# Patient Record
Sex: MALE | Race: WHITE | NOT HISPANIC OR LATINO | Employment: OTHER | URBAN - NONMETROPOLITAN AREA
[De-identification: names, ages, dates, MRNs, and addresses within clinical notes are randomized per-mention and may not be internally consistent; named-entity substitution may affect disease eponyms.]

---

## 2017-05-15 DIAGNOSIS — D64.9 ANEMIA: ICD-10-CM

## 2017-05-15 DIAGNOSIS — E78.5 HYPERLIPIDEMIA: ICD-10-CM

## 2017-05-15 DIAGNOSIS — I10 ESSENTIAL (PRIMARY) HYPERTENSION: ICD-10-CM

## 2017-05-15 DIAGNOSIS — R73.09 OTHER ABNORMAL GLUCOSE: ICD-10-CM

## 2017-05-18 ENCOUNTER — TRANSCRIBE ORDERS (OUTPATIENT)
Dept: LAB | Facility: MEDICAL CENTER | Age: 82
End: 2017-05-18

## 2017-05-18 ENCOUNTER — APPOINTMENT (OUTPATIENT)
Dept: LAB | Facility: MEDICAL CENTER | Age: 82
End: 2017-05-18
Payer: MEDICARE

## 2017-05-18 DIAGNOSIS — D64.9 ANEMIA: ICD-10-CM

## 2017-05-18 DIAGNOSIS — E78.5 HYPERLIPIDEMIA: ICD-10-CM

## 2017-05-18 DIAGNOSIS — I10 ESSENTIAL (PRIMARY) HYPERTENSION: ICD-10-CM

## 2017-05-18 DIAGNOSIS — R73.09 OTHER ABNORMAL GLUCOSE: ICD-10-CM

## 2017-05-18 LAB
25(OH)D3 SERPL-MCNC: 20.1 NG/ML (ref 30–100)
ALBUMIN SERPL BCP-MCNC: 4.3 G/DL (ref 3.5–5)
ALP SERPL-CCNC: 71 U/L (ref 46–116)
ALT SERPL W P-5'-P-CCNC: 18 U/L (ref 12–78)
ANION GAP SERPL CALCULATED.3IONS-SCNC: 6 MMOL/L (ref 4–13)
AST SERPL W P-5'-P-CCNC: 22 U/L (ref 5–45)
BILIRUB SERPL-MCNC: 0.61 MG/DL (ref 0.2–1)
BUN SERPL-MCNC: 34 MG/DL (ref 5–25)
CALCIUM SERPL-MCNC: 9 MG/DL (ref 8.3–10.1)
CHLORIDE SERPL-SCNC: 104 MMOL/L (ref 100–108)
CHOLEST SERPL-MCNC: 157 MG/DL (ref 50–200)
CO2 SERPL-SCNC: 27 MMOL/L (ref 21–32)
CREAT SERPL-MCNC: 2.21 MG/DL (ref 0.6–1.3)
ERYTHROCYTE [DISTWIDTH] IN BLOOD BY AUTOMATED COUNT: 13.2 % (ref 11.6–15.1)
EST. AVERAGE GLUCOSE BLD GHB EST-MCNC: 114 MG/DL
GFR SERPL CREATININE-BSD FRML MDRD: 28.7 ML/MIN/1.73SQ M
GLUCOSE P FAST SERPL-MCNC: 93 MG/DL (ref 65–99)
HBA1C MFR BLD: 5.6 % (ref 4.2–6.3)
HCT VFR BLD AUTO: 42 % (ref 36.5–49.3)
HDLC SERPL-MCNC: 29 MG/DL (ref 40–60)
HGB BLD-MCNC: 14.2 G/DL (ref 12–17)
LDLC SERPL CALC-MCNC: 78 MG/DL (ref 0–100)
MCH RBC QN AUTO: 30.9 PG (ref 26.8–34.3)
MCHC RBC AUTO-ENTMCNC: 33.8 G/DL (ref 31.4–37.4)
MCV RBC AUTO: 92 FL (ref 82–98)
PLATELET # BLD AUTO: 222 THOUSANDS/UL (ref 149–390)
PMV BLD AUTO: 10.9 FL (ref 8.9–12.7)
POTASSIUM SERPL-SCNC: 4.8 MMOL/L (ref 3.5–5.3)
PROT SERPL-MCNC: 7.9 G/DL (ref 6.4–8.2)
RBC # BLD AUTO: 4.59 MILLION/UL (ref 3.88–5.62)
SODIUM SERPL-SCNC: 137 MMOL/L (ref 136–145)
TRIGL SERPL-MCNC: 252 MG/DL
WBC # BLD AUTO: 9.28 THOUSAND/UL (ref 4.31–10.16)

## 2017-05-18 PROCEDURE — 80053 COMPREHEN METABOLIC PANEL: CPT

## 2017-05-18 PROCEDURE — 85027 COMPLETE CBC AUTOMATED: CPT

## 2017-05-18 PROCEDURE — 83036 HEMOGLOBIN GLYCOSYLATED A1C: CPT

## 2017-05-18 PROCEDURE — 36415 COLL VENOUS BLD VENIPUNCTURE: CPT

## 2017-05-18 PROCEDURE — 82306 VITAMIN D 25 HYDROXY: CPT

## 2017-05-18 PROCEDURE — 80061 LIPID PANEL: CPT

## 2017-05-25 ENCOUNTER — ALLSCRIPTS OFFICE VISIT (OUTPATIENT)
Dept: OTHER | Facility: OTHER | Age: 82
End: 2017-05-25

## 2017-05-25 ENCOUNTER — TRANSCRIBE ORDERS (OUTPATIENT)
Dept: ADMINISTRATIVE | Facility: HOSPITAL | Age: 82
End: 2017-05-25

## 2017-05-25 DIAGNOSIS — M54.17 LUMBOSACRAL NEURITIS: Primary | ICD-10-CM

## 2017-06-07 ENCOUNTER — HOSPITAL ENCOUNTER (OUTPATIENT)
Dept: MRI IMAGING | Facility: HOSPITAL | Age: 82
Discharge: HOME | End: 2017-06-07
Attending: INTERNAL MEDICINE
Payer: MEDICARE

## 2017-06-07 DIAGNOSIS — M54.17 LUMBOSACRAL NEURITIS: ICD-10-CM

## 2017-06-07 PROCEDURE — 72148 MRI LUMBAR SPINE W/O DYE: CPT

## 2017-06-29 ENCOUNTER — ALLSCRIPTS OFFICE VISIT (OUTPATIENT)
Dept: OTHER | Facility: OTHER | Age: 82
End: 2017-06-29

## 2017-06-29 DIAGNOSIS — M54.16 RADICULOPATHY OF LUMBAR REGION: ICD-10-CM

## 2017-07-05 ENCOUNTER — APPOINTMENT (OUTPATIENT)
Dept: PHYSICAL THERAPY | Facility: CLINIC | Age: 82
End: 2017-07-05
Payer: MEDICARE

## 2017-07-05 PROCEDURE — 97140 MANUAL THERAPY 1/> REGIONS: CPT

## 2017-07-05 PROCEDURE — G8979 MOBILITY GOAL STATUS: HCPCS

## 2017-07-05 PROCEDURE — 97162 PT EVAL MOD COMPLEX 30 MIN: CPT

## 2017-07-05 PROCEDURE — G8978 MOBILITY CURRENT STATUS: HCPCS

## 2017-07-05 PROCEDURE — 97110 THERAPEUTIC EXERCISES: CPT

## 2017-07-07 ENCOUNTER — GENERIC CONVERSION - ENCOUNTER (OUTPATIENT)
Dept: OTHER | Facility: OTHER | Age: 82
End: 2017-07-07

## 2017-07-10 ENCOUNTER — APPOINTMENT (OUTPATIENT)
Dept: PHYSICAL THERAPY | Facility: CLINIC | Age: 82
End: 2017-07-10
Payer: MEDICARE

## 2017-07-10 PROCEDURE — 97110 THERAPEUTIC EXERCISES: CPT

## 2017-07-10 PROCEDURE — 97140 MANUAL THERAPY 1/> REGIONS: CPT

## 2017-07-11 ENCOUNTER — APPOINTMENT (OUTPATIENT)
Dept: PHYSICAL THERAPY | Facility: CLINIC | Age: 82
End: 2017-07-11
Payer: MEDICARE

## 2017-07-11 PROCEDURE — 97140 MANUAL THERAPY 1/> REGIONS: CPT

## 2017-07-11 PROCEDURE — 97110 THERAPEUTIC EXERCISES: CPT

## 2017-07-12 ENCOUNTER — ALLSCRIPTS OFFICE VISIT (OUTPATIENT)
Dept: RADIOLOGY | Facility: CLINIC | Age: 82
End: 2017-07-12
Payer: MEDICARE

## 2017-07-13 ENCOUNTER — APPOINTMENT (OUTPATIENT)
Dept: PHYSICAL THERAPY | Facility: CLINIC | Age: 82
End: 2017-07-13
Payer: MEDICARE

## 2017-07-13 PROCEDURE — 97140 MANUAL THERAPY 1/> REGIONS: CPT

## 2017-07-13 PROCEDURE — 97110 THERAPEUTIC EXERCISES: CPT

## 2017-07-18 ENCOUNTER — APPOINTMENT (OUTPATIENT)
Dept: PHYSICAL THERAPY | Facility: CLINIC | Age: 82
End: 2017-07-18
Payer: MEDICARE

## 2017-07-18 PROCEDURE — 97140 MANUAL THERAPY 1/> REGIONS: CPT

## 2017-07-18 PROCEDURE — 97110 THERAPEUTIC EXERCISES: CPT

## 2017-07-19 ENCOUNTER — GENERIC CONVERSION - ENCOUNTER (OUTPATIENT)
Dept: OTHER | Facility: OTHER | Age: 82
End: 2017-07-19

## 2017-07-20 ENCOUNTER — APPOINTMENT (OUTPATIENT)
Dept: PHYSICAL THERAPY | Facility: CLINIC | Age: 82
End: 2017-07-20
Payer: MEDICARE

## 2017-07-20 PROCEDURE — 97110 THERAPEUTIC EXERCISES: CPT

## 2017-07-20 PROCEDURE — 97140 MANUAL THERAPY 1/> REGIONS: CPT

## 2017-08-08 ENCOUNTER — ALLSCRIPTS OFFICE VISIT (OUTPATIENT)
Dept: OTHER | Facility: OTHER | Age: 82
End: 2017-08-08

## 2017-10-03 ENCOUNTER — ALLSCRIPTS OFFICE VISIT (OUTPATIENT)
Dept: OTHER | Facility: OTHER | Age: 82
End: 2017-10-03

## 2017-10-03 DIAGNOSIS — I10 ESSENTIAL (PRIMARY) HYPERTENSION: ICD-10-CM

## 2017-10-03 DIAGNOSIS — I25.10 ATHEROSCLEROTIC HEART DISEASE OF NATIVE CORONARY ARTERY WITHOUT ANGINA PECTORIS: ICD-10-CM

## 2017-10-03 DIAGNOSIS — E78.5 HYPERLIPIDEMIA: ICD-10-CM

## 2017-10-03 DIAGNOSIS — N18.30 CHRONIC KIDNEY DISEASE, STAGE III (MODERATE) (HCC): ICD-10-CM

## 2017-10-04 NOTE — PROGRESS NOTES
Assessment  1  Never a smoker  2  Screening for neurological condition (V80 09) (Z13 89)  3  Screening for genitourinary condition (V81 6) (Z13 89)  4  Atherosclerotic 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 (414 01)   (I25 10)1   5  Chronic kidney disease, stage 3 (585 3) (N18 3)1   6  Benign essential hypertension (401 1) (I10)1      1 Amended By: Brain Holly; Oct 03 2017 3:36 PM EST    Plan  Atherosclerotic 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, Benign  essential hypertension    · (1) LIPID PANEL, FASTING; Status:Active; Requested SHT:[id number]; 1   Atherosclerotic 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, Benign  essential hypertension, Chronic kidney disease, stage 3    · Follow-up visit in 6 months Evaluation and Treatment  Follow-up  Status: Hold For -  Scheduling  Requested for: 03Oct20173   Benign essential hypertension, Chronic kidney disease, stage 3    · (1) COMPREHENSIVE METABOLIC PANEL; Status:Active; Requested for:03Oct2017; 1    · (1) MICROALBUMIN CREATININE RATIO, RANDOM URINE; Status:Active; Requested  WAZ:[id number]; 2   Benign essential hypertension, Chronic kidney disease, stage 3, Dyslipidemia    · (1) HEMOGLOBIN A1C; Status:Active; Requested WOR:[id number]; 1   Screening for genitourinary condition    · *VB - Urinary Incontinence Screen (Dx Z13 89 Screen for UI); Status:Complete -  Retrospective By Protocol Authorization;   Done: 03QPJ6942 03:17PM  Screening for neurological condition    · *VB - Fall Risk Assessment  (Dx Z13 89 Screen for Neurologic Disorder);  Status:Complete - Retrospective By Protocol Authorization;   Done: 62FDP4422 03:16PM     1 Amended By: Brain Holly; Oct 03 2017 3:37 PM EST   2 Amended By: Brain Holly; Oct 03 2017 3:39 PM EST   3 Amended By: Brain Holly;  Oct 03 2017 3:40 PM EST    Discussion/Summary  Surgical Clearance: He is at a LOW risk from a cardiovascular standpoint at this time without any additional cardiac testing  Reevaluation needed, if he should present with symptoms prior to surgery/procedure  Surgical clearance faxed to Dr Harrie Gottron   Possible side effects of new medications were reviewed with the patient/guardian today  The treatment plan was reviewed with the patient/guardian  The patient/guardian understands and agrees with the treatment plan         Self Referrals: No      Chief Complaint  Pt presents for pre op eval  Pt is having cataract surgery upcoming on 10/18  Pt feels well and ready for his surgery he states  NO falls  No refills needed today  Appetite is normal per patient  Flu vaccine requested today  Advance Directives  Advance Directive [facility]:   The patient is in agreement to receive the Advance Care Planning service-   NO - Patient does not have an advance health care directive  Capacity/Competence: This patient has full decision making capacity for discussion of advance care planning-and-This patient has full decision making competency for discussion of advance care planning  Review of Systems    Preventive Quality 65 and Older: Falls Risk: The patient fell times in the past 12 months  The patient is currently asymptomatic Symptoms Include:  Associated symptoms:  No associated symptoms are reported  The patient currently has no urinary incontinence symptoms  Active Problems  1  Advance directive discussed with patient (V65 49) (Z71 89)  2  Anemia (285 9) (D64 9)  3  History of Aortic stenosis, severe (424 1) (I35 0)  4  Asthma (493 90) (J45 909)  5  Atherosclerotic 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 (414 01)   (I25 10)  6  Benign essential hypertension (401 1) (I10)  7  Chronic kidney disease, stage 3 (585 3) (N18 3)  8  Duodenal ulcer (532 90) (K26 9)  9  Dyslipidemia (272 4) (E78 5)  10  Emphysema (492 8) (J43 9)  11  Esophageal ulcer (530 20) (K22 10)  12   Exertional shortness of breath (786 05) (R06 02)  13  GERD (gastroesophageal reflux disease) (530 81) (K21 9)  14  Iron deficiency anemia (280 9) (D50 9)  15  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)  16  Lumbar degenerative disc disease (722 52) (M51 36)  17  Lumbar herniated disc (722 10) (M51 26)  18  Lumbar radiculopathy (724 4) (M54 16)  19  Lumbar spondylosis (721 3) (M47 816)  20  Lumbar stenosis with neurogenic claudication (724 03) (M48 062)  21  Lumbosacral radiculopathy (724 4) (M54 17)  22  Lung disease, occupational (518 89) (J98 4)  23  Mitral regurgitation (424 0) (I34 0)  24  Need for influenza vaccination (V04 81) (Z23)  25  Obesity (BMI 30-39 9) (278 00) (E66 9)  26  Other symptoms involving cardiovascular system (785 9) (R09 89)  27  Screening for depression (V79 0) (Z13 89)    Past Medical History   · History of Aortic stenosis, severe (424 1) (I35 0)   · History of Encounter for screening colonoscopy (V76 51) (Z12 11)   · History of aortic valve disorder (V12 59) (Z86 79)   · History of transient cerebral ischemia (V12 54) (Z86 73)   · 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)    The active problems and past medical history were reviewed and updated today  Surgical History   · History of Aortic Valve Replacement   · History of CABG   · History of Cataract Surgery   · History of Enteroscopic Polypectomy   · History of Tonsillectomy    The surgical history was reviewed and updated today  Family History  Mother    · No pertinent family history  Father    · No pertinent family history  Family History    · Denied: Family history of coronary artery disease    The family history was reviewed and updated today         Social History   · Caffeine use (V49 89) (F15 90)   · Dental care, regularly   · Denied: History of Drug use   · Lives independently with spouse   ·    · Never a smoker   · No drug use   · Retired   · Social alcohol use (Z78 9)   · Uses Safety Equipment - Seatbelts  The social history was reviewed and updated today  The social history was reviewed and is unchanged  Current Meds  1  Aspirin 325 MG Oral Tablet; Take 1 tablet daily Recorded  2  Lovastatin 40 MG Oral Tablet; TAKE 1 TABLET DAILY; Therapy: 54SLU0628 to [phone])  Requested for: 26Oct2016; Last   Rx:26Oct2016 Ordered  3  Metoprolol Tartrate 25 MG Oral Tablet; TAKE 1 & 1/2 TABLETS BY MOUTH EVERY 12   HOURS -MAY CAUSE DROWSINESS; Therapy: 34VWG7964 to (Evaluate:04Jun2017)  Requested for: 23YRX1965; Last   Rx:06Mar2017 Ordered    The medication list was reviewed and updated today  Allergies  1  Phenergan SOLN    Vitals   Recorded: 03TYB0059 03:35PM Recorded: 07BEH1275 03:11PM   Temperature 1 96 3 F, Tympanic   Heart Rate 1 63   Systolic 6839 1   Diastolic 861 1   Height 1 5 ft 5 in   Weight 1 205 lb    BMI Calculated 1 34 11   BSA Calculated 1 2   O2 Saturation 1 97, RA       1  Amended By: Emiliano Lind; Oct 03 2017 3:35 PM EST   Results/Data  Falls Risk Assessment (Dx Z13 89 Screen for Neurologic Disorder) 17SYT0521 03:17PM User, Ahs     Test Name Result Flag Reference   Falls Risk      No falls in the past year     *VB - Urinary Incontinence Screen (Dx Z13 89 Screen for UI) 48LQQ3279 03:17PM Emiliano Rathke     Test Name Result Flag Reference   Urinary Incontinence Assessment [id number]       *VB - Fall Risk Assessment  (Dx Z13 89 Screen for Neurologic Disorder) 06SKN0220 03:16PM Emiliano [facility]     Test Name Result Flag Reference   Falls Risk      No falls in the past year       End of Encounter Meds  1  Lovastatin 40 MG Oral Tablet; TAKE 1 TABLET DAILY; Therapy: 64POR6298 to [phone])  Requested for: 26Oct2016; Last   Rx:26Oct2016 Ordered  2  Metoprolol Tartrate 25 MG Oral Tablet; TAKE 1 & 1/2 TABLETS BY MOUTH EVERY 12   HOURS -MAY CAUSE DROWSINESS; Therapy: 79PLZ2874 to (Evaluate:04Jun2017)  Requested for: 72BUA6042; Last   Rx:06Mar2017 Ordered  3  Aspirin 325 MG Oral Tablet;  Take 1 tablet daily Recorded    Signatures   Electronically signed by : Wanda Beltran DO; Oct  3 2017  3:40PM EST                       (Author)

## 2018-01-10 NOTE — CONSULTS
Chief Complaint  Pt presents for pre op eval  Pt is having cataract surgery upcoming on 10/18  Pt feels well and ready for his surgery he states  NO falls  No refills needed today  Appetite is normal per patient  Flu vaccine requested today  History of Present Illness  Pre-Op Visit (Brief): The patient is being seen for a preoperative visit and left cataract  Surgical Risk Assessment:   Prior Anesthesia: He had prior anesthesia, no prior adverse reaction to edidural anesthesia, no prior adverse reaction to spinal anesthesia and no prior adverse reaction to general anesthesia  Pertinent Past Medical History: htn  Exercise Capacity: able to walk four blocks without symptoms and able to walk two flights of stairs without symptoms  Lifestyle Factors: denies alcohol use, denies tobacco use and denies illegal drug use  Symptoms: no symptoms  Living Situation: no post-op concerns with his living situation  HPI: Pt has had decreased focus left eye  has felt well  No chest pain or sob  No acute issues over the summer      Review of Systems    Constitutional: No fever or chills, feels well, no tiredness, no recent weight gain or weight loss  Eyes: eyesight problems  ENT: no complaints of earache, no hearing loss, no nosebleeds, no nasal discharge, no sore throat, no hoarseness  Cardiovascular: No complaints of slow heart rate, no fast heart rate, no chest pain, no palpitations, no leg claudication, no lower extremity  Respiratory: No complaints of shortness of breath, no wheezing, no cough, no SOB on exertion, no orthopnea or PND  Gastrointestinal: No complaints of abdominal pain, no constipation, no nausea or vomiting, no diarrhea or bloody stools  Genitourinary: No complaints of dysuria, no incontinence, no hesitancy, no nocturia, no genital lesion, no testicular pain  Musculoskeletal: No complaints of arthralgia, no myalgias, no joint swelling or stiffness, no limb pain or swelling  Integumentary: No complaints of skin rash or skin lesions, no itching, no skin wound, no dry skin  Neurological: No compliants of headache, no confusion, no convulsions, no numbness or tingling, no dizziness or fainting, no limb weakness, no difficulty walking  Psychiatric: Is not suicidal, no sleep disturbances, no anxiety or depression, no change in personality, no emotional problems  Endocrine: No complaints of proptosis, no hot flashes, no muscle weakness, no erectile dysfunction, no deepening of the voice, no feelings of weakness  Hematologic/Lymphatic: No complaints of swollen glands, no swollen glands in the neck, does not bleed easily, no easy bruising  Preventive Quality 65 and Older: Falls Risk: The patient fell times in the past 12 months  The patient is currently asymptomatic Symptoms Include:  Associated symptoms:  No associated symptoms are reported  The patient currently has no urinary incontinence symptoms  Active Problems    1  Advance directive discussed with patient (V65 49) (Z71 89)   2  Anemia (285 9) (D64 9)   3  History of Aortic stenosis, severe (424 1) (I35 0)   4  Asthma (493 90) (J45 909)   5  Atherosclerotic 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 (414 01)   (I25 10)   6  Benign essential hypertension (401 1) (I10)   7  Chronic kidney disease, stage 3 (585 3) (N18 3)   8  Duodenal ulcer (532 90) (K26 9)   9  Dyslipidemia (272 4) (E78 5)   10  Emphysema (492 8) (J43 9)   11  Esophageal ulcer (530 20) (K22 10)   12  Exertional shortness of breath (786 05) (R06 02)   13  GERD (gastroesophageal reflux disease) (530 81) (K21 9)   14  Iron deficiency anemia (280 9) (D50 9)   15  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)   16  Lumbar degenerative disc disease (722 52) (M51 36)   17  Lumbar herniated disc (722 10) (M51 26)   18  Lumbar radiculopathy (724 4) (M54 16)   19  Lumbar spondylosis (721 3) (M47 816)   20   Lumbar stenosis with neurogenic claudication (724 03) (M48 062)   21  Lumbosacral radiculopathy (724 4) (M54 17)   22  Lung disease, occupational (518 89) (J98 4)   23  Mitral regurgitation (424 0) (I34 0)   24  Need for influenza vaccination (V04 81) (Z23)   25  Obesity (BMI 30-39 9) (278 00) (E66 9)   26  Other symptoms involving cardiovascular system (785 9) (R09 89)   27  Screening for depression (V79 0) (Z13 89)    Past Medical History    · History of Aortic stenosis, severe (424 1) (I35 0)   · History of Encounter for screening colonoscopy (V76 51) (Z12 11)   · History of aortic valve disorder (V12 59) (Z86 79)   · History of transient cerebral ischemia (V12 54) (Z86 73)   · 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)    The active problems and past medical history were reviewed and updated today  Surgical History    · History of Aortic Valve Replacement   · History of CABG   · History of Cataract Surgery   · History of Enteroscopic Polypectomy   · History of Tonsillectomy    The surgical history was reviewed and updated today  Family History    · No pertinent family history    · No pertinent family history    · Denied: Family history of coronary artery disease    The family history was reviewed and updated today  Social History    · Caffeine use (V49 89) (F15 90)   · Dental care, regularly   · Denied: History of Drug use   · Lives independently with spouse   ·    · Never a smoker   · No drug use   · Retired   · Social alcohol use (Z78 9)   · Uses Safety Equipment - Seatbelts  The social history was reviewed and updated today  The social history was reviewed and is unchanged  Current Meds   1  Aspirin 325 MG Oral Tablet; Take 1 tablet daily Recorded   2  Lovastatin 40 MG Oral Tablet; TAKE 1 TABLET DAILY; Therapy: 70NRZ8449 to (96 [id number])  Requested for: 26Oct2016; Last   Rx:26Oct2016 Ordered   3   Metoprolol Tartrate 25 MG Oral Tablet; TAKE 1 & 1/2 TABLETS BY MOUTH EVERY 12   HOURS -MAY CAUSE DROWSINESS; Therapy: 38UBI5973 to (Evaluate:04Jun2017)  Requested for: 31AXC3013; Last   Rx:06Mar2017 Ordered    The medication list was reviewed and updated today  Allergies    1  Phenergan SOLN    Vitals  Signs    Systolic: 646  Diastolic: 72   Temperature: 96 3 F, Tympanic  Heart Rate: 63  Height: 5 ft 5 in  Weight: 205 lb   BMI Calculated: 34 11  BSA Calculated: 2  O2 Saturation: 97, RA    Physical Exam    Constitutional   General appearance: No acute distress, well appearing and well nourished  Head and Face   Head and face: Normal     Palpation of the face and sinuses: No sinus tenderness  Eyes   Conjunctiva and lids: No erythema, swelling or discharge  Pupils and irises: Equal, round, reactive to light  Ophthalmoscopic examination: Normal fundi and optic discs  Ears, Nose, Mouth, and Throat   External inspection of ears and nose: Normal     Otoscopic examination: Tympanic membranes translucent with normal light reflex  Canals patent without erythema  Hearing: Normal     Nasal mucosa, septum, and turbinates: Normal without edema or erythema  Lips, teeth, and gums: Normal, good dentition  Oropharynx: Normal with no erythema, edema, exudate or lesions  Neck   Neck: Supple, symmetric, trachea midline, no masses  Thyroid: Normal, no thyromegaly  Pulmonary   Respiratory effort: No increased work of breathing or signs of respiratory distress  Percussion of chest: Normal     Palpation of chest: Normal     Auscultation of lungs: Clear to auscultation  Cardiovascular   Palpation of heart: Normal PMI, no thrills  Auscultation of heart: Normal rate and rhythm, normal S1 and S2, no murmurs  Carotid pulses: 2+ bilaterally  Abdominal aorta: Normal     Femoral pulses: 2+ bilaterally  Pedal pulses: 2+ bilaterally  Peripheral vascular exam: Normal     Examination of extremities for edema and/or varicosities: Normal     Abdomen   Abdomen: Non-tender, no masses  Liver and spleen: No hepatomegaly or splenomegaly  Examination for hernias: No hernias appreciated  Lymphatic   Palpation of lymph nodes in neck: No lymphadenopathy  Palpation of lymph nodes in axillae: No lymphadenopathy  Palpation of lymph nodes in groin: No lymphadenopathy  Palpation of lymph nodes in other areas: No lymphadenopathy  Musculoskeletal   Gait and station: Normal     Inspection/palpation of digits and nails: Normal without clubbing or cyanosis  Inspection/palpation of joints, bones, and muscles: Normal     Range of motion: Normal     Stability: Normal     Muscle strength/tone: Normal     Skin   Skin and subcutaneous tissue: Normal without rashes or lesions  Palpation of skin and subcutaneous tissue: Normal turgor  Neurologic   Cranial nerves: Cranial nerves 2-12 intact  Cortical function: Normal mental status  Reflexes: 2+ and symmetric  Sensation: No sensory loss  Coordination: Normal finger to nose and heel to shin  Psychiatric   Judgment and insight: Normal     Orientation to person, place and time: Normal     Recent and remote memory: Intact  Mood and affect: Normal        Results/Data  Falls Risk Assessment (Dx Z13 89 Screen for Neurologic Disorder) 49SSD3781 03:17PM User, Ahs     Test Name Result Flag Reference   Falls Risk      No falls in the past year     *VB - Urinary Incontinence Screen (Dx Z13 89 Screen for UI) 47RTX9757 03:17PM Sherin Fan     Test Name Result Flag Reference   Urinary Incontinence Assessment [id number]       *VB - Fall Risk Assessment  (Dx Z13 89 Screen for Neurologic Disorder) 54GTZ0161 03:16PM Sherin Fan     Test Name Result Flag Reference   Falls Risk      No falls in the past year       Assessment    1  Never a smoker   2  Screening for neurological condition (V80 09) (Z13 89)   3  Screening for genitourinary condition (V81 6) (Z13 89)   4   Atherosclerotic 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 (414 01)   (I25 10)   5  Chronic kidney disease, stage 3 (585 3) (N18 3)   6  Benign essential hypertension (401 1) (I10)   7  Left cataract (366 9) (H26 9)    Plan  Atherosclerotic 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, Benign  essential hypertension    · (1) LIPID PANEL, FASTING; Status:Active; Requested GNQ:[id number]; Atherosclerotic 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, Benign  essential hypertension, Chronic kidney disease, stage 3    · Follow-up visit in 6 months Evaluation and Treatment  Follow-up  Status: Complete   Done: 03Oct2017  Atherosclerotic 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, Need for  influenza vaccination    · Fluzone High-Dose 0 5 ML Intramuscular Suspension Prefilled Syringe  Benign essential hypertension, Chronic kidney disease, stage 3    · (1) COMPREHENSIVE METABOLIC PANEL; Status:Active; Requested for:03Oct2017;    · (1) MICROALBUMIN CREATININE RATIO, RANDOM URINE; Status:Active; Requested  TWR:[id number];   Benign essential hypertension, Chronic kidney disease, stage 3, Dyslipidemia    · (1) HEMOGLOBIN A1C; Status:Active; Requested UGY:[id number];   Screening for genitourinary condition    · *VB - Urinary Incontinence Screen (Dx Z13 89 Screen for UI); Status:Complete;   Done:  51VWC7157 03:17PM  Screening for neurological condition    · *VB - Fall Risk Assessment  (Dx Z13 89 Screen for Neurologic Disorder);  Status:Complete;   Done: 06HDQ4100 03:16PM    Discussion/Summary  Surgical Clearance: He is at a LOW risk from a cardiovascular standpoint at this time without any additional cardiac testing  Reevaluation needed, if he should present with symptoms prior to surgery/procedure  Surgical clearance faxed to Dr Meredith Christianson   Pt stable for cataract sx  Encouraged weight loss and walking for exercise  Rx fbw  Flu shot today  Rto 6 months         Possible side effects of new medications were reviewed with the patient/guardian today  The treatment plan was reviewed with the patient/guardian  The patient/guardian understands and agrees with the treatment plan         Self Referrals: No      End of Encounter Meds    1  Lovastatin 40 MG Oral Tablet; TAKE 1 TABLET DAILY; Therapy: 71NZW7624 to (96 [id number])  Requested for: 26Oct2016; Last   Rx:26Oct2016 Ordered   2  Metoprolol Tartrate 25 MG Oral Tablet; TAKE 1 & 1/2 TABLETS BY MOUTH EVERY 12   HOURS -MAY CAUSE DROWSINESS; Therapy: 96CKV9093 to (Evaluate:31Ekx9400)  Requested for: 15OCQ0481; Last   Rx:06Mar2017 Ordered    3  Aspirin 325 MG Oral Tablet;  Take 1 tablet daily Recorded    Signatures   Electronically signed by : Lis Begum DO; Oct  3 2017  9:10PM EST                       (Author)

## 2018-01-12 VITALS
SYSTOLIC BLOOD PRESSURE: 136 MMHG | TEMPERATURE: 98.6 F | BODY MASS INDEX: 34.32 KG/M2 | HEIGHT: 65 IN | DIASTOLIC BLOOD PRESSURE: 72 MMHG | WEIGHT: 206 LBS

## 2018-01-13 VITALS
TEMPERATURE: 96.3 F | HEIGHT: 65 IN | SYSTOLIC BLOOD PRESSURE: 124 MMHG | DIASTOLIC BLOOD PRESSURE: 72 MMHG | OXYGEN SATURATION: 97 % | WEIGHT: 205 LBS | BODY MASS INDEX: 34.16 KG/M2 | HEART RATE: 63 BPM

## 2018-01-14 VITALS
DIASTOLIC BLOOD PRESSURE: 84 MMHG | SYSTOLIC BLOOD PRESSURE: 130 MMHG | BODY MASS INDEX: 34.68 KG/M2 | WEIGHT: 208.13 LBS | HEIGHT: 65 IN | TEMPERATURE: 97.7 F

## 2018-01-15 VITALS
TEMPERATURE: 97.6 F | DIASTOLIC BLOOD PRESSURE: 72 MMHG | HEIGHT: 65 IN | BODY MASS INDEX: 35.35 KG/M2 | OXYGEN SATURATION: 98 % | HEART RATE: 67 BPM | WEIGHT: 212.19 LBS | SYSTOLIC BLOOD PRESSURE: 128 MMHG

## 2018-01-15 NOTE — RESULT NOTES
Message   Recorded as Task   Date: 07/19/2017 08:14 AM, Created By: Margi Arana   Task Name: Follow Up   Assigned To: [facility],Team   Regarding Patient: Deonte Dubon, Status: Active   CommentLandry Mishra - 19 Jul 4251 9:18 AM     TASK CREATED  S/P  L4-5 LESI  ON 7/12/2017 W/ DR Luke Stanley - F/U SCHEDULED ON 8/8/2017   Zara Castorena - 19 Jul 2017 11:41 AM     TASK EDITED  spoke with pt, 60% relief and pain level is 4/10 following the procedure  Explained to the pt that it  could take up to 2weeks to get the full effect of the steriod   f/u scheduled on 08/08/2017   Lala Mckeon - 19 Jul 2017 12:08 PM     TASK REPLIED TO: Previously Assigned To [address] [place] [address]   Electronically signed by : Eleanor Mahajan, ; Jul 19 2017  1:01PM EST                       (Author)

## 2018-01-16 NOTE — MISCELLANEOUS
Message   Recorded as Task   Date: 07/06/2017 01:06 PM, Created By: Aftab Roach   Task Name: Miscellaneous   Assigned To: [facility],Team   Regarding Patient: Floyd Aguillon, Status: Active   CommentArcher Gamma - 06 Jul 2017 1:06 PM     TASK CREATED  Renetta wu pt: Pt is scheduled for LESI on 7/12 in [facility] with Dr Len Wright  Anti coag hold form was sent upon scheduling the appt, pt was advised to hold aspirin starting 7/6 by [facility] office  Anto coag form was still signed/sent back by Dr Isela Patel  Cardiology)  Called their officem obtained the fax number for the [facility] office where Dr Isela Patel is today, and faxed over- [phone]  Called pt, spoke to wife, OK per communication consent, and advised that if we do not receive the anti coag form signed by Dr Nowak Bring 7/6, pt is to continue aspirin and we will need to reschedule the procedure  Kellie Webb - 06 Jul 2017 3:05 PM     TASK EDITED  Received anticoagulant hold request consent by Dr Isela Paetl for aspirin  Nan Ribeiro - 06 Jul 2017 3:08 PM     TASK EDITED  LMOM on home/cell # for pt to C/B, C/B # provided  **when pt calls back need to advise him of previous task and to start holding the ASA today, verify that pt did not take ASA today  Nan Ribeiro - 06 Jul 2017 3:15 PM     TASK Tye Hutton - 06 Jul 2017 5:12 PM     TASK EDITED  LMOM on home/cell # for pt to C/B, C/B # provided and office hrs  Laura James - 07 Jul 2017 9:00 AM     TASK EDITED  phone call from patient's wife Neto Morales stating that patient did not take aspirin yesterday  Neto Oglesbyitzel can be reached at [phone]  Kellie Webb - 07 Jul 2017 9:34 AM     TASK EDITED  S/W Gosia-pt's wife as per release of health information  Advised her of the previous tasks  Neto Morales stated the pt last took the ASA 7/5/17 and the pt has been holding the ASA since 7/6/17  [facility] the pt needs to continue to hold the ASA for the procedure  Confirmed appt w/ Neto Morales verbalized understanding and stated she had no questions of the preoperative instructions that was given at the office visit  Liban Jefferson - 07 Jul 2017 12:55 PM     TASK REPLIED TO: Previously Assigned To Liban Jefferson                    Aware        Active Problems    1  Advance directive discussed with patient (V65 49) (Z71 89)   2  Anemia (285 9) (D64 9)   3  History of Aortic stenosis, severe (424 1) (I35 0)   4  Asthma (493 90) (J45 909)   5  Atherosclerotic 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 (414 01)   (I25 10)   6  Benign essential hypertension (401 1) (I10)   7  Chronic kidney disease, stage 3 (585 3) (N18 3)   8  Contact dermatitis (692 9) (L25 9)   9  Duodenal ulcer (532 90) (K26 9)   10  Dyslipidemia (272 4) (E78 5)   11  Emphysema (492 8) (J43 9)   12  Esophageal ulcer (530 20) (K22 10)   13  Exertional shortness of breath (786 05) (R06 02)   14  GERD (gastroesophageal reflux disease) (530 81) (K21 9)   15  Iron deficiency anemia (280 9) (D50 9)   16  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)   17  Lumbar degenerative disc disease (722 52) (M51 36)   18  Lumbar herniated disc (722 10) (M51 26)   19  Lumbar radiculopathy (724 4) (M54 16)   20  Lumbar spondylosis (721 3) (M47 816)   21  Lumbar stenosis with neurogenic claudication (724 03) (M48 06)   22  Lumbosacral radiculopathy (724 4) (M54 17)   23  Lung disease, occupational (518 89) (J98 4)   24  Mitral regurgitation (424 0) (I34 0)   25  Need for influenza vaccination (V04 81) (Z23)   26  Obesity (BMI 30-39 9) (278 00) (E66 9)   27  Other symptoms involving cardiovascular system (785 9) (R09 89)   28  Refuses tetanus, diphtheria, and acellular pertussis (TDaP) vaccination (V64 06)    (Z28 21)   29  Screening for depression (V79 0) (Z13 89)    Current Meds   1  Aspirin 325 MG Oral Tablet; Take 1 tablet daily Recorded   2  Lovastatin 40 MG Oral Tablet; TAKE 1 TABLET DAILY;    Therapy: 39UQZ4750 to (05 12 73 93 30)  Requested for: 65PHW2999; Last   Rx:26Oct2016 Ordered   3  Metoprolol Tartrate 25 MG Oral Tablet; TAKE 1 & 1/2 TABLETS BY MOUTH EVERY 12   HOURS -MAY CAUSE DROWSINESS; Therapy: 45EZX3138 to (Evaluate:04Jun2017)  Requested for: 41AEQ0699; Last   Rx:06Mar2017 Ordered    Allergies    1   Phenergan SOLN    Signatures   Electronically signed by : Susanne Dawson, ; Jul 7 2017 12:59PM EST                       (Author)

## 2018-05-16 DIAGNOSIS — I10 ESSENTIAL HYPERTENSION: Primary | ICD-10-CM

## 2018-07-16 ENCOUNTER — TELEPHONE (OUTPATIENT)
Dept: INTERNAL MEDICINE CLINIC | Facility: CLINIC | Age: 83
End: 2018-07-16

## 2018-07-16 NOTE — TELEPHONE ENCOUNTER
He has active orders would only add cbc but make sure all the previosouly ordered labs are done as well - they are active in the system

## 2018-08-07 ENCOUNTER — APPOINTMENT (OUTPATIENT)
Dept: LAB | Facility: MEDICAL CENTER | Age: 83
End: 2018-08-07
Payer: MEDICARE

## 2018-08-07 DIAGNOSIS — I10 ESSENTIAL (PRIMARY) HYPERTENSION: ICD-10-CM

## 2018-08-07 DIAGNOSIS — I10 ESSENTIAL HYPERTENSION: ICD-10-CM

## 2018-08-07 DIAGNOSIS — E78.5 HYPERLIPIDEMIA: ICD-10-CM

## 2018-08-07 DIAGNOSIS — N18.30 CHRONIC KIDNEY DISEASE, STAGE III (MODERATE) (HCC): ICD-10-CM

## 2018-08-07 DIAGNOSIS — I25.10 ATHEROSCLEROTIC HEART DISEASE OF NATIVE CORONARY ARTERY WITHOUT ANGINA PECTORIS: ICD-10-CM

## 2018-08-07 LAB
ALBUMIN SERPL BCP-MCNC: 3.8 G/DL (ref 3.5–5)
ALP SERPL-CCNC: 73 U/L (ref 46–116)
ALT SERPL W P-5'-P-CCNC: 16 U/L (ref 12–78)
ANION GAP SERPL CALCULATED.3IONS-SCNC: 5 MMOL/L (ref 4–13)
AST SERPL W P-5'-P-CCNC: 20 U/L (ref 5–45)
BILIRUB SERPL-MCNC: 0.6 MG/DL (ref 0.2–1)
BUN SERPL-MCNC: 25 MG/DL (ref 5–25)
CALCIUM SERPL-MCNC: 8.9 MG/DL (ref 8.3–10.1)
CHLORIDE SERPL-SCNC: 106 MMOL/L (ref 100–108)
CHOLEST SERPL-MCNC: 154 MG/DL (ref 50–200)
CO2 SERPL-SCNC: 26 MMOL/L (ref 21–32)
CREAT SERPL-MCNC: 1.9 MG/DL (ref 0.6–1.3)
CREAT UR-MCNC: 103 MG/DL
EST. AVERAGE GLUCOSE BLD GHB EST-MCNC: 111 MG/DL
GFR SERPL CREATININE-BSD FRML MDRD: 32 ML/MIN/1.73SQ M
GLUCOSE P FAST SERPL-MCNC: 85 MG/DL (ref 65–99)
HBA1C MFR BLD: 5.5 % (ref 4.2–6.3)
HDLC SERPL-MCNC: 27 MG/DL (ref 40–60)
LDLC SERPL CALC-MCNC: 71 MG/DL (ref 0–100)
MICROALBUMIN UR-MCNC: 119 MG/L (ref 0–20)
MICROALBUMIN/CREAT 24H UR: 116 MG/G CREATININE (ref 0–30)
NONHDLC SERPL-MCNC: 127 MG/DL
POTASSIUM SERPL-SCNC: 4.7 MMOL/L (ref 3.5–5.3)
PROT SERPL-MCNC: 7.6 G/DL (ref 6.4–8.2)
SODIUM SERPL-SCNC: 137 MMOL/L (ref 136–145)
TRIGL SERPL-MCNC: 279 MG/DL

## 2018-08-07 PROCEDURE — 83036 HEMOGLOBIN GLYCOSYLATED A1C: CPT

## 2018-08-07 PROCEDURE — 80061 LIPID PANEL: CPT

## 2018-08-07 PROCEDURE — 82043 UR ALBUMIN QUANTITATIVE: CPT

## 2018-08-07 PROCEDURE — 36415 COLL VENOUS BLD VENIPUNCTURE: CPT

## 2018-08-07 PROCEDURE — 82570 ASSAY OF URINE CREATININE: CPT

## 2018-08-07 PROCEDURE — 80053 COMPREHEN METABOLIC PANEL: CPT

## 2018-08-14 ENCOUNTER — OFFICE VISIT (OUTPATIENT)
Dept: INTERNAL MEDICINE CLINIC | Facility: CLINIC | Age: 83
End: 2018-08-14
Payer: MEDICARE

## 2018-08-14 VITALS
WEIGHT: 210 LBS | BODY MASS INDEX: 34.99 KG/M2 | SYSTOLIC BLOOD PRESSURE: 124 MMHG | HEIGHT: 65 IN | DIASTOLIC BLOOD PRESSURE: 70 MMHG

## 2018-08-14 DIAGNOSIS — Z00.00 MEDICARE ANNUAL WELLNESS VISIT, SUBSEQUENT: Primary | ICD-10-CM

## 2018-08-14 DIAGNOSIS — E78.2 MIXED HYPERLIPIDEMIA: ICD-10-CM

## 2018-08-14 DIAGNOSIS — I10 ESSENTIAL HYPERTENSION: ICD-10-CM

## 2018-08-14 PROBLEM — M51.369 LUMBAR DEGENERATIVE DISC DISEASE: Status: ACTIVE | Noted: 2017-06-29

## 2018-08-14 PROBLEM — M51.36 LUMBAR DEGENERATIVE DISC DISEASE: Status: ACTIVE | Noted: 2017-06-29

## 2018-08-14 PROBLEM — M54.16 LUMBAR RADICULOPATHY: Status: ACTIVE | Noted: 2017-06-29

## 2018-08-14 PROCEDURE — G0439 PPPS, SUBSEQ VISIT: HCPCS | Performed by: INTERNAL MEDICINE

## 2018-08-14 RX ORDER — LOVASTATIN 40 MG/1
40 TABLET ORAL DAILY
COMMUNITY
Start: 2016-05-12 | End: 2018-08-14 | Stop reason: SDUPTHER

## 2018-08-14 RX ORDER — ASPIRIN 325 MG
1 TABLET ORAL DAILY
COMMUNITY
End: 2020-08-05 | Stop reason: HOSPADM

## 2018-08-14 RX ORDER — LOVASTATIN 40 MG/1
40 TABLET ORAL
Qty: 90 TABLET | Refills: 3 | Status: SHIPPED | OUTPATIENT
Start: 2018-08-14 | End: 2018-11-07 | Stop reason: SDUPTHER

## 2018-08-14 NOTE — PATIENT INSTRUCTIONS

## 2018-08-14 NOTE — PROGRESS NOTES
Assessment and Plan:    Problem List Items Addressed This Visit     Medicare annual wellness visit, subsequent - Primary      Other Visit Diagnoses     Mixed hyperlipidemia        Relevant Medications    lovastatin (MEVACOR) 40 MG tablet    Essential hypertension        Relevant Medications    metoprolol tartrate (LOPRESSOR) 25 mg tablet        Health Maintenance Due   Topic Date Due    Depression Screening PHQ-9  1935    Medicare Annual Wellness Visit (AWV)  1935    DTaP,Tdap,and Td Vaccines (1 - Tdap) 11/27/1956    GLAUCOMA SCREENING 65 + YR  11/27/2002         HPI:  Mandeep Dominguez is a 80 y o  male here for his Subsequent Wellness Visit  Patient Active Problem List   Diagnosis    Anemia    Asthma    Atherosclerotic 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    Benign essential hypertension    Chronic kidney disease, stage 3    Dyslipidemia    GERD (gastroesophageal reflux disease)    Late effects of cerebrovascular disease    Lumbar degenerative disc disease    Lumbar radiculopathy    Mitral regurgitation    Obesity (BMI 30-39  9)    Medicare annual wellness visit, subsequent     Past Medical History:   Diagnosis Date    Aortic stenosis     ECHO -4-14-14  MODERATE TO SEVERE AORTIC STENOSIS; MILD AROTIC INSUFFICIENCY - LAST ASSESSED 10/26/16; RESOLVED 6/8/16    Aortic valve disorder     LAST ASSESSED 10/8/15; 10/8/15    Transient cerebral ischemia      Past Surgical History:   Procedure Laterality Date    AORTIC VALVE REPLACEMENT  06/30/2015    avr with 23 mm [place] Magna Ease bioprosthetic    CATARACT EXTRACTION Right 06/05/2000    CORONARY ARTERY BYPASS GRAFT  06/05/2000    x1 with lima  to lad    POLYPECTOMY  04/2014    enteroscopic - esophageal ulcer, gastric erosion, and duodenal ulcer       TONSILLECTOMY      unknown     Family History   Problem Relation Age of Onset    No Known Problems Mother     No Known Problems Father     Coronary artery disease Neg Hx      History   Smoking Status    Never Smoker   Smokeless Tobacco    Not on file     History   Alcohol Use    Yes     Comment: social      History   Drug Use No       Current Outpatient Prescriptions   Medication Sig Dispense Refill    aspirin 325 mg tablet Take 1 tablet by mouth daily      lovastatin (MEVACOR) 40 MG tablet Take 1 tablet (40 mg total) by mouth daily at bedtime 90 tablet 3    metoprolol tartrate (LOPRESSOR) 25 mg tablet Take 1 tablet (25 mg total) by mouth every 12 (twelve) hours for 90 days 90 tablet 3     No current facility-administered medications for this visit  Allergies   Allergen Reactions    Promethazine Delerium     Immunization History   Administered Date(s) Administered    Influenza 10/26/2016    Influenza Split High Dose Preservative Free IM 11/07/2012, 11/03/2014, 10/08/2015, 10/26/2016, 10/03/2017    Influenza TIV (IM) 11/04/2013    Pneumococcal Polysaccharide PPV23 11/07/2006    Patient labs reviewed  Decrease snacking and increase activity encouraged  He does not believe he had chicken pox - aware of immunity testing if wants to consider shingrix  Flu shot when available  Rto 6 months/prn  Echo in spring since patient no longer follows with cardio     Patient Care Team:  Carlos Cancer,  as PCP - [facility], [address] [place], [place] [address]MD Gonzalez CancerDO Suly DO    Medicare Screening Tests and Risk Assessments:  Last Medicare Wellness visit information reviewed, patient interviewed, no change since last AWV  Health Risk Assessment:  Patient rates overall health as very good  Patient feels that their physical health rating is Much better  Eyesight was rated as Same  Hearing was rated as Same  Patient feels that their emotional and mental health rating is Same  Pain experienced by patient in the last 7 days has been None  Patient states that he has experienced no weight loss or gain in last 6 months       Emotional/Mental Health:  Patient has been feeling nervous/anxious  PHQ-9 Depression Screening:    Frequency of the following problems over the past two weeks:      1  Little interest or pleasure in doing things: 0 - not at all      2  Feeling down, depressed, or hopeless: 0 - not at all  PHQ-2 Score: 0          Broken Bones/Falls: Fall Risk Assessment:    In the past year, patient has experienced: No history of falling in past year          Bladder/Bowel:  Patient has not leaked urine accidently in the last six months  Patient reports no loss of bowel control  Immunizations:  Patient has had a flu vaccination within the last year  Patient has received a pneumonia shot  Patient has not received a shingles shot  Patient has not received tetanus/diphtheria shot  Home Safety:  Patient does not have trouble with stairs inside or outside of their home  Patient currently reports that there are no safety hazards present in home, working smoke alarms, working carbon monoxide detectors  Preventative Screenings:   no prostate cancer screen performed, no colon cancer screen completed, cholesterol screen completed, glaucoma eye exam completed, (Additional Comments: Patient had eye exam in July not sure of exact date)    Nutrition:  Current diet: Low Carb and No Added Salt with servings of the following:    Medications:  Patient is not currently taking any over-the-counter supplements  Patient is able to manage medications  Lifestyle Choices:  Patient reports no tobacco use  Patient has not smoked or used tobacco in the past   Patient reports no alcohol use  Patient drives a vehicle  Patient wears seat belt          Activities of Daily Living:  Can get out of bed by his or her self, able to dress self, able to make own meals, able to do own shopping, able to bathe self, can do own laundry/housekeeping, can manage own money, pay bills and track expenses    Previous Hospitalizations:  No hospitalization or ED visit in past 12 months        Advanced Directives:  Patient has decided on a power of   Patient has spoken to designated power of   Patient has completed advanced directive  Additional Comments: Requested copy of AD for chart    Preventative Screening/Counseling:      Cardiovascular:      General: Screening Current      Counseling: Healthy Diet, Healthy Weight and Improve Exercise Tolerance          Diabetes:      General: Screening Current      Counseling: Healthy Diet, Healthy Weight and Improve Physical Activity          Colorectal Cancer:      General: Screening Not Indicated      Counseling: high fiber diet          Prostate Cancer:      General: Screening Not Indicated          Osteoporosis:      General: Patient Declines      Counseling: Calcium and Vitamin D Intake and Regular Weightbearing Exercise          AAA:  Male patient with age over [de-identified] years  Glaucoma:      General: Screening Current          HIV:      General: Screening Not Indicated          Hepatitis C:      General: Screening Current        Advanced Directives:   Patient has living will for healthcare, has durable POA for healthcare, patient has an advanced directive  Information on ACP and/or AD not provided  No 5 wishes given  End of life assessment reviewed with patient  Provider agrees with end of life decisions    Additional Comments: Requested copy of AD for chart    Immunizations:      Influenza: Influenza UTD This Year      Pneumococcal: Lifetime Vaccine Completed      Shingrix: Patient Declines      Hepatitis B (Low risk patients): Series Not Indicated      Zostavax: Patient Declines      TD: Vaccine Status Unknown      TDAP: Vaccine Status Unknown

## 2018-09-27 DIAGNOSIS — I10 ESSENTIAL HYPERTENSION: ICD-10-CM

## 2018-10-09 ENCOUNTER — IMMUNIZATION (OUTPATIENT)
Dept: INTERNAL MEDICINE CLINIC | Facility: CLINIC | Age: 83
End: 2018-10-09
Payer: MEDICARE

## 2018-10-09 DIAGNOSIS — Z23 ENCOUNTER FOR IMMUNIZATION: ICD-10-CM

## 2018-10-09 PROCEDURE — 90662 IIV NO PRSV INCREASED AG IM: CPT

## 2018-10-09 PROCEDURE — G0008 ADMIN INFLUENZA VIRUS VAC: HCPCS

## 2018-11-06 DIAGNOSIS — I10 ESSENTIAL HYPERTENSION: ICD-10-CM

## 2018-11-07 DIAGNOSIS — E78.2 MIXED HYPERLIPIDEMIA: ICD-10-CM

## 2018-11-07 RX ORDER — LOVASTATIN 40 MG/1
TABLET ORAL
Qty: 90 TABLET | Refills: 2 | Status: SHIPPED | OUTPATIENT
Start: 2018-11-07 | End: 2019-05-22 | Stop reason: HOSPADM

## 2019-05-19 ENCOUNTER — APPOINTMENT (EMERGENCY)
Dept: CT IMAGING | Facility: HOSPITAL | Age: 84
DRG: 064 | End: 2019-05-19
Payer: MEDICARE

## 2019-05-19 ENCOUNTER — HOSPITAL ENCOUNTER (INPATIENT)
Facility: HOSPITAL | Age: 84
LOS: 3 days | DRG: 064 | End: 2019-05-22
Attending: EMERGENCY MEDICINE | Admitting: INTERNAL MEDICINE
Payer: MEDICARE

## 2019-05-19 ENCOUNTER — APPOINTMENT (INPATIENT)
Dept: RADIOLOGY | Facility: HOSPITAL | Age: 84
DRG: 064 | End: 2019-05-19
Payer: MEDICARE

## 2019-05-19 ENCOUNTER — APPOINTMENT (EMERGENCY)
Dept: RADIOLOGY | Facility: HOSPITAL | Age: 84
DRG: 064 | End: 2019-05-19
Payer: MEDICARE

## 2019-05-19 DIAGNOSIS — N18.30 CHRONIC KIDNEY DISEASE, STAGE 3 (HCC): ICD-10-CM

## 2019-05-19 DIAGNOSIS — I16.0 HYPERTENSIVE URGENCY: ICD-10-CM

## 2019-05-19 DIAGNOSIS — K21.9 GERD (GASTROESOPHAGEAL REFLUX DISEASE): ICD-10-CM

## 2019-05-19 DIAGNOSIS — R11.10 VOMITING: ICD-10-CM

## 2019-05-19 DIAGNOSIS — I63.9 CEREBROVASCULAR ACCIDENT (CVA), UNSPECIFIED MECHANISM (HCC): ICD-10-CM

## 2019-05-19 DIAGNOSIS — I10 BENIGN ESSENTIAL HYPERTENSION: ICD-10-CM

## 2019-05-19 DIAGNOSIS — R26.9 NEUROLOGIC GAIT DYSFUNCTION: ICD-10-CM

## 2019-05-19 DIAGNOSIS — R42 VERTIGO: Primary | ICD-10-CM

## 2019-05-19 PROBLEM — Z95.2 H/O AORTIC VALVE REPLACEMENT: Status: ACTIVE | Noted: 2019-05-19

## 2019-05-19 PROBLEM — T17.918A ASPIRATION OF VOMIT: Status: ACTIVE | Noted: 2019-05-19

## 2019-05-19 PROBLEM — T17.910A ASPIRATION OF VOMIT: Status: ACTIVE | Noted: 2019-05-19

## 2019-05-19 LAB
ALBUMIN SERPL BCP-MCNC: 3.8 G/DL (ref 3.5–5)
ALP SERPL-CCNC: 76 U/L (ref 46–116)
ALT SERPL W P-5'-P-CCNC: 21 U/L (ref 12–78)
ANION GAP SERPL CALCULATED.3IONS-SCNC: 8 MMOL/L (ref 4–13)
APTT PPP: 26 SECONDS (ref 26–38)
AST SERPL W P-5'-P-CCNC: 21 U/L (ref 5–45)
BACTERIA UR QL AUTO: ABNORMAL /HPF
BASOPHILS # BLD AUTO: 0.04 THOUSANDS/ΜL (ref 0–0.1)
BASOPHILS NFR BLD AUTO: 1 % (ref 0–1)
BILIRUB SERPL-MCNC: 0.4 MG/DL (ref 0.2–1)
BILIRUB UR QL STRIP: NEGATIVE
BUN SERPL-MCNC: 29 MG/DL (ref 5–25)
CALCIUM SERPL-MCNC: 9 MG/DL (ref 8.3–10.1)
CHLORIDE SERPL-SCNC: 103 MMOL/L (ref 100–108)
CLARITY UR: CLEAR
CO2 SERPL-SCNC: 27 MMOL/L (ref 21–32)
COLOR UR: YELLOW
CREAT SERPL-MCNC: 1.92 MG/DL (ref 0.6–1.3)
EOSINOPHIL # BLD AUTO: 0.51 THOUSAND/ΜL (ref 0–0.61)
EOSINOPHIL NFR BLD AUTO: 6 % (ref 0–6)
ERYTHROCYTE [DISTWIDTH] IN BLOOD BY AUTOMATED COUNT: 12.6 % (ref 11.6–15.1)
GFR SERPL CREATININE-BSD FRML MDRD: 31 ML/MIN/1.73SQ M
GLUCOSE SERPL-MCNC: 178 MG/DL (ref 65–140)
GLUCOSE UR STRIP-MCNC: NEGATIVE MG/DL
HCT VFR BLD AUTO: 41.8 % (ref 36.5–49.3)
HGB BLD-MCNC: 13.8 G/DL (ref 12–17)
HGB UR QL STRIP.AUTO: ABNORMAL
IMM GRANULOCYTES # BLD AUTO: 0.04 THOUSAND/UL (ref 0–0.2)
IMM GRANULOCYTES NFR BLD AUTO: 1 % (ref 0–2)
INR PPP: 1.07 (ref 0.86–1.17)
KETONES UR STRIP-MCNC: NEGATIVE MG/DL
LACTATE SERPL-SCNC: 2 MMOL/L (ref 0.5–2)
LEUKOCYTE ESTERASE UR QL STRIP: NEGATIVE
LYMPHOCYTES # BLD AUTO: 1.6 THOUSANDS/ΜL (ref 0.6–4.47)
LYMPHOCYTES NFR BLD AUTO: 18 % (ref 14–44)
MAGNESIUM SERPL-MCNC: 2.1 MG/DL (ref 1.6–2.6)
MCH RBC QN AUTO: 30.9 PG (ref 26.8–34.3)
MCHC RBC AUTO-ENTMCNC: 33 G/DL (ref 31.4–37.4)
MCV RBC AUTO: 94 FL (ref 82–98)
MONOCYTES # BLD AUTO: 0.61 THOUSAND/ΜL (ref 0.17–1.22)
MONOCYTES NFR BLD AUTO: 7 % (ref 4–12)
NEUTROPHILS # BLD AUTO: 6.01 THOUSANDS/ΜL (ref 1.85–7.62)
NEUTS SEG NFR BLD AUTO: 67 % (ref 43–75)
NITRITE UR QL STRIP: NEGATIVE
NON-SQ EPI CELLS URNS QL MICRO: ABNORMAL /HPF
NRBC BLD AUTO-RTO: 0 /100 WBCS
PH UR STRIP.AUTO: 6.5 [PH]
PLATELET # BLD AUTO: 190 THOUSANDS/UL (ref 149–390)
PLATELET # BLD AUTO: 198 THOUSANDS/UL (ref 149–390)
PMV BLD AUTO: 10 FL (ref 8.9–12.7)
PMV BLD AUTO: 9.9 FL (ref 8.9–12.7)
POTASSIUM SERPL-SCNC: 3.7 MMOL/L (ref 3.5–5.3)
PROT SERPL-MCNC: 7.7 G/DL (ref 6.4–8.2)
PROT UR STRIP-MCNC: ABNORMAL MG/DL
PROTHROMBIN TIME: 13.4 SECONDS (ref 11.8–14.2)
RBC # BLD AUTO: 4.47 MILLION/UL (ref 3.88–5.62)
RBC #/AREA URNS AUTO: ABNORMAL /HPF
SODIUM SERPL-SCNC: 138 MMOL/L (ref 136–145)
SP GR UR STRIP.AUTO: 1.02 (ref 1–1.03)
TROPONIN I SERPL-MCNC: <0.02 NG/ML
TSH SERPL DL<=0.05 MIU/L-ACNC: 1.42 UIU/ML (ref 0.36–3.74)
UROBILINOGEN UR QL STRIP.AUTO: 0.2 E.U./DL
WBC # BLD AUTO: 8.81 THOUSAND/UL (ref 4.31–10.16)
WBC #/AREA URNS AUTO: ABNORMAL /HPF

## 2019-05-19 PROCEDURE — 99223 1ST HOSP IP/OBS HIGH 75: CPT | Performed by: INTERNAL MEDICINE

## 2019-05-19 PROCEDURE — 84484 ASSAY OF TROPONIN QUANT: CPT | Performed by: EMERGENCY MEDICINE

## 2019-05-19 PROCEDURE — 84443 ASSAY THYROID STIM HORMONE: CPT | Performed by: EMERGENCY MEDICINE

## 2019-05-19 PROCEDURE — 99285 EMERGENCY DEPT VISIT HI MDM: CPT | Performed by: EMERGENCY MEDICINE

## 2019-05-19 PROCEDURE — 70450 CT HEAD/BRAIN W/O DYE: CPT

## 2019-05-19 PROCEDURE — 80053 COMPREHEN METABOLIC PANEL: CPT | Performed by: EMERGENCY MEDICINE

## 2019-05-19 PROCEDURE — 83605 ASSAY OF LACTIC ACID: CPT | Performed by: EMERGENCY MEDICINE

## 2019-05-19 PROCEDURE — 1124F ACP DISCUSS-NO DSCNMKR DOCD: CPT | Performed by: EMERGENCY MEDICINE

## 2019-05-19 PROCEDURE — 99285 EMERGENCY DEPT VISIT HI MDM: CPT

## 2019-05-19 PROCEDURE — 83735 ASSAY OF MAGNESIUM: CPT | Performed by: EMERGENCY MEDICINE

## 2019-05-19 PROCEDURE — 96375 TX/PRO/DX INJ NEW DRUG ADDON: CPT

## 2019-05-19 PROCEDURE — 85049 AUTOMATED PLATELET COUNT: CPT | Performed by: PHYSICIAN ASSISTANT

## 2019-05-19 PROCEDURE — 81001 URINALYSIS AUTO W/SCOPE: CPT | Performed by: EMERGENCY MEDICINE

## 2019-05-19 PROCEDURE — 85025 COMPLETE CBC W/AUTO DIFF WBC: CPT | Performed by: EMERGENCY MEDICINE

## 2019-05-19 PROCEDURE — 85610 PROTHROMBIN TIME: CPT | Performed by: EMERGENCY MEDICINE

## 2019-05-19 PROCEDURE — 93005 ELECTROCARDIOGRAM TRACING: CPT

## 2019-05-19 PROCEDURE — 36415 COLL VENOUS BLD VENIPUNCTURE: CPT | Performed by: EMERGENCY MEDICINE

## 2019-05-19 PROCEDURE — 85730 THROMBOPLASTIN TIME PARTIAL: CPT | Performed by: EMERGENCY MEDICINE

## 2019-05-19 PROCEDURE — 96374 THER/PROPH/DIAG INJ IV PUSH: CPT

## 2019-05-19 PROCEDURE — 71045 X-RAY EXAM CHEST 1 VIEW: CPT

## 2019-05-19 PROCEDURE — 71046 X-RAY EXAM CHEST 2 VIEWS: CPT

## 2019-05-19 RX ORDER — SODIUM CHLORIDE 9 MG/ML
75 INJECTION, SOLUTION INTRAVENOUS CONTINUOUS
Status: DISCONTINUED | OUTPATIENT
Start: 2019-05-19 | End: 2019-05-21

## 2019-05-19 RX ORDER — MECLIZINE HYDROCHLORIDE 25 MG/1
25 TABLET ORAL EVERY 8 HOURS PRN
Status: DISCONTINUED | OUTPATIENT
Start: 2019-05-19 | End: 2019-05-22 | Stop reason: HOSPADM

## 2019-05-19 RX ORDER — ACETAMINOPHEN 325 MG/1
650 TABLET ORAL EVERY 6 HOURS PRN
Status: DISCONTINUED | OUTPATIENT
Start: 2019-05-19 | End: 2019-05-22 | Stop reason: HOSPADM

## 2019-05-19 RX ORDER — ONDANSETRON 2 MG/ML
4 INJECTION INTRAMUSCULAR; INTRAVENOUS EVERY 6 HOURS PRN
Status: DISCONTINUED | OUTPATIENT
Start: 2019-05-19 | End: 2019-05-22 | Stop reason: HOSPADM

## 2019-05-19 RX ORDER — ONDANSETRON 2 MG/ML
1 INJECTION INTRAMUSCULAR; INTRAVENOUS ONCE
Status: COMPLETED | OUTPATIENT
Start: 2019-05-19 | End: 2019-05-19

## 2019-05-19 RX ORDER — METOPROLOL TARTRATE 5 MG/5ML
2.5 INJECTION INTRAVENOUS ONCE
Status: COMPLETED | OUTPATIENT
Start: 2019-05-19 | End: 2019-05-19

## 2019-05-19 RX ORDER — HYDRALAZINE HYDROCHLORIDE 20 MG/ML
10 INJECTION INTRAMUSCULAR; INTRAVENOUS ONCE
Status: COMPLETED | OUTPATIENT
Start: 2019-05-19 | End: 2019-05-19

## 2019-05-19 RX ORDER — ONDANSETRON 2 MG/ML
4 INJECTION INTRAMUSCULAR; INTRAVENOUS ONCE
Status: COMPLETED | OUTPATIENT
Start: 2019-05-19 | End: 2019-05-19

## 2019-05-19 RX ORDER — DIAZEPAM 5 MG/ML
1 INJECTION, SOLUTION INTRAMUSCULAR; INTRAVENOUS ONCE
Status: COMPLETED | OUTPATIENT
Start: 2019-05-19 | End: 2019-05-19

## 2019-05-19 RX ORDER — ATORVASTATIN CALCIUM 40 MG/1
40 TABLET, FILM COATED ORAL EVERY EVENING
Status: DISCONTINUED | OUTPATIENT
Start: 2019-05-19 | End: 2019-05-20

## 2019-05-19 RX ORDER — HEPARIN SODIUM 5000 [USP'U]/ML
5000 INJECTION, SOLUTION INTRAVENOUS; SUBCUTANEOUS EVERY 8 HOURS SCHEDULED
Status: DISCONTINUED | OUTPATIENT
Start: 2019-05-19 | End: 2019-05-22 | Stop reason: HOSPADM

## 2019-05-19 RX ORDER — ASPIRIN 300 MG/1
300 SUPPOSITORY RECTAL ONCE
Status: COMPLETED | OUTPATIENT
Start: 2019-05-19 | End: 2019-05-19

## 2019-05-19 RX ADMIN — ACETAMINOPHEN 650 MG: 325 TABLET, FILM COATED ORAL at 19:29

## 2019-05-19 RX ADMIN — ASPIRIN 300 MG: 300 SUPPOSITORY RECTAL at 11:04

## 2019-05-19 RX ADMIN — SODIUM CHLORIDE 500 ML: 0.9 INJECTION, SOLUTION INTRAVENOUS at 09:48

## 2019-05-19 RX ADMIN — METOPROLOL TARTRATE 2.5 MG: 1 INJECTION, SOLUTION INTRAVENOUS at 10:59

## 2019-05-19 RX ADMIN — HYDRALAZINE HYDROCHLORIDE 10 MG: 20 INJECTION INTRAMUSCULAR; INTRAVENOUS at 15:05

## 2019-05-19 RX ADMIN — MECLIZINE HYDROCHLORIDE 25 MG: 25 TABLET ORAL at 15:24

## 2019-05-19 RX ADMIN — HEPARIN SODIUM 5000 UNITS: 5000 INJECTION INTRAVENOUS; SUBCUTANEOUS at 16:13

## 2019-05-19 RX ADMIN — ATORVASTATIN CALCIUM 40 MG: 40 TABLET, FILM COATED ORAL at 18:42

## 2019-05-19 RX ADMIN — ONDANSETRON 4 MG: 2 INJECTION INTRAMUSCULAR; INTRAVENOUS at 15:24

## 2019-05-19 RX ADMIN — DIAZEPAM 1 MG: 5 INJECTION, SOLUTION INTRAMUSCULAR; INTRAVENOUS at 10:04

## 2019-05-19 RX ADMIN — ONDANSETRON 4 MG: 2 INJECTION INTRAMUSCULAR; INTRAVENOUS at 09:46

## 2019-05-19 RX ADMIN — HEPARIN SODIUM 5000 UNITS: 5000 INJECTION INTRAVENOUS; SUBCUTANEOUS at 22:25

## 2019-05-19 RX ADMIN — SODIUM CHLORIDE 75 ML/HR: 0.9 INJECTION, SOLUTION INTRAVENOUS at 15:53

## 2019-05-20 ENCOUNTER — APPOINTMENT (INPATIENT)
Dept: MRI IMAGING | Facility: HOSPITAL | Age: 84
DRG: 064 | End: 2019-05-20
Payer: MEDICARE

## 2019-05-20 ENCOUNTER — APPOINTMENT (INPATIENT)
Dept: RADIOLOGY | Facility: HOSPITAL | Age: 84
DRG: 064 | End: 2019-05-20
Payer: MEDICARE

## 2019-05-20 ENCOUNTER — APPOINTMENT (INPATIENT)
Dept: NON INVASIVE DIAGNOSTICS | Facility: HOSPITAL | Age: 84
DRG: 064 | End: 2019-05-20
Payer: MEDICARE

## 2019-05-20 LAB
ANION GAP SERPL CALCULATED.3IONS-SCNC: 8 MMOL/L (ref 4–13)
BASOPHILS # BLD AUTO: 0.02 THOUSANDS/ΜL (ref 0–0.1)
BASOPHILS NFR BLD AUTO: 0 % (ref 0–1)
BUN SERPL-MCNC: 30 MG/DL (ref 5–25)
CALCIUM SERPL-MCNC: 8.9 MG/DL (ref 8.3–10.1)
CHLORIDE SERPL-SCNC: 103 MMOL/L (ref 100–108)
CHOLEST SERPL-MCNC: 167 MG/DL (ref 50–200)
CO2 SERPL-SCNC: 30 MMOL/L (ref 21–32)
CREAT SERPL-MCNC: 1.81 MG/DL (ref 0.6–1.3)
EOSINOPHIL # BLD AUTO: 0.01 THOUSAND/ΜL (ref 0–0.61)
EOSINOPHIL NFR BLD AUTO: 0 % (ref 0–6)
ERYTHROCYTE [DISTWIDTH] IN BLOOD BY AUTOMATED COUNT: 12.8 % (ref 11.6–15.1)
EST. AVERAGE GLUCOSE BLD GHB EST-MCNC: 114 MG/DL
GFR SERPL CREATININE-BSD FRML MDRD: 34 ML/MIN/1.73SQ M
GLUCOSE SERPL-MCNC: 127 MG/DL (ref 65–140)
HBA1C MFR BLD: 5.6 % (ref 4.2–6.3)
HCT VFR BLD AUTO: 44 % (ref 36.5–49.3)
HDLC SERPL-MCNC: 32 MG/DL (ref 40–60)
HGB BLD-MCNC: 14.6 G/DL (ref 12–17)
IMM GRANULOCYTES # BLD AUTO: 0.06 THOUSAND/UL (ref 0–0.2)
IMM GRANULOCYTES NFR BLD AUTO: 0 % (ref 0–2)
LDLC SERPL CALC-MCNC: 103 MG/DL (ref 0–100)
LYMPHOCYTES # BLD AUTO: 0.92 THOUSANDS/ΜL (ref 0.6–4.47)
LYMPHOCYTES NFR BLD AUTO: 6 % (ref 14–44)
MCH RBC QN AUTO: 30.6 PG (ref 26.8–34.3)
MCHC RBC AUTO-ENTMCNC: 33.2 G/DL (ref 31.4–37.4)
MCV RBC AUTO: 92 FL (ref 82–98)
MONOCYTES # BLD AUTO: 0.64 THOUSAND/ΜL (ref 0.17–1.22)
MONOCYTES NFR BLD AUTO: 4 % (ref 4–12)
NEUTROPHILS # BLD AUTO: 13.83 THOUSANDS/ΜL (ref 1.85–7.62)
NEUTS SEG NFR BLD AUTO: 90 % (ref 43–75)
NRBC BLD AUTO-RTO: 0 /100 WBCS
PLATELET # BLD AUTO: 245 THOUSANDS/UL (ref 149–390)
PMV BLD AUTO: 10.4 FL (ref 8.9–12.7)
POTASSIUM SERPL-SCNC: 3.8 MMOL/L (ref 3.5–5.3)
PROCALCITONIN SERPL-MCNC: <0.05 NG/ML
RBC # BLD AUTO: 4.77 MILLION/UL (ref 3.88–5.62)
SODIUM SERPL-SCNC: 141 MMOL/L (ref 136–145)
TRIGL SERPL-MCNC: 162 MG/DL
WBC # BLD AUTO: 15.48 THOUSAND/UL (ref 4.31–10.16)

## 2019-05-20 PROCEDURE — 70544 MR ANGIOGRAPHY HEAD W/O DYE: CPT

## 2019-05-20 PROCEDURE — 80061 LIPID PANEL: CPT | Performed by: PHYSICIAN ASSISTANT

## 2019-05-20 PROCEDURE — 70551 MRI BRAIN STEM W/O DYE: CPT

## 2019-05-20 PROCEDURE — 84145 PROCALCITONIN (PCT): CPT | Performed by: PHYSICIAN ASSISTANT

## 2019-05-20 PROCEDURE — 70547 MR ANGIOGRAPHY NECK W/O DYE: CPT

## 2019-05-20 PROCEDURE — 83036 HEMOGLOBIN GLYCOSYLATED A1C: CPT | Performed by: PHYSICIAN ASSISTANT

## 2019-05-20 PROCEDURE — G0425 INPT/ED TELECONSULT30: HCPCS | Performed by: PSYCHIATRY & NEUROLOGY

## 2019-05-20 PROCEDURE — 99232 SBSQ HOSP IP/OBS MODERATE 35: CPT | Performed by: PHYSICIAN ASSISTANT

## 2019-05-20 PROCEDURE — 93306 TTE W/DOPPLER COMPLETE: CPT | Performed by: INTERNAL MEDICINE

## 2019-05-20 PROCEDURE — 85025 COMPLETE CBC W/AUTO DIFF WBC: CPT | Performed by: PHYSICIAN ASSISTANT

## 2019-05-20 PROCEDURE — 80048 BASIC METABOLIC PNL TOTAL CA: CPT | Performed by: PHYSICIAN ASSISTANT

## 2019-05-20 PROCEDURE — 93306 TTE W/DOPPLER COMPLETE: CPT

## 2019-05-20 PROCEDURE — 74022 RADEX COMPL AQT ABD SERIES: CPT

## 2019-05-20 RX ORDER — CLOPIDOGREL BISULFATE 75 MG/1
300 TABLET ORAL ONCE
Status: COMPLETED | OUTPATIENT
Start: 2019-05-20 | End: 2019-05-20

## 2019-05-20 RX ORDER — METOCLOPRAMIDE HYDROCHLORIDE 5 MG/ML
10 INJECTION INTRAMUSCULAR; INTRAVENOUS EVERY 6 HOURS PRN
Status: DISCONTINUED | OUTPATIENT
Start: 2019-05-20 | End: 2019-05-22 | Stop reason: HOSPADM

## 2019-05-20 RX ORDER — HYDRALAZINE HYDROCHLORIDE 20 MG/ML
10 INJECTION INTRAMUSCULAR; INTRAVENOUS EVERY 6 HOURS PRN
Status: DISCONTINUED | OUTPATIENT
Start: 2019-05-20 | End: 2019-05-21

## 2019-05-20 RX ORDER — HYDRALAZINE HYDROCHLORIDE 20 MG/ML
10 INJECTION INTRAMUSCULAR; INTRAVENOUS EVERY 6 HOURS PRN
Status: DISCONTINUED | OUTPATIENT
Start: 2019-05-20 | End: 2019-05-20

## 2019-05-20 RX ORDER — CLOPIDOGREL BISULFATE 75 MG/1
75 TABLET ORAL DAILY
Status: DISCONTINUED | OUTPATIENT
Start: 2019-05-21 | End: 2019-05-22 | Stop reason: HOSPADM

## 2019-05-20 RX ORDER — ASPIRIN 325 MG
325 TABLET ORAL DAILY
Status: DISCONTINUED | OUTPATIENT
Start: 2019-05-20 | End: 2019-05-22 | Stop reason: HOSPADM

## 2019-05-20 RX ORDER — ATORVASTATIN CALCIUM 40 MG/1
80 TABLET, FILM COATED ORAL EVERY EVENING
Status: DISCONTINUED | OUTPATIENT
Start: 2019-05-20 | End: 2019-05-22 | Stop reason: HOSPADM

## 2019-05-20 RX ADMIN — ATORVASTATIN CALCIUM 80 MG: 40 TABLET, FILM COATED ORAL at 17:03

## 2019-05-20 RX ADMIN — ONDANSETRON 4 MG: 2 INJECTION INTRAMUSCULAR; INTRAVENOUS at 02:46

## 2019-05-20 RX ADMIN — ONDANSETRON 4 MG: 2 INJECTION INTRAMUSCULAR; INTRAVENOUS at 09:56

## 2019-05-20 RX ADMIN — PIPERACILLIN AND TAZOBACTAM 3.38 G: 3; .375 INJECTION, POWDER, FOR SOLUTION INTRAVENOUS at 22:36

## 2019-05-20 RX ADMIN — HEPARIN SODIUM 5000 UNITS: 5000 INJECTION INTRAVENOUS; SUBCUTANEOUS at 14:52

## 2019-05-20 RX ADMIN — PIPERACILLIN AND TAZOBACTAM 3.38 G: 3; .375 INJECTION, POWDER, FOR SOLUTION INTRAVENOUS at 11:30

## 2019-05-20 RX ADMIN — MECLIZINE HYDROCHLORIDE 25 MG: 25 TABLET ORAL at 08:03

## 2019-05-20 RX ADMIN — HEPARIN SODIUM 5000 UNITS: 5000 INJECTION INTRAVENOUS; SUBCUTANEOUS at 21:02

## 2019-05-20 RX ADMIN — PIPERACILLIN AND TAZOBACTAM 3.38 G: 3; .375 INJECTION, POWDER, FOR SOLUTION INTRAVENOUS at 17:46

## 2019-05-20 RX ADMIN — METOPROLOL TARTRATE 25 MG: 25 TABLET ORAL at 20:58

## 2019-05-20 RX ADMIN — CLOPIDOGREL BISULFATE 300 MG: 75 TABLET ORAL at 17:03

## 2019-05-20 RX ADMIN — METOCLOPRAMIDE 10 MG: 5 INJECTION, SOLUTION INTRAMUSCULAR; INTRAVENOUS at 11:27

## 2019-05-20 RX ADMIN — ASPIRIN 325 MG ORAL TABLET 325 MG: 325 PILL ORAL at 17:02

## 2019-05-20 RX ADMIN — HEPARIN SODIUM 5000 UNITS: 5000 INJECTION INTRAVENOUS; SUBCUTANEOUS at 06:03

## 2019-05-20 RX ADMIN — SODIUM CHLORIDE 75 ML/HR: 0.9 INJECTION, SOLUTION INTRAVENOUS at 10:26

## 2019-05-21 PROBLEM — I63.9 CVA (CEREBRAL VASCULAR ACCIDENT) (HCC): Status: ACTIVE | Noted: 2019-05-19

## 2019-05-21 LAB
ANION GAP SERPL CALCULATED.3IONS-SCNC: 5 MMOL/L (ref 4–13)
ATRIAL RATE: 52 BPM
ATRIAL RATE: 74 BPM
BASOPHILS # BLD AUTO: 0.03 THOUSANDS/ΜL (ref 0–0.1)
BASOPHILS NFR BLD AUTO: 0 % (ref 0–1)
BUN SERPL-MCNC: 32 MG/DL (ref 5–25)
CALCIUM SERPL-MCNC: 8.3 MG/DL (ref 8.3–10.1)
CHLORIDE SERPL-SCNC: 105 MMOL/L (ref 100–108)
CO2 SERPL-SCNC: 30 MMOL/L (ref 21–32)
CREAT SERPL-MCNC: 1.98 MG/DL (ref 0.6–1.3)
EOSINOPHIL # BLD AUTO: 0.05 THOUSAND/ΜL (ref 0–0.61)
EOSINOPHIL NFR BLD AUTO: 0 % (ref 0–6)
ERYTHROCYTE [DISTWIDTH] IN BLOOD BY AUTOMATED COUNT: 12.6 % (ref 11.6–15.1)
GFR SERPL CREATININE-BSD FRML MDRD: 30 ML/MIN/1.73SQ M
GLUCOSE SERPL-MCNC: 105 MG/DL (ref 65–140)
HCT VFR BLD AUTO: 41.2 % (ref 36.5–49.3)
HGB BLD-MCNC: 13.4 G/DL (ref 12–17)
IMM GRANULOCYTES # BLD AUTO: 0.07 THOUSAND/UL (ref 0–0.2)
IMM GRANULOCYTES NFR BLD AUTO: 1 % (ref 0–2)
LYMPHOCYTES # BLD AUTO: 1.1 THOUSANDS/ΜL (ref 0.6–4.47)
LYMPHOCYTES NFR BLD AUTO: 8 % (ref 14–44)
MCH RBC QN AUTO: 30.9 PG (ref 26.8–34.3)
MCHC RBC AUTO-ENTMCNC: 32.5 G/DL (ref 31.4–37.4)
MCV RBC AUTO: 95 FL (ref 82–98)
MONOCYTES # BLD AUTO: 0.92 THOUSAND/ΜL (ref 0.17–1.22)
MONOCYTES NFR BLD AUTO: 6 % (ref 4–12)
NEUTROPHILS # BLD AUTO: 12.1 THOUSANDS/ΜL (ref 1.85–7.62)
NEUTS SEG NFR BLD AUTO: 85 % (ref 43–75)
NRBC BLD AUTO-RTO: 0 /100 WBCS
P AXIS: 40 DEGREES
P AXIS: 99 DEGREES
PLATELET # BLD AUTO: 216 THOUSANDS/UL (ref 149–390)
PMV BLD AUTO: 10.4 FL (ref 8.9–12.7)
POTASSIUM SERPL-SCNC: 4.4 MMOL/L (ref 3.5–5.3)
PR INTERVAL: 184 MS
PR INTERVAL: 206 MS
PROCALCITONIN SERPL-MCNC: 0.14 NG/ML
QRS AXIS: 141 DEGREES
QRS AXIS: 161 DEGREES
QRSD INTERVAL: 150 MS
QRSD INTERVAL: 152 MS
QT INTERVAL: 458 MS
QT INTERVAL: 546 MS
QTC INTERVAL: 507 MS
QTC INTERVAL: 508 MS
RBC # BLD AUTO: 4.33 MILLION/UL (ref 3.88–5.62)
SODIUM SERPL-SCNC: 140 MMOL/L (ref 136–145)
T WAVE AXIS: 37 DEGREES
T WAVE AXIS: 50 DEGREES
VENTRICULAR RATE: 52 BPM
VENTRICULAR RATE: 74 BPM
WBC # BLD AUTO: 14.27 THOUSAND/UL (ref 4.31–10.16)

## 2019-05-21 PROCEDURE — 84145 PROCALCITONIN (PCT): CPT | Performed by: PHYSICIAN ASSISTANT

## 2019-05-21 PROCEDURE — 92610 EVALUATE SWALLOWING FUNCTION: CPT

## 2019-05-21 PROCEDURE — G8988 SELF CARE GOAL STATUS: HCPCS

## 2019-05-21 PROCEDURE — 80048 BASIC METABOLIC PNL TOTAL CA: CPT | Performed by: PHYSICIAN ASSISTANT

## 2019-05-21 PROCEDURE — G8979 MOBILITY GOAL STATUS: HCPCS | Performed by: PHYSICAL THERAPIST

## 2019-05-21 PROCEDURE — G8978 MOBILITY CURRENT STATUS: HCPCS | Performed by: PHYSICAL THERAPIST

## 2019-05-21 PROCEDURE — G8987 SELF CARE CURRENT STATUS: HCPCS

## 2019-05-21 PROCEDURE — 93010 ELECTROCARDIOGRAM REPORT: CPT | Performed by: INTERNAL MEDICINE

## 2019-05-21 PROCEDURE — G8996 SWALLOW CURRENT STATUS: HCPCS

## 2019-05-21 PROCEDURE — 97163 PT EVAL HIGH COMPLEX 45 MIN: CPT | Performed by: PHYSICAL THERAPIST

## 2019-05-21 PROCEDURE — G8998 SWALLOW D/C STATUS: HCPCS

## 2019-05-21 PROCEDURE — 85025 COMPLETE CBC W/AUTO DIFF WBC: CPT | Performed by: PHYSICIAN ASSISTANT

## 2019-05-21 PROCEDURE — G8997 SWALLOW GOAL STATUS: HCPCS

## 2019-05-21 PROCEDURE — 97167 OT EVAL HIGH COMPLEX 60 MIN: CPT

## 2019-05-21 PROCEDURE — 99232 SBSQ HOSP IP/OBS MODERATE 35: CPT | Performed by: PHYSICIAN ASSISTANT

## 2019-05-21 RX ORDER — HYDRALAZINE HYDROCHLORIDE 20 MG/ML
10 INJECTION INTRAMUSCULAR; INTRAVENOUS EVERY 6 HOURS PRN
Status: DISCONTINUED | OUTPATIENT
Start: 2019-05-21 | End: 2019-05-22 | Stop reason: HOSPADM

## 2019-05-21 RX ORDER — AMLODIPINE BESYLATE 5 MG/1
5 TABLET ORAL DAILY
Status: DISCONTINUED | OUTPATIENT
Start: 2019-05-21 | End: 2019-05-22 | Stop reason: HOSPADM

## 2019-05-21 RX ORDER — CLOPIDOGREL BISULFATE 75 MG/1
75 TABLET ORAL DAILY
Qty: 30 TABLET | Refills: 0
Start: 2019-05-22 | End: 2019-06-17 | Stop reason: SDUPTHER

## 2019-05-21 RX ORDER — ATORVASTATIN CALCIUM 80 MG/1
80 TABLET, FILM COATED ORAL EVERY EVENING
Qty: 30 TABLET | Refills: 0
Start: 2019-05-21 | End: 2019-06-17 | Stop reason: CLARIF

## 2019-05-21 RX ADMIN — SODIUM CHLORIDE 75 ML/HR: 0.9 INJECTION, SOLUTION INTRAVENOUS at 02:18

## 2019-05-21 RX ADMIN — METOPROLOL TARTRATE 25 MG: 25 TABLET ORAL at 09:55

## 2019-05-21 RX ADMIN — ASPIRIN 325 MG ORAL TABLET 325 MG: 325 PILL ORAL at 09:55

## 2019-05-21 RX ADMIN — ATORVASTATIN CALCIUM 80 MG: 40 TABLET, FILM COATED ORAL at 17:14

## 2019-05-21 RX ADMIN — AMLODIPINE BESYLATE 5 MG: 5 TABLET ORAL at 17:14

## 2019-05-21 RX ADMIN — METOPROLOL TARTRATE 25 MG: 25 TABLET ORAL at 21:27

## 2019-05-21 RX ADMIN — ACETAMINOPHEN 650 MG: 325 TABLET, FILM COATED ORAL at 19:32

## 2019-05-21 RX ADMIN — HEPARIN SODIUM 5000 UNITS: 5000 INJECTION INTRAVENOUS; SUBCUTANEOUS at 05:01

## 2019-05-21 RX ADMIN — PIPERACILLIN AND TAZOBACTAM 3.38 G: 3; .375 INJECTION, POWDER, FOR SOLUTION INTRAVENOUS at 10:36

## 2019-05-21 RX ADMIN — HEPARIN SODIUM 5000 UNITS: 5000 INJECTION INTRAVENOUS; SUBCUTANEOUS at 14:14

## 2019-05-21 RX ADMIN — CLOPIDOGREL BISULFATE 75 MG: 75 TABLET ORAL at 09:55

## 2019-05-21 RX ADMIN — PIPERACILLIN AND TAZOBACTAM 3.38 G: 3; .375 INJECTION, POWDER, FOR SOLUTION INTRAVENOUS at 04:31

## 2019-05-21 RX ADMIN — HEPARIN SODIUM 5000 UNITS: 5000 INJECTION INTRAVENOUS; SUBCUTANEOUS at 21:27

## 2019-05-22 VITALS
WEIGHT: 214.29 LBS | HEIGHT: 71 IN | DIASTOLIC BLOOD PRESSURE: 76 MMHG | TEMPERATURE: 98.5 F | SYSTOLIC BLOOD PRESSURE: 180 MMHG | OXYGEN SATURATION: 93 % | HEART RATE: 76 BPM | BODY MASS INDEX: 30 KG/M2 | RESPIRATION RATE: 18 BRPM

## 2019-05-22 PROCEDURE — 99238 HOSP IP/OBS DSCHRG MGMT 30/<: CPT | Performed by: PHYSICIAN ASSISTANT

## 2019-05-22 PROCEDURE — 97535 SELF CARE MNGMENT TRAINING: CPT

## 2019-05-22 RX ORDER — AMLODIPINE BESYLATE 5 MG/1
5 TABLET ORAL DAILY
Qty: 30 TABLET | Refills: 0 | Status: SHIPPED | OUTPATIENT
Start: 2019-05-23 | End: 2019-06-17 | Stop reason: SDUPTHER

## 2019-05-22 RX ORDER — OXYCODONE HYDROCHLORIDE 5 MG/1
5 TABLET ORAL EVERY 4 HOURS PRN
Status: DISCONTINUED | OUTPATIENT
Start: 2019-05-22 | End: 2019-05-22 | Stop reason: HOSPADM

## 2019-05-22 RX ADMIN — METOPROLOL TARTRATE 25 MG: 25 TABLET ORAL at 08:18

## 2019-05-22 RX ADMIN — CLOPIDOGREL BISULFATE 75 MG: 75 TABLET ORAL at 08:18

## 2019-05-22 RX ADMIN — AMLODIPINE BESYLATE 5 MG: 5 TABLET ORAL at 08:18

## 2019-05-22 RX ADMIN — HEPARIN SODIUM 5000 UNITS: 5000 INJECTION INTRAVENOUS; SUBCUTANEOUS at 05:15

## 2019-05-22 RX ADMIN — ASPIRIN 325 MG ORAL TABLET 325 MG: 325 PILL ORAL at 08:18

## 2019-05-22 RX ADMIN — OXYCODONE HYDROCHLORIDE 5 MG: 5 TABLET ORAL at 09:42

## 2019-06-05 ENCOUNTER — PATIENT OUTREACH (OUTPATIENT)
Dept: CASE MANAGEMENT | Facility: OTHER | Age: 84
End: 2019-06-05

## 2019-06-07 ENCOUNTER — TRANSITIONAL CARE MANAGEMENT (OUTPATIENT)
Dept: INTERNAL MEDICINE CLINIC | Facility: CLINIC | Age: 84
End: 2019-06-07

## 2019-06-10 ENCOUNTER — DOCUMENTATION (OUTPATIENT)
Dept: INTERNAL MEDICINE CLINIC | Facility: CLINIC | Age: 84
End: 2019-06-10

## 2019-06-10 ENCOUNTER — TELEPHONE (OUTPATIENT)
Dept: INTERNAL MEDICINE CLINIC | Facility: CLINIC | Age: 84
End: 2019-06-10

## 2019-06-10 DIAGNOSIS — K21.9 GASTROESOPHAGEAL REFLUX DISEASE, ESOPHAGITIS PRESENCE NOT SPECIFIED: Primary | ICD-10-CM

## 2019-06-10 RX ORDER — SUCRALFATE 1 G/1
1 TABLET ORAL 4 TIMES DAILY
Qty: 120 TABLET | Refills: 5 | Status: SHIPPED | OUTPATIENT
Start: 2019-06-10 | End: 2020-08-05 | Stop reason: HOSPADM

## 2019-06-10 RX ORDER — METOPROLOL SUCCINATE 25 MG/1
TABLET, EXTENDED RELEASE ORAL 2 TIMES DAILY
Refills: 0 | COMMUNITY
Start: 2019-06-07 | End: 2019-06-17 | Stop reason: SDUPTHER

## 2019-06-11 ENCOUNTER — APPOINTMENT (OUTPATIENT)
Dept: LAB | Facility: MEDICAL CENTER | Age: 84
End: 2019-06-11
Payer: MEDICARE

## 2019-06-11 DIAGNOSIS — I63.9 CEREBROVASCULAR ACCIDENT (CVA), UNSPECIFIED MECHANISM (HCC): ICD-10-CM

## 2019-06-11 DIAGNOSIS — I63.9 CEREBROVASCULAR ACCIDENT (CVA), UNSPECIFIED MECHANISM (HCC): Primary | ICD-10-CM

## 2019-06-11 LAB
ERYTHROCYTE [DISTWIDTH] IN BLOOD BY AUTOMATED COUNT: 12.4 % (ref 11.6–15.1)
HCT VFR BLD AUTO: 37.9 % (ref 36.5–49.3)
HGB BLD-MCNC: 12.7 G/DL (ref 12–17)
MCH RBC QN AUTO: 30.6 PG (ref 26.8–34.3)
MCHC RBC AUTO-ENTMCNC: 33.5 G/DL (ref 31.4–37.4)
MCV RBC AUTO: 91 FL (ref 82–98)
PLATELET # BLD AUTO: 337 THOUSANDS/UL (ref 149–390)
PMV BLD AUTO: 10.7 FL (ref 8.9–12.7)
RBC # BLD AUTO: 4.15 MILLION/UL (ref 3.88–5.62)
WBC # BLD AUTO: 12.15 THOUSAND/UL (ref 4.31–10.16)

## 2019-06-11 PROCEDURE — 85027 COMPLETE CBC AUTOMATED: CPT

## 2019-06-11 PROCEDURE — 36415 COLL VENOUS BLD VENIPUNCTURE: CPT

## 2019-06-17 ENCOUNTER — OFFICE VISIT (OUTPATIENT)
Dept: INTERNAL MEDICINE CLINIC | Facility: CLINIC | Age: 84
End: 2019-06-17
Payer: MEDICARE

## 2019-06-17 VITALS
TEMPERATURE: 97.3 F | SYSTOLIC BLOOD PRESSURE: 124 MMHG | BODY MASS INDEX: 32.29 KG/M2 | DIASTOLIC BLOOD PRESSURE: 72 MMHG | WEIGHT: 193.8 LBS | HEART RATE: 67 BPM | HEIGHT: 65 IN | OXYGEN SATURATION: 93 %

## 2019-06-17 DIAGNOSIS — K59.00 CONSTIPATION, UNSPECIFIED CONSTIPATION TYPE: ICD-10-CM

## 2019-06-17 DIAGNOSIS — K21.9 GASTROESOPHAGEAL REFLUX DISEASE, ESOPHAGITIS PRESENCE NOT SPECIFIED: ICD-10-CM

## 2019-06-17 DIAGNOSIS — I63.9 CEREBROVASCULAR ACCIDENT (CVA), UNSPECIFIED MECHANISM (HCC): Primary | ICD-10-CM

## 2019-06-17 DIAGNOSIS — N18.30 CHRONIC KIDNEY DISEASE, STAGE 3 (HCC): ICD-10-CM

## 2019-06-17 DIAGNOSIS — N18.30 CHRONIC RENAL IMPAIRMENT, STAGE 3 (MODERATE) (HCC): ICD-10-CM

## 2019-06-17 DIAGNOSIS — I16.0 HYPERTENSIVE URGENCY: ICD-10-CM

## 2019-06-17 PROCEDURE — 99495 TRANSJ CARE MGMT MOD F2F 14D: CPT | Performed by: INTERNAL MEDICINE

## 2019-06-17 RX ORDER — SODIUM CHLORIDE 1000 MG
1 TABLET, SOLUBLE MISCELLANEOUS 2 TIMES DAILY
Refills: 0 | COMMUNITY
Start: 2019-06-07 | End: 2020-08-05 | Stop reason: HOSPADM

## 2019-06-17 RX ORDER — LOVASTATIN 40 MG/1
40 TABLET ORAL
COMMUNITY
End: 2019-06-17 | Stop reason: SDUPTHER

## 2019-06-17 RX ORDER — AMLODIPINE BESYLATE 5 MG/1
5 TABLET ORAL DAILY
Qty: 90 TABLET | Refills: 3 | Status: SHIPPED | OUTPATIENT
Start: 2019-06-17 | End: 2020-08-05 | Stop reason: HOSPADM

## 2019-06-17 RX ORDER — DOCUSATE SODIUM 100 MG/1
100 CAPSULE, LIQUID FILLED ORAL DAILY
Refills: 0 | COMMUNITY
Start: 2019-06-07 | End: 2019-06-17 | Stop reason: SDUPTHER

## 2019-06-17 RX ORDER — DOCUSATE SODIUM 100 MG/1
100 CAPSULE, LIQUID FILLED ORAL DAILY
Qty: 90 CAPSULE | Refills: 3 | Status: SHIPPED | OUTPATIENT
Start: 2019-06-17 | End: 2020-08-05 | Stop reason: HOSPADM

## 2019-06-17 RX ORDER — METOPROLOL SUCCINATE 25 MG/1
25 TABLET, EXTENDED RELEASE ORAL 2 TIMES DAILY
Qty: 180 TABLET | Refills: 3 | Status: ON HOLD | OUTPATIENT
Start: 2019-06-17 | End: 2020-07-29

## 2019-06-17 RX ORDER — PANTOPRAZOLE SODIUM 40 MG/1
1 TABLET, DELAYED RELEASE ORAL DAILY
Refills: 0 | COMMUNITY
Start: 2019-06-07 | End: 2020-08-13 | Stop reason: HOSPADM

## 2019-06-17 RX ORDER — CLOPIDOGREL BISULFATE 75 MG/1
75 TABLET ORAL DAILY
Qty: 90 TABLET | Refills: 3
Start: 2019-06-17 | End: 2020-08-05 | Stop reason: HOSPADM

## 2019-06-17 RX ORDER — LOVASTATIN 40 MG/1
40 TABLET ORAL
Qty: 90 TABLET | Refills: 3 | Status: SHIPPED | OUTPATIENT
Start: 2019-06-17 | End: 2019-07-24 | Stop reason: SDUPTHER

## 2019-07-01 ENCOUNTER — EVALUATION (OUTPATIENT)
Dept: PHYSICAL THERAPY | Facility: CLINIC | Age: 84
End: 2019-07-01
Payer: MEDICARE

## 2019-07-01 DIAGNOSIS — I63.9 CEREBROVASCULAR ACCIDENT (CVA), UNSPECIFIED MECHANISM (HCC): Primary | ICD-10-CM

## 2019-07-01 PROCEDURE — 97535 SELF CARE MNGMENT TRAINING: CPT | Performed by: PHYSICAL THERAPIST

## 2019-07-01 PROCEDURE — 97116 GAIT TRAINING THERAPY: CPT | Performed by: PHYSICAL THERAPIST

## 2019-07-01 PROCEDURE — 97162 PT EVAL MOD COMPLEX 30 MIN: CPT | Performed by: PHYSICAL THERAPIST

## 2019-07-01 PROCEDURE — 97110 THERAPEUTIC EXERCISES: CPT | Performed by: PHYSICAL THERAPIST

## 2019-07-01 NOTE — PROGRESS NOTES
PT Evaluation     Today's date: 2019  Patient name: Dmitriy Drake  : 1935  MRN: [id number]  Referring provider: Veronica Peck DO  Dx:   Encounter Diagnosis     ICD-10-CM    1  Cerebrovascular accident (CVA), unspecified mechanism ([facility] [address] ) I63 9 Ambulatory referral to Physical Therapy                  Assessment  Understanding of Dx/Px/POC: good   Prognosis: good    Goals  STGs: To be complete within 4 weeks  - Decrease TUG to < 12''  - Decrease Sit to Stand time to < 35''  - Increase bilateral LE strength to > 4+/5  - Improve gait to WNL with LRAD for distances < 6 blocks  - Improve DGI to > 21/24    LTGs: To be complete within 6 weeks  - Able to walk for any extended amount of time/distance without limitation for increased safety and functional capacity with ADLs and home-related duty  - Able to repetitively squat without limitation for increased safety and functional capacity with ADLs and home-related duty  - Able to repetitively ascend/descend a full flight of stairs without limitation for increased safety and functional capacity with ADLs and home-related duty  - Able to repetitively complete transfers without pain or limitation for increased safety and functional capacity with ADLs and home-related duty    Plan  Frequency: 3x week  Duration in weeks: 4       Pt is a very pleasant 80 y o  male s/p CVA 19, who presents with functional deficits including decreased capacity with walking, squatting, stairs, and transfers  Upon completion of today's initial evaluation, Devin's sx remain consistent with being s/p CVA 19  Pt will benefit from skilled physical therapy to address current deficits  Subjective Evaluation    Pain  Current pain ratin  At best pain ratin  At worst pain ratin  Location: None notes at this time       Pt reports suffering a CVA 19  Pt reports completing Rehab for 3 weeks, but then moved onto home physical therapy for 2 weeks  Objective Pain level ranges 2-8/10  AROM: WNL  Strength: R LE 4/5 t/o; L LE 3+/5 t/o  Gait: RW short step length mild to mod shuffle pattern, flexed trunk  PSFS: 5/10  TU''  DGI:   10 Sit to Stand Test: 48''  Unable to walk without limitation  Unable to squat without limitation  Unable complete transfers without limitation  Unable to ascend/descend stairs without limitation           Precautions: None at this time    Daily Treatment Diary    HEP: Discussed safety with ADLs and home-related activity    Manual  19            ART x15'            IASTM             JM             PROM and LE stretch             STM/Triggerpoint               Exercise Diary  19            Gait Instruction x10'            Laps 2x            Treadmill             NuStep x10'            Prone hip ER             Prone hip IR             PPT with Bridge with Add Squeeze             SL Bridge             Clam Shells             Sit to Stand 10x (50'')            SL Sit to stand             TB Lateral Walk             Step ups/downs             Prone H' flexor Stretch             Kneeling hip flexor Stretch             No shoe Steamboats AE             SLS BOSU             Upside down BOSU SL squat holds             SL RDLs             Side plank with hip Abd             Bunkies             TUG 20''                Modalities  19            MHP             CP             Stim

## 2019-07-03 ENCOUNTER — OFFICE VISIT (OUTPATIENT)
Dept: PHYSICAL THERAPY | Facility: CLINIC | Age: 84
End: 2019-07-03
Payer: MEDICARE

## 2019-07-03 DIAGNOSIS — I63.9 CEREBROVASCULAR ACCIDENT (CVA), UNSPECIFIED MECHANISM (HCC): Primary | ICD-10-CM

## 2019-07-03 PROCEDURE — 97140 MANUAL THERAPY 1/> REGIONS: CPT | Performed by: PHYSICAL THERAPIST

## 2019-07-03 PROCEDURE — 97110 THERAPEUTIC EXERCISES: CPT | Performed by: PHYSICAL THERAPIST

## 2019-07-03 PROCEDURE — 97530 THERAPEUTIC ACTIVITIES: CPT | Performed by: PHYSICAL THERAPIST

## 2019-07-03 NOTE — PROGRESS NOTES
Daily Note     Today's date: 7/3/2019  Patient name: Shane Harper  : 1935  MRN: [id number]  Referring provider: Felicia Gunter DO  Dx:   Encounter Diagnosis     ICD-10-CM    1  Cerebrovascular accident (CVA), unspecified mechanism ([facility] [address] ) I63 9                   Subjective: Pt reports he is doing well  Brought his SPC today to practice  Anxious to continue making progress with overall function and safety      Objective: See treatment diary below      Assessment: Tolerated treatment well  Patient required frequent rest breaks as he demonstrated fatigue post treatment, exhibited good technique with therapeutic exercises and would benefit from continued PT      Plan: Continue per plan of care  Progress treatment as tolerated         Precautions: None at this time    Daily Treatment Diary    HEP: Discussed safety with ADLs and home-related activity    Manual  7/3/19            ART             IASTM             JM             PROM and LE stretch             STM/Triggerpoint               Exercise Diary  7/3/19            Gait Instruction x10'            Laps with SPC (correct mechanics) 2x                         NuStep x10'            P-bars Forward/backward walk 3x10            Pbars Lat walk 2x10            Pbars Standing Marching with hand taps 3x10            SL Bridge             Clam Shells             Sit to Stand 3x10            SL Sit to stand             TB Lateral Walk             Step ups/downs             Prone H' flexor Stretch             Kneeling hip flexor Stretch             No shoe Steamboats AE             SLS BOSU             Upside down BOSU SL squat holds             SL RDLs             Side plank with hip Abd             Bunkies             TUG                 Modalities  7/3/19            MHP             CP             Stim

## 2019-07-05 ENCOUNTER — OFFICE VISIT (OUTPATIENT)
Dept: PHYSICAL THERAPY | Facility: CLINIC | Age: 84
End: 2019-07-05
Payer: MEDICARE

## 2019-07-05 DIAGNOSIS — I63.9 CEREBROVASCULAR ACCIDENT (CVA), UNSPECIFIED MECHANISM (HCC): Primary | ICD-10-CM

## 2019-07-05 PROCEDURE — 97110 THERAPEUTIC EXERCISES: CPT

## 2019-07-05 PROCEDURE — 97530 THERAPEUTIC ACTIVITIES: CPT

## 2019-07-05 NOTE — PROGRESS NOTES
Daily Note     Today's date: 2019  Patient name: Eduardo Amezcua  : 1935  MRN: [id number]  Referring provider: Geoff Monroe DO  Dx:   Encounter Diagnosis     ICD-10-CM    1  Cerebrovascular accident (CVA), unspecified mechanism ([facility] [address] ) I63 9                   Subjective: Pt reports doing well  States he is doing better with exercises  Objective: See treatment diary below      Assessment: Tolerated treatment well  Patient demonstrated fatigue post treatment  Pt with some unsteadiness requiring SBA to CGA with gait  Pt sarah exercise well with overall good sarah  Plan: Continue per plan of care  Precautions: None at this time    Daily Treatment Diary    HEP: Discussed safety with ADLs and home-related activity    Manual  7/3/19 7/5/19           ART             IASTM             JM             PROM and LE stretch             STM/Triggerpoint               Exercise Diary  7/3/19 7/5/19           Gait Instruction x10' 10'           Laps with [facility] (correct mechanics) 2x 2x                        NuStep x10' 10'           P-bars Forward/backward walk 3x10 3/10           Pbars Lat walk 2x10 2/10           Pbars Standing Marching with hand taps 3x10 3/10           SL Bridge             Clam Shells             Sit to Stand 3x10 3/10           SL Sit to stand             TB Lateral Walk  2/10 no TB           Step ups/downs  6" 10x ea             Prone H' flexor Stretch             Kneeling hip flexor Stretch             No shoe Steamboats AE             SLS BOSU             Upside down BOSU SL squat holds             SL RDLs             Side plank with hip Abd             Bunkies             TUG                 Modalities  7/3/19 7/5/19           MHP             CP             Stim

## 2019-07-08 ENCOUNTER — OFFICE VISIT (OUTPATIENT)
Dept: PHYSICAL THERAPY | Facility: CLINIC | Age: 84
End: 2019-07-08
Payer: MEDICARE

## 2019-07-08 DIAGNOSIS — I63.9 CEREBROVASCULAR ACCIDENT (CVA), UNSPECIFIED MECHANISM (HCC): Primary | ICD-10-CM

## 2019-07-08 PROCEDURE — 97112 NEUROMUSCULAR REEDUCATION: CPT | Performed by: PHYSICAL THERAPIST

## 2019-07-08 PROCEDURE — 97530 THERAPEUTIC ACTIVITIES: CPT | Performed by: PHYSICAL THERAPIST

## 2019-07-08 PROCEDURE — 97110 THERAPEUTIC EXERCISES: CPT | Performed by: PHYSICAL THERAPIST

## 2019-07-08 NOTE — PROGRESS NOTES
Daily Note     Today's date: 2019  Patient name: Dimitry Benitez  : 1935  MRN: [id number]  Referring provider: Yanely Avilez DO  Dx:   Encounter Diagnosis     ICD-10-CM    1  Cerebrovascular accident (CVA), unspecified mechanism ([facility] [address] ) I63 9                   Subjective: Pt reports continued progress with each session  Feels like he is getting safer and safer with ADLs  Anxious to continue making progress  Objective: See treatment diary below      Assessment: Tolerated treatment well  Patient demonstrated fatigue post treatment, exhibited good technique with therapeutic exercises and would benefit from continued PT  Pt with some unsteadiness requiring SBA to CGA with gait  Pt sarah exercise well with overall good sarah  Plan: Continue per plan of care  Progress treatment as tolerated         Precautions: None at this time    Daily Treatment Diary    HEP: Discussed safety with ADLs and home-related activity    Manual  7/3/19 7/8/19           ART             IASTM             JM             PROM and LE stretch             STM/Triggerpoint               Exercise Diary  7/3/19 7/8/19           Gait Instruction x10' 10'           Laps with [facility] (correct mechanics) 2x 2x                        NuStep x10' 10'           P-bars Forward/backward walk 3x10 3/10           Pbars Lat walk 2x10 3/10           Pbars Standing Marching with hand taps 3x10 3/10           SL Bridge             Clam Shells             Sit to Stand 3x10 3/10           SL Sit to stand             TB Lateral Walk             Step ups/downs  2x10 ea 8''           Prone H' flexor Stretch             Kneeling hip flexor Stretch             No shoe Steamboats AE             SLS BOSU             Upside down BOSU SL squat holds             SL RDLs             Side plank with hip Abd             Bunkies             TUG                 Modalities  7/3/19 7/8/19           MHP             CP             Stim

## 2019-07-10 ENCOUNTER — OFFICE VISIT (OUTPATIENT)
Dept: PHYSICAL THERAPY | Facility: CLINIC | Age: 84
End: 2019-07-10
Payer: MEDICARE

## 2019-07-10 DIAGNOSIS — I63.9 CEREBROVASCULAR ACCIDENT (CVA), UNSPECIFIED MECHANISM (HCC): Primary | ICD-10-CM

## 2019-07-10 PROCEDURE — 97110 THERAPEUTIC EXERCISES: CPT | Performed by: PHYSICAL THERAPIST

## 2019-07-10 PROCEDURE — 97530 THERAPEUTIC ACTIVITIES: CPT | Performed by: PHYSICAL THERAPIST

## 2019-07-10 PROCEDURE — 97140 MANUAL THERAPY 1/> REGIONS: CPT | Performed by: PHYSICAL THERAPIST

## 2019-07-10 PROCEDURE — 97112 NEUROMUSCULAR REEDUCATION: CPT | Performed by: PHYSICAL THERAPIST

## 2019-07-10 NOTE — PROGRESS NOTES
Daily Note     Today's date: 7/10/2019  Patient name: Francois Elliott  : 1935  MRN: [id number]  Referring provider: Jeremi Ramírez DO  Dx:   Encounter Diagnosis     ICD-10-CM    1  Cerebrovascular accident (CVA), unspecified mechanism ([facility] [address] ) I63 9                   Subjective: Pt reports he feels like he is getting better and better with each session  Understands his endurance levels are not great at this time, but feels he is making strides in this area  Mostly using the [facility] for ambulating which he is happy about  Objective: See treatment diary below      Assessment: Tolerated treatment well  Patient demonstrated fatigue post treatment, exhibited good technique with therapeutic exercises and would benefit from continued PT  Plan: Continue per plan of care  Progress treatment as tolerated         Precautions: None at this time    Daily Treatment Diary    HEP: Discussed safety with ADLs and home-related activity    Manual  7/3/19 7/10/19           ART             IASTM             JM             PROM and LE stretch             STM/Triggerpoint               Exercise Diary  7/3/19 7/10/19           Gait Instruction x10' 10'           Laps with [facility] (correct mechanics) 2x 2x           Stairs  1x           NuStep x10' 12'           P-bars Forward/backward walk 3x10 3/10           Pbars Lat walk 2x10 3/10           Pbars Standing Marching with hand taps 3x10 3/10           SL Bridge             Clam Shells             Sit to Stand 3x10 3/10           SL Sit to stand             TB Lateral Walk             Step ups/downs  3x10 ea 8''           Prone H' flexor Stretch             Kneeling hip flexor Stretch             No shoe Steamboats AE             SLS BOSU             Upside down BOSU SL squat holds             SL RDLs             Side plank with hip Abd             Bunkies             TUG                 Modalities  7/3/19 7/10/19           MHP             CP             Stim

## 2019-07-12 ENCOUNTER — OFFICE VISIT (OUTPATIENT)
Dept: PHYSICAL THERAPY | Facility: CLINIC | Age: 84
End: 2019-07-12
Payer: MEDICARE

## 2019-07-12 DIAGNOSIS — I63.9 CEREBROVASCULAR ACCIDENT (CVA), UNSPECIFIED MECHANISM (HCC): Primary | ICD-10-CM

## 2019-07-12 PROCEDURE — 97116 GAIT TRAINING THERAPY: CPT

## 2019-07-12 PROCEDURE — 97530 THERAPEUTIC ACTIVITIES: CPT

## 2019-07-12 PROCEDURE — 97110 THERAPEUTIC EXERCISES: CPT

## 2019-07-12 NOTE — PROGRESS NOTES
Daily Note     Today's date: 2019  Patient name: Dmitriy Drake  : 1935  MRN: [id number]  Referring provider: Veronica Peck DO  Dx:   Encounter Diagnosis     ICD-10-CM    1  Cerebrovascular accident (CVA), unspecified mechanism ([facility] [address] ) I63 9                   Subjective: Pt reports he is doing well  States having improved walking and strength  Objective: See treatment diary below      Assessment: Tolerated treatment well  Patient demonstrated fatigue post treatment  Pt sarah program well with cont improvement in strength and stability  Pt will cont to benefit from tx to address balance and strength deficits  Plan: Continue per plan of care          Precautions: None at this time     Daily Treatment Diary     HEP: Discussed safety with ADLs and home-related activity     Manual  7/3/19 7/10/19  7/12/19                 ART                       IASTM                       JM                       PROM and LE stretch                       STM/Triggerpoint                          Exercise Diary  7/3/19 7/10/19  7/12/19                 Gait Instruction x10' 10'  10'                 Laps with SPC (correct mechanics) 2x 2x  2x                 Stairs   1x                   NuStep x10' 12'  12'                 P-bars Forward/backward walk 3x10 3/10  3/10                 Pbars Lat walk 2x10 3/10  3/10                 Pbars Standing Marching with hand taps 3x10 3/10  3/10                 SL Bridge                       Clam Shells                       Sit to Stand 3x10 3/10  3/10                 SL Sit to stand                       TB Lateral Walk                       Step ups/downs   3x10 ea 8''  3/10 8''                 Prone H' flexor Stretch                       Kneeling hip flexor Stretch                       No shoe Steamboats AE                       SLS BOSU                       Upside down BOSU SL squat holds                       SL RDLs                       Side plank with hip Abd                       Dustin                       TUG                             Modalities  7/3/19 7/10/19  7/12/19                 MHP                       CP                       Stim                               Precautions: None at this time    Daily Treatment Diary    HEP: Discussed safety with ADLs and home-related activity    Manual  7/3/19 7/10/19           ART             IASTM             JM             PROM and LE stretch             STM/Triggerpoint               Exercise Diary  7/3/19 7/10/19           Gait Instruction x10' 10'           Laps with [address] (correct mechanics) 2x 2x           Stairs  1x           NuStep x10' 12'           P-bars Forward/backward walk 3x10 3/10           Pbars Lat walk 2x10 3/10           Pbars Standing Marching with hand taps 3x10 3/10           SL Bridge             Clam Shells             Sit to Stand 3x10 3/10           SL Sit to stand             TB Lateral Walk             Step ups/downs  3x10 ea 8''           Prone H' flexor Stretch             Kneeling hip flexor Stretch             No shoe Steamboats AE             SLS BOSU             Upside down BOSU SL squat holds             SL RDLs             Side plank with hip Abd             Carloses             TUG                 Modalities  7/3/19 7/10/19           P             CP             Stim

## 2019-07-15 ENCOUNTER — OFFICE VISIT (OUTPATIENT)
Dept: PHYSICAL THERAPY | Facility: CLINIC | Age: 84
End: 2019-07-15
Payer: MEDICARE

## 2019-07-15 DIAGNOSIS — I63.9 CEREBROVASCULAR ACCIDENT (CVA), UNSPECIFIED MECHANISM (HCC): Primary | ICD-10-CM

## 2019-07-15 PROCEDURE — 97110 THERAPEUTIC EXERCISES: CPT

## 2019-07-15 PROCEDURE — 97530 THERAPEUTIC ACTIVITIES: CPT

## 2019-07-15 NOTE — PROGRESS NOTES
Daily Note     Today's date: 7/15/2019  Patient name: Tiara Pedraza  : 1935  MRN: [id number]  Referring provider: Aparna Giles DO  Dx:   Encounter Diagnosis     ICD-10-CM    1  Cerebrovascular accident (CVA), unspecified mechanism ([facility] [address] ) I63 9                   Subjective: Pt reports legs are slowly getting stronger  Reports no doing stairs at home yet  Objective: See treatment diary below      Assessment: Tolerated treatment well  Patient demonstrated fatigue post treatment and exhibited good technique with therapeutic exercises  Pt able to sarah step to step gait with ascending/descending stairs  Pt sarah program well with overall fatigue  Pt con to progress  Plan: Continue per plan of care        Precautions: None at this time    Daily Treatment Diary    HEP: Discussed safety with ADLs and home-related activity    Manual  7/3/19 7/10/19 7/15/19          ART             IASTM             JM             PROM and LE stretch             STM/Triggerpoint               Exercise Diary  7/3/19 7/10/19 7/15/19          Gait Instruction x10' 10'           Laps with [facility] (correct mechanics) 2x 2x 2x          Stairs  1x 1x          NuStep x10' 12' 12'          P-bars Forward/backward walk 3x10 3/10 3/10          Pbars Lat walk 2x10 3/10 3/10          Pbars Standing Marching with hand taps 3x10 3/10 3/10          SL Bridge             Clam Shells             Sit to Stand 3x10 3/10 3/10          SL Sit to stand             TB Lateral Walk             Step ups/downs  3x10 ea 8'' 3/10 8"          Prone H' flexor Stretch             Kneeling hip flexor Stretch             No shoe Steamboats AE             SLS BOSU             Upside down BOSU SL squat holds             SL RDLs             Side plank with hip Abd             Bunkies             TUG                 Modalities  7/3/19 7/10/19 7/15/19          MHP             CP             Stim

## 2019-07-17 ENCOUNTER — OFFICE VISIT (OUTPATIENT)
Dept: PHYSICAL THERAPY | Facility: CLINIC | Age: 84
End: 2019-07-17
Payer: MEDICARE

## 2019-07-17 DIAGNOSIS — I63.9 CEREBROVASCULAR ACCIDENT (CVA), UNSPECIFIED MECHANISM (HCC): Primary | ICD-10-CM

## 2019-07-17 PROCEDURE — 97530 THERAPEUTIC ACTIVITIES: CPT

## 2019-07-17 PROCEDURE — 97110 THERAPEUTIC EXERCISES: CPT

## 2019-07-17 NOTE — PROGRESS NOTES
Daily Note     Today's date: 2019  Patient name: Dixie Abrams  : 1935  MRN: [id number]  Referring provider: Sammy Smart DO  Dx:   Encounter Diagnosis     ICD-10-CM    1  Cerebrovascular accident (CVA), unspecified mechanism ([facility] [address] ) I63 9                   Subjective: Pt reports he is doing well  States he is not doing steps at home  Objective: See treatment diary below      Assessment: Tolerated treatment well  Patient demonstrated fatigue post treatment  Pt sarah stairs x 2 today with min difficulty and SBA  Pt cont to make gains with overall strength  Educated family if doing stairs at home to have daughter or son assist        Plan: Continue per plan of care        Precautions: None at this time    Daily Treatment Diary    HEP: Discussed safety with ADLs and home-related activity    Manual  7/3/19 7/10/19 7/15/19 7/17/19         ART             IASTM             JM             PROM and LE stretch             STM/Triggerpoint               Exercise Diary  7/3/19 7/10/19 7/15/19 7/17/19         Gait Instruction x10' 10'           Laps with [facility] (correct mechanics) 2x 2x 2x 2x         Stairs  1x 1x 2x         NuStep x10' 12' 12' 15'         P-bars Forward/backward walk 3x10 3/10 3/10 3/10         Pbars Lat walk 2x10 3/10 3/10 3/10         Pbars Standing Marching with hand taps 3x10 3/10 3/10 3/10         SL Bridge             Clam Shells             Sit to Stand 3x10 3/10 3/10 3/10         SL Sit to stand             TB Lateral Walk             Step ups/downs  3x10 ea 8'' 3/10 8" 3/10 8"         Prone H' flexor Stretch             Kneeling hip flexor Stretch             No shoe Steamboats AE             SLS BOSU             Upside down BOSU SL squat holds             SL RDLs             Side plank with hip Abd             Bunkies             TUG                 Modalities  7/3/19 7/10/19 7/15/19 7/17/19         MHP             CP             Stim

## 2019-07-18 ENCOUNTER — OFFICE VISIT (OUTPATIENT)
Dept: PHYSICAL THERAPY | Facility: CLINIC | Age: 84
End: 2019-07-18
Payer: MEDICARE

## 2019-07-18 DIAGNOSIS — I63.9 CEREBROVASCULAR ACCIDENT (CVA), UNSPECIFIED MECHANISM (HCC): Primary | ICD-10-CM

## 2019-07-18 PROCEDURE — 97110 THERAPEUTIC EXERCISES: CPT

## 2019-07-18 PROCEDURE — 97530 THERAPEUTIC ACTIVITIES: CPT

## 2019-07-18 NOTE — PROGRESS NOTES
Daily Note     Today's date: 2019  Patient name: Francois Elliott  : 1935  MRN: [id number]  Referring provider: Jeremi Ramírez DO  Dx:   Encounter Diagnosis     ICD-10-CM    1  Cerebrovascular accident (CVA), unspecified mechanism ([facility] [address] ) I63 9                   Subjective: Pt reports he is doing well and is feeling stronger  Objective: See treatment diary below      Assessment: Tolerated treatment well  Patient demonstrated fatigue post treatment and exhibited good technique with therapeutic exercises  Pt able to complete 2 consecutive flights of stairs without rest breaks today with SBA  Pt cont to make gains  Plan: Continue per plan of care        Precautions: None at this time    Daily Treatment Diary    HEP: Discussed safety with ADLs and home-related activity    Manual  7/3/19 7/10/19 7/15/19 7/17/19 7/18/19        ART             IASTM             JM             PROM and LE stretch             STM/Triggerpoint               Exercise Diary  7/3/19 7/10/19 7/15/19 7/17/19 7/18/19        Gait Instruction x10' 10'           Laps with [facility] (correct mechanics) 2x 2x 2x 2x 2x        Stairs  1x 1x 2x 2x        NuStep x10' 12' 12' 15' 15'        P-bars Forward/backward walk 3x10 3/10 3/10 3/10 3/10        Pbars Lat walk 2x10 3/10 3/10 3/10 3/10        Pbars Standing Marching with hand taps 3x10 3/10 3/10 3/10 3/10        SL Bridge             Clam Shells             Sit to Stand 3x10 3/10 3/10 3/10 3/10        SL Sit to stand             TB Lateral Walk             Step ups/downs  3x10 ea 8'' 3/10 8" 3/10 8" 3/10 8"        Prone H' flexor Stretch             Kneeling hip flexor Stretch             No shoe Steamboats AE             SLS BOSU             Upside down BOSU SL squat holds             SL RDLs             Side plank with hip Abd             Bunkies             TUG                 Modalities  7/3/19 7/10/19 7/15/19 7/17/19 7/18/19        MHP             CP             Stim

## 2019-07-23 ENCOUNTER — OFFICE VISIT (OUTPATIENT)
Dept: PHYSICAL THERAPY | Facility: CLINIC | Age: 84
End: 2019-07-23
Payer: MEDICARE

## 2019-07-23 DIAGNOSIS — I63.9 CEREBROVASCULAR ACCIDENT (CVA), UNSPECIFIED MECHANISM (HCC): Primary | ICD-10-CM

## 2019-07-23 PROCEDURE — 97530 THERAPEUTIC ACTIVITIES: CPT | Performed by: PHYSICAL THERAPIST

## 2019-07-23 PROCEDURE — 97112 NEUROMUSCULAR REEDUCATION: CPT | Performed by: PHYSICAL THERAPIST

## 2019-07-23 PROCEDURE — 97110 THERAPEUTIC EXERCISES: CPT | Performed by: PHYSICAL THERAPIST

## 2019-07-23 NOTE — PROGRESS NOTES
PT Evaluation     Today's date: 2019  Patient name: Trice Guido  : 1935  MRN: [id number]  Referring provider: Nanda Lynch DO  Dx:   Encounter Diagnosis     ICD-10-CM    1  Cerebrovascular accident (CVA), unspecified mechanism ([facility] [address] ) I63 9                   Assessment  Understanding of Dx/Px/POC: good   Prognosis: good    Goals  STGs: To be complete within 4 weeks  - Decrease TUG to < 12''  - Decrease Sit to Stand time to < 35''  - Increase bilateral LE strength to > 4+/5  - Improve gait to WNL with LRAD for distances < 6 blocks  - Improve DGI to > 21/24    LTGs: To be complete within 6 weeks  - Able to walk for any extended amount of time/distance without limitation for increased safety and functional capacity with ADLs and home-related duty  - Able to repetitively squat without limitation for increased safety and functional capacity with ADLs and home-related duty  - Able to repetitively ascend/descend a full flight of stairs without limitation for increased safety and functional capacity with ADLs and home-related duty  - Able to repetitively complete transfers without pain or limitation for increased safety and functional capacity with ADLs and home-related duty    Plan  Frequency: 3x week  Duration in weeks: 4       Upon completion of today's re-evaluation, as evidenced by present objective and subjective measures, Devin's sx remain consistent with continued, well-paced progress from being s/p CVA 19  Pt will benefit from continued skilled physical therapy to address current deficits  Subjective Evaluation    Pain  Current pain ratin  At best pain ratin  At worst pain ratin  Location: None notes at this time       Pt reports he feels like he is getting better and better each day  Reports no setbacks  Balance improving, strength improving, self-confidence improving   Pt reports he is anxious to continue progressing at this time prior to eventual safe D/C to HEP           Objective Pain level ranges 2-8/10  AROM: WNL  Strength: R LE 4+/5 t/o; L LE 4/5 t/o  Gait: SPC, flexed trunk, intermittent need for rhythm adjustment  PSFS: 7/10  TU''  DGI:   10 Sit to Stand Test: 33''  Unable to walk without limitation, but improving (50% capacity)  Unable to squat without limitation, but improving (50% capacity)  Unable complete transfers without limitation, bu improving (75% capacity)  Unable to ascend/descend stairs without limitation, but improving (step to step pattern with SPC and Handrail)           Precautions: None at this time    Daily Treatment Diary    HEP: Discussed safety with ADLs and home-related activity    Manual  7/3/19 7/10/19 7/15/19 7/17/19 7/23/19        ART             IASTM             JM             PROM and LE stretch             STM/Triggerpoint               Exercise Diary  7/3/19 7/10/19 7/15/19 7/17/19 7/23/19        Gait Instruction x10' 10'           Laps with [facility] (correct mechanics) 2x 2x 2x 2x 2x        Stairs  1x 1x 2x 2x        NuStep x10' 12' 12' 15' 15'        P-bars Forward/backward walk 3x10 3/10 3/10 3/10 3/10        Pbars Lat walk 2x10 3/10 3/10 3/10 3/10        Pbars Standing Marching with hand taps 3x10 3/10 3/10 3/10 3/10        SL Bridge             Clam Shells             Sit to Stand 3x10 3/10 3/10 3/10 3/10        SL Sit to stand             TB Lateral Walk             Step ups/downs  3x10 ea 8'' 3/10 8" 3/10 8" 3/10 8"        Prone H' flexor Stretch             Kneeling hip flexor Stretch             No shoe Steamboats AE             SLS BOSU             Upside down BOSU SL squat holds             SL RDLs             Side plank with hip Abd             Bunkies             TUG                 Modalities  7/3/19 7/10/19 7/15/19 7/17/19 7/23/19        MHP             CP             Stim

## 2019-07-24 DIAGNOSIS — I63.9 CEREBROVASCULAR ACCIDENT (CVA), UNSPECIFIED MECHANISM (HCC): ICD-10-CM

## 2019-07-24 RX ORDER — LOVASTATIN 40 MG/1
40 TABLET ORAL
Qty: 90 TABLET | Refills: 3 | Status: SHIPPED | OUTPATIENT
Start: 2019-07-24 | End: 2020-08-05 | Stop reason: HOSPADM

## 2019-07-25 ENCOUNTER — PATIENT OUTREACH (OUTPATIENT)
Dept: CASE MANAGEMENT | Facility: OTHER | Age: 84
End: 2019-07-25

## 2019-07-25 ENCOUNTER — OFFICE VISIT (OUTPATIENT)
Dept: PHYSICAL THERAPY | Facility: CLINIC | Age: 84
End: 2019-07-25
Payer: MEDICARE

## 2019-07-25 DIAGNOSIS — I63.9 CEREBROVASCULAR ACCIDENT (CVA), UNSPECIFIED MECHANISM (HCC): Primary | ICD-10-CM

## 2019-07-25 PROCEDURE — 97530 THERAPEUTIC ACTIVITIES: CPT

## 2019-07-25 PROCEDURE — 97110 THERAPEUTIC EXERCISES: CPT

## 2019-07-25 PROCEDURE — 97112 NEUROMUSCULAR REEDUCATION: CPT

## 2019-07-25 NOTE — PROGRESS NOTES
Daily Note     Today's date: 2019  Patient name: Allen Arango  : 1935  MRN: [id number]  Referring provider: Marlena Guzman DO  Dx:   Encounter Diagnosis     ICD-10-CM    1  Cerebrovascular accident (CVA), unspecified mechanism ([facility] [address] ) I63 9                   Subjective: Pt reports he is doing well  States doing stairs at home  Pt reports he cont to improve  Objective: See treatment diary below      Assessment: Tolerated treatment well  Patient demonstrated fatigue post treatment and exhibited good technique with therapeutic exercises  Pt requires intermittent rest breaks at times  Rosalinda program well with overall improved demonstration of strength with ambulation of stairs  Plan: Continue per plan of care  Precautions: None at this time    Daily Treatment Diary    HEP: Discussed safety with ADLs and home-related activity    Manual  7/3/19 7/10/19 7/15/19 7/17/19 7/23/19 7/25/19       ART             IASTM             JM             PROM and LE stretch             STM/Triggerpoint               Exercise Diary  7/3/19 7/10/19 7/15/19 7/17/19 7/23/19 7/25/19       Gait Instruction x10' 10'           Laps with SPC (correct mechanics) 2x 2x 2x 2x 2x 3x       Stairs  1x 1x 2x 2x 2x       NuStep x10' 12' 12' 15' 15' 15'       P-bars Forward/backward walk 3x10 3/10 3/10 3/10 3/10 3/10       Pbars Lat walk 2x10 3/10 3/10 3/10 3/10 3/10       Pbars Standing Marching with hand taps 3x10 3/10 3/10 3/10 3/10 3/10       SL Bridge             Clam Shells             Sit to Stand 3x10 3/10 3/10 3/10 3/10 3/10       SL Sit to stand             TB Lateral Walk             Step ups/downs  3x10 ea 8'' 3/10 8" 3/10 8" 3/10 8" 3/10 8"       Prone H' flexor Stretch             Kneeling hip flexor Stretch             No shoe Steamboats AE             Tandem stance on airex      2/20" ea         Upside down BOSU SL squat holds             SL RDLs             Side plank with hip Abd             Bunkies TUG                 Modalities  7/3/19 7/10/19 7/15/19 7/17/19 7/23/19 7/25/19       MHP             CP             Stim

## 2019-07-25 NOTE — PROGRESS NOTES
Unsuccessful attempt at reaching facility  Left VM with name, call back number and detailed message regarding this patient  Requested call back

## 2019-07-31 ENCOUNTER — OFFICE VISIT (OUTPATIENT)
Dept: PHYSICAL THERAPY | Facility: CLINIC | Age: 84
End: 2019-07-31
Payer: MEDICARE

## 2019-07-31 DIAGNOSIS — I63.9 CEREBROVASCULAR ACCIDENT (CVA), UNSPECIFIED MECHANISM (HCC): Primary | ICD-10-CM

## 2019-07-31 PROCEDURE — 97110 THERAPEUTIC EXERCISES: CPT | Performed by: PHYSICAL THERAPIST

## 2019-07-31 PROCEDURE — 97530 THERAPEUTIC ACTIVITIES: CPT | Performed by: PHYSICAL THERAPIST

## 2019-07-31 PROCEDURE — 97112 NEUROMUSCULAR REEDUCATION: CPT | Performed by: PHYSICAL THERAPIST

## 2019-07-31 NOTE — PROGRESS NOTES
Daily Note     Today's date: 2019  Patient name: Maria Luisa Mckeon  : 1935  MRN: [id number]  Referring provider: Olga Estrella DO  Dx:   Encounter Diagnosis     ICD-10-CM    1  Cerebrovascular accident (CVA), unspecified mechanism ([facility] [address] ) I63 9        Start Time: 0900  Stop Time: 1000  Total time in clinic (min): 60 minutes    Subjective: No c/o falls or stumbles  Denies pain  Did not sleep well last night, no reason  States he goes up stairs using his hands and feet on the steps  Objective: See treatment diary below      Assessment: Patient tolerated treatment well with no pain, although did require rest breaks due to poor endurance  Noted poor stability on stairs  Was cued to place entire foot on step when ascending stairs to increase stability and decrease fall risk  Should f/u with proper safety techniques on stairs for proper follow through at home  Plan: Continue per plan of care  Progress treatment as tolerated         Precautions: None at this time    Daily Treatment Diary    HEP: Discussed safety with ADLs and home-related activity    Manual  7/3/19 7/10/19 7/15/19 7/17/19 7/23/19 7/25/19 7/31/19      ART             IASTM             JM             PROM and LE stretch             STM/Triggerpoint               Exercise Diary  7/3/19 7/10/19 7/15/19 7/17/19 7/23/19 7/25/19 7/31/19      Gait Instruction x10' 10'           Laps with SPC (correct mechanics) 2x 2x 2x 2x 2x 3x 2x      Stairs  1x 1x 2x 2x 2x 2x      NuStep x10' 12' 12' 15' 15' 15' 10'      P-bars Forward/backward walk 3x10 3/10 3/10 3/10 3/10 3/10 3/10      Pbars Lat walk 2x10 3/10 3/10 3/10 3/10 3/10 3x10      Pbars Standing Marching with hand taps 3x10 3/10 3/10 3/10 3/10 3/10 3/10      SL Bridge             Clam Shells             Sit to Stand 3x10 3/10 3/10 3/10 3/10 3/10 3x10      SL Sit to stand             TB Lateral Walk             Step ups/downs  3x10 ea 8'' 3/10 8" 3/10 8" 3/10 8" 3/10 8" 3/10 8"      Prone H' flexor Stretch             Kneeling hip flexor Stretch             No shoe Steamboats AE             Tandem stance on airex      2/20" ea   romberg/tandem 2x20"      Upside down BOSU SL squat holds             SL RDLs             Side plank with hip Abd             Bunkies             TUG                 Modalities  7/3/19 7/10/19 7/15/19 7/17/19 7/23/19 7/25/19 7/31/19      MHP             CP             Stim

## 2019-08-05 ENCOUNTER — OFFICE VISIT (OUTPATIENT)
Dept: PHYSICAL THERAPY | Facility: CLINIC | Age: 84
End: 2019-08-05
Payer: COMMERCIAL

## 2019-08-05 ENCOUNTER — PATIENT OUTREACH (OUTPATIENT)
Dept: CASE MANAGEMENT | Facility: OTHER | Age: 84
End: 2019-08-05

## 2019-08-05 DIAGNOSIS — I63.9 CEREBROVASCULAR ACCIDENT (CVA), UNSPECIFIED MECHANISM (HCC): Primary | ICD-10-CM

## 2019-08-05 PROCEDURE — 97110 THERAPEUTIC EXERCISES: CPT | Performed by: PHYSICAL THERAPIST

## 2019-08-05 PROCEDURE — 97112 NEUROMUSCULAR REEDUCATION: CPT | Performed by: PHYSICAL THERAPIST

## 2019-08-05 PROCEDURE — 97530 THERAPEUTIC ACTIVITIES: CPT | Performed by: PHYSICAL THERAPIST

## 2019-08-05 NOTE — PROGRESS NOTES
BPCI form and care coordination note updated  Patient has appointment tomorrow with Dr Andres Prather  This care manager will continue to monitor via chart review until bundle closes

## 2019-08-05 NOTE — PROGRESS NOTES
Daily Note     Today's date: 2019  Patient name: Digna Oglesby  : 1935  MRN: [id number]  Referring provider: Rose Brock DO  Dx:   Encounter Diagnosis     ICD-10-CM    1  Cerebrovascular accident (CVA), unspecified mechanism ([facility] [address] ) I63 9                   Subjective: Pt reports he continues to make progress daily  Becoming more functional and safer with ADLs and home-relate duty  Anxious to continue making progress  Objective: See treatment diary below      Assessment: Tolerated treatment well  Patient demonstrated fatigue post treatment and exhibited good technique with therapeutic exercises  Pt requires intermittent rest breaks at times  Cont to require less rest time, however  Plan: Continue per plan of care  Progress treatment as tolerated  Precautions: None at this time    Daily Treatment Diary    HEP: Discussed safety with ADLs and home-related activity    Manual  7/3/19 7/10/19 7/15/19 7/17/19 7/23/19 8/5/19       ART             IASTM             JM             PROM and LE stretch             STM/Triggerpoint               Exercise Diary  7/3/19 7/10/19 7/15/19 7/17/19 7/23/19 8/5/19       Gait Instruction x10' 10'           Laps with SPC (correct mechanics) 2x 2x 2x 2x 2x 3x       Stairs  1x 1x 2x 2x 2x       NuStep x10' 12' 12' 15' 15' 15'       P-bars Forward/backward walk 3x10 3/10 3/10 3/10 3/10 3/10       Pbars Lat walk 2x10 3/10 3/10 3/10 3/10 3/10       Pbars Standing Marching with hand taps 3x10 3/10 3/10 3/10 3/10 3/10       SL Bridge             Clam Shells             Sit to Stand 3x10 3/10 3/10 3/10 3/10 3/10       SL Sit to stand             TB Lateral Walk             Step ups/downs  3x10 ea 8'' 3/10 8" 3/10 8" 3/10 8" 3/10 8"       Prone H' flexor Stretch             Kneeling hip flexor Stretch             No shoe Steamboats AE             Tandem stance on airex      220" ea         Upside down BOSU SL squat holds             SL RDLs             Side plank with hip Abd             Bunkies             TUG                 Modalities  7/3/19 7/10/19 7/15/19 7/17/19 7/23/19 8/5/19       MHP             CP             Stim

## 2019-08-05 NOTE — PROGRESS NOTES
Return call from facility reporting that patient was discharged to home on 6/7/19 with [facility]

## 2019-08-06 ENCOUNTER — OFFICE VISIT (OUTPATIENT)
Dept: INTERNAL MEDICINE CLINIC | Facility: CLINIC | Age: 84
End: 2019-08-06
Payer: COMMERCIAL

## 2019-08-06 VITALS
SYSTOLIC BLOOD PRESSURE: 124 MMHG | BODY MASS INDEX: 33.01 KG/M2 | HEIGHT: 65 IN | TEMPERATURE: 97.7 F | WEIGHT: 198.13 LBS | OXYGEN SATURATION: 95 % | HEART RATE: 67 BPM | DIASTOLIC BLOOD PRESSURE: 70 MMHG

## 2019-08-06 DIAGNOSIS — R21 RASH: Primary | ICD-10-CM

## 2019-08-06 DIAGNOSIS — I63.9 CEREBROVASCULAR ACCIDENT (CVA), UNSPECIFIED MECHANISM (HCC): ICD-10-CM

## 2019-08-06 DIAGNOSIS — N18.30 CHRONIC KIDNEY DISEASE, STAGE 3 (HCC): ICD-10-CM

## 2019-08-06 PROBLEM — T17.910A ASPIRATION OF VOMIT: Status: RESOLVED | Noted: 2019-05-19 | Resolved: 2019-08-06

## 2019-08-06 PROBLEM — T17.918A ASPIRATION OF VOMIT: Status: RESOLVED | Noted: 2019-05-19 | Resolved: 2019-08-06

## 2019-08-06 PROCEDURE — 99214 OFFICE O/P EST MOD 30 MIN: CPT | Performed by: INTERNAL MEDICINE

## 2019-08-06 RX ORDER — DESOXIMETASONE 0.5 MG/G
CREAM TOPICAL 2 TIMES DAILY
Qty: 60 G | Refills: 3 | Status: SHIPPED | OUTPATIENT
Start: 2019-08-06 | End: 2020-08-05 | Stop reason: HOSPADM

## 2019-08-06 RX ORDER — DESOXIMETASONE 0.5 MG/G
CREAM TOPICAL 2 TIMES DAILY
COMMUNITY
End: 2019-08-06 | Stop reason: SDUPTHER

## 2019-08-06 NOTE — PROGRESS NOTES
Assessment/Plan:         Diagnoses and all orders for this visit:    Rash  -     desoximetasone (TOPICORT) 0 05 % cream; Apply topically 2 (two) times a day    Cerebrovascular accident (CVA), unspecified mechanism ([address] )  -     LDL cholesterol, direct; Future  Rx for fbw      Chronic kidney disease, stage 3 (HCC)  -     Comprehensive metabolic panel; Future  Recheck labs    Other orders  -     Discontinue: desoximetasone (TOPICORT) 0 05 % cream; Apply topically 2 (two) times a day    Rto 2 months Flu shot       Patient ID: Martinez Pastor is a 80 y o  male  HPI   Pt doing better overall He is finishing PT following recent stroke and has improved No longer uses walker or cane and is fairly active within the home 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 No headache or vision change No falls No limiting back pain Appetite is good No weakness of arms or legs residual he does not want to retunr to cardiology and has not been seen since the hospital - had echo inMay No chest pain or sob He tires a little more easily but just rests now when tired          Review of Systems   Constitutional: Negative  HENT: Negative  Respiratory: Negative  Cardiovascular: Negative  Gastrointestinal: Negative  Genitourinary: Negative  Musculoskeletal: Negative  Skin: Negative  Neurological: Negative  Hematological: Negative  Psychiatric/Behavioral: Negative  Past Medical History:   Diagnosis Date    Aortic stenosis     ECHO -4-14-14   MODERATE TO SEVERE AORTIC STENOSIS; MILD AROTIC INSUFFICIENCY - LAST ASSESSED 10/26/16; RESOLVED 6/8/16    Aortic valve disorder     LAST ASSESSED 10/8/15; 10/8/15    Transient cerebral ischemia      Past Surgical History:   Procedure Laterality Date    AORTIC VALVE REPLACEMENT  06/30/2015    avr with 23 mm [place] [employer] Ease bioprosthetic    CATARACT EXTRACTION Right 06/05/2000    COLONOSCOPY      CORONARY ARTERY BYPASS GRAFT  06/05/2000 x1 with lima  to lad    POLYPECTOMY  04/2014    enteroscopic - esophageal ulcer, gastric erosion, and duodenal ulcer   TONSILLECTOMY      unknown     Social History     Socioeconomic History    Marital status: /Civil Union     Spouse name: Not on file    Number of children: Not on file    Years of education: Not on file    Highest education level: Not on file   Occupational History    Occupation: retired   Social Needs    Financial resource strain: Not on file    Food insecurity:     Worry: Not on file     Inability: Not on file   [employer] needs:     Medical: Not on file     Non-medical: Not on file   Tobacco Use    Smoking status: Never Smoker    Smokeless tobacco: Never Used   Substance and Sexual Activity    Alcohol use:  Yes     Alcohol/week: 1 0 standard drinks     Types: 1 Cans of beer per week     Frequency: Monthly or less     Drinks per session: 1 or 2     Binge frequency: Never     Comment: social    Drug use: No    Sexual activity: Not on file   Lifestyle    Physical activity:     Days per week: Not on file     Minutes per session: Not on file    Stress: Not on file   Relationships    Social connections:     Talks on phone: Not on file     Gets together: Not on file     Attends Bahai service: Not on file     Active member of club or organization: Not on file     Attends meetings of clubs or organizations: Not on file     Relationship status: Not on file    Intimate partner violence:     Fear of current or ex partner: Not on file     Emotionally abused: Not on file     Physically abused: Not on file     Forced sexual activity: Not on file   Other Topics Concern    Not on file   Social History Narrative    Caffeine use     Dental care, regularly     Lives independently with spouse     Uses seatbelts      Allergies   Allergen Reactions    Promethazine Delerium             /70   Pulse 67   Temp 97 7 °F (36 5 °C) (Tympanic)   Ht 5' 5" (1 651 m)   Wt 89 9 kg (198 lb 2 oz)   SpO2 95%   BMI 32 97 kg/m²          Physical Exam   Constitutional: He is oriented to person, place, and time  He appears well-developed and well-nourished  HENT:   Head: Normocephalic and atraumatic  Right Ear: External ear normal    Left Ear: External ear normal    Nose: Nose normal    Mouth/Throat: Oropharynx is clear and moist  No oropharyngeal exudate  Eyes: Pupils are equal, round, and reactive to light  Conjunctivae and EOM are normal  No scleral icterus  Neck: Normal range of motion  Neck supple  No JVD present  Cardiovascular: Normal rate, regular rhythm, normal heart sounds and intact distal pulses  No murmur heard  Pulmonary/Chest: Effort normal and breath sounds normal  No respiratory distress  He has no wheezes  Abdominal: Soft  Bowel sounds are normal    Musculoskeletal: Normal range of motion  He exhibits no edema  Lymphadenopathy:     He has no cervical adenopathy  Neurological: He is alert and oriented to person, place, and time  He displays normal reflexes  No cranial nerve deficit or sensory deficit  He exhibits normal muscle tone  Coordination normal    Skin: Skin is warm and dry  He is not diaphoretic  Psychiatric: He has a normal mood and affect  His behavior is normal  Judgment and thought content normal    Nursing note and vitals reviewed

## 2019-08-08 ENCOUNTER — OFFICE VISIT (OUTPATIENT)
Dept: PHYSICAL THERAPY | Facility: CLINIC | Age: 84
End: 2019-08-08
Payer: COMMERCIAL

## 2019-08-08 DIAGNOSIS — I63.9 CEREBROVASCULAR ACCIDENT (CVA), UNSPECIFIED MECHANISM (HCC): Primary | ICD-10-CM

## 2019-08-08 PROCEDURE — 97110 THERAPEUTIC EXERCISES: CPT

## 2019-08-08 PROCEDURE — 97530 THERAPEUTIC ACTIVITIES: CPT

## 2019-08-08 PROCEDURE — 97116 GAIT TRAINING THERAPY: CPT

## 2019-08-08 NOTE — PROGRESS NOTES
Daily Note     Today's date: 2019  Patient name: Carroll Phillips  : 1935  MRN: [id number]  Referring provider: Sherin Brock DO  Dx:   Encounter Diagnosis     ICD-10-CM    1  Cerebrovascular accident (CVA), unspecified mechanism ([facility] [address] ) [address] 9                   Subjective: Pt reports he is doing well and is able to ascend/descend stairs at home  Pt reports he would like to dc  Objective: See treatment diary below      Assessment: Tolerated treatment well  Patient demonstrated fatigue post treatment  Pt cont to asrah program well and demonstrates improved sarah to stairs  Pt will hold x 1 week and possibly dc to HEP next week  Plan: Continue per plan of care  Precautions: None at this time    Daily Treatment Diary    HEP: Discussed safety with ADLs and home-related activity    Manual  7/3/19 7/10/19 7/15/19 7/17/19 7/23/19 8/5/19 8/8/19      ART             IASTM             JM             PROM and LE stretch             STM/Triggerpoint               Exercise Diary  7/3/19 7/10/19 7/15/19 7/17/19 7/23/19 8/5/19 8/8/19      Gait Instruction x10' 10'           Laps with SPC (correct mechanics) 2x 2x 2x 2x 2x 3x 3x no AD      Stairs  1x 1x 2x 2x 2x 2x      NuStep x10' 12' 12' 15' 15' 15' 15'      P-bars Forward/backward walk 3x10 3/10 3/10 3/10 3/10 3/10 3/10      Pbars Lat walk 2x10 3/10 3/10 3/10 3/10 3/10 3/10      Pbars Standing Marching with hand taps 3x10 3/10 3/10 3/10 3/10 3/10 3/10      SL Bridge             Clam Shells             Sit to Stand 3x10 3/10 3/10 3/10 3/10 3/10 3/10      SL Sit to stand             TB Lateral Walk             Step ups/downs  3x10 ea 8'' 3/10 8" 3/10 8" 3/10 8" 3/10 8" 3/10 8"      Prone H' flexor Stretch             Kneeling hip flexor Stretch             No shoe Steamboats AE             Tandem stance on airex      2/20" ea  2/20" ea        Upside down BOSU SL squat holds             SL RDLs             Side plank with hip Abd             Bunkies TUG                 Modalities  7/3/19 7/10/19 7/15/19 7/17/19 7/23/19 8/5/19 8/8/19      MHP             CP             Stim

## 2019-08-15 ENCOUNTER — OFFICE VISIT (OUTPATIENT)
Dept: PHYSICAL THERAPY | Facility: CLINIC | Age: 84
End: 2019-08-15
Payer: COMMERCIAL

## 2019-08-15 DIAGNOSIS — I63.9 CEREBROVASCULAR ACCIDENT (CVA), UNSPECIFIED MECHANISM (HCC): Primary | ICD-10-CM

## 2019-08-15 PROCEDURE — 97112 NEUROMUSCULAR REEDUCATION: CPT | Performed by: PHYSICAL THERAPIST

## 2019-08-15 PROCEDURE — 97110 THERAPEUTIC EXERCISES: CPT | Performed by: PHYSICAL THERAPIST

## 2019-08-15 PROCEDURE — 97530 THERAPEUTIC ACTIVITIES: CPT | Performed by: PHYSICAL THERAPIST

## 2019-08-15 NOTE — PROGRESS NOTES
PT Discharge    Today's date: 8/15/2019  Patient name: Dmitriy Drake  : 1935  MRN: [id number]  Referring provider: Veronica Peck DO  Dx:   Encounter Diagnosis     ICD-10-CM    1  Cerebrovascular accident (CVA), unspecified mechanism ([facility] [address] ) I63 9                   Assessment  Understanding of Dx/Px/POC: good   Prognosis: good    Goals  STGs: To be complete within 4 weeks  - Decrease TUG to < 12''  - Decrease Sit to Stand time to < 35''  - Increase bilateral LE strength to > 4+/5  - Improve gait to WNL with LRAD for distances < 6 blocks  - Improve DGI to > 21/24    LTGs: To be complete within 6 weeks  - Able to walk for any extended amount of time/distance without limitation for increased safety and functional capacity with ADLs and home-related duty  - Able to repetitively squat without limitation for increased safety and functional capacity with ADLs and home-related duty  - Able to repetitively ascend/descend a full flight of stairs without limitation for increased safety and functional capacity with ADLs and home-related duty  - Able to repetitively complete transfers without pain or limitation for increased safety and functional capacity with ADLs and home-related duty    Plan  Frequency: 3x week  Duration in weeks: 4       Pt will be D/C from physical therapy after today's session as he has achieved all personal and functional goals  Pt has a good understanding of HEP and has been instructed to reach out with any questions/concerns/setbacks  Subjective Evaluation    Pain  Current pain ratin  At best pain ratin  At worst pain ratin  Location: None notes at this time       Pt reports he feels ready to safely D/C to HEP after today's session, as he has achieved all personal and functional goals  Pt reports he has a good understanding of HEP and will reach out with any questions/concerns/setbacks            Objective Pain level ranges 0/10  AROM: WNL  Strength: 5/5  Gait: WNL  PSFS: 10/10  TU''  DGI:   10 Sit to Stand Test: 30''  Able to walk without limitation  Able to squat without limitation  Able complete transfers without limitation  Able to ascend/descend stairs without limitation           Precautions: None at this time    Daily Treatment Diary    HEP: Discussed safety with ADLs and home-related activity    Manual  7/3/19 7/10/19 7/15/19 7/17/19 7/23/19 8/5/19 8/15/19      ART             IASTM             JM             PROM and LE stretch             STM/Triggerpoint               Exercise Diary  7/3/19 7/10/19 7/15/19 7/17/19 7/23/19 8/5/19 8/15/19      Gait Instruction x10' 10'           Laps with SPC (correct mechanics) 2x 2x 2x 2x 2x 3x 3x no AD      Stairs  1x 1x 2x 2x 2x 2x      NuStep x10' 12' 12' 15' 15' 15' 15'      P-bars Forward/backward walk 3x10 3/10 3/10 3/10 3/10 3/10 3/10      Pbars Lat walk 2x10 3/10 3/10 3/10 3/10 3/10 3/10      Pbars Standing Marching with hand taps 3x10 3/10 3/10 3/10 3/10 3/10 3/10      SL Bridge             Clam Shells             Sit to Stand 3x10 3/10 3/10 3/10 3/10 3/10 3/10      SL Sit to stand             TB Lateral Walk             Step ups/downs  3x10 ea 8'' 3/10 8" 3/10 8" 3/10 8" 3/10 8" 3/10 8"      Prone H' flexor Stretch             Kneeling hip flexor Stretch             No shoe Steamboats AE             Tandem stance on airex      20" ea  20" ea        Upside down BOSU SL squat holds             SL RDLs             Side plank with hip Abd             Bunkies             TUG                 Modalities  7/3/19 7/10/19 7/15/19 7/17/19 7/23/19 8/5/19 8/15/19      MHP             CP             Stim

## 2019-08-19 ENCOUNTER — PATIENT OUTREACH (OUTPATIENT)
Dept: CASE MANAGEMENT | Facility: OTHER | Age: 84
End: 2019-08-19

## 2019-08-21 NOTE — PROGRESS NOTES
BPCI form updated, care coordination note removed and bundle episode resolved  Inbasket message to KEV Kirby

## 2019-09-27 ENCOUNTER — APPOINTMENT (OUTPATIENT)
Dept: LAB | Facility: MEDICAL CENTER | Age: 84
End: 2019-09-27
Payer: COMMERCIAL

## 2019-09-27 DIAGNOSIS — I63.9 CEREBROVASCULAR ACCIDENT (CVA), UNSPECIFIED MECHANISM (HCC): ICD-10-CM

## 2019-09-27 DIAGNOSIS — N18.30 CHRONIC KIDNEY DISEASE, STAGE 3 (HCC): ICD-10-CM

## 2019-09-27 LAB
ALBUMIN SERPL BCP-MCNC: 4.4 G/DL (ref 3.5–5)
ALP SERPL-CCNC: 87 U/L (ref 46–116)
ALT SERPL W P-5'-P-CCNC: 21 U/L (ref 12–78)
ANION GAP SERPL CALCULATED.3IONS-SCNC: 9 MMOL/L (ref 4–13)
AST SERPL W P-5'-P-CCNC: 20 U/L (ref 5–45)
BILIRUB SERPL-MCNC: 0.68 MG/DL (ref 0.2–1)
BUN SERPL-MCNC: 25 MG/DL (ref 5–25)
CALCIUM SERPL-MCNC: 9.3 MG/DL (ref 8.3–10.1)
CHLORIDE SERPL-SCNC: 105 MMOL/L (ref 100–108)
CO2 SERPL-SCNC: 27 MMOL/L (ref 21–32)
CREAT SERPL-MCNC: 2.05 MG/DL (ref 0.6–1.3)
GFR SERPL CREATININE-BSD FRML MDRD: 29 ML/MIN/1.73SQ M
GLUCOSE P FAST SERPL-MCNC: 85 MG/DL (ref 65–99)
LDLC SERPL DIRECT ASSAY-MCNC: 97 MG/DL (ref 0–100)
POTASSIUM SERPL-SCNC: 4.7 MMOL/L (ref 3.5–5.3)
PROT SERPL-MCNC: 7.8 G/DL (ref 6.4–8.2)
SODIUM SERPL-SCNC: 141 MMOL/L (ref 136–145)

## 2019-09-27 PROCEDURE — 83721 ASSAY OF BLOOD LIPOPROTEIN: CPT

## 2019-09-27 PROCEDURE — 80053 COMPREHEN METABOLIC PANEL: CPT

## 2019-09-27 PROCEDURE — 36415 COLL VENOUS BLD VENIPUNCTURE: CPT

## 2019-10-07 ENCOUNTER — OFFICE VISIT (OUTPATIENT)
Dept: INTERNAL MEDICINE CLINIC | Facility: CLINIC | Age: 84
End: 2019-10-07
Payer: COMMERCIAL

## 2019-10-07 VITALS
DIASTOLIC BLOOD PRESSURE: 70 MMHG | BODY MASS INDEX: 34.49 KG/M2 | SYSTOLIC BLOOD PRESSURE: 124 MMHG | HEIGHT: 65 IN | OXYGEN SATURATION: 97 % | TEMPERATURE: 96.3 F | HEART RATE: 60 BPM | WEIGHT: 207 LBS

## 2019-10-07 DIAGNOSIS — Z00.00 MEDICARE ANNUAL WELLNESS VISIT, SUBSEQUENT: Primary | ICD-10-CM

## 2019-10-07 DIAGNOSIS — Z23 FLU VACCINE NEED: ICD-10-CM

## 2019-10-07 PROCEDURE — 90662 IIV NO PRSV INCREASED AG IM: CPT

## 2019-10-07 PROCEDURE — 1125F AMNT PAIN NOTED PAIN PRSNT: CPT

## 2019-10-07 PROCEDURE — 1170F FXNL STATUS ASSESSED: CPT

## 2019-10-07 PROCEDURE — G0008 ADMIN INFLUENZA VIRUS VAC: HCPCS

## 2019-10-07 PROCEDURE — G0439 PPPS, SUBSEQ VISIT: HCPCS | Performed by: INTERNAL MEDICINE

## 2019-10-07 NOTE — PROGRESS NOTES
Assessment and Plan:     Problem List Items Addressed This Visit        Other    Medicare annual wellness visit, subsequent - Primary      Other Visit Diagnoses     Flu vaccine need          Flu shot today  Reviewed labs today Increase water intake  Home exercises encouraged   Discussed AD and patient will work on getting that done  Rto 6 months    Preventive health issues were discussed with patient, and age appropriate screening tests were ordered as noted in patient's After Visit Summary  Personalized health advice and appropriate referrals for health education or preventive services given if needed, as noted in patient's After Visit Summary  History of Present Illness:     Patient presents for Medicare Annual Wellness visit    Patient Care Team:  Anastacio Chávez DO as PCP - [facility], [address] [place] [address], MD Jessica West DO     Problem List:     Patient Active Problem List   Diagnosis    Anemia    Asthma    Atherosclerotic 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    Benign essential hypertension    Chronic kidney disease, stage 3 ([facility] [address] )    Dyslipidemia    GERD (gastroesophageal reflux disease)    Late effects of cerebrovascular disease    Lumbar degenerative disc disease    Lumbar radiculopathy    Mitral regurgitation    Obesity (BMI 30-39  9)    Medicare annual wellness visit, subsequent    Hypertensive urgency    H/O aortic valve replacement    CVA (cerebral vascular accident) [facility])      Past Medical and Surgical History:     Past Medical History:   Diagnosis Date    Aortic stenosis     ECHO -4-14-14   MODERATE TO SEVERE AORTIC STENOSIS; MILD AROTIC INSUFFICIENCY - LAST ASSESSED 10/26/16; RESOLVED 6/8/16    Aortic valve disorder     LAST ASSESSED 10/8/15; 10/8/15    Transient cerebral ischemia      Past Surgical History:   Procedure Laterality Date    AORTIC VALVE REPLACEMENT  06/30/2015    avr with 23 mm [facility] Ease bioprosthetic    CATARACT EXTRACTION Right 06/05/2000    COLONOSCOPY      CORONARY ARTERY BYPASS GRAFT  06/05/2000    x1 with lima  to lad    POLYPECTOMY  04/2014    enteroscopic - esophageal ulcer, gastric erosion, and duodenal ulcer   TONSILLECTOMY      unknown      Family History:     Family History   Problem Relation Age of Onset    No Known Problems Mother     No Known Problems Father     Coronary artery disease Neg Hx       Social History:     Social History     Socioeconomic History    Marital status: /Civil Union     Spouse name: None    Number of children: None    Years of education: None    Highest education level: None   Occupational History    Occupation: retired   Social Needs    Financial resource strain: None    Food insecurity:     Worry: None     Inability: None    Transportation needs:     Medical: None     Non-medical: None   Tobacco Use    Smoking status: Never Smoker    Smokeless tobacco: Never Used   Substance and Sexual Activity    Alcohol use:  Yes     Alcohol/week: 1 0 standard drinks     Types: 1 Cans of beer per week     Frequency: Monthly or less     Drinks per session: 1 or 2     Binge frequency: Never     Comment: social    Drug use: No    Sexual activity: None   Lifestyle    Physical activity:     Days per week: None     Minutes per session: None    Stress: None   Relationships    Social connections:     Talks on phone: None     Gets together: None     Attends Mormonism service: None     Active member of club or organization: None     Attends meetings of clubs or organizations: None     Relationship status: None    Intimate partner violence:     Fear of current or ex partner: None     Emotionally abused: None     Physically abused: None     Forced sexual activity: None   Other Topics Concern    None   Social History Narrative    Caffeine use     Dental care, regularly     Lives independently with spouse     Uses seatbelts        Medications and Allergies:     Current Outpatient Medications   Medication Sig Dispense Refill    amLODIPine (NORVASC) 5 mg tablet Take 1 tablet (5 mg total) by mouth daily 90 tablet 3    aspirin 325 mg tablet Take 1 tablet by mouth daily      clopidogrel (PLAVIX) 75 mg tablet Take 1 tablet (75 mg total) by mouth daily 90 tablet 3    desoximetasone (TOPICORT) 0 05 % cream Apply topically 2 (two) times a day 60 g 3    lovastatin (MEVACOR) 40 MG tablet Take 1 tablet (40 mg total) by mouth daily after dinner 90 tablet 3    metoprolol succinate (TOPROL-XL) 25 mg 24 hr tablet Take 1 tablet (25 mg total) by mouth 2 (two) times a day for 90 days 180 tablet 3     MG capsule Take 1 capsule (100 mg total) by mouth daily for 90 days 90 capsule 3    pantoprazole (PROTONIX) 40 mg tablet Take 1 tablet by mouth daily  0    sodium chloride 1 g tablet Take 1 tablet by mouth 2 (two) times a day  0    sucralfate (CARAFATE) 1 g tablet Take 1 tablet (1 g total) by mouth 4 (four) times a day for 30 days 120 tablet 5    VOLTAREN 1 % Apply 1 application topically 4 (four) times a day as needed for pain  0     No current facility-administered medications for this visit  Allergies   Allergen Reactions    Promethazine Delirium      Immunizations:     Immunization History   Administered Date(s) Administered    INFLUENZA 10/26/2016    Influenza Split High Dose Preservative Free IM 11/07/2012, 11/03/2014, 10/08/2015, 10/26/2016, 10/03/2017    Influenza TIV (IM) 11/04/2013    Influenza, high dose seasonal 0 5 mL 10/09/2018    Pneumococcal Polysaccharide PPV23 11/07/2006      Health Maintenance: There are no preventive care reminders to display for this patient        Topic Date Due    HEPATITIS B VACCINES (1 of 3 - Risk 3-dose series) 11/27/1954    DTaP,Tdap,and Td Vaccines (1 - Tdap) 11/27/1956    Pneumococcal Vaccine: 65+ Years (2 of 2 - PCV13) 11/07/2007    INFLUENZA VACCINE  07/01/2019      Medicare Health Risk Assessment:     /70   Pulse 60   Temp Chrissy Salazar ) 96 3 °F (35 7 °C) (Tympanic)   Ht 5' 5" (1 651 m)   Wt 93 9 kg (207 lb)   SpO2 97%   BMI 34 45 kg/m²      Abraham Fuller is here for his Subsequent Wellness visit  Last Medicare Wellness visit information reviewed, patient interviewed and updates made to the record today  Health Risk Assessment:   Patient rates overall health as good  Patient feels that their physical health rating is same  Eyesight was rated as same  Hearing was rated as same  Patient feels that their emotional and mental health rating is same  Pain experienced in the last 7 days has been none  Patient states that he has experienced no weight loss or gain in last 6 months  Depression Screening:   PHQ-2 Score: 0      Fall Risk Screening: In the past year, patient has experienced: no history of falling in past year      Home Safety:  Patient does not have trouble with stairs inside or outside of their home  Patient has working smoke alarms and has no working carbon monoxide detector  Home safety hazards include: none  Nutrition:   Current diet is Regular  Medications:   Patient is currently taking over-the-counter supplements  OTC medications include: see medication list  Patient is able to manage medications  Activities of Daily Living (ADLs)/Instrumental Activities of Daily Living (IADLs):   Walk and transfer into and out of bed and chair?: Yes  Dress and groom yourself?: Yes    Bathe or shower yourself?: Yes    Feed yourself?  Yes  Do your laundry/housekeeping?: Yes  Manage your money, pay your bills and track your expenses?: Yes  Make your own meals?: Yes    Do your own shopping?: Yes    Previous Hospitalizations:   Any hospitalizations or ED visits within the last 12 months?: No      Advance Care Planning:   Living will: No    Durable POA for healthcare: No    Advanced directive: No    Advanced directive counseling given: Yes    Five wishes given: No    Patient declined ACP directive: Yes    End of Life Decisions reviewed with patient: Yes      Cognitive Screening:   Provider or family/friend/caregiver concerned regarding cognition?: No    PREVENTIVE SCREENINGS      Cardiovascular Screening:    General: Screening Not Indicated and History Lipid Disorder      Diabetes Screening:     General: Screening Current      Colorectal Cancer Screening:     General: Screening Not Indicated      Prostate Cancer Screening:    General: Screening Not Indicated      Osteoporosis Screening:    General: Screening Not Indicated      Abdominal Aortic Aneurysm (AAA) Screening:        General: Screening Current      Lung Cancer Screening:     General: Screening Not Indicated      Hepatitis C Screening:    General: Screening Not Indicated      Patel Patel DO

## 2019-10-07 NOTE — PATIENT INSTRUCTIONS

## 2020-07-20 ENCOUNTER — HOSPITAL ENCOUNTER (INPATIENT)
Facility: HOSPITAL | Age: 85
LOS: 16 days | Discharge: NURSING, REHAB OR LONG-TERM CARE | DRG: 242 | End: 2020-08-05
Attending: INTERNAL MEDICINE | Admitting: INTERNAL MEDICINE
Payer: COMMERCIAL

## 2020-07-20 ENCOUNTER — TELEPHONE (OUTPATIENT)
Dept: INTERNAL MEDICINE CLINIC | Facility: CLINIC | Age: 85
End: 2020-07-20

## 2020-07-20 ENCOUNTER — HOSPITAL ENCOUNTER (EMERGENCY)
Facility: HOSPITAL | Age: 85
End: 2020-07-20
Attending: EMERGENCY MEDICINE | Admitting: EMERGENCY MEDICINE
Payer: COMMERCIAL

## 2020-07-20 ENCOUNTER — APPOINTMENT (EMERGENCY)
Dept: RADIOLOGY | Facility: HOSPITAL | Age: 85
End: 2020-07-20
Payer: COMMERCIAL

## 2020-07-20 VITALS
BODY MASS INDEX: 35.77 KG/M2 | RESPIRATION RATE: 15 BRPM | SYSTOLIC BLOOD PRESSURE: 162 MMHG | TEMPERATURE: 96.9 F | WEIGHT: 214.95 LBS | OXYGEN SATURATION: 97 % | DIASTOLIC BLOOD PRESSURE: 72 MMHG | HEART RATE: 25 BPM

## 2020-07-20 DIAGNOSIS — R53.1 WEAKNESS: Primary | ICD-10-CM

## 2020-07-20 DIAGNOSIS — I16.0 HYPERTENSIVE URGENCY: ICD-10-CM

## 2020-07-20 DIAGNOSIS — J96.01 ACUTE RESPIRATORY FAILURE WITH HYPOXIA (HCC): ICD-10-CM

## 2020-07-20 DIAGNOSIS — R00.1 BRADYCARDIA: Primary | ICD-10-CM

## 2020-07-20 DIAGNOSIS — I44.2 THIRD DEGREE AV BLOCK (HCC): ICD-10-CM

## 2020-07-20 DIAGNOSIS — D72.829 LEUKOCYTOSIS, UNSPECIFIED TYPE: ICD-10-CM

## 2020-07-20 DIAGNOSIS — R26.2 AMBULATORY DYSFUNCTION: ICD-10-CM

## 2020-07-20 DIAGNOSIS — N18.9 CHRONIC KIDNEY DISEASE: ICD-10-CM

## 2020-07-20 DIAGNOSIS — Z86.73 HISTORY OF STROKE: ICD-10-CM

## 2020-07-20 DIAGNOSIS — I25.10 ATHEROSCLEROSIS OF NATIVE CORONARY ARTERY OF NATIVE HEART WITHOUT ANGINA PECTORIS: ICD-10-CM

## 2020-07-20 PROBLEM — N17.9 ARF (ACUTE RENAL FAILURE) (HCC): Status: ACTIVE | Noted: 2020-07-20

## 2020-07-20 PROBLEM — I10 HYPERTENSION: Status: ACTIVE | Noted: 2020-07-20

## 2020-07-20 LAB
ALBUMIN SERPL BCP-MCNC: 3.8 G/DL (ref 3.5–5)
ALP SERPL-CCNC: 83 U/L (ref 46–116)
ALT SERPL W P-5'-P-CCNC: 18 U/L (ref 12–78)
ANION GAP SERPL CALCULATED.3IONS-SCNC: 7 MMOL/L (ref 4–13)
ANION GAP SERPL CALCULATED.3IONS-SCNC: 7 MMOL/L (ref 4–13)
APTT PPP: 33 SECONDS (ref 23–37)
AST SERPL W P-5'-P-CCNC: 15 U/L (ref 5–45)
ATRIAL RATE: 25 BPM
BASOPHILS # BLD AUTO: 0.04 THOUSANDS/ΜL (ref 0–0.1)
BASOPHILS NFR BLD AUTO: 0 % (ref 0–1)
BILIRUB SERPL-MCNC: 0.4 MG/DL (ref 0.2–1)
BUN SERPL-MCNC: 39 MG/DL (ref 5–25)
BUN SERPL-MCNC: 40 MG/DL (ref 5–25)
CALCIUM SERPL-MCNC: 8.8 MG/DL (ref 8.3–10.1)
CALCIUM SERPL-MCNC: 9.4 MG/DL (ref 8.3–10.1)
CHLORIDE SERPL-SCNC: 104 MMOL/L (ref 100–108)
CHLORIDE SERPL-SCNC: 107 MMOL/L (ref 100–108)
CO2 SERPL-SCNC: 25 MMOL/L (ref 21–32)
CO2 SERPL-SCNC: 28 MMOL/L (ref 21–32)
CREAT SERPL-MCNC: 2.33 MG/DL (ref 0.6–1.3)
CREAT SERPL-MCNC: 2.74 MG/DL (ref 0.6–1.3)
EOSINOPHIL # BLD AUTO: 0.27 THOUSAND/ΜL (ref 0–0.61)
EOSINOPHIL NFR BLD AUTO: 3 % (ref 0–6)
ERYTHROCYTE [DISTWIDTH] IN BLOOD BY AUTOMATED COUNT: 13 % (ref 11.6–15.1)
GFR SERPL CREATININE-BSD FRML MDRD: 20 ML/MIN/1.73SQ M
GFR SERPL CREATININE-BSD FRML MDRD: 25 ML/MIN/1.73SQ M
GLUCOSE SERPL-MCNC: 128 MG/DL (ref 65–140)
GLUCOSE SERPL-MCNC: 96 MG/DL (ref 65–140)
HCT VFR BLD AUTO: 42 % (ref 36.5–49.3)
HGB BLD-MCNC: 13.3 G/DL (ref 12–17)
IMM GRANULOCYTES # BLD AUTO: 0.03 THOUSAND/UL (ref 0–0.2)
IMM GRANULOCYTES NFR BLD AUTO: 0 % (ref 0–2)
INR PPP: 1.13 (ref 0.84–1.19)
LYMPHOCYTES # BLD AUTO: 1.44 THOUSANDS/ΜL (ref 0.6–4.47)
LYMPHOCYTES NFR BLD AUTO: 15 % (ref 14–44)
MAGNESIUM SERPL-MCNC: 2.4 MG/DL (ref 1.6–2.6)
MCH RBC QN AUTO: 30.3 PG (ref 26.8–34.3)
MCHC RBC AUTO-ENTMCNC: 31.7 G/DL (ref 31.4–37.4)
MCV RBC AUTO: 96 FL (ref 82–98)
MONOCYTES # BLD AUTO: 0.69 THOUSAND/ΜL (ref 0.17–1.22)
MONOCYTES NFR BLD AUTO: 7 % (ref 4–12)
NEUTROPHILS # BLD AUTO: 7.03 THOUSANDS/ΜL (ref 1.85–7.62)
NEUTS SEG NFR BLD AUTO: 75 % (ref 43–75)
NRBC BLD AUTO-RTO: 0 /100 WBCS
NT-PROBNP SERPL-MCNC: 8770 PG/ML
P AXIS: 96 DEGREES
PLATELET # BLD AUTO: 214 THOUSANDS/UL (ref 149–390)
PMV BLD AUTO: 10.1 FL (ref 8.9–12.7)
POTASSIUM SERPL-SCNC: 5.2 MMOL/L (ref 3.5–5.3)
POTASSIUM SERPL-SCNC: 5.5 MMOL/L (ref 3.5–5.3)
PROT SERPL-MCNC: 7.5 G/DL (ref 6.4–8.2)
PROTHROMBIN TIME: 14.5 SECONDS (ref 11.6–14.5)
QRS AXIS: 155 DEGREES
QRSD INTERVAL: 152 MS
QT INTERVAL: 636 MS
QTC INTERVAL: 417 MS
RBC # BLD AUTO: 4.39 MILLION/UL (ref 3.88–5.62)
SARS-COV-2 RNA RESP QL NAA+PROBE: NEGATIVE
SODIUM SERPL-SCNC: 139 MMOL/L (ref 136–145)
SODIUM SERPL-SCNC: 139 MMOL/L (ref 136–145)
T WAVE AXIS: 32 DEGREES
TROPONIN I SERPL-MCNC: <0.02 NG/ML
TSH SERPL DL<=0.05 MIU/L-ACNC: 1.48 UIU/ML (ref 0.36–3.74)
VENTRICULAR RATE: 26 BPM
WBC # BLD AUTO: 9.5 THOUSAND/UL (ref 4.31–10.16)

## 2020-07-20 PROCEDURE — 99285 EMERGENCY DEPT VISIT HI MDM: CPT

## 2020-07-20 PROCEDURE — 85610 PROTHROMBIN TIME: CPT | Performed by: PHYSICIAN ASSISTANT

## 2020-07-20 PROCEDURE — 85730 THROMBOPLASTIN TIME PARTIAL: CPT | Performed by: PHYSICIAN ASSISTANT

## 2020-07-20 PROCEDURE — 84484 ASSAY OF TROPONIN QUANT: CPT | Performed by: PHYSICIAN ASSISTANT

## 2020-07-20 PROCEDURE — 99285 EMERGENCY DEPT VISIT HI MDM: CPT | Performed by: PHYSICIAN ASSISTANT

## 2020-07-20 PROCEDURE — 87635 SARS-COV-2 COVID-19 AMP PRB: CPT | Performed by: PHYSICIAN ASSISTANT

## 2020-07-20 PROCEDURE — 84443 ASSAY THYROID STIM HORMONE: CPT | Performed by: PHYSICIAN ASSISTANT

## 2020-07-20 PROCEDURE — 93010 ELECTROCARDIOGRAM REPORT: CPT | Performed by: INTERNAL MEDICINE

## 2020-07-20 PROCEDURE — 94760 N-INVAS EAR/PLS OXIMETRY 1: CPT

## 2020-07-20 PROCEDURE — 83735 ASSAY OF MAGNESIUM: CPT | Performed by: PHYSICIAN ASSISTANT

## 2020-07-20 PROCEDURE — 80053 COMPREHEN METABOLIC PANEL: CPT | Performed by: PHYSICIAN ASSISTANT

## 2020-07-20 PROCEDURE — 71045 X-RAY EXAM CHEST 1 VIEW: CPT

## 2020-07-20 PROCEDURE — 99223 1ST HOSP IP/OBS HIGH 75: CPT | Performed by: INTERNAL MEDICINE

## 2020-07-20 PROCEDURE — 93005 ELECTROCARDIOGRAM TRACING: CPT

## 2020-07-20 PROCEDURE — 86617 LYME DISEASE ANTIBODY: CPT | Performed by: INTERNAL MEDICINE

## 2020-07-20 PROCEDURE — 99223 1ST HOSP IP/OBS HIGH 75: CPT | Performed by: ANESTHESIOLOGY

## 2020-07-20 PROCEDURE — 96360 HYDRATION IV INFUSION INIT: CPT

## 2020-07-20 PROCEDURE — 86618 LYME DISEASE ANTIBODY: CPT | Performed by: INTERNAL MEDICINE

## 2020-07-20 PROCEDURE — 85025 COMPLETE CBC W/AUTO DIFF WBC: CPT | Performed by: PHYSICIAN ASSISTANT

## 2020-07-20 PROCEDURE — 96361 HYDRATE IV INFUSION ADD-ON: CPT

## 2020-07-20 PROCEDURE — 80048 BASIC METABOLIC PNL TOTAL CA: CPT | Performed by: INTERNAL MEDICINE

## 2020-07-20 PROCEDURE — 83880 ASSAY OF NATRIURETIC PEPTIDE: CPT | Performed by: PHYSICIAN ASSISTANT

## 2020-07-20 RX ORDER — METOCLOPRAMIDE HYDROCHLORIDE 5 MG/ML
10 INJECTION INTRAMUSCULAR; INTRAVENOUS ONCE
Status: COMPLETED | OUTPATIENT
Start: 2020-07-20 | End: 2020-07-20

## 2020-07-20 RX ORDER — SODIUM CHLORIDE 1000 MG
1 TABLET, SOLUBLE MISCELLANEOUS 2 TIMES DAILY
Status: DISCONTINUED | OUTPATIENT
Start: 2020-07-20 | End: 2020-07-29

## 2020-07-20 RX ORDER — ONDANSETRON 2 MG/ML
4 INJECTION INTRAMUSCULAR; INTRAVENOUS EVERY 4 HOURS PRN
Status: DISCONTINUED | OUTPATIENT
Start: 2020-07-20 | End: 2020-07-20

## 2020-07-20 RX ORDER — PANTOPRAZOLE SODIUM 40 MG/1
40 TABLET, DELAYED RELEASE ORAL
Status: DISCONTINUED | OUTPATIENT
Start: 2020-07-21 | End: 2020-08-05 | Stop reason: HOSPADM

## 2020-07-20 RX ORDER — PRAVASTATIN SODIUM 40 MG
40 TABLET ORAL
Status: DISCONTINUED | OUTPATIENT
Start: 2020-07-20 | End: 2020-08-05 | Stop reason: HOSPADM

## 2020-07-20 RX ORDER — AMLODIPINE BESYLATE 5 MG/1
5 TABLET ORAL DAILY
Status: DISCONTINUED | OUTPATIENT
Start: 2020-07-21 | End: 2020-07-21

## 2020-07-20 RX ORDER — SODIUM CHLORIDE 9 MG/ML
125 INJECTION, SOLUTION INTRAVENOUS CONTINUOUS
Status: DISCONTINUED | OUTPATIENT
Start: 2020-07-20 | End: 2020-07-20 | Stop reason: HOSPADM

## 2020-07-20 RX ORDER — CEFAZOLIN SODIUM 2 G/50ML
2000 SOLUTION INTRAVENOUS ONCE
Status: DISCONTINUED | OUTPATIENT
Start: 2020-07-20 | End: 2020-07-20

## 2020-07-20 RX ORDER — ALBUTEROL SULFATE 2.5 MG/3ML
2.5 SOLUTION RESPIRATORY (INHALATION) EVERY 6 HOURS PRN
Status: DISCONTINUED | OUTPATIENT
Start: 2020-07-20 | End: 2020-07-20

## 2020-07-20 RX ORDER — SODIUM CHLORIDE 9 MG/ML
75 INJECTION, SOLUTION INTRAVENOUS CONTINUOUS
Status: DISCONTINUED | OUTPATIENT
Start: 2020-07-20 | End: 2020-07-24

## 2020-07-20 RX ORDER — CEFAZOLIN SODIUM 2 G/50ML
2000 SOLUTION INTRAVENOUS ONCE
Status: DISCONTINUED | OUTPATIENT
Start: 2020-07-21 | End: 2020-07-24

## 2020-07-20 RX ADMIN — METOCLOPRAMIDE 10 MG: 5 INJECTION, SOLUTION INTRAMUSCULAR; INTRAVENOUS at 22:07

## 2020-07-20 RX ADMIN — SODIUM CHLORIDE 125 ML/HR: 0.9 INJECTION, SOLUTION INTRAVENOUS at 11:20

## 2020-07-20 RX ADMIN — SODIUM CHLORIDE 50 ML/HR: 0.9 INJECTION, SOLUTION INTRAVENOUS at 17:53

## 2020-07-20 NOTE — ASSESSMENT & PLAN NOTE
Asymptomatic currently  Hold toprol  Check lymes ab with Western blot  [place]  Recheck potassium level  Appears to be high normal at [facility]  EP for pacer  Follow up on echo  Patient complete heart block  Discussed with critical care  Patient's blood pressure systolic in the 279G currently  Monitor heart rate closely  Consult Critical Care Resource service  Discussed with critical care attending

## 2020-07-20 NOTE — ASSESSMENT & PLAN NOTE
· Hypertensive with SBP in 170s  · Likely compensatory secondary to complete heart block  · Can resume norvasc tomorrow if stable overnight  · Hold metoprolol

## 2020-07-20 NOTE — ASSESSMENT & PLAN NOTE
Patient presents with 2 week history of shortness of breath with activity  In the ER, found to be in 3rd degree heart block with HR in the 20s     · Currently asymptomatic, hemodynamically stable with SBP 170s  · EP consulted  · Plan for PPM tomorrow  · Maintain pacing pads on chest overnight  · Recommend trial'ing dopamine infusion overnight   · Hold all AV david blocking agents, on metoprolol at home  · Monitor electrolytes closely, goal K 4, Mg>2  · TSH 1 6  · Stat echo pending  · Lyme titer pending   · temporary venous pacing if needed overnight  · NPO at midnight

## 2020-07-20 NOTE — PLAN OF CARE
Problem: Potential for Falls  Goal: Patient will remain free of falls  Description  INTERVENTIONS:  - Assess patient frequently for physical needs  -  Identify cognitive and physical deficits and behaviors that affect risk of falls    -  [place] fall precautions as indicated by assessment   - Educate patient/family on patient safety including physical limitations  - Instruct patient to call for assistance with activity based on assessment  - Modify environment to reduce risk of injury  - Consider OT/PT consult to assist with strengthening/mobility  Outcome: Progressing

## 2020-07-20 NOTE — H&P
H&P- Dixie Ngo 1935, 80 y o  male MRN: [id number]    Unit/Bed#: [facility] 525-01 Encounter: [id number]    Primary Care Provider: Harshad Combs DO   Date and time admitted to hospital: 7/20/2020  4:04 PM        * Bradycardia  Assessment & Plan  Asymptomatic currently  Hold toprol  Check lymes ab with Western blot  [place]  Recheck potassium level  Appears to be high normal at [facility]  EP for pacer  Follow up on echo  Patient complete heart block  Discussed with critical care  Patient's blood pressure systolic in the 698A currently  Monitor heart rate closely  Consult Critical Care Resource service  Discussed with critical care attending  ARF (acute renal failure) ([facility])  Assessment & Plan  Base line cr 1 8-1 9  Possible cardiorenal disease w reported bioprosthetic valve on echo in 2016 and bradyarrythmia  Re-access after pacer  Check echo  Stroke [facility])  Assessment & Plan  Patient with prior stroke last year  Managed on aspirin and Plavix currently  Will hold aspirin Plavix for now pending intervention by EP tomorrow  H/O aortic valve replacement  Assessment & Plan  Patient prior history bioprosthetic valve  Echocardiogram as per Cardiology    Hypertensive urgency  Assessment & Plan  Patient on Norvasc currently  Will hold Toprol given bradyarrhythmia        VTE Prophylaxis: Will hold for tentative procedure  / sequential compression device   Code Status:  Full code  POLST: There is no POLST form on file for this patient (pre-hospital)      Anticipated Length of Stay:  Patient will be admitted on an Inpatient basis with an anticipated length of stay of  greater than 2 midnights  Justification for Hospital Stay:  Need to monitor heart rate/rhythm  Total Time for Visit, including Counseling / Coordination of Care: 45 minutes  Greater than 50% of this total time spent on direct patient counseling and coordination of care      Chief Complaint:   Patient transferred for EP evaluation    History of Present Illness:    Susannah Patel is a 80 y o  male who presents with complete heart block at the [facility] ED  Carlton Vivar Patient reportedly resides on 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 and presents with increasing dyspnea on exertion and weakness for the past 2 weeks  He presents to the hospital after his wife urged him to be evaluated  Of note he has a known history of hypertension is on Toprol b i d  He presents for further evaluation assessment  On evaluation in the ED was noted to only complained of dyspnea on at 1 exertion with weakness  He denies any lightheadedness dizziness or syncopal symptoms  His past history is significant for chronic kidney disease and reported history of bioprosthetic valve  Review of Systems:    Review of Systems   Constitutional: Positive for fatigue  Negative for chills, diaphoresis and fever  HENT: Negative for congestion  Respiratory: Negative for apnea  Cardiovascular: Negative for chest pain and leg swelling  Gastrointestinal: Negative for abdominal distention and abdominal pain  Musculoskeletal: Negative for arthralgias  Neurological: Negative for dizziness, facial asymmetry, light-headedness and headaches  Psychiatric/Behavioral: Negative for agitation and behavioral problems  All other systems reviewed and are negative  Past Medical and Surgical History:     Past Medical History:   Diagnosis Date    Aortic stenosis     ECHO -4-14-14   MODERATE TO SEVERE AORTIC STENOSIS; MILD AROTIC INSUFFICIENCY - LAST ASSESSED 10/26/16; RESOLVED 6/8/16    Aortic valve disorder     LAST ASSESSED 10/8/15; 10/8/15    Transient cerebral ischemia        Past Surgical History:   Procedure Laterality Date    AORTIC VALVE REPLACEMENT  06/30/2015    avr with 23 mm [place] Magna Ease bioprosthetic    CATARACT EXTRACTION Right 06/05/2000    COLONOSCOPY      CORONARY ARTERY BYPASS GRAFT  06/05/2000    x1 with lima  to lad    POLYPECTOMY  04/2014    enteroscopic - esophageal ulcer, gastric erosion, and duodenal ulcer   TONSILLECTOMY      unknown       Meds/Allergies:    Prior to Admission medications    Medication Sig Start Date End Date Taking? Authorizing Provider   amLODIPine (NORVASC) 5 mg tablet Take 1 tablet (5 mg total) by mouth daily  Patient not taking: Reported on 7/20/2020 6/17/19   So Malcolm DO   aspirin 325 mg tablet Take 1 tablet by mouth daily    Historical Provider, MD   clopidogrel (PLAVIX) 75 mg tablet Take 1 tablet (75 mg total) by mouth daily  Patient not taking: Reported on 7/20/2020 6/17/19   So Malcolm DO   desoximetasone (TOPICORT) 0 05 % cream Apply topically 2 (two) times a day  Patient not taking: Reported on 7/20/2020 8/6/19   So Malcolm DO    MG capsule Take 1 capsule (100 mg total) by mouth daily for 90 days 6/17/19 9/15/19  So Malcolm DO   lovastatin (MEVACOR) 40 MG tablet Take 1 tablet (40 mg total) by mouth daily after dinner 7/24/19   So Malcolm DO   metoprolol succinate (TOPROL-XL) 25 mg 24 hr tablet Take 1 tablet (25 mg total) by mouth 2 (two) times a day for 90 days 6/17/19 7/20/20  So Malcolm,    pantoprazole (PROTONIX) 40 mg tablet Take 1 tablet by mouth daily 6/7/19   Historical Provider, MD   sodium chloride 1 g tablet Take 1 tablet by mouth 2 (two) times a day 6/7/19   Historical Provider, MD   sucralfate (CARAFATE) 1 g tablet Take 1 tablet (1 g total) by mouth 4 (four) times a day for 30 days 6/10/19 7/10/19  So Malcolm DO   VOLTAREN 1 % Apply 1 application topically 4 (four) times a day as needed for pain 6/7/19   Historical Provider, MD     I have reviewed home medications with patient personally  Allergies: Allergies   Allergen Reactions    Promethazine Delirium       Social History:     Marital Status: /Civil Union   Occupation:  Retired farmer  Patient resides on farm that 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    Patient Pre-hospital Living Situation:  Home  Patient Pre-hospital Level of Mobility:  Denies difficulty at baseline however the last 2 weeks have been very difficulty with ambulation due to weakness and fatigue  Patient Pre-hospital Diet Restrictions:  Denies  Substance Use History:   Social History     Substance and Sexual Activity   Alcohol Use Yes    Alcohol/week: 1 0 standard drinks    Types: 1 Cans of beer per week    Frequency: Monthly or less    Drinks per session: 1 or 2    Binge frequency: Never    Comment: social     Social History     Tobacco Use   Smoking Status Never Smoker   Smokeless Tobacco Never Used     Social History     Substance and Sexual Activity   Drug Use No       Family History:    Family History   Problem Relation Age of Onset    No Known Problems Mother     No Known Problems Father     Coronary artery disease Neg Hx        Physical Exam:     Vitals:   Blood Pressure: (!) 178/78 (07/20/20 1639)  Pulse: (!) 25 (07/20/20 1639)  Temperature: (!) 97 4 °F (36 3 °C) (07/20/20 1639)  Temp Source: Oral (07/20/20 1639)  Respirations: 17 (07/20/20 1639)  Height: 5' 4" (162 6 cm) (07/20/20 1633)  Weight - Scale: 95 5 kg (210 lb 8 6 oz) (07/20/20 1639)    Physical Exam   Constitutional: He is oriented to person, place, and time  He appears well-developed and well-nourished  HENT:   Head: Normocephalic and atraumatic  Neck: Normal range of motion  Neck supple  Pulmonary/Chest: Effort normal and breath sounds normal  No stridor  No respiratory distress  Abdominal: Soft  Bowel sounds are normal  He exhibits no distension  There is no tenderness  Musculoskeletal: Normal range of motion  He exhibits no edema  Neurological: He is alert and oriented to person, place, and time  Skin: Skin is warm and dry  Additional Data:     Lab Results: I have personally reviewed pertinent reports        Results from last 7 days   Lab Units 07/20/20  1021   WBC Thousand/uL 9 50   HEMOGLOBIN g/dL 13 3   HEMATOCRIT % 42 0   PLATELETS Thousands/uL 214   NEUTROS PCT % 75   LYMPHS PCT % 15   MONOS PCT % 7   EOS PCT % 3     Results from last 7 days   Lab Units 07/20/20  1021   SODIUM mmol/L 139   POTASSIUM mmol/L 5 2   CHLORIDE mmol/L 104   CO2 mmol/L 28   BUN mg/dL 40*   CREATININE mg/dL 2 74*   ANION GAP mmol/L 7   CALCIUM mg/dL 8 8   ALBUMIN g/dL 3 8   TOTAL BILIRUBIN mg/dL 0 40   ALK PHOS U/L 83   ALT U/L 18   AST U/L 15   GLUCOSE RANDOM mg/dL 128     Results from last 7 days   Lab Units 07/20/20  1021   INR  1 13                   Imaging: I have personally reviewed pertinent reports  No orders to display         ** Please Note: This note has been constructed using a voice recognition system   **

## 2020-07-20 NOTE — ASSESSMENT & PLAN NOTE
Patient on Norvasc currently  Will hold Toprol given bradyarrhythmia  Blood pressure remains elevated  · Will restart hydralazine p r n  Jaci Pedroza · If blood pressure remains elevated, will increase scheduled regimen

## 2020-07-20 NOTE — ASSESSMENT & PLAN NOTE
Asymptomatic currently  Hold toprol  Check lymes ab with Western blot  [place]  Recheck potassium level  Appears to be borderline elevated at [facility]  · Follow up on echo  · Patient complete heart block  Discussed with critical care  Patient's blood pressure systolic in the 240A currently    · Monitor heart rate closely  · Now status post pacemaker placement

## 2020-07-20 NOTE — ASSESSMENT & PLAN NOTE
· Baseline creatinine appears to be 1 7-2 0  · Creatinine 2 74 on admission  · Likely secondary to hypoperfusion given low flow state with complete heart block  · Continue to trend   · Strict I&O monitoring  · Daily weights  · Avoid nephrotoxins

## 2020-07-20 NOTE — ASSESSMENT & PLAN NOTE
Understanding Colon and Rectal Polyps    The colon (also called the large intestine) is a muscular tube that forms the last part of the digestive tract. It absorbs water and stores food waste. The colon is about 4 to 6 feet long. The rectum is the last 6 inches of the colon. The colon and rectum have a smooth lining composed of millions of cells. Changes in these cells can lead to growths in the colon that can become cancerous and should be removed. Multiple tests are available to screen for colon cancer, but the colonoscopy is the most recommended test. During colonoscopy, these polyps can be removed. How often you need this test depends on many things including your condition, your family history, symptoms, and what the findings were at the previous colonoscopy.   When the colon lining changes  Changes that happen in the cells that line the colon or rectum can lead to growths called polyps. Over a period of years, polyps can turn cancerous. Removing polyps early may prevent cancer from ever forming.  Polyps  Polyps are fleshy clumps of tissue that form on the lining of the colon or rectum. Small polyps are usually benign (not cancerous). However, over time, cells in a polyp can change and become cancerous. Certain types of polyps known as adenomatous polyps are premalignant. The risk for invasive cancer increases with the size of the polyp and certain cell and gene features. This means that they can become cancerous if they're not removed. Hyperplastic polyps are benign. They can grow quite large and not turn cancerous.   Cancer  Almost all colorectal cancers start when polyp cells begin growing abnormally. As a cancerous tumor grows, it may involve more and more of the colon or rectum. In time, cancer can also grow beyond the colon or rectum and spread to nearby organs or to glands called lymph nodes. The cells can also travel to other parts of the body. This is known as metastasis. The earlier a cancerous  · Hx of CVA in May of 2019: MRI head "Acute infarct within the inferior medial aspect of the right cerebellum extending into the cerebellar vermis without hemorrhagic transformation"  · Home med list reports ASA/plavix but patient reports that he no longer takes plavix  ·  Currently on hold for PPM tomorrow, can resume post procedure  · Patient completed PT/OT with no residual weakness  · Continue statin   · Continue neuro monitoring per routine tumor is removed, the better the chance of preventing its spread.    Date Last Reviewed: 8/1/2016  © 0531-2365 [employer]. [address] [place] [address], [place] [address]. All rights reserved. This information is not intended as a substitute for professional medical care. Always follow your healthcare professional's instructions.

## 2020-07-20 NOTE — ED PROVIDER NOTES
History  Chief Complaint   Patient presents with    Weakness - Generalized     Pt complains of weakness for 2 weeks that is progressivly getting worse  Pt also complains of SOB and difficulty walking     Patient presents to the emergency department today via wheelchair  He presents with his wife  He is alert oriented x4 in no acute distress stating that he has been feeling generalized weakness as well as feeling some shortness of breath exertion  Denies any specific chest pain  No leg pain leg edema  Denies any other body pain  Past surgical history of chronic kidney disease as well as what he describes as open heart surgery  He denies any prior bradycardic issues  Does follow with our Cardiology  Prior to Admission Medications   Prescriptions Last Dose Informant Patient Reported? Taking?     MG capsule   No No   Sig: Take 1 capsule (100 mg total) by mouth daily for 90 days   VOLTAREN 1 % Not Taking at Unknown time  Yes No   Sig: Apply 1 application topically 4 (four) times a day as needed for pain   amLODIPine (NORVASC) 5 mg tablet Not Taking at Unknown time  No No   Sig: Take 1 tablet (5 mg total) by mouth daily   Patient not taking: Reported on 7/20/2020   aspirin 325 mg tablet 7/20/2020 at Unknown time  Yes Yes   Sig: Take 1 tablet by mouth daily   clopidogrel (PLAVIX) 75 mg tablet Not Taking at Unknown time  No No   Sig: Take 1 tablet (75 mg total) by mouth daily   Patient not taking: Reported on 7/20/2020   desoximetasone (TOPICORT) 0 05 % cream Not Taking at Unknown time  No No   Sig: Apply topically 2 (two) times a day   Patient not taking: Reported on 7/20/2020   lovastatin (MEVACOR) 40 MG tablet 7/19/2020 at Unknown time  No Yes   Sig: Take 1 tablet (40 mg total) by mouth daily after dinner   metoprolol succinate (TOPROL-XL) 25 mg 24 hr tablet 7/20/2020 at Unknown time  No Yes   Sig: Take 1 tablet (25 mg total) by mouth 2 (two) times a day for 90 days   pantoprazole (PROTONIX) 40 mg tablet Not Taking at Unknown time  Yes No   Sig: Take 1 tablet by mouth daily   sodium chloride 1 g tablet Not Taking at Unknown time  Yes No   Sig: Take 1 tablet by mouth 2 (two) times a day   sucralfate (CARAFATE) 1 g tablet   No No   Sig: Take 1 tablet (1 g total) by mouth 4 (four) times a day for 30 days      Facility-Administered Medications: None       Past Medical History:   Diagnosis Date    Aortic stenosis     ECHO -4-14-14  MODERATE TO SEVERE AORTIC STENOSIS; MILD AROTIC INSUFFICIENCY - LAST ASSESSED 10/26/16; RESOLVED 6/8/16    Aortic valve disorder     LAST ASSESSED 10/8/15; 10/8/15    Transient cerebral ischemia        Past Surgical History:   Procedure Laterality Date    AORTIC VALVE REPLACEMENT  06/30/2015    avr with 23 mm [place] Magna Ease bioprosthetic    CATARACT EXTRACTION Right 06/05/2000    COLONOSCOPY      CORONARY ARTERY BYPASS GRAFT  06/05/2000    x1 with lima  to lad    POLYPECTOMY  04/2014    enteroscopic - esophageal ulcer, gastric erosion, and duodenal ulcer   TONSILLECTOMY      unknown       Family History   Problem Relation Age of Onset    No Known Problems Mother     No Known Problems Father     Coronary artery disease Neg Hx      I have reviewed and agree with the history as documented  E-Cigarette/Vaping     E-Cigarette/Vaping Substances     Social History     Tobacco Use    Smoking status: Never Smoker    Smokeless tobacco: Never Used   Substance Use Topics    Alcohol use: Yes     Alcohol/week: 1 0 standard drinks     Types: 1 Cans of beer per week     Frequency: Monthly or less     Drinks per session: 1 or 2     Binge frequency: Never     Comment: social    Drug use: No       Review of Systems   Constitutional: Positive for activity change  Negative for appetite change, chills, diaphoresis, fatigue, fever and unexpected weight change  HENT: Negative  Negative for sore throat, trouble swallowing and voice change  Eyes: Negative      Respiratory: Positive for shortness of breath  Negative for cough, chest tightness and wheezing  Cardiovascular: Negative  Negative for chest pain, palpitations and leg swelling  Gastrointestinal: Negative  Negative for abdominal pain, blood in stool, nausea and vomiting  Endocrine: Negative  Genitourinary: Negative  Negative for flank pain and hematuria  Musculoskeletal: Positive for gait problem  Negative for arthralgias, back pain, joint swelling, myalgias, neck pain and neck stiffness  Skin: Negative  Negative for rash and wound  Allergic/Immunologic: Negative  Neurological: Positive for weakness  Negative for dizziness, seizures, syncope, light-headedness and headaches  Hematological: Negative  Psychiatric/Behavioral: Negative  All other systems reviewed and are negative  Physical Exam  Physical Exam   Constitutional: He is oriented to person, place, and time  Vital signs are normal  He appears well-developed and well-nourished  He does not have a sickly appearance  He does not appear ill  No distress  HENT:   Right Ear: External ear normal  No swelling  Tympanic membrane is not bulging  Left Ear: External ear normal  No swelling  Tympanic membrane is not bulging  Nose: Nose normal    Mouth/Throat: Oropharynx is clear and moist  No oropharyngeal exudate  Eyes: Pupils are equal, round, and reactive to light  Conjunctivae, EOM and lids are normal    Neck: Normal range of motion  Neck supple  No JVD present  No tracheal deviation, no edema and normal range of motion present  No thyromegaly present  Cardiovascular: Regular rhythm, normal heart sounds, intact distal pulses and normal pulses  Exam reveals no gallop and no friction rub  No murmur heard  Bradycardic rate   Pulmonary/Chest: Effort normal and breath sounds normal  No stridor  No respiratory distress  He has no wheezes  He has no rales  He exhibits no tenderness  Abdominal: Soft   Bowel sounds are normal  He exhibits no distension and no mass  There is no tenderness  There is no rebound, no guarding and negative Samaniego's sign  No hernia  Musculoskeletal: Normal range of motion  He exhibits no edema or tenderness  Lymphadenopathy:     He has no cervical adenopathy  Neurological: He is alert and oriented to person, place, and time  He has normal strength and normal reflexes  No cranial nerve deficit or sensory deficit  GCS eye subscore is 4  GCS verbal subscore is 5  GCS motor subscore is 6  Skin: Skin is warm and dry  Capillary refill takes less than 2 seconds  No rash noted  He is not diaphoretic  No erythema  No pallor  Psychiatric: He has a normal mood and affect  His speech is normal and behavior is normal    Vitals reviewed  Vital Signs  ED Triage Vitals   Temperature Pulse Respirations Blood Pressure SpO2   07/20/20 1014 07/20/20 1014 07/20/20 1014 07/20/20 1014 07/20/20 1014   (!) 96 9 °F (36 1 °C) (!) 28 20 130/65 97 %      Temp Source Heart Rate Source Patient Position - Orthostatic VS BP Location FiO2 (%)   07/20/20 1014 07/20/20 1014 07/20/20 1014 07/20/20 1100 --   Temporal Monitor Sitting Right arm       Pain Score       --                  Vitals:    07/20/20 1014 07/20/20 1100   BP: 130/65 165/93   Pulse: (!) 28 (!) 27   Patient Position - Orthostatic VS: Sitting Sitting         Visual Acuity      ED Medications  Medications   sodium chloride 0 9 % infusion (125 mL/hr Intravenous New Bag 7/20/20 1120)       Diagnostic Studies  Results Reviewed     Procedure Component Value Units Date/Time    TSH [[id number]]  (Normal) Collected:  07/20/20 1021    Lab Status:  Final result Specimen:  Blood from Arm, Right Updated:  07/20/20 1051     TSH 3RD GENERATON 1 480 uIU/mL     Narrative:       Patients undergoing fluorescein dye angiography may retain small amounts of fluorescein in the body for 48-72 hours post procedure  Samples containing fluorescein can produce falsely depressed TSH values   If the patient had this procedure,a specimen should be resubmitted post fluorescein clearance  Magnesium [[id number]]  (Normal) Collected:  07/20/20 1021    Lab Status:  Final result Specimen:  Blood from Arm, Right Updated:  07/20/20 1051     Magnesium 2 4 mg/dL     NT-BNP PRO [[id number]]  (Abnormal) Collected:  07/20/20 1021    Lab Status:  Final result Specimen:  Blood from Arm, Right Updated:  07/20/20 1051     NT-proBNP 8,770 pg/mL     Troponin I [[id number]]  (Normal) Collected:  07/20/20 1021    Lab Status:  Final result Specimen:  Blood from Arm, Right Updated:  07/20/20 1047     Troponin I <0 02 ng/mL     Comprehensive metabolic panel [[id number]]  (Abnormal) Collected:  07/20/20 1021    Lab Status:  Final result Specimen:  Blood from Arm, Right Updated:  07/20/20 1046     Sodium 139 mmol/L      Potassium 5 2 mmol/L      Chloride 104 mmol/L      CO2 28 mmol/L      ANION GAP 7 mmol/L      BUN 40 mg/dL      Creatinine 2 74 mg/dL      Glucose 128 mg/dL      Calcium 8 8 mg/dL      AST 15 U/L      ALT 18 U/L      Alkaline Phosphatase 83 U/L      Total Protein 7 5 g/dL      Albumin 3 8 g/dL      Total Bilirubin 0 40 mg/dL      eGFR 20 ml/min/1 73sq m     Narrative:       [place] guidelines for Chronic Kidney Disease (CKD):     Stage 1 with normal or high GFR (GFR > 90 mL/min/1 73 square meters)    Stage 2 Mild CKD (GFR = 60-89 mL/min/1 73 square meters)    Stage 3A Moderate CKD (GFR = 45-59 mL/min/1 73 square meters)    Stage 3B Moderate CKD (GFR = 30-44 mL/min/1 73 square meters)    Stage 4 Severe CKD (GFR = 15-29 mL/min/1 73 square meters)    Stage 5 End Stage CKD (GFR <15 mL/min/1 73 square meters)  Note: GFR calculation is accurate only with a steady state creatinine    Novel Coronavirus Kaykay Macias [facility] [[id number]] Collected:  07/20/20 1043    Lab Status:   In process Specimen:  Nares from Nose Updated:  07/20/20 [address] [[id number]]  (Normal) Collected:  07/20/20 1021 Lab Status:  Final result Specimen:  Blood from Arm, Right Updated:  07/20/20 1044     Protime 14 5 seconds      INR 1 13    APTT [[id number]]  (Normal) Collected:  07/20/20 1021    Lab Status:  Final result Specimen:  Blood from Arm, Right Updated:  07/20/20 1044     PTT 33 seconds     CBC and differential [[id number]] Collected:  07/20/20 1021    Lab Status:  Final result Specimen:  Blood from Arm, Right Updated:  07/20/20 1028     WBC 9 50 Thousand/uL      RBC 4 39 Million/uL      Hemoglobin 13 3 g/dL      Hematocrit 42 0 %      MCV 96 fL      MCH 30 3 pg      MCHC 31 7 g/dL      RDW 13 0 %      MPV 10 1 fL      Platelets 835 Thousands/uL      nRBC 0 /100 WBCs      Neutrophils Relative 75 %      Immat GRANS % 0 %      Lymphocytes Relative 15 %      Monocytes Relative 7 %      Eosinophils Relative 3 %      Basophils Relative 0 %      Neutrophils Absolute 7 03 Thousands/µL      Immature Grans Absolute 0 03 Thousand/uL      Lymphocytes Absolute 1 44 Thousands/µL      Monocytes Absolute 0 69 Thousand/µL      Eosinophils Absolute 0 27 Thousand/µL      Basophils Absolute 0 04 Thousands/µL                  XR chest 1 view portable    (Results Pending)              Procedures  ECG 12 Lead Documentation Only  Date/Time: 7/20/2020 10:17 AM  Performed by: Lele Dumont PA-C  Authorized by: Lele Dumont PA-C     Indications / Diagnosis:  Weakness  ECG reviewed by me, the ED Provider: yes    Patient location:  ED  Previous ECG:     Comparison to cardiac monitor: Yes    Interpretation:     Interpretation: abnormal    Rate:     ECG rate:  30    ECG rate assessment: bradycardic    Rhythm:     Rhythm: A-V block    ST segments:     ST segments:  Normal  T waves:     T waves: normal    Comments:      3rd degree heart block                ED Course  ED Course as of Jul 20 1134   Mon Jul 20, 2020   1020 My manual blood pressure at bedside the semi-Keith's position right arm revealed blood pressure of 130/65 mmHg      1055 Magnesium: 2 4   1055 Troponin I: <0 02   1055 INR: 1 13   1055 WBC: 9 50   1055 Hemoglobin: 13 3   1055 Platelet Count: 418   1055 Sodium: 139   1055 Potassium: 5 2   1055 Anion Gap: 7   1055 BUN(!): 40   1055 eGFR: 20   1055 TOTAL BILIRUBIN: 0 40   1058 Patient's last EKG May of 2019  Normal sinus with right bundle-branch block      1121 Had the privilege of speaking with Keturah Bee who is the physician assistant for [facility] electrophysiology  She agrees with transfer to [place] under Internal Medicine Service and likely take to the EP lab today  Fluid severe around at 50 cc/hour      1128 Dr Lizeth Puckett [facility] stepdown level 1          US AUDIT      Most Recent Value   Initial Alcohol Screen: US AUDIT-C    1  How often do you have a drink containing alcohol? 1 Filed at: 07/20/2020 1016   2  How many drinks containing alcohol do you have on a typical day you are drinking? 1 Filed at: 07/20/2020 1016   3b  FEMALE Any Age, or MALE 65+: How often do you have 4 or more drinks on one occassion? 1 Filed at: 07/20/2020 1016   Audit-C Score  3 Filed at: 07/20/2020 1016                  JUDI/DAST-10      Most Recent Value   How many times in the past year have you    Used an illegal drug or used a prescription medication for non-medical reasons?   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[facility])   Chronic kidney disease     Time reflects when diagnosis was documented in both MDM as applicable and the Disposition within this note     Time User Action Codes Description Comment    7/20/2020 10:19 AM Dee LUIS Add [R53 1] Weakness     7/20/2020 10:19 AM Dee LUIS Add [I44 2] Third degree AV block ([facility])     7/20/2020 11:32 AM Dee LUIS Add [N18 9] Chronic kidney disease       ED Disposition     ED Disposition Condition Date/Time Comment    Transfer to Another Facility-In Network  Mon Jul 20, 2020 11:32 AM Jazmine Hernández should be transferred out to [facility] Level 1 stepdown  Follow-up Information    None         Patient's Medications   Discharge Prescriptions    No medications on file     No discharge procedures on file      PDMP Review     None          ED Provider  Electronically Signed by           Grace Velásquez PA-C  07/20/20 1139

## 2020-07-20 NOTE — ASSESSMENT & PLAN NOTE
Patient with prior stroke last year  · Managed on aspirin and Plavix currently  Held due to procedure    · Resume aspirin and Plavix tomorrow

## 2020-07-20 NOTE — ED NOTES
Daughter Manuel Davila is aware of father medical disposition per patient request, daughter was contacted at [phone]  Daughter is to be updated if anything were to change          Levon Ivory RN  07/20/20 1582

## 2020-07-20 NOTE — ED NOTES
Patient is resting comfortably  Family at bedside   Fluids being infused at 50 ml/hr per verbal order from Luigi Albarran0, RN  07/20/20 4497

## 2020-07-20 NOTE — ASSESSMENT & PLAN NOTE
Base line cr 1 8-1 9  Possible cardiorenal disease w reported bioprosthetic valve on echo in 2016 and bradyarrythmia  Re-access after pacer  Check echo

## 2020-07-20 NOTE — CONSULTS
Consult- Nathalie Jordan 1935, 80 y o  male MRN: [id number]    Unit/Bed#: [facility] 525-01 Encounter: [id number]    Primary Care Provider: Ree Pabon DO   Date and time admitted to hospital: 7/20/2020  4:04 PM      Consults    Complete heart block [facility])  Assessment & Plan  Patient presents with 2 week history of shortness of breath with activity  In the ER, found to be in 3rd degree heart block with HR in the 20s     · Currently asymptomatic, hemodynamically stable with SBP 170s  · EP consulted  · Plan for PPM tomorrow  · Maintain pacing pads on chest overnight  · Recommend trial'ing dopamine infusion overnight   · Hold all AV david blocking agents, on metoprolol at home  · Monitor electrolytes closely, goal K 4, Mg>2  · TSH 1 6  · Stat echo pending  · Lyme titer pending   · temporary venous pacing if needed overnight  · NPO at midnight    Acute kidney injury superimposed on chronic kidney disease ([facility] [address] )  Assessment & Plan  · Baseline creatinine appears to be 1 7-2 0  · Creatinine 2 74 on admission  · Likely secondary to hypoperfusion given low flow state with complete heart block  · Continue to trend   · Strict I&O monitoring  · Daily weights  · Avoid nephrotoxins    History of stroke  Assessment & Plan  · Hx of CVA in May of 2019: MRI head "Acute infarct within the inferior medial aspect of the right cerebellum extending into the cerebellar vermis without hemorrhagic transformation"  · Home med list reports ASA/plavix but patient reports that he no longer takes plavix  ·  Currently on hold for PPM tomorrow, can resume post procedure  · Patient completed PT/OT with no residual weakness  · Continue statin   · Continue neuro monitoring per routine     H/O aortic valve replacement  Assessment & Plan  · Hx of bioprosthetic AVR   · Echo pending  · Close I&O monitoring    Asthma  Assessment & Plan  · Pulmonary hygiene  · PRN nebs as needed     Hypertension  Assessment & Plan  · Hypertensive with SBP in 170s  · Likely compensatory secondary to complete heart block  · Can resume norvasc tomorrow if stable overnight  · Hold metoprolol     Hypertensive urgencyresolved as of 7/20/2020  Assessment & Plan  · Hypertensive likely compensatory due to heart block  · Hold metoprolol  · Can resume norvasc tomorrow if BP remains stable overnight      -------------------------------------------------------------------------------------------------------------  Chief Complaint: complete heart block     History of Present Illness   HX and PE limited by: none  Dimple Giraldo is a 80 y o  male with past medical history of aortic stenosis s/p bioprosthetic AVR, hx of CVA 1 year ago, CKD III and hyperlipidemia who presents with dyspnea on exertion x 2 weeks  In the ER he was found to be in complete heart block  He remains hemodynamically stable, currently awake and alert without complaints  Laboratory workup significant for YOLANDE on CKD, potassium mildly elevated at 5 2  Will admit to Step Down 1 with EP consult for monitoring overnight  PPM planned for tomorrow  History obtained from chart review and the patient   -------------------------------------------------------------------------------------------------------------  Dispo: Admit to Stepdown Level 1    Code Status: Level 1 - Full Code  --------------------------------------------------------------------------------------------------------------  Review of Systems   Constitutional: Positive for fatigue  Negative for chills, diaphoresis, fever and unexpected weight change  HENT: Negative  Respiratory: Positive for shortness of breath  Negative for chest tightness, wheezing and stridor  Cardiovascular: Negative for chest pain, palpitations and leg swelling  Gastrointestinal: Negative for diarrhea and nausea  Endocrine: Negative  Genitourinary: Negative          A 12-point, complete review of systems was reviewed and negative except as stated above     Physical Exam  --------------------------------------------------------------------------------------------------------------  Vitals:   Vitals:    07/20/20 1633 07/20/20 1639   BP:  (!) 178/78   BP Location:  Left arm   Pulse:  (!) 25   Resp:  17   Temp:  (!) 97 4 °F (36 3 °C)   TempSrc:  Oral   Weight:  95 5 kg (210 lb 8 6 oz)   Height: 5' 4" (1 626 m)      Temp  Min: 96 9 °F (36 1 °C)  Max: 97 4 °F (36 3 °C)  IBW: 59 2 kg  Height: 5' 4" (162 6 cm)  Body mass index is 36 14 kg/m²  Laboratory and Diagnostics:  Results from last 7 days   Lab Units 07/20/20  1021   WBC Thousand/uL 9 50   HEMOGLOBIN g/dL 13 3   HEMATOCRIT % 42 0   PLATELETS Thousands/uL 214   NEUTROS PCT % 75   MONOS PCT % 7     Results from last 7 days   Lab Units 07/20/20  1021   SODIUM mmol/L 139   POTASSIUM mmol/L 5 2   CHLORIDE mmol/L 104   CO2 mmol/L 28   ANION GAP mmol/L 7   BUN mg/dL 40*   CREATININE mg/dL 2 74*   CALCIUM mg/dL 8 8   GLUCOSE RANDOM mg/dL 128   ALT U/L 18   AST U/L 15   ALK PHOS U/L 83   ALBUMIN g/dL 3 8   TOTAL BILIRUBIN mg/dL 0 40     Results from last 7 days   Lab Units 07/20/20  1021   MAGNESIUM mg/dL 2 4      Results from last 7 days   Lab Units 07/20/20  1021   INR  1 13   PTT seconds 33      Results from last 7 days   Lab Units 07/20/20  1021   TROPONIN I ng/mL <0 02         ABG:    VBG:          Micro:        EKG: complete heart block with HR 29    Imaging: I have personally reviewed pertinent reports  and I have personally reviewed pertinent films in PACS      Historical Information   Past Medical History:   Diagnosis Date    Aortic stenosis     ECHO -4-14-14   MODERATE TO SEVERE AORTIC STENOSIS; MILD AROTIC INSUFFICIENCY - LAST ASSESSED 10/26/16; RESOLVED 6/8/16    Aortic valve disorder     LAST ASSESSED 10/8/15; 10/8/15    Hypertensive urgency 5/19/2019    Transient cerebral ischemia      Past Surgical History:   Procedure Laterality Date    AORTIC VALVE REPLACEMENT  06/30/2015    avr with 23 mm [facility] Ease bioprosthetic    CATARACT EXTRACTION Right 06/05/2000    COLONOSCOPY      CORONARY ARTERY BYPASS GRAFT  06/05/2000    x1 with lima  to lad    POLYPECTOMY  04/2014    enteroscopic - esophageal ulcer, gastric erosion, and duodenal ulcer   TONSILLECTOMY      unknown     Social History   Social History     Substance and Sexual Activity   Alcohol Use Yes    Alcohol/week: 1 0 standard drinks    Types: 1 Cans of beer per week    Frequency: Monthly or less    Drinks per session: 1 or 2    Binge frequency: Never    Comment: social     Social History     Substance and Sexual Activity   Drug Use No     Social History     Tobacco Use   Smoking Status Never Smoker   Smokeless Tobacco Never Used       Family History:   Family History   Problem Relation Age of Onset    No Known Problems Mother     No Known Problems Father     Coronary artery disease Neg Hx      I have reviewed this patient's family history and commented on sigificant items within the HPI      Medications:  Current Facility-Administered Medications   Medication Dose Route Frequency    [START ON 7/21/2020] amLODIPine (NORVASC) tablet 5 mg  5 mg Oral Daily    [START ON 7/21/2020] ceFAZolin (ANCEF) IVPB (premix) 2,000 mg 50 mL  2,000 mg Intravenous Once    [START ON 7/21/2020] pantoprazole (PROTONIX) EC tablet 40 mg  40 mg Oral Early Morning    pravastatin (PRAVACHOL) tablet 40 mg  40 mg Oral Daily With Dinner    sodium chloride 0 9 % infusion  50 mL/hr Intravenous Continuous    sodium chloride tablet 1 g  1 g Oral BID     Home medications:  Prior to Admission Medications   Prescriptions Last Dose Informant Patient Reported? Taking?     MG capsule   No No   Sig: Take 1 capsule (100 mg total) by mouth daily for 90 days   VOLTAREN 1 %   Yes No   Sig: Apply 1 application topically 4 (four) times a day as needed for pain   amLODIPine (NORVASC) 5 mg tablet   No No   Sig: Take 1 tablet (5 mg total) by mouth daily   Patient not taking: Reported on 7/20/2020   aspirin 325 mg tablet   Yes No   Sig: Take 1 tablet by mouth daily   clopidogrel (PLAVIX) 75 mg tablet   No No   Sig: Take 1 tablet (75 mg total) by mouth daily   Patient not taking: Reported on 7/20/2020   desoximetasone (TOPICORT) 0 05 % cream   No No   Sig: Apply topically 2 (two) times a day   Patient not taking: Reported on 7/20/2020   lovastatin (MEVACOR) 40 MG tablet   No No   Sig: Take 1 tablet (40 mg total) by mouth daily after dinner   metoprolol succinate (TOPROL-XL) 25 mg 24 hr tablet   No No   Sig: Take 1 tablet (25 mg total) by mouth 2 (two) times a day for 90 days   pantoprazole (PROTONIX) 40 mg tablet   Yes No   Sig: Take 1 tablet by mouth daily   sodium chloride 1 g tablet   Yes No   Sig: Take 1 tablet by mouth 2 (two) times a day   sucralfate (CARAFATE) 1 g tablet   No No   Sig: Take 1 tablet (1 g total) by mouth 4 (four) times a day for 30 days      Facility-Administered Medications: None     Allergies: Allergies   Allergen Reactions    Promethazine Delirium     ------------------------------------------------------------------------------------------------------------  Advance Directive and Living Will:      Power of :    POLST:    ------------------------------------------------------------------------------------------------------------  Care Time Delivered:   No Critical Care time spent       DIANDRA Swan        Portions of the record may have been created with voice recognition software  Occasional wrong word or "sound a like" substitutions may have occurred due to the inherent limitations of voice recognition software    Read the chart carefully and recognize, using context, where substitutions have occurred

## 2020-07-20 NOTE — EMTALA/ACUTE CARE TRANSFER
[facility] EMERGENCY DEPARTMENT  [address] [place] [id number]  Dept: [phone]      EMTALA TRANSFER CONSENT    NAME Kylah Morgan                                         1935                              MRN [id number]    I have been informed of my rights regarding examination, treatment, and transfer   by Dr Jordin Willett PA-C/ Cici Gonzalez, DO    Benefits:  EP lab    Risks:  Transport Risks      Consent for Transfer:  I acknowledge that my medical condition has been evaluated and explained to me by the emergency department physician or other qualified medical person and/or my attending physician, who has recommended that I be transferred to the service of   Dr Sherie Zaragoza at  [place]  The above potential benefits of such transfer, the potential risks associated with such transfer, and the probable risks of not being transferred have been explained to me, and I fully understand them  The doctor has explained that, in my case, the benefits of transfer outweigh the risks  I agree to be transferred  I authorize the performance of emergency medical procedures and treatments upon me in both transit and upon arrival at the receiving facility  Additionally, I authorize the release of any and all medical records to the receiving facility and request they be transported with me, if possible  I understand that the safest mode of transportation during a medical emergency is an ambulance and that the Hospital advocates the use of this mode of transport  Risks of traveling to the receiving facility by car, including absence of medical control, life sustaining equipment, such as oxygen, and medical personnel has been explained to me and I fully understand them  (GERA CORRECT BOX BELOW)  [  ]  I consent to the stated transfer and to be transported by ambulance/helicopter    [  ]  I consent to the stated transfer, but refuse transportation by ambulance and accept full responsibility for my transportation by car  I understand the risks of non-ambulance transfers and I exonerate the Hospital and its staff from any deterioration in my condition that results from this refusal     X___________________________________________    DATE  20  TIME________  Signature of patient or legally responsible individual signing on patient behalf           RELATIONSHIP TO PATIENT_________________________          Provider Certification    NAME Izzy Fragoso                                         1935                              MRN [id number]    A medical screening exam was performed on the above named patient  Based on the examination:    Condition Necessitating Transfer The primary encounter diagnosis was Weakness  Diagnoses of Third degree AV block ([address] ) and Chronic kidney disease were also pertinent to this visit  Patient Condition:  Stable    Reason for Transfer:  EP Lab    Transfer Requirements: Facility   [facility]  · Space available and qualified personnel available for treatment as acknowledged by  Claude  · Agreed to accept transfer and to provide appropriate medical treatment as acknowledged by          · Appropriate medical records of the examination and treatment of the patient are provided at the time of transfer   [address], [address] _______  · Transfer will be performed by qualified personnel from    and appropriate transfer equipment as required, including the use of necessary and appropriate life support measures      Provider Certification: I have examined the patient and explained the following risks and benefits of being transferred/refusing transfer to the patient/family:         Based on these reasonable risks and benefits to the patient and/or the unborn child(yohana), and based upon the information available at the time of the patients examination, I certify that the medical benefits reasonably to be expected from the provision of appropriate medical treatments at another medical facility outweigh the increasing risks, if any, to the individuals medical condition, and in the case of labor to the unborn child, from effecting the transfer      X____________________________________________ DATE 07/20/20        TIME_______      ORIGINAL - SEND TO MEDICAL RECORDS   COPY - SEND WITH PATIENT DURING TRANSFER

## 2020-07-20 NOTE — ASSESSMENT & PLAN NOTE
· Hypertensive likely compensatory due to heart block  · Hold metoprolol  · Can resume norvasc tomorrow if BP remains stable overnight

## 2020-07-21 ENCOUNTER — ANESTHESIA (INPATIENT)
Dept: NON INVASIVE DIAGNOSTICS | Facility: HOSPITAL | Age: 85
DRG: 242 | End: 2020-07-21
Payer: COMMERCIAL

## 2020-07-21 ENCOUNTER — APPOINTMENT (INPATIENT)
Dept: RADIOLOGY | Facility: HOSPITAL | Age: 85
DRG: 242 | End: 2020-07-21
Payer: COMMERCIAL

## 2020-07-21 PROBLEM — D72.829 LEUKOCYTOSIS: Status: ACTIVE | Noted: 2020-07-21

## 2020-07-21 LAB
ALBUMIN SERPL BCP-MCNC: 3.5 G/DL (ref 3.5–5)
ALP SERPL-CCNC: 73 U/L (ref 46–116)
ALT SERPL W P-5'-P-CCNC: 15 U/L (ref 12–78)
ANION GAP SERPL CALCULATED.3IONS-SCNC: 7 MMOL/L (ref 4–13)
AST SERPL W P-5'-P-CCNC: 12 U/L (ref 5–45)
ATRIAL RATE: 34 BPM
ATRIAL RATE: 96 BPM
BACTERIA UR QL AUTO: ABNORMAL /HPF
BASOPHILS # BLD AUTO: 0.04 THOUSANDS/ΜL (ref 0–0.1)
BASOPHILS # BLD AUTO: 0.05 THOUSANDS/ΜL (ref 0–0.1)
BASOPHILS NFR BLD AUTO: 0 % (ref 0–1)
BASOPHILS NFR BLD AUTO: 0 % (ref 0–1)
BILIRUB SERPL-MCNC: 0.61 MG/DL (ref 0.2–1)
BILIRUB UR QL STRIP: ABNORMAL
BUN SERPL-MCNC: 43 MG/DL (ref 5–25)
CALCIUM SERPL-MCNC: 8.8 MG/DL (ref 8.3–10.1)
CHLORIDE SERPL-SCNC: 108 MMOL/L (ref 100–108)
CLARITY UR: CLEAR
CO2 SERPL-SCNC: 24 MMOL/L (ref 21–32)
COLOR UR: ABNORMAL
CREAT SERPL-MCNC: 2.2 MG/DL (ref 0.6–1.3)
CRP SERPL QL: 16.2 MG/L
EOSINOPHIL # BLD AUTO: 0.02 THOUSAND/ΜL (ref 0–0.61)
EOSINOPHIL # BLD AUTO: 0.03 THOUSAND/ΜL (ref 0–0.61)
EOSINOPHIL NFR BLD AUTO: 0 % (ref 0–6)
EOSINOPHIL NFR BLD AUTO: 0 % (ref 0–6)
ERYTHROCYTE [DISTWIDTH] IN BLOOD BY AUTOMATED COUNT: 13.2 % (ref 11.6–15.1)
ERYTHROCYTE [DISTWIDTH] IN BLOOD BY AUTOMATED COUNT: 13.2 % (ref 11.6–15.1)
ERYTHROCYTE [SEDIMENTATION RATE] IN BLOOD: 28 MM/HOUR (ref 0–10)
GFR SERPL CREATININE-BSD FRML MDRD: 27 ML/MIN/1.73SQ M
GLUCOSE SERPL-MCNC: 110 MG/DL (ref 65–140)
GLUCOSE UR STRIP-MCNC: NEGATIVE MG/DL
HCT VFR BLD AUTO: 41.5 % (ref 36.5–49.3)
HCT VFR BLD AUTO: 42.1 % (ref 36.5–49.3)
HGB BLD-MCNC: 13.4 G/DL (ref 12–17)
HGB BLD-MCNC: 13.6 G/DL (ref 12–17)
HGB UR QL STRIP.AUTO: NEGATIVE
HYALINE CASTS #/AREA URNS LPF: ABNORMAL /LPF
IMM GRANULOCYTES # BLD AUTO: 0.08 THOUSAND/UL (ref 0–0.2)
IMM GRANULOCYTES # BLD AUTO: 0.11 THOUSAND/UL (ref 0–0.2)
IMM GRANULOCYTES NFR BLD AUTO: 0 % (ref 0–2)
IMM GRANULOCYTES NFR BLD AUTO: 1 % (ref 0–2)
KETONES UR STRIP-MCNC: ABNORMAL MG/DL
LEUKOCYTE ESTERASE UR QL STRIP: NEGATIVE
LYMPHOCYTES # BLD AUTO: 0.94 THOUSANDS/ΜL (ref 0.6–4.47)
LYMPHOCYTES # BLD AUTO: 0.99 THOUSANDS/ΜL (ref 0.6–4.47)
LYMPHOCYTES NFR BLD AUTO: 5 % (ref 14–44)
LYMPHOCYTES NFR BLD AUTO: 6 % (ref 14–44)
MAGNESIUM SERPL-MCNC: 2.6 MG/DL (ref 1.6–2.6)
MCH RBC QN AUTO: 30.6 PG (ref 26.8–34.3)
MCH RBC QN AUTO: 30.6 PG (ref 26.8–34.3)
MCHC RBC AUTO-ENTMCNC: 32.3 G/DL (ref 31.4–37.4)
MCHC RBC AUTO-ENTMCNC: 32.3 G/DL (ref 31.4–37.4)
MCV RBC AUTO: 95 FL (ref 82–98)
MCV RBC AUTO: 95 FL (ref 82–98)
MONOCYTES # BLD AUTO: 1.04 THOUSAND/ΜL (ref 0.17–1.22)
MONOCYTES # BLD AUTO: 1.07 THOUSAND/ΜL (ref 0.17–1.22)
MONOCYTES NFR BLD AUTO: 5 % (ref 4–12)
MONOCYTES NFR BLD AUTO: 6 % (ref 4–12)
NEUTROPHILS # BLD AUTO: 15.44 THOUSANDS/ΜL (ref 1.85–7.62)
NEUTROPHILS # BLD AUTO: 17.2 THOUSANDS/ΜL (ref 1.85–7.62)
NEUTS SEG NFR BLD AUTO: 87 % (ref 43–75)
NEUTS SEG NFR BLD AUTO: 90 % (ref 43–75)
NITRITE UR QL STRIP: NEGATIVE
NON-SQ EPI CELLS URNS QL MICRO: ABNORMAL /HPF
NRBC BLD AUTO-RTO: 0 /100 WBCS
NRBC BLD AUTO-RTO: 0 /100 WBCS
PH UR STRIP.AUTO: 5.5 [PH]
PHOSPHATE SERPL-MCNC: 4.1 MG/DL (ref 2.3–4.1)
PLATELET # BLD AUTO: 214 THOUSANDS/UL (ref 149–390)
PLATELET # BLD AUTO: 238 THOUSANDS/UL (ref 149–390)
PMV BLD AUTO: 10.8 FL (ref 8.9–12.7)
PMV BLD AUTO: 10.9 FL (ref 8.9–12.7)
POTASSIUM SERPL-SCNC: 5.3 MMOL/L (ref 3.5–5.3)
PROT SERPL-MCNC: 7 G/DL (ref 6.4–8.2)
PROT UR STRIP-MCNC: ABNORMAL MG/DL
QRS AXIS: -84 DEGREES
QRS AXIS: 116 DEGREES
QRSD INTERVAL: 116 MS
QRSD INTERVAL: 158 MS
QT INTERVAL: 524 MS
QT INTERVAL: 578 MS
QTC INTERVAL: 408 MS
QTC INTERVAL: 524 MS
RBC # BLD AUTO: 4.38 MILLION/UL (ref 3.88–5.62)
RBC # BLD AUTO: 4.44 MILLION/UL (ref 3.88–5.62)
RBC #/AREA URNS AUTO: ABNORMAL /HPF
SODIUM SERPL-SCNC: 139 MMOL/L (ref 136–145)
SP GR UR STRIP.AUTO: 1.03 (ref 1–1.03)
T WAVE AXIS: 68 DEGREES
T WAVE AXIS: 81 DEGREES
UROBILINOGEN UR QL STRIP.AUTO: 0.2 E.U./DL
VENTRICULAR RATE: 30 BPM
VENTRICULAR RATE: 60 BPM
WBC # BLD AUTO: 17.69 THOUSAND/UL (ref 4.31–10.16)
WBC # BLD AUTO: 19.32 THOUSAND/UL (ref 4.31–10.16)
WBC #/AREA URNS AUTO: ABNORMAL /HPF

## 2020-07-21 PROCEDURE — 93321 DOPPLER ECHO F-UP/LMTD STD: CPT | Performed by: INTERNAL MEDICINE

## 2020-07-21 PROCEDURE — 93010 ELECTROCARDIOGRAM REPORT: CPT | Performed by: INTERNAL MEDICINE

## 2020-07-21 PROCEDURE — 85025 COMPLETE CBC W/AUTO DIFF WBC: CPT | Performed by: INTERNAL MEDICINE

## 2020-07-21 PROCEDURE — 86140 C-REACTIVE PROTEIN: CPT | Performed by: STUDENT IN AN ORGANIZED HEALTH CARE EDUCATION/TRAINING PROGRAM

## 2020-07-21 PROCEDURE — 84100 ASSAY OF PHOSPHORUS: CPT | Performed by: INTERNAL MEDICINE

## 2020-07-21 PROCEDURE — 85025 COMPLETE CBC W/AUTO DIFF WBC: CPT | Performed by: PHYSICIAN ASSISTANT

## 2020-07-21 PROCEDURE — C1898 LEAD, PMKR, OTHER THAN TRANS: HCPCS

## 2020-07-21 PROCEDURE — 5A1223Z PERFORMANCE OF CARDIAC PACING, CONTINUOUS: ICD-10-PCS | Performed by: INTERNAL MEDICINE

## 2020-07-21 PROCEDURE — 33210 INSERT ELECTRD/PM CATH SNGL: CPT | Performed by: INTERNAL MEDICINE

## 2020-07-21 PROCEDURE — 83735 ASSAY OF MAGNESIUM: CPT | Performed by: INTERNAL MEDICINE

## 2020-07-21 PROCEDURE — 71045 X-RAY EXAM CHEST 1 VIEW: CPT

## 2020-07-21 PROCEDURE — 93325 DOPPLER ECHO COLOR FLOW MAPG: CPT | Performed by: INTERNAL MEDICINE

## 2020-07-21 PROCEDURE — 87040 BLOOD CULTURE FOR BACTERIA: CPT | Performed by: STUDENT IN AN ORGANIZED HEALTH CARE EDUCATION/TRAINING PROGRAM

## 2020-07-21 PROCEDURE — 93005 ELECTROCARDIOGRAM TRACING: CPT

## 2020-07-21 PROCEDURE — 99233 SBSQ HOSP IP/OBS HIGH 50: CPT | Performed by: INTERNAL MEDICINE

## 2020-07-21 PROCEDURE — 99223 1ST HOSP IP/OBS HIGH 75: CPT | Performed by: INTERNAL MEDICINE

## 2020-07-21 PROCEDURE — 85652 RBC SED RATE AUTOMATED: CPT | Performed by: STUDENT IN AN ORGANIZED HEALTH CARE EDUCATION/TRAINING PROGRAM

## 2020-07-21 PROCEDURE — 81001 URINALYSIS AUTO W/SCOPE: CPT | Performed by: STUDENT IN AN ORGANIZED HEALTH CARE EDUCATION/TRAINING PROGRAM

## 2020-07-21 PROCEDURE — NC001 PR NO CHARGE: Performed by: NURSE PRACTITIONER

## 2020-07-21 PROCEDURE — 93308 TTE F-UP OR LMTD: CPT

## 2020-07-21 PROCEDURE — 80053 COMPREHEN METABOLIC PANEL: CPT | Performed by: INTERNAL MEDICINE

## 2020-07-21 PROCEDURE — 93308 TTE F-UP OR LMTD: CPT | Performed by: INTERNAL MEDICINE

## 2020-07-21 PROCEDURE — 76937 US GUIDE VASCULAR ACCESS: CPT | Performed by: PHYSICIAN ASSISTANT

## 2020-07-21 PROCEDURE — C1892 INTRO/SHEATH,FIXED,PEEL-AWAY: HCPCS | Performed by: PHYSICIAN ASSISTANT

## 2020-07-21 PROCEDURE — 33210 INSERT ELECTRD/PM CATH SNGL: CPT | Performed by: PHYSICIAN ASSISTANT

## 2020-07-21 RX ORDER — AMLODIPINE BESYLATE 5 MG/1
5 TABLET ORAL DAILY
Status: DISCONTINUED | OUTPATIENT
Start: 2020-07-21 | End: 2020-07-21

## 2020-07-21 RX ORDER — AMLODIPINE BESYLATE 5 MG/1
5 TABLET ORAL ONCE
Status: COMPLETED | OUTPATIENT
Start: 2020-07-21 | End: 2020-07-21

## 2020-07-21 RX ORDER — FENTANYL CITRATE 50 UG/ML
INJECTION, SOLUTION INTRAMUSCULAR; INTRAVENOUS AS NEEDED
Status: DISCONTINUED | OUTPATIENT
Start: 2020-07-21 | End: 2020-07-21 | Stop reason: SURG

## 2020-07-21 RX ORDER — LABETALOL 20 MG/4 ML (5 MG/ML) INTRAVENOUS SYRINGE
10 ONCE
Status: COMPLETED | OUTPATIENT
Start: 2020-07-21 | End: 2020-07-21

## 2020-07-21 RX ORDER — CEFAZOLIN SODIUM 1 G/3ML
INJECTION, POWDER, FOR SOLUTION INTRAMUSCULAR; INTRAVENOUS AS NEEDED
Status: DISCONTINUED | OUTPATIENT
Start: 2020-07-21 | End: 2020-07-21 | Stop reason: SURG

## 2020-07-21 RX ORDER — LABETALOL 100 MG/1
100 TABLET, FILM COATED ORAL EVERY 8 HOURS SCHEDULED
Status: DISCONTINUED | OUTPATIENT
Start: 2020-07-21 | End: 2020-07-21

## 2020-07-21 RX ORDER — HYDRALAZINE HYDROCHLORIDE 20 MG/ML
10 INJECTION INTRAMUSCULAR; INTRAVENOUS ONCE
Status: COMPLETED | OUTPATIENT
Start: 2020-07-21 | End: 2020-07-21

## 2020-07-21 RX ORDER — LIDOCAINE HYDROCHLORIDE 10 MG/ML
INJECTION, SOLUTION EPIDURAL; INFILTRATION; INTRACAUDAL; PERINEURAL CODE/TRAUMA/SEDATION MEDICATION
Status: COMPLETED | OUTPATIENT
Start: 2020-07-21 | End: 2020-07-21

## 2020-07-21 RX ORDER — AMLODIPINE BESYLATE 10 MG/1
10 TABLET ORAL DAILY
Status: DISCONTINUED | OUTPATIENT
Start: 2020-07-22 | End: 2020-07-24

## 2020-07-21 RX ORDER — LABETALOL 20 MG/4 ML (5 MG/ML) INTRAVENOUS SYRINGE
10 EVERY 6 HOURS PRN
Status: DISCONTINUED | OUTPATIENT
Start: 2020-07-21 | End: 2020-07-21

## 2020-07-21 RX ORDER — HYDRALAZINE HYDROCHLORIDE 20 MG/ML
10 INJECTION INTRAMUSCULAR; INTRAVENOUS EVERY 6 HOURS PRN
Status: DISCONTINUED | OUTPATIENT
Start: 2020-07-21 | End: 2020-07-21

## 2020-07-21 RX ORDER — LABETALOL 20 MG/4 ML (5 MG/ML) INTRAVENOUS SYRINGE
10 EVERY 8 HOURS PRN
Status: DISCONTINUED | OUTPATIENT
Start: 2020-07-21 | End: 2020-08-05 | Stop reason: HOSPADM

## 2020-07-21 RX ORDER — LABETALOL 100 MG/1
50 TABLET, FILM COATED ORAL EVERY 8 HOURS SCHEDULED
Status: DISCONTINUED | OUTPATIENT
Start: 2020-07-21 | End: 2020-07-23

## 2020-07-21 RX ORDER — METOCLOPRAMIDE HYDROCHLORIDE 5 MG/ML
10 INJECTION INTRAMUSCULAR; INTRAVENOUS EVERY 6 HOURS PRN
Status: DISCONTINUED | OUTPATIENT
Start: 2020-07-21 | End: 2020-08-05 | Stop reason: HOSPADM

## 2020-07-21 RX ORDER — ALBUTEROL SULFATE 2.5 MG/3ML
2.5 SOLUTION RESPIRATORY (INHALATION) EVERY 4 HOURS PRN
Status: DISCONTINUED | OUTPATIENT
Start: 2020-07-21 | End: 2020-07-22

## 2020-07-21 RX ADMIN — LABETALOL HYDROCHLORIDE 100 MG: 100 TABLET, FILM COATED ORAL at 18:17

## 2020-07-21 RX ADMIN — LIDOCAINE HYDROCHLORIDE 7 ML: 10 INJECTION, SOLUTION EPIDURAL; INFILTRATION; INTRACAUDAL; PERINEURAL at 10:34

## 2020-07-21 RX ADMIN — Medication 1 G: at 17:00

## 2020-07-21 RX ADMIN — METOCLOPRAMIDE 10 MG: 5 INJECTION, SOLUTION INTRAMUSCULAR; INTRAVENOUS at 15:47

## 2020-07-21 RX ADMIN — CEFAZOLIN 2000 MG: 1 INJECTION, POWDER, FOR SOLUTION INTRAVENOUS at 10:17

## 2020-07-21 RX ADMIN — LABETALOL 20 MG/4 ML (5 MG/ML) INTRAVENOUS SYRINGE 10 MG: at 17:32

## 2020-07-21 RX ADMIN — FENTANYL CITRATE 50 MCG: 50 INJECTION, SOLUTION INTRAMUSCULAR; INTRAVENOUS at 10:30

## 2020-07-21 RX ADMIN — AMLODIPINE BESYLATE 5 MG: 5 TABLET ORAL at 12:30

## 2020-07-21 RX ADMIN — HYDRALAZINE HYDROCHLORIDE 10 MG: 20 INJECTION INTRAMUSCULAR; INTRAVENOUS at 09:21

## 2020-07-21 RX ADMIN — LABETALOL HYDROCHLORIDE 50 MG: 100 TABLET, FILM COATED ORAL at 22:34

## 2020-07-21 RX ADMIN — CEFEPIME HYDROCHLORIDE 500 MG: 1 INJECTION, POWDER, FOR SOLUTION INTRAMUSCULAR; INTRAVENOUS at 22:00

## 2020-07-21 RX ADMIN — AMLODIPINE BESYLATE 5 MG: 5 TABLET ORAL at 16:58

## 2020-07-21 RX ADMIN — PRAVASTATIN SODIUM 40 MG: 40 TABLET ORAL at 16:58

## 2020-07-21 RX ADMIN — HYDRALAZINE HYDROCHLORIDE 10 MG: 20 INJECTION INTRAMUSCULAR; INTRAVENOUS at 14:36

## 2020-07-21 RX ADMIN — FENTANYL CITRATE 50 MCG: 50 INJECTION, SOLUTION INTRAMUSCULAR; INTRAVENOUS at 10:57

## 2020-07-21 RX ADMIN — HYDRALAZINE HYDROCHLORIDE 10 MG: 20 INJECTION INTRAMUSCULAR; INTRAVENOUS at 08:11

## 2020-07-21 NOTE — ANESTHESIA POSTPROCEDURE EVALUATION
Post-Op Assessment Note    CV Status:  Stable    Pain management: adequate     Mental Status:  Alert and awake   Hydration Status:  Stable   PONV Controlled:  None   Airway Patency:  Patent   Post Op Vitals Reviewed: Yes      Staff: CRNA           BP  188/84   Temp      Pulse 60   Resp      SpO2   100

## 2020-07-21 NOTE — SOCIAL WORK
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  CM reviewed d/c planning process including the following: identifying help at home, patient preference for d/c planning needs, Discharge Lounge, [facility] Meds to Bed program, availability of treatment team to discuss questions or concerns patient and/or family may have regarding understanding medications and recognizing signs and symptoms once discharged  CM also encouraged patient to follow up with all recommended appointments after discharge  Patient advised of importance for patient and family to participate in managing patients medical well being

## 2020-07-21 NOTE — PHYSICAL THERAPY NOTE
Physical Therapy Cancellation Note      PT orders received, chart reviewed  Pt currently off floor at cardiac cath  PT to continue to follow and see pt for initial evaluation post procedure when appropriate and able      Connie Quinn, PT, DPT

## 2020-07-21 NOTE — PROGRESS NOTES
[facility] patient vomiting  Went into the room  Pt stated he wasn't feeling well  He suddenly had an episode of full body jerking that lasted a few seconds and then went completely unresponsive with eyes open, staring  Tele at this time (4248) did not have any QRS complexes  After a few seconds, pt came to and stated he did not know what happened or where he was  Contacted [place] [address] [place] to inform them of the event  On tele, it looks like approx 29 seconds with no QRS complexes, only P waves

## 2020-07-21 NOTE — ASSESSMENT & PLAN NOTE
Unclear etiology  White count initially normal, then increased to 17 69 and then 19 32  No bandemia  Possibly reactive in nature  Patient is otherwise afebrile and not septic    · Obtain blood cultures  · Obtain procalcitonin  · Will start broad-spectrum antibiotics if exhibiting other signs of infection

## 2020-07-21 NOTE — PROGRESS NOTES
Progress Note - Kaleb Granados 1935, 80 y o  male MRN: [id number]    Unit/Bed#: [facility] 525-01 Encounter: [id number]    Primary Care Provider: Lyndsey Patel,    Date and time admitted to hospital: 7/20/2020  4:04 PM        Leukocytosis  Assessment & Plan  Unclear etiology  White count initially normal, then increased to 17 69 and then 19 32  No bandemia  Possibly reactive in nature  Patient is otherwise afebrile and not septic  · Obtain blood cultures  · Obtain procalcitonin  · Will start broad-spectrum antibiotics if exhibiting other signs of infection    Essential hypertension  Assessment & Plan  Continue amlodipine  Will likely need adjustment in regimen due to hypertensive urgency    Complete heart block (HCC)  Assessment & Plan  Continue to hold beta-blocker  Status post pacemaker placement per EP    ARF (acute renal failure) (HCC)  Assessment & Plan  Base line cr 1 8-1 9  Possible cardiorenal disease w reported bioprosthetic valve on echo in 2016 and bradyarrythmia  Re-access after pacer  Check echo  History of stroke  Assessment & Plan  Patient with prior stroke last year  · Managed on aspirin and Plavix currently  Held due to procedure  · Resume aspirin and Plavix tomorrow    H/O aortic valve replacement  Assessment & Plan  Patient prior history bioprosthetic valve  Echocardiogram as per Cardiology    Hypertensive urgency  Assessment & Plan  Patient on Norvasc currently  Will hold Toprol given bradyarrhythmia  Blood pressure remains elevated  · Will restart hydralazine p r n  Dara Soulier · If blood pressure remains elevated, will increase scheduled regimen  Acute kidney injury superimposed on chronic kidney disease ([facility] [address] )  Assessment & Plan  Previous baseline around 1 8-1 9  Now improving to 2 20 today  Initially 2 74    · Avoid nephrotoxins  · Monitor electrolytes and renal function  · Ensure adequate oral intake and hydration    Mild intermittent asthma without complication  Assessment & Plan  Continue albuterol p r n     * Bradycardia  Assessment & Plan  Asymptomatic currently  Hold toprol  Check lymes ab with Western blot  [place]  Recheck potassium level  Appears to be borderline elevated at [facility]  · Follow up on echo  · Patient complete heart block  Discussed with critical care  Patient's blood pressure systolic in the 914D currently  · Monitor heart rate closely  · Now status post pacemaker placement        VTE Pharmacologic Prophylaxis:   Pharmacologic: Pharmacologic VTE Prophylaxis contraindicated due to procedure  Mechanical VTE Prophylaxis in Place: Yes    Patient Centered Rounds: I have performed bedside rounds with nursing staff today  Discussions with Specialists or Other Care Team Provider: cardio    Education and Discussions with Family / Patient: yes    Time Spent for Care: 30 minutes  More than 50% of total time spent on counseling and coordination of care as described above  Current Length of Stay: 1 day(s)    Current Patient Status: Inpatient   Certification Statement: The patient will continue to require additional inpatient hospital stay due to leukocytosis, catheterization, pacemaker placement    Discharge Plan: to home when able    Code Status: Level 1 - Full Code      Subjective:   Patient feels well today  No chest pain or shortness of breath  Had leukocytosis today  Objective:     Vitals:   Temp (24hrs), Av 7 °F (36 5 °C), Min:97 4 °F (36 3 °C), Max:97 9 °F (36 6 °C)    Temp:  [97 4 °F (36 3 °C)-97 9 °F (36 6 °C)] 97 9 °F (36 6 °C)  HR:  [25-60] 60  Resp:  [16-19] 19  BP: (140-212)/() 196/62  SpO2:  [94 %-97 %] 94 %  Body mass index is 36 14 kg/m²  Input and Output Summary (last 24 hours):        Intake/Output Summary (Last 24 hours) at 2020 1625  Last data filed at 2020 1500  Gross per 24 hour   Intake 200 ml   Output 600 ml   Net -400 ml       Physical Exam:     Physical Exam Constitutional: He appears well-developed and well-nourished  No distress  HENT:   Head: Normocephalic and atraumatic  Eyes: Pupils are equal, round, and reactive to light  EOM are normal    Neck: Neck supple  Cardiovascular: Normal rate, regular rhythm, normal heart sounds and intact distal pulses  Pulmonary/Chest: Effort normal and breath sounds normal  No respiratory distress  Abdominal: Soft  He exhibits no distension  There is no tenderness  Musculoskeletal: He exhibits no edema or deformity  Neurological: He is alert  Skin: Skin is warm and dry  He is not diaphoretic  Psychiatric: He has a normal mood and affect  His behavior is normal    Vitals reviewed  Additional Data:     Labs:    Results from last 7 days   Lab Units 07/21/20  0756   WBC Thousand/uL 19 32*   HEMOGLOBIN g/dL 13 4   HEMATOCRIT % 41 5   PLATELETS Thousands/uL 238   NEUTROS PCT % 90*   LYMPHS PCT % 5*   MONOS PCT % 5   EOS PCT % 0     Results from last 7 days   Lab Units 07/21/20  0445   SODIUM mmol/L 139   POTASSIUM mmol/L 5 3   CHLORIDE mmol/L 108   CO2 mmol/L 24   BUN mg/dL 43*   CREATININE mg/dL 2 20*   ANION GAP mmol/L 7   CALCIUM mg/dL 8 8   ALBUMIN g/dL 3 5   TOTAL BILIRUBIN mg/dL 0 61   ALK PHOS U/L 73   ALT U/L 15   AST U/L 12   GLUCOSE RANDOM mg/dL 110     Results from last 7 days   Lab Units 07/20/20  1021   INR  1 13                       * I Have Reviewed All Lab Data Listed Above  * Additional Pertinent Lab Tests Reviewed:  [address] Admission Reviewed    Imaging:    Imaging Reports Reviewed Today Include: CXR  Imaging Personally Reviewed by Myself Includes:  n/a    Recent Cultures (last 7 days):           Last 24 Hours Medication List:     Current Facility-Administered Medications:  albuterol 2 5 mg Nebulization Q4H PRN Genevieve Tubbs MD    amLODIPine 5 mg Oral Daily Kendra Ratliff DO    cefazolin 2,000 mg Intravenous Once Vernon Castanon PA-C    hydrALAZINE 10 mg Intravenous Q6H PRN Justino Momin MD    metoclopramide 10 mg Intravenous Q6H PRN Jes Panchal DO    pantoprazole 40 mg Oral Early Morning Hetul DO Pat    pravastatin 40 mg Oral Daily With [employer], DO    sodium chloride 50 mL/hr Intravenous Continuous Hetul Stewart, DO Last Rate: 50 mL/hr (07/20/20 1753)   sodium chloride 1 g Oral BID Cortez Stewart DO         Today, Patient Was Seen By: Jes Panchal DO    ** Please Note: Dictation voice to text software may have been used in the creation of this document   **

## 2020-07-21 NOTE — ASSESSMENT & PLAN NOTE
Previous baseline around 1 8-1 9  Now improving to 2 20 today  Initially 2 74    · Avoid nephrotoxins  · Monitor electrolytes and renal function  · Ensure adequate oral intake and hydration

## 2020-07-21 NOTE — PROGRESS NOTES
Critical care following as patient presented in complete heart block with HR in the 20-30s  Patient underwent temp-perm pacemaker placement today with EP  Temp perm pacer placed secondary to leukocytosis  Infectious workup per EP  Patient tolerated procedure well  He is alert and oriented, denies any complaints post procedure  He is hypertensive but norvasc was added to start today per EP  I spoke with his primary team who agrees that he can be downgraded to step down 2  Please call Critical Care at  with questions/concerns

## 2020-07-21 NOTE — RESPIRATORY THERAPY NOTE
RT Protocol Note  Waylon Headings 80 y o  male MRN: [id number]  Unit/Bed#: [facility] 525-01 Encounter: [id number]    Assessment    Principal Problem:    Bradycardia  Active Problems:    Asthma    Acute kidney injury superimposed on chronic kidney disease ([facility] [address] )    H/O aortic valve replacement    History of stroke    ARF (acute renal failure) ([facility])    Complete heart block ([facility] [address] )    Hypertension      Home Pulmonary Medications:  None       Past Medical History:   Diagnosis Date    Aortic stenosis     ECHO -4-14-14  MODERATE TO SEVERE AORTIC STENOSIS; MILD AROTIC INSUFFICIENCY - LAST ASSESSED 10/26/16; RESOLVED 6/8/16    Aortic valve disorder     LAST ASSESSED 10/8/15; 10/8/15    Hypertensive urgency 5/19/2019    Transient cerebral ischemia      Social History     Socioeconomic History    Marital status: /Civil Union     Spouse name: Not on file    Number of children: Not on file    Years of education: Not on file    Highest education level: Not on file   Occupational History    Occupation: retired   Social Needs    Financial resource strain: Not on file    Food insecurity:     Worry: Not on file     Inability: Not on file   [employer] needs:     Medical: Not on file     Non-medical: Not on file   Tobacco Use    Smoking status: Never Smoker    Smokeless tobacco: Never Used   Substance and Sexual Activity    Alcohol use:  Yes     Alcohol/week: 1 0 standard drinks     Types: 1 Cans of beer per week     Frequency: Monthly or less     Drinks per session: 1 or 2     Binge frequency: Never     Comment: social    Drug use: No    Sexual activity: Not on file   Lifestyle    Physical activity:     Days per week: Not on file     Minutes per session: Not on file    Stress: Not on file   Relationships    Social connections:     Talks on phone: Not on file     Gets together: Not on file     Attends Jew service: Not on file     Active member of club or organization: Not on file     Attends meetings of clubs or organizations: Not on file     Relationship status: Not on file    Intimate partner violence:     Fear of current or ex partner: Not on file     Emotionally abused: Not on file     Physically abused: Not on file     Forced sexual activity: Not on file   Other Topics Concern    Not on file   Social History Narrative    Caffeine use     Dental care, regularly     Lives independently with spouse     Uses seatbelts        Subjective    Subjective Data: pt currently in no resp distress  BS diminished/clear, SpO2 97% on room air  Pt denies SOB or increased WOB at this time  No resp UDNs or MDIs indicated at this time  D/C resp protocol and previously ordered UDNs    Objective    Physical Exam:   Assessment Type: Assess only  General Appearance: Alert, Awake  Respiratory Pattern: Normal  Chest Assessment: Chest expansion symmetrical  Bilateral Breath Sounds: Clear, Diminished  O2 Device: room air    Vitals:  Blood pressure (!) 180/75, pulse (!) 26, temperature 97 8 °F (36 6 °C), temperature source Oral, resp  rate 16, height 5' 4" (1 626 m), weight 95 5 kg (210 lb 8 6 oz), SpO2 97 %  Imaging and other studies: I have personally reviewed pertinent reports  O2 Device: room air     Plan: D/C resp protocol and previously ordered UDNs             Resp Comments: pt assessed per resp protocol  pt presents with heart block/bradycardia  Pt has no significant resp PMH, denies COPD of asthma PMH  Pt takes no resp UDNs or MDIs at home   No smoking hx

## 2020-07-21 NOTE — ANESTHESIA PREPROCEDURE EVALUATION
Review of Systems/Medical History          Cardiovascular  Exercise tolerance (METS): <4,  Hypertension , Valvular heart disease , aortic valve stenosis, Valve replacement , Dysrhythmias ,    Pulmonary  Asthma ,        GI/Hepatic    GERD ,        Kidney disease CKD,        Endo/Other     GYN       Hematology   Musculoskeletal       Neurology   Psychology           Physical Exam    Airway    Mallampati score: III  TM Distance: >3 FB  Neck ROM: limited     Dental       Cardiovascular      Pulmonary      Other Findings        Anesthesia Plan  ASA Score- 4     Anesthesia Type- IV sedation with anesthesia with ASA Monitors  Additional Monitors:   Airway Plan:     Comment: I have seen the patient and reviewed the history  Patient to receive IV sedation with full ASA monitors  Risks discussed with the patient, consent signed        Plan Factors-    Induction- intravenous  Postoperative Plan-     Informed Consent- Anesthetic plan and risks discussed with patient  I personally reviewed this patient with the CRNA  Discussed and agreed on the Anesthesia Plan with the CRNA  Madina Conroy

## 2020-07-21 NOTE — QUICK NOTE
Notified by nursing at [place] [id number] that patient had experienced an episode of AMS and during this appeared to have a period of approximately 27 seconds with p waves but no QRS although there is some artifact during this time on the rhythm strip, this resolved on its own and he returned to baseline LOC  Currently in CHB with ventricular rate 28-35  BP taken immediately after this episode was 140/88  Pt on my evaluation was A&Ox3, GCS 15, no focal deficits noted, and offered no complaints (denies CP, palpitations, dizziness/lightheadedness, SOB, abd pain, nausea)  Primary team (SLIM) at bedside to evaluate patient along with myself  Will notify cardiology  Continue to monitor  Frequent vitals including Bps  Discussed plan with nursing and RUPERT Yusuf PA-C  07/21/20  4:40 AM

## 2020-07-21 NOTE — PLAN OF CARE
Problem: Potential for Falls  Goal: Patient will remain free of falls  Description  INTERVENTIONS:  - Assess patient frequently for physical needs  -  Identify cognitive and physical deficits and behaviors that affect risk of falls    -  [place] fall precautions as indicated by assessment   - Educate patient/family on patient safety including physical limitations  - Instruct patient to call for assistance with activity based on assessment  - Modify environment to reduce risk of injury  - Consider OT/PT consult to assist with strengthening/mobility  Outcome: Progressing     Problem: PAIN - ADULT  Goal: Verbalizes/displays adequate comfort level or baseline comfort level  Description  Interventions:  - Encourage patient to monitor pain and request assistance  - Assess pain using appropriate pain scale  - Administer analgesics based on type and severity of pain and evaluate response  - Implement non-pharmacological measures as appropriate and evaluate response  - Consider cultural and social influences on pain and pain management  - Notify physician/advanced practitioner if interventions unsuccessful or patient reports new pain  Outcome: Progressing     Problem: INFECTION - ADULT  Goal: Absence or prevention of progression during hospitalization  Description  INTERVENTIONS:  - Assess and monitor for signs and symptoms of infection  - Monitor lab/diagnostic results  - Monitor all insertion sites, i e  indwelling lines, tubes, and drains  - Monitor endotracheal if appropriate and nasal secretions for changes in amount and color  - [place] appropriate cooling/warming therapies per order  - Administer medications as ordered  - Instruct and encourage patient and family to use good hand hygiene technique  - Identify and instruct in appropriate isolation precautions for identified infection/condition  Outcome: Progressing  Goal: Absence of fever/infection during neutropenic period  Description  INTERVENTIONS:  - Monitor WBC    Outcome: Progressing     Problem: SAFETY ADULT  Goal: Maintain or return to baseline ADL function  Description  INTERVENTIONS:  -  Assess patient's ability to carry out ADLs; assess patient's baseline for ADL function and identify physical deficits which impact ability to perform ADLs (bathing, care of mouth/teeth, toileting, grooming, dressing, etc )  - Assess/evaluate cause of self-care deficits   - Assess range of motion  - Assess patient's mobility; develop plan if impaired  - Assess patient's need for assistive devices and provide as appropriate  - Encourage maximum independence but intervene and supervise when necessary  - Involve family in performance of ADLs  - Assess for home care needs following discharge   - Consider OT consult to assist with ADL evaluation and planning for discharge  - Provide patient education as appropriate  Outcome: Progressing  Goal: Maintain or return mobility status to optimal level  Description  INTERVENTIONS:  - Assess patient's baseline mobility status (ambulation, transfers, stairs, etc )    - Identify cognitive and physical deficits and behaviors that affect mobility  - Identify mobility aids required to assist with transfers and/or ambulation (gait belt, sit-to-stand, lift, walker, cane, etc )  - [place] fall precautions as indicated by assessment  - Record patient progress and toleration of activity level on Mobility SBAR; progress patient to next Phase/Stage  - Instruct patient to call for assistance with activity based on assessment  - Consider rehabilitation consult to assist with strengthening/weightbearing, etc   Outcome: Progressing     Problem: DISCHARGE PLANNING  Goal: Discharge to home or other facility with appropriate resources  Description  INTERVENTIONS:  - Identify barriers to discharge w/patient and caregiver  - Arrange for needed discharge resources and transportation as appropriate  - Identify discharge learning needs (meds, wound care, etc )  - Arrange for interpretive services to assist at discharge as needed  - Refer to Case Management Department for coordinating discharge planning if the patient needs post-hospital services based on physician/advanced practitioner order or complex needs related to functional status, cognitive ability, or social support system  Outcome: Progressing     Problem: RESPIRATORY - ADULT  Goal: Achieves optimal ventilation and oxygenation  Description  INTERVENTIONS:  - Assess for changes in respiratory status  - Assess for changes in mentation and behavior  - Position to facilitate oxygenation and minimize respiratory effort  - Oxygen administered by appropriate delivery if ordered  - Initiate smoking cessation education as indicated  - Encourage broncho-pulmonary hygiene including cough, deep breathe, Incentive Spirometry  - Assess the need for suctioning and aspirate as needed  - Assess and instruct to report SOB or any respiratory difficulty  - Respiratory Therapy support as indicated  Outcome: Progressing     Problem: Knowledge Deficit  Goal: Patient/family/caregiver demonstrates understanding of disease process, treatment plan, medications, and discharge instructions  Description  Complete learning assessment and assess knowledge base    Interventions:  - Provide teaching at level of understanding  - Provide teaching via preferred learning methods  Outcome: Not Progressing     Problem: CARDIOVASCULAR - ADULT  Goal: Maintains optimal cardiac output and hemodynamic stability  Description  INTERVENTIONS:  - Monitor I/O, vital signs and rhythm  - Monitor for S/S and trends of decreased cardiac output  - Administer and titrate ordered vasoactive medications to optimize hemodynamic stability  - Assess quality of pulses, skin color and temperature  - Assess for signs of decreased coronary artery perfusion  - Instruct patient to report change in severity of symptoms  Outcome: Not Progressing  Goal: Absence of cardiac dysrhythmias or at baseline rhythm  Description  INTERVENTIONS:  - Continuous cardiac monitoring, vital signs, obtain 12 lead EKG if ordered  - Administer antiarrhythmic and heart rate control medications as ordered  - Monitor electrolytes and administer replacement therapy as ordered  Outcome: Not Progressing

## 2020-07-21 NOTE — UTILIZATION REVIEW
Initial Clinical Review    Admission: Date/Time/Statement: Admission Orders (From admission, onward)     Ordered        07/20/20 1703  Inpatient Admission  Once                Orders Placed This Encounter   Procedures    Inpatient Admission     Standing Status:   Standing     Number of Occurrences:   1     Order Specific Question:   Admitting Physician     Answer:   Genevieve Pierce [[id number]]     Order Specific Question:   Level of Care     Answer:   Level 1 Stepdown [13]     Order Specific Question:   Estimated length of stay     Answer:   More than 2 Midnights     Order Specific Question:   Certification     Answer:   I certify that inpatient services are medically necessary for this patient for a duration of greater than two midnights  See H&P and MD Progress Notes for additional information about the patient's course of treatment  Arrival Information: 7/20/20 at 1703 emergent Inpatient transfer from [facility] ED to [facility] 2nd  Complete Heart Block with heart rate in the 20's    Assessment/Plan: 80year old male with PMHx HTN, HLD, CVA 1 year ago, aortic stenosis s/p bioprosthetic AVR, CKD 3  Initially presented to [facility] ED 7/20/20 2nd 2 week history of worsening weakness and GROSS  In ED - heart rate 25  /78 EKG showed complete heart block with heart rate in the 20's  LABS:  K+ 5 2  BUN/ CR 40/2 74  BNP 8,770   Admitted inpatient 7/20/20 at 1703 2nd Complete Heart Block, YOLANDE on CKD Stage 3 - to Step Down Level 1 for Electrophysiology Consult for permanent pacemaker implant on 7/21/20 7/21/20 0300:  episode of AMS and approximately 27 second episode with p waves but no QRS - resolved on its own and he returned to baseline LOC  Remained in CHB with ventricular rate 28-35  BP after this episode was 140/88    7/21/20 Electrophysiology Consult:  Assessment:  1  Complete heart block   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, thus it is most probably progression of the conduction system  disease  2  Essential hypertension  3  CAD (s/p CABG)  4  AS ( s/p TAVR)  5  Leukocytosis      Plan:  1  Complete heart block: Given that complete heart block is most likely secondary to the progression the conduction system disease patient would benefit from the pacemaker but in setting of the leukocytosis would plan on placing temp pacemaker with plan to upgrade to permanent pacemaker based on the results of the infectious work up  2  HTN: restarted home amlodipine 5 mg daily  patient is also on metoprolol succinate 50 mg daily at home  At this time will hold off on metoprolol until upgrading to the permanent pacemaker  Will increase dose of amlodipine if BP continues being elevated  Will also plan on gradual decrease in blood pressure given that had episodes with SBP in 200s  3  Leukocytosis: f/u blood cultures, ESR, CRP, urinalysis      7/21/20 ELECTROPHYSIOLOGY OPERATIVE REPORT  Date of surgery: 07/21/20  PROCEDURE PERFORMED: Temporary external/internal screwing pacemaker placement   Preoperative Medications: ANESTHESIA:  Mac     PREOPERATIVE DIAGNOSIS:   1  Complete heart block with escape rhythm in 30s  2  Hypertension  3  Leukocytosis with white cell count increased to 19  4  History of stroke  5  Severe AS status post TAVR  6  Hypertensive urgency  7  Acute kidney injury      POSTOPERATIVE DIAGNOSIS: Successful dual chamber pacemaker implant  Same as Preop  *Pacemaker was programmed at VVI 60 beats per minute with output at 5 volts at 1 millisecond      Initial Vitals   Temperature Pulse Respirations Blood Pressure SpO2   07/20/20 1639 07/20/20 1639 07/20/20 1639 07/20/20 1639 07/20/20 1901   (!) 97 4 °F (36 3 °C) (!) 25 17 (!) 178/78 96 %      Temp Source Heart Rate Source Patient Position - Orthostatic VS BP Location FiO2 (%)   07/20/20 1639 07/20/20 1639 07/20/20 2250 07/20/20 1639 --   Oral Monitor Lying Left arm     Wt Readings from Last 1 Encounters:   07/20/20 95 5 kg (210 lb 8 6 oz)     Additional Vital Signs:   07/21/20 0921    26    200/85Abnormal         07/21/20 0811    28    186/94Abnormal         07/21/20 0700  97 6 °F (36 4 °C)  30Abnormal   19  204/103Abnormal    94 %      07/21/20 0259    28    140/88        07/21/20 0030    26      96 %  None (Room air)    07/21/20 0023    25Abnormal     146/58        07/20/20 2250  97 8 °F (36 6 °C)  27Abnormal   18  140/70  95 %  None (Room air)    07/20/20 2005  25      97 %  None (Room air)    07/20/20 2001  25      97 %  None (Room air)    07/20/20 1901  97 8 °F (36 6 °C)  26Abnormal   16  180/75Abnormal   96 %        POST TEMP PACER  07/21/20 1658    60 Paced    198/74Abnormal     07/21/20 1500  97 9 °F (36 6 °C)  60    196/62Abnormal     07/21/20 1307        212/80Abnormal       I/O    07/20 0701  07/21 0700 07/21 0701  -   P  O  0    I V  (mL/kg)  200 (2 1)   Total Intake(mL/kg) 0 (0) 200 (2 1)   Urine (mL/kg/hr) 400    Emesis/NG output 0    Total Output 400    Net -400 +200        Unmeasured Emesis Occurrence 2 x      Pertinent Labs/Diagnostic Test Results:   Results from last 7 days   Lab Units 07/20/20  1043   SARS-COV-2  Negative     Results from last 7 days   Lab Units 07/21/20  0756 07/21/20  0445 07/20/20  1021   WBC Thousand/uL 19 32* 17 69* 9 50   HEMOGLOBIN g/dL 13 4 13 6 13 3   HEMATOCRIT % 41 5 42 1 42 0   PLATELETS Thousands/uL 238 214 214   NEUTROS ABS Thousands/µL 17 20* 15 44* 7 03     Results from last 7 days   Lab Units 07/21/20  0445 07/20/20  1942 07/20/20  1021   SODIUM mmol/L 139 139 139   POTASSIUM mmol/L 5 3 5 5* 5 2   CHLORIDE mmol/L 108 107 104   CO2 mmol/L 24 25 28   ANION GAP mmol/L 7 7 7   BUN mg/dL 43* 39* 40*   CREATININE mg/dL 2 20* 2 33* 2 74*   EGFR ml/min/1 73sq m 27 25 20   CALCIUM mg/dL 8 8 9 4 8 8   MAGNESIUM mg/dL 2 6  --  2 4   PHOSPHORUS mg/dL 4 1  --   --      Results from last 7 days   Lab Units 07/21/20  0445 07/20/20  1021   AST U/L 12 15   ALT U/L 15 18 ALK PHOS U/L 73 83   TOTAL PROTEIN g/dL 7 0 7 5   ALBUMIN g/dL 3 5 3 8   TOTAL BILIRUBIN mg/dL 0 61 0 40     Results from last 7 days   Lab Units 07/21/20  0445 07/20/20  1942 07/20/20  1021   GLUCOSE RANDOM mg/dL 110 96 128     Results from last 7 days   Lab Units 07/20/20  1021   TROPONIN I ng/mL <0 02     Results from last 7 days   Lab Units 07/20/20  1021   PROTIME seconds 14 5   INR  1 13   PTT seconds 33     Results from last 7 days   Lab Units 07/20/20  1021   TSH 3RD GENERATON uIU/mL 1 480     Results from last 7 days   Lab Units 07/20/20  1021   NT-PRO BNP pg/mL 8,770*        12 LEAD EKG -  Marked sinus bradycardia with A-V dissociation and Junctional bradycardia  Right axis deviation  Incomplete right bundle branch block  Right ventricular hypertrophy with repolarization abnormality  Abnormal ECG    [facility] ED Medications  Medications   sodium chloride 0 9 % infusion (125 mL/hr Intravenous New Bag 7/20/20 1120)     Past Medical History:   Diagnosis Date    Aortic stenosis     ECHO -4-14-14   MODERATE TO SEVERE AORTIC STENOSIS; MILD AROTIC INSUFFICIENCY - LAST ASSESSED 10/26/16; RESOLVED 6/8/16    Aortic valve disorder     LAST ASSESSED 10/8/15; 10/8/15    Hypertensive urgency 5/19/2019    Transient cerebral ischemia      Present on Admission:   (Resolved) Hypertensive urgency   Complete heart block (HCC)   Acute kidney injury superimposed on chronic kidney disease ([facility] [address] )   Asthma    Admitting Diagnosis: Weakness [R53 1]    Age/Sex: 80 y o  male    Admission Orders:  Telemetry  VS q1-2hrs  NPO for Temp Pacer  ECHO  PT + OT Evals    Scheduled Medications:  amLODIPine 5 mg Oral Daily   cefazolin 2,000 mg Intravenous Once   pantoprazole 40 mg Oral Early Morning   pravastatin 40 mg Oral Daily With Dinner   sodium chloride 1 g Oral BID     Continuous IV Infusions:  sodium chloride 50 mL/hr Intravenous Continuous     PRN Meds:  albuterol 2 5 mg Nebulization Q4H PRN   hydrALAZINE 10 mg Intravenous Q6H PRN  SBP > 180 GIVEN X 2     IV metoclopramide (REGLAN) injection 10 mg     Ordered Dose: 10 mg Route: Intravenous Frequency: Every 6 hours PRN for nausea  GIVEN X 1   Scheduled Start Date/Time: 07/21/20 1219 End Date/Time       IP CONSULT TO MEDICAL CRITICAL CARE  IP CONSULT TO ELECTROPHYSIOLOGY    Network Utilization Review Department  [email or web address]  ATTENTION: Please call with any questions or concerns to [phone] and carefully listen to the prompts so that you are directed to the right person  All voicemails are confidential   Harlene Pair all requests for admission clinical reviews, approved or denied determinations and any other requests to dedicated fax number below belonging to the campus where the patient is receiving treatment   List of dedicated fax numbers for the Facilities:  [address] DENIALS (Administrative/Medical Necessity) [phone]   [address] (Maternity/NICU/Pediatrics) [phone]   Yuliya Scott [phone]   Derrick Chinchilla [phone]   Victor Manuel Marinelli [phone]   [facility] [phone]   [facility] [address] [phone]   [facility]  [phone]   [address] [facility] [address]  [address] [place] [address] [address] [facility] [phone]

## 2020-07-21 NOTE — CONSULTS
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, thus it is most probably progression of the conduction system  disease  2  Essential hypertension  3  CAD (s/p CABG)  4  AS ( s/p TAVR)  5  Leukocytosis      Plan:  1  Complete heart block: Given that complete heart block is most likely secondary to the progression the conduction system disease patient would benefit from the pacemaker but in setting of the leukocytosis would plan on placing temp perm pacemaker with plan to upgrade it to the permanent pacemaker based on the results of the infectious work up  2  HTN: restarted home amlodipine 5 mg daily  patient is also on metoprolol succinate 50 mg daily at home  At this time will hold off on metoprolol until upgrading to the permanent pacemaker  Will increase dose of amlodipine if BP continues being elevated  Will also plan on gradual decrease in blood pressure given that had episodes with SBP in 200s  3  Leukocytosis: f/u blood cultures, ESR, CRP, urinalysis  HPI: Sussy Vazquez is a 80y o  year old male with  CAD ( s/p CABG LIMA-LAD), AS (s/p TAVR 2015), HTN, HLD, was transferred to [facility]  for the pacemaker placement in setting of the complete heart block  Patient states that he was feeling dyspneic for the past 2 weeks, denies chest pain, palpitations, cough, orthopnea, PND, fever, diarrhea, constipation, dysuria     Patient was found in complete heart block but alert, oriented and hemodynamically stable initially  He had an episode of the encephalopathy concerning for the presyncope last pm and Critical care was consulted but given that encephalopathy improved and patient remained hemodinamilly stable patient was recommended to monitor on the general medical floor  Patients was found to have leucocytosis on the labs, no fever  He also had elevated Blood pressure which required multiple pushes of hydralazine IV  Social hx: tobacco: never; alcohol: social; illicit drugs: denies     Primary Cardiologist: Dr Nayeli Morin: complete heart block    EKG:       TTE 7/21/2020  SUMMARY     LEFT VENTRICLE:  Systolic function was normal  Ejection fraction was estimated to be 60 %  There were no regional wall motion abnormalities  Wall thickness was moderately increased      RIGHT VENTRICLE:  The size was normal   Systolic function was normal      LEFT ATRIUM:  The atrium was mildly dilated      RIGHT ATRIUM:  The atrium was dilated      MITRAL VALVE:  There was marked calcification  There was moderately reduced leaflet separation  There was mild to moderate stenosis  Assessment is limited by heart block  There was mild regurgitation  Not well visualized      AORTIC VALVE:  A bioprosthesis was present  The peak valve velocity was 141 cm/s  The aortic valve obstructive index (by VTI) was 0 61  Estimated aortic valve area (by VTI) was 1 38 cmï¾²     TRICUSPID VALVE:  There was mild regurgitation  Estimated peak PA pressure was 48 mmHg  The findings suggest mild pulmonary hypertension      HISTORY: PRIOR HISTORY: Aortic valve replacement      PROCEDURE: The procedure was performed at the bedside  This was a routine study  The transthoracic approach was used  The study included limited 2D imaging, limited spectral Doppler, and color Doppler  The heart rate was 30 bpm, at the  start of the study  Echocardiographic views were limited due to poor acoustic window availability and decreased penetration   This was a technically difficult study  LEFT VENTRICLE: Size was normal  Systolic function was normal  Ejection fraction was estimated to be 60 %  There were no regional wall motion abnormalities  Wall thickness was moderately increased  There was no evidence of concentric  hypertrophy  DOPPLER: The study was not technically sufficient to allow evaluation of LV diastolic function  RIGHT VENTRICLE: The size was normal  Systolic function was normal  Wall thickness was normal    LEFT ATRIUM: The atrium was mildly dilated    RIGHT ATRIUM: The atrium was dilated  MITRAL VALVE: There was annular calcification  There was marked calcification  There was moderately reduced leaflet separation  Not well visualized  DOPPLER: There was mild to moderate stenosis  Assessment is limited by heart block  There  was mild regurgitation    AORTIC VALVE: A bioprosthesis was present  DOPPLER: There was no regurgitation    TRICUSPID VALVE: The valve structure was normal  There was normal leaflet separation  DOPPLER: The transtricuspid velocity was within the normal range  There was no evidence for stenosis  There was mild regurgitation  Estimated peak PA  pressure was 48 mmHg  The findings suggest mild pulmonary hypertension  PULMONIC VALVE: Leaflets exhibited normal thickness, no calcification, and normal cuspal separation  DOPPLER: The transpulmonic velocity was within the normal range  There was trace regurgitation    PERICARDIUM: There was no pericardial effusion  The pericardium was normal in appearance  AORTA: The root exhibited normal size        [facility]: 6/2015  CORONARY VESSELS:   --  The coronary circulation is right dominant  --  Left main: Angiography showed minor luminal irregularities  --  Proximal LAD: There was a 90 % stenosis at the origin of D1    --  1st diagonal: There was a 80 % stenosis at the ostium of the vessel   segment  --  Distal circumflex: There was a 40 % stenosis     --  Proximal RCA: There was a 50 % stenosis  Review of Systems  ROS as noted above, otherwise 12 point review of systems was performed and is negative  Historical Information   Past Medical History:   Diagnosis Date    Aortic stenosis     ECHO -4-14-14  MODERATE TO SEVERE AORTIC STENOSIS; MILD AROTIC INSUFFICIENCY - LAST ASSESSED 10/26/16; RESOLVED 6/8/16    Aortic valve disorder     LAST ASSESSED 10/8/15; 10/8/15    Hypertensive urgency 5/19/2019    Transient cerebral ischemia      Past Surgical History:   Procedure Laterality Date    AORTIC VALVE REPLACEMENT  06/30/2015    avr with 23 mm [place] Magna Ease bioprosthetic    CATARACT EXTRACTION Right 06/05/2000    COLONOSCOPY      CORONARY ARTERY BYPASS GRAFT  06/05/2000    x1 with lima  to lad    POLYPECTOMY  04/2014    enteroscopic - esophageal ulcer, gastric erosion, and duodenal ulcer       TONSILLECTOMY      unknown     Social History     Substance and Sexual Activity   Alcohol Use Yes    Alcohol/week: 1 0 standard drinks    Types: 1 Cans of beer per week    Frequency: Monthly or less    Drinks per session: 1 or 2    Binge frequency: Never    Comment: social     Social History     Substance and Sexual Activity   Drug Use No     Social History     Tobacco Use   Smoking Status Never Smoker   Smokeless Tobacco Never Used     Family History: non-contributory    Meds/Allergies   Hospital Medications:   Current Facility-Administered Medications   Medication Dose Route Frequency    albuterol inhalation solution 2 5 mg  2 5 mg Nebulization Q4H PRN    amLODIPine (NORVASC) tablet 5 mg  5 mg Oral Daily    ceFAZolin (ANCEF) IVPB (premix) 2,000 mg 50 mL  2,000 mg Intravenous Once    hydrALAZINE (APRESOLINE) injection 10 mg  10 mg Intravenous Q6H PRN    pantoprazole (PROTONIX) EC tablet 40 mg  40 mg Oral Early Morning    pravastatin (PRAVACHOL) tablet 40 mg  40 mg Oral Daily With Dinner    sodium chloride 0 9 % infusion  50 mL/hr Intravenous Continuous    sodium chloride tablet 1 g  1 g Oral BID     Home Medications:   Medications Prior to Admission   Medication    amLODIPine (NORVASC) 5 mg tablet    aspirin 325 mg tablet    clopidogrel (PLAVIX) 75 mg tablet    desoximetasone (TOPICORT) 0 05 % cream     MG capsule    lovastatin (MEVACOR) 40 MG tablet    metoprolol succinate (TOPROL-XL) 25 mg 24 hr tablet    pantoprazole (PROTONIX) 40 mg tablet    sodium chloride 1 g tablet    sucralfate (CARAFATE) 1 g tablet    VOLTAREN 1 %       Allergies   Allergen Reactions    Promethazine Delirium       Objective   Vitals: Blood pressure (!) 200/85, pulse (!) 30, temperature 97 6 °F (36 4 °C), temperature source Oral, resp  rate 19, height 5' 4" (1 626 m), weight 95 5 kg (210 lb 8 6 oz), SpO2 94 %  Orthostatic Blood Pressures      Most Recent Value   Blood Pressure  (!) 200/85 filed at 07/21/2020 8943   Patient Position - Orthostatic VS  Lying filed at 07/21/2020 0700            Intake/Output Summary (Last 24 hours) at 7/21/2020 1132  Last data filed at 7/21/2020 1044  Gross per 24 hour   Intake 200 ml   Output 400 ml   Net -200 ml       Invasive Devices     Peripheral Intravenous Line            Peripheral IV 07/20/20 Right Antecubital 1 day    Peripheral IV 07/20/20 Left;Proximal;Ventral (anterior) Forearm less than 1 day                Physical Exam   GEN: NAD, alert and oriented, well appearing  SKIN: dry without significant lesions or rashes  HEENT: NCAT, PERRL, EOMs intact  NECK: No JVD or carotid bruits appreciated  CARDIOVASCULAR:  Regular rhythm, bradycardia, normal S1, S2 without murmurs, rubs, or gallops appreciated  LUNGS: Clear to auscultation bilaterally without wheezes, rhonchi, or rales  ABDOMEN: Soft, nontender, nondistended  EXTREMITIES/VASCULAR: perfused without clubbing, cyanosis, or edema b/l  PSYCH: Normal mood and affect  NEURO: CN ll-Xll grossly intact    Lab Results: I have personally reviewed pertinent lab results      Results from last 7 days   Lab Units 07/21/20  0756 07/21/20  0445 07/20/20  1021   WBC Thousand/uL 19 32* 17 69* 9 50   HEMOGLOBIN g/dL 13 4 13 6 13 3   HEMATOCRIT % 41 5 42 1 42 0   PLATELETS Thousands/uL 238 214 214     Results from last 7 days   Lab Units 07/21/20  0445 07/20/20  1942 07/20/20  1021   POTASSIUM mmol/L 5 3 5 5* 5 2   CHLORIDE mmol/L 108 107 104   CO2 mmol/L 24 25 28   BUN mg/dL 43* 39* 40*   CREATININE mg/dL 2 20* 2 33* 2 74*   CALCIUM mg/dL 8 8 9 4 8 8     Results from last 7 days   Lab Units 07/20/20  1021   INR  1 13   PTT seconds 33     Results from last 7 days   Lab Units 07/21/20  0445 07/20/20  1021   MAGNESIUM mg/dL 2 6 2 4       Imaging: I have personally reviewed pertinent reports        VTE Prophylaxis: Sequential compression device (Venodyne)

## 2020-07-21 NOTE — OCCUPATIONAL THERAPY NOTE
Occupational Therapy Cancellation Note    Orders received and chart reviewed  OT attempted to see, however Pt currently off of the unit at cardiac cath lab  Will continue to follow to be seen for OT evaluation post-procedure as appropriate/when medically cleared       Bhargav Patricia MS, OTR/L

## 2020-07-21 NOTE — OP NOTE
ELECTROPHYSIOLOGY  OPERATIVE REPORT  PATIENT NAME: Charissa Chavez  :  1935  MRN: [id number]  Date of surgery: 20  Surgeon: Aakash Lombardi MD  Pt Location: Cath Lab    PROCEDURE PERFORMED:   1) Temporary external/internal screwing pacemaker placement    Preoperative Medications:   ANESTHESIA:  Mac    PREOPERATIVE DIAGNOSIS:   1  Complete heart block with escape rhythm in 30s  2  Hypertension  3  Leukocytosis with white cell count increased to 19  4  History of stroke  5  Severe AS status post TAVR  6  Hypertensive urgency  7  Acute kidney injury     POSTOPERATIVE DIAGNOSIS: Successful dual chamber pacemaker implant  Same as Preop  Informed Consent: Risks, benefits, and alternatives discussed with patient and any family present  He understands risks, which include but are not limited to life threatening  bleeding, infection, air around lungs, blood around the heart and reoperation dislodged or malfunctioning device  Procedure Description:  Patient written consent was obtained following full risk/benefit discussion  A time-out was conducted in the EP lab  Pre-procedure IV antibiotic was given  The patient was prepped and draped in sterile fashion  Local anesthetic was used and systemic sedation was managed by the anesthesia team     Using ultrasound, right IJ access was obtained  Guidewire was advanced to the right atrium and then down to the IVC  A 7 [place] peel-away sheath was advanced over the guidewire  A pacemaker lead was advanced to the right ventricle apically septum and helix was deployed  Good threshold with current of injury and sensing was noted  The 7 [place] sheath was peeled away  The pacemaker lead was sutured with over-silk to the skin at the access site  The pacemaker the lead was attached to the generator which was attached to the neck using gauze and Tegaderm  Pacemaker was programmed at VVI 60 beats per minute with output at 5 volts at 1 millisecond    EBL: Minimal  Complications: None  Contrast:none  Findings: Successful placement of temporary external/internal screwing pacemaker (Medtronic)  The patient tolerated the procedure well  Plan: Routine postoperative care, CXR, and overnight observation with telemetry  Chest x-ray portable on 07/21/20 and PA   Obtain urine analysis, blood cultures to evaluate the cause of elevated white cell counts  Once patient is ruled out for blood infection then we will plan permanent pacemaker

## 2020-07-22 ENCOUNTER — APPOINTMENT (INPATIENT)
Dept: NON INVASIVE DIAGNOSTICS | Facility: HOSPITAL | Age: 85
DRG: 242 | End: 2020-07-22
Payer: COMMERCIAL

## 2020-07-22 LAB
ANION GAP SERPL CALCULATED.3IONS-SCNC: 7 MMOL/L (ref 4–13)
B BURGDOR IGG PATRN SER IB-IMP: NEGATIVE
B BURGDOR IGG+IGM SER-ACNC: 1.1 ISR (ref 0–0.9)
B BURGDOR IGM PATRN SER IB-IMP: NEGATIVE
B BURGDOR18KD IGG SER QL IB: ABNORMAL
B BURGDOR23KD IGG SER QL IB: ABNORMAL
B BURGDOR23KD IGM SER QL IB: ABNORMAL
B BURGDOR28KD IGG SER QL IB: ABNORMAL
B BURGDOR30KD IGG SER QL IB: ABNORMAL
B BURGDOR39KD IGG SER QL IB: ABNORMAL
B BURGDOR39KD IGM SER QL IB: ABNORMAL
B BURGDOR41KD IGG SER QL IB: ABNORMAL
B BURGDOR41KD IGM SER QL IB: ABNORMAL
B BURGDOR45KD IGG SER QL IB: PRESENT
B BURGDOR58KD IGG SER QL IB: ABNORMAL
B BURGDOR66KD IGG SER QL IB: ABNORMAL
B BURGDOR93KD IGG SER QL IB: ABNORMAL
BASOPHILS # BLD AUTO: 0.03 THOUSANDS/ΜL (ref 0–0.1)
BASOPHILS NFR BLD AUTO: 0 % (ref 0–1)
BUN SERPL-MCNC: 46 MG/DL (ref 5–25)
CALCIUM SERPL-MCNC: 8.9 MG/DL (ref 8.3–10.1)
CHLORIDE SERPL-SCNC: 108 MMOL/L (ref 100–108)
CO2 SERPL-SCNC: 24 MMOL/L (ref 21–32)
CREAT SERPL-MCNC: 2.35 MG/DL (ref 0.6–1.3)
EOSINOPHIL # BLD AUTO: 0.01 THOUSAND/ΜL (ref 0–0.61)
EOSINOPHIL NFR BLD AUTO: 0 % (ref 0–6)
ERYTHROCYTE [DISTWIDTH] IN BLOOD BY AUTOMATED COUNT: 13.4 % (ref 11.6–15.1)
GFR SERPL CREATININE-BSD FRML MDRD: 24 ML/MIN/1.73SQ M
GLUCOSE SERPL-MCNC: 130 MG/DL (ref 65–140)
HCT VFR BLD AUTO: 40.6 % (ref 36.5–49.3)
HGB BLD-MCNC: 13.3 G/DL (ref 12–17)
IMM GRANULOCYTES # BLD AUTO: 0.1 THOUSAND/UL (ref 0–0.2)
IMM GRANULOCYTES NFR BLD AUTO: 1 % (ref 0–2)
LYMPHOCYTES # BLD AUTO: 0.79 THOUSANDS/ΜL (ref 0.6–4.47)
LYMPHOCYTES NFR BLD AUTO: 4 % (ref 14–44)
MCH RBC QN AUTO: 30.9 PG (ref 26.8–34.3)
MCHC RBC AUTO-ENTMCNC: 32.8 G/DL (ref 31.4–37.4)
MCV RBC AUTO: 94 FL (ref 82–98)
MONOCYTES # BLD AUTO: 1.06 THOUSAND/ΜL (ref 0.17–1.22)
MONOCYTES NFR BLD AUTO: 6 % (ref 4–12)
NEUTROPHILS # BLD AUTO: 17.41 THOUSANDS/ΜL (ref 1.85–7.62)
NEUTS SEG NFR BLD AUTO: 89 % (ref 43–75)
NRBC BLD AUTO-RTO: 0 /100 WBCS
PLATELET # BLD AUTO: 200 THOUSANDS/UL (ref 149–390)
PMV BLD AUTO: 10.6 FL (ref 8.9–12.7)
POTASSIUM SERPL-SCNC: 4.9 MMOL/L (ref 3.5–5.3)
PROCALCITONIN SERPL-MCNC: 0.13 NG/ML
RBC # BLD AUTO: 4.3 MILLION/UL (ref 3.88–5.62)
SODIUM SERPL-SCNC: 139 MMOL/L (ref 136–145)
WBC # BLD AUTO: 19.4 THOUSAND/UL (ref 4.31–10.16)

## 2020-07-22 PROCEDURE — 80048 BASIC METABOLIC PNL TOTAL CA: CPT | Performed by: STUDENT IN AN ORGANIZED HEALTH CARE EDUCATION/TRAINING PROGRAM

## 2020-07-22 PROCEDURE — 87040 BLOOD CULTURE FOR BACTERIA: CPT | Performed by: INTERNAL MEDICINE

## 2020-07-22 PROCEDURE — 99223 1ST HOSP IP/OBS HIGH 75: CPT | Performed by: INTERNAL MEDICINE

## 2020-07-22 PROCEDURE — 85025 COMPLETE CBC W/AUTO DIFF WBC: CPT | Performed by: INTERNAL MEDICINE

## 2020-07-22 PROCEDURE — 99233 SBSQ HOSP IP/OBS HIGH 50: CPT | Performed by: INTERNAL MEDICINE

## 2020-07-22 PROCEDURE — 97163 PT EVAL HIGH COMPLEX 45 MIN: CPT

## 2020-07-22 PROCEDURE — 99232 SBSQ HOSP IP/OBS MODERATE 35: CPT | Performed by: INTERNAL MEDICINE

## 2020-07-22 PROCEDURE — 97167 OT EVAL HIGH COMPLEX 60 MIN: CPT

## 2020-07-22 PROCEDURE — 84145 PROCALCITONIN (PCT): CPT | Performed by: INTERNAL MEDICINE

## 2020-07-22 RX ORDER — ASPIRIN 325 MG
325 TABLET ORAL DAILY
Status: DISCONTINUED | OUTPATIENT
Start: 2020-07-23 | End: 2020-07-24

## 2020-07-22 RX ORDER — CLOPIDOGREL BISULFATE 75 MG/1
75 TABLET ORAL DAILY
Status: DISCONTINUED | OUTPATIENT
Start: 2020-07-23 | End: 2020-07-24

## 2020-07-22 RX ADMIN — LABETALOL HYDROCHLORIDE 50 MG: 100 TABLET, FILM COATED ORAL at 21:17

## 2020-07-22 RX ADMIN — Medication 1 G: at 17:20

## 2020-07-22 RX ADMIN — PANTOPRAZOLE SODIUM 40 MG: 40 TABLET, DELAYED RELEASE ORAL at 05:08

## 2020-07-22 RX ADMIN — PRAVASTATIN SODIUM 40 MG: 40 TABLET ORAL at 17:20

## 2020-07-22 RX ADMIN — CEFEPIME HYDROCHLORIDE 500 MG: 1 INJECTION, POWDER, FOR SOLUTION INTRAMUSCULAR; INTRAVENOUS at 08:48

## 2020-07-22 RX ADMIN — AMLODIPINE BESYLATE 10 MG: 10 TABLET ORAL at 08:47

## 2020-07-22 RX ADMIN — LABETALOL HYDROCHLORIDE 50 MG: 100 TABLET, FILM COATED ORAL at 14:43

## 2020-07-22 RX ADMIN — Medication 1 G: at 08:47

## 2020-07-22 NOTE — ASSESSMENT & PLAN NOTE
· Continue amlodipine  · Will likely need adjustment in regimen due to hypertensive urgency  · Continue scheduled labetalol

## 2020-07-22 NOTE — PLAN OF CARE
Problem: OCCUPATIONAL THERAPY ADULT  Goal: Performs self-care activities at highest level of function for planned discharge setting  See evaluation for individualized goals  Description  Treatment Interventions: ADL retraining, Functional transfer training, UE strengthening/ROM, Endurance training, Cognitive reorientation, Patient/family training, Equipment evaluation/education, Compensatory technique education, Energy conservation, Activityengagement  Equipment Recommended: Other (comment)(RW)       See flowsheet documentation for full assessment, interventions and recommendations      Note:   Limitation: Decreased ADL status, Decreased Safe judgement during ADL, Decreased cognition, Decreased endurance, Decreased self-care trans, Decreased high-level ADLs  Prognosis: Good        OT Discharge Recommendation: Post-Acute Rehabilitation Services  OT - OK to Discharge: Yes(when medically cleared)

## 2020-07-22 NOTE — PLAN OF CARE
Problem: PHYSICAL THERAPY ADULT  Goal: Performs mobility at highest level of function for planned discharge setting  See evaluation for individualized goals  Description  Treatment/Interventions: Functional transfer training, LE strengthening/ROM, Elevations, Therapeutic exercise, Endurance training, Patient/family training, Equipment eval/education, Gait training, Spoke to nursing, OT  Equipment Recommended: Rosalie Oneal       See flowsheet documentation for full assessment, interventions and recommendations  Note:   Prognosis: Good  Problem List: Decreased strength, Decreased mobility, Impaired balance, Decreased endurance, Decreased safety awareness, Impaired hearing  Assessment: Pt seen by PT on 07/22/20 for high complexity PT evaluation due to a decline in functional mobility compared to baseline  Pt was admitted to [place] [address]  on 7/20/20 for bradycardia and related SOB  Pt now s/p temporary external/internal screwing pacemaker placement  Chart reviewed, PT orders active  Pt  has a past medical history of Aortic stenosis, Aortic valve disorder, Hypertensive urgency (5/19/2019), and Transient cerebral ischemia  Pt reports no falls in the past 6 months  Pt resides in 2 story home with 3STE  Pt lives with wife, wife is home during the day and 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  Son and family live down the road and are able to assist if needed  Prior to hospital admission pt functioned at a independent A level, no AD used for mobility  Pt presents with decrease strength, endurance, and balance that contribute to limitations in bed mobility, functional mobility and functional transfers  During the session pt performed bed mobility at [facility] 74, STS at 45 [facility] x2 and progressed to STS Min-A x1 during session  Pt ambulated at [facility] for 70ft x2  Pt required VC and TC to manage RW, hand placement and safety  Further mobility limited by SOB, which was relieved by a sitting rest break   Pt very hard of hearing  Pt left sitting up in chair with chair alarm on and all needs in reach at end of therapy session  Pt would benefit from skilled PT in order to address impairments and functional limitations  PT to follow pt 3-5x /week, and would recommend rehab pending medical clearance  Barriers to Discharge: Inaccessible home environment     PT Discharge Recommendation: [address]     PT - OK to Discharge: Yes(pending medical clearance)    See flowsheet documentation for full assessment

## 2020-07-22 NOTE — ASSESSMENT & PLAN NOTE
Unclear etiology  White count initially normal, then increased to 17 69 and then 19 32  No bandemia  Possibly reactive in nature  Patient is otherwise afebrile and not septic    · Blood cultures negative today  · Procalcitonin not elevated  · Continue cefepime for now, but if remaining afebrile and without sign of infection, will discontinue likely tomorrow

## 2020-07-22 NOTE — CONSULTS
Consultation - Infectious Disease   Saturnino Mccullough 80 y o  male MRN: [id number]  Unit/Bed#: [facility] 525-01 Encounter: [id number]      Inpatient consult to Infectious Diseases  Consult performed by: Farhat Rowell MD  Consult ordered by: Edgar Martin PA-C          IMPRESSION & RECOMMENDATIONS:   Impression:  1  Leukocytosis R/0 stress leukocytosis  2  Complete heart block with bradycardia  3  CAD CABG, AS, bioprosthetic AVR 2015  4  YOLANDE on CKD  5  History of CVA     Recommendations:    Discuss with the primary service  1  Check blood culture results  2  Obtain serial WBC counts  3  We will discontinue cefepime and observe off antibiotics       HISTORY OF PRESENT ILLNESS:    Reason for Consult:  Fever  HPI: Saturnino Mccullough is a 80y o  year old male with a history of CABG, AS, bioprosthetic AVR 2015 presented initially on 07/22 the  [facility] ER with generalized weakness and was found to be in complete heart block bradycardia     Was transferred here that day for elective ppm placement  Because patient developed leukocytosis after initial normal WBC count it was decided to place a temporary screw in pacemaker yesterday until infection could be ruled out  Today's WBC count was 19,400 without a left shift  He has had negative procalcitonins, unremarkable UA  And 2 blood cultures which have been drawn today and are pending  A chest x-ray shows no acute cardiopulmonary disease  Was started on cefepime 500 mg q 12 hours IV yesterday  In early a m  Yesterday the patient had episode of AMS with what appeared to be sinus arrest       Review of Systems positive findings include hearing deficit, weakness, occasional feeling of warmth without diaphoresis or chills  A [id number] point system-based review of systems is otherwise negative  PAST MEDICAL HISTORY:  Past Medical History:   Diagnosis Date    Aortic stenosis     ECHO -4-14-14   MODERATE TO SEVERE AORTIC STENOSIS; MILD AROTIC INSUFFICIENCY - LAST ASSESSED 10/26/16; RESOLVED 16    Aortic valve disorder     LAST ASSESSED 10/8/15; 10/8/15    Hypertensive urgency 2019    Transient cerebral ischemia      Past Surgical History:   Procedure Laterality Date    AORTIC VALVE REPLACEMENT  2015    avr with 23 mm [place] Magna Ease bioprosthetic    CATARACT EXTRACTION Right 2000    COLONOSCOPY      CORONARY ARTERY BYPASS GRAFT  06/05/2000    x1 with lima  to lad    POLYPECTOMY  2014    enteroscopic - esophageal ulcer, gastric erosion, and duodenal ulcer   TONSILLECTOMY      unknown       FAMILY HISTORY:  Non-contributory    SOCIAL HISTORY:  Social History   /Civil Union  Social History     Substance and Sexual Activity   Alcohol Use Yes    Alcohol/week: 1 0 standard drinks    Types: 1 Cans of beer per week    Frequency: Monthly or less    Drinks per session: 1 or 2    Binge frequency: Never    Comment: social     Social History     Substance and Sexual Activity   Drug Use No     Social History     Tobacco Use   Smoking Status Never Smoker   Smokeless Tobacco Never Used       ALLERGIES:  Allergies   Allergen Reactions    Promethazine Delirium       MEDICATIONS:  All current active medications have been reviewed        PHYSICAL EXAM:  Temp:  [97 9 °F (36 6 °C)-98 7 °F (37 1 °C)] 98 3 °F (36 8 °C)  HR:  [59-60] 59  Resp:  [18] 18  BP: (112-189)/(50-73) 142/68  SpO2:  [94 %-96 %] 96 %  Temp (24hrs), Av 3 °F (36 8 °C), Min:97 9 °F (36 6 °C), Max:98 7 °F (37 1 °C)  Current: Temperature: 98 3 °F (36 8 °C)    Intake/Output Summary (Last 24 hours) at 2020 1941  Last data filed at 2020 1442  Gross per 24 hour   Intake 490 ml   Output    Net 490 ml       General Appearance:  Appearing moderately obese, nontoxic, and in no distress, appears stated age   Head:  Normocephalic, without obvious abnormality, atraumatic   Eyes:  PERRL, conjunctiva pink and sclera anicteric, both eyes   Nose: Nares normal, mucosa normal, no drainage   Throat: Oropharynx moist without lesions; lips, mucosa, and tongue upper denture   Neck: Supple, symmetrical, trachea midline, no adenopathy, no tenderness/mass/nodules   Back:   Symmetric, no curvature, ROM normal, no CVA tenderness   Lungs:   Clear to auscultation bilaterally, no audible wheezes, rhonchi and rales, respirations unlabored   Chest Wall:  No tenderness or deformity   Heart:  Sternotomy scar Regular rate and rhythm, S1, S2 normal, no murmur, rub or gallop, right subclavian ppm wound that is clean   Abdomen:   Soft, non-tender, non-distended, protuberant, positive bowel sounds, no masses, no organomegaly    No CVA tenderness   Extremities: Extremities normal, atraumatic, no cyanosis, clubbing or edema   Skin: As above   Neurologic: Alert and oriented times 3, extremity strength 5/5 and symmetric           Invasive Devices:   Peripheral IV 07/20/20 Right Antecubital (Active)   Site Assessment Clean;Dry; Intact 7/21/2020  8:00 PM   Dressing Type Transparent 7/21/2020  8:00 PM   Line Status Flushed;Saline locked 7/21/2020  8:00 PM   Dressing Status Clean;Dry; Intact 7/21/2020  8:00 PM   Dressing Change Due 07/24/20 7/21/2020  8:00 PM   Reason Not Rotated Not due 7/21/2020  8:00 PM       Peripheral IV 07/20/20 Left;Proximal;Ventral (anterior) Forearm (Active)   Site Assessment Clean;Dry; Intact 7/21/2020  8:00 PM   Dressing Type Transparent 7/21/2020  8:00 PM   Line Status No blood return;Flushed;Saline locked 7/21/2020  8:00 PM   Dressing Status Clean;Dry; Intact 7/21/2020  8:00 PM   Dressing Change Due 07/24/20 7/21/2020  8:00 PM   Reason Not Rotated Not due 7/21/2020  8:00 PM       LABS, IMAGING, & OTHER STUDIES:  Lab Results:      I have personally reviewed pertinent labs      Results from last 7 days   Lab Units 07/22/20  0433 07/21/20  0756 07/21/20  0445   WBC Thousand/uL 19 40* 19 32* 17 69*   HEMOGLOBIN g/dL 13 3 13 4 13 6   PLATELETS Thousands/uL 200 238 214     Results from last 7 days Lab Units 07/22/20  0433 07/21/20  0445 07/20/20  1942 07/20/20  1021   SODIUM mmol/L 139 139 139 139   POTASSIUM mmol/L 4 9 5 3 5 5* 5 2   CHLORIDE mmol/L 108 108 107 104   CO2 mmol/L 24 24 25 28   BUN mg/dL 46* 43* 39* 40*   CREATININE mg/dL 2 35* 2 20* 2 33* 2 74*   EGFR ml/min/1 73sq m 24 27 25 20   CALCIUM mg/dL 8 9 8 8 9 4 8 8   AST U/L  --  12  --  15   ALT U/L  --  15  --  18   ALK PHOS U/L  --  73  --  83     Results from last 7 days   Lab Units 07/22/20  1601 07/22/20  0443   BLOOD CULTURE  No Growth at 24 hrs  Received in Microbiology Lab  Culture in Progress  Imaging Studies:   I have personally reviewed pertinent imaging study reports and images in PACS  EKG, Pathology, and Other Studies:   I have personally reviewed pertinent reports

## 2020-07-22 NOTE — ASSESSMENT & PLAN NOTE
Patient with prior stroke last year  · Managed on aspirin and Plavix currently  Held due to procedure    · Resume aspirin and Plavix

## 2020-07-22 NOTE — ASSESSMENT & PLAN NOTE
Previous baseline around 1 8-1 9  Now improving to 2 20 today  Initially 2 74    · Avoid nephrotoxins  · Monitor electrolytes and renal function  · Ensure adequate oral intake and hydration  · Continue IV hydration

## 2020-07-22 NOTE — ASSESSMENT & PLAN NOTE
Asymptomatic currently  Western blot shows elevated Lyme IgG  · Follow up on echo  · Hold metoprolol  · Patient complete heart block  · Status post temporary pacemaker placement    · Plan for permanent pacemaker placement if no sign of infection

## 2020-07-22 NOTE — ASSESSMENT & PLAN NOTE
· Continue to hold beta-blocker  · Status post temporary pacemaker placement per EP  · Plan for permanent pacemaker placement pending ID evaluation

## 2020-07-22 NOTE — ASSESSMENT & PLAN NOTE
Patient on Norvasc currently    Will hold Toprol given bradyarrhythmia  · Currently resolved, will monitor

## 2020-07-22 NOTE — PROGRESS NOTES
Progress Note - Electrophysiology-Cardiology (EP)   Kylah Morgan 80 y o  male MRN: [id number]  Unit/Bed#: [facility] 525-01 Encounter: [id number]      Assessment:  1  Symptomatic complete heart block, status post temporary permanent pacemaker placement 7/21/2020  2  Preserved LV systolic function with EF 60% per echo 07/2020   A ) mild-to-moderate mitral stenosis  3  Severe aortic stenosis status post TAVR  4  CAD status post prior CABG  5  Leukocytosis  6  Hypertension  7  Hyperlipidemia      Plan:  He still has leukocytosis today, but is afebrile and asymptomatic  His UA was negative for bacteria, and blood cultures are currently pending  His temporary permanent pacemaker is functioning appropriately, and he will need a permanent device implantation prior to discharge  He has ongoing shortness of breath, which will hopefully improve once he has a permanent device placed with synchronous AV pacing  He has been placed on cefepime per the primary team   At this time, recommend Infectious Disease consultation for further evaluation and to help determine when a permanent device implantation can be performed  Continue to monitor telemetry, vital signs, and trend WBCs  Subjective/Objective   Chief Complaint:  Ongoing shortness of breath    Subjective: The patient hoped that his dyspnea would improve after having his temporary pacemaker placed, however it is still present  He otherwise denies chest pain or pressure, palpitations, dizziness, lightheadedness, or fevers/chills      Objective:     Vitals: /60 (BP Location: Left arm)   Pulse 60   Temp 97 9 °F (36 6 °C) (Oral)   Resp 18   Ht 5' 4" (1 626 m)   Wt 95 5 kg (210 lb 8 6 oz)   SpO2 94%   BMI 36 14 kg/m²   Vitals:    07/20/20 1639   Weight: 95 5 kg (210 lb 8 6 oz)     Orthostatic Blood Pressures      Most Recent Value   Blood Pressure  151/60 filed at 07/22/2020 1151   Patient Position - Orthostatic VS  Sitting filed at 07/22/2020 1151 Intake/Output Summary (Last 24 hours) at 7/22/2020 1405  Last data filed at 7/22/2020 0101  Gross per 24 hour   Intake 395 ml   Output 500 ml   Net -105 ml       Invasive Devices     Peripheral Intravenous Line            Peripheral IV 07/20/20 Right Antecubital 2 days    Peripheral IV 07/20/20 Left;Proximal;Ventral (anterior) Forearm 1 day                            Scheduled Meds:  Current Facility-Administered Medications:  amLODIPine 10 mg Oral Daily Kendraselvin Carson-Scott, DO    cefazolin 2,000 mg Intravenous Once Vernon Castanon PA-C    cefepime 500 mg Intravenous Q12H [place] Bolls Veres, DO Last Rate: 500 mg (07/22/20 0848)   labetalol 50 mg Oral Q8H [facility] Kendra Alli-Scott, DO    Labetalol HCl 10 mg Intravenous Q8H PRN Kendra Sheylali-Scott, DO    metoclopramide 10 mg Intravenous Q6H PRN [place] Bolls Veres, DO    pantoprazole 40 mg Oral Early Morning Hetul Stewart, DO    pravastatin 40 mg Oral Daily With [employer], DO    sodium chloride 50 mL/hr Intravenous Continuous Hetul Stewart, DO Last Rate: Stopped (07/21/20 1702)   sodium chloride 1 g Oral BID Hetul Stewart, DO      Continuous Infusions:  sodium chloride 50 mL/hr Last Rate: Stopped (07/21/20 1702)     PRN Meds: Labetalol HCl    metoclopramide    Review of Systems: Cardiovascular ROS: positive for - shortness of breath  negative for - chest pain or palpitations    Physical Exam:   GEN: NAD, alert and oriented, well appearing  SKIN: dry without significant lesions or rashes  HEENT: NCAT, PERRL, EOMs intact  NECK: RIJ temp perm pacemaker  CARDIOVASCULAR: RRR, normal S1, S2 without murmurs, rubs, or gallops appreciated  LUNGS: Clear to auscultation bilaterally without wheezes, rhonchi, or rales  ABDOMEN: Soft, nontender, nondistended  EXTREMITIES/VASCULAR: perfused without clubbing, cyanosis, or edema b/l  PSYCH: Normal mood and affect  NEURO: CN ll-Xll grossly intact                Lab Results: I have personally reviewed pertinent lab results  Results from last 7 days   Lab Units 20  0433 20  0756 20  0445   WBC Thousand/uL 19 40* 19 32* 17 69*   HEMOGLOBIN g/dL 13 3 13 4 13 6   HEMATOCRIT % 40 6 41 5 42 1   PLATELETS Thousands/uL 200 238 214     Results from last 7 days   Lab Units 20  0433 20  0445 20  1942   POTASSIUM mmol/L 4 9 5 3 5 5*   CHLORIDE mmol/L 108 108 107   CO2 mmol/L 24 24 25   BUN mg/dL 46* 43* 39*   CREATININE mg/dL 2 35* 2 20* 2 33*   CALCIUM mg/dL 8 9 8 8 9 4     Results from last 7 days   Lab Units 20  1021   INR  1 13   PTT seconds 33     Results from last 7 days   Lab Units 20  0445 20  1021   MAGNESIUM mg/dL 2 6 2 4         Imaging: I have personally reviewed pertinent reports  Results for orders placed during the hospital encounter of 19   Echo complete with contrast if indicated    Narrative [address] [place] [address] 34  [phone]    Transthoracic Echocardiogram  2D, M-mode, Doppler, and Color Doppler    Study date:  20-May-2019    Patient: Eric Renteria  MR number: [id number]  Account number: [de-identified]  : [id number]  Age: 80 years  Gender: Male  Status: Inpatient  Location: Bedside  Height: 71 in  Weight: 214 1 lb  BP: 198/ 88 mmHg    Indications: Vertigo, vomiting    Diagnoses: 780 4 - DIZZINESS AND GIDDINESS    Sonographer:  Brayden Mccall [facility]  Primary Physician:  Chip Santoyo DO  Referring Physician:  Jaime Patricia DO  Group:  [id number]  Interpreting Physician:  Araseli Vasquez MD    SUMMARY    LEFT VENTRICLE:  Systolic function was normal  Ejection fraction was estimated to be 60 %  There were no regional wall motion abnormalities  Doppler parameters were consistent with abnormal left ventricular relaxation (grade 1 diastolic dysfunction)  RIGHT VENTRICLE:  The size was normal   Systolic function was normal     LEFT ATRIUM:  The atrium was mildly dilated      RIGHT ATRIUM:  The atrium was mildly dilated  MITRAL VALVE:  There was marked annular calcification  There was moderately reduced leaflet separation  Transmitral velocity was increased due to valvular stenosis  There was moderate stenosis  There was trace regurgitation  Not well visualized  Mean transmitral gradient was 6 mmHg  TRICUSPID VALVE:  There was trace regurgitation  Estimated peak PA pressure was 37 mmHg  The findings suggest mild pulmonary hypertension  HISTORY: PRIOR HISTORY: Aortic valve replacement    PROCEDURE: The procedure was performed at the bedside  This was a routine study  The transthoracic approach was used  The study included complete 2D imaging, M-mode, complete spectral Doppler, and color Doppler  The heart rate was 80 bpm,  at the start of the study  This was a technically difficult study  LEFT VENTRICLE: Size was normal  Systolic function was normal  Ejection fraction was estimated to be 60 %  There were no regional wall motion abnormalities  Wall thickness was normal  DOPPLER: Doppler parameters were consistent with  abnormal left ventricular relaxation (grade 1 diastolic dysfunction)  RIGHT VENTRICLE: The size was normal  Systolic function was normal  Wall thickness was normal     LEFT ATRIUM: The atrium was mildly dilated  RIGHT ATRIUM: The atrium was mildly dilated  MITRAL VALVE: There was marked annular calcification  Valve structure was normal  There was moderately reduced leaflet separation  Not well visualized  DOPPLER: Transmitral velocity was increased due to valvular stenosis  There was  moderate stenosis  There was trace regurgitation  AORTIC VALVE: The valve was trileaflet  Leaflets exhibited mildly increased thickness, normal cuspal separation, and sclerosis  DOPPLER: Transaortic velocity was within the normal range  There was no evidence for stenosis  There was no  significant regurgitation      TRICUSPID VALVE: The valve structure was normal  There was normal leaflet separation  DOPPLER: The transtricuspid velocity was within the normal range  There was no evidence for stenosis  There was trace regurgitation  Estimated peak PA  pressure was 37 mmHg  The findings suggest mild pulmonary hypertension  PULMONIC VALVE: Leaflets exhibited normal thickness, no calcification, and normal cuspal separation  DOPPLER: The transpulmonic velocity was within the normal range  There was no significant regurgitation  PERICARDIUM: There was no pericardial effusion  The pericardium was normal in appearance  AORTA: The root exhibited normal size  SYSTEMIC VEINS: IVC: The inferior vena cava was normal in size  Respirophasic changes were normal     MEASUREMENT TABLES    DOPPLER MEASUREMENTS  Mitral valve   (Reference normals)  Mean gradient   6 mmHg   (--)    SYSTEM MEASUREMENT TABLES    2D  %FS: 36 25 %  Ao Diam: 2 74 cm  EDV(Teich): 58 16 ml  EF(Teich): 66 79 %  ESV(Teich): 19 32 ml  IVSd: 1 57 cm  LA Area: 21 91 cm2  LA Diam: 3 82 cm  LVEDV MOD A4C: 50 14 ml  LVEF MOD A4C: 66 92 %  LVESV MOD A4C: 16 58 ml  LVIDd: 3 7 cm  LVIDs: 2 36 cm  LVLd A4C: 6 98 cm  LVLs A4C: 5 65 cm  LVPWd: 1 24 cm  RA Area: 18 45 cm2  RV DIAM: 2 36 cm  SV MOD A4C: 33 55 ml  SV(Teich): 38 84 ml    CW  AV Env  Ti: 301 04 ms  AV VTI: 35 11 cm  AV Vmax: 1 86 m/s  AV Vmax: 1 85 m/s  AV Vmean: 1 17 m/s  AV maxP 92 mmHg  AV maxP 76 mmHg  AV meanP 35 mmHg  MV PHT: 137 58 ms  MV VTI: 55 83 cm  MV Vmax: 2 2 m/s  MV Vmean: 1 37 m/s  MV maxP 41 mmHg  MV meanP 67 mmHg  MVA By PHT: 1 6 cm2  TR Vmax: 2 81 m/s  TR maxP 7 mmHg    MM  TAPSE: 3 07 cm    PW  E' Sept: 0 04 m/s  E/E' Sept: 30 69  LVOT Env  Ti: 294 12 ms  LVOT VTI: 21 84 cm  LVOT Vmax: 1 06 m/s  LVOT Vmax: 1 07 m/s  LVOT Vmean: 0 74 m/s  LVOT maxP 52 mmHg  LVOT maxP 6 mmHg  LVOT meanP 44 mmHg  MV A Guevara: 1 81 m/s  MV Dec Will: 3 33 m/s2  MV DecT: 379 94 ms  MV E Guevara: 1 26 m/s  MV E/A Ratio: 0 7    [employer] Accredited Echocardiography Laboratory    Prepared and electronically signed by    Lord Hector MD  Signed 20-May-2019 11:38:07    Addendum Date: 5/20/2019 11:38:51 AM  A bioprosthetic aortic valve was present which was not well visualized but demonstrated normal gradients  Addendum entered by:  Lord Hector MD         EKG/telemetry:  Asynchronous ventricular pacing    VTE Pharmacologic Prophylaxis: Reason for no pharmacologic prophylaxis hematoma risk  VTE Mechanical Prophylaxis: sequential compression device

## 2020-07-22 NOTE — OCCUPATIONAL THERAPY NOTE
Occupational Therapy Evaluation     Patient Name: Dixie Ngo  XNKOD'C Date: 7/22/2020 07/22/20 1100   Note Type   Note type Eval/Treat   Restrictions/Precautions   Weight Bearing Precautions Per Order Yes   RUE Weight Bearing Per Order Other  (pacemaker precautions)   Braces or Orthoses Sling  (RUE s/p temporary pacemaker 7/21)   Other Precautions Cognitive; Chair Alarm; Bed Alarm;Multiple lines;Telemetry; Fall Risk;Hard of hearing; Impulsive  (Temporary pacemaker RUE)   Pain Assessment   Pain Assessment Tool Pain Assessment not indicated - pt denies pain   Pain Score No Pain   Home Living   Type of [facility] Two level;Stairs to enter with rails;1/2 bath on main level;Bed/bath upstairs  ([facility]; 3STE )   Bathroom Shower/Tub Walk-in shower   Bathroom Toilet Standard   Bathroom Equipment Shower chair   [facility]  (denies use)   Prior Function   Level of [place] Independent with ADLs and functional mobility; Needs assistance with IADLs   Lives With Spouse   Receives Help From Family  (son next door)   ADL Assistance Independent   IADLs Needs assistance   Falls in the last 6 months 0  (per pt )   Vocational Retired   Lifestyle   Autonomy Pt reports IND w/ ADLS and mobility PTA, A from wife and son for IADLS PTA  Reciprocal Relationships Pt reports a supportive wife at home who does not work and able to provide A PRN  Pt's son lives next door and is able to provide A if needed     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    Intrinsic Gratification Pt reports [place] time [place] farm with family   Psychosocial   Psychosocial ([address]) WDL   ADL   Where [address] 5  Supervision/Setup   Grooming Assistance 5  Supervision/Setup   UB [address] 3  Moderate Assistance   LB [address] 2  Maximal [address] 3  Moderate [address] 2  Maximal Assistance   Toileting Assistance  2  Maximal Assistance   Bed Mobility   Supine to Sit 5  Supervision   Additional items Assist x 1;Bedrails;HOB elevated; Increased time required;Verbal cues; Impulsive   Sit to Supine Unable to assess   Additional Comments Pt found supine in bed upon OT arrival  Pt able to sit EOB in static sitting w/ S  Pt left OOB in chair following OT session  Transfers   Sit to Stand 4  Minimal assistance   Additional items Armrests; Increased time required;Verbal cues  (Minx2 progress to Minx1)   Stand to Sit 4  Minimal assistance   Additional items Assist x 1; Armrests; Increased time required;Verbal cues   Additional Comments Pt transfers w/ RW  VC/TC for safety and hand placement  Pt impulsive during transfer, beginning to transfer before instructed and before in proper positioning  Pt hard of hearing requiring frequent repetition of commands    Functional Mobility   Functional Mobility 4  Minimal assistance  (Ax1)   Additional Comments Pt performed funcitonal mobility in hallway  Pt denies SOB, however was breathing heaviliy uring seated rest breakx1 - SpO2 remained at baseline  Pt educated on diaphragmatic breathing techniques - demonstrated F carryover  Pt required max VC/TC for RW management and safety  Additional items Rolling walker   Balance   Static Sitting Fair   Dynamic Sitting Fair -   Static Standing Fair -   Dynamic Standing Poor +   Ambulatory Poor +   Activity Tolerance   Activity Tolerance Patient limited by pain;Treatment limited secondary to medical complications (Comment)  (cognition, SOB)   Medical Staff Made Aware OT tor, PT Nikky Arroyo, SPT yanci   Nurse Made Aware  RN cleared Pt for OT eval   RUE Assessment   RUE Assessment WFL   LUE Assessment   LUE Assessment WFL   Hand Function   Gross Motor Coordination Functional   Fine Motor Coordination Functional   Cognition   Overall Cognitive Status Impaired   Arousal/Participation Alert; Cooperative   Attention Attends with cues to redirect Orientation Level Oriented to person;Disoriented to situation;Disoriented to place;Oriented to time  ("[facility]" "[facility] cant walk")   Memory Decreased short term memory;Decreased recall of recent events;Decreased recall of precautions   Following Commands Follows one step commands with increased time or repetition   Comments Pt pleasant and cooperative to work with therapy today  Pt poor historian and demonstrated decreased safety awareness throughout session  Pt hard of hearing - denies hearing aid  Pt demonstrated decreased insight into deficits  Pt to benefit from further cognitive assessment   Assessment   Limitation Decreased ADL status; Decreased Safe judgement during ADL;Decreased cognition;Decreased endurance;Decreased self-care trans;Decreased high-level ADLs   Prognosis Good   Assessment Pt is a 81 y/o male who presented to [facility] with GROSS/weakness, primary dx of bradycardia  Pt transferred to [facility] on 7/20/20 for further intervention  Pt now s/p "Temporary external/internal screwing pacemaker placement"  Pt  has a past medical history of Aortic stenosis, Aortic valve disorder, Hypertensive urgency (5/19/2019), and Transient cerebral ischemia  Pt has active OT orders  Pt lives w/wife in a [facility] w 3STE  Pt has 1/2 bath on 1st floor and bed on 2nd  Pt has walk-in shower w/ SC  Pt reports owning a walker but denies use PTA  Pt reports IND w/ ADLS and mobility, requires A for IADLS from wife and son  Current level of function: S eating/grooming, Mod A UB bathing/dressing, Max A LB bathing/dressing/toileting  S bed mobility, Min A x1 STS, and Min A x1 functional mobility with RW  Pt has impaired cog, AxOx2, to benefit from further cog assessment  Pt presents w/ limitations in cognition, ADL/IADL status, functional mobility, functional transfers, endurance, and activity tolerance    Following evaluation, Pt will benefit from continued OT treatment during hospital stay to address barriers to occupational performance defined above and to maximize functional IND  OT plan of care 3-5x/week for 10-14days  Post-acute rehab recommended following D/C  Goals   Patient Goals To go home   LTG Time Frame 10-14   Long Term Goal #1 see goals below   Plan   Treatment Interventions ADL retraining;Functional transfer training;UE strengthening/ROM; Endurance training;Cognitive reorientation;Patient/family training;Equipment evaluation/education; Compensatory technique education; Energy conservation; Activityengagement   Goal Expiration Date 08/05/20   OT Frequency 3-5x/wk   Recommendation   OT Discharge Recommendation Post-Acute Rehabilitation Services   Equipment Recommended Other (comment)  (RW)   OT - OK to Discharge Yes  (when medically cleared)   Modified Gerard Scale   Modified [place] Scale 4          GOALS    1  Pt will perform UB bathing/dressing w/ S using adaptive device and DME as needed  2  Pt will perform LB bathing/dressing w/ S using adaptive device and DME as needed  3  Pt will perform toileting w/ S w/ G hygiene/thoroughness using adaptive device and DME as needed  4  Pt will perform simulated IADL task w/ Min A using adaptive device and DME as needed and G balance/safety  5  Pt will perform functional transfers on/off all surfaces w/ S and 100% carryover of safety awareness using appropriate DME as needed w/ G balance  6  Pt will perform functional mobility w/ S and 100% carryover for safety and sequencing using appropriate DME as needed w/ G balance during ADL/IADL/leisure tasks  7  Pt will demonstrate understanding of patient/caregiver education and training with G carryover as appropriate without cues to increase safety during functional tasks  8  Pt will be AxOx4 for all tx sessions with no environmental cues to increase safety during functional tasks        9  Pt will demonstrate G carryover of energy conservation techniques to increase endurance during ADL/IADL/leisure tasks     14  Pt will increase UB strength using therapeutic exercise for UE/trunk by 1/2 grade MMT for increased independence/participation in functional tasks  13  Pt will attend to ongoing cognitive assessment w/ G participation to assist w/ safe discharge planning  16  Pt will demonstrate G high level balance during ADL/IADL/leisure activity w/ DME PRN w/ G safety        BELLA Serna

## 2020-07-22 NOTE — ASSESSMENT & PLAN NOTE
Asymptomatic currently  Hold toprol  Check lymes ab with Western blot  [place]  Recheck potassium level  Appears to be borderline elevated at [facility]  · Follow up on echo  · Patient complete heart block  Discussed with critical care  Patient's blood pressure systolic in the 016Y currently  · Status post temporary pacemaker placement  · Plan for permanent pacemaker placement after ID evaluation given leukocytosis

## 2020-07-22 NOTE — PROGRESS NOTES
Progress Note - Prudence Stearns 1935, 80 y o  male MRN: [id number]    Unit/Bed#: [facility] 525-01 Encounter: [id number]    Primary Care Provider: Adolfo Vásquez DO   Date and time admitted to hospital: 7/20/2020  4:04 PM        Leukocytosis  Assessment & Plan  Unclear etiology  White count initially normal, then increased to 17 69 and then 19 32  No bandemia  Possibly reactive in nature  Patient is otherwise afebrile and not septic  · Blood cultures negative today  · Procalcitonin not elevated  · Continue cefepime for now, but if remaining afebrile and without sign of infection, will discontinue likely tomorrow    Essential hypertension  Assessment & Plan  · Continue amlodipine  · Will likely need adjustment in regimen due to hypertensive urgency  · Continue scheduled labetalol    Complete heart block (HCC)  Assessment & Plan  · Continue to hold beta-blocker  · Status post temporary pacemaker placement per EP  · Plan for permanent pacemaker placement pending ID evaluation    History of stroke  Assessment & Plan  Patient with prior stroke last year  · Managed on aspirin and Plavix currently  Held due to procedure  · Resume aspirin and Plavix    H/O aortic valve replacement  Assessment & Plan  Patient prior history bioprosthetic valve  Echocardiogram as per Cardiology    Hypertensive urgency  Assessment & Plan  Patient on Norvasc currently  Will hold Toprol given bradyarrhythmia  · Currently resolved, will monitor    Acute kidney injury superimposed on chronic kidney disease ([facility] [address] )  Assessment & Plan  Previous baseline around 1 8-1 9  Now improving to 2 20 today  Initially 2 74  · Avoid nephrotoxins  · Monitor electrolytes and renal function  · Ensure adequate oral intake and hydration    Mild intermittent asthma without complication  Assessment & Plan  Continue albuterol p r n     * Bradycardia  Assessment & Plan  Asymptomatic currently  Lyme studies show elevated Lyme IgG    · Follow up on echo  · Patient complete heart block  Discussed with critical care  Patient's blood pressure systolic in the 916D currently  · Status post temporary pacemaker placement  · Plan for permanent pacemaker placement after ID evaluation given leukocytosis  VTE Pharmacologic Prophylaxis:   Pharmacologic: Pharmacologic VTE Prophylaxis contraindicated due to upcoming procedure  Mechanical VTE Prophylaxis in Place: Yes    Patient Centered Rounds: I have performed bedside rounds with nursing staff today  Discussions with Specialists or Other Care Team Provider: EP    Education and Discussions with Family / Patient: yes    Time Spent for Care: 30 minutes  More than 50% of total time spent on counseling and coordination of care as described above  Current Length of Stay: 2 day(s)    Current Patient Status: Inpatient   Certification Statement: The patient will continue to require additional inpatient hospital stay due to pacemaker placement    Discharge Plan: To rehab when medically cleared    Code Status: Level 1 - Full Code      Subjective:   Patient has no complaints today  No fevers, nausea, vomiting, chest pain, or shortness of breath  Still with leukocytosis  Objective:     Vitals:   Temp (24hrs), Av 2 °F (36 8 °C), Min:97 9 °F (36 6 °C), Max:98 7 °F (37 1 °C)    Temp:  [97 9 °F (36 6 °C)-98 7 °F (37 1 °C)] 98 3 °F (36 8 °C)  HR:  [59-60] 59  Resp:  [18-21] 18  BP: (112-199)/(44-77) 142/68  SpO2:  [94 %-96 %] 96 %  Body mass index is 36 14 kg/m²  Input and Output Summary (last 24 hours): Intake/Output Summary (Last 24 hours) at 2020 1713  Last data filed at 2020 1442  Gross per 24 hour   Intake 490 ml   Output 300 ml   Net 190 ml       Physical Exam:     Physical Exam   Constitutional: He appears well-developed and well-nourished  No distress  Nasal cannula in place  Obese   HENT:   Head: Normocephalic and atraumatic  Eyes: Pupils are equal, round, and reactive to light   EOM are normal    Cardiovascular: Normal rate, regular rhythm, normal heart sounds and intact distal pulses  Pulmonary/Chest: Effort normal and breath sounds normal  No respiratory distress  Abdominal: Soft  He exhibits no distension  There is no tenderness  Neurological: He is alert  Skin: Skin is warm and dry  He is not diaphoretic  Psychiatric: He has a normal mood and affect  His behavior is normal    Vitals reviewed  Additional Data:     Labs:    Results from last 7 days   Lab Units 07/22/20  0433   WBC Thousand/uL 19 40*   HEMOGLOBIN g/dL 13 3   HEMATOCRIT % 40 6   PLATELETS Thousands/uL 200   NEUTROS PCT % 89*   LYMPHS PCT % 4*   MONOS PCT % 6   EOS PCT % 0     Results from last 7 days   Lab Units 07/22/20  0433 07/21/20  0445   SODIUM mmol/L 139 139   POTASSIUM mmol/L 4 9 5 3   CHLORIDE mmol/L 108 108   CO2 mmol/L 24 24   BUN mg/dL 46* 43*   CREATININE mg/dL 2 35* 2 20*   ANION GAP mmol/L 7 7   CALCIUM mg/dL 8 9 8 8   ALBUMIN g/dL  --  3 5   TOTAL BILIRUBIN mg/dL  --  0 61   ALK PHOS U/L  --  73   ALT U/L  --  15   AST U/L  --  12   GLUCOSE RANDOM mg/dL 130 110     Results from last 7 days   Lab Units 07/20/20  1021   INR  1 13             Results from last 7 days   Lab Units 07/22/20  0433   PROCALCITONIN ng/ml 0 13           * I Have Reviewed All Lab Data Listed Above  * Additional Pertinent Lab Tests Reviewed: All Labs Within Last 24 Hours Reviewed    Imaging:    Imaging Reports Reviewed Today Include: echo  Imaging Personally Reviewed by Myself Includes:  n/a    Recent Cultures (last 7 days):     Results from last 7 days   Lab Units 07/22/20  1601 07/22/20  0443   BLOOD CULTURE  No Growth at 24 hrs  Received in Microbiology Lab  Culture in Progress         Last 24 Hours Medication List:     Current Facility-Administered Medications:  amLODIPine 10 mg Oral Daily Kendra Ratliff DO    [START ON 7/23/2020] aspirin 325 mg Oral Daily Pauline Sood DO    cefazolin 2,000 mg Intravenous Once Vernon Castanon PA-C    cefepime 500 mg Intravenous Q12H Cosme Cummings,  Last Rate: 500 mg (07/22/20 0848)   [START ON 7/23/2020] clopidogrel 75 mg Oral Daily Linda Sood, DO    labetalol 50 mg Oral Q8H [facility] Kendraadam Carson-Scott, DO    Labetalol HCl 10 mg Intravenous Q8H PRN Kendra Ratliff, DO    metoclopramide 10 mg Intravenous Q6H PRN Linda Sood, DO    pantoprazole 40 mg Oral Early Morning Hetlino Stewart, DO    pravastatin 40 mg Oral Daily With [employer], DO    sodium chloride 75 mL/hr Intravenous Continuous Cosme Cummings,  Last Rate: Stopped (07/21/20 1702)   sodium chloride 1 g Oral BID Cortez Stewart,          Today, Patient Was Seen By: Cosme Cummings DO    ** Please Note: Dictation voice to text software may have been used in the creation of this document   **

## 2020-07-22 NOTE — PHYSICAL THERAPY NOTE
Physical Therapy Evaluation     Patient's Name: Kaleb Granados    Admitting Diagnosis  Weakness [R53 1]    Problem List  Patient Active Problem List   Diagnosis    Anemia    Mild intermittent asthma without complication    Atherosclerotic 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    Acute kidney injury superimposed on chronic kidney disease ([address] )    Dyslipidemia    GERD (gastroesophageal reflux disease)    Late effects of cerebrovascular disease    Lumbar degenerative disc disease    Lumbar radiculopathy    Mitral regurgitation    Obesity (BMI 30-39  9)    Medicare annual wellness visit, subsequent    Hypertensive urgency    H/O aortic valve replacement    History of stroke    Bradycardia    ARF (acute renal failure) (HCC)    Complete heart block (HCC)    Essential hypertension    Leukocytosis       Past Medical History  Past Medical History:   Diagnosis Date    Aortic stenosis     ECHO -4-14-14  MODERATE TO SEVERE AORTIC STENOSIS; MILD AROTIC INSUFFICIENCY - LAST ASSESSED 10/26/16; RESOLVED 6/8/16    Aortic valve disorder     LAST ASSESSED 10/8/15; 10/8/15    Hypertensive urgency 5/19/2019    Transient cerebral ischemia        Past Surgical History  Past Surgical History:   Procedure Laterality Date    AORTIC VALVE REPLACEMENT  06/30/2015    avr with 23 mm [place] Magna Ease bioprosthetic    CATARACT EXTRACTION Right 06/05/2000    COLONOSCOPY      CORONARY ARTERY BYPASS GRAFT  06/05/2000    x1 with lima  to lad    POLYPECTOMY  04/2014    enteroscopic - esophageal ulcer, gastric erosion, and duodenal ulcer       TONSILLECTOMY      unknown          07/22/20 1101   Note Type   Note type Eval only   Pain Assessment   Pain Assessment Tool Pain Assessment not indicated - pt denies pain   Home Living   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)   Home Layout Two level;Stairs to enter with rails;1/2 bath on main level  (3STE)   Bathroom Shower/Tub Walk-in [address] krishna   Bathroom Accessibility Accessible   Home Equipment   (denies)   Prior Function   Level of [place] Independent with ADLs and functional mobility; Needs assistance with IADLs   Lives With Spouse  (wife)   Receives Help From Family  (wife, son lives right next door)   ADL Assistance Independent   IADLs Needs assistance   Falls in the last 6 months 0   Vocational Retired   Comments Pt's wife also retired, family (son) down the road to help if needed  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  Restrictions/Precautions   Weight Bearing Precautions Per Order No   Braces or Orthoses Sling  (R, no longer donned)   Other Precautions Hard of hearing; Fall Risk;Multiple lines;Telemetry; Chair Alarm; Bed Alarm;Cognitive  (pacemaker precautions on R)   General   Additional Pertinent History Pt very hard of hearing, does not wear hearing aids   Family/Caregiver Present No   Cognition   Overall Cognitive Status Impaired   Arousal/Participation Alert   Attention Attends with cues to redirect   Orientation Level Disoriented to place;Oriented to person;Oriented to time;Disoriented to situation  (Could not remember which hospital he was at, or proceedure )   Memory Decreased short term memory;Decreased recall of recent events;Decreased recall of precautions   Following Commands Follows one step commands with increased time or repetition   Comments Pt pleasant and agreeable to therapy, couldnt exactly state why he was here but acknowledged improvement in breath and fatigue  Pt demonstrated decreased insight into deficits and requires increased cues for safety  RLE Assessment   RLE Assessment   (functionally 3+/5)   LLE Assessment   LLE Assessment   (functionally 3+/5)   Bed Mobility   Supine to Sit 5  Supervision   Additional items Verbal cues; Increased time required; Impulsive;Assist x 1   Sit to Supine Unable to assess   Additional Comments Pt found sitting up in chair upon initial PT arrival  Pt left sitting up in chair with chair alarm on and all needs in reach s/p PT sesion  Transfers   Sit to Stand 4  Minimal assistance   Additional items Assist x 2;Bedrails; Increased time required; Impulsive;Verbal cues  (progressed to Min-A x1 with additional transfers )   Stand to Sit 4  Minimal assistance   Additional items Assist x 1; Armrests; Increased time required;Verbal cues; Impulsive   Additional Comments Pt required VC and TC for hand placement and safety  RW used for all transfers and mobility  Pt required repeated cues when managing RW  Ambulation/Elevation   Gait pattern Excessively slow; Short stride; Foward flexed;Poor UE support;Decreased foot clearance; Ataxia   Gait Assistance 4  Minimal assist  (CGA)   Additional items Assist x 1;Verbal cues; Tactile cues  (additional assist for chair follow)   Assistive Device Rolling walker   Distance 70ft x2  (one sitting rest break)   Balance   Static Sitting Fair   Dynamic Sitting Fair   Static Standing Fair -   Dynamic Standing Poor +   Ambulatory Poor +   Endurance Deficit   Endurance Deficit Yes   Endurance Deficit Description SOB  (improved with x1 sitting rest break)   Activity Tolerance   Activity Tolerance Patient limited by fatigue   Medical Staff Made Aware PT Amisha Appiah RN   Nurse Made Aware RN cleared pt for PT session   Assessment   Prognosis Good   Problem List Decreased strength;Decreased mobility; Impaired balance;Decreased endurance;Decreased safety awareness; Impaired hearing   Assessment Pt seen by PT on 07/22/20 for high complexity PT evaluation due to a decline in functional mobility compared to baseline  Pt was admitted to [place] [id number] on 7/20/20 for bradycardia and related SOB  Pt now s/p temporary external/internal screwing pacemaker placement  Chart reviewed, PT orders active   Pt  has a past medical history of Aortic stenosis, Aortic valve disorder, Hypertensive urgency (5/19/2019), and Transient cerebral ischemia  Pt reports no falls in the past 6 months  Pt resides in 2 story home with 3STE  Pt lives with wife, wife is home during the day and 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  Son and family live down the road and are able to assist if needed  Prior to hospital admission pt functioned at a independent A level, no AD used for mobility  Pt presents with decrease strength, endurance, and balance that contribute to limitations in bed mobility, functional mobility and functional transfers  During the session pt performed bed mobility at [facility], STS at [address] [facility] x2 and progressed to STS Min-A x1 during session  Pt ambulated at [facility] for 70ft x2  Pt required VC and TC to manage RW, hand placement and safety  Further mobility limited by SOB, which was relieved by a sitting rest break  Pt very hard of hearing  Pt left sitting up in chair with chair alarm on and all needs in reach at end of therapy session  Pt would benefit from skilled PT in order to address impairments and functional limitations  PT to follow pt 3-5x /week, and would recommend rehab pending medical clearance  Barriers to Discharge Inaccessible home environment   Goals   Patient Goals To improve his breathlessness  STG Expiration Date 08/05/20   Short Term Goal #1 Pt will be able to complete all aspects of bed mobility at a independent A level in order to decrease burden on caregivers  2  Pt will be able to complete functional transfers at a Mod-I A level in order to increase independence  3  Pt will be able to ambulate 200+ ft at a Mod-I A level with LRAD in order to promote safe transition back into the home environment  4  Pt will be able to negotiate a full flight of steps at a Mod-I A level with/without railing in order to promote safe transition back into the home environment    5  Pt will demonstrate an increase in b/l LE strength by one grade as indicated on MMT scale in order to promote functional independence  6  Pt will demonstrate improved balance by increasing at least one grade on the graded balance scale in order to reduce falls risk  Plan   Treatment/Interventions Functional transfer training;LE strengthening/ROM; Elevations; Therapeutic exercise; Endurance training;Patient/family training;Equipment eval/education;Gait training;Spoke to nursing;OT   PT Frequency Other (Comment)  (3-5x /week)   Recommendation   PT Discharge Recommendation Post-Acute Rehabilitation Services   Equipment Recommended Walker   PT - OK to Discharge Yes  (pending medical clearance)   Modified Gerard Scale   Modified Gerard Scale 4         Angie Jean, SPT

## 2020-07-23 LAB
ANION GAP SERPL CALCULATED.3IONS-SCNC: 6 MMOL/L (ref 4–13)
BUN SERPL-MCNC: 52 MG/DL (ref 5–25)
CALCIUM SERPL-MCNC: 8.4 MG/DL (ref 8.3–10.1)
CHLORIDE SERPL-SCNC: 108 MMOL/L (ref 100–108)
CO2 SERPL-SCNC: 24 MMOL/L (ref 21–32)
CREAT SERPL-MCNC: 2.29 MG/DL (ref 0.6–1.3)
ERYTHROCYTE [DISTWIDTH] IN BLOOD BY AUTOMATED COUNT: 13.5 % (ref 11.6–15.1)
GFR SERPL CREATININE-BSD FRML MDRD: 25 ML/MIN/1.73SQ M
GLUCOSE SERPL-MCNC: 120 MG/DL (ref 65–140)
HCT VFR BLD AUTO: 37.5 % (ref 36.5–49.3)
HGB BLD-MCNC: 12.1 G/DL (ref 12–17)
MCH RBC QN AUTO: 30.9 PG (ref 26.8–34.3)
MCHC RBC AUTO-ENTMCNC: 32.3 G/DL (ref 31.4–37.4)
MCV RBC AUTO: 96 FL (ref 82–98)
PLATELET # BLD AUTO: 181 THOUSANDS/UL (ref 149–390)
PMV BLD AUTO: 11.3 FL (ref 8.9–12.7)
POTASSIUM SERPL-SCNC: 5.1 MMOL/L (ref 3.5–5.3)
RBC # BLD AUTO: 3.91 MILLION/UL (ref 3.88–5.62)
SODIUM SERPL-SCNC: 138 MMOL/L (ref 136–145)
WBC # BLD AUTO: 15.79 THOUSAND/UL (ref 4.31–10.16)

## 2020-07-23 PROCEDURE — 99233 SBSQ HOSP IP/OBS HIGH 50: CPT | Performed by: INTERNAL MEDICINE

## 2020-07-23 PROCEDURE — 99232 SBSQ HOSP IP/OBS MODERATE 35: CPT | Performed by: INTERNAL MEDICINE

## 2020-07-23 PROCEDURE — 80048 BASIC METABOLIC PNL TOTAL CA: CPT | Performed by: INTERNAL MEDICINE

## 2020-07-23 PROCEDURE — 85027 COMPLETE CBC AUTOMATED: CPT | Performed by: INTERNAL MEDICINE

## 2020-07-23 RX ORDER — HEPARIN SODIUM 5000 [USP'U]/ML
5000 INJECTION, SOLUTION INTRAVENOUS; SUBCUTANEOUS EVERY 8 HOURS SCHEDULED
Status: DISCONTINUED | OUTPATIENT
Start: 2020-07-23 | End: 2020-07-23

## 2020-07-23 RX ORDER — ALBUTEROL SULFATE 90 UG/1
2 AEROSOL, METERED RESPIRATORY (INHALATION) EVERY 4 HOURS PRN
Status: DISCONTINUED | OUTPATIENT
Start: 2020-07-23 | End: 2020-07-26

## 2020-07-23 RX ORDER — LABETALOL 100 MG/1
100 TABLET, FILM COATED ORAL EVERY 8 HOURS SCHEDULED
Status: DISCONTINUED | OUTPATIENT
Start: 2020-07-23 | End: 2020-07-24

## 2020-07-23 RX ORDER — LABETALOL 100 MG/1
50 TABLET, FILM COATED ORAL EVERY 8 HOURS SCHEDULED
Status: DISCONTINUED | OUTPATIENT
Start: 2020-07-23 | End: 2020-07-23

## 2020-07-23 RX ADMIN — LABETALOL HYDROCHLORIDE 50 MG: 100 TABLET, FILM COATED ORAL at 14:40

## 2020-07-23 RX ADMIN — CLOPIDOGREL BISULFATE 75 MG: 75 TABLET ORAL at 08:48

## 2020-07-23 RX ADMIN — ASPIRIN 325 MG ORAL TABLET 325 MG: 325 PILL ORAL at 08:47

## 2020-07-23 RX ADMIN — PRAVASTATIN SODIUM 40 MG: 40 TABLET ORAL at 17:29

## 2020-07-23 RX ADMIN — Medication 1 G: at 17:29

## 2020-07-23 RX ADMIN — SODIUM CHLORIDE 75 ML/HR: 0.9 INJECTION, SOLUTION INTRAVENOUS at 05:19

## 2020-07-23 RX ADMIN — HEPARIN SODIUM 5000 UNITS: 5000 INJECTION, SOLUTION INTRAVENOUS; SUBCUTANEOUS at 14:46

## 2020-07-23 RX ADMIN — LABETALOL HYDROCHLORIDE 100 MG: 100 TABLET, FILM COATED ORAL at 22:00

## 2020-07-23 RX ADMIN — AMLODIPINE BESYLATE 10 MG: 10 TABLET ORAL at 08:47

## 2020-07-23 RX ADMIN — Medication 1 G: at 08:47

## 2020-07-23 RX ADMIN — PANTOPRAZOLE SODIUM 40 MG: 40 TABLET, DELAYED RELEASE ORAL at 05:18

## 2020-07-23 RX ADMIN — SODIUM CHLORIDE 75 ML/HR: 0.9 INJECTION, SOLUTION INTRAVENOUS at 21:50

## 2020-07-23 NOTE — ASSESSMENT & PLAN NOTE
Previous baseline around 1 8-1 9 in 2019  Now trending 2 2-2 3  This possibly represents his new baseline    · Avoid nephrotoxins  · Monitor electrolytes and renal function  · Ensure adequate oral intake and hydration  · Continue gentle IV hydration, likely discontinue tomorrow

## 2020-07-23 NOTE — PLAN OF CARE
Problem: Potential for Falls  Goal: Patient will remain free of falls  Description  INTERVENTIONS:  - Assess patient frequently for physical needs  -  Identify cognitive and physical deficits and behaviors that affect risk of falls    -  [place] fall precautions as indicated by assessment   - Educate patient/family on patient safety including physical limitations  - Instruct patient to call for assistance with activity based on assessment  - Modify environment to reduce risk of injury  - Consider OT/PT consult to assist with strengthening/mobility  Outcome: Progressing     Problem: PAIN - ADULT  Goal: Verbalizes/displays adequate comfort level or baseline comfort level  Description  Interventions:  - Encourage patient to monitor pain and request assistance  - Assess pain using appropriate pain scale  - Administer analgesics based on type and severity of pain and evaluate response  - Implement non-pharmacological measures as appropriate and evaluate response  - Consider cultural and social influences on pain and pain management  - Notify physician/advanced practitioner if interventions unsuccessful or patient reports new pain  Outcome: Progressing     Problem: INFECTION - ADULT  Goal: Absence or prevention of progression during hospitalization  Description  INTERVENTIONS:  - Assess and monitor for signs and symptoms of infection  - Monitor lab/diagnostic results  - Monitor all insertion sites, i e  indwelling lines, tubes, and drains  - Monitor endotracheal if appropriate and nasal secretions for changes in amount and color  - [place] appropriate cooling/warming therapies per order  - Administer medications as ordered  - Instruct and encourage patient and family to use good hand hygiene technique  - Identify and instruct in appropriate isolation precautions for identified infection/condition  Outcome: Progressing  Goal: Absence of fever/infection during neutropenic period  Description  INTERVENTIONS:  - Monitor WBC    Outcome: Progressing     Problem: SAFETY ADULT  Goal: Maintain or return to baseline ADL function  Description  INTERVENTIONS:  -  Assess patient's ability to carry out ADLs; assess patient's baseline for ADL function and identify physical deficits which impact ability to perform ADLs (bathing, care of mouth/teeth, toileting, grooming, dressing, etc )  - Assess/evaluate cause of self-care deficits   - Assess range of motion  - Assess patient's mobility; develop plan if impaired  - Assess patient's need for assistive devices and provide as appropriate  - Encourage maximum independence but intervene and supervise when necessary  - Involve family in performance of ADLs  - Assess for home care needs following discharge   - Consider OT consult to assist with ADL evaluation and planning for discharge  - Provide patient education as appropriate  Outcome: Progressing  Goal: Maintain or return mobility status to optimal level  Description  INTERVENTIONS:  - Assess patient's baseline mobility status (ambulation, transfers, stairs, etc )    - Identify cognitive and physical deficits and behaviors that affect mobility  - Identify mobility aids required to assist with transfers and/or ambulation (gait belt, sit-to-stand, lift, walker, cane, etc )  - [place] fall precautions as indicated by assessment  - Record patient progress and toleration of activity level on Mobility SBAR; progress patient to next Phase/Stage  - Instruct patient to call for assistance with activity based on assessment  - Consider rehabilitation consult to assist with strengthening/weightbearing, etc   Outcome: Progressing     Problem: DISCHARGE PLANNING  Goal: Discharge to home or other facility with appropriate resources  Description  INTERVENTIONS:  - Identify barriers to discharge w/patient and caregiver  - Arrange for needed discharge resources and transportation as appropriate  - Identify discharge learning needs (meds, wound care, etc )  - Arrange for interpretive services to assist at discharge as needed  - Refer to Case Management Department for coordinating discharge planning if the patient needs post-hospital services based on physician/advanced practitioner order or complex needs related to functional status, cognitive ability, or social support system  Outcome: Progressing     Problem: Knowledge Deficit  Goal: Patient/family/caregiver demonstrates understanding of disease process, treatment plan, medications, and discharge instructions  Description  Complete learning assessment and assess knowledge base    Interventions:  - Provide teaching at level of understanding  - Provide teaching via preferred learning methods  Outcome: Progressing     Problem: CARDIOVASCULAR - ADULT  Goal: Maintains optimal cardiac output and hemodynamic stability  Description  INTERVENTIONS:  - Monitor I/O, vital signs and rhythm  - Monitor for S/S and trends of decreased cardiac output  - Administer and titrate ordered vasoactive medications to optimize hemodynamic stability  - Assess quality of pulses, skin color and temperature  - Assess for signs of decreased coronary artery perfusion  - Instruct patient to report change in severity of symptoms  Outcome: Progressing  Goal: Absence of cardiac dysrhythmias or at baseline rhythm  Description  INTERVENTIONS:  - Continuous cardiac monitoring, vital signs, obtain 12 lead EKG if ordered  - Administer antiarrhythmic and heart rate control medications as ordered  - Monitor electrolytes and administer replacement therapy as ordered  Outcome: Progressing     Problem: RESPIRATORY - ADULT  Goal: Achieves optimal ventilation and oxygenation  Description  INTERVENTIONS:  - Assess for changes in respiratory status  - Assess for changes in mentation and behavior  - Position to facilitate oxygenation and minimize respiratory effort  - Oxygen administered by appropriate delivery if ordered  - Initiate smoking cessation education as indicated  - Encourage broncho-pulmonary hygiene including cough, deep breathe, Incentive Spirometry  - Assess the need for suctioning and aspirate as needed  - Assess and instruct to report SOB or any respiratory difficulty  - Respiratory Therapy support as indicated  Outcome: Progressing     Problem: Prexisting or High Potential for Compromised Skin Integrity  Goal: Skin integrity is maintained or improved  Description  INTERVENTIONS:  - Identify patients at risk for skin breakdown  - Assess and monitor skin integrity  - Assess and monitor nutrition and hydration status  - Monitor labs   - Assess for incontinence   - Turn and reposition patient  - Assist with mobility/ambulation  - Relieve pressure over bony prominences  - Avoid friction and shearing  - Provide appropriate hygiene as needed including keeping skin clean and dry  - Evaluate need for skin moisturizer/barrier cream  - Collaborate with interdisciplinary team   - Patient/family teaching  - Consider wound care consult   Outcome: Progressing

## 2020-07-23 NOTE — ASSESSMENT & PLAN NOTE
· Continue to hold metoprolol  · Patient receiving scheduled labetalol  · Status post temporary pacemaker placement per EP  · Plan for permanent pacemaker

## 2020-07-23 NOTE — PROGRESS NOTES
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, thus it is most probably progression of the conduction system  disease  S/p temp perm pacemaker initially as it was a concern for the systemic infection given leukocytosis  Infectious work up so far negative off antibiotics suggesting stress leukocytosis  2  Essential hypertension  Remains elevated although with improvement with increasing amlodipine dose and addition of labetolol  Patient with chronic hyperkalemia and CKD thus would avoid ACEi/ARBs  3  CAD (s/p CABG)  4  AS ( s/p TAVR)  5  CKD        Plan:  1  Complete heart block: will plan to upgrade temp perm pacemaker to the permanent pacemaker tomorrow  Holding PPx heparin sq, will restart after the procedure if no hematoma  Meanwhile continue with SCDs  2  HTN: continue with amlodipine 10 mg daily and increased labetolol to 100 mg tid  Will continue adjusting medications prior to the discharge  3 CAD/AS/CKD management per primary team    Subjective/Objective   Subjective:  Mr Izzy Fragoso is a 80y o  year old male with  CAD (s/p CABG LIMA-LAD), AS (s/p TAVR 2015), HTN, HLD, was transferred to [facility] from [facility]  for the pacemaker placement in setting of the complete heart block  Patient underwent a temp perm pacemaker placement given leukocytosis found on the lab and infectious workup was started along with empiric antibiotics  Id team was consulted and thought to be stress leukocytosis and antibiotics were held  Infectious workup was negative such as blood culture remains negative off antibiotics and leukocytosis downgraded  patient also had Lyme panel checked which was negative   This stay was complicated by Hypertensive urgencies with blood pressure continue being elevated even after the perm temp pacemaker placement thus patient's home amlodipine was increased from 5 mg to 10 mg and he was initially started on labetalol 100 mg tid but was decreased to 50 mg tid given patient initial SBP being in 190-200s to avoid excessive decrease in blood pressure  Chart review shows that blood pressure although improved remains still elevated in 150-160s  Patient states that he still gets dyspneic with walking but denies dizziness, lightheadedness, chest pain, cough, fever       TELE: HR 60s, V paced    Objective:  Vitals: /71 (BP Location: Right arm)   Pulse 59   Temp 98 3 °F (36 8 °C) (Oral)   Resp 18   Ht 5' 4" (1 626 m)   Wt 95 5 kg (210 lb 8 6 oz)   SpO2 95%   BMI 36 14 kg/m²     Vitals:    07/20/20 1639   Weight: 95 5 kg (210 lb 8 6 oz)     Orthostatic Blood Pressures      Most Recent Value   Blood Pressure  170/71 filed at 07/23/2020 1529   Patient Position - Orthostatic VS  Lying filed at 07/23/2020 1529          Intake/Output Summary (Last 24 hours) at 7/23/2020 1619  Last data filed at 7/23/2020 1200  Gross per 24 hour   Intake 1257 5 ml   Output 175 ml   Net 1082 5 ml       Invasive Devices     Peripheral Intravenous Line            Peripheral IV 07/20/20 Right Antecubital 3 days    Peripheral IV 07/20/20 Left;Proximal;Ventral (anterior) Forearm 2 days                    Scheduled Meds:  Current Facility-Administered Medications:  albuterol 2 puff Inhalation Q4H PRN José Miguel Sood, DO    amLODIPine 10 mg Oral Daily Kendra Ratliff, DO    aspirin 325 mg Oral Daily José Miguel Sood, DO    cefazolin 2,000 mg Intravenous Once Vernon Castanon PA-C    clopidogrel 75 mg Oral Daily José Miguel Sood, DO    heparin (porcine) 5,000 Units Subcutaneous Q8H [address] José Miguel Sood, DO    labetalol 100 mg Oral Q8H [address] Kendra Carson-Scott, DO    Labetalol HCl 10 mg Intravenous Q8H PRN Kendra Ratliff, DO    metoclopramide 10 mg Intravenous Q6H PRN José Miguel Sood, DO    pantoprazole 40 mg Oral Early Morning Hetul Stewart, DO    pravastatin 40 mg Oral Daily With [employer], DO    sodium chloride 75 mL/hr Intravenous Continuous Marie Maze, DO Last Rate: 75 mL/hr (07/23/20 0519)   sodium chloride 1 g Oral BID Hetul Stewart, DO      Continuous Infusions:  sodium chloride 75 mL/hr Last Rate: 75 mL/hr (07/23/20 0519)     PRN Meds:   albuterol    Labetalol HCl    metoclopramide    Review of Systems:  ROS as noted above, otherwise 12 point review of systems was performed and is negative  Physical Exam:  GEN: NAD, alert and oriented, well appearing  SKIN: dry without significant lesions or rashes  HEENT: NCAT, PERRL, EOMs intact  NECK: No JVD or carotid bruits appreciated  CARDIOVASCULAR: RRR, normal S1, S2 without murmurs, rubs, or gallops appreciated  LUNGS: Clear to auscultation bilaterally without wheezes, rhonchi, or rales  ABDOMEN: Soft, nontender, nondistended  EXTREMITIES/VASCULAR: perfused without clubbing, cyanosis, or edema b/l  PSYCH: Normal mood and affect  NEURO: CN ll-Xll grossly intact        Lab Results: I have personally reviewed pertinent lab results  Results from last 7 days   Lab Units 07/23/20  0552 07/22/20  0433 07/21/20  0756   WBC Thousand/uL 15 79* 19 40* 19 32*   HEMOGLOBIN g/dL 12 1 13 3 13 4   HEMATOCRIT % 37 5 40 6 41 5   PLATELETS Thousands/uL 181 200 238     Results from last 7 days   Lab Units 07/23/20  0552 07/22/20  0433 07/21/20  0445   POTASSIUM mmol/L 5 1 4 9 5 3   CHLORIDE mmol/L 108 108 108   CO2 mmol/L 24 24 24   BUN mg/dL 52* 46* 43*   CREATININE mg/dL 2 29* 2 35* 2 20*   CALCIUM mg/dL 8 4 8 9 8 8     Results from last 7 days   Lab Units 07/20/20  1021   INR  1 13   PTT seconds 33     Results from last 7 days   Lab Units 07/21/20  0445 07/20/20  1021   MAGNESIUM mg/dL 2 6 2 4       Imaging: I have personally reviewed pertinent reports        VTE Pharmacologic Prophylaxis: Sequential compression device (Venodyne)   VTE Mechanical Prophylaxis: sequential compression device

## 2020-07-23 NOTE — ASSESSMENT & PLAN NOTE
Unclear etiology  White count initially normal, then increased to 17 69 and then 19 32  No bandemia  Improving today  Possibly reactive in nature  Patient is otherwise afebrile and not septic    · Blood cultures negative to date  · Procalcitonin not elevated  · Monitor off antibiotics  · Appreciate ID recommendations

## 2020-07-23 NOTE — PROGRESS NOTES
Progress Note - Prudence Stearns 1935, 80 y o  male MRN: [id number]    Unit/Bed#: [facility] 525-01 Encounter: [id number]    Primary Care Provider: Adolfo Vásquez DO   Date and time admitted to hospital: 7/20/2020  4:04 PM        Leukocytosis  Assessment & Plan  Unclear etiology  White count initially normal, then increased to 17 69 and then 19 32  No bandemia  Improving today  Possibly reactive in nature  Patient is otherwise afebrile and not septic  · Blood cultures negative to date  · Procalcitonin not elevated  · Monitor off antibiotics  · Appreciate ID recommendations    Essential hypertension  Assessment & Plan  · Continue amlodipine  · Continue scheduled labetalol    Complete heart block (HCC)  Assessment & Plan  · Continue to hold metoprolol  · Patient receiving scheduled labetalol  · Status post temporary pacemaker placement per EP  · Plan for permanent pacemaker    History of stroke  Assessment & Plan  Patient with prior stroke last year  · Continue aspirin and Plavix  · Stat CT if acute change in neurological status    H/O aortic valve replacement  Assessment & Plan  Patient prior history bioprosthetic valve  · Valve area 1 38 cm2 per echo  Hypertensive urgency  Assessment & Plan  Patient on Norvasc currently  Will hold Toprol given bradyarrhythmia  · Currently resolved, will monitor    Acute kidney injury superimposed on chronic kidney disease ([facility] [address] )  Assessment & Plan  Previous baseline around 1 8-1 9 in 2019  Now trending 2 2-2 3  This possibly represents his new baseline  · Avoid nephrotoxins  · Monitor electrolytes and renal function  · Ensure adequate oral intake and hydration  · Continue gentle IV hydration, likely discontinue tomorrow    Mild intermittent asthma without complication  Assessment & Plan  Continue albuterol p r n     * Bradycardia  Assessment & Plan  Asymptomatic currently  Western blot shows elevated Lyme IgG    · Follow up on echo  · Hold metoprolol  · Patient complete heart block  · Status post temporary pacemaker placement  · Plan for permanent pacemaker placement if no sign of infection        VTE Pharmacologic Prophylaxis:   Pharmacologic: Heparin  Mechanical VTE Prophylaxis in Place: Yes    Patient Centered Rounds: I have performed bedside rounds with nursing staff today  Discussions with Specialists or Other Care Team Provider: EP    Education and Discussions with Family / Patient: patient    Time Spent for Care: 30 minutes  More than 50% of total time spent on counseling and coordination of care as described above  Current Length of Stay: 3 day(s)    Current Patient Status: Inpatient   Certification Statement: The patient will continue to require additional inpatient hospital stay due to bradycardia/heart block, pacemaker eval    Discharge Plan:  Inpatient rehab    Code Status: Level 1 - Full Code      Subjective:   Patient seen today  Feels well, not complaining of shortness of breath, although he is still on nasal cannula  Per nursing staff, he was wheezing earlier  On exam, patient was mildly tachypneic but saturating well on NC  Objective:     Vitals:   Temp (24hrs), Av 2 °F (36 8 °C), Min:97 9 °F (36 6 °C), Max:98 5 °F (36 9 °C)    Temp:  [97 9 °F (36 6 °C)-98 5 °F (36 9 °C)] 97 9 °F (36 6 °C)  HR:  [59-60] 59  Resp:  [15-18] 18  BP: (142-180)/(57-80) 148/57  SpO2:  [92 %-97 %] 97 %  Body mass index is 36 14 kg/m²  Input and Output Summary (last 24 hours): Intake/Output Summary (Last 24 hours) at 2020 1216  Last data filed at 2020 0800  Gross per 24 hour   Intake 1427 5 ml   Output 175 ml   Net 1252 5 ml       Physical Exam:     Physical Exam   Constitutional: He appears well-developed and well-nourished  No distress  Nasal cannula in place  HENT:   Head: Normocephalic and atraumatic  Eyes: Pupils are equal, round, and reactive to light  EOM are normal    Neck: Neck supple     Cardiovascular: Normal rate, regular rhythm and normal heart sounds  Pulmonary/Chest: Effort normal and breath sounds normal  He has no wheezes  Abdominal: Soft  He exhibits no distension  There is no tenderness  Neurological: He is alert  Skin: Skin is warm and dry  He is not diaphoretic  Psychiatric: He has a normal mood and affect  His behavior is normal    Vitals reviewed  Additional Data:     Labs:    Results from last 7 days   Lab Units 07/23/20  0552 07/22/20  0433   WBC Thousand/uL 15 79* 19 40*   HEMOGLOBIN g/dL 12 1 13 3   HEMATOCRIT % 37 5 40 6   PLATELETS Thousands/uL 181 200   NEUTROS PCT %  --  89*   LYMPHS PCT %  --  4*   MONOS PCT %  --  6   EOS PCT %  --  0     Results from last 7 days   Lab Units 07/23/20  0552  07/21/20  0445   SODIUM mmol/L 138   < > 139   POTASSIUM mmol/L 5 1   < > 5 3   CHLORIDE mmol/L 108   < > 108   CO2 mmol/L 24   < > 24   BUN mg/dL 52*   < > 43*   CREATININE mg/dL 2 29*   < > 2 20*   ANION GAP mmol/L 6   < > 7   CALCIUM mg/dL 8 4   < > 8 8   ALBUMIN g/dL  --   --  3 5   TOTAL BILIRUBIN mg/dL  --   --  0 61   ALK PHOS U/L  --   --  73   ALT U/L  --   --  15   AST U/L  --   --  12   GLUCOSE RANDOM mg/dL 120   < > 110    < > = values in this interval not displayed  Results from last 7 days   Lab Units 07/20/20  1021   INR  1 13             Results from last 7 days   Lab Units 07/22/20  0433   PROCALCITONIN ng/ml 0 13           * I Have Reviewed All Lab Data Listed Above  * Additional Pertinent Lab Tests Reviewed: All Labs Within Last 24 Hours Reviewed    Imaging:    Imaging Reports Reviewed Today Include: Echo  Imaging Personally Reviewed by Myself Includes:  n/a    Recent Cultures (last 7 days):     Results from last 7 days   Lab Units 07/22/20  1601 07/22/20  0443   BLOOD CULTURE  No Growth at 24 hrs  Received in Microbiology Lab  Culture in Progress         Last 24 Hours Medication List:     Current Facility-Administered Medications:  albuterol 2 puff Inhalation Q4H MAURA Stephens DO amLODIPine 10 mg Oral Daily Kendraselvin Carson-Scott, DO    aspirin 325 mg Oral Daily Lehaylee Sood, DO    cefazolin 2,000 mg Intravenous Once Vernon Castanon PA-C    clopidogrel 75 mg Oral Daily Leocadia Dalila Sood, DO    labetalol 50 mg Oral Q8H [address] Kendra Alli-Scott, DO    Labetalol HCl 10 mg Intravenous Q8H PRN Kendra Ratliff, DO    metoclopramide 10 mg Intravenous Q6H PRN Leclarencedia Dalila Sood, DO    pantoprazole 40 mg Oral Early Morning Hetlino Stewart, DO    pravastatin 40 mg Oral Daily With [employer], DO    sodium chloride 75 mL/hr Intravenous Continuous René Cohen DO Last Rate: 75 mL/hr (07/23/20 0519)   sodium chloride 1 g Oral BID Cortez Stewart DO         Today, Patient Was Seen By: René Cohen DO    ** Please Note: Dictation voice to text software may have been used in the creation of this document   **

## 2020-07-23 NOTE — ASSESSMENT & PLAN NOTE
Patient with prior stroke last year    · Continue aspirin and Plavix  · Stat CT if acute change in neurological status

## 2020-07-24 ENCOUNTER — ANESTHESIA (INPATIENT)
Dept: NON INVASIVE DIAGNOSTICS | Facility: HOSPITAL | Age: 85
DRG: 242 | End: 2020-07-24
Payer: COMMERCIAL

## 2020-07-24 ENCOUNTER — APPOINTMENT (INPATIENT)
Dept: RADIOLOGY | Facility: HOSPITAL | Age: 85
DRG: 242 | End: 2020-07-24
Payer: COMMERCIAL

## 2020-07-24 ENCOUNTER — APPOINTMENT (INPATIENT)
Dept: NON INVASIVE DIAGNOSTICS | Facility: HOSPITAL | Age: 85
DRG: 242 | End: 2020-07-24
Attending: INTERNAL MEDICINE
Payer: COMMERCIAL

## 2020-07-24 PROBLEM — I16.0 HYPERTENSIVE URGENCY: Status: RESOLVED | Noted: 2019-05-19 | Resolved: 2020-07-24

## 2020-07-24 LAB
ABO GROUP BLD: NORMAL
ANION GAP SERPL CALCULATED.3IONS-SCNC: 6 MMOL/L (ref 4–13)
APTT PPP: 34 SECONDS (ref 23–37)
ATRIAL RATE: 375 BPM
ATRIAL RATE: 468 BPM
BLD GP AB SCN SERPL QL: NEGATIVE
BUN SERPL-MCNC: 54 MG/DL (ref 5–25)
CALCIUM SERPL-MCNC: 9 MG/DL (ref 8.3–10.1)
CHLORIDE SERPL-SCNC: 111 MMOL/L (ref 100–108)
CO2 SERPL-SCNC: 24 MMOL/L (ref 21–32)
CREAT SERPL-MCNC: 2.28 MG/DL (ref 0.6–1.3)
ERYTHROCYTE [DISTWIDTH] IN BLOOD BY AUTOMATED COUNT: 13.7 % (ref 11.6–15.1)
GFR SERPL CREATININE-BSD FRML MDRD: 25 ML/MIN/1.73SQ M
GLUCOSE SERPL-MCNC: 125 MG/DL (ref 65–140)
GLUCOSE SERPL-MCNC: 141 MG/DL (ref 65–140)
GLUCOSE SERPL-MCNC: 95 MG/DL (ref 65–140)
HCT VFR BLD AUTO: 36.5 % (ref 36.5–49.3)
HGB BLD-MCNC: 11.6 G/DL (ref 12–17)
INR PPP: 1.32 (ref 0.84–1.19)
MCH RBC QN AUTO: 30.6 PG (ref 26.8–34.3)
MCHC RBC AUTO-ENTMCNC: 31.8 G/DL (ref 31.4–37.4)
MCV RBC AUTO: 96 FL (ref 82–98)
P AXIS: 129 DEGREES
PLATELET # BLD AUTO: 173 THOUSANDS/UL (ref 149–390)
PMV BLD AUTO: 11.2 FL (ref 8.9–12.7)
POTASSIUM SERPL-SCNC: 5 MMOL/L (ref 3.5–5.3)
PROTHROMBIN TIME: 16.4 SECONDS (ref 11.6–14.5)
QRS AXIS: -83 DEGREES
QRS AXIS: -85 DEGREES
QRSD INTERVAL: 150 MS
QRSD INTERVAL: 150 MS
QT INTERVAL: 482 MS
QT INTERVAL: 482 MS
QTC INTERVAL: 482 MS
QTC INTERVAL: 482 MS
RBC # BLD AUTO: 3.79 MILLION/UL (ref 3.88–5.62)
RH BLD: POSITIVE
SODIUM SERPL-SCNC: 141 MMOL/L (ref 136–145)
SPECIMEN EXPIRATION DATE: NORMAL
T WAVE AXIS: 86 DEGREES
T WAVE AXIS: 87 DEGREES
VENTRICULAR RATE: 60 BPM
VENTRICULAR RATE: 60 BPM
WBC # BLD AUTO: 10.91 THOUSAND/UL (ref 4.31–10.16)

## 2020-07-24 PROCEDURE — C1892 INTRO/SHEATH,FIXED,PEEL-AWAY: HCPCS | Performed by: INTERNAL MEDICINE

## 2020-07-24 PROCEDURE — C1898 LEAD, PMKR, OTHER THAN TRANS: HCPCS

## 2020-07-24 PROCEDURE — 93005 ELECTROCARDIOGRAM TRACING: CPT

## 2020-07-24 PROCEDURE — 86900 BLOOD TYPING SEROLOGIC ABO: CPT | Performed by: STUDENT IN AN ORGANIZED HEALTH CARE EDUCATION/TRAINING PROGRAM

## 2020-07-24 PROCEDURE — 99233 SBSQ HOSP IP/OBS HIGH 50: CPT | Performed by: INTERNAL MEDICINE

## 2020-07-24 PROCEDURE — 02H63JZ INSERTION OF PACEMAKER LEAD INTO RIGHT ATRIUM, PERCUTANEOUS APPROACH: ICD-10-PCS | Performed by: INTERNAL MEDICINE

## 2020-07-24 PROCEDURE — 82948 REAGENT STRIP/BLOOD GLUCOSE: CPT

## 2020-07-24 PROCEDURE — 86901 BLOOD TYPING SEROLOGIC RH(D): CPT | Performed by: STUDENT IN AN ORGANIZED HEALTH CARE EDUCATION/TRAINING PROGRAM

## 2020-07-24 PROCEDURE — 02PA3MZ REMOVAL OF CARDIAC LEAD FROM HEART, PERCUTANEOUS APPROACH: ICD-10-PCS | Performed by: INTERNAL MEDICINE

## 2020-07-24 PROCEDURE — 99232 SBSQ HOSP IP/OBS MODERATE 35: CPT | Performed by: INTERNAL MEDICINE

## 2020-07-24 PROCEDURE — 33208 INSRT HEART PM ATRIAL & VENT: CPT | Performed by: INTERNAL MEDICINE

## 2020-07-24 PROCEDURE — 71045 X-RAY EXAM CHEST 1 VIEW: CPT

## 2020-07-24 PROCEDURE — 3E0102A INTRODUCTION OF ANTI-INFECTIVE ENVELOPE INTO SUBCUTANEOUS TISSUE, OPEN APPROACH: ICD-10-PCS | Performed by: INTERNAL MEDICINE

## 2020-07-24 PROCEDURE — C1769 GUIDE WIRE: HCPCS | Performed by: INTERNAL MEDICINE

## 2020-07-24 PROCEDURE — 02HK3JZ INSERTION OF PACEMAKER LEAD INTO RIGHT VENTRICLE, PERCUTANEOUS APPROACH: ICD-10-PCS | Performed by: INTERNAL MEDICINE

## 2020-07-24 PROCEDURE — 86850 RBC ANTIBODY SCREEN: CPT | Performed by: STUDENT IN AN ORGANIZED HEALTH CARE EDUCATION/TRAINING PROGRAM

## 2020-07-24 PROCEDURE — 0JPT0PZ REMOVAL OF CARDIAC RHYTHM RELATED DEVICE FROM TRUNK SUBCUTANEOUS TISSUE AND FASCIA, OPEN APPROACH: ICD-10-PCS | Performed by: INTERNAL MEDICINE

## 2020-07-24 PROCEDURE — 85610 PROTHROMBIN TIME: CPT | Performed by: STUDENT IN AN ORGANIZED HEALTH CARE EDUCATION/TRAINING PROGRAM

## 2020-07-24 PROCEDURE — 0JH606Z INSERTION OF PACEMAKER, DUAL CHAMBER INTO CHEST SUBCUTANEOUS TISSUE AND FASCIA, OPEN APPROACH: ICD-10-PCS | Performed by: INTERNAL MEDICINE

## 2020-07-24 PROCEDURE — C1785 PMKR, DUAL, RATE-RESP: HCPCS

## 2020-07-24 PROCEDURE — 85027 COMPLETE CBC AUTOMATED: CPT | Performed by: INTERNAL MEDICINE

## 2020-07-24 PROCEDURE — 93010 ELECTROCARDIOGRAM REPORT: CPT | Performed by: INTERNAL MEDICINE

## 2020-07-24 PROCEDURE — 85730 THROMBOPLASTIN TIME PARTIAL: CPT | Performed by: STUDENT IN AN ORGANIZED HEALTH CARE EDUCATION/TRAINING PROGRAM

## 2020-07-24 PROCEDURE — 80048 BASIC METABOLIC PNL TOTAL CA: CPT | Performed by: INTERNAL MEDICINE

## 2020-07-24 RX ORDER — CLONIDINE HYDROCHLORIDE 0.1 MG/1
0.1 TABLET ORAL DAILY
Status: DISCONTINUED | OUTPATIENT
Start: 2020-07-25 | End: 2020-08-05 | Stop reason: HOSPADM

## 2020-07-24 RX ORDER — GENTAMICIN SULFATE 40 MG/ML
INJECTION, SOLUTION INTRAMUSCULAR; INTRAVENOUS CODE/TRAUMA/SEDATION MEDICATION
Status: COMPLETED | OUTPATIENT
Start: 2020-07-24 | End: 2020-07-24

## 2020-07-24 RX ORDER — LABETALOL 200 MG/1
200 TABLET, FILM COATED ORAL EVERY 8 HOURS SCHEDULED
Status: DISCONTINUED | OUTPATIENT
Start: 2020-07-24 | End: 2020-07-24

## 2020-07-24 RX ORDER — AMLODIPINE BESYLATE 10 MG/1
10 TABLET ORAL 2 TIMES DAILY
Status: DISCONTINUED | OUTPATIENT
Start: 2020-07-25 | End: 2020-08-05 | Stop reason: HOSPADM

## 2020-07-24 RX ORDER — MIDAZOLAM HYDROCHLORIDE 2 MG/2ML
INJECTION, SOLUTION INTRAMUSCULAR; INTRAVENOUS AS NEEDED
Status: DISCONTINUED | OUTPATIENT
Start: 2020-07-24 | End: 2020-07-24 | Stop reason: SURG

## 2020-07-24 RX ORDER — PROPOFOL 10 MG/ML
INJECTION, EMULSION INTRAVENOUS AS NEEDED
Status: DISCONTINUED | OUTPATIENT
Start: 2020-07-24 | End: 2020-07-24 | Stop reason: SURG

## 2020-07-24 RX ORDER — METOPROLOL SUCCINATE 50 MG/1
50 TABLET, EXTENDED RELEASE ORAL 2 TIMES DAILY
Status: DISCONTINUED | OUTPATIENT
Start: 2020-07-25 | End: 2020-07-25

## 2020-07-24 RX ORDER — CEFAZOLIN SODIUM 1 G/3ML
INJECTION, POWDER, FOR SOLUTION INTRAMUSCULAR; INTRAVENOUS AS NEEDED
Status: DISCONTINUED | OUTPATIENT
Start: 2020-07-24 | End: 2020-07-24 | Stop reason: SURG

## 2020-07-24 RX ORDER — FENTANYL CITRATE 50 UG/ML
INJECTION, SOLUTION INTRAMUSCULAR; INTRAVENOUS AS NEEDED
Status: DISCONTINUED | OUTPATIENT
Start: 2020-07-24 | End: 2020-07-24 | Stop reason: SURG

## 2020-07-24 RX ORDER — HEPARIN SODIUM 5000 [USP'U]/ML
5000 INJECTION, SOLUTION INTRAVENOUS; SUBCUTANEOUS EVERY 8 HOURS SCHEDULED
Status: DISCONTINUED | OUTPATIENT
Start: 2020-07-24 | End: 2020-07-24

## 2020-07-24 RX ORDER — LIDOCAINE HYDROCHLORIDE 10 MG/ML
INJECTION, SOLUTION EPIDURAL; INFILTRATION; INTRACAUDAL; PERINEURAL CODE/TRAUMA/SEDATION MEDICATION
Status: COMPLETED | OUTPATIENT
Start: 2020-07-24 | End: 2020-07-24

## 2020-07-24 RX ORDER — METOPROLOL SUCCINATE 50 MG/1
50 TABLET, EXTENDED RELEASE ORAL ONCE
Status: COMPLETED | OUTPATIENT
Start: 2020-07-24 | End: 2020-07-24

## 2020-07-24 RX ORDER — ASPIRIN 81 MG/1
81 TABLET ORAL DAILY
Status: DISCONTINUED | OUTPATIENT
Start: 2020-07-25 | End: 2020-08-05 | Stop reason: HOSPADM

## 2020-07-24 RX ADMIN — APIXABAN 2.5 MG: 2.5 TABLET, FILM COATED ORAL at 18:32

## 2020-07-24 RX ADMIN — Medication 1 G: at 17:55

## 2020-07-24 RX ADMIN — MIDAZOLAM 1 MG: 1 INJECTION INTRAMUSCULAR; INTRAVENOUS at 14:34

## 2020-07-24 RX ADMIN — METOPROLOL SUCCINATE 50 MG: 50 TABLET, EXTENDED RELEASE ORAL at 18:33

## 2020-07-24 RX ADMIN — GENTAMICIN SULFATE 80 MG: 40 INJECTION, SOLUTION INTRAMUSCULAR; INTRAVENOUS at 15:28

## 2020-07-24 RX ADMIN — CLOPIDOGREL BISULFATE 75 MG: 75 TABLET ORAL at 09:23

## 2020-07-24 RX ADMIN — ASPIRIN 325 MG ORAL TABLET 325 MG: 325 PILL ORAL at 09:23

## 2020-07-24 RX ADMIN — FENTANYL CITRATE 25 MCG: 50 INJECTION, SOLUTION INTRAMUSCULAR; INTRAVENOUS at 14:54

## 2020-07-24 RX ADMIN — FENTANYL CITRATE 25 MCG: 50 INJECTION, SOLUTION INTRAMUSCULAR; INTRAVENOUS at 14:49

## 2020-07-24 RX ADMIN — Medication 2 MCG/MIN: at 14:32

## 2020-07-24 RX ADMIN — FENTANYL CITRATE 25 MCG: 50 INJECTION, SOLUTION INTRAMUSCULAR; INTRAVENOUS at 14:59

## 2020-07-24 RX ADMIN — AMLODIPINE BESYLATE 10 MG: 10 TABLET ORAL at 09:23

## 2020-07-24 RX ADMIN — FENTANYL CITRATE 25 MCG: 50 INJECTION, SOLUTION INTRAMUSCULAR; INTRAVENOUS at 14:34

## 2020-07-24 RX ADMIN — CEFAZOLIN 2000 MG: 1 INJECTION, POWDER, FOR SOLUTION INTRAVENOUS at 14:31

## 2020-07-24 RX ADMIN — PROPOFOL 20 MG: 10 INJECTION, EMULSION INTRAVENOUS at 14:48

## 2020-07-24 RX ADMIN — IOHEXOL 20 ML: 350 INJECTION, SOLUTION INTRAVENOUS at 14:57

## 2020-07-24 RX ADMIN — PRAVASTATIN SODIUM 40 MG: 40 TABLET ORAL at 17:12

## 2020-07-24 RX ADMIN — Medication 1 G: at 09:23

## 2020-07-24 RX ADMIN — PANTOPRAZOLE SODIUM 40 MG: 40 TABLET, DELAYED RELEASE ORAL at 05:35

## 2020-07-24 RX ADMIN — LABETALOL HYDROCHLORIDE 100 MG: 100 TABLET, FILM COATED ORAL at 05:35

## 2020-07-24 RX ADMIN — LIDOCAINE HYDROCHLORIDE 25 ML: 10 INJECTION, SOLUTION EPIDURAL; INFILTRATION; INTRACAUDAL; PERINEURAL at 14:50

## 2020-07-24 NOTE — DISCHARGE INSTRUCTIONS
Please refer to post pacemaker implantation discharge instructions and restrictions and your pacemaker booklet/temporary card  Keep incision dry for one week  Do not use lotions/powders/creams on incision  Leave outer bandage in place for 1 week - it is water proof, and as long as it is fully adhered to your skin you may shower with it  If it appears as though the bandage is coming off and/or there is any communication to the area of device incision, please then keep the whole area dry for the remaining week  After 1 week, please remove by pulling all edges away from the center of the bandage  No overhead reaching/pushing/pulling/lifting greater than 5-10lbs with left arm for six weeks  Please call the office if you notice redness, swelling, bleeding, or drainage from incision or if you develop fevers  AFTER PACEMAKER CARE:    If you have any questions, please call [phone] to speak with a nurse (8:30am-4pm, or [phone] after hours)  For appointments, please call [phone]  WHAT YOU SHOULD KNOW:   A pacemaker is a small, battery-powered device that is placed under your skin in your upper chest area with wires placed through a vein that lead directly into the heart  It helps regulate your heart rate and prevent your heart from beating too slowly  AFTER YOU LEAVE:     Medicines:     · Pain medicine: You may need medicine to take away or decrease pain  ¨ Learn how to take your medicine  Ask what medicine and how much you should take  Be sure you know how, when, and how often to take it  Usually Over the counter pain medicine is sufficient to control pain (Acetominophen or Ibuprofen) Ask your doctor if you may take these  If this does not control your pain, narcotic pain killers may be prescribed, please call if you need prescription  ¨ Do not wait until the pain is severe before you take your medicine  Tell caregivers if your pain does not decrease      ¨ Pain medicine can make you dizzy or sleepy  Prevent falls by calling someone when you get out of bed or if you need help  Take your medicine as directed  Call your healthcare provider if you think your medicine is not helping or if you have side effects  Tell him if you are allergic to any medicine  Follow up with your cardiologist after your procedure: You will need a follow-up visit approximately 2 weeks after you leave the hospital  Your cardiologist will check your wound and make sure that your pacemaker is working correctly  Follow the instructions to check your pacemaker: Your cardiologist or primary healthcare provider will check your pacemaker and the battery regularly  He will use a computer to check your pacemaker over the telephone or wireless device which will be given to you  Pacemaker batteries usually last 8 to 10 years  The pacemaker unit will be replaced when the battery gets low  This is a simpler procedure than the original one to implant your pacemaker  Wound care:  Keep your incision dry for one week  Do not use lotions/powders/creams on incision  Leave outer bandage in place for 1 week - it is water proof, and as long as it is fully adhered to your skin you may shower with it  If it appears as though the bandage is coming off and/or there is any communication to the area of device incision, please then keep the whole area dry for the remaining week  After 1 week, please remove by pulling all edges away from the center of the bandage  Please call the office if you notice redness, swelling, bleeding, or drainage from incision or if you develop fevers  Activity:   · Arm movement and lifting:  Be careful using the arm on the side of your pacemaker  Do not move your arm for the first 24 hours after your procedure  Do not  lift your arm above your shoulder or lift more than 10 pounds for one month after your procedure   Avoid pushing, pulling, or repetitive arm movements for one month  This helps the leads stay in place and helps your wound heal  Ask your caregiver when you can drive after your procedure  You may move your arm side to side without lifting above your shoulder, and do not need to wear a sling at home  · Driving: you are ok to drive 48 hours after pacemaker is implanted   · Sports:  Ask your caregiver when it is okay to play tennis, golf, basketball, or any sport that requires you to lift your arms  Do not play full contact sports, such as football, that could damage your pacemaker  Ask your cardiologist or primary healthcare provider how much and what kinds of physical activity are safe for you  Living with a pacemaker:   · Tell all caregivers you have a pacemaker: This includes surgeons, radiologists, and medical technicians  You may want to wear a medical alert ID bracelet or necklace that states that you have a pacemaker  · Carry your pacemaker ID card: Make sure you receive a pacemaker ID card  Carry it with you at all times  It lists important information about your pacemaker  Show it to airport security if you travel  · Avoid electrical interference:  Avoid welding equipment and other equipment with large magnets or electric fields  These things could interfere with how your pacemaker works  Use your cell phone on the ear opposite from your pacemaker  Do not carry your cell phone in your shirt pocket over your chest      · Some Pacemakers are MRI safe  Ask you doctor if it is safe to proceed with MRI and let the radiologist and staff know you have a pacemaker  · Do not touch the skin around your pacemaker: This can cause damage to the lead wires or move the pacemaker unit from where it should be  Contact your cardiologist or primary healthcare provider if:   · The area around your pacemaker has increasing amount of pain after surgery  The pain should improve over first few days after implantation       · The skin around your stitches has increasing redness, swelling, or has drainage  This may mean that you have an infection  · You have a fever  · You have chills, a cough, and feel weak or achy  These are also signs of infection  · Your feet or ankles are more swollen than your baseline  · Your Heart rate is less than 50 beats per minute     Seek care immediately if:   · Your bandage becomes soaked with blood  · Your pacemaker is swelling rapidly    · Your stitches open up  · You feel your heart suddenly beating very slowly or quickly  · You become too weak or dizzy to stand, or you pass out  · Your arm or leg feels warm, tender, and painful  It may look swollen and red  · You have chest pain that does not go away with rest or medicine  · You feel lightheaded, short of breath, and have chest pain  · You cough up blood  © 2014 [address] is for End User's use only and may not be sold, redistributed or otherwise used for commercial purposes  All illustrations and images included in CareNotes® are the copyrighted property of [employer]  or Vincent Gordillo  The above information is an  only  It is not intended as medical advice for individual conditions or treatments  Talk to your doctor, nurse or pharmacist before following any medical regimen to see if it is safe and effective for you

## 2020-07-24 NOTE — ANESTHESIA POSTPROCEDURE EVALUATION
Post-Op Assessment Note    CV Status:  Stable  Pain Score: 0    Pain management: adequate     Mental Status:  Alert and awake   Hydration Status:  Euvolemic   PONV Controlled:  Controlled   Airway Patency:  Patent and adequate   Post Op Vitals Reviewed: Yes      Staff: CRNA           BP   154/68   Temp      Pulse  64   Resp   18   SpO2   93

## 2020-07-24 NOTE — PROCEDURES
PPM - His lead in LPF region    History and physical were reviewed  Patient was examined and history was reviewed  No change in patient's condition Since history and physical has been completed        The pre- operative diagnosis:  Complete heart block, temporary pacemaker in place   H/o bifascicular block  LVEF -60%  H/o severe AS , post TAVR  CAD, post CABG  Hypertension  Hyperlipidemia        Postoperative diagnosis:  Complete heart block, temporary pacemaker in place   H/o bifascicular block  LVEF -60%  H/o severe AS , post TAVR  CAD, post CABG  Hypertension  Hyperlipidemia        Procedure:  Dual chamber PPM  RA lead   RV lead - His lead - with LPF capture     Removal of right IJ temporary permanent pacer lead             Surgeon: [id number] -none    Specimens - none    Estimated blood loss- 10 ml    Findings-none    Complications none    Anesthesia-  Anesthesia by anaesthesiologist , local lidocaine by myself      Details of the device                Description of procedure: The patient was seen before the procedure  The details of the procedure was explained and patient agreed to the same  Appropriate consent was signed  The patient was brought to the electrophysiologic laboratory  Proper time out was done  Sterile dressing and draping was done  Local lidocaine was infiltrated, In the infraclavicular region at the site of device implant  Initial incision was made by a sharp number 10 blade  Thereafter electrocautery and blunt dissection was used to form a prepectoral pocket  Appropriate hemostasis was taken care of      20 mL of radiocontrast, followed by 20 mL of saline, was injected in a peripheral vein on the side of the implant  Under direct visualization, the axillary vein was  Punctured,extra-thoracic  A single guidewire was inserted, and taking all the way to the inferior vena cava to confirm that it is on the right side   We also confirmed by the left anterior oblique view that the wire is in the right side  Thereafter a second access was obtained using the fluoroscopic image of the venogram as a roadmap  Wire was once again taken to the inferior vena cava, and position checked in Green[place]ic views To confirm the appropriate position  A 7F sheath passed over first wire  A His sheath and lead was passed through the sheath  Mapping of His bundle done  Then went distally and inferiorly and left posterior fascicle (LPF) was mapped   Position of the lead was confirmed in both LOPEZ and [place]ic views Appropriate sensing and thresholds were noted  Lead was screwed in LPF position with non selective capture  The sheath was slit and removed  Once again the lead was tested for appropriate sensing, impedance and threshold  The lead was tied down to the prepectoral fascia and muscle  The patient was paced from the LPF  for second part of procedure     The temporary pacing wire was removed under fluoroscopy  Care was taken not to dislodge the newly placed left posterior fascicular lead    A 7 [place] sheath was passed over the 2nd wire  The atrial lead with the curved stylet in it, was maneuvered into the right atrial appendage  Appropriate sensing and thresholds were noted  The lead was screwed in  The sheath was removed  The lead was sutured to the pectoral muscle and fascia      The pocket was washed with large amounts of antibiotic saline  Appropriate hemostasis was taken care of  The lead was connected to the generator  The generator and leads were placed inside the pocket  The generator was tied down  An antibiotic pouch was placed in the pocket - patient is being treated for infection  Thereafter the device was interrogated and proper sensing and thresholds through the device were confirmed  The pocket was closed in 3 separate layers with intermittent sutures     Dressing was done with Steri-Strips, Aquacell  A modified pressure bandage was applied         Summary of the procedure: The patient came in to the laboratory for dual -chamber device placement   The device has been placed and patient tolerated the procedure well

## 2020-07-24 NOTE — ANESTHESIA PREPROCEDURE EVALUATION
Review of Systems/Medical History  Patient summary reviewed  Chart reviewed      Cardiovascular  Exercise tolerance (METS): <4,  Hypertension , Valvular heart disease , aortic valve stenosis, Valve replacement aortic valve  replacement, Dysrhythmias , 3rd degree block,    Pulmonary  Asthma ,        GI/Hepatic    GERD ,        Kidney disease CKD,        Endo/Other    Obesity    GYN  Negative gynecology ROS          Hematology  Anemia ,     Musculoskeletal       Neurology  Negative neurology ROS      Psychology   Negative psychology ROS          Ventricular-paced rhythm  Abnormal ECG  When compared with ECG of 21-JUL-2020 13:30,  No significant change was found  Confirmed by Chester Wood ([id number]) on 7/24/2020 8:43:56 AM   7/21/20 TTE  SUMMARY     LEFT VENTRICLE:  Systolic function was normal  Ejection fraction was estimated to be 60 %  There were no regional wall motion abnormalities  Wall thickness was moderately increased      RIGHT VENTRICLE:  The size was normal   Systolic function was normal      LEFT ATRIUM:  The atrium was mildly dilated      RIGHT ATRIUM:  The atrium was dilated      MITRAL VALVE:  There was marked calcification  There was moderately reduced leaflet separation  There was mild to moderate stenosis  Assessment is limited by heart block  There was mild regurgitation  Not well visualized      AORTIC VALVE:  A bioprosthesis was present  The peak valve velocity was 141 cm/s  The aortic valve obstructive index (by VTI) was 0 61  Estimated aortic valve area (by VTI) was 1 38 cmï¾²     TRICUSPID VALVE:  There was mild regurgitation  Estimated peak PA pressure was 48 mmHg  The findings suggest mild pulmonary hypertension  Physical Exam    Airway    Mallampati score: III  TM Distance: >3 FB  Neck ROM: limited     Dental       Cardiovascular      Pulmonary      Other Findings        Anesthesia Plan  ASA Score- 4     Anesthesia Type- IV sedation with anesthesia with ASA Monitors  Additional Monitors:   Airway Plan:     Comment: Plan for sedation with general anesthesia as back up        Plan Factors-    Induction- intravenous  Postoperative Plan-     Informed Consent- Anesthetic plan and risks discussed with patient  I personally reviewed this patient with the CRNA  Discussed and agreed on the Anesthesia Plan with the CRNA  Christa John

## 2020-07-24 NOTE — ASSESSMENT & PLAN NOTE
Asymptomatic currently  Patient with complete heart block  Western blot shows elevated Lyme IgG    · Hold metoprolol  · Status post permanent pacemaker placement

## 2020-07-24 NOTE — PROGRESS NOTES
Progress Note - Infectious Disease   Dixie Ngo 80 y o  male MRN: [id number]  Unit/Bed#: [facility] 525-01 Encounter: [id number]      Impression:  1  Stress leukocytosis  2  Complete heart block with bradycardia  3  CAD CABG, AS, bioprosthetic AVR   4  YOLANDE on CKD  5  History of CVA     Recommendations:  Patient is afebrile with a declining but still elevated WBC count of 15,790  Discussed with Cardiology service  1  Check final blood culture results which are negative so far and continue to follow serial WBC counts  2  No antibiotics at this time   3  PPM insertion if infection ruled out    Antibiotics:  1  None    Subjective: The patient has no complaints  Denies fevers, chills, or sweats  Denies nausea, vomiting, or diarrhea  Objective:  Vitals:  Temp:  [97 9 °F (36 6 °C)-98 6 °F (37 °C)] 98 6 °F (37 °C)  HR:  [59-60] 60  Resp:  [15-18] 18  BP: (146-180)/(57-76) 149/67  SpO2:  [92 %-97 %] 96 %  Temp (24hrs), Av 2 °F (36 8 °C), Min:97 9 °F (36 6 °C), Max:98 6 °F (37 °C)  Current: Temperature: 98 6 °F (37 °C)    Physical Exam:     General Appearance:  Alert, moderately obese elderly male who was nontoxic, no acute distress  Throat: Oropharynx moist without lesions  Lips, mucosa, and tongue normal   Neck: Supple, symmetrical, trachea midline, no adenopathy,  no tenderness/mass/nodules   Lungs:   Clear to auscultation bilaterally, no audible wheezes, rhonchi or rales; respirations unlabored   Heart:  Sternotomy scar, right subclavian ppm clean wound, Regular rate and rhythm, S1, S2 normal, no murmur, rub or gallop   Abdomen:   Soft, non-tender, non-distended, positive bowel sounds  No masses, no organomegaly    No CVA tenderness   Extremities: Extremities normal, atraumatic, no clubbing, cyanosis or edema   Skin: As above           Invasive Devices     Peripheral Intravenous Line            Peripheral IV 20 Left;Proximal;Ventral (anterior) Forearm 3 days    Peripheral IV 20 Right Antecubital 3 days                Labs, Imaging, & Other studies:   All pertinent labs were personally reviewed  Results from last 7 days   Lab Units 07/23/20  0552 07/22/20  0433 07/21/20  0756   WBC Thousand/uL 15 79* 19 40* 19 32*   HEMOGLOBIN g/dL 12 1 13 3 13 4   PLATELETS Thousands/uL 181 200 238     Results from last 7 days   Lab Units 07/23/20  0552 07/22/20  0433 07/21/20  0445  07/20/20  1021   SODIUM mmol/L 138 139 139   < > 139   POTASSIUM mmol/L 5 1 4 9 5 3   < > 5 2   CHLORIDE mmol/L 108 108 108   < > 104   CO2 mmol/L 24 24 24   < > 28   BUN mg/dL 52* 46* 43*   < > 40*   CREATININE mg/dL 2 29* 2 35* 2 20*   < > 2 74*   EGFR ml/min/1 73sq m 25 24 27   < > 20   CALCIUM mg/dL 8 4 8 9 8 8   < > 8 8   AST U/L  --   --  12  --  15   ALT U/L  --   --  15  --  18   ALK PHOS U/L  --   --  73  --  83    < > = values in this interval not displayed  Results from last 7 days   Lab Units 07/23/20  1602 07/22/20  0443   BLOOD CULTURE  No Growth at 48 hrs  No Growth at 24 hrs

## 2020-07-24 NOTE — ASSESSMENT & PLAN NOTE
Unclear etiology  White count initially normal, then increased to 17 69 and then 19 32  No bandemia  Improving today  Possibly reactive in nature  Patient is otherwise afebrile and not septic    · Blood cultures negative to date  · Procalcitonin not elevated  · Monitor off antibiotics  · Appreciate ID recommendations  · WBC count improving

## 2020-07-24 NOTE — ASSESSMENT & PLAN NOTE
Previous baseline around 1 8-1 9 in 2019  Now trending 2 2-2 3  This possibly represents his new baseline    · Avoid nephrotoxins  · Monitor electrolytes and renal function  · Ensure adequate oral intake and hydration  · Discontinue IV hydration

## 2020-07-24 NOTE — ASSESSMENT & PLAN NOTE
Patient with prior stroke last year    · Continue aspirin and Plavix  · Stat CT if acute change in neurological status  · Patient noted to have some postop confusion on arrival to floor, will order one-to-one observation

## 2020-07-24 NOTE — PROGRESS NOTES
Progress Note - [facility] 1935, 80 y o  male MRN: [id number]    Unit/Bed#: [facility] 525-01 Encounter: [id number]    Primary Care Provider: Brenna Moore DO   Date and time admitted to hospital: 7/20/2020  4:04 PM        Leukocytosis  Assessment & Plan  Unclear etiology  White count initially normal, then increased to 17 69 and then 19 32  No bandemia  Improving today  Possibly reactive in nature  Patient is otherwise afebrile and not septic  · Blood cultures negative to date  · Procalcitonin not elevated  · Monitor off antibiotics  · Appreciate ID recommendations  · WBC count improving    Essential hypertension  Assessment & Plan  · Continue amlodipine  · Continue scheduled labetalol    Complete heart block (HCC)  Assessment & Plan  · Continue to hold metoprolol  · Patient receiving scheduled labetalol  · Status post permanent pacemaker placement on 07/24    History of stroke  Assessment & Plan  Patient with prior stroke last year  · Continue aspirin and Plavix  · Stat CT if acute change in neurological status    Acute kidney injury superimposed on chronic kidney disease ([facility] [address] )  Assessment & Plan  Previous baseline around 1 8-1 9 in 2019  Now trending 2 2-2 3  This possibly represents his new baseline  · Avoid nephrotoxins  · Monitor electrolytes and renal function  · Ensure adequate oral intake and hydration  · Discontinue IV hydration    Mild intermittent asthma without complication  Assessment & Plan  Continue albuterol p r n     * Bradycardia  Assessment & Plan  Asymptomatic currently  Patient with complete heart block  Western blot shows elevated Lyme IgG  · Hold metoprolol  · Status post permanent pacemaker placement    Hypertensive urgencyresolved as of 7/24/2020  Assessment & Plan  Patient on Norvasc currently    Will hold Toprol given bradyarrhythmia  · Currently resolved, will monitor      VTE Pharmacologic Prophylaxis:   Pharmacologic: Heparin  Mechanical VTE Prophylaxis in Place: Yes    Patient Centered Rounds: I have performed bedside rounds with nursing staff today  Discussions with Specialists or Other Care Team Provider: EP    Education and Discussions with Family / Patient: yes    Time Spent for Care: 30 minutes  More than 50% of total time spent on counseling and coordination of care as described above  Current Length of Stay: 4 day(s)    Current Patient Status: Inpatient   Certification Statement: Status post pacemaker placement  Anticipate discharge tomorrow    Discharge Plan: To home when able    Code Status: Level 1 - Full Code      Subjective:   Patient without complaints today  Not feeling short of breath  Objective:     Vitals:   Temp (24hrs), Av 4 °F (36 9 °C), Min:98 °F (36 7 °C), Max:98 6 °F (37 °C)    Temp:  [98 °F (36 7 °C)-98 6 °F (37 °C)] 98 °F (36 7 °C)  HR:  [58-60] 58  Resp:  [13-16] 16  BP: (123-168)/(53-74) 140/55  SpO2:  [94 %-97 %] 94 %  Body mass index is 36 14 kg/m²  Input and Output Summary (last 24 hours): Intake/Output Summary (Last 24 hours) at 2020 1636  Last data filed at 2020 1550  Gross per 24 hour   Intake 1847 5 ml   Output 225 ml   Net 1622 5 ml       Physical Exam:     Physical Exam   Constitutional: He appears well-developed and well-nourished  No distress  Nasal cannula in place  HENT:   Head: Normocephalic and atraumatic  Eyes: Pupils are equal, round, and reactive to light  EOM are normal    Cardiovascular: Normal rate, regular rhythm, normal heart sounds and intact distal pulses  Pulmonary/Chest: Effort normal and breath sounds normal  No respiratory distress  Abdominal: He exhibits no distension  Neurological: He is alert  He is disoriented  Skin: Skin is warm and dry  He is not diaphoretic  Psychiatric: He has a normal mood and affect  His behavior is normal    Vitals reviewed        Additional Data:     Labs:    Results from last 7 days   Lab Units 20  0533  20  0433   WBC Thousand/uL 10 91*   < > 19 40*   HEMOGLOBIN g/dL 11 6*   < > 13 3   HEMATOCRIT % 36 5   < > 40 6   PLATELETS Thousands/uL 173   < > 200   NEUTROS PCT %  --   --  89*   LYMPHS PCT %  --   --  4*   MONOS PCT %  --   --  6   EOS PCT %  --   --  0    < > = values in this interval not displayed  Results from last 7 days   Lab Units 07/24/20  0533  07/21/20  0445   SODIUM mmol/L 141   < > 139   POTASSIUM mmol/L 5 0   < > 5 3   CHLORIDE mmol/L 111*   < > 108   CO2 mmol/L 24   < > 24   BUN mg/dL 54*   < > 43*   CREATININE mg/dL 2 28*   < > 2 20*   ANION GAP mmol/L 6   < > 7   CALCIUM mg/dL 9 0   < > 8 8   ALBUMIN g/dL  --   --  3 5   TOTAL BILIRUBIN mg/dL  --   --  0 61   ALK PHOS U/L  --   --  73   ALT U/L  --   --  15   AST U/L  --   --  12   GLUCOSE RANDOM mg/dL 95   < > 110    < > = values in this interval not displayed  Results from last 7 days   Lab Units 07/24/20  0533   INR  1 32*             Results from last 7 days   Lab Units 07/22/20  0433   PROCALCITONIN ng/ml 0 13           * I Have Reviewed All Lab Data Listed Above  * Additional Pertinent Lab Tests Reviewed: All Labs Within Last 24 Hours Reviewed    Imaging:    Imaging Reports Reviewed Today Include: n/a  Imaging Personally Reviewed by Myself Includes:  n/a    Recent Cultures (last 7 days):     Results from last 7 days   Lab Units 07/22/20  0443 07/21/20  1139   BLOOD CULTURE  No Growth at 48 hrs  No Growth at 72 hrs         Last 24 Hours Medication List:     Current Facility-Administered Medications:  albuterol 2 puff Inhalation Q4H PRN Bassem Nina Veres, DO   amLODIPine 10 mg Oral Daily Kendra Ratliff, DO   aspirin 325 mg Oral Daily Bassem Sood, DO   cefazolin 2,000 mg Intravenous Once Vernon Castanon PA-C   clopidogrel 75 mg Oral Daily Bassem Sood, DO   labetalol 200 mg Oral Q8H [facility] Kendra Ratliff, DO   Labetalol HCl 10 mg Intravenous Q8H PRN Kendra Carson-Scott, DO   metoclopramide 10 mg Intravenous Q6H PRN Bassem Nina DO Bar[place]   pantoprazole 40 mg Oral Early Morning Cortez Stewart DO   pravastatin 40 mg Oral Daily With [employer],    sodium chloride 1 g Oral BID Cortez Stewart DO        Today, Patient Was Seen By: Danielle Hoff DO    ** Please Note: Dictation voice to text software may have been used in the creation of this document   **

## 2020-07-24 NOTE — ASSESSMENT & PLAN NOTE
· Continue to hold metoprolol  · Patient receiving scheduled labetalol  · Status post permanent pacemaker placement on 07/24

## 2020-07-25 ENCOUNTER — APPOINTMENT (INPATIENT)
Dept: RADIOLOGY | Facility: HOSPITAL | Age: 85
DRG: 242 | End: 2020-07-25
Payer: COMMERCIAL

## 2020-07-25 PROBLEM — R26.2 AMBULATORY DYSFUNCTION: Status: ACTIVE | Noted: 2020-07-25

## 2020-07-25 PROBLEM — R09.02 HYPOXIA: Status: ACTIVE | Noted: 2020-07-25

## 2020-07-25 LAB
ANION GAP SERPL CALCULATED.3IONS-SCNC: 10 MMOL/L (ref 4–13)
ATRIAL RATE: 117 BPM
ATRIAL RATE: 127 BPM
ATRIAL RATE: 326 BPM
ATRIAL RATE: 52 BPM
BUN SERPL-MCNC: 62 MG/DL (ref 5–25)
CALCIUM SERPL-MCNC: 8.6 MG/DL (ref 8.3–10.1)
CHLORIDE SERPL-SCNC: 109 MMOL/L (ref 100–108)
CO2 SERPL-SCNC: 21 MMOL/L (ref 21–32)
CREAT SERPL-MCNC: 2.24 MG/DL (ref 0.6–1.3)
ERYTHROCYTE [DISTWIDTH] IN BLOOD BY AUTOMATED COUNT: 13.6 % (ref 11.6–15.1)
GFR SERPL CREATININE-BSD FRML MDRD: 26 ML/MIN/1.73SQ M
GLUCOSE SERPL-MCNC: 119 MG/DL (ref 65–140)
GLUCOSE SERPL-MCNC: 126 MG/DL (ref 65–140)
GLUCOSE SERPL-MCNC: 161 MG/DL (ref 65–140)
GLUCOSE SERPL-MCNC: 99 MG/DL (ref 65–140)
HCT VFR BLD AUTO: 36.8 % (ref 36.5–49.3)
HGB BLD-MCNC: 11.5 G/DL (ref 12–17)
MCH RBC QN AUTO: 30.6 PG (ref 26.8–34.3)
MCHC RBC AUTO-ENTMCNC: 31.3 G/DL (ref 31.4–37.4)
MCV RBC AUTO: 98 FL (ref 82–98)
P AXIS: 30 DEGREES
P AXIS: 47 DEGREES
PLATELET # BLD AUTO: 191 THOUSANDS/UL (ref 149–390)
PMV BLD AUTO: 10.8 FL (ref 8.9–12.7)
POTASSIUM SERPL-SCNC: 5.3 MMOL/L (ref 3.5–5.3)
QRS AXIS: 131 DEGREES
QRS AXIS: 135 DEGREES
QRS AXIS: 136 DEGREES
QRS AXIS: 136 DEGREES
QRSD INTERVAL: 124 MS
QRSD INTERVAL: 124 MS
QRSD INTERVAL: 126 MS
QRSD INTERVAL: 126 MS
QT INTERVAL: 392 MS
QT INTERVAL: 404 MS
QT INTERVAL: 476 MS
QT INTERVAL: 480 MS
QTC INTERVAL: 429 MS
QTC INTERVAL: 446 MS
QTC INTERVAL: 466 MS
QTC INTERVAL: 477 MS
RBC # BLD AUTO: 3.76 MILLION/UL (ref 3.88–5.62)
SODIUM SERPL-SCNC: 140 MMOL/L (ref 136–145)
T WAVE AXIS: 171 DEGREES
T WAVE AXIS: 180 DEGREES
T WAVE AXIS: 181 DEGREES
T WAVE AXIS: 181 DEGREES
VENTRICULAR RATE: 49 BPM
VENTRICULAR RATE: 52 BPM
VENTRICULAR RATE: 84 BPM
VENTRICULAR RATE: 85 BPM
WBC # BLD AUTO: 12.19 THOUSAND/UL (ref 4.31–10.16)

## 2020-07-25 PROCEDURE — 97110 THERAPEUTIC EXERCISES: CPT

## 2020-07-25 PROCEDURE — 99231 SBSQ HOSP IP/OBS SF/LOW 25: CPT | Performed by: INTERNAL MEDICINE

## 2020-07-25 PROCEDURE — 99233 SBSQ HOSP IP/OBS HIGH 50: CPT | Performed by: INTERNAL MEDICINE

## 2020-07-25 PROCEDURE — 93010 ELECTROCARDIOGRAM REPORT: CPT | Performed by: INTERNAL MEDICINE

## 2020-07-25 PROCEDURE — 97164 PT RE-EVAL EST PLAN CARE: CPT

## 2020-07-25 PROCEDURE — 71046 X-RAY EXAM CHEST 2 VIEWS: CPT

## 2020-07-25 PROCEDURE — 99024 POSTOP FOLLOW-UP VISIT: CPT | Performed by: INTERNAL MEDICINE

## 2020-07-25 PROCEDURE — 80048 BASIC METABOLIC PNL TOTAL CA: CPT | Performed by: STUDENT IN AN ORGANIZED HEALTH CARE EDUCATION/TRAINING PROGRAM

## 2020-07-25 PROCEDURE — 97168 OT RE-EVAL EST PLAN CARE: CPT

## 2020-07-25 PROCEDURE — 82948 REAGENT STRIP/BLOOD GLUCOSE: CPT

## 2020-07-25 PROCEDURE — 85027 COMPLETE CBC AUTOMATED: CPT | Performed by: STUDENT IN AN ORGANIZED HEALTH CARE EDUCATION/TRAINING PROGRAM

## 2020-07-25 RX ORDER — METOPROLOL SUCCINATE 100 MG/1
100 TABLET, EXTENDED RELEASE ORAL 2 TIMES DAILY
Status: DISCONTINUED | OUTPATIENT
Start: 2020-07-25 | End: 2020-08-05 | Stop reason: HOSPADM

## 2020-07-25 RX ORDER — ACETAMINOPHEN 325 MG/1
650 TABLET ORAL EVERY 6 HOURS PRN
Status: DISCONTINUED | OUTPATIENT
Start: 2020-07-25 | End: 2020-08-05 | Stop reason: HOSPADM

## 2020-07-25 RX ADMIN — METOPROLOL SUCCINATE 100 MG: 100 TABLET, FILM COATED, EXTENDED RELEASE ORAL at 17:11

## 2020-07-25 RX ADMIN — APIXABAN 2.5 MG: 2.5 TABLET, FILM COATED ORAL at 10:08

## 2020-07-25 RX ADMIN — Medication 1 G: at 17:11

## 2020-07-25 RX ADMIN — METOPROLOL SUCCINATE 50 MG: 50 TABLET, EXTENDED RELEASE ORAL at 10:08

## 2020-07-25 RX ADMIN — AMLODIPINE BESYLATE 10 MG: 10 TABLET ORAL at 17:11

## 2020-07-25 RX ADMIN — Medication 1 G: at 10:07

## 2020-07-25 RX ADMIN — ACETAMINOPHEN 650 MG: 325 TABLET, FILM COATED ORAL at 11:57

## 2020-07-25 RX ADMIN — ASPIRIN 81 MG: 81 TABLET, COATED ORAL at 10:08

## 2020-07-25 RX ADMIN — CLONIDINE HYDROCHLORIDE 0.1 MG: 0.1 TABLET ORAL at 10:07

## 2020-07-25 RX ADMIN — PRAVASTATIN SODIUM 40 MG: 40 TABLET ORAL at 17:11

## 2020-07-25 RX ADMIN — APIXABAN 2.5 MG: 2.5 TABLET, FILM COATED ORAL at 17:11

## 2020-07-25 RX ADMIN — PANTOPRAZOLE SODIUM 40 MG: 40 TABLET, DELAYED RELEASE ORAL at 05:00

## 2020-07-25 NOTE — PROGRESS NOTES
Progress Note - Nathalie Jordan 1935, 80 y o  male MRN: [id number]    Unit/Bed#: [facility] 525-01 Encounter: [id number]    Primary Care Provider: Ree Pabon DO   Date and time admitted to hospital: 7/20/2020  4:04 PM        Hypoxia  Assessment & Plan  Patient requires nasal cannula  Did not use at home  Possibly secondary to atelectasis  No known history of COPD  · Continue to wean O2 as able  · Follow-up chest x-ray today  · Consider outpatient PFTs    Ambulatory dysfunction  Assessment & Plan  · PT/OT recommending inpatient rehab    Leukocytosis  Assessment & Plan  Unclear etiology  White count initially normal, then increased to 17 69 and then 19 32  No bandemia  Improving today  Possibly reactive in nature  Patient is otherwise afebrile and not septic  · Blood cultures negative to date  · Procalcitonin not elevated  · Monitor off antibiotics  · Appreciate ID recommendations  · WBC 1219 today    Essential hypertension  Assessment & Plan  · Continue amlodipine, clonidine  · Continue scheduled labetalol, now 100 mg b i d  Complete heart block (HCC)  Assessment & Plan  · Continue to hold metoprolol  · Patient receiving scheduled labetalol  · Status post permanent pacemaker placement on 07/24    History of stroke  Assessment & Plan  Patient with prior stroke last year  · Continue aspirin and Plavix  · Stat CT if acute change in neurological status    H/O aortic valve replacement  Assessment & Plan  Patient prior history bioprosthetic valve  · Valve area 1 38 cm2 per echo  Acute kidney injury superimposed on chronic kidney disease ([facility] [address] )  Assessment & Plan  Previous baseline around 1 8-1 9 in 2019  Creatinine initially 2 74 on admission  Now trending 2 2-2 3  This possibly represents his new baseline    · Avoid nephrotoxins  · Monitor electrolytes and renal function  · Ensure adequate oral intake and hydration    Mild intermittent asthma without complication  Assessment & Plan  Continue albuterol p r n     * Bradycardia  Assessment & Plan  Asymptomatic currently  Patient with complete heart block  Western blot shows elevated Lyme IgG  However, do not suspect active Lyme infection  · Hold metoprolol  · Appreciate ID and Cardiology input  · Status post permanent pacemaker placement        VTE Pharmacologic Prophylaxis:   Pharmacologic: Apixaban (Eliquis)  Mechanical VTE Prophylaxis in Place: Yes    Patient Centered Rounds: I have performed bedside rounds with nursing staff today  Discussions with Specialists or Other Care Team Provider: cardio    Education and Discussions with Family / Patient: patient    Time Spent for Care: 30 minutes  More than 50% of total time spent on counseling and coordination of care as described above  Current Length of Stay: 5 day(s)    Current Patient Status: Inpatient   Certification Statement: The patient will continue to require additional inpatient hospital stay due to Hypoxia, pacemaker placement, rehab placement    Discharge Plan: To inpatient rehab    Code Status: Level 1 - Full Code      Subjective:   Patient complaining of pain in his knees today  Still requiring nasal cannula oxygen  No chest pain or shortness of breath  Observed to have difficulty with ambulation and standing today, requiring max assist   Currently working with PT and OT  Objective:     Vitals:   Temp (24hrs), Av 6 °F (37 °C), Min:98 °F (36 7 °C), Max:99 4 °F (37 4 °C)    Temp:  [98 °F (36 7 °C)-99 4 °F (37 4 °C)] 98 2 °F (36 8 °C)  HR:  [] 72  Resp:  [14-20] 15  BP: (135-167)/(54-91) 152/87  SpO2:  [92 %-97 %] 95 %  Body mass index is 36 14 kg/m²  Input and Output Summary (last 24 hours): Intake/Output Summary (Last 24 hours) at 2020 1153  Last data filed at 2020 0318  Gross per 24 hour   Intake 1360 ml   Output 225 ml   Net 1135 ml       Physical Exam:     Physical Exam   Constitutional: He appears well-developed and well-nourished  No distress  Nasal cannula in place  HENT:   Head: Normocephalic and atraumatic  Eyes: Pupils are equal, round, and reactive to light  EOM are normal    Neck: Neck supple  Cardiovascular: Normal rate, regular rhythm, normal heart sounds and intact distal pulses  Pulmonary/Chest: Effort normal and breath sounds normal  No respiratory distress  Musculoskeletal: He exhibits no edema  Right knee: He exhibits no swelling  Tenderness found  Neurological: He is alert  Skin: Skin is warm and dry  He is not diaphoretic  Psychiatric: He has a normal mood and affect  His behavior is normal    Vitals reviewed  Additional Data:     Labs:    Results from last 7 days   Lab Units 07/25/20 0458 07/22/20  0433   WBC Thousand/uL 12 19*   < > 19 40*   HEMOGLOBIN g/dL 11 5*   < > 13 3   HEMATOCRIT % 36 8   < > 40 6   PLATELETS Thousands/uL 191   < > 200   NEUTROS PCT %  --   --  89*   LYMPHS PCT %  --   --  4*   MONOS PCT %  --   --  6   EOS PCT %  --   --  0    < > = values in this interval not displayed  Results from last 7 days   Lab Units 07/25/20 0458 07/21/20  0445   SODIUM mmol/L 140   < > 139   POTASSIUM mmol/L 5 3   < > 5 3   CHLORIDE mmol/L 109*   < > 108   CO2 mmol/L 21   < > 24   BUN mg/dL 62*   < > 43*   CREATININE mg/dL 2 24*   < > 2 20*   ANION GAP mmol/L 10   < > 7   CALCIUM mg/dL 8 6   < > 8 8   ALBUMIN g/dL  --   --  3 5   TOTAL BILIRUBIN mg/dL  --   --  0 61   ALK PHOS U/L  --   --  73   ALT U/L  --   --  15   AST U/L  --   --  12   GLUCOSE RANDOM mg/dL 99   < > 110    < > = values in this interval not displayed  Results from last 7 days   Lab Units 07/24/20  0533   INR  1 32*     Results from last 7 days   Lab Units 07/25/20  1046 07/25/20  0659 07/24/20  2124 07/24/20  1637   POC GLUCOSE mg/dl 161* 119 141* 125         Results from last 7 days   Lab Units 07/22/20  0433   PROCALCITONIN ng/ml 0 13           * I Have Reviewed All Lab Data Listed Above    * Additional Pertinent Lab Tests Reviewed: All Labs Within Last 24 Hours Reviewed    Imaging:    Imaging Reports Reviewed Today Include: CXR, echo  Imaging Personally Reviewed by Myself Includes:  CXR    Recent Cultures (last 7 days):     Results from last 7 days   Lab Units 07/22/20  0443 07/21/20  1139   BLOOD CULTURE  No Growth at 48 hrs  No Growth at 72 hrs  Last 24 Hours Medication List:     Current Facility-Administered Medications:  acetaminophen 650 mg Oral Q6H PRN Ivania Serge, DO   albuterol 2 puff Inhalation Q4H PRN Ivania Serge, DO   amLODIPine 10 mg Oral BID Gemini Soden, PA-C   apixaban 2 5 mg Oral BID Freddy Pee, PA-C   aspirin 81 mg Oral Daily Freddy Pee, PA-C   cloNIDine 0 1 mg Oral Daily Gemini Soden, PA-C   Labetalol HCl 10 mg Intravenous Q8H PRN Kendra Ratliff, DO   metoclopramide 10 mg Intravenous Q6H PRN Ivania Webb, DO   metoprolol succinate 100 mg Oral BID Vanessa Higgins, DO   pantoprazole 40 mg Oral Early Morning Cortez Stewart, DO   pravastatin 40 mg Oral Daily With [employer], DO   sodium chloride 1 g Oral BID Cortez Stewart, DO        Today, Patient Was Seen By: Ivania Webb DO    ** Please Note: Dictation voice to text software may have been used in the creation of this document   **

## 2020-07-25 NOTE — ASSESSMENT & PLAN NOTE
Patient requires nasal cannula  Did not use at home  Possibly secondary to atelectasis  No known history of COPD    · Continue to wean O2 as able  · Follow-up chest x-ray today  · Consider outpatient PFTs

## 2020-07-25 NOTE — ASSESSMENT & PLAN NOTE
Previous baseline around 1 8-1 9 in 2019  Creatinine initially 2 74 on admission  Now trending 2 2-2 3  This possibly represents his new baseline    · Avoid nephrotoxins  · Monitor electrolytes and renal function  · Ensure adequate oral intake and hydration

## 2020-07-25 NOTE — PROGRESS NOTES
Heart Failure/ Pulmonary Hypertension Progress Note - Susannah Patel 80 y o  male MRN: [id number]    Unit/Bed#: [facility] 525-01 Encounter: [id number]      Assessment:    Principal Problem:    Bradycardia  Active Problems:    Mild intermittent asthma without complication    Acute kidney injury superimposed on chronic kidney disease (HCC)    H/O aortic valve replacement    History of stroke    Complete heart block (HCC)    Essential hypertension    Leukocytosis    Objective: Intake/ Output: not accurate  Weight:   Tele:   States his knees hurt    # symptomatic CHB  S/p temp wire  MDT PPM 7/24Friddie Perez    # PAF/ PAT  Hx of TIA  Started on Eliquis     # HTN  norvasc 10 mg BID, Toprol XL 50 mg BID, clonidine 0 1 mg daily    Echocardiogram 7/21/20  LVEF: 60%, moderate LVH  LVIDd:  RV:  MR:  PASP: 48 mmHg  RVOT:   Other:    # bioprosthetic AVR      Plan:  Increase Toprol to 100 mg BID for HTN and PAF/ PAT  Case management for eliquis pricing    Review of Systems   Constitutional: Negative for activity change, appetite change, fatigue and unexpected weight change  HENT: Negative for congestion and nosebleeds  Eyes: Negative  Respiratory: Negative for cough, chest tightness and shortness of breath  Cardiovascular: Negative for chest pain, palpitations and leg swelling  Gastrointestinal: Negative for abdominal distention  Endocrine: Negative  Genitourinary: Negative  Musculoskeletal: Positive for myalgias  Skin: Negative  Neurological: Negative for dizziness, syncope and weakness  Hematological: Negative  Psychiatric/Behavioral: Negative  [employer] (day, reason): Alvares catheter (day, reason):    Vitals: Blood pressure 167/63, pulse 93, temperature 98 8 °F (37 1 °C), temperature source Oral, resp  rate 14, height 5' 4" (1 626 m), weight 95 5 kg (210 lb 8 6 oz), SpO2 93 %  , Body mass index is 36 14 kg/m² , I/O last 3 completed shifts:   In: 2597 5 [P O :960; I V :1637 5]  Out: 225 [Urine:225]  No intake/output data recorded  Wt Readings from Last 3 Encounters:   07/20/20 95 5 kg (210 lb 8 6 oz)   07/20/20 97 5 kg (214 lb 15 2 oz)   10/07/19 93 9 kg (207 lb)       Intake/Output Summary (Last 24 hours) at 7/25/2020 1104  Last data filed at 7/25/2020 0318  Gross per 24 hour   Intake 1360 ml   Output 225 ml   Net 1135 ml     I/O last 3 completed shifts: In: 2597 5 [P O :960; I V :1637 5]  Out: 225 [Urine:225]    No significant arrhythmias seen on telemetry review         Physical Exam:  Vitals:    07/24/20 2300 07/25/20 0000 07/25/20 0316 07/25/20 0700   BP: 149/68  152/62 167/63   BP Location:    Right arm   Pulse: 87  84 93   Resp: 18  20 14   Temp: 99 4 °F (37 4 °C)  98 5 °F (36 9 °C) 98 8 °F (37 1 °C)   TempSrc: Oral  Oral Oral   SpO2: 96% 96% 97% 93%   Weight:       Height:           GEN: Saturnino Mccullough appears well, alert and oriented x 3, pleasant and cooperative   HEENT: pupils equal, round, and reactive to light; extraocular muscles intact  NECK: supple, no carotid bruits   HEART: regular rhythm, normal S1 and S2, no murmurs, clicks, gallops or rubs, JVP is    LUNGS: clear to auscultation bilaterally; no wheezes, rales, or rhonchi   ABDOMEN: normal bowel sounds, soft, no tenderness, no distention  EXTREMITIES: peripheral pulses normal; no clubbing, cyanosis, or edema  NEURO: no focal findings   SKIN: normal without suspicious lesions on exposed skin      Current Facility-Administered Medications:     acetaminophen (TYLENOL) tablet 650 mg, 650 mg, Oral, Q6H PRN, Vanessa Lust Veres, DO    albuterol (PROVENTIL HFA,VENTOLIN HFA) inhaler 2 puff, 2 puff, Inhalation, Q4H PRN, Vanessa Lust Veres, DO    amLODIPine (NORVASC) tablet 10 mg, 10 mg, Oral, BID, Gemini Soden, PA-C    apixaban (ELIQUIS) tablet 2 5 mg, 2 5 mg, Oral, BID, Edgar Martin PA-C, 2 5 mg at 07/25/20 1008    aspirin (ECOTRIN LOW STRENGTH) EC tablet 81 mg, 81 mg, Oral, Daily, Edgar Martin PA-C, 81 mg at 07/25/20 1008    cloNIDine (CATAPRES) tablet 0 1 mg, 0 1 mg, Oral, Daily, Gemini Coffey PA-C, 0 1 mg at 07/25/20 1007    Labetalol HCl (NORMODYNE) injection 10 mg, 10 mg, Intravenous, Q8H PRN, Kendra PackerScott, DO    metoclopramide (REGLAN) injection 10 mg, 10 mg, Intravenous, Q6H PRN, Orlando Choudhury Veres, DO, 10 mg at 07/21/20 1547    metoprolol succinate (TOPROL-XL) 24 hr tablet 50 mg, 50 mg, Oral, BID, Vicki Manjarrez PA-C, 50 mg at 07/25/20 1008    pantoprazole (PROTONIX) EC tablet 40 mg, 40 mg, Oral, Early Morning, Hetul Stewart, DO, 40 mg at 07/25/20 0500    pravastatin (PRAVACHOL) tablet 40 mg, 40 mg, Oral, Daily With Dinner, Hetul Stewart, DO, 40 mg at 07/24/20 1712    sodium chloride tablet 1 g, 1 g, Oral, BID, Hetul Stewart, DO, 1 g at 07/25/20 1007      Labs & Results:    Results from last 7 days   Lab Units 07/20/20  1021   TROPONIN I ng/mL <0 02     Results from last 7 days   Lab Units 07/25/20  0458 07/24/20  0533 07/23/20  0552   WBC Thousand/uL 12 19* 10 91* 15 79*   HEMOGLOBIN g/dL 11 5* 11 6* 12 1   HEMATOCRIT % 36 8 36 5 37 5   PLATELETS Thousands/uL 191 173 181         Results from last 7 days   Lab Units 07/25/20  0458 07/24/20  0533 07/23/20  0552  07/21/20  0445  07/20/20  1021   POTASSIUM mmol/L 5 3 5 0 5 1   < > 5 3   < > 5 2   CHLORIDE mmol/L 109* 111* 108   < > 108   < > 104   CO2 mmol/L 21 24 24   < > 24   < > 28   BUN mg/dL 62* 54* 52*   < > 43*   < > 40*   CREATININE mg/dL 2 24* 2 28* 2 29*   < > 2 20*   < > 2 74*   CALCIUM mg/dL 8 6 9 0 8 4   < > 8 8   < > 8 8   ALK PHOS U/L  --   --   --   --  73  --  83   ALT U/L  --   --   --   --  15  --  18   AST U/L  --   --   --   --  12  --  15    < > = values in this interval not displayed  Results from last 7 days   Lab Units 07/24/20  0533 07/20/20  1021   INR  1 32* 1 13         Counseling / Coordination of Care  Total floor / unit time spent today 25 minutes    Greater than 50% of total time was spent with the patient and / or family counseling and / or coordination of care  A description of the counseling / coordination of care: 15  Thank you for the opportunity to participate in the care of this patient  Maddy ROMEO    Director Heart Failure/ Medical Director [address] [place] [address]

## 2020-07-25 NOTE — PLAN OF CARE
Problem: OCCUPATIONAL THERAPY ADULT  Goal: Performs self-care activities at highest level of function for planned discharge setting  See evaluation for individualized goals  Description  Treatment Interventions: ADL retraining, Functional transfer training, UE strengthening/ROM, Endurance training, Cognitive reorientation, Patient/family training, Equipment evaluation/education, Compensatory technique education, Energy conservation, Activityengagement  Equipment Recommended: Other (comment)(RW)       See flowsheet documentation for full assessment, interventions and recommendations  Outcome: Progressing  Note:   Limitation: Decreased ADL status, Decreased UE ROM, Decreased UE strength, Decreased Safe judgement during ADL, Decreased cognition, Decreased endurance, Decreased fine motor control, Decreased high-level ADLs  Prognosis: Fair  Assessment: Pt is a 81 yo male seen for OT re-eval s/p pacemaker procedure 7/24/2020 and decreased functional status, per RN Angela  Pt in LUE sling and to maintain pacemaker precautions - Pt demonstrated poor carryover of education and precautions  Pt initially "presented to [facility] with GROSS/weakness, primary dx of bradycardia  Pt transferred to [facility] on 7/20/20 for further intervention  Pt now s/p "Temporary external/internal screwing pacemaker placement"  Pt  has a past medical history of Aortic stenosis, Aortic valve disorder, Hypertensive urgency (5/19/2019), and Transient cerebral ischemia  Pt has active OT orders  Pt lives w/wife in a [facility] w 3STE  Pt has 1/2 bath on 1st floor and bed on 2nd  Pt has walk-in shower w/ SC  Pt reports owning a walker but denies use PTA  Pt reports IND w/ ADLS and mobility, requires A for IADLS from wife and son " Pt is currently demonstrating the following occupational deficits: Max-Total A all self care, Max Ax2 STS and sit pivot transfers w/ HHAx2   Pt unable to advance B/L feet to take steps this day 2' severe B/L LE pain - RN Angela notified and aware  These deficits that are impacting pt's baseline areas of occupation are a result of the following impairments: pain, endurance, activity tolerance, functional mobility, forward functional reach, functional mobility, balance, functional standing tolerance, unsupportive home environment, decreased I w/ ADLS/IADLS, strength, ROM, cognitive impairments, decreased safety awareness, decreased insight into deficits, impaired fine motor skills and coordination deficits  The following Occupational Performance Areas to address include: grooming, bathing/shower, toilet hygiene, dressing, health maintenance, functional mobility and clothing management  Recommend STR upon D/C   Pt to continue to benefit from acute immediate OT services to address the following goals 3-5x/week to  w/in 10-14 days:      OT Discharge Recommendation: (S) Post-Acute Rehabilitation Services  OT - OK to Discharge: Yes(when medically cleared)

## 2020-07-25 NOTE — PHYSICAL THERAPY NOTE
Physical Therapy Re-evaluaiton and Tx     Patient Name: Waylon Serrano    NBREF'O Date: 7/25/2020     Problem List  Principal Problem:    Bradycardia  Active Problems:    Mild intermittent asthma without complication    Acute kidney injury superimposed on chronic kidney disease ([facility] [address] )    H/O aortic valve replacement    History of stroke    Complete heart block ([facility] [address] )    Essential hypertension    Leukocytosis    Ambulatory dysfunction    Hypoxia       Past Medical History  Past Medical History:   Diagnosis Date    Aortic stenosis     ECHO -4-14-14  MODERATE TO SEVERE AORTIC STENOSIS; MILD AROTIC INSUFFICIENCY - LAST ASSESSED 10/26/16; RESOLVED 6/8/16    Aortic valve disorder     LAST ASSESSED 10/8/15; 10/8/15    Hypertensive urgency 5/19/2019    Transient cerebral ischemia         Past Surgical History  Past Surgical History:   Procedure Laterality Date    AORTIC VALVE REPLACEMENT  06/30/2015    avr with 23 mm [place] Magna Ease bioprosthetic    CATARACT EXTRACTION Right 06/05/2000    COLONOSCOPY      CORONARY ARTERY BYPASS GRAFT  06/05/2000    x1 with lima  to lad    POLYPECTOMY  04/2014    enteroscopic - esophageal ulcer, gastric erosion, and duodenal ulcer   TONSILLECTOMY      unknown         07/25/20 1433   Note Type   Note type Re-eval  (and tx [id number])   Pain Assessment   Pain Assessment Tool FLACC   Pain Location/Orientation Orientation: Bilateral;Location: Hip;Location: Knee  (hands wrists ankles and knees )   Effect of Pain on Daily Activities limited standing and mobility    Patient's Stated Pain Goal No pain   Hospital Pain Intervention(s) Ambulation/increased activity; Emotional support;Repositioned   Pain Rating: FLACC (Rest) - Face 1   Pain Rating: FLACC (Rest) - Legs 1   Pain Rating: FLACC (Rest) - Activity 1   Pain Rating: FLACC (Rest) - Cry 1   Pain Rating: FLACC (Rest) - Consolability 1   Score: FLACC (Rest) 5   Pain Rating: FLACC (Activity) - Face 2   Pain Rating: FLACC (Activity) - Legs 2   Pain Rating: FLACC (Activity) - Activity 2   Pain Rating: FLACC (Activity) - Cry 1   Pain Rating: FLACC (Activity) - Consolability 2   Score: FLACC (Activity) 9   Home Living   Additional Comments see eval    Prior Function   Comments see eval    Restrictions/Precautions   Weight Bearing Precautions Per Order No   LUE Weight Bearing Per Order   (in sling s/p pacemaker 7/24/2020)   Braces or Orthoses Sling  (LUE s/p pacemaker)   Other Precautions Cognitive; Chair Alarm; Bed Alarm;O2;Telemetry; Fall Risk;Pain  (sling s/p PPM precautions )   Cognition   Overall Cognitive Status Impaired   Attention Attends with cues to redirect   Orientation Level Oriented to person;Oriented to place;Oriented to time;Disoriented to situation  ("i'm here for my bypass" "did I have surgery? ")   Memory Decreased recall of recent events;Decreased short term memory;Decreased recall of precautions   Following Commands Follows one step commands with increased time or repetition   Comments pt presents pleasantly confused extremely [place] unable to state why he is hospitalized or recent procedure      RUE Assessment   RUE Assessment X  (decreased gross strength)   LUE Assessment   LUE Assessment X  (in sling w/ pacemaker precautions)   RLE Assessment   RLE Assessment X  (3+/5 severe joint pain w/ all movemtns )   Strength RLE   RLE Overall Strength 3+/5   LLE Assessment   LLE Assessment X  (3+/5 w/ pain w/ all movements knees and ankles )   Coordination   Movements are Fluid and Coordinated 0   Light Touch   RLE Light Touch Grossly intact   LLE Light Touch Grossly intact   Bed Mobility   Rolling R 2  Maximal assistance   Additional items Assist x 1   Rolling L 2  Maximal assistance   Additional items Assist x 1   Supine to Sit Unable to assess   Sit to Supine 2  Maximal assistance   Additional items Assist x 2;Leg ;Verbal cues   Additional Comments up and OOB oin recliner on arrival- pt requesting BTB- cleared by RN- left in supine w/ HOB elevated post session on 4Lo2 as previous bed alarm intact    Transfers   Sit to Stand 2  Maximal assistance   Additional items Assist x 2   Stand to Sit 2  Maximal assistance   Additional items Assist x 2   Sit pivot 2  Maximal assistance   Additional items Assist x 2   Additional Comments pt required max A x2 for sit<>stand attempts from recliner- was anabe to advance LE for functional gait or achieve full upright 2( knee and LE pain)- PT/ OT assisted pt w/ maxA x2 BTB via Sit pivot to R -    Ambulation/Elevation   Gait pattern   (unable )   Balance   Static Sitting Poor +   Dynamic Sitting Poor   Static Standing Poor -   Ambulatory Zero   Endurance Deficit   Endurance Deficit Yes   Endurance Deficit Description SOB pain limiting    Activity Tolerance   Activity Tolerance Patient limited by fatigue;Patient limited by pain;Treatment limited secondary to medical complications (Comment)  (SOB)   Medical Staff Made Aware OT RN    Nurse Made Aware RN Angela requesting PT reeval and reassessment 2* decline in mobility    Assessment   Prognosis Good   Problem List Decreased strength;Decreased range of motion;Decreased endurance; Impaired balance;Decreased mobility; Decreased coordination;Decreased cognition; Impaired judgement;Decreased safety awareness; Impaired hearing;Obesity;Pain  (pacer precautions / sling )   Assessment Pt seen for PT reeva and tx session following PPM 7/24/20 and on request of RN Angela 2* significant decreased in functional mobility performance increaed pain in B/L LE's and all joints  Pt in LUE sling and s/p PPM precautions  Pt is severly [place] and was unable to recall why her was hospitalized  Pt initially "presented to [facility] with GROSS/weakness, primary dx of bradycardia  Pt transferred to [facility] on 7/20/20 for further intervention  Pt now s/p "Temporary external/internal screwing pacemaker placement"   Pt  has a past medical history of Aortic stenosis, Aortic valve disorder, Hypertensive urgency (5/19/2019), and Transient cerebral ischemia  On reeval - pt is noted to require maxA x1-2 for repositioning and standing attempts; for sit pivot xfers and for sit>supine transitions  Pt presents w/ overall mobility deficits 2* LE pain; soreness and pain w/ knee ankle and foot AROM w/ stiffness; SOB GROSS generalized weakness fatigue; confusion; decreased endurance decreased activity tolerance; cognitive impairment; obesity PPM precautions; O2;  Pt will continue to benefit from ongoing PT to address above deficits  PT recommending d/c to inpatient rehab setting when medically cleared for safety and to maximize functional potential prior to d/c home  PT [id number] following re-eval consisted of AAROM AROM instruction and completion  in supine AP HS ABD for active LE ROM pain relief  Pt will need ongoing instruction for completion and carryover 2* confusion  RN updated post session  Bed alarm intact    Barriers to Discharge Inaccessible home environment;Decreased caregiver support   Goals   Patient Goals less joint pain    STG Expiration Date 08/08/20  (goals remain appropriate w/ extended time frame )   PT Treatment Day 0   Plan   Treatment/Interventions Functional transfer training;LE strengthening/ROM; Elevations; Therapeutic exercise; Endurance training;Cognitive reorientation;Patient/family training;Equipment eval/education; Bed mobility;Gait training;Spoke to nursing;OT   Recommendation   PT Discharge Recommendation Post-Acute Rehabilitation Services   Equipment Recommended Walker   PT - OK to Discharge   (to rehab pending medical clearance )   Additional Comments bed alarm intact-    Modified Bertie Scale   Modified Bertie Scale 4

## 2020-07-25 NOTE — OCCUPATIONAL THERAPY NOTE
Occupational Therapy Re-Evaluation     Sussy Pascualaris    7/25/2020    Principal Problem:    Bradycardia  Active Problems:    Mild intermittent asthma without complication    Acute kidney injury superimposed on chronic kidney disease ([facility] [address] )    H/O aortic valve replacement    History of stroke    Complete heart block ([facility] [address] )    Essential hypertension    Leukocytosis    Ambulatory dysfunction    Hypoxia      @hPMHl@    Past Surgical History:   Procedure Laterality Date    AORTIC VALVE REPLACEMENT  06/30/2015    avr with 23 mm [place] Magna Ease bioprosthetic    CATARACT EXTRACTION Right 06/05/2000    COLONOSCOPY      CORONARY ARTERY BYPASS GRAFT  06/05/2000    x1 with lima  to lad    POLYPECTOMY  04/2014    enteroscopic - esophageal ulcer, gastric erosion, and duodenal ulcer   TONSILLECTOMY      unknown        07/25/20 3837   Note Type   Note type Re-eval   Restrictions/Precautions   Weight Bearing Precautions Per Order No   LUE Weight Bearing Per Order   (in sling s/p pacemaker 7/24/2020)   Braces or Orthoses Sling  (LUE s/p pacemaker)   Other Precautions Cognitive; Chair Alarm; Bed Alarm;Multiple lines;Telemetry;O2;Fall Risk;Pain;Hard of hearing  (4L NC O2; pacemaker precautions LUE)   Pain Assessment   Pain Assessment Tool FLACC   Pain Location/Orientation Orientation: Bilateral;Location: Leg;Location: Knee  (B/L legs, knees, ankles; R elbow)   Patient's Stated Pain Goal No pain   Hospital Pain Intervention(s) Repositioned; Ambulation/increased activity; Emotional support   Pain Rating: FLACC (Rest) - Face 1   Pain Rating: FLACC (Rest) - Legs 1   Pain Rating: FLACC (Rest) - Activity 1   Pain Rating: FLACC (Rest) - Cry 1   Pain Rating: FLACC (Rest) - Consolability 1   Score: FLACC (Rest) 5   Pain Rating: FLACC (Activity) - Face 2   Pain Rating: FLACC (Activity) - Legs 2   Pain Rating: FLACC (Activity) - Activity 2   Pain Rating: FLACC (Activity) - Cry 1   Pain Rating: FLACC (Activity) - Consolability 2   Score: FLACC (Activity) 9   Home Living   Additional Comments Please refer to IE for all PLOF and home set up   Prior Function   Comments Please refer to IE for all PLOF and home set up   Lifestyle   Autonomy Pt reports IND w/ ADLS and mobility PTA, A from wife and son for IADLS PTA  Reciprocal Relationships Pt reports a supportive wife at home who does not work and able to provide A PRN  Pt's son lives next door and is able to provide A if needed  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    Intrinsic Gratification Pt reports [place] with family   Psychosocial   Psychosocial ([address]) X   Patient Behaviors/Mood Anxious   Subjective   Subjective "I can't move my legs!"   ADL   Where Assessed Chair   Eating Assistance 5  Supervision/Setup   Grooming Assistance 4  Minimal Assistance   UB Bathing Assistance 2  Maximal Assistance   LB Bathing Assistance 2  Maximal Assistance   [address] 2  Maximal Assistance   LB Dressing Assistance 1  Total Assistance   Toileting Assistance  1  Total Assistance   Bed Mobility   Rolling R 2  Maximal assistance   Additional items Assist x 1; Increased time required;LE management;Verbal cues; Bedrails  (for repositioning )   Supine to Sit Unable to assess   Sit to Supine 2  Maximal assistance   Additional items Assist x 2; Increased time required;LE management;Verbal cues; Bedrails   Additional Comments Pt seated OOB in chair upon OT arrival  Pt requested to return supine in bed - cleared by RN Angela  Pt laying supine in bed w/ HOB slightly elevated 2' SOB  w/ bed alarm activated and all needs in reach s/p OT Session  Transfers   Sit to Stand 2  Maximal assistance   Additional items Assist x 2; Increased time required;Verbal cues;Armrests; Impulsive  (unable to achieve full upright posture in stance)   Stand to Sit 2  Maximal assistance   Additional items Assist x 2; Increased time required;Verbal cues;Armrests; Impulsive  (unable to achieve full upright posture in stance)   Sit pivot 2  Maximal assistance   Additional items Assist x 2; Increased time required;Verbal cues;Armrests  (drop arm chair>EOB)   Additional Comments Transfers w/ HHAx2  Pt attempted STS in chair, unable to achieve full upright posture in stance  Pt unable to advance feet to attempt to take steps  Pt performed sit pivot drop arm chair>EOB w/ HHA Max Ax2 w/ B/L feet and knee blocking  Pt c/o severe pain in B/L legs, knees, and ankles, limiting transfers and mobility  RN Angela notified and aware  Functional Mobility   Additional Comments Unable to advance B/L feet in stance to take steps at this time  Balance   Static Sitting Poor +   Dynamic Sitting Poor   Static Standing Poor -   Activity Tolerance   Activity Tolerance Patient limited by fatigue;Patient limited by pain;Treatment limited secondary to medical complications (Comment)  (SOB)   Medical Staff Made Aware PT Domi Thomas; BELLA Cosby   Nurse Made Aware RN Angela cleared Pt for OT session  RUE Assessment   RUE Assessment X  (decreased gross strength)   LUE Assessment   LUE Assessment X  (in sling w/ pacemaker precautions)   Hand Function   Gross Motor Coordination Impaired  (B/L edema)   Fine Motor Coordination Impaired  (B/L edema)   Cognition   Overall Cognitive Status Impaired   Arousal/Participation Alert; Cooperative;Lethargic   Attention Attends with cues to redirect   Orientation Level Oriented to person;Oriented to place;Oriented to time;Disoriented to situation  ("i'm here for my bypass" "did I have surgery? ")   Memory Decreased recall of recent events;Decreased short term memory;Decreased recall of precautions   Following Commands Follows one step commands with increased time or repetition   Comments Pt is pleasant and cooperative to participate in therapy this day  Pt presents w/ increased confusion to situation as he was unaware why he was in the hospital or of his recent procedures   Pt extremely [place] and required frequent repeat of commands for increased follow through  Pt continues to have decreased insight into deficits and decreased safety awareness  Pt required VC and TC for all safety, sequencing, and redirection to task  Assessment   Limitation Decreased ADL status; Decreased UE ROM; Decreased UE strength;Decreased Safe judgement during ADL;Decreased cognition;Decreased endurance;Decreased fine motor control;Decreased high-level ADLs   Prognosis Fair   Assessment Pt is a 79 yo male seen for OT re-eval s/p pacemaker procedure 7/24/2020 and decreased functional status, per RN Angela  Pt in E sling and to maintain pacemaker precautions - Pt demonstrated poor carryover of education and precautions  Pt initially "presented to [facility] with GROSS/weakness, primary dx of bradycardia  Pt transferred to [facility] on 7/20/20 for further intervention  Pt now s/p "Temporary external/internal screwing pacemaker placement"  Pt  has a past medical history of Aortic stenosis, Aortic valve disorder, Hypertensive urgency (5/19/2019), and Transient cerebral ischemia  Pt has active OT orders  Pt lives w/wife in a [facility] w 3STE  Pt has 1/2 bath on 1st floor and bed on 2nd  Pt has walk-in shower w/ SC  Pt reports owning a walker but denies use PTA  Pt reports IND w/ ADLS and mobility, requires A for IADLS from wife and son " Pt is currently demonstrating the following occupational deficits: Max-Total A all self care, Max Ax2 STS and sit pivot transfers w/ HHAx2  Pt unable to advance B/L feet to take steps this day 2' severe B/L LE pain - RN Angela notified and aware   These deficits that are impacting pt's baseline areas of occupation are a result of the following impairments: pain, endurance, activity tolerance, functional mobility, forward functional reach, functional mobility, balance, functional standing tolerance, unsupportive home environment, decreased I w/ ADLS/IADLS, strength, ROM, cognitive impairments, decreased safety awareness, decreased insight into deficits, impaired fine motor skills and coordination deficits  The following Occupational Performance Areas to address include: grooming, bathing/shower, toilet hygiene, dressing, health maintenance, functional mobility and clothing management  Recommend STR upon D/C  Pt to continue to benefit from acute immediate OT services to address the following goals 3-5x/week to  w/in 10-14 days:    Goals   Patient Goals To decrease joint pain   LTG Time Frame 10-   Long Term Goal #1 Refer to goals below   Plan   Treatment Interventions ADL retraining;Functional transfer training;UE strengthening/ROM; Endurance training;Cognitive reorientation;Patient/family training;Equipment evaluation/education; Fine motor coordination activities; Compensatory technique education; Activityengagement; Energy conservation   Goal Expiration Date 20   OT Treatment Day 1   OT Frequency 3-5x/wk   Recommendation   OT Discharge Recommendation Post-Acute Rehabilitation Services   Equipment Recommended Other (comment); Bedside commode;Tub seat with back  (RW)   OT - OK to Discharge Yes  (when medically cleared)   Modified [place] Scale   Modified Gerard Scale 4         GOALS    1) Pt will improve activity tolerance to G for min 30 min txment sessions for increase engagement in functional tasks    2) Pt will complete UB/LB dressing/self care w/ S using adaptive device and DME as needed    3) Pt will complete bathing w/ S w/ use of AE and DME as needed    4) Pt will complete toileting w/ S w/ G hygiene/thoroughness using DME as needed    5) Pt will improve functional transfers to S on/off all surfaces using DME as needed w/ G balance/safety     6) Pt will improve functional mobility during ADL/IADL/leisure tasks to S using DME as needed w/ G balance/safety     7) Pt will participate in simulated IADL management task to increase independence to S w/ G safety and endurance    8) Pt will be attentive 100% of the time during ongoing cognitive assessment w/ G participation to assist w/ safe d/c planning/recommendations    9) Pt will demonstrate G carryover of pt/caregiver education and training as appropriate w/o cues w/ good tolerance to increase safety during functional tasks    10) Pt will demonstrate 100% carryover of energy conservation techniques t/o functional I/ADL/leisure tasks w/o cues s/p skilled education to increase endurance during functional tasks             Rah Johnson MS, OTR/L

## 2020-07-25 NOTE — ASSESSMENT & PLAN NOTE
Unclear etiology  White count initially normal, then increased to 17 69 and then 19 32  No bandemia  Improving today  Possibly reactive in nature  Patient is otherwise afebrile and not septic    · Blood cultures negative to date  · Procalcitonin not elevated  · Monitor off antibiotics  · Appreciate ID recommendations  · WBC 1219 today

## 2020-07-25 NOTE — PLAN OF CARE
Problem: PHYSICAL THERAPY ADULT  Goal: Performs mobility at highest level of function for planned discharge setting  See evaluation for individualized goals  Description  Treatment/Interventions: Functional transfer training, LE strengthening/ROM, Elevations, Therapeutic exercise, Endurance training, Patient/family training, Equipment eval/education, Gait training, Spoke to nursing, OT  Equipment Recommended: Anton Walsh       See flowsheet documentation for full assessment, interventions and recommendations  Note:   Prognosis: Good  Problem List: Decreased strength, Decreased range of motion, Decreased endurance, Impaired balance, Decreased mobility, Decreased coordination, Decreased cognition, Impaired judgement, Decreased safety awareness, Impaired hearing, Obesity, Pain(pacer precautions / sling )  Assessment: Pt seen for PT reeva and tx session following PPM 7/24/20 and on request of RN Angela 2* significant decreased in functional mobility performance increaed pain in B/L LE's and all joints  Pt in LUE sling and s/p PPM precautions  Pt is severly [place] and was unable to recall why her was hospitalized  Pt initially "presented to [email or web address] with GROSS/weakness, primary dx of bradycardia  Pt transferred to [facility] on 7/20/20 for further intervention  Pt now s/p "Temporary external/internal screwing pacemaker placement"  Pt  has a past medical history of Aortic stenosis, Aortic valve disorder, Hypertensive urgency (5/19/2019), and Transient cerebral ischemia  On reeval - pt is noted to require maxA x1-2 for repositioning and standing attempts; for sit pivot xfers and for sit>supine transitions   Pt presents w/ overall mobility deficits 2* LE pain; soreness and pain w/ knee ankle and foot AROM w/ stiffness; SOB GROSS generalized weakness fatigue; confusion; decreased endurance decreased activity tolerance; cognitive impairment; obesity PPM precautions; O2;  Pt will continue to benefit from ongoing PT to address above deficits  PT recommending d/c to inpatient rehab setting when medically cleared for safety and to maximize functional potential prior to d/c home  PT tx following re-eval consisted of AAROM AROM instruction and completion  in supine AP HS ABD for active LE ROM pain relief  Pt will need ongoing instruction for completion and carryover 2* confusion  RN updated post session  Bed alarm intact   Barriers to Discharge: Inaccessible home environment, Decreased caregiver support     PT Discharge Recommendation: [address]     PT - OK to Discharge: (to rehab pending medical clearance )    See flowsheet documentation for full assessment

## 2020-07-25 NOTE — PROGRESS NOTES
Progress Note - Infectious Disease   Waylon Headings 80 y o  male MRN: [id number]  Unit/Bed#: [facility] 525-01 Encounter: [id number]      Impression:  1  Stress leukocytosis  2  Complete heart block with bradycardia  3  CAD CABG, AS, bioprosthetic AVR   4  YOLANDE on CKD  5  History of CVA     Recommendations:  Patient is afebrile with a near normal WBC count of 10,910  Discussed with Cardiology service and cleared him for his permanent pacemaker which he received today  Right subclavian pacemaker was removed  1  Check final blood culture results which are negative so far  2  Received preop cefazolin IV dose       Antibiotics:  1  Currently none    Subjective: The patient has no complaints  Denies fevers, chills, or sweats  Denies nausea, vomiting, or diarrhea  Objective:  Vitals:  Temp:  [98 °F (36 7 °C)-98 5 °F (36 9 °C)] 98 4 °F (36 9 °C)  HR:  [] 92  Resp:  [13-18] 18  BP: (123-168)/(53-91) 158/66  SpO2:  [92 %-97 %] 97 %  Temp (24hrs), Av 3 °F (36 8 °C), Min:98 °F (36 7 °C), Max:98 5 °F (36 9 °C)  Current: Temperature: 98 4 °F (36 9 °C)    Physical Exam:     General Appearance:  Alert, moderately obese elderly male who was nontoxic, no acute distress  Throat: Oropharynx moist without lesions  Lips, mucosa, and tongue normal   Neck: Supple, symmetrical, trachea midline, no adenopathy,  no tenderness/mass/nodules   Lungs:   Clear to auscultation bilaterally, no audible wheezes, rhonchi or rales; respirations unlabored   Heart:  Sternotomy scar, right subclavian ppm wound clean  Fresh left subclavian ppm wound with dry dressing, Regular rate and rhythm, S1, S2 normal, no murmur, rub or gallop   Abdomen:   Soft, non-tender, non-distended, positive bowel sounds  No masses, no organomegaly    No CVA tenderness   Extremities: Extremities normal, atraumatic, no clubbing, cyanosis or edema   Skin: As above           Invasive Devices     Peripheral Intravenous Line            Peripheral IV 20 Right Antecubital 4 days    Peripheral IV 07/24/20 Left Hand less than 1 day                Labs, Imaging, & Other studies:   All pertinent labs were personally reviewed  Results from last 7 days   Lab Units 07/24/20  0533 07/23/20  0552 07/22/20  0433   WBC Thousand/uL 10 91* 15 79* 19 40*   HEMOGLOBIN g/dL 11 6* 12 1 13 3   PLATELETS Thousands/uL 173 181 200     Results from last 7 days   Lab Units 07/24/20  0533 07/23/20  0552 07/22/20  0433 07/21/20  0445  07/20/20  1021   SODIUM mmol/L 141 138 139 139   < > 139   POTASSIUM mmol/L 5 0 5 1 4 9 5 3   < > 5 2   CHLORIDE mmol/L 111* 108 108 108   < > 104   CO2 mmol/L 24 24 24 24   < > 28   BUN mg/dL 54* 52* 46* 43*   < > 40*   CREATININE mg/dL 2 28* 2 29* 2 35* 2 20*   < > 2 74*   EGFR ml/min/1 73sq m 25 25 24 27   < > 20   CALCIUM mg/dL 9 0 8 4 8 9 8 8   < > 8 8   AST U/L  --   --   --  12  --  15   ALT U/L  --   --   --  15  --  18   ALK PHOS U/L  --   --   --  73  --  83    < > = values in this interval not displayed  Results from last 7 days   Lab Units 07/22/20 0443 07/21/20  1139   BLOOD CULTURE  No Growth at 48 hrs  No Growth at 72 hrs

## 2020-07-25 NOTE — ASSESSMENT & PLAN NOTE
Asymptomatic currently  Patient with complete heart block  Western blot shows elevated Lyme IgG  However, do not suspect active Lyme infection  · Hold metoprolol  · Appreciate ID and Cardiology input    · Status post permanent pacemaker placement

## 2020-07-25 NOTE — PLAN OF CARE
Problem: Potential for Falls  Goal: Patient will remain free of falls  Description  INTERVENTIONS:  - Assess patient frequently for physical needs  -  Identify cognitive and physical deficits and behaviors that affect risk of falls    -  [place] fall precautions as indicated by assessment   - Educate patient/family on patient safety including physical limitations  - Instruct patient to call for assistance with activity based on assessment  - Modify environment to reduce risk of injury  - Consider OT/PT consult to assist with strengthening/mobility  Outcome: Progressing     Problem: PAIN - ADULT  Goal: Verbalizes/displays adequate comfort level or baseline comfort level  Description  Interventions:  - Encourage patient to monitor pain and request assistance  - Assess pain using appropriate pain scale  - Administer analgesics based on type and severity of pain and evaluate response  - Implement non-pharmacological measures as appropriate and evaluate response  - Consider cultural and social influences on pain and pain management  - Notify physician/advanced practitioner if interventions unsuccessful or patient reports new pain  Outcome: Progressing     Problem: INFECTION - ADULT  Goal: Absence or prevention of progression during hospitalization  Description  INTERVENTIONS:  - Assess and monitor for signs and symptoms of infection  - Monitor lab/diagnostic results  - Monitor all insertion sites, i e  indwelling lines, tubes, and drains  - Monitor endotracheal if appropriate and nasal secretions for changes in amount and color  - [place] appropriate cooling/warming therapies per order  - Administer medications as ordered  - Instruct and encourage patient and family to use good hand hygiene technique  - Identify and instruct in appropriate isolation precautions for identified infection/condition  Outcome: Progressing  Goal: Absence of fever/infection during neutropenic period  Description  INTERVENTIONS:  - Monitor WBC    Outcome: Progressing     Problem: SAFETY ADULT  Goal: Maintain or return to baseline ADL function  Description  INTERVENTIONS:  -  Assess patient's ability to carry out ADLs; assess patient's baseline for ADL function and identify physical deficits which impact ability to perform ADLs (bathing, care of mouth/teeth, toileting, grooming, dressing, etc )  - Assess/evaluate cause of self-care deficits   - Assess range of motion  - Assess patient's mobility; develop plan if impaired  - Assess patient's need for assistive devices and provide as appropriate  - Encourage maximum independence but intervene and supervise when necessary  - Involve family in performance of ADLs  - Assess for home care needs following discharge   - Consider OT consult to assist with ADL evaluation and planning for discharge  - Provide patient education as appropriate  Outcome: Progressing  Goal: Maintain or return mobility status to optimal level  Description  INTERVENTIONS:  - Assess patient's baseline mobility status (ambulation, transfers, stairs, etc )    - Identify cognitive and physical deficits and behaviors that affect mobility  - Identify mobility aids required to assist with transfers and/or ambulation (gait belt, sit-to-stand, lift, walker, cane, etc )  - [place] fall precautions as indicated by assessment  - Record patient progress and toleration of activity level on Mobility SBAR; progress patient to next Phase/Stage  - Instruct patient to call for assistance with activity based on assessment  - Consider rehabilitation consult to assist with strengthening/weightbearing, etc   Outcome: Progressing     Problem: DISCHARGE PLANNING  Goal: Discharge to home or other facility with appropriate resources  Description  INTERVENTIONS:  - Identify barriers to discharge w/patient and caregiver  - Arrange for needed discharge resources and transportation as appropriate  - Identify discharge learning needs (meds, wound care, etc )  - Arrange for interpretive services to assist at discharge as needed  - Refer to Case Management Department for coordinating discharge planning if the patient needs post-hospital services based on physician/advanced practitioner order or complex needs related to functional status, cognitive ability, or social support system  Outcome: Progressing     Problem: Knowledge Deficit  Goal: Patient/family/caregiver demonstrates understanding of disease process, treatment plan, medications, and discharge instructions  Description  Complete learning assessment and assess knowledge base    Interventions:  - Provide teaching at level of understanding  - Provide teaching via preferred learning methods  Outcome: Progressing     Problem: CARDIOVASCULAR - ADULT  Goal: Maintains optimal cardiac output and hemodynamic stability  Description  INTERVENTIONS:  - Monitor I/O, vital signs and rhythm  - Monitor for S/S and trends of decreased cardiac output  - Administer and titrate ordered vasoactive medications to optimize hemodynamic stability  - Assess quality of pulses, skin color and temperature  - Assess for signs of decreased coronary artery perfusion  - Instruct patient to report change in severity of symptoms  Outcome: Progressing  Goal: Absence of cardiac dysrhythmias or at baseline rhythm  Description  INTERVENTIONS:  - Continuous cardiac monitoring, vital signs, obtain 12 lead EKG if ordered  - Administer antiarrhythmic and heart rate control medications as ordered  - Monitor electrolytes and administer replacement therapy as ordered  Outcome: Progressing     Problem: RESPIRATORY - ADULT  Goal: Achieves optimal ventilation and oxygenation  Description  INTERVENTIONS:  - Assess for changes in respiratory status  - Assess for changes in mentation and behavior  - Position to facilitate oxygenation and minimize respiratory effort  - Oxygen administered by appropriate delivery if ordered  - Initiate smoking cessation education as indicated  - Encourage broncho-pulmonary hygiene including cough, deep breathe, Incentive Spirometry  - Assess the need for suctioning and aspirate as needed  - Assess and instruct to report SOB or any respiratory difficulty  - Respiratory Therapy support as indicated  Outcome: Progressing     Problem: Prexisting or High Potential for Compromised Skin Integrity  Goal: Skin integrity is maintained or improved  Description  INTERVENTIONS:  - Identify patients at risk for skin breakdown  - Assess and monitor skin integrity  - Assess and monitor nutrition and hydration status  - Monitor labs   - Assess for incontinence   - Turn and reposition patient  - Assist with mobility/ambulation  - Relieve pressure over bony prominences  - Avoid friction and shearing  - Provide appropriate hygiene as needed including keeping skin clean and dry  - Evaluate need for skin moisturizer/barrier cream  - Collaborate with interdisciplinary team   - Patient/family teaching  - Consider wound care consult   Outcome: Progressing

## 2020-07-26 ENCOUNTER — APPOINTMENT (INPATIENT)
Dept: RADIOLOGY | Facility: HOSPITAL | Age: 85
DRG: 242 | End: 2020-07-26
Payer: COMMERCIAL

## 2020-07-26 DIAGNOSIS — I48.0 PAF (PAROXYSMAL ATRIAL FIBRILLATION) (HCC): Primary | ICD-10-CM

## 2020-07-26 LAB
ALBUMIN SERPL BCP-MCNC: 2.7 G/DL (ref 3.5–5)
ALP SERPL-CCNC: 100 U/L (ref 46–116)
ALT SERPL W P-5'-P-CCNC: 10 U/L (ref 12–78)
ANION GAP SERPL CALCULATED.3IONS-SCNC: 4 MMOL/L (ref 4–13)
ANION GAP SERPL CALCULATED.3IONS-SCNC: 8 MMOL/L (ref 4–13)
AST SERPL W P-5'-P-CCNC: 16 U/L (ref 5–45)
BACTERIA BLD CULT: NORMAL
BASE EX.OXY STD BLDV CALC-SCNC: 90.4 % (ref 60–80)
BASE EXCESS BLDV CALC-SCNC: -7.5 MMOL/L
BASOPHILS # BLD AUTO: 0.03 THOUSANDS/ΜL (ref 0–0.1)
BASOPHILS # BLD AUTO: 0.04 THOUSANDS/ΜL (ref 0–0.1)
BASOPHILS NFR BLD AUTO: 0 % (ref 0–1)
BASOPHILS NFR BLD AUTO: 0 % (ref 0–1)
BILIRUB SERPL-MCNC: 0.53 MG/DL (ref 0.2–1)
BUN SERPL-MCNC: 73 MG/DL (ref 5–25)
BUN SERPL-MCNC: 77 MG/DL (ref 5–25)
CALCIUM SERPL-MCNC: 8.6 MG/DL (ref 8.3–10.1)
CALCIUM SERPL-MCNC: 8.9 MG/DL (ref 8.3–10.1)
CHLORIDE SERPL-SCNC: 106 MMOL/L (ref 100–108)
CHLORIDE SERPL-SCNC: 106 MMOL/L (ref 100–108)
CO2 SERPL-SCNC: 21 MMOL/L (ref 21–32)
CO2 SERPL-SCNC: 24 MMOL/L (ref 21–32)
CREAT SERPL-MCNC: 2.28 MG/DL (ref 0.6–1.3)
CREAT SERPL-MCNC: 2.43 MG/DL (ref 0.6–1.3)
EOSINOPHIL # BLD AUTO: 0.2 THOUSAND/ΜL (ref 0–0.61)
EOSINOPHIL # BLD AUTO: 0.25 THOUSAND/ΜL (ref 0–0.61)
EOSINOPHIL NFR BLD AUTO: 1 % (ref 0–6)
EOSINOPHIL NFR BLD AUTO: 2 % (ref 0–6)
ERYTHROCYTE [DISTWIDTH] IN BLOOD BY AUTOMATED COUNT: 13.5 % (ref 11.6–15.1)
ERYTHROCYTE [DISTWIDTH] IN BLOOD BY AUTOMATED COUNT: 13.5 % (ref 11.6–15.1)
GFR SERPL CREATININE-BSD FRML MDRD: 24 ML/MIN/1.73SQ M
GFR SERPL CREATININE-BSD FRML MDRD: 25 ML/MIN/1.73SQ M
GLUCOSE SERPL-MCNC: 105 MG/DL (ref 65–140)
GLUCOSE SERPL-MCNC: 112 MG/DL (ref 65–140)
HCO3 BLDV-SCNC: 19.7 MMOL/L (ref 24–30)
HCT VFR BLD AUTO: 36.3 % (ref 36.5–49.3)
HCT VFR BLD AUTO: 36.3 % (ref 36.5–49.3)
HGB BLD-MCNC: 11.3 G/DL (ref 12–17)
HGB BLD-MCNC: 11.4 G/DL (ref 12–17)
IMM GRANULOCYTES # BLD AUTO: 0.08 THOUSAND/UL (ref 0–0.2)
IMM GRANULOCYTES # BLD AUTO: 0.17 THOUSAND/UL (ref 0–0.2)
IMM GRANULOCYTES NFR BLD AUTO: 1 % (ref 0–2)
IMM GRANULOCYTES NFR BLD AUTO: 1 % (ref 0–2)
LACTATE SERPL-SCNC: 1 MMOL/L (ref 0.5–2)
LYMPHOCYTES # BLD AUTO: 0.67 THOUSANDS/ΜL (ref 0.6–4.47)
LYMPHOCYTES # BLD AUTO: 0.68 THOUSANDS/ΜL (ref 0.6–4.47)
LYMPHOCYTES NFR BLD AUTO: 4 % (ref 14–44)
LYMPHOCYTES NFR BLD AUTO: 5 % (ref 14–44)
MCH RBC QN AUTO: 30.3 PG (ref 26.8–34.3)
MCH RBC QN AUTO: 30.8 PG (ref 26.8–34.3)
MCHC RBC AUTO-ENTMCNC: 31.1 G/DL (ref 31.4–37.4)
MCHC RBC AUTO-ENTMCNC: 31.4 G/DL (ref 31.4–37.4)
MCV RBC AUTO: 97 FL (ref 82–98)
MCV RBC AUTO: 98 FL (ref 82–98)
MONOCYTES # BLD AUTO: 1.17 THOUSAND/ΜL (ref 0.17–1.22)
MONOCYTES # BLD AUTO: 1.35 THOUSAND/ΜL (ref 0.17–1.22)
MONOCYTES NFR BLD AUTO: 8 % (ref 4–12)
MONOCYTES NFR BLD AUTO: 9 % (ref 4–12)
NASAL CANNULA: 10
NEUTROPHILS # BLD AUTO: 11.23 THOUSANDS/ΜL (ref 1.85–7.62)
NEUTROPHILS # BLD AUTO: 15.35 THOUSANDS/ΜL (ref 1.85–7.62)
NEUTS SEG NFR BLD AUTO: 83 % (ref 43–75)
NEUTS SEG NFR BLD AUTO: 86 % (ref 43–75)
NRBC BLD AUTO-RTO: 0 /100 WBCS
NRBC BLD AUTO-RTO: 0 /100 WBCS
O2 CT BLDV-SCNC: 15.7 ML/DL
PCO2 BLDV: 46.3 MM HG (ref 42–50)
PH BLDV: 7.25 [PH] (ref 7.3–7.4)
PLATELET # BLD AUTO: 208 THOUSANDS/UL (ref 149–390)
PLATELET # BLD AUTO: 219 THOUSANDS/UL (ref 149–390)
PMV BLD AUTO: 10.2 FL (ref 8.9–12.7)
PMV BLD AUTO: 10.6 FL (ref 8.9–12.7)
PO2 BLDV: 67 MM HG (ref 35–45)
POTASSIUM SERPL-SCNC: 5.6 MMOL/L (ref 3.5–5.3)
POTASSIUM SERPL-SCNC: 5.6 MMOL/L (ref 3.5–5.3)
PROCALCITONIN SERPL-MCNC: 0.37 NG/ML
PROCALCITONIN SERPL-MCNC: 0.53 NG/ML
PROT SERPL-MCNC: 7 G/DL (ref 6.4–8.2)
RBC # BLD AUTO: 3.7 MILLION/UL (ref 3.88–5.62)
RBC # BLD AUTO: 3.73 MILLION/UL (ref 3.88–5.62)
SODIUM SERPL-SCNC: 134 MMOL/L (ref 136–145)
SODIUM SERPL-SCNC: 135 MMOL/L (ref 136–145)
TROPONIN I SERPL-MCNC: 0.07 NG/ML
WBC # BLD AUTO: 13.39 THOUSAND/UL (ref 4.31–10.16)
WBC # BLD AUTO: 16.33 THOUSAND/UL (ref 4.31–10.16)
WBC # BLD AUTO: 17.83 THOUSAND/UL (ref 4.31–10.16)

## 2020-07-26 PROCEDURE — 99233 SBSQ HOSP IP/OBS HIGH 50: CPT | Performed by: INTERNAL MEDICINE

## 2020-07-26 PROCEDURE — 80053 COMPREHEN METABOLIC PANEL: CPT | Performed by: INTERNAL MEDICINE

## 2020-07-26 PROCEDURE — 94760 N-INVAS EAR/PLS OXIMETRY 1: CPT

## 2020-07-26 PROCEDURE — 99231 SBSQ HOSP IP/OBS SF/LOW 25: CPT | Performed by: INTERNAL MEDICINE

## 2020-07-26 PROCEDURE — 82805 BLOOD GASES W/O2 SATURATION: CPT | Performed by: PHYSICIAN ASSISTANT

## 2020-07-26 PROCEDURE — 87040 BLOOD CULTURE FOR BACTERIA: CPT | Performed by: PHYSICIAN ASSISTANT

## 2020-07-26 PROCEDURE — 84484 ASSAY OF TROPONIN QUANT: CPT | Performed by: INTERNAL MEDICINE

## 2020-07-26 PROCEDURE — 71046 X-RAY EXAM CHEST 2 VIEWS: CPT

## 2020-07-26 PROCEDURE — 99024 POSTOP FOLLOW-UP VISIT: CPT | Performed by: INTERNAL MEDICINE

## 2020-07-26 PROCEDURE — 94760 N-INVAS EAR/PLS OXIMETRY 1: CPT | Performed by: SOCIAL WORKER

## 2020-07-26 PROCEDURE — 84145 PROCALCITONIN (PCT): CPT | Performed by: PHYSICIAN ASSISTANT

## 2020-07-26 PROCEDURE — 87449 NOS EACH ORGANISM AG IA: CPT | Performed by: PHYSICIAN ASSISTANT

## 2020-07-26 PROCEDURE — 83605 ASSAY OF LACTIC ACID: CPT | Performed by: PHYSICIAN ASSISTANT

## 2020-07-26 PROCEDURE — 80048 BASIC METABOLIC PNL TOTAL CA: CPT | Performed by: INTERNAL MEDICINE

## 2020-07-26 PROCEDURE — 85025 COMPLETE CBC W/AUTO DIFF WBC: CPT | Performed by: INTERNAL MEDICINE

## 2020-07-26 PROCEDURE — 85048 AUTOMATED LEUKOCYTE COUNT: CPT | Performed by: PHYSICIAN ASSISTANT

## 2020-07-26 RX ORDER — VANCOMYCIN HYDROCHLORIDE 1 G/200ML
12.5 INJECTION, SOLUTION INTRAVENOUS EVERY 24 HOURS
Status: DISCONTINUED | OUTPATIENT
Start: 2020-07-26 | End: 2020-07-28

## 2020-07-26 RX ORDER — FUROSEMIDE 10 MG/ML
20 INJECTION INTRAMUSCULAR; INTRAVENOUS ONCE
Status: COMPLETED | OUTPATIENT
Start: 2020-07-26 | End: 2020-07-26

## 2020-07-26 RX ORDER — AZITHROMYCIN 250 MG/1
500 TABLET, FILM COATED ORAL EVERY 24 HOURS
Status: DISCONTINUED | OUTPATIENT
Start: 2020-07-26 | End: 2020-07-26

## 2020-07-26 RX ORDER — ALBUTEROL SULFATE 2.5 MG/3ML
2.5 SOLUTION RESPIRATORY (INHALATION) EVERY 4 HOURS PRN
Status: DISCONTINUED | OUTPATIENT
Start: 2020-07-26 | End: 2020-07-30

## 2020-07-26 RX ORDER — FUROSEMIDE 10 MG/ML
40 INJECTION INTRAMUSCULAR; INTRAVENOUS
Status: DISCONTINUED | OUTPATIENT
Start: 2020-07-26 | End: 2020-07-27

## 2020-07-26 RX ADMIN — METOPROLOL SUCCINATE 100 MG: 100 TABLET, FILM COATED, EXTENDED RELEASE ORAL at 18:06

## 2020-07-26 RX ADMIN — FUROSEMIDE 40 MG: 10 INJECTION, SOLUTION INTRAMUSCULAR; INTRAVENOUS at 16:49

## 2020-07-26 RX ADMIN — Medication 1 G: at 18:06

## 2020-07-26 RX ADMIN — APIXABAN 2.5 MG: 2.5 TABLET, FILM COATED ORAL at 08:51

## 2020-07-26 RX ADMIN — AZITHROMYCIN 500 MG: 250 TABLET, FILM COATED ORAL at 05:08

## 2020-07-26 RX ADMIN — AMLODIPINE BESYLATE 10 MG: 10 TABLET ORAL at 08:51

## 2020-07-26 RX ADMIN — PRAVASTATIN SODIUM 40 MG: 40 TABLET ORAL at 18:06

## 2020-07-26 RX ADMIN — AMLODIPINE BESYLATE 10 MG: 10 TABLET ORAL at 18:06

## 2020-07-26 RX ADMIN — CEFEPIME HYDROCHLORIDE 1000 MG: 1 INJECTION, POWDER, FOR SOLUTION INTRAMUSCULAR; INTRAVENOUS at 18:06

## 2020-07-26 RX ADMIN — ACETAMINOPHEN 650 MG: 325 TABLET, FILM COATED ORAL at 10:06

## 2020-07-26 RX ADMIN — PANTOPRAZOLE SODIUM 40 MG: 40 TABLET, DELAYED RELEASE ORAL at 05:02

## 2020-07-26 RX ADMIN — CLONIDINE HYDROCHLORIDE 0.1 MG: 0.1 TABLET ORAL at 08:50

## 2020-07-26 RX ADMIN — APIXABAN 2.5 MG: 2.5 TABLET, FILM COATED ORAL at 18:07

## 2020-07-26 RX ADMIN — FUROSEMIDE 20 MG: 10 INJECTION, SOLUTION INTRAMUSCULAR; INTRAVENOUS at 01:38

## 2020-07-26 RX ADMIN — Medication 1 G: at 08:51

## 2020-07-26 RX ADMIN — ALBUTEROL SULFATE 2 PUFF: 90 AEROSOL, METERED RESPIRATORY (INHALATION) at 00:37

## 2020-07-26 RX ADMIN — CEFTRIAXONE SODIUM 1000 MG: 10 INJECTION, POWDER, FOR SOLUTION INTRAVENOUS at 05:07

## 2020-07-26 RX ADMIN — METOPROLOL SUCCINATE 100 MG: 100 TABLET, FILM COATED, EXTENDED RELEASE ORAL at 08:51

## 2020-07-26 RX ADMIN — FUROSEMIDE 20 MG: 10 INJECTION, SOLUTION INTRAMUSCULAR; INTRAVENOUS at 10:06

## 2020-07-26 RX ADMIN — VANCOMYCIN HYDROCHLORIDE 1000 MG: 1 INJECTION, SOLUTION INTRAVENOUS at 19:18

## 2020-07-26 RX ADMIN — ASPIRIN 81 MG: 81 TABLET, COATED ORAL at 08:51

## 2020-07-26 NOTE — QUICK NOTE
QUICK NOTE - Deterioration Index  Prudence Stearns 80 y o  male MRN: [id number]  [facility] Encounter: [id number]    Date Paged: 20  Time Paged:  36  Room #: 26  Arrival Time: 1808  Deterioration index score at time of page: 83 3  %  Spoke with Primary RN as well as Dr Linda Edwards from primary team  Need to escalate level of care: no     PROBLEMS resulting in high DI score:    Change in mental status GCS 9; however spontaneously resolved    30% Sharlene coma scale is 9   16% Age is 84   15% Respiratory rate is 11   14% Supplemental oxygen is Nasal cannula   7% Potassium is 5 6 mmol/L   5% Cardiac rhythm is A-V Sequential paced         PLAN:     Discussed with Primary RN as well as Dr Linda Edwards   Obtain BMP, Lactic Acid   Mental status spontaneously resolved according to RN   GCS now 15 with non focal neuro exam   No need for Brain imaging at this time    Please call Medical CC Provider with any questions       Vitals:   Vitals:    20 0732 20 1101 20 1535 20 1600   BP: (!) 178/74 154/67 124/59 143/63   BP Location: Right arm Right arm  Right arm   Pulse: 80 65 59 60   Resp: 17 18 20 (!) 11   Temp: 98 5 °F (36 9 °C) 98 5 °F (36 9 °C) (!) 97 4 °F (36 3 °C)    TempSrc: Oral Oral Oral    SpO2: 93% 95% 95% 94%   Weight:       Height:           Respiratory:  SpO2: SpO2: 94 %, SpO2 Activity: SpO2 Activity: At Rest, SpO2 Device: O2 Device: Nasal cannula  Nasal Cannula O2 Flow Rate (L/min): 2 L/min    Temperature: Temp (24hrs), Av 1 °F (36 7 °C), Min:97 4 °F (36 3 °C), Max:98 5 °F (36 9 °C)  Current: Temperature: (!) 97 4 °F (36 3 °C)    Labs:   Results from last 7 days   Lab Units 20  1805 20  0455 20  0139 20  0458  20  0433   WBC Thousand/uL 13 39* 17 83* 16 33* 12 19*   < > 19 40*   HEMOGLOBIN g/dL 11 3* 11 4*  --  11 5*   < > 13 3   HEMATOCRIT % 36 3* 36 3*  --  36 8   < > 40 6   PLATELETS Thousands/uL 208 219  --  191   < > 200   NEUTROS PCT % 83* 86*  --   --   --  89*   MONOS PCT % 9 8  --   --   --  6    < > = values in this interval not displayed  Results from last 7 days   Lab Units 07/26/20  0455 07/25/20  0458 07/24/20  0533  07/21/20  0445  07/20/20  1021   SODIUM mmol/L 135* 140 141   < > 139   < > 139   POTASSIUM mmol/L 5 6* 5 3 5 0   < > 5 3   < > 5 2   CHLORIDE mmol/L 106 109* 111*   < > 108   < > 104   CO2 mmol/L 21 21 24   < > 24   < > 28   BUN mg/dL 73* 62* 54*   < > 43*   < > 40*   CREATININE mg/dL 2 28* 2 24* 2 28*   < > 2 20*   < > 2 74*   CALCIUM mg/dL 8 6 8 6 9 0   < > 8 8   < > 8 8   ALK PHOS U/L 100  --   --   --  73  --  83   ALT U/L 10*  --   --   --  15  --  18   AST U/L 16  --   --   --  12  --  15    < > = values in this interval not displayed       Results from last 7 days   Lab Units 07/21/20  0445 07/20/20  1021   MAGNESIUM mg/dL 2 6 2 4         Results from last 7 days   Lab Units 07/20/20  1021   TROPONIN I ng/mL <0 02     Results from last 7 days   Lab Units 07/26/20  0455 07/26/20  0139 07/22/20  0433   PROCALCITONIN ng/ml 0 53* 0 37* 0 13       Code Status: Level 1 - Full Code

## 2020-07-26 NOTE — SOCIAL WORK
Eliquis escribed to [facility] pharmacy in [facility] [phone])  Cm contacted pharmacy to get pricing however they are closed until tomorrow at 8am  Pt not ready for d/c, cm will f/u Monday

## 2020-07-26 NOTE — ASSESSMENT & PLAN NOTE
Patient requires nasal cannula  Did not use at home  Now on increased oxygen  Chest x-ray vascular congestion, increased pleural effusions, and persistent opacities at the right lung base  Differential concerning for CHF versus pneumonia    · Continue nasal cannula oxygen  · Monitor respiratory status closely  · Incentive spirometry  · Restart antibiotics, follow-up cultures  · Will also continue IV diuresis with Lasix, titrate accordingly to maintain good urinary output  · Fluid/sodium restriction  · Appreciate cardiology and ID input

## 2020-07-26 NOTE — PROGRESS NOTES
Heart Failure/ Pulmonary Hypertension Progress Note - Waylon Headings 80 y o  male MRN: [id number]    Unit/Bed#: [facility] 525-01 Encounter: [id number]      Assessment:    Principal Problem:    Bradycardia  Active Problems:    Mild intermittent asthma without complication    Acute kidney injury superimposed on chronic kidney disease (HCC)    H/O aortic valve replacement    History of stroke    Complete heart block (HCC)    Essential hypertension    Leukocytosis    Ambulatory dysfunction    Hypoxia    Objective: Intake/ Output: not accurate  Weight:   Tele:   States his knees hurt    7/25 CXR- looks congested    Given furosemide 20 mg IV at 1 am    # symptomatic CHB  S/p temp wire  MDT PPM 7/24Rivka Forward    # PAF/ PAT  Hx of TIA  Started on Eliquis 2 5 mg BID    # HTN  norvasc 10 mg BID, Toprol  mg BID, clonidine 0 1 mg daily    Echocardiogram 7/21/20  LVEF: 60%, moderate LVH  LVIDd:  RV:  MR:  PASP: 48 mmHg  RVOT:   Other:    # Chronic HFpEF, Stage C  PASP 48 mmHg on echo  Given lasix 20 mg IV last night    # bioprosthetic AVR    # CKD4      Plan:  Lasix 20 mg IV this am    Review of Systems   Constitutional: Negative for activity change, appetite change, fatigue and unexpected weight change  HENT: Negative for congestion and nosebleeds  Eyes: Negative  Respiratory: Negative for cough, chest tightness and shortness of breath  Cardiovascular: Negative for chest pain, palpitations and leg swelling  Gastrointestinal: Negative for abdominal distention  Endocrine: Negative  Genitourinary: Negative  Musculoskeletal: Positive for myalgias  Skin: Negative  Neurological: Negative for dizziness, syncope and weakness  Hematological: Negative  Psychiatric/Behavioral: Negative  [employer] (day, reason): Alvares catheter (day, reason):    Vitals: Blood pressure (!) 178/74, pulse 80, temperature 98 5 °F (36 9 °C), temperature source Oral, resp   rate 17, height 5' 4" (1 626 m), weight 95 5 kg (210 lb 8 6 oz), SpO2 93 %  , Body mass index is 36 14 kg/m² , I/O last 3 completed shifts: In: 1140 [P O :1060; I V :30; IV Piggyback:50]  Out: 705 [Urine:705]  No intake/output data recorded  Wt Readings from Last 3 Encounters:   07/20/20 95 5 kg (210 lb 8 6 oz)   07/20/20 97 5 kg (214 lb 15 2 oz)   10/07/19 93 9 kg (207 lb)       Intake/Output Summary (Last 24 hours) at 7/26/2020 0945  Last data filed at 7/26/2020 0606  Gross per 24 hour   Intake 180 ml   Output 705 ml   Net -525 ml     I/O last 3 completed shifts: In: 8829 [P O :1060; I V :30; IV Piggyback:50]  Out: 705 [Urine:705]    No significant arrhythmias seen on telemetry review         Physical Exam:  Vitals:    07/26/20 0215 07/26/20 0344 07/26/20 0730 07/26/20 0732   BP:  167/69  (!) 178/74   BP Location:    Right arm   Pulse:  81  80   Resp:  18  17   Temp:  98 1 °F (36 7 °C)  98 5 °F (36 9 °C)   TempSrc:  Oral  Oral   SpO2: 98% 98% 92% 93%   Weight:       Height:           GEN: Waylon Headings appears well, alert and oriented x 3, pleasant and cooperative   HEENT: pupils equal, round, and reactive to light; extraocular muscles intact  NECK: supple, no carotid bruits   HEART: regular rhythm, normal S1 and S2, no murmurs, clicks, gallops or rubs, JVP is    LUNGS: clear to auscultation bilaterally; no wheezes, rales, or rhonchi   ABDOMEN: normal bowel sounds, soft, no tenderness, no distention  EXTREMITIES: peripheral pulses normal; no clubbing, cyanosis, or edema  NEURO: no focal findings   SKIN: normal without suspicious lesions on exposed skin      Current Facility-Administered Medications:     acetaminophen (TYLENOL) tablet 650 mg, 650 mg, Oral, Q6H PRN, Joe Sood, DO, 650 mg at 07/25/20 1157    albuterol inhalation solution 2 5 mg, 2 5 mg, Nebulization, Q4H PRN, Joe Sood DO    amLODIPine (NORVASC) tablet 10 mg, 10 mg, Oral, BID, Gemini Coffey PA-C, 10 mg at 07/26/20 0851    apixaban (ELIQUIS) tablet 2 5 mg, 2 5 mg, Oral, BID, Lisa Nieto PA-C, 2 5 mg at 07/26/20 0694    aspirin (ECOTRIN LOW STRENGTH) EC tablet 81 mg, 81 mg, Oral, Daily, Lisa Nieto PA-C, 81 mg at 07/26/20 0851    cefTRIAXone (ROCEPHIN) 1,000 mg in dextrose 5 % 50 mL IVPB, 1,000 mg, Intravenous, Q24H, Stopped at 07/26/20 0606 **AND** azithromycin (ZITHROMAX) tablet 500 mg, 500 mg, Oral, Q24H, Gina Edmonds PA-C, 500 mg at 07/26/20 0508    cloNIDine (CATAPRES) tablet 0 1 mg, 0 1 mg, Oral, Daily, Gemini Coffey PA-C, 0 1 mg at 07/26/20 0850    Labetalol HCl (NORMODYNE) injection 10 mg, 10 mg, Intravenous, Q8H PRN, Kendra Ratliff, DO    metoclopramide (REGLAN) injection 10 mg, 10 mg, Intravenous, Q6H PRN, Alfrusty Saldana Vertri, DO, 10 mg at 07/21/20 1547    metoprolol succinate (TOPROL-XL) 24 hr tablet 100 mg, 100 mg, Oral, BID, Cherylene Cleaver, DO, 100 mg at 07/26/20 0851    pantoprazole (PROTONIX) EC tablet 40 mg, 40 mg, Oral, Early Morning, Hetul Stewart, DO, 40 mg at 07/26/20 0502    pravastatin (PRAVACHOL) tablet 40 mg, 40 mg, Oral, Daily With Dinner, Hetul Stewart, DO, 40 mg at 07/25/20 1711    sodium chloride tablet 1 g, 1 g, Oral, BID, Hetul Stewart, DO, 1 g at 07/26/20 0851      Labs & Results:    Results from last 7 days   Lab Units 07/20/20  1021   TROPONIN I ng/mL <0 02     Results from last 7 days   Lab Units 07/26/20  0455 07/26/20  0139 07/25/20  0458 07/24/20  0533   WBC Thousand/uL 17 83* 16 33* 12 19* 10 91*   HEMOGLOBIN g/dL 11 4*  --  11 5* 11 6*   HEMATOCRIT % 36 3*  --  36 8 36 5   PLATELETS Thousands/uL 219  --  191 173         Results from last 7 days   Lab Units 07/26/20  0455 07/25/20  0458 07/24/20  0533  07/21/20  0445  07/20/20  1021   POTASSIUM mmol/L 5 6* 5 3 5 0   < > 5 3   < > 5 2   CHLORIDE mmol/L 106 109* 111*   < > 108   < > 104   CO2 mmol/L 21 21 24   < > 24   < > 28   BUN mg/dL 73* 62* 54*   < > 43*   < > 40*   CREATININE mg/dL 2 28* 2 24* 2 28*   < > 2 20*   < > 2 74*   CALCIUM mg/dL 8 6 8 6 9 0   < > 8 8   < > 8 8 ALK PHOS U/L 100  --   --   --  73  --  83   ALT U/L 10*  --   --   --  15  --  18   AST U/L 16  --   --   --  12  --  15    < > = values in this interval not displayed  Results from last 7 days   Lab Units 07/24/20  0533 07/20/20  1021   INR  1 32* 1 13         Counseling / Coordination of Care  Total floor / unit time spent today 25 minutes  Greater than 50% of total time was spent with the patient and / or family counseling and / or coordination of care  A description of the counseling / coordination of care: 15  Thank you for the opportunity to participate in the care of this patient  Janis ROMEO    Director Heart Failure/ Medical Director [address] [place] [address]

## 2020-07-26 NOTE — PLAN OF CARE
Problem: Potential for Falls  Goal: Patient will remain free of falls  Description  INTERVENTIONS:  - Assess patient frequently for physical needs  -  Identify cognitive and physical deficits and behaviors that affect risk of falls    -  [place] fall precautions as indicated by assessment   - Educate patient/family on patient safety including physical limitations  - Instruct patient to call for assistance with activity based on assessment  - Modify environment to reduce risk of injury  - Consider OT/PT consult to assist with strengthening/mobility  Outcome: Progressing     Problem: PAIN - ADULT  Goal: Verbalizes/displays adequate comfort level or baseline comfort level  Description  Interventions:  - Encourage patient to monitor pain and request assistance  - Assess pain using appropriate pain scale  - Administer analgesics based on type and severity of pain and evaluate response  - Implement non-pharmacological measures as appropriate and evaluate response  - Consider cultural and social influences on pain and pain management  - Notify physician/advanced practitioner if interventions unsuccessful or patient reports new pain  Outcome: Progressing     Problem: INFECTION - ADULT  Goal: Absence or prevention of progression during hospitalization  Description  INTERVENTIONS:  - Assess and monitor for signs and symptoms of infection  - Monitor lab/diagnostic results  - Monitor all insertion sites, i e  indwelling lines, tubes, and drains  - Monitor endotracheal if appropriate and nasal secretions for changes in amount and color  - [place] appropriate cooling/warming therapies per order  - Administer medications as ordered  - Instruct and encourage patient and family to use good hand hygiene technique  - Identify and instruct in appropriate isolation precautions for identified infection/condition  Outcome: Progressing  Goal: Absence of fever/infection during neutropenic period  Description  INTERVENTIONS:  - Monitor WBC    Outcome: Progressing     Problem: SAFETY ADULT  Goal: Maintain or return to baseline ADL function  Description  INTERVENTIONS:  -  Assess patient's ability to carry out ADLs; assess patient's baseline for ADL function and identify physical deficits which impact ability to perform ADLs (bathing, care of mouth/teeth, toileting, grooming, dressing, etc )  - Assess/evaluate cause of self-care deficits   - Assess range of motion  - Assess patient's mobility; develop plan if impaired  - Assess patient's need for assistive devices and provide as appropriate  - Encourage maximum independence but intervene and supervise when necessary  - Involve family in performance of ADLs  - Assess for home care needs following discharge   - Consider OT consult to assist with ADL evaluation and planning for discharge  - Provide patient education as appropriate  Outcome: Progressing  Goal: Maintain or return mobility status to optimal level  Description  INTERVENTIONS:  - Assess patient's baseline mobility status (ambulation, transfers, stairs, etc )    - Identify cognitive and physical deficits and behaviors that affect mobility  - Identify mobility aids required to assist with transfers and/or ambulation (gait belt, sit-to-stand, lift, walker, cane, etc )  - [place] fall precautions as indicated by assessment  - Record patient progress and toleration of activity level on Mobility SBAR; progress patient to next Phase/Stage  - Instruct patient to call for assistance with activity based on assessment  - Consider rehabilitation consult to assist with strengthening/weightbearing, etc   Outcome: Progressing     Problem: DISCHARGE PLANNING  Goal: Discharge to home or other facility with appropriate resources  Description  INTERVENTIONS:  - Identify barriers to discharge w/patient and caregiver  - Arrange for needed discharge resources and transportation as appropriate  - Identify discharge learning needs (meds, wound care, etc )  - Arrange for interpretive services to assist at discharge as needed  - Refer to Case Management Department for coordinating discharge planning if the patient needs post-hospital services based on physician/advanced practitioner order or complex needs related to functional status, cognitive ability, or social support system  Outcome: Progressing     Problem: Knowledge Deficit  Goal: Patient/family/caregiver demonstrates understanding of disease process, treatment plan, medications, and discharge instructions  Description  Complete learning assessment and assess knowledge base    Interventions:  - Provide teaching at level of understanding  - Provide teaching via preferred learning methods  Outcome: Progressing     Problem: CARDIOVASCULAR - ADULT  Goal: Maintains optimal cardiac output and hemodynamic stability  Description  INTERVENTIONS:  - Monitor I/O, vital signs and rhythm  - Monitor for S/S and trends of decreased cardiac output  - Administer and titrate ordered vasoactive medications to optimize hemodynamic stability  - Assess quality of pulses, skin color and temperature  - Assess for signs of decreased coronary artery perfusion  - Instruct patient to report change in severity of symptoms  Outcome: Progressing  Goal: Absence of cardiac dysrhythmias or at baseline rhythm  Description  INTERVENTIONS:  - Continuous cardiac monitoring, vital signs, obtain 12 lead EKG if ordered  - Administer antiarrhythmic and heart rate control medications as ordered  - Monitor electrolytes and administer replacement therapy as ordered  Outcome: Progressing     Problem: RESPIRATORY - ADULT  Goal: Achieves optimal ventilation and oxygenation  Description  INTERVENTIONS:  - Assess for changes in respiratory status  - Assess for changes in mentation and behavior  - Position to facilitate oxygenation and minimize respiratory effort  - Oxygen administered by appropriate delivery if ordered  - Initiate smoking cessation education as indicated  - Encourage broncho-pulmonary hygiene including cough, deep breathe, Incentive Spirometry  - Assess the need for suctioning and aspirate as needed  - Assess and instruct to report SOB or any respiratory difficulty  - Respiratory Therapy support as indicated  Outcome: Progressing     Problem: Prexisting or High Potential for Compromised Skin Integrity  Goal: Skin integrity is maintained or improved  Description  INTERVENTIONS:  - Identify patients at risk for skin breakdown  - Assess and monitor skin integrity  - Assess and monitor nutrition and hydration status  - Monitor labs   - Assess for incontinence   - Turn and reposition patient  - Assist with mobility/ambulation  - Relieve pressure over bony prominences  - Avoid friction and shearing  - Provide appropriate hygiene as needed including keeping skin clean and dry  - Evaluate need for skin moisturizer/barrier cream  - Collaborate with interdisciplinary team   - Patient/family teaching  - Consider wound care consult   Outcome: Progressing

## 2020-07-26 NOTE — PROGRESS NOTES
Progress Note - Infectious Disease   Dave Showers 80 y o  male MRN: [id number]  Unit/Bed#: [facility] 525-01 Encounter: [id number]      Impression:  1  Stress leukocytosis  2  Complete heart block with bradycardia, S/P ppm  3  CAD CABG, AS, bioprosthetic AVR   4  YOLANDE on CKD  5  History of CVA     Recommendations:  Patient is afebrile with a near normal WBC count of 12,190     1  Check final blood culture results which are negative so far  Antibiotics:  1  None    Subjective: The patient has no complaints  Denies fevers, chills, or sweats  Denies nausea, vomiting, or diarrhea  Objective:  Vitals:  Temp:  [98 °F (36 7 °C)-99 4 °F (37 4 °C)] 98 1 °F (36 7 °C)  HR:  [] 63  Resp:  [14-20] 18  BP: (141-167)/(62-87) 154/68  SpO2:  [92 %-97 %] 92 %  Temp (24hrs), Av 5 °F (36 9 °C), Min:98 °F (36 7 °C), Max:99 4 °F (37 4 °C)  Current: Temperature: 98 1 °F (36 7 °C)    Physical Exam:     General Appearance:  Alert, moderately obese elderly male who was nontoxic, no acute distress  Throat: Oropharynx moist without lesions  Lips, mucosa, and tongue normal   Neck: Supple, symmetrical, trachea midline, no adenopathy,  no tenderness/mass/nodules   Lungs:   Clear to auscultation bilaterally, no audible wheezes, rhonchi or rales; respirations unlabored   Heart:  Sternotomy scar, right subclavian  wound clean  Left subclavian ppm wound with dry dressing, Regular rate and rhythm, S1, S2 normal, no murmur, rub or gallop   Abdomen:   Soft, non-tender, non-distended, positive bowel sounds  No masses, no organomegaly    No CVA tenderness   Extremities: Extremities normal, atraumatic, no clubbing, cyanosis or edema   Skin: As above           Invasive Devices     Peripheral Intravenous Line            Peripheral IV 20 Left Hand 1 day                Labs, Imaging, & Other studies:   All pertinent labs were personally reviewed  Results from last 7 days   Lab Units 20  0458 20  1020 07/23/20  0552   WBC Thousand/uL 12 19* 10 91* 15 79*   HEMOGLOBIN g/dL 11 5* 11 6* 12 1   PLATELETS Thousands/uL 191 173 181     Results from last 7 days   Lab Units 07/25/20  0458 07/24/20  0533 07/23/20  0552  07/21/20  0445  07/20/20  1021   SODIUM mmol/L 140 141 138   < > 139   < > 139   POTASSIUM mmol/L 5 3 5 0 5 1   < > 5 3   < > 5 2   CHLORIDE mmol/L 109* 111* 108   < > 108   < > 104   CO2 mmol/L 21 24 24   < > 24   < > 28   BUN mg/dL 62* 54* 52*   < > 43*   < > 40*   CREATININE mg/dL 2 24* 2 28* 2 29*   < > 2 20*   < > 2 74*   EGFR ml/min/1 73sq m 26 25 25   < > 27   < > 20   CALCIUM mg/dL 8 6 9 0 8 4   < > 8 8   < > 8 8   AST U/L  --   --   --   --  12  --  15   ALT U/L  --   --   --   --  15  --  18   ALK PHOS U/L  --   --   --   --  73  --  83    < > = values in this interval not displayed  Results from last 7 days   Lab Units 07/22/20 0443 07/21/20  1139   BLOOD CULTURE  No Growth at 72 hrs  No Growth After 4 Days

## 2020-07-26 NOTE — PROGRESS NOTES
Patient called c/o SOB, O2 sats 83-84% on 2L NC  Increased O2 to 6L, patient sat up, O2 sats increased to 89%  Notified PA with [facility] Who ordered 20 mg lasix IV, labs, and patient was placed on mid flow bubbler at 15L & hooked up to continuous pulse ox

## 2020-07-26 NOTE — RESPIRATORY THERAPY NOTE
RT Protocol Note  Felicity Lawson 80 y o  male MRN: [id number]  Unit/Bed#: [facility] 525-01 Encounter: [id number]    Assessment    Principal Problem:    Bradycardia  Active Problems:    Mild intermittent asthma without complication    Acute kidney injury superimposed on chronic kidney disease ([address] )    H/O aortic valve replacement    History of stroke    Complete heart block [facility])    Essential hypertension    Leukocytosis    Ambulatory dysfunction    Hypoxia      Home Pulmonary Medications:  none       Past Medical History:   Diagnosis Date    Aortic stenosis     ECHO -4-14-14  MODERATE TO SEVERE AORTIC STENOSIS; MILD AROTIC INSUFFICIENCY - LAST ASSESSED 10/26/16; RESOLVED 6/8/16    Aortic valve disorder     LAST ASSESSED 10/8/15; 10/8/15    Hypertensive urgency 5/19/2019    Transient cerebral ischemia      Social History     Socioeconomic History    Marital status: /Civil Union     Spouse name: None    Number of children: None    Years of education: None    Highest education level: None   Occupational History    Occupation: retired   Social Needs    Financial resource strain: None    Food insecurity:     Worry: None     Inability: None    Transportation needs:     Medical: None     Non-medical: None   Tobacco Use    Smoking status: Never Smoker    Smokeless tobacco: Never Used   Substance and Sexual Activity    Alcohol use:  Yes     Alcohol/week: 1 0 standard drinks     Types: 1 Cans of beer per week     Frequency: Monthly or less     Drinks per session: 1 or 2     Binge frequency: Never     Comment: social    Drug use: No    Sexual activity: None   Lifestyle    Physical activity:     Days per week: None     Minutes per session: None    Stress: None   Relationships    Social connections:     Talks on phone: None     Gets together: None     Attends Mandaeism service: None     Active member of club or organization: None     Attends meetings of clubs or organizations: None     Relationship status: None    Intimate partner violence:     Fear of current or ex partner: None     Emotionally abused: None     Physically abused: None     Forced sexual activity: None   Other Topics Concern    None   Social History Narrative    Caffeine use     Dental care, regularly     Lives independently with spouse     Uses seatbelts        Subjective      Objective    Physical Exam:   Assessment Type: Assess only  General Appearance: Awake  Respiratory Pattern: Dyspnea at rest  Bilateral Breath Sounds: Diminished, Crackles  O2 Device: midflow bubbler    Vitals:  Blood pressure 167/69, pulse 81, temperature 98 1 °F (36 7 °C), temperature source Oral, resp  rate 18, height 5' 4" (1 626 m), weight 95 5 kg (210 lb 8 6 oz), SpO2 98 %  Imaging and other studies: I have personally reviewed pertinent reports  O2 Device: midflow bubbler     Plan    Respiratory Plan: (P) Mild Distress pathway        Resp Comments: (P) RN called to place patient on midflow bubbler due to pt SpO2 89% on 6 lpm nasal cannula  BS with crackles heard  Pt placed on midflow bubbler at 15 lpm   SpO2 93%  Pt assessed at this time for respiratory protocol  Pt admitted with bradycardia/heart block  Pt with history of mild intermittent asthma  It appears no at home medications are taken for the asthma  Pt denies smoking history  IS is at bedside  RN states pt did not do very well with the IS  Pt had difficulty following directions  Will order Albuterol udn every four hours as needed for wheezing and SOB  Will continue to monitor patient per respiratory protocol

## 2020-07-26 NOTE — ASSESSMENT & PLAN NOTE
· Continue to hold metoprolol  · Patient receiving scheduled labetalol  · Status post permanent pacemaker placement on 07/24  · See plan for bradycardia

## 2020-07-26 NOTE — PROGRESS NOTES
Progress Note - Derek Vásquez 1935, 80 y o  male MRN: [id number]    Unit/Bed#: [facility] 525-01 Encounter: [id number]    Primary Care Provider: Chip Santoyo DO   Date and time admitted to hospital: 7/20/2020  4:04 PM        Hypoxia  Assessment & Plan  Patient requires nasal cannula  Did not use at home  Now on increased oxygen  Chest x-ray vascular congestion, increased pleural effusions, and persistent opacities at the right lung base  Differential concerning for CHF versus pneumonia  · Continue nasal cannula oxygen  · Monitor respiratory status closely  · Incentive spirometry  · Restart antibiotics, follow-up cultures  · Will also continue IV diuresis with Lasix, titrate accordingly to maintain good urinary output  · Fluid/sodium restriction  · Appreciate cardiology and ID input    Ambulatory dysfunction  Assessment & Plan  · PT/OT recommending inpatient rehab    Leukocytosis  Assessment & Plan  Unclear etiology  Initially thought reactive, but now concern for infection given worsening respiratory status and elevated procalcitonin  Procalcitonin also possibly elevated secondary to CHF  White count increasing  · Initial blood cultures negative  Repeat blood cultures in process  · Restart antibiotics  · Trend procalcitonin  · Trend white blood cell count and fever curve  · Appreciate ID recommendations    Essential hypertension  Assessment & Plan  · Continue amlodipine, clonidine  · Continue scheduled labetalol, now 100 mg b i d  Complete heart block (HCC)  Assessment & Plan  · Continue to hold metoprolol  · Patient receiving scheduled labetalol  · Status post permanent pacemaker placement on 07/24  · See plan for bradycardia    History of stroke  Assessment & Plan  Patient with prior stroke last year    · Continue aspirin and Plavix  · Stat CT if acute change in neurological status    Acute kidney injury superimposed on chronic kidney disease ([facility] [address] )  Assessment & Plan  Previous baseline around 1 8-1 9 in 2019  Creatinine initially 2 74 on admission  Now trending 2 2-2 3  This possibly represents his new baseline  · Avoid nephrotoxins  · Monitor electrolytes and renal function  · Ensure adequate oral intake and hydration    Mild intermittent asthma without complication  Assessment & Plan  Continue albuterol nebulization p r n     * Bradycardia  Assessment & Plan  Asymptomatic currently  Patient with complete heart block  Western blot shows elevated Lyme IgG  However, do not suspect active Lyme infection  · Hold metoprolol  · Appreciate ID and Cardiology input  · Status post permanent pacemaker placement      VTE Pharmacologic Prophylaxis:   Pharmacologic: Apixaban (Eliquis)  Mechanical VTE Prophylaxis in Place: Yes    Patient Centered Rounds: I have performed bedside rounds with nursing staff today  Discussions with Specialists or Other Care Team Provider:  Infectious disease    Education and Discussions with Family / Patient:  Patient and wife    Time Spent for Care: 30 minutes  More than 50% of total time spent on counseling and coordination of care as described above  Current Length of Stay: 6 day(s)    Current Patient Status: Inpatient   Certification Statement: The patient will continue to require additional inpatient hospital stay due to Hypoxia    Discharge Plan: To rehab when medically stable    Code Status: Level 1 - Full Code      Subjective:   Patient noted to have episodes of shortness of breath and desaturation last night  Initially on 2 L nasal cannula, now requiring 6 L  Noted to be tachypnea with abdominal breathing  Coarse breath sounds noted  Some peripheral edema  He initially required 15 L mid flow, chest x-ray was obtained which showed atelectasis versus pneumonia versus pulmonary congestion  BNP was 8000  Was given diuresis  Restarted on antibiotics  On my exam, patient is sleeping, somewhat lethargic, but easily arousable  Wife is present at bedside  Patient still requiring 6 L nasal cannula oxygen  He does not appear to be in respiratory distress, but there is accessory muscle usage  Spoke to nursing staff regarding plan of care  Objective:     Vitals:   Temp (24hrs), Av 2 °F (36 8 °C), Min:98 °F (36 7 °C), Max:98 5 °F (36 9 °C)    Temp:  [98 °F (36 7 °C)-98 5 °F (36 9 °C)] 98 5 °F (36 9 °C)  HR:  [63-81] 65  Resp:  [17-18] 18  BP: (154-184)/(61-74) 154/67  SpO2:  [92 %-98 %] 95 %  Body mass index is 36 14 kg/m²  Input and Output Summary (last 24 hours): Intake/Output Summary (Last 24 hours) at 2020 1522  Last data filed at 2020 1243  Gross per 24 hour   Intake 298 ml   Output 705 ml   Net -407 ml       Physical Exam:     Physical Exam   Constitutional: He appears well-developed and well-nourished  He appears lethargic  He is sleeping  No distress  Nasal cannula in place  HENT:   Head: Normocephalic and atraumatic  Eyes: Conjunctivae are normal    Cardiovascular: Normal rate, regular rhythm, normal heart sounds and intact distal pulses  Pulmonary/Chest: Accessory muscle usage present  He has no wheezes  He has no rhonchi  He has rales  Abdominal: There is no tenderness  Musculoskeletal: He exhibits edema (trace bilateral pitting edema)  Neurological: He appears lethargic  Skin: Skin is warm and dry  He is not diaphoretic  Psychiatric: He is slowed  Vitals reviewed        Additional Data:     Labs:    Results from last 7 days   Lab Units 20  0455   WBC Thousand/uL 17 83*   HEMOGLOBIN g/dL 11 4*   HEMATOCRIT % 36 3*   PLATELETS Thousands/uL 219   NEUTROS PCT % 86*   LYMPHS PCT % 4*   MONOS PCT % 8   EOS PCT % 1     Results from last 7 days   Lab Units 20  0455   SODIUM mmol/L 135*   POTASSIUM mmol/L 5 6*   CHLORIDE mmol/L 106   CO2 mmol/L 21   BUN mg/dL 73*   CREATININE mg/dL 2 28*   ANION GAP mmol/L 8   CALCIUM mg/dL 8 6   ALBUMIN g/dL 2 7*   TOTAL BILIRUBIN mg/dL 0 53   ALK PHOS U/L 100   ALT U/L 10* AST U/L 16   GLUCOSE RANDOM mg/dL 105     Results from last 7 days   Lab Units 07/24/20  0533   INR  1 32*     Results from last 7 days   Lab Units 07/25/20  1610 07/25/20  1046 07/25/20  0659 07/24/20  2124 07/24/20  1637   POC GLUCOSE mg/dl 126 161* 119 141* 125         Results from last 7 days   Lab Units 07/26/20  0455 07/26/20  0139 07/22/20  0433   PROCALCITONIN ng/ml 0 53* 0 37* 0 13           * I Have Reviewed All Lab Data Listed Above  * Additional Pertinent Lab Tests Reviewed: All Labs Within Last 24 Hours Reviewed    Imaging:    Imaging Reports Reviewed Today Include:  Chest x-ray  Imaging Personally Reviewed by Myself Includes:  Chest x-ray    Recent Cultures (last 7 days):     Results from last 7 days   Lab Units 07/26/20  0455 07/26/20  0454 07/22/20  0443 07/21/20  1139   BLOOD CULTURE  Received in Microbiology Lab  Culture in Progress  Received in Microbiology Lab  Culture in Progress  No Growth at 72 hrs  No Growth After 4 Days         Last 24 Hours Medication List:     Current Facility-Administered Medications:  acetaminophen 650 mg Oral Q6H PRN Josh Colon, DO    albuterol 2 5 mg Nebulization Q4H PRN Linda Sood, DO    amLODIPine 10 mg Oral BID Gemini HARMAN Coffey-RICARDO    apixaban 2 5 mg Oral BID Phil Casillas PA-C    aspirin 81 mg Oral Daily Phil Casillas PA-C    cefTRIAXone 1,000 mg Intravenous Q24H Gina Edmonds PA-C Last Rate: Stopped (07/26/20 0606)   And        azithromycin 500 mg Oral Q24H Gina Edmonds PA-C    cloNIDine 0 1 mg Oral Daily Gemini SodenDARIN    furosemide 40 mg Intravenous BID (diuretic) Phillips Prince Sood, DO    Labetalol HCl 10 mg Intravenous Q8H PRN Kendra Ratliff, DO    metoclopramide 10 mg Intravenous Q6H PRN Josh Colon, DO    metoprolol succinate 100 mg Oral BID [place], DO    pantoprazole 40 mg Oral Early Morning Hetul Stewart, DO    pravastatin 40 mg Oral Daily With [employer], DO    sodium chloride 1 g Oral BID Hetul Stewart, DO Today, Patient Was Seen By: Vinicio Christensen DO    ** Please Note: Dictation voice to text software may have been used in the creation of this document   **

## 2020-07-26 NOTE — ASSESSMENT & PLAN NOTE
Unclear etiology  Initially thought reactive, but now concern for infection given worsening respiratory status and elevated procalcitonin  Procalcitonin also possibly elevated secondary to CHF  White count increasing  · Initial blood cultures negative  Repeat blood cultures in process    · Restart antibiotics  · Trend procalcitonin  · Trend white blood cell count and fever curve  · Appreciate ID recommendations

## 2020-07-26 NOTE — QUICK NOTE
Pt suddenly woke from sleep c/o SOB  RN increased O2 from 2L NC to 6L  Also PRN albuterol given  Pt had some relief  O2 still 89% on 6L  Pt less symptomatic on my exam, but tachypneic with abdominal breathing  In NAD, but uncomfortable  HR RRR/ Lungs coarse b/l Lower fields  AB soft  Min peripheral edema  Respiratory called  Placed on 15L midflow  Blood work obtained  PCT positive  Elevation in WBC  Mild metabolic acidosis  CXR from 7/25 reviewed with possible atelectasis vs PNA and pulm vascular congestion  BNP from admission 8000, no prior  VSS  Pt has been afebrile  SOB likely multifactorial   With worsening respiratory status and now prior PCT, will start on abx for PNA  Repeat blood cultures prior  Repeat PA/Lateral CXR in am  1x dose lasix given with adequate urinary response and mild respiratory improvement  May require additional  Will continue to monitor and attempt to wean O2- currently able to be weaned to 10L

## 2020-07-27 PROBLEM — J96.01 ACUTE RESPIRATORY FAILURE WITH HYPOXIA (HCC): Status: ACTIVE | Noted: 2020-07-25

## 2020-07-27 LAB
ANION GAP SERPL CALCULATED.3IONS-SCNC: 10 MMOL/L (ref 4–13)
BACTERIA BLD CULT: NORMAL
BUN SERPL-MCNC: 74 MG/DL (ref 5–25)
CALCIUM SERPL-MCNC: 7.6 MG/DL (ref 8.3–10.1)
CHLORIDE SERPL-SCNC: 113 MMOL/L (ref 100–108)
CO2 SERPL-SCNC: 15 MMOL/L (ref 21–32)
CREAT SERPL-MCNC: 2.03 MG/DL (ref 0.6–1.3)
ERYTHROCYTE [DISTWIDTH] IN BLOOD BY AUTOMATED COUNT: 13.5 % (ref 11.6–15.1)
GFR SERPL CREATININE-BSD FRML MDRD: 29 ML/MIN/1.73SQ M
GLUCOSE SERPL-MCNC: 104 MG/DL (ref 65–140)
HCT VFR BLD AUTO: 30.2 % (ref 36.5–49.3)
HCT VFR BLD AUTO: 41.1 % (ref 36.5–49.3)
HGB BLD-MCNC: 12.7 G/DL (ref 12–17)
HGB BLD-MCNC: 9.3 G/DL (ref 12–17)
L PNEUMO1 AG UR QL IA.RAPID: NEGATIVE
MCH RBC QN AUTO: 31.4 PG (ref 26.8–34.3)
MCHC RBC AUTO-ENTMCNC: 30.8 G/DL (ref 31.4–37.4)
MCV RBC AUTO: 102 FL (ref 82–98)
PLATELET # BLD AUTO: 193 THOUSANDS/UL (ref 149–390)
PMV BLD AUTO: 11 FL (ref 8.9–12.7)
POTASSIUM SERPL-SCNC: 5 MMOL/L (ref 3.5–5.3)
PROCALCITONIN SERPL-MCNC: 0.47 NG/ML
RBC # BLD AUTO: 2.96 MILLION/UL (ref 3.88–5.62)
S PNEUM AG UR QL: NEGATIVE
SODIUM SERPL-SCNC: 138 MMOL/L (ref 136–145)
WBC # BLD AUTO: 11.49 THOUSAND/UL (ref 4.31–10.16)

## 2020-07-27 PROCEDURE — 84145 PROCALCITONIN (PCT): CPT | Performed by: INTERNAL MEDICINE

## 2020-07-27 PROCEDURE — 80048 BASIC METABOLIC PNL TOTAL CA: CPT | Performed by: INTERNAL MEDICINE

## 2020-07-27 PROCEDURE — 99233 SBSQ HOSP IP/OBS HIGH 50: CPT | Performed by: INTERNAL MEDICINE

## 2020-07-27 PROCEDURE — 85018 HEMOGLOBIN: CPT | Performed by: INTERNAL MEDICINE

## 2020-07-27 PROCEDURE — 94760 N-INVAS EAR/PLS OXIMETRY 1: CPT

## 2020-07-27 PROCEDURE — 85027 COMPLETE CBC AUTOMATED: CPT | Performed by: INTERNAL MEDICINE

## 2020-07-27 PROCEDURE — 99232 SBSQ HOSP IP/OBS MODERATE 35: CPT | Performed by: INTERNAL MEDICINE

## 2020-07-27 PROCEDURE — 85014 HEMATOCRIT: CPT | Performed by: INTERNAL MEDICINE

## 2020-07-27 RX ORDER — FUROSEMIDE 10 MG/ML
20 INJECTION INTRAMUSCULAR; INTRAVENOUS ONCE
Status: COMPLETED | OUTPATIENT
Start: 2020-07-27 | End: 2020-07-27

## 2020-07-27 RX ORDER — FUROSEMIDE 10 MG/ML
60 INJECTION INTRAMUSCULAR; INTRAVENOUS
Status: DISCONTINUED | OUTPATIENT
Start: 2020-07-27 | End: 2020-07-31

## 2020-07-27 RX ADMIN — FUROSEMIDE 20 MG: 10 INJECTION, SOLUTION INTRAMUSCULAR; INTRAVENOUS at 13:00

## 2020-07-27 RX ADMIN — CEFEPIME HYDROCHLORIDE 1000 MG: 1 INJECTION, POWDER, FOR SOLUTION INTRAMUSCULAR; INTRAVENOUS at 16:05

## 2020-07-27 RX ADMIN — ASPIRIN 81 MG: 81 TABLET, COATED ORAL at 08:31

## 2020-07-27 RX ADMIN — FUROSEMIDE 60 MG: 10 INJECTION, SOLUTION INTRAMUSCULAR; INTRAVENOUS at 16:03

## 2020-07-27 RX ADMIN — PRAVASTATIN SODIUM 40 MG: 40 TABLET ORAL at 16:03

## 2020-07-27 RX ADMIN — FUROSEMIDE 40 MG: 10 INJECTION, SOLUTION INTRAMUSCULAR; INTRAVENOUS at 08:33

## 2020-07-27 RX ADMIN — AMLODIPINE BESYLATE 10 MG: 10 TABLET ORAL at 08:34

## 2020-07-27 RX ADMIN — AMLODIPINE BESYLATE 10 MG: 10 TABLET ORAL at 17:31

## 2020-07-27 RX ADMIN — Medication 1 G: at 17:31

## 2020-07-27 RX ADMIN — Medication 1 G: at 08:34

## 2020-07-27 RX ADMIN — METOPROLOL SUCCINATE 100 MG: 100 TABLET, FILM COATED, EXTENDED RELEASE ORAL at 08:33

## 2020-07-27 RX ADMIN — METOPROLOL SUCCINATE 100 MG: 100 TABLET, FILM COATED, EXTENDED RELEASE ORAL at 17:31

## 2020-07-27 RX ADMIN — APIXABAN 2.5 MG: 2.5 TABLET, FILM COATED ORAL at 08:34

## 2020-07-27 RX ADMIN — APIXABAN 2.5 MG: 2.5 TABLET, FILM COATED ORAL at 17:31

## 2020-07-27 RX ADMIN — PANTOPRAZOLE SODIUM 40 MG: 40 TABLET, DELAYED RELEASE ORAL at 05:03

## 2020-07-27 RX ADMIN — VANCOMYCIN HYDROCHLORIDE 1000 MG: 1 INJECTION, SOLUTION INTRAVENOUS at 17:31

## 2020-07-27 RX ADMIN — CLONIDINE HYDROCHLORIDE 0.1 MG: 0.1 TABLET ORAL at 08:33

## 2020-07-27 RX ADMIN — CEFEPIME HYDROCHLORIDE 1000 MG: 1 INJECTION, POWDER, FOR SOLUTION INTRAMUSCULAR; INTRAVENOUS at 04:57

## 2020-07-27 NOTE — SOCIAL WORK
Cm contacted [facility] pharmacy in [facility] [phone])  Eliquis is $15 copay, pt qualifies for free 30 days

## 2020-07-27 NOTE — PROGRESS NOTES
Vancomycin IV Pharmacy-to-Dose Consultation    Maksim Rosenberg is a 80 y o  male who is currently receiving Vancomycin IV with management by the Pharmacy Consult service for the treatment of bacteremia    Assessment/Plan:    The patient's chart was reviewed  Renal function is stable  There are no signs or symptoms of nephrotoxicity and/or infusion reactions documented  The following nephrotoxicity factors are present:  Medications: none  Patient-Factors: elderly, renal dysfunction    Based on today's assessment, will continue current vancomycin (Day # 2) dosing of 1000 mg q24h, with a plan for trough to be drawn at 1730 on 7/29  We will continue to follow the patient's culture results and clinical progress daily        Steven Main, Pharmacist

## 2020-07-27 NOTE — ASSESSMENT & PLAN NOTE
Unclear etiology  Initially thought reactive, but now concern for infection given worsening respiratory status and elevated procalcitonin  Procalcitonin also possibly elevated secondary to CHF  · Initial blood cultures negative  Repeat blood cultures negative to date    · Restart antibiotics, currently on cefepime and vancomycin  · Trend procalcitonin  · Trend white blood cell count and fever curve  · Appreciate ID recommendations

## 2020-07-27 NOTE — ASSESSMENT & PLAN NOTE
Previous baseline around 1 8-1 9 in 2019  Creatinine initially 2 74 on admission  Now trending 2 2-2 3  This possibly represents his new baseline    · Avoid nephrotoxins  · Monitor electrolytes and renal function  · Ensure adequate oral intake and hydration  · Continue diuresis

## 2020-07-27 NOTE — RESTORATIVE TECHNICIAN NOTE
Restorative Specialist Mobility Note       Activity: Other (Comment)(OOB to chair )     Assistive Device: Other (Comment)(Smoothmover)        Repositioned: Sitting, Up in chair, Other (Comment)(Chair Alarm)

## 2020-07-27 NOTE — SOCIAL WORK
A post acute care recommendation was made by your care team for STR  Spouse \[facility]\"" and daughter Kayla Conroy requested to speak with cm  They would like referral to 1  [address] [place] [address] 2  [facility]

## 2020-07-27 NOTE — PROGRESS NOTES
Progress Note - Infectious Disease   Saturnino Mccullough 80 y o  male MRN: [id number]  Unit/Bed#: [facility] 525-01 Encounter: [id number]      Impression:  1  Stress leukocytosis R/0 pneumonia versus CHF  2  Complete heart block with bradycardia, S/P ppm  3  CAD CABG, AS, bioprosthetic AVR   4  YOLANDE on CKD  5  History of CVA     Recommendations:  Patient is afebrile with elevated but decreasing WBC count of 11,490  Blood cultures as well as strep pneumoniae and Legionella urinary antigens are negative so far  1  Continue cefepime 1 g q 12 hours IV, and vancomycin 1 g Q 24 hours IV for now  2  Check final blood culture results which are negative so far  3  At present he appears to have more of a CHF component than infection  However, agree with continuing cefepime and vancomycin pending final culture results        Antibiotics:  1  Cefepime 1 g q 12 hours IV, day 2 Rx  2  Vancomycin 1 g Q 24 hours IV, day 2 Rx    Subjective:  He is alert  Denies shortness of breath  Denies fevers, chills, or sweats  Denies nausea, vomiting, or diarrhea  Objective:  Vitals:  Temp:  [97 4 °F (36 3 °C)-98 8 °F (37 1 °C)] 98 8 °F (37 1 °C)  HR:  [59-60] 60  Resp:  [12-15] 14  BP: (144-175)/(53-73) 152/60  SpO2:  [85 %-97 %] 95 %  Temp (24hrs), Av °F (36 7 °C), Min:97 4 °F (36 3 °C), Max:98 8 °F (37 1 °C)  Current: Temperature: 98 8 °F (37 1 °C)    Physical Exam:     General Appearance:  Alert, moderately obese elderly male who was nontoxic, no acute distress  Throat: Oropharynx moist without lesions  Lips, mucosa, and tongue normal   Neck: Supple, symmetrical, trachea midline, no adenopathy,  no tenderness/mass/nodules   Lungs:    decreased breath sounds at bases but otherwise clear   Heart:  Sternotomy scar, right subclavian  wound clean  Left subclavian ppm wound with dry dressing, Regular rate and rhythm, S1, S2 normal, no murmur, rub or gallop   Abdomen:   Soft, non-tender, non-distended, positive bowel sounds    No masses, no organomegaly    No CVA tenderness   Extremities: Extremities normal, atraumatic, no clubbing, cyanosis or edema   Skin: As above  Invasive Devices     Peripheral Intravenous Line            Peripheral IV 07/24/20 Left Hand 3 days                Labs, Imaging, & Other studies:   All pertinent labs were personally reviewed  Results from last 7 days   Lab Units 07/27/20  1303 07/27/20  0514 07/26/20  1805 07/26/20  0455   WBC Thousand/uL  --  11 49* 13 39* 17 83*   HEMOGLOBIN g/dL 12 7 9 3* 11 3* 11 4*   PLATELETS Thousands/uL  --  193 208 219     Results from last 7 days   Lab Units 07/27/20  0514 07/26/20  1805 07/26/20  0455  07/21/20  0445   SODIUM mmol/L 138 134* 135*   < > 139   POTASSIUM mmol/L 5 0 5 6* 5 6*   < > 5 3   CHLORIDE mmol/L 113* 106 106   < > 108   CO2 mmol/L 15* 24 21   < > 24   BUN mg/dL 74* 77* 73*   < > 43*   CREATININE mg/dL 2 03* 2 43* 2 28*   < > 2 20*   EGFR ml/min/1 73sq m 29 24 25   < > 27   CALCIUM mg/dL 7 6* 8 9 8 6   < > 8 8   AST U/L  --   --  16  --  12   ALT U/L  --   --  10*  --  15   ALK PHOS U/L  --   --  100  --  73    < > = values in this interval not displayed  Results from last 7 days   Lab Units 07/26/20  2146 07/26/20  0455 07/26/20  0454 07/22/20  0443 07/21/20  1139   BLOOD CULTURE   --  No Growth at 24 hrs  No Growth at 24 hrs  No Growth After 5 Days  No Growth After 5 Days     LEGIONELLA URINARY ANTIGEN  Negative  --   --   --   --

## 2020-07-27 NOTE — PROGRESS NOTES
Progress Note - Infectious Disease   Izzy Fragoso 80 y o  male MRN: [id number]  Unit/Bed#: [facility] 525-01 Encounter: [id number]      Impression:  1  Stress leukocytosis R/0 pneumonia versus CHF  2  Complete heart block with bradycardia, S/P ppm  3  CAD CABG, AS, bioprosthetic AVR   4  YOLANDE on CKD  5  History of CVA     Recommendations:  Patient is afebrile with elevated WBC count of 13,390  He however became more hypoxic and short of breath  Chest x-ray shows increased vascular congestion with possible pneumonia repeat blood cultures done as well as strep pneumoniae and Legionella urinary antigens  Primary service place patient back on cefepime 1 g q 12 hours IV, and vancomycin 1 g Q 24 hours IV  1  Check final blood culture results which are negative so far  2  At present he appears to have more of a CHF component than infection  However, agree with continuing cefepime and vancomycin pending final culture results        Antibiotics:  1  Cefepime 1 g q 12 hours IV, day 1 Rx  2  Vancomycin 1 g Q 24 hours IV, day 1 Rx    Subjective:  He is alert and says that he feels well but he is too weak to sit up by himself  Denies fevers, chills, or sweats  Denies nausea, vomiting, or diarrhea  Objective:  Vitals:  Temp:  [97 4 °F (36 3 °C)-98 5 °F (36 9 °C)] 97 7 °F (36 5 °C)  HR:  [59-81] 59  Resp:  [11-20] 12  BP: (124-184)/(53-74) 153/53  SpO2:  [92 %-98 %] 95 %  Temp (24hrs), Av °F (36 7 °C), Min:97 4 °F (36 3 °C), Max:98 5 °F (36 9 °C)  Current: Temperature: 97 7 °F (36 5 °C)    Physical Exam:     General Appearance:  Alert, moderately obese elderly male who was nontoxic, no acute distress  Throat: Oropharynx moist without lesions  Lips, mucosa, and tongue normal   Neck: Supple, symmetrical, trachea midline, no adenopathy,  no tenderness/mass/nodules   Lungs:   Bibasilar rales with dullness, no audible wheezes, rhonchi ; respirations unlabored   Heart:  Sternotomy scar, right subclavian  wound clean  Left subclavian ppm wound with dry dressing, Regular rate and rhythm, S1, S2 normal, no murmur, rub or gallop   Abdomen:   Soft, non-tender, non-distended, positive bowel sounds  No masses, no organomegaly    No CVA tenderness   Extremities: Extremities normal, atraumatic, no clubbing, cyanosis or edema   Skin: As above  Invasive Devices     Peripheral Intravenous Line            Peripheral IV 07/24/20 Left Hand 2 days                Labs, Imaging, & Other studies:   All pertinent labs were personally reviewed  Results from last 7 days   Lab Units 07/26/20 1805 07/26/20 0455 07/26/20  0139 07/25/20  0458   WBC Thousand/uL 13 39* 17 83* 16 33* 12 19*   HEMOGLOBIN g/dL 11 3* 11 4*  --  11 5*   PLATELETS Thousands/uL 208 219  --  191     Results from last 7 days   Lab Units 07/26/20 1805 07/26/20 0455 07/25/20  0458  07/21/20  0445  07/20/20  1021   SODIUM mmol/L 134* 135* 140   < > 139   < > 139   POTASSIUM mmol/L 5 6* 5 6* 5 3   < > 5 3   < > 5 2   CHLORIDE mmol/L 106 106 109*   < > 108   < > 104   CO2 mmol/L 24 21 21   < > 24   < > 28   BUN mg/dL 77* 73* 62*   < > 43*   < > 40*   CREATININE mg/dL 2 43* 2 28* 2 24*   < > 2 20*   < > 2 74*   EGFR ml/min/1 73sq m 24 25 26   < > 27   < > 20   CALCIUM mg/dL 8 9 8 6 8 6   < > 8 8   < > 8 8   AST U/L  --  16  --   --  12  --  15   ALT U/L  --  10*  --   --  15  --  18   ALK PHOS U/L  --  100  --   --  73  --  83    < > = values in this interval not displayed  Results from last 7 days   Lab Units 07/26/20 0455 07/26/20 0454 07/22/20 0443 07/21/20  1139   BLOOD CULTURE  Received in Microbiology Lab  Culture in Progress  Received in Microbiology Lab  Culture in Progress  No Growth After 4 Days  No Growth After 5 Days

## 2020-07-27 NOTE — UTILIZATION REVIEW
Continued Stay Review    Date:         FOR   7/25/20                          Current Patient Class:   Inpatient  Current Level of Care:   Med surg    HPI:84 y o  male initially admitted on    7/20, transfer from [facility] with Complete heart block,  Heart rate in the  20's     PROCEDURE   7/24  Dual chamber PPM  RA lead   RV lead - His lead - with LPF capture      Removal of right IJ temporary permanent pacer lead   Postoperative diagnosis:  Complete heart block, temporary pacemaker in place   H/o bifascicular block  LVEF -60%  H/o severe AS , post TAVR  CAD, post CABG  Hypertension  Hyperlipidemia          Assessment/Plan:   7/25    S/P  PPM  on 7/24  Continues to require  O2  2L  NC, not previously on  O2  Continue  PT/OT  WBC  Elevated, unclear etiology  Blood cultures negative to date  Continue to monitor off FRANDY  Continue t o monitor renal function, creatinine   2 2 - 2 3, possibly  New baseline  Western  Blot shows elevated Lyme  Igg, do not suspect  Lyme  Infection  Pertinent Labs/Diagnostic Results:   CXR  ( 7/25)        Pulmonary vascular congestion with suggestion of trace interstitial edema  Also suspect small bilateral pleural effusions  Appearance is similar to yesterday  2   Bandlike opacities in the left midlung and right base probably represent atelectasis unless there is is compelling clinical evidence for pneumonia  Appearance is also unchanged        Lab Units 07/25/20  0458   WBC Thousand/uL 12 19*   HEMOGLOBIN g/dL 11 5*   HEMATOCRIT % 36 8   PLATELETS Thousands/uL 191   NEUTROS ABS Thousands/µL  --          Lab Units 07/25/20  0458   SODIUM mmol/L 140   POTASSIUM mmol/L 5 3   CHLORIDE mmol/L 109*   CO2 mmol/L 21   ANION GAP mmol/L 10   BUN mg/dL 62*   CREATININE mg/dL 2 24*   EGFR ml/min/1 73sq m 26   CALCIUM mg/dL 8 6   MAGNESIUM mg/dL  --    PHOSPHORUS mg/dL  --              Results from last 7 days   Lab Units 07/24/20  0533   PROTIME seconds 16 4*   INR  1 32*   PTT seconds 34         Vital Signs:   /25/20 2300  98 °F (36 7 °C)  66  18  154/66    97 %             07/25/20 2000                2 L/min    Nasal cannula     07/25/20 1900  98 1 °F (36 7 °C)  63  18  154/68    92 %             07/25/20 1500  98 °F (36 7 °C)  93  18  164/72    97 %             07/25/20 1100  98 2 °F (36 8 °C)  72  15  152/87    95 %          Lying   07/25/20 0700  98 8 °F (37 1 °C)  93  14  167/63    93 %          Lying   07/25/20 0316  98 5 °F (36 9 °C)  84  20  152/62    97 %             07/25/20 0000            96 %  28    2 L/min  Nasal cannula         Medications:   Scheduled Medications:    Medications:  amLODIPine 10 mg Oral BID   apixaban 2 5 mg Oral BID   aspirin 81 mg Oral Daily   cefepime 1,000 mg Intravenous Q12H   cloNIDine 0 1 mg Oral Daily   furosemide 40 mg Intravenous BID (diuretic)   metoprolol succinate 100 mg Oral BID   pantoprazole 40 mg Oral Early Morning   pravastatin 40 mg Oral Daily With Dinner   sodium chloride 1 g Oral BID   vancomycin 12 5 mg/kg (Adjusted) Intravenous Q24H     Continuous IV Infusions:     PRN Meds:    acetaminophen 650 mg Oral Q6H PRN   albuterol 2 5 mg Nebulization Q4H PRN   Labetalol HCl 10 mg Intravenous Q8H PRN   metoclopramide 10 mg Intravenous Q6H PRN       Discharge Plan:   TBD    Network Utilization Review Department  [email or web address]  ATTENTION: Please call with any questions or concerns to [phone] and carefully listen to the prompts so that you are directed to the right person  All voicemails are confidential   Annalise Fonseca all requests for admission clinical reviews, approved or denied determinations and any other requests to dedicated fax number below belonging to the campus where the patient is receiving treatment   List of dedicated fax numbers for the Facilities:  FACILITY NAME UR FAX NUMBER   ADMISSION DENIALS (Administrative/Medical Necessity) [phone]   PARENT CHILD [facility] (Maternity/NICU/Pediatrics) [phone]   Elijah Hartmann [phone]   Juanis Watson [phone]   [address] [place] [address] [phone]   [address] [facility] Dak[place]ali Reid [phone]   [facility]  [phone]   [address] [facility]  [address] [facility] [address] [facility] [phone]

## 2020-07-27 NOTE — PLAN OF CARE
Problem: Potential for Falls  Goal: Patient will remain free of falls  Description  INTERVENTIONS:  - Assess patient frequently for physical needs  -  Identify cognitive and physical deficits and behaviors that affect risk of falls    -  [place] fall precautions as indicated by assessment   - Educate patient/family on patient safety including physical limitations  - Instruct patient to call for assistance with activity based on assessment  - Modify environment to reduce risk of injury  - Consider OT/PT consult to assist with strengthening/mobility  Outcome: Progressing     Problem: PAIN - ADULT  Goal: Verbalizes/displays adequate comfort level or baseline comfort level  Description  Interventions:  - Encourage patient to monitor pain and request assistance  - Assess pain using appropriate pain scale  - Administer analgesics based on type and severity of pain and evaluate response  - Implement non-pharmacological measures as appropriate and evaluate response  - Consider cultural and social influences on pain and pain management  - Notify physician/advanced practitioner if interventions unsuccessful or patient reports new pain  Outcome: Progressing     Problem: INFECTION - ADULT  Goal: Absence or prevention of progression during hospitalization  Description  INTERVENTIONS:  - Assess and monitor for signs and symptoms of infection  - Monitor lab/diagnostic results  - Monitor all insertion sites, i e  indwelling lines, tubes, and drains  - Monitor endotracheal if appropriate and nasal secretions for changes in amount and color  - [place] appropriate cooling/warming therapies per order  - Administer medications as ordered  - Instruct and encourage patient and family to use good hand hygiene technique  - Identify and instruct in appropriate isolation precautions for identified infection/condition  Outcome: Progressing  Goal: Absence of fever/infection during neutropenic period  Description  INTERVENTIONS:  - Monitor WBC    Outcome: Progressing     Problem: SAFETY ADULT  Goal: Maintain or return to baseline ADL function  Description  INTERVENTIONS:  -  Assess patient's ability to carry out ADLs; assess patient's baseline for ADL function and identify physical deficits which impact ability to perform ADLs (bathing, care of mouth/teeth, toileting, grooming, dressing, etc )  - Assess/evaluate cause of self-care deficits   - Assess range of motion  - Assess patient's mobility; develop plan if impaired  - Assess patient's need for assistive devices and provide as appropriate  - Encourage maximum independence but intervene and supervise when necessary  - Involve family in performance of ADLs  - Assess for home care needs following discharge   - Consider OT consult to assist with ADL evaluation and planning for discharge  - Provide patient education as appropriate  Outcome: Progressing  Goal: Maintain or return mobility status to optimal level  Description  INTERVENTIONS:  - Assess patient's baseline mobility status (ambulation, transfers, stairs, etc )    - Identify cognitive and physical deficits and behaviors that affect mobility  - Identify mobility aids required to assist with transfers and/or ambulation (gait belt, sit-to-stand, lift, walker, cane, etc )  - [place] fall precautions as indicated by assessment  - Record patient progress and toleration of activity level on Mobility SBAR; progress patient to next Phase/Stage  - Instruct patient to call for assistance with activity based on assessment  - Consider rehabilitation consult to assist with strengthening/weightbearing, etc   Outcome: Progressing     Problem: DISCHARGE PLANNING  Goal: Discharge to home or other facility with appropriate resources  Description  INTERVENTIONS:  - Identify barriers to discharge w/patient and caregiver  - Arrange for needed discharge resources and transportation as appropriate  - Identify discharge learning needs (meds, wound care, etc )  - Arrange for interpretive services to assist at discharge as needed  - Refer to Case Management Department for coordinating discharge planning if the patient needs post-hospital services based on physician/advanced practitioner order or complex needs related to functional status, cognitive ability, or social support system  Outcome: Progressing     Problem: Knowledge Deficit  Goal: Patient/family/caregiver demonstrates understanding of disease process, treatment plan, medications, and discharge instructions  Description  Complete learning assessment and assess knowledge base    Interventions:  - Provide teaching at level of understanding  - Provide teaching via preferred learning methods  Outcome: Progressing     Problem: CARDIOVASCULAR - ADULT  Goal: Maintains optimal cardiac output and hemodynamic stability  Description  INTERVENTIONS:  - Monitor I/O, vital signs and rhythm  - Monitor for S/S and trends of decreased cardiac output  - Administer and titrate ordered vasoactive medications to optimize hemodynamic stability  - Assess quality of pulses, skin color and temperature  - Assess for signs of decreased coronary artery perfusion  - Instruct patient to report change in severity of symptoms  Outcome: Progressing  Goal: Absence of cardiac dysrhythmias or at baseline rhythm  Description  INTERVENTIONS:  - Continuous cardiac monitoring, vital signs, obtain 12 lead EKG if ordered  - Administer antiarrhythmic and heart rate control medications as ordered  - Monitor electrolytes and administer replacement therapy as ordered  Outcome: Progressing     Problem: RESPIRATORY - ADULT  Goal: Achieves optimal ventilation and oxygenation  Description  INTERVENTIONS:  - Assess for changes in respiratory status  - Assess for changes in mentation and behavior  - Position to facilitate oxygenation and minimize respiratory effort  - Oxygen administered by appropriate delivery if ordered  - Initiate smoking cessation education as indicated  - Encourage broncho-pulmonary hygiene including cough, deep breathe, Incentive Spirometry  - Assess the need for suctioning and aspirate as needed  - Assess and instruct to report SOB or any respiratory difficulty  - Respiratory Therapy support as indicated  Outcome: Progressing     Problem: Prexisting or High Potential for Compromised Skin Integrity  Goal: Skin integrity is maintained or improved  Description  INTERVENTIONS:  - Identify patients at risk for skin breakdown  - Assess and monitor skin integrity  - Assess and monitor nutrition and hydration status  - Monitor labs   - Assess for incontinence   - Turn and reposition patient  - Assist with mobility/ambulation  - Relieve pressure over bony prominences  - Avoid friction and shearing  - Provide appropriate hygiene as needed including keeping skin clean and dry  - Evaluate need for skin moisturizer/barrier cream  - Collaborate with interdisciplinary team   - Patient/family teaching  - Consider wound care consult   Outcome: Progressing

## 2020-07-27 NOTE — PROGRESS NOTES
Progress Note - Henry Miranda 1935, 80 y o  male MRN: [id number]    Unit/Bed#: [facility] 525-01 Encounter: [id number]    Primary Care Provider: Jerson Damon DO   Date and time admitted to hospital: 7/20/2020  4:04 PM        Acute respiratory failure with hypoxia [facility])  Assessment & Plan  Patient requires nasal cannula  Did not use at home  Now on increased oxygen  Chest x-ray vascular congestion, increased pleural effusions, and persistent opacities at the right lung base  Most recent echo showed an EF of 60% with no regional wall abnormalities  There does appear to be some mild pulmonary hypertension with a PA pressure of 48 mmHg  Differential concerning for CHF versus pneumonia  Respiratory status appears to be improving somewhat today  · Continue nasal cannula oxygen  · Monitor respiratory status closely  · Incentive spirometry  · Restart antibiotics, follow-up cultures  · Increase Lasix to 60 mg b i d  Due to inadequate urinary output, adjust accordingly  · Fluid/sodium restriction  · Appreciate cardiology and ID input    Ambulatory dysfunction  Assessment & Plan  · PT/OT recommending inpatient rehab    Leukocytosis  Assessment & Plan  Unclear etiology  Initially thought reactive, but now concern for infection given worsening respiratory status and elevated procalcitonin  Procalcitonin also possibly elevated secondary to CHF  · Initial blood cultures negative  Repeat blood cultures negative to date  · Restart antibiotics, currently on cefepime and vancomycin  · Trend procalcitonin  · Trend white blood cell count and fever curve  · Appreciate ID recommendations    H/O aortic valve replacement  Assessment & Plan  Patient prior history bioprosthetic valve  · Valve area 1 38 cm2 per echo  Acute kidney injury superimposed on chronic kidney disease ([facility] [address] )  Assessment & Plan  Previous baseline around 1 8-1 9 in 2019  Creatinine initially 2 74 on admission  Now trending 2 2-2 3    This possibly represents his new baseline  · Avoid nephrotoxins  · Monitor electrolytes and renal function  · Ensure adequate oral intake and hydration  · Continue diuresis    Mild intermittent asthma without complication  Assessment & Plan  Continue albuterol nebulization p r n     * Bradycardia  Assessment & Plan  Asymptomatic currently  Patient with complete heart block  Western blot shows elevated Lyme IgG  However, do not suspect active Lyme infection  · Hold metoprolol  · Appreciate ID and Cardiology input  · Status post permanent pacemaker placement        VTE Pharmacologic Prophylaxis:   Pharmacologic: Apixaban (Eliquis)  Mechanical VTE Prophylaxis in Place: Yes    Patient Centered Rounds: I have performed bedside rounds with nursing staff today  Discussions with Specialists or Other Care Team Provider: n/a    Education and Discussions with Family / Patient:  Patient, wife, daughter    Time Spent for Care: 30 minutes  More than 50% of total time spent on counseling and coordination of care as described above  Current Length of Stay: 7 day(s)    Current Patient Status: Inpatient   Certification Statement: The patient will continue to require additional inpatient hospital stay due to Hypoxia    Discharge Plan: To rehab    Code Status: Level 1 - Full Code      Subjective:   Patient seen today  Still requiring nasal cannula oxygen, however breathing and mental status appears slightly improved compared to yesterday  However, still without adequate urinary output  Objective:     Vitals:   Temp (24hrs), Av °F (36 7 °C), Min:97 4 °F (36 3 °C), Max:98 8 °F (37 1 °C)    Temp:  [97 4 °F (36 3 °C)-98 8 °F (37 1 °C)] 98 8 °F (37 1 °C)  HR:  [59-60] 60  Resp:  [11-15] 14  BP: (143-175)/(53-73) 152/60  SpO2:  [85 %-97 %] 94 %  Body mass index is 36 14 kg/m²  Input and Output Summary (last 24 hours):        Intake/Output Summary (Last 24 hours) at 2020 1547  Last data filed at 2020 1523  Gross per 24 hour   Intake 1110 ml   Output 425 ml   Net 685 ml       Physical Exam:     Physical Exam   Constitutional: He appears well-developed and well-nourished  No distress  Nasal cannula in place  HENT:   Head: Normocephalic and atraumatic  Eyes: No scleral icterus  Cardiovascular: Normal rate, regular rhythm and normal heart sounds  Pulmonary/Chest: Effort normal  No respiratory distress  He has decreased breath sounds  Musculoskeletal: He exhibits edema  Neurological: He is alert  Skin: Skin is warm and dry  He is not diaphoretic  Psychiatric: His affect is blunt  Vitals reviewed  Additional Data:     Labs:    Results from last 7 days   Lab Units 07/27/20  1303 07/27/20  0514 07/26/20  1805   WBC Thousand/uL  --  11 49* 13 39*   HEMOGLOBIN g/dL 12 7 9 3* 11 3*   HEMATOCRIT % 41 1 30 2* 36 3*   PLATELETS Thousands/uL  --  193 208   NEUTROS PCT %  --   --  83*   LYMPHS PCT %  --   --  5*   MONOS PCT %  --   --  9   EOS PCT %  --   --  2     Results from last 7 days   Lab Units 07/27/20  0514  07/26/20  0455   SODIUM mmol/L 138   < > 135*   POTASSIUM mmol/L 5 0   < > 5 6*   CHLORIDE mmol/L 113*   < > 106   CO2 mmol/L 15*   < > 21   BUN mg/dL 74*   < > 73*   CREATININE mg/dL 2 03*   < > 2 28*   ANION GAP mmol/L 10   < > 8   CALCIUM mg/dL 7 6*   < > 8 6   ALBUMIN g/dL  --   --  2 7*   TOTAL BILIRUBIN mg/dL  --   --  0 53   ALK PHOS U/L  --   --  100   ALT U/L  --   --  10*   AST U/L  --   --  16   GLUCOSE RANDOM mg/dL 104   < > 105    < > = values in this interval not displayed       Results from last 7 days   Lab Units 07/24/20  0533   INR  1 32*     Results from last 7 days   Lab Units 07/25/20  1610 07/25/20  1046 07/25/20  0659 07/24/20  2124 07/24/20  1637   POC GLUCOSE mg/dl 126 161* 119 141* 125         Results from last 7 days   Lab Units 07/27/20  0514 07/26/20  1858 07/26/20  0455 07/26/20  0139 07/22/20  0433   LACTIC ACID mmol/L  --  1 0  --   --   --    PROCALCITONIN ng/ml 0 47*  -- 0 53* 0 37* 0 13           * I Have Reviewed All Lab Data Listed Above  * Additional Pertinent Lab Tests Reviewed: All Labs Within Last 24 Hours Reviewed    Imaging:    Imaging Reports Reviewed Today Include: n/a  Imaging Personally Reviewed by Myself Includes:  n/a    Recent Cultures (last 7 days):     Results from last 7 days   Lab Units 07/26/20  2146 07/26/20  0455 07/26/20  0454 07/22/20  0443 07/21/20  1139   BLOOD CULTURE   --  No Growth at 24 hrs  No Growth at 24 hrs  No Growth After 4 Days  No Growth After 5 Days  LEGIONELLA URINARY ANTIGEN  Negative  --   --   --   --        Last 24 Hours Medication List:     Current Facility-Administered Medications:  acetaminophen 650 mg Oral Q6H PRN Marie Cho, DO    albuterol 2 5 mg Nebulization Q4H PRN Kamari Sood, DO    amLODIPine 10 mg Oral BID Gemini Soden, PA-RICARDO    apixaban 2 5 mg Oral BID Volin Collie, PA-RICARDO    aspirin 81 mg Oral Daily Michael CollDARIN mendoza    cefepime 1,000 mg Intravenous Q12H Marie Cho DO Last Rate: Stopped (07/27/20 0551)   cloNIDine 0 1 mg Oral Daily Gemini SodenDARIN    furosemide 20 mg Intravenous Once Marie Cho, DO    furosemide 60 mg Intravenous BID (diuretic) Kamari Sood, DO    Labetalol HCl 10 mg Intravenous Q8H PRN Kendra Ratliff, DO    metoclopramide 10 mg Intravenous Q6H PRN Marie Cho, DO    metoprolol succinate 100 mg Oral BID Alejandro Zapien, DO    pantoprazole 40 mg Oral Early Morning Hetul Stewart, DO    pravastatin 40 mg Oral Daily With [employer], DO    sodium chloride 1 g Oral BID Hetul Stewart, DO    vancomycin 12 5 mg/kg (Adjusted) Intravenous Q24H Marie Cho DO Last Rate: Stopped (07/27/20 0100)        Today, Patient Was Seen By: Marie Cho DO    ** Please Note: Dictation voice to text software may have been used in the creation of this document   **

## 2020-07-27 NOTE — QUICK NOTE
Telemetry was reviewed and patient is currently back in sinus rhythm  He was first diagnosed with atrial fibrillation on Friday, 7/25 the day in which his temp perm was upgrade to permanent pacemaker  He was started on low dose Eliquis given his prior stroke and after discussion with the patient and his family  He is currently back in sinus rhythm  Would continue anticoagulation at current time and have him follow up with his general cardiologist in about a month - appointment already in place with Dr Ac Mackenzie

## 2020-07-27 NOTE — ASSESSMENT & PLAN NOTE
Patient requires nasal cannula  Did not use at home  Now on increased oxygen  Chest x-ray vascular congestion, increased pleural effusions, and persistent opacities at the right lung base  Most recent echo showed an EF of 60% with no regional wall abnormalities  There does appear to be some mild pulmonary hypertension with a PA pressure of 48 mmHg  Differential concerning for CHF versus pneumonia  Respiratory status appears to be improving somewhat today  · Continue nasal cannula oxygen  · Monitor respiratory status closely  · Incentive spirometry  · Restart antibiotics, follow-up cultures  · Increase Lasix to 60 mg b i d   Due to inadequate urinary output, adjust accordingly  · Fluid/sodium restriction  · Appreciate cardiology and ID input

## 2020-07-28 ENCOUNTER — APPOINTMENT (INPATIENT)
Dept: NON INVASIVE DIAGNOSTICS | Facility: HOSPITAL | Age: 85
DRG: 242 | End: 2020-07-28
Payer: COMMERCIAL

## 2020-07-28 LAB
ANION GAP SERPL CALCULATED.3IONS-SCNC: 8 MMOL/L (ref 4–13)
BUN SERPL-MCNC: 91 MG/DL (ref 5–25)
CALCIUM SERPL-MCNC: 8.7 MG/DL (ref 8.3–10.1)
CHLORIDE SERPL-SCNC: 104 MMOL/L (ref 100–108)
CO2 SERPL-SCNC: 24 MMOL/L (ref 21–32)
CREAT SERPL-MCNC: 2.41 MG/DL (ref 0.6–1.3)
ERYTHROCYTE [DISTWIDTH] IN BLOOD BY AUTOMATED COUNT: 13.2 % (ref 11.6–15.1)
GFR SERPL CREATININE-BSD FRML MDRD: 24 ML/MIN/1.73SQ M
GLUCOSE SERPL-MCNC: 103 MG/DL (ref 65–140)
GLUCOSE SERPL-MCNC: 111 MG/DL (ref 65–140)
GLUCOSE SERPL-MCNC: 119 MG/DL (ref 65–140)
GLUCOSE SERPL-MCNC: 130 MG/DL (ref 65–140)
GLUCOSE SERPL-MCNC: 147 MG/DL (ref 65–140)
HCT VFR BLD AUTO: 36.3 % (ref 36.5–49.3)
HGB BLD-MCNC: 11.6 G/DL (ref 12–17)
MCH RBC QN AUTO: 30.4 PG (ref 26.8–34.3)
MCHC RBC AUTO-ENTMCNC: 32 G/DL (ref 31.4–37.4)
MCV RBC AUTO: 95 FL (ref 82–98)
PLATELET # BLD AUTO: 238 THOUSANDS/UL (ref 149–390)
PMV BLD AUTO: 10.5 FL (ref 8.9–12.7)
POTASSIUM SERPL-SCNC: 5.5 MMOL/L (ref 3.5–5.3)
PROCALCITONIN SERPL-MCNC: 0.48 NG/ML
RBC # BLD AUTO: 3.82 MILLION/UL (ref 3.88–5.62)
SODIUM SERPL-SCNC: 136 MMOL/L (ref 136–145)
WBC # BLD AUTO: 13.9 THOUSAND/UL (ref 4.31–10.16)

## 2020-07-28 PROCEDURE — 84145 PROCALCITONIN (PCT): CPT | Performed by: INTERNAL MEDICINE

## 2020-07-28 PROCEDURE — 97530 THERAPEUTIC ACTIVITIES: CPT

## 2020-07-28 PROCEDURE — 97110 THERAPEUTIC EXERCISES: CPT

## 2020-07-28 PROCEDURE — 93970 EXTREMITY STUDY: CPT

## 2020-07-28 PROCEDURE — 97535 SELF CARE MNGMENT TRAINING: CPT

## 2020-07-28 PROCEDURE — 94760 N-INVAS EAR/PLS OXIMETRY 1: CPT

## 2020-07-28 PROCEDURE — 99232 SBSQ HOSP IP/OBS MODERATE 35: CPT | Performed by: FAMILY MEDICINE

## 2020-07-28 PROCEDURE — 85027 COMPLETE CBC AUTOMATED: CPT | Performed by: INTERNAL MEDICINE

## 2020-07-28 PROCEDURE — 93970 EXTREMITY STUDY: CPT | Performed by: SURGERY

## 2020-07-28 PROCEDURE — 99232 SBSQ HOSP IP/OBS MODERATE 35: CPT | Performed by: INTERNAL MEDICINE

## 2020-07-28 PROCEDURE — 80048 BASIC METABOLIC PNL TOTAL CA: CPT | Performed by: INTERNAL MEDICINE

## 2020-07-28 PROCEDURE — 82948 REAGENT STRIP/BLOOD GLUCOSE: CPT

## 2020-07-28 RX ADMIN — AMLODIPINE BESYLATE 10 MG: 10 TABLET ORAL at 09:43

## 2020-07-28 RX ADMIN — APIXABAN 2.5 MG: 2.5 TABLET, FILM COATED ORAL at 09:39

## 2020-07-28 RX ADMIN — CLONIDINE HYDROCHLORIDE 0.1 MG: 0.1 TABLET ORAL at 09:40

## 2020-07-28 RX ADMIN — METOPROLOL SUCCINATE 100 MG: 100 TABLET, FILM COATED, EXTENDED RELEASE ORAL at 17:12

## 2020-07-28 RX ADMIN — APIXABAN 2.5 MG: 2.5 TABLET, FILM COATED ORAL at 17:12

## 2020-07-28 RX ADMIN — ASPIRIN 81 MG: 81 TABLET, COATED ORAL at 09:43

## 2020-07-28 RX ADMIN — CEFEPIME HYDROCHLORIDE 1000 MG: 1 INJECTION, POWDER, FOR SOLUTION INTRAMUSCULAR; INTRAVENOUS at 05:31

## 2020-07-28 RX ADMIN — CEFEPIME HYDROCHLORIDE 1000 MG: 1 INJECTION, POWDER, FOR SOLUTION INTRAMUSCULAR; INTRAVENOUS at 16:43

## 2020-07-28 RX ADMIN — METOPROLOL SUCCINATE 100 MG: 100 TABLET, FILM COATED, EXTENDED RELEASE ORAL at 09:43

## 2020-07-28 RX ADMIN — PRAVASTATIN SODIUM 40 MG: 40 TABLET ORAL at 16:43

## 2020-07-28 RX ADMIN — Medication 1 G: at 09:39

## 2020-07-28 RX ADMIN — PANTOPRAZOLE SODIUM 40 MG: 40 TABLET, DELAYED RELEASE ORAL at 05:31

## 2020-07-28 RX ADMIN — AMLODIPINE BESYLATE 10 MG: 10 TABLET ORAL at 17:13

## 2020-07-28 RX ADMIN — FUROSEMIDE 60 MG: 10 INJECTION, SOLUTION INTRAMUSCULAR; INTRAVENOUS at 16:42

## 2020-07-28 RX ADMIN — Medication 1 G: at 17:12

## 2020-07-28 RX ADMIN — FUROSEMIDE 60 MG: 10 INJECTION, SOLUTION INTRAMUSCULAR; INTRAVENOUS at 09:43

## 2020-07-28 NOTE — OCCUPATIONAL THERAPY NOTE
[address] Progress Note     Patient Name: Perley Fabry  RGBBV'V Date: 7/28/2020  Problem List  Principal Problem:    Bradycardia  Active Problems:    Mild intermittent asthma without complication    Acute kidney injury superimposed on chronic kidney disease ([facility] [address] )    H/O aortic valve replacement    History of stroke    Complete heart block (HCC)    Essential hypertension    Leukocytosis    Ambulatory dysfunction    Acute respiratory failure with hypoxia ([facility] [address] )          07/28/20 1154   Restrictions/Precautions   Other Precautions Cognitive; Chair Alarm; Bed Alarm;Multiple lines;Telemetry;O2;Fall Risk;Pain  (PPM precautions )   General   Response to Previous Treatment Patient with no complaints from previous session   Lifestyle   Autonomy Pt reports IND w/ ADLS and mobility PTA, A from wife and son for IADLS PTA  Reciprocal Relationships Pt reports a supportive wife at home who does not work and able to provide A PRN  Pt's son lives next door and is able to provide A if needed  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    Intrinsic Gratification Pt reports [place] with family   Pain Assessment   Pain Assessment Tool 0-10   Pain Score Worst Possible Pain   Pain Location/Orientation Orientation: Left;Orientation: Right;Location: Knee   Hospital Pain Intervention(s) Repositioned; Ambulation/increased activity; Emotional support   ADL   Where Assessed Chair   Grooming Assistance 4  Minimal Assistance   Grooming Deficit Setup;Supervision/safety; Increased time to complete;Brushing hair   Grooming Comments Pt required setup and VC to initiate task  LB Dressing Assistance 2  Maximal Assistance   LB Dressing Deficit Setup;Supervision/safety; Don/doff R sock; Don/doff L sock   LB Dressing Comments A to don socks  Bed Mobility   Supine to Sit 3  Moderate assistance   Additional items Assist x 2; Increased time required;Verbal cues;LE management   Additional Comments After OT session pt in bed with alarm on and all needs within reach  Transfers   Sit to Stand 2  Maximal assistance   Additional items Assist x 2; Increased time required;Verbal cues   Stand to Sit 2  Maximal assistance   Additional items Assist x 2; Increased time required;Verbal cues   Sit pivot 2  Maximal assistance   Additional items Assist x 2; Increased time required;Verbal cues   Functional Mobility   Functional Mobility 2  Maximal assistance   Additional Comments Pt initially able to take a few steps from bed to chair with max A x2  Pt fatigued quickly, requiring max A for sit pivot the rest of the way  Additional items Hand hold assistance   Therapeutic Exercise - ROM   UE-ROM Yes   ROM- Right Upper Extremities   R Shoulder AROM   R Elbow AROM   R Wrist AROM   R Hand AROM   ROM - Left Upper Extremities    L Shoulder AROM   L Elbow AROM   L Wrist AROM   L Hand AROM   Cognition   Overall Cognitive Status Impaired   Arousal/Participation Lethargic;Arousable; Cooperative   Attention Attends with cues to redirect   Orientation Level Oriented to person;Oriented to place   Memory Decreased recall of precautions;Decreased recall of recent events   Following Commands Follows one step commands with increased time or repetition   Comments Pt is overall pleasant and cooperative  Pt noted to have increased lethargy especially  towards end of session  Pt required VC to redirect, as he frequently forgets what he is saying mid conversation      Activity Tolerance   Activity Tolerance Patient limited by fatigue;Patient limited by pain   Medical Staff Made Aware RN confirmed okay to see pt   Assessment   Assessment Patient participated in Skilled OT session this date with interventions consisting of ADL re training with the use of correct body mechanics, deep breathing technique, therapeutic exercise to: increase functional use of BUEs, increase BUE muscle strength ,  therapeutic activities to: increase activity tolerance, increase trunk control and increase OOB/ sitting tolerance   Patient agreeable to OT treatment session, upon arrival patient was found supine in bed  Patient requiring frequent re direction, verbal cues for safety, verbal cues for correct technique and frequent rest periods  Patient continues to be functioning below baseline level, occupational performance remains limited secondary to factors listed above and increased risk for falls and injury  From OT standpoint, recommendation at time of d/c would be Short Term Rehab  Patient to benefit from continued Occupational Therapy treatment while in the hospital to address deficits as defined above and maximize level of functional independence with ADLs and functional mobility  Plan   Treatment Interventions ADL retraining;Functional transfer training;UE strengthening/ROM; Endurance training;Patient/family training;Cognitive reorientation   Goal Expiration Date 08/08/20   OT Treatment Day 2   OT Frequency 3-5x/wk   Recommendation   OT Discharge Recommendation Post-Acute Rehabilitation Services   OT - OK to Discharge Yes  (When medically appropriate )   Modified [place] Scale   Modified Gerard Scale 4     PAU Puga, OTR/L

## 2020-07-28 NOTE — PROGRESS NOTES
Vancomycin IV Pharmacy-to-Dose Consultation    Jamir Castaneda is a 80 y o  male who is currently receiving Vancomycin IV with management by the Pharmacy Consult service for the treatment of bacteremia  Assessment/Plan:    The patient's chart was reviewed  Renal function is stable  There are no signs or symptoms of nephrotoxicity and/or infusion reactions documented  The following nephrotoxicity factors are present:  Medications: diuretics  Patient-Factors: elderly, renal dysfunction    Based on today's assessment, will continue current vancomycin (Day # 3) dosing of 1000 mg q24h, with a plan for trough to be drawn at 1730 on 7/29/20  We will continue to follow the patient's culture results and clinical progress daily        Reinier Castañeda, PharmD  Pharmacist

## 2020-07-28 NOTE — PLAN OF CARE
Problem: PHYSICAL THERAPY ADULT  Goal: Performs mobility at highest level of function for planned discharge setting  See evaluation for individualized goals  Description  Treatment/Interventions: Functional transfer training, LE strengthening/ROM, Elevations, Therapeutic exercise, Endurance training, Patient/family training, Equipment eval/education, Gait training, Spoke to nursing, OT  Equipment Recommended: Andrea Velasquez       See flowsheet documentation for full assessment, interventions and recommendations  Outcome: Progressing  Note:   Prognosis: Good  Problem List: Decreased strength, Decreased endurance, Decreased mobility, Impaired balance, Decreased coordination, Decreased cognition, Decreased safety awareness, Pain  Assessment: Pt seen for PT treatment session with focus on bed mobility, functional transfers, short distance ambulation, endurance, and LE therex  Pt presents with significant pain in L>R knees, limiting participation in functional mobility  Pt continues to require increased assist for all mobility as a result of decreased strength, balance, endurance, and increased pain  Pt requires AAROM for LE therex due to decreased LE strength and pain in b/l knees  Pt left upright in bedside chair with chair alarm intact and with all needs in reach  Pt will benefit from skilled therapy in order to address current impairments and functional limitations  PT to follow pt and recommending rehab once medically cleared  Barriers to Discharge: Decreased caregiver support, Inaccessible home environment     PT Discharge Recommendation: [address]     PT - OK to Discharge: Yes(to rehab once medically cleared)    See flowsheet documentation for full assessment

## 2020-07-28 NOTE — CONSULTS
Vancomycin IV Pharmacy-to-Dose Consultation    Jamir Castaneda is a 80 y o  male who was receiving Vancomycin IV with management by the Pharmacy Consult service  The patient's Vancomycin therapy has been completed / discontinued  Thank you for allowing us to take part in this patient's care  Pharmacy will sign-off now; please call or re-consult if there are any questions          Maite Rahman, PharmD  Pharmacist

## 2020-07-28 NOTE — PHYSICAL THERAPY NOTE
PHYSICAL THERAPY TREATMENT NOTE          Patient Name: Kulwinder MCMILLAN Date: 7/28/2020 07/28/20 0803   Pain Assessment   Pain Assessment Tool 0-10   Pain Score Worst Possible Pain   Pain Location/Orientation Orientation: Bilateral;Location: Knee  (L>R)   Patient's Stated Pain Goal No pain   Hospital Pain Intervention(s) Repositioned; Ambulation/increased activity; Rest   Restrictions/Precautions   Weight Bearing Precautions Per Order No   Other Precautions Cognitive; Chair Alarm; Bed Alarm; Fall Risk;Telemetry;O2;Pain;Multiple lines  (4L O2 NC, PPM precautions)   General   Chart Reviewed Yes   Response to Previous Treatment Patient with no complaints from previous session  Family/Caregiver Present No   Cognition   Overall Cognitive Status Impaired   Arousal/Participation Responsive   Attention Attends with cues to redirect   Memory Decreased recall of precautions;Decreased recall of recent events;Decreased short term memory   Following Commands Follows one step commands with increased time or repetition   Comments Pt with increased drowsiness this session, requires cues for alertness especially toward end of therapy session  Pt continues to present with increased forgetfulness, noted to often lose train of thought mid conversation and therefore change topic  Subjective   Subjective Pt pleasant and agreeable to participate in therapy session  Bed Mobility   Supine to Sit 3  Moderate assistance   Additional items Assist x 2; Increased time required;Verbal cues;LE management   Additional Comments Supine in bed upon PT arrival   Pt left upright in bedside chair with chair alarm intact and all needs in reach at end of therapy session  Transfers   Sit to Stand 2  Maximal assistance   Additional items Assist x 2; Increased time required;Verbal cues   Stand to Sit 2  Maximal assistance   Additional items Assist x 2; Increased time required;Verbal cues   Sit pivot 2  Maximal assistance   Additional items Assist x 2; Increased time required;Verbal cues   Additional Comments Transfers with b/l HHA  VC for hand placement, safety, sequencing  Pt able to initially stand from EOB and take 2 side steps, however, unable to complete additional stepping after this  Remainder of transfer perfromed as modified sit pivot  Ambulation/Elevation   Gait pattern Excessively slow; Step to;Short stride; Foward flexed;Decreased foot clearance; Improper Weight shift   Gait Assistance 2  Maximal assist   Additional items Assist x 2;Verbal cues; Tactile cues   Assistive Device Other (Comment)  (b/l HHA)   Distance 2 sides steps   Balance   Static Sitting Fair -   Dynamic Sitting Poor +   Static Standing Poor -   Dynamic Standing Poor -   Ambulatory Poor -   Endurance Deficit   Endurance Deficit Yes   Endurance Deficit Description pain, fatigue, SOB, cog   Activity Tolerance   Activity Tolerance Patient limited by pain; Patient limited by fatigue   Medical Staff Made Aware OT [place]it   Nurse Made Aware RN cleared pt to be seen by PT   Exercises   Hip Flexion Sitting;10 reps;AAROM; Bilateral   Knee AROM Long Arc Quad Sitting;10 reps;AAROM; Bilateral   Ankle Pumps Sitting;10 reps;Bilateral   Assessment   Prognosis Good   Problem List Decreased strength;Decreased endurance;Decreased mobility; Impaired balance;Decreased coordination;Decreased cognition;Decreased safety awareness;Pain   Assessment Pt seen for PT treatment session with focus on bed mobility, functional transfers, short distance ambulation, endurance, and LE therex  Pt presents with significant pain in L>R knees, limiting participation in functional mobility  Pt continues to require increased assist for all mobility as a result of decreased strength, balance, endurance, and increased pain  Pt requires AAROM for LE therex due to decreased LE strength and pain in b/l knees  Pt left upright in bedside chair with chair alarm intact and with all needs in reach    Pt will benefit from skilled therapy in order to address current impairments and functional limitations  PT to follow pt and recommending rehab once medically cleared  Barriers to Discharge Decreased caregiver support; Inaccessible home environment   Goals   Patient Goals to have less pain in knees   STG Expiration Date 08/08/20   Plan   Treatment/Interventions Functional transfer training;LE strengthening/ROM; Therapeutic exercise; Endurance training;Patient/family training;Equipment eval/education; Bed mobility;Gait training;Spoke to nursing   Progress Slow progress, decreased activity tolerance   PT Frequency Other (Comment)  (3-5x/wk)   Recommendation   PT Discharge Recommendation Post-Acute Rehabilitation Services   PT - OK to Discharge Yes  (to rehab once medically cleared)     Carmen Gaona, PT, DPT

## 2020-07-28 NOTE — PROGRESS NOTES
Progress Note - Simeon Gentile 1935, 80 y o  male MRN: [id number]    Unit/Bed#: [facility] 525-01 Encounter: [id number]    Primary Care Provider: Gogo Rubio DO   Date and time admitted to hospital: 7/20/2020  4:04 PM        Acute respiratory failure with hypoxia [facility])  Assessment & Plan  Patient requires nasal cannula  Did not use at home  Now on increased oxygen  Chest x-ray vascular congestion, increased pleural effusions, and persistent opacities at the right lung base  Most recent echo showed an EF of 60% with no regional wall abnormalities  There does appear to be some mild pulmonary hypertension with a PA pressure of 48 mmHg  Differential concerning for CHF versus pneumonia  Respiratory status appears to be improving somewhat today  · Continue nasal cannula oxygen  · Monitor respiratory status closely  · Incentive spirometry  · Discontinue antibiotics-discussed with infectious disease  · Increase Lasix to 60 mg b i d  Due to inadequate urinary output, adjust accordingly  · Fluid/sodium restriction  · Appreciate cardiology and ID input-will discuss with cardiology tomorrow regarding diuresis  · Repeat chest x-ray tomorrow    Ambulatory dysfunction  Assessment & Plan  · PT/OT recommending inpatient rehab    Leukocytosis  Assessment & Plan  Unclear etiology  Initially thought reactive, but now concern for infection given worsening respiratory status and elevated procalcitonin  Procalcitonin also possibly elevated secondary to CHF  · Initial blood cultures negative  Repeat blood cultures negative to date  · Restart antibiotics, currently on cefepime and vancomycin  · Trend procalcitonin  · Trend white blood cell count and fever curve  · Appreciate ID recommendations    Essential hypertension  Assessment & Plan  · Continue amlodipine, clonidine  · Continue scheduled labetalol, now 100 mg b i d      Complete heart block (HCC)  Assessment & Plan  · Continue to hold metoprolol  · Patient receiving scheduled labetalol  · Status post permanent pacemaker placement on 07/24  · See plan for bradycardia    History of stroke  Assessment & Plan  Patient with prior stroke last year  · Continue aspirin and Plavix  · Stat CT if acute change in neurological status  · Patient appears to be more confused 9 baseline per family, improved as the day progressed  Discontinue cefepime and monitor if the patient appears to be confused tomorrow will obtain a  CT head  No focal deficits noted on the examination    H/O aortic valve replacement  Assessment & Plan  Patient prior history bioprosthetic valve  · Valve area 1 38 cm2 per echo  Acute kidney injury superimposed on chronic kidney disease ([address] )  Assessment & Plan  Previous baseline around 1 8-1 9 in 2019  Creatinine initially 2 74 on admission  Now trending 2 2-2 3  This possibly represents his new baseline  · Avoid nephrotoxins  · Monitor electrolytes and renal function  · Ensure adequate oral intake and hydration  · Continue diuresis-patient appears to be fluid overloaded on the x-ray  · Recheck X chest x-ray tomorrow    Mild intermittent asthma without complication  Assessment & Plan  Continue albuterol nebulization p r n     * Bradycardia  Assessment & Plan  Asymptomatic currently  Patient with complete heart block  Western blot shows elevated Lyme IgG  However, do not suspect active Lyme infection  · Hold metoprolol  · Appreciate ID and Cardiology input  · Status post permanent pacemaker placement      VTE Pharmacologic Prophylaxis:   Pharmacologic: Apixaban (Eliquis)  Mechanical VTE Prophylaxis in Place: Yes    Patient Centered Rounds: I have performed bedside rounds with nursing staff today  Discussions with Specialists or Other Care Team Provider:  Infectious disease    Education and Discussions with Family / Patient:  Family updated in the room    Time Spent for Care: 30 minutes    More than 50% of total time spent on counseling and coordination of care as described above  Current Length of Stay: 8 day(s)    Current Patient Status: Inpatient   Certification Statement: The patient will continue to require additional inpatient hospital stay due to Encephalopathy    Discharge Plan:     Code Status: Level 1 - Full Code      Subjective:   Patient seen and examined  Patient just woke up from sleep when I saw him and he was confused  Eventually patient was able to get more oriented  Family reported that even then and he was more confused than his baseline  Discussed with infectious disease and plan is to discontinue antibiotics and monitor him off the antibiotics  Will recheck a chest x-ray tomorrow    Objective:     Vitals:   Temp (24hrs), Av 8 °F (36 6 °C), Min:97 5 °F (36 4 °C), Max:98 7 °F (37 1 °C)    Temp:  [97 5 °F (36 4 °C)-98 7 °F (37 1 °C)] 98 7 °F (37 1 °C)  HR:  [59-66] 66  Resp:  [18-22] 20  BP: (142-178)/(52-75) 165/72  SpO2:  [91 %-96 %] 91 %  Body mass index is 37 77 kg/m²  Input and Output Summary (last 24 hours): Intake/Output Summary (Last 24 hours) at 2020  Last data filed at 2020 194  Gross per 24 hour   Intake 430 ml   Output 2232 ml   Net -1802 ml       Physical Exam:     Physical Exam   Constitutional: He appears well-developed  No distress  HENT:   Head: Normocephalic and atraumatic  Eyes: Right eye exhibits no discharge  Left eye exhibits no discharge  Neck: Normal range of motion  No JVD present  Cardiovascular: Normal rate  Pulmonary/Chest: Effort normal  No stridor  No respiratory distress  Abdominal: Soft  Musculoskeletal: He exhibits no edema  Edema of the left lower extremity noted  Left knee tender on palpation  No effusion on examination and also no warmth or erythema   Neurological: He is alert  No focal deficits   Skin: Skin is warm         Additional Data:     Labs:    Results from last 7 days   Lab Units 20  0529  20  1805   WBC Thousand/uL 13 90*   < > 13 39*   HEMOGLOBIN g/dL 11 6*   < > 11 3*   HEMATOCRIT % 36 3*   < > 36 3*   PLATELETS Thousands/uL 238   < > 208   NEUTROS PCT %  --   --  83*   LYMPHS PCT %  --   --  5*   MONOS PCT %  --   --  9   EOS PCT %  --   --  2    < > = values in this interval not displayed  Results from last 7 days   Lab Units 07/28/20  0529  07/26/20  0455   POTASSIUM mmol/L 5 5*   < > 5 6*   CHLORIDE mmol/L 104   < > 106   CO2 mmol/L 24   < > 21   BUN mg/dL 91*   < > 73*   CREATININE mg/dL 2 41*   < > 2 28*   CALCIUM mg/dL 8 7   < > 8 6   ALK PHOS U/L  --   --  100   ALT U/L  --   --  10*   AST U/L  --   --  16    < > = values in this interval not displayed  Results from last 7 days   Lab Units 07/24/20  0533   INR  1 32*       * I Have Reviewed All Lab Data Listed Above  * Additional Pertinent Lab Tests Reviewed: [address] 66 Admission Reviewed    Imaging:    Imaging Reports Reviewed Today Include:   Imaging Personally Reviewed by Myself Includes:      Recent Cultures (last 7 days):     Results from last 7 days   Lab Units 07/26/20  2146 07/26/20  0455 07/26/20  0454 07/22/20  0443   BLOOD CULTURE   --  No Growth at 48 hrs  No Growth at 48 hrs  No Growth After 5 Days     LEGIONELLA URINARY ANTIGEN  Negative  --   --   --        Last 24 Hours Medication List:     Current Facility-Administered Medications:  acetaminophen 650 mg Oral Q6H PRN René Vicki, DO   albuterol 2 5 mg Nebulization Q4H PRN Leocadia Dalila Sood, DO   amLODIPine 10 mg Oral BID HARMAN Petersen-RICARDO   apixaban 2 5 mg Oral BID Dawson Garcia PA-C   aspirin 81 mg Oral Daily Dawson [place] lopez   cloNIDine 0 1 mg Oral Daily Gemini Soden, PA-C   furosemide 60 mg Intravenous BID (diuretic) Ravin Sood, DO   Labetalol HCl 10 mg Intravenous Q8H PRN Kendra Gassaadia-Scott, DO   metoclopramide 10 mg Intravenous Q6H PRN René Vicki, DO   metoprolol succinate 100 mg Oral BID Cathy Jones DO   pantoprazole 40 mg Oral Early Morning Cortez Stewart DO   pravastatin 40 mg Oral Daily With [employer], DO   sodium chloride 1 g Oral BID Cortez Stewart DO        Today, Patient Was Seen By: Mellody Nageotte, MD    ** Please Note: Dictation voice to text software may have been used in the creation of this document   **

## 2020-07-28 NOTE — PROGRESS NOTES
Progress Note - Infectious Disease   Sonny Martinez 80 y o  male MRN: [id number]  Unit/Bed#: [facility] [place] 525-01 Encounter: [id number]      Impression:  1  Stress leukocytosis R/0 pneumonia versus CHF  2  Complete heart block with bradycardia, S/P ppm  3  CAD CABG, AS, bioprosthetic AVR   4  YOLANDE on CKD  5  History of CVA     Recommendations:  Patient is afebrile with elevated WBC count of 13,900  Blood cultures as well as strep pneumoniae and Legionella urinary antigens are negative so far  Patient was disoriented earlier   1  Discussed with primary service  Evidence for a pneumonia is lacking  Will discontinue antibiotics  2  Check final blood culture results which are negative so far  Antibiotics:  1  None    Subjective:  He was disoriented earlier and now appears better  Denies fevers, chills, or sweats  Denies nausea, vomiting, or diarrhea  Objective:  Vitals:  Temp:  [97 5 °F (36 4 °C)-98 8 °F (37 1 °C)] 98 1 °F (36 7 °C)  HR:  [59-65] 65  Resp:  [17-22] 21  BP: (142-178)/(52-75) 170/75  SpO2:  [93 %-97 %] 93 %  Temp (24hrs), Av 8 °F (36 6 °C), Min:97 5 °F (36 4 °C), Max:98 8 °F (37 1 °C)  Current: Temperature: 98 1 °F (36 7 °C)    Physical Exam:     General Appearance:  Alert, intermittently confused moderately obese elderly male who was nontoxic, no acute distress  Throat: Oropharynx moist without lesions  Lips, mucosa, and tongue normal   Neck: Supple, symmetrical, trachea midline, no adenopathy,  no tenderness/mass/nodules   Lungs:    decreased breath sounds at bases but otherwise clear   Heart:  Sternotomy scar, right subclavian  wound clean  Left subclavian ppm wound with dry dressing, Regular rate and rhythm, S1, S2 normal, no murmur, rub or gallop   Abdomen:   Soft, non-tender, non-distended, positive bowel sounds  No masses, no organomegaly    No CVA tenderness   Extremities: Extremities normal, atraumatic, no clubbing, cyanosis or edema   Skin: As above           Invasive Devices     Peripheral Intravenous Line            Peripheral IV 07/24/20 Left Hand 4 days                Labs, Imaging, & Other studies:   All pertinent labs were personally reviewed  Results from last 7 days   Lab Units 07/28/20  0529 07/27/20  1303 07/27/20  0514 07/26/20  1805   WBC Thousand/uL 13 90*  --  11 49* 13 39*   HEMOGLOBIN g/dL 11 6* 12 7 9 3* 11 3*   PLATELETS Thousands/uL 238  --  193 208     Results from last 7 days   Lab Units 07/28/20  0529 07/27/20  0514 07/26/20  1805 07/26/20  0455   SODIUM mmol/L 136 138 134* 135*   POTASSIUM mmol/L 5 5* 5 0 5 6* 5 6*   CHLORIDE mmol/L 104 113* 106 106   CO2 mmol/L 24 15* 24 21   BUN mg/dL 91* 74* 77* 73*   CREATININE mg/dL 2 41* 2 03* 2 43* 2 28*   EGFR ml/min/1 73sq m 24 29 24 25   CALCIUM mg/dL 8 7 7 6* 8 9 8 6   AST U/L  --   --   --  16   ALT U/L  --   --   --  10*   ALK PHOS U/L  --   --   --  100     Results from last 7 days   Lab Units 07/26/20  2146 07/26/20 0455 07/26/20 0454 07/22/20  0443   BLOOD CULTURE   --  No Growth at 48 hrs  No Growth at 48 hrs  No Growth After 5 Days     LEGIONELLA URINARY ANTIGEN  Negative  --   --   --

## 2020-07-28 NOTE — ASSESSMENT & PLAN NOTE
Patient with prior stroke last year  · Continue aspirin and Plavix  · Stat CT if acute change in neurological status  · Patient appears to be more confused 9 baseline per family, improved as the day progressed  Discontinue cefepime and monitor if the patient appears to be confused tomorrow will obtain a  CT head    No focal deficits noted on the examination

## 2020-07-28 NOTE — PLAN OF CARE
Problem: OCCUPATIONAL THERAPY ADULT  Goal: Performs self-care activities at highest level of function for planned discharge setting  See evaluation for individualized goals  Description  Treatment Interventions: ADL retraining, Functional transfer training, UE strengthening/ROM, Endurance training, Cognitive reorientation, Patient/family training, Equipment evaluation/education, Compensatory technique education, Energy conservation, Activityengagement  Equipment Recommended: Other (comment)(RW)       See flowsheet documentation for full assessment, interventions and recommendations  Outcome: Progressing  Note:   Limitation: Decreased ADL status, Decreased UE ROM, Decreased UE strength, Decreased Safe judgement during ADL, Decreased cognition, Decreased endurance, Decreased fine motor control, Decreased high-level ADLs  Prognosis: Fair  Assessment: Patient participated in Skilled OT session this date with interventions consisting of ADL re training with the use of correct body mechanics, deep breathing technique, therapeutic exercise to: increase functional use of BUEs, increase BUE muscle strength ,  therapeutic activities to: increase activity tolerance, increase trunk control and increase OOB/ sitting tolerance   Patient agreeable to OT treatment session, upon arrival patient was found supine in bed  Patient requiring frequent re direction, verbal cues for safety, verbal cues for correct technique and frequent rest periods  Patient continues to be functioning below baseline level, occupational performance remains limited secondary to factors listed above and increased risk for falls and injury  From OT standpoint, recommendation at time of d/c would be Short Term Rehab  Patient to benefit from continued Occupational Therapy treatment while in the hospital to address deficits as defined above and maximize level of functional independence with ADLs and functional mobility        OT Discharge Recommendation: Post-Acute Rehabilitation Services  OT - OK to Discharge: Yes(When medically appropriate )

## 2020-07-28 NOTE — PLAN OF CARE
Problem: Potential for Falls  Goal: Patient will remain free of falls  Description  INTERVENTIONS:  - Assess patient frequently for physical needs  -  Identify cognitive and physical deficits and behaviors that affect risk of falls    -  [place] fall precautions as indicated by assessment   - Educate patient/family on patient safety including physical limitations  - Instruct patient to call for assistance with activity based on assessment  - Modify environment to reduce risk of injury  - Consider OT/PT consult to assist with strengthening/mobility  Outcome: Progressing     Problem: PAIN - ADULT  Goal: Verbalizes/displays adequate comfort level or baseline comfort level  Description  Interventions:  - Encourage patient to monitor pain and request assistance  - Assess pain using appropriate pain scale  - Administer analgesics based on type and severity of pain and evaluate response  - Implement non-pharmacological measures as appropriate and evaluate response  - Consider cultural and social influences on pain and pain management  - Notify physician/advanced practitioner if interventions unsuccessful or patient reports new pain  Outcome: Progressing     Problem: INFECTION - ADULT  Goal: Absence or prevention of progression during hospitalization  Description  INTERVENTIONS:  - Assess and monitor for signs and symptoms of infection  - Monitor lab/diagnostic results  - Monitor all insertion sites, i e  indwelling lines, tubes, and drains  - Monitor endotracheal if appropriate and nasal secretions for changes in amount and color  - [place] appropriate cooling/warming therapies per order  - Administer medications as ordered  - Instruct and encourage patient and family to use good hand hygiene technique  - Identify and instruct in appropriate isolation precautions for identified infection/condition  Outcome: Progressing  Goal: Absence of fever/infection during neutropenic period  Description  INTERVENTIONS:  - Monitor WBC    Outcome: Progressing     Problem: SAFETY ADULT  Goal: Maintain or return to baseline ADL function  Description  INTERVENTIONS:  -  Assess patient's ability to carry out ADLs; assess patient's baseline for ADL function and identify physical deficits which impact ability to perform ADLs (bathing, care of mouth/teeth, toileting, grooming, dressing, etc )  - Assess/evaluate cause of self-care deficits   - Assess range of motion  - Assess patient's mobility; develop plan if impaired  - Assess patient's need for assistive devices and provide as appropriate  - Encourage maximum independence but intervene and supervise when necessary  - Involve family in performance of ADLs  - Assess for home care needs following discharge   - Consider OT consult to assist with ADL evaluation and planning for discharge  - Provide patient education as appropriate  Outcome: Progressing     Problem: SAFETY ADULT  Goal: Maintain or return mobility status to optimal level  Description  INTERVENTIONS:  - Assess patient's baseline mobility status (ambulation, transfers, stairs, etc )    - Identify cognitive and physical deficits and behaviors that affect mobility  - Identify mobility aids required to assist with transfers and/or ambulation (gait belt, sit-to-stand, lift, walker, cane, etc )  - [place] fall precautions as indicated by assessment  - Record patient progress and toleration of activity level on Mobility SBAR; progress patient to next Phase/Stage  - Instruct patient to call for assistance with activity based on assessment  - Consider rehabilitation consult to assist with strengthening/weightbearing, etc   Outcome: Progressing     Problem: DISCHARGE PLANNING  Goal: Discharge to home or other facility with appropriate resources  Description  INTERVENTIONS:  - Identify barriers to discharge w/patient and caregiver  - Arrange for needed discharge resources and transportation as appropriate  - Identify discharge learning needs (meds, wound care, etc )  - Arrange for interpretive services to assist at discharge as needed  - Refer to Case Management Department for coordinating discharge planning if the patient needs post-hospital services based on physician/advanced practitioner order or complex needs related to functional status, cognitive ability, or social support system  Outcome: Progressing     Problem: Knowledge Deficit  Goal: Patient/family/caregiver demonstrates understanding of disease process, treatment plan, medications, and discharge instructions  Description  Complete learning assessment and assess knowledge base    Interventions:  - Provide teaching at level of understanding  - Provide teaching via preferred learning methods  Outcome: Progressing     Problem: CARDIOVASCULAR - ADULT  Goal: Maintains optimal cardiac output and hemodynamic stability  Description  INTERVENTIONS:  - Monitor I/O, vital signs and rhythm  - Monitor for S/S and trends of decreased cardiac output  - Administer and titrate ordered vasoactive medications to optimize hemodynamic stability  - Assess quality of pulses, skin color and temperature  - Assess for signs of decreased coronary artery perfusion  - Instruct patient to report change in severity of symptoms  Outcome: Progressing  Goal: Absence of cardiac dysrhythmias or at baseline rhythm  Description  INTERVENTIONS:  - Continuous cardiac monitoring, vital signs, obtain 12 lead EKG if ordered  - Administer antiarrhythmic and heart rate control medications as ordered  - Monitor electrolytes and administer replacement therapy as ordered  Outcome: Progressing     Problem: RESPIRATORY - ADULT  Goal: Achieves optimal ventilation and oxygenation  Description  INTERVENTIONS:  - Assess for changes in respiratory status  - Assess for changes in mentation and behavior  - Position to facilitate oxygenation and minimize respiratory effort  - Oxygen administered by appropriate delivery if ordered  - Initiate smoking cessation education as indicated  - Encourage broncho-pulmonary hygiene including cough, deep breathe, Incentive Spirometry  - Assess the need for suctioning and aspirate as needed  - Assess and instruct to report SOB or any respiratory difficulty  - Respiratory Therapy support as indicated  Outcome: Progressing     Problem: Prexisting or High Potential for Compromised Skin Integrity  Goal: Skin integrity is maintained or improved  Description  INTERVENTIONS:  - Identify patients at risk for skin breakdown  - Assess and monitor skin integrity  - Assess and monitor nutrition and hydration status  - Monitor labs   - Assess for incontinence   - Turn and reposition patient  - Assist with mobility/ambulation  - Relieve pressure over bony prominences  - Avoid friction and shearing  - Provide appropriate hygiene as needed including keeping skin clean and dry  - Evaluate need for skin moisturizer/barrier cream  - Collaborate with interdisciplinary team   - Patient/family teaching  - Consider wound care consult   Outcome: Progressing

## 2020-07-28 NOTE — ASSESSMENT & PLAN NOTE
Patient requires nasal cannula  Did not use at home  Now on increased oxygen  Chest x-ray vascular congestion, increased pleural effusions, and persistent opacities at the right lung base  Most recent echo showed an EF of 60% with no regional wall abnormalities  There does appear to be some mild pulmonary hypertension with a PA pressure of 48 mmHg  Differential concerning for CHF versus pneumonia  Respiratory status appears to be improving somewhat today  · Continue nasal cannula oxygen  · Monitor respiratory status closely  · Incentive spirometry  · Discontinue antibiotics-discussed with infectious disease  · Increase Lasix to 60 mg b i d   Due to inadequate urinary output, adjust accordingly  · Fluid/sodium restriction  · Appreciate cardiology and ID input-will discuss with cardiology tomorrow regarding diuresis  · Repeat chest x-ray tomorrow

## 2020-07-29 ENCOUNTER — APPOINTMENT (INPATIENT)
Dept: RADIOLOGY | Facility: HOSPITAL | Age: 85
DRG: 242 | End: 2020-07-29
Payer: COMMERCIAL

## 2020-07-29 DIAGNOSIS — I63.9 CEREBROVASCULAR ACCIDENT (CVA), UNSPECIFIED MECHANISM (HCC): ICD-10-CM

## 2020-07-29 LAB
ALBUMIN SERPL BCP-MCNC: 2.7 G/DL (ref 3.5–5)
ALP SERPL-CCNC: 97 U/L (ref 46–116)
ALT SERPL W P-5'-P-CCNC: 12 U/L (ref 12–78)
ANION GAP SERPL CALCULATED.3IONS-SCNC: 8 MMOL/L (ref 4–13)
AST SERPL W P-5'-P-CCNC: 16 U/L (ref 5–45)
BILIRUB SERPL-MCNC: 0.53 MG/DL (ref 0.2–1)
BUN SERPL-MCNC: 94 MG/DL (ref 5–25)
CALCIUM SERPL-MCNC: 8.8 MG/DL (ref 8.3–10.1)
CHLORIDE SERPL-SCNC: 104 MMOL/L (ref 100–108)
CO2 SERPL-SCNC: 27 MMOL/L (ref 21–32)
CREAT SERPL-MCNC: 2.22 MG/DL (ref 0.6–1.3)
ERYTHROCYTE [DISTWIDTH] IN BLOOD BY AUTOMATED COUNT: 12.9 % (ref 11.6–15.1)
GFR SERPL CREATININE-BSD FRML MDRD: 26 ML/MIN/1.73SQ M
GLUCOSE SERPL-MCNC: 108 MG/DL (ref 65–140)
GLUCOSE SERPL-MCNC: 115 MG/DL (ref 65–140)
GLUCOSE SERPL-MCNC: 134 MG/DL (ref 65–140)
HCT VFR BLD AUTO: 34 % (ref 36.5–49.3)
HGB BLD-MCNC: 11.4 G/DL (ref 12–17)
MCH RBC QN AUTO: 30.7 PG (ref 26.8–34.3)
MCHC RBC AUTO-ENTMCNC: 33.5 G/DL (ref 31.4–37.4)
MCV RBC AUTO: 92 FL (ref 82–98)
NT-PROBNP SERPL-MCNC: 8341 PG/ML
PLATELET # BLD AUTO: 228 THOUSANDS/UL (ref 149–390)
PMV BLD AUTO: 10.2 FL (ref 8.9–12.7)
POTASSIUM SERPL-SCNC: 4.5 MMOL/L (ref 3.5–5.3)
PROT SERPL-MCNC: 7.1 G/DL (ref 6.4–8.2)
RBC # BLD AUTO: 3.71 MILLION/UL (ref 3.88–5.62)
SODIUM SERPL-SCNC: 139 MMOL/L (ref 136–145)
WBC # BLD AUTO: 13.59 THOUSAND/UL (ref 4.31–10.16)

## 2020-07-29 PROCEDURE — 80053 COMPREHEN METABOLIC PANEL: CPT | Performed by: FAMILY MEDICINE

## 2020-07-29 PROCEDURE — 99232 SBSQ HOSP IP/OBS MODERATE 35: CPT | Performed by: FAMILY MEDICINE

## 2020-07-29 PROCEDURE — 71045 X-RAY EXAM CHEST 1 VIEW: CPT

## 2020-07-29 PROCEDURE — 83880 ASSAY OF NATRIURETIC PEPTIDE: CPT | Performed by: NURSE PRACTITIONER

## 2020-07-29 PROCEDURE — 99232 SBSQ HOSP IP/OBS MODERATE 35: CPT | Performed by: INTERNAL MEDICINE

## 2020-07-29 PROCEDURE — 99231 SBSQ HOSP IP/OBS SF/LOW 25: CPT | Performed by: INTERNAL MEDICINE

## 2020-07-29 PROCEDURE — 73560 X-RAY EXAM OF KNEE 1 OR 2: CPT

## 2020-07-29 PROCEDURE — 85027 COMPLETE CBC AUTOMATED: CPT | Performed by: FAMILY MEDICINE

## 2020-07-29 PROCEDURE — 82948 REAGENT STRIP/BLOOD GLUCOSE: CPT

## 2020-07-29 RX ORDER — METOPROLOL SUCCINATE 25 MG/1
TABLET, EXTENDED RELEASE ORAL
Qty: 180 TABLET | Refills: 3 | Status: SHIPPED | OUTPATIENT
Start: 2020-07-29 | End: 2020-08-05 | Stop reason: HOSPADM

## 2020-07-29 RX ADMIN — CLONIDINE HYDROCHLORIDE 0.1 MG: 0.1 TABLET ORAL at 08:50

## 2020-07-29 RX ADMIN — FUROSEMIDE 60 MG: 10 INJECTION, SOLUTION INTRAMUSCULAR; INTRAVENOUS at 17:03

## 2020-07-29 RX ADMIN — ASPIRIN 81 MG: 81 TABLET, COATED ORAL at 08:50

## 2020-07-29 RX ADMIN — APIXABAN 2.5 MG: 2.5 TABLET, FILM COATED ORAL at 08:50

## 2020-07-29 RX ADMIN — PANTOPRAZOLE SODIUM 40 MG: 40 TABLET, DELAYED RELEASE ORAL at 05:29

## 2020-07-29 RX ADMIN — METOPROLOL SUCCINATE 100 MG: 100 TABLET, FILM COATED, EXTENDED RELEASE ORAL at 17:02

## 2020-07-29 RX ADMIN — FUROSEMIDE 60 MG: 10 INJECTION, SOLUTION INTRAMUSCULAR; INTRAVENOUS at 08:50

## 2020-07-29 RX ADMIN — METOPROLOL SUCCINATE 100 MG: 100 TABLET, FILM COATED, EXTENDED RELEASE ORAL at 08:50

## 2020-07-29 RX ADMIN — Medication 1 G: at 08:50

## 2020-07-29 RX ADMIN — PRAVASTATIN SODIUM 40 MG: 40 TABLET ORAL at 17:03

## 2020-07-29 RX ADMIN — AMLODIPINE BESYLATE 10 MG: 10 TABLET ORAL at 17:03

## 2020-07-29 RX ADMIN — APIXABAN 2.5 MG: 2.5 TABLET, FILM COATED ORAL at 17:03

## 2020-07-29 RX ADMIN — AMLODIPINE BESYLATE 10 MG: 10 TABLET ORAL at 08:50

## 2020-07-29 NOTE — RESPIRATORY THERAPY NOTE
resp care      07/29/20 6613   Respiratory Assessment   Assessment Type Assess only   General Appearance Alert; Awake   Respiratory Pattern Normal   Chest Assessment Chest expansion symmetrical   Bilateral Breath Sounds Clear;Diminished   Cough None   Resp Comments no prn tx indicated, no distress noted

## 2020-07-29 NOTE — RESTORATIVE TECHNICIAN NOTE
Restorative Specialist Mobility Note       Activity: Other (Comment)(Completed LB exercises x10)             Repositioned: Sitting, Up in chair, Other (Comment)(Chair Alarm)

## 2020-07-29 NOTE — ASSESSMENT & PLAN NOTE
Patient requires nasal cannula  Did not use at home  Now on increased oxygen  Chest x-ray vascular congestion, increased pleural effusions, and persistent opacities at the right lung base  Most recent echo showed an EF of 60% with no regional wall abnormalities  There does appear to be some mild pulmonary hypertension with a PA pressure of 48 mmHg  Differential concerning for CHF versus pneumonia  Respiratory status appears to be improving somewhat today  · Continue nasal cannula oxygen  · Monitor respiratory status closely  · Incentive spirometry  · Discontinue antibiotics-discussed with infectious disease  · Increase Lasix to 60 mg b i d   Due to inadequate urinary output, adjust accordingly  · Fluid/sodium restriction  · Appreciate cardiology and ID input-will discuss with cardiology tomorrow regarding diuresis  · Repeat chest x-ray tomorrow [place]

## 2020-07-29 NOTE — PROGRESS NOTES
Progress Note - Infectious Disease   Renetta Moseley 80 y o  male MRN: [id number]  Unit/Bed#: [facility] 525-01 Encounter: [id number]      Impression:  1  Stress leukocytosis with probable CHF  2  Complete heart block with bradycardia, S/P ppm  3  CAD CABG, AS, bioprosthetic AVR   4  YOLANDE on CKD  5  History of CVA     Recommendations:  Patient is afebrile with elevated WBC count of 13,590  Blood cultures as well as strep pneumoniae and Legionella urinary antigens are negative so far  Patient has periodic confusion  1  Check final blood culture results which are negative so far  Antibiotics:  1  None    Subjective:  Denies shortness of breath  Denies fevers, chills, or sweats  Denies nausea, vomiting, or diarrhea  Objective:  Vitals:  Temp:  [97 9 °F (36 6 °C)-98 7 °F (37 1 °C)] 98 1 °F (36 7 °C)  HR:  [59-66] 62  Resp:  [16-20] 18  BP: (152-182)/(63-78) 152/67  SpO2:  [91 %-96 %] 96 %  Temp (24hrs), Av 2 °F (36 8 °C), Min:97 9 °F (36 6 °C), Max:98 7 °F (37 1 °C)  Current: Temperature: 98 1 °F (36 7 °C)    Physical Exam:     General Appearance:  Alert, intermittently confused moderately obese elderly male who was nontoxic, no acute distress  Throat: Oropharynx moist without lesions  Lips, mucosa, and tongue normal   Neck: Supple, symmetrical, trachea midline, no adenopathy,  no tenderness/mass/nodules   Lungs:    decreased breath sounds at bases but otherwise clear   Heart:  Sternotomy scar, right subclavian  wound clean  Left subclavian ppm wound with dry dressing, Regular rate and rhythm, S1, S2 normal, no murmur, rub or gallop   Abdomen:   Soft, non-tender, non-distended, positive bowel sounds  No masses, no organomegaly    No CVA tenderness   Extremities: Extremities normal, atraumatic, no clubbing, cyanosis or edema   Skin: As above           Invasive Devices     Peripheral Intravenous Line            Peripheral IV 20 Right;Upper;Ventral (anterior) Arm less than 1 day Labs, Imaging, & Other studies:   All pertinent labs were personally reviewed  Results from last 7 days   Lab Units 07/29/20  0235 07/28/20  0529 07/27/20  1303 07/27/20  0514   WBC Thousand/uL 13 59* 13 90*  --  11 49*   HEMOGLOBIN g/dL 11 4* 11 6* 12 7 9 3*   PLATELETS Thousands/uL 228 238  --  193     Results from last 7 days   Lab Units 07/29/20  0235 07/28/20  0529 07/27/20  0514  07/26/20  0455   SODIUM mmol/L 139 136 138   < > 135*   POTASSIUM mmol/L 4 5 5 5* 5 0   < > 5 6*   CHLORIDE mmol/L 104 104 113*   < > 106   CO2 mmol/L 27 24 15*   < > 21   BUN mg/dL 94* 91* 74*   < > 73*   CREATININE mg/dL 2 22* 2 41* 2 03*   < > 2 28*   EGFR ml/min/1 73sq m 26 24 29   < > 25   CALCIUM mg/dL 8 8 8 7 7 6*   < > 8 6   AST U/L 16  --   --   --  16   ALT U/L 12  --   --   --  10*   ALK PHOS U/L 97  --   --   --  100    < > = values in this interval not displayed  Results from last 7 days   Lab Units 07/26/20  2146 07/26/20  0455 07/26/20  0454   BLOOD CULTURE   --  No Growth at 72 hrs  No Growth at 72 hrs     LEGIONELLA URINARY ANTIGEN  Negative  --   --

## 2020-07-29 NOTE — ASSESSMENT & PLAN NOTE
Previous baseline around 1 8-1 9 in 2019  Creatinine initially 2 74 on admission  Now trending 2 2-2 3  This possibly represents his new baseline    · Avoid nephrotoxins  · Monitor electrolytes and renal function  · Ensure adequate oral intake and hydration  · Continue diuresis-patient appears to be fluid overloaded on the x-ray  · Cardiology evaluation appreciated  · Chest x-ray reviewed-continue with diuresis

## 2020-07-29 NOTE — RESTORATIVE TECHNICIAN NOTE
Restorative Specialist Mobility Note       Activity: Other (Comment)(OOB to chair with LB exercises)     Assistive Device: Other (Comment)(Smoothmover)        Repositioned: Sitting, Up in chair, Other (Comment)(Chair Alarm)

## 2020-07-29 NOTE — PROGRESS NOTES
Progress Note - Aziza Tracy 1935, 80 y o  male MRN: [id number]    Unit/Bed#: [facility] 525-01 Encounter: [id number]    Primary Care Provider: Bartolome Waddell DO   Date and time admitted to hospital: 7/20/2020  4:04 PM        Acute respiratory failure with hypoxia [facility])  Assessment & Plan  Patient requires nasal cannula  Did not use at home  Now on increased oxygen  Chest x-ray vascular congestion, increased pleural effusions, and persistent opacities at the right lung base  Most recent echo showed an EF of 60% with no regional wall abnormalities  There does appear to be some mild pulmonary hypertension with a PA pressure of 48 mmHg  Differential concerning for CHF versus pneumonia  Respiratory status appears to be improving somewhat today  · Continue nasal cannula oxygen  · Monitor respiratory status closely  · Incentive spirometry  · Discontinue antibiotics-discussed with infectious disease  · Increase Lasix to 60 mg b i d  Due to inadequate urinary output, adjust accordingly  · Fluid/sodium restriction  · Appreciate cardiology and ID input-will discuss with cardiology tomorrow regarding diuresis  · Repeat chest x-ray tomorrow    Ambulatory dysfunction  Assessment & Plan  · PT/OT recommending inpatient rehab    Leukocytosis  Assessment & Plan  Unclear etiology  Initially thought reactive, but now concern for infection given worsening respiratory status and elevated procalcitonin  Procalcitonin also possibly elevated secondary to CHF  · Initial blood cultures negative  Repeat blood cultures negative to date  · Restart antibiotics, currently on cefepime and vancomycin  · Trend procalcitonin  · Trend white blood cell count and fever curve  · Appreciate ID recommendations    Essential hypertension  Assessment & Plan  · Continue amlodipine, clonidine  · Continue scheduled labetalol, now 100 mg b i d      Complete heart block (HCC)  Assessment & Plan  · Continue to hold metoprolol  · Patient receiving scheduled labetalol  · Status post permanent pacemaker placement on 07/24  · See plan for bradycardia    History of stroke  Assessment & Plan  Patient with prior stroke last year  · Continue aspirin and Plavix  · Stat CT if acute change in neurological status  · Patient appears to be more confused 9 baseline per family, improved as the day progressed  Discontinue cefepime and monitor if the patient appears to be confused tomorrow will obtain a  CT head  No focal deficits noted on the examination    H/O aortic valve replacement  Assessment & Plan  Patient prior history bioprosthetic valve  · Valve area 1 38 cm2 per echo  Acute kidney injury superimposed on chronic kidney disease ([address] )  Assessment & Plan  Previous baseline around 1 8-1 9 in 2019  Creatinine initially 2 74 on admission  Now trending 2 2-2 3  This possibly represents his new baseline  · Avoid nephrotoxins  · Monitor electrolytes and renal function  · Ensure adequate oral intake and hydration  · Continue diuresis-patient appears to be fluid overloaded on the x-ray  · Cardiology evaluation appreciated  · Chest x-ray reviewed-continue with diuresis    Mild intermittent asthma without complication  Assessment & Plan  Continue albuterol nebulization p r n     * Bradycardia  Assessment & Plan  Asymptomatic currently  Patient with complete heart block  Western blot shows elevated Lyme IgG  However, do not suspect active Lyme infection  · Hold metoprolol  · Appreciate ID and Cardiology input  · Status post permanent pacemaker placement        VTE Pharmacologic Prophylaxis:   Pharmacologic: Apixaban (Eliquis)  Mechanical VTE Prophylaxis in Place: Yes    Patient Centered Rounds: I have performed bedside rounds with nursing staff today  Discussions with Specialists or Other Care Team Provider:     Education and Discussions with Family / Patient:  Patient and family    Time Spent for Care: 30 minutes    More than 50% of total time spent on counseling and coordination of care as described above  Current Length of Stay: 9 day(s)    Current Patient Status: Inpatient   Certification Statement: The patient will continue to require additional inpatient hospital stay due to Continue diuresis    Discharge Plan:     Code Status: Level 1 - Full Code      Subjective:   Patient seen and examined  Patient appears to be more alert awake and interactive today  Periods of confusion    Objective:     Vitals:   Temp (24hrs), Av 2 °F (36 8 °C), Min:97 9 °F (36 6 °C), Max:98 7 °F (37 1 °C)    Temp:  [97 9 °F (36 6 °C)-98 7 °F (37 1 °C)] 98 1 °F (36 7 °C)  HR:  [59-66] 62  Resp:  [16-20] 18  BP: (152-182)/(63-78) 152/67  SpO2:  [91 %-96 %] 96 %  Body mass index is 37 77 kg/m²  Input and Output Summary (last 24 hours): Intake/Output Summary (Last 24 hours) at 2020 1810  Last data filed at 2020 1201  Gross per 24 hour   Intake 298 ml   Output 1797 ml   Net -1499 ml       Physical Exam:     Physical Exam   Constitutional: He appears well-developed  No distress  HENT:   Head: Normocephalic and atraumatic  Eyes: Right eye exhibits no discharge  Left eye exhibits no discharge  Neck: No JVD present  Cardiovascular: Normal rate and regular rhythm  Pulmonary/Chest: Effort normal    Decreased breath sounds with bilateral basal crackles   Abdominal: Soft  He exhibits no distension  There is no tenderness  Musculoskeletal: Normal range of motion  Neurological: He is alert  Additional Data:     Labs:    Results from last 7 days   Lab Units 20  0235  20  1805   WBC Thousand/uL 13 59*   < > 13 39*   HEMOGLOBIN g/dL 11 4*   < > 11 3*   HEMATOCRIT % 34 0*   < > 36 3*   PLATELETS Thousands/uL 228   < > 208   NEUTROS PCT %  --   --  83*   LYMPHS PCT %  --   --  5*   MONOS PCT %  --   --  9   EOS PCT %  --   --  2    < > = values in this interval not displayed       Results from last 7 days   Lab Units 20  0235   POTASSIUM mmol/L 4 5   CHLORIDE mmol/L 104   CO2 mmol/L 27   BUN mg/dL 94*   CREATININE mg/dL 2 22*   CALCIUM mg/dL 8 8   ALK PHOS U/L 97   ALT U/L 12   AST U/L 16     Results from last 7 days   Lab Units 07/24/20  0533   INR  1 32*       * I Have Reviewed All Lab Data Listed Above  * Additional Pertinent Lab Tests Reviewed: [address] 66 Admission Reviewed    Imaging:    Imaging Reports Reviewed Today Include:   Imaging Personally Reviewed by Myself Includes:      Recent Cultures (last 7 days):     Results from last 7 days   Lab Units 07/26/20  2146 07/26/20  0455 07/26/20  0454   BLOOD CULTURE   --  No Growth at 72 hrs  No Growth at 72 hrs  LEGIONELLA URINARY ANTIGEN  Negative  --   --        Last 24 Hours Medication List:     Current Facility-Administered Medications:  acetaminophen 650 mg Oral Q6H PRN Enrique Milling, DO   albuterol 2 5 mg Nebulization Q4H PRN Gurinder Sood, DO   amLODIPine 10 mg Oral BID Gemini Sodcally, PA-C   apixaban 2 5 mg Oral BID Oracio Fontaine, PA-C   aspirin 81 mg Oral Daily Oracio Fontaine, PA-C   cloNIDine 0 1 mg Oral Daily Gemini Soden, PA-C   furosemide 60 mg Intravenous BID (diuretic) Gurinder Sood, DO   Labetalol HCl 10 mg Intravenous Q8H PRN Kendra Ratliff, DO   metoclopramide 10 mg Intravenous Q6H PRN Enrique Milling, DO   metoprolol succinate 100 mg Oral BID Jatin Brojoe, DO   pantoprazole 40 mg Oral Early Morning Karenul Stewart, DO   pravastatin 40 mg Oral Daily With [employer], DO        Today, Patient Was Seen By: Ben Jameson MD    ** Please Note: Dictation voice to text software may have been used in the creation of this document   **

## 2020-07-29 NOTE — PROGRESS NOTES
Heart Failure/ Pulmonary Hypertension Progress Note - Jamir Castaneda 80 y o  male MRN: [id number]    Unit/Bed#: [facility] 525-01 Encounter: [id number]      Assessment:    Principal Problem:    Bradycardia  Active Problems:    Mild intermittent asthma without complication    Acute kidney injury superimposed on chronic kidney disease (HCC)    H/O aortic valve replacement    History of stroke    Complete heart block (HCC)    Essential hypertension    Leukocytosis    Ambulatory dysfunction    Acute respiratory failure with hypoxia (HCC)      Subjective:   Here with CHB S/P MDT PPM placement on 7/24/20  Seen by Dr Anusha Trivedi over this past weekend due to concerns for volume overload post procedure  Started with gentle IV diuresis but now requiring dose escalation due to inadequate response  Has congestion on CXR with effusions  Patient seen and examined  No significant events overnight  Alert, but slow to respond with  conversational dyspnea  Still with O2 requirements  Repeat CXR pending  Objective: Intake/ Output:inaccurate  Weight: bed weights  Tele: AV paced at 70 bpm    7/25 CXR- looks congested     # symptomatic CHB  S/p temp wire  MDT PPM 7/24- Stella     # PAF/ PAT  Hx of TIA  Started on Eliquis 2 5 mg BID    #CAD ( s/p CABG LIMA-LAD),     # HTN  Still elevated  Should improved with diuresis  norvasc 10 mg BID, Toprol  mg BID, clonidine 0 1 mg daily     # Acute on chronic HFpEF, Stage C  Likely iatrogenic volume overload  Currently receiving Lasix 60 mg IV BID  Not on a home diuretic  Takes salt tabs as home med  Will d/c for now  Lungs congested with increased WOB  Will continue on current regimen for now  Echocardiogram 7/21/20  LVEF: 60%, moderate LVH  LVIDd:  RV:  MR:  PASP: 48 mmHg  RVOT:   Other:       # bioprosthetic AVR  (s/p TAVR 2015)     # CKD4  Baseline creat appears to be around 2  Today 2 2            Review of Systems     [employer] (day, reason):   Alvares catheter (day, reason):    Vitals: Blood pressure 158/67, pulse 60, temperature 98 2 °F (36 8 °C), temperature source Oral, resp  rate 18, height 5' 4" (1 626 m), weight 99 8 kg (220 lb 0 3 oz), SpO2 95 %  , Body mass index is 37 77 kg/m² , I/O last 3 completed shifts: In: 430 [P O :120; I V :60; IV Piggyback:250]  Out: 2773 [Urine:3879]  No intake/output data recorded  Wt Readings from Last 3 Encounters:   07/28/20 99 8 kg (220 lb 0 3 oz)   07/20/20 97 5 kg (214 lb 15 2 oz)   10/07/19 93 9 kg (207 lb)       Intake/Output Summary (Last 24 hours) at 7/29/2020 0854  Last data filed at 7/29/2020 0616  Gross per 24 hour   Intake 120 ml   Output 2547 ml   Net -2427 ml     I/O last 3 completed shifts: In: 430 [P O :120; I V :60; IV Piggyback:250]  Out: 9549 [Urine:3879]    No significant arrhythmias seen on telemetry review  Physical Exam:  Vitals:    07/28/20 2330 07/29/20 0257 07/29/20 0300 07/29/20 0729   BP: 163/78 (!) 182/63  158/67   BP Location: Right arm Left arm     Pulse: 60 59  60   Resp: 18 16  18   Temp: 97 9 °F (36 6 °C) 98 1 °F (36 7 °C)  98 2 °F (36 8 °C)   TempSrc: Axillary Oral  Oral   SpO2: 91% 95% 92% 95%   Weight:       Height:           GEN: Lorena Goldberg awake, alert, oriented x 3  Slow to respond     HEENT: pupils equal, round, and reactive to light; extraocular muscles intact  NECK: supple, no carotid bruits   HEART: regular rhythm, normal S1 and S2, no murmurs, clicks, gallops or rubs, JVP is up   LUNGS: coarse  to auscultation bilaterally; no wheezes, rales, or rhonchi   ABDOMEN: normal bowel sounds, soft, no tenderness, no distention  EXTREMITIES: peripheral pulses normal; no clubbing, cyanosis, or edema  NEURO: no focal findings   SKIN: normal without suspicious lesions on exposed skin      Current Facility-Administered Medications:     acetaminophen (TYLENOL) tablet 650 mg, 650 mg, Oral, Q6H PRN, Cassie Sood DO, 650 mg at 07/26/20 1006    albuterol inhalation solution 2 5 mg, 2 5 mg, Nebulization, Q4H PRN, Teresa Rodriguez, DO    amLODIPine (NORVASC) tablet 10 mg, 10 mg, Oral, BID, Gemini Soden, PA-C, 10 mg at 07/29/20 0850    apixaban (ELIQUIS) tablet 2 5 mg, 2 5 mg, Oral, BID, Silva Shows, PA-C, 2 5 mg at 07/29/20 0850    aspirin (ECOTRIN LOW STRENGTH) EC tablet 81 mg, 81 mg, Oral, Daily, Silva Shows, PA-C, 81 mg at 07/29/20 0850    cloNIDine (CATAPRES) tablet 0 1 mg, 0 1 mg, Oral, Daily, Gemini Soden, PA-C, 0 1 mg at 07/29/20 0850    furosemide (LASIX) injection 60 mg, 60 mg, Intravenous, BID (diuretic), Pauline Stevan Veres, DO, 60 mg at 07/29/20 0850    Labetalol HCl (NORMODYNE) injection 10 mg, 10 mg, Intravenous, Q8H PRN, Kendra Ratliff, DO    metoclopramide (REGLAN) injection 10 mg, 10 mg, Intravenous, Q6H PRN, Pauline Pettyred Veres, DO, 10 mg at 07/21/20 1547    metoprolol succinate (TOPROL-XL) 24 hr tablet 100 mg, 100 mg, Oral, BID, Noelle East, DO, 100 mg at 07/29/20 0850    pantoprazole (PROTONIX) EC tablet 40 mg, 40 mg, Oral, Early Morning, Hetul Stewart, DO, 40 mg at 07/29/20 0529    pravastatin (PRAVACHOL) tablet 40 mg, 40 mg, Oral, Daily With Dinner, Hetul Stewart, DO, 40 mg at 07/28/20 1643    sodium chloride tablet 1 g, 1 g, Oral, BID, Hetul Stewart, DO, 1 g at 07/29/20 0850      Labs & Results:    Results from last 7 days   Lab Units 07/26/20  1805   TROPONIN I ng/mL 0 07*     Results from last 7 days   Lab Units 07/29/20  0235 07/28/20  0529 07/27/20  1303 07/27/20  0514   WBC Thousand/uL 13 59* 13 90*  --  11 49*   HEMOGLOBIN g/dL 11 4* 11 6* 12 7 9 3*   HEMATOCRIT % 34 0* 36 3* 41 1 30 2*   PLATELETS Thousands/uL 228 238  --  193         Results from last 7 days   Lab Units 07/29/20  0235 07/28/20  0529 07/27/20  0514  07/26/20  0455   POTASSIUM mmol/L 4 5 5 5* 5 0   < > 5 6*   CHLORIDE mmol/L 104 104 113*   < > 106   CO2 mmol/L 27 24 15*   < > 21   BUN mg/dL 94* 91* 74*   < > 73*   CREATININE mg/dL 2 22* 2 41* 2 03*   < > 2 28*   CALCIUM mg/dL 8 8 8 7 7 6*   < > 8  6   ALK PHOS U/L 97  --   --   --  100   ALT U/L 12  --   --   --  10*   AST U/L 16  --   --   --  16    < > = values in this interval not displayed  Results from last 7 days   Lab Units 07/24/20  0533   INR  1 32*       Chest X-Ray is obtained; result - reviewed    Counseling / Coordination of Care  Total floor / unit time spent today 20 minutes  Greater than 50% of total time was spent with the patient and / or family counseling and / or coordination of care  A description of the counseling / coordination of care: 20  Blank Ledezma

## 2020-07-30 ENCOUNTER — APPOINTMENT (INPATIENT)
Dept: RADIOLOGY | Facility: HOSPITAL | Age: 85
DRG: 242 | End: 2020-07-30
Payer: COMMERCIAL

## 2020-07-30 LAB
ALBUMIN SERPL BCP-MCNC: 2.6 G/DL (ref 3.5–5)
ALP SERPL-CCNC: 92 U/L (ref 46–116)
ALT SERPL W P-5'-P-CCNC: 15 U/L (ref 12–78)
ANION GAP SERPL CALCULATED.3IONS-SCNC: 6 MMOL/L (ref 4–13)
ARTERIAL PATENCY WRIST A: NO
AST SERPL W P-5'-P-CCNC: 19 U/L (ref 5–45)
BASE EX.OXY STD BLDV CALC-SCNC: 71 % (ref 60–80)
BASE EXCESS BLDV CALC-SCNC: -5.9 MMOL/L
BILIRUB SERPL-MCNC: 0.54 MG/DL (ref 0.2–1)
BUN SERPL-MCNC: 87 MG/DL (ref 5–25)
CALCIUM SERPL-MCNC: 8.9 MG/DL (ref 8.3–10.1)
CHLORIDE SERPL-SCNC: 104 MMOL/L (ref 100–108)
CO2 SERPL-SCNC: 28 MMOL/L (ref 21–32)
CREAT SERPL-MCNC: 2.16 MG/DL (ref 0.6–1.3)
ERYTHROCYTE [DISTWIDTH] IN BLOOD BY AUTOMATED COUNT: 12.8 % (ref 11.6–15.1)
GFR SERPL CREATININE-BSD FRML MDRD: 27 ML/MIN/1.73SQ M
GLUCOSE SERPL-MCNC: 102 MG/DL (ref 65–140)
GLUCOSE SERPL-MCNC: 110 MG/DL (ref 65–140)
GLUCOSE SERPL-MCNC: 145 MG/DL (ref 65–140)
HCO3 BLDV-SCNC: 19.6 MMOL/L (ref 24–30)
HCT VFR BLD AUTO: 34 % (ref 36.5–49.3)
HGB BLD-MCNC: 11.2 G/DL (ref 12–17)
MCH RBC QN AUTO: 30.5 PG (ref 26.8–34.3)
MCHC RBC AUTO-ENTMCNC: 32.9 G/DL (ref 31.4–37.4)
MCV RBC AUTO: 93 FL (ref 82–98)
O2 CT BLDV-SCNC: 11.5 ML/DL
PCO2 BLDV: 38.3 MM HG (ref 42–50)
PH BLDV: 7.33 [PH] (ref 7.3–7.4)
PLATELET # BLD AUTO: 229 THOUSANDS/UL (ref 149–390)
PMV BLD AUTO: 10.6 FL (ref 8.9–12.7)
PO2 BLDV: 41.5 MM HG (ref 35–45)
POTASSIUM SERPL-SCNC: 4.1 MMOL/L (ref 3.5–5.3)
PROT SERPL-MCNC: 7 G/DL (ref 6.4–8.2)
RBC # BLD AUTO: 3.67 MILLION/UL (ref 3.88–5.62)
SODIUM SERPL-SCNC: 138 MMOL/L (ref 136–145)
WBC # BLD AUTO: 11.98 THOUSAND/UL (ref 4.31–10.16)

## 2020-07-30 PROCEDURE — 82805 BLOOD GASES W/O2 SATURATION: CPT | Performed by: FAMILY MEDICINE

## 2020-07-30 PROCEDURE — 85027 COMPLETE CBC AUTOMATED: CPT | Performed by: FAMILY MEDICINE

## 2020-07-30 PROCEDURE — 99232 SBSQ HOSP IP/OBS MODERATE 35: CPT | Performed by: INTERNAL MEDICINE

## 2020-07-30 PROCEDURE — 99232 SBSQ HOSP IP/OBS MODERATE 35: CPT | Performed by: FAMILY MEDICINE

## 2020-07-30 PROCEDURE — 82948 REAGENT STRIP/BLOOD GLUCOSE: CPT

## 2020-07-30 PROCEDURE — 70450 CT HEAD/BRAIN W/O DYE: CPT

## 2020-07-30 PROCEDURE — 97530 THERAPEUTIC ACTIVITIES: CPT

## 2020-07-30 PROCEDURE — 80053 COMPREHEN METABOLIC PANEL: CPT | Performed by: FAMILY MEDICINE

## 2020-07-30 PROCEDURE — 97110 THERAPEUTIC EXERCISES: CPT

## 2020-07-30 RX ORDER — SENNOSIDES 8.6 MG
1 TABLET ORAL
Status: DISCONTINUED | OUTPATIENT
Start: 2020-07-30 | End: 2020-08-05 | Stop reason: HOSPADM

## 2020-07-30 RX ORDER — DOCUSATE SODIUM 100 MG/1
100 CAPSULE, LIQUID FILLED ORAL 2 TIMES DAILY
Status: DISCONTINUED | OUTPATIENT
Start: 2020-07-30 | End: 2020-08-05 | Stop reason: HOSPADM

## 2020-07-30 RX ORDER — POLYETHYLENE GLYCOL 3350 17 G/17G
17 POWDER, FOR SOLUTION ORAL DAILY
Status: DISCONTINUED | OUTPATIENT
Start: 2020-07-30 | End: 2020-08-05 | Stop reason: HOSPADM

## 2020-07-30 RX ADMIN — FUROSEMIDE 60 MG: 10 INJECTION, SOLUTION INTRAMUSCULAR; INTRAVENOUS at 09:27

## 2020-07-30 RX ADMIN — POLYETHYLENE GLYCOL 3350 17 G: 17 POWDER, FOR SOLUTION ORAL at 17:45

## 2020-07-30 RX ADMIN — DOCUSATE SODIUM 100 MG: 100 CAPSULE, LIQUID FILLED ORAL at 17:49

## 2020-07-30 RX ADMIN — ASPIRIN 81 MG: 81 TABLET, COATED ORAL at 09:27

## 2020-07-30 RX ADMIN — FUROSEMIDE 60 MG: 10 INJECTION, SOLUTION INTRAMUSCULAR; INTRAVENOUS at 17:46

## 2020-07-30 RX ADMIN — SENNOSIDES 8.6 MG: 8.6 TABLET, FILM COATED ORAL at 21:50

## 2020-07-30 RX ADMIN — METOPROLOL SUCCINATE 100 MG: 100 TABLET, FILM COATED, EXTENDED RELEASE ORAL at 17:49

## 2020-07-30 RX ADMIN — METOPROLOL SUCCINATE 100 MG: 100 TABLET, FILM COATED, EXTENDED RELEASE ORAL at 09:27

## 2020-07-30 RX ADMIN — PRAVASTATIN SODIUM 40 MG: 40 TABLET ORAL at 17:49

## 2020-07-30 RX ADMIN — PANTOPRAZOLE SODIUM 40 MG: 40 TABLET, DELAYED RELEASE ORAL at 05:41

## 2020-07-30 RX ADMIN — AMLODIPINE BESYLATE 10 MG: 10 TABLET ORAL at 17:49

## 2020-07-30 RX ADMIN — AMLODIPINE BESYLATE 10 MG: 10 TABLET ORAL at 09:27

## 2020-07-30 RX ADMIN — APIXABAN 2.5 MG: 2.5 TABLET, FILM COATED ORAL at 09:27

## 2020-07-30 RX ADMIN — APIXABAN 2.5 MG: 2.5 TABLET, FILM COATED ORAL at 17:49

## 2020-07-30 RX ADMIN — CLONIDINE HYDROCHLORIDE 0.1 MG: 0.1 TABLET ORAL at 09:27

## 2020-07-30 NOTE — PROGRESS NOTES
Heart Failure/ Pulmonary Hypertension Progress Note - Aziza Tracy 80 y o  male MRN: [id number]    Unit/Bed#: [facility] 525-01 Encounter: [id number]      Assessment:    Principal Problem:    Bradycardia  Active Problems:    Mild intermittent asthma without complication    Acute kidney injury superimposed on chronic kidney disease (HCC)    H/O aortic valve replacement    History of stroke    Complete heart block (HCC)    Essential hypertension    Leukocytosis    Ambulatory dysfunction    Acute respiratory failure with hypoxia (HCC)      Subjective:   Here with CHB S/P MDT PPM placement on 7/24/20  Seen by Dr Alisha Wallace over this past weekend due to concerns for volume overload post procedure  Started with gentle IV diuresis but now requiring dose escalation due to inadequate response  Has congestion on CXR with effusions  Patient seen and examined  No significant events overnight  Alert this AM but with intermittent bouts of confusion per RN staff  Still with O2 requirements  Drinks minimal amounts so intake likely accurate  Volume status improving  Creat coming down  Objective: Intake/ Output:298/1594/-1 3 L  Weight: bed weights  Tele: AV paced at 70 bpm    NT Pro BNP 8770--->8341  7/29/20- CXR- mild pulm edema with small effusions, grossly stable since prior exam    7/25 CXR- looks congested     # symptomatic CHB  S/p temp wire  MDT PPM 7/24- Stella     # PAF/ PAT  Hx of TIA  Started on Eliquis 2 5 mg BID    #CAD ( s/p CABG LIMA-LAD),     # HTN  Still elevated  Should improved with diuresis  norvasc 10 mg BID, Toprol  mg BID, clonidine 0 1 mg daily     # Acute on chronic HFpEF, Stage C  Likely iatrogenic volume overload  Currently receiving Lasix 60 mg IV BID  Not on a home diuretic  Takes salt tabs as home med  Will d/c for now  Improved from a volume perspective  Lungs CTA  No increased WOB   Continue IV diuresis today, reassess in AM      Echocardiogram 7/21/20  LVEF: 60%, moderate LVH  LVIDd:  RV:  MR:  PASP: 48 mmHg  RVOT:   Other:       # bioprosthetic AVR  (s/p TAVR 2015)     # CKD4  Baseline creat appears to be around 2  Today 2 16, improving              Review of Systems     [employer] (day, reason): Alvares catheter (day, reason):    Vitals: Blood pressure 143/68, pulse 84, temperature 98 2 °F (36 8 °C), temperature source Oral, resp  rate 18, height 5' 4" (1 626 m), weight 95 kg (209 lb 8 oz), SpO2 96 %  , Body mass index is 35 96 kg/m² , I/O last 3 completed shifts: In: 298 [P O :298]  Out: 4526 [Urine:3141]  I/O this shift:  In: 358 [P O :358]  Out: 300 [Urine:300]  Wt Readings from Last 3 Encounters:   07/30/20 95 kg (209 lb 8 oz)   07/20/20 97 5 kg (214 lb 15 2 oz)   10/07/19 93 9 kg (207 lb)       Intake/Output Summary (Last 24 hours) at 7/30/2020 0950  Last data filed at 7/30/2020 0807  Gross per 24 hour   Intake 476 ml   Output 1894 ml   Net -1418 ml     I/O last 3 completed shifts: In: 12 [P O :298]  Out: 7853 [Urine:3141]    No significant arrhythmias seen on telemetry review  Physical Exam:  Vitals:    07/29/20 2337 07/30/20 0451 07/30/20 0637 07/30/20 0800   BP: 144/67  143/68    BP Location: Left arm  Left arm    Pulse: 62  84    Resp: 18  18    Temp: 98 4 °F (36 9 °C)  98 2 °F (36 8 °C)    TempSrc: Oral  Oral    SpO2: 97%  96% 96%   Weight:  95 kg (209 lb 8 oz)     Height:           GEN: Dixie Sayres awake, alert, oriented x 3  Slow to respond     HEENT: pupils equal, round, and reactive to light; extraocular muscles intact  NECK: supple, no carotid bruits   HEART: regular rhythm, normal S1 and S2, no murmurs, clicks, gallops or rubs, JVP is up   LUNGS: diminished  to auscultation bilaterally; no wheezes, rales, or rhonchi   ABDOMEN: normal bowel sounds, soft, no tenderness, no distention  EXTREMITIES: peripheral pulses normal; no clubbing, cyanosis, or edema  NEURO: no focal findings   SKIN: normal without suspicious lesions on exposed skin      Current Facility-Administered Medications:     acetaminophen (TYLENOL) tablet 650 mg, 650 mg, Oral, Q6H PRN, Michae Median Veres, DO, 650 mg at 07/26/20 1006    amLODIPine (NORVASC) tablet 10 mg, 10 mg, Oral, BID, Gemini Soden, PA-C, 10 mg at 07/30/20 2361    apixaban (ELIQUIS) tablet 2 5 mg, 2 5 mg, Oral, BID, Lily Coke, PA-C, 2 5 mg at 07/30/20 1354    aspirin (ECOTRIN LOW STRENGTH) EC tablet 81 mg, 81 mg, Oral, Daily, Bourbon Coke, PA-C, 81 mg at 07/30/20 1365    cloNIDine (CATAPRES) tablet 0 1 mg, 0 1 mg, Oral, Daily, Gemini Soden, PA-C, 0 1 mg at 07/30/20 3063    furosemide (LASIX) injection 60 mg, 60 mg, Intravenous, BID (diuretic), Michae Median Veres, DO, 60 mg at 07/30/20 0927    Labetalol HCl (NORMODYNE) injection 10 mg, 10 mg, Intravenous, Q8H PRN, Kendra Gasimli-Scott, DO    metoclopramide (REGLAN) injection 10 mg, 10 mg, Intravenous, Q6H PRN, Michae Median Veres, DO, 10 mg at 07/21/20 1547    metoprolol succinate (TOPROL-XL) 24 hr tablet 100 mg, 100 mg, Oral, BID, Jessica Deem, DO, 100 mg at 07/30/20 1582    pantoprazole (PROTONIX) EC tablet 40 mg, 40 mg, Oral, Early Morning, Hetul Stewart, DO, 40 mg at 07/30/20 0541    pravastatin (PRAVACHOL) tablet 40 mg, 40 mg, Oral, Daily With Dinner, Hetul Stewart, DO, 40 mg at 07/29/20 1703      Labs & Results:    Results from last 7 days   Lab Units 07/26/20  1805   TROPONIN I ng/mL 0 07*     Results from last 7 days   Lab Units 07/30/20  0539 07/29/20  0235 07/28/20  0529   WBC Thousand/uL 11 98* 13 59* 13 90*   HEMOGLOBIN g/dL 11 2* 11 4* 11 6*   HEMATOCRIT % 34 0* 34 0* 36 3*   PLATELETS Thousands/uL 229 228 238         Results from last 7 days   Lab Units 07/30/20  0539 07/29/20  0235 07/28/20  0529  07/26/20  0455   POTASSIUM mmol/L 4 1 4 5 5 5*   < > 5 6*   CHLORIDE mmol/L 104 104 104   < > 106   CO2 mmol/L 28 27 24   < > 21   BUN mg/dL 87* 94* 91*   < > 73*   CREATININE mg/dL 2 16* 2 22* 2 41*   < > 2 28*   CALCIUM mg/dL 8 9 8 8 8 7   < > 8 6   ALK PHOS U/L 92 97  --   --  100   ALT U/L 15 12  --   --  10*   AST U/L 19 16  --   --  16    < > = values in this interval not displayed  Results from last 7 days   Lab Units 07/24/20  0533   INR  1 32*       Chest X-Ray is obtained; result - reviewed    Counseling / Coordination of Care  Total floor / unit time spent today 20 minutes  Greater than 50% of total time was spent with the patient and / or family counseling and / or coordination of care  A description of the counseling / coordination of care: 20  Blank Roberts

## 2020-07-30 NOTE — UTILIZATION REVIEW
Continued Stay Review    Date: 7/30/20                          Current Patient Class: inpatient    Current Level of Care:   Level 1 stepdown 7/21-7/25; med surg since 7/25    HPI:84 y o  male initially admitted on 7/20/20 as inpatient  transferred from [facility] to [facility] due to complete heart block, rate 20's  Dual chamber PPM placed 7/24  He was still requiring o2 with elevated creat, suspected to be new normal      Assessment/Plan: on 7/26, awoke from sleep with sudden sob  desat to 83-84% on 2 liters, tachypneic with abdominal breathing and coarse BLL lung sounds  Up to 89% on 6 liters  IV diuretic ordered with midflow bubbler at 15 liters applied  Started empiric IV antbx for pneumonia-as he has worsening resp status  CXR showed increased vascular congestion with possible pneumonia  Repeat cultures and urine strep/legionella sent  7/27: back in nsr, continue anticoag due to prior a fib  resp status improving a little today, although still needs nasal cannula  Inadequate urinary output  Double IV antbx continue  Cultures negative so far  ID and electrophysiology are following      7/28: initially disoriented, then improved  dc antbx due to no evidence for pneumonia  Cultures/strep/legionella negative  IV lasix increased to 60mg bid  Fluid/na restriction in progress  7/29: alert but slow to respond, has conversational dyspnea and periods of confusion  Still needs o2  Off antbx  7/30: stll with intermittent confusion and o2 requirements  Volume improving, creat decreasing  CXR shownig congestion with effusions  Escalated lasix dose in progress  Pertinent Labs/Diagnostic Results:   7/25 PCXR:Pulmonary vascular congestion without significant change   Mild increased effusions    Persistent airspace opacities at the right lung base        7/28 BLE doppler: normal  7/29 PCXR: Mild pulmonary edema and small bilateral pleural effusions, grossly stable since the prior examination  7/29 L knee: nothing acute  7/30 CT head: No acute intracranial abnormality      Decreased density in the medial posterior inferior right cerebellum is consistent with the sequela of a late subacute or chronic infarction, but is a new finding when compared with May 19, 2019    Other chronic small and large vessel ischemic changes are similar from May 19, 2019      Results from last 7 days   Lab Units 07/30/20  0539 07/29/20  0235 07/28/20  0529 07/27/20  1303 07/27/20  0514 07/26/20  1805 07/26/20  0455   WBC Thousand/uL 11 98* 13 59* 13 90*  --  11 49* 13 39* 17 83*   HEMOGLOBIN g/dL 11 2* 11 4* 11 6* 12 7 9 3* 11 3* 11 4*   HEMATOCRIT % 34 0* 34 0* 36 3* 41 1 30 2* 36 3* 36 3*   PLATELETS Thousands/uL 229 228 238  --  193 208 219   NEUTROS ABS Thousands/µL  --   --   --   --   --  11 23* 15 35*         Results from last 7 days   Lab Units 07/30/20  0539 07/29/20  0235 07/28/20  0529 07/27/20  0514 07/26/20  1805   SODIUM mmol/L 138 139 136 138 134*   POTASSIUM mmol/L 4 1 4 5 5 5* 5 0 5 6*   CHLORIDE mmol/L 104 104 104 113* 106   CO2 mmol/L 28 27 24 15* 24   ANION GAP mmol/L 6 8 8 10 4   BUN mg/dL 87* 94* 91* 74* 77*   CREATININE mg/dL 2 16* 2 22* 2 41* 2 03* 2 43*   EGFR ml/min/1 73sq m 27 26 24 29 24   CALCIUM mg/dL 8 9 8 8 8 7 7 6* 8 9     Results from last 7 days   Lab Units 07/30/20  0539 07/29/20  0235 07/26/20  0455   AST U/L 19 16 16   ALT U/L 15 12 10*   ALK PHOS U/L 92 97 100   TOTAL PROTEIN g/dL 7 0 7 1 7 0   ALBUMIN g/dL 2 6* 2 7* 2 7*   TOTAL BILIRUBIN mg/dL 0 54 0 53 0 53     Results from last 7 days   Lab Units 07/30/20  1110 07/30/20  0636 07/29/20  1115 07/29/20  7864 07/28/20  2049 07/28/20  1639 07/28/20  1025 07/28/20  0656 07/25/20  1610 07/25/20  1046 07/25/20  0659 07/24/20  2124   POC GLUCOSE mg/dl 145* 102 134 108 119 130 147* 111 126 161* 119 141*     Results from last 7 days   Lab Units 07/30/20  0539 07/29/20  0235 07/28/20  0529 07/27/20  0706 07/26/20  1805 07/26/20  0455 07/25/20  0458 07/24/20  0533   GLUCOSE RANDOM mg/dL 110 115 103 104 112 105 99 95     Results from last 7 days   Lab Units 07/30/20  1620 07/26/20  0139   PH [place]  7 326 7 246*   PCO2 [place] mm Hg 38 3* 46 3   PO2 [place] mm Hg 41 5 67 0*   HCO3 [place] mmol/L 19 6* 19 7*   BASE EXC [place] mmol/L -5 9 -7 5   O2 CONTENT [place] ml/dL 11 5 15 7   O2 HGB, VENOUS % 71 0 90 4*     Results from last 7 days   Lab Units 07/26/20  1805   TROPONIN I ng/mL 0 07*     Results from last 7 days   Lab Units 07/24/20  0533   PROTIME seconds 16 4*   INR  1 32*   PTT seconds 34         Results from last 7 days   Lab Units 07/28/20  0529 07/27/20  0514 07/26/20  0455 07/26/20  0139   PROCALCITONIN ng/ml 0 48* 0 47* 0 53* 0 37*     Results from last 7 days   Lab Units 07/26/20  1858   LACTIC ACID mmol/L 1 0     Results from last 7 days   Lab Units 07/29/20  0235   NT-PRO BNP pg/mL 8,341*       Results from last 7 days   Lab Units 07/26/20  2146   STREP PNEUMONIAE ANTIGEN, URINE  Negative   LEGIONELLA URINARY ANTIGEN  Negative     Results from last 7 days   Lab Units 07/26/20  0455 07/26/20  0454   BLOOD CULTURE  No Growth After 4 Days  No Growth After 4 Days       Vital Signs:   Date/Time  Temp  Pulse  Resp  BP   SpO2  Calculated FIO2 (%) - Nasal Cannula  Nasal Cannula O2 Flow Rate (L/min)  O2 Device  Patient Position - Orthostatic VS   07/30/20 1532  97 5 °F (36 4 °C)  65  18  140/55   98 %        Lying   07/30/20 1106  98 °F (36 7 °C)  67  18  140/68   97 %      Nasal cannula  Sitting   07/30/20 0800           96 %  28  2 L/min  Nasal cannula     07/30/20 0637  98 2 °F (36 8 °C)  84  18  143/68   96 %      Nasal cannula  Lying   07/29/20 2337  98 4 °F (36 9 °C)  62  18  144/67   97 %      Nasal cannula  Lying   07/29/20 2000             32  3 L/min  Nasal cannula     07/29/20 1702    62    152/67              07/29/20 1045  98 1 °F (36 7 °C)  66  18  156/63   96 %      Nasal cannula     07/29/20 0729  98 2 °F (36 8 °C)  60  18  158/67 95 %  32  3 L/min  Nasal cannula     07/29/20 0300           92 %  32  3 L/min  Nasal cannula     07/29/20 0257  98 1 °F (36 7 °C)  59  16  182/63Abnormal    95 %      Nasal cannula  Lying   07/28/20 2330  97 9 °F (36 6 °C)  60  18  163/78   91 %      Nasal cannula  Lying         Medications:   Scheduled Medications:  Medications:  amLODIPine 10 mg Oral BID   apixaban 2 5 mg Oral BID   aspirin 81 mg Oral Daily   cloNIDine 0 1 mg Oral Daily   docusate sodium 100 mg Oral BID   furosemide 60 mg Intravenous BID (diuretic)   metoprolol succinate 100 mg Oral BID   pantoprazole 40 mg Oral Early Morning   polyethylene glycol 17 g Oral Daily   pravastatin 40 mg Oral Daily With Dinner   senna 1 tablet Oral HS   cefTRIAXone (ROCEPHIN) 1,000 mg in dextrose 5 % 50 mL IVPB   Dose: 1,000 mg  Freq: Every 24 hours Route: IV  Indications of Use: PNEUMONIA  Last Dose: Stopped (07/26/20 0606)  Start: 07/26/20 0400 End: 07/26/20 1658  azithromycin (ZITHROMAX) tablet 500 mg   Dose: 500 mg  Freq: Every 24 hours Route: PO  Start: 07/26/20 0400 End: 07/26/20 1658  cefepime (MAXIPIME) 1,000 mg in dextrose 5 % 50 mL IVPB   Dose: 1,000 mg  Freq: Every 12 hours Route: IV  Last Dose: 1,000 mg (07/28/20 1643)  Start: 07/26/20 1700 End: 07/28/20 1744    IV lasix 20mg x 2 7/26, x 1 7/27  IV lasix 40mg x 1 7/26, x 1 7/27     PRN Meds:  Acetaminophen x 1 7/26 650 mg Oral Q6H PRN   bisacodyl 10 mg Oral Daily PRN   Labetalol HCl 10 mg Intravenous Q8H PRN   metoclopramide 10 mg Intravenous Q6H PRN   Albuterol 2 puffs x 1 7/26    Discharge Plan: D    Network Utilization Review Department  [email or web address]  ATTENTION: Please call with any questions or concerns to [phone] and carefully listen to the prompts so that you are directed to the right person   All voicemails are confidential   Combine Doing all requests for admission clinical reviews, approved or denied determinations and any other requests to dedicated fax number below belonging to the campus where the patient is receiving treatment   List of dedicated fax numbers for the Facilities:  [address] [place] [address] DENIALS (Administrative/Medical Necessity) [phone]   [address]  (Maternity/NICU/Pediatrics) [phone]   Katharine Morrison [phone]   Sweetie Kaur [phone]   [address] [place] [address] [phone]   [address] [facility]  [phone]   [address] [facility] [phone]   [facility]  [phone]   [address] [facility] [address]  [address] [facility] [address] [facility] [phone]

## 2020-07-30 NOTE — PLAN OF CARE
Problem: PHYSICAL THERAPY ADULT  Goal: Performs mobility at highest level of function for planned discharge setting  See evaluation for individualized goals  Description  Treatment/Interventions: Functional transfer training, LE strengthening/ROM, Elevations, Therapeutic exercise, Endurance training, Patient/family training, Equipment eval/education, Gait training, Spoke to nursing, OT  Equipment Recommended: Gilma Velasquez       See flowsheet documentation for full assessment, interventions and recommendations  Outcome: Progressing  Note:   Prognosis: Good  Problem List: Decreased strength, Decreased range of motion, Decreased endurance, Impaired balance, Decreased mobility, Decreased coordination, Decreased cognition, Decreased safety awareness  Assessment: Pt seen by PT on 07/30/20 for PT treatment session w/ a focus on bed mobility, functional transfers and LE therex  Pt making fair progress toward goals  Pt w/ increased confusion, limits ability to attend to task during session  Pt performed bed mobility w/ decreased assist 2/2 to slight improvement in alertness this session  Pt able to stand and take steps to chair, however pt continues to present w/ decreased endurance limiting further mobility  Additionally pt presents w/ fear of falling, requires education and encouragement  Pt requires some assist to complete LE therex through full ROM due to strength deficits  Pt left sitting up in chair w/ chair alarm on with all needs in reach at end of therapy session  Pt would continue to benefit from skilled PT in order to address impairments and functional limitations  PT to continue to follow pt, and would recommend rehab pending medical clearance  Barriers to Discharge: Decreased caregiver support, Inaccessible home environment     PT Discharge Recommendation: [address]     PT - OK to Discharge: Yes(pending medical clearance)    See flowsheet documentation for full assessment

## 2020-07-30 NOTE — RESPIRATORY THERAPY NOTE
resp care      07/30/20 0810   Respiratory Assessment   Assessment Type Assess only   General Appearance Alert; Awake   Respiratory Pattern Normal   Chest Assessment Chest expansion symmetrical   Bilateral Breath Sounds Clear;Diminished   Cough None   Resp Comments no prn tx indicated, per chart, no resp meds at home, cxr shows mild pulm edema, no distress noted, udn/resp protocol d/c'd   O2 Device nc

## 2020-07-30 NOTE — PROGRESS NOTES
Progress Note - Infectious Disease   Saturnino Mccullough 80 y o  male MRN: [id number]  Unit/Bed#: [facility] 525-01 Encounter: [id number]      Impression:  1  Stress leukocytosis with probable CHF  2  Complete heart block with bradycardia, S/P ppm  3  CAD CABG, AS, bioprosthetic AVR   4  YOLANDE on CKD  5  History of CVA     Recommendations:  Leukocytosis  Patient is afebrile with elevated WBC count of 11,980  Blood cultures as well as Strep pneumoniae and Legionella urinary antigens are negative so far  Patient has periodic confusion  1  Check final blood culture results which are negative so far  Intermittent confusion  1  Had CT of the head today which showed no acute intracranial abnormality but there was a relatively new decreased density in the medial posterior inferior right cerebellum that was consistent with the sequelae of a late subacute or chronic infarction that was not present in          Antibiotics:  1  None    Subjective:  Denies shortness of breath, fatigued  Denies fevers, chills, or sweats  Denies nausea, vomiting, or diarrhea  Objective:  Vitals:  Temp:  [97 5 °F (36 4 °C)-98 4 °F (36 9 °C)] 97 5 °F (36 4 °C)  HR:  [62-84] 65  Resp:  [18] 18  BP: (140-152)/(55-68) 140/55  SpO2:  [96 %-98 %] 98 %  Temp (24hrs), Av °F (36 7 °C), Min:97 5 °F (36 4 °C), Max:98 4 °F (36 9 °C)  Current: Temperature: 97 5 °F (36 4 °C)    Physical Exam:     General Appearance:  Alert but fatigued, intermittently confused moderately obese elderly male who was nontoxic, no acute distress  Throat: Oropharynx moist without lesions  Lips, mucosa, and tongue normal   Neck: Supple, symmetrical, trachea midline, no adenopathy,  no tenderness/mass/nodules   Lungs:    decreased breath sounds at bases but otherwise clear   Heart:  Sternotomy scar, right subclavian  wound clean    Left subclavian ppm wound with dry dressing, Regular rate and rhythm, S1, S2 normal, no murmur, rub or gallop   Abdomen:   Soft, non-tender, non-distended, positive bowel sounds  No masses, no organomegaly    No CVA tenderness   Extremities: Extremities normal, atraumatic, no clubbing, cyanosis or edema   Skin: As above  Invasive Devices     Peripheral Intravenous Line            Peripheral IV 07/29/20 Right;Upper;Ventral (anterior) Arm 1 day                Labs, Imaging, & Other studies:   All pertinent labs were personally reviewed  Results from last 7 days   Lab Units 07/30/20  0539 07/29/20  0235 07/28/20  0529   WBC Thousand/uL 11 98* 13 59* 13 90*   HEMOGLOBIN g/dL 11 2* 11 4* 11 6*   PLATELETS Thousands/uL 229 228 238     Results from last 7 days   Lab Units 07/30/20  0539 07/29/20  0235 07/28/20  0529  07/26/20  0455   SODIUM mmol/L 138 139 136   < > 135*   POTASSIUM mmol/L 4 1 4 5 5 5*   < > 5 6*   CHLORIDE mmol/L 104 104 104   < > 106   CO2 mmol/L 28 27 24   < > 21   BUN mg/dL 87* 94* 91*   < > 73*   CREATININE mg/dL 2 16* 2 22* 2 41*   < > 2 28*   EGFR ml/min/1 73sq m 27 26 24   < > 25   CALCIUM mg/dL 8 9 8 8 8 7   < > 8 6   AST U/L 19 16  --   --  16   ALT U/L 15 12  --   --  10*   ALK PHOS U/L 92 97  --   --  100    < > = values in this interval not displayed  Results from last 7 days   Lab Units 07/26/20  2146 07/26/20  0455 07/26/20  0454   BLOOD CULTURE   --  No Growth After 4 Days  No Growth After 4 Days     LEGIONELLA URINARY ANTIGEN  Negative  --   --

## 2020-07-30 NOTE — PHYSICAL THERAPY NOTE
PHYSICAL THERAPY TREATMENT NOTE        Patient Name: Prudence SALAZAR Date: 7/30/2020 07/30/20 0945   Pain Assessment   Pain Assessment Tool 0-10   Pain Score No Pain   Restrictions/Precautions   Weight Bearing Precautions Per Order No   Braces or Orthoses Other (Comment)  (none)   Other Precautions Hard of hearing;O2;Fall Risk;Multiple lines;Telemetry; Chair Alarm; Bed Alarm;Cognitive  ([id number])   General   Chart Reviewed Yes   Additional Pertinent History Pt very hard of hearing, does not wear hearing aides  Response to Previous Treatment Patient with no complaints from previous session  Family/Caregiver Present No   Cognition   Overall Cognitive Status Impaired   Arousal/Participation Responsive; Alert   Attention Attends with cues to redirect   Orientation Level Oriented to person;Oriented to time;Oriented to place; Disoriented to situation  (time w/ visual cues)   Memory Decreased short term memory;Decreased recall of recent events;Decreased recall of precautions   Following Commands Follows one step commands with increased time or repetition   Comments Pt pleasant and agreeable to therapy, very confused as to situation and why he "feels is confused", but oriented x3  Pt w/ decreased insights into deficits, requires cues for safety and redirection to task  Is able to identify his is doing better today physically Pt requires VC and TC for hand placement and safety  Subjective   Subjective "I feel like I am doing better today, but why am I so confused?"   Bed Mobility   Supine to Sit 3  Moderate assistance   Additional items Assist x 1;HOB elevated; Bedrails; Increased time required;Verbal cues;LE management   Sit to Supine Unable to assess   Additional Comments Pt found supine in bed upon PT arrival  Pt able to initiate movements toward EOB on own w/out VC or TC  Pt left sitting up in chair w/ chair alarm on and all needs in reach     Transfers   Sit to Stand 2  Maximal assistance   Additional items Assist x 2; Increased time required;Verbal cues   Stand to Sit 2  Maximal assistance   Additional items Assist x 2;Armrests; Increased time required;Verbal cues   Additional Comments Pt required max VC and TC for hand placement and safety  Pt used b/l HHA for all transfers and ambulation  Ambulation/Elevation   Gait pattern Excessively slow; Step to;Ataxia; Foward flexed; Shuffling;Decreased foot clearance; Improper Weight shift   Gait Assistance 2  Maximal assist   Additional items Assist x 2;Verbal cues; Tactile cues   Assistive Device Other (Comment)  (b/l HHA)   Distance 3ft to chair   Balance   Static Sitting Fair   Dynamic Sitting Fair -   Static Standing Poor -   Dynamic Standing Poor -   Ambulatory Poor -   Endurance Deficit   Endurance Deficit Yes   Endurance Deficit Description decreased strength, fatigue, fear of falls   Activity Tolerance   Activity Tolerance Patient limited by fatigue   Medical Staff Made Aware PT [facility], RN   Nurse Made Aware RN cleared pt for PT treatment session  Exercises   Knee AROM Long Arc Quad AROM; Bilateral;Sitting;10 reps  (2 sets)   Ankle Pumps AROM;10 reps; Sitting;Bilateral  (2 sets)   Marching Sitting;10 reps;AAROM; Bilateral  ( 2 sets)   Assessment   Prognosis Good   Problem List Decreased strength;Decreased range of motion;Decreased endurance; Impaired balance;Decreased mobility; Decreased coordination;Decreased cognition;Decreased safety awareness   Assessment Pt seen by PT on 07/30/20 for PT treatment session w/ a focus on bed mobility, functional transfers and LE therex  Pt making fair progress toward goals  Pt w/ increased confusion, limits ability to attend to task during session  Pt performed bed mobility w/ decreased assist 2/2 to slight improvement in alertness this session  Pt able to stand and take steps to chair, however pt continues to present w/ decreased endurance limiting further mobility   Additionally pt presents w/ fear of falling, requires education and encouragement  Pt requires some assist to complete LE therex through full ROM due to strength deficits  Pt left sitting up in chair w/ chair alarm on with all needs in reach at end of therapy session  Pt would continue to benefit from skilled PT in order to address impairments and functional limitations  PT to continue to follow pt, and would recommend rehab pending medical clearance  Barriers to Discharge Decreased caregiver support; Inaccessible home environment   Goals   Patient Goals "to be less confused"   [facility] Expiration Date 08/08/20   Plan   Treatment/Interventions Functional transfer training;LE strengthening/ROM; Therapeutic exercise; Endurance training;Cognitive reorientation;Patient/family training;Equipment eval/education; Bed mobility;Gait training;OT;Spoke to nursing   Progress Slow progress, decreased activity tolerance   PT Frequency Other (Comment)  (3-5x /week)   Recommendation   PT Discharge Recommendation Post-Acute Rehabilitation Services   Equipment Recommended Chava Dumont   PT - OK to Discharge Yes  (pending medical clearance)       Vanna Huddleston, SPT

## 2020-07-31 LAB
ANION GAP SERPL CALCULATED.3IONS-SCNC: 3 MMOL/L (ref 4–13)
BACTERIA BLD CULT: NORMAL
BACTERIA BLD CULT: NORMAL
BUN SERPL-MCNC: 93 MG/DL (ref 5–25)
CALCIUM SERPL-MCNC: 8.7 MG/DL (ref 8.3–10.1)
CHLORIDE SERPL-SCNC: 100 MMOL/L (ref 100–108)
CO2 SERPL-SCNC: 29 MMOL/L (ref 21–32)
CREAT SERPL-MCNC: 2.3 MG/DL (ref 0.6–1.3)
GFR SERPL CREATININE-BSD FRML MDRD: 25 ML/MIN/1.73SQ M
GLUCOSE SERPL-MCNC: 160 MG/DL (ref 65–140)
POTASSIUM SERPL-SCNC: 4.6 MMOL/L (ref 3.5–5.3)
SODIUM SERPL-SCNC: 132 MMOL/L (ref 136–145)

## 2020-07-31 PROCEDURE — 99232 SBSQ HOSP IP/OBS MODERATE 35: CPT | Performed by: FAMILY MEDICINE

## 2020-07-31 PROCEDURE — 99232 SBSQ HOSP IP/OBS MODERATE 35: CPT | Performed by: INTERNAL MEDICINE

## 2020-07-31 PROCEDURE — 80048 BASIC METABOLIC PNL TOTAL CA: CPT | Performed by: NURSE PRACTITIONER

## 2020-07-31 RX ADMIN — FUROSEMIDE 60 MG: 10 INJECTION, SOLUTION INTRAMUSCULAR; INTRAVENOUS at 09:51

## 2020-07-31 RX ADMIN — APIXABAN 2.5 MG: 2.5 TABLET, FILM COATED ORAL at 17:18

## 2020-07-31 RX ADMIN — PANTOPRAZOLE SODIUM 40 MG: 40 TABLET, DELAYED RELEASE ORAL at 05:15

## 2020-07-31 RX ADMIN — PRAVASTATIN SODIUM 40 MG: 40 TABLET ORAL at 17:18

## 2020-07-31 RX ADMIN — AMLODIPINE BESYLATE 10 MG: 10 TABLET ORAL at 09:57

## 2020-07-31 RX ADMIN — SENNOSIDES 8.6 MG: 8.6 TABLET, FILM COATED ORAL at 20:23

## 2020-07-31 RX ADMIN — ASPIRIN 81 MG: 81 TABLET, COATED ORAL at 09:52

## 2020-07-31 RX ADMIN — POLYETHYLENE GLYCOL 3350 17 G: 17 POWDER, FOR SOLUTION ORAL at 09:57

## 2020-07-31 RX ADMIN — METOPROLOL SUCCINATE 100 MG: 100 TABLET, FILM COATED, EXTENDED RELEASE ORAL at 17:18

## 2020-07-31 RX ADMIN — AMLODIPINE BESYLATE 10 MG: 10 TABLET ORAL at 17:18

## 2020-07-31 RX ADMIN — FUROSEMIDE 60 MG: 10 INJECTION, SOLUTION INTRAMUSCULAR; INTRAVENOUS at 17:18

## 2020-07-31 RX ADMIN — APIXABAN 2.5 MG: 2.5 TABLET, FILM COATED ORAL at 09:51

## 2020-07-31 RX ADMIN — METOPROLOL SUCCINATE 100 MG: 100 TABLET, FILM COATED, EXTENDED RELEASE ORAL at 09:51

## 2020-07-31 RX ADMIN — CLONIDINE HYDROCHLORIDE 0.1 MG: 0.1 TABLET ORAL at 09:51

## 2020-07-31 RX ADMIN — DOCUSATE SODIUM 100 MG: 100 CAPSULE, LIQUID FILLED ORAL at 17:18

## 2020-07-31 RX ADMIN — DOCUSATE SODIUM 100 MG: 100 CAPSULE, LIQUID FILLED ORAL at 09:51

## 2020-07-31 NOTE — PROGRESS NOTES
Heart Failure/ Pulmonary Hypertension Progress Note - Derek Vásquez 80 y o  male MRN: [id number]    Unit/Bed#: [facility] 525-01 Encounter: [id number]      Assessment:    Principal Problem:    Bradycardia  Active Problems:    Mild intermittent asthma without complication    Acute kidney injury superimposed on chronic kidney disease (HCC)    H/O aortic valve replacement    History of stroke    Complete heart block (HCC)    Essential hypertension    Leukocytosis    Ambulatory dysfunction    Acute respiratory failure with hypoxia (HCC)      Subjective:   Here with [facility] S/P MDT PPM placement on 7/24/20  Seen by Dr Dolores Lamb over this past weekend due to concerns for volume overload post procedure  Started with gentle IV diuresis but now requiring dose escalation due to inadequate response  Has congestion on CXR with effusions  Patient seen and examined  No significant events overnight  Occasional confusion  Attempting to wean O2  Drinks minimal amounts so intake likely accurate  Volume status improving  Awaiting repeat BMP  Objective: Intake/ Output:838/2165/-1 4L  Weight: bed weights  Tele: AV paced at 70 bpm    NT Pro BNP 8770--->8341  7/29/20- CXR- mild pulm edema with small effusions, grossly stable since prior exam    7/25 CXR- looks congested     # symptomatic CHB  S/p temp wire  MDT PPM 7/24- Stella     # PAF/ PAT  Hx of TIA  Started on Eliquis 2 5 mg BID    #CAD ( s/p CABG LIMA-LAD),     # HTN  Still elevated  Should improve with diuresis  norvasc 10 mg BID, Toprol  mg BID, clonidine 0 1 mg daily     # Acute on chronic HFpEF, Stage C  Likely iatrogenic volume overload  Currently receiving Lasix 60 mg IV BID  Not on a home diuretic  Takes salt tabs as home med  Will d/c for now  Improved from a volume perspective  Lungs CTA  No increased WOB   Continue IV diuresis today, reassess in AM      Echocardiogram 7/21/20  LVEF: 60%, moderate LVH  LVIDd:  RV:  MR:  PASP: 48 mmHg  RVOT:   Other:       # bioprosthetic AVR  (s/p TAVR 2015)     # CKD4  Baseline creat appears to be around 2  Yesterday 2 16, improving  Recheck BMP today              Review of Systems     [employer] (day, reason): Alvares catheter (day, reason):    Vitals: Blood pressure 131/65, pulse 65, temperature 97 8 °F (36 6 °C), temperature source Oral, resp  rate 18, height 5' 4" (1 626 m), weight 95 kg (209 lb 8 oz), SpO2 96 %  , Body mass index is 35 96 kg/m² , I/O last 3 completed shifts: In: 961 [P O :838]  Out: 0027 [Urine:3509]  No intake/output data recorded  Wt Readings from Last 3 Encounters:   07/30/20 95 kg (209 lb 8 oz)   07/20/20 97 5 kg (214 lb 15 2 oz)   10/07/19 93 9 kg (207 lb)       Intake/Output Summary (Last 24 hours) at 7/31/2020 0931  Last data filed at 7/31/2020 0641  Gross per 24 hour   Intake 480 ml   Output 1865 ml   Net -1385 ml     I/O last 3 completed shifts: In: 727 [P O :838]  Out: 3509 [MHUIS:4175]    Not on tele  Physical Exam:  Vitals:    07/30/20 1532 07/30/20 1910 07/30/20 2326 07/31/20 0641   BP: 140/55 131/59 138/64 131/65   BP Location: Right arm Left arm Left arm Left arm   Pulse: 65 60 60 65   Resp: 18 17 17 18   Temp: 97 5 °F (36 4 °C) 97 6 °F (36 4 °C)  97 8 °F (36 6 °C)   TempSrc: Oral Oral  Oral   SpO2: 98% 98% 97% 96%   Weight:       Height:           GEN: Dixie Sayres awake, alert, oriented x 3  Slow to respond     HEENT: pupils equal, round, and reactive to light; extraocular muscles intact  NECK: supple, no carotid bruits   HEART: regular rhythm, normal S1 and S2, no murmurs, clicks, gallops or rubs, JVP is up   LUNGS: diminished  to auscultation bilaterally; few scattered rales bilaterally, no wheezes, rales, or rhonchi   ABDOMEN: normal bowel sounds, soft, no tenderness, no distention  EXTREMITIES: peripheral pulses normal; no clubbing, cyanosis, or edema  NEURO: no focal findings   SKIN: normal without suspicious lesions on exposed skin      Current Facility-Administered Medications:   acetaminophen (TYLENOL) tablet 650 mg, 650 mg, Oral, Q6H PRN, Joe Sood DO, 650 mg at 07/26/20 1006    amLODIPine (NORVASC) tablet 10 mg, 10 mg, Oral, BID, Gemini Soden, PA-C, 10 mg at 07/30/20 1749    apixaban (ELIQUIS) tablet 2 5 mg, 2 5 mg, Oral, BID, Edith Mary, PA-C, 2 5 mg at 07/30/20 1749    aspirin (ECOTRIN LOW STRENGTH) EC tablet 81 mg, 81 mg, Oral, Daily, Edith Mary, PA-C, 81 mg at 07/30/20 0893    bisacodyl (DULCOLAX) EC tablet 10 mg, 10 mg, Oral, Daily PRN, Megan Russell MD    cloNIDine (CATAPRES) tablet 0 1 mg, 0 1 mg, Oral, Daily, Gemini Soden, PA-C, 0 1 mg at 07/30/20 0625    docusate sodium (COLACE) capsule 100 mg, 100 mg, Oral, BID, Megan Russell MD, 100 mg at 07/30/20 1749    furosemide (LASIX) injection 60 mg, 60 mg, Intravenous, BID (diuretic), Joe Sood DO, 60 mg at 07/30/20 1746    Labetalol HCl (NORMODYNE) injection 10 mg, 10 mg, Intravenous, Q8H PRN, Kendra Ratliff,     metoclopramide (REGLAN) injection 10 mg, 10 mg, Intravenous, Q6H PRN, Joe Sood DO, 10 mg at 07/21/20 1547    metoprolol succinate (TOPROL-XL) 24 hr tablet 100 mg, 100 mg, Oral, BID, Jose Brownlee DO, 100 mg at 07/30/20 1749    pantoprazole (PROTONIX) EC tablet 40 mg, 40 mg, Oral, Early Morning, Cortez Stewart DO, 40 mg at 07/31/20 0515    polyethylene glycol (MIRALAX) packet 17 g, 17 g, Oral, Daily, Megan Russell MD, 17 g at 07/30/20 1745    pravastatin (PRAVACHOL) tablet 40 mg, 40 mg, Oral, Daily With [employer], Cortez Stewart DO, 40 mg at 07/30/20 1749    senna (SENOKOT) tablet 8 6 mg, 1 tablet, Oral, HS, Megan Russell MD, 8 6 mg at 07/30/20 2150      Labs & Results:    Results from last 7 days   Lab Units 07/26/20  1805   TROPONIN I ng/mL 0 07*     Results from last 7 days   Lab Units 07/30/20  0539 07/29/20  0235 07/28/20  0529   WBC Thousand/uL 11 98* 13 59* 13 90*   HEMOGLOBIN g/dL 11 2* 11 4* 11 6*   HEMATOCRIT % 34 0* 34 0* 36 3*   PLATELETS Thousands/uL 229 228 238         Results from last 7 days   Lab Units 07/30/20  0539 07/29/20  0235 07/28/20  0529  07/26/20  0455   POTASSIUM mmol/L 4 1 4 5 5 5*   < > 5 6*   CHLORIDE mmol/L 104 104 104   < > 106   CO2 mmol/L 28 27 24   < > 21   BUN mg/dL 87* 94* 91*   < > 73*   CREATININE mg/dL 2 16* 2 22* 2 41*   < > 2 28*   CALCIUM mg/dL 8 9 8 8 8 7   < > 8 6   ALK PHOS U/L 92 97  --   --  100   ALT U/L 15 12  --   --  10*   AST U/L 19 16  --   --  16    < > = values in this interval not displayed  Chest X-Ray is obtained; result - reviewed    Counseling / Coordination of Care  Total floor / unit time spent today 20 minutes  Greater than 50% of total time was spent with the patient and / or family counseling and / or coordination of care  A description of the counseling / coordination of care: 20  Blank Buenrostro

## 2020-07-31 NOTE — ASSESSMENT & PLAN NOTE
Previous baseline around 1 8-1 9 in 2019  Creatinine initially 2 74 on admission  Now trending 2 3 today This possibly represents his new baseline    · Avoid nephrotoxins  · Monitor electrolytes and renal function  · Ensure adequate oral intake and hydration  · Continue diuresis-patient appears to be fluid overloaded on the x-ray  · Cardiology evaluation appreciated  · Chest x-ray reviewed-continue with diuresis  · Recheck creatinine tomorrow morning with diuresis

## 2020-07-31 NOTE — ASSESSMENT & PLAN NOTE
Unclear etiology  Initially thought reactive, but now concern for infection given worsening respiratory status and elevated procalcitonin  Procalcitonin also possibly elevated secondary to CHF  · Initial blood cultures negative  Repeat blood cultures negative to date    · Restart antibiotics, currently on cefepime and vancomycin  · Trend white blood cell count and fever curve  · Appreciate ID recommendations, currently monitoring off of antibiotics

## 2020-07-31 NOTE — PROGRESS NOTES
Progress Note - Infectious Disease   Tarah Lott 80 y o  male MRN: [id number]  Unit/Bed#: [facility] 525-01 Encounter: [id number]      Impression:  1  Stress leukocytosis with probable CHF  2  Complete heart block with bradycardia, S/P ppm  3  CAD CABG, AS, bioprosthetic AVR   4  YOLANDE on CKD  5  History of CVA     Recommendations:  Leukocytosis  Patient is afebrile with elevated WBC count of 11,980 when last taken  Blood cultures as well as Strep pneumoniae and Legionella urinary antigens are negative       1  Final blood culture results are negative     Intermittent confusion  1  Patient is no longer confused today        Antibiotics:  1  None    Subjective:  Denies shortness of breath,   Denies fevers, chills, or sweats  Denies nausea, vomiting, or diarrhea  Objective:  Vitals:  Temp:  [97 4 °F (36 3 °C)-97 8 °F (36 6 °C)] 97 4 °F (36 3 °C)  HR:  [60-65] 60  Resp:  [17-18] 18  BP: (126-143)/(59-70) 126/70  SpO2:  [95 %-98 %] 95 %  Temp (24hrs), Av 6 °F (36 4 °C), Min:97 4 °F (36 3 °C), Max:97 8 °F (36 6 °C)  Current: Temperature: (!) 97 4 °F (36 3 °C)    Physical Exam:     General Appearance:  Alert moderately obese elderly male who was nontoxic, no acute distress  Patient is sitting comfortably in chair  Throat: Oropharynx moist without lesions  Lips, mucosa, and tongue normal   Neck: Supple, symmetrical, trachea midline, no adenopathy,  no tenderness/mass/nodules   Lungs:    decreased breath sounds at bases but otherwise clear   Heart:  Sternotomy scar, right subclavian  wound clean  Left subclavian ppm wound with dry dressing, Regular rate and rhythm, S1, S2 normal, no murmur, rub or gallop   Abdomen:   Soft, non-tender, non-distended, positive bowel sounds  No masses, no organomegaly    No CVA tenderness   Extremities: Extremities normal, atraumatic, no clubbing, cyanosis or edema   Skin: As above           Invasive Devices     Peripheral Intravenous Line            Peripheral IV 20 Right; Upper;Ventral (anterior) Arm 2 days                Labs, Imaging, & Other studies:   All pertinent labs were personally reviewed  Results from last 7 days   Lab Units 07/30/20  0539 07/29/20  0235 07/28/20  0529   WBC Thousand/uL 11 98* 13 59* 13 90*   HEMOGLOBIN g/dL 11 2* 11 4* 11 6*   PLATELETS Thousands/uL 229 228 238     Results from last 7 days   Lab Units 07/31/20  1040 07/30/20  0539 07/29/20  0235  07/26/20  0455   SODIUM mmol/L 132* 138 139   < > 135*   POTASSIUM mmol/L 4 6 4 1 4 5   < > 5 6*   CHLORIDE mmol/L 100 104 104   < > 106   CO2 mmol/L 29 28 27   < > 21   BUN mg/dL 93* 87* 94*   < > 73*   CREATININE mg/dL 2 30* 2 16* 2 22*   < > 2 28*   EGFR ml/min/1 73sq m 25 27 26   < > 25   CALCIUM mg/dL 8 7 8 9 8 8   < > 8 6   AST U/L  --  19 16  --  16   ALT U/L  --  15 12  --  10*   ALK PHOS U/L  --  92 97  --  100    < > = values in this interval not displayed  Results from last 7 days   Lab Units 07/26/20  2146 07/26/20  0455 07/26/20  0454   BLOOD CULTURE   --  No Growth After 5 Days  No Growth After 5 Days     LEGIONELLA URINARY ANTIGEN  Negative  --   --

## 2020-07-31 NOTE — RESTORATIVE TECHNICIAN NOTE
Restorative Specialist Mobility Note       Activity: Stand at bedside, Turn, Chair     Assistive Device: Other (Comment)(HHAx2)        Repositioned: Sitting, Up in chair, Other (Comment)(Chair Alarm)

## 2020-07-31 NOTE — ASSESSMENT & PLAN NOTE
Previous baseline around 1 8-1 9 in 2019  Creatinine initially 2 74 on admission  Now trending 2 16 today This possibly represents his new baseline    · Avoid nephrotoxins  · Monitor electrolytes and renal function  · Ensure adequate oral intake and hydration  · Continue diuresis-patient appears to be fluid overloaded on the x-ray  · Cardiology evaluation appreciated  · Chest x-ray reviewed-continue with diuresis  · Recheck creatinine tomorrow morning with diuresis

## 2020-07-31 NOTE — ASSESSMENT & PLAN NOTE
Patient requires nasal cannula  Did not use at home  Now on increased oxygen  Chest x-ray vascular congestion, increased pleural effusions, and persistent opacities at the right lung base  Most recent echo showed an EF of 60% with no regional wall abnormalities  There does appear to be some mild pulmonary hypertension with a PA pressure of 48 mmHg  Differential concerning for CHF versus pneumonia  Respiratory status appears to be improving somewhat today  · Continue nasal cannula oxygen  · Monitor respiratory status closely  · Incentive spirometry  · Discontinue antibiotics-discussed with infectious disease  · Increase Lasix to 60 mg b i d   Due to inadequate urinary output, adjust accordingly  · Fluid/sodium restriction  · Appreciate cardiology and ID input-  · Continue with the IV diuresis  · Repeat chest x-ray showed presence of fluid overload, BNP is elevated

## 2020-07-31 NOTE — PROGRESS NOTES
Progress Note - Izzy Fragoso 1935, 80 y o  male MRN: [id number]    Unit/Bed#: [facility] 525-01 Encounter: [id number]    Primary Care Provider: Roshan Fagan DO   Date and time admitted to hospital: 7/20/2020  4:04 PM        Acute respiratory failure with hypoxia [facility])  Assessment & Plan  Patient requires nasal cannula  Did not use at home  Now on increased oxygen  Chest x-ray vascular congestion, increased pleural effusions, and persistent opacities at the right lung base  Most recent echo showed an EF of 60% with no regional wall abnormalities  There does appear to be some mild pulmonary hypertension with a PA pressure of 48 mmHg  Differential concerning for CHF versus pneumonia  Respiratory status appears to be improving somewhat today  · Continue nasal cannula oxygen  · Monitor respiratory status closely  · Incentive spirometry  · Discontinue antibiotics-discussed with infectious disease  · Increase Lasix to 60 mg b i d  Due to inadequate urinary output, adjust accordingly  · Fluid/sodium restriction  · Appreciate cardiology and ID input-  · Continue with the IV diuresis  · Repeat chest x-ray showed presence of fluid overload, BNP is elevated    Ambulatory dysfunction  Assessment & Plan  · PT/OT recommending inpatient rehab    Leukocytosis  Assessment & Plan  Unclear etiology  Initially thought reactive, but now concern for infection given worsening respiratory status and elevated procalcitonin  Procalcitonin also possibly elevated secondary to CHF  · Initial blood cultures negative  Repeat blood cultures negative to date  · Restart antibiotics, currently on cefepime and vancomycin  · Trend white blood cell count and fever curve  · Appreciate ID recommendations, currently monitoring off of antibiotics    Essential hypertension  Assessment & Plan  · Continue amlodipine, clonidine  · Continue scheduled labetalol, now 100 mg b i d      Complete heart block (HCC)  Assessment & Plan  · Continue to hold metoprolol  · Patient receiving scheduled labetalol  · Status post permanent pacemaker placement on 07/24  · See plan for bradycardia    History of stroke  Assessment & Plan  Patient with prior stroke last year  · Continue aspirin and Plavix  · Stat CT if acute change in neurological status  · Patient appears to be more confused 9 baseline per family, improved as the day progressed  Patient had a CT scan today which showed the presence of previous CVA no acute intracranial pathology, discussed with radiology in person    H/O aortic valve replacement  Assessment & Plan  Patient prior history bioprosthetic valve  · Valve area 1 38 cm2 per echo  Acute kidney injury superimposed on chronic kidney disease ([facility] [address] )  Assessment & Plan  Previous baseline around 1 8-1 9 in 2019  Creatinine initially 2 74 on admission  Now trending 2 3 today This possibly represents his new baseline  · Avoid nephrotoxins  · Monitor electrolytes and renal function  · Ensure adequate oral intake and hydration  · Continue diuresis-patient appears to be fluid overloaded on the x-ray  · Cardiology evaluation appreciated  · Chest x-ray reviewed-continue with diuresis  · Recheck creatinine tomorrow morning with diuresis    Mild intermittent asthma without complication  Assessment & Plan  Continue albuterol nebulization p r n     * Bradycardia  Assessment & Plan  Asymptomatic currently  Patient with complete heart block  Western blot shows elevated Lyme IgG  However, do not suspect active Lyme infection  · Hold metoprolol  · Appreciate ID and Cardiology input  · Status post permanent pacemaker placement        VTE Pharmacologic Prophylaxis:   Pharmacologic: Apixaban (Eliquis)  Mechanical VTE Prophylaxis in Place: Yes    Patient Centered Rounds: I have performed bedside rounds with nursing staff today      Discussions with Specialists or Other Care Team Provider:     Education and Discussions with Family / Patient: Wife updated in the room in detail    Time Spent for Care: 30 minutes  More than 50% of total time spent on counseling and coordination of care as described above  Current Length of Stay: 11 day(s)    Current Patient Status: Inpatient   Certification Statement: The patient will continue to require additional inpatient hospital stay due to Pending rehab    Discharge Plan:     Code Status: Level 1 - Full Code      Subjective:   Patient seen and examined, patient is on room air today  Wife reported that patient is back to his baseline  As per cardiology progress note IV diuresis for 1 more day today    Objective:     Vitals:   Temp (24hrs), Av 6 °F (36 4 °C), Min:97 4 °F (36 3 °C), Max:97 8 °F (36 6 °C)    Temp:  [97 4 °F (36 3 °C)-97 8 °F (36 6 °C)] 97 4 °F (36 3 °C)  HR:  [60-65] 60  Resp:  [17-18] 18  BP: (126-143)/(59-70) 126/70  SpO2:  [95 %-98 %] 95 %  Body mass index is 35 96 kg/m²  Input and Output Summary (last 24 hours): Intake/Output Summary (Last 24 hours) at 2020 1750  Last data filed at 2020 1725  Gross per 24 hour   Intake 498 ml   Output 1615 ml   Net -1117 ml       Physical Exam:     Physical Exam   Constitutional: He appears well-developed  No distress  HENT:   Head: Normocephalic and atraumatic  Eyes: Right eye exhibits no discharge  Neck: No JVD present  Cardiovascular: Normal rate  No murmur heard  Pulmonary/Chest:   Decreased breath sounds bilateral   Abdominal: Soft  Musculoskeletal: He exhibits no edema  Bilateral upper  extremity edema   Neurological: He is alert  Skin: Skin is warm         Additional Data:     Labs:    Results from last 7 days   Lab Units 20  0539  20  1805   WBC Thousand/uL 11 98*   < > 13 39*   HEMOGLOBIN g/dL 11 2*   < > 11 3*   HEMATOCRIT % 34 0*   < > 36 3*   PLATELETS Thousands/uL 229   < > 208   NEUTROS PCT %  --   --  83*   LYMPHS PCT %  --   --  5*   MONOS PCT %  --   --  9   EOS PCT %  --   --  2    < > = values in this interval not displayed  Results from last 7 days   Lab Units 07/31/20  1040 07/30/20  0539   POTASSIUM mmol/L 4 6 4 1   CHLORIDE mmol/L 100 104   CO2 mmol/L 29 28   BUN mg/dL 93* 87*   CREATININE mg/dL 2 30* 2 16*   CALCIUM mg/dL 8 7 8 9   ALK PHOS U/L  --  92   ALT U/L  --  15   AST U/L  --  19           * I Have Reviewed All Lab Data Listed Above  * Additional Pertinent Lab Tests Reviewed: Tamiko 66 Admission Reviewed    Imaging:    Imaging Reports Reviewed Today Include:   Imaging Personally Reviewed by Myself Includes:      Recent Cultures (last 7 days):     Results from last 7 days   Lab Units 07/26/20  2146 07/26/20  0455 07/26/20  0454   BLOOD CULTURE   --  No Growth After 5 Days  No Growth After 5 Days  LEGIONELLA URINARY ANTIGEN  Negative  --   --        Last 24 Hours Medication List:     Current Facility-Administered Medications:  acetaminophen 650 mg Oral Q6H PRN Minerva Sood, DO   amLODIPine 10 mg Oral BID Gemini DARIN Coffey   apixaban 2 5 mg Oral BID [place] Coke, DARIN   aspirin 81 mg Oral Daily Lily CoDARIN mccarthy   bisacodyl 10 mg Oral Daily PRN Jing Arevalo MD   cloNIDine 0 1 mg Oral Daily Gemini DARIN Coffey   docusate sodium 100 mg Oral BID Jing Arevalo MD   furosemide 60 mg Intravenous BID (diuretic) China Murphy,    Labetalol HCl 10 mg Intravenous Q8H PRN Kendra Ratliff, DO   metoclopramide 10 mg Intravenous Q6H PRN China Murphy DO   metoprolol succinate 100 mg Oral BID Jessica Deem, DO   pantoprazole 40 mg Oral Early Morning Hetul Stewart, DO   polyethylene glycol 17 g Oral Daily Jing Arevalo MD   pravastatin 40 mg Oral Daily With [employer], DO   senna 1 tablet Oral HS Jing Arevalo MD        Today, Patient Was Seen By: Jing Arevalo MD    ** Please Note: Dictation voice to text software may have been used in the creation of this document   **

## 2020-07-31 NOTE — ASSESSMENT & PLAN NOTE
Patient with prior stroke last year  · Continue aspirin and Plavix  · Stat CT if acute change in neurological status  · Patient appears to be more confused 9 baseline per family, improved as the day progressed    Patient had a CT scan today which showed the presence of previous CVA no acute intracranial pathology, discussed with radiology in person

## 2020-08-01 PROCEDURE — 99024 POSTOP FOLLOW-UP VISIT: CPT | Performed by: INTERNAL MEDICINE

## 2020-08-01 PROCEDURE — 99232 SBSQ HOSP IP/OBS MODERATE 35: CPT | Performed by: INTERNAL MEDICINE

## 2020-08-01 PROCEDURE — 99232 SBSQ HOSP IP/OBS MODERATE 35: CPT | Performed by: FAMILY MEDICINE

## 2020-08-01 RX ADMIN — PANTOPRAZOLE SODIUM 40 MG: 40 TABLET, DELAYED RELEASE ORAL at 05:44

## 2020-08-01 RX ADMIN — CLONIDINE HYDROCHLORIDE 0.1 MG: 0.1 TABLET ORAL at 08:20

## 2020-08-01 RX ADMIN — PRAVASTATIN SODIUM 40 MG: 40 TABLET ORAL at 17:48

## 2020-08-01 RX ADMIN — AMLODIPINE BESYLATE 10 MG: 10 TABLET ORAL at 17:48

## 2020-08-01 RX ADMIN — APIXABAN 2.5 MG: 2.5 TABLET, FILM COATED ORAL at 08:20

## 2020-08-01 RX ADMIN — METOPROLOL SUCCINATE 100 MG: 100 TABLET, FILM COATED, EXTENDED RELEASE ORAL at 08:20

## 2020-08-01 RX ADMIN — DOCUSATE SODIUM 100 MG: 100 CAPSULE, LIQUID FILLED ORAL at 08:20

## 2020-08-01 RX ADMIN — AMLODIPINE BESYLATE 10 MG: 10 TABLET ORAL at 08:20

## 2020-08-01 RX ADMIN — APIXABAN 2.5 MG: 2.5 TABLET, FILM COATED ORAL at 17:48

## 2020-08-01 RX ADMIN — METOPROLOL SUCCINATE 100 MG: 100 TABLET, FILM COATED, EXTENDED RELEASE ORAL at 17:48

## 2020-08-01 RX ADMIN — ASPIRIN 81 MG: 81 TABLET, COATED ORAL at 08:20

## 2020-08-01 RX ADMIN — DOCUSATE SODIUM 100 MG: 100 CAPSULE, LIQUID FILLED ORAL at 17:48

## 2020-08-01 NOTE — PLAN OF CARE
Problem: Potential for Falls  Goal: Patient will remain free of falls  Description  INTERVENTIONS:  - Assess patient frequently for physical needs  -  Identify cognitive and physical deficits and behaviors that affect risk of falls    -  [place] fall precautions as indicated by assessment   - Educate patient/family on patient safety including physical limitations  - Instruct patient to call for assistance with activity based on assessment  - Modify environment to reduce risk of injury  - Consider OT/PT consult to assist with strengthening/mobility  Outcome: Progressing     Problem: PAIN - ADULT  Goal: Verbalizes/displays adequate comfort level or baseline comfort level  Description  Interventions:  - Encourage patient to monitor pain and request assistance  - Assess pain using appropriate pain scale  - Administer analgesics based on type and severity of pain and evaluate response  - Implement non-pharmacological measures as appropriate and evaluate response  - Consider cultural and social influences on pain and pain management  - Notify physician/advanced practitioner if interventions unsuccessful or patient reports new pain  Outcome: Progressing     Problem: INFECTION - ADULT  Goal: Absence or prevention of progression during hospitalization  Description  INTERVENTIONS:  - Assess and monitor for signs and symptoms of infection  - Monitor lab/diagnostic results  - Monitor all insertion sites, i e  indwelling lines, tubes, and drains  - Monitor endotracheal if appropriate and nasal secretions for changes in amount and color  - [place] appropriate cooling/warming therapies per order  - Administer medications as ordered  - Instruct and encourage patient and family to use good hand hygiene technique  - Identify and instruct in appropriate isolation precautions for identified infection/condition  Outcome: Progressing  Goal: Absence of fever/infection during neutropenic period  Description  INTERVENTIONS:  - Monitor WBC    Outcome: Progressing     Problem: SAFETY ADULT  Goal: Maintain or return to baseline ADL function  Description  INTERVENTIONS:  -  Assess patient's ability to carry out ADLs; assess patient's baseline for ADL function and identify physical deficits which impact ability to perform ADLs (bathing, care of mouth/teeth, toileting, grooming, dressing, etc )  - Assess/evaluate cause of self-care deficits   - Assess range of motion  - Assess patient's mobility; develop plan if impaired  - Assess patient's need for assistive devices and provide as appropriate  - Encourage maximum independence but intervene and supervise when necessary  - Involve family in performance of ADLs  - Assess for home care needs following discharge   - Consider OT consult to assist with ADL evaluation and planning for discharge  - Provide patient education as appropriate  Outcome: Progressing  Goal: Maintain or return mobility status to optimal level  Description  INTERVENTIONS:  - Assess patient's baseline mobility status (ambulation, transfers, stairs, etc )    - Identify cognitive and physical deficits and behaviors that affect mobility  - Identify mobility aids required to assist with transfers and/or ambulation (gait belt, sit-to-stand, lift, walker, cane, etc )  - [place] fall precautions as indicated by assessment  - Record patient progress and toleration of activity level on Mobility SBAR; progress patient to next Phase/Stage  - Instruct patient to call for assistance with activity based on assessment  - Consider rehabilitation consult to assist with strengthening/weightbearing, etc   Outcome: Progressing     Problem: DISCHARGE PLANNING  Goal: Discharge to home or other facility with appropriate resources  Description  INTERVENTIONS:  - Identify barriers to discharge w/patient and caregiver  - Arrange for needed discharge resources and transportation as appropriate  - Identify discharge learning needs (meds, wound care, etc )  - Arrange for interpretive services to assist at discharge as needed  - Refer to Case Management Department for coordinating discharge planning if the patient needs post-hospital services based on physician/advanced practitioner order or complex needs related to functional status, cognitive ability, or social support system  Outcome: Progressing     Problem: Knowledge Deficit  Goal: Patient/family/caregiver demonstrates understanding of disease process, treatment plan, medications, and discharge instructions  Description  Complete learning assessment and assess knowledge base    Interventions:  - Provide teaching at level of understanding  - Provide teaching via preferred learning methods  Outcome: Progressing     Problem: CARDIOVASCULAR - ADULT  Goal: Maintains optimal cardiac output and hemodynamic stability  Description  INTERVENTIONS:  - Monitor I/O, vital signs and rhythm  - Monitor for S/S and trends of decreased cardiac output  - Administer and titrate ordered vasoactive medications to optimize hemodynamic stability  - Assess quality of pulses, skin color and temperature  - Assess for signs of decreased coronary artery perfusion  - Instruct patient to report change in severity of symptoms  Outcome: Progressing  Goal: Absence of cardiac dysrhythmias or at baseline rhythm  Description  INTERVENTIONS:  - Continuous cardiac monitoring, vital signs, obtain 12 lead EKG if ordered  - Administer antiarrhythmic and heart rate control medications as ordered  - Monitor electrolytes and administer replacement therapy as ordered  Outcome: Progressing     Problem: RESPIRATORY - ADULT  Goal: Achieves optimal ventilation and oxygenation  Description  INTERVENTIONS:  - Assess for changes in respiratory status  - Assess for changes in mentation and behavior  - Position to facilitate oxygenation and minimize respiratory effort  - Oxygen administered by appropriate delivery if ordered  - Initiate smoking cessation education as indicated  - Encourage broncho-pulmonary hygiene including cough, deep breathe, Incentive Spirometry  - Assess the need for suctioning and aspirate as needed  - Assess and instruct to report SOB or any respiratory difficulty  - Respiratory Therapy support as indicated  Outcome: Progressing     Problem: Prexisting or High Potential for Compromised Skin Integrity  Goal: Skin integrity is maintained or improved  Description  INTERVENTIONS:  - Identify patients at risk for skin breakdown  - Assess and monitor skin integrity  - Assess and monitor nutrition and hydration status  - Monitor labs   - Assess for incontinence   - Turn and reposition patient  - Assist with mobility/ambulation  - Relieve pressure over bony prominences  - Avoid friction and shearing  - Provide appropriate hygiene as needed including keeping skin clean and dry  - Evaluate need for skin moisturizer/barrier cream  - Collaborate with interdisciplinary team   - Patient/family teaching  - Consider wound care consult   Outcome: Progressing     Problem: Nutrition/Hydration-ADULT  Goal: Nutrient/Hydration intake appropriate for improving, restoring or maintaining nutritional needs  Description  Monitor and assess patient's nutrition/hydration status for malnutrition  Collaborate with interdisciplinary team and initiate plan and interventions as ordered  Monitor patient's weight and dietary intake as ordered or per policy  Utilize nutrition screening tool and intervene as necessary  Determine patient's food preferences and provide high-protein, high-caloric foods as appropriate       INTERVENTIONS:  - Monitor oral intake, urinary output, labs, and treatment plans  - Assess nutrition and hydration status and recommend course of action  - Evaluate amount of meals eaten  - Assist patient with eating if necessary   - Allow adequate time for meals  - Recommend/ encourage appropriate diets, oral nutritional supplements, and vitamin/mineral supplements  - Order, calculate, and assess calorie counts as needed  - Recommend, monitor, and adjust tube feedings and TPN/PPN based on assessed needs  - Assess need for intravenous fluids  - Provide specific nutrition/hydration education as appropriate  - Include patient/family/caregiver in decisions related to nutrition  Outcome: Progressing

## 2020-08-01 NOTE — PROGRESS NOTES
Advanced Heart Failure/Pulmonary Hypertension Service Progress Note - Dimple Giraldo 80 y o  male MRN: [id number]    Unit/Bed#: [facility] 525-01 Encounter: [id number]      Assessment:    Principal Problem:    Bradycardia  Active Problems:    Mild intermittent asthma without complication    Acute kidney injury superimposed on chronic kidney disease (HCC)    H/O aortic valve replacement    History of stroke    Complete heart block (HCC)    Essential hypertension    Leukocytosis    Ambulatory dysfunction    Acute respiratory failure with hypoxia (HCC)    Plan:  --Change to torsemide 20 mg PO BID today; may need further uptitration  --BP control per primary team     Assessment:  # Chronic HFpEF, NYHA II, ACC/AHA Stage C:  Diag:  --TTE 7/21/2020: LVEF 60%  Mod cLVH w/ LAE  PASP 48 mmHg  Normal RV size and funciton      # Symptomatic CHB s/p MDT PPM 7/24  # pAF/PAT w/ hx of TIA on Eliquis 2 5 mg PO BID  # HTN  # Bio AVR  # CKD IV    Subjective:   Patient seen and examined  No significant events overnight  Objective:       [employer] (day, reason): Alvares catheter (day, reason):    Vitals: Blood pressure 154/62, pulse 72, temperature 97 7 °F (36 5 °C), temperature source Oral, resp  rate 18, height 5' 4" (1 626 m), weight 92 7 kg (204 lb 6 4 oz), SpO2 94 %  , Body mass index is 35 09 kg/m² , I/O last 3 completed shifts: In: 1218 [P O :1218]  Out: 2440 [Urine:2440]  I/O this shift:  In: 600 [P O :600]  Out: 450 [Urine:450]  Wt Readings from Last 3 Encounters:   08/01/20 92 7 kg (204 lb 6 4 oz)   07/20/20 97 5 kg (214 lb 15 2 oz)   10/07/19 93 9 kg (207 lb)       Intake/Output Summary (Last 24 hours) at 8/1/2020 1551  Last data filed at 8/1/2020 1436  Gross per 24 hour   Intake 1320 ml   Output 1650 ml   Net -330 ml     I/O last 3 completed shifts: In: 1218 [P O :1218]  Out: 8970 [Urine:2440]    No significant arrhythmias seen on telemetry review      Physical Exam:  Vitals:    08/01/20 0030 08/01/20 0600 08/01/20 6559 08/01/20 1051   BP:   142/66 154/62   BP Location:   Left arm    Pulse: 64  67 72   Resp:   16 18   Temp:   98 2 °F (36 8 °C) 97 7 °F (36 5 °C)   TempSrc:   Oral Oral   SpO2: 93%  94% 94%   Weight:  92 7 kg (204 lb 6 4 oz)     Height:           GEN: Kylah Morgan appears well, alert and oriented x 3, pleasant and cooperative   HEENT: pupils equal, round, and reactive to light; extraocular muscles intact  NECK: supple, no carotid bruits   HEART: regular rhythm, normal S1 and S2, no murmurs, clicks, gallops or rubs, JVP is    LUNGS: clear to auscultation bilaterally; no wheezes, rales, or rhonchi   ABDOMEN: normal bowel sounds, soft, no tenderness, no distention  EXTREMITIES: peripheral pulses normal; no clubbing, cyanosis, or edema  NEURO: no focal findings   SKIN: normal without suspicious lesions on exposed skin      Current Facility-Administered Medications:     acetaminophen (TYLENOL) tablet 650 mg, 650 mg, Oral, Q6H PRN, Amrita Sood DO, 650 mg at 07/26/20 1006    amLODIPine (NORVASC) tablet 10 mg, 10 mg, Oral, BID, Gemini Soden, PA-C, 10 mg at 08/01/20 0820    apixaban (ELIQUIS) tablet 2 5 mg, 2 5 mg, Oral, BID, Blondie Ruffing, PA-C, 2 5 mg at 08/01/20 0820    aspirin (ECOTRIN LOW STRENGTH) EC tablet 81 mg, 81 mg, Oral, Daily, Blondie Ruffing, PA-C, 81 mg at 08/01/20 0820    bisacodyl (DULCOLAX) EC tablet 10 mg, 10 mg, Oral, Daily PRN, Gina Bai MD    cloNIDine (CATAPRES) tablet 0 1 mg, 0 1 mg, Oral, Daily, Gemini Soden, PA-C, 0 1 mg at 08/01/20 0820    docusate sodium (COLACE) capsule 100 mg, 100 mg, Oral, BID, Gina Bai MD, 100 mg at 08/01/20 0820    Labetalol HCl (NORMODYNE) injection 10 mg, 10 mg, Intravenous, Q8H PRN, Kendra Beardache, DO    metoclopramide (REGLAN) injection 10 mg, 10 mg, Intravenous, Q6H PRN, Amrita Sood DO, 10 mg at 07/21/20 1547    metoprolol succinate (TOPROL-XL) 24 hr tablet 100 mg, 100 mg, Oral, BID, Janet Barbosa DO, 100 mg at 08/01/20 0820   pantoprazole (PROTONIX) EC tablet 40 mg, 40 mg, Oral, Early Morning, Hetul Stewart, DO, 40 mg at 08/01/20 0544    polyethylene glycol (MIRALAX) packet 17 g, 17 g, Oral, Daily, Ras Willingham MD, 17 g at 07/31/20 0957    pravastatin (PRAVACHOL) tablet 40 mg, 40 mg, Oral, Daily With Dinner, Hetul Stewart, DO, 40 mg at 07/31/20 1718    senna (SENOKOT) tablet 8 6 mg, 1 tablet, Oral, HS, Ras Willingham MD, 8 6 mg at 07/31/20 2023      Labs & Results:    Results from last 7 days   Lab Units 07/26/20  1805   TROPONIN I ng/mL 0 07*     Results from last 7 days   Lab Units 07/30/20  0539 07/29/20  0235 07/28/20  0529   WBC Thousand/uL 11 98* 13 59* 13 90*   HEMOGLOBIN g/dL 11 2* 11 4* 11 6*   HEMATOCRIT % 34 0* 34 0* 36 3*   PLATELETS Thousands/uL 229 228 238         Results from last 7 days   Lab Units 07/31/20  1040 07/30/20  0539 07/29/20  0235  07/26/20  0455   POTASSIUM mmol/L 4 6 4 1 4 5   < > 5 6*   CHLORIDE mmol/L 100 104 104   < > 106   CO2 mmol/L 29 28 27   < > 21   BUN mg/dL 93* 87* 94*   < > 73*   CREATININE mg/dL 2 30* 2 16* 2 22*   < > 2 28*   CALCIUM mg/dL 8 7 8 9 8 8   < > 8 6   ALK PHOS U/L  --  92 97  --  100   ALT U/L  --  15 12  --  10*   AST U/L  --  19 16  --  16    < > = values in this interval not displayed  EKG personally reviewed by Jarome Habermann, MD      Counseling / Coordination of Care  Total floor / unit time spent today 20 minutes  Greater than 50% of total time was spent with the patient and / or family counseling and / or coordination of care  A description of the counseling / coordination of care: 10 min  Thank you for the opportunity to participate in the care of this patient      Jarome Habermann MD, PhD   Yani Jacob

## 2020-08-01 NOTE — PROGRESS NOTES
Progress Note - Infectious Disease   Denver Ohara 80 y o  male MRN: [id number]  Unit/Bed#: [facility] 525-01 Encounter: [id number]      Impression:  1  Stress leukocytosis with probable CHF  2  Complete heart block with bradycardia, S/P ppm  3  CAD CABG, AS, bioprosthetic AVR   4  YOLANDE on CKD  5  History of CVA     Recommendations:  Leukocytosis  Patient is afebrile with elevated WBC count of 11,980 when last taken  Blood cultures as well as Strep pneumoniae and Legionella urinary antigens are negative       1  Final blood culture results are negative     Intermittent confusion  1  Patient is no longer confused         Antibiotics:  1  None    Subjective:  Denies shortness of breath,   Denies fevers, chills, or sweats  Denies nausea, vomiting, or diarrhea  Objective:  Vitals:  Temp:  [97 6 °F (36 4 °C)-98 2 °F (36 8 °C)] 98 °F (36 7 °C)  HR:  [64-76] 69  Resp:  [16-18] 16  BP: (141-154)/(56-66) 144/63  SpO2:  [93 %-96 %] 94 %  Temp (24hrs), Av 9 °F (36 6 °C), Min:97 6 °F (36 4 °C), Max:98 2 °F (36 8 °C)  Current: Temperature: 98 °F (36 7 °C)    Physical Exam:     General Appearance:  Alert moderately obese elderly male who was nontoxic, no acute distress      Throat: Oropharynx moist without lesions  Lips, mucosa, and tongue normal   Neck: Supple, symmetrical, trachea midline, no adenopathy,  no tenderness/mass/nodules   Lungs:    Decreased breath sounds at bases with sparse rales  Heart:  Sternotomy scar, right subclavian  wound clean  Left subclavian ppm wound with dry dressing, Regular rate and rhythm, S1, S2 normal, no murmur, rub or gallop   Abdomen:   Soft, non-tender, non-distended, positive bowel sounds  No masses, no organomegaly    No CVA tenderness   Extremities: Extremities normal, atraumatic, no clubbing, cyanosis or edema   Skin: As above           Invasive Devices     Peripheral Intravenous Line            Peripheral IV 20 Right;Upper;Ventral (anterior) Arm 3 days Labs, Imaging, & Other studies:   All pertinent labs were personally reviewed  Results from last 7 days   Lab Units 07/30/20  0539 07/29/20  0235 07/28/20  0529   WBC Thousand/uL 11 98* 13 59* 13 90*   HEMOGLOBIN g/dL 11 2* 11 4* 11 6*   PLATELETS Thousands/uL 229 228 238     Results from last 7 days   Lab Units 07/31/20  1040 07/30/20  0539 07/29/20  0235  07/26/20  0455   SODIUM mmol/L 132* 138 139   < > 135*   POTASSIUM mmol/L 4 6 4 1 4 5   < > 5 6*   CHLORIDE mmol/L 100 104 104   < > 106   CO2 mmol/L 29 28 27   < > 21   BUN mg/dL 93* 87* 94*   < > 73*   CREATININE mg/dL 2 30* 2 16* 2 22*   < > 2 28*   EGFR ml/min/1 73sq m 25 27 26   < > 25   CALCIUM mg/dL 8 7 8 9 8 8   < > 8 6   AST U/L  --  19 16  --  16   ALT U/L  --  15 12  --  10*   ALK PHOS U/L  --  92 97  --  100    < > = values in this interval not displayed  Results from last 7 days   Lab Units 07/26/20  2146 07/26/20  0455 07/26/20  0454   BLOOD CULTURE   --  No Growth After 5 Days  No Growth After 5 Days     LEGIONELLA URINARY ANTIGEN  Negative  --   --

## 2020-08-02 LAB
ANION GAP SERPL CALCULATED.3IONS-SCNC: 6 MMOL/L (ref 4–13)
BUN SERPL-MCNC: 94 MG/DL (ref 5–25)
CALCIUM SERPL-MCNC: 9.5 MG/DL (ref 8.3–10.1)
CHLORIDE SERPL-SCNC: 101 MMOL/L (ref 100–108)
CO2 SERPL-SCNC: 29 MMOL/L (ref 21–32)
CREAT SERPL-MCNC: 2.35 MG/DL (ref 0.6–1.3)
ERYTHROCYTE [DISTWIDTH] IN BLOOD BY AUTOMATED COUNT: 12.7 % (ref 11.6–15.1)
GFR SERPL CREATININE-BSD FRML MDRD: 24 ML/MIN/1.73SQ M
GLUCOSE SERPL-MCNC: 171 MG/DL (ref 65–140)
HCT VFR BLD AUTO: 31.1 % (ref 36.5–49.3)
HGB BLD-MCNC: 10.6 G/DL (ref 12–17)
MCH RBC QN AUTO: 31.3 PG (ref 26.8–34.3)
MCHC RBC AUTO-ENTMCNC: 34.1 G/DL (ref 31.4–37.4)
MCV RBC AUTO: 92 FL (ref 82–98)
PLATELET # BLD AUTO: 285 THOUSANDS/UL (ref 149–390)
PMV BLD AUTO: 10.4 FL (ref 8.9–12.7)
POTASSIUM SERPL-SCNC: 4.5 MMOL/L (ref 3.5–5.3)
RBC # BLD AUTO: 3.39 MILLION/UL (ref 3.88–5.62)
SODIUM SERPL-SCNC: 136 MMOL/L (ref 136–145)
WBC # BLD AUTO: 13.41 THOUSAND/UL (ref 4.31–10.16)

## 2020-08-02 PROCEDURE — 99232 SBSQ HOSP IP/OBS MODERATE 35: CPT | Performed by: FAMILY MEDICINE

## 2020-08-02 PROCEDURE — 80048 BASIC METABOLIC PNL TOTAL CA: CPT | Performed by: FAMILY MEDICINE

## 2020-08-02 PROCEDURE — 85027 COMPLETE CBC AUTOMATED: CPT | Performed by: FAMILY MEDICINE

## 2020-08-02 PROCEDURE — 99231 SBSQ HOSP IP/OBS SF/LOW 25: CPT | Performed by: INTERNAL MEDICINE

## 2020-08-02 PROCEDURE — 99024 POSTOP FOLLOW-UP VISIT: CPT | Performed by: INTERNAL MEDICINE

## 2020-08-02 RX ORDER — TORSEMIDE 20 MG/1
20 TABLET ORAL 2 TIMES DAILY
Status: DISCONTINUED | OUTPATIENT
Start: 2020-08-02 | End: 2020-08-05 | Stop reason: HOSPADM

## 2020-08-02 RX ADMIN — AMLODIPINE BESYLATE 10 MG: 10 TABLET ORAL at 17:41

## 2020-08-02 RX ADMIN — APIXABAN 2.5 MG: 2.5 TABLET, FILM COATED ORAL at 17:41

## 2020-08-02 RX ADMIN — APIXABAN 2.5 MG: 2.5 TABLET, FILM COATED ORAL at 08:30

## 2020-08-02 RX ADMIN — DOCUSATE SODIUM 100 MG: 100 CAPSULE, LIQUID FILLED ORAL at 17:41

## 2020-08-02 RX ADMIN — PANTOPRAZOLE SODIUM 40 MG: 40 TABLET, DELAYED RELEASE ORAL at 05:43

## 2020-08-02 RX ADMIN — METOPROLOL SUCCINATE 100 MG: 100 TABLET, FILM COATED, EXTENDED RELEASE ORAL at 08:30

## 2020-08-02 RX ADMIN — METOPROLOL SUCCINATE 100 MG: 100 TABLET, FILM COATED, EXTENDED RELEASE ORAL at 17:41

## 2020-08-02 RX ADMIN — AMLODIPINE BESYLATE 10 MG: 10 TABLET ORAL at 08:31

## 2020-08-02 RX ADMIN — TORSEMIDE 20 MG: 20 TABLET ORAL at 13:22

## 2020-08-02 RX ADMIN — CLONIDINE HYDROCHLORIDE 0.1 MG: 0.1 TABLET ORAL at 08:31

## 2020-08-02 RX ADMIN — DOCUSATE SODIUM 100 MG: 100 CAPSULE, LIQUID FILLED ORAL at 08:31

## 2020-08-02 RX ADMIN — ASPIRIN 81 MG: 81 TABLET, COATED ORAL at 08:31

## 2020-08-02 RX ADMIN — PRAVASTATIN SODIUM 40 MG: 40 TABLET ORAL at 17:41

## 2020-08-02 RX ADMIN — TORSEMIDE 20 MG: 20 TABLET ORAL at 17:41

## 2020-08-02 NOTE — PROGRESS NOTES
Progress Note - Infectious Disease   Renetta Moseley 80 y o  male MRN: [id number]  Unit/Bed#: [facility] 525-01 Encounter: [id number]      Impression:  1  Stress leukocytosis with probable CHF  2  Complete heart block with bradycardia, S/P ppm  3  CAD CABG, AS, bioprosthetic AVR   4  YOLANDE on CKD  5  History of CVA     Recommendations:  Leukocytosis  Patient is afebrile with elevated WBC count of 13,410 when last taken  Blood cultures as well as Strep pneumoniae and Legionella urinary antigens are negative       1  Final blood culture results are negative     2  Observe for continued WBC elevation  Discussed with patient's wife who was present  Intermittent confusion  1  Patient is no longer confused         Antibiotics:  1  None    Subjective:  Denies shortness of breath, feels well  Denies fevers, chills, or sweats  Denies nausea, vomiting, or diarrhea  Objective:  Vitals:  Temp:  [97 7 °F (36 5 °C)-98 7 °F (37 1 °C)] 98 7 °F (37 1 °C)  HR:  [68-77] 68  Resp:  [16-18] 16  BP: (138-157)/(58-90) 138/64  SpO2:  [93 %-95 %] 95 %  Temp (24hrs), Av 1 °F (36 7 °C), Min:97 7 °F (36 5 °C), Max:98 7 °F (37 1 °C)  Current: Temperature: 98 7 °F (37 1 °C)    Physical Exam:     General Appearance:  Alert moderately obese elderly male who was nontoxic, no acute distress      Throat: Oropharynx moist without lesions  Lips, mucosa, and tongue normal   Neck: Supple, symmetrical, trachea midline, no adenopathy,  no tenderness/mass/nodules   Lungs:    Decreased breath sounds at bases with sparse rales  Heart:  Sternotomy scar, right subclavian  wound clean  Left subclavian ppm wound with dry dressing, Regular rate and rhythm, S1, S2 normal, no murmur, rub or gallop   Abdomen:   Soft, non-tender, non-distended, positive bowel sounds  No masses, no organomegaly    No CVA tenderness   Extremities: Extremities normal, atraumatic, no clubbing, cyanosis or edema   Skin: As above           Invasive Devices     Peripheral Intravenous Line            Peripheral IV 08/02/20 Left;Dorsal (posterior) Forearm less than 1 day                Labs, Imaging, & Other studies:   All pertinent labs were personally reviewed  Results from last 7 days   Lab Units 08/02/20  0946 07/30/20  0539 07/29/20  0235   WBC Thousand/uL 13 41* 11 98* 13 59*   HEMOGLOBIN g/dL 10 6* 11 2* 11 4*   PLATELETS Thousands/uL 285 229 228     Results from last 7 days   Lab Units 08/02/20  0945 07/31/20  1040 07/30/20  0539 07/29/20  0235   SODIUM mmol/L 136 132* 138 139   POTASSIUM mmol/L 4 5 4 6 4 1 4 5   CHLORIDE mmol/L 101 100 104 104   CO2 mmol/L 29 29 28 27   BUN mg/dL 94* 93* 87* 94*   CREATININE mg/dL 2 35* 2 30* 2 16* 2 22*   EGFR ml/min/1 73sq m 24 25 27 26   CALCIUM mg/dL 9 5 8 7 8 9 8 8   AST U/L  --   --  19 16   ALT U/L  --   --  15 12   ALK PHOS U/L  --   --  92 97     Results from last 7 days   Lab Units 07/26/20  2146   LEGIONELLA URINARY ANTIGEN  Negative

## 2020-08-02 NOTE — PLAN OF CARE
Problem: Potential for Falls  Goal: Patient will remain free of falls  Description: INTERVENTIONS:  - Assess patient frequently for physical needs  -  Identify cognitive and physical deficits and behaviors that affect risk of falls    -  [place] fall precautions as indicated by assessment   - Educate patient/family on patient safety including physical limitations  - Instruct patient to call for assistance with activity based on assessment  - Modify environment to reduce risk of injury  - Consider OT/PT consult to assist with strengthening/mobility  Outcome: Progressing     Problem: PAIN - ADULT  Goal: Verbalizes/displays adequate comfort level or baseline comfort level  Description: Interventions:  - Encourage patient to monitor pain and request assistance  - Assess pain using appropriate pain scale  - Administer analgesics based on type and severity of pain and evaluate response  - Implement non-pharmacological measures as appropriate and evaluate response  - Consider cultural and social influences on pain and pain management  - Notify physician/advanced practitioner if interventions unsuccessful or patient reports new pain  Outcome: Progressing     Problem: INFECTION - ADULT  Goal: Absence or prevention of progression during hospitalization  Description: INTERVENTIONS:  - Assess and monitor for signs and symptoms of infection  - Monitor lab/diagnostic results  - Monitor all insertion sites, i e  indwelling lines, tubes, and drains  - Monitor endotracheal if appropriate and nasal secretions for changes in amount and color  - [place] appropriate cooling/warming therapies per order  - Administer medications as ordered  - Instruct and encourage patient and family to use good hand hygiene technique  - Identify and instruct in appropriate isolation precautions for identified infection/condition  Outcome: Progressing  Goal: Absence of fever/infection during neutropenic period  Description: INTERVENTIONS:  - Monitor WBC    Outcome: Progressing     Problem: SAFETY ADULT  Goal: Maintain or return to baseline ADL function  Description: INTERVENTIONS:  -  Assess patient's ability to carry out ADLs; assess patient's baseline for ADL function and identify physical deficits which impact ability to perform ADLs (bathing, care of mouth/teeth, toileting, grooming, dressing, etc )  - Assess/evaluate cause of self-care deficits   - Assess range of motion  - Assess patient's mobility; develop plan if impaired  - Assess patient's need for assistive devices and provide as appropriate  - Encourage maximum independence but intervene and supervise when necessary  - Involve family in performance of ADLs  - Assess for home care needs following discharge   - Consider OT consult to assist with ADL evaluation and planning for discharge  - Provide patient education as appropriate  Outcome: Progressing  Goal: Maintain or return mobility status to optimal level  Description: INTERVENTIONS:  - Assess patient's baseline mobility status (ambulation, transfers, stairs, etc )    - Identify cognitive and physical deficits and behaviors that affect mobility  - Identify mobility aids required to assist with transfers and/or ambulation (gait belt, sit-to-stand, lift, walker, cane, etc )  - [place] fall precautions as indicated by assessment  - Record patient progress and toleration of activity level on Mobility SBAR; progress patient to next Phase/Stage  - Instruct patient to call for assistance with activity based on assessment  - Consider rehabilitation consult to assist with strengthening/weightbearing, etc   Outcome: Progressing     Problem: DISCHARGE PLANNING  Goal: Discharge to home or other facility with appropriate resources  Description: INTERVENTIONS:  - Identify barriers to discharge w/patient and caregiver  - Arrange for needed discharge resources and transportation as appropriate  - Identify discharge learning needs (meds, wound care, etc )  - Arrange for interpretive services to assist at discharge as needed  - Refer to Case Management Department for coordinating discharge planning if the patient needs post-hospital services based on physician/advanced practitioner order or complex needs related to functional status, cognitive ability, or social support system  Outcome: Progressing     Problem: Knowledge Deficit  Goal: Patient/family/caregiver demonstrates understanding of disease process, treatment plan, medications, and discharge instructions  Description: Complete learning assessment and assess knowledge base    Interventions:  - Provide teaching at level of understanding  - Provide teaching via preferred learning methods  Outcome: Progressing     Problem: RESPIRATORY - ADULT  Goal: Achieves optimal ventilation and oxygenation  Description: INTERVENTIONS:  - Assess for changes in respiratory status  - Assess for changes in mentation and behavior  - Position to facilitate oxygenation and minimize respiratory effort  - Oxygen administered by appropriate delivery if ordered  - Initiate smoking cessation education as indicated  - Encourage broncho-pulmonary hygiene including cough, deep breathe, Incentive Spirometry  - Assess the need for suctioning and aspirate as needed  - Assess and instruct to report SOB or any respiratory difficulty  - Respiratory Therapy support as indicated  Outcome: Progressing     Problem: Prexisting or High Potential for Compromised Skin Integrity  Goal: Skin integrity is maintained or improved  Description: INTERVENTIONS:  - Identify patients at risk for skin breakdown  - Assess and monitor skin integrity  - Assess and monitor nutrition and hydration status  - Monitor labs   - Assess for incontinence   - Turn and reposition patient  - Assist with mobility/ambulation  - Relieve pressure over bony prominences  - Avoid friction and shearing  - Provide appropriate hygiene as needed including keeping skin clean and dry  - Evaluate need for skin moisturizer/barrier cream  - Collaborate with interdisciplinary team   - Patient/family teaching  - Consider wound care consult   Outcome: Progressing     Problem: Nutrition/Hydration-ADULT  Goal: Nutrient/Hydration intake appropriate for improving, restoring or maintaining nutritional needs  Description: Monitor and assess patient's nutrition/hydration status for malnutrition  Collaborate with interdisciplinary team and initiate plan and interventions as ordered  Monitor patient's weight and dietary intake as ordered or per policy  Utilize nutrition screening tool and intervene as necessary  Determine patient's food preferences and provide high-protein, high-caloric foods as appropriate       INTERVENTIONS:  - Monitor oral intake, urinary output, labs, and treatment plans  - Assess nutrition and hydration status and recommend course of action  - Evaluate amount of meals eaten  - Assist patient with eating if necessary   - Allow adequate time for meals  - Recommend/ encourage appropriate diets, oral nutritional supplements, and vitamin/mineral supplements  - Order, calculate, and assess calorie counts as needed  - Recommend, monitor, and adjust tube feedings and TPN/PPN based on assessed needs  - Assess need for intravenous fluids  - Provide specific nutrition/hydration education as appropriate  - Include patient/family/caregiver in decisions related to nutrition  Outcome: Progressing     Problem: CARDIOVASCULAR - ADULT  Goal: Maintains optimal cardiac output and hemodynamic stability  Description: INTERVENTIONS:  - Monitor I/O, vital signs and rhythm  - Monitor for S/S and trends of decreased cardiac output  - Administer and titrate ordered vasoactive medications to optimize hemodynamic stability  - Assess quality of pulses, skin color and temperature  - Assess for signs of decreased coronary artery perfusion  - Instruct patient to report change in severity of symptoms  Outcome: Adequate for Discharge  Goal: Absence of cardiac dysrhythmias or at baseline rhythm  Description: INTERVENTIONS:  - Continuous cardiac monitoring, vital signs, obtain 12 lead EKG if ordered  - Administer antiarrhythmic and heart rate control medications as ordered  - Monitor electrolytes and administer replacement therapy as ordered  Outcome: Adequate for Discharge

## 2020-08-02 NOTE — PROGRESS NOTES
Progress Note - Jamir Render 1935, 80 y o  male MRN: [id number]    Unit/Bed#: [facility] 525-01 Encounter: [id number]    Primary Care Provider: Florence Horne DO   Date and time admitted to hospital: 7/20/2020  4:04 PM        Acute respiratory failure with hypoxia [facility])  Assessment & Plan  Patient requires nasal cannula  Did not use at home  Now on increased oxygen  Chest x-ray vascular congestion, increased pleural effusions, and persistent opacities at the right lung base  Most recent echo showed an EF of 60% with no regional wall abnormalities  There does appear to be some mild pulmonary hypertension with a PA pressure of 48 mmHg  Differential concerning for CHF versus pneumonia  Respiratory status appears to be improving somewhat today  · Continue nasal cannula oxygen  · Monitor respiratory status closely  · Incentive spirometry  · Discontinue antibiotics-discussed with infectious disease  · Increase Lasix to 60 mg b i d  Due to inadequate urinary output, adjust accordingly  · Fluid/sodium restriction  · Appreciate cardiology and ID input-  · Transition to p o  Diuretics-cardiology recommended torso 20 mg p o  B i d  Ambulatory dysfunction  Assessment & Plan  · PT/OT recommending inpatient rehab    Leukocytosis  Assessment & Plan  Unclear etiology  Initially thought reactive, but now concern for infection given worsening respiratory status and elevated procalcitonin  Procalcitonin also possibly elevated secondary to CHF  · Initial blood cultures negative  Repeat blood cultures negative to date  · Restart antibiotics, currently on cefepime and vancomycin  · Trend white blood cell count and fever curve-fluctuating WBC:  · Appreciate ID recommendations, currently monitoring off of antibiotics    Essential hypertension  Assessment & Plan  · Continue amlodipine, clonidine  · Continue scheduled labetalol, now 100 mg b i d      Complete heart block ([address] )  Assessment & Plan  · Continue to hold metoprolol  · Patient receiving scheduled labetalol  · Status post permanent pacemaker placement on 07/24  · See plan for bradycardia    History of stroke  Assessment & Plan  Patient with prior stroke last year  · Continue aspirin and Plavix  · Stat CT if acute change in neurological status  · Patient appears to be more confused 9 baseline per family, improved as the day progressed  Patient had a CT scan today which showed the presence of previous CVA no acute intracranial pathology, discussed with radiology in person    H/O aortic valve replacement  Assessment & Plan  Patient prior history bioprosthetic valve  · Valve area 1 38 cm2 per echo  Acute kidney injury superimposed on chronic kidney disease ([facility] [address] )  Assessment & Plan  Previous baseline around 1 8-1 9 in 2019  Creatinine initially 2 74 on admission  Now trending 2 3 today This possibly represents his new baseline  · Avoid nephrotoxins  · Monitor electrolytes and renal function  · Ensure adequate oral intake and hydration  · Continue diuresis-patient appears to be fluid overloaded on the x-ray  · Cardiology evaluation appreciated  · Chest x-ray reviewed-continue with diuresis  · Recheck creatinine tomorrow morning with diuresis    Mild intermittent asthma without complication  Assessment & Plan  Continue albuterol nebulization p r n     * Bradycardia  Assessment & Plan  Asymptomatic currently  Patient with complete heart block  Western blot shows elevated Lyme IgG  However, do not suspect active Lyme infection  · Hold metoprolol  · Appreciate ID and Cardiology input  · Status post permanent pacemaker placement        VTE Pharmacologic Prophylaxis:   Pharmacologic: Apixaban (Eliquis)  Mechanical VTE Prophylaxis in Place: Yes    Patient Centered Rounds: I have performed bedside rounds with nursing staff today      Discussions with Specialists or Other Care Team Provider:     Education and Discussions with Family / Patient:  Daughter over the phone    Time Spent for Care: 30 minutes  More than 50% of total time spent on counseling and coordination of care as described above  Current Length of Stay: 13 day(s)    Current Patient Status: Inpatient   Certification Statement:  Patient stays in the hospital pending rehab tomorrow  Discharge Plan:     Code Status: Level 1 - Full Code      Subjective:   Patient seen and examined  No specific complaints offered  Waiting for rehab    Objective:     Vitals:   Temp (24hrs), Av 1 °F (36 7 °C), Min:97 7 °F (36 5 °C), Max:98 7 °F (37 1 °C)    Temp:  [97 7 °F (36 5 °C)-98 7 °F (37 1 °C)] 98 7 °F (37 1 °C)  HR:  [68-77] 68  Resp:  [16-18] 16  BP: (138-157)/(58-90) 138/64  SpO2:  [93 %-95 %] 95 %  Body mass index is 35 09 kg/m²  Input and Output Summary (last 24 hours): Intake/Output Summary (Last 24 hours) at 2020 1704  Last data filed at 2020 1617  Gross per 24 hour   Intake 240 ml   Output 1375 ml   Net -1135 ml       Physical Exam:     Physical Exam   Constitutional: He appears well-developed  No distress  HENT:   Head: Normocephalic and atraumatic  Eyes: Right eye exhibits no discharge  Left eye exhibits no discharge  Neck: No JVD present  Cardiovascular: Regular rhythm  No murmur heard  Pacemaker site covered in Steri-Strips   Pulmonary/Chest: Effort normal  No stridor  No respiratory distress  Abdominal: Soft  Bowel sounds are normal    Musculoskeletal: Normal range of motion  General: No edema  Neurological: He is alert  No cranial nerve deficit  Additional Data:     Labs:    Results from last 7 days   Lab Units 20  0946  20  1805   WBC Thousand/uL 13 41*   < > 13 39*   HEMOGLOBIN g/dL 10 6*   < > 11 3*   HEMATOCRIT % 31 1*   < > 36 3*   PLATELETS Thousands/uL 285   < > 208   NEUTROS PCT %  --   --  83*   LYMPHS PCT %  --   --  5*   MONOS PCT %  --   --  9   EOS PCT %  --   --  2    < > = values in this interval not displayed       Results from last 7 days   Lab Units 08/02/20  0945  07/30/20  0539   POTASSIUM mmol/L 4 5   < > 4 1   CHLORIDE mmol/L 101   < > 104   CO2 mmol/L 29   < > 28   BUN mg/dL 94*   < > 87*   CREATININE mg/dL 2 35*   < > 2 16*   CALCIUM mg/dL 9 5   < > 8 9   ALK PHOS U/L  --   --  92   ALT U/L  --   --  15   AST U/L  --   --  19    < > = values in this interval not displayed  * I Have Reviewed All Lab Data Listed Above  * Additional Pertinent Lab Tests Reviewed: [address] Admission Reviewed    Imaging:    Imaging Reports Reviewed Today Include:   Imaging Personally Reviewed by Myself Includes:      Recent Cultures (last 7 days):     Results from last 7 days   Lab Units 07/26/20 2146   LEGIONE[place]A URINARY ANTIGEN  Negative       Last 24 Hours Medication List:   acetaminophen, 650 mg, Oral, Q6H PRN, Maykel Sood, DO  amLODIPine, 10 mg, Oral, BID, Gemini Soden, PA-C  apixaban, 2 5 mg, Oral, BID, Ival Record, PA-C  aspirin, 81 mg, Oral, Daily, Ival Record, PA-C  bisacodyl, 10 mg, Oral, Daily PRN, Valerie Smith MD  cloNIDine, 0 1 mg, Oral, Daily, Gemini Soden, PA-C  docusate sodium, 100 mg, Oral, BID, Valerie Smith MD  Labetalol HCl, 10 mg, Intravenous, Q8H PRN, Kendra Ratliff, DO  metoclopramide, 10 mg, Intravenous, Q6H PRN, Maykel Sood, DO  metoprolol succinate, 100 mg, Oral, BID, Shelia Saint, DO  pantoprazole, 40 mg, Oral, Early Morning, Hetul Stewart, DO  polyethylene glycol, 17 g, Oral, Daily, Valerie Smith MD  pravastatin, 40 mg, Oral, Daily With Dinner, [employer], DO  senna, 1 tablet, Oral, HS, Valerie Smith MD  torsemide, 20 mg, Oral, BID, Valerie Smith MD         Today, Patient Was Seen By: Valerie Smith MD    ** Please Note: Dictation voice to text software may have been used in the creation of this document   **

## 2020-08-02 NOTE — ASSESSMENT & PLAN NOTE
Patient requires nasal cannula  Did not use at home  Now on increased oxygen  Chest x-ray vascular congestion, increased pleural effusions, and persistent opacities at the right lung base  Most recent echo showed an EF of 60% with no regional wall abnormalities  There does appear to be some mild pulmonary hypertension with a PA pressure of 48 mmHg  Differential concerning for CHF versus pneumonia  Respiratory status appears to be improving somewhat today  · Continue nasal cannula oxygen  · Monitor respiratory status closely  · Incentive spirometry  · Discontinue antibiotics-discussed with infectious disease  · Increase Lasix to 60 mg b i d  Due to inadequate urinary output, adjust accordingly  · Fluid/sodium restriction  · Appreciate cardiology and ID input-  · Transition to p o   Diuretics-cardiology recommended torso 20 mg p o  B i d

## 2020-08-02 NOTE — PROGRESS NOTES
Progress Note - Felicity Lawson 1935, 80 y o  male MRN: [id number]    Unit/Bed#: [facility] 525-01 Encounter: [id number]    Primary Care Provider: Marlene Farr DO   Date and time admitted to hospital: 7/20/2020  4:04 PM        Acute respiratory failure with hypoxia [facility])  Assessment & Plan  Patient requires nasal cannula  Did not use at home  Now on increased oxygen  Chest x-ray vascular congestion, increased pleural effusions, and persistent opacities at the right lung base  Most recent echo showed an EF of 60% with no regional wall abnormalities  There does appear to be some mild pulmonary hypertension with a PA pressure of 48 mmHg  Differential concerning for CHF versus pneumonia  Respiratory status appears to be improving somewhat today  · Continue nasal cannula oxygen  · Monitor respiratory status closely  · Incentive spirometry  · Discontinue antibiotics-discussed with infectious disease  · Increase Lasix to 60 mg b i d  Due to inadequate urinary output, adjust accordingly  · Fluid/sodium restriction  · Appreciate cardiology and ID input-  · Transition to p o  Diuretics-cardiology recommended torso 20 mg p o  B i d  Ambulatory dysfunction  Assessment & Plan  · PT/OT recommending inpatient rehab    Leukocytosis  Assessment & Plan  Unclear etiology  Initially thought reactive, but now concern for infection given worsening respiratory status and elevated procalcitonin  Procalcitonin also possibly elevated secondary to CHF  · Initial blood cultures negative  Repeat blood cultures negative to date  · Restart antibiotics, currently on cefepime and vancomycin  · Trend white blood cell count and fever curve  · Appreciate ID recommendations, currently monitoring off of antibiotics    Essential hypertension  Assessment & Plan  · Continue amlodipine, clonidine  · Continue scheduled labetalol, now 100 mg b i d      Complete heart block ([address] )  Assessment & Plan  · Continue to hold metoprolol  · Patient receiving scheduled labetalol  · Status post permanent pacemaker placement on 07/24  · See plan for bradycardia    History of stroke  Assessment & Plan  Patient with prior stroke last year  · Continue aspirin and Plavix  · Stat CT if acute change in neurological status  · Patient appears to be more confused 9 baseline per family, improved as the day progressed  Patient had a CT scan today which showed the presence of previous CVA no acute intracranial pathology, discussed with radiology in person    H/O aortic valve replacement  Assessment & Plan  Patient prior history bioprosthetic valve  · Valve area 1 38 cm2 per echo  Acute kidney injury superimposed on chronic kidney disease ([address] )  Assessment & Plan  Previous baseline around 1 8-1 9 in 2019  Creatinine initially 2 74 on admission  Now trending 2 3 today This possibly represents his new baseline  · Avoid nephrotoxins  · Monitor electrolytes and renal function  · Ensure adequate oral intake and hydration  · Continue diuresis-patient appears to be fluid overloaded on the x-ray  · Cardiology evaluation appreciated  · Chest x-ray reviewed-continue with diuresis  · Recheck creatinine tomorrow morning with diuresis    Mild intermittent asthma without complication  Assessment & Plan  Continue albuterol nebulization p r n     * Bradycardia  Assessment & Plan  Asymptomatic currently  Patient with complete heart block  Western blot shows elevated Lyme IgG  However, do not suspect active Lyme infection  · Hold metoprolol  · Appreciate ID and Cardiology input  · Status post permanent pacemaker placement      VTE Pharmacologic Prophylaxis:   Pharmacologic: Apixaban (Eliquis)  Mechanical VTE Prophylaxis in Place: Yes    Patient Centered Rounds: I have performed bedside rounds with nursing staff today      Discussions with Specialists or Other Care Team Provider:     Education and Discussions with Family / Patient:  Wife updated in the room    Time Spent for Care: 30 minutes  More than 50% of total time spent on counseling and coordination of care as described above  Current Length of Stay: 12 day(s)    Current Patient Status: Inpatient   Certification Statement: The patient will continue to require additional inpatient hospital stay due to Pending rehab    Discharge Plan:     Code Status: Level 1 - Full Code      Subjective:   Patient seen and examined  No specific complaints  Patient is back to his baseline as per his wife    Objective:     Vitals:   Temp (24hrs), Av 9 °F (36 6 °C), Min:97 6 °F (36 4 °C), Max:98 2 °F (36 8 °C)    Temp:  [97 6 °F (36 4 °C)-98 2 °F (36 8 °C)] 98 °F (36 7 °C)  HR:  [64-76] 69  Resp:  [16-18] 16  BP: (141-154)/(56-66) 144/63  SpO2:  [93 %-96 %] 94 %  Body mass index is 35 09 kg/m²  Input and Output Summary (last 24 hours): Intake/Output Summary (Last 24 hours) at 2020  Last data filed at 2020 192  Gross per 24 hour   Intake 1080 ml   Output 1225 ml   Net -145 ml       Physical Exam:     Physical Exam   Constitutional: He appears well-developed  No distress  HENT:   Head: Normocephalic and atraumatic  Eyes: Right eye exhibits no discharge  Left eye exhibits no discharge  Cardiovascular: Normal rate  Exam reveals no friction rub  No murmur heard  Surgical site covered in dressing   Pulmonary/Chest: Effort normal    Decreased breath sounds bilateral   Abdominal: Soft  Musculoskeletal: Normal range of motion  Neurological: He is alert  No cranial nerve deficit  Coordination normal    Skin: Skin is warm         Additional Data:     Labs:    Results from last 7 days   Lab Units 20  0539  20  1805   WBC Thousand/uL 11 98*   < > 13 39*   HEMOGLOBIN g/dL 11 2*   < > 11 3*   HEMATOCRIT % 34 0*   < > 36 3*   PLATELETS Thousands/uL 229   < > 208   NEUTROS PCT %  --   --  83*   LYMPHS PCT %  --   --  5*   MONOS PCT %  --   --  9   EOS PCT %  --   --  2    < > = values in this interval not displayed  Results from last 7 days   Lab Units 07/31/20  1040 07/30/20  0539   POTASSIUM mmol/L 4 6 4 1   CHLORIDE mmol/L 100 104   CO2 mmol/L 29 28   BUN mg/dL 93* 87*   CREATININE mg/dL 2 30* 2 16*   CALCIUM mg/dL 8 7 8 9   ALK PHOS U/L  --  92   ALT U/L  --  15   AST U/L  --  19           * I Have Reviewed All Lab Data Listed Above  * Additional Pertinent Lab Tests Reviewed: [address] Admission Reviewed    Imaging:    Imaging Reports Reviewed Today Include:   Imaging Personally Reviewed by Myself Includes:      Recent Cultures (last 7 days):     Results from last 7 days   Lab Units 07/26/20  2146 07/26/20  0455 07/26/20  0454   BLOOD CULTURE   --  No Growth After 5 Days  No Growth After 5 Days  LEGIONELLA URINARY ANTIGEN  Negative  --   --        Last 24 Hours Medication List:     Current Facility-Administered Medications:  acetaminophen 650 mg Oral Q6H PRN Bassem Huffof Veres, DO   amLODIPine 10 mg Oral BID Gemini Soden, PA-C   apixaban 2 5 mg Oral BID Debbie Wilson, PA-C   aspirin 81 mg Oral Daily Debbie Wilson, PA-C   bisacodyl 10 mg Oral Daily PRN Vin Garay MD   cloNIDine 0 1 mg Oral Daily Gemini Soden, PA-C   docusate sodium 100 mg Oral BID Vin Garay MD   Labetalol HCl 10 mg Intravenous Q8H PRN Kednra Ratliff, DO   metoclopramide 10 mg Intravenous Q6H PRN Yonathan Matthews, DO   metoprolol succinate 100 mg Oral BID Creed Snuffer, DO   pantoprazole 40 mg Oral Early Morning Hetul Stewart, DO   polyethylene glycol 17 g Oral Daily Vin Garay MD   pravastatin 40 mg Oral Daily With [employer], DO   senna 1 tablet Oral HS Vin Garay MD        Today, Patient Was Seen By: Vin Garay MD    ** Please Note: Dictation voice to text software may have been used in the creation of this document   **

## 2020-08-02 NOTE — PROGRESS NOTES
Advanced Heart Failure/Pulmonary Hypertension Service Progress Note - Waylon Headings 80 y o  male MRN: [id number]    Unit/Bed#: [facility] 525-01 Encounter: [id number]      Assessment:    Principal Problem:    Bradycardia  Active Problems:    Mild intermittent asthma without complication    Acute kidney injury superimposed on chronic kidney disease (HCC)    H/O aortic valve replacement    History of stroke    Complete heart block (HCC)    Essential hypertension    Leukocytosis    Ambulatory dysfunction    Acute respiratory failure with hypoxia ([facility] [address] )    Plan:  --Start torsemide 20 mg PO BID; may need further uptitration  --BP control per primary team     Assessment:  # Chronic HFpEF, NYHA II, ACC/AHA Stage C:  Diag:  --TTE 7/21/2020: LVEF 60%  Mod cLVH w/ LAE  PASP 48 mmHg  Normal RV size and funciton      # Symptomatic CHB s/p MDT PPM 7/24  # pAF/PAT w/ hx of TIA on Eliquis 2 5 mg PO BID  # HTN  # Bio AVR  # CKD IV    Subjective:   Patient seen and examined  No significant events overnight  Objective:       [employer] (day, reason): Alvares catheter (day, reason):    Vitals: Blood pressure 157/90, pulse 77, temperature 97 7 °F (36 5 °C), temperature source Oral, resp  rate 18, height 5' 4", weight 92 7 kg (204 lb 6 4 oz), SpO2 93 %  , Body mass index is 35 09 kg/m² , I/O last 3 completed shifts: In: 1080 [P O :1080]  Out: 2200 [Urine:2200]  I/O this shift:  In: 120 [P O :120]  Out: 150 [Urine:150]  Wt Readings from Last 3 Encounters:   08/01/20 92 7 kg (204 lb 6 4 oz)   07/20/20 97 5 kg (214 lb 15 2 oz)   10/07/19 93 9 kg (207 lb)       Intake/Output Summary (Last 24 hours) at 8/2/2020 1006  Last data filed at 8/2/2020 0925  Gross per 24 hour   Intake 600 ml   Output 1325 ml   Net -725 ml     I/O last 3 completed shifts: In: 1080 [P O :1080]  Out: 2200 [Urine:2200]    No significant arrhythmias seen on telemetry review      Physical Exam:  Vitals:    08/01/20 1631 08/01/20 2300 08/02/20 0300 08/02/20 0830   BP: 144/63 147/58 145/60 157/90   BP Location: Right arm Right arm Right arm    Pulse: 69 73 77    Resp: 16 16 18    Temp: 98 °F (36 7 °C) 97 9 °F (36 6 °C) 97 7 °F (36 5 °C)    TempSrc: Oral Oral Oral    SpO2: 94% 93% 93%    Weight:       Height:           GEN: Vikki Morgan appears well, alert and oriented x 3, pleasant and cooperative   HEENT: pupils equal, round, and reactive to light; extraocular muscles intact  NECK: supple, no carotid bruits   HEART: regular rhythm, normal S1 and S2, no murmurs, clicks, gallops or rubs, JVP is    LUNGS: clear to auscultation bilaterally; no wheezes, rales, or rhonchi   ABDOMEN: normal bowel sounds, soft, no tenderness, no distention  EXTREMITIES: peripheral pulses normal; no clubbing, cyanosis, or edema  NEURO: no focal findings   SKIN: normal without suspicious lesions on exposed skin  SKIN: normal without suspicious lesions on exposed skin      Current Facility-Administered Medications:     acetaminophen (TYLENOL) tablet 650 mg, 650 mg, Oral, Q6H PRN, Michaela Sood DO, 650 mg at 07/26/20 1006    amLODIPine (NORVASC) tablet 10 mg, 10 mg, Oral, BID, Gemini Soden, PA-C, 10 mg at 08/02/20 0831    apixaban (ELIQUIS) tablet 2 5 mg, 2 5 mg, Oral, BID, Sundra Hammonds, PA-C, 2 5 mg at 08/02/20 0830    aspirin (ECOTRIN LOW STRENGTH) EC tablet 81 mg, 81 mg, Oral, Daily, Sundra Hammonds, PA-C, 81 mg at 08/02/20 0831    bisacodyl (DULCOLAX) EC tablet 10 mg, 10 mg, Oral, Daily PRN, Yuli Zuniga MD    cloNIDine (CATAPRES) tablet 0 1 mg, 0 1 mg, Oral, Daily, Gemini Soden, PA-C, 0 1 mg at 08/02/20 0831    docusate sodium (COLACE) capsule 100 mg, 100 mg, Oral, BID, Yuli Zuniga MD, 100 mg at 08/02/20 0831    Labetalol HCl (NORMODYNE) injection 10 mg, 10 mg, Intravenous, Q8H PRN, Kendra Ratliff DO    metoclopramide (REGLAN) injection 10 mg, 10 mg, Intravenous, Q6H PRN, Michaela Sood DO, 10 mg at 07/21/20 1547    metoprolol succinate (TOPROL-XL) 24 hr tablet 100 mg, 100 mg, Oral, BID, Janis Cover, DO, 100 mg at 08/02/20 0830    pantoprazole (PROTONIX) EC tablet 40 mg, 40 mg, Oral, Early Morning, Hetul Stewart, DO, 40 mg at 08/02/20 0543    polyethylene glycol (MIRALAX) packet 17 g, 17 g, Oral, Daily, Yuli Zuniga MD, 17 g at 07/31/20 0957    pravastatin (PRAVACHOL) tablet 40 mg, 40 mg, Oral, Daily With [employer], Hetul Stewart, DO, 40 mg at 08/01/20 1748    senna (SENOKOT) tablet 8 6 mg, 1 tablet, Oral, HS, Yuli Zuniga MD, 8 6 mg at 07/31/20 2023      Labs & Results:    Results from last 7 days   Lab Units 07/26/20  1805   TROPONIN I ng/mL 0 07*     Results from last 7 days   Lab Units 08/02/20  0946 07/30/20  0539 07/29/20  0235   WBC Thousand/uL 13 41* 11 98* 13 59*   HEMOGLOBIN g/dL 10 6* 11 2* 11 4*   HEMATOCRIT % 31 1* 34 0* 34 0*   PLATELETS Thousands/uL 285 229 228         Results from last 7 days   Lab Units 07/31/20  1040 07/30/20  0539 07/29/20  0235   POTASSIUM mmol/L 4 6 4 1 4 5   CHLORIDE mmol/L 100 104 104   CO2 mmol/L 29 28 27   BUN mg/dL 93* 87* 94*   CREATININE mg/dL 2 30* 2 16* 2 22*   CALCIUM mg/dL 8 7 8 9 8 8   ALK PHOS U/L  --  92 97   ALT U/L  --  15 12   AST U/L  --  19 16         EKG personally reviewed by Adria Ramos MD      Counseling / Coordination of Care  Total floor / unit time spent today 20 minutes  Greater than 50% of total time was spent with the patient and / or family counseling and / or coordination of care  A description of the counseling / coordination of care: 10 min  Thank you for the opportunity to participate in the care of this patient      Adria Ramos MD, PhD   Faustino Patel

## 2020-08-02 NOTE — ASSESSMENT & PLAN NOTE
Unclear etiology  Initially thought reactive, but now concern for infection given worsening respiratory status and elevated procalcitonin  Procalcitonin also possibly elevated secondary to CHF  · Initial blood cultures negative  Repeat blood cultures negative to date    · Restart antibiotics, currently on cefepime and vancomycin  · Trend white blood cell count and fever curve-fluctuating WBC:  · Appreciate ID recommendations, currently monitoring off of antibiotics

## 2020-08-03 LAB
ANION GAP SERPL CALCULATED.3IONS-SCNC: 6 MMOL/L (ref 4–13)
BASOPHILS # BLD AUTO: 0.05 THOUSANDS/ΜL (ref 0–0.1)
BASOPHILS NFR BLD AUTO: 0 % (ref 0–1)
BUN SERPL-MCNC: 93 MG/DL (ref 5–25)
CALCIUM SERPL-MCNC: 9 MG/DL (ref 8.3–10.1)
CHLORIDE SERPL-SCNC: 99 MMOL/L (ref 100–108)
CO2 SERPL-SCNC: 33 MMOL/L (ref 21–32)
CREAT SERPL-MCNC: 2.28 MG/DL (ref 0.6–1.3)
EOSINOPHIL # BLD AUTO: 0.85 THOUSAND/ΜL (ref 0–0.61)
EOSINOPHIL NFR BLD AUTO: 7 % (ref 0–6)
ERYTHROCYTE [DISTWIDTH] IN BLOOD BY AUTOMATED COUNT: 12.5 % (ref 11.6–15.1)
GFR SERPL CREATININE-BSD FRML MDRD: 25 ML/MIN/1.73SQ M
GLUCOSE SERPL-MCNC: 111 MG/DL (ref 65–140)
HCT VFR BLD AUTO: 30.6 % (ref 36.5–49.3)
HGB BLD-MCNC: 10.1 G/DL (ref 12–17)
IMM GRANULOCYTES # BLD AUTO: 0.07 THOUSAND/UL (ref 0–0.2)
IMM GRANULOCYTES NFR BLD AUTO: 1 % (ref 0–2)
LYMPHOCYTES # BLD AUTO: 1.33 THOUSANDS/ΜL (ref 0.6–4.47)
LYMPHOCYTES NFR BLD AUTO: 10 % (ref 14–44)
MCH RBC QN AUTO: 30.3 PG (ref 26.8–34.3)
MCHC RBC AUTO-ENTMCNC: 33 G/DL (ref 31.4–37.4)
MCV RBC AUTO: 92 FL (ref 82–98)
MONOCYTES # BLD AUTO: 0.94 THOUSAND/ΜL (ref 0.17–1.22)
MONOCYTES NFR BLD AUTO: 7 % (ref 4–12)
NEUTROPHILS # BLD AUTO: 9.57 THOUSANDS/ΜL (ref 1.85–7.62)
NEUTS SEG NFR BLD AUTO: 75 % (ref 43–75)
NRBC BLD AUTO-RTO: 0 /100 WBCS
PLATELET # BLD AUTO: 312 THOUSANDS/UL (ref 149–390)
PMV BLD AUTO: 10.7 FL (ref 8.9–12.7)
POTASSIUM SERPL-SCNC: 4.7 MMOL/L (ref 3.5–5.3)
RBC # BLD AUTO: 3.33 MILLION/UL (ref 3.88–5.62)
SODIUM SERPL-SCNC: 138 MMOL/L (ref 136–145)
WBC # BLD AUTO: 12.81 THOUSAND/UL (ref 4.31–10.16)

## 2020-08-03 PROCEDURE — 80048 BASIC METABOLIC PNL TOTAL CA: CPT | Performed by: FAMILY MEDICINE

## 2020-08-03 PROCEDURE — 99232 SBSQ HOSP IP/OBS MODERATE 35: CPT | Performed by: INTERNAL MEDICINE

## 2020-08-03 PROCEDURE — 85025 COMPLETE CBC W/AUTO DIFF WBC: CPT | Performed by: FAMILY MEDICINE

## 2020-08-03 PROCEDURE — 99024 POSTOP FOLLOW-UP VISIT: CPT | Performed by: INTERNAL MEDICINE

## 2020-08-03 PROCEDURE — 97116 GAIT TRAINING THERAPY: CPT

## 2020-08-03 PROCEDURE — 99232 SBSQ HOSP IP/OBS MODERATE 35: CPT | Performed by: FAMILY MEDICINE

## 2020-08-03 PROCEDURE — 97535 SELF CARE MNGMENT TRAINING: CPT

## 2020-08-03 RX ADMIN — CLONIDINE HYDROCHLORIDE 0.1 MG: 0.1 TABLET ORAL at 09:14

## 2020-08-03 RX ADMIN — TORSEMIDE 20 MG: 20 TABLET ORAL at 09:15

## 2020-08-03 RX ADMIN — AMLODIPINE BESYLATE 10 MG: 10 TABLET ORAL at 09:15

## 2020-08-03 RX ADMIN — POLYETHYLENE GLYCOL 3350 17 G: 17 POWDER, FOR SOLUTION ORAL at 09:15

## 2020-08-03 RX ADMIN — METOPROLOL SUCCINATE 100 MG: 100 TABLET, FILM COATED, EXTENDED RELEASE ORAL at 09:15

## 2020-08-03 RX ADMIN — PANTOPRAZOLE SODIUM 40 MG: 40 TABLET, DELAYED RELEASE ORAL at 06:15

## 2020-08-03 RX ADMIN — METOPROLOL SUCCINATE 100 MG: 100 TABLET, FILM COATED, EXTENDED RELEASE ORAL at 17:24

## 2020-08-03 RX ADMIN — ACETAMINOPHEN 650 MG: 325 TABLET, FILM COATED ORAL at 22:56

## 2020-08-03 RX ADMIN — TORSEMIDE 20 MG: 20 TABLET ORAL at 17:24

## 2020-08-03 RX ADMIN — APIXABAN 2.5 MG: 2.5 TABLET, FILM COATED ORAL at 09:15

## 2020-08-03 RX ADMIN — APIXABAN 2.5 MG: 2.5 TABLET, FILM COATED ORAL at 17:24

## 2020-08-03 RX ADMIN — AMLODIPINE BESYLATE 10 MG: 10 TABLET ORAL at 17:24

## 2020-08-03 RX ADMIN — ASPIRIN 81 MG: 81 TABLET, COATED ORAL at 09:14

## 2020-08-03 RX ADMIN — PRAVASTATIN SODIUM 40 MG: 40 TABLET ORAL at 17:24

## 2020-08-03 RX ADMIN — DOCUSATE SODIUM 100 MG: 100 CAPSULE, LIQUID FILLED ORAL at 17:24

## 2020-08-03 RX ADMIN — DOCUSATE SODIUM 100 MG: 100 CAPSULE, LIQUID FILLED ORAL at 09:14

## 2020-08-03 NOTE — PHYSICAL THERAPY NOTE
PHYSICAL THERAPY TREATMENT NOTE        Patient Name: Dave Mix  BPNUD'B Date: 8/3/2020       08/03/20 1215   Pain Assessment   Pain Assessment Tool 0-10   Pain Score No Pain   Restrictions/Precautions   Weight Bearing Precautions Per Order No   Other Precautions Chair Alarm; Bed Alarm;Telemetry  (Pacemaker precautions)   General   Chart Reviewed Yes   Additional Pertinent History Pt hard of hearing  Response to Previous Treatment Patient with no complaints from previous session  Family/Caregiver Present No   Cognition   Overall Cognitive Status Impaired   Arousal/Participation Alert; Cooperative   Attention Attends with cues to redirect   Orientation Level Oriented X4   Memory Decreased recall of precautions   Following Commands Follows one step commands with increased time or repetition   Comments Pt pleasant and agreeable to therapy  Pt w/ significant improvement in cognitive status compared to last session  Stated he is feeling much better and not as confused as prior sessions  Pt has decreased insight into deficits, requires cues for safety  Subjective   Subjective "I feel like im doing better today "   Bed Mobility   Additional Comments Pt found sitting up in chair upon initial PT arrival  Pt left sitting up in chair w/ chair alarm on and all needs in reach s/p PT session  Transfers   Sit to Stand 4  Minimal assistance   Additional items Assist x 1; Armrests; Increased time required;Verbal cues   Stand to Sit 4  Minimal assistance   Additional items Assist x 1; Increased time required;Armrests; Verbal cues   Additional Comments Pt required increased VC and TC for hand placement and safety  Pt used RW for all transfers and ambulation  Ambulation/Elevation   Gait pattern Excessively slow; Short stride; Foward flexed; Inconsistent cem; Shuffling;Narrow KRISTIN; Decreased foot clearance   Gait Assistance 4  Minimal assist   Additional items Assist x 1;Verbal cues; Tactile cues  (2nd assist for chair follow) Assistive Device Rolling walker   Distance 15ft x1, 45ft x1, 10ft x1  (w/ multiple sitting rest breaks)   Balance   Static Sitting Fair   Dynamic Sitting Fair -   Static Standing Poor -   Dynamic Standing Poor -   Ambulatory Poor -   Endurance Deficit   Endurance Deficit Yes   Endurance Deficit Description decreased strength, fatigue   Activity Tolerance   Activity Tolerance Patient limited by fatigue   Medical Staff Made Aware PT Debara Points, RN   Nurse Made Aware RN cleared pt for PT session   Assessment   Prognosis Good   Problem List Decreased strength;Decreased endurance; Impaired balance;Decreased mobility; Decreased safety awareness; Impaired hearing;Decreased cognition   Assessment Pt seen by PT on 08/03/20 for PT Treatment session w/ a focus on ambulation  Pt making good progress towards goals  Pt showed significant improvement in cognitive status compared to last session  During the session pt demonstrated an increase in ambulatory distance 2/2 an increase in strength and aerobic endurance  Pt also was able to ambulate at a lower level of assist 2/2 increased balance  Pt left sitting up in chair w/ chair alarm on and all needs in reach at end of therapy session  Pt would continues to benefit from skilled PT in order to address impairments and functional limitations  PT to continue to follow pt 3-5x /week, and would recommend rehab pending medical clearance  Barriers to Discharge Inaccessible home environment;Decreased caregiver support   Goals   Patient Goals "to get stronger"   STG Expiration Date 08/08/20   Plan   Treatment/Interventions Functional transfer training;LE strengthening/ROM; Elevations; Therapeutic exercise;OT;Spoke to nursing;Bed mobility;Gait training;Patient/family training;Equipment eval/education; Endurance training;Cognitive reorientation   Progress Progressing toward goals   PT Frequency Other (Comment)  (3-5x /week)   Recommendation   PT Discharge Recommendation Post-Acute Rehabilitation Services   Equipment Recommended Chava Dumont   PT - OK to Discharge Yes  (pending medical clearance)       Vanna Huddleston, SPT

## 2020-08-03 NOTE — PROGRESS NOTES
Advanced Heart Failure / Pulmonary Hypertension Service Progress Note    Maksim Rosenberg 80 y o  male   MRN: [id number]  Unit/Bed#: [facility] 525-01; Encounter: [id number]    Assessment:  Principal Problem:    Bradycardia  Active Problems:    Mild intermittent asthma without complication    Acute kidney injury superimposed on chronic kidney disease ([facility] [address] )    H/O aortic valve replacement    History of stroke    Complete heart block (HCC)    Essential hypertension    Leukocytosis    Ambulatory dysfunction    Acute respiratory failure with hypoxia ([facility] [address] )      Subjective:   Maksim Rosenberg is an 61-year-old man with a PMH as below who presented on 07/20/2020 to [facility] with progressively worsening weakness and shortness of breath for past 2 weeks  In ED, patient was found to be in third-degree heart block with escape rhythm in 30s  Patient was then transferred to [facility] for EP evaluation and pacemaker placement on 07/20  Temporary screw-in pacemaker placed on 07/21 due to elevated white count with no obvious signs of infection  Infectious disease was consulted, and blood cultures were without growth  Patient then underwent permanent pacemaker implantation on 07/24  Patient seen and examined  No significant events overnight  Reports decreased appetite this AM; feeling nauseous  Walked this AM; denies LH, dizziness, or GROSS with this  Sleeping okay  Objective: Intake/ Output: 480 mL / 2175 mL (net negative 1695 mL)  Weight: 204 lbs (down from 209 lbs on 07/30)  Telemetry: No longer on telemetry  Today's Plan:   BP control per primary team     Continue torsemide 20 mg BID; may require higher dose as outpatient depending on diet/fluid intake in outpatient setting  Plan:  Complete heart block, symptomatic    S/p Medtronic dual-chamber pacemaker; placed 07/24/2020  Chronic HFpEF; LVEF 60%; LVIDd 5 52 cm; NYHA II; ACC/AHA Stage C   TTE 07/21/2020: LVEF 60%  Moderate cLVH with LAE   PASP 48 mmHg  Normal RV size and function  Reviewed importance of low sodium diet and fluid restriction  Strict I/Os with daily weights  CV diet with fluid restriction recommended  Neurohormonal Blockade:  --Beta Blocker: metoprolol succinate 100 mg q12 hours  --ARNi / ACEi / ARB: No; CKD precludes  --Aldosterone Antagonist: No; CKD precludes  --SGLT2 Inhibitor: No    --Home Diuretic: None  --Inpatient Diuretic: torsemide 20 mg BID  Sudden Cardiac Death Risk Reduction:  --LVEF >35%  Electrical Resynchronization:  --Candidacy for BiV device:  ms  Advanced Therapies (if appropriate): Will continue to monitor  Coronary artery disease  S/p CABG x1 (LIMA-LAD) on 06/05/2000  Continue on aspirin, statin, and BB as above  Atrial fibrillation, paroxysmal   [id number] = 7 (age, CHF, HTN, TIA, CAD)  Anticoagulation on Eliquis (renally dosed)  Chronic kidney disease, stage IV   Baseline creatinine of 2 1-2 4  Today, creatinine of 2 28 (down from 2 35 on 08/02)  Aortic stenosis, severe: s/p TAVR (23 mm [facility] Ease bioprosthetic) on 06/30/2015  Hypertension: continues on amlodipine 10 mg BID  Hyperlipidemia  History of TIA    Vitals:   Blood pressure 148/71, pulse 63, temperature 97 6 °F (36 4 °C), resp  rate 18, height 5' 4", weight 92 7 kg (204 lb 6 4 oz), SpO2 93 %  Body mass index is 35 09 kg/m²  I/O last 3 completed shifts:   In: 480 [P O :480]  Out: 3100 [Urine:3100]     Wt Readings from Last 3 Encounters:   08/01/20 92 7 kg (204 lb 6 4 oz)   07/20/20 97 5 kg (214 lb 15 2 oz)   10/07/19 93 9 kg (207 lb)     Vitals:    08/02/20 1834 08/02/20 2311 08/03/20 0243 08/03/20 0709   BP: 126/58 157/53 162/67 148/71   BP Location: Right arm Right arm Right arm    Pulse: 64 72 78 63   Resp: 16 16 18 18   Temp: 97 9 °F (36 6 °C) 97 9 °F (36 6 °C) 98 °F (36 7 °C) 97 6 °F (36 4 °C)   TempSrc: Oral Oral Oral    SpO2: 94% 96% 95% 93%   Weight:       Height:         Physical Exam   Constitutional: He is oriented to person, place, and time  He appears well-developed and well-nourished  He is cooperative  He does not appear ill  No distress  HENT:   Head: Normocephalic and atraumatic  Eyes: Conjunctivae are normal  No scleral icterus  Neck: Normal range of motion  Neck supple  JVD present  No tracheal deviation present  Cardiovascular: Normal rate and regular rhythm  No murmur heard  PPM incision clean, dry, intact  Steri-strips intact with dried blood noted under dressing  No signs of infection or hematoma   Pulmonary/Chest: Effort normal  No respiratory distress  He has wheezes (faint end-expiratory)  Abdominal: Soft  Bowel sounds are normal  He exhibits distension  There is no abdominal tenderness  Musculoskeletal:         General: No tenderness or edema  Neurological: He is alert and oriented to person, place, and time  Skin: Skin is warm and dry  Psychiatric: He has a normal mood and affect  His behavior is normal    Vitals reviewed  [facility] (day, reason): None  Alvares Catheter (day, reason): None       Current Facility-Administered Medications:     acetaminophen (TYLENOL) tablet 650 mg, 650 mg, Oral, Q6H PRN, Kamari Sood DO, 650 mg at 07/26/20 1006    amLODIPine (NORVASC) tablet 10 mg, 10 mg, Oral, BID, Gemini Soden, PA-C, 10 mg at 08/03/20 0915    apixaban (ELIQUIS) tablet 2 5 mg, 2 5 mg, Oral, BID, [place], PA-C, 2 5 mg at 08/03/20 0915    aspirin (ECOTRIN LOW STRENGTH) EC tablet 81 mg, 81 mg, Oral, Daily, Michael Collie, PA-C, 81 mg at 08/03/20 0914    bisacodyl (DULCOLAX) EC tablet 10 mg, 10 mg, Oral, Daily PRN, Nemo Eid MD    cloNIDine (CATAPRES) tablet 0 1 mg, 0 1 mg, Oral, Daily, Gemini Soden, PA-C, 0 1 mg at 08/03/20 0914    docusate sodium (COLACE) capsule 100 mg, 100 mg, Oral, BID, Nemo Eid MD, 100 mg at 08/03/20 0914    Labetalol HCl (NORMODYNE) injection 10 mg, 10 mg, Intravenous, Q8H PRN, Kendra Alli-Scott, DO    metoclopramide (REGLAN) injection 10 mg, 10 mg, Intravenous, Q6H PRN, Linda Sood, DO, 10 mg at 07/21/20 1547    metoprolol succinate (TOPROL-XL) 24 hr tablet 100 mg, 100 mg, Oral, BID, Jefferson Keith, DO, 100 mg at 08/03/20 0915    pantoprazole (PROTONIX) EC tablet 40 mg, 40 mg, Oral, Early Morning, Hetul Stewart, DO, 40 mg at 08/03/20 0615    polyethylene glycol (MIRALAX) packet 17 g, 17 g, Oral, Daily, Gerson Wilson MD, 17 g at 08/03/20 0915    pravastatin (PRAVACHOL) tablet 40 mg, 40 mg, Oral, Daily With Dinner, Hetul Stewart, DO, 40 mg at 08/02/20 1741    senna (SENOKOT) tablet 8 6 mg, 1 tablet, Oral, HS, Gerson Wilson MD, 8 6 mg at 07/31/20 2023    torsemide (DEMADEX) tablet 20 mg, 20 mg, Oral, BID, Gerson Wilson MD, 20 mg at 08/03/20 0915    Labs & Results:      Results from last 7 days   Lab Units 08/03/20  0458 08/02/20  0946 07/30/20  0539   WBC Thousand/uL 12 81* 13 41* 11 98*   HEMOGLOBIN g/dL 10 1* 10 6* 11 2*   HEMATOCRIT % 30 6* 31 1* 34 0*   PLATELETS Thousands/uL 312 285 229         Results from last 7 days   Lab Units 08/03/20  0458 08/02/20  0945 07/31/20  1040 07/30/20  0539 07/29/20  0235   POTASSIUM mmol/L 4 7 4 5 4 6 4 1 4 5   CHLORIDE mmol/L 99* 101 100 104 104   CO2 mmol/L 33* 29 29 28 27   BUN mg/dL 93* 94* 93* 87* 94*   CREATININE mg/dL 2 28* 2 35* 2 30* 2 16* 2 22*   CALCIUM mg/dL 9 0 9 5 8 7 8 9 8 8   ALK PHOS U/L  --   --   --  92 97   ALT U/L  --   --   --  15 12   AST U/L  --   --   --  19 16         Counseling / Coordination of Care: Total floor / unit time spent today 40 minutes  Greater than 50% of total time was spent with the patient and / or family counseling and / or coordination of care  A description of the counseling / coordination of care: 20 minutes (low sodium diet, fluid restriction, daily weights, and importance of medication compliance)  Thank you for the opportunity to participate in the care of this patient      Alexi Ro, DARIN

## 2020-08-03 NOTE — OCCUPATIONAL THERAPY NOTE
[address] Progress Note     Patient Name: Wendy Amaro  DLTBW'T Date: 8/3/2020  Problem List  Principal Problem:    Bradycardia  Active Problems:    Mild intermittent asthma without complication    Acute kidney injury superimposed on chronic kidney disease ([facility] [address] )    H/O aortic valve replacement    History of stroke    Complete heart block (HCC)    Essential hypertension    Leukocytosis    Ambulatory dysfunction    Acute respiratory failure with hypoxia ([facility] [address] )          08/03/20 1051   Restrictions/Precautions   Weight Bearing Precautions Per Order No   Other Precautions Chair Alarm; Bed Alarm;Cognitive; Fall Risk;Impulsive;Hard of hearing  (pacemaker precautions)   Lifestyle   Autonomy Pt reports IND w/ ADLS and mobility PTA, A from wife and son for IADLS PTA  Reciprocal Relationships Pt reports a supportive wife at home who does not work and able to provide A PRN  Pt's son lives next door and is able to provide A if needed  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    Intrinsic Gratification Pt reports [place] with family   Pain Assessment   Pain Assessment Tool 0-10   Pain Score No Pain   ADL   Where Assessed Chair   Eating Assistance Unable to assess   Grooming Assistance 5  Supervision/Setup   Grooming Deficit Setup;Supervision/safety; Increased time to complete   Grooming Comments Pt brushed teeth and wiped face with cloth w/ setup  UB Bathing Assistance 4  Minimal Assistance   UB Bathing Deficit Setup;Supervision/safety; Increased time to complete;Verbal cueing; Other (Comment)  (back)   UB Bathing Comments Able to wash RUE/LUE/chest abdomen while seated in chair  A to wash back   LB Bathing Assistance 3  Moderate Assistance   LB Bathing Deficit Setup;Supervision/safety;Verbal cueing; Increased time to complete; Buttocks;Right lower leg including foot; Left lower leg including foot   LB Bathing Comments Pt able to wash upper legs while seated and suha area while standing w/ RW and min A to stabilize in stance  A to wash lower legs/feet and buttock  UB Dressing Assistance 4  Minimal Assistance   UB Dressing Deficit Setup;Supervision/safety;Verbal cueing; Increased time to complete;Pull around back; Fasteners   UB Dressing Comments doff/don gown - able to thread RUE/LUE    LB Dressing Assistance 2  Maximal Assistance   LB Dressing Deficit Setup;Supervision/safety; Don/doff R sock; Don/doff L sock   LB Dressing Comments A to doff/don BL socks - Pt unable to reach feet    Toileting Assistance  Unable to assess  (denies need at this time)   Functional Standing Tolerance   Time ~1:30   Activity LB bathing    Comments standing with one hand on RW and min A from therapist for balance - Pt able to use R hand to perform LB bathing  VC/TC for hand placement and to maintain upright standing position  Bed Mobility   Additional Comments PT found OOB in chair upon OT arrival; left OOB in chair w/ chair alarm on and all needs in reach following OT session   Transfers   Sit to Stand 4  Minimal assistance   Additional items Assist x 1; Armrests; Increased time required;Verbal cues; Impulsive   Stand to Sit 4  Minimal assistance   Additional items Assist x 1; Armrests; Increased time required;Verbal cues   Additional Comments Transfers w/ RW; attempting to transfer prior to RW in place/ socks on  VC/TC for hand placement and safety    Cognition   Overall Cognitive Status Impaired   Arousal/Participation Alert; Cooperative   Attention Attends with cues to redirect   Orientation Level Oriented X4   Memory Decreased recall of precautions;Decreased short term memory;Decreased recall of recent events   Following Commands Follows one step commands with increased time or repetition   Comments Pt pleasant and cooperative to work with therapy today  Decreased safety awareness and decreased insight into deficits  Pt requires frequent VC/TC throughout session   Unable to recall pacemaker precautions - OT educated - Pt verballized understanding  Activity Tolerance   Activity Tolerance Patient tolerated treatment well   Medical Staff Made Aware OT SELAM Schroeder cleared Pt for OT session   Assessment   Assessment Pt participated in OT treatment session today to address barriers to occupational performance  Interventions focused on ADL retraining, functional standing, functional transfers, and activity engagement  Pt agreeable to OT treatment today  Upon OT arrival, Pt was found OOB in chair  Pt participated in teeth brushing, UB/LB bathing, UB/LB dressing, STS transfers, and functional standing- refer to chart for A levels  Pt requires frequent cueing throughout session, repetition of commands, and encouragement throughout session  In comparison to last session, Pt is progressing with treatment goals  Pt educated on safety awareness, pacing through activities, fall prevention, and pacemaker precautions  Pt will benefit from continued OT treatment t/o hospital stay to address limitations to occupational performance as defined above to maximize functional independence  Recommend post-acute rehab following d/c     Plan   Treatment Interventions ADL retraining;Functional transfer training;UE strengthening/ROM; Endurance training;Patient/family training;Fine motor coordination activities; Activityengagement; Energy conservation   Goal Expiration Date 08/08/20   OT Treatment Day 3   OT Frequency 3-5x/wk   Recommendation   OT Discharge Recommendation Post-Acute Rehabilitation Services   Equipment Recommended Other (comment)  (RW)   OT - OK to Discharge Yes  (when medically cleared)   Modified Hansford Scale   Modified Hansford Scale 4     [address], OTS

## 2020-08-03 NOTE — PLAN OF CARE
Problem: PHYSICAL THERAPY ADULT  Goal: Performs mobility at highest level of function for planned discharge setting  See evaluation for individualized goals  Description: Treatment/Interventions: Functional transfer training, LE strengthening/ROM, Elevations, Therapeutic exercise, Endurance training, Patient/family training, Equipment eval/education, Gait training, Spoke to nursing, OT  Equipment Recommended: Yadira Duffy       See flowsheet documentation for full assessment, interventions and recommendations  Outcome: Progressing  Note: Prognosis: Good  Problem List: Decreased strength, Decreased endurance, Impaired balance, Decreased mobility, Decreased safety awareness, Impaired hearing, Decreased cognition  Assessment: Pt seen by PT on 08/03/20 for PT Treatment session w/ a focus on ambulation  Pt making good progress towards goals  Pt showed significant improvement in cognitive status compared to last session  During the session pt demonstrated an increase in ambulatory distance 2/2 an increase in strength and aerobic endurance  Pt also was able to ambulate at a lower level of assist 2/2 increased balance  Pt left sitting up in chair w/ chair alarm on and all needs in reach at end of therapy session  Pt would continues to benefit from skilled PT in order to address impairments and functional limitations  PT to continue to follow pt 3-5x /week, and would recommend rehab pending medical clearance  Barriers to Discharge: Inaccessible home environment, Decreased caregiver support     PT Discharge Recommendation: [address]     PT - OK to Discharge: Yes(pending medical clearance)    See flowsheet documentation for full assessment

## 2020-08-03 NOTE — RESTORATIVE TECHNICIAN NOTE
Restorative Specialist Mobility Note       Activity: Other (Comment)(Completed LB and UB exercises x10)     Heel Pumps, Leg raises, Arm Raises    Repositioned: Sitting, Up in chair, Other (Comment)(Chair Alarm)

## 2020-08-03 NOTE — PROGRESS NOTES
Progress Note - Infectious Disease   Isa Figures 80 y o  male MRN: [id number]  Unit/Bed#: [facility] 525-01 Encounter: [id number]      Impression:  1  Stress leukocytosis with probable CHF  2  Complete heart block with bradycardia, S/P ppm  3  CAD CABG, AS, bioprosthetic AVR   4  YOLANDE on CKD  5  History of CVA     Recommendations:  Leukocytosis  Patient is afebrile with elevated WBC count of 12,810 when last taken  Blood cultures as well as Strep pneumoniae and Legionella urinary antigens are negative       1  Final blood culture results are negative     2  Observe for continued WBC elevation  Discussed with patient's wife and son who are at bedside  Intermittent confusion  1  Patient is no longer confused         Antibiotics:  1  None    Subjective:  Denies shortness of breath, feels well  Denies fevers, chills, or sweats  Denies nausea, vomiting, or diarrhea  Objective:  Vitals:  Temp:  [97 4 °F (36 3 °C)-98 °F (36 7 °C)] 97 4 °F (36 3 °C)  HR:  [63-78] 65  Resp:  [16-20] 20  BP: (121-162)/(53-71) 150/64  SpO2:  [93 %-97 %] 97 %  Temp (24hrs), Av 8 °F (36 6 °C), Min:97 4 °F (36 3 °C), Max:98 °F (36 7 °C)  Current: Temperature: (!) 97 4 °F (36 3 °C)    Physical Exam:     General Appearance:  Alert moderately obese elderly male who was nontoxic, no acute distress, sitting comfortably in chair      Throat: Oropharynx moist without lesions  Lips, mucosa, and tongue normal   Neck: Supple, symmetrical, trachea midline, no adenopathy,  no tenderness/mass/nodules   Lungs:    Decreased breath sounds at bases with sparse rales  Heart:  Sternotomy scar, right subclavian  wound clean  Left subclavian ppm wound with dry dressing, Regular rate and rhythm, S1, S2 normal, no murmur, rub or gallop   Abdomen:   Soft, non-tender, non-distended, positive bowel sounds  No masses, no organomegaly    No CVA tenderness   Extremities: Extremities normal, atraumatic, no clubbing, cyanosis or edema   Skin: As above  Invasive Devices     Peripheral Intravenous Line            Peripheral IV 08/02/20 Left;Dorsal (posterior) Forearm 1 day                Labs, Imaging, & Other studies:   All pertinent labs were personally reviewed  Results from last 7 days   Lab Units 08/03/20  0458 08/02/20  0946 07/30/20  0539   WBC Thousand/uL 12 81* 13 41* 11 98*   HEMOGLOBIN g/dL 10 1* 10 6* 11 2*   PLATELETS Thousands/uL 312 285 229     Results from last 7 days   Lab Units 08/03/20  0458 08/02/20  0945 07/31/20  1040 07/30/20  0539 07/29/20  0235   SODIUM mmol/L 138 136 132* 138 139   POTASSIUM mmol/L 4 7 4 5 4 6 4 1 4 5   CHLORIDE mmol/L 99* 101 100 104 104   CO2 mmol/L 33* 29 29 28 27   BUN mg/dL 93* 94* 93* 87* 94*   CREATININE mg/dL 2 28* 2 35* 2 30* 2 16* 2 22*   EGFR ml/min/1 73sq m 25 24 25 27 26   CALCIUM mg/dL 9 0 9 5 8 7 8 9 8 8   AST U/L  --   --   --  19 16   ALT U/L  --   --   --  15 12   ALK PHOS U/L  --   --   --  92 97

## 2020-08-03 NOTE — RESTORATIVE TECHNICIAN NOTE
Restorative Specialist Mobility Note       Activity: Ambulate in room, Chair     Assistive Device: Front wheel walker        Repositioned: Sitting, Up in chair, Other (Comment)(Chair Alarm)

## 2020-08-03 NOTE — PLAN OF CARE
Problem: OCCUPATIONAL THERAPY ADULT  Goal: Performs self-care activities at highest level of function for planned discharge setting  See evaluation for individualized goals  Description: Treatment Interventions: ADL retraining, Functional transfer training, UE strengthening/ROM, Endurance training, Cognitive reorientation, Patient/family training, Equipment evaluation/education, Compensatory technique education, Energy conservation, Activityengagement  Equipment Recommended: Other (comment)(RW)       See flowsheet documentation for full assessment, interventions and recommendations  Outcome: Progressing  Note: Limitation: Decreased ADL status, Decreased UE ROM, Decreased UE strength, Decreased Safe judgement during ADL, Decreased cognition, Decreased endurance, Decreased fine motor control, Decreased high-level ADLs  Prognosis: Fair  Assessment: Patient participated in Skilled OT session this date with interventions consisting of ADL re training with the use of correct body mechanics, deep breathing technique, therapeutic exercise to: increase functional use of BUEs, increase BUE muscle strength ,  therapeutic activities to: increase activity tolerance, increase trunk control and increase OOB/ sitting tolerance   Patient agreeable to OT treatment session, upon arrival patient was found supine in bed  Patient requiring frequent re direction, verbal cues for safety, verbal cues for correct technique and frequent rest periods  Patient continues to be functioning below baseline level, occupational performance remains limited secondary to factors listed above and increased risk for falls and injury  From OT standpoint, recommendation at time of d/c would be Short Term Rehab  Patient to benefit from continued Occupational Therapy treatment while in the hospital to address deficits as defined above and maximize level of functional independence with ADLs and functional mobility        OT Discharge Recommendation: Post-Acute Rehabilitation Services  OT - OK to Discharge: Yes(when medically cleared)

## 2020-08-04 PROCEDURE — 99231 SBSQ HOSP IP/OBS SF/LOW 25: CPT | Performed by: INTERNAL MEDICINE

## 2020-08-04 PROCEDURE — 99232 SBSQ HOSP IP/OBS MODERATE 35: CPT | Performed by: HOSPITALIST

## 2020-08-04 RX ADMIN — METOPROLOL SUCCINATE 100 MG: 100 TABLET, FILM COATED, EXTENDED RELEASE ORAL at 17:05

## 2020-08-04 RX ADMIN — TORSEMIDE 20 MG: 20 TABLET ORAL at 17:05

## 2020-08-04 RX ADMIN — PRAVASTATIN SODIUM 40 MG: 40 TABLET ORAL at 17:04

## 2020-08-04 RX ADMIN — PANTOPRAZOLE SODIUM 40 MG: 40 TABLET, DELAYED RELEASE ORAL at 05:25

## 2020-08-04 RX ADMIN — METOPROLOL SUCCINATE 100 MG: 100 TABLET, FILM COATED, EXTENDED RELEASE ORAL at 08:20

## 2020-08-04 RX ADMIN — TORSEMIDE 20 MG: 20 TABLET ORAL at 08:21

## 2020-08-04 RX ADMIN — AMLODIPINE BESYLATE 10 MG: 10 TABLET ORAL at 08:20

## 2020-08-04 RX ADMIN — ASPIRIN 81 MG: 81 TABLET, COATED ORAL at 08:20

## 2020-08-04 RX ADMIN — APIXABAN 2.5 MG: 2.5 TABLET, FILM COATED ORAL at 08:21

## 2020-08-04 RX ADMIN — CLONIDINE HYDROCHLORIDE 0.1 MG: 0.1 TABLET ORAL at 08:20

## 2020-08-04 RX ADMIN — POLYETHYLENE GLYCOL 3350 17 G: 17 POWDER, FOR SOLUTION ORAL at 08:21

## 2020-08-04 RX ADMIN — APIXABAN 2.5 MG: 2.5 TABLET, FILM COATED ORAL at 17:04

## 2020-08-04 RX ADMIN — AMLODIPINE BESYLATE 10 MG: 10 TABLET ORAL at 17:05

## 2020-08-04 RX ADMIN — DOCUSATE SODIUM 100 MG: 100 CAPSULE, LIQUID FILLED ORAL at 08:20

## 2020-08-04 NOTE — UTILIZATION REVIEW
Continued Stay Review    Date: 8/4/2020                         Current Patient Class: inpatient   Current Level of Care:  Med Surg     HPI:84 y o  male initially admitted on 7/20/2020 as inpatient  transferred from [address] [place] [address] to [facility] due to complete heart block, rate 20's  Dual chamber PPM placed 7/24  He was still requiring o2 with elevated creat  Assessment/Plan: Pt currently on room air  Continue torsemide 20 mg po bid, fluid and sodium restrictions  Therapy continues to recommend rehab  Leukocytosis to 12 81 today , blood cultures all negative to date  Currently monitoring off antibiotics as per infectious disease  [place] on CKD creatinine to 2 28 today trending around 2 3,   Possibly new baseline  Had been 1 8-1 9 in 2019       Pertinent Labs/Diagnostic Results:       Results from last 7 days   Lab Units 08/03/20  0458 08/02/20  0946 07/30/20  0539 07/29/20  0235   WBC Thousand/uL 12 81* 13 41* 11 98* 13 59*   HEMOGLOBIN g/dL 10 1* 10 6* 11 2* 11 4*   HEMATOCRIT % 30 6* 31 1* 34 0* 34 0*   PLATELETS Thousands/uL 312 285 229 228   NEUTROS ABS Thousands/µL 9 57*  --   --   --      Results from last 7 days   Lab Units 08/03/20  0458 08/02/20  0945 07/31/20  1040 07/30/20  0539 07/29/20  0235   SODIUM mmol/L 138 136 132* 138 139   POTASSIUM mmol/L 4 7 4 5 4 6 4 1 4 5   CHLORIDE mmol/L 99* 101 100 104 104   CO2 mmol/L 33* 29 29 28 27   ANION GAP mmol/L 6 6 3* 6 8   BUN mg/dL 93* 94* 93* 87* 94*   CREATININE mg/dL 2 28* 2 35* 2 30* 2 16* 2 22*   EGFR ml/min/1 73sq m 25 24 25 27 26   CALCIUM mg/dL 9 0 9 5 8 7 8 9 8 8     Results from last 7 days   Lab Units 07/30/20  0539 07/29/20  0235   AST U/L 19 16   ALT U/L 15 12   ALK PHOS U/L 92 97   TOTAL PROTEIN g/dL 7 0 7 1   ALBUMIN g/dL 2 6* 2 7*   TOTAL BILIRUBIN mg/dL 0 54 0 53     Results from last 7 days   Lab Units 07/30/20  1110 07/30/20  0636 07/29/20  1115 07/29/20  0633 07/28/20  2049 07/28/20  1639   POC GLUCOSE mg/dl 145* 102 134 108 119 130 Results from last 7 days   Lab Units 08/03/20  0458 08/02/20  0945 07/31/20  1040 07/30/20  0539 07/29/20  0235   GLUCOSE RANDOM mg/dL 111 171* 160* 110 115     Results from last 7 days   Lab Units 07/30/20  1620   PH [place]  7 326   PCO2 [place] mm Hg 38 3*   PO2 [place] mm Hg 41 5   HCO3 [place] mmol/L 19 6*   BASE EXC [place] mmol/L -5 9   O2 CONTENT [place] ml/dL 11 5   O2 HGB, VENOUS % 71 0       Results from last 7 days   Lab Units 07/29/20  0235   NT-PRO BNP pg/mL 8,341*       Vital Signs:   Date/Time   Temp   Pulse   Resp   BP   MAP (mmHg)   SpO2   O2 Device   Patient Position - Orthostatic VS    08/04/20 0700   97 9 °F (36 6 °C)   61   16   141/63      94 %      Lying    08/03/20 2255   97 5 °F (36 4 °C)   66   18   130/61   88   93 %   None (Room air)   Lying    08/03/20 1724            150/64                08/03/20 1635                  97 %   None (Room air)       08/03/20 1444   97 4 °F (36 3 °C)Abnormal     65   20   121/57      96 %          08/03/20 0900                     None (Room air)       08/03/20 0709   97 6 °F (36 4 °C)   63   18   148/71      93 %          08/03/20 0243   98 °F (36 7 °C)   78   18   162/67   104   95 %   None (Room air)   Lying    08/02/20 2311   97 9 °F (36 6 °C)   72   16   157/53   84   96 %   None (Room air)   Lying          Medications:   Scheduled Medications:  amLODIPine, 10 mg, Oral, BID  apixaban, 2 5 mg, Oral, BID  aspirin, 81 mg, Oral, Daily  cloNIDine, 0 1 mg, Oral, Daily  docusate sodium, 100 mg, Oral, BID  metoprolol succinate, 100 mg, Oral, BID  pantoprazole, 40 mg, Oral, Early Morning  polyethylene glycol, 17 g, Oral, Daily  pravastatin, 40 mg, Oral, Daily With Dinner  senna, 1 tablet, Oral, HS  torsemide, 20 mg, Oral, BID      Continuous IV Infusions:     PRN Meds:  acetaminophen, 650 mg, Oral, Q6H PRN -8/3 x 1  bisacodyl, 10 mg, Oral, Daily PRN  Labetalol HCl, 10 mg, Intravenous, Q8H PRN  metoclopramide, 10 mg, Intravenous, Q6H PRN    Nursing Orders -  VS - up & OOB as tolerated - aspiration prcautions - elevate HOB > 30 degrees - diet 2 gm NA cardiac  With a fluid restriction 1500 ml    Discharge Plan: TBD     Network Utilization Review Department  [email or web address]  ATTENTION: Please call with any questions or concerns to [phone] and carefully listen to the prompts so that you are directed to the right person  All voicemails are confidential   Sascha Suresh all requests for admission clinical reviews, approved or denied determinations and any other requests to dedicated fax number below belonging to the campus where the patient is receiving treatment   List of dedicated fax numbers for the Facilities:  [address] DENIALS (Administrative/Medical Necessity) [phone]   [address] (Maternity/NICU/Pediatrics) [phone]   [facility] [phone]     [address]  [phone]   Vel Marks [phone]   Delisa Baker [phone]   [address] [place] [address] [phone]   [facility]  [phone]   [address] [place] [address], S W  [address]01 [facility] 1000 [address] [facility] [phone]

## 2020-08-04 NOTE — SOCIAL WORK
Cm received message from Kandice Litten at [facility], Dr Nidia Frankel reviewed pt's case and pt does not have enough of a PT need, needs two services for acute rehab  Cm contacted [facility] to clarify pt's PT needs  Per therapy notes pt requires min assist and farthest ambulation was 45ft  Kandice Litten will review with Dr Nidia Frankel again around 10am  Cm sent message to the [facility], will f/u  Cm received message from Kandice Litten per Dr Nidia Frankel he feels as though pt will progress in a few days and won't have that 2 discipline needed for rehab stay  Pt is also more appropriate for SNF  Cm made daughter Sushma Guevara aware, also contacted admissions at the [facility] and left message for return call  Cm received call from librado Guevara advised still no response from the [facility]  Ralf requested referral to [facility]  Cm received message from Sudha Bello that they are able to accept pt pending auth and will reach out to family  Cm then received call from [facility] at the [address] [place] [address] and they can also offer a bed to pt  Cm spoke with pt's spouse [facility] and daughter Sushma Guevara and they would prefer a bed at [facility]  as it is closer to home  Cm faxed clinicals to [phone], pending ref# [id number]

## 2020-08-04 NOTE — PROGRESS NOTES
Progress Note - Kylah Morgan 1935, 80 y o  male MRN: [id number]    Unit/Bed#: [facility] 525-01 Encounter: [id number]    Primary Care Provider: Zachary Rahman DO   Date and time admitted to hospital: 7/20/2020  4:04 PM        Acute respiratory failure with hypoxia [facility])  Assessment & Plan  Patient requires nasal cannula  Did not use at home  Now on increased oxygen  Chest x-ray vascular congestion, increased pleural effusions, and persistent opacities at the right lung base  Most recent echo showed an EF of 60% with no regional wall abnormalities  There does appear to be some mild pulmonary hypertension with a PA pressure of 48 mmHg  Differential concerning for CHF versus pneumonia  Respiratory status appears to be improving somewhat today  · Continue nasal cannula oxygen  · Monitor respiratory status closely  · Incentive spirometry  · Discontinue antibiotics-discussed with infectious disease  · Increase Lasix to 60 mg b i d  Due to inadequate urinary output, adjust accordingly  · Fluid/sodium restriction  · Appreciate cardiology and ID input-  · Transition to p o  Diuretics-cardiology recommended torsemide 20 mg p o  B i d  Ambulatory dysfunction  Assessment & Plan  · PT/OT recommending inpatient rehab  · Patient is waiting for rehab placement    Leukocytosis  Assessment & Plan  Unclear etiology  Initially thought reactive, but now concern for infection given worsening respiratory status and elevated procalcitonin  Procalcitonin also possibly elevated secondary to CHF  · Initial blood cultures negative  Repeat blood cultures negative to date  · Restart antibiotics, currently on cefepime and vancomycin  · Trend white blood cell count and fever curve-fluctuating WBC:  · Appreciate ID recommendations, currently monitoring off of antibiotics    Essential hypertension  Assessment & Plan  · Continue amlodipine, clonidine  · Continue scheduled labetalol, now 100 mg b i d      Complete heart [facility])  Assessment & Plan  · Continue to hold metoprolol  · Patient receiving scheduled labetalol  · Status post permanent pacemaker placement on 07/24  ·     History of stroke  Assessment & Plan  Patient with prior stroke last year  · Continue aspirin and Plavix  · Stat CT if acute change in neurological status  · Patient appears to be more confused 9 baseline per family, improved as the day progressed  Patient had a CT scan today which showed the presence of previous CVA no acute intracranial pathology, discussed with radiology in person    H/O aortic valve replacement  Assessment & Plan  Patient prior history bioprosthetic valve  · Valve area 1 38 cm2 per echo  Acute kidney injury superimposed on chronic kidney disease ([address] )  Assessment & Plan  Previous baseline around 1 8-1 9 in 2019  Creatinine initially 2 74 on admission  Now trending 2 3 today This possibly represents his new baseline  · Avoid nephrotoxins  · Monitor electrolytes and renal function  · Ensure adequate oral intake and hydration  · Continue diuresis-patient appears to be fluid overloaded on the x-ray  · Cardiology evaluation appreciated  · Currently on p o  Diuretics    Mild intermittent asthma without complication  Assessment & Plan  Continue albuterol nebulization p r n     * Bradycardia  Assessment & Plan  Asymptomatic currently  Patient with complete heart block  Western blot shows elevated Lyme IgG  However, do not suspect active Lyme infection  · Hold metoprolol  · Appreciate ID and Cardiology input  · Status post permanent pacemaker placement        VTE Pharmacologic Prophylaxis:   Pharmacologic: Apixaban (Eliquis)  Mechanical VTE Prophylaxis in Place: Yes    Patient Centered Rounds: I have performed bedside rounds with nursing staff today  Discussions with Specialists or Other Care Team Provider:     Education and Discussions with Family / Patient:  Patient and wife    Time Spent for Care: 30 minutes    More than 50% of total time spent on counseling and coordination of care as described above  Current Length of Stay: 14 day(s)    Current Patient Status: Inpatient   Certification Statement: The patient will continue to require additional inpatient hospital stay due to Pending rehab    Discharge Plan:     Code Status: Level 1 - Full Code      Subjective:   Patient seen and examined  No events overnight  Currently patient is waiting for rehab placement    Objective:     Vitals:   Temp (24hrs), Av 7 °F (36 5 °C), Min:97 4 °F (36 3 °C), Max:98 °F (36 7 °C)    Temp:  [97 4 °F (36 3 °C)-98 °F (36 7 °C)] 97 4 °F (36 3 °C)  HR:  [63-78] 65  Resp:  [16-20] 20  BP: (121-162)/(53-71) 150/64  SpO2:  [93 %-97 %] 97 %  Body mass index is 35 09 kg/m²  Input and Output Summary (last 24 hours): Intake/Output Summary (Last 24 hours) at 8/3/2020 2101  Last data filed at 8/3/2020 1755  Gross per 24 hour   Intake 840 ml   Output 1400 ml   Net -560 ml       Physical Exam:     Physical Exam   Constitutional: He appears well-developed and well-nourished  HENT:   Head: Normocephalic and atraumatic  Eyes: Right eye exhibits no discharge  Left eye exhibits no discharge  Neck: No JVD present  Cardiovascular: Regular rhythm, S1 normal and S2 normal    Pacemaker insertion site with Steri-Strips   Pulmonary/Chest: Effort normal  No respiratory distress  Abdominal: Soft  Musculoskeletal: Normal range of motion  Neurological: He is alert  No cranial nerve deficit  Coordination normal    Skin: Skin is warm         Additional Data:     Labs:    Results from last 7 days   Lab Units 20  0458   WBC Thousand/uL 12 81*   HEMOGLOBIN g/dL 10 1*   HEMATOCRIT % 30 6*   PLATELETS Thousands/uL 312   NEUTROS PCT % 75   LYMPHS PCT % 10*   MONOS PCT % 7   EOS PCT % 7*     Results from last 7 days   Lab Units 208  20  0539   POTASSIUM mmol/L 4 7   < > 4 1   CHLORIDE mmol/L 99*   < > 104   CO2 mmol/L 33*   < > 28   BUN mg/dL 93*   < > 87*   CREATININE mg/dL 2 28*   < > 2 16*   CALCIUM mg/dL 9 0   < > 8 9   ALK PHOS U/L  --   --  92   ALT U/L  --   --  15   AST U/L  --   --  19    < > = values in this interval not displayed  * I Have Reviewed All Lab Data Listed Above  * Additional Pertinent Lab Tests Reviewed: [address] Admission Reviewed    Imaging:    Imaging Reports Reviewed Today Include:   Imaging Personally Reviewed by Myself Includes:      Recent Cultures (last 7 days):           Last 24 Hours Medication List:   acetaminophen, 650 mg, Oral, Q6H PRN, Sierra Sood, DO  amLODIPine, 10 mg, Oral, BID, Gemini Coffey PA-C  apixaban, 2 5 mg, Oral, BID, Iglesia Mane PA-C  aspirin, 81 mg, Oral, Daily, Iglesia Mane PA-C  bisacodyl, 10 mg, Oral, Daily PRN, Sandro Ospina MD  cloNIDine, 0 1 mg, Oral, Daily, Gemini Coffey PA-C  docusate sodium, 100 mg, Oral, BID, Sandro Ospina MD  Labetalol HCl, 10 mg, Intravenous, Q8H PRN, Kendra Ratliff,   metoclopramide, 10 mg, Intravenous, Q6H PRN, Sierar Sood,   metoprolol succinate, 100 mg, Oral, BID, Trude Hashimoto, DO  pantoprazole, 40 mg, Oral, Early Morning, Cortez Stewart, DO  polyethylene glycol, 17 g, Oral, Daily, Sandro Ospina MD  pravastatin, 40 mg, Oral, Daily With Dinner, [place], DO  senna, 1 tablet, Oral, HS, Sandro Ospina MD  torsemide, 20 mg, Oral, BID, Sandro Ospina MD         Today, Patient Was Seen By: Sandro Ospina MD    ** Please Note: Dictation voice to text software may have been used in the creation of this document   **

## 2020-08-04 NOTE — PROGRESS NOTES
Progress Note - Jazmine Hernández 1935, 80 y o  male MRN: [id number]    Unit/Bed#: [facility] 525-01 Encounter: [id number]    Primary Care Provider: Nikita Ford DO   Date and time admitted to hospital: 7/20/2020  4:04 PM        Acute respiratory failure with hypoxia [facility])  Assessment & Plan  Patient requires nasal cannula  Did not use at home  Now on increased oxygen  Chest x-ray vascular congestion, increased pleural effusions, and persistent opacities at the right lung base  Most recent echo showed an EF of 60% with no regional wall abnormalities  There does appear to be some mild pulmonary hypertension with a PA pressure of 48 mmHg  Differential concerning for CHF versus pneumonia  Respiratory status appears to be improving somewhat today  · Continue nasal cannula oxygen  · Monitor respiratory status closely  · Incentive spirometry  · Discontinue antibiotics-discussed with infectious disease  · Increase Lasix to 60 mg b i d  Due to inadequate urinary output, adjust accordingly  · Fluid/sodium restriction  · Appreciate cardiology and ID input-  · Transition to p o  Diuretics-cardiology recommended torsemide 20 mg p o  B i d  Ambulatory dysfunction  Assessment & Plan  · PT/OT recommending inpatient rehab  · Patient is waiting for rehab placement    Leukocytosis  Assessment & Plan  Unclear etiology  Initially thought reactive, but now concern for infection given worsening respiratory status and elevated procalcitonin  Procalcitonin also possibly elevated secondary to CHF  · Initial blood cultures negative  Repeat blood cultures negative to date  · Restarted antibiotics, currently on cefepime and vancomycin  · Trend white blood cell count and fever curve-fluctuating WBC:  · Appreciate ID recommendations, currently monitoring off of antibiotics    Essential hypertension  Assessment & Plan  · Continue amlodipine, clonidine  · toprol  mg b i d      Complete heart block (HCC)  Assessment & Plan  · Status post permanent pacemaker placement on 07/24  ·     History of stroke  Assessment & Plan  Patient with prior stroke last year  · Continue aspirin and Plavix  · Stat CT if acute change in neurological status  · Patient appears to be more confused than baseline per family, improved as the day progressed  Patient had a CT scan 7/30 which showed the presence of previous CVA no acute intracranial pathology, previous provider discussed with radiology in person    H/O aortic valve replacement  Assessment & Plan  Patient prior history bioprosthetic valve  · Valve area 1 38 cm2 per echo  Acute kidney injury superimposed on chronic kidney disease ([facility] [address] )  Assessment & Plan  Previous baseline around 1 8-1 9 in 2019  Creatinine initially 2 74 on admission  Now trending 2 3 today This possibly represents his new baseline  · Avoid nephrotoxins  · Monitor electrolytes and renal function  · Ensure adequate oral intake and hydration  · Continue diuresis-patient appears to be fluid overloaded on the x-ray  · Cardiology evaluation appreciated  · Currently on p o  Diuretics    Mild intermittent asthma without complication  Assessment & Plan  Continue albuterol nebulization p r n     * Bradycardia  Assessment & Plan  Asymptomatic currently  Patient with complete heart block  Western blot shows elevated Lyme IgG  However, do not suspect active Lyme infection  · Appreciate ID and Cardiology input    · Status post permanent pacemaker placement  · Now back on toprol XL        [facility] Progress Note  Patient: Charissa Chavez 80 y o  male   MRN: [id number]  PCP: Donald Tate DO  Unit/Bed#: [facility] 525-01 Encounter: [id number]  Date Of Visit: 08/04/20    Assessment:    Principal Problem:    Bradycardia  Active Problems:    Mild intermittent asthma without complication    Acute kidney injury superimposed on chronic kidney disease ([facility] [address] )    H/O aortic valve replacement    History of stroke    Complete heart block (HCC)    Essential hypertension    Leukocytosis    Ambulatory dysfunction    Acute respiratory failure with hypoxia (HCC)      Plan:       VTE Pharmacologic Prophylaxis:   Pharmacologic: Apixaban (Eliquis)  Mechanical VTE Prophylaxis in Place: Yes    Patient Centered Rounds: I have performed bedside rounds with nursing staff today  Discussions with Specialists or Other Care Team Provider:     Education and Discussions with Family / Patient: patient & wife    Time Spent for Care: 30 minutes  More than 50% of total time spent on counseling and coordination of care as described above  Current Length of Stay: 15 day(s)    Current Patient Status: Inpatient   Certification Statement: The patient will continue to require additional inpatient hospital stay due to placement    Discharge Plan / Estimated Discharge Date:     Code Status: Level 1 - Full Code      Subjective:   Feels ok, no major complains    Objective:     Vitals:   Temp (24hrs), Av 6 °F (36 4 °C), Min:97 5 °F (36 4 °C), Max:97 9 °F (36 6 °C)    Temp:  [97 5 °F (36 4 °C)-97 9 °F (36 6 °C)] 97 5 °F (36 4 °C)  HR:  [61-66] 65  Resp:  [16-18] 16  BP: (115-150)/(61-65) 115/65  SpO2:  [93 %-96 %] 96 %  Body mass index is 34 59 kg/m²  Input and Output Summary (last 24 hours): Intake/Output Summary (Last 24 hours) at 2020 1651  Last data filed at 2020 1523  Gross per 24 hour   Intake 480 ml   Output 995 ml   Net -515 ml       Physical Exam:     Physical Exam   Constitutional: He is oriented to person, place, and time  He appears well-developed and well-nourished  HENT:   Head: Normocephalic and atraumatic  Mouth/Throat: Oropharynx is clear and moist    Cardiovascular: Regular rhythm, S1 normal and S2 normal    No murmur heard  Pulmonary/Chest: Effort normal and breath sounds normal  No stridor  No respiratory distress  Abdominal: Soft  He exhibits no distension  There is no abdominal tenderness     Neurological: He is alert and oriented to person, place, and time  Vitals reviewed  Additional Data:     Labs:    Results from last 7 days   Lab Units 08/03/20  0458   WBC Thousand/uL 12 81*   HEMOGLOBIN g/dL 10 1*   HEMATOCRIT % 30 6*   PLATELETS Thousands/uL 312   NEUTROS PCT % 75   LYMPHS PCT % 10*   MONOS PCT % 7   EOS PCT % 7*     Results from last 7 days   Lab Units 08/03/20  0458  07/30/20  0539   POTASSIUM mmol/L 4 7   < > 4 1   CHLORIDE mmol/L 99*   < > 104   CO2 mmol/L 33*   < > 28   BUN mg/dL 93*   < > 87*   CREATININE mg/dL 2 28*   < > 2 16*   CALCIUM mg/dL 9 0   < > 8 9   ALK PHOS U/L  --   --  92   ALT U/L  --   --  15   AST U/L  --   --  19    < > = values in this interval not displayed  * I Have Reviewed All Lab Data Listed Above    * Additional Pertinent Lab Tests Reviewed: No New Labs Available For Today    Imaging:    Imaging Reports Reviewed Today Include:   Imaging Personally Reviewed by Myself Includes:      Recent Cultures (last 7 days):           Last 24 Hours Medication List:   acetaminophen, 650 mg, Oral, Q6H PRN, Reed Sood,   amLODIPine, 10 mg, Oral, BID, Gemini Coffey PA-C  apixaban, 2 5 mg, Oral, BID, Mabel Roy PA-C  aspirin, 81 mg, Oral, Daily, Mabel Roy PA-C  bisacodyl, 10 mg, Oral, Daily PRN, Mellody Nageotte, MD  cloNIDine, 0 1 mg, Oral, Daily, Gemini Coffey PA-C  docusate sodium, 100 mg, Oral, BID, Mellody Nageotte, MD  Labetalol HCl, 10 mg, Intravenous, Q8H PRN, Kendra Ratliff,   metoclopramide, 10 mg, Intravenous, Q6H PRN, Reed Sood DO  metoprolol succinate, 100 mg, Oral, BID, Sommer Carbajal, DO  pantoprazole, 40 mg, Oral, Early Morning, Cortez Stewart, DO  polyethylene glycol, 17 g, Oral, Daily, Mellody Nageotte, MD  pravastatin, 40 mg, Oral, Daily With Dinner, Castro Stage, DO  senna, 1 tablet, Oral, HS, Mellody Nageotte, MD  torsemide, 20 mg, Oral, BID, Mellody Nageotte, MD         Today, Patient Was Seen By: Delana Aase, MD    ** Please Note: This note has been constructed using a voice recognition system   **

## 2020-08-04 NOTE — RESTORATIVE TECHNICIAN NOTE
Restorative Specialist Mobility Note       Activity: Ambulate in [place]     Assistive Device: Front wheel walker        Repositioned: Sitting, Up in chair

## 2020-08-04 NOTE — ASSESSMENT & PLAN NOTE
Patient requires nasal cannula  Did not use at home  Now on increased oxygen  Chest x-ray vascular congestion, increased pleural effusions, and persistent opacities at the right lung base  Most recent echo showed an EF of 60% with no regional wall abnormalities  There does appear to be some mild pulmonary hypertension with a PA pressure of 48 mmHg  Differential concerning for CHF versus pneumonia  Respiratory status appears to be improving somewhat today  · Continue nasal cannula oxygen  · Monitor respiratory status closely  · Incentive spirometry  · Discontinue antibiotics-discussed with infectious disease  · Increase Lasix to 60 mg b i d  Due to inadequate urinary output, adjust accordingly  · Fluid/sodium restriction  · Appreciate cardiology and ID input-  · Transition to p o   Diuretics-cardiology recommended torsemide 20 mg p o  B i d

## 2020-08-04 NOTE — ASSESSMENT & PLAN NOTE
· Continue to hold metoprolol  · Patient receiving scheduled labetalol  · Status post permanent pacemaker placement on 07/24  ·

## 2020-08-04 NOTE — ASSESSMENT & PLAN NOTE
Patient with prior stroke last year  · Continue aspirin and Plavix  · Stat CT if acute change in neurological status  · Patient appears to be more confused than baseline per family, improved as the day progressed    Patient had a CT scan 7/30 which showed the presence of previous CVA no acute intracranial pathology, previous provider discussed with radiology in person

## 2020-08-04 NOTE — ASSESSMENT & PLAN NOTE
Asymptomatic currently  Patient with complete heart block  Western blot shows elevated Lyme IgG  However, do not suspect active Lyme infection  · Appreciate ID and Cardiology input    · Status post permanent pacemaker placement  · Now back on toprol XL

## 2020-08-04 NOTE — RESTORATIVE TECHNICIAN NOTE
Restorative Specialist Mobility Note       Activity: Ambulate in [place], Chair     Assistive Device: Front wheel walker        Repositioned: Sitting, Up in chair, Other (Comment)(Chair Alarm)

## 2020-08-04 NOTE — ASSESSMENT & PLAN NOTE
Previous baseline around 1 8-1 9 in 2019  Creatinine initially 2 74 on admission  Now trending 2 3 today This possibly represents his new baseline  · Avoid nephrotoxins  · Monitor electrolytes and renal function  · Ensure adequate oral intake and hydration  · Continue diuresis-patient appears to be fluid overloaded on the x-ray  · Cardiology evaluation appreciated  · Currently on p o   Diuretics

## 2020-08-04 NOTE — ASSESSMENT & PLAN NOTE
Unclear etiology  Initially thought reactive, but now concern for infection given worsening respiratory status and elevated procalcitonin  Procalcitonin also possibly elevated secondary to CHF  · Initial blood cultures negative  Repeat blood cultures negative to date    · Restarted antibiotics, currently on cefepime and vancomycin  · Trend white blood cell count and fever curve-fluctuating WBC:  · Appreciate ID recommendations, currently monitoring off of antibiotics

## 2020-08-05 ENCOUNTER — HOSPITAL ENCOUNTER (INPATIENT)
Facility: HOSPITAL | Age: 85
LOS: 8 days | Discharge: NURSING, REHAB OR LONG-TERM CARE | DRG: 308 | End: 2020-08-13
Attending: INTERNAL MEDICINE | Admitting: INTERNAL MEDICINE
Payer: COMMERCIAL

## 2020-08-05 VITALS
SYSTOLIC BLOOD PRESSURE: 126 MMHG | RESPIRATION RATE: 18 BRPM | BODY MASS INDEX: 34.4 KG/M2 | TEMPERATURE: 97.8 F | WEIGHT: 201.5 LBS | OXYGEN SATURATION: 97 % | DIASTOLIC BLOOD PRESSURE: 74 MMHG | HEIGHT: 64 IN | HEART RATE: 69 BPM

## 2020-08-05 DIAGNOSIS — Z20.822 COVID-19 RULED OUT: ICD-10-CM

## 2020-08-05 DIAGNOSIS — D64.9 ANEMIA, UNSPECIFIED TYPE: ICD-10-CM

## 2020-08-05 DIAGNOSIS — J96.01 ACUTE RESPIRATORY FAILURE WITH HYPOXIA (HCC): Primary | ICD-10-CM

## 2020-08-05 LAB
GLUCOSE SERPL-MCNC: 105 MG/DL (ref 65–140)
GLUCOSE SERPL-MCNC: 141 MG/DL (ref 65–140)
SARS-COV-2 RNA RESP QL NAA+PROBE: NEGATIVE

## 2020-08-05 PROCEDURE — 97110 THERAPEUTIC EXERCISES: CPT

## 2020-08-05 PROCEDURE — 82948 REAGENT STRIP/BLOOD GLUCOSE: CPT

## 2020-08-05 PROCEDURE — 99024 POSTOP FOLLOW-UP VISIT: CPT | Performed by: INTERNAL MEDICINE

## 2020-08-05 PROCEDURE — 97112 NEUROMUSCULAR REEDUCATION: CPT

## 2020-08-05 PROCEDURE — U0003 INFECTIOUS AGENT DETECTION BY NUCLEIC ACID (DNA OR RNA); SEVERE ACUTE RESPIRATORY SYNDROME CORONAVIRUS 2 (SARS-COV-2) (CORONAVIRUS DISEASE [COVID-19]), AMPLIFIED PROBE TECHNIQUE, MAKING USE OF HIGH THROUGHPUT TECHNOLOGIES AS DESCRIBED BY CMS-2020-01-R: HCPCS | Performed by: HOSPITALIST

## 2020-08-05 PROCEDURE — 97535 SELF CARE MNGMENT TRAINING: CPT

## 2020-08-05 PROCEDURE — 99239 HOSP IP/OBS DSCHRG MGMT >30: CPT | Performed by: HOSPITALIST

## 2020-08-05 PROCEDURE — 97116 GAIT TRAINING THERAPY: CPT

## 2020-08-05 RX ORDER — PRAVASTATIN SODIUM 40 MG
40 TABLET ORAL
Refills: 0
Start: 2020-08-05 | End: 2020-10-15 | Stop reason: SDUPTHER

## 2020-08-05 RX ORDER — METOPROLOL SUCCINATE 100 MG/1
100 TABLET, EXTENDED RELEASE ORAL 2 TIMES DAILY
Refills: 0
Start: 2020-08-05 | End: 2020-08-24

## 2020-08-05 RX ORDER — ASPIRIN 81 MG/1
81 TABLET ORAL DAILY
Refills: 0
Start: 2020-08-06

## 2020-08-05 RX ORDER — SENNOSIDES 8.6 MG
1 TABLET ORAL
Qty: 120 EACH | Refills: 0 | Status: SHIPPED | OUTPATIENT
Start: 2020-08-05 | End: 2021-01-14 | Stop reason: SDUPTHER

## 2020-08-05 RX ORDER — DOCUSATE SODIUM 100 MG/1
100 CAPSULE, LIQUID FILLED ORAL 2 TIMES DAILY
Qty: 10 CAPSULE | Refills: 0 | Status: SHIPPED | OUTPATIENT
Start: 2020-08-05 | End: 2021-05-29

## 2020-08-05 RX ORDER — POLYETHYLENE GLYCOL 3350 17 G/17G
17 POWDER, FOR SOLUTION ORAL DAILY
Qty: 14 EACH | Refills: 0
Start: 2020-08-06 | End: 2020-08-24

## 2020-08-05 RX ORDER — TORSEMIDE 20 MG/1
20 TABLET ORAL 2 TIMES DAILY
Refills: 0
Start: 2020-08-05 | End: 2020-10-15 | Stop reason: SDUPTHER

## 2020-08-05 RX ORDER — AMLODIPINE BESYLATE 10 MG/1
10 TABLET ORAL 2 TIMES DAILY
Refills: 0
Start: 2020-08-05 | End: 2020-08-13 | Stop reason: HOSPADM

## 2020-08-05 RX ORDER — CLONIDINE HYDROCHLORIDE 0.1 MG/1
0.1 TABLET ORAL DAILY
Refills: 0
Start: 2020-08-06 | End: 2020-10-15 | Stop reason: SDUPTHER

## 2020-08-05 RX ADMIN — AMLODIPINE BESYLATE 10 MG: 10 TABLET ORAL at 09:01

## 2020-08-05 RX ADMIN — APIXABAN 2.5 MG: 2.5 TABLET, FILM COATED ORAL at 09:02

## 2020-08-05 RX ADMIN — DOCUSATE SODIUM 100 MG: 100 CAPSULE, LIQUID FILLED ORAL at 09:04

## 2020-08-05 RX ADMIN — CLONIDINE HYDROCHLORIDE 0.1 MG: 0.1 TABLET ORAL at 09:03

## 2020-08-05 RX ADMIN — ASPIRIN 81 MG: 81 TABLET, COATED ORAL at 09:03

## 2020-08-05 RX ADMIN — PANTOPRAZOLE SODIUM 40 MG: 40 TABLET, DELAYED RELEASE ORAL at 05:12

## 2020-08-05 RX ADMIN — METOPROLOL SUCCINATE 100 MG: 100 TABLET, FILM COATED, EXTENDED RELEASE ORAL at 09:04

## 2020-08-05 RX ADMIN — TORSEMIDE 20 MG: 20 TABLET ORAL at 09:05

## 2020-08-05 NOTE — ASSESSMENT & PLAN NOTE
Unclear etiology  Initially thought reactive, but now concern for infection given worsening respiratory status and elevated procalcitonin  Procalcitonin also possibly elevated secondary to CHF  · Initial blood cultures negative    Repeat blood cultures also negative  · Restarted antibiotics with cefepime and vancomycin but then taken off them  · Trended white blood cell count and fever curve-fluctuating WBC:  · Appreciate ID recommendations, currently monitoring off of antibiotics

## 2020-08-05 NOTE — ASSESSMENT & PLAN NOTE
Previous baseline around 1 8-1 9 in 2019  Creatinine initially 2 74 on admission  Now trending down  Most recent 2 2  This possibly represents his new baseline  · Avoid nephrotoxins  · Monitor electrolytes and renal function  · Ensure adequate oral intake and hydration  · S/p IV diuresis-patient appeared to be fluid overloaded on the x-ray  · Cardiology evaluation appreciated  · Currently on p o   Diuretics torsemide 20 BID

## 2020-08-05 NOTE — RESTORATIVE TECHNICIAN NOTE
Restorative Specialist Mobility Note       Activity: Ambulate in [place]     Assistive Device: Front wheel walker        Repositioned: Sitting, Up in chair, Other (Comment)(Chair Alarm)

## 2020-08-05 NOTE — ASSESSMENT & PLAN NOTE
Asymptomatic currently  Patient with complete heart block  Western blot shows elevated Lyme IgG  However, do not suspect active Lyme infection  · Appreciate ID and Cardiology input    · Status post permanent pacemaker placement 7/24  · Now back on toprol XL

## 2020-08-05 NOTE — PROGRESS NOTES
Advanced Heart Failure / Pulmonary Hypertension Service Progress Note    Vikki Morgan 80 y o  male   MRN: [id number]  Unit/Bed#: [facility] 525-01; Encounter: [id number]    Assessment:  Principal Problem:    Bradycardia  Active Problems:    Mild intermittent asthma without complication    Acute kidney injury superimposed on chronic kidney disease ([facility] [address] )    H/O aortic valve replacement    History of stroke    Complete heart block (HCC)    Essential hypertension    Leukocytosis    Ambulatory dysfunction    Acute respiratory failure with hypoxia ([facility] [address] )      Subjective:   Vikki Morgan is an 60-year-old man with a PMH as below who presented on 07/20/2020 to [facility] with progressively worsening weakness and shortness of breath for past 2 weeks  In ED, patient was found to be in third-degree heart block with escape rhythm in 30s  Patient was then transferred to [facility] for EP evaluation and pacemaker placement on 07/20  Temporary screw-in pacemaker placed on 07/21 due to elevated white count with no obvious signs of infection  Infectious disease was consulted, and blood cultures were without growth  Patient then underwent permanent pacemaker implantation on 07/24  Patient seen and examined  No significant events overnight  Reports mild left thigh pain only with deep palpation of this area  Denies LH, dizziness, or GROSS with this  Sleeping okay  Chronically decreased appetite per patient  Objective: Intake/ Output: 580 mL / 295 mL (net positive 285 mL; accurate?)  Weight: 201 lbs on 08/04 (down from 204 lbs on 08/01)  Telemetry: No longer on telemetry  Today's Plan:   Continue torsemide 20 mg BID; may require higher dose as outpatient depending on diet/fluid intake in outpatient setting  [facility] follow-up appointment and in-person device check have been scheduled for next week at [facility] office       Plan:  Complete heart block, symptomatic    S/p Medtronic dual-chamber pacemaker; placed 07/24/2020  Chronic HFpEF; LVEF 60%; LVIDd 5 52 cm; NYHA II; ACC/AHA Stage C   TTE 07/21/2020: LVEF 60%  Moderate cLVH with LAE  PASP 48 mmHg  Normal RV size and function  Reviewed importance of low sodium diet and fluid restriction  Strict I/Os with daily weights  CV diet with fluid restriction recommended  Neurohormonal Blockade:  --Beta Blocker: metoprolol succinate 100 mg q12 hours  --ARNi / ACEi / ARB: No; CKD precludes  --Aldosterone Antagonist: No; CKD precludes  --SGLT2 Inhibitor: No    --Home Diuretic: None  --Inpatient Diuretic: torsemide 20 mg BID  Sudden Cardiac Death Risk Reduction:  --LVEF >35%  Electrical Resynchronization:  --Candidacy for BiV device:  ms  Advanced Therapies (if appropriate): Will continue to monitor  Coronary artery disease  S/p CABG x1 (LIMA-LAD) on 06/05/2000  Continue on aspirin, statin, and BB as above  Atrial fibrillation, paroxysmal   [id number] = 7 (age, CHF, HTN, TIA, CAD)  Anticoagulation on Eliquis (renally dosed)  Chronic kidney disease, stage IV   Baseline creatinine of 2 1-2 4  Creatinine of 2 28 on 08/03 (down from 2 35 on 08/02)  Aortic stenosis, severe: s/p TAVR (23 mm [facility] Ease bioprosthetic) on 06/30/2015  Hypertension: continues on amlodipine 10 mg BID  Hyperlipidemia  History of TIA    Vitals:   Blood pressure 126/74, pulse 69, temperature 97 8 °F (36 6 °C), temperature source Oral, resp  rate 18, height 5' 4", weight 91 4 kg (201 lb 8 oz), SpO2 97 %  Body mass index is 34 59 kg/m²  I/O last 3 completed shifts:   In: 700 [P O :700]  Out: 1170 [Urine:1170]     Wt Readings from Last 3 Encounters:   08/04/20 91 4 kg (201 lb 8 oz)   07/20/20 97 5 kg (214 lb 15 2 oz)   10/07/19 93 9 kg (207 lb)     Vitals:    08/04/20 0700 08/04/20 1523 08/04/20 2300 08/05/20 0700   BP: 141/63 115/65 125/58 126/74   BP Location:  Left arm Left arm    Pulse: 61 65 64 69   Resp: 16 16 18 18 Temp: 97 9 °F (36 6 °C) 97 5 °F (36 4 °C) 97 6 °F (36 4 °C) 97 8 °F (36 6 °C)   TempSrc: Oral Oral Oral Oral   SpO2: 94% 96% 96% 97%   Weight:       Height:           Physical Exam   Constitutional: He is oriented to person, place, and time  He appears well-developed and well-nourished  HENT:   Head: Normocephalic and atraumatic  Eyes: Pupils are equal, round, and reactive to light  Conjunctivae are normal  No scleral icterus  Neck: Neck supple  No JVD present  No tracheal deviation present  Cardiovascular: Normal rate and regular rhythm  No murmur heard  PPM incision clean, dry, intact  Steri-strips intact with dried blood noted under dressing  No signs of infection or hematoma   Pulmonary/Chest: Effort normal and breath sounds normal  No respiratory distress  He has no wheezes  Abdominal: Soft  Bowel sounds are normal  He exhibits distension  Musculoskeletal:         General: Edema (trace LE) present  No tenderness  Neurological: He is alert and oriented to person, place, and time  Skin: Skin is warm and dry  Psychiatric: He has a normal mood and affect  His behavior is normal    Vitals reviewed  [facility] (day, reason): None  Alvares Catheter (day, reason): None         Current Facility-Administered Medications:     acetaminophen (TYLENOL) tablet 650 mg, 650 mg, Oral, Q6H PRN, [facility] Verrti DO, 650 mg at 08/03/20 2256    amLODIPine (NORVASC) tablet 10 mg, 10 mg, Oral, BID, Gemini Soden, PA-C, 10 mg at 08/05/20 0901    apixaban (ELIQUIS) tablet 2 5 mg, 2 5 mg, Oral, BID, [facility], PA-C, 2 5 mg at 08/05/20 0902    aspirin (ECOTRIN LOW STRENGTH) EC tablet 81 mg, 81 mg, Oral, Daily, [facility], PA-C, 81 mg at 08/05/20 5009    bisacodyl (DULCOLAX) EC tablet 10 mg, 10 mg, Oral, Daily PRN, Rocío Rahman MD    cloNIDine (CATAPRES) tablet 0 1 mg, 0 1 mg, Oral, Daily, Gemini Soden, PA-C, 0 1 mg at 08/05/20 0903    docusate sodium (COLACE) capsule 100 mg, 100 mg, Oral, BID, Lul Mcmillan MD, 100 mg at 08/05/20 1533    Labetalol HCl (NORMODYNE) injection 10 mg, 10 mg, Intravenous, Q8H PRN, Kendra Ratliff, DO    metoclopramide (REGLAN) injection 10 mg, 10 mg, Intravenous, Q6H PRN, Yesenia Mates Veres, DO, 10 mg at 07/21/20 1547    metoprolol succinate (TOPROL-XL) 24 hr tablet 100 mg, 100 mg, Oral, BID, Zettie Fat, DO, 100 mg at 08/05/20 1548    pantoprazole (PROTONIX) EC tablet 40 mg, 40 mg, Oral, Early Morning, Cortez Stewart, DO, 40 mg at 08/05/20 0004    polyethylene glycol (MIRALAX) packet 17 g, 17 g, Oral, Daily, Lul Mcmillan MD, 17 g at 08/04/20 1035    pravastatin (PRAVACHOL) tablet 40 mg, 40 mg, Oral, Daily With Dinner, Cortez Stewart, DO, 40 mg at 08/04/20 1704    senna (SENOKOT) tablet 8 6 mg, 1 tablet, Oral, HS, Lul Mcmillan MD, 8 6 mg at 07/31/20 2023    torsemide (DEMADEX) tablet 20 mg, 20 mg, Oral, BID, Lul Mcmillan MD, 20 mg at 08/05/20 0905    Labs & Results:      Results from last 7 days   Lab Units 08/03/20  0458 08/02/20  0946 07/30/20  0539   WBC Thousand/uL 12 81* 13 41* 11 98*   HEMOGLOBIN g/dL 10 1* 10 6* 11 2*   HEMATOCRIT % 30 6* 31 1* 34 0*   PLATELETS Thousands/uL 312 285 229         Results from last 7 days   Lab Units 08/03/20  0458 08/02/20  0945 07/31/20  1040 07/30/20  0539   POTASSIUM mmol/L 4 7 4 5 4 6 4 1   CHLORIDE mmol/L 99* 101 100 104   CO2 mmol/L 33* 29 29 28   BUN mg/dL 93* 94* 93* 87*   CREATININE mg/dL 2 28* 2 35* 2 30* 2 16*   CALCIUM mg/dL 9 0 9 5 8 7 8 9   ALK PHOS U/L  --   --   --  92   ALT U/L  --   --   --  15   AST U/L  --   --   --  19         Counseling / Coordination of Care: Total floor / unit time spent today 40 minutes  Greater than 50% of total time was spent with the patient and / or family counseling and / or coordination of care  A description of the counseling / coordination of care: 20 minutes (low sodium diet, fluid restriction, daily weights, and importance of medication compliance)        Thank you for the opportunity to participate in the care of this patient      Vicki Prajapati PA-C

## 2020-08-05 NOTE — ASSESSMENT & PLAN NOTE
Patient with prior stroke last year  · Continue aspirin and Plavix  · Patient appears to be more confused than baseline per family, improved as the day progressed    Patient had a CT scan 7/30 which showed the presence of previous CVA no acute intracranial pathology, previous provider discussed with radiology in person

## 2020-08-05 NOTE — DISCHARGE SUMMARY
Discharge- Dimple Giraldo 1935, 80 y o  male MRN: [id number]    Unit/Bed#: [facility] 525-01 Encounter: [id number]    Primary Care Provider: Valeri Pradhan DO   Date and time admitted to hospital: 7/20/2020  4:04 PM        Acute respiratory failure with hypoxia [facility])  Assessment & Plan  Patient requires nasal cannula  Did not use at home  Now on increased oxygen  Chest x-ray vascular congestion, increased pleural effusions, and persistent opacities at the right lung base  Most recent echo showed an EF of 60% with no regional wall abnormalities  There does appear to be some mild pulmonary hypertension with a PA pressure of 48 mmHg  Differential concerning for CHF versus pneumonia  Respiratory status appears to be improving somewhat today  · Continue nasal cannula oxygen  · Monitor respiratory status closely  · Incentive spirometry  · Discontinue antibiotics-discussed with infectious disease  · Treated with IV lasix  · 2 gm Na & 1500 FR restriction  · Appreciate cardiology and ID input  · Transitioned to p o  Diuretics-cardiology recommended torsemide 20 mg p o  B i d   · Outpatient cardiology follow up      Ambulatory dysfunction  Assessment & Plan  · PT/OT recommending inpatient rehab  · DC to rehab    Leukocytosis  Assessment & Plan  Unclear etiology  Initially thought reactive, but now concern for infection given worsening respiratory status and elevated procalcitonin  Procalcitonin also possibly elevated secondary to CHF  · Initial blood cultures negative  Repeat blood cultures also negative  · Restarted antibiotics with cefepime and vancomycin but then taken off them  · Trended white blood cell count and fever curve-fluctuating WBC:  · Appreciate ID recommendations, currently monitoring off of antibiotics    Essential hypertension  Assessment & Plan  · Continue amlodipine, clonidine  · toprol  mg b i d      Complete heart block ([address] )  Assessment & Plan  · Status post permanent pacemaker placement on 07/24    History of stroke  Assessment & Plan  Patient with prior stroke last year  · Continue aspirin and Plavix  · Patient appears to be more confused than baseline per family, improved as the day progressed  Patient had a CT scan 7/30 which showed the presence of previous CVA no acute intracranial pathology, previous provider discussed with radiology in person    H/O aortic valve replacement  Assessment & Plan  Patient prior history bioprosthetic valve  · Valve area 1 38 cm2 per echo  Acute kidney injury superimposed on chronic kidney disease ([address] )  Assessment & Plan  Previous baseline around 1 8-1 9 in 2019  Creatinine initially 2 74 on admission  Now trending down  Most recent 2 2  This possibly represents his new baseline  · Avoid nephrotoxins  · Monitor electrolytes and renal function  · Ensure adequate oral intake and hydration  · S/p IV diuresis-patient appeared to be fluid overloaded on the x-ray  · Cardiology evaluation appreciated  · Currently on p o  Diuretics torsemide 20 BID    Mild intermittent asthma without complication  Assessment & Plan  Continue albuterol nebulization p r n     * Bradycardia  Assessment & Plan  Asymptomatic currently  Patient with complete heart block  Western blot shows elevated Lyme IgG  However, do not suspect active Lyme infection  · Appreciate ID and Cardiology input    · Status post permanent pacemaker placement 7/24  · Now back on toprol XL        Transition of Care Discharge Summary - [facility]    Patient Information: Waylon Serrano 80 y o  male MRN: [id number]  [facility] Encounter: [id number]    Discharging Physician / Practitioner: Deya Rabago MD  PCP: Benjamin Doan DO  Admission Date: 7/20/2020  Discharge Date: 08/05/20    Disposition:      Short Term Rehab or SNF at [facility]    For Discharges to [facility] SNF:   · Not Applicable to this Patient - Not Applicable to this Patient    Reason for Admission: Patient transferred for EP evaluation    Discharge Diagnoses:     Principal Problem:    Bradycardia  Active Problems:    Mild intermittent asthma without complication    Acute kidney injury superimposed on chronic kidney disease (HCC)    H/O aortic valve replacement    History of stroke    Complete heart block (HCC)    Essential hypertension    Leukocytosis    Ambulatory dysfunction    Acute respiratory failure with hypoxia ([facility] [address] )  Resolved Problems:    Hypertensive urgency      Consultations During Hospital Stay:  · Cardiology  · Fractures disease  · Critical care medicine    Procedures Performed:     · Permanent pacer insertion on 07/24    Medication Adjustments and Discharge Medications:  · Summary of Medication Adjustments made as a result of this hospitalization:  Metoprolol increased to Toprol- mg b i d , torsemide 20 mg b i d ,  · Medication Dosing Tapers - Please refer to Discharge Medication List for details on any medication dosing tapers (if applicable to patient)  · Medications being temporarily held (include recommended restart time):   · Discharge Medication List: See after visit summary for reconciled discharge medications  Wound Care Recommendations:  When applicable, please see wound care section of After Visit Summary  Diet Recommendations at Discharge:  Diet -        Diet Orders   (From admission, onward)             Start     Ordered    08/05/20 1523  Dietary nutrition supplements  Once     Question Answer Comment   Select Supplement: Ensure Compact-Vanilla    Frequency Breakfast, Lunch, Dinner        08/05/20 1523    07/30/20 1521  Diet Cardiovascular; Sodium 2 GM; Fluid Restriction 1500 ML  Diet effective now     Question Answer Comment   Diet Type Cardiovascular    Cardiac Sodium 2 GM    Other Restriction(s): Fluid Restriction 1500 ML    RD to adjust diet per protocol?  Yes    Special Instructions Chopped meats    Special Instructions Soft Vegetables        07/30/20 1521              Fluid Restriction - New Fluid Restriction at Discharge: 1500 ml/day  Instructions for any Catheters / Lines Present at Discharge (including removal date, if applicable): none    Significant Findings / Test Results:     CT head wo contrast   Final Result by Augustine Hernandez MD (07/30 1636)      No acute intracranial abnormality  Decreased density in the medial posterior inferior right cerebellum is consistent with the sequela of a late subacute or chronic infarction, but is a new finding when compared with May 19, 2019  Other chronic small and large vessel ischemic changes are similar from May 19, 2019  Workstation performed: [id number]         XR chest portable   Final Result by Shi Barone MD (07/29 5714)      Mild pulmonary edema and small bilateral pleural effusions, grossly stable since the prior examination  Workstation performed: [id number]         XR knee 1 or 2 vw left   Final Result by Jigar Sotomayor MD (07/30 1245)      No acute osseous abnormality  Workstation performed: [id number]         VAS lower limb venous duplex study, complete bilateral   Final Result by Rachana Fletcher MD (07/28 2313)      XR chest pa & lateral   Final Result by Andrew Zambrano MD (07/26 0865)      Pulmonary vascular congestion without significant change      Mild increased effusions  Persistent airspace opacities at the right lung base  Workstation performed: [id number]         XR chest 2 views   Final Result by Renan Zuniga MD (07/25 1514)      1  Pulmonary vascular congestion with suggestion of trace interstitial edema  Also suspect small bilateral pleural effusions  Appearance is similar to yesterday  2   Bandlike opacities in the left midlung and right base probably represent atelectasis unless there is is compelling clinical evidence for pneumonia  Appearance is also unchanged                          Workstation performed: [id number]         XR chest portable   Final Result by Jennifer Pacheco MD (07/24 1739)      Central vascular prominence with bibasilar atelectasis and possible small effusions  No pneumothorax  Workstation performed: [id number]         XR chest portable   Final Result by Cecy Sauceod MD (07/21 1257)      No acute cardiopulmonary disease  Right jugular pacemaker lead in right ventricle with no pneumothorax  Workstation performed: [id number]               Incidental Findings:   ·      Test Results Pending at Discharge (will require follow up):   ·      Outpatient Tests Requested:      Complications:     Hospital Course:     Charissa Chavez is a 80 y o  male patient who originally presented to the hospital on 7/20/2020 due to heart block as a transfer from [address] [place] [address] emergency room  Patient reportedly [place] 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 and presents with increasing dyspnea on exertion and weakness for the past 2 weeks  He presents to the hospital after his wife urged him to be evaluated  Of note he has a known history of hypertension is on Toprol b i d  He presents for further evaluation & assessment  Found to be in complete heart block, sent to [facility] for further evaluation    Condition at Discharge: stable     Discharge Day Visit / Exam:     Subjective:  Feels good, has no major complaints    Vitals: Blood Pressure: 126/74 (08/05/20 0700)  Pulse: 69 (08/05/20 0700)  Temperature: 97 8 °F (36 6 °C) (08/05/20 0700)  Temp Source: Oral (08/05/20 0700)  Respirations: 18 (08/05/20 0700)  Height: 5' 4" (162 6 cm) (07/20/20 1633)  Weight - Scale: 91 4 kg (201 lb 8 oz) (08/04/20 0600)  SpO2: 97 % (08/05/20 0700)  Exam:   Physical Exam   Constitutional: He is oriented to person, place, and time  He appears well-developed and well-nourished  HENT:   Head: Normocephalic and atraumatic  Eyes: Conjunctivae are normal    Cardiovascular: Normal rate, regular rhythm and normal heart sounds     No murmur heard   Pulmonary/Chest: Effort normal  No stridor  No respiratory distress  Abdominal: Soft  He exhibits no distension  There is no abdominal tenderness  Musculoskeletal:         General: No tenderness  Neurological: He is alert and oriented to person, place, and time  Vitals reviewed  Discussion with Family: wife      Discharge instructions/Information to patient and family:   See after visit summary section titled Discharge Instructions for information provided to patient and family  Planned Readmission: no      Discharge Statement:  I spent 35 minutes discharging the patient  This time was spent on the day of discharge  I had direct contact with the patient on the day of discharge  Greater than 50% of the total time was spent examining patient, answering all patient questions, arranging and discussing plan of care with patient as well as directly providing post-discharge instructions  Additional time then spent on discharge activities      ** Please Note: This note has been constructed using a voice recognition system **

## 2020-08-05 NOTE — PLAN OF CARE
Problem: PHYSICAL THERAPY ADULT  Goal: Performs mobility at highest level of function for planned discharge setting  See evaluation for individualized goals  Description: Treatment/Interventions: Functional transfer training, LE strengthening/ROM, Elevations, Therapeutic exercise, Endurance training, Patient/family training, Equipment eval/education, Gait training, Spoke to nursing, OT  Equipment Recommended: Chandler Camacho       See flowsheet documentation for full assessment, interventions and recommendations  Outcome: Progressing  Note: Prognosis: Good  Problem List: Decreased strength, Decreased range of motion, Decreased endurance, Impaired balance, Decreased mobility, Decreased cognition, Decreased safety awareness, Impaired hearing  Assessment: Pt seen by PT on 08/05/20 for PT treatment session w/ a focus on ambulation, balance, and LE therex  Pt making good progress toward goals  Pt continuing to increase ambulatory distance during sessions and today did so with also reduced number of sitting rest breaks 2/2 increased strength and endurance  Pt also able to side step w/ b/l UE support on railing, due to improvements in balance  Despite improvements pt continues to require VC/TC for posture  Pt completed LE therex w/ good form, needing only minimal verbal cueing from therapist  Pt left sitting up in chair with chair alarm on and all needs in reach at end of therapy session  Pt would continue to benefit from skilled PT in order to address impairments and functional limitations  PT to continue to follow pt 3-5x /week, and would recommend rehab pending medical clearance  Barriers to Discharge: Inaccessible home environment, Decreased caregiver support     PT Discharge Recommendation: [address]     PT - OK to Discharge: Yes(pending medical clearance)    See flowsheet documentation for full assessment

## 2020-08-05 NOTE — SOCIAL WORK
Cm received call from [place] at [facility] pt is approved at a level 1 auth # [id number] effect 8/5  NRD 8/10  Information provided to [facility] [employer] , pt's family not allowed to transport pt  [facility] transport scheduled with [facility] for 4235   Daughter Sonia Herron and spouse Arnold Cortez made aware and agreeable to transport and cost

## 2020-08-05 NOTE — OCCUPATIONAL THERAPY NOTE
[address] Progress Note     Patient Name: Wendy Amaro  ZRBNO'Z Date: 8/5/2020  Problem List  Principal Problem:    Bradycardia  Active Problems:    Mild intermittent asthma without complication    Acute kidney injury superimposed on chronic kidney disease ([facility] [address] )    H/O aortic valve replacement    History of stroke    Complete heart block (HCC)    Essential hypertension    Leukocytosis    Ambulatory dysfunction    Acute respiratory failure with hypoxia ([facility] [address] )            08/05/20 1103   Restrictions/Precautions   Weight Bearing Precautions Per Order No   Other Precautions Chair Alarm; Bed Alarm;Cognitive; Fall Risk;Multiple lines;Hard of hearing   Lifestyle   Autonomy Pt reports IND w/ ADLS and mobility PTA, A from wife and son for IADLS PTA  Reciprocal Relationships Pt reports a supportive wife at home who does not work and able to provide A PRN  Pt's son lives next door and is able to provide A if needed  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    Intrinsic Gratification Pt reports [place] time [place] with family   Pain Assessment   Pain Assessment Tool Pain Assessment not indicated - pt denies pain   Pain Score No Pain   ADL   Where Assessed Chair   Grooming Assistance 5  Supervision/Setup   Grooming Deficit Setup;Supervision/safety;Verbal cueing; Increased time to complete   Grooming Comments PT brushed teeth while seated in chair w/ setup  Washed hair with no-rinse cap - able to don and doff - VC for thoroughness while scrubbing  Pt dried hair using RUE and then combed hair  LB Dressing Assistance 2  Maximal Assistance   LB Dressing Deficit Don/doff R sock; Don/doff L sock   LB Dressing Comments Unable to reach feet   Therapeutic Excerise-Strength   UE Strength Yes   Right Upper Extremity- Strength   RUE Strength Comment Pt performed 2x5 chair push ups  w/ 1 minute rest break between sets to increase UE strength required for independence during daily activities     Left Upper Extremity-Strength   LUE Strength Comment Pt performed 2x5 chair push ups  w/ 1 minute rest break between sets to increase UE strength required for independence during daily activities  Coordination   Fine Motor open/close toothpaste and mouthwash   Cognition   Overall Cognitive Status Impaired   Arousal/Participation Alert; Cooperative   Attention Attends with cues to redirect   Orientation Level Oriented X4   Memory Decreased recall of precautions   Following Commands Follows one step commands without difficulty   Comments Pt pleasant and cooperative to work with OT this day  Pt requires VC for safety during functional tasks and requires increased time to process/respond to therapists  Activity Tolerance   Activity Tolerance Patient limited by fatigue   Medical Staff Made Aware OT SELAM lentz cleared Pt for OT session   Assessment   Assessment Pt participated in OT treatment session today to address barriers to occupational performance  Interventions focused on ADL retraining and therapeutic exercise  Pt agreeable to OT treatment today  Upon OT arrival, Pt was found OOB in [address] chair  Pt participated in brushing teeth, washing & combing hair, and UE strengthening- refer to chart for A levels  Pt requires cueing for safety; Pt is hard of hearing  In comparison to last session, Pt is progressing with treatment goals but is limited by fatigue and cognitive status  Pt will benefit from continued OT treatment t/o hospital stay to address limitations to occupational performance as defined above to maximize functional independence  Recommend post-acute rehab following d/c     Plan   Treatment Interventions ADL retraining;Functional transfer training;UE strengthening/ROM; Endurance training;Cognitive reorientation;Patient/family training;Fine motor coordination activities; Compensatory technique education; Energy conservation; Activityengagement   Goal Expiration Date 08/08/20   OT Treatment Day 4   OT Frequency 3-5x/wk   Recommendation   OT Discharge Recommendation Post-Acute Rehabilitation Services   OT - OK to Discharge Yes  (when medically cleared)   Modified [place] Scale   Modified Gerard Scale 4        Alea Best, BELLA

## 2020-08-05 NOTE — PHYSICAL THERAPY NOTE
PHYSICAL THERAPY TREATMENT NOTE      Patient Name: Isa Sandhu  BRGVF'X Date: 8/5/2020 08/05/20 1032   Pain Assessment   Pain Assessment Tool 0-10   Pain Score No Pain   Restrictions/Precautions   Weight Bearing Precautions Per Order No   Other Precautions Chair Alarm; Bed Alarm;Telemetry; Fall Risk;Multiple lines;Cognitive   General   Chart Reviewed Yes   Response to Previous Treatment Patient with no complaints from previous session  Family/Caregiver Present No   Cognition   Overall Cognitive Status Impaired   Arousal/Participation Alert; Cooperative   Attention Attends with cues to redirect   Orientation Level Oriented X4   Memory Decreased recall of precautions   Following Commands Follows one step commands with increased time or repetition   Comments Pt pleasant and agreeable to therapy, demonstrates continued improvement in cognition  Pt requires additional time to respond, but with increased recall of recent evens  Pt has decreased insight into deficits and requires cues from therapists for safety  Pt able assess when to notify therapists he feels he needs a break  Subjective   Subjective "Why do my legs still feel weak?"   Bed Mobility   Additional Comments Pt found sitting up in chair upon initial PT arrival  Pt left sitting up in chair w/ chair alarm on and all needs in reach s/p PT session   Transfers   Sit to Stand 4  Minimal assistance  (CGA)   Additional items Assist x 1; Increased time required;Verbal cues;Armrests   Stand to Sit 4  Minimal assistance  (CGA)   Additional items Assist x 1;Verbal cues; Increased time required;Armrests   Additional Comments Pt requires VC and TC for hand placement and safety  RW used for transfers and ambulation  Ambulation/Elevation   Gait pattern Short stride; Foward flexed; Inconsistent cem; Shuffling;Decreased foot clearance;Narrow KRISTIN; Improper Weight shift   Gait Assistance 4  Minimal assist  (CGA)   Additional items Assist x 1;Verbal cues; Tactile cues  (additional assist for chair follow)   Assistive Device Rolling walker   Distance 70ft x1, 60ft x1  (with 1 sitting rest break)   Balance   Static Sitting Fair   Dynamic Sitting Fair -   Static Standing Poor +   Dynamic Standing Poor +   Ambulatory Poor +   Endurance Deficit   Endurance Deficit Yes   Endurance Deficit Description decreased strength, fatigue   Activity Tolerance   Activity Tolerance Patient limited by fatigue   Medical Staff Made Aware PT Emily Rasmussen RN   Nurse Made Aware RN cleared pt for PT session    Exercises   Knee AROM Long Arc Quad 10 reps;Bilateral;Sitting;AROM  (2 sets)   Marching Sitting;AROM; Bilateral;10 reps  (2 sets)   Balance training  10ft x4 side stepping, b/l UE support on rail and Min-A x1   Assessment   Prognosis Good   Problem List Decreased strength;Decreased range of motion;Decreased endurance; Impaired balance;Decreased mobility; Decreased cognition;Decreased safety awareness; Impaired hearing   Assessment Pt seen by PT on 08/05/20 for PT treatment session w/ a focus on ambulation, balance, and LE therex  Pt making good progress toward goals  Pt continuing to increase ambulatory distance during sessions and today did so with also reduced number of sitting rest breaks 2/2 increased strength and endurance  Pt also able to side step w/ b/l UE support on railing, due to improvements in balance  Despite improvements pt continues to require VC/TC for posture  Pt completed LE therex w/ good form, needing only minimal verbal cueing from therapist  Pt left sitting up in chair with chair alarm on and all needs in reach at end of therapy session  Pt would continue to benefit from skilled PT in order to address impairments and functional limitations  PT to continue to follow pt 3-5x /week, and would recommend rehab pending medical clearance     Barriers to Discharge Inaccessible home environment;Decreased caregiver support   Goals   Patient Goals To get his legs stronger  STG Expiration Date 08/08/20   Plan   Treatment/Interventions Functional transfer training;LE strengthening/ROM; Therapeutic exercise; Endurance training;Cognitive reorientation;Patient/family training;Equipment eval/education; Bed mobility;Gait training;Spoke to nursing;OT   Progress Progressing toward goals   PT Frequency Other (Comment)  (3-5x /week)   Recommendation   PT Discharge Recommendation Post-Acute Rehabilitation Services   Equipment Recommended Obie Castleman   PT - OK to Discharge Yes  (pending medical clearance)     Angie Jean, SPT

## 2020-08-05 NOTE — PROGRESS NOTES
Progress Note - Infectious Disease   Pavel Jose 80 y o  male MRN: [id number]  Unit/Bed#: [facility] 525-01 Encounter: [id number]      Impression:  1  Stress leukocytosis with probable CHF  2  Complete heart block with bradycardia, S/P ppm  3  CAD CABG, AS, bioprosthetic AVR   4  YOLANDE on CKD  5  History of CVA     Recommendations:  Leukocytosis  Patient is afebrile with elevated WBC count of 12,810 when last taken  Blood cultures as well as Strep pneumoniae and Legionella urinary antigens are negative       1  Final blood culture results are negative     2  Observe for continued WBC elevation  Intermittent confusion  1  Patient is no longer confused         Antibiotics:  1  None    Subjective:  He said that he feels well  Denies fevers, chills, or sweats  Denies nausea, vomiting, or diarrhea  Objective:  Vitals:  Temp:  [97 5 °F (36 4 °C)-97 9 °F (36 6 °C)] 97 5 °F (36 4 °C)  HR:  [61-66] 65  Resp:  [16-18] 16  BP: (115-141)/(61-65) 115/65  SpO2:  [93 %-96 %] 96 %  Temp (24hrs), Av 6 °F (36 4 °C), Min:97 5 °F (36 4 °C), Max:97 9 °F (36 6 °C)  Current: Temperature: 97 5 °F (36 4 °C)    Physical Exam:     General Appearance:  Alert moderately obese elderly male who was nontoxic, no acute distress, sitting comfortably in chair      Throat: Oropharynx moist without lesions  Lips, mucosa, and tongue normal   Neck: Supple, symmetrical, trachea midline, no adenopathy,  no tenderness/mass/nodules   Lungs:    Decreased breath sounds at bases with sparse rales  Heart:  Sternotomy scar, right subclavian  wound clean  Left subclavian ppm wound with dry dressing, Regular rate and rhythm, S1, S2 normal, no murmur, rub or gallop   Abdomen:   Soft, non-tender, non-distended, positive bowel sounds  No masses, no organomegaly    No CVA tenderness   Extremities: Extremities normal, atraumatic, no clubbing, cyanosis or edema   Skin: As above           Invasive Devices     Peripheral Intravenous Line Peripheral IV 08/02/20 Left;Dorsal (posterior) Forearm 2 days                Labs, Imaging, & Other studies:   All pertinent labs were personally reviewed  Results from last 7 days   Lab Units 08/03/20  0458 08/02/20  0946 07/30/20  0539   WBC Thousand/uL 12 81* 13 41* 11 98*   HEMOGLOBIN g/dL 10 1* 10 6* 11 2*   PLATELETS Thousands/uL 312 285 229     Results from last 7 days   Lab Units 08/03/20  0458 08/02/20  0945 07/31/20  1040 07/30/20  0539 07/29/20  0235   SODIUM mmol/L 138 136 132* 138 139   POTASSIUM mmol/L 4 7 4 5 4 6 4 1 4 5   CHLORIDE mmol/L 99* 101 100 104 104   CO2 mmol/L 33* 29 29 28 27   BUN mg/dL 93* 94* 93* 87* 94*   CREATININE mg/dL 2 28* 2 35* 2 30* 2 16* 2 22*   EGFR ml/min/1 73sq m 25 24 25 27 26   CALCIUM mg/dL 9 0 9 5 8 7 8 9 8 8   AST U/L  --   --   --  19 16   ALT U/L  --   --   --  15 12   ALK PHOS U/L  --   --   --  92 97

## 2020-08-05 NOTE — ASSESSMENT & PLAN NOTE
Patient requires nasal cannula  Did not use at home  Now on increased oxygen  Chest x-ray vascular congestion, increased pleural effusions, and persistent opacities at the right lung base  Most recent echo showed an EF of 60% with no regional wall abnormalities  There does appear to be some mild pulmonary hypertension with a PA pressure of 48 mmHg  Differential concerning for CHF versus pneumonia  Respiratory status appears to be improving somewhat today  · Continue nasal cannula oxygen  · Monitor respiratory status closely  · Incentive spirometry  · Discontinue antibiotics-discussed with infectious disease  · Treated with IV lasix  · 2 gm Na & 1500 FR restriction  · Appreciate cardiology and ID input  · Transitioned to p o   Diuretics-cardiology recommended torsemide 20 mg p o  B i d   · Outpatient cardiology follow up

## 2020-08-05 NOTE — PLAN OF CARE
Problem: OCCUPATIONAL THERAPY ADULT  Goal: Performs self-care activities at highest level of function for planned discharge setting  See evaluation for individualized goals  Description: Treatment Interventions: ADL retraining, Functional transfer training, UE strengthening/ROM, Endurance training, Cognitive reorientation, Patient/family training, Equipment evaluation/education, Compensatory technique education, Energy conservation, Activityengagement  Equipment Recommended: Other (comment)(RW)       See flowsheet documentation for full assessment, interventions and recommendations  Outcome: Progressing  Note: Limitation: Decreased ADL status, Decreased UE ROM, Decreased UE strength, Decreased Safe judgement during ADL, Decreased cognition, Decreased endurance, Decreased fine motor control, Decreased high-level ADLs  Prognosis: Fair  Assessment: Pt participated in OT treatment session today to address barriers to occupational performance  Interventions focused on ADL retraining, functional standing, functional transfers, and activity engagement  Pt agreeable to OT treatment today  Upon OT arrival, Pt was found OOB in chair  Pt participated in teeth brushing, UB/LB bathing, UB/LB dressing, STS transfers, and functional standing- refer to chart for A levels  Pt requires frequent cueing throughout session, repetition of commands, and encouragement throughout session  In comparison to last session, Pt is progressing with treatment goals  Pt educated on safety awareness, pacing through activities, fall prevention, and pacemaker precautions  Pt will benefit from continued OT treatment t/o hospital stay to address limitations to occupational performance as defined above to maximize functional independence   Recommend post-acute rehab following d/c       OT Discharge Recommendation: Post-Acute Rehabilitation Services  OT - OK to Discharge: Yes(when medically cleared)

## 2020-08-06 PROCEDURE — U0003 INFECTIOUS AGENT DETECTION BY NUCLEIC ACID (DNA OR RNA); SEVERE ACUTE RESPIRATORY SYNDROME CORONAVIRUS 2 (SARS-COV-2) (CORONAVIRUS DISEASE [COVID-19]), AMPLIFIED PROBE TECHNIQUE, MAKING USE OF HIGH THROUGHPUT TECHNOLOGIES AS DESCRIBED BY CMS-2020-01-R: HCPCS | Performed by: INTERNAL MEDICINE

## 2020-08-07 LAB
SARS-COV-2 RNA RESP QL NAA+PROBE: NEGATIVE
SARS-COV-2 RNA RESP QL NAA+PROBE: NEGATIVE

## 2020-08-07 PROCEDURE — 87635 SARS-COV-2 COVID-19 AMP PRB: CPT | Performed by: INTERNAL MEDICINE

## 2020-08-10 ENCOUNTER — HOSPITAL ENCOUNTER (INPATIENT)
Facility: HOSPITAL | Age: 85
LOS: 3 days | Discharge: NURSING, REHAB OR LONG-TERM CARE | DRG: 813 | End: 2020-08-13
Attending: EMERGENCY MEDICINE | Admitting: INTERNAL MEDICINE
Payer: COMMERCIAL

## 2020-08-10 DIAGNOSIS — N18.4 STAGE 4 CHRONIC KIDNEY DISEASE (HCC): ICD-10-CM

## 2020-08-10 DIAGNOSIS — K92.2 GASTROINTESTINAL HEMORRHAGE, UNSPECIFIED GASTROINTESTINAL HEMORRHAGE TYPE: ICD-10-CM

## 2020-08-10 DIAGNOSIS — D50.0 IRON DEFICIENCY ANEMIA DUE TO CHRONIC BLOOD LOSS: ICD-10-CM

## 2020-08-10 DIAGNOSIS — K92.2 GI BLEED: Primary | ICD-10-CM

## 2020-08-10 DIAGNOSIS — I44.2 COMPLETE HEART BLOCK (HCC): ICD-10-CM

## 2020-08-10 DIAGNOSIS — I10 ESSENTIAL HYPERTENSION: ICD-10-CM

## 2020-08-10 DIAGNOSIS — I48.0 PAF (PAROXYSMAL ATRIAL FIBRILLATION) (HCC): ICD-10-CM

## 2020-08-10 LAB
ABO GROUP BLD: NORMAL
ANION GAP SERPL CALCULATED.3IONS-SCNC: 8 MMOL/L (ref 4–13)
ANION GAP SERPL CALCULATED.3IONS-SCNC: 8 MMOL/L (ref 4–13)
BASOPHILS # BLD AUTO: 0.1 THOUSANDS/ΜL (ref 0–0.1)
BASOPHILS NFR BLD AUTO: 1 % (ref 0–2)
BLD GP AB SCN SERPL QL: NEGATIVE
BUN SERPL-MCNC: 107 MG/DL (ref 7–25)
BUN SERPL-MCNC: 108 MG/DL (ref 7–25)
CALCIUM SERPL-MCNC: 8.8 MG/DL (ref 8.6–10.5)
CALCIUM SERPL-MCNC: 9.3 MG/DL (ref 8.6–10.5)
CHLORIDE SERPL-SCNC: 89 MMOL/L (ref 98–107)
CHLORIDE SERPL-SCNC: 91 MMOL/L (ref 98–107)
CO2 SERPL-SCNC: 35 MMOL/L (ref 21–31)
CO2 SERPL-SCNC: 35 MMOL/L (ref 21–31)
CREAT SERPL-MCNC: 3.26 MG/DL (ref 0.7–1.3)
CREAT SERPL-MCNC: 3.37 MG/DL (ref 0.7–1.3)
EOSINOPHIL # BLD AUTO: 0.5 THOUSAND/ΜL (ref 0–0.61)
EOSINOPHIL NFR BLD AUTO: 4 % (ref 0–5)
EOSINOPHIL NFR BLD AUTO: 5 % (ref 0–5)
EOSINOPHIL NFR BLD AUTO: 5 % (ref 0–5)
ERYTHROCYTE [DISTWIDTH] IN BLOOD BY AUTOMATED COUNT: 12.9 % (ref 11.5–14.5)
ERYTHROCYTE [DISTWIDTH] IN BLOOD BY AUTOMATED COUNT: 13 % (ref 11.5–14.5)
ERYTHROCYTE [DISTWIDTH] IN BLOOD BY AUTOMATED COUNT: 13.2 % (ref 11.5–14.5)
FERRITIN SERPL-MCNC: 388 NG/ML (ref 8–388)
GFR SERPL CREATININE-BSD FRML MDRD: 16 ML/MIN/1.73SQ M
GFR SERPL CREATININE-BSD FRML MDRD: 16 ML/MIN/1.73SQ M
GLUCOSE P FAST SERPL-MCNC: 100 MG/DL (ref 65–99)
GLUCOSE SERPL-MCNC: 100 MG/DL (ref 65–99)
GLUCOSE SERPL-MCNC: 179 MG/DL (ref 65–99)
HCT VFR BLD AUTO: 18.4 % (ref 42–47)
HCT VFR BLD AUTO: 20.4 % (ref 42–47)
HCT VFR BLD AUTO: 21.9 % (ref 42–47)
HGB BLD-MCNC: 6.4 G/DL (ref 14–18)
HGB BLD-MCNC: 7.2 G/DL (ref 14–18)
HGB BLD-MCNC: 7.3 G/DL (ref 14–18)
IRON SATN MFR SERPL: 14 %
IRON SERPL-MCNC: 42 UG/DL (ref 65–175)
LYMPHOCYTES # BLD AUTO: 1.1 THOUSANDS/ΜL (ref 0.6–4.47)
LYMPHOCYTES # BLD AUTO: 1.2 THOUSANDS/ΜL (ref 0.6–4.47)
LYMPHOCYTES # BLD AUTO: 1.3 THOUSANDS/ΜL (ref 0.6–4.47)
LYMPHOCYTES NFR BLD AUTO: 12 % (ref 21–51)
LYMPHOCYTES NFR BLD AUTO: 13 % (ref 21–51)
LYMPHOCYTES NFR BLD AUTO: 9 % (ref 21–51)
MCH RBC QN AUTO: 30.4 PG (ref 26–34)
MCH RBC QN AUTO: 31.3 PG (ref 26–34)
MCH RBC QN AUTO: 31.7 PG (ref 26–34)
MCHC RBC AUTO-ENTMCNC: 33.6 G/DL (ref 31–37)
MCHC RBC AUTO-ENTMCNC: 35 G/DL (ref 31–37)
MCHC RBC AUTO-ENTMCNC: 35.2 G/DL (ref 31–37)
MCV RBC AUTO: 90 FL (ref 81–99)
MONOCYTES # BLD AUTO: 0.8 THOUSAND/ΜL (ref 0.17–1.22)
MONOCYTES # BLD AUTO: 0.9 THOUSAND/ΜL (ref 0.17–1.22)
MONOCYTES # BLD AUTO: 1 THOUSAND/ΜL (ref 0.17–1.22)
MONOCYTES NFR BLD AUTO: 8 % (ref 2–12)
MONOCYTES NFR BLD AUTO: 8 % (ref 2–12)
MONOCYTES NFR BLD AUTO: 9 % (ref 2–12)
NEUTROPHILS # BLD AUTO: 7.4 THOUSANDS/ΜL (ref 1.4–6.5)
NEUTROPHILS # BLD AUTO: 7.5 THOUSANDS/ΜL (ref 1.4–6.5)
NEUTROPHILS # BLD AUTO: 9.8 THOUSANDS/ΜL (ref 1.4–6.5)
NEUTS SEG NFR BLD AUTO: 73 % (ref 42–75)
NEUTS SEG NFR BLD AUTO: 74 % (ref 42–75)
NEUTS SEG NFR BLD AUTO: 78 % (ref 42–75)
PLATELET # BLD AUTO: 269 THOUSANDS/UL (ref 149–390)
PLATELET # BLD AUTO: 294 THOUSANDS/UL (ref 149–390)
PLATELET # BLD AUTO: 365 THOUSANDS/UL (ref 149–390)
PMV BLD AUTO: 8.6 FL (ref 8.6–11.7)
PMV BLD AUTO: 8.6 FL (ref 8.6–11.7)
PMV BLD AUTO: 9.1 FL (ref 8.6–11.7)
POTASSIUM SERPL-SCNC: 4 MMOL/L (ref 3.5–5.5)
POTASSIUM SERPL-SCNC: 4.5 MMOL/L (ref 3.5–5.5)
RBC # BLD AUTO: 2.06 MILLION/UL (ref 4.3–5.9)
RBC # BLD AUTO: 2.27 MILLION/UL (ref 4.3–5.9)
RBC # BLD AUTO: 2.42 MILLION/UL (ref 4.3–5.9)
RH BLD: POSITIVE
SODIUM SERPL-SCNC: 132 MMOL/L (ref 134–143)
SODIUM SERPL-SCNC: 134 MMOL/L (ref 134–143)
SPECIMEN EXPIRATION DATE: NORMAL
TIBC SERPL-MCNC: 298 UG/DL (ref 250–450)
TROPONIN I SERPL-MCNC: 0.03 NG/ML
WBC # BLD AUTO: 10 THOUSAND/UL (ref 4.8–10.8)
WBC # BLD AUTO: 10.3 THOUSAND/UL (ref 4.8–10.8)
WBC # BLD AUTO: 12.5 THOUSAND/UL (ref 4.8–10.8)

## 2020-08-10 PROCEDURE — 30233N1 TRANSFUSION OF NONAUTOLOGOUS RED BLOOD CELLS INTO PERIPHERAL VEIN, PERCUTANEOUS APPROACH: ICD-10-PCS | Performed by: INTERNAL MEDICINE

## 2020-08-10 PROCEDURE — 86850 RBC ANTIBODY SCREEN: CPT | Performed by: PHYSICIAN ASSISTANT

## 2020-08-10 PROCEDURE — 99285 EMERGENCY DEPT VISIT HI MDM: CPT | Performed by: PHYSICIAN ASSISTANT

## 2020-08-10 PROCEDURE — 99223 1ST HOSP IP/OBS HIGH 75: CPT | Performed by: NURSE PRACTITIONER

## 2020-08-10 PROCEDURE — 86900 BLOOD TYPING SEROLOGIC ABO: CPT | Performed by: PHYSICIAN ASSISTANT

## 2020-08-10 PROCEDURE — 80048 BASIC METABOLIC PNL TOTAL CA: CPT | Performed by: INTERNAL MEDICINE

## 2020-08-10 PROCEDURE — 85025 COMPLETE CBC W/AUTO DIFF WBC: CPT | Performed by: INTERNAL MEDICINE

## 2020-08-10 PROCEDURE — 83550 IRON BINDING TEST: CPT | Performed by: NURSE PRACTITIONER

## 2020-08-10 PROCEDURE — 93010 ELECTROCARDIOGRAM REPORT: CPT | Performed by: INTERNAL MEDICINE

## 2020-08-10 PROCEDURE — 80048 BASIC METABOLIC PNL TOTAL CA: CPT | Performed by: PHYSICIAN ASSISTANT

## 2020-08-10 PROCEDURE — 85025 COMPLETE CBC W/AUTO DIFF WBC: CPT | Performed by: PHYSICIAN ASSISTANT

## 2020-08-10 PROCEDURE — 83540 ASSAY OF IRON: CPT | Performed by: NURSE PRACTITIONER

## 2020-08-10 PROCEDURE — 93005 ELECTROCARDIOGRAM TRACING: CPT

## 2020-08-10 PROCEDURE — C9113 INJ PANTOPRAZOLE SODIUM, VIA: HCPCS | Performed by: PHYSICIAN ASSISTANT

## 2020-08-10 PROCEDURE — 86901 BLOOD TYPING SEROLOGIC RH(D): CPT | Performed by: PHYSICIAN ASSISTANT

## 2020-08-10 PROCEDURE — 86920 COMPATIBILITY TEST SPIN: CPT

## 2020-08-10 PROCEDURE — 99285 EMERGENCY DEPT VISIT HI MDM: CPT

## 2020-08-10 PROCEDURE — 36415 COLL VENOUS BLD VENIPUNCTURE: CPT | Performed by: PHYSICIAN ASSISTANT

## 2020-08-10 PROCEDURE — 84484 ASSAY OF TROPONIN QUANT: CPT | Performed by: PHYSICIAN ASSISTANT

## 2020-08-10 PROCEDURE — 96374 THER/PROPH/DIAG INJ IV PUSH: CPT

## 2020-08-10 PROCEDURE — 82728 ASSAY OF FERRITIN: CPT | Performed by: NURSE PRACTITIONER

## 2020-08-10 PROCEDURE — P9016 RBC LEUKOCYTES REDUCED: HCPCS

## 2020-08-10 RX ORDER — CLONIDINE HYDROCHLORIDE 0.1 MG/1
0.1 TABLET ORAL 2 TIMES DAILY
Status: DISCONTINUED | OUTPATIENT
Start: 2020-08-10 | End: 2020-08-11

## 2020-08-10 RX ORDER — PRAVASTATIN SODIUM 40 MG
40 TABLET ORAL
Status: DISCONTINUED | OUTPATIENT
Start: 2020-08-10 | End: 2020-08-13 | Stop reason: HOSPADM

## 2020-08-10 RX ORDER — PANTOPRAZOLE SODIUM 40 MG/1
40 INJECTION, POWDER, FOR SOLUTION INTRAVENOUS EVERY 12 HOURS
Status: DISCONTINUED | OUTPATIENT
Start: 2020-08-10 | End: 2020-08-11

## 2020-08-10 RX ORDER — ACETAMINOPHEN 325 MG/1
650 TABLET ORAL EVERY 6 HOURS PRN
Status: DISCONTINUED | OUTPATIENT
Start: 2020-08-10 | End: 2020-08-13 | Stop reason: HOSPADM

## 2020-08-10 RX ORDER — METOPROLOL SUCCINATE 50 MG/1
100 TABLET, EXTENDED RELEASE ORAL 2 TIMES DAILY
Status: DISCONTINUED | OUTPATIENT
Start: 2020-08-10 | End: 2020-08-11

## 2020-08-10 RX ORDER — FUROSEMIDE 10 MG/ML
40 INJECTION INTRAMUSCULAR; INTRAVENOUS ONCE
Status: COMPLETED | OUTPATIENT
Start: 2020-08-10 | End: 2020-08-11

## 2020-08-10 RX ORDER — AMLODIPINE BESYLATE 10 MG/1
10 TABLET ORAL DAILY
Status: DISCONTINUED | OUTPATIENT
Start: 2020-08-10 | End: 2020-08-11

## 2020-08-10 RX ADMIN — CLONIDINE HYDROCHLORIDE 0.1 MG: 0.1 TABLET ORAL at 13:03

## 2020-08-10 RX ADMIN — PRAVASTATIN SODIUM 40 MG: 40 TABLET ORAL at 15:37

## 2020-08-10 RX ADMIN — AMLODIPINE BESYLATE 10 MG: 10 TABLET ORAL at 13:03

## 2020-08-10 RX ADMIN — METOPROLOL SUCCINATE 100 MG: 50 TABLET, EXTENDED RELEASE ORAL at 13:03

## 2020-08-10 RX ADMIN — METOPROLOL SUCCINATE 100 MG: 50 TABLET, EXTENDED RELEASE ORAL at 17:15

## 2020-08-10 RX ADMIN — SODIUM CHLORIDE 80 MG: 9 INJECTION, SOLUTION INTRAVENOUS at 10:16

## 2020-08-10 RX ADMIN — CLONIDINE HYDROCHLORIDE 0.1 MG: 0.1 TABLET ORAL at 17:15

## 2020-08-10 NOTE — H&P
H&P- Waylon Headings 1935, 80 y o  male MRN: [id number]    Unit/Bed#: -02 Encounter: [id number]    Primary Care Provider: Nancy Patel DO   Date and time admitted to hospital: 8/10/2020  9:44 AM        * GI bleeding  Assessment & Plan  · Patient with history of gastric ulcer in the past  · Noted to have drop in hemoglobin and occult positive; suspected to be exacerbated by Eliquis  · Gastroenterology input appreciated  · Planned for an EGD tomorrow  · IV Protonix  · Frequent H&H  · Will give 2 units of packed red blood cells transfusion today with furosemide after transfusion  · Will keep NPO after midnight    Anemia  Assessment & Plan  · Acute on chronic  · Normochromic normocytic  · Suspected some underlying iron deficiency  · Will check iron panel  · Will transfuse packed red blood cells today  · Monitor H&H closely    Paroxysmal A-fib (HCC)  Assessment & Plan  · Rate is controlled  · He was recently started on Eliquis 2 5 mg BID   · Will continue with BB for rate control   · Hold off on Eliquis and ASA for now  Stage 4 chronic kidney disease ([facility] [address] )  Assessment & Plan  · With acute kidney injury  · His baseline creatinine appears to be 2 1-2 3 mg/dL   · Today's creatinine level is at 3 37 mg/dL  · Suspected to be due to acute on chronic anemia with diuretic use  · Will hold diuresis at this time  · Will transfuse  · Check PVR and renal ultrasound  · Follow-up with BMP in the a m    · Consult Nephrology  · Avoid any hypotension and nephrotoxins    Essential hypertension  Assessment & Plan  · Blood pressure appears to be well controlled  · Continue with current medical management  · Monitor blood pressure closely    Atherosclerotic 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  Assessment & Plan  · With bioprosthetic AVR  · The patient recently had symptomatic complete heart block status post pacemaker placement on 07/24  · Will continue with medical management including beta-blocker, amlodipine, clonidine and statin  · Will hold Eliquis and aspirin for now  · Will consult cardiology for cardiac clearance    VTE Prophylaxis: Pharmacologic VTE Prophylaxis contraindicated due to GI bleeding, SCDs only   Code Status: Full Code   POLST: POLST is not applicable to this patient  Discussion with family: wife at bedside    Anticipated Length of Stay:  Patient will be admitted on an Inpatient basis with an anticipated length of stay of  > 2 midnights  Justification for Hospital Stay: GI bleeding     Chief Complaint:   "My blood count was low"    History of Present Illness:    Denver Ohara is a 80 y o  male with a PMH of HTN, CKD, complete heart block, paroxysmal Afib, CABG, aortic valve replacement, and posterior CVA who presents with a hemoglobin of 6 4 this morning at the [facility] ([facility]) where he was in rehabilitation following a 16-day hospitalization from 07/20/20 to 08/05/2020 for new onset complete heart block during which he had a pacemaker placement and was started on Eliquis for underlying PAF  History provided by patient and wife at bedside  The patient reports normal daily bowel movements since arrival at the [facility] but his wife reports that she was called last night and informed of blood in the stool noticed by the nursing staff  The patient has a history of a gastric ulcer bleed approximately 5 years ago requiring blood tranfusion and was treated with Protonix but has not been taking a PPI since then  In addition to Eliquis, he has been taking aspirin 81 mg since having a stroke several years ago  A repeat hemoglobin level in ED is 7 3  Patient does not recall his last colonoscopy or EGD  Denies recent trauma or fall  Denies fevers, chills, night sweats, recent unintentional weight loss or gain, chest pain, SOB, dizziness, lightheadedness, bruising, cough, hemoptysis, hematuria, or hematochezia       Review of Systems:  Review of Systems Constitutional: Negative for appetite change, chills, diaphoresis, fatigue, fever and unexpected weight change  HENT: Positive for hearing loss  Negative for congestion, dental problem, ear pain, postnasal drip and sore throat  Eyes: Negative  Negative for discharge, itching and visual disturbance  Respiratory: Negative for cough, choking, chest tightness, shortness of breath, wheezing and stridor  Cardiovascular: Negative for chest pain, palpitations and leg swelling  Gastrointestinal: Positive for blood in stool  Negative for abdominal distention, abdominal pain, anal bleeding, constipation, diarrhea, nausea, rectal pain and vomiting  Endocrine: Negative  Negative for cold intolerance and heat intolerance  Genitourinary: Negative  Negative for difficulty urinating, dysuria, flank pain, frequency and hematuria  Musculoskeletal: Positive for gait problem  Negative for back pain and neck pain  Skin: Positive for pallor  Negative for rash and wound  Neurological: Negative for dizziness, speech difficulty, weakness, light-headedness, numbness and headaches  Psychiatric/Behavioral: Negative  Negative for decreased concentration, dysphoric mood and hallucinations  Past Medical and Surgical History:   Past Medical History:   Diagnosis Date    Aortic stenosis     ECHO -4-14-14  MODERATE TO SEVERE AORTIC STENOSIS; MILD AROTIC INSUFFICIENCY - LAST ASSESSED 10/26/16; RESOLVED 6/8/16    Aortic valve disorder     LAST ASSESSED 10/8/15; 10/8/15    Hypertensive urgency 5/19/2019    Transient cerebral ischemia        Past Surgical History:   Procedure Laterality Date    AORTIC VALVE REPLACEMENT  06/30/2015    avr with 23 mm Livingston Magna Ease bioprosthetic    CATARACT EXTRACTION Right 06/05/2000    COLONOSCOPY      CORONARY ARTERY BYPASS GRAFT  06/05/2000    x1 with lima  to lad    POLYPECTOMY  04/2014    enteroscopic - esophageal ulcer, gastric erosion, and duodenal ulcer       TONSILLECTOMY      unknown       Meds/Allergies:  Prior to Admission medications    Medication Sig Start Date End Date Taking? Authorizing Provider   apixaban (ELIQUIS) 2 5 mg Take 1 tablet (2 5 mg total) by mouth 2 (two) times a day 7/26/20  Yes Jason Maza DO   aspirin (ECOTRIN LOW STRENGTH) 81 mg EC tablet Take 1 tablet (81 mg total) by mouth daily 8/6/20  Yes Velasquez Wagner MD   cloNIDine (CATAPRES) 0 1 mg tablet Take 1 tablet (0 1 mg total) by mouth daily  Patient taking differently: Take 0 1 mg by mouth 2 (two) times a day  8/6/20  Yes Velasquez Wagner MD   docusate sodium (COLACE) 100 mg capsule Take 1 capsule (100 mg total) by mouth 2 (two) times a day 8/5/20  Yes Velasquez Wagner MD   metoprolol succinate (TOPROL-XL) 100 mg 24 hr tablet Take 1 tablet (100 mg total) by mouth 2 (two) times a day 8/5/20  Yes Velasquez Wagner MD   pantoprazole (PROTONIX) 40 mg tablet Take 1 tablet by mouth daily 6/7/19  Yes Historical Provider, MD   polyethylene glycol (MIRALAX) 17 g packet Take 17 g by mouth daily 8/6/20  Yes Velasquez Wagner MD   pravastatin (PRAVACHOL) 40 mg tablet Take 1 tablet (40 mg total) by mouth daily with dinner 8/5/20  Yes Velasquez Wagner MD   senna (SENOKOT) 8 6 mg Take 1 tablet (8 6 mg total) by mouth daily at bedtime 8/5/20  Yes Velasquez Wagner MD   torsemide (DEMADEX) 20 mg tablet Take 1 tablet (20 mg total) by mouth 2 (two) times a day 8/5/20  Yes Velasquez Wagner MD   amLODIPine (NORVASC) 10 mg tablet Take 1 tablet (10 mg total) by mouth 2 (two) times a day  Patient taking differently: Take 10 mg by mouth daily  8/5/20   Velasquez Wagner MD   bisacodyl (DULCOLAX) 5 mg EC tablet Take 2 tablets (10 mg total) by mouth daily as needed for constipation 8/5/20   Velasquez Wagner MD     I have reveiwed home medications using records provided by [facility]  Allergies:    Allergies   Allergen Reactions    Promethazine Delirium       Social History:  Marital Status: /Civil Union   Occupation: retired farmer   Patient Pre-hospital Living Situation: [place] with wife and son prior to recent hospitalization and SNF  Patient Pre-hospital Level of Mobility: uses walker  Patient Pre-hospital Diet Restrictions: none  Substance Use History:     Social History     Substance and Sexual Activity   Alcohol Use Never    Alcohol/week: 1 0 standard drinks    Types: 1 Cans of beer per week    Frequency: Monthly or less    Drinks per session: 1 or 2    Binge frequency: Never    Comment: social     Social History     Tobacco Use   Smoking Status Never Smoker   Smokeless Tobacco Never Used     Social History     Substance and Sexual Activity   Drug Use Never       Family History:  I have reviewed the patients family history    Physical Exam:   Vitals:   Blood Pressure: 135/59 (08/10/20 1139)  Pulse: 62 (08/10/20 1139)  Temperature: (!) 97 2 °F (36 2 °C) (08/10/20 1139)  Temp Source: Temporal (08/10/20 1139)  Respirations: 18 (08/10/20 1139)  Height: 5' 4" (162 6 cm) (08/10/20 1139)  Weight - Scale: 88 9 kg (195 lb 15 8 oz) (08/10/20 1139)  SpO2: 96 % (08/10/20 1139)    Physical Exam  Vitals signs and nursing note reviewed  Constitutional:       General: He is not in acute distress  Appearance: Normal appearance  He is normal weight  He is ill-appearing  He is not toxic-appearing or diaphoretic  HENT:      Head: Normocephalic and atraumatic  Right Ear: External ear normal       Left Ear: External ear normal       Nose: Nose normal       Mouth/Throat:      Mouth: Mucous membranes are dry  Pharynx: Oropharynx is clear  Eyes:      General: No scleral icterus  Right eye: No discharge  Left eye: No discharge  Extraocular Movements: Extraocular movements intact  Pupils: Pupils are equal, round, and reactive to light  Neck:      Musculoskeletal: Normal range of motion and neck supple  No neck rigidity     Cardiovascular:      Rate and Rhythm: Normal rate and regular rhythm  Pulses: Normal pulses  Dorsalis pedis pulses are 2+ on the right side and 2+ on the left side  Posterior tibial pulses are 2+ on the right side and 2+ on the left side  Heart sounds: Normal heart sounds  No murmur  Comments: 1+ pitting edema present bilateral posterior medial malleolus, no anterior lower extremity pitting edema  Left chest wall with steri-strips on  Pacemaker present  Pulmonary:      Effort: Pulmonary effort is normal  No respiratory distress  Breath sounds: Rales (bases) present  No wheezing  Chest:       Abdominal:      General: Abdomen is flat  Bowel sounds are normal  There is no distension  Palpations: Abdomen is soft  There is no mass  Tenderness: There is no abdominal tenderness  Genitourinary:     Rectum: Guaiac result positive (performed in the ED)  Musculoskeletal: Normal range of motion  General: No swelling, tenderness or deformity  Skin:     General: Skin is warm and dry  Capillary Refill: Capillary refill takes less than 2 seconds  Coloration: Skin is pale  Findings: No erythema  Neurological:      General: No focal deficit present  Mental Status: He is alert and oriented to person, place, and time  Mental status is at baseline  Psychiatric:         Mood and Affect: Mood normal          Behavior: Behavior normal          Thought Content: Thought content normal          Additional Data:   Lab Results: I have personally reviewed pertinent reports          Results from last 7 days   Lab Units 08/10/20  1002   WBC Thousand/uL 12 50*   RBC Million/uL 2 42*   HEMOGLOBIN g/dL 7 3*   HEMATOCRIT % 21 9*   PLATELETS Thousands/uL 365   NEUTROS PCT % 78*   LYMPHS PCT % 9*   MONOS PCT % 8   EOS PCT % 4     Results from last 7 days   Lab Units 08/10/20  1002   SODIUM mmol/L 132*   POTASSIUM mmol/L 4 0   CHLORIDE mmol/L 89*   CO2 mmol/L 35*   BUN mg/dL 107*   CREATININE mg/dL 3 37*   CALCIUM mg/dL 9 3         Results from last 7 days   Lab Units 08/05/20  1042 08/05/20  0611   POC GLUCOSE mg/dl 141* 105             Imaging: I have personally reviewed pertinent reports  US kidney and bladder    (Results Pending)       EKG, Pathology, and Other Studies Reviewed on Admission:   · 7/21/20 LVEF of 60 % mild pulmonary hypertension    Epic Records Reviewed: Yes     ** Please Note: This note has been constructed using a voice recognition system   **

## 2020-08-10 NOTE — CONSULTS
Consultation - GI   Renetta Moseley 80 y o  male MRN: [id number]  Unit/Bed#: -02 Encounter: [id number]      Assessment/Plan     Assessment:  Chronic GI bleed, exacerbated by Eliquis  Plan:  EGD tomorrow  If this is negative would plan colonoscopy on Wednesday  Treat with IV Protonix  History of Present Illness   Physician Requesting Consult: Nesha Russo MD  Reason for Consult / Principal Problem:  Chronic GI bleed  Hx and PE limited by:   HPI: Renetta Moseley is a 80y o  year old male who presents with worsening anemia with heme-positive stools  Hemoglobin in 1 week drop from 10 to 7  The patient has mild dyspnea on exertion  He denies any chest pain  He has been fatigued  His wife tells me that he looks pale  There has been no obvious visible blood in the stool  Consults    Review of Systems   Constitutional: Positive for activity change, appetite change and fatigue  HENT: Negative  Eyes: Negative  Respiratory: Negative  Cardiovascular: Negative  Gastrointestinal: Negative  Genitourinary: Negative  Musculoskeletal: Negative  Allergic/Immunologic: Negative  Neurological: Negative  Psychiatric/Behavioral: Negative  Historical Information   Past Medical History:   Diagnosis Date    Aortic stenosis     ECHO -4-14-14  MODERATE TO SEVERE AORTIC STENOSIS; MILD AROTIC INSUFFICIENCY - LAST ASSESSED 10/26/16; RESOLVED 6/8/16    Aortic valve disorder     LAST ASSESSED 10/8/15; 10/8/15    Hypertensive urgency 5/19/2019    Transient cerebral ischemia      Past Surgical History:   Procedure Laterality Date    AORTIC VALVE REPLACEMENT  06/30/2015    avr with 23 mm Livingston Magna Ease bioprosthetic    CATARACT EXTRACTION Right 06/05/2000    COLONOSCOPY      CORONARY ARTERY BYPASS GRAFT  06/05/2000    x1 with lima  to lad    POLYPECTOMY  04/2014    enteroscopic - esophageal ulcer, gastric erosion, and duodenal ulcer       TONSILLECTOMY      unknown     Social History   Social History     Substance and Sexual Activity   Alcohol Use Never    Alcohol/week: 1 0 standard drinks    Types: 1 Cans of beer per week    Frequency: Monthly or less    Drinks per session: 1 or 2    Binge frequency: Never    Comment: social     Social History     Substance and Sexual Activity   Drug Use Never     E-Cigarette/Vaping    E-Cigarette Use Never User      E-Cigarette/Vaping Substances     Social History     Tobacco Use   Smoking Status Never Smoker   Smokeless Tobacco Never Used     Current Facility-Administered Medications   Medication Dose Route Frequency Provider Last Rate Last Dose    acetaminophen (TYLENOL) tablet 650 mg  650 mg Oral Q6H PRN Alec Ambrosia, CRNP        amLODIPine (NORVASC) tablet 10 mg  10 mg Oral Daily Alec Ambrosia, CRNP   10 mg at 08/10/20 1303    cloNIDine (CATAPRES) tablet 0 1 mg  0 1 mg Oral BID Alec Ambrosia, CRNP   0 1 mg at 08/10/20 1303    metoprolol succinate (TOPROL-XL) 24 hr tablet 100 mg  100 mg Oral BID Alec Ambrosia, CRNP   100 mg at 08/10/20 1303    pantoprazole (PROTONIX) injection 40 mg  40 mg Intravenous Q12H Alec Ambrosia, CRNP        pneumococcal 13-valent conjugate vaccine (PREVNAR-13) IM injection 0 5 mL  0 5 mL Intramuscular Prior to discharge Tristian Toney MD        pravastatin (PRAVACHOL) tablet 40 mg  40 mg Oral Daily With Dinner Alec Ambrosia, CRNP         Family History: non-contributory    Meds/Allergies   all current active meds have been reviewed    Allergies   Allergen Reactions    Promethazine Delirium       Objective       Intake/Output Summary (Last 24 hours) at 8/10/2020 1323  Last data filed at 8/10/2020 1031  Gross per 24 hour   Intake 100 ml   Output    Net 100 ml       Invasive Devices:   Peripheral IV 08/10/20 Right Forearm (Active)   Site Assessment Clean;Dry; Intact 08/10/20 1149   Dressing Type Transparent 08/10/20 1149   Line Status Flushed 08/10/20 1149   Dressing Status Clean;Dry; Intact 08/10/20 1149     Vitals:    08/10/20 0941 08/10/20 1000 08/10/20 1100 08/10/20 1139   BP: 135/74 138/65 126/60 135/59   BP Location: Left arm   Right arm   Pulse: 63 60 61 62   Resp: 18 (!) 11 22 18   Temp: 97 5 °F (36 4 °C)   (!) 97 2 °F (36 2 °C)   TempSrc: Temporal   Temporal   SpO2: 99% 90% 98% 96%   Weight: 91 2 kg (201 lb)   88 9 kg (195 lb 15 8 oz)   Height:    5' 4" (1 626 m)     Physical Exam  Constitutional:       Appearance: He is normal weight  HENT:      Head: Normocephalic and atraumatic  Cardiovascular:      Rate and Rhythm: Normal rate and regular rhythm  Abdominal:      General: Abdomen is flat  Bowel sounds are normal       Palpations: Abdomen is soft  Skin:     General: Skin is warm and dry  Neurological:      General: No focal deficit present  Mental Status: He is alert  Psychiatric:         Mood and Affect: Mood normal          Behavior: Behavior normal          Lab Results: I have personally reviewed pertinent reports  Imaging Studies: I have personally reviewed pertinent reports  EKG, Pathology, and Other Studies: I have personally reviewed pertinent reports  VTE Prophylaxis: Reason for no pharmacologic prophylaxis GI bleed    Counseling / Coordination of Care  Total floor / unit time spent today 45 minutes  Greater than 50% of total time was spent with the patient and / or family counseling and / or coordination of care  A description of the counseling / coordination of care:  Case discussed with hospital physician and patient

## 2020-08-10 NOTE — ASSESSMENT & PLAN NOTE
· With bioprosthetic AVR  · The patient recently had symptomatic complete heart block status post pacemaker placement on 07/24  · Will continue with medical management including beta-blocker, amlodipine, clonidine and statin  · Will hold Eliquis and aspirin for now  · Will consult cardiology for cardiac clearance

## 2020-08-10 NOTE — ASSESSMENT & PLAN NOTE
· Rate is controlled  · He was recently started on Eliquis 2 5 mg BID   · Will continue with BB for rate control   · Hold off on Eliquis and ASA for now

## 2020-08-10 NOTE — ED PROVIDER NOTES
History  Chief Complaint   Patient presents with    GI Bleeding     80-year-old male history of recent pacemaker placement on Eliquis presents from acute rehab facility via EMS with complaints of low hemoglobin  He had blood work done her this morning and was found have a hemoglobin of 6 4  He denies any complaints at this time  He denies feeling dizzy, lightheaded or having any syncopal events  Denies any chest pain, shortness of breath, abdominal pain or any dark stools or hematuria  Prior to Admission Medications   Prescriptions Last Dose Informant Patient Reported? Taking?    amLODIPine (NORVASC) 10 mg tablet   No No   Sig: Take 1 tablet (10 mg total) by mouth 2 (two) times a day   Patient taking differently: Take 10 mg by mouth daily    apixaban (ELIQUIS) 2 5 mg 8/9/2020 at Unknown time  No Yes   Sig: Take 1 tablet (2 5 mg total) by mouth 2 (two) times a day   aspirin (ECOTRIN LOW STRENGTH) 81 mg EC tablet 8/9/2020 at Unknown time  No Yes   Sig: Take 1 tablet (81 mg total) by mouth daily   bisacodyl (DULCOLAX) 5 mg EC tablet   No No   Sig: Take 2 tablets (10 mg total) by mouth daily as needed for constipation   cloNIDine (CATAPRES) 0 1 mg tablet 8/9/2020 at Unknown time  No Yes   Sig: Take 1 tablet (0 1 mg total) by mouth daily   docusate sodium (COLACE) 100 mg capsule 8/9/2020 at Unknown time  No Yes   Sig: Take 1 capsule (100 mg total) by mouth 2 (two) times a day   metoprolol succinate (TOPROL-XL) 100 mg 24 hr tablet 8/9/2020 at Unknown time  No Yes   Sig: Take 1 tablet (100 mg total) by mouth 2 (two) times a day   pantoprazole (PROTONIX) 40 mg tablet 8/9/2020 at Unknown time  Yes Yes   Sig: Take 1 tablet by mouth daily   polyethylene glycol (MIRALAX) 17 g packet 8/9/2020 at Unknown time  No Yes   Sig: Take 17 g by mouth daily   pravastatin (PRAVACHOL) 40 mg tablet 8/9/2020 at Unknown time  No Yes   Sig: Take 1 tablet (40 mg total) by mouth daily with dinner   senna (SENOKOT) 8 6 mg 8/9/2020 at Unknown time  No Yes   Sig: Take 1 tablet (8 6 mg total) by mouth daily at bedtime   torsemide (DEMADEX) 20 mg tablet 8/9/2020 at Unknown time  No Yes   Sig: Take 1 tablet (20 mg total) by mouth 2 (two) times a day      Facility-Administered Medications: None       Past Medical History:   Diagnosis Date    Aortic stenosis     ECHO -4-14-14  MODERATE TO SEVERE AORTIC STENOSIS; MILD AROTIC INSUFFICIENCY - LAST ASSESSED 10/26/16; RESOLVED 6/8/16    Aortic valve disorder     LAST ASSESSED 10/8/15; 10/8/15    Hypertensive urgency 5/19/2019    Transient cerebral ischemia        Past Surgical History:   Procedure Laterality Date    AORTIC VALVE REPLACEMENT  06/30/2015    avr with 23 mm [place] Magna Ease bioprosthetic    CATARACT EXTRACTION Right 06/05/2000    COLONOSCOPY      CORONARY ARTERY BYPASS GRAFT  06/05/2000    x1 with lima  to lad    POLYPECTOMY  04/2014    enteroscopic - esophageal ulcer, gastric erosion, and duodenal ulcer   TONSILLECTOMY      unknown       Family History   Problem Relation Age of Onset    No Known Problems Mother     No Known Problems Father     Coronary artery disease Neg Hx      I have reviewed and agree with the history as documented  E-Cigarette/Vaping    E-Cigarette Use Never User      E-Cigarette/Vaping Substances     Social History     Tobacco Use    Smoking status: Never Smoker    Smokeless tobacco: Never Used   Substance Use Topics    Alcohol use: Yes     Alcohol/week: 1 0 standard drinks     Types: 1 Cans of beer per week     Frequency: Monthly or less     Drinks per session: 1 or 2     Binge frequency: Never     Comment: social    Drug use: No       Review of Systems   Constitutional: Negative for chills, fatigue and fever  HENT: Negative for congestion and sore throat  Eyes: Negative for pain  Respiratory: Negative for cough, chest tightness, shortness of breath and wheezing  Cardiovascular: Negative for chest pain, palpitations and leg swelling  Gastrointestinal: Negative for abdominal pain, constipation, diarrhea, nausea and vomiting  Endocrine: Negative for polyuria  Genitourinary: Negative for dysuria  Musculoskeletal: Negative for arthralgias, back pain, myalgias and neck pain  Skin: Negative for rash  Neurological: Negative for dizziness, syncope, light-headedness and headaches  All other systems reviewed and are negative  Physical Exam  Physical Exam  Vitals signs reviewed  Constitutional:       Appearance: He is well-developed  HENT:      Head: Normocephalic and atraumatic  Neck:      Musculoskeletal: Normal range of motion  Cardiovascular:      Rate and Rhythm: Normal rate and regular rhythm  Heart sounds: Normal heart sounds  Pulmonary:      Effort: Pulmonary effort is normal       Breath sounds: Normal breath sounds  Abdominal:      General: Bowel sounds are normal       Palpations: Abdomen is soft  Tenderness: There is no abdominal tenderness  Genitourinary:     Rectum: Guaiac result positive  Musculoskeletal: Normal range of motion  Skin:     General: Skin is warm and dry  Capillary Refill: Capillary refill takes less than 2 seconds  Neurological:      Mental Status: He is alert and oriented to person, place, and time           Vital Signs  ED Triage Vitals [08/10/20 0941]   Temperature Pulse Respirations Blood Pressure SpO2   97 5 °F (36 4 °C) 63 18 135/74 99 %      Temp Source Heart Rate Source Patient Position - Orthostatic VS BP Location FiO2 (%)   Temporal Monitor Lying Left arm --      Pain Score       --           Vitals:    08/10/20 0941 08/10/20 1000 08/10/20 1100   BP: 135/74 138/65 126/60   Pulse: 63 60 61   Patient Position - Orthostatic VS: Lying           Visual Acuity      ED Medications  Medications   pantoprazole (PROTONIX) 80 mg in sodium chloride 0 9 % 100 mL IVPB (0 mg Intravenous Stopped 8/10/20 1031)       Diagnostic Studies  Results Reviewed     Procedure Component Value Units Date/Time    Basic metabolic panel [[id number]]  (Abnormal) Collected:  08/10/20 1002    Lab Status:  Final result Specimen:  Blood from Arm, Right Updated:  08/10/20 1102     Sodium 132 mmol/L      Potassium 4 0 mmol/L      Chloride 89 mmol/L      CO2 35 mmol/L      ANION GAP 8 mmol/L       mg/dL      Creatinine 3 37 mg/dL      Glucose 179 mg/dL      Calcium 9 3 mg/dL      eGFR 16 ml/min/1 73sq m     Narrative:       National Kidney Disease Foundation guidelines for Chronic Kidney Disease (CKD):     Stage 1 with normal or high GFR (GFR > 90 mL/min/1 73 square meters)    Stage 2 Mild CKD (GFR = 60-89 mL/min/1 73 square meters)    Stage 3A Moderate CKD (GFR = 45-59 mL/min/1 73 square meters)    Stage 3B Moderate CKD (GFR = 30-44 mL/min/1 73 square meters)    Stage 4 Severe CKD (GFR = 15-29 mL/min/1 73 square meters)    Stage 5 End Stage CKD (GFR <15 mL/min/1 73 square meters)  Note: GFR calculation is accurate only with a steady state creatinine    Troponin I [[id number]]  (Normal) Collected:  08/10/20 1002    Lab Status:  Final result Specimen:  Blood from Arm, Right Updated:  08/10/20 1028     Troponin I 0 03 ng/mL     CBC and differential [[id number]]  (Abnormal) Collected:  08/10/20 1002    Lab Status:  Final result Specimen:  Blood from Arm, Right Updated:  08/10/20 1011     WBC 12 50 Thousand/uL      RBC 2 42 Million/uL      Hemoglobin 7 3 g/dL      Hematocrit 21 9 %      MCV 90 fL      MCH 30 4 pg      MCHC 33 6 g/dL      RDW 13 2 %      MPV 8 6 fL      Platelets 287 Thousands/uL      Neutrophils Relative 78 %      Lymphocytes Relative 9 %      Monocytes Relative 8 %      Eosinophils Relative 4 %      Basophils Relative 1 %      Neutrophils Absolute 9 80 Thousands/µL      Lymphocytes Absolute 1 10 Thousands/µL      Monocytes Absolute 1 00 Thousand/µL      Eosinophils Absolute 0 50 Thousand/µL      Basophils Absolute 0 10 Thousands/µL                  No orders to display Procedures  Procedures         ED Course       US AUDIT      Most Recent Value   Initial Alcohol Screen: US AUDIT-C    1  How often do you have a drink containing alcohol?  0 Filed at: 08/10/2020 0943   2  How many drinks containing alcohol do you have on a typical day you are drinking? 0 Filed at: 08/10/2020 0943   3a  Male UNDER 65: How often do you have five or more drinks on one occasion? 0 Filed at: 08/10/2020 0943   3b  FEMALE Any Age, or MALE 65+: How often do you have 4 or more drinks on one occassion? 0 Filed at: 08/10/2020 0943   Audit-C Score  0 Filed at: 08/10/2020 1681                  JUDI/DAST-10      Most Recent Value   How many times in the past year have you    Used an illegal drug or used a prescription medication for non-medical reasons? Never Filed at: 08/10/2020 0943                                MDM  Number of Diagnoses or Management Options  GI bleed:   Diagnosis management comments: Spoke to Dr Rajiv Mahajan of GI  Wants patient admitted, and will be evaluated  Discussed case with Dr Jordan Montalvo recommended protonix  Patient agreeable to plan  Disposition  Final diagnoses:   GI bleed     Time reflects when diagnosis was documented in both MDM as applicable and the Disposition within this note     Time User Action Codes Description Comment    8/10/2020 11:05 AM iWlma Led Add [K92 2] GI bleed       ED Disposition     ED Disposition Condition Date/Time Comment    Admit Stable Mon Aug 10, 2020 11:05 AM Case was discussed with RUPERT and the patient's admission status was agreed to be Admission Status: inpatient status to the service of Dr Barbee Meckel          Follow-up Information     Follow up With Specialties Details Why Contact Info    Alison Rahman MD Orthopedic Surgery, Hand Surgery, Orthopedics Schedule an appointment as soon as possible for a visit   [address]  [place] [address]  [phone]            Patient's Medications   Discharge Prescriptions    No medications on file     No discharge procedures on file      PDMP Review     None          ED Provider  Electronically Signed by           Michael Rashid PA-C  08/10/20 3935

## 2020-08-10 NOTE — ASSESSMENT & PLAN NOTE
· Blood pressure appears to be well controlled  · Continue with current medical management  · Monitor blood pressure closely

## 2020-08-10 NOTE — ASSESSMENT & PLAN NOTE
· Patient with history of gastric ulcer in the past  · Noted to have drop in hemoglobin and occult positive; suspected to be exacerbated by Eliquis  · Gastroenterology input appreciated  · Planned for an EGD tomorrow  · IV Protonix  · Frequent H&H  · Will give 2 units of packed red blood cells transfusion today with furosemide after transfusion  · Will keep NPO after midnight

## 2020-08-10 NOTE — PLAN OF CARE
Problem: Potential for Falls  Goal: Patient will remain free of falls  Description: INTERVENTIONS:  - Assess patient frequently for physical needs  -  Identify cognitive and physical deficits and behaviors that affect risk of falls    -  [place] fall precautions as indicated by assessment   - Educate patient/family on patient safety including physical limitations  - Instruct patient to call for assistance with activity based on assessment  - Modify environment to reduce risk of injury  - Consider OT/PT consult to assist with strengthening/mobility  Outcome: Progressing     Problem: CARDIOVASCULAR - ADULT  Goal: Maintains optimal cardiac output and hemodynamic stability  Description: INTERVENTIONS:  - Monitor I/O, vital signs and rhythm  - Monitor for S/S and trends of decreased cardiac output  - Assess quality of pulses, skin color and temperature  - Assess for signs of decreased coronary artery perfusion  - Instruct patient to report change in severity of symptoms  Outcome: Progressing     Problem: GASTROINTESTINAL - ADULT  Goal: Maintains or returns to baseline bowel function  Description: INTERVENTIONS:  - Assess bowel function  - Encourage oral fluids to ensure adequate hydration  - Administer IV fluids if ordered to ensure adequate hydration  - Administer ordered medications as needed  - Encourage mobilization and activity  - Consider nutritional services referral to assist patient with adequate nutrition and appropriate food choices  Outcome: Progressing  Goal: Maintains adequate nutritional intake  Description: INTERVENTIONS:  - Monitor percentage of each meal consumed  - Identify factors contributing to decreased intake, treat as appropriate  - Assist with meals as needed  - Monitor I&O, weight, and lab values if indicated  - Obtain nutrition services referral as needed  Outcome: Progressing     Problem: METABOLIC, FLUID AND ELECTROLYTES - ADULT  Goal: Electrolytes maintained within normal limits  Description: INTERVENTIONS:  - Monitor labs and assess patient for signs and symptoms of electrolyte imbalances  - Administer electrolyte replacement as ordered  - Monitor response to electrolyte replacements, including repeat lab results as appropriate  - Instruct patient on fluid and nutrition as appropriate  Outcome: Progressing  Goal: Fluid balance maintained  Description: INTERVENTIONS:  - Monitor labs   - Monitor I/O and WT  - Instruct patient on fluid and nutrition as appropriate  - Assess for signs & symptoms of volume excess or deficit  Outcome: Progressing     Problem: SKIN/TISSUE INTEGRITY - ADULT  Goal: Skin integrity remains intact  Description: INTERVENTIONS  - Identify patients at risk for skin breakdown  - Assess and monitor skin integrity  - Assess and monitor nutrition and hydration status  - Monitor labs (i e  albumin)  - Assess for incontinence   - Turn and reposition patient  - Assist with mobility/ambulation  - Relieve pressure over bony prominences  - Avoid friction and shearing  - Provide appropriate hygiene as needed including keeping skin clean and dry  - Evaluate need for skin moisturizer/barrier cream  - Collaborate with interdisciplinary team (i e  Nutrition, Rehabilitation, etc )   - Patient/family teaching  Outcome: Progressing     Problem: HEMATOLOGIC - ADULT  Goal: Maintains hematologic stability  Description: INTERVENTIONS  - Assess for signs and symptoms of bleeding or hemorrhage  - Monitor labs  - Administer supportive blood products/factors as ordered and appropriate  Outcome: Progressing     Problem: MUSCULOSKELETAL - ADULT  Goal: Maintain or return mobility to safest level of function  Description: INTERVENTIONS:  - Assess patient's ability to carry out ADLs; assess patient's baseline for ADL function and identify physical deficits which impact ability to perform ADLs (bathing, care of mouth/teeth, toileting, grooming, dressing, etc )  - Assess/evaluate cause of self-care deficits   - Assess range of motion  - Assess patient's mobility  - Assess patient's need for assistive devices and provide as appropriate  - Encourage maximum independence but intervene and supervise when necessary  - Involve family in performance of ADLs  - Assess for home care needs following discharge   - Consider OT consult to assist with ADL evaluation and planning for discharge  - Provide patient education as appropriate  Outcome: Progressing     Problem: PAIN - ADULT  Goal: Verbalizes/displays adequate comfort level or baseline comfort level  Description: Interventions:  - Encourage patient to monitor pain and request assistance  - Assess pain using appropriate pain scale  - Administer analgesics based on type and severity of pain and evaluate response  - Implement non-pharmacological measures as appropriate and evaluate response  - Consider cultural and social influences on pain and pain management  - Notify physician/advanced practitioner if interventions unsuccessful or patient reports new pain  Outcome: Progressing     Problem: INFECTION - ADULT  Goal: Absence or prevention of progression during hospitalization  Description: INTERVENTIONS:  - Assess and monitor for signs and symptoms of infection  - Monitor lab/diagnostic results  - Monitor all insertion sites, i e  indwelling lines, tubes, and drains  - [place] appropriate cooling/warming therapies per order  - Administer medications as ordered  - Instruct and encourage patient and family to use good hand hygiene technique  Outcome: Progressing     Problem: SAFETY ADULT  Goal: Patient will remain free of falls  Description: INTERVENTIONS:  - Assess patient frequently for physical needs  -  Identify cognitive and physical deficits and behaviors that affect risk of falls    -  [place] fall precautions as indicated by assessment   - Educate patient/family on patient safety including physical limitations  - Instruct patient to call for assistance with activity based on assessment  - Modify environment to reduce risk of injury  - Consider OT/PT consult to assist with strengthening/mobility  Outcome: Progressing  Goal: Maintain or return to baseline ADL function  Description: INTERVENTIONS:  -  Assess patient's ability to carry out ADLs; assess patient's baseline for ADL function and identify physical deficits which impact ability to perform ADLs (bathing, care of mouth/teeth, toileting, grooming, dressing, etc )  - Assess/evaluate cause of self-care deficits   - Assess range of motion  - Assess patient's mobility; develop plan if impaired  - Assess patient's need for assistive devices and provide as appropriate  - Encourage maximum independence but intervene and supervise when necessary  - Involve family in performance of ADLs  - Assess for home care needs following discharge   - Consider OT consult to assist with ADL evaluation and planning for discharge  - Provide patient education as appropriate  Outcome: Progressing  Goal: Maintain or return mobility status to optimal level  Description: INTERVENTIONS:  - Assess patient's baseline mobility status (ambulation, transfers, stairs, etc )    - Identify cognitive and physical deficits and behaviors that affect mobility  - Identify mobility aids required to assist with transfers and/or ambulation (gait belt, sit-to-stand, lift, walker, cane, etc )  - [place] fall precautions as indicated by assessment  - Record patient progress and toleration of activity level on Mobility SBAR; progress patient to next Phase/Stage  - Instruct patient to call for assistance with activity based on assessment  - Consider rehabilitation consult to assist with strengthening/weightbearing, etc   Outcome: Progressing     Problem: DISCHARGE PLANNING  Goal: Discharge to home or other facility with appropriate resources  Description: INTERVENTIONS:  - Identify barriers to discharge w/patient and caregiver  - Arrange for needed discharge resources and transportation as appropriate  - Identify discharge learning needs (meds, wound care, etc )  - Refer to Case Management Department for coordinating discharge planning if the patient needs post-hospital services based on physician/advanced practitioner order or complex needs related to functional status, cognitive ability, or social support system  Outcome: Progressing     Problem: Knowledge Deficit  Goal: Patient/family/caregiver demonstrates understanding of disease process, treatment plan, medications, and discharge instructions  Description: Complete learning assessment and assess knowledge base    Interventions:  - Provide teaching at level of understanding  - Provide teaching via preferred learning methods  Outcome: Progressing

## 2020-08-10 NOTE — ASSESSMENT & PLAN NOTE
· Acute on chronic  · Normochromic normocytic  · Suspected some underlying iron deficiency  · Will check iron panel  · Will transfuse packed red blood cells today  · Monitor H&H closely

## 2020-08-10 NOTE — ASSESSMENT & PLAN NOTE
· With acute kidney injury  · His baseline creatinine appears to be 2 1-2 3 mg/dL   · Today's creatinine level is at 3 37 mg/dL  · Suspected to be due to acute on chronic anemia with diuretic use  · Will hold diuresis at this time  · Will transfuse  · Check PVR and renal ultrasound  · Follow-up with BMP in the a m    · Consult Nephrology  · Avoid any hypotension and nephrotoxins

## 2020-08-11 ENCOUNTER — APPOINTMENT (INPATIENT)
Dept: PERIOP | Facility: HOSPITAL | Age: 85
DRG: 813 | End: 2020-08-11
Attending: INTERNAL MEDICINE
Payer: COMMERCIAL

## 2020-08-11 ENCOUNTER — APPOINTMENT (INPATIENT)
Dept: ULTRASOUND IMAGING | Facility: HOSPITAL | Age: 85
DRG: 813 | End: 2020-08-11
Payer: COMMERCIAL

## 2020-08-11 ENCOUNTER — ANESTHESIA EVENT (INPATIENT)
Dept: PERIOP | Facility: HOSPITAL | Age: 85
DRG: 813 | End: 2020-08-11
Payer: COMMERCIAL

## 2020-08-11 ENCOUNTER — ANESTHESIA (INPATIENT)
Dept: PERIOP | Facility: HOSPITAL | Age: 85
DRG: 813 | End: 2020-08-11
Payer: COMMERCIAL

## 2020-08-11 PROBLEM — Z95.0 PACEMAKER: Chronic | Status: ACTIVE | Noted: 2020-08-11

## 2020-08-11 PROBLEM — I44.2 COMPLETE HEART BLOCK (HCC): Status: RESOLVED | Noted: 2020-07-20 | Resolved: 2020-08-11

## 2020-08-11 PROBLEM — I25.810 ATHEROSCLEROSIS OF CORONARY ARTERY BYPASS GRAFT OF NATIVE HEART WITHOUT ANGINA PECTORIS: Status: ACTIVE | Noted: 2020-08-11

## 2020-08-11 PROBLEM — R79.89 AZOTEMIA: Status: ACTIVE | Noted: 2020-08-11

## 2020-08-11 PROBLEM — Z99.81 OXYGEN DEPENDENT: Status: ACTIVE | Noted: 2020-08-11

## 2020-08-11 LAB
ABO GROUP BLD BPU: NORMAL
ABO GROUP BLD BPU: NORMAL
ANION GAP SERPL CALCULATED.3IONS-SCNC: 8 MMOL/L (ref 4–13)
ATRIAL RATE: 66 BPM
BPU ID: NORMAL
BPU ID: NORMAL
BUN SERPL-MCNC: 99 MG/DL (ref 7–25)
CALCIUM SERPL-MCNC: 8.9 MG/DL (ref 8.6–10.5)
CHLORIDE SERPL-SCNC: 93 MMOL/L (ref 98–107)
CO2 SERPL-SCNC: 34 MMOL/L (ref 21–31)
CREAT SERPL-MCNC: 3.21 MG/DL (ref 0.7–1.3)
CROSSMATCH: NORMAL
CROSSMATCH: NORMAL
ERYTHROCYTE [DISTWIDTH] IN BLOOD BY AUTOMATED COUNT: 13.8 % (ref 11.5–14.5)
GFR SERPL CREATININE-BSD FRML MDRD: 17 ML/MIN/1.73SQ M
GLUCOSE SERPL-MCNC: 96 MG/DL (ref 65–99)
HCT VFR BLD AUTO: 26.2 % (ref 42–47)
HGB BLD-MCNC: 9.2 G/DL (ref 14–18)
HGB BLD-MCNC: 9.3 G/DL (ref 14–18)
INR PPP: 1.33 (ref 0.84–1.19)
MAGNESIUM SERPL-MCNC: 2.6 MG/DL (ref 1.9–2.7)
MCH RBC QN AUTO: 31.4 PG (ref 26–34)
MCHC RBC AUTO-ENTMCNC: 35.5 G/DL (ref 31–37)
MCV RBC AUTO: 88 FL (ref 81–99)
P AXIS: 99 DEGREES
PLATELET # BLD AUTO: 286 THOUSANDS/UL (ref 149–390)
PMV BLD AUTO: 9.2 FL (ref 8.6–11.7)
POTASSIUM SERPL-SCNC: 4.3 MMOL/L (ref 3.5–5.5)
PR INTERVAL: 180 MS
PROTHROMBIN TIME: 16.4 SECONDS (ref 11.6–14.5)
QRS AXIS: -45 DEGREES
QRSD INTERVAL: 142 MS
QT INTERVAL: 482 MS
QTC INTERVAL: 505 MS
RBC # BLD AUTO: 2.96 MILLION/UL (ref 4.3–5.9)
SODIUM SERPL-SCNC: 135 MMOL/L (ref 134–143)
T WAVE AXIS: 66 DEGREES
UNIT DISPENSE STATUS: NORMAL
UNIT DISPENSE STATUS: NORMAL
UNIT PRODUCT CODE: NORMAL
UNIT PRODUCT CODE: NORMAL
UNIT RH: NORMAL
UNIT RH: NORMAL
VENTRICULAR RATE: 66 BPM
WBC # BLD AUTO: 10.6 THOUSAND/UL (ref 4.8–10.8)

## 2020-08-11 PROCEDURE — 99232 SBSQ HOSP IP/OBS MODERATE 35: CPT | Performed by: NURSE PRACTITIONER

## 2020-08-11 PROCEDURE — 97167 OT EVAL HIGH COMPLEX 60 MIN: CPT

## 2020-08-11 PROCEDURE — C9113 INJ PANTOPRAZOLE SODIUM, VIA: HCPCS | Performed by: NURSE PRACTITIONER

## 2020-08-11 PROCEDURE — 85610 PROTHROMBIN TIME: CPT | Performed by: NURSE PRACTITIONER

## 2020-08-11 PROCEDURE — 99232 SBSQ HOSP IP/OBS MODERATE 35: CPT | Performed by: PHYSICIAN ASSISTANT

## 2020-08-11 PROCEDURE — 76770 US EXAM ABDO BACK WALL COMP: CPT

## 2020-08-11 PROCEDURE — 85018 HEMOGLOBIN: CPT | Performed by: INTERNAL MEDICINE

## 2020-08-11 PROCEDURE — 97163 PT EVAL HIGH COMPLEX 45 MIN: CPT

## 2020-08-11 PROCEDURE — 84156 ASSAY OF PROTEIN URINE: CPT | Performed by: INTERNAL MEDICINE

## 2020-08-11 PROCEDURE — 80048 BASIC METABOLIC PNL TOTAL CA: CPT | Performed by: NURSE PRACTITIONER

## 2020-08-11 PROCEDURE — 83735 ASSAY OF MAGNESIUM: CPT | Performed by: NURSE PRACTITIONER

## 2020-08-11 PROCEDURE — 0DB68ZX EXCISION OF STOMACH, VIA NATURAL OR ARTIFICIAL OPENING ENDOSCOPIC, DIAGNOSTIC: ICD-10-PCS | Performed by: INTERNAL MEDICINE

## 2020-08-11 PROCEDURE — 81003 URINALYSIS AUTO W/O SCOPE: CPT | Performed by: INTERNAL MEDICINE

## 2020-08-11 PROCEDURE — 99223 1ST HOSP IP/OBS HIGH 75: CPT | Performed by: INTERNAL MEDICINE

## 2020-08-11 PROCEDURE — 84300 ASSAY OF URINE SODIUM: CPT | Performed by: INTERNAL MEDICINE

## 2020-08-11 PROCEDURE — 82570 ASSAY OF URINE CREATININE: CPT | Performed by: INTERNAL MEDICINE

## 2020-08-11 PROCEDURE — 88305 TISSUE EXAM BY PATHOLOGIST: CPT | Performed by: PATHOLOGY

## 2020-08-11 PROCEDURE — 85027 COMPLETE CBC AUTOMATED: CPT | Performed by: NURSE PRACTITIONER

## 2020-08-11 RX ORDER — PANTOPRAZOLE SODIUM 40 MG/1
40 TABLET, DELAYED RELEASE ORAL
Status: DISCONTINUED | OUTPATIENT
Start: 2020-08-11 | End: 2020-08-13 | Stop reason: HOSPADM

## 2020-08-11 RX ORDER — METOPROLOL SUCCINATE 50 MG/1
100 TABLET, EXTENDED RELEASE ORAL 2 TIMES DAILY
Status: DISCONTINUED | OUTPATIENT
Start: 2020-08-11 | End: 2020-08-13 | Stop reason: HOSPADM

## 2020-08-11 RX ORDER — CLONIDINE HYDROCHLORIDE 0.1 MG/1
0.1 TABLET ORAL 2 TIMES DAILY
Status: DISCONTINUED | OUTPATIENT
Start: 2020-08-11 | End: 2020-08-11

## 2020-08-11 RX ORDER — KETAMINE HCL IN NACL, ISO-OSM 100MG/10ML
SYRINGE (ML) INJECTION AS NEEDED
Status: DISCONTINUED | OUTPATIENT
Start: 2020-08-11 | End: 2020-08-11

## 2020-08-11 RX ORDER — AMLODIPINE BESYLATE 10 MG/1
10 TABLET ORAL DAILY
Status: DISCONTINUED | OUTPATIENT
Start: 2020-08-12 | End: 2020-08-12

## 2020-08-11 RX ORDER — CLONIDINE HYDROCHLORIDE 0.1 MG/1
0.1 TABLET ORAL 2 TIMES DAILY
Status: DISCONTINUED | OUTPATIENT
Start: 2020-08-11 | End: 2020-08-13 | Stop reason: HOSPADM

## 2020-08-11 RX ORDER — SODIUM CHLORIDE, SODIUM LACTATE, POTASSIUM CHLORIDE, CALCIUM CHLORIDE 600; 310; 30; 20 MG/100ML; MG/100ML; MG/100ML; MG/100ML
INJECTION, SOLUTION INTRAVENOUS CONTINUOUS PRN
Status: DISCONTINUED | OUTPATIENT
Start: 2020-08-11 | End: 2020-08-11

## 2020-08-11 RX ORDER — LIDOCAINE HYDROCHLORIDE 20 MG/ML
INJECTION, SOLUTION EPIDURAL; INFILTRATION; INTRACAUDAL; PERINEURAL AS NEEDED
Status: DISCONTINUED | OUTPATIENT
Start: 2020-08-11 | End: 2020-08-11

## 2020-08-11 RX ORDER — PROPOFOL 10 MG/ML
INJECTION, EMULSION INTRAVENOUS AS NEEDED
Status: DISCONTINUED | OUTPATIENT
Start: 2020-08-11 | End: 2020-08-11

## 2020-08-11 RX ORDER — METOPROLOL SUCCINATE 50 MG/1
100 TABLET, EXTENDED RELEASE ORAL 2 TIMES DAILY
Status: DISCONTINUED | OUTPATIENT
Start: 2020-08-11 | End: 2020-08-11

## 2020-08-11 RX ADMIN — PANTOPRAZOLE SODIUM 40 MG: 40 INJECTION, POWDER, LYOPHILIZED, FOR SOLUTION INTRAVENOUS at 13:43

## 2020-08-11 RX ADMIN — PANTOPRAZOLE SODIUM 40 MG: 40 TABLET, DELAYED RELEASE ORAL at 18:03

## 2020-08-11 RX ADMIN — PRAVASTATIN SODIUM 40 MG: 40 TABLET ORAL at 18:04

## 2020-08-11 RX ADMIN — PANTOPRAZOLE SODIUM 40 MG: 40 INJECTION, POWDER, LYOPHILIZED, FOR SOLUTION INTRAVENOUS at 00:08

## 2020-08-11 RX ADMIN — SODIUM CHLORIDE, SODIUM LACTATE, POTASSIUM CHLORIDE, AND CALCIUM CHLORIDE: .6; .31; .03; .02 INJECTION, SOLUTION INTRAVENOUS at 12:01

## 2020-08-11 RX ADMIN — METOPROLOL SUCCINATE 100 MG: 50 TABLET, EXTENDED RELEASE ORAL at 13:41

## 2020-08-11 RX ADMIN — CLONIDINE HYDROCHLORIDE 0.1 MG: 0.1 TABLET ORAL at 13:43

## 2020-08-11 RX ADMIN — Medication 30 MG: at 12:08

## 2020-08-11 RX ADMIN — LIDOCAINE HYDROCHLORIDE 100 MG: 20 INJECTION, SOLUTION EPIDURAL; INFILTRATION; INTRACAUDAL; PERINEURAL at 12:09

## 2020-08-11 RX ADMIN — AMLODIPINE BESYLATE 10 MG: 10 TABLET ORAL at 13:43

## 2020-08-11 RX ADMIN — FUROSEMIDE 40 MG: 10 INJECTION, SOLUTION INTRAMUSCULAR; INTRAVENOUS at 00:08

## 2020-08-11 RX ADMIN — PROPOFOL 50 MG: 10 INJECTION, EMULSION INTRAVENOUS at 12:09

## 2020-08-11 NOTE — ANESTHESIA PREPROCEDURE EVALUATION
Procedure:  EGD    Relevant Problems   ANESTHESIA (within normal limits)      CARDIO   (+) Complete heart block (HCC) (Resolved)   (+) Essential hypertension   (+) H/O aortic valve replacement   (+) Mitral regurgitation   (+) Paroxysmal A-fib (HCC)      GI/HEPATIC   (+) GERD (gastroesophageal reflux disease)   (+) GI bleeding      /RENAL   (+) Acute kidney injury superimposed on chronic kidney disease (HCC)   (+) Stage 4 chronic kidney disease (HCC)      HEMATOLOGY   (+) Anemia      MUSCULOSKELETAL   (+) Lumbar degenerative disc disease      NEURO/PSYCH   (+) History of stroke      PULMONARY   (+) Acute respiratory failure with hypoxia (HCC)   (+) Mild intermittent asthma without complication   (+) Oxygen dependent      Other   (+) 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   (+) Azotemia   (+) Obesity (BMI 30-39  9)        Physical Exam    Airway    Mallampati score: II  TM Distance: >3 FB  Neck ROM: limited     Dental   upper dentures,     Cardiovascular  Rhythm: regular, Murmur, No friction rub,     Pulmonary  Decreased breath sounds,     Other Findings              Anesthesia Plan  ASA Score- 4     Anesthesia Type- IV sedation with anesthesia with ASA Monitors  Additional Monitors:   Airway Plan:           Plan Factors-    Chart reviewed  EKG reviewed  Existing labs reviewed  Patient summary reviewed  Patient is a current smoker  Patient instructed to abstain from smoking on day of procedure  Patient did not smoke on day of surgery  Induction- intravenous  Postoperative Plan-     Informed Consent- Anesthetic plan and risks discussed with patient  I personally reviewed this patient with the CRNA  Discussed and agreed on the Anesthesia Plan with the CRNA  Ortiz Ashraf

## 2020-08-11 NOTE — UTILIZATION REVIEW
Initial Clinical Review    Admission: Date/Time/Statement:   Admission Orders (From admission, onward)    Orders from this encounter     Ordered        08/10/20 1109  Inpatient Admission  Once               Orders from suspended admission ([facility] )     None              Orders Placed This Encounter   Procedures    Inpatient Admission     Standing Status:   Standing     Number of Occurrences:   1     Order Specific Question:   Admitting Physician     Answer:   Roro Rendon     Order Specific Question:   Level of Care     Answer:   Med Surg [16]     Order Specific Question:   Bed request comments     Answer:   tele     Order Specific Question:   Estimated length of stay     Answer:   More than 2 Midnights     Order Specific Question:   Certification     Answer:   I certify that inpatient services are medically necessary for this patient for a duration of greater than two midnights  See H&P and MD Progress Notes for additional information about the patient's course of treatment  ED Arrival Information     Expected Arrival Acuity Means of Arrival Escorted By Service Admission Type    - 8/10/2020 09:35 [facility]gent [facility]    Arrival Complaint    gi bleed        Chief Complaint   Patient presents with    GI Bleeding     Assessment/Plan:  81 yo male presents to ED from Acute Rehab via EMS with Hg 6 4  Recent admit from 7/20 to 8/5 for new onset complete heart block during which he had  pacemaker placement and was started on Eliquis for underlying PAF  Wife reports she was called last night and informed of blood in the stool noticed by the nursing staff  H/O a gastric ulcer bleed approximately 5 years ago requiring blood tranfusion and was treated with Protonix but has not been taking a PPI since then  On exam: guaiac + stool   WBC's 12 50,  Hg 7 3,  Na 132,  , Cr 3 37  REc'd IV Protonix in the ED  Admitted to IP with GI Bleed  Anemia on CKD stage 4 ( baseline Cr 2 1 - 2 3)   Plan: Transfuse 2 units PC's with Lasix after,IV Protonix,  GI Consult, Plan for EGD tomorrow, monitor H&H closely,  Hold eliquis & ASA for now, check PVR,  renal US, Nephrology consult,  Cardio Consult    Per GI: GI Bleed exacerbated by Eliquis  Hemoglobin in 1 week drop from 10 to 7  The patient has mild dyspnea on exertionPlan for EGD  If this is negative would plan colonoscopy on Wednesday  Treat with IV Protonix    8/11: EGD: Duodenal bulb ulcer with duodenitis-likely source of bleeding and anemia  Moderate hiatal hernia  Check pathology to rule out H pylori  Treat with b i d  PPI   If there is evidence of continued bleeding, would recommend colonoscopy as an outpatient      ED Triage Vitals   Temperature Pulse Respirations Blood Pressure SpO2   08/10/20 0941 08/10/20 0941 08/10/20 0941 08/10/20 0941 08/10/20 0941   97 5 °F (36 4 °C) 63 18 135/74 99 %      Temp Source Heart Rate Source Patient Position - Orthostatic VS BP Location FiO2 (%)   08/10/20 0941 08/10/20 0941 08/10/20 0941 08/10/20 0941 --   Temporal Monitor Lying Left arm       Pain Score       08/10/20 1146       No Pain          Wt Readings from Last 1 Encounters:   08/10/20 88 9 kg (195 lb 15 8 oz)     Additional Vital Signs:   08/11/20 0746   97 9 °F (36 6 °C)   60   18   156/69      93 %   None (Room air)  Lying    08/11/20 0311   98 °F (36 7 °C)   65   18   130/60      92 %   None (Room air)  Lying    08/10/20 2250   97 8 °F (36 6 °C)   60   18   118/59   85   92 %   None (Room air)  Lying    08/10/20 2031   97 6 °F (36 4 °C)   60   16   110/56   78   94 %   None (Room air)  Lying    08/10/20 1957   98 1 °F (36 7 °C)   60   18   113/56   81   96 %   None (Room air)  Lying    08/10/20 1900   98 1 °F (36 7 °C)   67   18   130/69               08/10/20 1700   98 7 °F (37 1 °C)   60   18   121/59           Lying    08/10/20 1543   97 4 °F (36 3 °C)     60   18   129/59               08/10/20 1513   97 4 °F (36 3 °C)  60   18   129/59      94 %   None (Room air)  Lying    08/10/20 1139   97 2 °F (36 2 °C)    62   18   135/59      96 %   None (Room air)  Lying    08/10/20 1100      61   22   126/60   86   98 %            Pertinent Labs/Diagnostic Test Results:   Lab Units 08/07/20  0900   SARS-COV-2  Negative     Results from last 7 days   Lab Units 08/11/20  0552 08/10/20  1002 08/10/20  0558 08/10/20  0522   WBC Thousand/uL 10 60 12 50* 10 00 10 30   HEMOGLOBIN g/dL 9 3* 7 3* 7 2* 6 4*   HEMATOCRIT % 26 2* 21 9* 20 4* 18 4*   PLATELETS Thousands/uL 286 365 269 294   NEUTROS ABS Thousands/µL  --  9 80* 7 40* 7 50*     Results from last 7 days   Lab Units 08/11/20  0552 08/10/20  1002 08/10/20  0522   SODIUM mmol/L 135 132* 134   POTASSIUM mmol/L 4 3 4 0 4 5   CHLORIDE mmol/L 93* 89* 91*   CO2 mmol/L 34* 35* 35*   ANION GAP mmol/L 8 8 8   BUN mg/dL 99* 107* 108*   CREATININE mg/dL 3 21* 3 37* 3 26*   EGFR ml/min/1 73sq m 17 16 16   CALCIUM mg/dL 8 9 9 3 8 8   MAGNESIUM mg/dL 2 6  --   --      Results from last 7 days   Lab Units 08/11/20  0552 08/10/20  1002 08/10/20  0522   GLUCOSE RANDOM mg/dL 96 179* 100*     Results from last 7 days   Lab Units 08/10/20  1002   TROPONIN I ng/mL 0 03     Results from last 7 days   Lab Units 08/11/20  0554   PROTIME seconds 16 4*   INR  1 33*     Results from last 7 days   Lab Units 08/10/20  1002   FERRITIN ng/mL 388     ED Treatment:   Medication Administration from 08/10/2020 0935 to 08/10/2020 1130       Date/Time Order Dose Route Action     08/10/2020 1016 pantoprazole (PROTONIX) 80 mg in sodium chloride 0 9 % 100 mL IVPB 80 mg Intravenous New Bag        Past Medical History:   Diagnosis Date    Aortic stenosis     ECHO -4-14-14   MODERATE TO SEVERE AORTIC STENOSIS; MILD AROTIC INSUFFICIENCY - LAST ASSESSED 10/26/16; RESOLVED 6/8/16    Aortic valve disorder     LAST ASSESSED 10/8/15; 10/8/15    Hypertensive urgency 5/19/2019    Transient cerebral ischemia      Present on Admission:   Essential hypertension   (Resolved) Atherosclerotic 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   Anemia   Dyslipidemia   Complete heart block (HCC)      Admitting Diagnosis: GI bleeding [K92 2]  GI bleed [K92 2]  Age/Sex: 80 y o  male     Admission Orders:  Scheduled Medications:  amLODIPine, 10 mg, Oral, Daily  cloNIDine, 0 1 mg, Oral, BID  metoprolol succinate, 100 mg, Oral, BID  pantoprazole, 40 mg, Intravenous, Q12H  pravastatin, 40 mg, Oral, Daily With Dinner    Continuous IV Infusions:  PRN Meds:  acetaminophen, 650 mg, Oral, Q6H PRN  pneumococcal 13-valent conjugate vaccine, 0 5 mL, Intramuscular, Prior to discharge    Lasix 40 mg IV X 1 8/11 @ 0008    TELE  H&H q8h  SCDs  IP CONSULT TO GASTROENTEROLOGY  IP CONSULT TO CASE MANAGEMENT  IP CONSULT TO CARDIOLOGY  IP CONSULT TO NEPHROLOGY    Network Utilization Review Department  [email or web address]  ATTENTION: Please call with any questions or concerns to [phone] and carefully listen to the prompts so that you are directed to the right person  All voicemails are confidential   Sudie Crigler all requests for admission clinical reviews, approved or denied determinations and any other requests to dedicated fax number below belonging to the campus where the patient is receiving treatment   List of dedicated fax numbers for the Facilities:  FACILITY NAME UR FAX NUMBER   ADMISSION DENIALS (Administrative/Medical Necessity) [phone]   [address] (Maternity/NICU/Pediatrics) [phone] [address] [facility] [phone]   Noemi Sheets [phone]   [facility] [phone]   [facility] [address] [facility] [address] [address] [place] [phone]   [address] [phone]

## 2020-08-11 NOTE — PHYSICAL THERAPY NOTE
Physical Therapy Evaluation     Patient's Name: Denver Ohara    Admitting Diagnosis  GI bleeding [K92 2]  GI bleed [K92 2]    Problem List  Patient Active Problem List   Diagnosis    Anemia    Mild intermittent asthma without complication    Acute kidney injury superimposed on chronic kidney disease ([address] )    Dyslipidemia    GERD (gastroesophageal reflux disease)    Late effects of cerebrovascular disease    Lumbar degenerative disc disease    Lumbar radiculopathy    Mitral regurgitation    Obesity (BMI 30-39  9)    Medicare annual wellness visit, subsequent    H/O aortic valve replacement    History of stroke    Bradycardia    Essential hypertension    Leukocytosis    Ambulatory dysfunction    Acute respiratory failure with hypoxia (HCC)    GI bleeding    Stage 4 chronic kidney disease (HCC)    Paroxysmal A-fib (HCC)    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    Pacemaker    Azotemia    Oxygen dependent       Past Medical History  Past Medical History:   Diagnosis Date    Aortic stenosis     ECHO -4-14-14  MODERATE TO SEVERE AORTIC STENOSIS; MILD AROTIC INSUFFICIENCY - LAST ASSESSED 10/26/16; RESOLVED 6/8/16    Aortic valve disorder     LAST ASSESSED 10/8/15; 10/8/15    Complete heart block ([facility] ) 7/20/2020    Hypertensive urgency 5/19/2019    Transient cerebral ischemia        Past Surgical History  Past Surgical History:   Procedure Laterality Date    AORTIC VALVE REPLACEMENT  06/30/2015    avr with 23 mm [place] Magna Ease bioprosthetic    CATARACT EXTRACTION Right 06/05/2000    COLONOSCOPY      CORONARY ARTERY BYPASS GRAFT  06/05/2000    x1 with lima  to lad    POLYPECTOMY  04/2014    enteroscopic - esophageal ulcer, gastric erosion, and duodenal ulcer       TONSILLECTOMY      unknown          08/11/20 1045   Note Type   Note type Eval/Treat   Pain Assessment   Pain Assessment Tool 0-10   Pain Score 3   Pain Location/Orientation Orientation: Bilateral;Location: Knee   Pain Onset/Description Onset: Ongoing; Descriptor: Aching   Patient's Stated Pain Goal No pain   Home Living   Type of Home House   Home Layout Two level;Bed/bath upstairs;1/2 bath on main level;Stairs to enter with rails  (3 GILBERT, 1st floor setup upon return home)   Bathroom Shower/Tub Walk-in shower   Bathroom Toilet Standard   Bathroom Equipment Shower chair   [facility]  (no AD at baseline)   Prior Function   Level of [employer] Independent with ADLs and functional mobility; Needs assistance with IADLs   Lives With Spouse   Receives Help From Family   ADL Assistance Independent   IADLs Needs assistance   Falls in the last 6 months 0   Vocational Retired   Restrictions/Precautions   Wells Elina Bearing Precautions Per Order No   Other Precautions Cognitive; Chair Alarm; Fall Risk;Pain; Impulsive;Hard of hearing  (?if continued PPM precautions)   General   Family/Caregiver Present No   Cognition   Overall Cognitive Status Impaired   Arousal/Participation Alert   Attention Attends with cues to redirect   Orientation Level Oriented X4   Memory Decreased recall of recent events;Decreased recall of precautions   Following Commands Follows one step commands with increased time or repetition   Comments Pt pleasant and cooperative to work with therapy today  Pt poor historian and demonstrated decreased safety awareness throughout session  Pt hard of hearing - denies hearing aid  Pt demonstrated decreased insight into deficits   Strength RLE   RLE Overall Strength 3+/5   Strength LLE   LLE Overall Strength 3+/5   Coordination   Movements are Fluid and Coordinated 0   Light Touch   RLE Light Touch Grossly intact   LLE Light Touch Grossly intact   Bed Mobility   Supine to Sit Unable to assess  (pt received OOB in recliner)   Transfers   Sit to Stand 4  Minimal assistance   Additional items Assist x 1; Armrests; Impulsive;Verbal cues   Stand to Sit 4  Minimal assistance  (primarily CGA for safety)   Additional items Assist x 1; Armrests; Verbal cues   Additional Comments Pt requires VC and TC for hand placement and safety  RW used for transfers and ambulation  Ambulation/Elevation   Gait pattern Excessively slow; Short stride; Foward flexed;Poor UE support;Decreased foot clearance; Ataxia   Gait Assistance 4  Minimal assist  (primarily CGA)   Additional items Assist x 1;Verbal cues; Tactile cues   Assistive Device Rolling walker   Distance 40 ft   Stair Management Assistance Not tested   Balance   Static Sitting Good   Dynamic Sitting Fair   Static Standing Fair -   Dynamic Standing Poor +   Ambulatory Poor +   Endurance Deficit   Endurance Deficit Yes   Activity Tolerance   Activity Tolerance Patient tolerated treatment well   Medical Staff Made Aware CM Shayla, OT Tanisha Jeronimo   Nurse Made Aware Yes, RN made aware of PT eval outcomes   Assessment   Prognosis Good   Problem List Decreased strength;Decreased range of motion;Decreased endurance; Impaired balance;Decreased mobility; Decreased cognition;Decreased safety awareness; Impaired hearing   Assessment Pt is 80 y o  male seen for PT evaluation on 8/11/2020 s/p admit to [address] on 8/10/2020 w/ GI bleeding; pt presented with a hemoglobin of 6 4 at the [facility] nursing [facility] [place] where he was in rehabilitation following a 16-day hospitalization from 07/20/20 to 08/05/2020 for new onset complete heart block during which he had a pacemaker placement and was started on Eliquis for underlying PAF  PT consulted to assess pt's functional mobility and d/c needs  Order placed for PT eval and tx, w/ up w/ A order  PT performed at least 2 patient identifiers during session: Name and wristband   Comorbidities affecting pt's physical performance at time of assessment include: acute on chronic anemia, AFib, CKD stage 4, HTN, atherosclerotic heart disease, complete heart block, s/p PPM placement 7/24/20  PTA and recent hospitalization, pt was independent w/ all functional mobility w/ no AD, ambulates community distances and elevations, ambulates household distances, has (+) GILBERT and lives w/ wife in 2 level home c 1st floor setup  Personal factors affecting pt at time of IE include: lives in 2 story house, ambulating w/ assistive device, stairs to enter home, unable to perform dynamic tasks in community, decreased cognition, decreased initiation and engagement, unable to perform physical activity and limited insight into impairments  Please find objective findings from PT assessment regarding body systems outlined above with impairments and limitations including weakness, impaired balance, decreased endurance, decreased activity tolerance, decreased functional mobility tolerance, decreased safety awareness and fall risk, as well as mobility assessment (need for cueing for mobility technique)  The following objective measures performed on IE also reveal limitations: Barthel Index: 50/100 and Modified [place]: 4 (moderate/severe disability)  Pt's clinical presentation is currently unstable/unpredictable seen in pt's presentation of abnormal lab value(s), ongoing medical assessment and low HgB requiring blood transfusion - pt is pending EGD today  Pt to benefit from continued PT tx to address deficits as defined above and maximize level of functional independent mobility and consistency  From PT/mobility standpoint, recommendation at time of d/c would be STR pending progress in order to facilitate return to PLOF  Barriers to Discharge Inaccessible home environment   Goals   Patient Goals "to get stronger"   STG Expiration Date 08/21/20   Short Term Goal #1 1  Pt will be able to complete all aspects of bed mobility at a independent A level in order to decrease burden on caregivers  2  Pt will be able to complete functional transfers at a Mod-I A level in order to increase independence     3  Pt will be able to ambulate 200+ ft at a Mod-I A level with LRAD in order to promote safe transition back into the home environment  4  Pt will be able to negotiate a full flight of steps at a Mod-I A level with/without railing in order to promote safe transition back into the home environment  5  Pt will demonstrate an increase in b/l LE strength by one grade as indicated on MMT scale in order to promote functional independence    6  Pt will demonstrate improved balance by increasing at least one grade on the graded balance scale in order to reduce falls risk  PT Treatment Day 0   Plan   Treatment/Interventions Functional transfer training;LE strengthening/ROM; Elevations; Therapeutic exercise; Endurance training;Patient/family training;Equipment eval/education; Bed mobility;Gait training;Spoke to nursing;Spoke to case management   PT Frequency   (3-5x/wk)   Recommendation   PT Discharge Recommendation Post-Acute Rehabilitation Services   Equipment Recommended Walker   PT - OK to Discharge Yes  (when medically cleared, if to STR)   Modified [place] Scale   Modified [place] Scale 4   Barthel Index   Feeding 10   Bathing 0   Grooming Score 0   Dressing Score 5   Bladder Score 10   Bowels Score 10   Toilet Use Score 5   Transfers (Bed/Chair) Score 10   Mobility (Level Surface) Score 0   Stairs Score 0   Barthel Index Score 50         Rosa M Da Silva, PT

## 2020-08-11 NOTE — PLAN OF CARE
Problem: OCCUPATIONAL THERAPY ADULT  Goal: Performs self-care activities at highest level of function for planned discharge setting  See evaluation for individualized goals  Note: Limitation: Decreased ADL status, Decreased UE strength, Decreased Safe judgement during ADL, Decreased cognition, Decreased endurance, Decreased self-care trans, Decreased high-level ADLs  Prognosis: Good  Assessment: Pt is a 80 y o  male seen for OT evaluation s/p admit to [address] on 8/10/2020 w/ GI bleeding  Comorbidities affecting pt's functional performance at time of assessment include: HTN and Anemia, A Fib, CKD IV, see H & P for additional PMHx  Personal factors affecting pt at time of IE include:steps to enter environment, difficulty performing ADLS and difficulty performing IADLS   Prior to admission, pt was I with self care ADL's, Mobility, A with IADL's, most recently at [facility] receiving STR from recent hospitalization  Upon evaluation: Pt requires an increased level of assistance with self care ADL's, IADL's, functional transfers/toileting/mobility r/t  the following deficits impacting occupational performance: weakness, decreased strength, decreased balance, decreased tolerance and decreased safety awareness  Pt to benefit from continued skilled OT tx while in the hospital to address deficits as defined above and maximize level of functional independence w ADL's and functional mobility  Occupational Performance areas to address include: grooming, bathing/shower, toilet hygiene, dressing and functional mobility  From OT standpoint, recommendation at time of d/c would be STR (return to the [facility] and resume therapies as indicated)          OT Discharge Recommendation: Post-Acute Rehabilitation Services(at the [facility])  OT - OK to Discharge: Yes(when medically cleared)

## 2020-08-11 NOTE — ASSESSMENT & PLAN NOTE
· LIMA to LAD 2000;  Bio[employer] 2015  · Asymptomatic   · Cardiology input appreciated   · Continue with current medical management

## 2020-08-11 NOTE — PROGRESS NOTES
Progress Note - Charissa Chavez 1935, 80 y o  male MRN: [id number]    Unit/Bed#: -02 Encounter: [id number]    Primary Care Provider: Donald Tate DO   Date and time admitted to hospital: 8/10/2020  9:44 AM        * GI bleeding  Assessment & Plan  · Patient with history of gastric ulcer in the past  · Noted to have drop in hemoglobin and occult positive; suspected to be exacerbated by Eliquis  · Gastroenterology input appreciated  · EGD today   · Continue with IV Protonix  · Frequent H&H  · Received 2 units of packed red blood cells transfusion today with furosemide after transfusion; Hgb 6s ->9s      Anemia  Assessment & Plan  · Acute on chronic  · Normochromic normocytic  · Iron panel result appreciated   · Received 2 units of packed red blood cells 8/10  · Monitor H&H closely    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  Assessment & Plan  · LIMA to LAD 2000; BioAVR 2015  · Asymptomatic   · Cardiology input appreciated   · Continue with current medical management     Paroxysmal [facility])  Assessment & Plan  · Rate is controlled  · He was recently started on Eliquis 2 5 mg BID   · Will continue with BB for rate control   · Hold off on Eliquis and ASA for now  · Cardiology input appreciated    Stage 4 chronic kidney disease ([address] )  Assessment & Plan  · With acute kidney injury  · His baseline creatinine appears to be 2 1-2 3 mg/dL   · Now creatinine level is at 3 37 ->3 2 mg/dL  · Suspected to be due to acute on chronic anemia with diuretic use  · Will hold diuresis at this time  · renal ultrasound- no hydronephrosis  Medical renal disease  Simple bilateral renal cyst   · Follow-up with BMP in the a m    · Consult Nephrology  · Avoid any hypotension and nephrotoxins    Essential hypertension  Assessment & Plan  · Blood pressure appears to be well controlled  · Continue with current medical management  · Monitor blood pressure closely          VTE Prophylaxis:  SCDs only     Patient Centered Rounds: I have performed bedside rounds with nursing staff today  Discussions with Specialists or Other Care Team Provider:  Cardiology, Nephrology, nursing, case management, PT and OT, and pharmacy  Education and Discussions with Family / Patient:  Patient and wife    Current Length of Stay: 1 day(s)    Current Patient Status: Inpatient   Certification Statement: The patient will continue to require additional inpatient hospital stay due to GI bleed    Discharge Plan:  Pending hospital course    Code Status: Level 1 - Full Code    Subjective:   Feeling okay  Denies any pain  No n/v/abdominal pain  No chest pain or dyspnea  Had a BM this AM  No apparent blood per staff  Objective:     Vitals:   Temp (24hrs), Av 9 °F (36 6 °C), Min:97 4 °F (36 3 °C), Max:98 7 °F (37 1 °C)    Temp:  [97 4 °F (36 3 °C)-98 7 °F (37 1 °C)] 97 9 °F (36 6 °C)  HR:  [60-67] 60  Resp:  [16-18] 18  BP: (110-156)/(56-69) 156/69  SpO2:  [92 %-96 %] 93 %  Body mass index is 33 64 kg/m²  Input and Output Summary (last 24 hours): Intake/Output Summary (Last 24 hours) at 2020 1228  Last data filed at 2020 1222  Gross per 24 hour   Intake 1420 ml   Output 1325 ml   Net 95 ml       Physical Exam:   Physical Exam  Vitals signs and nursing note reviewed  Constitutional:       General: He is not in acute distress  Appearance: Normal appearance  He is not ill-appearing  HENT:      Head: Normocephalic and atraumatic  Right Ear: External ear normal       Left Ear: External ear normal       Nose: Nose normal       Mouth/Throat:      Mouth: Mucous membranes are moist       Pharynx: Oropharynx is clear  Eyes:      General:         Right eye: No discharge  Left eye: No discharge  Extraocular Movements: Extraocular movements intact  Pupils: Pupils are equal, round, and reactive to light     Neck:      Musculoskeletal: Normal range of motion and neck supple  No neck rigidity  Cardiovascular:      Rate and Rhythm: Normal rate and regular rhythm  Pulses: Normal pulses  Heart sounds: Normal heart sounds  No murmur  Pulmonary:      Effort: Pulmonary effort is normal  No respiratory distress  Breath sounds: Normal breath sounds  No wheezing or rales  Abdominal:      General: Bowel sounds are normal  There is no distension  Palpations: Abdomen is soft  There is no mass  Tenderness: There is no abdominal tenderness  Musculoskeletal: Normal range of motion  General: No swelling, tenderness or deformity  Skin:     General: Skin is warm and dry  Capillary Refill: Capillary refill takes less than 2 seconds  Coloration: Skin is pale  Findings: No erythema  Neurological:      General: No focal deficit present  Mental Status: He is alert and oriented to person, place, and time  Mental status is at baseline  Psychiatric:         Mood and Affect: Mood normal          Behavior: Behavior normal          Thought Content: Thought content normal          Judgment: Judgment normal          Additional Data:     Labs:    Results from last 7 days   Lab Units 08/11/20  0552 08/10/20  1002   WBC Thousand/uL 10 60 12 50*   HEMOGLOBIN g/dL 9 3* 7 3*   HEMATOCRIT % 26 2* 21 9*   PLATELETS Thousands/uL 286 365   NEUTROS PCT %  --  78*   LYMPHS PCT %  --  9*   MONOS PCT %  --  8   EOS PCT %  --  4     Results from last 7 days   Lab Units 08/11/20  0552   SODIUM mmol/L 135   POTASSIUM mmol/L 4 3   CHLORIDE mmol/L 93*   CO2 mmol/L 34*   BUN mg/dL 99*   CREATININE mg/dL 3 21*   CALCIUM mg/dL 8 9     Results from last 7 days   Lab Units 08/11/20  0554   INR  1 33*     Results from last 7 days   Lab Units 08/05/20  1042 08/05/20  0611   POC GLUCOSE mg/dl 141* 105           * I Have Reviewed All Lab Data Listed Above  * Additional Pertinent Lab Tests Reviewed:  [address] Admission Reviewed    Imaging:  Imaging Reports Reviewed Today Include: US renal     Recent Cultures (last 7 days):           Last 24 Hours Medication List:   Current Facility-Administered Medications   Medication Dose Route Frequency Provider Last Rate    acetaminophen  650 mg Oral Q6H PRN DIANDRA De La Torre      amLODIPine  10 mg Oral Daily DIANDRA De La Torre      cloNIDine  0 1 mg Oral BID DIANDRA De La Torre      metoprolol succinate  100 mg Oral BID DIANDRA De La Torre      pantoprazole  40 mg Intravenous Q12H DIANDRA De La Torre      pneumococcal 13-valent conjugate vaccine  0 5 mL Intramuscular Prior to discharge Demetrius Kwan MD      pravastatin  40 mg Oral Daily With [id number] DIANDRA Arshad          Today, Patient Was Seen By: DIANDRA De La Torre    ** Please Note: Dictation voice to text software may have been used in the creation of this document   **

## 2020-08-11 NOTE — ASSESSMENT & PLAN NOTE
· With acute kidney injury  · His baseline creatinine appears to be 2 1-2 3 mg/dL   · Now creatinine level is at 3 37 ->3 2 mg/dL  · Suspected to be due to acute on chronic anemia with diuretic use  · Will hold diuresis at this time  · renal ultrasound- no hydronephrosis  Medical renal disease  Simple bilateral renal cyst   · Follow-up with BMP in the a m    · Consult Nephrology  · Avoid any hypotension and nephrotoxins

## 2020-08-11 NOTE — ASSESSMENT & PLAN NOTE
· Acute on chronic  · Normochromic normocytic  · Iron panel result appreciated   · Received 2 units of packed red blood cells 8/10  · Monitor H&H closely

## 2020-08-11 NOTE — CONSULTS
Consult- Josr Goddard 1935, 80 y o  male MRN: [id number]    Unit/Bed#: -02 Encounter: [id number]    Primary Care Provider: Tae Burgos DO   Date and time admitted to hospital: 8/10/2020  9:44 AM      Inpatient consult to Cardiology  Consult performed by: Sylvester Levy PA-C  Consult ordered by: DIANDRA Ortez          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  Assessment & Plan  LIMA to LAD 2000  No angina     * GI bleeding  Assessment & Plan  Secondary to anticoagulation therapy   Ok to hold Eliquis until source of bleeding is found       Complete heart block ([address] )  Assessment & Plan  Pacemake in place      H/O aortic valve replacement  Assessment & Plan  BIO AVR 2015  Valve sound good      Dyslipidemia  Assessment & Plan  tolerating statin    Paroxysmal A-fib (HCC)  Assessment & Plan  Maintaining sinus rhythm   Rate controlled   Given age, HTN, Hx or stroke CV disease patient should be on full AC   However with severe anemia/GI bleed this can be held until source is found    OK to hold until cleared by GI    Essential hypertension  Assessment & Plan  Controlled on current medical regimen    Anemia  Assessment & Plan  Severe amenia   Hbg on admission was 6 4 now 9 2  Continue to monitor     Patient ok to proceed with EGD with intermediate risk for CV event  Thank you for allowing us to see this pleasant patient in consult  We will follow up with Outpatient plans outlined above and make all arrangements  Chief Complaint:  Chief Complaint   Patient presents with    GI Bleeding        History of Present Illness: The patient who has known CAD with CABG in 2000 for LIMA to LAD and AVR in 2015 with a BIO Valve, HTN, HLD and HX of Stroke, also has PAF and was on ELiquis  He recently had PPM for complete HB  He had a routine CBC that showed a HBG of 6 4  He reports normal BM and no melena or hematochezia       From a cardiac perspective, he is without complaints of chest pain or exertional dyspnea  He has no palpitations syncope or near syncope, and denies edema orthopnea or PND  He does not complain of TIA or CVA symptoms  Troponin are negative  EKG is unchanged  Review of Systems: a 10 point review of systems was conducted and is negative except for as mentioned in the HPI or as below  Review of Systems   Constitution: Negative  HENT: Negative  Eyes: Negative  Cardiovascular: Negative  Negative for chest pain, claudication, cyanosis, dyspnea on exertion, irregular heartbeat, leg swelling, near-syncope, orthopnea, palpitations, paroxysmal nocturnal dyspnea and syncope  Respiratory: Negative  Negative for cough, hemoptysis, shortness of breath, sleep disturbances due to breathing, snoring, sputum production and wheezing  Endocrine: Negative  Hematologic/Lymphatic: Negative  Skin: Negative  Musculoskeletal: Negative  Gastrointestinal: Negative for hematochezia and melena  Genitourinary: Negative  Neurological: Negative  Psychiatric/Behavioral: Negative  Allergic/Immunologic: Negative  Past Medical and Surgical History:  Past Medical History:   Diagnosis Date    Aortic stenosis     ECHO -4-14-14  MODERATE TO SEVERE AORTIC STENOSIS; MILD AROTIC INSUFFICIENCY - LAST ASSESSED 10/26/16; RESOLVED 6/8/16    Aortic valve disorder     LAST ASSESSED 10/8/15; 10/8/15    Hypertensive urgency 5/19/2019    Transient cerebral ischemia      Past Surgical History:   Procedure Laterality Date    AORTIC VALVE REPLACEMENT  06/30/2015    avr with 23 mm [place] Magna Ease bioprosthetic    CATARACT EXTRACTION Right 06/05/2000    COLONOSCOPY      CORONARY ARTERY BYPASS GRAFT  06/05/2000    x1 with lima  to lad    POLYPECTOMY  04/2014    enteroscopic - esophageal ulcer, gastric erosion, and duodenal ulcer       TONSILLECTOMY      unknown     Social History     Substance and Sexual Activity Alcohol Use Never    Alcohol/week: 1 0 standard drinks    Types: 1 Cans of beer per week    Frequency: Monthly or less    Drinks per session: 1 or 2    Binge frequency: Never    Comment: social     Social History     Substance and Sexual Activity   Drug Use Never     Social History     Tobacco Use   Smoking Status Never Smoker   Smokeless Tobacco Never Used       Family History:  Family History   Problem Relation Age of Onset    No Known Problems Mother     No Known Problems Father     Coronary artery disease Neg Hx        Medication:  Medications Prior to Admission   Medication    apixaban (ELIQUIS) 2 5 mg    aspirin (ECOTRIN LOW STRENGTH) 81 mg EC tablet    cloNIDine (CATAPRES) 0 1 mg tablet    docusate sodium (COLACE) 100 mg capsule    metoprolol succinate (TOPROL-XL) 100 mg 24 hr tablet    pantoprazole (PROTONIX) 40 mg tablet    polyethylene glycol (MIRALAX) 17 g packet    pravastatin (PRAVACHOL) 40 mg tablet    senna (SENOKOT) 8 6 mg    torsemide (DEMADEX) 20 mg tablet    amLODIPine (NORVASC) 10 mg tablet    bisacodyl (DULCOLAX) 5 mg EC tablet        Allergies: Allergies   Allergen Reactions    Promethazine Delirium       Physical Exam:  Vitals: Blood pressure 156/69, pulse 60, temperature 97 9 °F (36 6 °C), temperature source Temporal, resp  rate 18, height 5' 4" (1 626 m), weight 88 9 kg (195 lb 15 8 oz), SpO2 93 %  , Body mass index is 33 64 kg/m² ,   Orthostatic Blood Pressures      Most Recent Value   Blood Pressure  156/69 filed at 08/11/2020 0746   Patient Position - Orthostatic VS  Lying filed at 08/11/2020 5649      , Systolic (63NQR), XXS:178 , Min:110 , BHM:974   , Diastolic (46CFN), XHM:88, Min:56, Max:69    Physical Exam      Most Recent Cardiac Imaging:   PPM implant 7-    Echo 5-20-19: LEFT VENTRICLE:  Systolic function was normal  Ejection fraction was estimated to be 60 %  There were no regional wall motion abnormalities    Doppler parameters were consistent with abnormal left ventricular relaxation (grade 1 diastolic dysfunction)    RIGHT VENTRICLE:The size was normal   Systolic function was normal    LEFT ATRIUM:The atrium was mildly dilated    EKG: Normal sinus rhythm  atrial-sensed ventricular-paced complexes    Lab Results:   Troponins:   Results from last 7 days   Lab Units 08/10/20  1002   TROPONIN I ng/mL 0 03     BNP:    CBC with diff:   Results from last 7 days   Lab Units 08/11/20  0552 08/10/20  1002 08/10/20  0558   WBC Thousand/uL 10 60 12 50* 10 00   HEMOGLOBIN g/dL 9 3* 7 3* 7 2*   HEMATOCRIT % 26 2* 21 9* 20 4*   PLATELETS Thousands/uL 286 365 269   RBC Million/uL 2 96* 2 42* 2 27*     CMP:  Results from last 7 days   Lab Units 08/11/20  0552 08/10/20  1002 08/10/20  0522   POTASSIUM mmol/L 4 3 4 0 4 5   CHLORIDE mmol/L 93* 89* 91*   BUN mg/dL 99* 107* 108*   CREATININE mg/dL 3 21* 3 37* 3 26*   CALCIUM mg/dL 8 9 9 3 8 8   EGFR ml/min/1 73sq m 17 16 16     Lipid Profile:

## 2020-08-11 NOTE — ASSESSMENT & PLAN NOTE
· Rate is controlled  · He was recently started on Eliquis 2 5 mg BID   · Will continue with BB for rate control   · Hold off on Eliquis and ASA for now     · Cardiology input appreciated

## 2020-08-11 NOTE — OCCUPATIONAL THERAPY NOTE
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    Pacemaker    Azotemia    Oxygen dependent      Past Medical History:   Diagnosis Date    Aortic stenosis     ECHO -4-14-14  MODERATE TO SEVERE AORTIC STENOSIS; MILD AROTIC INSUFFICIENCY - LAST ASSESSED 10/26/16; RESOLVED 6/8/16    Aortic valve disorder     LAST ASSESSED 10/8/15; 10/8/15    Complete heart block ([address] ) 7/20/2020    Hypertensive urgency 5/19/2019    Transient cerebral ischemia        Past Surgical History:   Procedure Laterality Date    AORTIC VALVE REPLACEMENT  06/30/2015    avr with 23 mm [place] Magna Ease bioprosthetic    CATARACT EXTRACTION Right 06/05/2000    COLONOSCOPY      CORONARY ARTERY BYPASS GRAFT  06/05/2000    x1 with lima  to lad    POLYPECTOMY  04/2014    enteroscopic - esophageal ulcer, gastric erosion, and duodenal ulcer   TONSILLECTOMY      unknown        08/11/20 1042   Note Type   Note type Eval/Treat   Restrictions/Precautions   Weight Bearing Precautions Per Order No   LUE Weight Bearing Per Order Other  (recxent pacemaker insertion)   Other Precautions Fall Risk;Pain;Hard of hearing;Cognitive; Chair Alarm;Visual impairment; Impulsive   Pain Assessment   Pain Assessment Tool 0-10   Pain Score 3   Pain Location/Orientation Orientation: Bilateral;Location: Knee   Pain Onset/Description Onset: Ongoing; Descriptor: Aching   Patient's Stated Pain Goal No pain   Home Living   Type of [address] [place] [address] Two level;Bed/bath upstairs;1/2 bath on main level;Stairs to enter with rails  (3 [address], set up 1st floor)   Bathroom Shower/Tub Walk-in shower   Bathroom Toilet Standard   Bathroom Equipment Shower chair [facility]   Additional Comments ambulatory w/o devices at baseline   Prior Function   Level of [place] Independent with ADLs and functional mobility   Lives With Spouse   Receives Help From Family   ADL Assistance Independent   IADLs Needs assistance   Falls in the last 6 months 0   Vocational Retired  (retired farmer)   Comments son lives next door and is supportive/involved PRN   Psychosocial   Psychosocial (WDL) WDL   Subjective   Subjective agreeable to therapy eval, staying OOB for lunch   ADL   Eating Assistance 5  Supervision/Setup   Eating Deficit Setup   Grooming Assistance 4  Minimal Assistance   Grooming Deficit Setup; Increased time to complete   UB Bathing Assistance 3  Moderate Assistance   LB Bathing Assistance 2  Maximal [address] 3  Moderate [address] Unable to assess   Bed Mobility   Additional Comments Pt OOB as approached for therapy eval   Transfers   Sit to Stand 4  Minimal assistance   Additional items Assist x 1; Armrests; Impulsive;Verbal cues   Stand to Sit 4  Minimal assistance   Additional items Assist x 1; Armrests; Verbal cues   Additional Comments VC's for safety/use of RW   Balance   Static Sitting Good   Dynamic Sitting Fair   Static Standing Fair -   Dynamic Standing Poor +   Ambulatory Poor +   Activity Tolerance   Activity Tolerance Patient tolerated treatment well   Medical Staff Made Aware PT Jeremiah Littlejohn CM Shayla   Nurse Made Aware yes, RN   RUE Assessment   RUE Assessment   (strength F+/G-)   LUE Assessment   LUE Assessment   (DNT strength)   Hand Function   Gross Motor Coordination Functional   Fine Motor Coordination Functional   Cognition   Overall Cognitive Status Impaired   Arousal/Participation Alert; Cooperative  (pleasant)   Attention Attends with cues to redirect   Orientation Level Oriented X4   Memory Decreased recall of precautions;Decreased recall of recent events Following Commands Follows one step commands with increased time or repetition   Assessment   Limitation Decreased ADL status; Decreased UE strength;Decreased Safe judgement during ADL;Decreased cognition;Decreased endurance;Decreased self-care trans;Decreased high-level ADLs   Prognosis Good   Assessment Pt is a 80 y o  male seen for OT evaluation s/p admit to [address] on 8/10/2020 w/ GI bleeding  Comorbidities affecting pt's functional performance at time of assessment include: HTN and Anemia, A Fib, CKD IV, see H & P for additional PMHx  Personal factors affecting pt at time of IE include:steps to enter environment, difficulty performing ADLS and difficulty performing IADLS   Prior to admission, pt was I with self care ADL's, Mobility, A with IADL's, most recently at [facility] receiving STR from recent hospitalization  Upon evaluation: Pt requires an increased level of assistance with self care ADL's, IADL's, functional transfers/toileting/mobility r/t  the following deficits impacting occupational performance: weakness, decreased strength, decreased balance, decreased tolerance and decreased safety awareness  Pt to benefit from continued skilled OT tx while in the hospital to address deficits as defined above and maximize level of functional independence w ADL's and functional mobility  Occupational Performance areas to address include: grooming, bathing/shower, toilet hygiene, dressing and functional mobility  From OT standpoint, recommendation at time of d/c would be STR (return to the [place] and resume therapies as indicated)        Goals   Patient Goals to get stronger   STG Time Frame 3-5   Short Term Goal #1  Pt will perform UB bathing/dressing w/ Min A    Short Term Goal #2 assess and address/increase bed mobility X 1 level, for self care participation   LTG Time Frame 7-10   Long Term Goal #1 increase LB self care to Mod A w/ adaptations as indicated   Long Term Goal #2 increase functional OOB transfers to [facility] w/ good safety, for self toileting   Long Term Goal increase B/L UE strength X 1/2 grade for increased ability to safely participate in functional transfers   Plan   Treatment Interventions ADL retraining; Activityengagement; Functional transfer training;UE strengthening/ROM; Endurance training;Patient/family training;Cognitive reorientation;Continued evaluation; Compensatory technique education   Goal Expiration Date 08/21/20   OT Treatment Day 0   OT Frequency 3-5x/wk   Recommendation   OT Discharge Recommendation Post-Acute Rehabilitation Services  (at the [address] [place] [address])   OT - OK to Discharge Yes  (when medically cleared)   Ashley Ford OT

## 2020-08-11 NOTE — PLAN OF CARE
Problem: Potential for Falls  Goal: Patient will remain free of falls  Description: INTERVENTIONS:  - Assess patient frequently for physical needs  -  Identify cognitive and physical deficits and behaviors that affect risk of falls    -  [place] fall precautions as indicated by assessment   - Educate patient/family on patient safety including physical limitations  - Instruct patient to call for assistance with activity based on assessment  - Modify environment to reduce risk of injury  - Consider OT/PT consult to assist with strengthening/mobility  Outcome: Progressing     Problem: CARDIOVASCULAR - ADULT  Goal: Maintains optimal cardiac output and hemodynamic stability  Description: INTERVENTIONS:  - Monitor I/O, vital signs and rhythm  - Monitor for S/S and trends of decreased cardiac output  - Assess quality of pulses, skin color and temperature  - Assess for signs of decreased coronary artery perfusion  - Instruct patient to report change in severity of symptoms  Outcome: Progressing     Problem: GASTROINTESTINAL - ADULT  Goal: Maintains or returns to baseline bowel function  Description: INTERVENTIONS:  - Assess bowel function  - Encourage oral fluids to ensure adequate hydration  - Administer IV fluids if ordered to ensure adequate hydration  - Administer ordered medications as needed  - Encourage mobilization and activity  - Consider nutritional services referral to assist patient with adequate nutrition and appropriate food choices  Outcome: Progressing  Goal: Maintains adequate nutritional intake  Description: INTERVENTIONS:  - Monitor percentage of each meal consumed  - Identify factors contributing to decreased intake, treat as appropriate  - Assist with meals as needed  - Monitor I&O, weight, and lab values if indicated  - Obtain nutrition services referral as needed  Outcome: Progressing     Problem: METABOLIC, FLUID AND ELECTROLYTES - ADULT  Goal: Electrolytes maintained within normal limits  Description: INTERVENTIONS:  - Monitor labs and assess patient for signs and symptoms of electrolyte imbalances  - Administer electrolyte replacement as ordered  - Monitor response to electrolyte replacements, including repeat lab results as appropriate  - Instruct patient on fluid and nutrition as appropriate  Outcome: Progressing  Goal: Fluid balance maintained  Description: INTERVENTIONS:  - Monitor labs   - Monitor I/O and WT  - Instruct patient on fluid and nutrition as appropriate  - Assess for signs & symptoms of volume excess or deficit  Outcome: Progressing     Problem: SKIN/TISSUE INTEGRITY - ADULT  Goal: Skin integrity remains intact  Description: INTERVENTIONS  - Identify patients at risk for skin breakdown  - Assess and monitor skin integrity  - Assess and monitor nutrition and hydration status  - Monitor labs (i e  albumin)  - Assess for incontinence   - Turn and reposition patient  - Assist with mobility/ambulation  - Relieve pressure over bony prominences  - Avoid friction and shearing  - Provide appropriate hygiene as needed including keeping skin clean and dry  - Evaluate need for skin moisturizer/barrier cream  - Collaborate with interdisciplinary team (i e  Nutrition, Rehabilitation, etc )   - Patient/family teaching  Outcome: Progressing     Problem: HEMATOLOGIC - ADULT  Goal: Maintains hematologic stability  Description: INTERVENTIONS  - Assess for signs and symptoms of bleeding or hemorrhage  - Monitor labs  - Administer supportive blood products/factors as ordered and appropriate  Outcome: Progressing     Problem: MUSCULOSKELETAL - ADULT  Goal: Maintain or return mobility to safest level of function  Description: INTERVENTIONS:  - Assess patient's ability to carry out ADLs; assess patient's baseline for ADL function and identify physical deficits which impact ability to perform ADLs (bathing, care of mouth/teeth, toileting, grooming, dressing, etc )  - Assess/evaluate cause of self-care deficits   - Assess range of motion  - Assess patient's mobility  - Assess patient's need for assistive devices and provide as appropriate  - Encourage maximum independence but intervene and supervise when necessary  - Involve family in performance of ADLs  - Assess for home care needs following discharge   - Consider OT consult to assist with ADL evaluation and planning for discharge  - Provide patient education as appropriate  Outcome: Progressing     Problem: PAIN - ADULT  Goal: Verbalizes/displays adequate comfort level or baseline comfort level  Description: Interventions:  - Encourage patient to monitor pain and request assistance  - Assess pain using appropriate pain scale  - Administer analgesics based on type and severity of pain and evaluate response  - Implement non-pharmacological measures as appropriate and evaluate response  - Consider cultural and social influences on pain and pain management  - Notify physician/advanced practitioner if interventions unsuccessful or patient reports new pain  Outcome: Progressing     Problem: INFECTION - ADULT  Goal: Absence or prevention of progression during hospitalization  Description: INTERVENTIONS:  - Assess and monitor for signs and symptoms of infection  - Monitor lab/diagnostic results  - Monitor all insertion sites, i e  indwelling lines, tubes, and drains  - [place] appropriate cooling/warming therapies per order  - Administer medications as ordered  - Instruct and encourage patient and family to use good hand hygiene technique  Outcome: Progressing     Problem: SAFETY ADULT  Goal: Patient will remain free of falls  Description: INTERVENTIONS:  - Assess patient frequently for physical needs  -  Identify cognitive and physical deficits and behaviors that affect risk of falls    -  [place] fall precautions as indicated by assessment   - Educate patient/family on patient safety including physical limitations  - Instruct patient to call for assistance with activity based on assessment  - Modify environment to reduce risk of injury  - Consider OT/PT consult to assist with strengthening/mobility  Outcome: Progressing  Goal: Maintain or return to baseline ADL function  Description: INTERVENTIONS:  -  Assess patient's ability to carry out ADLs; assess patient's baseline for ADL function and identify physical deficits which impact ability to perform ADLs (bathing, care of mouth/teeth, toileting, grooming, dressing, etc )  - Assess/evaluate cause of self-care deficits   - Assess range of motion  - Assess patient's mobility; develop plan if impaired  - Assess patient's need for assistive devices and provide as appropriate  - Encourage maximum independence but intervene and supervise when necessary  - Involve family in performance of ADLs  - Assess for home care needs following discharge   - Consider OT consult to assist with ADL evaluation and planning for discharge  - Provide patient education as appropriate  Outcome: Progressing  Goal: Maintain or return mobility status to optimal level  Description: INTERVENTIONS:  - Assess patient's baseline mobility status (ambulation, transfers, stairs, etc )    - Identify cognitive and physical deficits and behaviors that affect mobility  - Identify mobility aids required to assist with transfers and/or ambulation (gait belt, sit-to-stand, lift, walker, cane, etc )  - [place] fall precautions as indicated by assessment  - Record patient progress and toleration of activity level on Mobility SBAR; progress patient to next Phase/Stage  - Instruct patient to call for assistance with activity based on assessment  - Consider rehabilitation consult to assist with strengthening/weightbearing, etc   Outcome: Progressing     Problem: DISCHARGE PLANNING  Goal: Discharge to home or other facility with appropriate resources  Description: INTERVENTIONS:  - Identify barriers to discharge w/patient and caregiver  - Arrange for needed discharge resources and transportation as appropriate  - Identify discharge learning needs (meds, wound care, etc )  - Refer to Case Management Department for coordinating discharge planning if the patient needs post-hospital services based on physician/advanced practitioner order or complex needs related to functional status, cognitive ability, or social support system  Outcome: Progressing     Problem: Knowledge Deficit  Goal: Patient/family/caregiver demonstrates understanding of disease process, treatment plan, medications, and discharge instructions  Description: Complete learning assessment and assess knowledge base    Interventions:  - Provide teaching at level of understanding  - Provide teaching via preferred learning methods  Outcome: Progressing

## 2020-08-11 NOTE — SOCIAL WORK
As per Warden Caldwell the pt will be stable to be discharged back to [facility] tomorrow  Faxed clinical for authorization

## 2020-08-11 NOTE — CONSULTS
Consultation - Nephrology   Charissa Chavez 80 y o  male MRN: [id number]  Unit/Bed#: -02 Encounter: [id number]      A/P:  1  YOLANDE non oliguric on top of CKD stage 4  Multifactorial cause could be secondary to the blood loss anemia along with diuretics torsemide and related hypotension upon presentation  His blood pressure usually runs higher side more than 160s which is upon presentation 110's  Received IV fluids LR upon presentation  Agree with holding diuretics  Blood pressure medication including clonidine, metoprolol, amlodipine should be continued with higher holding parameters  Please see the orders  Renal Ultrasound noted--no hydronephrosis medical renal disease  No urine workup was done will order urine electrolytes sodium creatinine and a m renal function panel  His creatinine already started getting better , expect creatinine should improve as GI bleeding  is being addressed  Maintain MAP around >80s  Avoid hypotension and nephrotoxins including NSAIDs, IV contrast agents  Adjust any renally excreted medication to the GFR less than 15  2  Hypertension  Please see above  3  GI bleeding  Please see above  4  Anemia status post 2 units of packed red blood cell transfusion, monitor H and H and transfuse as needed  5  CKD stage 4-- Will need close follow-up as an outpatient for further workup including PTH vitamin D volume status and adjustment of BMD   Spoke to the hospital medicine team and discussed about the management length     Thank you for allowing us to participate in the care of your patient  Please feel free to contact us regarding the care of this patient, or any other questions/concerns that may be applicable      Patient Active Problem List   Diagnosis    Anemia    Mild intermittent asthma without complication    Acute kidney injury superimposed on chronic kidney disease ([address] )    Dyslipidemia    GERD (gastroesophageal reflux disease)    Late effects of cerebrovascular disease    Lumbar degenerative disc disease    Lumbar radiculopathy    Mitral regurgitation    Obesity (BMI 30-39  9)    Medicare annual wellness visit, subsequent    H/O aortic valve replacement    History of stroke    Bradycardia    Essential hypertension    Leukocytosis    Ambulatory dysfunction    Acute respiratory failure with hypoxia (HCC)    GI bleeding    Stage 4 chronic kidney disease (HCC)    Paroxysmal A-fib (HCC)    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    Pacemaker    Azotemia    Oxygen dependent       History of Present Illness   Physician Requesting Consult: Lord Rubi MD  Reason for Consult / Principal Problem: YOLANDE on CKD  Hx and PE limited by:   HPI: Donnell Gomes is a 80y o  year old male with history CKD stage 4 with baseline creatinine appears to be 2 1-2 3 mg/dL, history of hypertension, history of complete heart block, paroxysmal AFib, CABG, aortic valve replacement, history of stroke who presents with a hemoglobin level of 6 4 and after blood transfusion if hemoglobin came out to be 9 2  Apparently for paroxysmal AVB was on Eliquis and he has not had melena  He has initial presentation blood pressure was lower side most likely from the chronic blood loss along with the patient is being on multiple blood pressure medication including clonidine metoprolol, Amlodipine  GI has seen the patient and going for endoscopy this morning  He denies any over-the-counter NSAIDs or any new medication or any recent IV contrast exposure  Upon admission his creatinine was up 3 37 from his baseline around 2 3   He denies any dysuria or any pain or fever or incontinence or short of breath  Nephrology consult was called for acute rise of creatinine  History obtained from chart review and the patient    Review of Systems - Negative except as mentioned above in HPI, more specifics as mentioned below    Review of Systems - Negative except HPI    Historical Information   Past Medical History:   Diagnosis Date    Aortic stenosis     ECHO -4-14-14  MODERATE TO SEVERE AORTIC STENOSIS; MILD AROTIC INSUFFICIENCY - LAST ASSESSED 10/26/16; RESOLVED 6/8/16    Aortic valve disorder     LAST ASSESSED 10/8/15; 10/8/15    Complete heart block ([address] ) 7/20/2020    Hypertensive urgency 5/19/2019    Transient cerebral ischemia      Past Surgical History:   Procedure Laterality Date    AORTIC VALVE REPLACEMENT  06/30/2015    avr with 23 mm [place] Magna Ease bioprosthetic    CATARACT EXTRACTION Right 06/05/2000    COLONOSCOPY      CORONARY ARTERY BYPASS GRAFT  06/05/2000    x1 with lima  to lad    POLYPECTOMY  04/2014    enteroscopic - esophageal ulcer, gastric erosion, and duodenal ulcer       TONSILLECTOMY      unknown     Social History   Social History     Substance and Sexual Activity   Alcohol Use Never    Alcohol/week: 1 0 standard drinks    Types: 1 Cans of beer per week    Frequency: Monthly or less    Drinks per session: 1 or 2    Binge frequency: Never    Comment: social     Social History     Substance and Sexual Activity   Drug Use Never     Social History     Tobacco Use   Smoking Status Never Smoker   Smokeless Tobacco Never Used     Family History   Problem Relation Age of Onset    No Known Problems Mother     No Known Problems Father     Coronary artery disease Neg Hx        Meds/Allergies   all current active meds have been reviewed, current meds:   Current Facility-Administered Medications   Medication Dose Route Frequency    acetaminophen (TYLENOL) tablet 650 mg  650 mg Oral Q6H PRN    amLODIPine (NORVASC) tablet 10 mg  10 mg Oral Daily    cloNIDine (CATAPRES) tablet 0 1 mg  0 1 mg Oral BID    metoprolol succinate (TOPROL-XL) 24 hr tablet 100 mg  100 mg Oral BID    pantoprazole (PROTONIX) EC tablet 40 mg  40 mg Oral BID AC    pneumococcal 13-valent conjugate vaccine (PREVNAR-13) IM injection 0 5 mL  0 5 mL Intramuscular Prior to discharge    pravastatin (PRAVACHOL) tablet 40 mg  40 mg Oral Daily With Dinner    and PTA meds:    Medications Prior to Admission   Medication    apixaban (ELIQUIS) 2 5 mg    aspirin (ECOTRIN LOW STRENGTH) 81 mg EC tablet    cloNIDine (CATAPRES) 0 1 mg tablet    docusate sodium (COLACE) 100 mg capsule    metoprolol succinate (TOPROL-XL) 100 mg 24 hr tablet    pantoprazole (PROTONIX) 40 mg tablet    polyethylene glycol (MIRALAX) 17 g packet    pravastatin (PRAVACHOL) 40 mg tablet    senna (SENOKOT) 8 6 mg    torsemide (DEMADEX) 20 mg tablet    amLODIPine (NORVASC) 10 mg tablet    bisacodyl (DULCOLAX) 5 mg EC tablet         Allergies   Allergen Reactions    Promethazine Delirium       Objective     Intake/Output Summary (Last 24 hours) at 8/11/2020 2038  Last data filed at 8/11/2020 1600  Gross per 24 hour   Intake 950 ml   Output 1525 ml   Net -575 ml       Invasive Devices:        Physical Exam      I/O last 3 completed shifts: In: 1520 [P O :420; I V :300; Blood:700; IV Piggyback:100]  Out: 3905 [Urine:1775]    Vitals:    08/11/20 1945   BP: 95/51   Pulse: 60   Resp: 16   Temp: 98 3 °F (36 8 °C)   SpO2: 96%       Gen: in NAD, Alert/Awake  HEENT: no sclerous icterus, MMM, neck supple  CV: +S1/S2, RRR  Lungs: CTA bilaterally  Abd: +BS, soft NT/ND  Ext: all four extremities are warm  Skin: no rashes noted  Neuro: CN II-XII intact    Current Weight: Weight - Scale: 88 9 kg (195 lb 15 8 oz)  First Weight: Weight - Scale: 91 2 kg (201 lb)    Lab Results:  I have personally reviewed pertinent labs      CBC:   Lab Results   Component Value Date    WBC 10 60 08/11/2020    HGB 9 2 (L) 08/11/2020    HCT 26 2 (L) 08/11/2020    MCV 88 08/11/2020     08/11/2020    MCH 31 4 08/11/2020    MCHC 35 5 08/11/2020    RDW 13 8 08/11/2020    MPV 9 2 08/11/2020     CMP:   Lab Results   Component Value Date    K 4 3 08/11/2020    CL 93 (L) 08/11/2020    CO2 34 (H) 08/11/2020    BUN 99 (H) 08/11/2020    CREATININE 3 21 (H) 08/11/2020    CALCIUM 8 9 08/11/2020    EGFR 17 08/11/2020     Phosphorus: No results found for: PHOS  Magnesium:   Lab Results   Component Value Date    MG 2 6 08/11/2020     Urinalysis: No results found for: Kaity Comber, SPECGRAV, PHUR, LEUKOCYTESUR, NITRITE, PROTEINUA, GLUCOSEU, KETONESU, BILIRUBINUR, BLOODU  Ionized Calcium: No results found for: CAION  Coagulation:   Lab Results   Component Value Date    INR 1 33 (H) 08/11/2020     Troponin: No results found for: TROPONINI  ABG: No results found for: PHART, YJM8QSJ, PO2ART, LYY8HFD, K0RXVMEO, BEART, SOURCE    Results from last 7 days   Lab Units 08/11/20  0552 08/10/20  1002 08/10/20  0522   POTASSIUM mmol/L 4 3 4 0 4 5   CHLORIDE mmol/L 93* 89* 91*   CO2 mmol/L 34* 35* 35*   BUN mg/dL 99* 107* 108*   CREATININE mg/dL 3 21* 3 37* 3 26*   CALCIUM mg/dL 8 9 9 3 8 8       Radiology review:  Procedure: Us Kidney And Bladder    Result Date: 8/11/2020  Narrative: RENAL ULTRASOUND INDICATION:   YOLANDE  COMPARISON: Renal ultrasound dated 5/27/2015  TECHNIQUE:   Ultrasound of the retroperitoneum was performed with a curvilinear transducer utilizing volumetric sweeps and still imaging techniques  FINDINGS: KIDNEYS: Symmetric and normal size  Right kidney:  8 9 cm  Left kidney:  11 9 cm  Right kidney The kidney is echogenic with thinning of the renal cortex  No suspicious masses detected  Few simple renal cysts measuring up to 2 5 cm  No hydronephrosis  No shadowing calculi  No perinephric fluid collections  Left kidney The kidney is echogenic with thinning of the renal cortex  No suspicious masses detected  Dominant simple upper pole renal cyst is seen measuring 8 2 x 7 8 x 6 8 cm  No hydronephrosis  No shadowing calculi  No perinephric fluid collections  URETERS: Nonvisualized  BLADDER: Normally distended  No focal thickening or mass lesions  Bilateral ureteral jets detected  Impression: No hydronephrosis  Medical renal disease  Simple bilateral renal cysts  Workstation performed: [id number]         EKG, Pathology, and Other Studies:  Reviewed       Counseling / Coordination of Care  Total ADDITIONAL floor / unit time spent today 50 minutes  Greater than 50% of total time was spent with the patient and / or family counseling and / or coordination of care  A description of the counseling / coordination of care: 30 min  Shirley Palomo MD      This consultation note was produced in part using a dictation device which may document imprecise wording from author's original intent

## 2020-08-11 NOTE — ASSESSMENT & PLAN NOTE
· Patient with history of gastric ulcer in the past  · Noted to have drop in hemoglobin and occult positive; suspected to be exacerbated by Eliquis  · Gastroenterology input appreciated  · EGD today   · Continue with IV Protonix  · Frequent H&H  · Received 2 units of packed red blood cells transfusion today with furosemide after transfusion; Hgb 6s ->9s

## 2020-08-11 NOTE — ASSESSMENT & PLAN NOTE
Maintaining sinus rhythm   Rate controlled   Given age, HTN, Hx or stroke CV disease patient should be on full AC   However with severe anemia/GI bleed this can be held until source is found    OK to hold until cleared by GI

## 2020-08-11 NOTE — SOCIAL WORK
Evaluated the pt at the bedside  Pt was admitted to the hospital for GI bleeding  Explained the role of CM and the optiosn of discharge planning with the pt  Pt indicated he is at [facility]  for STR  Spoke to Arsen from [employer]  who reports the pt is currently a bedhold at the [facility]  Pt will need authorization from [id number] test to return upon discharge  Pt is scheduled for an EGD today  GI is planning to do a colonoscopy tomorrow  Will submit for authorization today from [facility]   Patient/caregiver received discharge checklist   Content reviewed  Patient/caregiver encouraged to participate in discharge plan of care prior to discharge home  CM reviewed d/c planning process including the following: identifying help at home, patient preference for d/c planning needs, availability of treatment team to discuss questions or concerns patient and/or family may have regarding understanding medications and recognizing signs and symptoms once discharged  CM also encouraged patient to follow up with all recommended appointments after discharge  Patient advised of importance for patient and family to participate in managing patients medical well being

## 2020-08-11 NOTE — PLAN OF CARE
Problem: PHYSICAL THERAPY ADULT  Goal: Performs mobility at highest level of function for planned discharge setting  See evaluation for individualized goals  Description: Treatment/Interventions: Functional transfer training, LE strengthening/ROM, Elevations, Therapeutic exercise, Endurance training, Patient/family training, Equipment eval/education, Bed mobility, Gait training, Spoke to nursing, Spoke to case management  Equipment Recommended: Patel Avalos       See flowsheet documentation for full assessment, interventions and recommendations  Note: Prognosis: Good  Problem List: Decreased strength, Decreased range of motion, Decreased endurance, Impaired balance, Decreased mobility, Decreased cognition, Decreased safety awareness, Impaired hearing  Assessment: Pt is 80 y o  male seen for PT evaluation on 8/11/2020 s/p admit to [address] on 8/10/2020 w/ GI bleeding; pt presented with a hemoglobin of 6 4 at the [facility] (the [place]) where he was in rehabilitation following a 16-day hospitalization from 07/20/20 to 08/05/2020 for new onset complete heart block during which he had a pacemaker placement and was started on Eliquis for underlying PAF  PT consulted to assess pt's functional mobility and d/c needs  Order placed for PT eval and tx, w/ up w/ A order  PT performed at least 2 patient identifiers during session: Name and wristband  Comorbidities affecting pt's physical performance at time of assessment include: acute on chronic anemia, AFib, CKD stage 4, HTN, atherosclerotic heart disease, complete heart block, s/p PPM placement 7/24/20  PTA and recent hospitalization, pt was independent w/ all functional mobility w/ no AD, ambulates community distances and elevations, ambulates household distances, has (+) GILBERT and lives w/ wife in 2 level home c 1st floor setup   Personal factors affecting pt at time of IE include: lives in 2 story house, ambulating w/ assistive device, stairs to enter home, unable to perform dynamic tasks in community, decreased cognition, decreased initiation and engagement, unable to perform physical activity and limited insight into impairments  Please find objective findings from PT assessment regarding body systems outlined above with impairments and limitations including weakness, impaired balance, decreased endurance, decreased activity tolerance, decreased functional mobility tolerance, decreased safety awareness and fall risk, as well as mobility assessment (need for cueing for mobility technique)  The following objective measures performed on IE also reveal limitations: Barthel Index: 50/100 and Modified Gerard: 4 (moderate/severe disability)  Pt's clinical presentation is currently unstable/unpredictable seen in pt's presentation of abnormal lab value(s), ongoing medical assessment and low HgB requiring blood transfusion - pt is pending EGD today  Pt to benefit from continued PT tx to address deficits as defined above and maximize level of functional independent mobility and consistency  From PT/mobility standpoint, recommendation at time of d/c would be STR pending progress in order to facilitate return to PLOF  Barriers to Discharge: Inaccessible home environment     PT Discharge Recommendation: [address]     PT - OK to Discharge: Yes(when medically cleared, if to STR)    See flowsheet documentation for full assessment

## 2020-08-12 LAB
ALBUMIN SERPL BCP-MCNC: 3.5 G/DL (ref 3.5–5.7)
ALBUMIN SERPL BCP-MCNC: 3.7 G/DL (ref 3.5–5.7)
ANION GAP SERPL CALCULATED.3IONS-SCNC: 8 MMOL/L (ref 4–13)
ANION GAP SERPL CALCULATED.3IONS-SCNC: 9 MMOL/L (ref 4–13)
BILIRUB UR QL STRIP: NEGATIVE
BUN SERPL-MCNC: 84 MG/DL (ref 7–25)
BUN SERPL-MCNC: 86 MG/DL (ref 7–25)
CALCIUM SERPL-MCNC: 8.9 MG/DL (ref 8.6–10.5)
CALCIUM SERPL-MCNC: 9.1 MG/DL (ref 8.6–10.5)
CHLORIDE SERPL-SCNC: 93 MMOL/L (ref 98–107)
CHLORIDE SERPL-SCNC: 95 MMOL/L (ref 98–107)
CLARITY UR: CLEAR
CO2 SERPL-SCNC: 33 MMOL/L (ref 21–31)
CO2 SERPL-SCNC: 34 MMOL/L (ref 21–31)
COLOR UR: YELLOW
CREAT SERPL-MCNC: 3.11 MG/DL (ref 0.7–1.3)
CREAT SERPL-MCNC: 3.17 MG/DL (ref 0.7–1.3)
CREAT UR-MCNC: 71 MG/DL
CREAT UR-MCNC: 71.1 MG/DL
ERYTHROCYTE [DISTWIDTH] IN BLOOD BY AUTOMATED COUNT: 14 % (ref 11.5–14.5)
GFR SERPL CREATININE-BSD FRML MDRD: 17 ML/MIN/1.73SQ M
GFR SERPL CREATININE-BSD FRML MDRD: 17 ML/MIN/1.73SQ M
GLUCOSE SERPL-MCNC: 151 MG/DL (ref 65–99)
GLUCOSE SERPL-MCNC: 92 MG/DL (ref 65–99)
GLUCOSE UR STRIP-MCNC: NEGATIVE MG/DL
HCT VFR BLD AUTO: 26.6 % (ref 42–47)
HGB BLD-MCNC: 9.1 G/DL (ref 14–18)
HGB BLD-MCNC: 9.4 G/DL (ref 14–18)
HGB UR QL STRIP.AUTO: NEGATIVE
KETONES UR STRIP-MCNC: NEGATIVE MG/DL
LEUKOCYTE ESTERASE UR QL STRIP: NEGATIVE
MCH RBC QN AUTO: 30.7 PG (ref 26–34)
MCHC RBC AUTO-ENTMCNC: 34.3 G/DL (ref 31–37)
MCV RBC AUTO: 90 FL (ref 81–99)
NITRITE UR QL STRIP: NEGATIVE
PH UR STRIP.AUTO: 6.5 [PH]
PHOSPHATE SERPL-MCNC: 4.4 MG/DL (ref 3–5.5)
PHOSPHATE SERPL-MCNC: 4.6 MG/DL (ref 3–5.5)
PLATELET # BLD AUTO: 292 THOUSANDS/UL (ref 149–390)
PMV BLD AUTO: 8.9 FL (ref 8.6–11.7)
POTASSIUM SERPL-SCNC: 3.9 MMOL/L (ref 3.5–5.5)
POTASSIUM SERPL-SCNC: 4.1 MMOL/L (ref 3.5–5.5)
PROT UR STRIP-MCNC: NEGATIVE MG/DL
PROT UR-MCNC: 17 MG/DL
PROT UR-MCNC: 17 MG/DL
PROT/CREAT UR: 0.24 MG/G{CREAT} (ref 0–0.1)
PTH-INTACT SERPL-MCNC: 92.9 PG/ML (ref 18.4–80.1)
RBC # BLD AUTO: 2.97 MILLION/UL (ref 4.3–5.9)
SARS-COV-2 RNA RESP QL NAA+PROBE: NEGATIVE
SODIUM 24H UR-SCNC: 25 MOL/L
SODIUM SERPL-SCNC: 135 MMOL/L (ref 134–143)
SODIUM SERPL-SCNC: 137 MMOL/L (ref 134–143)
SP GR UR STRIP.AUTO: 1.01 (ref 1–1.03)
UROBILINOGEN UR QL STRIP.AUTO: 0.2 E.U./DL
WBC # BLD AUTO: 10.2 THOUSAND/UL (ref 4.8–10.8)

## 2020-08-12 PROCEDURE — 83970 ASSAY OF PARATHORMONE: CPT | Performed by: INTERNAL MEDICINE

## 2020-08-12 PROCEDURE — 85027 COMPLETE CBC AUTOMATED: CPT | Performed by: NURSE PRACTITIONER

## 2020-08-12 PROCEDURE — 99232 SBSQ HOSP IP/OBS MODERATE 35: CPT | Performed by: INTERNAL MEDICINE

## 2020-08-12 PROCEDURE — 97116 GAIT TRAINING THERAPY: CPT

## 2020-08-12 PROCEDURE — 85018 HEMOGLOBIN: CPT | Performed by: INTERNAL MEDICINE

## 2020-08-12 PROCEDURE — 99232 SBSQ HOSP IP/OBS MODERATE 35: CPT | Performed by: NURSE PRACTITIONER

## 2020-08-12 PROCEDURE — 80069 RENAL FUNCTION PANEL: CPT | Performed by: INTERNAL MEDICINE

## 2020-08-12 PROCEDURE — 97110 THERAPEUTIC EXERCISES: CPT

## 2020-08-12 PROCEDURE — 30233N1 TRANSFUSION OF NONAUTOLOGOUS RED BLOOD CELLS INTO PERIPHERAL VEIN, PERCUTANEOUS APPROACH: ICD-10-PCS | Performed by: INTERNAL MEDICINE

## 2020-08-12 PROCEDURE — 87635 SARS-COV-2 COVID-19 AMP PRB: CPT | Performed by: NURSE PRACTITIONER

## 2020-08-12 RX ORDER — AMLODIPINE BESYLATE 5 MG/1
5 TABLET ORAL DAILY
Status: DISCONTINUED | OUTPATIENT
Start: 2020-08-12 | End: 2020-08-13 | Stop reason: HOSPADM

## 2020-08-12 RX ORDER — TORSEMIDE 20 MG/1
20 TABLET ORAL DAILY
Status: DISCONTINUED | OUTPATIENT
Start: 2020-08-12 | End: 2020-08-13 | Stop reason: HOSPADM

## 2020-08-12 RX ORDER — ASPIRIN 81 MG/1
81 TABLET ORAL DAILY
Status: DISCONTINUED | OUTPATIENT
Start: 2020-08-12 | End: 2020-08-13 | Stop reason: HOSPADM

## 2020-08-12 RX ADMIN — METOPROLOL SUCCINATE 100 MG: 50 TABLET, EXTENDED RELEASE ORAL at 10:28

## 2020-08-12 RX ADMIN — TORSEMIDE 20 MG: 20 TABLET ORAL at 12:02

## 2020-08-12 RX ADMIN — CLONIDINE HYDROCHLORIDE 0.1 MG: 0.1 TABLET ORAL at 21:34

## 2020-08-12 RX ADMIN — PANTOPRAZOLE SODIUM 40 MG: 40 TABLET, DELAYED RELEASE ORAL at 18:07

## 2020-08-12 RX ADMIN — PRAVASTATIN SODIUM 40 MG: 40 TABLET ORAL at 18:07

## 2020-08-12 RX ADMIN — CLONIDINE HYDROCHLORIDE 0.1 MG: 0.1 TABLET ORAL at 10:28

## 2020-08-12 RX ADMIN — AMLODIPINE BESYLATE 5 MG: 5 TABLET ORAL at 10:28

## 2020-08-12 RX ADMIN — PANTOPRAZOLE SODIUM 40 MG: 40 TABLET, DELAYED RELEASE ORAL at 06:15

## 2020-08-12 RX ADMIN — METOPROLOL SUCCINATE 100 MG: 50 TABLET, EXTENDED RELEASE ORAL at 21:34

## 2020-08-12 RX ADMIN — ASPIRIN 81 MG: 81 TABLET, COATED ORAL at 18:07

## 2020-08-12 NOTE — SOCIAL WORK
Received TC from [facility] with authorization # [id number]  Spoke to the pt who reports he does not want to go now  TC to pt wife, left a VM  Notified Kathy Peñaloza of same

## 2020-08-12 NOTE — PLAN OF CARE
Problem: GASTROINTESTINAL - ADULT  Goal: Maintains or returns to baseline bowel function  Description: INTERVENTIONS:  - Assess bowel function  - Encourage oral fluids to ensure adequate hydration  - Administer IV fluids if ordered to ensure adequate hydration  - Administer ordered medications as needed  - Encourage mobilization and activity  - Consider nutritional services referral to assist patient with adequate nutrition and appropriate food choices  Outcome: Progressing  Goal: Maintains adequate nutritional intake  Description: INTERVENTIONS:  - Monitor percentage of each meal consumed  - Identify factors contributing to decreased intake, treat as appropriate  - Assist with meals as needed  - Monitor I&O, weight, and lab values if indicated  - Obtain nutrition services referral as needed  Outcome: Progressing

## 2020-08-12 NOTE — ASSESSMENT & PLAN NOTE
· Patient with history of gastric ulcer in the past  · Noted to have drop in hemoglobin and occult positive; suspected to be exacerbated by Eliquis  · Gastroenterology input appreciated  · EGD 8/11 hiatal hernia  13 mm crater did, benign appearing ulcer in the what no ball with no hemorrhage  Moderate, patch she a demand is an erythematous mucosa in the duodenal bulb  Biopsy was taken  · GI recommend PPI b i d , soft food  · Received 2 units of packed red blood cells transfusion 8/10 with furosemide after transfusion; Hgb 6s ->9s  · Will hold eliquis for 1 week  Will resume ASA 81

## 2020-08-12 NOTE — PHYSICAL THERAPY NOTE
08/12/20 1107   Pain Assessment   Pain Assessment Tool 0-10   Pain Score No Pain   Restrictions/Precautions   Weight Bearing Precautions Per Order No   Other Precautions Cognitive; Chair Alarm; Bed Alarm; Fall Risk;Hard of hearing   General   Chart Reviewed Yes   Family/Caregiver Present No   Cognition   Overall Cognitive Status Impaired   Arousal/Participation Alert; Cooperative   Attention Attends with cues to redirect   Orientation Level Oriented X4   Memory Decreased short term memory;Decreased recall of recent events;Decreased recall of precautions   Following Commands Follows one step commands with increased time or repetition   Comments pt agreed to PT session   Subjective   Subjective "I want to go home  I don't want to go back to the [facility] "   Bed Mobility   Additional Comments pt OOB in chair to begin & end session, SCDs active post session   Transfers   Sit to Stand 4  Minimal assistance   Additional items Assist x 1; Armrests; Increased time required;Verbal cues   Stand to Sit 4  Minimal assistance   Additional items Assist x 1; Armrests; Increased time required;Verbal cues   Toilet transfer 4  Minimal assistance   Additional items Assist x 1; Increased time required;Verbal cues;Standard toilet   Additional Comments max x 1 for hygiene post BM   Ambulation/Elevation   Gait pattern Poor UE support;Decreased foot clearance; Foward flexed; Short stride; Excessively slow   Gait Assistance   (CG)   Additional items Assist x 1;Verbal cues; Tactile cues   Assistive Device Rolling walker   Distance 25 feet x 2, 70 feet x 1   Stair Management Assistance Not tested   Balance   Static Sitting Good   Dynamic Sitting Fair   Static Standing Fair -   Dynamic Standing Poor +   Ambulatory Poor +   Endurance Deficit   Endurance Deficit Yes   Activity Tolerance   Activity Tolerance Patient tolerated treatment well   Nurse Made Aware yes SELAM Granados notified   Exercises   Hip Flexion Sitting;20 reps;AROM; Bilateral   Hip Abduction Sitting;20 reps;AROM; Bilateral   Hip Adduction Sitting;20 reps;AROM; Bilateral  (isometric)   Knee AROM Long Arc Quad Sitting;20 reps;AROM; Bilateral   Ankle Pumps Sitting;20 reps;AROM; Bilateral   Assessment   Prognosis Good   Problem List Decreased strength;Decreased range of motion;Decreased endurance; Impaired balance;Decreased mobility; Decreased cognition; Impaired judgement;Decreased safety awareness; Impaired hearing   Assessment Pt seen for PT treatment session this date with interventions consisting of gait training w/ emphasis on improving pt's ability to ambulate level surfaces x 25 feet x 2 & 70 feet x 1 with CGA provided by therapist with RW and Therapeutic exercise consisting of: AROM 20 reps B LE in sitting position  Pt agreeable to PT treatment session upon arrival, pt found seated OOB in chair, in no apparent distress  In comparison to previous session, pt with improvements in amb distance, activity tolerance  Post session: all needs in reach seated in bedside chair, SCDs active  Continue to recommend STR vs  Home PT at time of d/c in order to maximize pt's functional independence and safety w/ mobility  Pt continues to be functioning below baseline level, and remains limited 2* factors listed above and including decreased strength, endurance & safe functional mobility  PT will continue to see pt while here in order to address the deficits listed above and provide interventions consistent w/ POC in effort to achieve STGs  Goals   Patient Goals to go home   PT Treatment Day 1   Plan   Treatment/Interventions Functional transfer training;LE strengthening/ROM; Elevations; Therapeutic exercise; Endurance training;Patient/family training;Equipment eval/education; Bed mobility;Gait training;Spoke to nursing   Progress Progressing toward goals   PT Frequency   (3-5 x week)   Recommendation   PT Discharge Recommendation   (STR vs home PT)   Equipment Recommended Walker   PT - OK to Discharge Yes  (when med cleared) Gissel Henderson, PTA

## 2020-08-12 NOTE — SOCIAL WORK
Received TC from [id number]  stating the medical Director is reviewing case  HAD additional questions about the PT notes  Provided requested information

## 2020-08-12 NOTE — PROGRESS NOTES
Progress Note - Henry Miranda 1935, 80 y o  male MRN: [id number]    Unit/Bed#: -02 Encounter: [id number]    Primary Care Provider: Jerson Damon DO   Date and time admitted to hospital: 8/10/2020  9:44 AM        * GI bleeding  Assessment & Plan  · Patient with history of gastric ulcer in the past  · Noted to have drop in hemoglobin and occult positive; suspected to be exacerbated by Eliquis  · Gastroenterology input appreciated  · EGD 8/11 hiatal hernia  13 mm crater did, benign appearing ulcer in the what no ball with no hemorrhage  Moderate, patch she a demand is an erythematous mucosa in the duodenal bulb  Biopsy was taken  · GI recommend PPI b i d , soft food  · Received 2 units of packed red blood cells transfusion 8/10 with furosemide after transfusion; Hgb 6s ->9s  · Will hold eliquis for 1 week  Will resume ASA 81  Anemia  Assessment & Plan  · Acute on chronic  · Normochromic normocytic  · Iron panel result appreciated   · Received 2 units of packed red blood cells 8/10  · Monitor H&H closely      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  Assessment & Plan  · LIMA to LAD 2000; BioAVR 2015  · Asymptomatic   · Cardiology input appreciated   · Continue with current medical management       Paroxysmal [facility])  Assessment & Plan  · Rate is controlled  · He was recently started on Eliquis 2 5 mg BID   · Will continue with BB for rate control   · Hold off on Eliquis and ASA for now  · GI recommended to hold anticoagulations for 1 week  · Cardiology input appreciated    Stage 4 chronic kidney disease ([facility] [address] )  Assessment & Plan  · With acute kidney injury  · His baseline creatinine appears to be 2 1-2 3 mg/dL   · Now creatinine level is at 3 37 ->3 2 -> 3 1 mg/dL  · Suspected to be due to acute on chronic anemia with diuretic use  · renal ultrasound- no hydronephrosis  Medical renal disease    Simple bilateral renal cyst   · Nephrology input appreciated  · Torsemide was resumed today  · Follow-up with BMP in the a m  · Avoid any hypotension and nephrotoxins    Essential hypertension  Assessment & Plan  · Blood pressure appears to be well controlled  · Continue with current medical management  · Monitor blood pressure closely            VTE Prophylaxis:  SCDs     Patient Centered Rounds: I have performed bedside rounds with nursing staff today  Discussions with Specialists or Other Care Team Provider:  Nephrology, gastroenterology, nursing, case management, PT and OT, pharmacy  Education and Discussions with Family / Patient:  Patient and daughters and wife via phone    Current Length of Stay: 2 day(s)    Current Patient Status: Inpatient   Certification Statement: The patient will continue to require additional inpatient hospital stay due to Acute on chronic anemia    Discharge Plan:  Pending hospital course    Code Status: Level 1 - Full Code    Subjective:   Feeling okay  He would like to go home  He states having poor appetite  Denies any further bleeding or bloody stool  Objective:     Vitals:   Temp (24hrs), Av 8 °F (36 6 °C), Min:97 4 °F (36 3 °C), Max:98 3 °F (36 8 °C)    Temp:  [97 4 °F (36 3 °C)-98 3 °F (36 8 °C)] 97 4 °F (36 3 °C)  HR:  [60-62] 60  Resp:  [16-18] 18  BP: ()/(51-67) 143/67  SpO2:  [92 %-97 %] 97 %  Body mass index is 33 64 kg/m²  Input and Output Summary (last 24 hours): Intake/Output Summary (Last 24 hours) at 2020 1524  Last data filed at 2020 1031  Gross per 24 hour   Intake 480 ml   Output 400 ml   Net 80 ml       Physical Exam:   Physical Exam  Vitals signs and nursing note reviewed  Constitutional:       General: He is not in acute distress  Appearance: Normal appearance  He is ill-appearing  HENT:      Head: Normocephalic and atraumatic        Right Ear: External ear normal       Left Ear: External ear normal       Nose: Nose normal  Mouth/Throat:      Mouth: Mucous membranes are moist       Pharynx: Oropharynx is clear  Eyes:      General:         Right eye: No discharge  Left eye: No discharge  Extraocular Movements: Extraocular movements intact  Pupils: Pupils are equal, round, and reactive to light  Neck:      Musculoskeletal: Normal range of motion and neck supple  No neck rigidity  Cardiovascular:      Rate and Rhythm: Normal rate and regular rhythm  Pulses: Normal pulses  Heart sounds: Normal heart sounds  No murmur  Pulmonary:      Effort: Pulmonary effort is normal  No respiratory distress  Breath sounds: Normal breath sounds  No wheezing or rales  Abdominal:      General: Bowel sounds are normal  There is no distension  Palpations: Abdomen is soft  There is no mass  Tenderness: There is no abdominal tenderness  Musculoskeletal: Normal range of motion  General: No swelling, tenderness or deformity  Skin:     General: Skin is warm and dry  Capillary Refill: Capillary refill takes less than 2 seconds  Coloration: Skin is not pale  Findings: No erythema  Neurological:      General: No focal deficit present  Mental Status: He is alert and oriented to person, place, and time  Mental status is at baseline  Psychiatric:         Mood and Affect: Mood normal          Behavior: Behavior normal          Thought Content: Thought content normal          Judgment: Judgment normal          Additional Data:     Labs:    Results from last 7 days   Lab Units 08/12/20  1025 08/12/20  0614  08/10/20  1002   WBC Thousand/uL  --  10 20   < > 12 50*   HEMOGLOBIN g/dL 9 4* 9 1*   < > 7 3*   HEMATOCRIT %  --  26 6*   < > 21 9*   PLATELETS Thousands/uL  --  292   < > 365   NEUTROS PCT %  --   --   --  78*   LYMPHS PCT %  --   --   --  9*   MONOS PCT %  --   --   --  8   EOS PCT %  --   --   --  4    < > = values in this interval not displayed       Results from last 7 days Lab Units 08/12/20  1025   SODIUM mmol/L 135   POTASSIUM mmol/L 3 9   CHLORIDE mmol/L 93*   CO2 mmol/L 33*   BUN mg/dL 84*   CREATININE mg/dL 3 11*   CALCIUM mg/dL 9 1     Results from last 7 days   Lab Units 08/11/20  0554   INR  1 33*               * I Have Reviewed All Lab Data Listed Above  * Additional Pertinent Lab Tests Reviewed: Tamiko 66 Admission  Reviewed    Imaging:  Imaging Reports Reviewed Today Include: n/a     Recent Cultures (last 7 days):           Last 24 Hours Medication List:   Current Facility-Administered Medications   Medication Dose Route Frequency Provider Last Rate    acetaminophen  650 mg Oral Q6H PRN Ximena Pepe MD      amLODIPine  5 mg Oral Daily DIANDRA Weems      cloNIDine  0 1 mg Oral BID Jeri Harvey MD      metoprolol succinate  100 mg Oral BID Jeri Harvey MD      pantoprazole  40 mg Oral BID AC DIANDRA Weems      pneumococcal 13-valent conjugate vaccine  0 5 mL Intramuscular Prior to discharge Ximena Pepe MD      pravastatin  40 mg Oral Daily With Ronald Wood MD      torsemide  20 mg Oral Daily Jeri Harvey MD          Today, Patient Was Seen By: DIANDRA Weems    ** Please Note: Dictation voice to text software may have been used in the creation of this document   **

## 2020-08-12 NOTE — ASSESSMENT & PLAN NOTE
· With acute kidney injury  · His baseline creatinine appears to be 2 1-2 3 mg/dL   · Now creatinine level is at 3 37 ->3 2 -> 3 1 mg/dL  · Suspected to be due to acute on chronic anemia with diuretic use  · renal ultrasound- no hydronephrosis  Medical renal disease  Simple bilateral renal cyst   · Nephrology input appreciated  · Torsemide was resumed today  · Follow-up with BMP in the a m    · Avoid any hypotension and nephrotoxins

## 2020-08-12 NOTE — PLAN OF CARE
Problem: PHYSICAL THERAPY ADULT  Goal: Performs mobility at highest level of function for planned discharge setting  See evaluation for individualized goals  Description: Treatment/Interventions: Functional transfer training, LE strengthening/ROM, Elevations, Therapeutic exercise, Endurance training, Patient/family training, Equipment eval/education, Bed mobility, Gait training, Spoke to nursing, Spoke to case management  Equipment Recommended: Lucian Funes       See flowsheet documentation for full assessment, interventions and recommendations  Outcome: Progressing  Note: Prognosis: Good  Problem List: Decreased strength, Decreased range of motion, Decreased endurance, Impaired balance, Decreased mobility, Decreased cognition, Impaired judgement, Decreased safety awareness, Impaired hearing  Assessment: Pt seen for PT treatment session this date with interventions consisting of gait training w/ emphasis on improving pt's ability to ambulate level surfaces x 25 feet x 2 & 70 feet x 1 with CGA provided by therapist with RW and Therapeutic exercise consisting of: AROM 20 reps B LE in sitting position  Pt agreeable to PT treatment session upon arrival, pt found seated OOB in chair, in no apparent distress  In comparison to previous session, pt with improvements in amb distance, activity tolerance  Post session: all needs in reach seated in bedside chair, SCDs active  Continue to recommend STR vs  Home PT at time of d/c in order to maximize pt's functional independence and safety w/ mobility  Pt continues to be functioning below baseline level, and remains limited 2* factors listed above and including decreased strength, endurance & safe functional mobility  PT will continue to see pt while here in order to address the deficits listed above and provide interventions consistent w/ POC in effort to achieve STGs    Barriers to Discharge: Inaccessible home environment     PT Discharge Recommendation: (STR vs home PT)     PT - OK to Discharge: Yes(when med cleared)    See flowsheet documentation for full assessment

## 2020-08-12 NOTE — PLAN OF CARE
Problem: Potential for Falls  Goal: Patient will remain free of falls  Description: INTERVENTIONS:  - Assess patient frequently for physical needs  -  Identify cognitive and physical deficits and behaviors that affect risk of falls    -  [place] fall precautions as indicated by assessment   - Educate patient/family on patient safety including physical limitations  - Instruct patient to call for assistance with activity based on assessment  - Modify environment to reduce risk of injury  - Consider OT/PT consult to assist with strengthening/mobility  Outcome: Progressing     Problem: CARDIOVASCULAR - ADULT  Goal: Maintains optimal cardiac output and hemodynamic stability  Description: INTERVENTIONS:  - Monitor I/O, vital signs and rhythm  - Monitor for S/S and trends of decreased cardiac output  - Assess quality of pulses, skin color and temperature  - Assess for signs of decreased coronary artery perfusion  - Instruct patient to report change in severity of symptoms  Outcome: Progressing     Problem: GASTROINTESTINAL - ADULT  Goal: Maintains or returns to baseline bowel function  Description: INTERVENTIONS:  - Assess bowel function  - Encourage oral fluids to ensure adequate hydration  - Administer IV fluids if ordered to ensure adequate hydration  - Administer ordered medications as needed  - Encourage mobilization and activity  - Consider nutritional services referral to assist patient with adequate nutrition and appropriate food choices  Outcome: Progressing  Goal: Maintains adequate nutritional intake  Description: INTERVENTIONS:  - Monitor percentage of each meal consumed  - Identify factors contributing to decreased intake, treat as appropriate  - Assist with meals as needed  - Monitor I&O, weight, and lab values if indicated  - Obtain nutrition services referral as needed  Outcome: Progressing     Problem: METABOLIC, FLUID AND ELECTROLYTES - ADULT  Goal: Electrolytes maintained within normal limits  Description: INTERVENTIONS:  - Monitor labs and assess patient for signs and symptoms of electrolyte imbalances  - Administer electrolyte replacement as ordered  - Monitor response to electrolyte replacements, including repeat lab results as appropriate  - Instruct patient on fluid and nutrition as appropriate  Outcome: Progressing  Goal: Fluid balance maintained  Description: INTERVENTIONS:  - Monitor labs   - Monitor I/O and WT  - Instruct patient on fluid and nutrition as appropriate  - Assess for signs & symptoms of volume excess or deficit  Outcome: Progressing     Problem: SKIN/TISSUE INTEGRITY - ADULT  Goal: Skin integrity remains intact  Description: INTERVENTIONS  - Identify patients at risk for skin breakdown  - Assess and monitor skin integrity  - Assess and monitor nutrition and hydration status  - Monitor labs (i e  albumin)  - Assess for incontinence   - Turn and reposition patient  - Assist with mobility/ambulation  - Relieve pressure over bony prominences  - Avoid friction and shearing  - Provide appropriate hygiene as needed including keeping skin clean and dry  - Evaluate need for skin moisturizer/barrier cream  - Collaborate with interdisciplinary team (i e  Nutrition, Rehabilitation, etc )   - Patient/family teaching  Outcome: Progressing     Problem: HEMATOLOGIC - ADULT  Goal: Maintains hematologic stability  Description: INTERVENTIONS  - Assess for signs and symptoms of bleeding or hemorrhage  - Monitor labs  - Administer supportive blood products/factors as ordered and appropriate  Outcome: Progressing     Problem: MUSCULOSKELETAL - ADULT  Goal: Maintain or return mobility to safest level of function  Description: INTERVENTIONS:  - Assess patient's ability to carry out ADLs; assess patient's baseline for ADL function and identify physical deficits which impact ability to perform ADLs (bathing, care of mouth/teeth, toileting, grooming, dressing, etc )  - Assess/evaluate cause of self-care deficits   - Assess range of motion  - Assess patient's mobility  - Assess patient's need for assistive devices and provide as appropriate  - Encourage maximum independence but intervene and supervise when necessary  - Involve family in performance of ADLs  - Assess for home care needs following discharge   - Consider OT consult to assist with ADL evaluation and planning for discharge  - Provide patient education as appropriate  Outcome: Progressing     Problem: PAIN - ADULT  Goal: Verbalizes/displays adequate comfort level or baseline comfort level  Description: Interventions:  - Encourage patient to monitor pain and request assistance  - Assess pain using appropriate pain scale  - Administer analgesics based on type and severity of pain and evaluate response  - Implement non-pharmacological measures as appropriate and evaluate response  - Consider cultural and social influences on pain and pain management  - Notify physician/advanced practitioner if interventions unsuccessful or patient reports new pain  Outcome: Progressing     Problem: INFECTION - ADULT  Goal: Absence or prevention of progression during hospitalization  Description: INTERVENTIONS:  - Assess and monitor for signs and symptoms of infection  - Monitor lab/diagnostic results  - Monitor all insertion sites, i e  indwelling lines, tubes, and drains  - [place] appropriate cooling/warming therapies per order  - Administer medications as ordered  - Instruct and encourage patient and family to use good hand hygiene technique  Outcome: Progressing     Problem: SAFETY ADULT  Goal: Patient will remain free of falls  Description: INTERVENTIONS:  - Assess patient frequently for physical needs  -  Identify cognitive and physical deficits and behaviors that affect risk of falls    -  [place] fall precautions as indicated by assessment   - Educate patient/family on patient safety including physical limitations  - Instruct patient to call for assistance with activity based on assessment  - Modify environment to reduce risk of injury  - Consider OT/PT consult to assist with strengthening/mobility  Outcome: Progressing  Goal: Maintain or return to baseline ADL function  Description: INTERVENTIONS:  -  Assess patient's ability to carry out ADLs; assess patient's baseline for ADL function and identify physical deficits which impact ability to perform ADLs (bathing, care of mouth/teeth, toileting, grooming, dressing, etc )  - Assess/evaluate cause of self-care deficits   - Assess range of motion  - Assess patient's mobility; develop plan if impaired  - Assess patient's need for assistive devices and provide as appropriate  - Encourage maximum independence but intervene and supervise when necessary  - Involve family in performance of ADLs  - Assess for home care needs following discharge   - Consider OT consult to assist with ADL evaluation and planning for discharge  - Provide patient education as appropriate  Outcome: Progressing  Goal: Maintain or return mobility status to optimal level  Description: INTERVENTIONS:  - Assess patient's baseline mobility status (ambulation, transfers, stairs, etc )    - Identify cognitive and physical deficits and behaviors that affect mobility  - Identify mobility aids required to assist with transfers and/or ambulation (gait belt, sit-to-stand, lift, walker, cane, etc )  - [place] fall precautions as indicated by assessment  - Record patient progress and toleration of activity level on Mobility SBAR; progress patient to next Phase/Stage  - Instruct patient to call for assistance with activity based on assessment  - Consider rehabilitation consult to assist with strengthening/weightbearing, etc   Outcome: Progressing     Problem: DISCHARGE PLANNING  Goal: Discharge to home or other facility with appropriate resources  Description: INTERVENTIONS:  - Identify barriers to discharge w/patient and caregiver  - Arrange for needed discharge resources and transportation as appropriate  - Identify discharge learning needs (meds, wound care, etc )  - Refer to Case Management Department for coordinating discharge planning if the patient needs post-hospital services based on physician/advanced practitioner order or complex needs related to functional status, cognitive ability, or social support system  Outcome: Progressing     Problem: Knowledge Deficit  Goal: Patient/family/caregiver demonstrates understanding of disease process, treatment plan, medications, and discharge instructions  Description: Complete learning assessment and assess knowledge base    Interventions:  - Provide teaching at level of understanding  - Provide teaching via preferred learning methods  Outcome: Progressing

## 2020-08-12 NOTE — PROGRESS NOTES
Progress Note - Nephrology   Josr Goddard 80 y o  male MRN: [id number]  Unit/Bed#: -02 Encounter: [id number]    A/P:  1  YOLANDE non oliguric on top of CKD stage 4    Creatinine improved slightly but not to the baseline yet  His urine chemistry noted  No urine output documented no wood has been documented either  His blood pressure is improving with the higher holding parameters  Clinically he looks much better but he reports no short of breath but would resume torsemide the see orders  No IV fluids at this time  Patient already received blood transfusion and IV fluid since admission  A m  Left to be followed  Would also documented urine output and weight today and early morning tomorrow  He needs to follow up outpatient nephrologist   He is not sure about whether he follows with any nephrologist as an outpatient   Blood pressure medication including clonidine, metoprolol, amlodipine should be continued with higher holding parameters  Please see the orders  H and H is stable so most likely GI bleeding has stopped  Endoscopy report noted  Biopsy of duodenum for H pylori pending  Avoid hypotension and nephrotoxins including NSAIDs, IV contrast agents  Adjust any renally excreted medication to the GFR less than 15  I discussed with him about possible future need for renal replacement therapy in the setting of advanced stage IV CKD he liked to be educated more so would be benefited from outpatient follow-up and further management  2  Hypertension  Please see above  3  GI bleeding  Please see above  4  Anemia status post 2 units of packed red blood cell transfusion, monitor H and H and transfuse as needed  5  CKD stage 4-- Will need close follow-up as an outpatient for further workup including PTH vitamin D volume status and adjustment of BMD   Spoke to the patient , hospital medicine team and discussed about the management length         Follow up reason for today's visit: YOLANDE, CKD , Hypotension, GI bleed, Anemia    GI bleeding    Patient Active Problem List   Diagnosis    Anemia    Mild intermittent asthma without complication    Acute kidney injury superimposed on chronic kidney disease ([facility] [place] [address] )    Dyslipidemia    GERD (gastroesophageal reflux disease)    Late effects of cerebrovascular disease    Lumbar degenerative disc disease    Lumbar radiculopathy    Mitral regurgitation    Obesity (BMI 30-39  9)    Medicare annual wellness visit, subsequent    H/O aortic valve replacement    History of stroke    Bradycardia    Essential hypertension    Leukocytosis    Ambulatory dysfunction    Acute respiratory failure with hypoxia (HCC)    GI bleeding    Stage 4 chronic kidney disease (HCC)    Paroxysmal A-fib (HCC)    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    Pacemaker    Azotemia    Oxygen dependent         Subjective:   Denies any acute problem over night eating well  Objective:     Vitals: Blood pressure 135/62, pulse 62, temperature 97 7 °F (36 5 °C), temperature source Temporal, resp  rate 16, height 5' 4" (1 626 m), weight 88 9 kg (195 lb 15 8 oz), SpO2 93 %  ,Body mass index is 33 64 kg/m²  Weight (last 2 days)     Date/Time   Weight    08/10/20 1139   88 9 (195 99)    08/10/20 0941   91 2 (201)                Intake/Output Summary (Last 24 hours) at 8/12/2020 1028  Last data filed at 8/11/2020 1600  Gross per 24 hour   Intake 300 ml   Output 450 ml   Net -150 ml     I/O last 3 completed shifts: In: 950 [P O :300;  I V :300; Blood:350]  Out: 1525 [Urine:1525]         Physical Exam: /62 (BP Location: Right arm)   Pulse 62   Temp 97 7 °F (36 5 °C) (Temporal)   Resp 16   Ht 5' 4" (1 626 m)   Wt 88 9 kg (195 lb 15 8 oz)   SpO2 93%   BMI 33 64 kg/m²     General Appearance:    Alert, cooperative, no distress, appears stated age   Head:    Normocephalic, without obvious abnormality, atraumatic   Eyes: Conjunctiva/corneas clear   Ears:    Normal external ears   Nose:   Nares normal, septum midline, mucosa normal, no drainage    or sinus tenderness   Throat:   Lips, mucosa, and tongue normal; teeth and gums normal   Neck:   Supple, symmetrical, trachea midline, no adenopathy;        thyroid:  No enlargement/tenderness/nodules; no carotid    bruit or JVD   Back:     Symmetric, no curvature, ROM normal, no CVA tenderness   Lungs:     Clear to auscultation bilaterally, respirations unlabored   Chest wall:    No tenderness or deformity   Heart:    Regular rate and rhythm, S1 and S2 normal, no murmur, rub   or gallop   Abdomen:     Soft, non-tender, bowel sounds active   Extremities:   Extremities normal, atraumatic, no cyanosis or edema   Skin:   Skin color, texture, turgor normal, no rashes or lesions   Lymph nodes:   Cervical normal   Neurologic:   CNII-XII intact            Lab, Imaging and other studies: I have personally reviewed pertinent labs  CBC:   Lab Results   Component Value Date    WBC 10 20 08/12/2020    HGB 9 1 (L) 08/12/2020    HCT 26 6 (L) 08/12/2020    MCV 90 08/12/2020     08/12/2020    MCH 30 7 08/12/2020    MCHC 34 3 08/12/2020    RDW 14 0 08/12/2020    MPV 8 9 08/12/2020     CMP:   Lab Results   Component Value Date    K 4 1 08/12/2020    CL 95 (L) 08/12/2020    CO2 34 (H) 08/12/2020    BUN 86 (H) 08/12/2020    CREATININE 3 17 (H) 08/12/2020    CALCIUM 8 9 08/12/2020    EGFR 17 08/12/2020           Results from last 7 days   Lab Units 08/12/20  0614 08/11/20  0552 08/10/20  1002   POTASSIUM mmol/L 4 1 4 3 4 0   CHLORIDE mmol/L 95* 93* 89*   CO2 mmol/L 34* 34* 35*   BUN mg/dL 86* 99* 107*   CREATININE mg/dL 3 17* 3 21* 3 37*   CALCIUM mg/dL 8 9 8 9 9 3         Phosphorus:   Lab Results   Component Value Date    PHOS 4 6 08/12/2020     Magnesium: No results found for: MG  Urinalysis:   Lab Results   Component Value Date    COLORU Yellow 08/11/2020    CLARITYU Clear 08/11/2020    SPECGRAV 1 010 08/11/2020    PHUR 6 5 08/11/2020    LEUKOCYTESUR Negative 08/11/2020    NITRITE Negative 08/11/2020    GLUCOSEU Negative 08/11/2020    KETONESU Negative 08/11/2020    BILIRUBINUR Negative 08/11/2020    BLOODU Negative 08/11/2020     Ionized Calcium: No results found for: CAION  Coagulation: No results found for: PT, INR, APTT  Troponin: No results found for: TROPONINI  ABG: No results found for: PHART, ODC0CWT, PO2ART, LGY4QUZ, A4FUZUDJ, BEART, SOURCE  Radiology review:     IMAGING  Procedure: Us Kidney And Bladder    Result Date: 8/11/2020  Narrative: RENAL ULTRASOUND INDICATION:   YOLANDE  COMPARISON: Renal ultrasound dated 5/27/2015  TECHNIQUE:   Ultrasound of the retroperitoneum was performed with a curvilinear transducer utilizing volumetric sweeps and still imaging techniques  FINDINGS: KIDNEYS: Symmetric and normal size  Right kidney:  8 9 cm  Left kidney:  11 9 cm  Right kidney The kidney is echogenic with thinning of the renal cortex  No suspicious masses detected  Few simple renal cysts measuring up to 2 5 cm  No hydronephrosis  No shadowing calculi  No perinephric fluid collections  Left kidney The kidney is echogenic with thinning of the renal cortex  No suspicious masses detected  Dominant simple upper pole renal cyst is seen measuring 8 2 x 7 8 x 6 8 cm  No hydronephrosis  No shadowing calculi  No perinephric fluid collections  URETERS: Nonvisualized  BLADDER: Normally distended  No focal thickening or mass lesions  Bilateral ureteral jets detected  Impression: No hydronephrosis  Medical renal disease  Simple bilateral renal cysts   Workstation performed: [id number]       Current Facility-Administered Medications   Medication Dose Route Frequency    acetaminophen (TYLENOL) tablet 650 mg  650 mg Oral Q6H PRN    amLODIPine (NORVASC) tablet 5 mg  5 mg Oral Daily    cloNIDine (CATAPRES) tablet 0 1 mg  0 1 mg Oral BID    metoprolol succinate (TOPROL-XL) 24 hr tablet 100 mg  100 mg Oral BID    pantoprazole (PROTONIX) EC tablet 40 mg  40 mg Oral BID AC    pneumococcal 13-valent conjugate vaccine (PREVNAR-13) IM injection 0 5 mL  0 5 mL Intramuscular Prior to discharge    pravastatin (PRAVACHOL) tablet 40 mg  40 mg Oral Daily With Dinner     Medications Discontinued During This Encounter   Medication Reason    pantoprazole (PROTONIX) injection 40 mg     cloNIDine (CATAPRES) tablet 0 1 mg     metoprolol succinate (TOPROL-XL) 24 hr tablet 100 mg     cloNIDine (CATAPRES) tablet 0 1 mg     amLODIPine (NORVASC) tablet 10 mg     metoprolol succinate (TOPROL-XL) 24 hr tablet 100 mg     amLODIPine (NORVASC) tablet 10 mg        Rebecca Walker MD      This progress note was produced in part using a dictation device which may document imprecise wording from author's original intent

## 2020-08-12 NOTE — ASSESSMENT & PLAN NOTE
· Rate is controlled  · He was recently started on Eliquis 2 5 mg BID   · Will continue with BB for rate control   · Hold off on Eliquis and ASA for now  · GI recommended to hold anticoagulations for 1 week      · Cardiology input appreciated

## 2020-08-13 ENCOUNTER — HOSPITAL ENCOUNTER (INPATIENT)
Facility: HOSPITAL | Age: 85
LOS: 8 days | Discharge: HOME HEALTH | DRG: 378 | End: 2020-08-21
Attending: INTERNAL MEDICINE | Admitting: INTERNAL MEDICINE
Payer: COMMERCIAL

## 2020-08-13 ENCOUNTER — TRANSITIONAL CARE MANAGEMENT (OUTPATIENT)
Dept: INTERNAL MEDICINE CLINIC | Facility: CLINIC | Age: 85
End: 2020-08-13

## 2020-08-13 VITALS
RESPIRATION RATE: 18 BRPM | HEART RATE: 60 BPM | BODY MASS INDEX: 34.78 KG/M2 | WEIGHT: 203.71 LBS | TEMPERATURE: 98.1 F | DIASTOLIC BLOOD PRESSURE: 58 MMHG | HEIGHT: 64 IN | SYSTOLIC BLOOD PRESSURE: 124 MMHG | OXYGEN SATURATION: 95 %

## 2020-08-13 DIAGNOSIS — N18.9 ACUTE KIDNEY INJURY SUPERIMPOSED ON CHRONIC KIDNEY DISEASE (HCC): ICD-10-CM

## 2020-08-13 DIAGNOSIS — Z20.822 COVID-19 RULED OUT: ICD-10-CM

## 2020-08-13 DIAGNOSIS — N17.9 ACUTE KIDNEY INJURY SUPERIMPOSED ON CHRONIC KIDNEY DISEASE (HCC): ICD-10-CM

## 2020-08-13 DIAGNOSIS — N18.4 STAGE 4 CHRONIC KIDNEY DISEASE (HCC): ICD-10-CM

## 2020-08-13 DIAGNOSIS — D62 ACUTE BLOOD LOSS ANEMIA: Primary | ICD-10-CM

## 2020-08-13 PROBLEM — I50.42 CHRONIC COMBINED SYSTOLIC AND DIASTOLIC CONGESTIVE HEART FAILURE (HCC): Status: ACTIVE | Noted: 2020-08-13

## 2020-08-13 PROBLEM — K92.2 GI BLEEDING: Status: RESOLVED | Noted: 2020-08-10 | Resolved: 2020-08-13

## 2020-08-13 LAB
ALBUMIN SERPL BCP-MCNC: 3.3 G/DL (ref 3.5–5.7)
ANION GAP SERPL CALCULATED.3IONS-SCNC: 7 MMOL/L (ref 4–13)
BUN SERPL-MCNC: 77 MG/DL (ref 7–25)
CALCIUM SERPL-MCNC: 8.8 MG/DL (ref 8.6–10.5)
CHLORIDE SERPL-SCNC: 94 MMOL/L (ref 98–107)
CO2 SERPL-SCNC: 33 MMOL/L (ref 21–31)
CREAT SERPL-MCNC: 2.95 MG/DL (ref 0.7–1.3)
ERYTHROCYTE [DISTWIDTH] IN BLOOD BY AUTOMATED COUNT: 14 % (ref 11.5–14.5)
GFR SERPL CREATININE-BSD FRML MDRD: 19 ML/MIN/1.73SQ M
GLUCOSE SERPL-MCNC: 95 MG/DL (ref 65–99)
HCT VFR BLD AUTO: 24.9 % (ref 42–47)
HGB BLD-MCNC: 8.7 G/DL (ref 14–18)
MCH RBC QN AUTO: 31.5 PG (ref 26–34)
MCHC RBC AUTO-ENTMCNC: 34.9 G/DL (ref 31–37)
MCV RBC AUTO: 90 FL (ref 81–99)
PHOSPHATE SERPL-MCNC: 4.7 MG/DL (ref 3–5.5)
PLATELET # BLD AUTO: 273 THOUSANDS/UL (ref 149–390)
PMV BLD AUTO: 9.2 FL (ref 8.6–11.7)
POTASSIUM SERPL-SCNC: 4.2 MMOL/L (ref 3.5–5.5)
RBC # BLD AUTO: 2.76 MILLION/UL (ref 4.3–5.9)
SODIUM SERPL-SCNC: 134 MMOL/L (ref 134–143)
WBC # BLD AUTO: 9.9 THOUSAND/UL (ref 4.8–10.8)

## 2020-08-13 PROCEDURE — 80069 RENAL FUNCTION PANEL: CPT | Performed by: INTERNAL MEDICINE

## 2020-08-13 PROCEDURE — 99239 HOSP IP/OBS DSCHRG MGMT >30: CPT | Performed by: NURSE PRACTITIONER

## 2020-08-13 PROCEDURE — 97530 THERAPEUTIC ACTIVITIES: CPT

## 2020-08-13 PROCEDURE — 99232 SBSQ HOSP IP/OBS MODERATE 35: CPT | Performed by: INTERNAL MEDICINE

## 2020-08-13 PROCEDURE — 97110 THERAPEUTIC EXERCISES: CPT

## 2020-08-13 PROCEDURE — 85027 COMPLETE CBC AUTOMATED: CPT | Performed by: NURSE PRACTITIONER

## 2020-08-13 RX ORDER — PANTOPRAZOLE SODIUM 40 MG/1
40 TABLET, DELAYED RELEASE ORAL DAILY
Refills: 0
Start: 2020-09-14 | End: 2020-08-24

## 2020-08-13 RX ORDER — FERROUS SULFATE 325(65) MG
325 TABLET ORAL
Status: DISCONTINUED | OUTPATIENT
Start: 2020-08-14 | End: 2020-08-13 | Stop reason: HOSPADM

## 2020-08-13 RX ORDER — FERROUS SULFATE 325(65) MG
325 TABLET ORAL
Qty: 30 TABLET | Refills: 0
Start: 2020-08-14 | End: 2020-10-15 | Stop reason: SDUPTHER

## 2020-08-13 RX ORDER — PANTOPRAZOLE SODIUM 40 MG/1
40 TABLET, DELAYED RELEASE ORAL
Refills: 0
Start: 2020-08-13 | End: 2020-10-15 | Stop reason: SDUPTHER

## 2020-08-13 RX ORDER — AMLODIPINE BESYLATE 5 MG/1
5 TABLET ORAL DAILY
Refills: 0
Start: 2020-08-14 | End: 2020-10-15 | Stop reason: SDUPTHER

## 2020-08-13 RX ADMIN — METOPROLOL SUCCINATE 100 MG: 50 TABLET, EXTENDED RELEASE ORAL at 09:48

## 2020-08-13 RX ADMIN — AMLODIPINE BESYLATE 5 MG: 5 TABLET ORAL at 09:48

## 2020-08-13 RX ADMIN — PANTOPRAZOLE SODIUM 40 MG: 40 TABLET, DELAYED RELEASE ORAL at 06:10

## 2020-08-13 RX ADMIN — TORSEMIDE 20 MG: 20 TABLET ORAL at 09:48

## 2020-08-13 RX ADMIN — ASPIRIN 81 MG: 81 TABLET, COATED ORAL at 09:48

## 2020-08-13 RX ADMIN — CLONIDINE HYDROCHLORIDE 0.1 MG: 0.1 TABLET ORAL at 09:48

## 2020-08-13 NOTE — COVID-19 HEALTH CARE FACILITY TRANSFER FORM
[facility] to [address] Transfer - COVID-19 Assessment             Name of Patient: Tarah Lott                : 1935          Transport Date: 20       Has the patient been laboratory tested for COVID-19? []  NO  If No,Test was not indicated per  CDC Testing Criteria   May Transfer Patient   [x] YES  If Tested Results below     COVID-19 References              SARS-CoV-2   Date/Time Value Ref Range Status   2020 10:26 AM Negative Negative Final            Question is to be completed for any patient who tests positive for COVID-19        1  [x] Yes [May Transfer] [] No [May Not Transfer]          Question is to be completed for any patient who is tested for COVID-19            2    [] Yes [May Not Transfer] [x] No [May Transfer]          Signature of Physician or Health Care Professional: DIANDRA Wray 20          Form updated as of 3/24/2020

## 2020-08-13 NOTE — ASSESSMENT & PLAN NOTE
· With acute kidney injury  · His baseline creatinine appears to be 2 1-2 3 mg/dL   · Now creatinine level is at 3 37 ->3 2 -> 3 1-> 2 95  mg/dL  · Suspected to be due to acute on chronic anemia with diuretic use  · renal ultrasound- no hydronephrosis  Medical renal disease    Simple bilateral renal cyst   · Nephrology input appreciated  · Torsemide was resumed at 20 mg BID   · Avoid any hypotension and nephrotoxins  · Will need to follow up with Nephrology and check BMP outpatient

## 2020-08-13 NOTE — ASSESSMENT & PLAN NOTE
· Rate is controlled  · He was recently started on Eliquis 2 5 mg BID   · Will continue with BB for rate control   · Hold off on Eliquis and ASA for now  · GI recommended to hold anticoagulations for 1 week-this can be resumed 8/18      · Cardiology input appreciated

## 2020-08-13 NOTE — ASSESSMENT & PLAN NOTE
· Patient with history of gastric ulcer in the past  · Noted to have drop in hemoglobin and occult positive; suspected to be exacerbated by Eliquis  · Gastroenterology input appreciated  · EGD 8/11 hiatal hernia  13 mm crater did, benign appearing ulcer in the what no ball with no hemorrhage  Moderate, patch she a demand is an erythematous mucosa in the duodenal bulb  Biopsy was taken  · GI recommend PPI b i d , soft food  · If the patient continues to have any signs of bleeding or drop in hemoglobin, gastroenterology recommend to follow-up as an outpatient for a colonoscopy  · Received 2 units of packed red blood cells transfusion 8/10 with furosemide after transfusion; Hgb 6s ->8-9s  · Will hold eliquis for 1 week- this can be resumed on 8/18  Continue with ASA 81

## 2020-08-13 NOTE — ASSESSMENT & PLAN NOTE
Wt Readings from Last 3 Encounters:   08/13/20 92 4 kg (203 lb 11 3 oz)   08/04/20 91 4 kg (201 lb 8 oz)   07/20/20 97 5 kg (214 lb 15 2 oz)       · No acute exacerbation  · Patient actually was volume depleted on admission  · Diuresis was resumed  · Will continue with monitor weight closely

## 2020-08-13 NOTE — PLAN OF CARE
Problem: OCCUPATIONAL THERAPY ADULT  Goal: Performs self-care activities at highest level of function for planned discharge setting  See evaluation for individualized goals  Outcome: Progressing  Note: Limitation: Decreased ADL status, Decreased UE strength, Decreased Safe judgement during ADL, Decreased cognition, Decreased endurance, Decreased self-care trans, Decreased high-level ADLs  Prognosis: Good  Assessment: Patient participated in Skilled OT session this date with interventions consisting of functional transfer training, Energy Conservation techniques and therapeutic exercise to: increase functional use of BUEs, increase BUE muscle strength    Patient agreeable to OT treatment session, upon arrival patient was found supine in bed  Patient somewhat upset at approach due to having to return to STR versus going home  Patient transfers supine > sit EOB with supervision and sit to stand with Min A x 1  Completes functional mobility x 100 feet with Min A x 1 for safety during ambulation  Patient then sat OOB in bedside chair in order to complete UB TE as detailed in flow sheet  Patient left OOB in chair with SCDs reapplied, call bell within reach, and all needs met  Daughter present during session  In comparison to previous session, patient with improvements in functional transfers, bed mobility, and endurance  Patient requiring occasional rest periods, verbal cues for safety, verbal cues for correct technique and verbal cues for pacing thru activity steps  Patient performance  demonstrated good carryover of learned techniques and strategies to facilitate safety during functional tasks  Patient continues to be functioning below baseline level, occupational performance remains limited secondary to factors listed above and increased risk for falls and injury  From OT standpoint, recommendation at time of d/c would be Short Term Rehab    Patient to benefit from continued Occupational Therapy treatment while in the hospital to address deficits as defined above and maximize level of functional independence with ADLs and functional mobility in order to return to PLOF        OT Discharge Recommendation: Post-Acute Rehabilitation Services([facility] [place])  OT - OK to Discharge: Yes(when medically cleared)

## 2020-08-13 NOTE — OCCUPATIONAL THERAPY NOTE
08/13/20 1132   Restrictions/Precautions   Weight Bearing Precautions Per Order No   RUE Weight Bearing Per Order Other  (pacemaker precautions)   Other Precautions Cognitive; Chair Alarm; Bed Alarm; Fall Risk;Hard of hearing   Pain Assessment   Pain Assessment Tool 0-10   Pain Score No Pain   Bed Mobility   Supine to Sit 5  Supervision   Additional items Assist x 1;HOB elevated; Bedrails;Verbal cues; Increased time required   Transfers   Sit to Stand 4  Minimal assistance   Additional items Assist x 1; Armrests; Increased time required;Verbal cues   Stand to Sit 4  Minimal assistance   Additional items Assist x 1; Armrests; Increased time required;Verbal cues   Stand pivot 4  Minimal assistance   Additional items Assist x 1; Increased time required;Verbal cues   Functional Mobility   Functional Mobility 4  Minimal assistance   Additional Comments 100 feet   Additional items Rolling walker   Therapeutic Excerise-Strength   UE Strength Yes   Right Upper Extremity- Strength   R Shoulder Flexion; Extension;Horizontal ABduction  (horiz  adduction, scapular protraction / retraction)   R Elbow Elbow flexion;Elbow extension   R Wrist   (wrist rotation)   R Weight/Reps/Sets 1 pound weight x 30 reps   Left Upper Extremity-Strength   L Weights/Reps/Sets TE same as R UE above   Cognition   Overall Cognitive Status Impaired   Arousal/Participation Alert; Cooperative   Attention Attends with cues to redirect   Orientation Level Oriented X4   Memory Decreased short term memory;Decreased recall of recent events;Decreased recall of precautions   Following Commands Follows one step commands with increased time or repetition   Comments pt agreeable to OT treatment     Activity Tolerance   Activity Tolerance Patient tolerated treatment well   Assessment   Assessment Patient participated in Skilled OT session this date with interventions consisting of functional transfer training, Energy Conservation techniques and therapeutic exercise to: increase functional use of BUEs, increase BUE muscle strength    Patient agreeable to OT treatment session, upon arrival patient was found supine in bed  Patient somewhat upset at approach due to having to return to STR versus going home  Patient transfers supine > sit EOB with supervision and sit to stand with Min A x 1  Completes functional mobility x 100 feet with Min A x 1 for safety during ambulation  Patient then sat OOB in bedside chair in order to complete UB TE as detailed in flow sheet  Patient left OOB in chair with SCDs reapplied, call bell within reach, and all needs met  Daughter present during session  In comparison to previous session, patient with improvements in functional transfers, bed mobility, and endurance  Patient requiring occasional rest periods, verbal cues for safety, verbal cues for correct technique and verbal cues for pacing thru activity steps  Patient performance  demonstrated good carryover of learned techniques and strategies to facilitate safety during functional tasks  Patient continues to be functioning below baseline level, occupational performance remains limited secondary to factors listed above and increased risk for falls and injury  From OT standpoint, recommendation at time of d/c would be Short Term Rehab  Patient to benefit from continued Occupational Therapy treatment while in the hospital to address deficits as defined above and maximize level of functional independence with ADLs and functional mobility in order to return to PLOF  Plan   Treatment Interventions Functional transfer training;UE strengthening/ROM; Energy conservation   Goal Expiration Date 08/21/20   OT Treatment Day 1   OT Frequency 3-5x/wk   Recommendation   OT Discharge Recommendation Post-Acute Rehabilitation Services  (At [facility])   OT - OK to Discharge Yes  (when medically cleared)   EMILY Jones

## 2020-08-13 NOTE — ASSESSMENT & PLAN NOTE
· Blood pressure appears to be well controlled  · Continue with current medical management  · Monitor blood pressure closely  · Amlodipine decreased to 5 mg daily from 10 mg daily

## 2020-08-13 NOTE — ASSESSMENT & PLAN NOTE
· Acute on chronic- due to acute blood loss from GI bleeding   · Normochromic normocytic  · Iron panel result appreciated   · Started on ferrous sulfate daily   · Received 2 units of packed red blood cells 8/10  · Monitor H&H closely

## 2020-08-13 NOTE — SOCIAL WORK
Received authorization from [place], provided same to [facility]  Pt daughter at the bedside and reviewed the IMM  Awaiting pt spouse  Pt will go to [facility] today

## 2020-08-13 NOTE — PLAN OF CARE
Problem: Potential for Falls  Goal: Patient will remain free of falls  Description: INTERVENTIONS:  - Assess patient frequently for physical needs  -  Identify cognitive and physical deficits and behaviors that affect risk of falls    -  [place] fall precautions as indicated by assessment   - Educate patient/family on patient safety including physical limitations  - Instruct patient to call for assistance with activity based on assessment  - Modify environment to reduce risk of injury  - Consider OT/PT consult to assist with strengthening/mobility  Outcome: Progressing     Problem: CARDIOVASCULAR - ADULT  Goal: Maintains optimal cardiac output and hemodynamic stability  Description: INTERVENTIONS:  - Monitor I/O, vital signs and rhythm  - Monitor for S/S and trends of decreased cardiac output  - Assess quality of pulses, skin color and temperature  - Assess for signs of decreased coronary artery perfusion  - Instruct patient to report change in severity of symptoms  Outcome: Progressing     Problem: GASTROINTESTINAL - ADULT  Goal: Maintains or returns to baseline bowel function  Description: INTERVENTIONS:  - Assess bowel function  - Encourage oral fluids to ensure adequate hydration  - Administer IV fluids if ordered to ensure adequate hydration  - Administer ordered medications as needed  - Encourage mobilization and activity  - Consider nutritional services referral to assist patient with adequate nutrition and appropriate food choices  Outcome: Progressing  Goal: Maintains adequate nutritional intake  Description: INTERVENTIONS:  - Monitor percentage of each meal consumed  - Identify factors contributing to decreased intake, treat as appropriate  - Assist with meals as needed  - Monitor I&O, weight, and lab values if indicated  - Obtain nutrition services referral as needed  Outcome: Progressing     Problem: METABOLIC, FLUID AND ELECTROLYTES - ADULT  Goal: Electrolytes maintained within normal limits  Description: INTERVENTIONS:  - Monitor labs and assess patient for signs and symptoms of electrolyte imbalances  - Administer electrolyte replacement as ordered  - Monitor response to electrolyte replacements, including repeat lab results as appropriate  - Instruct patient on fluid and nutrition as appropriate  Outcome: Progressing  Goal: Fluid balance maintained  Description: INTERVENTIONS:  - Monitor labs   - Monitor I/O and WT  - Instruct patient on fluid and nutrition as appropriate  - Assess for signs & symptoms of volume excess or deficit  Outcome: Progressing     Problem: SKIN/TISSUE INTEGRITY - ADULT  Goal: Skin integrity remains intact  Description: INTERVENTIONS  - Identify patients at risk for skin breakdown  - Assess and monitor skin integrity  - Assess and monitor nutrition and hydration status  - Monitor labs (i e  albumin)  - Assess for incontinence   - Turn and reposition patient  - Assist with mobility/ambulation  - Relieve pressure over bony prominences  - Avoid friction and shearing  - Provide appropriate hygiene as needed including keeping skin clean and dry  - Evaluate need for skin moisturizer/barrier cream  - Collaborate with interdisciplinary team (i e  Nutrition, Rehabilitation, etc )   - Patient/family teaching  Outcome: Progressing     Problem: HEMATOLOGIC - ADULT  Goal: Maintains hematologic stability  Description: INTERVENTIONS  - Assess for signs and symptoms of bleeding or hemorrhage  - Monitor labs  - Administer supportive blood products/factors as ordered and appropriate  Outcome: Progressing     Problem: MUSCULOSKELETAL - ADULT  Goal: Maintain or return mobility to safest level of function  Description: INTERVENTIONS:  - Assess patient's ability to carry out ADLs; assess patient's baseline for ADL function and identify physical deficits which impact ability to perform ADLs (bathing, care of mouth/teeth, toileting, grooming, dressing, etc )  - Assess/evaluate cause of self-care deficits   - Assess range of motion  - Assess patient's mobility  - Assess patient's need for assistive devices and provide as appropriate  - Encourage maximum independence but intervene and supervise when necessary  - Involve family in performance of ADLs  - Assess for home care needs following discharge   - Consider OT consult to assist with ADL evaluation and planning for discharge  - Provide patient education as appropriate  Outcome: Progressing     Problem: PAIN - ADULT  Goal: Verbalizes/displays adequate comfort level or baseline comfort level  Description: Interventions:  - Encourage patient to monitor pain and request assistance  - Assess pain using appropriate pain scale  - Administer analgesics based on type and severity of pain and evaluate response  - Implement non-pharmacological measures as appropriate and evaluate response  - Consider cultural and social influences on pain and pain management  - Notify physician/advanced practitioner if interventions unsuccessful or patient reports new pain  Outcome: Progressing     Problem: INFECTION - ADULT  Goal: Absence or prevention of progression during hospitalization  Description: INTERVENTIONS:  - Assess and monitor for signs and symptoms of infection  - Monitor lab/diagnostic results  - Monitor all insertion sites, i e  indwelling lines, tubes, and drains  - [place] appropriate cooling/warming therapies per order  - Administer medications as ordered  - Instruct and encourage patient and family to use good hand hygiene technique  Outcome: Progressing     Problem: SAFETY ADULT  Goal: Patient will remain free of falls  Description: INTERVENTIONS:  - Assess patient frequently for physical needs  -  Identify cognitive and physical deficits and behaviors that affect risk of falls    -  [place] fall precautions as indicated by assessment   - Educate patient/family on patient safety including physical limitations  - Instruct patient to call for assistance with activity based on assessment  - Modify environment to reduce risk of injury  - Consider OT/PT consult to assist with strengthening/mobility  Outcome: Progressing  Goal: Maintain or return to baseline ADL function  Description: INTERVENTIONS:  -  Assess patient's ability to carry out ADLs; assess patient's baseline for ADL function and identify physical deficits which impact ability to perform ADLs (bathing, care of mouth/teeth, toileting, grooming, dressing, etc )  - Assess/evaluate cause of self-care deficits   - Assess range of motion  - Assess patient's mobility; develop plan if impaired  - Assess patient's need for assistive devices and provide as appropriate  - Encourage maximum independence but intervene and supervise when necessary  - Involve family in performance of ADLs  - Assess for home care needs following discharge   - Consider OT consult to assist with ADL evaluation and planning for discharge  - Provide patient education as appropriate  Outcome: Progressing  Goal: Maintain or return mobility status to optimal level  Description: INTERVENTIONS:  - Assess patient's baseline mobility status (ambulation, transfers, stairs, etc )    - Identify cognitive and physical deficits and behaviors that affect mobility  - Identify mobility aids required to assist with transfers and/or ambulation (gait belt, sit-to-stand, lift, walker, cane, etc )  - [place] fall precautions as indicated by assessment  - Record patient progress and toleration of activity level on Mobility SBAR; progress patient to next Phase/Stage  - Instruct patient to call for assistance with activity based on assessment  - Consider rehabilitation consult to assist with strengthening/weightbearing, etc   Outcome: Progressing     Problem: DISCHARGE PLANNING  Goal: Discharge to home or other facility with appropriate resources  Description: INTERVENTIONS:  - Identify barriers to discharge w/patient and caregiver  - Arrange for needed discharge resources and transportation as appropriate  - Identify discharge learning needs (meds, wound care, etc )  - Refer to Case Management Department for coordinating discharge planning if the patient needs post-hospital services based on physician/advanced practitioner order or complex needs related to functional status, cognitive ability, or social support system  Outcome: Progressing     Problem: Knowledge Deficit  Goal: Patient/family/caregiver demonstrates understanding of disease process, treatment plan, medications, and discharge instructions  Description: Complete learning assessment and assess knowledge base    Interventions:  - Provide teaching at level of understanding  - Provide teaching via preferred learning methods  Outcome: Progressing

## 2020-08-13 NOTE — DISCHARGE SUMMARY
Discharge- Renetta Moseley 1935, 80 y o  male MRN: [id number]    Unit/Bed#: -02 Encounter: [id number]    Primary Care Provider: Felix Callahan DO   Date and time admitted to hospital: 8/10/2020  9:44 AM    GI bleed  · Likely exacerbated by anticoagulation use-Eliquis  · GI input appreciated  · EGD 08/11/2020 shows hiatal hernia  13 mm cratered, benign-appearing ulcer in duodenal bulb with no hemorrhage  · Gastroenterology recommend to continue with PPI b i d  And if there is recurrent GI bleed the patient may need to follow-up with an outpatient colonoscopy  He will need to follow-up with gastroenterology as an outpatient  · Recommend to hold Eliquis for total 1 week  May resume this on 08/18/2020  Aspirin 81 mg was resumed  · No further active bleeding noted  Hemoglobin is stabilizing at 8-9s  Acute blood loss anemia  Assessment & Plan  · Acute on chronic- due to acute blood loss from GI bleeding   · Normochromic normocytic  · Iron panel result appreciated   · Started on ferrous sulfate daily   · Received 2 units of packed red blood cells 8/10  · Monitor H&H closely      Chronic combined systolic and diastolic congestive heart failure (HCC)  Assessment & Plan  Wt Readings from Last 3 Encounters:   08/13/20 92 4 kg (203 lb 11 3 oz)   08/04/20 91 4 kg (201 lb 8 oz)   07/20/20 97 5 kg (214 lb 15 2 oz)       · No acute exacerbation  · Patient actually was volume depleted on admission  · Diuresis was resumed  · Will continue with monitor weight closely      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  Assessment & Plan  · LIMA to LAD 2000; BioAVR 2015  · Asymptomatic   · Cardiology input appreciated   · Continue with current medical management         [facility]  Assessment & Plan  · Rate is controlled     · He was recently started on Eliquis 2 5 mg BID   · Will continue with BB for rate control   · Hold off on Eliquis and ASA for now   · GI recommended to hold anticoagulations for 1 week-this can be resumed 8/18  · Cardiology input appreciated      Stage 4 chronic kidney disease ([facility] [address] )  Assessment & Plan  · With acute kidney injury  · His baseline creatinine appears to be 2 1-2 3 mg/dL   · Now creatinine level is at 3 37 ->3 2 -> 3 1-> 2 95  mg/dL  · Suspected to be due to acute on chronic anemia with diuretic use  · renal ultrasound- no hydronephrosis  Medical renal disease  Simple bilateral renal cyst   · Nephrology input appreciated  · Torsemide was resumed at 20 mg BID   · Avoid any hypotension and nephrotoxins  · Will need to follow up with Nephrology and check BMP outpatient     Essential hypertension  Assessment & Plan  · Blood pressure appears to be well controlled  · Continue with current medical management  · Monitor blood pressure closely  · Amlodipine decreased to 5 mg daily from 10 mg daily               Discharging Physician / Practitioner: Luke Delacruz  PCP: Brenna Moore DO  Admission Date:   Admission Orders (From admission, onward)    Orders from this encounter     Ordered        08/10/20 1109  Inpatient Admission  Once               Orders from suspended admission ([facility] [address] [facility] [place], [place])     None              Discharge Date: 08/13/20    Disposition:      Short Term Rehab or SNF at the [address] SNF:   · Not Applicable to this Patient - [place] [facility] Physician    Reason for Admission:  Abnormal blood work- low hemoglobin    Discharge Diagnoses:     Please see assessment and plan section above for further details regarding discharge diagnoses       Resolved Problems  Date Reviewed: 8/13/2020          Resolved    Atherosclerotic 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 8/11/2020     Resolved by  Eun Roberts PA-C    Complete heart block ([facility] [address] ) 8/11/2020     Resolved by  Can Bhakta DO    * (Principal) GI bleeding 8/13/2020     Resolved by  Ki Abel, [facility] Stay:  · Gastroenterology  · Nephrology    Procedures Performed:   · EGD 8/11     Medication Adjustments and Discharge Medications:  · Summary of Medication Adjustments made as a result of this hospitalization:  pantoprazole added 40 mg BID, amlodipine decreased to 5 mg daily (from 10 mg daily)   · Medication Dosing Tapers - Please refer to Discharge Medication List for details on any medication dosing tapers (if applicable to patient)  · Medications being temporarily held (include recommended restart time): Eliquis will need to be held for total of 1 week- resume 8/18/2020  · Discharge Medication List: See after visit summary for reconciled discharge medications  Wound Care Recommendations:  When applicable, please see wound care section of After Visit Summary  Diet Recommendations at Discharge:  Diet -        Diet Orders   (From admission, onward)            Orders from this encounter     Start     Ordered    08/11/20 1346  Diet GI; Non Ulcerogenic; Sodium 4 GM (SARA)  Diet effective now     Question Answer Comment   Diet Type GI    GI Non Ulcerogenic    Other Restriction(s): Sodium 4 GM (SARA)    RD to adjust diet per protocol?  Yes        08/11/20 1346          Orders from suspended admission ([place] [address] [facility] [place], [place])     Start     Ordered    08/06/20 1438  [Order Hold - Suspended Admission]  Room Service  Once     (On hold at [address] [facility] since 08/10/20 1117)   Question:  Type of Service  Answer:  Room Service-Appropriate    08/06/20 1437    08/05/20 1646  [Order Hold - Suspended Admission]  Diet Regular; Regular House; Fluid Restriction 1500 ML, Sodium 2 GM  Diet effective now     (On hold at [facility] [place] [address] [facility] since 08/10/20 1253)   Question Answer Comment Diet Type Regular    Regular Regular House    Other Restriction(s): Fluid Restriction 1500 ML    Other Restriction(s): Sodium 2 GM    Special Instructions Meals in resident room    RD to adjust diet per protocol? Yes        08/05/20 1466                Instructions for any Catheters / Lines Present at Discharge (including removal date, if applicable): n/a     Significant Findings / Test Results:     US kidney and bladder   Final Result by Hernan Jimenez MD (08/11 2172)      No hydronephrosis  Medical renal disease  Simple bilateral renal cysts  Workstation performed: [id number]             Incidental Findings:   · None      Test Results Pending at Discharge (will require follow up): · None      Outpatient Tests Requested:  · Follow up with PCP, GI and nephrology     Complications:    · None     Hospital Course:     Simeon Gentile is a 80 y o  male patient who originally presented to the hospital on 8/10/2020 due to drop in hemoglobin with blood per rectum by nursing home staff  Please refer to H&P for initial presenting complaint  In brief the patient was sent from the [facility] after the staff there noticed some blood in his stools  It is unclear whether it was a bright red blood or melena  The patient was tested for occult blood in the ER and that was positive  Additionally he was found to have low hemoglobin and subsequently was admitted  Gastroenterology evaluation was done  The patient does have history of gastric ulcer in the past requiring blood transfusion  The patient underwent an EGD on 08/11/2020 which the results noted above  At this time gastroenterology recommend to continue with PPI b i d  And to follow-up outpatient  If the patient continues to have drop in hemoglobin and/or evidence of GI bleed, he will likely need to follow-up with a colonoscopy as an outpatient    Of note, the patient was recently admitted at [facility] due to complete heart block and underwent pacemaker placement  He was also started on Eliquis with history of underlying paroxysmal atrial fibrillation  It is suspected that the GI bleed was exacerbated by possible the initiation of anticoagulation  Gastroenterology would like to hold Eliquis for total of 1 week  This may be resumed on 08/18/2020  Aspirin 81 mg was resumed  The patient was noted to have acute on chronic kidney disease and Nephrology evaluation was done  Initially his diuresis were on hold but after receiving blood transfusion and optimize his volume, nephrology is okay for the patient to resume diuresis  The patient will need to have a repeat BMP on Monday  And he will need to follow-up with nephrology as an outpatient  The patient was evaluated by PT and OT and recommended to return back to skilled nursing facility for further PPE rehabilitation  Condition at Discharge: good     Discharge Day Visit / Exam:     Subjective:  Feeling much improved  No on-going bleeding  Vitals: Blood Pressure: 124/58 (08/13/20 0703)  Pulse: 60 (08/13/20 0703)  Temperature: 98 1 °F (36 7 °C) (08/13/20 0703)  Temp Source: Temporal (08/13/20 0703)  Respirations: 18 (08/13/20 0703)  Height: 5' 4" (162 6 cm) (08/10/20 1139)  Weight - Scale: 92 4 kg (203 lb 11 3 oz) (08/13/20 0537)  SpO2: 95 % (08/13/20 0703)  Exam:   Physical Exam  Vitals signs and nursing note reviewed  Constitutional:       General: He is not in acute distress  Appearance: Normal appearance  He is ill-appearing  HENT:      Head: Normocephalic and atraumatic  Right Ear: External ear normal       Left Ear: External ear normal       Nose: Nose normal       Mouth/Throat:      Mouth: Mucous membranes are moist       Pharynx: Oropharynx is clear  Eyes:      General:         Right eye: No discharge  Left eye: No discharge  Extraocular Movements: Extraocular movements intact  Pupils: Pupils are equal, round, and reactive to light  Neck:      Musculoskeletal: Normal range of motion and neck supple  No neck rigidity  Cardiovascular:      Rate and Rhythm: Normal rate and regular rhythm  Pulses: Normal pulses  Heart sounds: Normal heart sounds  No murmur  Pulmonary:      Effort: Pulmonary effort is normal  No respiratory distress  Breath sounds: Normal breath sounds  No wheezing or rales  Abdominal:      General: Bowel sounds are normal  There is no distension  Palpations: Abdomen is soft  There is no mass  Tenderness: There is no abdominal tenderness  Musculoskeletal: Normal range of motion  General: No swelling, tenderness or deformity  Skin:     General: Skin is warm and dry  Capillary Refill: Capillary refill takes less than 2 seconds  Coloration: Skin is not pale  Findings: No erythema  Neurological:      General: No focal deficit present  Mental Status: He is alert and oriented to person, place, and time  Mental status is at baseline  Comments: With baseline forgetfulness      Psychiatric:         Mood and Affect: Mood normal          Behavior: Behavior normal          Thought Content: Thought content normal          Judgment: Judgment normal          Discussion with Family: wife and daughter     Goals of Care Discussions:  · Code Status at Discharge: Level 1 - Full Code  · Were there any Goals of Care Discussions during Hospitalization?: Yes  · Results of any General Goals of Care Discussions: same    · POLST Completed: No   · If POLST Completed, Summary of POLST Agreement Provided Here: n/a    · OK to Rehospitalize if Needed? Yes    Discharge instructions/Information to patient and family:   See after visit summary section titled Discharge Instructions for information provided to patient and family  Planned Readmission: no       Discharge Statement:  I spent 39 minutes discharging the patient  This time was spent on the day of discharge   I had direct contact with the patient on the day of discharge  Greater than 50% of the total time was spent examining patient, answering all patient questions, arranging and discussing plan of care with patient as well as directly providing post-discharge instructions  Additional time then spent on discharge activities      ** Please Note: This note has been constructed using a voice recognition system **

## 2020-08-13 NOTE — PROGRESS NOTES
Progress Note - Nephrology   Jamir Castaneda 80 y o  male MRN: [id number]  Unit/Bed#: -02 Encounter: [id number]    A/P:  1  YOLANDE non oliguric on top of CKD stage 4    Creatinine is improving  Started on diuretics yesterday  Will increase the diuretics to the home dose he tolerated  No short of breath and spoke to the hospitalist team about the patient's possible discharge today to rehab center  In is to have another BMP done in a week  And nephrology office follow-up strongly encourage discussed at length with the patient and patient's wife at the bedside  Clinically he looks much better but he reports no short of breath but would resume torsemide the see orders  Avoid hypotension and nephrotoxins including NSAIDs, IV contrast agents  Adjust any renally excreted medication to the GFR less than 15  I discussed with him about possible future need for renal replacement therapy in the setting of advanced stage IV CKD he liked to be educated more so would be benefited from outpatient follow-up and further management  2  Hypertension  Please see above  3  GI bleeding  Please see above  4  Anemia status post 2 units of packed red blood cell transfusion upon admission, monitor H and H and transfuse as needed  5  CKD stage 4-- Will need close follow-up as an outpatient for further workup including PTH vitamin D volume status and adjustment of BMD   Spoke to the patient , hospital medicine team and discussed about the management length         Follow up reason for today's visit: YOLANDE, CKD , Hypotension, GI bleed, Anemia    GI bleeding    Patient Active Problem List   Diagnosis    Anemia    Mild intermittent asthma without complication    Acute kidney injury superimposed on chronic kidney disease ([address] )    Dyslipidemia    GERD (gastroesophageal reflux disease)    Late effects of cerebrovascular disease    Lumbar degenerative disc disease    Lumbar radiculopathy    Mitral regurgitation    Obesity (BMI 30-39  9)    Medicare annual wellness visit, subsequent    H/O aortic valve replacement    History of stroke    Bradycardia    Essential hypertension    Leukocytosis    Ambulatory dysfunction    Acute respiratory failure with hypoxia (HCC)    Stage 4 chronic kidney disease (HCC)    Paroxysmal A-fib (HCC)    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    Pacemaker    Azotemia    Oxygen dependent         Subjective:   Denies any acute problem over night eating well  Wife at the bedside and patient eating lunch at this time  Objective:     Vitals: Blood pressure 124/58, pulse 60, temperature 98 1 °F (36 7 °C), temperature source Temporal, resp  rate 18, height 5' 4" (1 626 m), weight 92 4 kg (203 lb 11 3 oz), SpO2 95 %  ,Body mass index is 34 97 kg/m²  Weight (last 2 days)     Date/Time   Weight    08/13/20 0537   92 4 (203 71)                Intake/Output Summary (Last 24 hours) at 8/13/2020 1321  Last data filed at 8/13/2020 0705  Gross per 24 hour   Intake 480 ml   Output 300 ml   Net 180 ml     I/O last 3 completed shifts:   In: 12 [P O :960]  Out: 200 [Urine:200]         Physical Exam: /58 (BP Location: Right arm)   Pulse 60   Temp 98 1 °F (36 7 °C) (Temporal)   Resp 18   Ht 5' 4" (1 626 m)   Wt 92 4 kg (203 lb 11 3 oz)   SpO2 95%   BMI 34 97 kg/m²     General Appearance:    Alert, cooperative, no distress, appears stated age   Head:    Normocephalic, without obvious abnormality, atraumatic   Eyes:    Conjunctiva/corneas clear   Ears:    Normal external ears   Nose:   Nares normal, septum midline, mucosa normal, no drainage    or sinus tenderness   Throat:   Lips, mucosa, and tongue normal; teeth and gums normal   Neck:   Supple, symmetrical, trachea midline, no adenopathy;        thyroid:  No enlargement/tenderness/nodules; no carotid    bruit or JVD   Back:     Symmetric, no curvature, ROM normal, no CVA tenderness   Lungs:     Clear to auscultation bilaterally, respirations unlabored   Chest wall:    No tenderness or deformity   Heart:    Regular rate and rhythm, S1 and S2 normal, no murmur, rub   or gallop   Abdomen:     Soft, non-tender, bowel sounds active   Extremities:   Extremities normal, atraumatic, no cyanosis or edema   Skin:   Skin color, texture, turgor normal, no rashes or lesions   Lymph nodes:   Cervical normal   Neurologic:   CNII-XII intact            Lab, Imaging and other studies: I have personally reviewed pertinent labs  CBC:   Lab Results   Component Value Date    WBC 9 90 08/13/2020    HGB 8 7 (L) 08/13/2020    HCT 24 9 (L) 08/13/2020    MCV 90 08/13/2020     08/13/2020    MCH 31 5 08/13/2020    MCHC 34 9 08/13/2020    RDW 14 0 08/13/2020    MPV 9 2 08/13/2020     CMP:   Lab Results   Component Value Date    K 4 2 08/13/2020    CL 94 (L) 08/13/2020    CO2 33 (H) 08/13/2020    BUN 77 (H) 08/13/2020    CREATININE 2 95 (H) 08/13/2020    CALCIUM 8 8 08/13/2020    EGFR 19 08/13/2020         Results from last 7 days   Lab Units 08/13/20  0807 08/12/20  1025 08/12/20  0614   POTASSIUM mmol/L 4 2 3 9 4 1   CHLORIDE mmol/L 94* 93* 95*   CO2 mmol/L 33* 33* 34*   BUN mg/dL 77* 84* 86*   CREATININE mg/dL 2 95* 3 11* 3 17*   CALCIUM mg/dL 8 8 9 1 8 9         Phosphorus:   Lab Results   Component Value Date    PHOS 4 7 08/13/2020     Magnesium: No results found for: MG  Urinalysis:   No results found for: COLORU, CLARITYU, SPECGRAV, PHUR, LEUKOCYTESUR, NITRITE, PROTEINUA, GLUCOSEU, KETONESU, BILIRUBINUR, BLOODU  Ionized Calcium: No results found for: CAION  Coagulation: No results found for: PT, INR, APTT  Troponin: No results found for: TROPONINI  ABG: No results found for: PHART, KDY6KXK, PO2ART, UZK7GQJ, Q9JMUAFX, BEART, SOURCE  Radiology review:     IMAGING  Procedure: Us Kidney And Bladder    Result Date: 8/11/2020  Narrative: RENAL ULTRASOUND INDICATION:   YOLANDE  COMPARISON: Renal ultrasound dated 5/27/2015   TECHNIQUE: Ultrasound of the retroperitoneum was performed with a curvilinear transducer utilizing volumetric sweeps and still imaging techniques  FINDINGS: KIDNEYS: Symmetric and normal size  Right kidney:  8 9 cm  Left kidney:  11 9 cm  Right kidney The kidney is echogenic with thinning of the renal cortex  No suspicious masses detected  Few simple renal cysts measuring up to 2 5 cm  No hydronephrosis  No shadowing calculi  No perinephric fluid collections  Left kidney The kidney is echogenic with thinning of the renal cortex  No suspicious masses detected  Dominant simple upper pole renal cyst is seen measuring 8 2 x 7 8 x 6 8 cm  No hydronephrosis  No shadowing calculi  No perinephric fluid collections  URETERS: Nonvisualized  BLADDER: Normally distended  No focal thickening or mass lesions  Bilateral ureteral jets detected  Impression: No hydronephrosis  Medical renal disease  Simple bilateral renal cysts   Workstation performed: [id number]       Current Facility-Administered Medications   Medication Dose Route Frequency    acetaminophen (TYLENOL) tablet 650 mg  650 mg Oral Q6H PRN    amLODIPine (NORVASC) tablet 5 mg  5 mg Oral Daily    aspirin (ECOTRIN LOW STRENGTH) EC tablet 81 mg  81 mg Oral Daily    cloNIDine (CATAPRES) tablet 0 1 mg  0 1 mg Oral BID    [START ON 8/14/2020] ferrous sulfate tablet 325 mg  325 mg Oral Daily With Breakfast    metoprolol succinate (TOPROL-XL) 24 hr tablet 100 mg  100 mg Oral BID    pantoprazole (PROTONIX) EC tablet 40 mg  40 mg Oral BID AC    pneumococcal 13-valent conjugate vaccine (PREVNAR-13) IM injection 0 5 mL  0 5 mL Intramuscular Prior to discharge    pravastatin (PRAVACHOL) tablet 40 mg  40 mg Oral Daily With Dinner    torsemide (DEMADEX) tablet 20 mg  20 mg Oral Daily     Medications Discontinued During This Encounter   Medication Reason    pantoprazole (PROTONIX) injection 40 mg     cloNIDine (CATAPRES) tablet 0 1 mg     metoprolol succinate (TOPROL-XL) 24 hr tablet 100 mg     cloNIDine (CATAPRES) tablet 0 1 mg     amLODIPine (NORVASC) tablet 10 mg     metoprolol succinate (TOPROL-XL) 24 hr tablet 100 mg     amLODIPine (NORVASC) tablet 10 mg     apixaban (ELIQUIS) 2 5 mg Devin Kovacs MD      This progress note was produced in part using a dictation device which may document imprecise wording from author's original intent

## 2020-08-13 NOTE — ASSESSMENT & PLAN NOTE
· Acute on chronic  · Normochromic normocytic  · Iron panel result appreciated   · Started on ferrous sulfate daily   · Received 2 units of packed red blood cells 8/10  · Monitor H&H closely

## 2020-08-14 LAB — SARS-COV-2 RNA RESP QL NAA+PROBE: NEGATIVE

## 2020-08-14 PROCEDURE — 87635 SARS-COV-2 COVID-19 AMP PRB: CPT | Performed by: INTERNAL MEDICINE

## 2020-08-14 PROCEDURE — 99222 1ST HOSP IP/OBS MODERATE 55: CPT | Performed by: INTERNAL MEDICINE

## 2020-08-14 NOTE — CONSULTS
Consultation - Nephrology   Saturnino Mccullough 80 y o  male MRN: [id number]  Unit/Bed#: [facility] 206-01 Encounter: [id number]       A/P:  1  YOLANDE non oliguric on top of CKD stage 4     Creatinine was improving  prior to discharge from the acute care  Continue p o  diuretics  to the home dose he tolerated  No short of breath  Seen at the bedside [facility]  I will repeat the BMP  And he will need nephrology office follow-up  strongly encourage discussed at length with the patient       He  Avoid hypotension and nephrotoxins including NSAIDs, IV contrast agents  Adjust any renally excreted medication to the GFR less than 15  I discussed with him about possible future need for renal replacement therapy in the setting of advanced stage IV CKD he liked to be educated more so would be benefited from outpatient follow-up and further management  2  Hypertension  Please see above  3  GI bleeding  Please see above  4  Anemia status post 2 units of packed red blood cell transfusion upon admission, monitor H and H and transfuse as needed  5  CKD stage 4-- Will need close follow-up as an outpatient for further workup including PTH vitamin D volume status and adjustment of BMD   Spoke to the patient , hospital medicine team and discussed about the management length                Thank you for allowing us to participate in the care of your patient  Please feel free to contact us regarding the care of this patient, or any other questions/concerns that may be applicable  Patient Active Problem List   Diagnosis    Acute blood loss anemia    Mild intermittent asthma without complication    Acute kidney injury superimposed on chronic kidney disease (HCC)    Dyslipidemia    GERD (gastroesophageal reflux disease)    Late effects of cerebrovascular disease    Lumbar degenerative disc disease    Lumbar radiculopathy    Mitral regurgitation    Obesity (BMI 30-39  9)    Medicare annual wellness visit, subsequent    H/O aortic valve replacement    History of stroke    Bradycardia    Essential hypertension    Leukocytosis    Ambulatory dysfunction    Acute respiratory failure with hypoxia (HCC)    Stage 4 chronic kidney disease (HCC)    Paroxysmal A-fib (HCC)    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    Pacemaker    Azotemia    Oxygen dependent    Chronic combined systolic and diastolic congestive heart failure (HCC)       History of Present Illness   Physician Requesting Consult: Johnny Mcclure DO  Reason for Consult / Principal Problem:  CKD advanced stage  Hx and PE limited by:   HPI: Vikki Morgan is a 80y o  year old male who was transferred from acute care yet on hospital where he was admitted because of the GI bleeding during that admission the patient had Kobe I on CKD stage 4  Patient did not see any nephrologist prior to admission as per the report  Patient was transfused 2 units of red blood cell and had GI endoscopy done and found to have duodenal ulceration and biopsies pending meanwhile the patient was resuscitated with fluid and renal function improved eventually and Lasix which he was taking at home was resumed at the time discharge  Nephrology was consulted for [facility]up the [facility]  Denies any dysuria or any shortness of breathing and all other 10 point review of system was negative  History obtained from chart review and the patient    Review of Systems - Negative except as mentioned above in HPI, more specifics as mentioned below  Review of Systems - Negative except hpi    Historical Information   Past Medical History:   Diagnosis Date    Aortic stenosis     ECHO -4-14-14   MODERATE TO SEVERE AORTIC STENOSIS; MILD AROTIC INSUFFICIENCY - LAST ASSESSED 10/26/16; RESOLVED 6/8/16    Aortic valve disorder     LAST ASSESSED 10/8/15; 10/8/15    Complete heart block ([address] ) 7/20/2020    Hypertensive urgency 5/19/2019    Transient cerebral ischemia      Past Surgical History:   Procedure Laterality Date    AORTIC VALVE REPLACEMENT  06/30/2015    avr with 23 mm [place] Magna Ease bioprosthetic    CATARACT EXTRACTION Right 06/05/2000    COLONOSCOPY      CORONARY ARTERY BYPASS GRAFT  06/05/2000    x1 with lima  to lad    POLYPECTOMY  04/2014    enteroscopic - esophageal ulcer, gastric erosion, and duodenal ulcer   TONSILLECTOMY      unknown     Social History   Social History     Substance and Sexual Activity   Alcohol Use Never    Alcohol/week: 1 0 standard drinks    Types: 1 Cans of beer per week    Frequency: Monthly or less    Drinks per session: 1 or 2    Binge frequency: Never    Comment: social     Social History     Substance and Sexual Activity   Drug Use Never     Social History     Tobacco Use   Smoking Status Never Smoker   Smokeless Tobacco Never Used     Family History   Problem Relation Age of Onset    No Known Problems Mother     No Known Problems Father     Coronary artery disease Neg Hx        Meds/Allergies   all current active meds have been reviewed, current meds:   No current facility-administered medications for this encounter       and PTA meds:    Medications Prior to Admission   Medication    amLODIPine (NORVASC) 5 mg tablet    [START ON 8/18/2020] apixaban (ELIQUIS) 2 5 mg    aspirin (ECOTRIN LOW STRENGTH) 81 mg EC tablet    bisacodyl (DULCOLAX) 5 mg EC tablet    cloNIDine (CATAPRES) 0 1 mg tablet    docusate sodium (COLACE) 100 mg capsule    ferrous sulfate 325 (65 Fe) mg tablet    metoprolol succinate (TOPROL-XL) 100 mg 24 hr tablet    pantoprazole (PROTONIX) 40 mg tablet    [START ON 9/14/2020] pantoprazole (PROTONIX) 40 mg tablet    polyethylene glycol (MIRALAX) 17 g packet    pravastatin (PRAVACHOL) 40 mg tablet    senna (SENOKOT) 8 6 mg    torsemide (DEMADEX) 20 mg tablet         Allergies   Allergen Reactions    Promethazine Delirium       Objective   No intake or output data in the 24 hours ending 08/14/20 1708    Invasive Devices:        Physical Exam      No intake/output data recorded  There were no vitals filed for this visit  Gen: in NAD, Alert/Awake  HEENT: no sclerous icterus, MMM, neck supple  CV: +S1/S2, RRR  Lungs: CTA bilaterally  Abd: +BS, soft NT/ND  Ext: all four extremities are warm  Skin: no rashes noted  Neuro: CN II-XII intact    Current Weight:    First Weight:      Lab Results:  I have personally reviewed pertinent labs  CBC: No results found for: WBC, HGB, HCT, MCV, PLT, ADJUSTEDWBC, MCH, MCHC, RDW, MPV, NRBC  CMP: No results found for: NA, K, CL, CO2, ANIONGAP, BUN, CREATININE, GLUCOSE, CALCIUM, AST, ALT, ALKPHOS, PROT, BILITOT, EGFR  Phosphorus: No results found for: PHOS  Magnesium: No results found for: MG  Urinalysis: No results found for: COLORU, CLARITYU, SPECGRAV, PHUR, LEUKOCYTESUR, NITRITE, PROTEINUA, GLUCOSEU, KETONESU, BILIRUBINUR, BLOODU  Ionized Calcium: No results found for: CAION  Coagulation: No results found for: PT, INR, APTT  Troponin: No results found for: TROPONINI  ABG: No results found for: PHART, XEY1NFJ, PO2ART, KZN1YDQ, D3OEOGCT, BEART, SOURCE    Results from last 7 days   Lab Units 08/13/20  0807 08/12/20  1025 08/12/20  0614   POTASSIUM mmol/L 4 2 3 9 4 1   CHLORIDE mmol/L 94* 93* 95*   CO2 mmol/L 33* 33* 34*   BUN mg/dL 77* 84* 86*   CREATININE mg/dL 2 95* 3 11* 3 17*   CALCIUM mg/dL 8 8 9 1 8 9       Radiology review:  No results found  EKG, Pathology, and Other Studies:  Reviewed      Counseling / Coordination of Care  Total ADDITIONAL floor / unit time spent today 30 minutes  Greater than 50% of total time was spent with the patient and / or family counseling and / or coordination of care  A description of the counseling / coordination of care:  Nursing staff    Lele Gaona MD      This consultation note was produced in part using a dictation device which may document imprecise wording from author's original intent

## 2020-08-15 LAB
ALBUMIN SERPL BCP-MCNC: 3.4 G/DL (ref 3.5–5.7)
ANION GAP SERPL CALCULATED.3IONS-SCNC: 8 MMOL/L (ref 4–13)
ANION GAP SERPL CALCULATED.3IONS-SCNC: 9 MMOL/L (ref 4–13)
BUN SERPL-MCNC: 66 MG/DL (ref 7–25)
BUN SERPL-MCNC: 67 MG/DL (ref 7–25)
CALCIUM SERPL-MCNC: 8.6 MG/DL (ref 8.6–10.5)
CALCIUM SERPL-MCNC: 8.6 MG/DL (ref 8.6–10.5)
CHLORIDE SERPL-SCNC: 94 MMOL/L (ref 98–107)
CHLORIDE SERPL-SCNC: 95 MMOL/L (ref 98–107)
CO2 SERPL-SCNC: 33 MMOL/L (ref 21–31)
CO2 SERPL-SCNC: 33 MMOL/L (ref 21–31)
CREAT SERPL-MCNC: 2.9 MG/DL (ref 0.7–1.3)
CREAT SERPL-MCNC: 2.93 MG/DL (ref 0.7–1.3)
GFR SERPL CREATININE-BSD FRML MDRD: 19 ML/MIN/1.73SQ M
GFR SERPL CREATININE-BSD FRML MDRD: 19 ML/MIN/1.73SQ M
GLUCOSE SERPL-MCNC: 164 MG/DL (ref 65–99)
GLUCOSE SERPL-MCNC: 167 MG/DL (ref 65–99)
PHOSPHATE SERPL-MCNC: 4 MG/DL (ref 3–5.5)
POTASSIUM SERPL-SCNC: 3.8 MMOL/L (ref 3.5–5.5)
POTASSIUM SERPL-SCNC: 3.8 MMOL/L (ref 3.5–5.5)
SODIUM SERPL-SCNC: 136 MMOL/L (ref 134–143)
SODIUM SERPL-SCNC: 136 MMOL/L (ref 134–143)

## 2020-08-15 PROCEDURE — 80048 BASIC METABOLIC PNL TOTAL CA: CPT | Performed by: INTERNAL MEDICINE

## 2020-08-15 PROCEDURE — 80069 RENAL FUNCTION PANEL: CPT | Performed by: INTERNAL MEDICINE

## 2020-08-16 LAB — SARS-COV-2 N GENE RESP QL NAA+PROBE: NEGATIVE

## 2020-08-16 PROCEDURE — U0003 INFECTIOUS AGENT DETECTION BY NUCLEIC ACID (DNA OR RNA); SEVERE ACUTE RESPIRATORY SYNDROME CORONAVIRUS 2 (SARS-COV-2) (CORONAVIRUS DISEASE [COVID-19]), AMPLIFIED PROBE TECHNIQUE, MAKING USE OF HIGH THROUGHPUT TECHNOLOGIES AS DESCRIBED BY CMS-2020-01-R: HCPCS | Performed by: INTERNAL MEDICINE

## 2020-08-16 PROCEDURE — NC001 PR NO CHARGE: Performed by: INTERNAL MEDICINE

## 2020-08-17 LAB
ANION GAP SERPL CALCULATED.3IONS-SCNC: 9 MMOL/L (ref 4–13)
BASOPHILS # BLD AUTO: 0.1 THOUSANDS/ΜL (ref 0–0.1)
BASOPHILS NFR BLD AUTO: 1 % (ref 0–2)
BUN SERPL-MCNC: 60 MG/DL (ref 7–25)
CALCIUM SERPL-MCNC: 9 MG/DL (ref 8.6–10.5)
CHLORIDE SERPL-SCNC: 94 MMOL/L (ref 98–107)
CO2 SERPL-SCNC: 34 MMOL/L (ref 21–31)
CREAT SERPL-MCNC: 2.52 MG/DL (ref 0.7–1.3)
EOSINOPHIL # BLD AUTO: 0.6 THOUSAND/ΜL (ref 0–0.61)
EOSINOPHIL NFR BLD AUTO: 6 % (ref 0–5)
ERYTHROCYTE [DISTWIDTH] IN BLOOD BY AUTOMATED COUNT: 13.5 % (ref 11.5–14.5)
GFR SERPL CREATININE-BSD FRML MDRD: 23 ML/MIN/1.73SQ M
GLUCOSE P FAST SERPL-MCNC: 95 MG/DL (ref 65–99)
GLUCOSE SERPL-MCNC: 95 MG/DL (ref 65–99)
HCT VFR BLD AUTO: 25.7 % (ref 42–47)
HGB BLD-MCNC: 9.2 G/DL (ref 14–18)
LYMPHOCYTES # BLD AUTO: 1.1 THOUSANDS/ΜL (ref 0.6–4.47)
LYMPHOCYTES NFR BLD AUTO: 13 % (ref 21–51)
MCH RBC QN AUTO: 32 PG (ref 26–34)
MCHC RBC AUTO-ENTMCNC: 35.6 G/DL (ref 31–37)
MCV RBC AUTO: 90 FL (ref 81–99)
MONOCYTES # BLD AUTO: 0.8 THOUSAND/ΜL (ref 0.17–1.22)
MONOCYTES NFR BLD AUTO: 9 % (ref 2–12)
NEUTROPHILS # BLD AUTO: 6.4 THOUSANDS/ΜL (ref 1.4–6.5)
NEUTS SEG NFR BLD AUTO: 71 % (ref 42–75)
PLATELET # BLD AUTO: 261 THOUSANDS/UL (ref 149–390)
PMV BLD AUTO: 9 FL (ref 8.6–11.7)
POTASSIUM SERPL-SCNC: 4 MMOL/L (ref 3.5–5.5)
RBC # BLD AUTO: 2.87 MILLION/UL (ref 4.3–5.9)
SODIUM SERPL-SCNC: 137 MMOL/L (ref 134–143)
WBC # BLD AUTO: 8.9 THOUSAND/UL (ref 4.8–10.8)

## 2020-08-17 PROCEDURE — 85025 COMPLETE CBC W/AUTO DIFF WBC: CPT | Performed by: INTERNAL MEDICINE

## 2020-08-17 PROCEDURE — 80048 BASIC METABOLIC PNL TOTAL CA: CPT | Performed by: INTERNAL MEDICINE

## 2020-08-21 ENCOUNTER — TRANSITIONAL CARE MANAGEMENT (OUTPATIENT)
Dept: INTERNAL MEDICINE CLINIC | Facility: CLINIC | Age: 85
End: 2020-08-21

## 2020-08-24 ENCOUNTER — HOSPITAL ENCOUNTER (INPATIENT)
Facility: HOSPITAL | Age: 85
LOS: 6 days | Discharge: HOME | DRG: 378 | End: 2020-08-30
Attending: EMERGENCY MEDICINE | Admitting: INTERNAL MEDICINE
Payer: COMMERCIAL

## 2020-08-24 ENCOUNTER — TELEPHONE (OUTPATIENT)
Dept: INTERNAL MEDICINE CLINIC | Facility: CLINIC | Age: 85
End: 2020-08-24

## 2020-08-24 ENCOUNTER — APPOINTMENT (EMERGENCY)
Dept: CT IMAGING | Facility: HOSPITAL | Age: 85
DRG: 378 | End: 2020-08-24
Payer: COMMERCIAL

## 2020-08-24 DIAGNOSIS — D64.9 SYMPTOMATIC ANEMIA: ICD-10-CM

## 2020-08-24 DIAGNOSIS — N18.9 ACUTE ON CHRONIC RENAL INSUFFICIENCY: ICD-10-CM

## 2020-08-24 DIAGNOSIS — E66.9 OBESITY (BMI 30-39.9): ICD-10-CM

## 2020-08-24 DIAGNOSIS — I10 ESSENTIAL HYPERTENSION: ICD-10-CM

## 2020-08-24 DIAGNOSIS — D62 ACUTE BLOOD LOSS ANEMIA: Primary | ICD-10-CM

## 2020-08-24 DIAGNOSIS — Z95.2 H/O AORTIC VALVE REPLACEMENT: ICD-10-CM

## 2020-08-24 DIAGNOSIS — K92.2 GI BLEED: ICD-10-CM

## 2020-08-24 DIAGNOSIS — J45.20 MILD INTERMITTENT ASTHMA WITHOUT COMPLICATION: ICD-10-CM

## 2020-08-24 DIAGNOSIS — K92.1 MELENA: ICD-10-CM

## 2020-08-24 DIAGNOSIS — E87.1 HYPONATREMIA: ICD-10-CM

## 2020-08-24 DIAGNOSIS — N28.9 ACUTE ON CHRONIC RENAL INSUFFICIENCY: ICD-10-CM

## 2020-08-24 DIAGNOSIS — I50.42 CHRONIC COMBINED SYSTOLIC AND DIASTOLIC CONGESTIVE HEART FAILURE (HCC): ICD-10-CM

## 2020-08-24 DIAGNOSIS — K21.9 GASTROESOPHAGEAL REFLUX DISEASE, ESOPHAGITIS PRESENCE NOT SPECIFIED: ICD-10-CM

## 2020-08-24 LAB
ABO GROUP BLD: NORMAL
ALBUMIN SERPL BCP-MCNC: 3.3 G/DL (ref 3.5–5)
ALP SERPL-CCNC: 81 U/L (ref 46–116)
ALT SERPL W P-5'-P-CCNC: 16 U/L (ref 12–78)
ANION GAP SERPL CALCULATED.3IONS-SCNC: 8 MMOL/L (ref 4–13)
APTT PPP: 32 SECONDS (ref 23–37)
AST SERPL W P-5'-P-CCNC: 19 U/L (ref 5–45)
BASOPHILS # BLD AUTO: 0.01 THOUSANDS/ΜL (ref 0–0.1)
BASOPHILS NFR BLD AUTO: 0 % (ref 0–1)
BILIRUB SERPL-MCNC: 0.3 MG/DL (ref 0.2–1)
BLD GP AB SCN SERPL QL: NEGATIVE
BUN SERPL-MCNC: 67 MG/DL (ref 5–25)
CALCIUM SERPL-MCNC: 8.7 MG/DL (ref 8.3–10.1)
CHLORIDE SERPL-SCNC: 94 MMOL/L (ref 100–108)
CO2 SERPL-SCNC: 30 MMOL/L (ref 21–32)
CREAT SERPL-MCNC: 3.11 MG/DL (ref 0.6–1.3)
EOSINOPHIL # BLD AUTO: 0.55 THOUSAND/ΜL (ref 0–0.61)
EOSINOPHIL NFR BLD AUTO: 5 % (ref 0–6)
ERYTHROCYTE [DISTWIDTH] IN BLOOD BY AUTOMATED COUNT: 12.5 % (ref 11.6–15.1)
GFR SERPL CREATININE-BSD FRML MDRD: 17 ML/MIN/1.73SQ M
GLUCOSE SERPL-MCNC: 128 MG/DL (ref 65–140)
HCT VFR BLD AUTO: 22.7 % (ref 36.5–49.3)
HGB BLD-MCNC: 7.5 G/DL (ref 12–17)
INR PPP: 1.44 (ref 0.84–1.19)
LYMPHOCYTES # BLD AUTO: 0.99 THOUSANDS/ΜL (ref 0.6–4.47)
LYMPHOCYTES NFR BLD AUTO: 9 % (ref 14–44)
MCH RBC QN AUTO: 30.2 PG (ref 26.8–34.3)
MCHC RBC AUTO-ENTMCNC: 33 G/DL (ref 31.4–37.4)
MCV RBC AUTO: 92 FL (ref 82–98)
MONOCYTES # BLD AUTO: 0.99 THOUSAND/ΜL (ref 0.17–1.22)
MONOCYTES NFR BLD AUTO: 9 % (ref 4–12)
NEUTROPHILS # BLD AUTO: 8.26 THOUSANDS/ΜL (ref 1.85–7.62)
NEUTS SEG NFR BLD AUTO: 77 % (ref 43–75)
NT-PROBNP SERPL-MCNC: 3481 PG/ML
PLATELET # BLD AUTO: 276 THOUSANDS/UL (ref 149–390)
PMV BLD AUTO: 10.4 FL (ref 8.9–12.7)
POTASSIUM SERPL-SCNC: 4 MMOL/L (ref 3.5–5.3)
PROT SERPL-MCNC: 7.4 G/DL (ref 6.4–8.2)
PROTHROMBIN TIME: 17.2 SECONDS (ref 11.6–14.5)
RBC # BLD AUTO: 2.48 MILLION/UL (ref 3.88–5.62)
RH BLD: POSITIVE
SODIUM SERPL-SCNC: 132 MMOL/L (ref 136–145)
SPECIMEN EXPIRATION DATE: NORMAL
TROPONIN I SERPL-MCNC: <0.02 NG/ML
WBC # BLD AUTO: 10.8 THOUSAND/UL (ref 4.31–10.16)

## 2020-08-24 PROCEDURE — 36415 COLL VENOUS BLD VENIPUNCTURE: CPT | Performed by: EMERGENCY MEDICINE

## 2020-08-24 PROCEDURE — 99291 CRITICAL CARE FIRST HOUR: CPT | Performed by: EMERGENCY MEDICINE

## 2020-08-24 PROCEDURE — 93005 ELECTROCARDIOGRAM TRACING: CPT

## 2020-08-24 PROCEDURE — 36430 TRANSFUSION BLD/BLD COMPNT: CPT

## 2020-08-24 PROCEDURE — 84484 ASSAY OF TROPONIN QUANT: CPT | Performed by: EMERGENCY MEDICINE

## 2020-08-24 PROCEDURE — 83880 ASSAY OF NATRIURETIC PEPTIDE: CPT | Performed by: EMERGENCY MEDICINE

## 2020-08-24 PROCEDURE — G1004 CDSM NDSC: HCPCS

## 2020-08-24 PROCEDURE — 74176 CT ABD & PELVIS W/O CONTRAST: CPT

## 2020-08-24 PROCEDURE — 30233N1 TRANSFUSION OF NONAUTOLOGOUS RED BLOOD CELLS INTO PERIPHERAL VEIN, PERCUTANEOUS APPROACH: ICD-10-PCS | Performed by: EMERGENCY MEDICINE

## 2020-08-24 PROCEDURE — 99223 1ST HOSP IP/OBS HIGH 75: CPT | Performed by: INTERNAL MEDICINE

## 2020-08-24 PROCEDURE — 80053 COMPREHEN METABOLIC PANEL: CPT | Performed by: EMERGENCY MEDICINE

## 2020-08-24 PROCEDURE — 99285 EMERGENCY DEPT VISIT HI MDM: CPT

## 2020-08-24 PROCEDURE — 96365 THER/PROPH/DIAG IV INF INIT: CPT

## 2020-08-24 PROCEDURE — 86920 COMPATIBILITY TEST SPIN: CPT

## 2020-08-24 PROCEDURE — C9113 INJ PANTOPRAZOLE SODIUM, VIA: HCPCS | Performed by: EMERGENCY MEDICINE

## 2020-08-24 PROCEDURE — 85610 PROTHROMBIN TIME: CPT | Performed by: EMERGENCY MEDICINE

## 2020-08-24 PROCEDURE — 86850 RBC ANTIBODY SCREEN: CPT | Performed by: EMERGENCY MEDICINE

## 2020-08-24 PROCEDURE — 85730 THROMBOPLASTIN TIME PARTIAL: CPT | Performed by: EMERGENCY MEDICINE

## 2020-08-24 PROCEDURE — 85025 COMPLETE CBC W/AUTO DIFF WBC: CPT | Performed by: EMERGENCY MEDICINE

## 2020-08-24 PROCEDURE — 86901 BLOOD TYPING SEROLOGIC RH(D): CPT | Performed by: EMERGENCY MEDICINE

## 2020-08-24 PROCEDURE — P9016 RBC LEUKOCYTES REDUCED: HCPCS

## 2020-08-24 PROCEDURE — 86900 BLOOD TYPING SEROLOGIC ABO: CPT | Performed by: EMERGENCY MEDICINE

## 2020-08-24 RX ORDER — TORSEMIDE 20 MG/1
20 TABLET ORAL 2 TIMES DAILY
Status: DISCONTINUED | OUTPATIENT
Start: 2020-08-24 | End: 2020-08-28

## 2020-08-24 RX ORDER — AMLODIPINE BESYLATE 5 MG/1
5 TABLET ORAL DAILY
Status: DISCONTINUED | OUTPATIENT
Start: 2020-08-24 | End: 2020-08-30 | Stop reason: HOSPADM

## 2020-08-24 RX ORDER — METOPROLOL SUCCINATE 100 MG/1
100 TABLET, EXTENDED RELEASE ORAL DAILY
Status: DISCONTINUED | OUTPATIENT
Start: 2020-08-24 | End: 2020-08-24

## 2020-08-24 RX ORDER — METOPROLOL SUCCINATE 100 MG/1
100 TABLET, EXTENDED RELEASE ORAL 2 TIMES DAILY
Status: DISCONTINUED | OUTPATIENT
Start: 2020-08-24 | End: 2020-08-30 | Stop reason: HOSPADM

## 2020-08-24 RX ORDER — ASPIRIN 81 MG/1
81 TABLET ORAL DAILY
Status: DISCONTINUED | OUTPATIENT
Start: 2020-08-24 | End: 2020-08-30 | Stop reason: HOSPADM

## 2020-08-24 RX ORDER — CLONIDINE HYDROCHLORIDE 0.1 MG/1
0.1 TABLET ORAL DAILY
Status: DISCONTINUED | OUTPATIENT
Start: 2020-08-24 | End: 2020-08-30 | Stop reason: HOSPADM

## 2020-08-24 RX ORDER — PRAVASTATIN SODIUM 40 MG
40 TABLET ORAL
Status: DISCONTINUED | OUTPATIENT
Start: 2020-08-24 | End: 2020-08-30 | Stop reason: HOSPADM

## 2020-08-24 RX ADMIN — ASPIRIN 81 MG: 81 TABLET, COATED ORAL at 17:04

## 2020-08-24 RX ADMIN — CLONIDINE HYDROCHLORIDE 0.1 MG: 0.1 TABLET ORAL at 17:03

## 2020-08-24 RX ADMIN — TORSEMIDE 20 MG: 20 TABLET ORAL at 17:03

## 2020-08-24 RX ADMIN — AMLODIPINE BESYLATE 5 MG: 5 TABLET ORAL at 17:04

## 2020-08-24 RX ADMIN — PRAVASTATIN SODIUM 40 MG: 40 TABLET ORAL at 17:03

## 2020-08-24 RX ADMIN — SODIUM CHLORIDE 8 MG/HR: 9 INJECTION, SOLUTION INTRAVENOUS at 14:00

## 2020-08-24 RX ADMIN — SODIUM CHLORIDE 80 MG: 9 INJECTION, SOLUTION INTRAVENOUS at 13:16

## 2020-08-24 RX ADMIN — METOPROLOL SUCCINATE 100 MG: 100 TABLET, EXTENDED RELEASE ORAL at 21:12

## 2020-08-24 NOTE — ED PROVIDER NOTES
History  Chief Complaint   Patient presents with    Black or Bloody Stool     pt c/o black stools since being discharged from hospital on Friday for pacemaker placement     80YEAR-OLD MALE PRESENTS WITH BLACK STOOL WHICH BEEN PRESENT FOR THE LAST 5 DAYS  PATIENT RECENTLY HAD AN EGD PERFORMED ON 08/10 WHICH DEMONSTRATED THE FOLLOWING: Duodenal bulb ulcer with duodenitis-likely source of bleeding and anemia  Moderate hiatal hernia  " PATIENT STATES THAT HE TOLD HIS PCP TODAY WHO 7 BOX TO WAS SENT TO THE EMERGED DEPARTMENT FOR EVALUATION  PATIENT IS TAKING ELIQUIS   8/ 17 PATIENT'S HEMOGLOBIN WAS 9 2 WITH HEMATOCRIT OF 25  History provided by:  Patient  Black or Bloody Stool   Quality:  Black and tarry  Amount: Moderate  Duration:  5 days  Timing:  Intermittent  Chronicity:  Recurrent  Context: not anal fissures, not anal penetration and not constipation    Similar prior episodes: yes    Relieved by:  Nothing  Worsened by:  Defecation  Associated symptoms: no abdominal pain, no dizziness and no epistaxis    Risk factors: anticoagulant use    Risk factors: no hx of colorectal cancer        Prior to Admission Medications   Prescriptions Last Dose Informant Patient Reported? Taking?    METOPROLOL TARTRATE PO   Yes Yes   Sig: Take by mouth 2 (two) times a day   amLODIPine (NORVASC) 5 mg tablet   No Yes   Sig: Take 1 tablet (5 mg total) by mouth daily   apixaban (ELIQUIS) 2 5 mg   No Yes   Sig: Take 1 tablet (2 5 mg total) by mouth 2 (two) times a day   aspirin (ECOTRIN LOW STRENGTH) 81 mg EC tablet   No Yes   Sig: Take 1 tablet (81 mg total) by mouth daily   bisacodyl (DULCOLAX) 5 mg EC tablet   No Yes   Sig: Take 2 tablets (10 mg total) by mouth daily as needed for constipation   cloNIDine (CATAPRES) 0 1 mg tablet   No Yes   Sig: Take 1 tablet (0 1 mg total) by mouth daily   docusate sodium (COLACE) 100 mg capsule   No Yes   Sig: Take 1 capsule (100 mg total) by mouth 2 (two) times a day   ferrous sulfate 325 (65 Fe) mg tablet   No Yes   Sig: Take 1 tablet (325 mg total) by mouth daily with breakfast for 30 doses   pantoprazole (PROTONIX) 40 mg tablet   No Yes   Sig: Take 1 tablet (40 mg total) by mouth 2 (two) times a day before meals   pravastatin (PRAVACHOL) 40 mg tablet   No Yes   Sig: Take 1 tablet (40 mg total) by mouth daily with dinner   senna (SENOKOT) 8 6 mg   No Yes   Sig: Take 1 tablet (8 6 mg total) by mouth daily at bedtime   torsemide (DEMADEX) 20 mg tablet   No Yes   Sig: Take 1 tablet (20 mg total) by mouth 2 (two) times a day      Facility-Administered Medications: None       Past Medical History:   Diagnosis Date    Aortic stenosis     ECHO -4-14-14  MODERATE TO SEVERE AORTIC STENOSIS; MILD AROTIC INSUFFICIENCY - LAST ASSESSED 10/26/16; RESOLVED 6/8/16    Aortic valve disorder     LAST ASSESSED 10/8/15; 10/8/15    Complete heart block ([facility] [address] ) 7/20/2020    Hypertensive urgency 5/19/2019    Transient cerebral ischemia        Past Surgical History:   Procedure Laterality Date    AORTIC VALVE REPLACEMENT  06/30/2015    avr with 23 mm [place] Magna Ease bioprosthetic    CATARACT EXTRACTION Right 06/05/2000    COLONOSCOPY      CORONARY ARTERY BYPASS GRAFT  06/05/2000    x1 with lima  to lad    POLYPECTOMY  04/2014    enteroscopic - esophageal ulcer, gastric erosion, and duodenal ulcer   TONSILLECTOMY      unknown       Family History   Problem Relation Age of Onset    No Known Problems Mother     No Known Problems Father     Coronary artery disease Neg Hx      I have reviewed and agree with the history as documented      E-Cigarette/Vaping    E-Cigarette Use Never User      E-Cigarette/Vaping Substances     Social History     Tobacco Use    Smoking status: Never Smoker    Smokeless tobacco: Never Used   Substance Use Topics    Alcohol use: Never     Alcohol/week: 1 0 standard drinks     Types: 1 Cans of beer per week     Frequency: Monthly or less     Drinks per session: 1 or 2     Binge frequency: Never     Comment: social    Drug use: Never       Review of Systems   Constitutional: Negative  HENT: Negative  Negative for nosebleeds  Eyes: Negative  Respiratory: Negative  Cardiovascular: Negative  Gastrointestinal: Positive for blood in stool  Negative for abdominal pain  DARK STOOLS   Endocrine: Negative  Genitourinary: Negative  Musculoskeletal: Negative  Skin: Negative  Allergic/Immunologic: Negative  Neurological: Negative for dizziness  Hematological: Negative  Psychiatric/Behavioral: Negative  All other systems reviewed and are negative  Physical Exam  Physical Exam  Vitals signs and nursing note reviewed  Constitutional:       General: He is not in acute distress  HENT:      Head: Normocephalic  Right Ear: External ear normal       Left Ear: External ear normal       Nose: Nose normal       Mouth/Throat:      Mouth: Mucous membranes are moist    Eyes:      Pupils: Pupils are equal, round, and reactive to light  Neck:      Musculoskeletal: Normal range of motion  Cardiovascular:      Rate and Rhythm: Normal rate  Pulses: Normal pulses  Pulmonary:      Effort: Pulmonary effort is normal  No respiratory distress  Breath sounds: No stridor  No wheezing or rhonchi  Abdominal:      General: Abdomen is flat  There is no distension  Palpations: There is no mass  Tenderness: There is no abdominal tenderness  Hernia: No hernia is present  Musculoskeletal:         General: Swelling and tenderness present  Right lower leg: Edema present  Left lower leg: Edema present  Skin:     General: Skin is warm  Capillary Refill: Capillary refill takes less than 2 seconds  Coloration: Skin is not jaundiced or pale  Neurological:      General: No focal deficit present  Mental Status: He is alert     Psychiatric:         Mood and Affect: Mood normal          Vital Signs  ED Triage Vitals   Temp Pulse Respirations Blood Pressure SpO2   -- 08/24/20 1236 08/24/20 1236 08/24/20 1236 08/24/20 1236    61 18 150/69 96 %      Temp Source Heart Rate Source Patient Position - Orthostatic VS BP Location FiO2 (%)   08/24/20 1418 08/24/20 1236 08/24/20 1236 08/24/20 1236 --   Temporal Monitor Lying Right arm       Pain Score       --                  Vitals:    08/24/20 1236 08/24/20 1300 08/24/20 1403 08/24/20 1418   BP: 150/69 127/61 126/60 124/59   Pulse: 61 60 60 60   Patient Position - Orthostatic VS: Lying Lying Lying          Visual Acuity      ED Medications  Medications   pantoprazole (PROTONIX) 80 mg in sodium chloride 0 9 % 100 mL infusion (8 mg/hr Intravenous New Bag 8/24/20 1400)   pantoprazole (PROTONIX) 80 mg in sodium chloride 0 9 % 100 mL IVPB (0 mg Intravenous Stopped 8/24/20 1331)       Diagnostic Studies  Results Reviewed     Procedure Component Value Units Date/Time    Comprehensive metabolic panel [[id number]]  (Abnormal) Collected:  08/24/20 1255    Lab Status:  Final result Specimen:  Blood from Arm, Right Updated:  08/24/20 1332     Sodium 132 mmol/L      Potassium 4 0 mmol/L      Chloride 94 mmol/L      CO2 30 mmol/L      ANION GAP 8 mmol/L      BUN 67 mg/dL      Creatinine 3 11 mg/dL      Glucose 128 mg/dL      Calcium 8 7 mg/dL      AST 19 U/L      ALT 16 U/L      Alkaline Phosphatase 81 U/L      Total Protein 7 4 g/dL      Albumin 3 3 g/dL      Total Bilirubin 0 30 mg/dL      eGFR 17 ml/min/1 73sq m     Narrative:       Bailey guidelines for Chronic Kidney Disease (CKD):     Stage 1 with normal or high GFR (GFR > 90 mL/min/1 73 square meters)    Stage 2 Mild CKD (GFR = 60-89 mL/min/1 73 square meters)    Stage 3A Moderate CKD (GFR = 45-59 mL/min/1 73 square meters)    Stage 3B Moderate CKD (GFR = 30-44 mL/min/1 73 square meters)    Stage 4 Severe CKD (GFR = 15-29 mL/min/1 73 square meters)    Stage 5 End Stage CKD (GFR <15 mL/min/1 73 square meters)  Note: GFR calculation is accurate only with a steady state creatinine    NT-BNP PRO [[id number]]  (Abnormal) Collected:  08/24/20 1255    Lab Status:  Final result Specimen:  Blood from Arm, Right Updated:  08/24/20 1324     NT-proBNP 3,481 pg/mL     Troponin I [[id number]]  (Normal) Collected:  08/24/20 1255    Lab Status:  Final result Specimen:  Blood from Arm, Right Updated:  08/24/20 1320     Troponin I <0 02 ng/mL     Protime-INR [[id number]]  (Abnormal) Collected:  08/24/20 1255    Lab Status:  Final result Specimen:  Blood from Arm, Right Updated:  08/24/20 1312     Protime 17 2 seconds      INR 1 44    APTT [[id number]]  (Normal) Collected:  08/24/20 1255    Lab Status:  Final result Specimen:  Blood from Arm, Right Updated:  08/24/20 1312     PTT 32 seconds     CBC and differential [[id number]]  (Abnormal) Collected:  08/24/20 1255    Lab Status:  Final result Specimen:  Blood from Arm, Right Updated:  08/24/20 1303     WBC 10 80 Thousand/uL      RBC 2 48 Million/uL      Hemoglobin 7 5 g/dL      Hematocrit 22 7 %      MCV 92 fL      MCH 30 2 pg      MCHC 33 0 g/dL      RDW 12 5 %      MPV 10 4 fL      Platelets 151 Thousands/uL      Neutrophils Relative 77 %      Lymphocytes Relative 9 %      Monocytes Relative 9 %      Eosinophils Relative 5 %      Basophils Relative 0 %      Neutrophils Absolute 8 26 Thousands/µL      Lymphocytes Absolute 0 99 Thousands/µL      Monocytes Absolute 0 99 Thousand/µL      Eosinophils Absolute 0 55 Thousand/µL      Basophils Absolute 0 01 Thousands/µL                  CT abdomen pelvis wo contrast    (Results Pending)              Procedures  ECG 12 Lead Documentation Only    Date/Time: 8/24/2020 1:05 PM  Performed by: Mahesh Ly DO  Authorized by: Mahesh Ly DO     Indications / Diagnosis:  Gi BLEED   ECG reviewed by me, the ED Provider: yes    Patient location:  ED  Previous ECG:     Previous ECG:  Unavailable  Interpretation:     Interpretation: non-specific    Rate: ECG rate:  65  Rhythm:     Rhythm: paced      CriticalCare Time  Performed by: July Toledo DO  Authorized by: July Toledo DO     Critical care provider statement:     Critical care time (minutes):  45    Critical care start time:  8/24/2020 1:11 PM    Critical care was necessary to treat or prevent imminent or life-threatening deterioration of the following conditions:  Circulatory failure    Critical care was time spent personally by me on the following activities:  Blood draw for specimens, obtaining history from patient or surrogate, development of treatment plan with patient or surrogate, discussions with consultants, evaluation of patient's response to treatment, examination of patient, ordering and performing treatments and interventions, ordering and review of laboratory studies and ordering and review of radiographic studies  Comments:      Protonix bolus and drip for upper GI bleed  Administration of blood products  ED Course       US AUDIT      Most Recent Value   Initial Alcohol Screen: US AUDIT-C    1  How often do you have a drink containing alcohol? 1 Filed at: 08/24/2020 1240   2  How many drinks containing alcohol do you have on a typical day you are drinking? 1 Filed at: 08/24/2020 1240   3a  Male UNDER 65: How often do you have five or more drinks on one occasion? 0 Filed at: 08/24/2020 1240   3b  FEMALE Any Age, or MALE 65+: How often do you have 4 or more drinks on one occassion? 0 Filed at: 08/24/2020 1240   Audit-C Score  2 Filed at: 08/24/2020 1240            HEART Risk Score      Most Recent Value   Heart Score Risk Calculator   History  1 Filed at: 08/24/2020 1251   ECG  1 Filed at: 08/24/2020 1251   Age  2 Filed at: 08/24/2020 1251   Risk Factors  2 Filed at: 08/24/2020 1251   Troponin  0 Filed at: 08/24/2020 1251   HEART Score  6 Filed at: 08/24/2020 1251            JUDI/DAST-10      Most Recent Value   How many times in the past year have you       Used an illegal drug or used a prescription medication for non-medical reasons? Never Filed at: 08/24/2020 1241                                MDM  Number of Diagnoses or Management Options  Diagnosis management comments: 80YEAR-OLD MALE PRESENTS WITH DARK TARRY STOOLS  DIFFERENTIAL DIAGNOSIS LIKELY FROM DUODENITIS  PATIENT WITH RECENT EASY EGD WHICH DEMONSTRATED THE SAME  I HAD EXTENSIVE CONVERSATION WITH FAMILY  WILL CHECK LABS AND DECIDE OUTPATIENT VERSUS INPATIENT FOLLOW-UP WITH GI    DIFFERENTIAL DIAGNOSIS 1  UPPER GI BLEED   ESOPHAGITIS, DUODENITIS, GASTRITIS  LOWER GI BLEED COLITIS, DIVERTICULITIS  WILL PERFORM CT SCAN CHECK LABS  13:20  I spoke with DR Lazaro Solis who states admit on protonix bolus plan on egd for weds  Full liquid diet        Amount and/or Complexity of Data Reviewed  Clinical lab tests: ordered and reviewed  Tests in the radiology section of CPT®: ordered and reviewed  Tests in the medicine section of CPT®: reviewed and ordered    Risk of Complications, Morbidity, and/or Mortality  Presenting problems: high  Diagnostic procedures: high  Management options: high          Disposition  Final diagnoses:   GI bleed   Symptomatic anemia   Chronic combined systolic and diastolic congestive heart failure ([facility])   H/O aortic valve replacement   Gastroesophageal reflux disease, esophagitis presence not specified   Mild intermittent asthma without complication   Obesity (BMI 30-39  9)   Essential hypertension   Hyponatremia   Acute on chronic renal insufficiency     Time reflects when diagnosis was documented in both MDM as applicable and the Disposition within this note     Time User Action Codes Description Comment    8/24/2020  1:23 PM Henretta Keith Add [K92 2] GI bleed     8/24/2020  1:23 PM Henretta Keith Add [D64 9] Symptomatic anemia     8/24/2020  1:23 PM Henretta Keith Add [I50 42] Chronic combined systolic and diastolic congestive heart failure ([facility] [address] )     8/24/2020  1:23 PM Dawit Livers Add [Z95 2] H/O aortic valve replacement     8/24/2020  1:24 PM Dawit Livers Add [K21 9] Gastroesophageal reflux disease, esophagitis presence not specified     8/24/2020  1:24 PM Dawit Livers Add [N18 4] Stage 4 [facility]     8/24/2020  1:24 PM Dawit Livers Add [R97 62] Mild intermittent asthma without complication     2/09/0354  1:24 PM Dawit Livers Add [E66 9] Obesity (BMI 30-39  9)     8/24/2020  1:24 PM Dawit Livers Add [I10] Essential hypertension     8/24/2020  1:34 PM Dawit Livers Remove [N18 4] Stage 4 chronic kidney disease ([facility] )     8/24/2020  1:34 PM Dawit Livers Add [E87 1] Hyponatremia     8/24/2020  1:34 PM Dawit Livers Add [N28 9,  N18 9] Acute on chronic renal insufficiency       ED Disposition     ED Disposition Condition Date/Time Comment    Admit Stable Mon Aug 24, 2020  1:23 PM         Follow-up Information    None         Patient's Medications   Discharge Prescriptions    No medications on file     No discharge procedures on file      PDMP Review     None          ED Provider  Electronically Signed by           Christopher Doshi DO  08/24/20 3129

## 2020-08-24 NOTE — H&P
H&P- Kaleb Granados 1935, 80 y o  male MRN: [id number]    Unit/Bed#: 359-21 Encounter: [id number]    Primary Care Provider: Lyndsey Patel,    Date and time admitted to hospital: 8/24/2020 12:34 PM        * Melena  Assessment & Plan  Pt with recent h/o GIB and recent EGD on 8/10/2020 showing Duodenal bulb ulcer with duodenitis  Coming in with persistent melena with drop in hemoglobin from 9 7 5  Hold Eliquis  Consult GI  Hemoglobin  Patient ordered 1 unit PRBC in ER  Recheck hemoglobin  Protonix drip  Acute blood loss anemia  Assessment & Plan  Hemoglobin dropped from 9 2  To 7 5  Pt to get 1 u prbc in ER  Monitor H&H daily and transfuse for Hb < 7    Paroxysmal A-fib (HCC)  Assessment & Plan  Rate controlled  Continue metoprolol  Daughter not sure of dose  Please confirm dose  Will continue Toprol-XL 1000 bid as per previous DC med rec  Hold Eliquis  Essential hypertension  Assessment & Plan  Well controlled  Continue clonidine, amlodipine, metoprolol    CKD (chronic kidney disease) stage 4, GFR 15-29 ml/min (HCC)  Assessment & Plan  Baseline creatinine 2 5 to 3 1  Currently at baseline slightly on the higher side  Continue monitor closely  VTE Prophylaxis: Pharmacologic VTE Prophylaxis contraindicated due to GI bleed  / sequential compression device   Code Status: full  POLST: POLST form is not discussed and not completed at this time  Discussion with family: wife and daughter at bedside    Anticipated Length of Stay:  Patient will be admitted on an Inpatient basis with an anticipated length of stay of  > 2 midnights  Justification for Hospital Stay: above    Total Time for Visit, including Counseling / Coordination of Care: 45 minutes  Greater than 50% of this total time spent on direct patient counseling and coordination of care      Chief Complaint:   melena    History of Present Illness:    Kaleb Granados is a 80 y o  male who presents with persistent melena since he was discharged from the hospital   He was admitted with GI bleed 8/10/2020-8/13/2020  Underwent EGD - that showed duodenal ulcer and duodenitis  That was thought to be a source of bleeding  Patient on Eliquis because of history of atrial fibrillation  GI recommended holding Eliquis and aspirin for 1 week after discharge and restarting  Other than melena, patient currently denies any specific complaints  No dizziness, lightheadedness, chest pain, shortness of breath, fatigue  Review of Systems:    Review of Systems   Constitutional: Negative for chills, fatigue and fever  HENT: Negative for congestion and sore throat  Respiratory: Negative for cough and shortness of breath  Cardiovascular: Negative for chest pain and palpitations  Gastrointestinal: Positive for blood in stool  Negative for abdominal pain, diarrhea, nausea and vomiting  Genitourinary: Negative for dysuria, flank pain and frequency  Musculoskeletal: Negative for arthralgias and myalgias  Skin: Negative for color change and rash  Neurological: Negative for dizziness, syncope and light-headedness  Psychiatric/Behavioral: Negative for agitation, behavioral problems and confusion  Past Medical and Surgical History:     Past Medical History:   Diagnosis Date    Aortic stenosis     ECHO -4-14-14   MODERATE TO SEVERE AORTIC STENOSIS; MILD AROTIC INSUFFICIENCY - LAST ASSESSED 10/26/16; RESOLVED 6/8/16    Aortic valve disorder     LAST ASSESSED 10/8/15; 10/8/15    Complete heart block ([address] ) 7/20/2020    Hypertensive urgency 5/19/2019    Transient cerebral ischemia        Past Surgical History:   Procedure Laterality Date    AORTIC VALVE REPLACEMENT  06/30/2015    avr with 23 mm [place] Magna Ease bioprosthetic    CATARACT EXTRACTION Right 06/05/2000    COLONOSCOPY      CORONARY ARTERY BYPASS GRAFT  06/05/2000    x1 with lima  to lad    POLYPECTOMY  04/2014    enteroscopic - esophageal ulcer, gastric erosion, and duodenal ulcer   TONSILLECTOMY      unknown       Meds/Allergies:    Prior to Admission medications    Medication Sig Start Date End Date Taking?  Authorizing Provider   amLODIPine (NORVASC) 5 mg tablet Take 1 tablet (5 mg total) by mouth daily 8/14/20  Yes DIANDRA Mancini   apixaban (ELIQUIS) 2 5 mg Take 1 tablet (2 5 mg total) by mouth 2 (two) times a day 8/18/20  Yes DIANDRA Mancini   aspirin (ECOTRIN LOW STRENGTH) 81 mg EC tablet Take 1 tablet (81 mg total) by mouth daily 8/6/20  Yes Yusuf Otero MD   bisacodyl (DULCOLAX) 5 mg EC tablet Take 2 tablets (10 mg total) by mouth daily as needed for constipation 8/5/20  Yes Yusuf Otero MD   cloNIDine (CATAPRES) 0 1 mg tablet Take 1 tablet (0 1 mg total) by mouth daily 8/6/20  Yes Yusuf Otero MD   docusate sodium (COLACE) 100 mg capsule Take 1 capsule (100 mg total) by mouth 2 (two) times a day 8/5/20  Yes Yusuf Otero MD   ferrous sulfate 325 (65 Fe) mg tablet Take 1 tablet (325 mg total) by mouth daily with breakfast for 30 doses 8/14/20 9/13/20 Yes DIANDRA Mancini   METOPROLOL TARTRATE PO Take by mouth 2 (two) times a day   Yes Caleb Florez MD   pantoprazole (PROTONIX) 40 mg tablet Take 1 tablet (40 mg total) by mouth 2 (two) times a day before meals 8/13/20 9/12/20 Yes DIANDRA Mancini   pravastatin (PRAVACHOL) 40 mg tablet Take 1 tablet (40 mg total) by mouth daily with dinner 8/5/20  Yes Yusuf Otero MD   senna (SENOKOT) 8 6 mg Take 1 tablet (8 6 mg total) by mouth daily at bedtime 8/5/20  Yes Yusuf Otero MD   torsemide (DEMADEX) 20 mg tablet Take 1 tablet (20 mg total) by mouth 2 (two) times a day 8/5/20  Yes Yusuf Otero MD   metoprolol succinate (TOPROL-XL) 100 mg 24 hr tablet Take 1 tablet (100 mg total) by mouth 2 (two) times a day 8/5/20 8/24/20  Yusuf Otero MD   pantoprazole (PROTONIX) 40 mg tablet Take 1 tablet (40 mg total) by mouth daily 9/14/20 8/24/20  DIANDRA Harper   polyethylene glycol (MIRALAX) 17 g packet Take 17 g by mouth daily 8/6/20 8/24/20  Nereyda Qureshi MD     I have reviewed home medications with patient personally  Allergies: Allergies   Allergen Reactions    Promethazine Delirium       Social History:     Marital Status: /Civil Union   Occupation:   Patient Pre-hospital Living Situation:   Patient Pre-hospital Level of Mobility:   Patient Pre-hospital Diet Restrictions:   Substance Use History:   Social History     Substance and Sexual Activity   Alcohol Use Never    Alcohol/week: 1 0 standard drinks    Types: 1 Cans of beer per week    Frequency: Monthly or less    Drinks per session: 1 or 2    Binge frequency: Never    Comment: social     Social History     Tobacco Use   Smoking Status Never Smoker   Smokeless Tobacco Never Used     Social History     Substance and Sexual Activity   Drug Use Never       Family History:    non-contributory    Physical Exam:     Vitals:   Blood Pressure: 125/55 (08/24/20 1455)  Pulse: 62 (08/24/20 1455)  Temperature: 97 6 °F (36 4 °C) (08/24/20 1455)  Temp Source: Temporal (08/24/20 1455)  Respirations: 18 (08/24/20 1455)  Weight - Scale: 90 2 kg (198 lb 13 7 oz) (08/24/20 1236)  SpO2: 97 % (08/24/20 1455)    Physical Exam    Constitutional: Pt appears comfortable  Not in any acute distress  HENT:   Head: Normocephalic and atraumatic  Eyes: EOM are normal    Neck: Neck supple  Cardiovascular: Normal rate, regular rhythm, normal heart sounds  No murmur heard  Pulmonary/Chest: Effort normal, air entry b/l equal  No respiratory distress  Pt has no wheezes or crackles  Abdominal: Soft  Non-distended, Non-tender  Bowel sounds are normal    Musculoskeletal: Normal range of motion  Neurological: awake, alert  Moving all extremities spontaneously  Psychiatric: normal mood and affect  Additional Data:     Lab Results: I have personally reviewed pertinent reports  Results from last 7 days   Lab Units 08/24/20  1255   WBC Thousand/uL 10 80*   HEMOGLOBIN g/dL 7 5*   HEMATOCRIT % 22 7*   PLATELETS Thousands/uL 276   NEUTROS PCT % 77*   LYMPHS PCT % 9*   MONOS PCT % 9   EOS PCT % 5     Results from last 7 days   Lab Units 08/24/20  1255   SODIUM mmol/L 132*   POTASSIUM mmol/L 4 0   CHLORIDE mmol/L 94*   CO2 mmol/L 30   BUN mg/dL 67*   CREATININE mg/dL 3 11*   ANION GAP mmol/L 8   CALCIUM mg/dL 8 7   ALBUMIN g/dL 3 3*   TOTAL BILIRUBIN mg/dL 0 30   ALK PHOS U/L 81   ALT U/L 16   AST U/L 19   GLUCOSE RANDOM mg/dL 128     Results from last 7 days   Lab Units 08/24/20  1255   INR  1 44*                   Imaging: I have personally reviewed pertinent reports  CT abdomen pelvis wo contrast   Final Result by Nicole Nguyen MD (08/24 1431)      No acute findings in the abdomen or pelvis within the limits of unenhanced technique  Workstation performed: [id number]             EKG, Pathology, and Other Studies Reviewed on Admission:   · EKG:     Allscripts / Epic Records Reviewed: Yes     ** Please Note: This note has been constructed using a voice recognition system   **

## 2020-08-24 NOTE — PLAN OF CARE
Problem: Potential for Falls  Goal: Patient will remain free of falls  Description: INTERVENTIONS:  - Assess patient frequently for physical needs  -  Identify cognitive and physical deficits and behaviors that affect risk of falls  -  [place] fall precautions as indicated by assessment   - Educate patient/family on patient safety including physical limitations  - Instruct patient to call for assistance with activity based on assessment  - Modify environment to reduce risk of injury  - Consider OT/PT consult to assist with strengthening/mobility  Outcome: Progressing     Problem: Prexisting or High Potential for Compromised Skin Integrity  Goal: Skin integrity is maintained or improved  Description: INTERVENTIONS:  - Identify patients at risk for skin breakdown  - Assess and monitor skin integrity  - Assess and monitor nutrition and hydration status  - Monitor labs   - Assess for incontinence   - Turn and reposition patient  - Assist with mobility/ambulation  - Relieve pressure over bony prominences  - Avoid friction and shearing  - Provide appropriate hygiene as needed including keeping skin clean and dry  - Evaluate need for skin moisturizer/barrier cream  - Collaborate with interdisciplinary team   - Patient/family teaching  - Consider wound care consult   Outcome: Progressing     Problem: Nutrition/Hydration-ADULT  Goal: Nutrient/Hydration intake appropriate for improving, restoring or maintaining nutritional needs  Description: Monitor and assess patient's nutrition/hydration status for malnutrition  Collaborate with interdisciplinary team and initiate plan and interventions as ordered  Monitor patient's weight and dietary intake as ordered or per policy  Utilize nutrition screening tool and intervene as necessary  Determine patient's food preferences and provide high-protein, high-caloric foods as appropriate       INTERVENTIONS:  - Monitor oral intake, urinary output, labs, and treatment plans  - Assess nutrition and hydration status and recommend course of action  - Evaluate amount of meals eaten  - Assist patient with eating if necessary   - Allow adequate time for meals  - Recommend/ encourage appropriate diets, oral nutritional supplements, and vitamin/mineral supplements  - Order, calculate, and assess calorie counts as needed  - Recommend, monitor, and adjust tube feedings and TPN/PPN based on assessed needs  - Assess need for intravenous fluids  - Provide specific nutrition/hydration education as appropriate  - Include patient/family/caregiver in decisions related to nutrition  Outcome: Progressing

## 2020-08-24 NOTE — ASSESSMENT & PLAN NOTE
Hemoglobin dropped from 9 2  To 7 5    Pt to get 1 u prbc in ER  Monitor H&H daily and transfuse for Hb < 7

## 2020-08-24 NOTE — ASSESSMENT & PLAN NOTE
Pt with recent h/o GIB and recent EGD on 8/10/2020 showing Duodenal bulb ulcer with duodenitis  Coming in with persistent melena with drop in hemoglobin from 9 7 5  Hold Eliquis  Consult GI  Hemoglobin  Patient ordered 1 unit PRBC in ER  Recheck hemoglobin  Protonix drip

## 2020-08-24 NOTE — ASSESSMENT & PLAN NOTE
Baseline creatinine 2 5 to 3 1  Currently at baseline slightly on the higher side  Continue monitor closely

## 2020-08-25 LAB
ABO GROUP BLD BPU: NORMAL
ANION GAP SERPL CALCULATED.3IONS-SCNC: 3 MMOL/L (ref 4–13)
BASOPHILS # BLD AUTO: 0.02 THOUSANDS/ΜL (ref 0–0.1)
BASOPHILS NFR BLD AUTO: 0 % (ref 0–1)
BPU ID: NORMAL
BUN SERPL-MCNC: 66 MG/DL (ref 5–25)
CALCIUM SERPL-MCNC: 8.5 MG/DL (ref 8.3–10.1)
CHLORIDE SERPL-SCNC: 99 MMOL/L (ref 100–108)
CO2 SERPL-SCNC: 34 MMOL/L (ref 21–32)
CREAT SERPL-MCNC: 2.96 MG/DL (ref 0.6–1.3)
CROSSMATCH: NORMAL
EOSINOPHIL # BLD AUTO: 0.57 THOUSAND/ΜL (ref 0–0.61)
EOSINOPHIL NFR BLD AUTO: 7 % (ref 0–6)
ERYTHROCYTE [DISTWIDTH] IN BLOOD BY AUTOMATED COUNT: 13.2 % (ref 11.6–15.1)
GFR SERPL CREATININE-BSD FRML MDRD: 19 ML/MIN/1.73SQ M
GLUCOSE SERPL-MCNC: 103 MG/DL (ref 65–140)
HCT VFR BLD AUTO: 26.9 % (ref 36.5–49.3)
HGB BLD-MCNC: 9 G/DL (ref 12–17)
IMM GRANULOCYTES # BLD AUTO: 0.03 THOUSAND/UL (ref 0–0.2)
IMM GRANULOCYTES NFR BLD AUTO: 0 % (ref 0–2)
LYMPHOCYTES # BLD AUTO: 1.12 THOUSANDS/ΜL (ref 0.6–4.47)
LYMPHOCYTES NFR BLD AUTO: 14 % (ref 14–44)
MCH RBC QN AUTO: 30.5 PG (ref 26.8–34.3)
MCHC RBC AUTO-ENTMCNC: 33.5 G/DL (ref 31.4–37.4)
MCV RBC AUTO: 91 FL (ref 82–98)
MONOCYTES # BLD AUTO: 0.75 THOUSAND/ΜL (ref 0.17–1.22)
MONOCYTES NFR BLD AUTO: 10 % (ref 4–12)
NEUTROPHILS # BLD AUTO: 5.37 THOUSANDS/ΜL (ref 1.85–7.62)
NEUTS SEG NFR BLD AUTO: 69 % (ref 43–75)
NRBC BLD AUTO-RTO: 0 /100 WBCS
PLATELET # BLD AUTO: 205 THOUSANDS/UL (ref 149–390)
PMV BLD AUTO: 10.7 FL (ref 8.9–12.7)
POTASSIUM SERPL-SCNC: 3.8 MMOL/L (ref 3.5–5.3)
RBC # BLD AUTO: 2.95 MILLION/UL (ref 3.88–5.62)
SODIUM SERPL-SCNC: 136 MMOL/L (ref 136–145)
UNIT DISPENSE STATUS: NORMAL
UNIT PRODUCT CODE: NORMAL
UNIT RH: NORMAL
WBC # BLD AUTO: 7.86 THOUSAND/UL (ref 4.31–10.16)

## 2020-08-25 PROCEDURE — 85025 COMPLETE CBC W/AUTO DIFF WBC: CPT | Performed by: INTERNAL MEDICINE

## 2020-08-25 PROCEDURE — 80048 BASIC METABOLIC PNL TOTAL CA: CPT | Performed by: INTERNAL MEDICINE

## 2020-08-25 PROCEDURE — 99255 IP/OBS CONSLTJ NEW/EST HI 80: CPT | Performed by: INTERNAL MEDICINE

## 2020-08-25 PROCEDURE — C9113 INJ PANTOPRAZOLE SODIUM, VIA: HCPCS | Performed by: EMERGENCY MEDICINE

## 2020-08-25 PROCEDURE — 99232 SBSQ HOSP IP/OBS MODERATE 35: CPT | Performed by: PHYSICIAN ASSISTANT

## 2020-08-25 RX ORDER — ACETAMINOPHEN 325 MG/1
650 TABLET ORAL EVERY 6 HOURS PRN
Status: DISCONTINUED | OUTPATIENT
Start: 2020-08-25 | End: 2020-08-30 | Stop reason: HOSPADM

## 2020-08-25 RX ADMIN — AMLODIPINE BESYLATE 5 MG: 5 TABLET ORAL at 08:43

## 2020-08-25 RX ADMIN — METOPROLOL SUCCINATE 100 MG: 100 TABLET, EXTENDED RELEASE ORAL at 20:45

## 2020-08-25 RX ADMIN — METOPROLOL SUCCINATE 100 MG: 100 TABLET, EXTENDED RELEASE ORAL at 08:43

## 2020-08-25 RX ADMIN — TORSEMIDE 20 MG: 20 TABLET ORAL at 17:22

## 2020-08-25 RX ADMIN — ASPIRIN 81 MG: 81 TABLET, COATED ORAL at 08:43

## 2020-08-25 RX ADMIN — SODIUM CHLORIDE 8 MG/HR: 9 INJECTION, SOLUTION INTRAVENOUS at 18:49

## 2020-08-25 RX ADMIN — SODIUM CHLORIDE 8 MG/HR: 9 INJECTION, SOLUTION INTRAVENOUS at 10:04

## 2020-08-25 RX ADMIN — TORSEMIDE 20 MG: 20 TABLET ORAL at 08:43

## 2020-08-25 RX ADMIN — SODIUM CHLORIDE 8 MG/HR: 9 INJECTION, SOLUTION INTRAVENOUS at 01:30

## 2020-08-25 RX ADMIN — ACETAMINOPHEN 650 MG: 325 TABLET, FILM COATED ORAL at 20:46

## 2020-08-25 RX ADMIN — CLONIDINE HYDROCHLORIDE 0.1 MG: 0.1 TABLET ORAL at 08:42

## 2020-08-25 RX ADMIN — PRAVASTATIN SODIUM 40 MG: 40 TABLET ORAL at 17:22

## 2020-08-25 NOTE — UTILIZATION REVIEW
Initial Clinical Review    Admission: Date/Time/Statement:   Admission Orders (From admission, onward)     Ordered        08/24/20 1421  Inpatient Admission (expected length of stay for this patient Order details is greater than two midnights)  Once                   Orders Placed This Encounter   Procedures    Inpatient Admission (expected length of stay for this patient Order details is greater than two midnights)     Standing Status:   Standing     Number of Occurrences:   1     Order Specific Question:   Admitting Physician     Answer:   Sade Rahman [[id number]]     Order Specific Question:   Level of Care     Answer:   Med Surg [[id number]]     Order Specific Question:   Estimated length of stay     Answer:   More than 2 Midnights     Order Specific Question:   Certification     Answer:   I certify that inpatient services are medically necessary for this patient for a duration of greater than two midnights  See H&P and MD Progress Notes for additional information about the patient's course of treatment  ED Arrival Information     Expected Arrival Acuity Means of Arrival Escorted By Service Admission Type    - 8/24/2020 12:24 Urgent Wheelchair Spouse Hospitalist Urgent    [address]        Chief Complaint   Patient presents with   Mark Massing or Bloody Stool     pt c/o black stools since being discharged from hospital on Friday for pacemaker placement     Assessment/Plan:  Mr Jolynn Schwab is an 79 yo male who presents to the ED from home with c/o persistent melena since dc from hospital on 8/13 for treatment for GI Bleed  He was found to have duodenal ulcer and duodenitis and was thought to be cause of bleeding  He is on Eliquis for PAF which was held for 1 week and then restarted  He does not have any associated complaints  PMH: PAF, HTN, CKD stage 4  He is admitted to INPATIENT status with Melena - hold Eliquis, consult GI, trend Hgb, Transfused with 1 U PRBCs in ED, Protonix drip    Acute blood loss anemia  - hgb 7 5, monitor and transfuse if < 7  ED Triage Vitals   Temperature Pulse Respirations Blood Pressure SpO2   08/24/20 1418 08/24/20 1236 08/24/20 1236 08/24/20 1236 08/24/20 1236   98 °F (36 7 °C) 61 18 150/69 96 %      Temp Source Heart Rate Source Patient Position - Orthostatic VS BP Location FiO2 (%)   08/24/20 1418 08/24/20 1236 08/24/20 1236 08/24/20 1236 --   Temporal Monitor Lying Right arm       Pain Score       08/24/20 1524       No Pain          Wt Readings from Last 1 Encounters:   08/24/20 89 7 kg (197 lb 12 oz)     Additional Vital Signs:   08/25/20 07:31:42   98 3 °F (36 8 °C)   60   20   137/59   85   96 %          08/24/20 2326                     None (Room air)       08/24/20 22:43:07   98 1 °F (36 7 °C)   65      140/59   86   97 %          08/24/20 18:30:55   98 1 °F (36 7 °C)   60      142/58   86   95 %          08/24/20 16:58:26   97 8 °F (36 6 °C)   67      145/66   92   100 %          08/24/20 16:57:53      69      145/66   92   99 %          08/24/20 15:23:39   98 1 °F (36 7 °C)   66   18   142/58   86   99 %          08/24/20 1455   97 6 °F (36 4 °C)   62   18   125/55      97 %   None (Room air)   Lying    08/24/20 1445   97 9 °F (36 6 °C)   60   17   128/60      97 %   None (Room air)       08/24/20 1430      60   17   128/60   86   97 %   None (Room air)   Lying    08/24/20 1418   98 °F (36 7 °C)   60   17   124/59      98 %   None (Room air)       08/24/20 1415      60   17   124/59   85   96 %   None (Room air)   Lying    08/24/20 1403      60   16   126/60      98 %   None (Room air)   Lying    08/24/20 1300      60   15   127/61   88   97 %   None (Room air)   Lying      Pertinent Labs/Diagnostic Test Results:     8/24 CT abd, pelvis - No acute findings in the abdomen or pelvis within the limits of unenhanced technique      8/24 ECG - paced rhythm at rate 65        Results from last 7 days   Lab Units 08/25/20  1473 08/24/20  1255   WBC Thousand/uL 7 86 10 80*   HEMOGLOBIN g/dL 9 0* 7 5*   HEMATOCRIT % 26 9* 22 7*   PLATELETS Thousands/uL 205 276   NEUTROS ABS Thousands/µL 5 37 8 26*         Results from last 7 days   Lab Units 08/25/20  0414 08/24/20  1255   SODIUM mmol/L 136 132*   POTASSIUM mmol/L 3 8 4 0   CHLORIDE mmol/L 99* 94*   CO2 mmol/L 34* 30   ANION GAP mmol/L 3* 8   BUN mg/dL 66* 67*   CREATININE mg/dL 2 96* 3 11*   EGFR ml/min/1 73sq m 19 17   CALCIUM mg/dL 8 5 8 7     Results from last 7 days   Lab Units 08/24/20  1255   AST U/L 19   ALT U/L 16   ALK PHOS U/L 81   TOTAL PROTEIN g/dL 7 4   ALBUMIN g/dL 3 3*   TOTAL BILIRUBIN mg/dL 0 30         Results from last 7 days   Lab Units 08/25/20  0414 08/24/20  1255   GLUCOSE RANDOM mg/dL 103 128     Results from last 7 days   Lab Units 08/24/20  1255   TROPONIN I ng/mL <0 02         Results from last 7 days   Lab Units 08/24/20  1255   PROTIME seconds 17 2*   INR  1 44*   PTT seconds 32         Results from last 7 days   Lab Units 08/24/20  1255   NT-PRO BNP pg/mL 3,481*       ED Treatment:   Medication Administration from 08/24/2020 1222 to 08/24/2020 1513    Date/Time Order Dose Route Action   08/24/2020 1316 pantoprazole (PROTONIX) 80 mg in sodium chloride 0 9 % 100 mL IVPB 80 mg Intravenous New Bag   08/24/2020 1400 pantoprazole (PROTONIX) 80 mg in sodium chloride 0 9 % 100 mL infusion 8 mg/hr Intravenous New Bag        Past Medical History:   Diagnosis Date    Aortic stenosis     ECHO -4-14-14   MODERATE TO SEVERE AORTIC STENOSIS; MILD AROTIC INSUFFICIENCY - LAST ASSESSED 10/26/16; RESOLVED 6/8/16    Aortic valve disorder     LAST ASSESSED 10/8/15; 10/8/15    Complete heart block ([address] ) 7/20/2020    Hypertensive urgency 5/19/2019    Transient cerebral ischemia      Present on Admission:   Acute blood loss anemia   CKD (chronic kidney disease) stage 4, GFR 15-29 ml/min ([facility])   Essential hypertension   Paroxysmal A-fib (HCC)    Admitting Diagnosis: Hyponatremia [E87 1]  Essential hypertension [I10]  GI bleed [K92 2]  Black stool [K92 1]  Acute on chronic renal insufficiency [N28 9, N18 9]  Chronic combined systolic and diastolic congestive heart failure (HCC) [I50 42]  Mild intermittent asthma without complication [Q41 46]  Obesity (BMI 30-39  9) [E66 9]  H/O aortic valve replacement [Z95 2]  Symptomatic anemia [D64 9]  Gastroesophageal reflux disease, esophagitis presence not specified [K21 9]     Age/Sex: 80 y o  male     Admission Orders:    Scheduled Medications:  amLODIPine, 5 mg, Oral, Daily  aspirin, 81 mg, Oral, Daily  cloNIDine, 0 1 mg, Oral, Daily  metoprolol succinate, 100 mg, Oral, BID  pravastatin, 40 mg, Oral, Daily With Dinner  torsemide, 20 mg, Oral, BID      Continuous IV Infusions:  pantoprozole (PROTONIX) infusion (Continuous), 8 mg/hr, Intravenous, Continuous      PRN Meds:     SCDs  Up w/ assist   Cardiac diet   IP CONSULT TO GASTROENTEROLOGY  IP CONSULT TO CASE MANAGEMENT    Network Utilization Review Department  [email or web address]  ATTENTION: Please call with any questions or concerns to [phone] and carefully listen to the prompts so that you are directed to the right person  All voicemails are confidential   Brody Loera all requests for admission clinical reviews, approved or denied determinations and any other requests to dedicated fax number below belonging to the campus where the patient is receiving treatment   List of dedicated fax numbers for the Facilities:  FACILITY NAME UR FAX NUMBER   ADMISSION DENIALS (Administrative/Medical Necessity) [phone]   PARENT CHILD HEALTH (Maternity/NICU/Pediatrics) [phone]   Cheng Canas [phone]   Kevin Mohan [phone]   [facility] [address] [facility] [address] [place] [address] [address] [phone]   [address] [phone]

## 2020-08-25 NOTE — ASSESSMENT & PLAN NOTE
Pt with recent h/o GIB and recent EGD on 8/10/2020 showing Duodenal bulb ulcer with duodenitis  Coming in with persistent melena with drop in hemoglobin from 9 2 to 7 5  Patient received 1 unit PRBC in ED 8/24/20  Hemoglobin improved at 9 0 today  Continue to hold Eliquis  Gastroenterology consultation appreciated, recommending EGD/Colonoscopy for Friday 8/28 given patient was no eliquis and had regular die today  Patient currently not wanting colonoscopy  Continue protonix drip    Continue to montior H&H

## 2020-08-25 NOTE — ASSESSMENT & PLAN NOTE
Hemoglobin dropped from 9 2 to 7 5    Patient received 1 unit of PRBC's in the ED 8/24/20   hemoglobin today at 9 0  Monitor H&H daily and transfuse for Hb < 7

## 2020-08-25 NOTE — CONSULTS
Consultation - [facility]) [facility]  Vikki Morgan 80 y o  male MRN: [id number]  Unit/Bed#: 987-67 Encounter: [id number]        ASSESSMENT/PLAN:   Anemia    Patient was admitted approximately 2 weeks ago for anemia and underwent endoscopy showing a duodenal ulcer  This was not actively bleeding at the time  He states since discharge she has noticed dark black stools  Hemoglobin has again decreased  It is unlikely that his bleeding is coming from the ulcer as it had a clean base  Possibly coming from small intestinal bleeding or colonic bleeding  It was recommended that he have an outpatient colonoscopy  Since he has eaten this morning will hold off on any procedures  Would continue to hold Eliquis and potentially plan for repeat endoscopy and colonoscopy on Friday   -follow H&H  -transfuse as necessary  -hold Eliquis  -EGD/colonoscopy Friday      Consults    Reason for Consult / Principal Problem: Melena    HPI: Vikki Morgan is a 80y o  year old male with a PMH of aortic stenosis, atrial fibrillation, on Eliquis, who presented to the hospital with melena  Patient was seen at [facility] on 08/10 for worsening anemia and heme-positive stools  His hemoglobin had dropped from 10-7  He underwent endoscopy at that time showing a medium hiatal hernia, duodenal ulcer, not actively bleeding, duodenitis  Biopsies were negative for H pylori  Patient states for the last 4 days he has noticed dark black stools prompting him to return to the hospital    Hemoglobin was 7 5, down from 9 2 on discharge  He did receive 1 unit is currently and 9  He has not had any further bowel movements since admission  He ate a regular diet this morning  He believes his last colonoscopy was 5 years ago and 1 polyp was found  Review of Systems: as per HPI  Review of Systems   All other systems reviewed and are negative        Historical Information   Past Medical History:   Diagnosis Date    Aortic stenosis ECHO -4-14-14  MODERATE TO SEVERE AORTIC STENOSIS; MILD AROTIC INSUFFICIENCY - LAST ASSESSED 10/26/16; RESOLVED 6/8/16    Aortic valve disorder     LAST ASSESSED 10/8/15; 10/8/15    Complete heart block ([address] ) 7/20/2020    Hypertensive urgency 5/19/2019    Transient cerebral ischemia      Past Surgical History:   Procedure Laterality Date    AORTIC VALVE REPLACEMENT  06/30/2015    avr with 23 mm [place] Magna Ease bioprosthetic    CARDIAC PACEMAKER PLACEMENT Left 07/24/2020    CATARACT EXTRACTION Right 06/05/2000    COLONOSCOPY      CORONARY ARTERY BYPASS GRAFT  06/05/2000    x1 with lima  to lad    POLYPECTOMY  04/2014    enteroscopic - esophageal ulcer, gastric erosion, and duodenal ulcer       TONSILLECTOMY      unknown     Social History   Social History     Substance and Sexual Activity   Alcohol Use Never    Alcohol/week: 0 0 standard drinks    Frequency: Never    Drinks per session: Patient refused    Binge frequency: Never     Social History     Substance and Sexual Activity   Drug Use Never     Social History     Tobacco Use   Smoking Status Never Smoker   Smokeless Tobacco Never Used     Family History   Problem Relation Age of Onset    No Known Problems Mother     No Known Problems Father     Coronary artery disease Neg Hx        Meds/Allergies     Medications Prior to Admission   Medication    amLODIPine (NORVASC) 5 mg tablet    apixaban (ELIQUIS) 2 5 mg    aspirin (ECOTRIN LOW STRENGTH) 81 mg EC tablet    cloNIDine (CATAPRES) 0 1 mg tablet    docusate sodium (COLACE) 100 mg capsule    ferrous sulfate 325 (65 Fe) mg tablet    METOPROLOL TARTRATE PO    pantoprazole (PROTONIX) 40 mg tablet    pravastatin (PRAVACHOL) 40 mg tablet    senna (SENOKOT) 8 6 mg    torsemide (DEMADEX) 20 mg tablet    bisacodyl (DULCOLAX) 5 mg EC tablet     Current Facility-Administered Medications   Medication Dose Route Frequency    amLODIPine (NORVASC) tablet 5 mg  5 mg Oral Daily    aspirin (ECOTRIN LOW STRENGTH) EC tablet 81 mg  81 mg Oral Daily    cloNIDine (CATAPRES) tablet 0 1 mg  0 1 mg Oral Daily    metoprolol succinate (TOPROL-XL) 24 hr tablet 100 mg  100 mg Oral BID    pantoprazole (PROTONIX) 80 mg in sodium chloride 0 9 % 100 mL infusion  8 mg/hr Intravenous Continuous    pravastatin (PRAVACHOL) tablet 40 mg  40 mg Oral Daily With Dinner    torsemide [facility]) tablet 20 mg  20 mg Oral BID       Allergies   Allergen Reactions    Promethazine Delirium       Objective     Blood pressure 137/59, pulse 60, temperature 98 3 °F (36 8 °C), resp  rate 20, height 5' 4" (1 626 m), weight 89 7 kg (197 lb 12 oz), SpO2 96 %  Intake/Output Summary (Last 24 hours) at 8/25/2020 1119  Last data filed at 8/25/2020 1010  Gross per 24 hour   Intake 1037 67 ml   Output 900 ml   Net 137 67 ml       PHYSICAL EXAM     Physical Exam  Constitutional:       General: He is not in acute distress  Appearance: Normal appearance  He is well-developed  HENT:      Head: Normocephalic and atraumatic  Eyes:      Conjunctiva/sclera: Conjunctivae normal    Neck:      Musculoskeletal: Normal range of motion  Cardiovascular:      Rate and Rhythm: Normal rate and regular rhythm  Pulmonary:      Effort: Pulmonary effort is normal       Breath sounds: Normal breath sounds  Abdominal:      General: Bowel sounds are normal  There is no distension  Palpations: Abdomen is soft  Tenderness: There is no abdominal tenderness  Musculoskeletal: Normal range of motion  Skin:     General: Skin is warm and dry  Neurological:      Mental Status: He is alert and oriented to person, place, and time     Psychiatric:         Behavior: Behavior normal          Lab Results:   CBC:   Lab Results   Component Value Date    WBC 7 86 08/25/2020    HGB 9 0 (L) 08/25/2020    HCT 26 9 (L) 08/25/2020    MCV 91 08/25/2020     08/25/2020    MCH 30 5 08/25/2020    MCHC 33 5 08/25/2020    RDW 13 2 08/25/2020    MPV 10 7 08/25/2020    NRBC 0 08/25/2020   ,   CMP:   Lab Results   Component Value Date    K 3 8 08/25/2020    CL 99 (L) 08/25/2020    CO2 34 (H) 08/25/2020    BUN 66 (H) 08/25/2020    CREATININE 2 96 (H) 08/25/2020    CALCIUM 8 5 08/25/2020    AST 19 08/24/2020    ALT 16 08/24/2020    ALKPHOS 81 08/24/2020    EGFR 19 08/25/2020   ,   Lipase: No results found for: LIPASE,  PT/INR:   Lab Results   Component Value Date    INR 1 44 (H) 08/24/2020   ,   Troponin:   Lab Results   Component Value Date    TROPONINI <0 02 08/24/2020   ,   Imaging Studies: I have personally reviewed pertinent reports  CT abd/pelvis:  No acute findings in the abdomen or pelvis within the limits of unenhanced technique

## 2020-08-25 NOTE — PROGRESS NOTES
Progress Note - Kulwinder Momin 1935, 80 y o  male MRN: [id number]    Unit/Bed#: 918-09 Encounter: [id number]    Primary Care Provider: Raven Lopez DO   Date and time admitted to hospital: 8/24/2020 12:34 PM        * Melena  Assessment & Plan  Pt with recent h/o GIB and recent EGD on 8/10/2020 showing Duodenal bulb ulcer with duodenitis  Coming in with persistent melena with drop in hemoglobin from 9 2 to 7 5  Patient received 1 unit PRBC in ED 8/24/20  Hemoglobin improved at 9 0 today  Continue to hold Eliquis  Gastroenterology consultation appreciated, recommending EGD/Colonoscopy for Friday 8/28 given patient was no eliquis and had regular die today  Patient currently not wanting colonoscopy  Continue protonix drip  Continue to montior H&H    Acute blood loss anemia  Assessment & Plan  Hemoglobin dropped from 9 2 to 7 5  Patient received 1 unit of PRBC's in the ED 8/24/20   hemoglobin today at 9 0  Monitor H&H daily and transfuse for Hb < 7    Paroxysmal A-fib (HCC)  Assessment & Plan  Rate controlled  Continue metoprolol very  Hold Eliquis  Essential hypertension  Assessment & Plan  Well controlled  Continue clonidine, amlodipine, metoprolol    CKD (chronic kidney disease) stage 4, GFR 15-29 ml/min (HCC)  Assessment & Plan  Baseline creatinine 2 5 to 3 1  Currently at baseline slightly on the higher side  Continue monitor closely  VTE Pharmacologic Prophylaxis:   Pharmacologic: Pharmacologic VTE Prophylaxis contraindicated due to gi bleed  Mechanical VTE Prophylaxis in Place: Yes    Patient Centered Rounds: I have performed bedside rounds with nursing staff today  Discussions with Specialists or Other Care Team Provider: GI, attending    Education and Discussions with Family / Patient: patient    Time Spent for Care: 20 minutes  More than 50% of total time spent on counseling and coordination of care as described above      Current Length of Stay: 1 day(s)    Current Patient Status: Inpatient   Certification Statement: The patient will continue to require additional inpatient hospital stay due to contiued monitoring of H&H and planned EGD/Colonospcopy on      Discharge Plan: TBD    Code Status: Level 1 - Full Code      Subjective: The patient was seen and examined  The patient denies any additional bleeding or bloody stools  He denies any complaints  No acute events overnight  Objective:     Vitals:   Temp (24hrs), Av °F (36 7 °C), Min:97 6 °F (36 4 °C), Max:98 3 °F (36 8 °C)    Temp:  [97 6 °F (36 4 °C)-98 3 °F (36 8 °C)] 98 3 °F (36 8 °C)  HR:  [60-69] 60  Resp:  [15-20] 20  BP: (124-145)/(55-66) 137/59  SpO2:  [95 %-100 %] 96 %  Body mass index is 33 94 kg/m²  Input and Output Summary (last 24 hours): Intake/Output Summary (Last 24 hours) at 2020 1257  Last data filed at 2020 1121  Gross per 24 hour   Intake 1037 67 ml   Output 1075 ml   Net -37 33 ml       Physical Exam:     Physical Exam  Vitals signs and nursing note reviewed  Constitutional:       General: He is awake  Cardiovascular:      Rate and Rhythm: Normal rate and regular rhythm  Pulmonary:      Effort: Pulmonary effort is normal       Breath sounds: Normal breath sounds  No wheezing, rhonchi or rales  Abdominal:      General: Bowel sounds are normal       Palpations: Abdomen is soft  Tenderness: There is no abdominal tenderness  Skin:     General: Skin is warm and dry  Neurological:      General: No focal deficit present  Mental Status: He is alert and oriented to person, place, and time  Cranial Nerves: Cranial nerves are intact  Psychiatric:         Attention and Perception: Attention normal          Mood and Affect: Mood normal          Speech: Speech normal          Behavior: Behavior is cooperative           Additional Data:     Labs:    Results from last 7 days   Lab Units 20  0414   WBC Thousand/uL 7 86   HEMOGLOBIN g/dL 9 0*   HEMATOCRIT % 26 9*   PLATELETS Thousands/uL 205   NEUTROS PCT % 69   LYMPHS PCT % 14   MONOS PCT % 10   EOS PCT % 7*     Results from last 7 days   Lab Units 08/25/20  0414 08/24/20  1255   SODIUM mmol/L 136 132*   POTASSIUM mmol/L 3 8 4 0   CHLORIDE mmol/L 99* 94*   CO2 mmol/L 34* 30   BUN mg/dL 66* 67*   CREATININE mg/dL 2 96* 3 11*   ANION GAP mmol/L 3* 8   CALCIUM mg/dL 8 5 8 7   ALBUMIN g/dL  --  3 3*   TOTAL BILIRUBIN mg/dL  --  0 30   ALK PHOS U/L  --  81   ALT U/L  --  16   AST U/L  --  19   GLUCOSE RANDOM mg/dL 103 128     Results from last 7 days   Lab Units 08/24/20  1255   INR  1 44*                       * I Have Reviewed All Lab Data Listed Above  * Additional Pertinent Lab Tests Reviewed: All Labs Within Last 24 Hours Reviewed    Imaging:    Imaging Reports Reviewed Today Include: none  Imaging Personally Reviewed by Myself Includes:  none    Recent Cultures (last 7 days):           Last 24 Hours Medication List:   Current Facility-Administered Medications   Medication Dose Route Frequency Provider Last Rate    amLODIPine  5 mg Oral Daily Miranda Callahan MD      aspirin  81 mg Oral Daily Miranda Callahan MD      cloNIDine  0 1 mg Oral Daily Miranda Callahan MD      metoprolol succinate  100 mg Oral BID Miranda Callahan MD      pantoprozole (PROTONIX) infusion (Continuous)  8 mg/hr Intravenous Continuous Abbie Body, DO 8 mg/hr (08/25/20 1004)    pravastatin  40 mg Oral Daily With Arsh Alcala MD      torsemide  20 mg Oral BID Miranda Callahan MD          Today, Patient Was Seen By: Esau Lynch PA-C    ** Please Note: Dictation voice to text software may have been used in the creation of this document   **

## 2020-08-25 NOTE — CASE MANAGEMENT
Met with pt to discuss role as  in helping pt to develop discharge plan and to help pt carry out their plan  Pt lives in a house with his wife   Pt has 3 [address]IL[address]  Pt has 12 steps on the inside  Pt has his bedroom and bathroom on the 2nd floor  Pt has a powder room on the 1rst floor  Pt has a cane and a walker  Pt uses the walker to ambulate  Pt is independent with ADL's  Pt's wife does the cooking and cleaning  Pt was set up with home care when he was discharged from the [facility] except he can not remember which agency it is   Pt 's PCP is Dr Lori Cuba  Pt uses the [facility] in [facility]          Pt was given Meds to [facility] and discharge check list  Both were reviewed with patient with good understanding  Pt is not interested in getting Meds at [facility] at this time  Pt is a readmission  Pt was hospitalized at [facility] from 7/20/20 - 8/5/20 with Resp failure and Leukocytosis   Pt went to the [address] [place] [address] home when was discharged   Pt was then at [facility]  From 8/10 -8/13 with GI Bleed    Pt then want to the [facility] for 8 more days from 8/13/20 - 8/21/20   Pt had home care when he was discharged from the [facility] but is not sure what agency it was  Pt did not have enough time to have follow up with PCP  Will continue to follow for any Case Management needs

## 2020-08-26 LAB
ANION GAP SERPL CALCULATED.3IONS-SCNC: 8 MMOL/L (ref 4–13)
BASOPHILS # BLD AUTO: 0.02 THOUSANDS/ΜL (ref 0–0.1)
BASOPHILS NFR BLD AUTO: 0 % (ref 0–1)
BUN SERPL-MCNC: 62 MG/DL (ref 5–25)
CALCIUM SERPL-MCNC: 8.4 MG/DL (ref 8.3–10.1)
CHLORIDE SERPL-SCNC: 96 MMOL/L (ref 100–108)
CO2 SERPL-SCNC: 32 MMOL/L (ref 21–32)
CREAT SERPL-MCNC: 2.74 MG/DL (ref 0.6–1.3)
EOSINOPHIL # BLD AUTO: 0.38 THOUSAND/ΜL (ref 0–0.61)
EOSINOPHIL NFR BLD AUTO: 4 % (ref 0–6)
ERYTHROCYTE [DISTWIDTH] IN BLOOD BY AUTOMATED COUNT: 13.1 % (ref 11.6–15.1)
GFR SERPL CREATININE-BSD FRML MDRD: 20 ML/MIN/1.73SQ M
GLUCOSE SERPL-MCNC: 104 MG/DL (ref 65–140)
HCT VFR BLD AUTO: 25.4 % (ref 36.5–49.3)
HGB BLD-MCNC: 8.5 G/DL (ref 12–17)
IMM GRANULOCYTES # BLD AUTO: 0.05 THOUSAND/UL (ref 0–0.2)
IMM GRANULOCYTES NFR BLD AUTO: 1 % (ref 0–2)
LYMPHOCYTES # BLD AUTO: 0.87 THOUSANDS/ΜL (ref 0.6–4.47)
LYMPHOCYTES NFR BLD AUTO: 10 % (ref 14–44)
MCH RBC QN AUTO: 30.5 PG (ref 26.8–34.3)
MCHC RBC AUTO-ENTMCNC: 33.5 G/DL (ref 31.4–37.4)
MCV RBC AUTO: 91 FL (ref 82–98)
MONOCYTES # BLD AUTO: 0.92 THOUSAND/ΜL (ref 0.17–1.22)
MONOCYTES NFR BLD AUTO: 10 % (ref 4–12)
NEUTROPHILS # BLD AUTO: 6.74 THOUSANDS/ΜL (ref 1.85–7.62)
NEUTS SEG NFR BLD AUTO: 75 % (ref 43–75)
NRBC BLD AUTO-RTO: 0 /100 WBCS
PLATELET # BLD AUTO: 206 THOUSANDS/UL (ref 149–390)
PMV BLD AUTO: 10.1 FL (ref 8.9–12.7)
POTASSIUM SERPL-SCNC: 3.7 MMOL/L (ref 3.5–5.3)
RBC # BLD AUTO: 2.79 MILLION/UL (ref 3.88–5.62)
SODIUM SERPL-SCNC: 136 MMOL/L (ref 136–145)
WBC # BLD AUTO: 8.98 THOUSAND/UL (ref 4.31–10.16)

## 2020-08-26 PROCEDURE — 99232 SBSQ HOSP IP/OBS MODERATE 35: CPT | Performed by: INTERNAL MEDICINE

## 2020-08-26 PROCEDURE — 85025 COMPLETE CBC W/AUTO DIFF WBC: CPT | Performed by: PHYSICIAN ASSISTANT

## 2020-08-26 PROCEDURE — C9113 INJ PANTOPRAZOLE SODIUM, VIA: HCPCS | Performed by: EMERGENCY MEDICINE

## 2020-08-26 PROCEDURE — 80048 BASIC METABOLIC PNL TOTAL CA: CPT | Performed by: PHYSICIAN ASSISTANT

## 2020-08-26 PROCEDURE — 99232 SBSQ HOSP IP/OBS MODERATE 35: CPT | Performed by: PHYSICIAN ASSISTANT

## 2020-08-26 RX ADMIN — ASPIRIN 81 MG: 81 TABLET, COATED ORAL at 08:23

## 2020-08-26 RX ADMIN — AMLODIPINE BESYLATE 5 MG: 5 TABLET ORAL at 08:23

## 2020-08-26 RX ADMIN — SODIUM CHLORIDE 8 MG/HR: 9 INJECTION, SOLUTION INTRAVENOUS at 14:15

## 2020-08-26 RX ADMIN — ACETAMINOPHEN 650 MG: 325 TABLET, FILM COATED ORAL at 18:02

## 2020-08-26 RX ADMIN — TORSEMIDE 20 MG: 20 TABLET ORAL at 17:59

## 2020-08-26 RX ADMIN — METOPROLOL SUCCINATE 100 MG: 100 TABLET, EXTENDED RELEASE ORAL at 08:23

## 2020-08-26 RX ADMIN — ACETAMINOPHEN 650 MG: 325 TABLET, FILM COATED ORAL at 04:29

## 2020-08-26 RX ADMIN — PRAVASTATIN SODIUM 40 MG: 40 TABLET ORAL at 17:59

## 2020-08-26 RX ADMIN — SODIUM CHLORIDE 8 MG/HR: 9 INJECTION, SOLUTION INTRAVENOUS at 05:40

## 2020-08-26 RX ADMIN — METOPROLOL SUCCINATE 100 MG: 100 TABLET, EXTENDED RELEASE ORAL at 20:40

## 2020-08-26 RX ADMIN — CLONIDINE HYDROCHLORIDE 0.1 MG: 0.1 TABLET ORAL at 08:23

## 2020-08-26 RX ADMIN — TORSEMIDE 20 MG: 20 TABLET ORAL at 08:23

## 2020-08-26 NOTE — ASSESSMENT & PLAN NOTE
Hemoglobin dropped from 9 2 to 7 5    Patient received 1 unit of PRBC's in the ED 8/24/20   hemoglobin today at 8 5  Monitor H&H daily and transfuse for Hb < 7 g/dL

## 2020-08-26 NOTE — PROGRESS NOTES
Progress Note - Waylon Serrano 1935, 80 y o  male MRN: [id number]    Unit/Bed#: 752-09 Encounter: [id number]    Primary Care Provider: Benjamin Doan DO   Date and time admitted to hospital: 8/24/2020 12:34 PM        * Melena  Assessment & Plan  Pt with recent h/o GIB and recent EGD on 8/10/2020 showing Duodenal bulb ulcer with duodenitis  Coming in with persistent melena with drop in hemoglobin from 9 2 to 7 5  Patient received 1 unit PRBC in ED 8/24/20  Hemoglobin with slight decrease to 8 5 today  Continue to hold Eliquis  Gastroenterology consultation appreciated, planned EGD/Colonoscopy for Friday 8/28   Continue protonix drip  Continue to montior H&H    Acute blood loss anemia  Assessment & Plan  Hemoglobin dropped from 9 2 to 7 5  Patient received 1 unit of PRBC's in the ED 8/24/20   hemoglobin today at 8 5  Monitor H&H daily and transfuse for Hb < 7 g/dL    Paroxysmal A-fib (HCC)  Assessment & Plan  Rate controlled  Continue metoprolol   Hold Eliquis  Essential hypertension  Assessment & Plan  Well controlled  Continue clonidine, amlodipine, metoprolol    CKD (chronic kidney disease) stage 4, GFR 15-29 ml/min (HCC)  Assessment & Plan  Baseline creatinine 2 5 to 3 1  Currently at baseline  Continue monitor closely  VTE Pharmacologic Prophylaxis:   Pharmacologic: Pharmacologic VTE Prophylaxis contraindicated due to gi bleed  Mechanical VTE Prophylaxis in Place: Yes    Patient Centered Rounds: I have performed bedside rounds with nursing staff today  Discussions with Specialists or Other Care Team Provider: GI, attending, CM    Education and Discussions with Family / Patient: wife updated at bedside    Time Spent for Care: 20 minutes  More than 50% of total time spent on counseling and coordination of care as described above      Current Length of Stay: 2 day(s)    Current Patient Status: Inpatient   Certification Statement: The patient will continue to require additional inpatient hospital stay due to continued monitoring of labs and planned EGD/colonoscopy     Discharge Plan: TBD    Code Status: Level 1 - Full Code      Subjective: The patient was seen and examined  The patient denies any complaints  He denies any dark or bloody stools  Objective:     Vitals:   Temp (24hrs), Av °F (37 2 °C), Min:98 8 °F (37 1 °C), Max:99 1 °F (37 3 °C)    Temp:  [98 8 °F (37 1 °C)-99 1 °F (37 3 °C)] 99 1 °F (37 3 °C)  HR:  [57-77] 60  Resp:  [18] 18  BP: (108-147)/(54-68) 114/54  SpO2:  [94 %-97 %] 97 %  Body mass index is 33 94 kg/m²  Input and Output Summary (last 24 hours): Intake/Output Summary (Last 24 hours) at 2020 1620  Last data filed at 2020 1415  Gross per 24 hour   Intake 480 ml   Output 950 ml   Net -470 ml       Physical Exam:     Physical Exam  Vitals signs and nursing note reviewed  Constitutional:       General: He is awake  Appearance: He is morbidly obese  Cardiovascular:      Rate and Rhythm: Normal rate and regular rhythm  Pulmonary:      Effort: Pulmonary effort is normal       Breath sounds: Normal breath sounds  No wheezing, rhonchi or rales  Abdominal:      General: Bowel sounds are normal       Palpations: Abdomen is soft  Tenderness: There is no abdominal tenderness  Skin:     General: Skin is warm and dry  Neurological:      General: No focal deficit present  Mental Status: He is alert and oriented to person, place, and time  Psychiatric:         Attention and Perception: Attention normal          Mood and Affect: Mood normal          Speech: Speech normal          Behavior: Behavior is cooperative           Additional Data:     Labs:    Results from last 7 days   Lab Units 20  0534   WBC Thousand/uL 8 98   HEMOGLOBIN g/dL 8 5*   HEMATOCRIT % 25 4*   PLATELETS Thousands/uL 206   NEUTROS PCT % 75   LYMPHS PCT % 10*   MONOS PCT % 10   EOS PCT % 4     Results from last 7 days   Lab Units 08/26/20  0534  08/24/20  1255   SODIUM mmol/L 136   < > 132*   POTASSIUM mmol/L 3 7   < > 4 0   CHLORIDE mmol/L 96*   < > 94*   CO2 mmol/L 32   < > 30   BUN mg/dL 62*   < > 67*   CREATININE mg/dL 2 74*   < > 3 11*   ANION GAP mmol/L 8   < > 8   CALCIUM mg/dL 8 4   < > 8 7   ALBUMIN g/dL  --   --  3 3*   TOTAL BILIRUBIN mg/dL  --   --  0 30   ALK PHOS U/L  --   --  81   ALT U/L  --   --  16   AST U/L  --   --  19   GLUCOSE RANDOM mg/dL 104   < > 128    < > = values in this interval not displayed  Results from last 7 days   Lab Units 08/24/20  1255   INR  1 44*                       * I Have Reviewed All Lab Data Listed Above  * Additional Pertinent Lab Tests Reviewed: All Labs Within Last 24 Hours Reviewed    Imaging:    Imaging Reports Reviewed Today Include: none  Imaging Personally Reviewed by Myself Includes:  none    Recent Cultures (last 7 days):           Last 24 Hours Medication List:   Current Facility-Administered Medications   Medication Dose Route Frequency Provider Last Rate    acetaminophen  650 mg Oral Q6H PRN Andrew Sparks PA-C      amLODIPine  5 mg Oral Daily Jody Lucero MD      aspirin  81 mg Oral Daily Jody Lucero MD     [facility] [START ON 8/27/2020] bisacodyl  10 mg Oral Once Sudeep Zapata PA-C      cloNIDine  0 1 mg Oral Daily Jody Lucero MD      metoprolol succinate  100 mg Oral BID Jody Lucero MD      pantoprozole (PROTONIX) infusion (Continuous)  8 mg/hr Intravenous Continuous Viviana Humphries DO 8 mg/hr (08/26/20 1415)    [START ON 8/27/2020] polyethylene glycol  4,000 mL Oral Once Sudeep Zapata PA-C      pravastatin  40 mg Oral Daily With Andrea Adams MD      torsemide  20 mg Oral BID Jody Lucero MD          Today, Patient Was Seen By: Vee Mckenzie PA-C    ** Please Note: Dictation voice to text software may have been used in the creation of this document   **

## 2020-08-26 NOTE — CASE MANAGEMENT
As per physician during inner disciplinary discharge planning meeting physician said patient is not ready for discharge  Pt is going to have an EGD and Colonoscopy on Friday  Pt is a readmission so patient might benefit from home care on discharge

## 2020-08-26 NOTE — PLAN OF CARE
Problem: METABOLIC, FLUID AND ELECTROLYTES - ADULT  Goal: Fluid balance maintained  Description: INTERVENTIONS:  - Monitor labs   - Monitor I/O and WT  - Instruct patient on fluid and nutrition as appropriate  - Assess for signs & symptoms of volume excess or deficit  Outcome: Progressing

## 2020-08-26 NOTE — ASSESSMENT & PLAN NOTE
Pt with recent h/o GIB and recent EGD on 8/10/2020 showing Duodenal bulb ulcer with duodenitis  Coming in with persistent melena with drop in hemoglobin from 9 2 to 7 5  Patient received 1 unit PRBC in ED 8/24/20  Hemoglobin with slight decrease to 8 5 today  Continue to hold Eliquis  Gastroenterology consultation appreciated, planned EGD/Colonoscopy for Friday 8/28   Continue protonix drip    Continue to montior H&H

## 2020-08-26 NOTE — UTILIZATION REVIEW
Notification of Inpatient Admission/Inpatient Authorization Request   This is a Notification of Inpatient Admission for [address]  Be advised that this patient was admitted to our facility under Inpatient Status  Contact Dilan Bergman at [phone] for additional admission information  [facility] DEPT  DEDICATED [phone]  Patient Name:   Lindsay Cruz   YOB: 1935       [address]   [address]:   [address]  Tax ID: [id number]  NPI: [id number] Attending Provider/NPI:  Phone:  Address: Laisha Zuniga Ama Morrissey [[id number]]  [phone]  Same as RICARDO/[address] of Service Code: 24 Place of Service Name:  [address] [place] [address]   Start Date: 8/24/20 1421 Discharge Date & Time: No discharge date for patient encounter  Type of Admission: Inpatient Status Discharge Disposition   (if discharged): Home with [address] [employer] [facility]   Patient Diagnoses: Hyponatremia [E87 1]  Essential hypertension [I10]  GI bleed [K92 2]  Black stool [K92 1]  Acute on chronic renal insufficiency [N28 9, N18 9]  Chronic combined systolic and diastolic congestive heart failure (HCC) [I50 42]  Mild intermittent asthma without complication [H86 81]  Obesity (BMI 30-39  9) [E66 9]  H/O aortic valve replacement [Z95 2]  Symptomatic anemia [D64 9]  Gastroesophageal reflux disease, esophagitis presence not specified [K21 9]     Orders: Admission Orders (From admission, onward)     Ordered        08/24/20 1421  Inpatient Admission (expected length of stay for this patient Order details is greater than two midnights)  Once                    Assigned Utilization Review Contact: Dilan Bergman  Utilization   Network Utilization Review Department  Phone: [phone]; Fax [phone]  Email: [email or web address]   ATTENTION PAYERS: Please call the assigned Utilization  directly with any questions or concerns ALL voicemails in the department are confidential  Maxwell Warner all requests for admission clinical reviews, approved or denied determinations and any other requests to dedicated fax number belonging to the campus where the patient is receiving treatment

## 2020-08-26 NOTE — MALNUTRITION/BMI
This medical record reflects one or more clinical indicators suggestive of malnutrition  Malnutrition Findings:   Malnutrition type: Acute illness  Degree of Malnutrition: Malnutrition of moderate degree  Malnutrition Characteristics: Inadequate energy, Weight loss(moderate PCM r/t decrease po intake with significant wt loss x 1 month  Treated with liberal diet and possible supplements after GI testing )    BMI Findings: Body mass index is 33 94 kg/m²  See Nutrition note dated 8/26/2020 for additional details  Completed nutrition assessment is viewable in the nutrition documentation

## 2020-08-27 ENCOUNTER — ANESTHESIA EVENT (INPATIENT)
Dept: PERIOP | Facility: HOSPITAL | Age: 85
DRG: 378 | End: 2020-08-27
Payer: COMMERCIAL

## 2020-08-27 LAB
ANION GAP SERPL CALCULATED.3IONS-SCNC: 7 MMOL/L (ref 4–13)
ATRIAL RATE: 59 BPM
BASOPHILS # BLD AUTO: 0.02 THOUSANDS/ΜL (ref 0–0.1)
BASOPHILS NFR BLD AUTO: 0 % (ref 0–1)
BUN SERPL-MCNC: 63 MG/DL (ref 5–25)
CALCIUM SERPL-MCNC: 8.4 MG/DL (ref 8.3–10.1)
CHLORIDE SERPL-SCNC: 96 MMOL/L (ref 100–108)
CO2 SERPL-SCNC: 32 MMOL/L (ref 21–32)
CREAT SERPL-MCNC: 2.89 MG/DL (ref 0.6–1.3)
EOSINOPHIL # BLD AUTO: 0.38 THOUSAND/ΜL (ref 0–0.61)
EOSINOPHIL NFR BLD AUTO: 4 % (ref 0–6)
ERYTHROCYTE [DISTWIDTH] IN BLOOD BY AUTOMATED COUNT: 12.8 % (ref 11.6–15.1)
GFR SERPL CREATININE-BSD FRML MDRD: 19 ML/MIN/1.73SQ M
GLUCOSE SERPL-MCNC: 102 MG/DL (ref 65–140)
HCT VFR BLD AUTO: 25.8 % (ref 36.5–49.3)
HGB BLD-MCNC: 8.5 G/DL (ref 12–17)
IMM GRANULOCYTES # BLD AUTO: 0.03 THOUSAND/UL (ref 0–0.2)
IMM GRANULOCYTES NFR BLD AUTO: 0 % (ref 0–2)
LYMPHOCYTES # BLD AUTO: 0.95 THOUSANDS/ΜL (ref 0.6–4.47)
LYMPHOCYTES NFR BLD AUTO: 11 % (ref 14–44)
MCH RBC QN AUTO: 30.2 PG (ref 26.8–34.3)
MCHC RBC AUTO-ENTMCNC: 32.9 G/DL (ref 31.4–37.4)
MCV RBC AUTO: 92 FL (ref 82–98)
MONOCYTES # BLD AUTO: 0.88 THOUSAND/ΜL (ref 0.17–1.22)
MONOCYTES NFR BLD AUTO: 10 % (ref 4–12)
NEUTROPHILS # BLD AUTO: 6.59 THOUSANDS/ΜL (ref 1.85–7.62)
NEUTS SEG NFR BLD AUTO: 75 % (ref 43–75)
NRBC BLD AUTO-RTO: 0 /100 WBCS
P AXIS: 119 DEGREES
PLATELET # BLD AUTO: 213 THOUSANDS/UL (ref 149–390)
PMV BLD AUTO: 10.2 FL (ref 8.9–12.7)
POTASSIUM SERPL-SCNC: 3.7 MMOL/L (ref 3.5–5.3)
PR INTERVAL: 174 MS
QRS AXIS: 133 DEGREES
QRSD INTERVAL: 144 MS
QT INTERVAL: 500 MS
QTC INTERVAL: 520 MS
RBC # BLD AUTO: 2.81 MILLION/UL (ref 3.88–5.62)
SODIUM SERPL-SCNC: 135 MMOL/L (ref 136–145)
T WAVE AXIS: -8 DEGREES
VENTRICULAR RATE: 65 BPM
WBC # BLD AUTO: 8.85 THOUSAND/UL (ref 4.31–10.16)

## 2020-08-27 PROCEDURE — 93010 ELECTROCARDIOGRAM REPORT: CPT | Performed by: INTERNAL MEDICINE

## 2020-08-27 PROCEDURE — 99233 SBSQ HOSP IP/OBS HIGH 50: CPT | Performed by: INTERNAL MEDICINE

## 2020-08-27 PROCEDURE — C9113 INJ PANTOPRAZOLE SODIUM, VIA: HCPCS | Performed by: EMERGENCY MEDICINE

## 2020-08-27 PROCEDURE — 80048 BASIC METABOLIC PNL TOTAL CA: CPT | Performed by: PHYSICIAN ASSISTANT

## 2020-08-27 PROCEDURE — 85025 COMPLETE CBC W/AUTO DIFF WBC: CPT | Performed by: PHYSICIAN ASSISTANT

## 2020-08-27 RX ADMIN — BISACODYL 10 MG: 5 TABLET, COATED ORAL at 05:13

## 2020-08-27 RX ADMIN — POLYETHYLENE GLYCOL 3350, SODIUM SULFATE ANHYDROUS, SODIUM BICARBONATE, SODIUM CHLORIDE, POTASSIUM CHLORIDE 4000 ML: 236; 22.74; 6.74; 5.86; 2.97 POWDER, FOR SOLUTION ORAL at 17:29

## 2020-08-27 RX ADMIN — TORSEMIDE 20 MG: 20 TABLET ORAL at 17:28

## 2020-08-27 RX ADMIN — METOPROLOL SUCCINATE 100 MG: 100 TABLET, EXTENDED RELEASE ORAL at 09:25

## 2020-08-27 RX ADMIN — SODIUM CHLORIDE 8 MG/HR: 9 INJECTION, SOLUTION INTRAVENOUS at 01:30

## 2020-08-27 RX ADMIN — AMLODIPINE BESYLATE 5 MG: 5 TABLET ORAL at 09:25

## 2020-08-27 RX ADMIN — ASPIRIN 81 MG: 81 TABLET, COATED ORAL at 09:25

## 2020-08-27 RX ADMIN — TORSEMIDE 20 MG: 20 TABLET ORAL at 09:25

## 2020-08-27 RX ADMIN — CLONIDINE HYDROCHLORIDE 0.1 MG: 0.1 TABLET ORAL at 09:25

## 2020-08-27 RX ADMIN — METOPROLOL SUCCINATE 100 MG: 100 TABLET, EXTENDED RELEASE ORAL at 20:51

## 2020-08-27 RX ADMIN — PRAVASTATIN SODIUM 40 MG: 40 TABLET ORAL at 16:42

## 2020-08-27 NOTE — ASSESSMENT & PLAN NOTE
Pt with recent h/o GIB and recent EGD on 8/10/2020 showing Duodenal bulb ulcer with duodenitis  Coming in with persistent melena with drop in hemoglobin from 9 2 to 7 5  Patient received 1 unit PRBC in ED 8/24/20  Hemoglobin with slight decrease to 8 5 yesterday, same today  Continue to hold Eliquis  Continue protonix drip    Continue to montior H&H  NPO post midnight, EGD/colonoscopy tomorrow

## 2020-08-27 NOTE — ASSESSMENT & PLAN NOTE
Hemoglobin dropped from 9 2 to 7 5  Patient received 1 unit of PRBC's in the ED 8/24/20   hemoglobin yesterday, same today remaining stable    Monitor H&H daily and transfuse for Hb < 7 g/dL

## 2020-08-27 NOTE — PROGRESS NOTES
Progress Note - Rylee Downs 1935, 80 y o  male MRN: [id number]    Unit/Bed#: 260-14 Encounter: [id number]    Primary Care Provider: Jessa Vanessa DO   Date and time admitted to hospital: 8/24/2020 12:34 PM        Paroxysmal A-fib [facility])  Assessment & Plan  Rate controlled  Continue metoprolol and aspirin  Eliquis on hold pending endoscopy/colonoscopy    Essential hypertension  Assessment & Plan  Well controlled  Continue clonidine, amlodipine, metoprolol    CKD (chronic kidney disease) stage 4, GFR 15-29 ml/min ([facility])  Assessment & Plan  Baseline creatinine 2 5 to 3 1  Currently at baseline at 2 8  Continue monitor closely  Acute blood loss anemia  Assessment & Plan  Hemoglobin dropped from 9 2 to 7 5  Patient received 1 unit of PRBC's in the ED 8/24/20   hemoglobin yesterday, same today remaining stable  Monitor H&H daily and transfuse for Hb < 7 g/dL    * Melena  Assessment & Plan  Pt with recent h/o GIB and recent EGD on 8/10/2020 showing Duodenal bulb ulcer with duodenitis  Coming in with persistent melena with drop in hemoglobin from 9 2 to 7 5  Patient received 1 unit PRBC in ED 8/24/20  Hemoglobin with slight decrease to 8 5 yesterday, same today  Continue to hold Eliquis  Continue protonix drip    Continue to montior H&H  NPO post midnight, EGD/colonoscopy tomorrow      -Food electrolyte and nutrition:  Clears, gentle IV fluids NPO post midnight    -VTE prophylaxis:  SCDs, admitted with active bleed, no anticoagulation    - Level 1 - Full Code    -Pt Centered Rounds: Performed    -Education/Discussion with family and pt: Assessment plan discussed with patient      -Discharge planning/length of stay: Today 3 day(s)  Likely in 24-48 hours pending EGD/colonoscopy tomorrow     -Current patient status: Inpatient     -Certification Statement: I certify that patient need more than 2 nights stay    The total time spent more than 35 minutes, more than 50% of total time spent on counseling and coordination of care as described above  ? Please Note: Voice recognition used throughout compilation of provider note  Typographical errors may be present  Extended Emergency Contact Information  Primary Emergency Contact: [facility]  Address: [address] [place] [id number] [address] Phone: [phone]  Mobile Phone: [phone]  Relation: Spouse  Secondary Emergency Contact: Ralf Douglass  Mobile Phone: [phone]  Relation: Daughter_   ? Subjectives   Interval History:  Patient denies any complaints  Eating drinking well  Asking when he will go home  Rest 12 point review of systems was reviewed and otherwise negative except as noted in interval Hx     Physical Exam:    Current    Min/Max  Temperature: 98 6 °F (37 °C)  Temp  Min: 98 4 °F (36 9 °C)  Max: 98 9 °F (37 2 °C)  Pulse: 69  Pulse  Min: 64  Max: 69  Respirations: 18  Resp  Min: 18  Max: 18  Blood Pressure: 124/55  BP  Min: 124/55  Max: 159/69  SpO2: 95 %  SpO2  Min: 94 %  Max: 97 %  ? Weight:   Wt Readings from Last 4 Encounters:   08/24/20 89 7 kg (197 lb 12 oz)   08/13/20 92 4 kg (203 lb 11 3 oz)   08/04/20 91 4 kg (201 lb 8 oz)   07/20/20 97 5 kg (214 lb 15 2 oz)     BMI: Body mass index is 33 94 kg/m²  Ins/Os last 24 mike    General: No distress, well-developed   ? ENT: EOMI, Conjunctiva non-icteric, MMM, no facial ASYMMETRY  Respiratory: Trachea midline  Lungs clear to auscultation bilaterally  No signs of cellulitis at pacemaker site, old dressing removed  ? CVS: No JVD, regular rate and rhythm, pulses equal all extremities  ? Abd: Soft and non-tender  Bowel sounds normal   ?  Msk: No lower extremity edema, sensation intact in all extremities,  ? Skin: No bruising, rash    MSK: No joint swelling, tenderness  ? CNS: AAOx3, no gross deficit, hard of hearing  ? PSYCH:  Flat affect,     Genitourinary: No Alvares       MEDS:  Current Facility-Administered Medications Medication Dose Route Frequency Provider Last Rate    acetaminophen  650 mg Oral Q6H PRN Andrew Sparks PA-C      amLODIPine  5 mg Oral Daily Maci Uriarte MD      aspirin  81 mg Oral Daily Maci Uriarte MD      cloNIDine  0 1 mg Oral Daily Maci Uriarte MD      metoprolol succinate  100 mg Oral BID Maci Uriarte MD      pantoprozole (PROTONIX) infusion (Continuous)  8 mg/hr Intravenous Continuous Maribel Huizar DO 8 mg/hr (08/27/20 0130)    polyethylene glycol  4,000 mL Oral Once Ravi Castillo PA-C      pravastatin  40 mg Oral Daily With Wandalee Phoenix, MD      torsemide  20 mg Oral BID Maci Uriarte MD           LABORATORY RESULTS:  CBC:  Results from last 7 days   Lab Units 08/27/20  0513 08/26/20  0534 08/25/20  0414   WBC Thousand/uL 8 85 8 98 7 86   HEMOGLOBIN g/dL 8 5* 8 5* 9 0*   HEMATOCRIT % 25 8* 25 4* 26 9*   PLATELETS Thousands/uL 213 206 205   NEUTROS PCT % 75 75 69   LYMPHS PCT % 11* 10* 14   MONOS PCT % 10 10 10   EOS PCT % 4 4 7*       CHEMISTRY:  Results from last 7 days   Lab Units 08/27/20  0513 08/26/20  0534 08/25/20  0414 08/24/20  1255   POTASSIUM mmol/L 3 7 3 7 3 8 4 0   CHLORIDE mmol/L 96* 96* 99* 94*   CO2 mmol/L 32 32 34* 30   BUN mg/dL 63* 62* 66* 67*   CREATININE mg/dL 2 89* 2 74* 2 96* 3 11*   CALCIUM mg/dL 8 4 8 4 8 5 8 7   ALK PHOS U/L  --   --   --  81   ALT U/L  --   --   --  16   AST U/L  --   --   --  19           COAGULATION:  Results from last 7 days   Lab Units 08/24/20  1255   INR  1 44*   PTT seconds 32       OTHER TESTS:  0   Lab Value Date/Time    TROPONINI <0 02 08/24/2020 1255    TROPONINI 0 03 08/10/2020 1002    TROPONINI 0 07 (H) 07/26/2020 1805    TROPONINI <0 02 07/20/2020 1021    TROPONINI <0 02 05/19/2019 0938    TROPONINI 1 09 (H) 07/02/2015 1223    TROPONINI 0 04 04/15/2014 1005    TROPONINI 0 04 04/15/2014 0400    TROPONINI <0 04 04/14/2014 2113    TROPONINI <0 04 04/14/2014 0430    TROPONINI 0 06 (H) 04/13/2014 0359 [id number]     ?                                                                                                                                                                          ? Radiological Studies:  I have reviewed images personally as well as reports  Ct Abdomen Pelvis Wo Contrast    Result Date: 8/24/2020  Narrative: CT ABDOMEN AND PELVIS WITHOUT IV CONTRAST INDICATION: Black stools for 5 days  On Eliquis  Hemoglobin 7 5  EGD 8/10/2020 which demonstrated: Duodenal bulb ulcer with duodenitis-likely source of bleeding and anemia  Moderate hiatal hernia  COMPARISON:  Renal ultrasound 8/11/2020 TECHNIQUE:  CT examination of the abdomen and pelvis was performed without intravenous contrast   Axial, sagittal, and coronal 2D reformatted images were created from the source data and submitted for interpretation  Radiation dose length product (DLP) for this visit:  553 62 mGy-cm   This examination, like all CT scans performed in the [facility], was performed utilizing techniques to minimize radiation dose exposure, including the use of iterative  reconstruction and automated exposure control  Enteric contrast was not administered  FINDINGS: ABDOMEN LOWER CHEST:  Dense calcification of the mitral annulus  Pacemaker leads partially visualized  Coronary artery calcifications  Small hiatal hernia  LIVER/BILIARY TREE:  Calcified granuloma in the right hepatic lobe  Otherwise unremarkable  GALLBLADDER:  There are gallstone(s) within the gallbladder, without pericholecystic inflammatory changes  SPLEEN:  Unremarkable  PANCREAS:  Unremarkable  ADRENAL GLANDS:  Unremarkable  KIDNEYS/URETERS:  Bilateral renal cysts grossly similar to prior ultrasound measuring up to 8 2 cm on the left  No hydronephrosis  STOMACH AND BOWEL:  There is colonic diverticulosis without evidence of acute diverticulitis  No periduodenal inflammatory changes are seen noting history of duodenitis on recent EGD  Evaluation for gastrointestinal bleeding is limited due to unenhanced technique  APPENDIX:  Unremarkable appendix  ABDOMINOPELVIC CAVITY:  No ascites  No pneumoperitoneum  No lymphadenopathy  VESSELS:  Atherosclerotic changes are present  No evidence of aneurysm  PELVIS REPRODUCTIVE ORGANS:  Unremarkable for patient's age  URINARY BLADDER:  Unremarkable  ABDOMINAL WALL/INGUINAL REGIONS:  Small bilateral fat-containing inguinal hernias  OSSEOUS STRUCTURES:  No acute fracture or destructive osseous lesion  Spinal degenerative changes are noted  Impression: No acute findings in the abdomen or pelvis within the limits of unenhanced technique  Workstation performed: [id number]     Xr Chest Portable    Result Date: 7/29/2020  Narrative: CHEST INDICATION:   chf exacerbation  COMPARISON:  7/26/2020 EXAM PERFORMED/VIEWS:  XR CHEST PORTABLE FINDINGS:  There is a left-sided pacemaker  Cardiomediastinal silhouette appears unremarkable  Patient is status post aortic valve replacement  There is mild pulmonary edema  There are small effusions  Sternal wires are present  Visualized osseous structures appear otherwise unremarkable for the patient's age  Impression: Mild pulmonary edema and small bilateral pleural effusions, grossly stable since the prior examination  Workstation performed: [id number]     Xr Knee 1 Or 2 Vw Left    Result Date: 7/30/2020  Narrative: LEFT KNEE INDICATION:   Knee pain  COMPARISON:  None VIEWS:  XR KNEE 1 OR 2 VW LEFT FINDINGS: There is no acute fracture or dislocation  There is no joint effusion  Mild tricompartmental osteoarthritic change is present  Small patellar enthesophyte at the quadriceps tendon insertion is present  Vascular calcification noted  No lytic or blastic osseous lesion  Impression: No acute osseous abnormality   Workstation performed: [id number]     Ct Head Wo Contrast    Result Date: 7/30/2020  Narrative: CT BRAIN - WITHOUT CONTRAST INDICATION:   Altered mental status  COMPARISON:  May 19, 2019  TECHNIQUE:  CT examination of the brain was performed  In addition to axial images, coronal 2D reformatted images were created and submitted for interpretation  Radiation dose length product (DLP) for this visit:  899 24 mGy-cm   This examination, like all CT scans performed in the [facility], was performed utilizing techniques to minimize radiation dose exposure, including the use of iterative  reconstruction and automated exposure control  IMAGE QUALITY:  Diagnostic  FINDINGS: PARENCHYMA: Mild microangiopathic changes are reidentified  Decreased density in the medial posterior inferior right cerebellum is consistent with the sequela of a late subacute or chronic infarction, but is a new finding when compared with May 19, 2019  Encephalomalacia in the high lateral left frontal lobe related to chronic ischemia is unchanged when compared with May 19, 2019  Chronic basal ganglia lacunar infarctions are reidentified  No CT evidence of acute large vessel infarction  No intracranial mass, mass effect or midline shift  No parenchymal hemorrhage  VENTRICLES AND EXTRA-AXIAL SPACES:  Ventricles and extra-axial CSF spaces are prominent commensurate with the degree of volume loss  No hydrocephalus  No acute extra-axial hemorrhage  VISUALIZED ORBITS AND PARANASAL SINUSES:  Scattered mild sinus mucosal disease is reidentified  Again noted are chronic changes in the left maxillary sinus  Mastoid air cells are normally aerated  Its appear unremarkable  CALVARIUM AND EXTRACRANIAL SOFT TISSUES:  Normal      Impression: No acute intracranial abnormality  Decreased density in the medial posterior inferior right cerebellum is consistent with the sequela of a late subacute or chronic infarction, but is a new finding when compared with May 19, 2019  Other chronic small and large vessel ischemic changes are similar from May 19, 2019    Workstation performed: [id number]     Us Kidney And Bladder    Result Date: 8/11/2020  Narrative: RENAL ULTRASOUND INDICATION:   YOLANDE  COMPARISON: Renal ultrasound dated 5/27/2015  TECHNIQUE:   Ultrasound of the retroperitoneum was performed with a curvilinear transducer utilizing volumetric sweeps and still imaging techniques  FINDINGS: KIDNEYS: Symmetric and normal size  Right kidney:  8 9 cm  Left kidney:  11 9 cm  Right kidney The kidney is echogenic with thinning of the renal cortex  No suspicious masses detected  Few simple renal cysts measuring up to 2 5 cm  No hydronephrosis  No shadowing calculi  No perinephric fluid collections  Left kidney The kidney is echogenic with thinning of the renal cortex  No suspicious masses detected  Dominant simple upper pole renal cyst is seen measuring 8 2 x 7 8 x 6 8 cm  No hydronephrosis  No shadowing calculi  No perinephric fluid collections  URETERS: Nonvisualized  BLADDER: Normally distended  No focal thickening or mass lesions  Bilateral ureteral jets detected  Impression: No hydronephrosis  Medical renal disease  Simple bilateral renal cysts  Workstation performed: [id number]     Vas Lower Limb Venous Duplex Study, Complete Bilateral    Result Date: 7/28/2020  Narrative:  [facility] REPORT CLINICAL: Indications: Patient presents with bilateral lower extremity pain and swelling x1 days  Operative History: 2015-06-30 Aortic valve replacement 2000-06-05 Coronary artery BPG Risk Factors The patient has history of Obesity, HTN, CKD, CAD, and GERD  FINDINGS:  Segment  Right            Left              Impression       Impression       CFV      Normal (Patent)  Normal (Patent)     CONCLUSION:  Impression: RIGHT LOWER LIMB: No evidence of acute or chronic deep vein thrombosis  No evidence of superficial thrombophlebitis noted  Doppler evaluation shows a normal response to augmentation maneuvers   Popliteal, posterior tibial and anterior tibial arterial Doppler waveforms are biphasic  LEFT LOWER LIMB: No evidence of acute or chronic deep vein thrombosis  No evidence of superficial thrombophlebitis noted  Doppler evaluation shows a normal response to augmentation maneuvers  Popliteal, posterior tibial and anterior tibial arterial Doppler waveforms are biphasic  Technical findings given to [address] [place] [address] and fax to chart    SIGNATURE: Electronically Signed by: Mitzi Terry MD on 2020-07-28 11:13:49 PM        Signed,    Dina Miller MD  [facility]  8/27/2020 10:40 AM       ?

## 2020-08-27 NOTE — PLAN OF CARE
Problem: Potential for Falls  Goal: Patient will remain free of falls  Description: INTERVENTIONS:  - Assess patient frequently for physical needs  -  Identify cognitive and physical deficits and behaviors that affect risk of falls  -  [place] fall precautions as indicated by assessment   - Educate patient/family on patient safety including physical limitations  - Instruct patient to call for assistance with activity based on assessment  - Modify environment to reduce risk of injury  - Consider OT/PT consult to assist with strengthening/mobility  Outcome: Progressing     Problem: Prexisting or High Potential for Compromised Skin Integrity  Goal: Skin integrity is maintained or improved  Description: INTERVENTIONS:  - Identify patients at risk for skin breakdown  - Assess and monitor skin integrity  - Assess and monitor nutrition and hydration status  - Monitor labs   - Assess for incontinence   - Turn and reposition patient  - Assist with mobility/ambulation  - Relieve pressure over bony prominences  - Avoid friction and shearing  - Provide appropriate hygiene as needed including keeping skin clean and dry  - Evaluate need for skin moisturizer/barrier cream  - Collaborate with interdisciplinary team   - Patient/family teaching  - Consider wound care consult   Outcome: Progressing     Problem: Nutrition/Hydration-ADULT  Goal: Nutrient/Hydration intake appropriate for improving, restoring or maintaining nutritional needs  Description: Monitor and assess patient's nutrition/hydration status for malnutrition  Collaborate with interdisciplinary team and initiate plan and interventions as ordered  Monitor patient's weight and dietary intake as ordered or per policy  Utilize nutrition screening tool and intervene as necessary  Determine patient's food preferences and provide high-protein, high-caloric foods as appropriate       INTERVENTIONS:  - Monitor oral intake, urinary output, labs, and treatment plans  - Assess nutrition and hydration status and recommend course of action  - Evaluate amount of meals eaten  - Assist patient with eating if necessary   - Allow adequate time for meals  - Recommend/ encourage appropriate diets, oral nutritional supplements, and vitamin/mineral supplements  - Order, calculate, and assess calorie counts as needed  - Recommend, monitor, and adjust tube feedings and TPN/PPN based on assessed needs  - Assess need for intravenous fluids  - Provide specific nutrition/hydration education as appropriate  - Include patient/family/caregiver in decisions related to nutrition  Outcome: Progressing     Problem: CARDIOVASCULAR - ADULT  Goal: Maintains optimal cardiac output and hemodynamic stability  Description: INTERVENTIONS:  - Monitor I/O, vital signs and rhythm  - Monitor for S/S and trends of decreased cardiac output  - Administer and titrate ordered vasoactive medications to optimize hemodynamic stability  - Assess quality of pulses, skin color and temperature  - Assess for signs of decreased coronary artery perfusion  - Instruct patient to report change in severity of symptoms  Outcome: Progressing     Problem: METABOLIC, FLUID AND ELECTROLYTES - ADULT  Goal: Fluid balance maintained  Description: INTERVENTIONS:  - Monitor labs   - Monitor I/O and WT  - Instruct patient on fluid and nutrition as appropriate  - Assess for signs & symptoms of volume excess or deficit  Outcome: Progressing     Problem: HEMATOLOGIC - ADULT  Goal: Maintains hematologic stability  Description: INTERVENTIONS  - Assess for signs and symptoms of bleeding or hemorrhage  - Monitor labs  - Administer supportive blood products/factors as ordered and appropriate  Outcome: Progressing

## 2020-08-28 ENCOUNTER — APPOINTMENT (OUTPATIENT)
Dept: PERIOP | Facility: HOSPITAL | Age: 85
DRG: 378 | End: 2020-08-28
Payer: COMMERCIAL

## 2020-08-28 ENCOUNTER — ANESTHESIA (INPATIENT)
Dept: PERIOP | Facility: HOSPITAL | Age: 85
DRG: 378 | End: 2020-08-28
Payer: COMMERCIAL

## 2020-08-28 PROBLEM — I05.0 MITRAL STENOSIS: Status: ACTIVE | Noted: 2020-08-28

## 2020-08-28 LAB
ANION GAP SERPL CALCULATED.3IONS-SCNC: 9 MMOL/L (ref 4–13)
BUN SERPL-MCNC: 56 MG/DL (ref 5–25)
CALCIUM SERPL-MCNC: 8.6 MG/DL (ref 8.3–10.1)
CHLORIDE SERPL-SCNC: 95 MMOL/L (ref 100–108)
CO2 SERPL-SCNC: 31 MMOL/L (ref 21–32)
CREAT SERPL-MCNC: 2.89 MG/DL (ref 0.6–1.3)
ERYTHROCYTE [DISTWIDTH] IN BLOOD BY AUTOMATED COUNT: 12.8 % (ref 11.6–15.1)
GFR SERPL CREATININE-BSD FRML MDRD: 19 ML/MIN/1.73SQ M
GLUCOSE SERPL-MCNC: 88 MG/DL (ref 65–140)
HCT VFR BLD AUTO: 27.1 % (ref 36.5–49.3)
HGB BLD-MCNC: 8.8 G/DL (ref 12–17)
MCH RBC QN AUTO: 29.6 PG (ref 26.8–34.3)
MCHC RBC AUTO-ENTMCNC: 32.5 G/DL (ref 31.4–37.4)
MCV RBC AUTO: 91 FL (ref 82–98)
PLATELET # BLD AUTO: 233 THOUSANDS/UL (ref 149–390)
PMV BLD AUTO: 10.5 FL (ref 8.9–12.7)
POTASSIUM SERPL-SCNC: 3.5 MMOL/L (ref 3.5–5.3)
RBC # BLD AUTO: 2.97 MILLION/UL (ref 3.88–5.62)
SODIUM SERPL-SCNC: 135 MMOL/L (ref 136–145)
WBC # BLD AUTO: 8.51 THOUSAND/UL (ref 4.31–10.16)

## 2020-08-28 PROCEDURE — 43239 EGD BIOPSY SINGLE/MULTIPLE: CPT | Performed by: INTERNAL MEDICINE

## 2020-08-28 PROCEDURE — 0DHD8YZ INSERTION OF OTHER DEVICE INTO LOWER INTESTINAL TRACT, VIA NATURAL OR ARTIFICIAL OPENING ENDOSCOPIC: ICD-10-PCS | Performed by: INTERNAL MEDICINE

## 2020-08-28 PROCEDURE — C9113 INJ PANTOPRAZOLE SODIUM, VIA: HCPCS | Performed by: EMERGENCY MEDICINE

## 2020-08-28 PROCEDURE — 0DB68ZX EXCISION OF STOMACH, VIA NATURAL OR ARTIFICIAL OPENING ENDOSCOPIC, DIAGNOSTIC: ICD-10-PCS | Performed by: INTERNAL MEDICINE

## 2020-08-28 PROCEDURE — 0DBN8ZZ EXCISION OF SIGMOID COLON, VIA NATURAL OR ARTIFICIAL OPENING ENDOSCOPIC: ICD-10-PCS | Performed by: INTERNAL MEDICINE

## 2020-08-28 PROCEDURE — 45385 COLONOSCOPY W/LESION REMOVAL: CPT | Performed by: INTERNAL MEDICINE

## 2020-08-28 PROCEDURE — 88305 TISSUE EXAM BY PATHOLOGIST: CPT | Performed by: PATHOLOGY

## 2020-08-28 PROCEDURE — 80048 BASIC METABOLIC PNL TOTAL CA: CPT | Performed by: INTERNAL MEDICINE

## 2020-08-28 PROCEDURE — 85027 COMPLETE CBC AUTOMATED: CPT | Performed by: INTERNAL MEDICINE

## 2020-08-28 PROCEDURE — 99233 SBSQ HOSP IP/OBS HIGH 50: CPT | Performed by: INTERNAL MEDICINE

## 2020-08-28 RX ORDER — SENNOSIDES 8.6 MG
2 TABLET ORAL
Status: DISCONTINUED | OUTPATIENT
Start: 2020-08-28 | End: 2020-08-30 | Stop reason: HOSPADM

## 2020-08-28 RX ORDER — FERROUS SULFATE 325(65) MG
325 TABLET ORAL
Status: DISCONTINUED | OUTPATIENT
Start: 2020-08-29 | End: 2020-08-30 | Stop reason: HOSPADM

## 2020-08-28 RX ORDER — PROPOFOL 10 MG/ML
INJECTION, EMULSION INTRAVENOUS AS NEEDED
Status: DISCONTINUED | OUTPATIENT
Start: 2020-08-28 | End: 2020-08-28

## 2020-08-28 RX ORDER — PROPOFOL 10 MG/ML
INJECTION, EMULSION INTRAVENOUS CONTINUOUS PRN
Status: DISCONTINUED | OUTPATIENT
Start: 2020-08-28 | End: 2020-08-28

## 2020-08-28 RX ORDER — SODIUM CHLORIDE, SODIUM LACTATE, POTASSIUM CHLORIDE, CALCIUM CHLORIDE 600; 310; 30; 20 MG/100ML; MG/100ML; MG/100ML; MG/100ML
20 INJECTION, SOLUTION INTRAVENOUS CONTINUOUS
Status: DISCONTINUED | OUTPATIENT
Start: 2020-08-28 | End: 2020-08-29

## 2020-08-28 RX ORDER — LIDOCAINE HYDROCHLORIDE 10 MG/ML
INJECTION, SOLUTION EPIDURAL; INFILTRATION; INTRACAUDAL; PERINEURAL AS NEEDED
Status: DISCONTINUED | OUTPATIENT
Start: 2020-08-28 | End: 2020-08-28

## 2020-08-28 RX ORDER — SODIUM CHLORIDE, SODIUM LACTATE, POTASSIUM CHLORIDE, CALCIUM CHLORIDE 600; 310; 30; 20 MG/100ML; MG/100ML; MG/100ML; MG/100ML
INJECTION, SOLUTION INTRAVENOUS CONTINUOUS PRN
Status: DISCONTINUED | OUTPATIENT
Start: 2020-08-28 | End: 2020-08-28

## 2020-08-28 RX ADMIN — SODIUM CHLORIDE, SODIUM LACTATE, POTASSIUM CHLORIDE, AND CALCIUM CHLORIDE 20 ML/HR: .6; .31; .03; .02 INJECTION, SOLUTION INTRAVENOUS at 12:45

## 2020-08-28 RX ADMIN — METOPROLOL SUCCINATE 100 MG: 100 TABLET, EXTENDED RELEASE ORAL at 20:19

## 2020-08-28 RX ADMIN — CLONIDINE HYDROCHLORIDE 0.1 MG: 0.1 TABLET ORAL at 09:14

## 2020-08-28 RX ADMIN — LIDOCAINE HYDROCHLORIDE 50 MG: 10 INJECTION, SOLUTION EPIDURAL; INFILTRATION; INTRACAUDAL; PERINEURAL at 11:51

## 2020-08-28 RX ADMIN — PROPOFOL 100 MCG/KG/MIN: 10 INJECTION, EMULSION INTRAVENOUS at 12:00

## 2020-08-28 RX ADMIN — SODIUM CHLORIDE 8 MG/HR: 9 INJECTION, SOLUTION INTRAVENOUS at 18:59

## 2020-08-28 RX ADMIN — SODIUM CHLORIDE, SODIUM LACTATE, POTASSIUM CHLORIDE, AND CALCIUM CHLORIDE: .6; .31; .03; .02 INJECTION, SOLUTION INTRAVENOUS at 11:41

## 2020-08-28 RX ADMIN — SODIUM CHLORIDE 8 MG/HR: 9 INJECTION, SOLUTION INTRAVENOUS at 05:35

## 2020-08-28 RX ADMIN — PROPOFOL 50 MG: 10 INJECTION, EMULSION INTRAVENOUS at 11:55

## 2020-08-28 RX ADMIN — PROPOFOL 50 MG: 10 INJECTION, EMULSION INTRAVENOUS at 12:04

## 2020-08-28 RX ADMIN — SODIUM CHLORIDE, SODIUM LACTATE, POTASSIUM CHLORIDE, AND CALCIUM CHLORIDE 20 ML/HR: .6; .31; .03; .02 INJECTION, SOLUTION INTRAVENOUS at 18:59

## 2020-08-28 RX ADMIN — PROPOFOL 100 MG: 10 INJECTION, EMULSION INTRAVENOUS at 11:51

## 2020-08-28 RX ADMIN — PRAVASTATIN SODIUM 40 MG: 40 TABLET ORAL at 16:07

## 2020-08-28 RX ADMIN — AMLODIPINE BESYLATE 5 MG: 5 TABLET ORAL at 09:14

## 2020-08-28 RX ADMIN — ACETAMINOPHEN 650 MG: 325 TABLET, FILM COATED ORAL at 13:22

## 2020-08-28 RX ADMIN — METOPROLOL SUCCINATE 100 MG: 100 TABLET, EXTENDED RELEASE ORAL at 09:14

## 2020-08-28 NOTE — ANESTHESIA POSTPROCEDURE EVALUATION
Post-Op Assessment Note    CV Status:  Stable    Pain management: adequate     Mental Status:  Sleepy   Hydration Status:  Euvolemic   PONV Controlled:  Controlled   Airway Patency:  Patent      Post Op Vitals Reviewed: Yes      Staff: CRNA         No complications documented      BP   115/54   Temp   97 6   Pulse  60   Resp   20   SpO2   100

## 2020-08-28 NOTE — UTILIZATION REVIEW
Continued Stay Review    Date:8/28/2020                          Current Patient Class:  Inpatient   Current Level of Care:  Med Surg     HPI:84 y o  male initially admitted on 8/24/2020 with persistent  Melena, with a recent duodenal ulcer s/p 1 unit of PRBC on 8/24    Assessment/Plan: 8/28 - GI for EGD/push enteroscopy and  colonoscopy on 8/28    Pertinent Labs/Diagnostic Results:       Results from last 7 days   Lab Units 08/28/20  0407 08/27/20  0513 08/26/20  0534 08/25/20  0414 08/24/20  1255   WBC Thousand/uL 8 51 8 85 8 98 7 86 10 80*   HEMOGLOBIN g/dL 8 8* 8 5* 8 5* 9 0* 7 5*   HEMATOCRIT % 27 1* 25 8* 25 4* 26 9* 22 7*   PLATELETS Thousands/uL 233 213 206 205 276   NEUTROS ABS Thousands/µL  --  6 59 6 74 5 37 8 26*         Results from last 7 days   Lab Units 08/28/20  0407 08/27/20  0513 08/26/20  0534 08/25/20  0414 08/24/20  1255   SODIUM mmol/L 135* 135* 136 136 132*   POTASSIUM mmol/L 3 5 3 7 3 7 3 8 4 0   CHLORIDE mmol/L 95* 96* 96* 99* 94*   CO2 mmol/L 31 32 32 34* 30   ANION GAP mmol/L 9 7 8 3* 8   BUN mg/dL 56* 63* 62* 66* 67*   CREATININE mg/dL 2 89* 2 89* 2 74* 2 96* 3 11*   EGFR ml/min/1 73sq m 19 19 20 19 17   CALCIUM mg/dL 8 6 8 4 8 4 8 5 8 7     Results from last 7 days   Lab Units 08/24/20  1255   AST U/L 19   ALT U/L 16   ALK PHOS U/L 81   TOTAL PROTEIN g/dL 7 4   ALBUMIN g/dL 3 3*   TOTAL BILIRUBIN mg/dL 0 30     Results from last 7 days   Lab Units 08/28/20  0407 08/27/20  0513 08/26/20  0534 08/25/20  0414 08/24/20  1255   GLUCOSE RANDOM mg/dL 88 102 104 103 128     Results from last 7 days   Lab Units 08/24/20  1255   TROPONIN I ng/mL <0 02         Results from last 7 days   Lab Units 08/24/20  1255   PROTIME seconds 17 2*   INR  1 44*   PTT seconds 32     Results from last 7 days   Lab Units 08/24/20  1255   NT-PRO BNP pg/mL 3,481*     CT A & P - 8/24 - No acute findings in the abdomen or pelvis within the limits of unenhanced technique       Vital Signs:   Date/Time   Temp   Pulse Resp   BP   MAP (mmHg)   SpO2   O2 Device   Patient Position - Orthostatic VS    08/28/20 06:14:38   99 1 °F (37 3 °C)   71   19   129/59   82   96 %   None (Room air)   Lying    08/27/20 21:37:16   98 8 °F (37 1 °C)   71   18   171/78Abnormal     109   98 %          08/27/20 20:45:02      78      144/64   91   96 %      Sitting    08/27/20 20:43:14      80      148/62   91   97 %      Sitting    08/27/20 20:41:03      77      99/69   79   97 %      Sitting    08/27/20 2030                     None (Room air)       08/27/20 15:04:59   98 6 °F (37 °C)   65   18   117/56   76   85 %Abnormal            08/27/20 07:27:33   98 6 °F (37 °C)   69   18   124/55   78   95 %          08/26/20 22:28:10   98 4 °F (36 9 °C)   66   18   159/69   99   96 %          08/26/20 20:15:40      64      131/60   84   97 %                Medications:   Scheduled Medications:  amLODIPine, 5 mg, Oral, Daily  aspirin, 81 mg, Oral, Daily  cloNIDine, 0 1 mg, Oral, Daily  metoprolol succinate, 100 mg, Oral, BID  pravastatin, 40 mg, Oral, Daily With Dinner      Continuous IV Infusions:  pantoprozole (PROTONIX) infusion (Continuous), 8 mg/hr, Intravenous, Continuous      PRN Meds:  acetaminophen, 650 mg, Oral, Q6H PRN    Nursing Orders - VS - SCD's to le's- up with assistance - Diet NPO on 8/28    Discharge Plan: TBD     Network Utilization Review Department  [email or web address]  ATTENTION: Please call with any questions or concerns to [phone] and carefully listen to the prompts so that you are directed to the right person  All voicemails are confidential   Sudie Crigler all requests for admission clinical reviews, approved or denied determinations and any other requests to dedicated fax number below belonging to the campus where the patient is receiving treatment   List of dedicated fax numbers for the Facilities:  FACILITY NAME UR FAX NUMBER   ADMISSION DENIALS (Administrative/Medical Necessity) [address] (Maternity/NICU/Pediatrics) [phone]   Dot Rachel [phone]   [facility] [phone]   [address] [place] [address] [phone]   [address] [phone]   [facility]  [phone]   [address] [place] [address], [facility]  [address] [place] [address] [phone]

## 2020-08-28 NOTE — PLAN OF CARE
Problem: Potential for Falls  Goal: Patient will remain free of falls  Description: INTERVENTIONS:  - Assess patient frequently for physical needs  -  Identify cognitive and physical deficits and behaviors that affect risk of falls  -  [place] fall precautions as indicated by assessment   - Educate patient/family on patient safety including physical limitations  - Instruct patient to call for assistance with activity based on assessment  - Modify environment to reduce risk of injury  - Consider OT/PT consult to assist with strengthening/mobility  Outcome: Progressing     Problem: Prexisting or High Potential for Compromised Skin Integrity  Goal: Skin integrity is maintained or improved  Description: INTERVENTIONS:  - Identify patients at risk for skin breakdown  - Assess and monitor skin integrity  - Assess and monitor nutrition and hydration status  - Monitor labs   - Assess for incontinence   - Turn and reposition patient  - Assist with mobility/ambulation  - Relieve pressure over bony prominences  - Avoid friction and shearing  - Provide appropriate hygiene as needed including keeping skin clean and dry  - Evaluate need for skin moisturizer/barrier cream  - Collaborate with interdisciplinary team   - Patient/family teaching  - Consider wound care consult   Outcome: Progressing     Problem: Nutrition/Hydration-ADULT  Goal: Nutrient/Hydration intake appropriate for improving, restoring or maintaining nutritional needs  Description: Monitor and assess patient's nutrition/hydration status for malnutrition  Collaborate with interdisciplinary team and initiate plan and interventions as ordered  Monitor patient's weight and dietary intake as ordered or per policy  Utilize nutrition screening tool and intervene as necessary  Determine patient's food preferences and provide high-protein, high-caloric foods as appropriate       INTERVENTIONS:  - Monitor oral intake, urinary output, labs, and treatment plans  - Assess nutrition and hydration status and recommend course of action  - Evaluate amount of meals eaten  - Assist patient with eating if necessary   - Allow adequate time for meals  - Recommend/ encourage appropriate diets, oral nutritional supplements, and vitamin/mineral supplements  - Order, calculate, and assess calorie counts as needed  - Recommend, monitor, and adjust tube feedings and TPN/PPN based on assessed needs  - Assess need for intravenous fluids  - Provide specific nutrition/hydration education as appropriate  - Include patient/family/caregiver in decisions related to nutrition  Outcome: Progressing     Problem: CARDIOVASCULAR - ADULT  Goal: Maintains optimal cardiac output and hemodynamic stability  Description: INTERVENTIONS:  - Monitor I/O, vital signs and rhythm  - Monitor for S/S and trends of decreased cardiac output  - Administer and titrate ordered vasoactive medications to optimize hemodynamic stability  - Assess quality of pulses, skin color and temperature  - Assess for signs of decreased coronary artery perfusion  - Instruct patient to report change in severity of symptoms  Outcome: Progressing     Problem: METABOLIC, FLUID AND ELECTROLYTES - ADULT  Goal: Fluid balance maintained  Description: INTERVENTIONS:  - Monitor labs   - Monitor I/O and WT  - Instruct patient on fluid and nutrition as appropriate  - Assess for signs & symptoms of volume excess or deficit  Outcome: Progressing     Problem: HEMATOLOGIC - ADULT  Goal: Maintains hematologic stability  Description: INTERVENTIONS  - Assess for signs and symptoms of bleeding or hemorrhage  - Monitor labs  - Administer supportive blood products/factors as ordered and appropriate  Outcome: Progressing

## 2020-08-28 NOTE — ASSESSMENT & PLAN NOTE
Pt with recent h/o GIB and recent EGD on 8/10/2020 showing Duodenal bulb ulcer with duodenitis  Coming in with persistent melena with drop in hemoglobin from 9 2 to 7 5  Patient received 1 unit PRBC in ED 8/24/20  Hemoglobin hemoglobin stable, 8 7 today slightly higher than yesterday  Continue to hold Eliquis  Continue protonix drip    Continue to montior H&H  NPO post midnight, EGD/colonoscopy tomorrow

## 2020-08-28 NOTE — ANESTHESIA PREPROCEDURE EVALUATION
Procedure:  COLONOSCOPY  EGD WITH PUSH ENTEROSCOPY    Relevant Problems   ANESTHESIA (within normal limits)      CARDIO  Echo 60%EF    MITRAL VALVE:  There was marked calcification  There was moderately reduced leaflet separation  There was mild to moderate stenosis  Assessment is limited by heart block  There was mild regurgitation  Not well visualized      AORTIC VALVE:  A bioprosthesis was present  The peak valve velocity was 141 cm/s  The aortic valve obstructive index (by VTI) was 0 61  Estimated aortic valve area (by VTI) was 1 38 cmï¾²     TRICUSPID VALVE:  There was mild regurgitation  Estimated peak PA pressure was 48 mmHg  The findings suggest mild pulmonary hypertension   (+) 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   (+) Essential hypertension   (+) H/O aortic valve replacement   (+) Mitral regurgitation   (+) Mitral stenosis   (+) Pacemaker   (+) Paroxysmal A-fib (HCC)      ENDO (within normal limits)      GI/HEPATIC   (+) GERD (gastroesophageal reflux disease)      /RENAL   (+) CKD (chronic kidney disease) stage 4, GFR 15-29 ml/min ([facility])   (+) Stage 4 chronic kidney disease (HCC)      HEMATOLOGY   (+) Acute blood loss anemia      MUSCULOSKELETAL   (+) Lumbar degenerative disc disease      NEURO/PSYCH   (+) History of stroke      PULMONARY   (+) Acute respiratory failure with hypoxia (HCC)   (+) Mild intermittent asthma without complication   (+) Oxygen dependent      Other   (+) Melena        Physical Exam    Airway    Mallampati score: III  TM Distance: >3 FB  Neck ROM: full     Dental   upper dentures,     Cardiovascular      Pulmonary      Other Findings        Anesthesia Plan  ASA Score- 3     Anesthesia Type- IV sedation with anesthesia with ASA Monitors  Additional Monitors:   Airway Plan:           Plan Factors-    Chart reviewed  EKG reviewed  Existing labs reviewed  Patient summary reviewed  Patient is not a current smoker  Patient did not smoke on day of surgery  Induction-     Postoperative Plan-     Informed Consent- Anesthetic plan and risks discussed with patient  I personally reviewed this patient with the CRNA  Discussed and agreed on the Anesthesia Plan with the VIRAJ Wagner

## 2020-08-28 NOTE — PROGRESS NOTES
Progress Note - Felicity Lawson 1935, 80 y o  male MRN: [id number]    Unit/Bed#: 924-29 Encounter: [id number]    Primary Care Provider: Marlene Farr DO   Date and time admitted to hospital: 8/24/2020 12:34 PM    Paroxysmal A-fib [facility])  Assessment & Plan  Rate controlled  Continue metoprolol and aspirin  Eliquis on hold pending endoscopy/colonoscopy today     Essential hypertension  Assessment & Plan  Well controlled  Continue clonidine, amlodipine, metoprolol     CKD (chronic kidney disease) stage 4, GFR 15-29 ml/min ([facility])  Assessment & Plan  Baseline creatinine 2 5 to 3 1  Currently at baseline  Continue monitor closely      Acute blood loss anemia  Assessment & Plan  Hemoglobin dropped from 9 2 to 7 5  Patient received 1 unit of PRBC's in the ED 8/24/20   hemoglobin went to 8 7 today  from 8 5 yesterday     * Melena  Assessment & Plan  Pt with recent h/o GIB and recent EGD on 8/10/2020 showing Duodenal bulb ulcer with duodenitis  Coming in with persistent melena with drop in hemoglobin from 9 2 to 7 5  Patient received 1 unit PRBC in ED 8/24/20  Hemoglobin stable now  Continue to hold Eliquis  Continue protonix drip  Continue to montior H&H  We wait for endoscopy and colonoscopy finding     -Food electrolyte and nutrition:  NPO pending endoscopy and colonoscopy today    -VTE prophylaxis:  SCDs    - Level 1 - Full Code    -Pt Centered Rounds: Performed    -Education/Discussion with family and pt: Assessment plan discussed with patient      -Discharge planning/length of stay: Today 4 day(s)  Will wait for endoscopy and colonoscopy finding, maybe later this evening or earlier in the morning tomorrow   -Current patient status: Inpatient     -Certification Statement: I certify that patient need more than 2 nights stay    The total time spent more than 35 minutes, more than 50% of total time spent on counseling and coordination of care as described above  ?   Please Note: Voice recognition used throughout compilation of provider note  Typographical errors may be present  Extended Emergency Contact Information  Primary Emergency Contact: [facility]  Address: [address] [place] [id number] [address] Phone: [phone]  Mobile Phone: [phone]  Relation: Spouse  Secondary Emergency Contact: Ralf Douglass  Mobile Phone: [phone]  Relation: Daughter_   ? Subjectives   Interval History:  Patient denies any complaints except he could not sleep well last night  Rest 12 point review of systems was reviewed and otherwise negative except as noted in interval Hx     Physical Exam:    Current    Min/Max  Temperature: 99 1 °F (37 3 °C)  Temp  Min: 98 6 °F (37 °C)  Max: 99 1 °F (37 3 °C)  Pulse: 71  Pulse  Min: 65  Max: 80  Respirations: 19  Resp  Min: 18  Max: 19  Blood Pressure: 129/59  BP  Min: 99/69  Max: 171/78  SpO2: 96 %  SpO2  Min: 85 %  Max: 98 %  ? Weight:   Wt Readings from Last 4 Encounters:   08/24/20 89 7 kg (197 lb 12 oz)   08/13/20 92 4 kg (203 lb 11 3 oz)   08/04/20 91 4 kg (201 lb 8 oz)   07/20/20 97 5 kg (214 lb 15 2 oz)     BMI: Body mass index is 33 94 kg/m²  Ins/Os last 24 mike    General: No distress, well-developed   ? ENT: EOMI, Conjunctiva non-icteric, MMM, no facial ASYMMETRY  Respiratory: Trachea midline  Lungs clear to auscultation bilaterally  No use of accessory muscle  ?  CVS: No JVD, regular rate and rhythm, pulses equal all extremitie  ? Abd: Soft and non-tender  Bowel sounds normal ?No surgery scar  ?  Msk: No lower extremity edema, sensation intact in all extremities,  ? Skin: No bruising, rash    MSK: No joint swelling, tenderness  ? CNS: AAOx3, no gross deficit  ? PSYCH: Normal affect,     Genitourinary: No [place]       MEDS:  Current Facility-Administered Medications   Medication Dose Route Frequency Provider Last Rate    acetaminophen  650 mg Oral Q6H PRN Andrew Sparks PA-C      amLODIPine  5 mg Oral Daily Jeet Noemi Walsh MD      aspirin  81 mg Oral Daily Keegan Gibbs MD      cloNIDine  0 1 mg Oral Daily Keegan Gibbs MD      metoprolol succinate  100 mg Oral BID Keegan Gibbs MD      pantoprozole (PROTONIX) infusion (Continuous)  8 mg/hr Intravenous Continuous Jayne Rg, DO 8 mg/hr (08/28/20 0535)    pravastatin  40 mg Oral Daily With Carol Irving MD           LABORATORY RESULTS:  CBC:  Results from last 7 days   Lab Units 08/28/20  0407 08/27/20  0513 08/26/20  0534 08/25/20  0414   WBC Thousand/uL 8 51 8 85 8 98 7 86   HEMOGLOBIN g/dL 8 8* 8 5* 8 5* 9 0*   HEMATOCRIT % 27 1* 25 8* 25 4* 26 9*   PLATELETS Thousands/uL 233 213 206 205   NEUTROS PCT %  --  75 75 69   LYMPHS PCT %  --  11* 10* 14   MONOS PCT %  --  10 10 10   EOS PCT %  --  4 4 7*       CHEMISTRY:  Results from last 7 days   Lab Units 08/28/20  0407 08/27/20  0513 08/26/20  0534  08/24/20  1255   POTASSIUM mmol/L 3 5 3 7 3 7   < > 4 0   CHLORIDE mmol/L 95* 96* 96*   < > 94*   CO2 mmol/L 31 32 32   < > 30   BUN mg/dL 56* 63* 62*   < > 67*   CREATININE mg/dL 2 89* 2 89* 2 74*   < > 3 11*   CALCIUM mg/dL 8 6 8 4 8 4   < > 8 7   ALK PHOS U/L  --   --   --   --  81   ALT U/L  --   --   --   --  16   AST U/L  --   --   --   --  19    < > = values in this interval not displayed  COAGULATION:  Results from last 7 days   Lab Units 08/24/20  1255   INR  1 44*   PTT seconds 32       OTHER TESTS:  0   Lab Value Date/Time    TROPONINI <0 02 08/24/2020 1255    TROPONINI 0 03 08/10/2020 1002    TROPONINI 0 07 (H) 07/26/2020 1805    TROPONINI <0 02 07/20/2020 1021    TROPONINI <0 02 05/19/2019 0938    TROPONINI 1 09 (H) 07/02/2015 1223    TROPONINI 0 04 04/15/2014 1005    TROPONINI 0 04 04/15/2014 0400    TROPONINI <0 04 04/14/2014 2113    TROPONINI <0 04 04/14/2014 0430    TROPONINI 0 06 (H) 04/13/2014 2304     ? ?      Radiological Studies:  I have reviewed images personally as well as reports  Ct Abdomen Pelvis Wo Contrast    Result Date: 8/24/2020  Narrative: CT ABDOMEN AND PELVIS WITHOUT IV CONTRAST INDICATION: Black stools for 5 days  On Eliquis  Hemoglobin 7 5  EGD 8/10/2020 which demonstrated: Duodenal bulb ulcer with duodenitis-likely source of bleeding and anemia  Moderate hiatal hernia  COMPARISON:  Renal ultrasound 8/11/2020 TECHNIQUE:  CT examination of the abdomen and pelvis was performed without intravenous contrast   Axial, sagittal, and coronal 2D reformatted images were created from the source data and submitted for interpretation  Radiation dose length product (DLP) for this visit:  553 62 mGy-cm   This examination, like all CT scans performed in the [facility], was performed utilizing techniques to minimize radiation dose exposure, including the use of iterative  reconstruction and automated exposure control  Enteric contrast was not administered  FINDINGS: ABDOMEN LOWER CHEST:  Dense calcification of the mitral annulus  Pacemaker leads partially visualized  Coronary artery calcifications  Small hiatal hernia  LIVER/BILIARY TREE:  Calcified granuloma in the right hepatic lobe  Otherwise unremarkable  GALLBLADDER:  There are gallstone(s) within the gallbladder, without pericholecystic inflammatory changes  SPLEEN:  Unremarkable  PANCREAS:  Unremarkable  ADRENAL GLANDS:  Unremarkable  KIDNEYS/URETERS:  Bilateral renal cysts grossly similar to prior ultrasound measuring up to 8 2 cm on the left  No hydronephrosis  STOMACH AND BOWEL:  There is colonic diverticulosis without evidence of acute diverticulitis  No periduodenal inflammatory changes are seen noting history of duodenitis on recent EGD  Evaluation for gastrointestinal bleeding is limited due to unenhanced technique  APPENDIX:  Unremarkable appendix  ABDOMINOPELVIC CAVITY:  No ascites  No pneumoperitoneum  No lymphadenopathy  VESSELS:  Atherosclerotic changes are present  No evidence of aneurysm  PELVIS REPRODUCTIVE ORGANS:  Unremarkable for patient's age  URINARY BLADDER:  Unremarkable  ABDOMINAL WALL/INGUINAL REGIONS:  Small bilateral fat-containing inguinal hernias  OSSEOUS STRUCTURES:  No acute fracture or destructive osseous lesion  Spinal degenerative changes are noted  Impression: No acute findings in the abdomen or pelvis within the limits of unenhanced technique  Workstation performed: [id number]     Ct Head Wo Contrast    Result Date: 7/30/2020  Narrative: CT BRAIN - WITHOUT CONTRAST INDICATION:   Altered mental status  COMPARISON:  May 19, 2019  TECHNIQUE:  CT examination of the brain was performed  In addition to axial images, coronal 2D reformatted images were created and submitted for interpretation  Radiation dose length product (DLP) for this visit:  899 24 mGy-cm   This examination, like all CT scans performed in the [facility], was performed utilizing techniques to minimize radiation dose exposure, including the use of iterative  reconstruction and automated exposure control  IMAGE QUALITY:  Diagnostic  FINDINGS: PARENCHYMA: Mild microangiopathic changes are reidentified  Decreased density in the medial posterior inferior right cerebellum is consistent with the sequela of a late subacute or chronic infarction, but is a new finding when compared with May 19, 2019  Encephalomalacia in the high lateral left frontal lobe related to chronic ischemia is unchanged when compared with May 19, 2019  Chronic basal ganglia lacunar infarctions are reidentified  No CT evidence of acute large vessel infarction  No intracranial mass, mass effect or midline shift  No parenchymal hemorrhage  VENTRICLES AND EXTRA-AXIAL SPACES:  Ventricles and extra-axial CSF spaces are prominent commensurate with the degree of volume loss    No hydrocephalus  No acute extra-axial hemorrhage  VISUALIZED ORBITS AND PARANASAL SINUSES:  Scattered mild sinus mucosal disease is reidentified  Again noted are chronic changes in the left maxillary sinus  Mastoid air cells are normally aerated  Its appear unremarkable  CALVARIUM AND EXTRACRANIAL SOFT TISSUES:  Normal      Impression: No acute intracranial abnormality  Decreased density in the medial posterior inferior right cerebellum is consistent with the sequela of a late subacute or chronic infarction, but is a new finding when compared with May 19, 2019  Other chronic small and large vessel ischemic changes are similar from May 19, 2019  Workstation performed: [id number]     Us Kidney And Bladder    Result Date: 8/11/2020  Narrative: RENAL ULTRASOUND INDICATION:   YOLANDE  COMPARISON: Renal ultrasound dated 5/27/2015  TECHNIQUE:   Ultrasound of the retroperitoneum was performed with a curvilinear transducer utilizing volumetric sweeps and still imaging techniques  FINDINGS: KIDNEYS: Symmetric and normal size  Right kidney:  8 9 cm  Left kidney:  11 9 cm  Right kidney The kidney is echogenic with thinning of the renal cortex  No suspicious masses detected  Few simple renal cysts measuring up to 2 5 cm  No hydronephrosis  No shadowing calculi  No perinephric fluid collections  Left kidney The kidney is echogenic with thinning of the renal cortex  No suspicious masses detected  Dominant simple upper pole renal cyst is seen measuring 8 2 x 7 8 x 6 8 cm  No hydronephrosis  No shadowing calculi  No perinephric fluid collections  URETERS: Nonvisualized  BLADDER: Normally distended  No focal thickening or mass lesions  Bilateral ureteral jets detected  Impression: No hydronephrosis  Medical renal disease  Simple bilateral renal cysts   Workstation performed: [id number]         Signed,    Andrew Goldsmith MD  [facility]  8/28/2020 11:19 AM       ?

## 2020-08-28 NOTE — ASSESSMENT & PLAN NOTE
Hemoglobin dropped from 9 2 to 7 5  Patient received 1 unit of PRBC's in the ED 8/24/20   hemoglobin stable, 8 yesterday 8 5 today    Monitor H&H daily and transfuse for Hb < 7 g/dL

## 2020-08-29 LAB
ANION GAP SERPL CALCULATED.3IONS-SCNC: 8 MMOL/L (ref 4–13)
BASOPHILS # BLD AUTO: 0.03 THOUSANDS/ΜL (ref 0–0.1)
BASOPHILS NFR BLD AUTO: 0 % (ref 0–1)
BUN SERPL-MCNC: 52 MG/DL (ref 5–25)
CALCIUM SERPL-MCNC: 8.5 MG/DL (ref 8.3–10.1)
CHLORIDE SERPL-SCNC: 96 MMOL/L (ref 100–108)
CO2 SERPL-SCNC: 32 MMOL/L (ref 21–32)
CREAT SERPL-MCNC: 2.6 MG/DL (ref 0.6–1.3)
EOSINOPHIL # BLD AUTO: 0.36 THOUSAND/ΜL (ref 0–0.61)
EOSINOPHIL NFR BLD AUTO: 4 % (ref 0–6)
ERYTHROCYTE [DISTWIDTH] IN BLOOD BY AUTOMATED COUNT: 12.5 % (ref 11.6–15.1)
GFR SERPL CREATININE-BSD FRML MDRD: 22 ML/MIN/1.73SQ M
GLUCOSE SERPL-MCNC: 94 MG/DL (ref 65–140)
HCT VFR BLD AUTO: 25.9 % (ref 36.5–49.3)
HGB BLD-MCNC: 8.5 G/DL (ref 12–17)
IMM GRANULOCYTES # BLD AUTO: 0.04 THOUSAND/UL (ref 0–0.2)
IMM GRANULOCYTES NFR BLD AUTO: 0 % (ref 0–2)
LYMPHOCYTES # BLD AUTO: 0.99 THOUSANDS/ΜL (ref 0.6–4.47)
LYMPHOCYTES NFR BLD AUTO: 11 % (ref 14–44)
MCH RBC QN AUTO: 30 PG (ref 26.8–34.3)
MCHC RBC AUTO-ENTMCNC: 32.8 G/DL (ref 31.4–37.4)
MCV RBC AUTO: 92 FL (ref 82–98)
MONOCYTES # BLD AUTO: 0.77 THOUSAND/ΜL (ref 0.17–1.22)
MONOCYTES NFR BLD AUTO: 8 % (ref 4–12)
NEUTROPHILS # BLD AUTO: 6.96 THOUSANDS/ΜL (ref 1.85–7.62)
NEUTS SEG NFR BLD AUTO: 77 % (ref 43–75)
NRBC BLD AUTO-RTO: 0 /100 WBCS
PLATELET # BLD AUTO: 228 THOUSANDS/UL (ref 149–390)
PMV BLD AUTO: 9.9 FL (ref 8.9–12.7)
POTASSIUM SERPL-SCNC: 3.5 MMOL/L (ref 3.5–5.3)
RBC # BLD AUTO: 2.83 MILLION/UL (ref 3.88–5.62)
SODIUM SERPL-SCNC: 136 MMOL/L (ref 136–145)
WBC # BLD AUTO: 9.15 THOUSAND/UL (ref 4.31–10.16)

## 2020-08-29 PROCEDURE — 99232 SBSQ HOSP IP/OBS MODERATE 35: CPT | Performed by: FAMILY MEDICINE

## 2020-08-29 PROCEDURE — 85025 COMPLETE CBC W/AUTO DIFF WBC: CPT | Performed by: INTERNAL MEDICINE

## 2020-08-29 PROCEDURE — C9113 INJ PANTOPRAZOLE SODIUM, VIA: HCPCS | Performed by: EMERGENCY MEDICINE

## 2020-08-29 PROCEDURE — 80048 BASIC METABOLIC PNL TOTAL CA: CPT | Performed by: INTERNAL MEDICINE

## 2020-08-29 RX ORDER — PANTOPRAZOLE SODIUM 40 MG/1
40 TABLET, DELAYED RELEASE ORAL
Status: DISCONTINUED | OUTPATIENT
Start: 2020-08-29 | End: 2020-08-30 | Stop reason: HOSPADM

## 2020-08-29 RX ADMIN — ACETAMINOPHEN 650 MG: 325 TABLET, FILM COATED ORAL at 16:29

## 2020-08-29 RX ADMIN — ACETAMINOPHEN 650 MG: 325 TABLET, FILM COATED ORAL at 23:12

## 2020-08-29 RX ADMIN — ACETAMINOPHEN 650 MG: 325 TABLET, FILM COATED ORAL at 08:28

## 2020-08-29 RX ADMIN — APIXABAN 2.5 MG: 2.5 TABLET, FILM COATED ORAL at 20:15

## 2020-08-29 RX ADMIN — AMLODIPINE BESYLATE 5 MG: 5 TABLET ORAL at 08:24

## 2020-08-29 RX ADMIN — FERROUS SULFATE TAB 325 MG (65 MG ELEMENTAL FE) 325 MG: 325 (65 FE) TAB at 08:24

## 2020-08-29 RX ADMIN — METOPROLOL SUCCINATE 100 MG: 100 TABLET, EXTENDED RELEASE ORAL at 20:15

## 2020-08-29 RX ADMIN — SODIUM CHLORIDE 8 MG/HR: 9 INJECTION, SOLUTION INTRAVENOUS at 02:41

## 2020-08-29 RX ADMIN — PANTOPRAZOLE SODIUM 40 MG: 40 TABLET, DELAYED RELEASE ORAL at 08:25

## 2020-08-29 RX ADMIN — METOPROLOL SUCCINATE 100 MG: 100 TABLET, EXTENDED RELEASE ORAL at 08:24

## 2020-08-29 RX ADMIN — APIXABAN 2.5 MG: 2.5 TABLET, FILM COATED ORAL at 11:44

## 2020-08-29 RX ADMIN — CLONIDINE HYDROCHLORIDE 0.1 MG: 0.1 TABLET ORAL at 08:25

## 2020-08-29 RX ADMIN — ASPIRIN 81 MG: 81 TABLET, COATED ORAL at 08:25

## 2020-08-29 RX ADMIN — PRAVASTATIN SODIUM 40 MG: 40 TABLET ORAL at 16:26

## 2020-08-29 NOTE — PROGRESS NOTES
Progress Note - Eliza Rios 1935, 80 y o  male MRN: [id number]    Unit/Bed#: 412-76 Encounter: [id number]    Primary Care Provider: Alma Chowdhury DO   Date and time admitted to hospital: 8/24/2020 12:34 PM        Acute blood loss anemia  Assessment & Plan  Hemoglobin dropped from 9 2 to 7 5  Patient received 1 unit of PRBC's in the ED 8/24/20  Hemoglobin stable, EGD and colonoscopy report reviewed, restart Eliquis    Paroxysmal A-fib ([address] )  Assessment & Plan  Rate controlled  Continue metoprolol and aspirin  Essential hypertension  Assessment & Plan  Well controlled  Continue clonidine, amlodipine, metoprolol    CKD (chronic kidney disease) stage 4, GFR 15-29 ml/min ([facility])  Assessment & Plan  Baseline creatinine 2 5 to 3 1  Currently at baseline  Continue monitor closely  Progress Note - Eliza Rios 80 y o  male MRN: [id number]    Unit/Bed#: 418-01 Encounter: [id number]        Subjective:   Patient seen and examined at bedside  No complaints     Objective:     Vitals:   Vitals:    08/29/20 0731   BP: 133/63   Pulse: 67   Resp: 20   Temp: 99 1 °F (37 3 °C)   SpO2: 94%     Body mass index is 33 94 kg/m²      Intake/Output Summary (Last 24 hours) at 8/29/2020 1055  Last data filed at 8/29/2020 0502  Gross per 24 hour   Intake 1180 83 ml   Output 900 ml   Net 280 83 ml       Physical Exam:   /63 (BP Location: Right arm)   Pulse 67   Temp 99 1 °F (37 3 °C) (Oral)   Resp 20   Ht 5' 4" (1 626 m)   Wt 89 7 kg (197 lb 12 oz)   SpO2 94%   BMI 33 94 kg/m²   General appearance: alert and oriented, in no acute distress  Head: Normocephalic, without obvious abnormality, atraumatic  Lungs: clear to auscultation bilaterally  Heart: regular rate and rhythm, S1, S2 normal, no murmur, click, rub or gallop  Abdomen: soft, non-tender; bowel sounds normal; no masses,  no organomegaly  Extremities: extremities normal, warm and well-perfused; no cyanosis, clubbing, or edema  Pulses: 2+ and symmetric  Neurologic: Grossly normal     Invasive Devices     Peripheral Intravenous Line            Peripheral IV 08/24/20 Left Antecubital 4 days                Results from last 7 days   Lab Units 08/29/20  0452 08/28/20  0407 08/27/20  0513   WBC Thousand/uL 9 15 8 51 8 85   HEMOGLOBIN g/dL 8 5* 8 8* 8 5*   HEMATOCRIT % 25 9* 27 1* 25 8*   PLATELETS Thousands/uL 228 233 213       Results from last 7 days   Lab Units 08/29/20  0452 08/28/20  0407 08/27/20  0513  08/24/20  1255   POTASSIUM mmol/L 3 5 3 5 3 7   < > 4 0   CHLORIDE mmol/L 96* 95* 96*   < > 94*   CO2 mmol/L 32 31 32   < > 30   BUN mg/dL 52* 56* 63*   < > 67*   CREATININE mg/dL 2 60* 2 89* 2 89*   < > 3 11*   CALCIUM mg/dL 8 5 8 6 8 4   < > 8 7   ALK PHOS U/L  --   --   --   --  81   ALT U/L  --   --   --   --  16   AST U/L  --   --   --   --  19    < > = values in this interval not displayed         Medication Administration - last 24 hours from 08/28/2020 1055 to 08/29/2020 1055       Date/Time Order Dose Route Action Action by     08/29/2020 0741 pantoprazole (PROTONIX) 80 mg in sodium chloride 0 9 % 100 mL infusion 0 mg/hr Intravenous Stopped Apolinar Wallace RN     08/29/2020 0241 pantoprazole (PROTONIX) 80 mg in sodium chloride 0 9 % 100 mL infusion 8 mg/hr Intravenous [address] Jose Mir RN     08/28/2020 1859 pantoprazole (PROTONIX) 80 mg in sodium chloride 0 9 % 100 mL infusion 8 mg/hr Intravenous [address] Apolinar Wallace, [address]     08/29/2020 2193 amLODIPine (NORVASC) tablet 5 mg 5 mg Oral Given Apolinar Wallace RN     08/29/2020 0825 aspirin (ECOTRIN LOW STRENGTH) EC tablet 81 mg 81 mg Oral Given Apolinar Wallace RN     08/29/2020 0825 cloNIDine (CATAPRES) tablet 0 1 mg 0 1 mg Oral Given Apolinar Wallace RN     08/28/2020 1607 pravastatin (PRAVACHOL) tablet 40 mg 40 mg Oral Given Apolinar Wallace RN     08/29/2020 9452 metoprolol succinate (TOPROL-XL) 24 hr tablet 100 mg 100 mg Oral Given Apolinar Wallace RN     08/28/2020 2019 metoprolol succinate (TOPROL-XL) 24 hr tablet 100 mg 100 mg Oral Given Cassie Dyer RN     08/29/2020 1826 acetaminophen (TYLENOL) tablet 650 mg 650 mg Oral Given Lamonte Howard RN     08/28/2020 1322 acetaminophen (TYLENOL) tablet 650 mg 650 mg Oral Given Abran Ricks RN     08/29/2020 8240 lactated ringers infusion 0 mL/hr Intravenous Stopped Lamonte Howard RN     08/28/2020 1859 lactated ringers infusion 20 mL/hr Intravenous [address] Lamonte Howard RN     08/28/2020 1245 lactated ringers infusion 20 mL/hr Intravenous [id number] [facility]     08/29/2020 8770 ferrous sulfate tablet 325 mg 325 mg Oral Given Lamonte Howard RN     08/29/2020 0825 pantoprazole (PROTONIX) EC tablet 40 mg 40 mg Oral Given Lamonte Howard RN            Lab, Imaging and other studies: I have personally reviewed pertinent reports      VTE Pharmacologic Prophylaxis:  Eliquis  VTE Mechanical Prophylaxis: sequential compression device     Chris Zafar MD  8/29/2020,10:55 AM

## 2020-08-29 NOTE — ASSESSMENT & PLAN NOTE
Hemoglobin dropped from 9 2 to 7 5  Patient received 1 unit of PRBC's in the ED 8/24/20    Hemoglobin stable, EGD and colonoscopy report reviewed, restart Eliquis

## 2020-08-30 VITALS
HEART RATE: 65 BPM | TEMPERATURE: 98.4 F | RESPIRATION RATE: 18 BRPM | SYSTOLIC BLOOD PRESSURE: 136 MMHG | WEIGHT: 197.75 LBS | OXYGEN SATURATION: 94 % | HEIGHT: 64 IN | BODY MASS INDEX: 33.76 KG/M2 | DIASTOLIC BLOOD PRESSURE: 61 MMHG

## 2020-08-30 LAB
ANION GAP SERPL CALCULATED.3IONS-SCNC: 6 MMOL/L (ref 4–13)
BASOPHILS # BLD AUTO: 0.03 THOUSANDS/ΜL (ref 0–0.1)
BASOPHILS NFR BLD AUTO: 0 % (ref 0–1)
BUN SERPL-MCNC: 49 MG/DL (ref 5–25)
CALCIUM SERPL-MCNC: 8.6 MG/DL (ref 8.3–10.1)
CHLORIDE SERPL-SCNC: 98 MMOL/L (ref 100–108)
CO2 SERPL-SCNC: 32 MMOL/L (ref 21–32)
CREAT SERPL-MCNC: 2.63 MG/DL (ref 0.6–1.3)
EOSINOPHIL # BLD AUTO: 0.52 THOUSAND/ΜL (ref 0–0.61)
EOSINOPHIL NFR BLD AUTO: 6 % (ref 0–6)
ERYTHROCYTE [DISTWIDTH] IN BLOOD BY AUTOMATED COUNT: 12.5 % (ref 11.6–15.1)
GFR SERPL CREATININE-BSD FRML MDRD: 21 ML/MIN/1.73SQ M
GLUCOSE SERPL-MCNC: 95 MG/DL (ref 65–140)
HCT VFR BLD AUTO: 24.7 % (ref 36.5–49.3)
HCT VFR BLD AUTO: 24.8 % (ref 36.5–49.3)
HGB BLD-MCNC: 8.1 G/DL (ref 12–17)
HGB BLD-MCNC: 8.1 G/DL (ref 12–17)
IMM GRANULOCYTES # BLD AUTO: 0.02 THOUSAND/UL (ref 0–0.2)
IMM GRANULOCYTES NFR BLD AUTO: 0 % (ref 0–2)
LYMPHOCYTES # BLD AUTO: 1.25 THOUSANDS/ΜL (ref 0.6–4.47)
LYMPHOCYTES NFR BLD AUTO: 15 % (ref 14–44)
MCH RBC QN AUTO: 30 PG (ref 26.8–34.3)
MCHC RBC AUTO-ENTMCNC: 32.7 G/DL (ref 31.4–37.4)
MCV RBC AUTO: 92 FL (ref 82–98)
MONOCYTES # BLD AUTO: 0.77 THOUSAND/ΜL (ref 0.17–1.22)
MONOCYTES NFR BLD AUTO: 9 % (ref 4–12)
NEUTROPHILS # BLD AUTO: 5.74 THOUSANDS/ΜL (ref 1.85–7.62)
NEUTS SEG NFR BLD AUTO: 70 % (ref 43–75)
NRBC BLD AUTO-RTO: 0 /100 WBCS
PLATELET # BLD AUTO: 248 THOUSANDS/UL (ref 149–390)
PMV BLD AUTO: 10.2 FL (ref 8.9–12.7)
POTASSIUM SERPL-SCNC: 3.6 MMOL/L (ref 3.5–5.3)
RBC # BLD AUTO: 2.7 MILLION/UL (ref 3.88–5.62)
SODIUM SERPL-SCNC: 136 MMOL/L (ref 136–145)
WBC # BLD AUTO: 8.33 THOUSAND/UL (ref 4.31–10.16)

## 2020-08-30 PROCEDURE — 85018 HEMOGLOBIN: CPT | Performed by: FAMILY MEDICINE

## 2020-08-30 PROCEDURE — 80048 BASIC METABOLIC PNL TOTAL CA: CPT | Performed by: FAMILY MEDICINE

## 2020-08-30 PROCEDURE — 85025 COMPLETE CBC W/AUTO DIFF WBC: CPT | Performed by: FAMILY MEDICINE

## 2020-08-30 PROCEDURE — 85014 HEMATOCRIT: CPT | Performed by: FAMILY MEDICINE

## 2020-08-30 PROCEDURE — 99239 HOSP IP/OBS DSCHRG MGMT >30: CPT | Performed by: FAMILY MEDICINE

## 2020-08-30 RX ADMIN — ASPIRIN 81 MG: 81 TABLET, COATED ORAL at 08:21

## 2020-08-30 RX ADMIN — AMLODIPINE BESYLATE 5 MG: 5 TABLET ORAL at 08:21

## 2020-08-30 RX ADMIN — APIXABAN 2.5 MG: 2.5 TABLET, FILM COATED ORAL at 08:21

## 2020-08-30 RX ADMIN — FERROUS SULFATE TAB 325 MG (65 MG ELEMENTAL FE) 325 MG: 325 (65 FE) TAB at 08:21

## 2020-08-30 RX ADMIN — PANTOPRAZOLE SODIUM 40 MG: 40 TABLET, DELAYED RELEASE ORAL at 05:52

## 2020-08-30 RX ADMIN — METOPROLOL SUCCINATE 100 MG: 100 TABLET, EXTENDED RELEASE ORAL at 08:20

## 2020-08-30 RX ADMIN — CLONIDINE HYDROCHLORIDE 0.1 MG: 0.1 TABLET ORAL at 08:20

## 2020-08-30 NOTE — PLAN OF CARE
Problem: Potential for Falls  Goal: Patient will remain free of falls  Description: INTERVENTIONS:  - Assess patient frequently for physical needs  -  Identify cognitive and physical deficits and behaviors that affect risk of falls  -  [place] fall precautions as indicated by assessment   - Educate patient/family on patient safety including physical limitations  - Instruct patient to call for assistance with activity based on assessment  - Modify environment to reduce risk of injury  - Consider OT/PT consult to assist with strengthening/mobility  Outcome: Adequate for Discharge     Problem: Prexisting or High Potential for Compromised Skin Integrity  Goal: Skin integrity is maintained or improved  Description: INTERVENTIONS:  - Identify patients at risk for skin breakdown  - Assess and monitor skin integrity  - Assess and monitor nutrition and hydration status  - Monitor labs   - Assess for incontinence   - Turn and reposition patient  - Assist with mobility/ambulation  - Relieve pressure over bony prominences  - Avoid friction and shearing  - Provide appropriate hygiene as needed including keeping skin clean and dry  - Evaluate need for skin moisturizer/barrier cream  - Collaborate with interdisciplinary team   - Patient/family teaching  - Consider wound care consult   Outcome: Adequate for Discharge     Problem: Nutrition/Hydration-ADULT  Goal: Nutrient/Hydration intake appropriate for improving, restoring or maintaining nutritional needs  Description: Monitor and assess patient's nutrition/hydration status for malnutrition  Collaborate with interdisciplinary team and initiate plan and interventions as ordered  Monitor patient's weight and dietary intake as ordered or per policy  Utilize nutrition screening tool and intervene as necessary  Determine patient's food preferences and provide high-protein, high-caloric foods as appropriate       INTERVENTIONS:  - Monitor oral intake, urinary output, labs, and treatment plans  - Assess nutrition and hydration status and recommend course of action  - Evaluate amount of meals eaten  - Assist patient with eating if necessary   - Allow adequate time for meals  - Recommend/ encourage appropriate diets, oral nutritional supplements, and vitamin/mineral supplements  - Order, calculate, and assess calorie counts as needed  - Recommend, monitor, and adjust tube feedings and TPN/PPN based on assessed needs  - Assess need for intravenous fluids  - Provide specific nutrition/hydration education as appropriate  - Include patient/family/caregiver in decisions related to nutrition  Outcome: Adequate for Discharge     Problem: CARDIOVASCULAR - ADULT  Goal: Maintains optimal cardiac output and hemodynamic stability  Description: INTERVENTIONS:  - Monitor I/O, vital signs and rhythm  - Monitor for S/S and trends of decreased cardiac output  - Administer and titrate ordered vasoactive medications to optimize hemodynamic stability  - Assess quality of pulses, skin color and temperature  - Assess for signs of decreased coronary artery perfusion  - Instruct patient to report change in severity of symptoms  Outcome: Adequate for Discharge     Problem: METABOLIC, FLUID AND ELECTROLYTES - ADULT  Goal: Fluid balance maintained  Description: INTERVENTIONS:  - Monitor labs   - Monitor I/O and WT  - Instruct patient on fluid and nutrition as appropriate  - Assess for signs & symptoms of volume excess or deficit  Outcome: Adequate for Discharge     Problem: HEMATOLOGIC - ADULT  Goal: Maintains hematologic stability  Description: INTERVENTIONS  - Assess for signs and symptoms of bleeding or hemorrhage  - Monitor labs  - Administer supportive blood products/factors as ordered and appropriate  Outcome: Adequate for Discharge

## 2020-08-30 NOTE — DISCHARGE SUMMARY
Discharge Summary - Waylon Serrano 80 y o  male MRN: [id number]    Unit/Bed#: 819-31 Encounter: [id number]    Admission Date:   Admission Orders (From admission, onward)     Ordered        08/24/20 1421  Inpatient Admission (expected length of stay for this patient Order details is greater than two midnights)  Once                     Admitting Diagnosis: Hyponatremia [E87 1]  Essential hypertension [I10]  GI bleed [K92 2]  Black stool [K92 1]  Acute on chronic renal insufficiency [N28 9, N18 9]  Chronic combined systolic and diastolic congestive heart failure (HCC) [I50 42]  Mild intermittent asthma without complication [U67 45]  Obesity (BMI 30-39  9) [E66 9]  H/O aortic valve replacement [Z95 2]  Symptomatic anemia [D64 9]  Gastroesophageal reflux disease, esophagitis presence not specified [K21 9]    HPI: Waylon Serrano is a 80 y o  male who presents with persistent melena since he was discharged from the hospital   He was admitted with GI bleed 8/10/2020-8/13/2020  Underwent EGD - that showed duodenal ulcer and duodenitis  That was thought to be a source of bleeding  Patient on Eliquis because of history of atrial fibrillation  GI recommended holding Eliquis and aspirin for 1 week after discharge and restarting      Other than melena, patient currently denies any specific complaints  No dizziness, lightheadedness, chest pain, shortness of breath, fatigue  Procedures Performed:   Orders Placed This Encounter   Procedures   [address] ED ECG Documentation Only       Summary of Hospital Course:     Melena  Acute blood loss anemia    Patient presents emergency room with complaint of melena since he was discharged from the hospital   Hemoglobin was found to be 9 2, but decreased to 7 5  He was transfused 1 unit PRBCs and seen in consultation by Gastroenterology  He underwent EGD and colonoscopy, there was no active bleeding and recommended he resume Eliquis    Eliquis was resumed a normal dose, hemoglobin remained stable between 8-8 5  There is no signs of bleeding, he was tolerating a regular diet, Protonix orally was resumed, and the patient requested to be discharged home  He has follow-up with his PCP the following day, instructed the patient if he would have any additional melanotic stool to stop taking Eliquis and return to the ED  Significant Findings, Care, Treatment and Services Provided:       · Colonscopy :   One sessile polyp measuring 5-9 mm in the sigmoid colon; removed en bloc by cold snare and retrieved specimen; placed 2 clips successfully (clips were MRI compatible)  Multiple small and large severe localized diverticula in the sigmoid colon  Flat, external and internal hemorrhoids    Endoscopy Normal esophagus  Mild gastric erythema status post biopsies rule out H pylori  Mild duodenal bulb erythema  Normal 2nd 3rd and 4th part of duodenum as well as normal jejunum  Complications: none    Discharge Diagnosis: see above    Resolved Problems  Date Reviewed: 8/28/2020    None          Condition at Discharge: fair         Discharge instructions/Information to patient and family:   See after visit summary for information provided to patient and family  Provisions for Follow-Up Care:  See after visit summary for information related to follow-up care and any pertinent home health orders  PCP: Harshad Combs DO    Disposition: Home    Planned Readmission: No      Discharge Statement   I spent 60 minutes discharging the patient  This time was spent on the day of discharge  I had direct contact with the patient on the day of discharge  Additional documentation is required if more than 30 minutes were spent on discharge  Discharge Medications:  See after visit summary for reconciled discharge medications provided to patient and family

## 2020-08-30 NOTE — PLAN OF CARE
Problem: Potential for Falls  Goal: Patient will remain free of falls  Description: INTERVENTIONS:  - Assess patient frequently for physical needs  -  Identify cognitive and physical deficits and behaviors that affect risk of falls  -  [place] fall precautions as indicated by assessment   - Educate patient/family on patient safety including physical limitations  - Instruct patient to call for assistance with activity based on assessment  - Modify environment to reduce risk of injury  - Consider OT/PT consult to assist with strengthening/mobility  Outcome: Progressing     Problem: Prexisting or High Potential for Compromised Skin Integrity  Goal: Skin integrity is maintained or improved  Description: INTERVENTIONS:  - Identify patients at risk for skin breakdown  - Assess and monitor skin integrity  - Assess and monitor nutrition and hydration status  - Monitor labs   - Assess for incontinence   - Turn and reposition patient  - Assist with mobility/ambulation  - Relieve pressure over bony prominences  - Avoid friction and shearing  - Provide appropriate hygiene as needed including keeping skin clean and dry  - Evaluate need for skin moisturizer/barrier cream  - Collaborate with interdisciplinary team   - Patient/family teaching  - Consider wound care consult   Outcome: Progressing     Problem: Nutrition/Hydration-ADULT  Goal: Nutrient/Hydration intake appropriate for improving, restoring or maintaining nutritional needs  Description: Monitor and assess patient's nutrition/hydration status for malnutrition  Collaborate with interdisciplinary team and initiate plan and interventions as ordered  Monitor patient's weight and dietary intake as ordered or per policy  Utilize nutrition screening tool and intervene as necessary  Determine patient's food preferences and provide high-protein, high-caloric foods as appropriate       INTERVENTIONS:  - Monitor oral intake, urinary output, labs, and treatment plans  - Assess nutrition and hydration status and recommend course of action  - Evaluate amount of meals eaten  - Assist patient with eating if necessary   - Allow adequate time for meals  - Recommend/ encourage appropriate diets, oral nutritional supplements, and vitamin/mineral supplements  - Order, calculate, and assess calorie counts as needed  - Recommend, monitor, and adjust tube feedings and TPN/PPN based on assessed needs  - Assess need for intravenous fluids  - Provide specific nutrition/hydration education as appropriate  - Include patient/family/caregiver in decisions related to nutrition  Outcome: Progressing     Problem: CARDIOVASCULAR - ADULT  Goal: Maintains optimal cardiac output and hemodynamic stability  Description: INTERVENTIONS:  - Monitor I/O, vital signs and rhythm  - Monitor for S/S and trends of decreased cardiac output  - Administer and titrate ordered vasoactive medications to optimize hemodynamic stability  - Assess quality of pulses, skin color and temperature  - Assess for signs of decreased coronary artery perfusion  - Instruct patient to report change in severity of symptoms  Outcome: Progressing     Problem: METABOLIC, FLUID AND ELECTROLYTES - ADULT  Goal: Fluid balance maintained  Description: INTERVENTIONS:  - Monitor labs   - Monitor I/O and WT  - Instruct patient on fluid and nutrition as appropriate  - Assess for signs & symptoms of volume excess or deficit  Outcome: Progressing     Problem: HEMATOLOGIC - ADULT  Goal: Maintains hematologic stability  Description: INTERVENTIONS  - Assess for signs and symptoms of bleeding or hemorrhage  - Monitor labs  - Administer supportive blood products/factors as ordered and appropriate  Outcome: Progressing

## 2020-08-31 ENCOUNTER — TELEPHONE (OUTPATIENT)
Dept: INTERNAL MEDICINE CLINIC | Facility: CLINIC | Age: 85
End: 2020-08-31

## 2020-08-31 ENCOUNTER — TRANSITIONAL CARE MANAGEMENT (OUTPATIENT)
Dept: INTERNAL MEDICINE CLINIC | Facility: CLINIC | Age: 85
End: 2020-08-31

## 2020-08-31 ENCOUNTER — OFFICE VISIT (OUTPATIENT)
Dept: INTERNAL MEDICINE CLINIC | Facility: CLINIC | Age: 85
End: 2020-08-31
Payer: COMMERCIAL

## 2020-08-31 ENCOUNTER — TELEPHONE (OUTPATIENT)
Dept: GASTROENTEROLOGY | Facility: AMBULARY SURGERY CENTER | Age: 85
End: 2020-08-31

## 2020-08-31 VITALS
WEIGHT: 191.38 LBS | HEIGHT: 64 IN | OXYGEN SATURATION: 94 % | HEART RATE: 58 BPM | SYSTOLIC BLOOD PRESSURE: 122 MMHG | TEMPERATURE: 97.8 F | DIASTOLIC BLOOD PRESSURE: 78 MMHG | BODY MASS INDEX: 32.67 KG/M2

## 2020-08-31 DIAGNOSIS — Z71.89 COMPLEX CARE COORDINATION: Primary | ICD-10-CM

## 2020-08-31 DIAGNOSIS — D62 ACUTE BLOOD LOSS ANEMIA: Primary | ICD-10-CM

## 2020-08-31 DIAGNOSIS — K21.9 GASTROESOPHAGEAL REFLUX DISEASE, ESOPHAGITIS PRESENCE NOT SPECIFIED: ICD-10-CM

## 2020-08-31 DIAGNOSIS — N18.4 CKD (CHRONIC KIDNEY DISEASE) STAGE 4, GFR 15-29 ML/MIN (HCC): ICD-10-CM

## 2020-08-31 DIAGNOSIS — K92.1 MELENA: ICD-10-CM

## 2020-08-31 PROBLEM — J96.01 ACUTE RESPIRATORY FAILURE WITH HYPOXIA (HCC): Status: RESOLVED | Noted: 2020-07-25 | Resolved: 2020-08-31

## 2020-08-31 PROCEDURE — 1111F DSCHRG MED/CURRENT MED MERGE: CPT | Performed by: INTERNAL MEDICINE

## 2020-08-31 PROCEDURE — 99496 TRANSJ CARE MGMT HIGH F2F 7D: CPT | Performed by: INTERNAL MEDICINE

## 2020-08-31 NOTE — SOCIAL WORK
Late Note 8/31/20 Pt was discharged yesterday to home  AVS and discharge instructions reviewed by nurse with good understanding  Dr Imani Smart office did put a  Referral in for home health care

## 2020-08-31 NOTE — PROGRESS NOTES
Assessment/Plan:         Diagnoses and all orders for this visit:    Acute blood loss anemia  -     CBC and differential; Future  Hgb 8 range at discharge Eliquis was resumed  He has not had BM yet since home (just home since yesterday(  Cbc in one week Iron rich foods encouraged  Return to ER as per discharge if melenotic stools recur on Eliquis  Homecare to resume  Melena  Pt stable at discharge and is back on Eliquis  He has followup cardiology appt in 2 weeks  Homecare to recheck cbc next week    Gastroesophageal reflux disease, esophagitis presence not specified  Continue PPI BID  Nilam Diaz diet Increase water intake      CKD (chronic kidney disease) stage 4, GFR 15-29 ml/min ([facility] [address] )  Levels stablilized at discharge He is followed by nephrology as outpt      Has October appt Cardio appt 9/15     Patient ID: Henry Miranda is a 80 y o  male      HPI  Pt readmitted to [facility] after being home for aout 2 days from str He developed acute blood in stool He had egd and colon in the hospita; and no source of melena discovered - no active bleeding seen He was told to resume eliquis and he is on it since getting dc yesterday Homecare had been started when he got home last time from str so they will be resuming care and PT/Ot Pt did not move much in the hospital last week and did walk around the table at home yesterday No chest pain Denies sob No BM since home His diet is getting better - he is interested in eating No falls He is staying on first floor and his wife managing meds/care He has downstairs bathroom he can access easily No lightheadedness or dizziness No headaches He has appt with Dr Josefina Javed 9/15 No urinary sxs Some lower leg edema He does wear compression stockings and feels it is better this AM than it had been His daughter drove them to the appt today and is helping with his care     TCM Call (since 7/31/2020)     Date and time call was made  8/31/2020  7:24 AM    Hospital care reviewed  Records reviewed    Patient was hospitialized at  [address] [place] [address]     Date of Admission  08/24/20    Date of discharge  08/30/20    Diagnosis  Hyponatremia/ GI BLeed     Disposition  Home    Were the patients medications reviewed and updated  No    Current Symptoms  None      TCM Call (since 7/31/2020)     Post hospital issues  None    Should patient be enrolled in anticoag monitoring? No    Scheduled for follow up? Yes    Not clinically warranted  pt received into short term rehab - [facility]    Did you obtain your prescribed medications  Yes    Do you need help managing your prescriptions or medications  No    Is transportation to your appointment needed  No    I have advised the patient to call PCP with any new or worsening symptoms  [address] [facility] other    Support System  None    Are you recieving any outpatient services  No    Are you recieving home care services  No    Are you using any community resources  No    Current waiver services  No    Have you fallen in the last 12 months  No    Interperter language line needed  No    Counseling  Family          Review of Systems   Constitutional: Negative for activity change, chills and fever  A bit more alert off imdur   HENT: Negative  Eyes: Negative for visual disturbance  Respiratory: Negative for cough, chest tightness and shortness of breath  Cardiovascular: Negative for chest pain and leg swelling  Imdur stopped by cardio   Gastrointestinal: Positive for constipation  Negative for abdominal distention and abdominal pain  Better at this time and uses colace    Genitourinary: Negative for difficulty urinating, enuresis and flank pain  Musculoskeletal: Positive for arthralgias and gait problem  Psychiatric/Behavioral: Positive for confusion  Past Medical History:   Diagnosis Date    Aortic stenosis     ECHO -4-14-14   MODERATE TO SEVERE AORTIC STENOSIS; MILD AROTIC INSUFFICIENCY - LAST ASSESSED 10/26/16; RESOLVED 6/8/16    Aortic valve disorder     LAST ASSESSED 10/8/15; 10/8/15    Complete heart block ([address] ) 7/20/2020    Hypertensive urgency 5/19/2019    Transient cerebral ischemia      Past Surgical History:   Procedure Laterality Date    AORTIC VALVE REPLACEMENT  06/30/2015    avr with 23 mm [place] Magna Ease bioprosthetic    CARDIAC PACEMAKER PLACEMENT Left 07/24/2020    CATARACT EXTRACTION Right 06/05/2000    COLONOSCOPY      CORONARY ARTERY BYPASS GRAFT  06/05/2000    x1 with lima  to lad    POLYPECTOMY  04/2014    enteroscopic - esophageal ulcer, gastric erosion, and duodenal ulcer       TONSILLECTOMY      unknown     Social History     Socioeconomic History    Marital status: /Civil Union     Spouse name: Not on file    Number of children: Not on file    Years of education: Not on file    Highest education level: Not on file   Occupational History    Occupation: retired   Social Needs    Financial resource strain: Not on file    Food insecurity     Worry: Not on file     Inability: Not on file   [employer] needs     Medical: Not on file     Non-medical: Not on file   Tobacco Use    Smoking status: Never Smoker    Smokeless tobacco: Never Used   Substance and Sexual Activity    Alcohol use: Never     Alcohol/week: 0 0 standard drinks     Frequency: Never     Drinks per session: Patient refused     Binge frequency: Never    Drug use: Never    Sexual activity: Not on file   Lifestyle    Physical activity     Days per week: Not on file     Minutes per session: Not on file    Stress: Not on file   Relationships    Social connections     Talks on phone: Not on file     Gets together: Not on file     Attends Alevism service: Not on file     Active member of club or organization: Not on file     Attends meetings of clubs or organizations: Not on file     Relationship status: Not on file    Intimate partner violence     Fear of current or ex partner: Not on file     Emotionally abused: Not on file     Physically abused: Not on file     Forced sexual activity: Not on file   Other Topics Concern    Not on file   Social History Narrative    Caffeine use     Dental care, regularly     Lives independently with spouse     Uses seatbelts      Allergies   Allergen Reactions    Promethazine Delirium           /78   Pulse 58   Temp 97 8 °F (36 6 °C) (Temporal)   Ht 5' 4" (1 626 m)   Wt 86 8 kg (191 lb 6 oz)   SpO2 94%   BMI 32 85 kg/m²          Physical Exam  Constitutional:       General: He is not in acute distress  Appearance: Normal appearance  He is not ill-appearing or toxic-appearing  Comments: More alert today Less dizziness since off the imdur   HENT:      Head: Normocephalic and atraumatic  Right Ear: Tympanic membrane normal       Left Ear: Tympanic membrane normal       Nose: No congestion or rhinorrhea  Mouth/Throat:      Mouth: Mucous membranes are moist       Pharynx: No oropharyngeal exudate or posterior oropharyngeal erythema  Eyes:      Extraocular Movements: Extraocular movements intact  Conjunctiva/sclera: Conjunctivae normal       Pupils: Pupils are equal, round, and reactive to light  Neck:      Musculoskeletal: Normal range of motion  Cardiovascular:      Rate and Rhythm: Rhythm irregular  Pulses: Normal pulses  Pulmonary:      Effort: Pulmonary effort is normal  No respiratory distress  Breath sounds: Normal breath sounds  No wheezing  Abdominal:      General: Bowel sounds are normal  There is no distension  Palpations: Abdomen is soft  Musculoskeletal:         General: No swelling or tenderness  Lymphadenopathy:      Cervical: No cervical adenopathy  Skin:     General: Skin is warm and dry  Coloration: Skin is not jaundiced  Neurological:      General: No focal deficit present  Mental Status: He is alert and oriented to person, place, and time  Mental status is at baseline  Psychiatric:         Mood and Affect: Mood normal          Behavior: Behavior normal          Thought Content:  Thought content normal          Judgment: Judgment normal

## 2020-08-31 NOTE — UTILIZATION REVIEW
Notification of Discharge  This is a Notification of Discharge from our facility [address]  Please be advised that this patient has been discharge from our facility  Below you will find the admission and discharge date and time including the patients disposition  PRESENTATION DATE: 8/24/2020 12:34 PM  OBS ADMISSION DATE:   IP ADMISSION DATE: 8/24/20 1421   DISCHARGE DATE: 8/30/2020  2:04 PM  DISPOSITION: Home/Self Care Home/Self Care   Admission Orders listed below:  Admission Orders (From admission, onward)     Ordered        08/24/20 1421  Inpatient Admission (expected length of stay for this patient Order details is greater than two midnights)  Once                   Please contact the UR Department if additional information is required to close this patient's authorization/case  [facility] Utilization Review Department  Main: [phone] x carefully listen to the prompts  All voicemails are confidential   [email or web address]  Send all requests for admission clinical reviews, approved or denied determinations and any other requests to dedicated fax number below belonging to the campus where the patient is receiving treatment   List of dedicated fax numbers:  [address] DENIALS (Administrative/Medical Necessity) [phone]   [address] [place] [address] (Maternity/NICU/Pediatrics) [phone]   [facility] [phone]   [facility] [phone]   [facility] [phone]   Delisa Baker [facility] [address] [place] [address] [phone]   [facility]  [phone]   [address] [facility]  [facility] [address] [facility] [phone]

## 2020-08-31 NOTE — TELEPHONE ENCOUNTER
Per Dr Anthony Ellis, see below instructions  Patient needs colonoscopy  Let me know when scheduled  [facility] Team   Call to set up capsule endoscopy       Thank you,  Isaac Albarran

## 2020-08-31 NOTE — TELEPHONE ENCOUNTER
----- Message from [facility], MD sent at 8/28/2020  4:25 PM EDT -----  Please send this to [facility] schedule her colon     Please schedule patient for outpatient capsule endoscopy      Thank you

## 2020-09-01 ENCOUNTER — PATIENT OUTREACH (OUTPATIENT)
Dept: INTERNAL MEDICINE CLINIC | Facility: CLINIC | Age: 85
End: 2020-09-01

## 2020-09-01 NOTE — PROGRESS NOTES
Outpatient Care Management Note:  [facility] referral received via in basket message  Chart review completed

## 2020-09-02 ENCOUNTER — TELEPHONE (OUTPATIENT)
Dept: INTERNAL MEDICINE CLINIC | Facility: CLINIC | Age: 85
End: 2020-09-02

## 2020-09-02 ENCOUNTER — DOCUMENTATION (OUTPATIENT)
Dept: INTERNAL MEDICINE CLINIC | Facility: CLINIC | Age: 85
End: 2020-09-02

## 2020-09-02 DIAGNOSIS — D64.9 ANEMIA, UNSPECIFIED TYPE: Primary | ICD-10-CM

## 2020-09-02 DIAGNOSIS — K92.1 BLACK STOOLS: Primary | ICD-10-CM

## 2020-09-02 NOTE — TELEPHONE ENCOUNTER
If he is having recurrent black stools Dc said to return to hospital   Would at least recheck hgb to see if it is getting lower

## 2020-09-02 NOTE — TELEPHONE ENCOUNTER
Daughter called to state patient hasn't taken his eliquis for two days, he's refusing to take because he states it gives him black stools

## 2020-09-02 NOTE — TELEPHONE ENCOUNTER
Can someone please put order in for the capsule endoscopy, gave pt wife number to schedule this procedure

## 2020-09-03 ENCOUNTER — PATIENT OUTREACH (OUTPATIENT)
Dept: INTERNAL MEDICINE CLINIC | Facility: CLINIC | Age: 85
End: 2020-09-03

## 2020-09-03 NOTE — PROGRESS NOTES
Outpatient Care Management Note:  Outreach call placed to [facility]  Spoke with he and his wife, Jaz Vargas  [facility] has been doing well since discharge  He is ambulating with a walker and sleeping on the main floor in a bed  Also has a powder room on the first floor  He continues to have black stool, but no bright red bleeding noted  Discussed that it can take some time for the body to clear all the old blood out of the GI track but to notify the PCP if any bright red blood was noted or if [facility] has an increase in weakness  Verbalized understanding of same  He is scheduled for a capsule endoscopy on 9/10/2020  His appetite has been good  Per Les's wife, he had been hiding his Eliquis in his shirt pocket because he feels that is the source of the bleeding  Jaz Vargas explained that not taking it would increase his risk of having a stroke and then he might not be able to stay at home  He did take his Eliquis with no issues this morning  He is active with [facility] and SN is coming this morning for the first visit  Denies any needs at this time  Provided contact information  Encouraged to call with any questions or concerns

## 2020-09-10 ENCOUNTER — HOSPITAL ENCOUNTER (OUTPATIENT)
Dept: GASTROENTEROLOGY | Facility: HOSPITAL | Age: 85
Discharge: HOME | End: 2020-09-10
Payer: COMMERCIAL

## 2020-09-10 DIAGNOSIS — D64.9 ANEMIA, UNSPECIFIED TYPE: ICD-10-CM

## 2020-09-10 PROCEDURE — 91110 GI TRC IMG INTRAL ESOPH-ILE: CPT

## 2020-09-10 NOTE — PERIOPERATIVE NURSING NOTE
Patient swallowed capsule without difficulty  Capsule placement confirmed by visualization of gastric folds

## 2020-09-11 ENCOUNTER — TELEPHONE (OUTPATIENT)
Dept: GASTROENTEROLOGY | Facility: AMBULARY SURGERY CENTER | Age: 85
End: 2020-09-11

## 2020-09-11 ENCOUNTER — APPOINTMENT (OUTPATIENT)
Dept: LAB | Facility: CLINIC | Age: 85
End: 2020-09-11
Payer: COMMERCIAL

## 2020-09-11 ENCOUNTER — TRANSCRIBE ORDERS (OUTPATIENT)
Dept: LAB | Facility: CLINIC | Age: 85
End: 2020-09-11

## 2020-09-11 ENCOUNTER — TELEPHONE (OUTPATIENT)
Dept: GASTROENTEROLOGY | Facility: CLINIC | Age: 85
End: 2020-09-11

## 2020-09-11 DIAGNOSIS — I30.0 ACUTE IDIOPATHIC PERICARDITIS: Primary | ICD-10-CM

## 2020-09-11 DIAGNOSIS — I30.0 ACUTE IDIOPATHIC PERICARDITIS: ICD-10-CM

## 2020-09-11 DIAGNOSIS — D62 ACUTE BLOOD LOSS ANEMIA: ICD-10-CM

## 2020-09-11 DIAGNOSIS — K92.1 BLACK STOOLS: ICD-10-CM

## 2020-09-11 LAB
ANION GAP SERPL CALCULATED.3IONS-SCNC: 9 MMOL/L (ref 4–13)
BASOPHILS # BLD AUTO: 0.06 THOUSANDS/ΜL (ref 0–0.1)
BASOPHILS NFR BLD AUTO: 0 % (ref 0–1)
BUN SERPL-MCNC: 38 MG/DL (ref 5–25)
CALCIUM SERPL-MCNC: 8.9 MG/DL (ref 8.3–10.1)
CHLORIDE SERPL-SCNC: 97 MMOL/L (ref 100–108)
CO2 SERPL-SCNC: 29 MMOL/L (ref 21–32)
CREAT SERPL-MCNC: 2.19 MG/DL (ref 0.6–1.3)
EOSINOPHIL # BLD AUTO: 0.27 THOUSAND/ΜL (ref 0–0.61)
EOSINOPHIL NFR BLD AUTO: 2 % (ref 0–6)
ERYTHROCYTE [DISTWIDTH] IN BLOOD BY AUTOMATED COUNT: 13.2 % (ref 11.6–15.1)
GFR SERPL CREATININE-BSD FRML MDRD: 27 ML/MIN/1.73SQ M
GLUCOSE SERPL-MCNC: 99 MG/DL (ref 65–140)
HCT VFR BLD AUTO: 30.9 % (ref 36.5–49.3)
HGB BLD-MCNC: 9.8 G/DL (ref 12–17)
IMM GRANULOCYTES # BLD AUTO: 0.06 THOUSAND/UL (ref 0–0.2)
IMM GRANULOCYTES NFR BLD AUTO: 0 % (ref 0–2)
LYMPHOCYTES # BLD AUTO: 1.22 THOUSANDS/ΜL (ref 0.6–4.47)
LYMPHOCYTES NFR BLD AUTO: 8 % (ref 14–44)
MCH RBC QN AUTO: 30.2 PG (ref 26.8–34.3)
MCHC RBC AUTO-ENTMCNC: 31.7 G/DL (ref 31.4–37.4)
MCV RBC AUTO: 95 FL (ref 82–98)
MONOCYTES # BLD AUTO: 1.06 THOUSAND/ΜL (ref 0.17–1.22)
MONOCYTES NFR BLD AUTO: 7 % (ref 4–12)
NEUTROPHILS # BLD AUTO: 12.41 THOUSANDS/ΜL (ref 1.85–7.62)
NEUTS SEG NFR BLD AUTO: 83 % (ref 43–75)
NRBC BLD AUTO-RTO: 0 /100 WBCS
PLATELET # BLD AUTO: 432 THOUSANDS/UL (ref 149–390)
PMV BLD AUTO: 10.2 FL (ref 8.9–12.7)
POTASSIUM SERPL-SCNC: 4.6 MMOL/L (ref 3.5–5.3)
RBC # BLD AUTO: 3.25 MILLION/UL (ref 3.88–5.62)
SODIUM SERPL-SCNC: 135 MMOL/L (ref 136–145)
WBC # BLD AUTO: 15.08 THOUSAND/UL (ref 4.31–10.16)

## 2020-09-11 PROCEDURE — 85025 COMPLETE CBC W/AUTO DIFF WBC: CPT

## 2020-09-11 PROCEDURE — 36415 COLL VENOUS BLD VENIPUNCTURE: CPT

## 2020-09-11 PROCEDURE — 80048 BASIC METABOLIC PNL TOTAL CA: CPT

## 2020-09-11 NOTE — TELEPHONE ENCOUNTER
Patients wife called the office returning your call  She will be home if you can return her call   Thank you

## 2020-09-15 ENCOUNTER — OFFICE VISIT (OUTPATIENT)
Dept: CARDIOLOGY CLINIC | Facility: HOSPITAL | Age: 85
End: 2020-09-15
Payer: COMMERCIAL

## 2020-09-15 VITALS
DIASTOLIC BLOOD PRESSURE: 76 MMHG | HEART RATE: 67 BPM | HEIGHT: 64 IN | WEIGHT: 194 LBS | OXYGEN SATURATION: 97 % | SYSTOLIC BLOOD PRESSURE: 124 MMHG | BODY MASS INDEX: 33.12 KG/M2

## 2020-09-15 DIAGNOSIS — I48.0 PAROXYSMAL ATRIAL FIBRILLATION (HCC): Primary | ICD-10-CM

## 2020-09-15 DIAGNOSIS — I44.2 COMPLETE HEART BLOCK (HCC): ICD-10-CM

## 2020-09-15 DIAGNOSIS — I25.10 CORONARY ARTERY DISEASE INVOLVING NATIVE CORONARY ARTERY OF NATIVE HEART WITHOUT ANGINA PECTORIS: ICD-10-CM

## 2020-09-15 DIAGNOSIS — I10 ESSENTIAL HYPERTENSION: ICD-10-CM

## 2020-09-15 DIAGNOSIS — N18.4 CKD (CHRONIC KIDNEY DISEASE) STAGE 4, GFR 15-29 ML/MIN (HCC): ICD-10-CM

## 2020-09-15 DIAGNOSIS — I50.42 CHRONIC COMBINED SYSTOLIC AND DIASTOLIC CONGESTIVE HEART FAILURE (HCC): ICD-10-CM

## 2020-09-15 DIAGNOSIS — Z95.0 S/P PLACEMENT OF CARDIAC PACEMAKER: ICD-10-CM

## 2020-09-15 DIAGNOSIS — Z95.1 S/P CABG (CORONARY ARTERY BYPASS GRAFT): ICD-10-CM

## 2020-09-15 DIAGNOSIS — Z95.2 H/O AORTIC VALVE REPLACEMENT: ICD-10-CM

## 2020-09-15 DIAGNOSIS — I35.0 SEVERE AORTIC STENOSIS: ICD-10-CM

## 2020-09-15 DIAGNOSIS — I34.2 NONRHEUMATIC MITRAL VALVE STENOSIS: ICD-10-CM

## 2020-09-15 DIAGNOSIS — E78.5 DYSLIPIDEMIA: ICD-10-CM

## 2020-09-15 PROCEDURE — 3078F DIAST BP <80 MM HG: CPT | Performed by: PHYSICIAN ASSISTANT

## 2020-09-15 PROCEDURE — 99214 OFFICE O/P EST MOD 30 MIN: CPT | Performed by: PHYSICIAN ASSISTANT

## 2020-09-15 PROCEDURE — 93000 ELECTROCARDIOGRAM COMPLETE: CPT | Performed by: PHYSICIAN ASSISTANT

## 2020-09-15 PROCEDURE — 1111F DSCHRG MED/CURRENT MED MERGE: CPT | Performed by: PHYSICIAN ASSISTANT

## 2020-09-15 NOTE — PROGRESS NOTES
Cardiology Follow Up    Felicity Lawson  1935  [id number]  [phone] [id number]  [phone]  [phone]    1  Paroxysmal atrial fibrillation (HCC)     2  Complete heart block ([address] )     3  S/P placement of cardiac pacemaker     4  Coronary artery disease involving native coronary artery of 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     5  S/P CABG (coronary artery bypass graft)     6  Severe aortic stenosis     7  H/O aortic valve replacement     8  Chronic combined systolic and diastolic congestive heart failure ([facility] [address] )     9  Nonrheumatic mitral valve stenosis     10  Essential hypertension     11  Dyslipidemia     12  CKD (chronic kidney disease) stage 4, GFR 15-29 ml/min ([facility])         Discussion/Summary:  Overall he is stable from a cardiac standpoint  He denies symptoms of angina  He is maintained on aspirin, statin, beta-blocker therapy for coronary artery disease with prior CABG  He does have some ankle/pedal edema on exam which he reports is chronic  Otherwise there are no other signs nor symptoms of decompensated heart failure  He will remain on torsemide 20 mg daily  I did encourage him to start daily weights and to call the office for weight gain >3lbs overnight or worsening edema  Low sodium diet discussed  His pacemaker is functioning appropriately per last device check  He has a <0 1% atrial fibrillation burden  One 29 beat run of NSVT was noted  This will be followed through serial device checks as he is already on high dose metoprolol tartrate 100 mg BID  He will continue anticoagulation with Eliquis at this time as his CBC has been stable and all GI workup has been negative thus far  Recent echo showed normal function of bioprosthetic aortic valve without any perivalvular regurgitation  Blood pressure is well controlled  I stressed the importance of compliance with all medications to patient and his wife  He will RTO in 2 months with Dr Oscar Whittaker or sooner if necessary  He will call with any concerns  Interval History:   Wendy Amaro is a 80 y o  male with coronary artery disease s/p CABG, severe aortic stenosis s/p aortic valve replacement, complete heart block s/p pacemaker placement in July 2020, recently diagnosed paroxysmal atrial fibrillation on anticoagulation with Eliquis - complicated by GI bleeding, chronic diastolic congestive heart failure, hypertension, dyslipidemia, and CKD Stage IV who presents to the office today for hospital follow up  The patient underwent pacemaker placement in July 2020 for complete heart block  During this time he was found to have paroxysmal atrial fibrillation and placed on Eliquis  Shortly after starting Eliquis he had routine labs which showed a hemoglobin of 6 4  He was admitted and and given PRBC's  He underwent EGD which showed a moderate sized duodenal ulcer without bleeding  Eliquis was held for one week and then restarted  One week after restarting Eliquis he presented to the ER with melena  He was admitted again and underwent repeat EGD and colonoscopy which did not reveal a source of bleeding  eliquis was restarted the next day  Last CBC showed improvement in hemoglobin to 9 8  Since hospital discharge he has been feeling ok  He is mainly sedentary  With the activity he performs he denies any shortness of breath and chest pain  He reports chronic lower extremity edema which is unchanged  He does not weigh himself daily  He denies lightheadedness, dizziness, palpitations, and syncope  He denies orthopnea and paroxysmal nocturnal dyspnea  The patient is still having dark stools  His wife states that she has been intermittently holding his medications to see if the color of his stool will change  She started holding iron yesterday        Problem List     Acute blood loss anemia    Mild intermittent asthma without complication    CKD (chronic kidney disease) stage 4, GFR 15-29 ml/min (HCC)    Dyslipidemia    GERD (gastroesophageal reflux disease)    Late effects of cerebrovascular disease    Lumbar degenerative disc disease    Lumbar radiculopathy    Mitral regurgitation    Overview Signed 8/14/2018  9:07 AM by Raven Lopez DO     Description: echo 04- - moderate         Obesity (BMI 30-39  9)    Medicare annual wellness visit, subsequent    H/O aortic valve replacement    History of stroke    Bradycardia    Essential hypertension    Leukocytosis    Ambulatory dysfunction    Paroxysmal A-fib (HCC)    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    Pacemaker (Chronic)    Azotemia    Oxygen dependent    Chronic combined systolic and diastolic congestive heart failure (HCC)    Wt Readings from Last 3 Encounters:   09/15/20 88 kg (194 lb)   08/31/20 86 8 kg (191 lb 6 oz)   08/24/20 89 7 kg (197 lb 12 oz)                 Melena    Mitral stenosis        Past Medical History:   Diagnosis Date    Aortic stenosis     ECHO -4-14-14  MODERATE TO SEVERE AORTIC STENOSIS; MILD AROTIC INSUFFICIENCY - LAST ASSESSED 10/26/16; RESOLVED 6/8/16    Aortic valve disorder     LAST ASSESSED 10/8/15; 10/8/15    Complete heart block ([address] ) 7/20/2020    Hypertensive urgency 5/19/2019    Transient cerebral ischemia      Social History     Socioeconomic History    Marital status: /Civil Union     Spouse name:  William Drake Number of children: 5    Years of education: Not on file    Highest education level: Not on file   Occupational History    Occupation: retired   Social Needs    Financial resource strain: Not hard at all   Sylvia-Ronaldo insecurity     Worry: Never true     Inability: Never true   [employer] needs     Medical: No     Non-medical: No   Tobacco Use    Smoking status: Never Smoker    Smokeless tobacco: Never Used   Substance and Sexual Activity    Alcohol use: Never     Alcohol/week: 0 0 standard drinks Frequency: Never     Drinks per session: Patient refused     Binge frequency: Never    Drug use: Never    Sexual activity: Not on file   Lifestyle    Physical activity     Days per week: Not on file     Minutes per session: Not on file    Stress: Not on file   Relationships    Social connections     Talks on phone: More than three times a week     Gets together: More than three times a week     Attends Yazidi service: Not on file     Active member of club or organization: Not on file     Attends meetings of clubs or organizations: Not on file     Relationship status:     Intimate partner violence     Fear of current or ex partner: Not on file     Emotionally abused: Not on file     Physically abused: Not on file     Forced sexual activity: Not on file   Other Topics Concern    Not on file   Social History Narrative    Caffeine use     Dental care, regularly     Lives independently with spouse     Uses seatbelts       Family History   Problem Relation Age of Onset    No Known Problems Mother     No Known Problems Father     Coronary artery disease Neg Hx      Past Surgical History:   Procedure Laterality Date    AORTIC VALVE REPLACEMENT  06/30/2015    avr with 23 mm [place] Magna Ease bioprosthetic    CARDIAC PACEMAKER PLACEMENT Left 07/24/2020    CATARACT EXTRACTION Right 06/05/2000    COLONOSCOPY      CORONARY ARTERY BYPASS GRAFT  06/05/2000    x1 with lima  to lad    POLYPECTOMY  04/2014    enteroscopic - esophageal ulcer, gastric erosion, and duodenal ulcer       TONSILLECTOMY      unknown       Current Outpatient Medications:     amLODIPine (NORVASC) 5 mg tablet, Take 1 tablet (5 mg total) by mouth daily, Disp:  , Rfl: 0    apixaban (ELIQUIS) 2 5 mg, Take 1 tablet (2 5 mg total) by mouth 2 (two) times a day, Disp: 60 tablet, Rfl: 0    aspirin (ECOTRIN LOW STRENGTH) 81 mg EC tablet, Take 1 tablet (81 mg total) by mouth daily, Disp:  , Rfl: 0    cloNIDine (CATAPRES) 0 1 mg tablet, Take 1 tablet (0 1 mg total) by mouth daily, Disp:  , Rfl: 0    docusate sodium (COLACE) 100 mg capsule, Take 1 capsule (100 mg total) by mouth 2 (two) times a day, Disp: 10 capsule, Rfl: 0    ferrous sulfate 325 (65 Fe) mg tablet, Take 1 tablet (325 mg total) by mouth daily with breakfast for 30 doses, Disp: 30 tablet, Rfl: 0    METOPROLOL TARTRATE PO, Take 100 mg by mouth 2 (two) times a day , Disp: , Rfl:     pantoprazole (PROTONIX) 40 mg tablet, Take 1 tablet (40 mg total) by mouth 2 (two) times a day before meals, Disp: , Rfl: 0    pravastatin (PRAVACHOL) 40 mg tablet, Take 1 tablet (40 mg total) by mouth daily with dinner, Disp:  , Rfl: 0    senna (SENOKOT) 8 6 mg, Take 1 tablet (8 6 mg total) by mouth daily at bedtime, Disp: 120 each, Rfl: 0    torsemide (DEMADEX) 20 mg tablet, Take 1 tablet (20 mg total) by mouth 2 (two) times a day, Disp:  , Rfl: 0    bisacodyl (DULCOLAX) 5 mg EC tablet, Take 2 tablets (10 mg total) by mouth daily as needed for constipation (Patient not taking: Reported on 8/24/2020), Disp: 30 tablet, Rfl: 0  Allergies   Allergen Reactions    Promethazine Delirium       Labs:     Chemistry        Component Value Date/Time     10/01/2015 0832    K 4 6 09/11/2020 1100    K 4 6 10/01/2015 0832    CL 97 (L) 09/11/2020 1100     10/01/2015 0832    CO2 29 09/11/2020 1100    CO2 27 4 10/01/2015 0832    BUN 38 (H) 09/11/2020 1100    BUN 28 (H) 10/01/2015 0832    CREATININE 2 19 (H) 09/11/2020 1100    CREATININE 1 88 (H) 10/01/2015 0832        Component Value Date/Time    CALCIUM 8 9 09/11/2020 1100    CALCIUM 9 1 10/01/2015 0832    ALKPHOS 81 08/24/2020 1255    ALKPHOS 100 10/01/2015 0832    AST 19 08/24/2020 1255    AST 18 10/01/2015 0832    ALT 16 08/24/2020 1255    ALT 17 10/01/2015 0832    BILITOT 0 37 10/01/2015 0832            Lab Results   Component Value Date    CHOL 139 10/01/2015    CHOL 147 06/15/2015    CHOL 151 04/30/2015     Lab Results   Component Value Date HDL 32 (L) 05/20/2019    HDL 27 (L) 08/07/2018    HDL 29 (L) 05/18/2017     Lab Results   Component Value Date    LDLCALC 103 (H) 05/20/2019    LDLCALC 71 08/07/2018    LDLCALC 78 05/18/2017     Lab Results   Component Value Date    TRIG 162 (H) 05/20/2019    TRIG 279 (H) 08/07/2018    TRIG 252 (H) 05/18/2017     No results found for: CHOLHDL    Imaging: Ct Abdomen Pelvis Wo Contrast    Result Date: 8/24/2020  Narrative: CT ABDOMEN AND PELVIS WITHOUT IV CONTRAST INDICATION: Black stools for 5 days  On Eliquis  Hemoglobin 7 5  EGD 8/10/2020 which demonstrated: Duodenal bulb ulcer with duodenitis-likely source of bleeding and anemia  Moderate hiatal hernia  COMPARISON:  Renal ultrasound 8/11/2020 TECHNIQUE:  CT examination of the abdomen and pelvis was performed without intravenous contrast   Axial, sagittal, and coronal 2D reformatted images were created from the source data and submitted for interpretation  Radiation dose length product (DLP) for this visit:  553 62 mGy-cm   This examination, like all CT scans performed in the [facility], was performed utilizing techniques to minimize radiation dose exposure, including the use of iterative  reconstruction and automated exposure control  Enteric contrast was not administered  FINDINGS: ABDOMEN LOWER CHEST:  Dense calcification of the mitral annulus  Pacemaker leads partially visualized  Coronary artery calcifications  Small hiatal hernia  LIVER/BILIARY TREE:  Calcified granuloma in the right hepatic lobe  Otherwise unremarkable  GALLBLADDER:  There are gallstone(s) within the gallbladder, without pericholecystic inflammatory changes  SPLEEN:  Unremarkable  PANCREAS:  Unremarkable  ADRENAL GLANDS:  Unremarkable  KIDNEYS/URETERS:  Bilateral renal cysts grossly similar to prior ultrasound measuring up to 8 2 cm on the left  No hydronephrosis  STOMACH AND BOWEL:  There is colonic diverticulosis without evidence of acute diverticulitis    No periduodenal inflammatory changes are seen noting history of duodenitis on recent EGD  Evaluation for gastrointestinal bleeding is limited due to unenhanced technique  APPENDIX:  Unremarkable appendix  ABDOMINOPELVIC CAVITY:  No ascites  No pneumoperitoneum  No lymphadenopathy  VESSELS:  Atherosclerotic changes are present  No evidence of aneurysm  PELVIS REPRODUCTIVE ORGANS:  Unremarkable for patient's age  URINARY BLADDER:  Unremarkable  ABDOMINAL WALL/INGUINAL REGIONS:  Small bilateral fat-containing inguinal hernias  OSSEOUS STRUCTURES:  No acute fracture or destructive osseous lesion  Spinal degenerative changes are noted  Impression: No acute findings in the abdomen or pelvis within the limits of unenhanced technique  Workstation performed: [id number]       ECG: atrial sensed ventricular paced rhythm      Review of Systems   Constitution: Negative for chills and fever  HENT: Negative  Cardiovascular: Positive for leg swelling  Negative for chest pain, dyspnea on exertion, orthopnea, palpitations, paroxysmal nocturnal dyspnea and syncope  Respiratory: Negative for cough and shortness of breath  Gastrointestinal: Positive for melena  Negative for diarrhea, nausea and vomiting  Genitourinary: Negative  Neurological: Negative for dizziness and light-headedness  All other systems reviewed and are negative  Vitals:    09/15/20 1434   BP: 124/76   Pulse: 67   SpO2: 97%     Vitals:    09/15/20 1434   Weight: 88 kg (194 lb)     Height: 5' 4" (162 6 cm)   Body mass index is 33 3 kg/m²  Physical Exam:  Physical Exam  Vitals signs reviewed  Constitutional:       General: He is not in acute distress  Appearance: He is well-developed  He is obese  He is not diaphoretic  Comments: Obese male sitting in a wheelchair in no acute distress   HENT:      Head: Normocephalic and atraumatic  Eyes:      Pupils: Pupils are equal, round, and reactive to light     Neck: Musculoskeletal: Normal range of motion  Vascular: No carotid bruit  Cardiovascular:      Rate and Rhythm: Normal rate and regular rhythm  Pulses:           Radial pulses are 2+ on the right side and 2+ on the left side  Dorsalis pedis pulses are 2+ on the right side and 2+ on the left side  Heart sounds: S1 normal and S2 normal  No murmur  Pulmonary:      Effort: No respiratory distress  Breath sounds: Normal breath sounds  No wheezing or rales  Abdominal:      Palpations: Abdomen is soft  Tenderness: There is no abdominal tenderness  Musculoskeletal: Normal range of motion  Right lower leg: Edema (+1 ankle edema) present  Left lower leg: Edema (+1 ankle edema) present  Skin:     General: Skin is warm and dry  Findings: No erythema  Neurological:      General: No focal deficit present  Mental Status: He is alert and oriented to person, place, and time     Psychiatric:         Mood and Affect: Mood normal          Behavior: Behavior normal

## 2020-09-17 ENCOUNTER — PATIENT OUTREACH (OUTPATIENT)
Dept: INTERNAL MEDICINE CLINIC | Facility: CLINIC | Age: 85
End: 2020-09-17

## 2020-09-17 NOTE — PROGRESS NOTES
Outpatient Care Management Note:  Outreach call placed to [facility]  Spoke with his wife, Jack Pérez  [facility] is doing well  At times he is ambulating with out the walker but feels more secure using it most of the time  He continues to have black stool but there is no visible blood  Discussed that his iron tablets could also be making the stool dark and his last blood counts were improving  Verbalized understanding of same  Had a recent follow up visit with cardiology and there were no changes made to his medication  Has an upcoming appointment with PCP on 10/15/2020  Remains current with [facility] for SN and PT  Discharged from OT on 9/16/2020  Denies any current needs  Encouraged to call with any questions or concerns

## 2020-09-24 ENCOUNTER — TELEPHONE (OUTPATIENT)
Dept: INTERNAL MEDICINE CLINIC | Facility: CLINIC | Age: 85
End: 2020-09-24

## 2020-09-24 NOTE — TELEPHONE ENCOUNTER
VIRGIEI-  Pt done with PT and is discharged as he met all of his goals  He was asked about doing out patient PT and refused  He is still seeing nursing in home   Any questions we can call Hollis Farooq at [phone]

## 2020-10-01 ENCOUNTER — PATIENT OUTREACH (OUTPATIENT)
Dept: INTERNAL MEDICINE CLINIC | Facility: CLINIC | Age: 85
End: 2020-10-01

## 2020-10-01 PROCEDURE — 91110 GI TRC IMG INTRAL ESOPH-ILE: CPT | Performed by: INTERNAL MEDICINE

## 2020-10-15 ENCOUNTER — LAB (OUTPATIENT)
Dept: LAB | Facility: MEDICAL CENTER | Age: 85
End: 2020-10-15
Payer: COMMERCIAL

## 2020-10-15 ENCOUNTER — OFFICE VISIT (OUTPATIENT)
Dept: INTERNAL MEDICINE CLINIC | Facility: CLINIC | Age: 85
End: 2020-10-15
Payer: COMMERCIAL

## 2020-10-15 VITALS
WEIGHT: 186 LBS | BODY MASS INDEX: 31.76 KG/M2 | OXYGEN SATURATION: 97 % | SYSTOLIC BLOOD PRESSURE: 128 MMHG | TEMPERATURE: 97.7 F | DIASTOLIC BLOOD PRESSURE: 70 MMHG | HEIGHT: 64 IN | HEART RATE: 65 BPM

## 2020-10-15 DIAGNOSIS — I16.0 HYPERTENSIVE URGENCY: ICD-10-CM

## 2020-10-15 DIAGNOSIS — D50.8 OTHER IRON DEFICIENCY ANEMIA: ICD-10-CM

## 2020-10-15 DIAGNOSIS — D50.0 IRON DEFICIENCY ANEMIA DUE TO CHRONIC BLOOD LOSS: ICD-10-CM

## 2020-10-15 DIAGNOSIS — Z00.00 MEDICARE ANNUAL WELLNESS VISIT, SUBSEQUENT: Primary | ICD-10-CM

## 2020-10-15 DIAGNOSIS — Z23 FLU VACCINE NEED: ICD-10-CM

## 2020-10-15 DIAGNOSIS — Z11.59 ENCOUNTER FOR HEPATITIS C SCREENING TEST FOR LOW RISK PATIENT: ICD-10-CM

## 2020-10-15 DIAGNOSIS — K92.2 GASTROINTESTINAL HEMORRHAGE, UNSPECIFIED GASTROINTESTINAL HEMORRHAGE TYPE: ICD-10-CM

## 2020-10-15 DIAGNOSIS — J96.01 ACUTE RESPIRATORY FAILURE WITH HYPOXIA (HCC): ICD-10-CM

## 2020-10-15 DIAGNOSIS — I10 ESSENTIAL HYPERTENSION: ICD-10-CM

## 2020-10-15 DIAGNOSIS — I25.10 ATHEROSCLEROSIS OF NATIVE CORONARY ARTERY OF NATIVE HEART WITHOUT ANGINA PECTORIS: ICD-10-CM

## 2020-10-15 DIAGNOSIS — I48.0 PAF (PAROXYSMAL ATRIAL FIBRILLATION) (HCC): ICD-10-CM

## 2020-10-15 PROBLEM — J43.9 PULMONARY EMPHYSEMA (HCC): Status: ACTIVE | Noted: 2020-10-15

## 2020-10-15 PROBLEM — D50.9 IRON DEFICIENCY ANEMIA: Status: ACTIVE | Noted: 2020-10-15

## 2020-10-15 LAB
BASOPHILS # BLD AUTO: 0.06 THOUSANDS/ΜL (ref 0–0.1)
BASOPHILS NFR BLD AUTO: 1 % (ref 0–1)
EOSINOPHIL # BLD AUTO: 0.65 THOUSAND/ΜL (ref 0–0.61)
EOSINOPHIL NFR BLD AUTO: 6 % (ref 0–6)
ERYTHROCYTE [DISTWIDTH] IN BLOOD BY AUTOMATED COUNT: 13.5 % (ref 11.6–15.1)
HCT VFR BLD AUTO: 31 % (ref 36.5–49.3)
HGB BLD-MCNC: 9.9 G/DL (ref 12–17)
IMM GRANULOCYTES # BLD AUTO: 0.04 THOUSAND/UL (ref 0–0.2)
IMM GRANULOCYTES NFR BLD AUTO: 0 % (ref 0–2)
IRON SERPL-MCNC: 45 UG/DL (ref 65–175)
LYMPHOCYTES # BLD AUTO: 1.67 THOUSANDS/ΜL (ref 0.6–4.47)
LYMPHOCYTES NFR BLD AUTO: 15 % (ref 14–44)
MCH RBC QN AUTO: 29.7 PG (ref 26.8–34.3)
MCHC RBC AUTO-ENTMCNC: 31.9 G/DL (ref 31.4–37.4)
MCV RBC AUTO: 93 FL (ref 82–98)
MONOCYTES # BLD AUTO: 1.22 THOUSAND/ΜL (ref 0.17–1.22)
MONOCYTES NFR BLD AUTO: 11 % (ref 4–12)
NEUTROPHILS # BLD AUTO: 7.84 THOUSANDS/ΜL (ref 1.85–7.62)
NEUTS SEG NFR BLD AUTO: 67 % (ref 43–75)
NRBC BLD AUTO-RTO: 0 /100 WBCS
PLATELET # BLD AUTO: 461 THOUSANDS/UL (ref 149–390)
PMV BLD AUTO: 10.2 FL (ref 8.9–12.7)
RBC # BLD AUTO: 3.33 MILLION/UL (ref 3.88–5.62)
WBC # BLD AUTO: 11.48 THOUSAND/UL (ref 4.31–10.16)

## 2020-10-15 PROCEDURE — 83540 ASSAY OF IRON: CPT

## 2020-10-15 PROCEDURE — 36415 COLL VENOUS BLD VENIPUNCTURE: CPT

## 2020-10-15 PROCEDURE — 1125F AMNT PAIN NOTED PAIN PRSNT: CPT | Performed by: INTERNAL MEDICINE

## 2020-10-15 PROCEDURE — 86803 HEPATITIS C AB TEST: CPT

## 2020-10-15 PROCEDURE — 1160F RVW MEDS BY RX/DR IN RCRD: CPT | Performed by: INTERNAL MEDICINE

## 2020-10-15 PROCEDURE — 85025 COMPLETE CBC W/AUTO DIFF WBC: CPT

## 2020-10-15 PROCEDURE — 3725F SCREEN DEPRESSION PERFORMED: CPT | Performed by: INTERNAL MEDICINE

## 2020-10-15 PROCEDURE — 1036F TOBACCO NON-USER: CPT | Performed by: INTERNAL MEDICINE

## 2020-10-15 PROCEDURE — G0008 ADMIN INFLUENZA VIRUS VAC: HCPCS

## 2020-10-15 PROCEDURE — G0439 PPPS, SUBSEQ VISIT: HCPCS | Performed by: INTERNAL MEDICINE

## 2020-10-15 PROCEDURE — 90662 IIV NO PRSV INCREASED AG IM: CPT

## 2020-10-15 PROCEDURE — 1170F FXNL STATUS ASSESSED: CPT | Performed by: INTERNAL MEDICINE

## 2020-10-15 RX ORDER — FERROUS SULFATE 325(65) MG
325 TABLET ORAL
Qty: 90 TABLET | Refills: 3 | Status: SHIPPED | OUTPATIENT
Start: 2020-10-15 | End: 2021-08-04 | Stop reason: HOSPADM

## 2020-10-15 RX ORDER — TORSEMIDE 20 MG/1
20 TABLET ORAL 2 TIMES DAILY
Qty: 180 TABLET | Refills: 3 | Status: SHIPPED | OUTPATIENT
Start: 2020-10-15 | End: 2021-07-06 | Stop reason: SDUPTHER

## 2020-10-15 RX ORDER — AMLODIPINE BESYLATE 5 MG/1
5 TABLET ORAL DAILY
Qty: 90 TABLET | Refills: 3 | Status: SHIPPED | OUTPATIENT
Start: 2020-10-15 | End: 2021-07-06

## 2020-10-15 RX ORDER — PANTOPRAZOLE SODIUM 40 MG/1
40 TABLET, DELAYED RELEASE ORAL
Qty: 180 TABLET | Refills: 3 | Status: SHIPPED | OUTPATIENT
Start: 2020-10-15 | End: 2021-07-06 | Stop reason: SDUPTHER

## 2020-10-15 RX ORDER — CLONIDINE HYDROCHLORIDE 0.1 MG/1
0.1 TABLET ORAL DAILY
Qty: 90 TABLET | Refills: 3 | Status: SHIPPED | OUTPATIENT
Start: 2020-10-15 | End: 2021-07-06

## 2020-10-15 RX ORDER — PRAVASTATIN SODIUM 40 MG
40 TABLET ORAL
Qty: 90 TABLET | Refills: 3 | Status: SHIPPED | OUTPATIENT
Start: 2020-10-15 | End: 2021-05-04

## 2020-10-16 ENCOUNTER — TELEPHONE (OUTPATIENT)
Dept: INTERNAL MEDICINE CLINIC | Facility: CLINIC | Age: 85
End: 2020-10-16

## 2020-10-16 DIAGNOSIS — K92.2 GASTROINTESTINAL HEMORRHAGE, UNSPECIFIED GASTROINTESTINAL HEMORRHAGE TYPE: Primary | ICD-10-CM

## 2020-10-16 LAB — HCV AB SER QL: NORMAL

## 2020-11-02 ENCOUNTER — PATIENT OUTREACH (OUTPATIENT)
Dept: INTERNAL MEDICINE CLINIC | Facility: CLINIC | Age: 85
End: 2020-11-02

## 2020-11-17 ENCOUNTER — REMOTE DEVICE CLINIC VISIT (OUTPATIENT)
Dept: CARDIOLOGY CLINIC | Facility: CLINIC | Age: 85
End: 2020-11-17
Payer: COMMERCIAL

## 2020-11-17 DIAGNOSIS — Z95.0 S/P PLACEMENT OF CARDIAC PACEMAKER: Primary | ICD-10-CM

## 2020-11-17 PROCEDURE — 93294 REM INTERROG EVL PM/LDLS PM: CPT | Performed by: INTERNAL MEDICINE

## 2020-11-17 PROCEDURE — 93296 REM INTERROG EVL PM/IDS: CPT | Performed by: INTERNAL MEDICINE

## 2020-12-02 ENCOUNTER — PATIENT OUTREACH (OUTPATIENT)
Dept: INTERNAL MEDICINE CLINIC | Facility: CLINIC | Age: 85
End: 2020-12-02

## 2021-01-04 DIAGNOSIS — R26.2 AMBULATORY DYSFUNCTION: ICD-10-CM

## 2021-01-04 DIAGNOSIS — K92.2 GASTROINTESTINAL HEMORRHAGE, UNSPECIFIED GASTROINTESTINAL HEMORRHAGE TYPE: ICD-10-CM

## 2021-01-04 DIAGNOSIS — I10 ESSENTIAL HYPERTENSION: Primary | ICD-10-CM

## 2021-01-04 DIAGNOSIS — J96.01 ACUTE RESPIRATORY FAILURE WITH HYPOXIA (HCC): ICD-10-CM

## 2021-01-04 RX ORDER — METOPROLOL TARTRATE 100 MG/1
100 TABLET ORAL 2 TIMES DAILY
Qty: 180 TABLET | Refills: 3 | Status: SHIPPED | OUTPATIENT
Start: 2021-01-04 | End: 2021-07-06

## 2021-01-14 DIAGNOSIS — J96.01 ACUTE RESPIRATORY FAILURE WITH HYPOXIA (HCC): ICD-10-CM

## 2021-01-14 DIAGNOSIS — R26.2 AMBULATORY DYSFUNCTION: ICD-10-CM

## 2021-01-14 RX ORDER — SENNOSIDES 8.6 MG
8.6 TABLET ORAL
Qty: 120 EACH | Refills: 0 | Status: SHIPPED | OUTPATIENT
Start: 2021-01-14 | End: 2021-05-06 | Stop reason: SDUPTHER

## 2021-01-21 ENCOUNTER — IMMUNIZATIONS (OUTPATIENT)
Dept: FAMILY MEDICINE CLINIC | Facility: HOSPITAL | Age: 86
End: 2021-01-21

## 2021-01-21 DIAGNOSIS — Z23 ENCOUNTER FOR IMMUNIZATION: Primary | ICD-10-CM

## 2021-01-21 PROCEDURE — 0011A SARS-COV-2 / COVID-19 MRNA VACCINE (MODERNA) 100 MCG: CPT | Performed by: INTERNAL MEDICINE

## 2021-01-21 PROCEDURE — 91301 SARS-COV-2 / COVID-19 MRNA VACCINE (MODERNA) 100 MCG: CPT | Performed by: INTERNAL MEDICINE

## 2021-02-16 ENCOUNTER — REMOTE DEVICE CLINIC VISIT (OUTPATIENT)
Dept: CARDIOLOGY CLINIC | Facility: CLINIC | Age: 86
End: 2021-02-16
Payer: COMMERCIAL

## 2021-02-16 DIAGNOSIS — Z95.0 PRESENCE OF PERMANENT CARDIAC PACEMAKER: Primary | ICD-10-CM

## 2021-02-16 PROCEDURE — 93296 REM INTERROG EVL PM/IDS: CPT | Performed by: INTERNAL MEDICINE

## 2021-02-16 PROCEDURE — 93294 REM INTERROG EVL PM/LDLS PM: CPT | Performed by: INTERNAL MEDICINE

## 2021-02-16 NOTE — PROGRESS NOTES
MDT DUAL PM/ ACTIVE SYSTEM IS MRI CONDITIONAL   CARELINK TRANSMISSION:  BATTERY VOLTAGE ADEQUATE (12 2 YR)   AP 49 4%  99 5% (>40%/CHB)   ALL AVAILABLE LEAD PARAMETERS WITHIN NORMAL LIMITS   SINCE 11/17/20;  2 EPISODES OF NSVT (13 @ 203 BPM, 7 @ 176 BPM), AND 17 AT/AF EPISODES (<0 1%) WITH AVAILABLE EGMS SHOWING AT/AF WITH INT  COMPETITIVE PACING AND LONGEST EPISODE 16 MIN     EF 60% (ECHO 07/2020)    PT TAKES ASA, ELIQUIS, METOPROLOL, AND TORSEMIDE   TASK TO DR MEDINA   NORMAL DEVICE FUNCTION   RG

## 2021-02-19 ENCOUNTER — IMMUNIZATIONS (OUTPATIENT)
Dept: FAMILY MEDICINE CLINIC | Facility: HOSPITAL | Age: 86
End: 2021-02-19

## 2021-02-19 DIAGNOSIS — Z23 ENCOUNTER FOR IMMUNIZATION: Primary | ICD-10-CM

## 2021-02-19 PROCEDURE — 91301 SARS-COV-2 / COVID-19 MRNA VACCINE (MODERNA) 100 MCG: CPT

## 2021-02-19 PROCEDURE — 0012A SARS-COV-2 / COVID-19 MRNA VACCINE (MODERNA) 100 MCG: CPT

## 2021-05-04 DIAGNOSIS — I25.10 ATHEROSCLEROSIS OF NATIVE CORONARY ARTERY OF NATIVE HEART WITHOUT ANGINA PECTORIS: ICD-10-CM

## 2021-05-04 RX ORDER — PRAVASTATIN SODIUM 40 MG
TABLET ORAL
Qty: 90 TABLET | Refills: 0 | Status: SHIPPED | OUTPATIENT
Start: 2021-05-04 | End: 2021-07-06 | Stop reason: SDUPTHER

## 2021-05-06 DIAGNOSIS — J96.01 ACUTE RESPIRATORY FAILURE WITH HYPOXIA (HCC): ICD-10-CM

## 2021-05-06 DIAGNOSIS — R26.2 AMBULATORY DYSFUNCTION: ICD-10-CM

## 2021-05-06 RX ORDER — SENNOSIDES 8.6 MG
8.6 TABLET ORAL
Qty: 90 TABLET | Refills: 3 | Status: SHIPPED | OUTPATIENT
Start: 2021-05-06 | End: 2021-07-06 | Stop reason: SDUPTHER

## 2021-05-19 ENCOUNTER — REMOTE DEVICE CLINIC VISIT (OUTPATIENT)
Dept: CARDIOLOGY CLINIC | Facility: CLINIC | Age: 86
End: 2021-05-19
Payer: COMMERCIAL

## 2021-05-19 DIAGNOSIS — Z95.0 PACEMAKER: Primary | ICD-10-CM

## 2021-05-19 PROCEDURE — 93294 REM INTERROG EVL PM/LDLS PM: CPT | Performed by: INTERNAL MEDICINE

## 2021-05-19 PROCEDURE — 93296 REM INTERROG EVL PM/IDS: CPT | Performed by: INTERNAL MEDICINE

## 2021-05-19 NOTE — PROGRESS NOTES
Results for orders placed or performed in visit on 05/19/21   Cardiac EP device report    Narrative    MDT-DUAL CHAMBER PPM (DDD MODE)/ ACTIVE SYSTEM IS MRI CONDITIONAL  CARELINK TRANSMISSION: BATTERY ADEUQUATE (11 1 YRS)  AP 64%;  100% (CHB/DDD 60)  ALL AVAILABLE LEAD PARAMETERS WITHIN NORMAL LIMITS  NO EPISODES  PT  TAKES ELIQUIS, ASA 81 & METOPROLOL TART  NORMAL DEVICE FUNCTION   PL

## 2021-05-24 ENCOUNTER — RA CDI HCC (OUTPATIENT)
Dept: OTHER | Facility: HOSPITAL | Age: 86
End: 2021-05-24

## 2021-05-24 NOTE — PROGRESS NOTES
[address]  coding opportunities             Chart reviewed, (number of) suggestions sent to provider: 5     Problem listed updated  Provider Accepted, (number of) suggestions accepted: 5        Patients insurance company: [employer] (Medicare Advantage and [employer])     Visit status: Patient arrived for their scheduled appointment   I11 0 not used  [address]  coding opportunities             Chart reviewed, (number of) suggestions sent to provider: 5     Problem listed updated   Provider Accepted, (number of) suggestions accepted: 5        Patients insurance company: [employer] (Medicare Advantage and [employer])           [address]  coding opportunities             Chart reviewed, (number of) suggestions sent to provider: 5           Patients insurance company: [employer] (Medicare Advantage and [employer])           J43 9 Pulmonary emphysema ([address] )    I48 0 Paroxysmal A-fib ([address] )    I11 0, I50 42 Hypertensive heart disease with Chronic combined systolic and diastolic congestive heart failure ([address] )    N18 4 CKD (chronic kidney disease) stage 4, GFR 15-29 ml/min ([address] )     If this is correct, please document and assess at your next visit 6/1/21

## 2021-05-26 PROBLEM — I11.0 HYPERTENSIVE HEART DISEASE WITH HF (HEART FAILURE) (HCC): Status: ACTIVE | Noted: 2020-07-20

## 2021-05-29 ENCOUNTER — APPOINTMENT (EMERGENCY)
Dept: RADIOLOGY | Facility: HOSPITAL | Age: 86
End: 2021-05-29
Payer: COMMERCIAL

## 2021-05-29 ENCOUNTER — APPOINTMENT (EMERGENCY)
Dept: CT IMAGING | Facility: HOSPITAL | Age: 86
End: 2021-05-29
Payer: COMMERCIAL

## 2021-05-29 ENCOUNTER — HOSPITAL ENCOUNTER (EMERGENCY)
Facility: HOSPITAL | Age: 86
Discharge: HOME | End: 2021-05-29
Attending: EMERGENCY MEDICINE | Admitting: EMERGENCY MEDICINE
Payer: COMMERCIAL

## 2021-05-29 VITALS
TEMPERATURE: 97.6 F | SYSTOLIC BLOOD PRESSURE: 131 MMHG | BODY MASS INDEX: 31.84 KG/M2 | OXYGEN SATURATION: 98 % | WEIGHT: 186.51 LBS | HEIGHT: 64 IN | DIASTOLIC BLOOD PRESSURE: 62 MMHG | HEART RATE: 60 BPM | RESPIRATION RATE: 16 BRPM

## 2021-05-29 DIAGNOSIS — S63.502A LEFT WRIST SPRAIN, INITIAL ENCOUNTER: Primary | ICD-10-CM

## 2021-05-29 PROCEDURE — 73130 X-RAY EXAM OF HAND: CPT

## 2021-05-29 PROCEDURE — 99284 EMERGENCY DEPT VISIT MOD MDM: CPT

## 2021-05-29 PROCEDURE — 73080 X-RAY EXAM OF ELBOW: CPT

## 2021-05-29 PROCEDURE — 70450 CT HEAD/BRAIN W/O DYE: CPT

## 2021-05-29 PROCEDURE — 73110 X-RAY EXAM OF WRIST: CPT

## 2021-05-29 PROCEDURE — 29125 APPL SHORT ARM SPLINT STATIC: CPT | Performed by: EMERGENCY MEDICINE

## 2021-05-29 PROCEDURE — 99284 EMERGENCY DEPT VISIT MOD MDM: CPT | Performed by: EMERGENCY MEDICINE

## 2021-05-29 NOTE — ED PROVIDER NOTES
Emergency Department Trauma Note  Izzy Fragoso 80 y o  male MRN: [id number]  Unit/Bed#: LG92/HY88 Encounter: [id number]      Trauma Alert: Trauma Acuity: Trauma Evaluation  Model of Arrival:   via    Trauma Team: Current Providers  Attending Provider: Neil Prieto DO  Registered Nurse: Quinn Burnett RN  Consultants: None      History of Present Illness     Chief Complaint:   Chief Complaint   Patient presents with    Arm Injury     pt fell last night around 2000 landing on his left arm, denies hitting head, no loc, does take eliquis and aspirin, only complaint is pain in left wrist     HPI:  Izzy Fragoso is a 80 y o  male who is right-handed and who presents with left wrist/hand pain following a ground level mechanical fall yesterday evening approximately 1900  Patient was walking out a restaurant and states he slipped on wet ground, causing him to fall onto his left side and strike his left hand  He could not specify if he fell onto his hand in outstretched fashion or if he landed on top of his arm  He did not strike his head  No LOC  He had immediate pain on the dorsal lateral aspect of the wrist   He did take acetaminophen for the pain without any significant improvement  Pain is worse with any active range of motion of the wrist   He has some degree of chronic paresthesias in limited range of motion of both hands with flexion at the MCPs and interphalangeal joint but states this is no different now  He is anticoagulated with apixaban  No prior fracture/surgery of the left upper extremity  He incidentally noted a bony protuberance along the posterior aspect of the right elbow  He states this is not painful  He was unsure if it was present prior to his fall yesterday  He has had no limitation of movement in the right arm since the fall  He has had no prior fracture in the right elbow      Patient's cervical spine can be cleared by [facility] c-spine criteria:  Patient has a normal mental status and is awake and alert with no evidence of intoxication  The patient has no distracting injuries  The patient has a normal strength/sensation examination in the upper/lower extremities bilaterally  There is no posterior midline c-spine ttp or step-off  CT head given patient's anticoagulated status and ground level fall even though he did not strike his head  His age raises concern for delayed ICH particularly in the setting of anticoagulation  Left wrist/hand films  The right elbow has no tenderness associated with the area of swelling; I will obtain radiography of that regardless as patient identifies it as new  I suspect this represents osteoarthritic/degenerative changes  Mechanism:Details of Incident: pt fell around 2000 last night landing on his left arm, denies hitting his head, no loc, does take eliquis and aspirin Injury Date: 05/28/21 Injury Time: 2000 Injury Occurence Location - [address] [place] [address] Way: [facility]      History provided by:  Patient, relative and medical records    Review of Systems    Historical Information     Immunizations:   Immunization History   Administered Date(s) Administered    INFLUENZA 10/26/2016    Influenza Split High Dose Preservative Free IM 11/07/2012, 11/03/2014, 10/08/2015, 10/26/2016, 10/03/2017    Influenza, high dose seasonal 0 7 mL 10/09/2018, 10/07/2019, 10/15/2020    Influenza, seasonal, injectable 11/04/2013    Pneumococcal Polysaccharide PPV23 11/07/2006    SARS-CoV-2 / COVID-19 mRNA IM ([facility]) 01/21/2021, 02/19/2021       Past Medical History:   Diagnosis Date    Aortic stenosis     ECHO -4-14-14   MODERATE TO SEVERE AORTIC STENOSIS; MILD AROTIC INSUFFICIENCY - LAST ASSESSED 10/26/16; RESOLVED 6/8/16    Aortic valve disorder     LAST ASSESSED 10/8/15; 10/8/15    Complete heart block ([address] ) 7/20/2020    Hypertensive urgency 5/19/2019    Transient cerebral ischemia        Family History   Problem Relation Age of Onset    No Known Problems Mother     No Known Problems Father     Coronary artery disease Neg Hx      Past Surgical History:   Procedure Laterality Date    AORTIC VALVE REPLACEMENT  06/30/2015    avr with 23 mm [place] Magna Ease bioprosthetic    CARDIAC PACEMAKER PLACEMENT Left 07/24/2020    CATARACT EXTRACTION Right 06/05/2000    COLONOSCOPY      CORONARY ARTERY BYPASS GRAFT  06/05/2000    x1 with lima  to lad    POLYPECTOMY  04/2014    enteroscopic - esophageal ulcer, gastric erosion, and duodenal ulcer   TONSILLECTOMY      unknown     Social History     Tobacco Use    Smoking status: Never Smoker    Smokeless tobacco: Never Used   Substance Use Topics    Alcohol use: Never     Alcohol/week: 0 0 standard drinks     Frequency: Never     Drinks per session: Patient refused     Binge frequency: Never    Drug use: Never     E-Cigarette/Vaping    E-Cigarette Use Never User      E-Cigarette/Vaping Substances    Nicotine No     THC No     CBD No     Flavoring No     Other No     Unknown No        Family History: Noncontributory    Meds/Allergies   Prior to Admission Medications   Prescriptions Last Dose Informant Patient Reported? Taking?    amLODIPine (NORVASC) 5 mg tablet 5/29/2021 at Unknown time  No Yes   Sig: Take 1 tablet (5 mg total) by mouth daily   apixaban (ELIQUIS) 2 5 mg 5/29/2021 at Unknown time  No Yes   Sig: Take 1 tablet (2 5 mg total) by mouth 2 (two) times a day   aspirin (ECOTRIN LOW STRENGTH) 81 mg EC tablet 5/29/2021 at Unknown time  No Yes   Sig: Take 1 tablet (81 mg total) by mouth daily   cloNIDine (CATAPRES) 0 1 mg tablet 5/29/2021 at Unknown time  No Yes   Sig: Take 1 tablet (0 1 mg total) by mouth daily   ferrous sulfate 325 (65 Fe) mg tablet 5/29/2021 at Unknown time  No Yes   Sig: Take 1 tablet (325 mg total) by mouth daily with breakfast for 90 doses   metoprolol tartrate (LOPRESSOR) 100 mg tablet 5/29/2021 at Unknown time  No Yes   Sig: Take 1 tablet (100 mg total) by mouth 2 (two) times a day pantoprazole (PROTONIX) 40 mg tablet 5/29/2021 at Unknown time  No Yes   Sig: Take 1 tablet (40 mg total) by mouth 2 (two) times a day before meals   pravastatin (PRAVACHOL) 40 mg tablet 5/28/2021 at Unknown time  No Yes   Sig: TAKE 1 TABLET BY MOUTH ONCE DAILY WITH DINNER   senna (SENOKOT) 8 6 mg 5/28/2021 at Unknown time  No Yes   Sig: Take 1 tablet (8 6 mg total) by mouth daily at bedtime   torsemide (DEMADEX) 20 mg tablet 5/29/2021 at Unknown time  No Yes   Sig: Take 1 tablet (20 mg total) by mouth 2 (two) times a day      Facility-Administered Medications: None       Allergies   Allergen Reactions    Promethazine Delirium and Other (See Comments)       PHYSICAL EXAM    Objective   Vitals:   First set: Temperature: 97 6 °F (36 4 °C) (05/29/21 0859)  Pulse: 80 (05/29/21 0900)  Respirations: 18 (05/29/21 0900)  Blood Pressure: 157/66 (05/29/21 0900)  SpO2: 97 % (05/29/21 0900)    Primary Survey:   (A) Airway:  Intact with normal phonation  (B) Breathing:  Equal bilaterally with no wheeze/crackles/rhonchi  (C) Circulation: Pulses:   pedal  2/4 and radial  2/4 bilaterally; no external hemorrhage  (D) Disabliity:  GCS Total:  15   Intact sensation all extremities  Intact strength in all extremities  (E) Expose:  Completed    Secondary Survey:   Physical Exam  Vitals signs and nursing note reviewed  Constitutional:       General: He is awake  He is not in acute distress  Appearance: Normal appearance  He is well-developed  HENT:      Head: Normocephalic and atraumatic  Right Ear: Hearing and external ear normal       Left Ear: Hearing and external ear normal    Neck:      Musculoskeletal: No spinous process tenderness or muscular tenderness  Trachea: Trachea and phonation normal       Comments: No posterior midline tenderness to palpation or step-off  Rotation to 45 degrees bilaterally without difficulty  Cardiovascular:      Rate and Rhythm: Normal rate and regular rhythm        Pulses: Radial pulses are 2+ on the right side and 2+ on the left side  Dorsalis pedis pulses are 2+ on the right side and 2+ on the left side  Posterior tibial pulses are 2+ on the right side and 2+ on the left side  Heart sounds: Normal heart sounds, S1 normal and S2 normal  No murmur  No friction rub  No gallop  Pulmonary:      Effort: Pulmonary effort is normal  No respiratory distress  Breath sounds: Normal breath sounds  No stridor  No decreased breath sounds, wheezing, rhonchi or rales  Abdominal:      Tenderness: There is no abdominal tenderness  There is no guarding or rebound  Musculoskeletal:      Right elbow: He exhibits swelling (mild bony protuberance over posterior aspect of elbow just distal to olecranon  This is nontender  No crepitus)  He exhibits normal range of motion, no effusion and no deformity  No tenderness found  Left elbow: Normal       Right wrist: Normal       Left wrist: He exhibits tenderness and bony tenderness (Moderate ttp over distal radius with no crepitus  Somewhat less ttp over distal ulnar also with no deformity or crepitus)  He exhibits normal range of motion (Approx 30 deg flexion, 25 deg extension, 10 deg ulnar/radial deviation)  Right forearm: Normal       Left forearm: Normal       Right hand: He exhibits normal range of motion, no tenderness, no bony tenderness, normal two-point discrimination and no swelling  Normal sensation noted  Normal strength noted  Left hand: He exhibits decreased range of motion, tenderness (Proximal metacarpals 2-3 without overlying skin change or deformity) and bony tenderness  Comments: No posterior midline T/L-spine tenderness to palpation or step-off   Skin:     General: Skin is warm and dry  Neurological:      Mental Status: He is alert and oriented to person, place, and time  GCS: GCS eye subscore is 4  GCS verbal subscore is 5  GCS motor subscore is 6        Cranial Nerves: No cranial nerve deficit  Sensory: No sensory deficit  Motor: No abnormal muscle tone  Comments: PERRLA; EOMI  Sensation intact to light touch over face in V1-V3 distribution bilaterally  Facial expressions symmetric  Tongue/uvula midline  Shoulder shrug equal bilaterally  Strength 5/5 in UE/LE bilaterally  Sensation intact to light touch in UE/LE bilaterally  Cervical spine cleared by clinical criteria? Yes     Invasive Devices     Peripheral Intravenous Line            Peripheral IV 08/24/20 Left Antecubital 278 days                Lab Results:   Results Reviewed     None                 Imaging Studies:   Direct to CT: No  XR elbow 3+ vw RIGHT   ED Interpretation by Jill Taylor DO (05/29 1001)   Degenerative changes about the elbow  No fracture/dislocation  Final Result by Juju Cm MD (05/29 1009)      No acute osseous abnormality  Workstation performed: [id number]         XR wrist 3+ views LEFT   ED Interpretation by Jill Taylor DO (05/29 1001)   There appears to be a nondisplaced distal radius fracture  There are significant degenerative changes about the wrist generally  Final Result by Juju Cm MD (05/29 1018)      No acute osseous abnormality  Workstation performed: [id number]         XR hand 3+ views LEFT   ED Interpretation by Jill Taylor DO (05/29 1002)   Apparent nondisplaced distal radius fracture  Extensive degenerative disease around the radiocarpal joints generally  Arthritic changes in multiple interphalangeal joints, most pronounced on the second digit distal interphalangeal joint  Final Result by Juju Cm MD (05/29 1021)      No acute osseous abnormality  Workstation performed: [id number]         TRAUMA - CT head wo contrast   Final Result by Fernando Whitt DO (05/29 2116)   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        No acute intracranial abnormality  Workstation performed: [id number]               Procedures  Splint application    Date/Time: 5/29/2021 10:35 AM  Performed by: Eris Tomas DO  Authorized by: Eris Tomas DO   Universal Protocol:  Consent: Verbal consent obtained  Risks and benefits: risks, benefits and alternatives were discussed  Consent given by: patient  Time out: Immediately prior to procedure a "time out" was called to verify the correct patient, procedure, equipment, support staff and site/side marked as required  Timeout called at: 5/29/2021 10:56 AM   Required items: required blood products, implants, devices, and special equipment available  Patient identity confirmed: verbally with patient and arm band      Pre-procedure details:     Sensation:  Normal  Procedure details:     Laterality:  Left    Location:  Wrist    Wrist:  L wrist    Splint type:  Volar short arm    Supplies:  Cotton padding and Ortho-Glass  Post-procedure details:     Pain:  Improved    Sensation:  Normal    Patient tolerance of procedure: Tolerated well, no immediate complications             ED Course  ED Course as of May 29 1421   Sat May 29, 2021   1046 No chest injury described  No dyspnea/chest pain/chest ttp  Hemodynamically normal  Normal lung/chest exam   Defer chest x-ray given lack of injury pattern--no indication for chest imaging generally      0952 IMPRESSION:  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      No acute intracranial abnormality  TRAUMA - CT head wo contrast   1014 XR elbow 3+ vw RIGHT   1022 XR wrist 3+ views LEFT   1022 XR hand 3+ views LEFT   1022 Radiology interpretation of images indicates no fracture or dislocation  Will apply splint and disposition              MDM  Number of Diagnoses or Management Options  Left wrist sprain, initial encounter:   Diagnosis management comments: No radiographic evidence of injury   Splint was applied to left wrist for comfort  Patient already has an appointment scheduled with his primary physician on 1 June and was advised to keep this appointment for re-evaluation of his wrist   Signs/symptoms of delayed intracranial hemorrhage were discussed; the should obviously prompt immediate ED return if he were to develop any of them  All questions were answered prior to discharge  The patient expressed understanding and agreed to plan  Amount and/or Complexity of Data Reviewed  Tests in the radiology section of CPT®: ordered and reviewed  Decide to obtain previous medical records or to obtain history from someone other than the patient: yes  Obtain history from someone other than the patient: yes  Review and summarize past medical records: yes  Independent visualization of images, tracings, or specimens: yes    Risk of Complications, Morbidity, and/or Mortality  Presenting problems: high  Diagnostic procedures: high  Management options: moderate            Disposition  [facility] Transfer: No  Final diagnoses:   Left wrist sprain, initial encounter     Time reflects when diagnosis was documented in both MDM as applicable and the Disposition within this note     Time User Action Codes Description Comment    5/29/2021 10:43 AM Arsalan Vail Add [[id number] [id number]] Left wrist sprain, initial encounter       ED Disposition     ED Disposition Condition Date/Time Comment    Discharge Stable Sat May 29, 2021 10:43 AM Vikki Morgan discharge to home/self care              Follow-up Information     Follow up With Specialties Details Why Contact Info Additional Renée, DO Internal Medicine Go in 3 days As scheduled for further evaluation [facility] [address] [facility] Emergency Department Emergency Medicine Go to  If symptoms worsen: if you develop a severe headache, trouble speaking, sudden changes in your vision, trouble walking, or sudden numbness/weakness in some part of your body [address] [phone]  [address] [facility], [place] [address], [place], [place], [address]        Discharge Medication List as of 5/29/2021 10:46 AM      CONTINUE these medications which have NOT CHANGED    Details   amLODIPine (NORVASC) 5 mg tablet Take 1 tablet (5 mg total) by mouth daily, Starting Thu 10/15/2020, Until Sat 5/29/2021, Normal      apixaban (ELIQUIS) 2 5 mg Take 1 tablet (2 5 mg total) by mouth 2 (two) times a day, Starting Thu 10/15/2020, Until Sat 5/29/2021, Normal      aspirin (ECOTRIN LOW STRENGTH) 81 mg EC tablet Take 1 tablet (81 mg total) by mouth daily, Starting Thu 8/6/2020, No Print      cloNIDine (CATAPRES) 0 1 mg tablet Take 1 tablet (0 1 mg total) by mouth daily, Starting Thu 10/15/2020, Until Sat 5/29/2021, Normal      ferrous sulfate 325 (65 Fe) mg tablet Take 1 tablet (325 mg total) by mouth daily with breakfast for 90 doses, Starting Thu 10/15/2020, Until Sat 5/29/2021, Normal      metoprolol tartrate (LOPRESSOR) 100 mg tablet Take 1 tablet (100 mg total) by mouth 2 (two) times a day, Starting Mon 1/4/2021, Until Sat 5/29/2021, Normal      pantoprazole (PROTONIX) 40 mg tablet Take 1 tablet (40 mg total) by mouth 2 (two) times a day before meals, Starting Thu 10/15/2020, Until Sat 5/29/2021, Normal      pravastatin (PRAVACHOL) 40 mg tablet TAKE 1 TABLET BY MOUTH ONCE DAILY WITH DINNER, Normal      senna (SENOKOT) 8 6 mg Take 1 tablet (8 6 mg total) by mouth daily at bedtime, Starting Thu 5/6/2021, Normal      torsemide (DEMADEX) 20 mg tablet Take 1 tablet (20 mg total) by mouth 2 (two) times a day, Starting Thu 10/15/2020, Until Sat 5/29/2021, Normal           No discharge procedures on file      PDMP Review     None          ED Provider  Electronically Signed by         Ernesto Samaniego DO  05/29/21 2894

## 2021-06-01 ENCOUNTER — OFFICE VISIT (OUTPATIENT)
Dept: INTERNAL MEDICINE CLINIC | Facility: CLINIC | Age: 86
End: 2021-06-01
Payer: COMMERCIAL

## 2021-06-01 VITALS
DIASTOLIC BLOOD PRESSURE: 78 MMHG | HEIGHT: 64 IN | BODY MASS INDEX: 29.39 KG/M2 | TEMPERATURE: 96.1 F | SYSTOLIC BLOOD PRESSURE: 124 MMHG | WEIGHT: 172.13 LBS

## 2021-06-01 DIAGNOSIS — D50.8 OTHER IRON DEFICIENCY ANEMIA: Primary | ICD-10-CM

## 2021-06-01 DIAGNOSIS — Z86.39 HX OF HYPERGLYCEMIA: ICD-10-CM

## 2021-06-01 DIAGNOSIS — M25.532 LEFT WRIST PAIN: ICD-10-CM

## 2021-06-01 DIAGNOSIS — I50.42 CHRONIC COMBINED SYSTOLIC AND DIASTOLIC CONGESTIVE HEART FAILURE (HCC): ICD-10-CM

## 2021-06-01 DIAGNOSIS — J43.9 PULMONARY EMPHYSEMA, UNSPECIFIED EMPHYSEMA TYPE (HCC): ICD-10-CM

## 2021-06-01 DIAGNOSIS — N18.4 CKD (CHRONIC KIDNEY DISEASE) STAGE 4, GFR 15-29 ML/MIN (HCC): ICD-10-CM

## 2021-06-01 PROBLEM — R00.1 BRADYCARDIA: Status: RESOLVED | Noted: 2020-07-20 | Resolved: 2021-06-01

## 2021-06-01 PROBLEM — R79.89 AZOTEMIA: Status: RESOLVED | Noted: 2020-08-11 | Resolved: 2021-06-01

## 2021-06-01 PROBLEM — D72.829 LEUKOCYTOSIS: Status: RESOLVED | Noted: 2020-07-21 | Resolved: 2021-06-01

## 2021-06-01 PROCEDURE — 1160F RVW MEDS BY RX/DR IN RCRD: CPT | Performed by: INTERNAL MEDICINE

## 2021-06-01 PROCEDURE — 99214 OFFICE O/P EST MOD 30 MIN: CPT | Performed by: INTERNAL MEDICINE

## 2021-06-01 NOTE — PROGRESS NOTES
Assessment/Plan:         Diagnoses and all orders for this visit:    Left wrist pain s/p fall   Ortho evaluation scheduled for this week maintain splint - elevate forearm at rest Tylenol every 8 hours for pain as needed     Other iron deficiency anemia  -     Iron Panel (Includes Ferritin, Iron Sat%, Iron, and TIBC); Future  Rx for labs and his daughter will take him in ear future     Pulmonary emphysema, unspecified emphysema type ([facility] )  No acute respiratory issues but pt very limited at home  Encouraged walking within the home for exercise     Chronic combined systolic and diastolic congestive heart failure ([facility] [address] )  Pt encouraged to call cardio for followup  Last visit appears fall 2020    CKD (chronic kidney disease) stage 4, GFR 15-29 ml/min ([facility])  -     CBC and differential; Future  -     Comprehensive metabolic panel; Future  Recheck gfr may need nephro followup    Complete heart block (HCC)  Pt has pacer and does routine checks via cardiology network     Hx of hyperglycemia  -     HEMOGLOBIN A1C W/ EAG ESTIMATION; Future  October/prn     Patient ID: Sonny Martinez is a 80 y o  male  HPI  Pt has a fall over the weekend He slipped going from restaurant in the rain He injured left wrist and was in [facility] ER said no fx but pt still has splint and significant pain/swelling of his fingers Tylenol prn pain No discoloration He did not loc and he said it was because of the rain not sxs prior to fall No chest pain He is not very active per his wife at home He sleeps often She tries to get him to walk but he is not interested He was active when the fields needed attention but that is done for the season No constipation No abdominal pain or melena       Review of Systems   Constitutional: Positive for activity change  Negative for chills and fever  HENT: Negative  Eyes: Positive for visual disturbance  Respiratory: Negative for cough and shortness of breath      Cardiovascular: Negative for chest pain, palpitations and leg swelling  Gastrointestinal: Negative for abdominal distention, abdominal pain, constipation and diarrhea  Genitourinary: Positive for frequency  Negative for difficulty urinating and flank pain  Musculoskeletal: Positive for arthralgias and joint swelling  Skin: Positive for pallor  Neurological: Negative for dizziness and headaches  Psychiatric/Behavioral: Negative for sleep disturbance  The patient is not nervous/anxious  Past Medical History:   Diagnosis Date    Aortic stenosis     ECHO -4-14-14  MODERATE TO SEVERE AORTIC STENOSIS; MILD AROTIC INSUFFICIENCY - LAST ASSESSED 10/26/16; RESOLVED 6/8/16    Aortic valve disorder     LAST ASSESSED 10/8/15; 10/8/15    Complete heart block ([facility] ) 7/20/2020    Hypertensive urgency 5/19/2019    Transient cerebral ischemia      Past Surgical History:   Procedure Laterality Date    AORTIC VALVE REPLACEMENT  06/30/2015    avr with 23 mm Livingston Magna Ease bioprosthetic    CARDIAC PACEMAKER PLACEMENT Left 07/24/2020    CATARACT EXTRACTION Right 06/05/2000    COLONOSCOPY      CORONARY ARTERY BYPASS GRAFT  06/05/2000    x1 with lima  to lad    POLYPECTOMY  04/2014    enteroscopic - esophageal ulcer, gastric erosion, and duodenal ulcer   TONSILLECTOMY      unknown     Social History     Socioeconomic History    Marital status: /Civil Union     Spouse name:  Yari Santoss Number of children: 5    Years of education: Not on file    Highest education level: Not on file   Occupational History    Occupation: retired   Social Needs    Financial resource strain: Not hard at all   Sylvia-Ronaldo insecurity     Worry: Never true     Inability: Never true   F[employer] needs     Medical: No     Non-medical: No   Tobacco Use    Smoking status: Never Smoker    Smokeless tobacco: Never Used   Substance and Sexual Activity    Alcohol use: Never     Alcohol/week: 0 0 standard drinks     Frequency: Never     Drinks per session: Patient refused Binge frequency: Never    Drug use: Never    Sexual activity: Not on file   Lifestyle    Physical activity     Days per week: Not on file     Minutes per session: Not on file    Stress: Not on file   Relationships    Social connections     Talks on phone: More than three times a week     Gets together: More than three times a week     Attends Shinto service: Not on file     Active member of club or organization: Not on file     Attends meetings of clubs or organizations: Not on file     Relationship status:     Intimate partner violence     Fear of current or ex partner: Not on file     Emotionally abused: Not on file     Physically abused: Not on file     Forced sexual activity: Not on file   Other Topics Concern    Not on file   Social History Narrative    Caffeine use     Dental care, regularly     Lives independently with spouse     Uses seatbelts      Allergies   Allergen Reactions    Promethazine Delirium and Other (See Comments)     BMI Counseling: Body mass index is 29 55 kg/m²  The BMI is above normal  Nutrition recommendations include consuming healthier snacks and moderation in carbohydrate intake  Exercise recommendations include exercising 3-5 times per week  No pharmacotherapy was ordered  /78   Temp (!) 96 1 °F (35 6 °C) (Temporal)   Ht 5' 4" (1 626 m)   Wt 78 1 kg (172 lb 2 oz)   BMI 29 55 kg/m²          Physical Exam  Vitals signs reviewed  Constitutional:       General: He is not in acute distress  Appearance: Normal appearance  He is not ill-appearing, toxic-appearing or diaphoretic  HENT:      Head: Normocephalic and atraumatic  Right Ear: Tympanic membrane, ear canal and external ear normal  There is no impacted cerumen  Left Ear: Tympanic membrane, ear canal and external ear normal  There is no impacted cerumen  Nose: Nose normal       Mouth/Throat:      Mouth: Mucous membranes are dry  Eyes:      General: No scleral icterus  Extraocular Movements: Extraocular movements intact  Conjunctiva/sclera: Conjunctivae normal       Pupils: Pupils are equal, round, and reactive to light  Neck:      Musculoskeletal: Normal range of motion and neck supple  No neck rigidity or muscular tenderness  Cardiovascular:      Rate and Rhythm: Normal rate and regular rhythm  Pulses: Normal pulses  Heart sounds: Normal heart sounds  No murmur  Pulmonary:      Effort: Pulmonary effort is normal  No respiratory distress  Breath sounds: Normal breath sounds  No wheezing  Abdominal:      General: Bowel sounds are normal  There is no distension  Palpations: Abdomen is soft  Tenderness: There is no abdominal tenderness  Musculoskeletal:         General: Swelling, tenderness and deformity present  Comments: Left wrist injury in splint   Lymphadenopathy:      Cervical: No cervical adenopathy  Skin:     General: Skin is dry  Coloration: Skin is not jaundiced or pale  Findings: No erythema  Neurological:      General: No focal deficit present  Mental Status: He is alert and oriented to person, place, and time  Mental status is at baseline  Cranial Nerves: No cranial nerve deficit  Sensory: Sensory deficit present  Psychiatric:         Mood and Affect: Mood normal          Behavior: Behavior normal          Thought Content:  Thought content normal          Judgment: Judgment normal

## 2021-06-03 ENCOUNTER — OFFICE VISIT (OUTPATIENT)
Dept: OBGYN CLINIC | Facility: CLINIC | Age: 86
End: 2021-06-03
Payer: COMMERCIAL

## 2021-06-03 VITALS
BODY MASS INDEX: 29.39 KG/M2 | SYSTOLIC BLOOD PRESSURE: 128 MMHG | HEART RATE: 62 BPM | DIASTOLIC BLOOD PRESSURE: 70 MMHG | WEIGHT: 172.13 LBS | HEIGHT: 64 IN

## 2021-06-03 DIAGNOSIS — M25.432 SWELLING OF LEFT WRIST: ICD-10-CM

## 2021-06-03 DIAGNOSIS — S63.502A SPRAIN OF LEFT WRIST, INITIAL ENCOUNTER: Primary | ICD-10-CM

## 2021-06-03 PROCEDURE — 1036F TOBACCO NON-USER: CPT | Performed by: ORTHOPAEDIC SURGERY

## 2021-06-03 PROCEDURE — 1160F RVW MEDS BY RX/DR IN RCRD: CPT | Performed by: ORTHOPAEDIC SURGERY

## 2021-06-03 PROCEDURE — 99203 OFFICE O/P NEW LOW 30 MIN: CPT | Performed by: ORTHOPAEDIC SURGERY

## 2021-06-03 RX ORDER — METHYLPREDNISOLONE 4 MG/1
TABLET ORAL
Qty: 21 TABLET | Refills: 0 | Status: SHIPPED | OUTPATIENT
Start: 2021-06-03 | End: 2021-08-04 | Stop reason: HOSPADM

## 2021-06-03 NOTE — PROGRESS NOTES
ASSESSMENT/PLAN:    Diagnoses and all orders for this visit:    Sprain of left wrist, initial encounter  -     Ambulatory referral to Orthopedic Surgery  -     methylPREDNISolone 4 MG tablet therapy pack; Use as directed on package  -     Ambulatory referral to Physical Therapy; Future    Swelling of left wrist        X-rays of the patient's left wrist are negative for any acute fractures or dislocations  He does have significant swelling  He will be prescribed a Medrol Dosepak  As an anti-inflammatory, as he is currently on Eliquis  He was also given a referral for physical therapy to work on edema control and range of motion  He will follow up with our office in 1 month for strength and motion check  The patient is acceptable to this plan  Return in about 1 month (around 7/3/2021)  _____________________________________________________  CHIEF COMPLAINT:  Chief Complaint   Patient presents with    Left Wrist - Pain         SUBJECTIVE:  Eliza Rios is a 80 y o  male who presents   To our office complaining of left wrist swelling and pain  Patient states that on 05/20/2021 he fell and landed on his left arm  The next morning, he went to the emergency department where x-rays performed  X-rays were negative for any fractures or dislocations  He was given a splint to wear  He presents today complaining of significant edema of his left hand and wrist   He is also complaining of left wrist pain  He denies any numbness or tingling  He denies any fever or chills  He denies any history of gout  He is currently taking Eliquis  The following portions of the patient's history were reviewed and updated as appropriate: allergies, current medications, past family history, past medical history, past social history, past surgical history and problem list     PAST MEDICAL HISTORY:  Past Medical History:   Diagnosis Date    Aortic stenosis     ECHO -4-14-14   MODERATE TO SEVERE AORTIC STENOSIS; MILD AROTIC INSUFFICIENCY - LAST ASSESSED 10/26/16; RESOLVED 6/8/16    Aortic valve disorder     LAST ASSESSED 10/8/15; 10/8/15    Complete heart block ([address] ) 7/20/2020    Hypertensive urgency 5/19/2019    Transient cerebral ischemia        PAST SURGICAL HISTORY:  Past Surgical History:   Procedure Laterality Date    AORTIC VALVE REPLACEMENT  06/30/2015    avr with 23 mm Livingston Magna Ease bioprosthetic    CARDIAC PACEMAKER PLACEMENT Left 07/24/2020    CATARACT EXTRACTION Right 06/05/2000    COLONOSCOPY      CORONARY ARTERY BYPASS GRAFT  06/05/2000    x1 with lima  to lad    POLYPECTOMY  04/2014    enteroscopic - esophageal ulcer, gastric erosion, and duodenal ulcer       TONSILLECTOMY      unknown       FAMILY HISTORY:  Family History   Problem Relation Age of Onset    No Known Problems Mother     No Known Problems Father     Coronary artery disease Neg Hx        SOCIAL HISTORY:  Social History     Tobacco Use    Smoking status: Never Smoker    Smokeless tobacco: Never Used   Substance Use Topics    Alcohol use: Never     Alcohol/week: 0 0 standard drinks     Frequency: Never     Drinks per session: Patient refused     Binge frequency: Never    Drug use: Never       MEDICATIONS:    Current Outpatient Medications:     aspirin (ECOTRIN LOW STRENGTH) 81 mg EC tablet, Take 1 tablet (81 mg total) by mouth daily, Disp:  , Rfl: 0    pravastatin (PRAVACHOL) 40 mg tablet, TAKE 1 TABLET BY MOUTH ONCE DAILY WITH DINNER, Disp: 90 tablet, Rfl: 0    senna (SENOKOT) 8 6 mg, Take 1 tablet (8 6 mg total) by mouth daily at bedtime, Disp: 90 tablet, Rfl: 3    amLODIPine (NORVASC) 5 mg tablet, Take 1 tablet (5 mg total) by mouth daily, Disp: 90 tablet, Rfl: 3    apixaban (ELIQUIS) 2 5 mg, Take 1 tablet (2 5 mg total) by mouth 2 (two) times a day, Disp: 180 tablet, Rfl: 3    cloNIDine (CATAPRES) 0 1 mg tablet, Take 1 tablet (0 1 mg total) by mouth daily, Disp: 90 tablet, Rfl: 3    ferrous sulfate 325 (65 Fe) mg tablet, Take 1 tablet (325 mg total) by mouth daily with breakfast for 90 doses, Disp: 90 tablet, Rfl: 3    methylPREDNISolone 4 MG tablet therapy pack, Use as directed on package, Disp: 21 tablet, Rfl: 0    metoprolol tartrate (LOPRESSOR) 100 mg tablet, Take 1 tablet (100 mg total) by mouth 2 (two) times a day, Disp: 180 tablet, Rfl: 3    pantoprazole (PROTONIX) 40 mg tablet, Take 1 tablet (40 mg total) by mouth 2 (two) times a day before meals, Disp: 180 tablet, Rfl: 3    torsemide (DEMADEX) 20 mg tablet, Take 1 tablet (20 mg total) by mouth 2 (two) times a day, Disp: 180 tablet, Rfl: 3    ALLERGIES:  Allergies   Allergen Reactions    Promethazine Delirium and Other (See Comments)       ROS:  Review of Systems     Constitutional: Negative for fatigue, fever or loss of appetite  HENT: Negative  Respiratory: Negative for shortness of breath, dyspnea  Cardiovascular: Negative for chest pain/tightness  Gastrointestinal: Negative for abdominal pain, N/V  Endocrine: Negative for cold/heat intolerance, unexplained weight loss/gain  Genitourinary: Negative for flank pain, dysuria, hematuria  Musculoskeletal: Positive for arthralgia and edema   Skin: Negative for rash  Neurological: Negative for numbness or tingling  Psychiatric/Behavioral: Negative for agitation  _____________________________________________________  PHYSICAL EXAMINATION:    Blood pressure 128/70, pulse 62, height 5' 4" (1 626 m), weight 78 1 kg (172 lb 2 oz)  Constitutional: Oriented to person, place, and time  Appears well-developed and well-nourished  No distress  HENT:   Head: Normocephalic  Eyes: Conjunctivae are normal  Right eye exhibits no discharge  Left eye exhibits no discharge  No scleral icterus  Cardiovascular: Normal rate  Pulmonary/Chest: Effort normal    Neurological: Alert and oriented to person, place, and time  Skin: Skin is warm and dry  No rash noted  Not diaphoretic  No erythema  No pallor  Psychiatric: Normal mood and affect  Behavior is normal  Judgment and thought content normal       MUSCULOSKELETAL EXAMINATION:   Physical Exam  Ortho Exam      Left upper extremity is neurovascularly intact   Fingers are pink and mobile   Compartments are soft  There is significant swelling of the hand and wrist   Brisk cap refill   Sensation intact   Decreased range of motion of the wrist as well as  strength   Generalized tenderness to palpation   no warmth, erythema or ecchymosis  Objective:  BP Readings from Last 1 Encounters:   06/03/21 128/70      Wt Readings from Last 1 Encounters:   06/03/21 78 1 kg (172 lb 2 oz)        BMI:   Estimated body mass index is 29 55 kg/m² as calculated from the following:    Height as of this encounter: 5' 4" (1 626 m)  Weight as of this encounter: 78 1 kg (172 lb 2 oz)  Radiographs:  _____________________________________________________  STUDIES REVIEWED:  I have personally reviewed pertinent films and reports in PACS  x-rays the patient's left wrist are negative for any fractures or dislocations  He does have arthritic changes          Ivania Abdi PA-C

## 2021-06-09 ENCOUNTER — EVALUATION (OUTPATIENT)
Dept: PHYSICAL THERAPY | Facility: CLINIC | Age: 86
End: 2021-06-09
Payer: COMMERCIAL

## 2021-06-09 DIAGNOSIS — S63.502A SPRAIN OF LEFT WRIST, INITIAL ENCOUNTER: ICD-10-CM

## 2021-06-09 DIAGNOSIS — S63.502D SPRAIN OF LEFT WRIST, SUBSEQUENT ENCOUNTER: Primary | ICD-10-CM

## 2021-06-09 PROCEDURE — 97035 APP MDLTY 1+ULTRASOUND EA 15: CPT | Performed by: PHYSICAL THERAPIST

## 2021-06-09 PROCEDURE — 97140 MANUAL THERAPY 1/> REGIONS: CPT | Performed by: PHYSICAL THERAPIST

## 2021-06-09 PROCEDURE — 97110 THERAPEUTIC EXERCISES: CPT | Performed by: PHYSICAL THERAPIST

## 2021-06-09 PROCEDURE — 97161 PT EVAL LOW COMPLEX 20 MIN: CPT | Performed by: PHYSICAL THERAPIST

## 2021-06-09 NOTE — PROGRESS NOTES
PT Evaluation     Today's date: 2021  Patient name: Waylon Serrano  : 1935  MRN: [id number]  Referring provider: Yodit Alexander DO  Dx: No diagnosis found  Assessment  Assessment details: Pt is an 79 YO male presenting to PT with pain, decreased AROM, strength and tolerance to activity  Pt would benefit from skilled intervention to address these issues and maximize overall function  Occupation- retired  Dominant- right; Involved- left      Goals  ST  Decrease pain to 2-3/10 in 4 weeks            2  Decrease swelling left hand and wrist            3  Increase AROM to [facility] in 6-8 weeks            4   Provide orthotic for protection, compression for support  LT  Increase functional motion and strength for independence with ADL and self care by DC            2  Ability to RT recreational activity by DC    Plan  Patient would benefit from: skilled physical therapy  Planned modality interventions: ultrasound, thermotherapy: hydrocollator packs and cryotherapy  Planned therapy interventions: manual therapy, activity modification, neuromuscular re-education, strengthening, stretching, therapeutic activities, therapeutic exercise and home exercise program  Frequency: 2x week  Duration in weeks: 4  Treatment plan discussed with: patient        Subjective Evaluation    History of Present Illness  Mechanism of injury: Patient states that on 2021 he fell and landed on his left arm  The next morning, he went to the emergency department where x-rays performed  X-rays were negative for any fractures or dislocations  He was given a splint to wear  He presents today complaining of significant edema of his left hand and wrist   He is also complaining of left wrist pain  He denies any numbness or tingling   He is currently taking Eliquis       Pain  Current pain ratin  At best pain ratin  At worst pain rating: 3    Hand dominance: right    Treatments  Current treatment: physical therapy  Patient Goals  Patient goals for therapy: decreased edema, decreased pain, increased motion, increased strength, independence with ADLs/IADLs and return to sport/leisure activities          Objective     General Comments:      Wrist/Hand Comments  AROM left FA S/P- 50/90; wrist E/F- 50/45 (right 60/60)              digits MF MP- 0/75;PIP- 0/75;DIP- 0/75  Strength-  II- L/R- 10/18; 3 LORIE- 2/2; Key- 4/4  Circumference at wrist-L/R-20 5/18 5 cm  Sensation- no numbness or tingling noted  Palpation- tender ulnar aspect and scaphoid area             Precautions: wear splint when active    Manuals 6/9            STM 15                         tubigrip E                         Ther Ex             AROM W/FA 10x                                                   Modalities              12            CP 15

## 2021-06-11 ENCOUNTER — APPOINTMENT (OUTPATIENT)
Dept: LAB | Facility: MEDICAL CENTER | Age: 86
End: 2021-06-11
Payer: COMMERCIAL

## 2021-06-11 DIAGNOSIS — D50.8 OTHER IRON DEFICIENCY ANEMIA: ICD-10-CM

## 2021-06-11 DIAGNOSIS — N18.4 CKD (CHRONIC KIDNEY DISEASE) STAGE 4, GFR 15-29 ML/MIN (HCC): ICD-10-CM

## 2021-06-11 DIAGNOSIS — Z86.39 HX OF HYPERGLYCEMIA: ICD-10-CM

## 2021-06-11 LAB
ALBUMIN SERPL BCP-MCNC: 3.7 G/DL (ref 3.5–5)
ALP SERPL-CCNC: 84 U/L (ref 46–116)
ALT SERPL W P-5'-P-CCNC: 35 U/L (ref 12–78)
ANION GAP SERPL CALCULATED.3IONS-SCNC: 5 MMOL/L (ref 4–13)
AST SERPL W P-5'-P-CCNC: 21 U/L (ref 5–45)
BASOPHILS # BLD AUTO: 0.03 THOUSANDS/ΜL (ref 0–0.1)
BASOPHILS NFR BLD AUTO: 0 % (ref 0–1)
BILIRUB SERPL-MCNC: 0.36 MG/DL (ref 0.2–1)
BUN SERPL-MCNC: 80 MG/DL (ref 5–25)
CALCIUM SERPL-MCNC: 9.4 MG/DL (ref 8.3–10.1)
CHLORIDE SERPL-SCNC: 96 MMOL/L (ref 100–108)
CO2 SERPL-SCNC: 34 MMOL/L (ref 21–32)
CREAT SERPL-MCNC: 2.72 MG/DL (ref 0.6–1.3)
EOSINOPHIL # BLD AUTO: 0.54 THOUSAND/ΜL (ref 0–0.61)
EOSINOPHIL NFR BLD AUTO: 3 % (ref 0–6)
ERYTHROCYTE [DISTWIDTH] IN BLOOD BY AUTOMATED COUNT: 12.1 % (ref 11.6–15.1)
EST. AVERAGE GLUCOSE BLD GHB EST-MCNC: 120 MG/DL
FERRITIN SERPL-MCNC: 77 NG/ML (ref 8–388)
GFR SERPL CREATININE-BSD FRML MDRD: 20 ML/MIN/1.73SQ M
GLUCOSE P FAST SERPL-MCNC: 105 MG/DL (ref 65–99)
HBA1C MFR BLD: 5.8 %
HCT VFR BLD AUTO: 34.7 % (ref 36.5–49.3)
HGB BLD-MCNC: 10.9 G/DL (ref 12–17)
IMM GRANULOCYTES # BLD AUTO: 0.27 THOUSAND/UL (ref 0–0.2)
IMM GRANULOCYTES NFR BLD AUTO: 1 % (ref 0–2)
IRON SATN MFR SERPL: 10 %
IRON SERPL-MCNC: 38 UG/DL (ref 65–175)
LYMPHOCYTES # BLD AUTO: 1.6 THOUSANDS/ΜL (ref 0.6–4.47)
LYMPHOCYTES NFR BLD AUTO: 8 % (ref 14–44)
MCH RBC QN AUTO: 29.1 PG (ref 26.8–34.3)
MCHC RBC AUTO-ENTMCNC: 31.4 G/DL (ref 31.4–37.4)
MCV RBC AUTO: 93 FL (ref 82–98)
MONOCYTES # BLD AUTO: 1.56 THOUSAND/ΜL (ref 0.17–1.22)
MONOCYTES NFR BLD AUTO: 8 % (ref 4–12)
NEUTROPHILS # BLD AUTO: 16.03 THOUSANDS/ΜL (ref 1.85–7.62)
NEUTS SEG NFR BLD AUTO: 80 % (ref 43–75)
NRBC BLD AUTO-RTO: 0 /100 WBCS
PLATELET # BLD AUTO: 514 THOUSANDS/UL (ref 149–390)
PMV BLD AUTO: 10.2 FL (ref 8.9–12.7)
POTASSIUM SERPL-SCNC: 4.1 MMOL/L (ref 3.5–5.3)
PROT SERPL-MCNC: 7.6 G/DL (ref 6.4–8.2)
RBC # BLD AUTO: 3.75 MILLION/UL (ref 3.88–5.62)
SODIUM SERPL-SCNC: 135 MMOL/L (ref 136–145)
TIBC SERPL-MCNC: 391 UG/DL (ref 250–450)
WBC # BLD AUTO: 20.03 THOUSAND/UL (ref 4.31–10.16)

## 2021-06-11 PROCEDURE — 83550 IRON BINDING TEST: CPT

## 2021-06-11 PROCEDURE — 83540 ASSAY OF IRON: CPT

## 2021-06-11 PROCEDURE — 80053 COMPREHEN METABOLIC PANEL: CPT

## 2021-06-11 PROCEDURE — 82728 ASSAY OF FERRITIN: CPT

## 2021-06-11 PROCEDURE — 85025 COMPLETE CBC W/AUTO DIFF WBC: CPT

## 2021-06-11 PROCEDURE — 83036 HEMOGLOBIN GLYCOSYLATED A1C: CPT

## 2021-06-11 PROCEDURE — 36415 COLL VENOUS BLD VENIPUNCTURE: CPT

## 2021-06-14 ENCOUNTER — OFFICE VISIT (OUTPATIENT)
Dept: PHYSICAL THERAPY | Facility: CLINIC | Age: 86
End: 2021-06-14
Payer: COMMERCIAL

## 2021-06-14 DIAGNOSIS — S63.502A SPRAIN OF LEFT WRIST, INITIAL ENCOUNTER: ICD-10-CM

## 2021-06-14 DIAGNOSIS — S63.502D SPRAIN OF LEFT WRIST, SUBSEQUENT ENCOUNTER: Primary | ICD-10-CM

## 2021-06-14 PROCEDURE — 97035 APP MDLTY 1+ULTRASOUND EA 15: CPT

## 2021-06-14 PROCEDURE — 97140 MANUAL THERAPY 1/> REGIONS: CPT

## 2021-06-14 PROCEDURE — 97110 THERAPEUTIC EXERCISES: CPT

## 2021-06-14 NOTE — PROGRESS NOTES
Daily Note     Today's date: 2021  Patient name: Tarah Lott  : 1935  MRN: [id number]  Referring provider: Kari White DO  Dx:   Encounter Diagnosis     ICD-10-CM    1  Sprain of left wrist, subsequent encounter  S63 502D    2  Sprain of left wrist, initial encounter  S63 502A                   Subjective: Pt reports minimal change since IE  No numbness or tingling in his wrist       Objective: See treatment diary below    Assessment  Assessment details: Pt is an 79 YO male presenting to PT with pain, decreased AROM, strength and tolerance to activity  Pt would benefit from skilled intervention to address these issues and maximize overall function  Occupation- retired  Dominant- right;  Involved- left    Wrist/Hand Comments  AROM left FA S/P- 50/90; wrist E/F- 50/45 (right 60/60)              digits MF MP- 0/75;PIP- 0/75;DIP- 0/75  Strength-  II- L/R- 10/18; 3 LORIE- 2/2; Key- 4/4  Circumference at wrist-L/R-20 5/18 5 cm  Sensation- no numbness or tingling noted  Palpation- tender ulnar aspect and scaphoid area    Plan:       Precautions: wear splint when active    Manuals            STM 15 15                        tubigrip E                         Ther Ex             AROM W/FA 10x 10x                                                  Modalities             US 12 12           CP 15 15

## 2021-06-16 ENCOUNTER — OFFICE VISIT (OUTPATIENT)
Dept: PHYSICAL THERAPY | Facility: CLINIC | Age: 86
End: 2021-06-16
Payer: COMMERCIAL

## 2021-06-16 DIAGNOSIS — S63.502D SPRAIN OF LEFT WRIST, SUBSEQUENT ENCOUNTER: Primary | ICD-10-CM

## 2021-06-16 PROCEDURE — 97110 THERAPEUTIC EXERCISES: CPT | Performed by: PHYSICAL THERAPIST

## 2021-06-16 PROCEDURE — 97140 MANUAL THERAPY 1/> REGIONS: CPT | Performed by: PHYSICAL THERAPIST

## 2021-06-16 PROCEDURE — 97112 NEUROMUSCULAR REEDUCATION: CPT | Performed by: PHYSICAL THERAPIST

## 2021-06-16 NOTE — PROGRESS NOTES
Daily Note     Today's date: 2021  Patient name: Sonny Martinez  : 1935  MRN: [id number]  Referring provider: Lambert Harris DO  Dx:   Encounter Diagnosis     ICD-10-CM    1  Sprain of left wrist, subsequent encounter  S63 829D                   Subjective: pt feels his pain is decreasing in the wrist but swelling continues      Objective: See treatment diary below    AROM left FA S/P- 50/90; wrist E/F- 50/45 (right 60/60)              digits MF MP- 0/75;PIP- 0/75;DIP- 0/75  Strength-  II- L/R- 10/18; 3 LORIE- 2/2; Key- 4/4  Circumference at wrist-L/R-20 /18 5 cm  Sensation- no numbness or tingling noted  Palpation- tender ulnar aspect and scaphoid area     Assessment: Tolerated treatment well  Patient would benefit from continued PT      Plan: Continue per plan of care        Precautions: wear splint when active    Manuals           STM 15 15 15                       tubigrip E            isotoner   lg          Ther Ex             AROM W/FA 10x 10x 10x                                                 Modalities             US 12 12 12          CP 15 15 15

## 2021-06-21 ENCOUNTER — OFFICE VISIT (OUTPATIENT)
Dept: PHYSICAL THERAPY | Facility: CLINIC | Age: 86
End: 2021-06-21
Payer: COMMERCIAL

## 2021-06-21 DIAGNOSIS — S63.502D SPRAIN OF LEFT WRIST, SUBSEQUENT ENCOUNTER: Primary | ICD-10-CM

## 2021-06-21 DIAGNOSIS — S63.502A SPRAIN OF LEFT WRIST, INITIAL ENCOUNTER: ICD-10-CM

## 2021-06-21 PROCEDURE — 97140 MANUAL THERAPY 1/> REGIONS: CPT

## 2021-06-21 PROCEDURE — 97112 NEUROMUSCULAR REEDUCATION: CPT

## 2021-06-21 PROCEDURE — 97110 THERAPEUTIC EXERCISES: CPT

## 2021-06-21 PROCEDURE — 97035 APP MDLTY 1+ULTRASOUND EA 15: CPT

## 2021-06-21 NOTE — PROGRESS NOTES
Daily Note     Today's date: 2021  Patient name: Pavel Jose  : 1935  MRN: [id number]  Referring provider: Anita Allen DO  Dx:   Encounter Diagnosis     ICD-10-CM    1  Sprain of left wrist, subsequent encounter  S63 502D    2  Sprain of left wrist, initial encounter  S63 502A                   Subjective: Pt reports the glove fits good and his hand is "a little better "      Objective: See treatment diary below    Objective: See treatment diary below    AROM left FA S/P- 50/90; wrist E/F- 50/45 (right 60/60)              digits MF MP- 0/75;PIP- 0/75;DIP- 0/75  Strength-  II- L/R- 10/18; 3 LORIE- 2/2; Key- 4/4  Circumference at wrist-L/R-20 5/18 5 cm  Sensation- no numbness or tingling noted  Palpation- tender ulnar aspect and scaphoid area       Assessment: Tolerated treatment well today  Continue with AROM and modalities for symptom control  Plan: Progress treatment as tolerated         Precautions: wear splint when active    Manuals          STM 15 15 15 15                      tubigrip E            isotoner   lg          Ther Ex             AROM W/FA 10x 10x 10x 10x                                                Modalities             US 12 12 12 12         CP 15 15 15 15

## 2021-06-23 ENCOUNTER — APPOINTMENT (OUTPATIENT)
Dept: PHYSICAL THERAPY | Facility: CLINIC | Age: 86
End: 2021-06-23
Payer: COMMERCIAL

## 2021-06-25 ENCOUNTER — OFFICE VISIT (OUTPATIENT)
Dept: PHYSICAL THERAPY | Facility: CLINIC | Age: 86
End: 2021-06-25
Payer: COMMERCIAL

## 2021-06-25 DIAGNOSIS — S63.502D SPRAIN OF LEFT WRIST, SUBSEQUENT ENCOUNTER: Primary | ICD-10-CM

## 2021-06-25 DIAGNOSIS — S63.502A SPRAIN OF LEFT WRIST, INITIAL ENCOUNTER: ICD-10-CM

## 2021-06-25 PROCEDURE — 97035 APP MDLTY 1+ULTRASOUND EA 15: CPT

## 2021-06-25 PROCEDURE — 97112 NEUROMUSCULAR REEDUCATION: CPT

## 2021-06-25 PROCEDURE — 97110 THERAPEUTIC EXERCISES: CPT

## 2021-06-25 PROCEDURE — 97140 MANUAL THERAPY 1/> REGIONS: CPT

## 2021-06-25 NOTE — PROGRESS NOTES
Daily Note     Today's date: 2021  Patient name: Sussy Vazquez  : 1935  MRN: [id number]  Referring provider: Penny Luther DO  Dx:   Encounter Diagnosis     ICD-10-CM    1  Sprain of left wrist, subsequent encounter  S63 502D    2  Sprain of left wrist, initial encounter  S63 502A                   Subjective: Pt reports his wrist is improving slowly      Objective: See treatment diary below  Objective: See treatment diary below    AROM left FA S/P- 50/90; wrist E/F- 50/45 (right 60/60)              digits MF MP- 0/75;PIP- 0/75;DIP- 0/75  Strength-  II- L/R- 10/18; 3 LORIE- 2/2; Key- 4/4  Circumference at wrist-L/R-20 18 5 cm  Sensation- no numbness or tingling noted  Palpation- tender ulnar aspect and scaphoid area       Assessment: Tolerated treatment well  Patient would benefit from continued PT   Glove fitting well  Plan: Progress treatment as tolerated         Precautions: wear splint when active    Manuals         STM 15 15 15 15 15                     tubigrip E            isotoner   lg          Ther Ex             AROM W/FA 10x 10x 10x 10x 10x                                               Modalities             US 12 12 12 12 12        CP 15 15 15 15 15

## 2021-06-28 ENCOUNTER — OFFICE VISIT (OUTPATIENT)
Dept: PHYSICAL THERAPY | Facility: CLINIC | Age: 86
End: 2021-06-28
Payer: COMMERCIAL

## 2021-06-28 DIAGNOSIS — S63.502D SPRAIN OF LEFT WRIST, SUBSEQUENT ENCOUNTER: Primary | ICD-10-CM

## 2021-06-28 PROCEDURE — 97112 NEUROMUSCULAR REEDUCATION: CPT | Performed by: PHYSICAL THERAPIST

## 2021-06-28 PROCEDURE — 97140 MANUAL THERAPY 1/> REGIONS: CPT | Performed by: PHYSICAL THERAPIST

## 2021-06-28 PROCEDURE — 97110 THERAPEUTIC EXERCISES: CPT | Performed by: PHYSICAL THERAPIST

## 2021-06-28 PROCEDURE — 97035 APP MDLTY 1+ULTRASOUND EA 15: CPT | Performed by: PHYSICAL THERAPIST

## 2021-06-28 NOTE — PROGRESS NOTES
Daily Note     Today's date: 2021  Patient name: Kylah Morgan  : 1935  MRN: [id number]   Referring provider: Rickie Siemens, DO  Dx:   Encounter Diagnosis     ICD-10-CM    1  Sprain of left wrist, subsequent encounter  S63 502D                   Subjective: pt pleased with his progress and is using his hand for more activity at home  Objective: See treatment diary below    AROM left FA S/P- 65/90; wrist E/F- 60/60 (right 60/60)              digits MF MP- 0/75;PIP- 0/80;DIP- 0/75  Strength-  II- L/R- 20/18; 3 LORIE- 2/2; Key- 4/4  Circumference at wrist-L/R-20 5/18 5 cm  Sensation- no numbness or tingling noted  Palpation- tender ulnar aspect and scaphoid area    Assessment: Tolerated treatment well  Patient would benefit from continued PT      Plan: Continue per plan of care        Precautions: wear splint when active    Manuals        STM 15 15 15 15 15 15                    tubigrip E            isotoner   lg          Ther Ex             AROM W/FA 10x 10x 10x 10x 10x 10x                                              Modalities             US 12 12 12 12 12 12       CP 15 15 15 15 15 15

## 2021-06-30 ENCOUNTER — APPOINTMENT (OUTPATIENT)
Dept: PHYSICAL THERAPY | Facility: CLINIC | Age: 86
End: 2021-06-30
Payer: COMMERCIAL

## 2021-07-01 ENCOUNTER — OFFICE VISIT (OUTPATIENT)
Dept: OBGYN CLINIC | Facility: CLINIC | Age: 86
End: 2021-07-01
Payer: COMMERCIAL

## 2021-07-01 VITALS
WEIGHT: 172 LBS | BODY MASS INDEX: 29.37 KG/M2 | HEART RATE: 84 BPM | HEIGHT: 64 IN | DIASTOLIC BLOOD PRESSURE: 68 MMHG | SYSTOLIC BLOOD PRESSURE: 120 MMHG

## 2021-07-01 DIAGNOSIS — S63.502D SPRAIN OF LEFT WRIST, SUBSEQUENT ENCOUNTER: Primary | ICD-10-CM

## 2021-07-01 PROCEDURE — 1160F RVW MEDS BY RX/DR IN RCRD: CPT | Performed by: ORTHOPAEDIC SURGERY

## 2021-07-01 PROCEDURE — 99213 OFFICE O/P EST LOW 20 MIN: CPT | Performed by: ORTHOPAEDIC SURGERY

## 2021-07-01 PROCEDURE — 1036F TOBACCO NON-USER: CPT | Performed by: ORTHOPAEDIC SURGERY

## 2021-07-01 NOTE — PROGRESS NOTES
Assessment/Plan:  Assessment/Plan   Diagnoses and all orders for this visit:    Sprain of left wrist, subsequent encounter      Discussed with patient that today's physical exam is essentially benign  He can discontinue physical therapy and transition to home exercise program   He can continue NSAIDs/ Tylenol as needed for pain and soreness  Resume activities as tolerated without limitations or restrictions  He will be seen moving forward on as-needed basis  Patient is in agreement with this treatment plan  the patient is doing quite well  Continue home exercise program   Continue stretching  Follow up on as-needed basis  Physical exam shows full strength and full motion along his left hand and swelling is now gone  The patient is pleased with his results  Subjective:   Patient ID: Jeremiah Vasquez is a 80 y o  male  HPI      Patient presents today for follow-up evaluation of left wrist sprain  He was last seen in regards to this issue on 6/20/2021, at which time he was referred to physical therapy to work on range of motion and swelling reduction  He states that his swelling significantly resolved after about 2 weeks  He has been consistent with physical therapy, but states that he feels like he no longer needs to  He denies any recent onset bruising, swelling, numbness, or tingling  He has been able to return to most activities of daily living without significant symptom exacerbation  The following portions of the patient's history were reviewed and updated as appropriate: allergies, current medications, past family history, past medical history, past social history, past surgical history and problem list       Past Medical History:   Diagnosis Date    Aortic stenosis     ECHO -4-14-14   MODERATE TO SEVERE AORTIC STENOSIS; MILD AROTIC INSUFFICIENCY - LAST ASSESSED 10/26/16; RESOLVED 6/8/16    Aortic valve disorder     LAST ASSESSED 10/8/15; 10/8/15    Complete heart block ([address] ) 7/20/2020    Hypertensive urgency 5/19/2019    Transient cerebral ischemia      Past Surgical History:   Procedure Laterality Date    AORTIC VALVE REPLACEMENT  06/30/2015    avr with 23 mm Livingston Magna Ease bioprosthetic    CARDIAC PACEMAKER PLACEMENT Left 07/24/2020    CATARACT EXTRACTION Right 06/05/2000    COLONOSCOPY      CORONARY ARTERY BYPASS GRAFT  06/05/2000    x1 with lima  to lad    POLYPECTOMY  04/2014    enteroscopic - esophageal ulcer, gastric erosion, and duodenal ulcer   TONSILLECTOMY      unknown     Family History   Problem Relation Age of Onset    No Known Problems Mother     No Known Problems Father     Coronary artery disease Neg Hx      Social History     Socioeconomic History    Marital status: /Civil Union     Spouse name: Beltran Lopez Number of children: 5    Years of education: None    Highest education level: None   Occupational History    Occupation: retired   Tobacco Use    Smoking status: Never Smoker    Smokeless tobacco: Never Used   Vaping Use    Vaping Use: Never used   Substance and Sexual Activity    Alcohol use: Never     Alcohol/week: 0 0 standard drinks    Drug use: Never    Sexual activity: None   Other Topics Concern    None   Social History Narrative    Caffeine use     Dental care, regularly     Lives independently with spouse     Uses seatbelts      Social Determinants of Health     Financial Resource Strain: Low Risk     Difficulty of Paying Living Expenses: Not hard at all   Food Insecurity: No Food Insecurity    Worried About Running Out of Food in the Last Year: Never true    Catalina of Food in the Last Year: Never true   Transportation Needs: No Transportation Needs    Lack of Transportation (Medical): No    Lack of Transportation (Non-Medical):  No   Physical Activity:     Days of Exercise per Week:     Minutes of Exercise per Session:    Stress:     Feeling of Stress :    Social Connections: Unknown    Frequency of Communication with Friends and Family: More than three times a week    Frequency of Social Gatherings with Friends and Family: More than three times a week    Attends Denominational Services: Not on file   [employer] or Organizations: Not on file    Attends Club or Organization Meetings: Not on file    Marital Status:    Intimate Partner Violence:     Fear of Current or Ex-Partner:     Emotionally Abused:     Physically Abused:     Sexually Abused:        Current Outpatient Medications:     aspirin (ECOTRIN LOW STRENGTH) 81 mg EC tablet, Take 1 tablet (81 mg total) by mouth daily, Disp:  , Rfl: 0    methylPREDNISolone 4 MG tablet therapy pack, Use as directed on package, Disp: 21 tablet, Rfl: 0    pravastatin (PRAVACHOL) 40 mg tablet, TAKE 1 TABLET BY MOUTH ONCE DAILY WITH DINNER, Disp: 90 tablet, Rfl: 0    senna (SENOKOT) 8 6 mg, Take 1 tablet (8 6 mg total) by mouth daily at bedtime, Disp: 90 tablet, Rfl: 3    amLODIPine (NORVASC) 5 mg tablet, Take 1 tablet (5 mg total) by mouth daily, Disp: 90 tablet, Rfl: 3    apixaban (ELIQUIS) 2 5 mg, Take 1 tablet (2 5 mg total) by mouth 2 (two) times a day, Disp: 180 tablet, Rfl: 3    cloNIDine (CATAPRES) 0 1 mg tablet, Take 1 tablet (0 1 mg total) by mouth daily, Disp: 90 tablet, Rfl: 3    ferrous sulfate 325 (65 Fe) mg tablet, Take 1 tablet (325 mg total) by mouth daily with breakfast for 90 doses, Disp: 90 tablet, Rfl: 3    metoprolol tartrate (LOPRESSOR) 100 mg tablet, Take 1 tablet (100 mg total) by mouth 2 (two) times a day, Disp: 180 tablet, Rfl: 3    pantoprazole (PROTONIX) 40 mg tablet, Take 1 tablet (40 mg total) by mouth 2 (two) times a day before meals, Disp: 180 tablet, Rfl: 3    torsemide (DEMADEX) 20 mg tablet, Take 1 tablet (20 mg total) by mouth 2 (two) times a day, Disp: 180 tablet, Rfl: 3    Allergies   Allergen Reactions    Promethazine Delirium and Other (See Comments)       Review of Systems   Constitutional: Negative for chills, fever and unexpected weight change  HENT: Negative for hearing loss, nosebleeds and sore throat  Eyes: Negative for pain, redness and visual disturbance  Respiratory: Negative for cough, shortness of breath and wheezing  Cardiovascular: Negative for chest pain, palpitations and leg swelling  Gastrointestinal: Negative for abdominal pain, nausea and vomiting  Endocrine: Negative for polydipsia and polyuria  Genitourinary: Negative for dysuria and hematuria  Musculoskeletal:        As noted in HPI   Skin: Negative for rash and wound  Neurological: Negative for dizziness, numbness and headaches  Psychiatric/Behavioral: Negative for decreased concentration and suicidal ideas  The patient is not nervous/anxious  Objective:  /68   Pulse 84   Ht 5' 4" (1 626 m)   Wt 78 kg (172 lb)   BMI 29 52 kg/m²     Ortho Exam  Left wrist -    no obvious anatomical deformity   Skin is warm and dry to touch with no signs of erythema, ecchymosis, or infection   mild generalized soft tissue swelling, no effusion noted   patient is nontender to palpation on exam   demonstrates full active and passive wrist ROM as compared to contralateral upper extremity   Full FDS, FDP, and extensor mechanisms are intact   no instability appreciated on exam   2+ distal radial pulse with brisk capillary refill to the fingers   Radial, median, and ulnar motor and sensory distributions intact  Sensation light touch intact distally    Physical Exam  Constitutional:       Appearance: He is well-developed  HENT:      Right Ear: External ear normal       Left Ear: External ear normal       Nose: Nose normal    Eyes:      Conjunctiva/sclera: Conjunctivae normal       Pupils: Pupils are equal, round, and reactive to light  Pulmonary:      Effort: Pulmonary effort is normal    Musculoskeletal:      Cervical back: Normal range of motion  Comments:  As noted in HPI   Skin:     General: Skin is warm and dry  Neurological:      Mental Status: He is alert and oriented to person, place, and time  Psychiatric:         Behavior: Behavior normal          Thought Content:  Thought content normal          Judgment: Judgment normal        Imaging:  No new imaging reviewed this visit    Procedures     No procedures performed this visit    Scribe Attestation    I,:  Cuco Tabor am acting as a scribe while in the presence of the attending physician :       I,:  Shalom Montes, DO personally performed the services described in this documentation    as scribed in my presence :

## 2021-07-06 DIAGNOSIS — J96.01 ACUTE RESPIRATORY FAILURE WITH HYPOXIA (HCC): ICD-10-CM

## 2021-07-06 DIAGNOSIS — I16.0 HYPERTENSIVE URGENCY: ICD-10-CM

## 2021-07-06 DIAGNOSIS — K92.2 GASTROINTESTINAL HEMORRHAGE, UNSPECIFIED GASTROINTESTINAL HEMORRHAGE TYPE: ICD-10-CM

## 2021-07-06 DIAGNOSIS — I10 ESSENTIAL HYPERTENSION: ICD-10-CM

## 2021-07-06 DIAGNOSIS — I25.10 ATHEROSCLEROSIS OF NATIVE CORONARY ARTERY OF NATIVE HEART WITHOUT ANGINA PECTORIS: ICD-10-CM

## 2021-07-06 DIAGNOSIS — I48.0 PAF (PAROXYSMAL ATRIAL FIBRILLATION) (HCC): ICD-10-CM

## 2021-07-06 DIAGNOSIS — R26.2 AMBULATORY DYSFUNCTION: ICD-10-CM

## 2021-07-06 RX ORDER — PANTOPRAZOLE SODIUM 40 MG/1
40 TABLET, DELAYED RELEASE ORAL
Qty: 180 TABLET | Refills: 3 | Status: SHIPPED | OUTPATIENT
Start: 2021-07-06 | End: 2022-05-27 | Stop reason: SDUPTHER

## 2021-07-06 RX ORDER — SENNOSIDES 8.6 MG
8.6 TABLET ORAL
Qty: 90 TABLET | Refills: 3 | Status: SHIPPED | OUTPATIENT
Start: 2021-07-06 | End: 2022-05-27 | Stop reason: SDUPTHER

## 2021-07-06 RX ORDER — CLONIDINE HYDROCHLORIDE 0.1 MG/1
TABLET ORAL
Qty: 90 TABLET | Refills: 0 | Status: SHIPPED | OUTPATIENT
Start: 2021-07-06 | End: 2021-08-04 | Stop reason: HOSPADM

## 2021-07-06 RX ORDER — PRAVASTATIN SODIUM 40 MG
40 TABLET ORAL
Qty: 90 TABLET | Refills: 3 | Status: SHIPPED | OUTPATIENT
Start: 2021-07-06 | End: 2022-05-27 | Stop reason: SDUPTHER

## 2021-07-06 RX ORDER — TORSEMIDE 20 MG/1
20 TABLET ORAL 2 TIMES DAILY
Qty: 180 TABLET | Refills: 3 | Status: SHIPPED | OUTPATIENT
Start: 2021-07-06 | End: 2021-08-04 | Stop reason: HOSPADM

## 2021-07-06 RX ORDER — AMLODIPINE BESYLATE 5 MG/1
TABLET ORAL
Qty: 90 TABLET | Refills: 0 | Status: SHIPPED | OUTPATIENT
Start: 2021-07-06 | End: 2021-10-16 | Stop reason: SDUPTHER

## 2021-07-06 RX ORDER — METOPROLOL TARTRATE 100 MG/1
TABLET ORAL
Qty: 180 TABLET | Refills: 0 | Status: SHIPPED | OUTPATIENT
Start: 2021-07-06 | End: 2021-10-16 | Stop reason: SDUPTHER

## 2021-07-26 NOTE — PROGRESS NOTES
PT Evaluation     Today's date: 2021  Patient name: Waylon Serrano  : 1935  MRN: [id number]  Referring provider: Adelaida Ocampo DO  Dx:   Encounter Diagnosis     ICD-10-CM    1  Sprain of left wrist, subsequent encounter  S63 502D        Start Time: 1030  Stop Time: 1130  Total time in clinic (min): 60 minutes    Assessment  Assessment details: Pt is an 79 YO male presenting to PT with pain, decreased AROM, strength and tolerance to activity  Pt would benefit from skilled intervention to address these issues and maximize overall function  Occupation- retired  Dominant- right; Involved- left      Goals  ST  Decrease pain to 2-3/10 in 4 weeks            2  Decrease swelling left hand and wrist            3  Increase AROM to [facility] [facility] in 6-8 weeks            4   Provide orthotic for protection, compression for support  LT  Increase functional motion and strength for independence with ADL and self care by DC            2  Ability to RT recreational activity by DC    Plan  Patient would benefit from: skilled physical therapy  Planned modality interventions: ultrasound, thermotherapy: hydrocollator packs and cryotherapy  Planned therapy interventions: manual therapy, activity modification, neuromuscular re-education, strengthening, stretching, therapeutic activities, therapeutic exercise and home exercise program  Frequency: 2x week  Duration in weeks: 4  Treatment plan discussed with: patient        Subjective Evaluation    History of Present Illness  Mechanism of injury: Patient states that on 2021 he fell and landed on his left arm  The next morning, he went to the emergency department where x-rays performed  X-rays were negative for any fractures or dislocations  He was given a splint to wear  He presents today complaining of significant edema of his left hand and wrist   He is also complaining of left wrist pain  He denies any numbness or tingling   He is currently taking Eliquis       Pain  Current pain ratin  At best pain ratin  At worst pain rating: 3    Hand dominance: right    Treatments  Current treatment: physical therapy  Patient Goals  Patient goals for therapy: decreased edema, decreased pain, increased motion, increased strength, independence with ADLs/IADLs and return to sport/leisure activities          Objective     General Comments:      Wrist/Hand Comments  AROM left FA S/P- 50/90; wrist E/F- 50/45 (right 60/60)              digits MF MP- 0/75;PIP- 0/75;DIP- 0/75  Strength-  II- L/R- 10/18; 3 LORIE- 2/2; Key- /  Circumference at wrist-L/R-20 5/18 5 cm  Sensation- no numbness or tingling noted  Palpation- tender ulnar aspect and scaphoid area      Flowsheet Rows      Most Recent Value   PT/OT G-Codes   Current Score  67   Projected Score  66             Precautions: wear splint when active    Manuals             STM 15                         tubigrip E                         Ther Ex             AROM W/FA 10x                                                   Modalities             US 12            CP 15

## 2021-07-28 ENCOUNTER — HOSPITAL ENCOUNTER (INPATIENT)
Facility: HOSPITAL | Age: 86
LOS: 7 days | Discharge: HOME HEALTH | DRG: 872 | End: 2021-08-04
Attending: EMERGENCY MEDICINE | Admitting: INTERNAL MEDICINE
Payer: COMMERCIAL

## 2021-07-28 ENCOUNTER — APPOINTMENT (EMERGENCY)
Dept: RADIOLOGY | Facility: HOSPITAL | Age: 86
DRG: 872 | End: 2021-07-28
Payer: COMMERCIAL

## 2021-07-28 ENCOUNTER — TELEPHONE (OUTPATIENT)
Dept: INTERNAL MEDICINE CLINIC | Facility: CLINIC | Age: 86
End: 2021-07-28

## 2021-07-28 ENCOUNTER — APPOINTMENT (INPATIENT)
Dept: RADIOLOGY | Facility: HOSPITAL | Age: 86
DRG: 872 | End: 2021-07-28
Payer: COMMERCIAL

## 2021-07-28 ENCOUNTER — APPOINTMENT (EMERGENCY)
Dept: CT IMAGING | Facility: HOSPITAL | Age: 86
DRG: 872 | End: 2021-07-28
Payer: COMMERCIAL

## 2021-07-28 DIAGNOSIS — N30.00 ACUTE CYSTITIS WITHOUT HEMATURIA: ICD-10-CM

## 2021-07-28 DIAGNOSIS — I25.810 ATHEROSCLEROSIS OF CORONARY ARTERY BYPASS GRAFT OF NATIVE HEART WITHOUT ANGINA PECTORIS: ICD-10-CM

## 2021-07-28 DIAGNOSIS — N17.9 ACUTE KIDNEY INJURY SUPERIMPOSED ON CKD (HCC): ICD-10-CM

## 2021-07-28 DIAGNOSIS — M25.559 HIP PAIN: ICD-10-CM

## 2021-07-28 DIAGNOSIS — Z95.0 PACEMAKER: ICD-10-CM

## 2021-07-28 DIAGNOSIS — N18.4 CKD (CHRONIC KIDNEY DISEASE) STAGE 4, GFR 15-29 ML/MIN (HCC): ICD-10-CM

## 2021-07-28 DIAGNOSIS — N17.9 AKI (ACUTE KIDNEY INJURY) (HCC): Primary | ICD-10-CM

## 2021-07-28 DIAGNOSIS — I50.42 CHRONIC COMBINED SYSTOLIC AND DIASTOLIC CONGESTIVE HEART FAILURE (HCC): ICD-10-CM

## 2021-07-28 DIAGNOSIS — N18.9 ACUTE KIDNEY INJURY SUPERIMPOSED ON CKD (HCC): ICD-10-CM

## 2021-07-28 DIAGNOSIS — M10.9 ACUTE GOUT OF MULTIPLE SITES, UNSPECIFIED CAUSE: ICD-10-CM

## 2021-07-28 DIAGNOSIS — I48.0 PAROXYSMAL ATRIAL FIBRILLATION (HCC): ICD-10-CM

## 2021-07-28 DIAGNOSIS — R53.1 WEAKNESS: ICD-10-CM

## 2021-07-28 DIAGNOSIS — R26.2 AMBULATORY DYSFUNCTION: ICD-10-CM

## 2021-07-28 DIAGNOSIS — Q61.02 MULTIPLE RENAL CYSTS: ICD-10-CM

## 2021-07-28 DIAGNOSIS — M25.551 RIGHT HIP PAIN: ICD-10-CM

## 2021-07-28 DIAGNOSIS — M54.50 LOW BACK PAIN: ICD-10-CM

## 2021-07-28 PROBLEM — R65.10 SIRS (SYSTEMIC INFLAMMATORY RESPONSE SYNDROME) (HCC): Status: ACTIVE | Noted: 2021-07-28

## 2021-07-28 LAB
ANION GAP SERPL CALCULATED.3IONS-SCNC: 8 MMOL/L (ref 4–13)
BASOPHILS # BLD AUTO: 0.03 THOUSANDS/ΜL (ref 0–0.1)
BASOPHILS NFR BLD AUTO: 0 % (ref 0–1)
BILIRUB UR QL STRIP: NEGATIVE
BUN SERPL-MCNC: 62 MG/DL (ref 5–25)
CALCIUM SERPL-MCNC: 9.2 MG/DL (ref 8.3–10.1)
CHLORIDE SERPL-SCNC: 98 MMOL/L (ref 100–108)
CLARITY UR: CLEAR
CO2 SERPL-SCNC: 30 MMOL/L (ref 21–32)
COLOR UR: YELLOW
CREAT SERPL-MCNC: 3.45 MG/DL (ref 0.6–1.3)
EOSINOPHIL # BLD AUTO: 0.01 THOUSAND/ΜL (ref 0–0.61)
EOSINOPHIL NFR BLD AUTO: 0 % (ref 0–6)
ERYTHROCYTE [DISTWIDTH] IN BLOOD BY AUTOMATED COUNT: 13.7 % (ref 11.6–15.1)
GFR SERPL CREATININE-BSD FRML MDRD: 15 ML/MIN/1.73SQ M
GLUCOSE SERPL-MCNC: 114 MG/DL (ref 65–140)
GLUCOSE UR STRIP-MCNC: NEGATIVE MG/DL
HCT VFR BLD AUTO: 30.2 % (ref 36.5–49.3)
HGB BLD-MCNC: 9.8 G/DL (ref 12–17)
HGB UR QL STRIP.AUTO: NEGATIVE
IMM GRANULOCYTES # BLD AUTO: 0.12 THOUSAND/UL (ref 0–0.2)
IMM GRANULOCYTES NFR BLD AUTO: 1 % (ref 0–2)
KETONES UR STRIP-MCNC: NEGATIVE MG/DL
LEUKOCYTE ESTERASE UR QL STRIP: NEGATIVE
LYMPHOCYTES # BLD AUTO: 0.71 THOUSANDS/ΜL (ref 0.6–4.47)
LYMPHOCYTES NFR BLD AUTO: 4 % (ref 14–44)
MCH RBC QN AUTO: 29.7 PG (ref 26.8–34.3)
MCHC RBC AUTO-ENTMCNC: 32.5 G/DL (ref 31.4–37.4)
MCV RBC AUTO: 92 FL (ref 82–98)
MONOCYTES # BLD AUTO: 1.24 THOUSAND/ΜL (ref 0.17–1.22)
MONOCYTES NFR BLD AUTO: 7 % (ref 4–12)
NEUTROPHILS # BLD AUTO: 16.47 THOUSANDS/ΜL (ref 1.85–7.62)
NEUTS SEG NFR BLD AUTO: 88 % (ref 43–75)
NITRITE UR QL STRIP: NEGATIVE
NRBC BLD AUTO-RTO: 0 /100 WBCS
PH UR STRIP.AUTO: 6 [PH]
PLATELET # BLD AUTO: 262 THOUSANDS/UL (ref 149–390)
PMV BLD AUTO: 9.8 FL (ref 8.9–12.7)
POTASSIUM SERPL-SCNC: 3.8 MMOL/L (ref 3.5–5.3)
PROT UR STRIP-MCNC: NEGATIVE MG/DL
RBC # BLD AUTO: 3.3 MILLION/UL (ref 3.88–5.62)
SARS-COV-2 RNA RESP QL NAA+PROBE: NEGATIVE
SODIUM SERPL-SCNC: 136 MMOL/L (ref 136–145)
SP GR UR STRIP.AUTO: 1.01 (ref 1–1.03)
UROBILINOGEN UR QL STRIP.AUTO: 0.2 E.U./DL
WBC # BLD AUTO: 18.58 THOUSAND/UL (ref 4.31–10.16)

## 2021-07-28 PROCEDURE — 87040 BLOOD CULTURE FOR BACTERIA: CPT

## 2021-07-28 PROCEDURE — 80048 BASIC METABOLIC PNL TOTAL CA: CPT | Performed by: EMERGENCY MEDICINE

## 2021-07-28 PROCEDURE — U0005 INFEC AGEN DETEC AMPLI PROBE: HCPCS | Performed by: INTERNAL MEDICINE

## 2021-07-28 PROCEDURE — 71045 X-RAY EXAM CHEST 1 VIEW: CPT

## 2021-07-28 PROCEDURE — 85025 COMPLETE CBC W/AUTO DIFF WBC: CPT | Performed by: EMERGENCY MEDICINE

## 2021-07-28 PROCEDURE — 74176 CT ABD & PELVIS W/O CONTRAST: CPT

## 2021-07-28 PROCEDURE — 84300 ASSAY OF URINE SODIUM: CPT

## 2021-07-28 PROCEDURE — 99223 1ST HOSP IP/OBS HIGH 75: CPT | Performed by: INTERNAL MEDICINE

## 2021-07-28 PROCEDURE — 81003 URINALYSIS AUTO W/O SCOPE: CPT

## 2021-07-28 PROCEDURE — 36415 COLL VENOUS BLD VENIPUNCTURE: CPT | Performed by: EMERGENCY MEDICINE

## 2021-07-28 PROCEDURE — 84156 ASSAY OF PROTEIN URINE: CPT

## 2021-07-28 PROCEDURE — U0003 INFECTIOUS AGENT DETECTION BY NUCLEIC ACID (DNA OR RNA); SEVERE ACUTE RESPIRATORY SYNDROME CORONAVIRUS 2 (SARS-COV-2) (CORONAVIRUS DISEASE [COVID-19]), AMPLIFIED PROBE TECHNIQUE, MAKING USE OF HIGH THROUGHPUT TECHNOLOGIES AS DESCRIBED BY CMS-2020-01-R: HCPCS | Performed by: INTERNAL MEDICINE

## 2021-07-28 PROCEDURE — 99285 EMERGENCY DEPT VISIT HI MDM: CPT | Performed by: EMERGENCY MEDICINE

## 2021-07-28 PROCEDURE — 99285 EMERGENCY DEPT VISIT HI MDM: CPT

## 2021-07-28 PROCEDURE — 82570 ASSAY OF URINE CREATININE: CPT

## 2021-07-28 PROCEDURE — 73502 X-RAY EXAM HIP UNI 2-3 VIEWS: CPT

## 2021-07-28 RX ORDER — OXYCODONE HYDROCHLORIDE 5 MG/1
5 TABLET ORAL EVERY 4 HOURS PRN
Status: DISCONTINUED | OUTPATIENT
Start: 2021-07-28 | End: 2021-08-04 | Stop reason: HOSPADM

## 2021-07-28 RX ORDER — HYDROMORPHONE HCL/PF 1 MG/ML
0.2 SYRINGE (ML) INJECTION ONCE
Status: COMPLETED | OUTPATIENT
Start: 2021-07-28 | End: 2021-07-28

## 2021-07-28 RX ORDER — SODIUM CHLORIDE 9 MG/ML
50 INJECTION, SOLUTION INTRAVENOUS CONTINUOUS
Status: DISCONTINUED | OUTPATIENT
Start: 2021-07-28 | End: 2021-07-30

## 2021-07-28 RX ORDER — METOPROLOL TARTRATE 100 MG/1
100 TABLET ORAL 2 TIMES DAILY
Status: DISCONTINUED | OUTPATIENT
Start: 2021-07-28 | End: 2021-08-04 | Stop reason: HOSPADM

## 2021-07-28 RX ORDER — SENNOSIDES 8.6 MG
8.6 TABLET ORAL
Status: DISCONTINUED | OUTPATIENT
Start: 2021-07-28 | End: 2021-08-04 | Stop reason: HOSPADM

## 2021-07-28 RX ORDER — AMLODIPINE BESYLATE 5 MG/1
5 TABLET ORAL DAILY
Status: DISCONTINUED | OUTPATIENT
Start: 2021-07-29 | End: 2021-07-29

## 2021-07-28 RX ORDER — HYDROMORPHONE HCL/PF 1 MG/ML
0.5 SYRINGE (ML) INJECTION EVERY 4 HOURS PRN
Status: DISCONTINUED | OUTPATIENT
Start: 2021-07-28 | End: 2021-08-04 | Stop reason: HOSPADM

## 2021-07-28 RX ORDER — PRAVASTATIN SODIUM 40 MG
40 TABLET ORAL
Status: DISCONTINUED | OUTPATIENT
Start: 2021-07-28 | End: 2021-08-02

## 2021-07-28 RX ORDER — METHOCARBAMOL 500 MG/1
500 TABLET, FILM COATED ORAL ONCE
Status: COMPLETED | OUTPATIENT
Start: 2021-07-28 | End: 2021-07-28

## 2021-07-28 RX ORDER — HYDROMORPHONE HCL/PF 1 MG/ML
0.2 SYRINGE (ML) INJECTION EVERY 6 HOURS PRN
Status: DISCONTINUED | OUTPATIENT
Start: 2021-07-28 | End: 2021-07-28

## 2021-07-28 RX ORDER — ASPIRIN 81 MG/1
81 TABLET ORAL DAILY
Status: DISCONTINUED | OUTPATIENT
Start: 2021-07-29 | End: 2021-08-04 | Stop reason: HOSPADM

## 2021-07-28 RX ORDER — OXYCODONE HYDROCHLORIDE 5 MG/1
5 TABLET ORAL EVERY 4 HOURS PRN
Status: DISCONTINUED | OUTPATIENT
Start: 2021-07-28 | End: 2021-07-28

## 2021-07-28 RX ORDER — ACETAMINOPHEN 325 MG/1
650 TABLET ORAL EVERY 6 HOURS
Status: DISCONTINUED | OUTPATIENT
Start: 2021-07-28 | End: 2021-07-28

## 2021-07-28 RX ORDER — ACETAMINOPHEN 325 MG/1
650 TABLET ORAL EVERY 6 HOURS PRN
Status: DISCONTINUED | OUTPATIENT
Start: 2021-07-28 | End: 2021-08-02

## 2021-07-28 RX ADMIN — SODIUM CHLORIDE 50 ML/HR: 0.9 INJECTION, SOLUTION INTRAVENOUS at 18:55

## 2021-07-28 RX ADMIN — PRAVASTATIN SODIUM 40 MG: 40 TABLET ORAL at 17:47

## 2021-07-28 RX ADMIN — HYDROMORPHONE HYDROCHLORIDE 0.5 MG: 1 INJECTION, SOLUTION INTRAMUSCULAR; INTRAVENOUS; SUBCUTANEOUS at 23:29

## 2021-07-28 RX ADMIN — METHOCARBAMOL TABLETS 500 MG: 500 TABLET, COATED ORAL at 11:32

## 2021-07-28 RX ADMIN — ACETAMINOPHEN 650 MG: 325 TABLET, FILM COATED ORAL at 17:47

## 2021-07-28 RX ADMIN — APIXABAN 2.5 MG: 2.5 TABLET, FILM COATED ORAL at 17:48

## 2021-07-28 RX ADMIN — METOPROLOL TARTRATE 100 MG: 100 TABLET, FILM COATED ORAL at 17:47

## 2021-07-28 RX ADMIN — SODIUM CHLORIDE 1000 ML: 0.9 INJECTION, SOLUTION INTRAVENOUS at 13:40

## 2021-07-28 RX ADMIN — HYDROMORPHONE HYDROCHLORIDE 0.2 MG: 1 INJECTION, SOLUTION INTRAMUSCULAR; INTRAVENOUS; SUBCUTANEOUS at 16:43

## 2021-07-28 NOTE — ASSESSMENT & PLAN NOTE
Severe right hip pain unable to rule out fracture  No MRI due to pacemaker  Consult ortho    For pain give 2 Tylenol Q8   Dilaudid for breakthrough pain

## 2021-07-28 NOTE — H&P
[address] [facility] [address] [place]  H&P- Heidi Denver 1935, 80 y o  male MRN: [id number]  Unit/Bed#: 159-13 Encounter: [id number]  Primary Care Provider: Luisa Pierce DO   Date and time admitted to hospital: 7/28/2021 11:01 AM    * Acute right hip pain  Assessment & Plan  Severe right hip pain unable to rule out fracture  No MRI due to pacemaker  Consult ortho    For pain give 2 Tylenol Q8   Dilaudid for breakthrough pain         SIRS (systemic inflammatory response syndrome) ([facility] [address] )  Assessment & Plan  Patient met SIRS criteria upon arrival in ED  Blood cultures x2  Urine culture  Chest x-ray    Acute kidney injury superimposed on CKD 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)  Assessment & Plan  Wt Readings from Last 3 Encounters:   07/28/21 76 3 kg (168 lb 3 4 oz)   07/01/21 78 kg (172 lb)   06/03/21 78 1 kg (172 lb 2 oz)       Continue amlodipine  Hold clonidine      Dyslipidemia  Assessment & Plan  Continue statin    VTE Pharmacologic Prophylaxis: VTE Score: 7 Currently Anticoagulated  Code Status: Level 1 - Full Code  Discussion with family: Updated  (wife) at bedside  Anticipated Length of Stay: Patient will be admitted on an inpatient basis with an anticipated length of stay of greater than 2 midnights secondary to right hip pain with consults      Total Time for Visit, including Counseling / Coordination of Care: 15 minutes Greater than 50% of this total time spent on direct patient counseling and coordination of care  Chief Complaint: Right hip pain x 3 days    History of Present Illness:  Dixie Abrams is a 80 y o  male with a PMH of CHF, HTN, Dyslipidemia who presents with right-sided hip pain for 3 days  Pain is located over right hip bone, is sharp, comes and goes, does not radiate, rated 10/10  Patient states that pain started 3 days ago but this morning was much worse where patient could not get out of bed so had to call ambulance for help  Patient was brought into the ED where x-ray and CT were negative, CBC and CMP showed elevated creatinine and low GFR and chronically high wbc's  Patient admitted for SIRS, YOLANDE on CKD, hip pain  Patient seen at bedside this afternoon  Patient's wife and daughter were present in room  Patient states that he usually is able to ambulate fine but cannot walk today due to right hip pain  Patient uses cane to ambulate 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  Patient denies any red flag symptoms, no fever, chills, weight loss, saddle anesthesia, bowel/urinary changes  Patient denies any constitutional symptoms, no shortness of breath no chest pain no stomach pain  Patient also complains of left elbow pain which is mild  At bedside patient's right leg would jerk randomly  Patient did not seem to notice or mind  Review of Systems:  Review of Systems   Constitutional: Positive for activity change  Negative for chills and fever  HENT: Negative  Negative for sore throat  Eyes: Negative  Negative for pain  Respiratory: Negative for chest tightness and shortness of breath  Cardiovascular: Negative  Negative for chest pain  Gastrointestinal: Negative  Negative for abdominal pain  Genitourinary: Negative for difficulty urinating and dysuria  Musculoskeletal: Positive for arthralgias and back pain  Right lower back pain and right hip pain  Left elbow pain   Skin: Negative    Negative for pallor and rash  Neurological: Negative for dizziness and headaches  Hematological: Negative for adenopathy  Does not bruise/bleed easily  Psychiatric/Behavioral: Negative for confusion  Past Medical and Surgical History:   Past Medical History:   Diagnosis Date    Aortic stenosis     ECHO -4-14-14  MODERATE TO SEVERE AORTIC STENOSIS; MILD AROTIC INSUFFICIENCY - LAST ASSESSED 10/26/16; RESOLVED 6/8/16    Aortic valve disorder     LAST ASSESSED 10/8/15; 10/8/15    Arthritis     CH[facility]congestive heart failure) ([facility])     Complete heart block ([address] ) 7/20/2020    Coronary artery disease     Hypertension     Hypertensive urgency 5/19/2019    Renal disorder     TIA (transient ischemic attack)     Transient cerebral ischemia        Past Surgical History:   Procedure Laterality Date    AORTIC VALVE REPLACEMENT  06/30/2015    avr with 23 mm [place] Magna Ease bioprosthetic    CARDIAC PACEMAKER PLACEMENT Left 07/24/2020    CATARACT EXTRACTION Right 06/05/2000    COLONOSCOPY      CORONARY ARTERY BYPASS GRAFT  06/05/2000    x1 with argueta  to lad    EYE SURGERY      POLYPECTOMY  04/2014    enteroscopic - esophageal ulcer, gastric erosion, and duodenal ulcer   TONSILLECTOMY      unknown       Meds/Allergies:  Prior to Admission medications    Medication Sig Start Date End Date Taking?  Authorizing Provider   amLODIPine (NORVASC) 5 mg tablet Take 1 tablet by mouth once daily 7/6/21   Debbie Flow, DO   apixaban (ELIQUIS) 2 5 mg Take 1 tablet (2 5 mg total) by mouth 2 (two) times a day 7/6/21 10/4/21  Debbie Flow, DO   aspirin (ECOTRIN LOW STRENGTH) 81 mg EC tablet Take 1 tablet (81 mg total) by mouth daily 8/6/20   Alberto Gandhi MD   cloNIDine (CATAPRES) 0 1 mg tablet Take 1 tablet by mouth once daily 7/6/21   Debbie Flow, DO   ferrous sulfate 325 (65 Fe) mg tablet Take 1 tablet (325 mg total) by mouth daily with breakfast for 90 doses 10/15/20 5/29/21  Debbie Flow, DO methylPREDNISolone 4 MG tablet therapy pack Use as directed on package 6/3/21   Siria Castro PA-C   metoprolol tartrate (LOPRESSOR) 100 mg tablet Take 1 tablet by mouth twice daily 7/6/21   Zachery Krishnamurthy, DO   pantoprazole (PROTONIX) 40 mg tablet Take 1 tablet (40 mg total) by mouth 2 (two) times a day before meals 7/6/21 10/4/21  Zachery Captain, DO   pravastatin (PRAVACHOL) 40 mg tablet Take 1 tablet (40 mg total) by mouth daily with dinner 7/6/21   Zachery Captain, DO   senna (SENOKOT) 8 6 mg Take 1 tablet (8 6 mg total) by mouth daily at bedtime 7/6/21   Zachery Captain, DO   torsemide [facility]) 20 mg tablet Take 1 tablet (20 mg total) by mouth 2 (two) times a day 7/6/21 10/4/21  Zachery Krishnamurthy, DO     I have reveiwed home medications using records provided by [facility]  Allergies:    Allergies   Allergen Reactions    Promethazine Delirium and Other (See Comments)       Social History:  Marital Status: /Civil Union   Occupation:  Retired farmer  Patient Pre-hospital Living Situation: Home  Patient Pre-hospital Level of Mobility: walks with cane  Patient Pre-hospital Diet Restrictions:   Substance Use History:   Social History     Substance and Sexual Activity   Alcohol Use Never    Alcohol/week: 0 0 standard drinks    Comment: very occasional     Social History     Tobacco Use   Smoking Status Never Smoker   Smokeless Tobacco Never Used     Social History     Substance and Sexual Activity   Drug Use Never       Family History:  Family History   Problem Relation Age of Onset    No Known Problems Mother     No Known Problems Father     Coronary artery disease Neg Hx        Physical Exam:     Vitals:   Blood Pressure: 148/72 (07/28/21 1432)  Pulse: 87 (07/28/21 1432)  Temperature: 97 8 °F (36 6 °C) (07/28/21 1432)  Temp Source: Oral (07/28/21 1432)  Respirations: 16 (07/28/21 1432)  Height: 5' 4" (162 6 cm) (07/28/21 1432)  Weight - Scale: 76 3 kg (168 lb 3 4 oz) (07/28/21 1432)  SpO2: 99 % (07/28/21 1432)    Physical Exam  Constitutional:       Appearance: Normal appearance  HENT:      Nose: No rhinorrhea  Eyes:      Conjunctiva/sclera: Conjunctivae normal    Cardiovascular:      Rate and Rhythm: Normal rate and regular rhythm  Pulses: Normal pulses  Heart sounds: Normal heart sounds  Pulmonary:      Effort: Pulmonary effort is normal       Breath sounds: Normal breath sounds  No wheezing  Abdominal:      General: Abdomen is flat  Bowel sounds are normal  There is no distension  Palpations: Abdomen is soft  Tenderness: There is no abdominal tenderness  Musculoskeletal:         General: Tenderness present  No swelling or signs of injury  Cervical back: Normal range of motion  Back:       Right lower leg: No edema  Left lower leg: No edema  Comments: Decreased ROM of right leg   Skin:     General: Skin is warm and dry  Neurological:      Mental Status: He is alert and oriented to person, place, and time  Psychiatric:         Mood and Affect: Mood normal           Additional Data:     Lab Results:  Results from last 7 days   Lab Units 07/28/21  1225   WBC Thousand/uL 18 58*   HEMOGLOBIN g/dL 9 8*   HEMATOCRIT % 30 2*   PLATELETS Thousands/uL 262   NEUTROS PCT % 88*   LYMPHS PCT % 4*   MONOS PCT % 7   EOS PCT % 0     Results from last 7 days   Lab Units 07/28/21  1225   SODIUM mmol/L 136   POTASSIUM mmol/L 3 8   CHLORIDE mmol/L 98*   CO2 mmol/L 30   BUN mg/dL 62*   CREATININE mg/dL 3 45*   ANION GAP mmol/L 8   CALCIUM mg/dL 9 2   GLUCOSE RANDOM mg/dL 114                       Imaging: Reviewed radiology reports from this admission including: abdominal/pelvic CT  CT abdomen pelvis wo contrast   Final Result by Jaida Lam DO (07/28 5879)      No acute intra-abdominal abnormality  Cholelithiasis  Colonic diverticulosis  Multiple incidental findings as above        Limited study without contrast       Workstation performed: [id number] XR hip/pelv 2-3 vws right   Final Result by Osmin Sewell MD (07/28 3)      No acute osseous abnormality  Workstation performed: [id number]         US retroperitoneal complete    (Results Pending)   XR chest portable    (Results Pending)       EKG and Other Studies Reviewed on Admission:   · EKG: No EKG obtained  ** Please Note: This note has been constructed using a voice recognition system   **

## 2021-07-28 NOTE — ED NOTES
Patient transported to [address] [place] [address], [address] [place] [address]  07/28/21 1121

## 2021-07-28 NOTE — ASSESSMENT & PLAN NOTE
Lab Results   Component Value Date    EGFR 15 07/28/2021    EGFR 20 06/11/2021    EGFR 27 09/11/2020    CREATININE 3 45 (H) 07/28/2021    CREATININE 2 72 (H) 06/11/2021    CREATININE 2 19 (H) 09/11/2020     Chronic kidney disease stage 4  Creatinine 3 45 (baseline 2 2-3 1)  GFR 14    FENA  Urine sodium  Urine creatinine  Urine protein/creatinine ratio  Urinary retention protocol  Consult Nephro    Hold nephrotoxic agents

## 2021-07-28 NOTE — PLAN OF CARE
Problem: PAIN - ADULT  Goal: Verbalizes/displays adequate comfort level or baseline comfort level  Description: Interventions:  - Encourage patient to monitor pain and request assistance  - Assess pain using appropriate pain scale  - Administer analgesics based on type and severity of pain and evaluate response  - Implement non-pharmacological measures as appropriate and evaluate response  - Consider cultural and social influences on pain and pain management  - Notify physician/advanced practitioner if interventions unsuccessful or patient reports new pain  Outcome: Progressing     Problem: INFECTION - ADULT  Goal: Absence or prevention of progression during hospitalization  Description: INTERVENTIONS:  - Assess and monitor for signs and symptoms of infection  - Monitor lab/diagnostic results  - Monitor all insertion sites, i e  indwelling lines, tubes, and drains  - Administer medications as ordered  - Instruct and encourage patient and family to use good hand hygiene technique  - Identify and instruct in appropriate isolation precautions for identified infection/condition  Outcome: Progressing     Problem: SAFETY ADULT  Goal: Patient will remain free of falls  Description: INTERVENTIONS:  - Educate patient/family on patient safety including physical limitations  - Instruct patient to call for assistance with activity   - Consult OT/PT to assist with strengthening/mobility   - Keep Call bell within reach  - Keep bed low and locked with side rails adjusted as appropriate  - Keep care items and personal belongings within reach  - Initiate and maintain comfort rounds  - Make Fall Risk Sign visible to staff  - Offer Toileting every 2 Hours, in advance of need  - Initiate/Maintain bed alarm  - Apply yellow socks and bracelet for high fall risk patients  - Consider moving patient to room near nurses station  Outcome: Progressing  Goal: Maintain or return to baseline ADL function  Description: INTERVENTIONS:  -  Assess patient's ability to carry out ADLs; assess patient's baseline for ADL function and identify physical deficits which impact ability to perform ADLs (bathing, care of mouth/teeth, toileting, grooming, dressing, etc )  - Assess/evaluate cause of self-care deficits   - Assess range of motion  - Assess patient's mobility; develop plan if impaired  - Assess patient's need for assistive devices and provide as appropriate  - Encourage maximum independence but intervene and supervise when necessary  - Involve family in performance of ADLs  - Assess for home care needs following discharge   - Consider OT consult to assist with ADL evaluation and planning for discharge  - Provide patient education as appropriate  Outcome: Progressing  Goal: Maintains/Returns to pre admission functional level  Description: INTERVENTIONS:  - Perform BMAT or MOVE assessment daily    - Set and communicate daily mobility goal to care team and patient/family/caregiver  - Collaborate with rehabilitation services on mobility goals if consulted  - Perform Range of Motion 4 times a day    - Reposition patient every 2 hours  - Out of bed to chair 2 times a day   - Out of bed for meals 3 times a day  - Out of bed for toileting  - Record patient progress and toleration of activity level   Outcome: Progressing     Problem: DISCHARGE PLANNING  Goal: Discharge to home or other facility with appropriate resources  Description: INTERVENTIONS:  - Identify barriers to discharge w/patient and caregiver  - Arrange for needed discharge resources and transportation as appropriate  - Identify discharge learning needs (meds, wound care, etc )  - Refer to Case Management Department for coordinating discharge planning if the patient needs post-hospital services based on physician/advanced practitioner order or complex needs related to functional status, cognitive ability, or social support system  Outcome: Progressing     Problem: Knowledge Deficit  Goal: Patient/family/caregiver demonstrates understanding of disease process, treatment plan, medications, and discharge instructions  Description: Complete learning assessment and assess knowledge base    Interventions:  - Provide teaching at level of understanding  - Provide teaching via preferred learning methods  Outcome: Progressing     Problem: MUSCULOSKELETAL - ADULT  Goal: Maintain or return mobility to safest level of function  Description: INTERVENTIONS:  - Assess patient's ability to carry out ADLs; assess patient's baseline for ADL function and identify physical deficits which impact ability to perform ADLs (bathing, care of mouth/teeth, toileting, grooming, dressing, etc )  - Assess/evaluate cause of self-care deficits   - Assess range of motion  - Assess patient's mobility  - Assess patient's need for assistive devices and provide as appropriate  - Encourage maximum independence but intervene and supervise when necessary  - Involve family in performance of ADLs  - Assess for home care needs following discharge   - Consider OT consult to assist with ADL evaluation and planning for discharge  - Provide patient education as appropriate  Outcome: Progressing  Goal: Maintain proper alignment of affected body part  Description: INTERVENTIONS:  - Support, maintain and protect limb and body alignment  - Provide patient/ family with appropriate education  Outcome: Progressing     Problem: GENITOURINARY - ADULT  Goal: Maintains or returns to baseline urinary function  Description: INTERVENTIONS:  - Assess urinary function  - Encourage oral fluids to ensure adequate hydration if ordered  - Administer IV fluids as ordered to ensure adequate hydration  - Administer ordered medications as needed  - Offer frequent toileting  - Follow urinary retention protocol if ordered  Outcome: Progressing     Problem: METABOLIC, FLUID AND ELECTROLYTES - ADULT  Goal: Electrolytes maintained within normal limits  Description: INTERVENTIONS:  - Monitor labs and assess patient for signs and symptoms of electrolyte imbalances  - Administer electrolyte replacement as ordered  - Monitor response to electrolyte replacements, including repeat lab results as appropriate  - Instruct patient on fluid and nutrition as appropriate  Outcome: Progressing  Goal: Fluid balance maintained  Description: INTERVENTIONS:  - Monitor labs   - Monitor I/O and WT  - Instruct patient on fluid and nutrition as appropriate  - Assess for signs & symptoms of volume excess or deficit  Outcome: Progressing

## 2021-07-28 NOTE — ED NOTES
Sepsis criteria met   Dr Blanco Doran made aware     Mira Darci, [facility]  07/28/21 0478

## 2021-07-28 NOTE — TELEPHONE ENCOUNTER
Daughter called to state she called 46 for her dad  States he is laying in bed in terrible pain in his back and unable to move  She didn't know what to do  Verified she did the right thing and wanted you to be aware

## 2021-07-28 NOTE — ED NOTES
Patient transported to [facility], [address] [place] [address]  07/28/21 9864

## 2021-07-28 NOTE — ED PROVIDER NOTES
Final Diagnosis:  1  YOLANDE (acute kidney injury) ([facility] [address] )    2  Low back pain    3  Right hip pain        Chief Complaint   Patient presents with    Back Pain     pt reports right lower back pain; right hip pain started few days ago; no injury, no relief from lidocaine patch    Hip Pain     HPI  Patient presents for evaluation of right-sided low back pain  Patient states that he had the general onset of right-sided hip pain  He denies any trauma to the area  Locates his pain is being mainly located over the greater trochanter  No radiations  Worse with palpation or movement of leg  Denies any history of surgery on either of his hips  Denies any fever chills, incontinence, difficulty urinating  No history of malignancy  Patient tried to take Tylenol and use a Lidoderm patch with out any improvement causing him to present now for further evaluation  Patient denies the following concerning findings on history:  IV Drug Use  HIV/Immuncompromised  Fever or Chills  History of Cancer  Night Sweats  Unexplained Weight Loss  Sudden tearing chest pain  Weakness/Numbness  Saddle anesthesia  Bowel/bladder incontinence or retention  Recent fall or trauma  Chronic steroid use  Osteoporosis    Patient does not have the following concerning findings on exam:  Fever  Patient writhing in pain   Anal sphincter laxity   Perianal/perineal sensory loss (Saddle Anesthesia)   Palpable bladder post voiding or abnormal post-void residual  Point vertebral tenderness  Neurological deficits   Positive straight leg raise      Unless otherwise specified:  - No language barrier    - History obtained from patient  - There are no limitations to the history obtained    - Previous charting was reviewed    PMH:   has a past medical history of Aortic stenosis, Aortic valve disorder, Arthritis, CHF (congestive heart failure) ([facility] [address] ), Complete heart block ([facility] [address] ) (7/20/2020), Coronary artery disease, Hypertension, Hypertensive urgency (5/19/2019), Renal disorder, TIA (transient ischemic attack), and Transient cerebral ischemia  PSH:   has a past surgical history that includes Aortic valve replacement (06/30/2015); Cataract extraction (Right, 06/05/2000); Polypectomy (04/2014); Tonsillectomy; Coronary artery bypass graft (06/05/2000); Colonoscopy; Cardiac pacemaker placement (Left, 07/24/2020); and Eye surgery  ROS:  Review of Systems   -   - 13 point ROS was performed and all are normal unless stated in the history above  - Nursing note reviewed  Vitals reviewed  - Orders placed by myself and/or advanced practitioner / resident  PE:   Vitals:    07/28/21 1300 07/28/21 1330 07/28/21 1400 07/28/21 1432   BP: 134/65 127/60 120/62 148/72   BP Location: Right arm Right arm Right arm Right arm   Pulse: 97 64 99 87   Resp: 18 16 19 16   Temp:    97 8 °F (36 6 °C)   TempSrc:    Oral   SpO2: 96% 95% 96% 99%   Weight:    76 3 kg (168 lb 3 4 oz)   Height:    5' 4" (1 626 m)     Vitals reviewed by me  General: VS reviewed  Appears in NAD  awake, alert  Speaking normally in full sentences  Head: Normocephalic, atraumatic  Eyes: EOM-I  No exudate  ENT: Atraumatic external nose and ears  No malocclusion  No stridor  Normal phonation  No drooling  Normal swallowing  Neck: No JVD  CV: No pallor noted  Lungs:   No tachypnea  No respiratory distress  Abdomen:  Soft, non-tender, non-distended  MSK:   Patient with reduced range of motion of right lower extremity secondary to pain  He has tenderness to palpation over the lateral aspect of his leg  No evidence of trauma  2+ DP and PT  Sensation grossly intact  Skin: Dry, intact  Neuro: Awake, alert, GCS15, CN II-XII grossly intact  Motor grossly intact  Psychiatric/Behavioral: Appropriate mood and affect   Exam: deferred    Physical Exam     A:  - Nursing note reviewed                     ED Course as of Jul 28 1609 Wed Jul 28, 2021   1242 Creatinine(!): 3 45   1257 WBC(!): 18 58 CT abdomen pelvis wo contrast   Final Result      No acute intra-abdominal abnormality  Cholelithiasis  Colonic diverticulosis  Multiple incidental findings as above  Limited study without contrast       Workstation performed: [id number]         XR hip/pelv 2-3 vws right   Final Result      No acute osseous abnormality  Workstation performed: [id number]           Orders Placed This Encounter   Procedures    XR hip/pelv 2-3 vws right    CT abdomen pelvis wo contrast    CBC and differential    Basic metabolic panel    Insert peripheral IV    Nursing communication Continue IV as ordered     Randi Syed Notify admitting physician    Notify admitting physician on arrival   Randi Syed Inpatient Admission     Labs Reviewed   CBC AND DIFFERENTIAL - Abnormal       Result Value Ref Range Status    WBC 18 58 (*) 4 31 - 10 16 Thousand/uL Final    RBC 3 30 (*) 3 88 - 5 62 Million/uL Final    Hemoglobin 9 8 (*) 12 0 - 17 0 g/dL Final    Hematocrit 30 2 (*) 36 5 - 49 3 % Final    MCV 92  82 - 98 fL Final    MCH 29 7  26 8 - 34 3 pg Final    MCHC 32 5  31 4 - 37 4 g/dL Final    RDW 13 7  11 6 - 15 1 % Final    MPV 9 8  8 9 - 12 7 fL Final    Platelets 513  090 - 390 Thousands/uL Final    nRBC 0  /100 WBCs Final    Neutrophils Relative 88 (*) 43 - 75 % Final    Immat GRANS % 1  0 - 2 % Final    Lymphocytes Relative 4 (*) 14 - 44 % Final    Monocytes Relative 7  4 - 12 % Final    Eosinophils Relative 0  0 - 6 % Final    Basophils Relative 0  0 - 1 % Final    Neutrophils Absolute 16 47 (*) 1 85 - 7 62 Thousands/µL Final    Immature Grans Absolute 0 12  0 00 - 0 20 Thousand/uL Final    Lymphocytes Absolute 0 71  0 60 - 4 47 Thousands/µL Final    Monocytes Absolute 1 24 (*) 0 17 - 1 22 Thousand/µL Final    Eosinophils Absolute 0 01  0 00 - 0 61 Thousand/µL Final    Basophils Absolute 0 03  0 00 - 0 10 Thousands/µL Final   BASIC METABOLIC PANEL - Abnormal    Sodium 136  136 - 145 mmol/L Final    Potassium 3 8 3 5 - 5 3 mmol/L Final    Chloride 98 (*) 100 - 108 mmol/L Final    CO2 30  21 - 32 mmol/L Final    ANION GAP 8  4 - 13 mmol/L Final    BUN 62 (*) 5 - 25 mg/dL Final    Creatinine 3 45 (*) 0 60 - 1 30 mg/dL Final    Comment: Standardized to IDMS reference method    Glucose 114  65 - 140 mg/dL Final    Comment: If the patient is fasting, the ADA then defines impaired fasting glucose as > 100 mg/dL and diabetes as > or equal to 123 mg/dL  Specimen collection should occur prior to Sulfasalazine administration due to the potential for falsely depressed results  Specimen collection should occur prior to Sulfapyridine administration due to the potential for falsely elevated results  Calcium 9 2  8 3 - 10 1 mg/dL Final    eGFR 15  ml/min/1 73sq m Final    Narrative:     [place] guidelines for Chronic Kidney Disease (CKD):     Stage 1 with normal or high GFR (GFR > 90 mL/min/1 73 square meters)    Stage 2 Mild CKD (GFR = 60-89 mL/min/1 73 square meters)    Stage 3A Moderate CKD (GFR = 45-59 mL/min/1 73 square meters)    Stage 3B Moderate CKD (GFR = 30-44 mL/min/1 73 square meters)    Stage 4 Severe CKD (GFR = 15-29 mL/min/1 73 square meters)    Stage 5 End Stage CKD (GFR <15 mL/min/1 73 square meters)  Note: GFR calculation is accurate only with a steady state creatinine         Final Diagnosis:  1  YOLANDE (acute kidney injury) ([facility] [address] )    2  Low back pain    3  Right hip pain        P:  - patient without acute findings on pelvis x-ray  Given that patient had increasing pain decided to pursue further imaging which also did not show acute pathology  Upon having discussion with family they felt that the patient was likely too weak and too much pain to return home  Due to this basic labs were drawn which showed an YOLANDE with a creatinine of 3 45 from previous value of approximately 2 7  Patient was discussed with germán and admits her service for further evaluation      Medications HYDROmorphone (DILAUDID) injection 0 2 mg (has no administration in time range)   methocarbamol (ROBAXIN) tablet 500 mg (500 mg Oral Given 7/28/21 1132)   sodium chloride 0 9 % bolus 1,000 mL (1,000 mL Intravenous New Bag 7/28/21 1340)     Time reflects when diagnosis was documented in both MDM as applicable and the Disposition within this note     Time User Action Codes Description Comment    7/28/2021  1:52 PM Juan Diego Kennedy Add [N17 9] YOLANDE (acute kidney injury) ([facility] [address] )     7/28/2021  1:52 PM Juan Diego Kennedy Add [M54 5] Low back pain     7/28/2021  1:52 PM Jacquiline Flood Add [M25 551] Right hip pain       ED Disposition     ED Disposition Condition Date/Time Comment    Admit Stable Wed Jul 28, 2021  1:52 PM Case was discussed with RUPERT and the patient's admission status was agreed to be Admission Status: inpatient status to the service of Dr Thomas Haynes           Follow-up Information    None       Current Discharge Medication List      CONTINUE these medications which have NOT CHANGED    Details   amLODIPine (NORVASC) 5 mg tablet Take 1 tablet by mouth once daily  Qty: 90 tablet, Refills: 0    Associated Diagnoses: Essential hypertension      apixaban (ELIQUIS) 2 5 mg Take 1 tablet (2 5 mg total) by mouth 2 (two) times a day  Qty: 180 tablet, Refills: 3    Associated Diagnoses: PAF (paroxysmal atrial fibrillation) ([facility])      aspirin (ECOTRIN LOW STRENGTH) 81 mg EC tablet Take 1 tablet (81 mg total) by mouth daily  Qty:  , Refills: 0    Associated Diagnoses: History of stroke      cloNIDine (CATAPRES) 0 1 mg tablet Take 1 tablet by mouth once daily  Qty: 90 tablet, Refills: 0    Associated Diagnoses: Hypertensive urgency      ferrous sulfate 325 (65 Fe) mg tablet Take 1 tablet (325 mg total) by mouth daily with breakfast for 90 doses  Qty: 90 tablet, Refills: 3    Associated Diagnoses: Iron deficiency anemia due to chronic blood loss      methylPREDNISolone 4 MG tablet therapy pack Use as directed on package  Qty: 21 tablet, Refills: 0    Associated Diagnoses: Sprain of left wrist, initial encounter      metoprolol tartrate (LOPRESSOR) 100 mg tablet Take 1 tablet by mouth twice daily  Qty: 180 tablet, Refills: 0    Associated Diagnoses: Essential hypertension      pantoprazole (PROTONIX) 40 mg tablet Take 1 tablet (40 mg total) by mouth 2 (two) times a day before meals  Qty: 180 tablet, Refills: 3    Associated Diagnoses: Gastrointestinal hemorrhage, unspecified gastrointestinal hemorrhage type      pravastatin (PRAVACHOL) 40 mg tablet Take 1 tablet (40 mg total) by mouth daily with dinner  Qty: 90 tablet, Refills: 3    Associated Diagnoses: Atherosclerosis of native coronary artery of 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      senna (SENOKOT) 8 6 mg Take 1 tablet (8 6 mg total) by mouth daily at bedtime  Qty: 90 tablet, Refills: 3    Associated Diagnoses: Acute respiratory failure with hypoxia ([address] ); Ambulatory dysfunction      torsemide (DEMADEX) 20 mg tablet Take 1 tablet (20 mg total) by mouth 2 (two) times a day  Qty: 180 tablet, Refills: 3    Associated Diagnoses: Hypertensive urgency; Acute respiratory failure with hypoxia (HCC)           No discharge procedures on file  Prior to Admission Medications   Prescriptions Last Dose Informant Patient Reported? Taking?    amLODIPine (NORVASC) 5 mg tablet   No No   Sig: Take 1 tablet by mouth once daily   apixaban (ELIQUIS) 2 5 mg   No No   Sig: Take 1 tablet (2 5 mg total) by mouth 2 (two) times a day   aspirin (ECOTRIN LOW STRENGTH) 81 mg EC tablet   No No   Sig: Take 1 tablet (81 mg total) by mouth daily   cloNIDine (CATAPRES) 0 1 mg tablet   No No   Sig: Take 1 tablet by mouth once daily   ferrous sulfate 325 (65 Fe) mg tablet   No No   Sig: Take 1 tablet (325 mg total) by mouth daily with breakfast for 90 doses   methylPREDNISolone 4 MG tablet therapy pack   No No   Sig: Use as directed on package   metoprolol tartrate (LOPRESSOR) 100 mg tablet   No No   Sig: Take 1 tablet by mouth twice daily   pantoprazole (PROTONIX) 40 mg tablet   No No   Sig: Take 1 tablet (40 mg total) by mouth 2 (two) times a day before meals   pravastatin (PRAVACHOL) 40 mg tablet   No No   Sig: Take 1 tablet (40 mg total) by mouth daily with dinner   senna (SENOKOT) 8 6 mg   No No   Sig: Take 1 tablet (8 6 mg total) by mouth daily at bedtime   torsemide (DEMADEX) 20 mg tablet   No No   Sig: Take 1 tablet (20 mg total) by mouth 2 (two) times a day      Facility-Administered Medications: None       Portions of the record may have been created with voice recognition software  Occasional wrong word or "sound a like" substitutions may have occurred due to the inherent limitations of voice recognition software  Read the chart carefully and recognize, using context, where substitutions have occurred      Electronically signed by:  MD Donell Vance MD  07/28/21 9436

## 2021-07-28 NOTE — ASSESSMENT & PLAN NOTE
Wt Readings from Last 3 Encounters:   07/28/21 76 3 kg (168 lb 3 4 oz)   07/01/21 78 kg (172 lb)   06/03/21 78 1 kg (172 lb 2 oz)       Continue amlodipine  Hold clonidine

## 2021-07-29 ENCOUNTER — APPOINTMENT (INPATIENT)
Dept: RADIOLOGY | Facility: HOSPITAL | Age: 86
DRG: 872 | End: 2021-07-29
Payer: COMMERCIAL

## 2021-07-29 ENCOUNTER — APPOINTMENT (INPATIENT)
Dept: ULTRASOUND IMAGING | Facility: HOSPITAL | Age: 86
DRG: 872 | End: 2021-07-29
Payer: COMMERCIAL

## 2021-07-29 PROBLEM — Q61.02 MULTIPLE RENAL CYSTS: Status: ACTIVE | Noted: 2021-07-29

## 2021-07-29 LAB
ALBUMIN SERPL BCP-MCNC: 2.9 G/DL (ref 3.5–5)
ALP SERPL-CCNC: 83 U/L (ref 46–116)
ALT SERPL W P-5'-P-CCNC: 11 U/L (ref 12–78)
ANION GAP SERPL CALCULATED.3IONS-SCNC: 12 MMOL/L (ref 4–13)
AST SERPL W P-5'-P-CCNC: 19 U/L (ref 5–45)
BASOPHILS # BLD AUTO: 0.03 THOUSANDS/ΜL (ref 0–0.1)
BASOPHILS NFR BLD AUTO: 0 % (ref 0–1)
BILIRUB SERPL-MCNC: 0.44 MG/DL (ref 0.2–1)
BUN SERPL-MCNC: 54 MG/DL (ref 5–25)
CALCIUM ALBUM COR SERPL-MCNC: 10.1 MG/DL (ref 8.3–10.1)
CALCIUM SERPL-MCNC: 9.2 MG/DL (ref 8.3–10.1)
CHLORIDE SERPL-SCNC: 101 MMOL/L (ref 100–108)
CK SERPL-CCNC: 72 U/L (ref 39–308)
CO2 SERPL-SCNC: 26 MMOL/L (ref 21–32)
CREAT SERPL-MCNC: 2.75 MG/DL (ref 0.6–1.3)
CREAT UR-MCNC: 73.4 MG/DL
CREAT UR-MCNC: 73.4 MG/DL
CRP SERPL QL: 244.8 MG/L
EOSINOPHIL # BLD AUTO: 0.05 THOUSAND/ΜL (ref 0–0.61)
EOSINOPHIL NFR BLD AUTO: 0 % (ref 0–6)
ERYTHROCYTE [DISTWIDTH] IN BLOOD BY AUTOMATED COUNT: 13.9 % (ref 11.6–15.1)
GFR SERPL CREATININE-BSD FRML MDRD: 20 ML/MIN/1.73SQ M
GLUCOSE SERPL-MCNC: 86 MG/DL (ref 65–140)
HCT VFR BLD AUTO: 33.1 % (ref 36.5–49.3)
HGB BLD-MCNC: 10.4 G/DL (ref 12–17)
IMM GRANULOCYTES # BLD AUTO: 0.06 THOUSAND/UL (ref 0–0.2)
IMM GRANULOCYTES NFR BLD AUTO: 0 % (ref 0–2)
LACTATE SERPL-SCNC: 1 MMOL/L (ref 0.5–2)
LYMPHOCYTES # BLD AUTO: 1.01 THOUSANDS/ΜL (ref 0.6–4.47)
LYMPHOCYTES NFR BLD AUTO: 7 % (ref 14–44)
MAGNESIUM SERPL-MCNC: 2.4 MG/DL (ref 1.6–2.6)
MCH RBC QN AUTO: 29.9 PG (ref 26.8–34.3)
MCHC RBC AUTO-ENTMCNC: 31.4 G/DL (ref 31.4–37.4)
MCV RBC AUTO: 95 FL (ref 82–98)
MONOCYTES # BLD AUTO: 0.91 THOUSAND/ΜL (ref 0.17–1.22)
MONOCYTES NFR BLD AUTO: 7 % (ref 4–12)
NEUTROPHILS # BLD AUTO: 11.51 THOUSANDS/ΜL (ref 1.85–7.62)
NEUTS SEG NFR BLD AUTO: 86 % (ref 43–75)
NRBC BLD AUTO-RTO: 0 /100 WBCS
PHOSPHATE SERPL-MCNC: 4.5 MG/DL (ref 2.3–4.1)
PLATELET # BLD AUTO: 299 THOUSANDS/UL (ref 149–390)
PMV BLD AUTO: 10.2 FL (ref 8.9–12.7)
POTASSIUM SERPL-SCNC: 3.6 MMOL/L (ref 3.5–5.3)
PROCALCITONIN SERPL-MCNC: 0.68 NG/ML
PROT SERPL-MCNC: 7.4 G/DL (ref 6.4–8.2)
PROT UR-MCNC: 33 MG/DL
PROT/CREAT UR: 0.45 MG/G{CREAT} (ref 0–0.1)
RBC # BLD AUTO: 3.48 MILLION/UL (ref 3.88–5.62)
SODIUM 24H UR-SCNC: 26 MOL/L
SODIUM SERPL-SCNC: 139 MMOL/L (ref 136–145)
TROPONIN I SERPL-MCNC: <0.02 NG/ML
WBC # BLD AUTO: 13.57 THOUSAND/UL (ref 4.31–10.16)

## 2021-07-29 PROCEDURE — 83735 ASSAY OF MAGNESIUM: CPT | Performed by: INTERNAL MEDICINE

## 2021-07-29 PROCEDURE — 85025 COMPLETE CBC W/AUTO DIFF WBC: CPT | Performed by: INTERNAL MEDICINE

## 2021-07-29 PROCEDURE — 86140 C-REACTIVE PROTEIN: CPT | Performed by: INTERNAL MEDICINE

## 2021-07-29 PROCEDURE — 72100 X-RAY EXAM L-S SPINE 2/3 VWS: CPT

## 2021-07-29 PROCEDURE — 84100 ASSAY OF PHOSPHORUS: CPT | Performed by: INTERNAL MEDICINE

## 2021-07-29 PROCEDURE — 82550 ASSAY OF CK (CPK): CPT | Performed by: INTERNAL MEDICINE

## 2021-07-29 PROCEDURE — 76770 US EXAM ABDO BACK WALL COMP: CPT

## 2021-07-29 PROCEDURE — 99232 SBSQ HOSP IP/OBS MODERATE 35: CPT | Performed by: INTERNAL MEDICINE

## 2021-07-29 PROCEDURE — 84145 PROCALCITONIN (PCT): CPT | Performed by: INTERNAL MEDICINE

## 2021-07-29 PROCEDURE — 80053 COMPREHEN METABOLIC PANEL: CPT | Performed by: INTERNAL MEDICINE

## 2021-07-29 PROCEDURE — 99223 1ST HOSP IP/OBS HIGH 75: CPT | Performed by: NURSE PRACTITIONER

## 2021-07-29 PROCEDURE — 99223 1ST HOSP IP/OBS HIGH 75: CPT | Performed by: PHYSICIAN ASSISTANT

## 2021-07-29 PROCEDURE — 84484 ASSAY OF TROPONIN QUANT: CPT | Performed by: INTERNAL MEDICINE

## 2021-07-29 PROCEDURE — 83605 ASSAY OF LACTIC ACID: CPT | Performed by: INTERNAL MEDICINE

## 2021-07-29 RX ORDER — AMLODIPINE BESYLATE 5 MG/1
5 TABLET ORAL DAILY
Status: DISCONTINUED | OUTPATIENT
Start: 2021-07-30 | End: 2021-08-04 | Stop reason: HOSPADM

## 2021-07-29 RX ADMIN — APIXABAN 2.5 MG: 2.5 TABLET, FILM COATED ORAL at 08:37

## 2021-07-29 RX ADMIN — SODIUM CHLORIDE 50 ML/HR: 0.9 INJECTION, SOLUTION INTRAVENOUS at 13:55

## 2021-07-29 RX ADMIN — ASPIRIN 81 MG: 81 TABLET, COATED ORAL at 08:37

## 2021-07-29 RX ADMIN — OXYCODONE HYDROCHLORIDE 5 MG: 5 TABLET ORAL at 20:13

## 2021-07-29 RX ADMIN — METOPROLOL TARTRATE 100 MG: 100 TABLET, FILM COATED ORAL at 08:37

## 2021-07-29 RX ADMIN — APIXABAN 2.5 MG: 2.5 TABLET, FILM COATED ORAL at 17:56

## 2021-07-29 RX ADMIN — HYDROMORPHONE HYDROCHLORIDE 0.5 MG: 1 INJECTION, SOLUTION INTRAMUSCULAR; INTRAVENOUS; SUBCUTANEOUS at 16:13

## 2021-07-29 RX ADMIN — HYDROMORPHONE HYDROCHLORIDE 0.5 MG: 1 INJECTION, SOLUTION INTRAMUSCULAR; INTRAVENOUS; SUBCUTANEOUS at 08:37

## 2021-07-29 RX ADMIN — ACETAMINOPHEN 650 MG: 325 TABLET, FILM COATED ORAL at 20:13

## 2021-07-29 RX ADMIN — AMLODIPINE BESYLATE 5 MG: 5 TABLET ORAL at 08:37

## 2021-07-29 RX ADMIN — PRAVASTATIN SODIUM 40 MG: 40 TABLET ORAL at 16:13

## 2021-07-29 RX ADMIN — METOPROLOL TARTRATE 100 MG: 100 TABLET, FILM COATED ORAL at 17:56

## 2021-07-29 RX ADMIN — SENNOSIDES 8.6 MG: 8.6 TABLET, FILM COATED ORAL at 22:31

## 2021-07-29 NOTE — CONSULTS
Orthopedics   Minerva Erwin 80 y o  male MRN: [id number]  Unit/Bed#: 236-18      Chief Complaint:   right hip pain    HPI:   80 y  o male complaining of right posterior hip pain  For approximately 3 days without known trauma  He is a farmer  He notes difficulty walking because of this  He states the pain is more on the back of the right hip  X-rays and CT scan were taken in the emergency room which do not show any type of hip fracture  Patient has a pacemaker and is unable to get an MRI  Patient notes his pain seems to wax and wane is more positional   It does not radiate knee rated 10/10 at its worst   He does have a history of low back pain  Denies fevers chills sweats or weight loss  Patient is currently hungry notes a good appetite  Denies abdominal pain  Denies numbness or tingling in his legs  Review Of Systems:   · Skin: Normal  · Neuro: See HPI  · Musculoskeletal: See HPI  · 14 point review of systems negative except as stated above     Past Medical History:   Past Medical History:   Diagnosis Date    Aortic stenosis     ECHO -4-14-14   MODERATE TO SEVERE AORTIC STENOSIS; MILD AROTIC INSUFFICIENCY - LAST ASSESSED 10/26/16; RESOLVED 6/8/16    Aortic valve disorder     LAST ASSESSED 10/8/15; 10/8/15    Arthritis     [facility]est[facility]     Complete heart block ([facility] [address] ) 7/20/2020    Coronary artery disease     Hypertension     Hypertensive urgency 5/19/2019    Renal disorder     TIA (transient ischemic attack)     Transient cerebral ischemia        Past Surgical History:   Past Surgical History:   Procedure Laterality Date    AORTIC VALVE REPLACEMENT  06/30/2015    avr with 23 mm [place] Magna Ease bioprosthetic    CARDIAC PACEMAKER PLACEMENT Left 07/24/2020    CATARACT EXTRACTION Right 06/05/2000    COLONOSCOPY      CORONARY ARTERY BYPASS GRAFT  06/05/2000    x1 with argueta  to lad    EYE SURGERY      POLYPECTOMY  04/2014    enteroscopic - esophageal ulcer, gastric erosion, and duodenal ulcer   TONSILLECTOMY      unknown     Family History:  Family history reviewed and non-contributory  Family History   Problem Relation Age of Onset    No Known Problems Mother     No Known Problems Father     Coronary artery disease Neg Hx      Social History:  Social History     Socioeconomic History    Marital status: /Civil Union     Spouse name: Dorie Ji Number of children: 5    Years of education: None    Highest education level: None   Occupational History    Occupation: retired   Tobacco Use    Smoking status: Never Smoker    Smokeless tobacco: Never Used   Vaping Use    Vaping Use: Never used   Substance and Sexual Activity    Alcohol use: Never     Alcohol/week: 0 0 standard drinks     Comment: very occasional    Drug use: Never    Sexual activity: None   Other Topics Concern    None   Social History Narrative    Caffeine use     Dental care, regularly     Lives independently with spouse     Uses seatbelts      Social Determinants of Health     Financial Resource Strain: Low Risk     Difficulty of Paying Living Expenses: Not hard at all   Food Insecurity: No Food Insecurity    Worried About Running Out of Food in the Last Year: Never true    Catalina of Food in the Last Year: Never true   Transportation Needs: No Transportation Needs    Lack of Transportation (Medical): No    Lack of Transportation (Non-Medical):  No   Physical Activity:     Days of Exercise per Week:     Minutes of Exercise per Session:    Stress:     Feeling of Stress :    Social Connections: Unknown    Frequency of Communication with Friends and Family: More than three times a week    Frequency of Social Gatherings with Friends and Family: More than three times a week    Attends [employer] Services: Not on file   [employer] or Organizations: Not on file    Attends Club or Organization Meetings: Not on file    Marital Status:    Intimate Partner Violence:     Fear of Current or Ex-Partner:     Emotionally Abused:     Physically Abused:     Sexually Abused: Allergies:    Allergies   Allergen Reactions    Promethazine Delirium and Other (See Comments)     Labs:  0   Lab Value Date/Time    HCT 33 1 (L) 07/29/2021 0457    HCT 30 2 (L) 07/28/2021 1225    HCT 34 7 (L) 06/11/2021 0826    HCT 27 8 (L) 07/04/2015 0501    HCT 30 1 (L) 07/03/2015 0935    HCT 30 0 (L) 07/02/2015 1223    HGB 10 4 (L) 07/29/2021 0457    HGB 9 8 (L) 07/28/2021 1225    HGB 10 9 (L) 06/11/2021 0826    HGB 9 4 (L) 07/04/2015 0501    HGB 9 9 (L) 07/03/2015 0935    HGB 9 8 (L) 07/02/2015 1223    INR 1 44 (H) 08/24/2020 1255    INR 1 27 (H) 07/02/2015 1223    WBC 13 57 (H) 07/29/2021 0457    WBC 18 58 (H) 07/28/2021 1225    WBC 20 03 (H) 06/11/2021 0826    WBC 12 50 (H) 07/04/2015 0501    WBC 16 11 (H) 07/03/2015 0935    WBC 19 17 (H) 07/02/2015 1223    ESR 28 (H) 07/21/2020 0756     8 (H) 07/29/2021 0457     Meds:    Current Facility-Administered Medications:     acetaminophen (TYLENOL) tablet 650 mg, 650 mg, Oral, Q6H PRN, [employer], DO    amLODIPine (NORVASC) tablet 5 mg, 5 mg, Oral, Daily, Skylar Gustafson MD    apixaban Ilya Jem) tablet 2 5 mg, 2 5 mg, Oral, BID, Skylar Gustafson MD, 2 5 mg at 07/28/21 5878    aspirin (ECOTRIN LOW STRENGTH) EC tablet 81 mg, 81 mg, Oral, Daily, Skylar Gustafson MD    oxyCODONE (ROXICODONE) IR tablet 5 mg, 5 mg, Oral, Q4H PRN **OR** HYDROmorphone (DILAUDID) injection 0 5 mg, 0 5 mg, Intravenous, Q4H PRN, [employer], DO, 0 5 mg at 07/28/21 2329    metoprolol tartrate (LOPRESSOR) tablet 100 mg, 100 mg, Oral, BID, Skylar Gustafson MD, 100 mg at 07/28/21 1747    pravastatin (PRAVACHOL) tablet 40 mg, 40 mg, Oral, Daily With Marc Lloyd MD, 40 mg at 07/28/21 1747    senna (SENOKOT) tablet 8 6 mg, 8 6 mg, Oral, HS, Skylar Gustafson MD    sodium chloride 0 9 % infusion, 50 mL/hr, Intravenous, Continuous, Jayant Shoemaker, DO, Last Rate: 50 mL/hr at 07/28/21 1855, 50 mL/hr at 07/28/21 1855    Blood Culture:   Lab Results   Component Value Date    BLOODCX Received in Microbiology Lab  Culture in Progress  07/28/2021    BLOODCX Received in Microbiology Lab  Culture in Progress  07/28/2021     Wound Culture:   No results found for: WOUNDCULT    Ins and Outs:  I/O last 24 hours: In: 1100 [P O :120; I V :980]  Out: 625 [Urine:625]    Physical Exam:   /69 (BP Location: Right arm)   Pulse 83   Temp 99 1 °F (37 3 °C) (Oral)   Resp 16   Ht 5' 4" (1 626 m)   Wt 76 3 kg (168 lb 3 4 oz)   SpO2 95%   BMI 28 87 kg/m²   Gen: Alert and oriented to person, place, time  HEENT: EOMI, eyes clear, moist mucus membranes, hearing intact  Respiratory: Bilateral chest rise  No audible wheezing found  Cardiovascular: Regular Rate and Rhythm  Abdomen: soft nontender/nondistended  Musculoskeletal: right lower extremity  · Skin pink, dry, and intact, no erythema  Lidoderm in place over posterior pelvis  · Log-rolling while supine elicits no pain about the hip  Passive abduction no pain  Minimal discomfort with passive adduction past midline  No significant pain with axial loading  Internal rotation of the hip produces pain in the posterior pelvic region  Patient is able to do a knee extension but has pain with attempted straight leg raise off the bed  No micro motion tenderness  · Sensation intact L3-S1  · 5/5 motor strength to knee flexion/extension, ankle dorsi/plantar flexion  Positive pain with hip flexion about the posterior aspect  · Negative Stinchfield  · 2+ DP pulse    Radiology:   I personally reviewed the films  X-rays of and CT of right hip shows no obvious fracture or bony deformity about the right hip  Questionable bone lesion posterior pelvis  Films note degenerative disc disease lumbar spine and arthritis  Assessment:  80 y  o male with right posterior hip pain and spinal arthritis    Patient unable to get MRI due to pacemaker  Plan:   Patient requires lower body bone scan to evaluate the right hip and posterior pelvis region for fracture or other lesion  We will also get dedicated lumbar sacral x-rays  · WBAT right lower extremity  · PT  · Pain control  · Body mass index is 28 87 kg/m²  mildly obese  Recommend behavior modifications  · Dispo: Ortho will follow  Case reviewed with Dr Davi Small PA-C

## 2021-07-29 NOTE — ASSESSMENT & PLAN NOTE
Lab Results   Component Value Date    EGFR 20 07/29/2021    EGFR 15 07/28/2021    EGFR 20 06/11/2021    CREATININE 2 75 (H) 07/29/2021    CREATININE 3 45 (H) 07/28/2021    CREATININE 2 72 (H) 06/11/2021     Acute pre renal injury  FENA 0 9  Continue IV fluids  Continue to hold loop diuretic  Continue to avoid all nephrotoxic agents    Chronic kidney disease stage 4  Creatinine 2 75 from 3 45 (baseline 1 8-2 2)  eGFR 20 from 14  Renal ultrasound shows chronic kidney disease and multiple bilateral cysts  Follow-up as outpatient

## 2021-07-29 NOTE — ASSESSMENT & PLAN NOTE
Patient met SIRS criteria upon arrival in ED (WBC 18 58 & )  WBC decreased to 13 57  Heart rate decreased to 83  Follow-up on Blood cultures x2  Follow-up on Urine culture  Chest x-ray normal

## 2021-07-29 NOTE — PROGRESS NOTES
[facility] [address]  Progress Note - Tiara Pedraza 1935, 80 y o  male MRN: [id number]  Unit/Bed#: 565-89 Encounter: [id number]  Primary Care Provider: Ryan Linn DO   Date and time admitted to hospital: 7/28/2021 11:01 AM    * Acute right hip pain  Assessment & Plan  Severe right hip pain unable to rule out fracture  No MRI due to pacemaker  Waiting for recs from Ortho  Continue pain meds (PRN Tylenol 650mg Q6, Dilaudid 0 5mg Q4, Oxycodone 5mg Q4)  F/U lumbar x-ray  F/U bone scan  Consider arthrocentesis (in presence of )  PT/OT        Acute kidney injury superimposed on CKD [facility])  Assessment & Plan  Lab Results   Component Value Date    EGFR 20 07/29/2021    EGFR 15 07/28/2021    EGFR 20 06/11/2021    CREATININE 2 75 (H) 07/29/2021    CREATININE 3 45 (H) 07/28/2021    CREATININE 2 72 (H) 06/11/2021     Acute pre renal injury  FENA 0 9  Continue IV fluids  Continue to hold loop diuretic  Continue to avoid all nephrotoxic agents    Chronic kidney disease stage 4  Creatinine 2 75 from 3 45 (baseline 1 8-2 2)  eGFR 20 from 14  Renal ultrasound shows chronic kidney disease and multiple bilateral cysts  Follow-up as outpatient    SIRS (systemic inflammatory response syndrome) ([facility] [address] )  Assessment & Plan  Patient met SIRS criteria upon arrival in ED (WBC 18 58 & )  WBC decreased to 13 57  Heart rate decreased to 83  Follow-up on Blood cultures x2  Follow-up on Urine culture  Chest x-ray normal    Hypertensive heart disease with HF (heart failure) ([facility])  Assessment & Plan  Wt Readings from Last 3 Encounters:   07/28/21 76 3 kg (168 lb 3 4 oz)   07/01/21 78 kg (172 lb)   06/03/21 78 1 kg (172 lb 2 oz)       Continue amlodipine 5 mg  Continue metoprolol 100 mg b i d    Continue to Hold clonidine      Dyslipidemia  Assessment & Plan  Continue statin    Multiple renal cysts  Assessment & Plan  Bilateral renal cysts found on ultrasound of kidneys  Nephro recommends following up outpatient    GERD (gastroesophageal reflux disease)  Assessment & Plan  Hold PPI        VTE Pharmacologic Prophylaxis: VTE Score: 7 High Risk (Score >/= 5) - Pharmacological DVT Prophylaxis Ordered: apixaban (Eliquis)  Sequential Compression Devices Ordered  Patient Centered Rounds: I performed bedside rounds with nursing staff today  Discussions with Specialists or Other Care Team Provider:     Education and Discussions with Family / Patient: Updated  (wife) at bedside  Time Spent for Care: 30 minutes  More than 50% of total time spent on counseling and coordination of care as described above  Current Length of Stay: 1 day(s)  Current Patient Status: Inpatient   Certification Statement: The patient will continue to require additional inpatient hospital stay due to hip pain  Discharge Plan: Anticipate discharge in 48 hrs to home with home services  Code Status: Level 1 - Full Code    Subjective:   Patient is an 80-year-old male that came to the hospital after 2 days of severe right hip pain  Patient was seen at bedside this afternoon he reports no overnight events  Patient reports right hip pain is improving, rated 6/10 down from 10/10  Pain is worse with any movement, pain resolves when lying completely still  Pain still comes in waves at the same frequency  Patient was laying comfortably and had eaten breakfast at the time that I saw him  No other complaints  Objective:     Vitals:   Temp (24hrs), Av 4 °F (36 9 °C), Min:97 8 °F (36 6 °C), Max:99 1 °F (37 3 °C)    Temp:  [97 8 °F (36 6 °C)-99 1 °F (37 3 °C)] 99 1 °F (37 3 °C)  HR:  [59-99] 83  Resp:  [16-19] 16  BP: (120-148)/(60-74) 140/69  SpO2:  [94 %-99 %] 95 %  Body mass index is 28 87 kg/m²  Input and Output Summary (last 24 hours):      Intake/Output Summary (Last 24 hours) at 2021 1306  Last data filed at 2021 0823  Gross per 24 hour   Intake 1640 ml   Output 1025 ml   Net 615 ml       Physical Exam: Physical Exam  Constitutional:       Appearance: Normal appearance  Comments: Patient unable to ambulate due to right hip pain   HENT:      Head: Normocephalic and atraumatic  Right Ear: External ear normal       Left Ear: External ear normal       Nose: No rhinorrhea  Eyes:      General:         Right eye: No discharge  Left eye: No discharge  Conjunctiva/sclera: Conjunctivae normal    Cardiovascular:      Rate and Rhythm: Normal rate and regular rhythm  Pulses: Normal pulses  Heart sounds: Normal heart sounds  Pulmonary:      Effort: Pulmonary effort is normal       Breath sounds: Normal breath sounds  Abdominal:      General: Abdomen is flat  Palpations: Abdomen is soft  Tenderness: There is no abdominal tenderness  Musculoskeletal:         General: Tenderness present  Lumbar back: Bony tenderness present  Decreased range of motion  Back:       Comments: Decreased ROM due to right hip pain   Skin:     General: Skin is warm and dry  Neurological:      Mental Status: He is alert and oriented to person, place, and time     Psychiatric:         Mood and Affect: Mood normal          Behavior: Behavior normal          Additional Data:     Labs:  Results from last 7 days   Lab Units 07/29/21  0457   WBC Thousand/uL 13 57*   HEMOGLOBIN g/dL 10 4*   HEMATOCRIT % 33 1*   PLATELETS Thousands/uL 299   NEUTROS PCT % 86*   LYMPHS PCT % 7*   MONOS PCT % 7   EOS PCT % 0     Results from last 7 days   Lab Units 07/29/21  0457   SODIUM mmol/L 139   POTASSIUM mmol/L 3 6   CHLORIDE mmol/L 101   CO2 mmol/L 26   BUN mg/dL 54*   CREATININE mg/dL 2 75*   ANION GAP mmol/L 12   CALCIUM mg/dL 9 2   ALBUMIN g/dL 2 9*   TOTAL BILIRUBIN mg/dL 0 44   ALK PHOS U/L 83   ALT U/L 11*   AST U/L 19   GLUCOSE RANDOM mg/dL 86                 Results from last 7 days   Lab Units 07/29/21  0457   LACTIC ACID mmol/L 1 0   PROCALCITONIN ng/ml 0 68*       Lines/Drains:  Invasive Devices Peripheral Intravenous Line            Peripheral IV 07/28/21 Right Antecubital 1 day          Drain            External Urinary Catheter Medium <1 day                      Imaging: Reviewed radiology reports from this admission including: ultrasound(s)    Recent Cultures (last 7 days):   Results from last 7 days   Lab Units 07/28/21  1656   BLOOD CULTURE  Received in Microbiology Lab  Culture in Progress  Received in Microbiology Lab  Culture in Progress  Last 24 Hours Medication List:   Current Facility-Administered Medications   Medication Dose Route Frequency Provider Last Rate    acetaminophen  650 mg Oral Q6H PRN [employer], DO      [START ON 7/30/2021] amLODIPine  5 mg Oral Daily Faraz Purchase, CRNP      apixaban  2 5 mg Oral BID Bhargav Rehman MD      aspirin  81 mg Oral Daily Bhargav Rehman MD      oxyCODONE  5 mg Oral Q4H PRN [employer], DO      Or    HYDROmorphone  0 5 mg Intravenous Q4H PRN [employer], DO      metoprolol tartrate  100 mg Oral BID Bhargav Rehman MD      pravastatin  40 mg Oral Daily With Mimi Anand MD      senna  8 6 mg Oral HS Bhargav Rehman MD      sodium chloride  50 mL/hr Intravenous Continuous Faraz Purchase, CRNP 50 mL/hr (07/28/21 4505)        Today, Patient Was Seen By: Bhargav Rehman MD    **Please Note: This note may have been constructed using a voice recognition system  **

## 2021-07-29 NOTE — UTILIZATION REVIEW
Initial Clinical Review    Admission: Date/Time/Statement:   Admission Orders (From admission, onward)     Ordered        07/28/21 1352  Inpatient Admission  Once                   Orders Placed This Encounter   Procedures    Inpatient Admission     Standing Status:   Standing     Number of Occurrences:   1     Order Specific Question:   Level of Care     Answer:   Med Surg [16]     Order Specific Question:   Estimated length of stay     Answer:   More than 2 Midnights     Order Specific Question:   Certification     Answer:   I certify that inpatient services are medically necessary for this patient for a duration of greater than two midnights  See H&P and MD Progress Notes for additional information about the patient's course of treatment  ED Arrival Information     Expected Arrival Acuity    - 7/28/2021 11:01 Urgent         Means of arrival Escorted by Service Admission type    Ambulance [facility] Hospitalist Urgent         Arrival complaint    Back Pain        Chief Complaint   Patient presents with    Back Pain     pt reports right lower back pain; right hip pain started few days ago; no injury, no relief from lidocaine patch    Hip Pain       Initial Presentation:  81 yo m with a pmh of CAD,  CHF, aortic stenosis s/p AVR, complete heart block  s/p PPM , HTN, renal disorder, CKD stage 4,  dyslipidemia,  Cataract R eye and  Left eye surgery  He presented to the Ed with right hip pain, that is sharp, reportedly comes and goes rated 10/10  He reports the pain started 3 days prior  Worse this morning, he was unable to get out of bed, EMS called  IN the ED Xray  And CT were negative, labs  showed elevated creatinine(3 45) and low GFR( 15 - baseline 27--20) with chronically high wbc's (18 58)  He reports he ambulates with a cane at baseline, 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  On exam his right leg would jerk randomly   He is admitted inpatient with YOLANDE, was 3 45 ( baseline cr 2 2-3 1),  SIRS  and  acute right hip pain  Date: 7/29/21   Day 2:  No acute events overnight  He reports R hip pain is improving, rated 8/10 down frm 10/10  Pain is worse with any movement, pain resolves when lying completely still  He reports the pain comes in waves  Plan to marilia current treatmetn, flow  Ortho plana nd nephrology plan  Nephrology Consult 7/29 -  YOLANDE on CKD, cr to 3 45 - today 2 75 ( baseline cr 1 8-2 2) , agree with renal US, continue IVF for additional 24 hours  Monitor for urinary retention, hold diuretics,  Strict I & O, low - NA diet  Orthopedic Surgery Consult  7/29 -  Pt with right posterior hip pain  For 3 days  Without known trauma  He notes difficulty walking due to this  Xray  & CT scan do not show any type of fracture  He has a PPM  So unable to get an MRI  He reports pain seems to wax and wane and is positional  Plan weight bear as tolerated RLE, PT eval, Pain control  Plan to follow       ED Triage Vitals [07/28/21 1105]   Temperature Pulse Respirations Blood Pressure SpO2   98 6 °F (37 °C) 69 18 153/67 98 %      Temp Source Heart Rate Source Patient Position - Orthostatic VS BP Location FiO2 (%)   Temporal Monitor Sitting Right arm --      Pain Score       Worst Possible Pain          Wt Readings from Last 1 Encounters:   07/28/21 76 3 kg (168 lb 3 4 oz)     Additional Vital Signs:   Date/Time  Temp  Pulse  Resp  BP  MAP (mmHg)  SpO2  O2 Device  Patient Position - Orthostatic VS   07/29/21 06:55:14  99 1 °F (37 3 °C)  83  16  140/69  93  95 %  --  --   07/28/21 23:25:02  98 4 °F (36 9 °C)  59  18  --  --  94 %  --  --   07/28/21 22:19:27  --  86  18  141/71  94  97 %  --  --   07/28/21 2020  --  --  --  --  --  --  None (Room air)  --   07/28/21 17:49:04  --  94  --  143/74  97  97 %  --  --   07/28/21 1432  97 8 °F (36 6 °C)  87  16  148/72  97  99 %  None (Room air)  Lying   07/28/21 1400  --  99  19  120/62  83  96 %  None (Room air)  Sitting   07/28/21 1330  --  64  16  127/60  87  95 % None (Room air)  Sitting   07/28/21 1300  --  97  18  134/65  93  96 %  None (Room air)  Sitting   07/28/21 1230  --  86  18  120/81  95  97 %  None (Room air)  Sitting   07/28/21 1200  --  92  18  123/66  89  95 %  None (Room air)  Sitting   07/28/21 1130  --  101  18  139/70  99  99 %  None (Room air)  Sitting           Pertinent Labs/Diagnostic Test Results:   Results from last 7 days   Lab Units 07/28/21  1853   SARS-COV-2  Negative     Results from last 7 days   Lab Units 07/29/21  0457 07/28/21  1225   WBC Thousand/uL 13 57* 18 58*   HEMOGLOBIN g/dL 10 4* 9 8*   HEMATOCRIT % 33 1* 30 2*   PLATELETS Thousands/uL 299 262   NEUTROS ABS Thousands/µL 11 51* 16 47*         Results from last 7 days   Lab Units 07/29/21  0457 07/28/21  1225   SODIUM mmol/L 139 136   POTASSIUM mmol/L 3 6 3 8   CHLORIDE mmol/L 101 98*   CO2 mmol/L 26 30   ANION GAP mmol/L 12 8   BUN mg/dL 54* 62*   CREATININE mg/dL 2 75* 3 45*   EGFR ml/min/1 73sq m 20 15   CALCIUM mg/dL 9 2 9 2   MAGNESIUM mg/dL 2 4  --    PHOSPHORUS mg/dL 4 5*  --      Results from last 7 days   Lab Units 07/29/21  0457   AST U/L 19   ALT U/L 11*   ALK PHOS U/L 83   TOTAL PROTEIN g/dL 7 4   ALBUMIN g/dL 2 9*   TOTAL BILIRUBIN mg/dL 0 44     Results from last 7 days   Lab Units 07/29/21  0457 07/28/21  1225   GLUCOSE RANDOM mg/dL 86 114       Results from last 7 days   Lab Units 07/29/21  0457   CK TOTAL U/L 72     Results from last 7 days   Lab Units 07/29/21  0457   TROPONIN I ng/mL <0 02       Results from last 7 days   Lab Units 07/29/21  0457   LACTIC ACID mmol/L 1 0       Results from last 7 days   Lab Units 07/29/21  0457   CRP mg/L 244 8*     Results from last 7 days   Lab Units 07/28/21  1655   CLARITY UA  Clear   COLOR UA  Yellow   SPEC GRAV UA  1 010   PH UA  6 0   GLUCOSE UA mg/dl Negative   KETONES UA mg/dl Negative   BLOOD UA  Negative   PROTEIN UA mg/dl Negative   NITRITE UA  Negative   BILIRUBIN UA  Negative   UROBILINOGEN UA E U /dl 0 2   LEUKOCYTES UA Negative   SODIUM UR  26   CREATININE UR mg/dL 73 4  73 4   PROTEIN UR mg/dL 33   PROT/CREAT RATIO UR  0 45*       Results from last 7 days   Lab Units 07/28/21  1656   BLOOD CULTURE  Received in Microbiology Lab  Culture in Progress  Received in Microbiology Lab  Culture in Progress  CT A & P - 7/28 - No acute intra-abdominal abnormality  Cholelithiasis  Colonic diverticulosis  Multiple incidental findings as above  XR Hip - pelvis - 7/28 -  No acute osseous abnormality     EKG - none    ED Treatment:   Medication Administration from 07/28/2021 1101 to 07/28/2021 1420       Date/Time Order Dose Route Action Comments     07/28/2021 1132 methocarbamol (ROBAXIN) tablet 500 mg 500 mg Oral Given      07/28/2021 1340 sodium chloride 0 9 % bolus 1,000 mL 1,000 mL Intravenous New Bag         Past Medical History:   Diagnosis Date    Aortic stenosis     ECHO -4-14-14   MODERATE TO SEVERE AORTIC STENOSIS; MILD AROTIC INSUFFICIENCY - LAST ASSESSED 10/26/16; RESOLVED 6/8/16    Aortic valve disorder     LAST ASSESSED 10/8/15; 10/8/15    Arthritis     [facility]est[facility]     Complete heart block ([facility] ) 7/20/2020    Coronary artery disease     Hypertension     Hypertensive urgency 5/19/2019    Renal disorder     TIA (transient ischemic attack)     Transient cerebral ischemia      Present on Admission:   Acute right hip pain   Acute kidney injury superimposed on CKD ([facility] )   Dyslipidemia   Paroxysmal A-fib ([facility] )   Hypertensive heart disease with HF (heart failure) ([facility])   SIRS (systemic inflammatory response syndrome) ([facility])   GERD (gastroesophageal reflux disease)      Admitting Diagnosis: Low back pain [M54 5]  Back pain [M54 9]  Hip pain [M25 559]  Right hip pain [M25 551]  YOLANDE (acute kidney injury) ([facility] ) [N17 9]  Age/Sex: 80 y o  male     Admission Orders:  Scheduled Medications:  Tylenol 650 mg po once- 7/28 x 1   amLODIPine, 5 mg, Oral, Daily  apixaban, 2 5 mg, Oral, BID  aspirin, 81 mg, Oral, Daily  metoprolol tartrate, 100 mg, Oral, BID  pravastatin, 40 mg, Oral, Daily With Dinner  senna, 8 6 mg, Oral, HS  Dilaudid  0 2 mg iv once- 7/28 x1       Continuous IV Infusions:  sodium chloride, 50 mL/hr, Intravenous, Continuous      PRN Meds:  acetaminophen, 650 mg, Oral, Q6H PRN  oxyCODONE, 5 mg, Oral, Q4H PRN   Or  HYDROmorphone, 0 5 mg, Intravenous, Q4H PRN - 7/28 x 1  - 7/29 x 1    Nursing Orders - VS - SCD's to le's-  Diet NPO     Network Utilization Review Department  ATTENTION: Please call with any questions or concerns to [phone] and carefully listen to the prompts so that you are directed to the right person  All voicemails are confidential   [place] all requests for admission clinical reviews, approved or denied determinations and any other requests to dedicated fax number below belonging to the campus where the patient is receiving treatment   List of dedicated fax numbers for the Facilities:  [address] DENIALS (Administrative/Medical Necessity) [phone]   [address] (Maternity/NICU/Pediatrics) [phone]   [address] Giselle Caal 5581 [id number] [place] [address] [address] [facility] [facility] [phone]

## 2021-07-29 NOTE — ASSESSMENT & PLAN NOTE
Wt Readings from Last 3 Encounters:   07/28/21 76 3 kg (168 lb 3 4 oz)   07/01/21 78 kg (172 lb)   06/03/21 78 1 kg (172 lb 2 oz)       Continue amlodipine 5 mg  Continue metoprolol 100 mg b i d    Continue to Hold clonidine

## 2021-07-29 NOTE — PLAN OF CARE
Problem: PAIN - ADULT  Goal: Verbalizes/displays adequate comfort level or baseline comfort level  Description: Interventions:  - Encourage patient to monitor pain and request assistance  - Assess pain using appropriate pain scale (0-10 pain scale)  - Administer analgesics based on type and severity of pain and evaluate response  - Implement non-pharmacological measures as appropriate and evaluate response  - Consider cultural and social influences on pain and pain management  - Notify physician/advanced practitioner if interventions unsuccessful or patient reports new pain  7/28/2021 2341 by Vandana Valdes RN  Outcome: Progressing  7/28/2021 2341 by Vandana Valdes RN  Outcome: Progressing  7/28/2021 2337 by Vandana Valdes RN  Outcome: Progressing     Problem: INFECTION - ADULT  Goal: Absence or prevention of progression during hospitalization  Description: INTERVENTIONS:  - Assess and monitor for signs and symptoms of infection  - Monitor lab/diagnostic results  - Monitor all insertion sites, i e  indwelling lines, tubes, and drains  - Administer medications as ordered  - Instruct and encourage patient and family to use good hand hygiene technique  - Identify and instruct in appropriate isolation precautions for identified infection/condition  7/28/2021 2341 by Vandana Valdes RN  Outcome: Progressing  7/28/2021 2341 by Vandana Valdes RN  Outcome: Progressing  7/28/2021 2337 by Vandana Valdes RN  Outcome: Progressing     Problem: SAFETY ADULT  Goal: Patient will remain free of falls  Description: INTERVENTIONS:  - Educate patient/family on patient safety including physical limitations  - Instruct patient to call for assistance with activity   - Consult OT/PT to assist with strengthening/mobility   - Keep Call bell within reach  - Keep bed low and locked with side rails adjusted as appropriate  - Keep care items and personal belongings within reach  - Initiate and maintain comfort rounds  - Make Fall Risk Sign visible to staff  - Offer Toileting every 4 Hours, in advance of need  - Initiate/Maintain bed alarm  - Obtain necessary fall risk management equipment  - Apply yellow socks and bracelet for high fall risk patients  - Consider moving patient to room near nurses station  7/28/2021 2341 by Peace Robles RN  Outcome: Progressing  7/28/2021 2341 by Peace Robles RN  Outcome: Progressing  7/28/2021 2337 by Peace Robles RN  Outcome: Progressing  Goal: Maintain or return to baseline ADL function  Description: INTERVENTIONS:  -  Assess patient's ability to carry out ADLs; assess patient's baseline for ADL function and identify physical deficits which impact ability to perform ADLs (bathing, care of mouth/teeth, toileting, grooming, dressing, etc )  - Assess/evaluate cause of self-care deficits   - Assess range of motion  - Assess patient's mobility; develop plan if impaired  - Assess patient's need for assistive devices and provide as appropriate  - Encourage maximum independence but intervene and supervise when necessary  - Involve family in performance of ADLs  - Assess for home care needs following discharge   - Consider OT consult to assist with ADL evaluation and planning for discharge  - Provide patient education as appropriate  7/28/2021 2341 by Peace Robles RN  Outcome: Progressing  7/28/2021 2341 by Peace Robles RN  Outcome: Progressing  7/28/2021 2337 by Peace Robles RN  Outcome: Progressing  Goal: Maintains/Returns to pre admission functional level  Description: INTERVENTIONS:  - Perform BMAT or MOVE assessment daily    - Set and communicate daily mobility goal to care team and patient/family/caregiver  - Collaborate with rehabilitation services on mobility goals if consulted  - Perform Range of Motion 2 times a day  - Reposition patient every 2 hours    - Dangle patient 2 times a day  - Stand patient 2 times a day  - Ambulate patient 2 times a day  - Out of bed to chair 2 times a day   - Out of bed for meals 2 times a day  - Out of bed for toileting  - Record patient progress and toleration of activity level   7/28/2021 2341 by Dilshad Hassan RN  Outcome: Progressing  7/28/2021 2341 by Dilshad Hassan RN  Outcome: Progressing  7/28/2021 2337 by Dilshad Hassan RN  Outcome: Progressing     Problem: DISCHARGE PLANNING  Goal: Discharge to home or other facility with appropriate resources  Description: INTERVENTIONS:  - Identify barriers to discharge w/patient and caregiver  - Arrange for needed discharge resources and transportation as appropriate  - Identify discharge learning needs (meds, wound care, etc )  - Refer to Case Management Department for coordinating discharge planning if the patient needs post-hospital services based on physician/advanced practitioner order or complex needs related to functional status, cognitive ability, or social support system  7/28/2021 2341 by Dilshad Hassan RN  Outcome: Progressing  7/28/2021 2341 by Dilshad Hassan RN  Outcome: Progressing  7/28/2021 2337 by Dilshad Hassan RN  Outcome: Progressing     Problem: Knowledge Deficit  Goal: Patient/family/caregiver demonstrates understanding of disease process, treatment plan, medications, and discharge instructions  Description: Complete learning assessment and assess knowledge base    Interventions:  - Provide teaching at level of understanding  - Provide teaching via preferred learning methods  7/28/2021 2341 by Dilshad Hassan RN  Outcome: Progressing  7/28/2021 2341 by Dilshad Hassan RN  Outcome: Progressing  7/28/2021 2337 by Dilshad Hassan RN  Outcome: Progressing     Problem: MUSCULOSKELETAL - ADULT  Goal: Maintain or return mobility to safest level of function  Description: INTERVENTIONS:  - Assess patient's ability to carry out ADLs; assess patient's baseline for ADL function and identify physical deficits which impact ability to perform ADLs (bathing, care of mouth/teeth, toileting, grooming, dressing, etc )  - Assess/evaluate cause of self-care deficits   - Assess range of motion  - Assess patient's mobility  - Assess patient's need for assistive devices and provide as appropriate  - Encourage maximum independence but intervene and supervise when necessary  - Involve family in performance of ADLs  - Assess for home care needs following discharge   - Consider OT consult to assist with ADL evaluation and planning for discharge  - Provide patient education as appropriate  7/28/2021 2341 by Be Hernandez RN  Outcome: Progressing  7/28/2021 2341 by Be Hernandez RN  Outcome: Progressing  7/28/2021 2337 by Be Hernandez RN  Outcome: Progressing  Goal: Maintain proper alignment of affected body part  Description: INTERVENTIONS:  - Support, maintain and protect limb and body alignment  - Provide patient/ family with appropriate education  7/28/2021 2341 by Be Hernandez RN  Outcome: Progressing  7/28/2021 2341 by Be Hernandez RN  Outcome: Progressing  7/28/2021 2337 by Be Hernandez RN  Outcome: Progressing     Problem: GENITOURINARY - ADULT  Goal: Maintains or returns to baseline urinary function  Description: INTERVENTIONS:  - Assess urinary function  - Encourage oral fluids to ensure adequate hydration if ordered  - Administer IV fluids as ordered to ensure adequate hydration  - Administer ordered medications as needed  - Offer frequent toileting  - Follow urinary retention protocol if ordered  7/28/2021 2341 by Be Hernandez RN  Outcome: Progressing  7/28/2021 2341 by Be Hernandez RN  Outcome: Progressing  7/28/2021 2337 by Be Hernandez RN  Outcome: Progressing     Problem: METABOLIC, FLUID AND ELECTROLYTES - ADULT  Goal: Electrolytes maintained within normal limits  Description: INTERVENTIONS:  - Monitor labs and assess patient for signs and symptoms of electrolyte imbalances  - Administer electrolyte replacement as ordered  - Monitor response to electrolyte replacements, including repeat lab results as appropriate  - Instruct patient on fluid and nutrition as appropriate  7/28/2021 2341 by Vandana Valdes RN  Outcome: Progressing  7/28/2021 2341 by Vandana Valdes RN  Outcome: Progressing  7/28/2021 2337 by Vandana Valdes RN  Outcome: Progressing  Goal: Fluid balance maintained  Description: INTERVENTIONS:  - Monitor labs   - Monitor I/O and WT  - Instruct patient on fluid and nutrition as appropriate  - Assess for signs & symptoms of volume excess or deficit  7/28/2021 2341 by Vandana Valdes RN  Outcome: Progressing  7/28/2021 2341 by Vandana Valdes RN  Outcome: Progressing  7/28/2021 2337 by Vandana Valdes RN  Outcome: Progressing     Problem: MOBILITY - ADULT  Goal: Maintain or return to baseline ADL function  Description: INTERVENTIONS:  -  Assess patient's ability to carry out ADLs; assess patient's baseline for ADL function and identify physical deficits which impact ability to perform ADLs (bathing, care of mouth/teeth, toileting, grooming, dressing, etc )  - Assess/evaluate cause of self-care deficits   - Assess range of motion  - Assess patient's mobility; develop plan if impaired  - Assess patient's need for assistive devices and provide as appropriate  - Encourage maximum independence but intervene and supervise when necessary  - Involve family in performance of ADLs  - Assess for home care needs following discharge   - Consider OT consult to assist with ADL evaluation and planning for discharge  - Provide patient education as appropriate  7/28/2021 2341 by Vandana Valdes RN  Outcome: Progressing  7/28/2021 2341 by Vandana Valdes RN  Outcome: Progressing  7/28/2021 2337 by Vandana Valdes RN  Outcome: Progressing  Goal: Maintains/Returns to pre admission functional level  Description: INTERVENTIONS:  - Perform BMAT or MOVE assessment daily    - Set and communicate daily mobility goal to care team and patient/family/caregiver     - Collaborate with rehabilitation services on mobility goals if consulted  - Perform Range of Motion 2 times a day  - Reposition patient every 2 hours    - Dangle patient 2 times a day  - Stand patient 2 times a day  - Ambulate patient 2 times a day  - Out of bed to chair 2 times a day   - Out of bed for meals 2 times a day  - Out of bed for toileting  - Record patient progress and toleration of activity level   7/28/2021 2341 by Michelle Argueta RN  Outcome: Progressing  7/28/2021 2341 by Michelle Argueta RN  Outcome: Progressing  7/28/2021 2337 by Michelle Argueta RN  Outcome: Progressing     Problem: Prexisting or High Potential for Compromised Skin Integrity  Goal: Skin integrity is maintained or improved  Description: INTERVENTIONS:  - Identify patients at risk for skin breakdown  - Assess and monitor skin integrity  - Assess and monitor nutrition and hydration status  - Monitor labs   - Assess for incontinence   - Turn and reposition patient  - Assist with mobility/ambulation  - Relieve pressure over bony prominences  - Avoid friction and shearing  - Provide appropriate hygiene as needed including keeping skin clean and dry  - Evaluate need for skin moisturizer/barrier cream  - Collaborate with interdisciplinary team   - Patient/family teaching  - Consider wound care consult   7/28/2021 2341 by Michelle Argueta RN  Outcome: Progressing  7/28/2021 2341 by Michelle Argueta RN  Outcome: Progressing  7/28/2021 2337 by Michelle Argueta RN  Outcome: Progressing

## 2021-07-29 NOTE — ASSESSMENT & PLAN NOTE
Severe right hip pain unable to rule out fracture  No MRI due to pacemaker  Waiting for recs from Ortho  Continue pain meds (PRN Tylenol 650mg Q6, Dilaudid 0 5mg Q4, Oxycodone 5mg Q4)  F/U lumbar x-ray  F/U bone scan  Consider arthrocentesis (in presence of )  PT/OT

## 2021-07-29 NOTE — PHYSICAL THERAPY NOTE
PT eval order received  Bone scan ordered by orthopedics to r/o hip and pelvic fx    Will await results before proceeding with PT eval

## 2021-07-29 NOTE — CASE MANAGEMENT
Met with pt to discuss role as  in helping pt to develop discharge plan and to help pt carry out their plan  Pt's current LOS is    1 day    Pt is a Risk of Unplanned Readmission score of  23  Pt is not a 30 day readmission  Pt is not a bundle       Pt lives in a multi- level house with his wife  Pt has 3 [address]  Pt has 13 steps on the inside  Pt has his bedroom and bathroom on the 2nd floor  Pt has a powder room on the 1rst floor  Pt has a cane and a walker  Pt uses the cane to ambulate  Pt is independent with ADL's and functional mobility  Pt does not drive  Right now patient's wife can not drive because he arm is in a sling  Pt 's daughter will drive the patient home  Pt has never had home care or any services in the home  Pt has never been to STR  Pt with no history of substance abuse  Pt was recently doing outpatient PT  PCP: Ryan Linn  Preferred Pharmacy : [facility] in [facility] Pt with no barriers paying for his meds or picking them up  Case Management met with the patient to evaluate the patient's prior level of functioning and living situation and any barriers to discharge and to forming a safe discharge plan  CM also evaluated the patient for any services in the home or need for services  Case management also notified patient that their preferences will be take into consideration when developing their discharge plan        Pt's  Primary  is his wife Ruddy Ardon [phone])  Pt might benefit from STR vs Home care   I will continue to follow

## 2021-07-29 NOTE — PLAN OF CARE
Problem: SAFETY ADULT  Goal: Patient will remain free of falls  Description: INTERVENTIONS:  - Educate patient/family on patient safety including physical limitations  - Instruct patient to call for assistance with activity   - Consult OT/PT to assist with strengthening/mobility   - Keep Call bell within reach  - Keep bed low and locked with side rails adjusted as appropriate  - Keep care items and personal belongings within reach  - Initiate and maintain comfort rounds  - Make Fall Risk Sign visible to staff  - Offer Toileting every 4 Hours, in advance of need  - Initiate/Maintain bed alarm  - Obtain necessary fall risk management equipment  - Apply yellow socks and bracelet for high fall risk patients  - Consider moving patient to room near nurses station  Outcome: Progressing     Problem: Potential for Falls  Goal: Patient will remain free of falls  Description: INTERVENTIONS:  - Educate patient/family on patient safety including physical limitations  - Instruct patient to call for assistance with activity   - Consult OT/PT to assist with strengthening/mobility   - Keep Call bell within reach  - Keep bed low and locked with side rails adjusted as appropriate  - Keep care items and personal belongings within reach  - Initiate and maintain comfort rounds  - Make Fall Risk Sign visible to staff  - Offer Toileting every 4 Hours, in advance of need  - Initiate/Maintain bed alarm  - Obtain necessary fall risk management equipment  - Apply yellow socks and bracelet for high fall risk patients  - Consider moving patient to room near nurses station  Outcome: Progressing

## 2021-07-29 NOTE — CONSULTS
Fernando Quiñonez 80 y o  male MRN: [id number]  Unit/Bed#: 469-59 Encounter: [id number]        Assessment and Plan:    1  Acute kidney injury (POA) atop chronic kidney disease  · Presented with a creatinine 3 45 mg/dL -> 2 75 mg/dL today  Urine chemistries suggestive of prerenal etiology  Urinalysis bland  No obstructive uropathy on CT scan  · Plan:  · Agree with renal ultrasound, this is pending  · Continue normal saline times 24 more hours  · Continue to hold diuretics  · Monitor for urinary retention  · Avoid potential nephrotoxins, maximize hemodynamics and provide supportive care  2  Stage 4 chronic kidney disease with baseline creatinine around 1 8-2 2 mg/dL  · Should follow-up with us in office  3  Chronic diastolic congestive heart failure  · Continue to hold diuretics  Examines compensated  Continue daily weight, strict I&O, low-sodium diet  4  Hypertension setting of CKD 4  · Home med list contains clonidine which he is currently not receiving, monitor for rebound hypertension  5  Mineral bone disease of chronic kidney disease  · Check vitamin-D and PTH as an outpatient  No binder wanted  Avoid magnesium supplementation  6  Multifactorial anemia  · Has history of GI bleed 2020  Appears to be iron deficient but possibly infected  Consider iron infusions as an outpatient  Transfuse for hemoglobin less than 7 0 grams/deciliter  7  Paroxysmal atrial fibrillation on Eliquis  8  Right hip pain  · Seen in consultation by Orthopedic surgery  Avoid NSAIDs    HPI:    Andrew Casillas is a 80 y o  male with an active problem list significant for CKD 4, emphyesema, chronic diastolic congestive heart failure, left wrist sprain seen by Dr Azucena Arevalo earlier this month, iron deficiency anemia, GIB on eliquis 2020, who presented to [address] [place] [address] emergency department 7/28/21 for low back pain without trauma  Chest x-ray and unenhanced CT negative for acute abnormality   Noted to have YOLANDE upon arrival  Diuretics held and provided volume expansion  A renal consultation was requested today for YOLANDE on CKD  Upon discussion with the patient, he relays HPI as above in addition: 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  He denies any trauma  Has back pain  Denies any nausea, vomiting, diarrhea, decreased oral intake, dysuria urgency frequency now or prior to arrival  Denies all NSAID use and states he takes tylenol for pain  Endorses taking medications as prescribed until day of admission  Denies shortness of breath, swelling, dizziness, chest pain  From a renal perspective, appears to have CKD stage 4 with baseline sCr around 1 8-2 2 mg/dL  Patient is unaware that he has CKD  Has not seen a nephrologist as an outpatient  Has seen by Nephrology in consultation during hospital stays  Denies using NSAIDs  Denies history of hypertension, diabetes however per chart review he does carry both these diagnosis  Will need outpatient follow-up  Reason for Consult: acute kidney injury    Review of Systems:  A complete 10-point review of systems was performed  Aside from what was mentioned in the HPI, it is otherwise negative  Historical Information   Past Medical History:   Diagnosis Date    Aortic stenosis     ECHO -4-14-14   MODERATE TO SEVERE AORTIC STENOSIS; MILD AROTIC INSUFFICIENCY - LAST ASSESSED 10/26/16; RESOLVED 6/8/16    Aortic valve disorder     LAST ASSESSED 10/8/15; 10/8/15    Arthritis     [facility])     Complete heart block ([facility] [address] [facility] 7/20/2020    Coronary artery disease     Hypertension     Hypertensive urgency 5/19/2019    Renal disorder     TIA (transient ischemic attack)     Transient cerebral ischemia      Past Surgical History:   Procedure Laterality Date    AORTIC VALVE REPLACEMENT  06/30/2015    avr with 23 mm [place] Magna Ease bioprosthetic    CARDIAC PACEMAKER PLACEMENT Left 07/24/2020    CATARACT EXTRACTION Right 06/05/2000    COLONOSCOPY      CORONARY ARTERY BYPASS GRAFT  06/05/2000    x1 with argueta  to lad    EYE SURGERY      POLYPECTOMY  04/2014    enteroscopic - esophageal ulcer, gastric erosion, and duodenal ulcer   TONSILLECTOMY      unknown     Social History   Social History     Substance and Sexual Activity   Alcohol Use Never    Alcohol/week: 0 0 standard drinks    Comment: very occasional     Social History     Substance and Sexual Activity   Drug Use Never     Social History     Tobacco Use   Smoking Status Never Smoker   Smokeless Tobacco Never Used       Family History:   Family History   Problem Relation Age of Onset    No Known Problems Mother     No Known Problems Father     Coronary artery disease Neg Hx        Medications:  Pertinent medications were reviewed  Current Facility-Administered Medications   Medication Dose Route Frequency Provider Last Rate    acetaminophen  650 mg Oral Q6H PRN Rocío Mikhail, DO      amLODIPine  5 mg Oral Daily Marii Chavez MD      apixaban  2 5 mg Oral BID Marii Chavez MD      aspirin  81 mg Oral Daily Marii Chavez MD      oxyCODONE  5 mg Oral Q4H PRN Slanalow Elizondo DO      Or    HYDROmorphone  0 5 mg Intravenous Q4H PRN Rocío Elizondo DO      metoprolol tartrate  100 mg Oral BID Marii Chavez MD      pravastatin  40 mg Oral Daily With Gely Sotelo MD      senna  8 6 mg Oral HS Marii Chavez MD      sodium chloride  50 mL/hr Intravenous Continuous Slanalow Elizondo DO 50 mL/hr (07/28/21 1855)         Allergies   Allergen Reactions    Promethazine Delirium and Other (See Comments)         Vitals:   /69 (BP Location: Right arm)   Pulse 83   Temp 99 1 °F (37 3 °C) (Oral)   Resp 16   Ht 5' 4" (1 626 m)   Wt 76 3 kg (168 lb 3 4 oz)   SpO2 95%   BMI 28 87 kg/m²   Body mass index is 28 87 kg/m²    SpO2: 95 %,   SpO2 Activity: At Rest,   O2 Device: None (Room air)      Intake/Output Summary (Last 24 hours) at 7/29/2021 1029  Last data filed at 7/29/2021 7656  Gross per 24 hour   Intake 1640 ml   Output 1025 ml   Net 615 ml     Invasive Devices     Peripheral Intravenous Line            Peripheral IV 07/28/21 Right Antecubital <1 day          Drain            External Urinary Catheter Medium <1 day                Physical Exam:  General: conscious, cooperative, in no acute distress  Eyes: conjunctivae pink, anicteric sclerae  ENT: lips and mucous membranes moist  Neck: supple, no JVD, no masses  Chest: essentially clear breath sounds bilateral, no crackles, ronchus or wheezings  CVS: S1 & S2, normal rate, regular rhythm  Abdomen: soft, non-tender, non-distended, normoactive bowel sounds  Extremities: no edema of both legs  Skin: no rash  Neuro: awake, alert, oriented      Diagnostic Data:  Lab: I have personally reviewed pertinent lab results  ,   CBC:  Results from last 7 days   Lab Units 07/29/21  0457   WBC Thousand/uL 13 57*   HEMOGLOBIN g/dL 10 4*   HEMATOCRIT % 33 1*   PLATELETS Thousands/uL 299      CMP:   Lab Results   Component Value Date    SODIUM 139 07/29/2021    K 3 6 07/29/2021     07/29/2021    CO2 26 07/29/2021    BUN 54 (H) 07/29/2021    CREATININE 2 75 (H) 07/29/2021    CALCIUM 9 2 07/29/2021    AST 19 07/29/2021    ALT 11 (L) 07/29/2021    ALKPHOS 83 07/29/2021    EGFR 20 07/29/2021   ,   PT/INR: No results found for: PT, INR,   Magnesium: No components found for: MAG,  Phosphorous:   Lab Results   Component Value Date    PHOS 4 5 (H) 07/29/2021       Microbiology:  @[email or web address],(urinecx:7)@        DIANDRA Pradhan    Portions of the record may have been created with voice recognition software  Occasional wrong word or "sound a like" substitutions may have occurred due to the inherent limitations of voice recognition software  Read the chart carefully and recognize, using context, where substitutions have occurred

## 2021-07-30 ENCOUNTER — APPOINTMENT (INPATIENT)
Dept: RADIOLOGY | Facility: HOSPITAL | Age: 86
DRG: 872 | End: 2021-07-30
Payer: COMMERCIAL

## 2021-07-30 ENCOUNTER — APPOINTMENT (INPATIENT)
Dept: NUCLEAR MEDICINE | Facility: HOSPITAL | Age: 86
DRG: 872 | End: 2021-07-30
Payer: COMMERCIAL

## 2021-07-30 PROBLEM — M25.462 EFFUSION OF LEFT KNEE: Status: ACTIVE | Noted: 2021-07-30

## 2021-07-30 PROBLEM — I50.32 CHRONIC DIASTOLIC CHF (CONGESTIVE HEART FAILURE) (HCC): Status: ACTIVE | Noted: 2020-07-20

## 2021-07-30 PROBLEM — I05.0 MITRAL VALVE STENOSIS: Status: ACTIVE | Noted: 2021-07-30

## 2021-07-30 LAB
ALBUMIN SERPL BCP-MCNC: 2.6 G/DL (ref 3.5–5)
ALP SERPL-CCNC: 72 U/L (ref 46–116)
ALT SERPL W P-5'-P-CCNC: 11 U/L (ref 12–78)
ANION GAP SERPL CALCULATED.3IONS-SCNC: 10 MMOL/L (ref 4–13)
APPEARANCE FLD: ABNORMAL
AST SERPL W P-5'-P-CCNC: 20 U/L (ref 5–45)
BACTERIA UR QL AUTO: ABNORMAL /HPF
BASOPHILS # BLD AUTO: 0.04 THOUSANDS/ΜL (ref 0–0.1)
BASOPHILS NFR BLD AUTO: 0 % (ref 0–1)
BILIRUB SERPL-MCNC: 0.63 MG/DL (ref 0.2–1)
BILIRUB UR QL STRIP: NEGATIVE
BUN SERPL-MCNC: 46 MG/DL (ref 5–25)
CALCIUM ALBUM COR SERPL-MCNC: 10.1 MG/DL (ref 8.3–10.1)
CALCIUM SERPL-MCNC: 9 MG/DL (ref 8.3–10.1)
CHLORIDE SERPL-SCNC: 101 MMOL/L (ref 100–108)
CLARITY UR: CLEAR
CO2 SERPL-SCNC: 26 MMOL/L (ref 21–32)
COLOR FLD: YELLOW
COLOR UR: YELLOW
CREAT SERPL-MCNC: 2.39 MG/DL (ref 0.6–1.3)
CRYSTALS SNV QL MICRO: NORMAL
EOSINOPHIL # BLD AUTO: 0.01 THOUSAND/ΜL (ref 0–0.61)
EOSINOPHIL NFR BLD AUTO: 0 % (ref 0–6)
ERYTHROCYTE [DISTWIDTH] IN BLOOD BY AUTOMATED COUNT: 13.6 % (ref 11.6–15.1)
ERYTHROCYTE [SEDIMENTATION RATE] IN BLOOD: 50 MM/HOUR (ref 0–19)
GFR SERPL CREATININE-BSD FRML MDRD: 24 ML/MIN/1.73SQ M
GLUCOSE SERPL-MCNC: 89 MG/DL (ref 65–140)
GLUCOSE UR STRIP-MCNC: NEGATIVE MG/DL
HCT VFR BLD AUTO: 30.4 % (ref 36.5–49.3)
HGB BLD-MCNC: 9.5 G/DL (ref 12–17)
HGB UR QL STRIP.AUTO: NEGATIVE
IMM GRANULOCYTES # BLD AUTO: 0.08 THOUSAND/UL (ref 0–0.2)
IMM GRANULOCYTES NFR BLD AUTO: 1 % (ref 0–2)
KETONES UR STRIP-MCNC: NEGATIVE MG/DL
LEUKOCYTE ESTERASE UR QL STRIP: NEGATIVE
LYMPHOCYTES # BLD AUTO: 1.04 THOUSANDS/ΜL (ref 0.6–4.47)
LYMPHOCYTES NFR BLD AUTO: 1 %
LYMPHOCYTES NFR BLD AUTO: 6 % (ref 14–44)
MAGNESIUM SERPL-MCNC: 2.3 MG/DL (ref 1.6–2.6)
MCH RBC QN AUTO: 29.1 PG (ref 26.8–34.3)
MCHC RBC AUTO-ENTMCNC: 31.3 G/DL (ref 31.4–37.4)
MCV RBC AUTO: 93 FL (ref 82–98)
MONO+MESO NFR FLD MANUAL: 2 %
MONOCYTES # BLD AUTO: 1.24 THOUSAND/ΜL (ref 0.17–1.22)
MONOCYTES NFR BLD AUTO: 7 %
MONOCYTES NFR BLD AUTO: 7 % (ref 4–12)
NEUTROPHILS # BLD AUTO: 14.81 THOUSANDS/ΜL (ref 1.85–7.62)
NEUTS SEG NFR BLD AUTO: 86 % (ref 43–75)
NEUTS SEG NFR BLD AUTO: 90 %
NITRITE UR QL STRIP: NEGATIVE
NON-SQ EPI CELLS URNS QL MICRO: ABNORMAL /HPF
NRBC BLD AUTO-RTO: 0 /100 WBCS
PH UR STRIP.AUTO: 5.5 [PH]
PHOSPHATE SERPL-MCNC: 4.2 MG/DL (ref 2.3–4.1)
PLATELET # BLD AUTO: 332 THOUSANDS/UL (ref 149–390)
PMV BLD AUTO: 10.3 FL (ref 8.9–12.7)
POTASSIUM SERPL-SCNC: 3.7 MMOL/L (ref 3.5–5.3)
PROCALCITONIN SERPL-MCNC: 0.78 NG/ML
PROT SERPL-MCNC: 6.8 G/DL (ref 6.4–8.2)
PROT UR STRIP-MCNC: ABNORMAL MG/DL
RBC # BLD AUTO: 3.27 MILLION/UL (ref 3.88–5.62)
RBC #/AREA URNS AUTO: ABNORMAL /HPF
SITE: ABNORMAL
SODIUM SERPL-SCNC: 137 MMOL/L (ref 136–145)
SP GR UR STRIP.AUTO: 1.01 (ref 1–1.03)
TOTAL CELLS COUNTED SPEC: 100
UROBILINOGEN UR QL STRIP.AUTO: 0.2 E.U./DL
WBC # BLD AUTO: 17.22 THOUSAND/UL (ref 4.31–10.16)
WBC # FLD MANUAL: ABNORMAL /UL
WBC #/AREA URNS AUTO: ABNORMAL /HPF

## 2021-07-30 PROCEDURE — A9503 TC99M MEDRONATE: HCPCS

## 2021-07-30 PROCEDURE — 85652 RBC SED RATE AUTOMATED: CPT | Performed by: INTERNAL MEDICINE

## 2021-07-30 PROCEDURE — 83735 ASSAY OF MAGNESIUM: CPT | Performed by: INTERNAL MEDICINE

## 2021-07-30 PROCEDURE — 87077 CULTURE AEROBIC IDENTIFY: CPT | Performed by: INTERNAL MEDICINE

## 2021-07-30 PROCEDURE — 73562 X-RAY EXAM OF KNEE 3: CPT

## 2021-07-30 PROCEDURE — G1004 CDSM NDSC: HCPCS

## 2021-07-30 PROCEDURE — 87086 URINE CULTURE/COLONY COUNT: CPT | Performed by: INTERNAL MEDICINE

## 2021-07-30 PROCEDURE — 81001 URINALYSIS AUTO W/SCOPE: CPT | Performed by: INTERNAL MEDICINE

## 2021-07-30 PROCEDURE — 89060 EXAM SYNOVIAL FLUID CRYSTALS: CPT | Performed by: ORTHOPAEDIC SURGERY

## 2021-07-30 PROCEDURE — 97163 PT EVAL HIGH COMPLEX 45 MIN: CPT

## 2021-07-30 PROCEDURE — 99232 SBSQ HOSP IP/OBS MODERATE 35: CPT | Performed by: INTERNAL MEDICINE

## 2021-07-30 PROCEDURE — 84145 PROCALCITONIN (PCT): CPT | Performed by: INTERNAL MEDICINE

## 2021-07-30 PROCEDURE — 87205 SMEAR GRAM STAIN: CPT | Performed by: ORTHOPAEDIC SURGERY

## 2021-07-30 PROCEDURE — 99232 SBSQ HOSP IP/OBS MODERATE 35: CPT | Performed by: PHYSICIAN ASSISTANT

## 2021-07-30 PROCEDURE — NC001 PR NO CHARGE: Performed by: ORTHOPAEDIC SURGERY

## 2021-07-30 PROCEDURE — 89051 BODY FLUID CELL COUNT: CPT | Performed by: ORTHOPAEDIC SURGERY

## 2021-07-30 PROCEDURE — 20610 DRAIN/INJ JOINT/BURSA W/O US: CPT | Performed by: ORTHOPAEDIC SURGERY

## 2021-07-30 PROCEDURE — 97530 THERAPEUTIC ACTIVITIES: CPT

## 2021-07-30 PROCEDURE — 78315 BONE IMAGING 3 PHASE: CPT

## 2021-07-30 PROCEDURE — 97167 OT EVAL HIGH COMPLEX 60 MIN: CPT

## 2021-07-30 PROCEDURE — 80053 COMPREHEN METABOLIC PANEL: CPT | Performed by: INTERNAL MEDICINE

## 2021-07-30 PROCEDURE — 99232 SBSQ HOSP IP/OBS MODERATE 35: CPT | Performed by: ORTHOPAEDIC SURGERY

## 2021-07-30 PROCEDURE — 87186 SC STD MICRODIL/AGAR DIL: CPT | Performed by: INTERNAL MEDICINE

## 2021-07-30 PROCEDURE — 87070 CULTURE OTHR SPECIMN AEROBIC: CPT | Performed by: ORTHOPAEDIC SURGERY

## 2021-07-30 PROCEDURE — 84100 ASSAY OF PHOSPHORUS: CPT | Performed by: INTERNAL MEDICINE

## 2021-07-30 PROCEDURE — 85025 COMPLETE CBC W/AUTO DIFF WBC: CPT | Performed by: INTERNAL MEDICINE

## 2021-07-30 RX ORDER — CEFEPIME HYDROCHLORIDE 1 G/50ML
1000 INJECTION, SOLUTION INTRAVENOUS EVERY 12 HOURS
Status: DISCONTINUED | OUTPATIENT
Start: 2021-07-30 | End: 2021-07-31

## 2021-07-30 RX ORDER — SODIUM CHLORIDE, SODIUM GLUCONATE, SODIUM ACETATE, POTASSIUM CHLORIDE, MAGNESIUM CHLORIDE, SODIUM PHOSPHATE, DIBASIC, AND POTASSIUM PHOSPHATE .53; .5; .37; .037; .03; .012; .00082 G/100ML; G/100ML; G/100ML; G/100ML; G/100ML; G/100ML; G/100ML
75 INJECTION, SOLUTION INTRAVENOUS CONTINUOUS
Status: DISCONTINUED | OUTPATIENT
Start: 2021-07-30 | End: 2021-07-31

## 2021-07-30 RX ORDER — ONDANSETRON 2 MG/ML
4 INJECTION INTRAMUSCULAR; INTRAVENOUS EVERY 4 HOURS PRN
Status: DISCONTINUED | OUTPATIENT
Start: 2021-07-30 | End: 2021-08-04 | Stop reason: HOSPADM

## 2021-07-30 RX ORDER — VANCOMYCIN HYDROCHLORIDE 1 G/200ML
12.5 INJECTION, SOLUTION INTRAVENOUS EVERY 24 HOURS
Status: DISCONTINUED | OUTPATIENT
Start: 2021-07-30 | End: 2021-07-30

## 2021-07-30 RX ADMIN — ASPIRIN 81 MG: 81 TABLET, COATED ORAL at 08:38

## 2021-07-30 RX ADMIN — VANCOMYCIN HYDROCHLORIDE 750 MG: 750 INJECTION, POWDER, LYOPHILIZED, FOR SOLUTION INTRAVENOUS at 11:21

## 2021-07-30 RX ADMIN — OXYCODONE HYDROCHLORIDE 5 MG: 5 TABLET ORAL at 08:41

## 2021-07-30 RX ADMIN — APIXABAN 2.5 MG: 2.5 TABLET, FILM COATED ORAL at 18:11

## 2021-07-30 RX ADMIN — ONDANSETRON 4 MG: 2 INJECTION INTRAMUSCULAR; INTRAVENOUS at 16:27

## 2021-07-30 RX ADMIN — METOPROLOL TARTRATE 100 MG: 100 TABLET, FILM COATED ORAL at 18:11

## 2021-07-30 RX ADMIN — METOPROLOL TARTRATE 100 MG: 100 TABLET, FILM COATED ORAL at 08:38

## 2021-07-30 RX ADMIN — APIXABAN 2.5 MG: 2.5 TABLET, FILM COATED ORAL at 08:38

## 2021-07-30 RX ADMIN — SENNOSIDES 8.6 MG: 8.6 TABLET, FILM COATED ORAL at 21:12

## 2021-07-30 RX ADMIN — SODIUM CHLORIDE, SODIUM GLUCONATE, SODIUM ACETATE, POTASSIUM CHLORIDE, MAGNESIUM CHLORIDE, SODIUM PHOSPHATE, DIBASIC, AND POTASSIUM PHOSPHATE 75 ML/HR: .53; .5; .37; .037; .03; .012; .00082 INJECTION, SOLUTION INTRAVENOUS at 12:30

## 2021-07-30 RX ADMIN — AMLODIPINE BESYLATE 5 MG: 5 TABLET ORAL at 08:38

## 2021-07-30 RX ADMIN — HYDROMORPHONE HYDROCHLORIDE 0.5 MG: 1 INJECTION, SOLUTION INTRAMUSCULAR; INTRAVENOUS; SUBCUTANEOUS at 18:11

## 2021-07-30 RX ADMIN — CEFEPIME HYDROCHLORIDE 1000 MG: 1 INJECTION, SOLUTION INTRAVENOUS at 21:08

## 2021-07-30 RX ADMIN — CEFEPIME HYDROCHLORIDE 1000 MG: 1 INJECTION, SOLUTION INTRAVENOUS at 10:25

## 2021-07-30 NOTE — CASE MANAGEMENT
Pt was accepted at [address] when he is medically ready for discharge   As per physician during [facility] disciplinary pt with effusion of Left knee  Effusion was aspirated  They are awaiting culture results  Paperwork for authorization will be sent through [facility]

## 2021-07-30 NOTE — ASSESSMENT & PLAN NOTE
Wt Readings from Last 3 Encounters:   07/30/21 70 1 kg (154 lb 8 7 oz)   07/01/21 78 kg (172 lb)   06/03/21 78 1 kg (172 lb 2 oz)     · Monitor his volume status closely  · Daily weights  · Strict intake/output measurements  · Outpatient follow-up with Cardiology

## 2021-07-30 NOTE — PROGRESS NOTES
Patient complaining of left knee pain and swelling today  He also complains of some right knee pain  This was noticed by the nurse and Orthopedics was notified  On examination of the left knee has a mild effusion present in the left knee  Patient had knee pains on and off for quite some time  Patient is awaiting on the results of his bone scan  Left knee and aseptic precautions aspirated 15 cc of blood-tinged fluid obtained sent for Gram stain culture cell count and crystals    Pressure dressing applied

## 2021-07-30 NOTE — PROGRESS NOTES
Progress Note - Orthopedics   Diamond James 80 y o  male MRN: [id number]  Unit/Bed#: 034-09      Subjective:    80 y  o male with continued right posterior pelvic/gluteal pain  Bone scan is pending for today lumbar spine x-rays were done yesterday  Patient notes slight improvement in his pain from yesterday  Still very symptomatic in localized area  No acute events, no complaints  Pt doing well  Pain controlled   Denies fevers chills, CP, SOB    Labs:  0   Lab Value Date/Time    HCT 30 4 (L) 07/30/2021 0417    HCT 33 1 (L) 07/29/2021 0457    HCT 30 2 (L) 07/28/2021 1225    HCT 27 8 (L) 07/04/2015 0501    HCT 30 1 (L) 07/03/2015 0935    HCT 30 0 (L) 07/02/2015 1223    HGB 9 5 (L) 07/30/2021 0417    HGB 10 4 (L) 07/29/2021 0457    HGB 9 8 (L) 07/28/2021 1225    HGB 9 4 (L) 07/04/2015 0501    HGB 9 9 (L) 07/03/2015 0935    HGB 9 8 (L) 07/02/2015 1223    INR 1 44 (H) 08/24/2020 1255    INR 1 27 (H) 07/02/2015 1223    WBC 17 22 (H) 07/30/2021 0417    WBC 13 57 (H) 07/29/2021 0457    WBC 18 58 (H) 07/28/2021 1225    WBC 12 50 (H) 07/04/2015 0501    WBC 16 11 (H) 07/03/2015 0935    WBC 19 17 (H) 07/02/2015 1223    ESR 50 (H) 07/30/2021 0417     8 (H) 07/29/2021 0457     Meds:    Current Facility-Administered Medications:     acetaminophen (TYLENOL) tablet 650 mg, 650 mg, Oral, Q6H PRN, Margi Rogel DO, 650 mg at 07/29/21 2013    amLODIPine (NORVASC) tablet 5 mg, 5 mg, Oral, Daily, DIANDRA Gage    apixaban (ELIQUIS) tablet 2 5 mg, 2 5 mg, Oral, BID, Tyra Celeste MD, 2 5 mg at 07/29/21 1756    aspirin (ECOTRIN LOW STRENGTH) EC tablet 81 mg, 81 mg, Oral, Daily, Tyra Celeste MD, 81 mg at 07/29/21 0837    oxyCODONE (ROXICODONE) IR tablet 5 mg, 5 mg, Oral, Q4H PRN, 5 mg at 07/29/21 2013 **OR** HYDROmorphone (DILAUDID) injection 0 5 mg, 0 5 mg, Intravenous, Q4H PRN, Margi Rogel DO, 0 5 mg at 07/29/21 1613    metoprolol tartrate (LOPRESSOR) tablet 100 mg, 100 mg, Oral, BID, Magui Mccarthy MD, 100 mg at 07/29/21 1756    pravastatin (PRAVACHOL) tablet 40 mg, 40 mg, Oral, Daily With Michelle Jefferson MD, 40 mg at 07/29/21 1613    senna (SENOKOT) tablet 8 6 mg, 8 6 mg, Oral, HS, Magui Mccarthy MD, 8 6 mg at 07/29/21 2231    sodium chloride 0 9 % infusion, 50 mL/hr, Intravenous, Continuous, DIANDRA Nair, Last Rate: 50 mL/hr at 07/29/21 1355, 50 mL/hr at 07/29/21 1355    Blood Culture:   Lab Results   Component Value Date    BLOODCX No Growth at 24 hrs  07/28/2021    BLOODCX No Growth at 24 hrs  07/28/2021     Wound Culture:   No results found for: WOUNDCULT    Ins and Outs:  I/O last 24 hours: In: 540 [P O :540]  Out: 1550 [Urine:1550]    Physical:  Vitals:    07/30/21 0709   BP: 133/64   Pulse: 67   Resp: 18   Temp: 98 8 °F (37 1 °C)   SpO2: 93%     Musculoskeletal: right Lower Extremity  · Skin intact without ecchymosis erythema or other discoloration or open wounds about the right hip groin and back region  · Lidoderm patch in place over the gluteal region right posterior hip  · TTP posterior hip and gluteal region  There is no pain with palpation along the spinous processes of the entire back  There is no right-sided CVA tenderness  · There is no pain with micro motion of the right hip nor with passive log-rolling of the hip  Patient has a negative Lasegue test   · There is no groin pain  · There is pain with internal until lesser degree external rotation of the hip in the posterior gluteal region  No pain with active knee extension  No pain with passive abduction  · Lumbar spine x-rays note mild levoscoliosis with multilevel degenerative disc disease and endplate osteophytes but no obvious fracture  Assessment:    80 y  o male right gluteal/posterior pelvic pain of questionable etiology  There is lumbar sacral disease but must rule out pelvic pathology    Patient also has grossly elevated sed rate and CRP with WBC 17 2 which is up from 13 5 yesterday      Plan:  · WBAT RLE, may be out of bed  · PT/OT  · Pain control per primary service  · DVT ppx per primary service  · Dispo: Ortho will follow, awaiting bone scan results    Hemalatha Pfeiffer PA-C

## 2021-07-30 NOTE — PROGRESS NOTES
Vancomycin Assessment    Promise Padilla is a 80 y o  male who is currently receiving vancomycin 1000mg q24h for skin-soft tissue infection     Relevant clinical data and objective history reviewed:  Creatinine   Date Value Ref Range Status   07/30/2021 2 39 (H) 0 60 - 1 30 mg/dL Final     Comment:     Standardized to IDMS reference method   07/29/2021 2 75 (H) 0 60 - 1 30 mg/dL Final     Comment:     Standardized to IDMS reference method   07/28/2021 3 45 (H) 0 60 - 1 30 mg/dL Final     Comment:     Standardized to IDMS reference method   10/01/2015 1 88 (H) 0 60 - 1 30 mg/dL Final     Comment:     Standardized to IDMS reference method   07/14/2015 2 22 (H) 0 60 - 1 30 mg/dL Final     Comment:     Standardized to IDMS reference method   07/08/2015 2 05 (H) 0 60 - 1 30 mg/dL Final     Comment:     Standardized to IDMS reference method     /64   Pulse 67   Temp 98 8 °F (37 1 °C)   Resp 18   Ht 5' 4" (1 626 m)   Wt 70 1 kg (154 lb 8 7 oz)   SpO2 93%   BMI 26 53 kg/m²   I/O last 3 completed shifts: In: 65 [P O :660]  Out: 2175 [Urine:2175]  Lab Results   Component Value Date/Time    BUN 46 (H) 07/30/2021 04:17 AM    BUN 28 (H) 10/01/2015 08:32 AM    WBC 17 22 (H) 07/30/2021 04:17 AM    WBC 12 50 (H) 07/04/2015 05:01 AM    HGB 9 5 (L) 07/30/2021 04:17 AM    HGB 9 4 (L) 07/04/2015 05:01 AM    HCT 30 4 (L) 07/30/2021 04:17 AM    HCT 27 8 (L) 07/04/2015 05:01 AM    MCV 93 07/30/2021 04:17 AM    MCV 91 07/04/2015 05:01 AM     07/30/2021 04:17 AM     07/04/2015 05:01 AM     Temp Readings from Last 3 Encounters:   07/30/21 98 8 °F (37 1 °C)   06/01/21 (!) 96 1 °F (35 6 °C) (Temporal)   05/29/21 97 6 °F (36 4 °C) (Temporal)     Vancomycin Days of Therapy: 1    Assessment/Plan  The patient is currently on vancomycin utilizing scheduled dosing based on actual body weight  Baseline risks associated with therapy include: pre-existing renal impairment, advanced age, and dehydration    The patient is currently receiving 1000mg q24h and after clinical evaluation will be changed to 750mg q24h  Pharmacy will also follow closely for s/sx of nephrotoxicity, infusion reactions, and appropriateness of therapy  BMP and CBC will be ordered per protocol  Plan for trough as patient approaches steady state, prior to the 4th  dose at approximately 0900 on 8/2/21  Due to infection severity, will target a trough of 15-20 (appropriate for most indications)   Pharmacy will continue to follow the patients culture results and clinical progress daily      Abiodun Lockwood

## 2021-07-30 NOTE — PLAN OF CARE
Problem: OCCUPATIONAL THERAPY ADULT  Goal: Performs self-care activities at highest level of function for planned discharge setting  See evaluation for individualized goals  Description: Treatment Interventions: ADL retraining, Functional transfer training, UE strengthening/ROM, Endurance training, Patient/family training, Equipment evaluation/education, Activityengagement          See flowsheet documentation for full assessment, interventions and recommendations  Note: Limitation: Decreased ADL status, Decreased UE strength, Decreased Safe judgement during ADL, Decreased endurance, Decreased self-care trans, Decreased high-level ADLs, Decreased cognition     Assessment: Pt is a 80 y o  male seen for OT evaluation s/p admit to [facility] on 7/28/2021 w/ Acute right hip pain  Comorbidities affecting pt's functional performance at time of assessment include: aortic valve disorder, aortic stenosis, TIA, CHF, CAD, renal disorder  Personal factors affecting pt at time of IE include:steps to enter environment, limited home support, behavioral pattern, difficulty performing ADLS, limited insight into deficits, decreased initiation and engagement  and health management   Prior to admission, pt was (A) with ADLs and IADLs with use of SPC during mobility  Upon evaluation: Pt requires mod-max (A) x2 with use of RW during functional transfers and bed mobility 2* the following deficits impacting occupational performance: weakness, decreased ROM, decreased strength, decreased balance, decreased tolerance, impaired attention, impaired initiation, impaired memory, impaired sequencing, impaired problem solving, decreased safety awareness, increased pain and impaired interpersonal skills  Pt to benefit from continued skilled OT tx while in the hospital to address deficits as defined above and maximize level of functional independence w ADL's and functional mobility   Occupational Performance areas to address include: grooming, bathing/shower, toilet hygiene, dressing, functional mobility, community mobility and clothing management  The patient's raw score on the AM-PAC Daily Activity inpatient short form is 15, standardized score is 34 69, less than 39 4  Patients at this level are likely to benefit from discharge to post-acute rehabilitation services  Please refer to the recommendation of the Occupational Therapist for safe discharge planning       OT Discharge Recommendation: Post acute rehabilitation services

## 2021-07-30 NOTE — PHYSICAL THERAPY NOTE
Physical Therapy Evaluation    Patient Name: Brenna Keyes    RDUMV'R Date: 7/30/2021     Problem List  Principal Problem:    Acute right hip pain  Active Problems:    Dyslipidemia    GERD (gastroesophageal reflux disease)    Hypertensive heart disease with HF (heart failure) ([facility])    Paroxysmal A-fib (HCC)    Acute kidney injury superimposed on CKD (HCC)    SIRS (systemic inflammatory response syndrome) ([facility] )    Multiple renal cysts       Past Medical History  Past Medical History:   Diagnosis Date    Aortic stenosis     ECHO -4-14-14  MODERATE TO SEVERE AORTIC STENOSIS; MILD AROTIC INSUFFICIENCY - LAST ASSESSED 10/26/16; RESOLVED 6/8/16    Aortic valve disorder     LAST ASSESSED 10/8/15; 10/8/15    Arthritis     CHF (congestive heart failure) ([facility])     [facility] 7/20/2020    Coronary artery disease     Hypertension     Hypertensive urgency 5/19/2019    Renal disorder     TIA (transient ischemic attack)     Transient cerebral ischemia         Past Surgical History  Past Surgical History:   Procedure Laterality Date    AORTIC VALVE REPLACEMENT  06/30/2015    avr with 23 mm [place] Magna Ease bioprosthetic    CARDIAC PACEMAKER PLACEMENT Left 07/24/2020    CATARACT EXTRACTION Right 06/05/2000    COLONOSCOPY      CORONARY ARTERY BYPASS GRAFT  06/05/2000    x1 with argueta  to lad    EYE SURGERY      POLYPECTOMY  04/2014    enteroscopic - esophageal ulcer, gastric erosion, and duodenal ulcer   TONSILLECTOMY      unknown         07/30/21 1008   Note Type   Note type Evaluation   Pain Assessment   Pain Assessment Tool 0-10   Pain Score 3   Pain Location/Orientation Orientation: Right;Orientation: Lower; Location: Back   Home Living   Type of [address] [place] [address] Two level;Bed/bath upstairs;1/2 bath on main level;Stairs to enter with rails  (3STE)   Bathroom Shower/Tub Walk-in shower   Bathroom Toilet Standard   Bathroom Accessibility Accessible   Home Equipment Walker;Cane;Wheelchair-electric   Additional Comments uses SPC   Prior Function   Level of [place] Independent with ADLs and functional mobility; Needs assistance with IADLs   Lives With Spouse   Receives Help From Family   ADL Assistance Needs assistance   IADLs Needs assistance   Falls in the last 6 months 1 to 4   Vocational Retired   Restrictions/Precautions   [facility] Bearing Precautions Per Order No   Other Precautions Chair Alarm; Bed Alarm;Telemetry; Fall Risk;Pain   Cognition   Overall Cognitive Status WFL   Arousal/Participation Cooperative   Orientation Level Oriented to person;Oriented to situation;Disoriented to place; Disoriented to time   Memory Within functional limits   Following Commands Follows all commands and directions without difficulty   RLE Assessment   RLE Assessment X  (3+/5 limited by pain)   LLE Assessment   LLE Assessment X  (3+/5 limited by pain)   Light Touch   RLE Light Touch Grossly intact   LLE Light Touch Grossly intact   Bed Mobility   Rolling L 3  Moderate assistance   Additional items Assist x 1;Bedrails; Increased time required;Verbal cues;LE management   Supine to Sit 3  Moderate assistance   Additional items Assist x 1;Bedrails; Increased time required;Verbal cues;LE management   Transfers   Sit to Stand 4  Minimal assistance   Additional items Assist x 2; Increased time required;Verbal cues   Stand to Sit 4  Minimal assistance   Additional items Assist x 2; Increased time required;Verbal cues   Balance   Static Sitting Normal   Dynamic Sitting Good   Static Standing Fair -   Endurance Deficit   Endurance Deficit Yes   Activity Tolerance   Activity Tolerance Patient limited by pain   Assessment   Prognosis Fair   Problem List Decreased strength;Decreased endurance; Impaired balance;Decreased mobility;Pain   Assessment Pt is 80 y o  male seen for PT evaluation s/p admit [address] on 7/28/2021 w/ Acute right hip pain   PT consulted to assess pt's functional mobility and d/c needs  Order placed for PT eval and tx, w/ activity order  Comorbidities affecting pt's physical performance at time of assessment include: CKD, lumbar radiculopathy, and asthma  PTA, pt was independent w/ all functional mobility w/ SPC  Wife assisted with ADLs at times    Personal factors affecting pt at time of IE include: lives in 2 story house, stairs to enter home, inability to ambulate household distances, inability to navigate level surfaces w/o external assistance, limited home support and inability to perform ADLs  Please find objective findings from PT assessment regarding body systems outlined above with impairments and limitations including weakness, decreased endurance, pain, decreased functional mobility tolerance, impaired judgement and fall risk  Pt performed bed mobility with instruction of body mechanics with mod assist x1  Transfers from EOB with RW and min assist x2  Unable to ambulate due to pain  Forward flexion in standing but standing tolerance of ~2 minutes with no LOB  The following objective measures performed on IE also reveal limitations: Modified Story: 4 (moderate/severe disability)  Pt's clinical presentation is currently unstable/unpredictable  Pt to benefit from continued PT tx to address deficits as defined above and maximize level of functional independent mobility and consistency  From PT/mobility standpoint, recommendation at time of d/c would be STR pending progress in order to facilitate return to PLOF  Pt's raw score on the AM-PAC Basic Mobility short form is 17, standardized score is 27 46  Pts scoring below 18 are likely to benefit from STR which PT is recommending  Goals   Short Term Goal #1 Patient will participate in BLE strength to increase BLE strength  Short Term Goal #2 Patient will perform bed mobility and transfers with supervision to maximize independence     LTG Expiration Date 08/13/21   Long Term Goal #1 Patient will ambulate 50 ft with RW and supervision to return to prior living environment  Long Term Goal #2 Patient will ascend/descend 13 steps with rail and supervision to return to prior living environment  PT Treatment Day 0   Plan   Treatment/Interventions Functional transfer training;LE strengthening/ROM; Elevations; Therapeutic exercise; Endurance training;Bed mobility;Gait training   PT Frequency Other (Comment)  (3-5x/week)   Recommendation   PT Discharge Recommendation Post acute rehabilitation services   AM-PAC Basic Mobility Inpatient   Turning in Bed Without Bedrails 2   Lying on Back to Sitting on Edge of Flat Bed 2   Moving Bed to Chair 1   Standing Up From Chair 2   Walk in Room 1   Climb 3-5 Stairs 1   Basic Mobility Inpatient Raw Score 9   Turning Head Towards Sound 4   Follow Simple Instructions 4   Low Function Basic Mobility Raw Score 17   Low Function Basic Mobility Standardized Score 27 46   Modified [place] Scale   Modified Gerard Scale 4       Patient seated in recliner with all needs reach at end of session  Recliner pulled up behind pt as she he was unable to advance feet

## 2021-07-30 NOTE — PLAN OF CARE
Problem: PHYSICAL THERAPY ADULT  Goal: Performs mobility at highest level of function for planned discharge setting  See evaluation for individualized goals  Description: Treatment/Interventions: Functional transfer training, LE strengthening/ROM, Elevations, Therapeutic exercise, Endurance training, Bed mobility, Gait training          See flowsheet documentation for full assessment, interventions and recommendations  Note: Prognosis: Fair  Problem List: Decreased strength, Decreased endurance, Impaired balance, Decreased mobility, Pain  Assessment: Pt is 80 y o  male seen for PT evaluation s/p admit to [email or web address] on 7/28/2021 w/ Acute right hip pain  PT consulted to assess pt's functional mobility and d/c needs  Order placed for PT eval and tx, w/ activity order  Comorbidities affecting pt's physical performance at time of assessment include: CKD, lumbar radiculopathy, and asthma  PTA, pt was independent w/ all functional mobility w/ SPC  Wife assisted with ADLs at times    Personal factors affecting pt at time of IE include: lives in 2 story house, stairs to enter home, inability to ambulate household distances, inability to navigate level surfaces w/o external assistance, limited home support and inability to perform ADLs  Please find objective findings from PT assessment regarding body systems outlined above with impairments and limitations including weakness, decreased endurance, pain, decreased functional mobility tolerance, impaired judgement and fall risk  Pt performed bed mobility with instruction of body mechanics with mod assist x1  Transfers from EOB with RW and min assist x2  Unable to ambulate due to pain  Forward flexion in standing but standing tolerance of ~2 minutes with no LOB  The following objective measures performed on IE also reveal limitations: Modified Gerard: 4 (moderate/severe disability)  Pt's clinical presentation is currently unstable/unpredictable   Pt to benefit from continued PT tx to address deficits as defined above and maximize level of functional independent mobility and consistency  From PT/mobility standpoint, recommendation at time of d/c would be STR pending progress in order to facilitate return to PLOF  Pt's raw score on the AM-PAC Basic Mobility short form is 17, standardized score is 27 46  Pts scoring below 18 are likely to benefit from STR which PT is recommending  PT Discharge Recommendation: Post acute rehabilitation services          See flowsheet documentation for full assessment

## 2021-07-30 NOTE — PROGRESS NOTES
[address] [facility] [address]  Progress Note - Digna Oglesby 1935, 80 y o  male MRN: [id number]  Unit/Bed#: 767-79 Encounter: [id number]  Primary Care Provider: Debbie Flow, DO   Date and time admitted to hospital: 7/28/2021 11:01 AM    * Acute right hip pain  Assessment & Plan  Severe right hip pain unable to rule-out fracture  No MRI due to pacemaker  A bone scan is in process with results pending at this time  Continue pain meds (PRN Tylenol 650mg Q6 PRN, Dilaudid 0 5mg Q4 PRN, Oxycodone 5mg Q4 PRN)  Consider arthrocentesis (in presence of )  PT/OT  · The patient will likely require short-term rehabilitation placement upon discharge  Effusion of left knee  Assessment & Plan  · The left knee joint effusion will be aspirated by Orthopedic Surgery on 07/30/2021      Acute kidney injury superimposed on CKD [facility])  Assessment & Plan  Lab Results   Component Value Date    EGFR 24 07/30/2021    EGFR 20 07/29/2021    EGFR 15 07/28/2021    CREATININE 2 39 (H) 07/30/2021    CREATININE 2 75 (H) 07/29/2021    CREATININE 3 45 (H) 07/28/2021     Acute pre renal injury  FENA 0 9  Continue IV fluids with Isolyte IV fluids at 75 ml/hr  Continue to hold loop diuretic  Continue to avoid all nephrotoxic agents    Chronic kidney disease stage 4  Creatinine 2 75 from 3 45 (baseline 1 8-2 2)  eGFR 20 from 14  Renal ultrasound shows chronic kidney disease and multiple bilateral cysts  Follow-up as outpatient with Nephrology    Mitral valve stenosis  Assessment & Plan  · Outpatient follow-up with Cardiology    Multiple renal cysts  Assessment & Plan  Bilateral renal cysts found on ultrasound of kidneys  Outpatient surveillance imaging with PCP    SIRS (systemic inflammatory response syndrome) (HCC)  Assessment & Plan  · SIRS was present on admission with a leukocytosis and tachycardia  · Now with fevers  · Possible sepsis due to a possible right hip joint infection versus a left knee joint infection  · The patient was seen in consultation by Orthopedic Surgery  · Treat with IV vancomycin  · The patient cannot have an MRI of the right hip due to an indwelling permanent pacemaker  · A bone scan was ordered to evaluate the patient's right hip, which is in process  · The left knee joint effusion will be aspirated by Orthopedic Surgery on 07/30/2021  · Follow culture results        Paroxysmal atrial fibrillation (HCC)  Assessment & Plan  Continue metoprolol 100 mg PO every 12 hours  Continue anticoagulation with eliquis 2 5 mg PO BID    Chronic diastolic CHF (congestive heart failure) ([facility] )  Assessment & Plan  Wt Readings from Last 3 Encounters:   07/30/21 70 1 kg (154 lb 8 7 oz)   07/01/21 78 kg (172 lb)   06/03/21 78 1 kg (172 lb 2 oz)     · Monitor his volume status closely  · Daily weights  · Strict intake/output measurements  · Outpatient follow-up with Cardiology      GERD (gastroesophageal reflux disease)  Assessment & Plan  Hold PPI in the setting of acute kidney injury    Dyslipidemia  Assessment & Plan  · Continue statin therapy      VTE Pharmacologic Prophylaxis: VTE Score: 7 High Risk (Score >/= 5) - Pharmacological DVT Prophylaxis Ordered: apixaban (Eliquis)  Sequential Compression Devices Ordered  Patient Centered Rounds: I performed bedside rounds with nursing staff today  Discussions with Specialists or Other Care Team Provider: I discussed the case with Dr Edwin Sarabia (Orthopedic Surgery)  Education and Discussions with Family / Patient: Updated  (wife and daughter) at bedside  Time Spent for Care: 30 minutes  More than 50% of total time spent on counseling and coordination of care as described above      Current Length of Stay: 2 day(s)  Current Patient Status: Inpatient   Certification Statement: The patient will continue to require additional inpatient hospital stay due to the need for the bone scan to be completed, for IV antibiotic treatment, and for short-term rehabilitation placement upon dischage  Discharge Plan: Anticipate discharge in 48-72 hrs to rehab facility  Code Status: Level 1 - Full Code    Subjective: The patient was seen and examined  The right hip pain is improving  The patient now has a swollen left knee  Objective:     Vitals:   Temp (24hrs), Av 5 °F (37 5 °C), Min:98 4 °F (36 9 °C), Max:100 7 °F (38 2 °C)    Temp:  [98 4 °F (36 9 °C)-100 7 °F (38 2 °C)] 98 8 °F (37 1 °C)  HR:  [67-81] 67  Resp:  [18] 18  BP: (122-143)/(64-68) 133/64  SpO2:  [93 %-97 %] 93 %  Body mass index is 26 53 kg/m²  Input and Output Summary (last 24 hours):      Intake/Output Summary (Last 24 hours) at 2021 1335  Last data filed at 2021 0834  Gross per 24 hour   Intake 0 ml   Output 1500 ml   Net -1500 ml       Physical Exam:   Physical Exam   General:  NAD, follows commands  HEENT:  NC/AT, mucous membranes moist  Neck:  Supple, No JVP elevation  CV:  + S1, + S2, RRR  Pulm:  Lung fields are CTA bilaterally  Abd:  Soft, Non-tender, Non-distended  Ext:  No clubbing/cyanosis, Edema and erythema of the left knee, Pain in the lateral aspect of the right hip with movement of the right lower extremity  Skin:  No rashes  Neuro:  Awake, alert, oriented  Psych:  Normal mood and affect      Additional Data:    Labs:  Results from last 7 days   Lab Units 21  0417   WBC Thousand/uL 17 22*   HEMOGLOBIN g/dL 9 5*   HEMATOCRIT % 30 4*   PLATELETS Thousands/uL 332   NEUTROS PCT % 86*   LYMPHS PCT % 6*   MONOS PCT % 7   EOS PCT % 0     Results from last 7 days   Lab Units 21  0417   SODIUM mmol/L 137   POTASSIUM mmol/L 3 7   CHLORIDE mmol/L 101   CO2 mmol/L 26   BUN mg/dL 46*   CREATININE mg/dL 2 39*   ANION GAP mmol/L 10   CALCIUM mg/dL 9 0   ALBUMIN g/dL 2 6*   TOTAL BILIRUBIN mg/dL 0 63   ALK PHOS U/L 72   ALT U/L 11*   AST U/L 20   GLUCOSE RANDOM mg/dL 89                 Results from last 7 days   Lab Units 21  0457   LACTIC ACID mmol/L 1 0 PROCALCITONIN ng/ml 0 68*       Lines/Drains:  Invasive Devices     Peripheral Intravenous Line            Peripheral IV 07/30/21 Left Antecubital <1 day          Drain            External Urinary Catheter Medium 1 day                      Imaging: No pertinent imaging reviewed  Recent Cultures (last 7 days):   Results from last 7 days   Lab Units 07/28/21  1656   BLOOD CULTURE  No Growth at 24 hrs  No Growth at 24 hrs  Last 24 Hours Medication List:   Current Facility-Administered Medications   Medication Dose Route Frequency Provider Last Rate    acetaminophen  650 mg Oral Q6H PRN Vernon Suresh DO      amLODIPine  5 mg Oral Daily Esperanza Basket, CRNP      apixaban  2 5 mg Oral BID Aurelia Roberts MD      aspirin  81 mg Oral Daily Aurelia Roberts MD      cefepime  1,000 mg Intravenous Q12H Vernon Suresh DO 1,000 mg (07/30/21 1025)    oxyCODONE  5 mg Oral Q4H PRN Vernon Suresh DO      Or    HYDROmorphone  0 5 mg Intravenous Q4H PRN Vernon Suresh DO      metoprolol tartrate  100 mg Oral BID Aurelia Roberts MD      multi-electrolyte  75 mL/hr Intravenous Continuous Raheem Morales PA-C 75 mL/hr (07/30/21 1230)    pravastatin  40 mg Oral Daily With Harmnoy Mar MD      senna  8 6 mg Oral HS Aurelia Roberts MD      vancomycin  10 mg/kg Intravenous Q24H Vernon Suresh  mg (07/30/21 1121)        Today, Patient Was Seen By: Vernon Suresh DO    **Please Note: This note may have been constructed using a voice recognition system  **

## 2021-07-30 NOTE — ASSESSMENT & PLAN NOTE
· SIRS was present on admission with a leukocytosis and tachycardia  · Now with fevers  · Possible sepsis due to a possible right hip joint infection versus a left knee joint infection  · The patient was seen in consultation by Orthopedic Surgery  · Treat with IV vancomycin  · The patient cannot have an MRI of the right due to an indwelling permanent pacemaker  · A bone scan was ordered to evaluate the patient's right hip, which is in process  · The left knee joint effusion will be aspirated by Orthopedic Surgery on 07/30/2021    · Follow culture results

## 2021-07-30 NOTE — CASE MANAGEMENT
Spoke with Trini Hartley @ [facility] to start snf [address] NPI [id number]   The doctor is Elena Carcamo NPI [id number]    [facility]patient [facility] primary  auth pending  [facility] task complete  Clinicals faxed [phone]    Pending ref# [id number]

## 2021-07-30 NOTE — OCCUPATIONAL THERAPY NOTE
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) ([facility])    Paroxysmal A-fib (HCC)    Acute kidney injury superimposed on CKD (HCC)    SIRS (systemic inflammatory response syndrome) ([facility] )    Multiple renal cysts    Past Medical History  Past Medical History:   Diagnosis Date    Aortic stenosis     ECHO -4-14-14  MODERATE TO SEVERE AORTIC STENOSIS; MILD AROTIC INSUFFICIENCY - LAST ASSESSED 10/26/16; RESOLVED 6/8/16    Aortic valve disorder     LAST ASSESSED 10/8/15; 10/8/15    Arthritis     CHF (congestive heart failure) ([facility])     [facility] 7/20/2020    Coronary artery disease     Hypertension     Hypertensive urgency 5/19/2019    Renal disorder     TIA (transient ischemic attack)     Transient cerebral ischemia      Past Surgical History  Past Surgical History:   Procedure Laterality Date    AORTIC VALVE REPLACEMENT  06/30/2015    avr with 23 mm [place] Magna Ease bioprosthetic    CARDIAC PACEMAKER PLACEMENT Left 07/24/2020    CATARACT EXTRACTION Right 06/05/2000    COLONOSCOPY      CORONARY ARTERY BYPASS GRAFT  06/05/2000    x1 with argueta  to lad    EYE SURGERY      POLYPECTOMY  04/2014    enteroscopic - esophageal ulcer, gastric erosion, and duodenal ulcer   TONSILLECTOMY      unknown             07/30/21 1004   OT Last Visit   OT Visit Date 07/30/21   Note Type   Note type Evaluation   Restrictions/Precautions   Weight Bearing Precautions Per Order No   Other Precautions Fall Risk;Pain   Pain Assessment   Pain Assessment Tool 0-10   Pain Score 3   Pain Location/Orientation Orientation: Right;Location: Back   Home Living   Type of [address] [place] [address] Two level;1/2 bath on main level;Bed/bath upstairs;Stairs to enter with rails; Other (Comment)  (3 GILBERT c HR; FOS to 2nd c HR)   Bathroom Shower/Tub Walk-in shower   Bathroom Toilet Standard   Bathroom Equipment Shower chair   Bathroom Accessibility Accessible   Home Equipment Walker;Cane;Wheelchair-electric   Additional Comments pt reports use of SPC during mobility   Prior Function   Level of [place] Needs assistance with IADLs; Needs assistance with ADLs and functional mobility   Lives With Spouse   Receives Help From Family   ADL Assistance Needs assistance   IADLs Needs assistance   Falls in the last 6 months 1 to 4   Vocational Retired   Comments pt does not drive; wife currently not driving following surgery and daughter (A) with driving   Psychosocial   Psychosocial (WDL) WDL   Subjective   Subjective "I don't think it will be that bad"   ADL   Where Assessed Edge of bed   LB Dressing Assistance 2  Maximal Assistance   LB Dressing Deficit Don/doff R sock; Don/doff L sock; Thread RLE into underwear;Pull up over hips; Thread LLE into underwear   Toileting Assistance  2  Maximal Assistance   Toileting Deficit Perineal hygiene; Increased time to complete   Additional Comments pt with catheter at this time; pt requires max (A) level due to inability bring leg to knee nor bend forward    Bed Mobility   Rolling R 3  Moderate assistance   Additional items Assist x 2;Bedrails; Increased time required;Verbal cues;LE management   Supine to Sit 3  Moderate assistance   Additional items Assist x 2;Bedrails; Increased time required;Verbal cues;LE management   Sit to Supine   (seated in chair at end of session)   Additional Comments pt on RA during session; SpO2 WFL with no complaints of SOB; pt seated EOB ~5 minutes prior to transfers    Transfers   Sit to Stand 4  Minimal assistance   Additional items Assist x 2;Bedrails; Increased time required;Verbal cues  (RW)   Stand to Sit 4  Minimal assistance   Additional items Assist x 2; Increased time required;Verbal cues  (RW)   Additional Comments pt performs functional transfers with RW; pt with significant pain upon standing with RW; no significant LOB with moderate instability and inability to achieve full stand; requires chair to be brought behind and bed moved    Functional Mobility   Additional Comments pt is unable to advance forward at this time nor perform SPT to chair   Balance   Static Sitting Fair -   Dynamic Sitting Fair -   Static Standing Poor +   Dynamic Standing Poor +   Activity Tolerance   Activity Tolerance Patient limited by fatigue;Patient limited by pain   RUE Assessment   RUE Assessment WFL   LUE Assessment   LUE Assessment WFL   Hand Function   Gross Motor Coordination Functional   Fine Motor Coordination Functional   Sensation   Light Touch No apparent deficits   Sharp/Dull No apparent deficits   Cognition   Overall Cognitive Status Impaired   Arousal/Participation Alert   Attention Attends with cues to redirect   Orientation Level Oriented to person;Oriented to place;Oriented to time;Disoriented to situation   Memory Decreased long term memory;Decreased short term memory   Following Commands Follows one step commands without difficulty   Assessment   Limitation Decreased ADL status; Decreased UE strength;Decreased Safe judgement during ADL;Decreased endurance;Decreased self-care trans;Decreased high-level ADLs; Decreased cognition   Assessment Pt is a 80 y o  male seen for OT evaluation s/p admit to [facility] on 7/28/[facility] w/ Acute right hip pain  Comorbidities affecting pt's functional performance at time of assessment include: aortic valve disorder, aortic stenosis, TIA, CHF, CAD, renal disorder  Personal factors affecting pt at time of IE include:steps to enter environment, limited home support, behavioral pattern, difficulty performing ADLS, limited insight into deficits, decreased initiation and engagement  and health management   Prior to admission, pt was (A) with ADLs and IADLs with use of SPC during mobility   Upon evaluation: Pt requires mod-max (A) x2 with use of RW during functional transfers and bed mobility 2* the following deficits impacting occupational performance: weakness, decreased ROM, decreased strength, decreased balance, decreased tolerance, impaired attention, impaired initiation, impaired memory, impaired sequencing, impaired problem solving, decreased safety awareness, increased pain and impaired interpersonal skills  Pt to benefit from continued skilled OT tx while in the hospital to address deficits as defined above and maximize level of functional independence w ADL's and functional mobility  Occupational Performance areas to address include: grooming, bathing/shower, toilet hygiene, dressing, functional mobility, community mobility and clothing management  The patient's raw score on the AM-PAC Daily Activity inpatient short form is 15, standardized score is 34 69, less than 39 4  Patients at this level are likely to benefit from discharge to post-acute rehabilitation services  Please refer to the recommendation of the Occupational Therapist for safe discharge planning  Goals   Patient Goals to go home    Short Term Goal  pt will perform UE strengthening exercises    Long Term Goal #1 pt will demonstrate toilet transfers and hygiene at min (A) level    Long Term Goal #2 pt will demonstrate functional mobility with RW at min (A) level   Long Term Goal pt will demonstrate UB/LB bathing and grooming tasks at min (A) level   Plan   Treatment Interventions ADL retraining;Functional transfer training;UE strengthening/ROM; Endurance training;Patient/family training;Equipment evaluation/education; Activityengagement   Goal Expiration Date 08/13/21   OT Frequency 3-5x/wk   Recommendation   OT Discharge Recommendation Post acute rehabilitation services   [facility] Daily Activity Inpatient   Lower Body Dressing 2   Bathing 2   Toileting 2   Upper Body Dressing 3   Grooming 3   Eating 3   Daily Activity Raw Score 15   Daily Activity Standardized Score (Calc for Raw Score >=11) 34 69   AM-PAC Applied Cognition Inpatient   Following a Speech/Presentation 3   Understanding Ordinary Conversation 3   Taking Medications 3   Remembering Where Things Are Placed or Put Away 2   Remembering List of 4-5 Errands 2   Taking Care of Complicated Tasks 2   Applied Cognition Raw Score 15   Applied Cognition Standardized Score 33 54     Pt will benefit from continued OT services in order to maximize (I) c ADL performance, FM c RW, and improve overall endurance/strength required to complete functional tasks in preparation for d/c  Pt left seated in chair at end of session; all needs within reach; all lines intact; scds connected and turned on

## 2021-07-30 NOTE — ASSESSMENT & PLAN NOTE
Lab Results   Component Value Date    EGFR 24 07/30/2021    EGFR 20 07/29/2021    EGFR 15 07/28/2021    CREATININE 2 39 (H) 07/30/2021    CREATININE 2 75 (H) 07/29/2021    CREATININE 3 45 (H) 07/28/2021     Acute pre renal injury  FENA 0 9  Continue IV fluids with Isolyte IV fluids at 75 ml/hr  Continue to hold loop diuretic  Continue to avoid all nephrotoxic agents    Chronic kidney disease stage 4  Creatinine 2 75 from 3 45 (baseline 1 8-2 2)  eGFR 20 from 14  Renal ultrasound shows chronic kidney disease and multiple bilateral cysts  Follow-up as outpatient with Nephrology

## 2021-07-30 NOTE — ASSESSMENT & PLAN NOTE
Severe right hip pain unable to rule-out fracture  No MRI due to pacemaker  A bone scan is in process with results pending at this time  Continue pain meds (PRN Tylenol 650mg Q6 PRN, Dilaudid 0 5mg Q4 PRN, Oxycodone 5mg Q4 PRN)  Consider arthrocentesis (in presence of )  PT/OT  · The patient will likely require short-term rehabilitation placement upon discharge

## 2021-07-30 NOTE — CASE MANAGEMENT
PT is recommending that patient should go to STR   Spoke with patient and his wife and explained what STR is   Pt is agreeable to STR  A post acute care recommendation was made by your care team for STR  Discussed Freedom of Choice with patient  List of facilities given to patient via in person  patient aware the list is custom filtered for them by preference  and that [facility] post acute providers are designated  Pt would like me to make a referral to [facility]  Referral made as requested   Pt did have both his covid Vaccines

## 2021-07-30 NOTE — PROGRESS NOTES
Progress Note - Nephrology   Rj Hernandez 80 y o  male MRN: [id number]  Unit/Bed#: 742-51 Encounter: [id number]    Assessment and Plan    Rj Hernandez is a 80 y o  male with complete heart block, aortic stenosis, HTN, CKD 4, emphysema, chronic dCHF, CAD presented to [facility] on 7/28/21 with right hip and low back pain  He was found to have acute kidney injury with creatinine 3 45 mg/dL and was admitted for YOLANDE on CKD 4 and SIRS with leukocytosis WBC 18,580  Nephrology is consulted for evaluation and management of YOLANDE on CKD  A/P Summary:  Renal function improved near baseline  Patient complains of thirst, is NPO  Will start Isolyte  Monitor for signs of volume overload  Will need outpatient nephrology follow-up  See below  1  Acute kidney injury present on admission superimposed on chronic kidney disease  · Renal function is improved near baseline with BUN of 46 mg/dL with creatinine of 2 39 mg/dL estimated GFR 24 mL/min on 07/30/2021, improved from presenting creatinine 3 45 mg/dL on 07/28/2021  · Non oliguric with -1 5 L urine output yesterday, 07/29/2021  · Etiology: Fractional excretion of sodium was low at 0 9%, consistent with prerenal azotemia  · Continue to avoid nephrotoxins, IV contrast   Optimize hemodynamics, provide supportive care  Continue to hold home home torsemide 20 mg twice daily  2  Chronic kidney disease, stage IV with baseline creatinine 1 8-2 2 mg/dL  · Renal ultrasound performed 07/29/2021 reveals 9 4 cm right renal length, 11 1 cm left renal length with bilateral mild echogenicity, consistent with chronic renal disease, no hydronephrosis or urinary obstruction  · Not currently established with nephrologist   Will need outpatient follow-up  3  Proteinuria  · Protein to creatinine ratio 0 45 on 07/28/2021  Outpatient follow-up  4  Renal cysts  · Renal ultrasound on 07/29/2021 revealed several simple cysts in the right kidney the largest of which measured 3 3 cm  One 7 8 cm exophytic simple cyst in the left kidney  · Monitor with outpatient ultrasound in 6 months  5  Chronic diastolic congestive heart failure  · Daily weights  Suspect weights are unreliable as recorded  Requesting scale only  Strict I&Os  Diet is currently NPO with the exception of sips of water with medications  6  Hypertension the setting of chronic kidney disease stage 4  · Home antihypertensive regimen includes amlodipine 5 mg daily, clonidine 0 1 mg daily, torsemide 20 mg twice daily metoprolol tartrate 100 mg twice daily  7  Mineral bone disease of chronic kidney disease  · Will need outpatient evaluation  8  Anemia, multifactorial  · IV iron contraindicated with possible infection  Consider as outpatient  · Transfuse for hemoglobin less than 7 grams/deciliter, per primary  9  Paroxysmal Atrial fibrillation on Eliquis    10  Right gluteal, posterior pelvic pain  · Orthopedics evaluating  Bone scan pending  Follow up reason for today's visit:  Acute kidney injury present on admission superimposed on chronic kidney disease, stage IV    Acute right hip pain    Patient Active Problem List   Diagnosis    Acute blood loss anemia    Mild intermittent asthma without complication    CKD (chronic kidney disease) stage 4, GFR 15-29 ml/min ([facility])    Dyslipidemia    GERD (gastroesophageal reflux disease)    Late effects of cerebrovascular disease    Lumbar degenerative disc disease    Lumbar radiculopathy    Mitral regurgitation    Obesity (BMI 30-39  9)    Medicare annual wellness visit, subsequent    H/O aortic valve replacement    History of stroke    Hypertensive heart disease with HF (heart failure) ([address] )    Ambulatory dysfunction    Paroxysmal A-fib ([address] )    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    Pacemaker    Oxygen dependent    Chronic combined systolic and diastolic congestive heart failure ([address] )    Melena    Mitral stenosis    Iron deficiency anemia    Need for immunization against influenza    Pulmonary emphysema ([facility]    Left wrist pain    Acute right hip pain    Acute kidney injury superimposed on CKD (HCC)    SIRS (systemic inflammatory response syndrome) (HCC)    Multiple renal cysts         Subjective:   "Thirsty " No acute events overnight  A complete 10 point review of systems was performed and is otherwise negative  Spoke with wife at bedside as well  Objective:     Vitals: Blood pressure 133/64, pulse 67, temperature 98 8 °F (37 1 °C), temperature source Oral, resp  rate 18, height 5' 4" (1 626 m), weight 70 1 kg (154 lb 8 7 oz), SpO2 93 %  ,Body mass index is 26 53 kg/m²  Weight (last 2 days)     Date/Time   Weight    07/30/21 0600   70 1 (154 54)    07/28/21 1432   76 3 (168 21)    07/28/21 1105   79 4 (175)                Intake/Output Summary (Last 24 hours) at 7/30/2021 1052  Last data filed at 7/30/2021 0834  Gross per 24 hour   Intake 0 ml   Output 1500 ml   Net -1500 ml     I/O last 3 completed shifts:   In: 65 [P O :660]  Out: 2175 [Urine:2175]         Physical Exam: /64 (BP Location: Right arm)   Pulse 67   Temp 98 8 °F (37 1 °C) (Oral)   Resp 18   Ht 5' 4" (1 626 m)   Wt 70 1 kg (154 lb 8 7 oz)   SpO2 93%   BMI 26 53 kg/m²     General Appearance:    Alert, cooperative, no distress, appears stated age   Head:    Normocephalic, without obvious abnormality, atraumatic   Eyes:    Conjunctiva/corneas clear   Ears:    Normal external ears   Nose:   Nares normal, septum midline, mucosa normal, no drainage  or sinus tenderness   Throat:   Lips, mucosa, and tongue normal; teeth and gums normal   Neck:   Supple, symmetrical   Back:     Symmetric, no curvature, ROM normal, no CVA tenderness   Lungs:     Clear to auscultation bilaterally, respirations unlabored   Chest wall:    No tenderness or deformity   Heart:    Regular rate and rhythm, S1 and S2 normal, no murmur, rub   or gallop Abdomen:     Soft, non-tender, bowel sounds active   Extremities:   Extremities normal, atraumatic, no cyanosis or edema   Skin:   Skin color, texture, turgor normal, no rashes or lesions   Lymph nodes:   Cervical normal   Neurologic:   CNII-XII intact            Lab, Imaging and other studies: I have personally reviewed pertinent labs  CBC:   Lab Results   Component Value Date    WBC 17 22 (H) 07/30/2021    HGB 9 5 (L) 07/30/2021    HCT 30 4 (L) 07/30/2021    MCV 93 07/30/2021     07/30/2021    MCH 29 1 07/30/2021    MCHC 31 3 (L) 07/30/2021    RDW 13 6 07/30/2021    MPV 10 3 07/30/2021    NRBC 0 07/30/2021     CMP:   Lab Results   Component Value Date    K 3 7 07/30/2021     07/30/2021    CO2 26 07/30/2021    BUN 46 (H) 07/30/2021    CREATININE 2 39 (H) 07/30/2021    CALCIUM 9 0 07/30/2021    AST 20 07/30/2021    ALT 11 (L) 07/30/2021    ALKPHOS 72 07/30/2021    EGFR 24 07/30/2021           Results from last 7 days   Lab Units 07/30/21  0417 07/29/21  0457 07/28/21  1225   POTASSIUM mmol/L 3 7 3 6 3 8   CHLORIDE mmol/L 101 101 98*   CO2 mmol/L 26 26 30   BUN mg/dL 46* 54* 62*   CREATININE mg/dL 2 39* 2 75* 3 45*   CALCIUM mg/dL 9 0 9 2 9 2   ALK PHOS U/L 72 83  --    ALT U/L 11* 11*  --    AST U/L 20 19  --          Phosphorus:   Lab Results   Component Value Date    PHOS 4 2 (H) 07/30/2021     Magnesium:   Lab Results   Component Value Date    MG 2 3 07/30/2021     Urinalysis:   Lab Results   Component Value Date    COLORU Yellow 07/30/2021    CLARITYU Clear 07/30/2021    SPECGRAV 1 015 07/30/2021    PHUR 5 5 07/30/2021    LEUKOCYTESUR Negative 07/30/2021    NITRITE Negative 07/30/2021    GLUCOSEU Negative 07/30/2021    KETONESU Negative 07/30/2021    BILIRUBINUR Negative 07/30/2021    BLOODU Negative 07/30/2021     Ionized Calcium: No results found for: CAION  Coagulation: No results found for: PT, INR, APTT  Troponin: No results found for: TROPONINI  ABG: No results found for: PHART, BQQ4VRF, PO2ART, CHQ2HYF, P8DRZEYU, BEART, SOURCE  Radiology review:     IMAGING  Procedure: CT abdomen pelvis wo contrast    Result Date: 7/28/2021  Narrative: CT ABDOMEN AND PELVIS WITHOUT IV CONTRAST INDICATION:  Back and right hip pain  COMPARISON:  CT abdomen pelvis 8/24/2020 TECHNIQUE:  CT examination of the abdomen and pelvis was performed without intravenous contrast   Axial, sagittal, and coronal 2D reformatted images were created from the source data and submitted for interpretation  Radiation dose length product (DLP) for this visit:  674 29 mGy-cm  This examination, like all CT scans performed in the [facility], was performed utilizing techniques to minimize radiation dose exposure, including the use of iterative reconstruction and automated exposure control  Enteric contrast was not administered  FINDINGS: ABDOMEN Evaluation of the solid organs is limited without IV contrast  LOWER CHEST:  Minimal right middle lobe atelectasis or scarring  Aortic valve replacement, pacer wires, coronary artery calcifications and extensive mitral valve annulus calcification  LIVER/BILIARY TREE:  Calcified right lobe granuloma  GALLBLADDER:  There are gallstone(s) within the gallbladder, without pericholecystic inflammatory changes  SPLEEN:  Unremarkable  PANCREAS:  Moderate fatty replacement of the pancreas without acute findings  ADRENAL GLANDS:  Unremarkable  KIDNEYS/URETERS:  Bilateral renal cysts  No hydronephrosis  Punctate left upper pole calculus versus vascular calcification  STOMACH AND BOWEL:  Stomach is suboptimally distended, grossly unremarkable within limitations  Small bowel is normal caliber  Colonic diverticulosis without evidence of diverticulitis  APPENDIX:  The appendix is prominent measuring 8 mm diameter although this appears to be a chronic finding  No periappendiceal fat stranding  ABDOMINOPELVIC CAVITY:  No ascites  No pneumoperitoneum  No pathologic lymphadenopathy   VESSELS: Atherosclerotic changes abdominal aorta without evidence of aneurysm  PELVIS REPRODUCTIVE ORGANS:  Mild prostatomegaly  URINARY BLADDER:  Unremarkable  ABDOMINAL WALL/INGUINAL REGIONS:  Tiny fat-containing umbilical hernia  Small bilateral fat-containing inguinal hernias  OSSEOUS STRUCTURES:  No acute fracture or destructive osseous lesion  Diffuse multilevel degenerative changes of the spine  Prior median sternotomy  Mild degenerative changes both hips and pubic symphysis  Impression: No acute intra-abdominal abnormality  Cholelithiasis  Colonic diverticulosis  Multiple incidental findings as above  Limited study without contrast  Workstation performed: [id number]     Procedure: XR chest portable    Result Date: 7/29/2021  Narrative: CHEST INDICATION:   Severe pain  COMPARISON:  July 29, 2020 EXAM PERFORMED/VIEWS:  XR CHEST PORTABLE FINDINGS:  Left-sided chest wall intracardiac device is identified  Leads are intact  Sternal wires noted  Prosthetic aortic valve noted  Cardiomediastinal silhouette appears unremarkable  Left basilar atelectasis/scarring noted  No significant pulmonary vascular congestion  No pleural effusion  No pneumothorax  Osseous structures appear within normal limits for patient age  Impression: No focal consolidation, pleural effusion, or pneumothorax  Workstation performed: [id number]     Procedure: XR spine lumbar 2 or 3 views injury    Result Date: 7/29/2021  Narrative: LUMBAR SPINE INDICATION:  Pelvic pain  COMPARISON:  MR lumbar spine 6/7/2017 VIEWS:  XR SPINE LUMBAR 2 OR 3 VIEWS INJURY Images: 3 FINDINGS: Numbering of the spine will be consistent with the previous MRI with the lowest disc space labeled L5-S1  There is no evidence of acute fracture or destructive osseous lesion  Mild levoscoliosis  There is diffuse moderately advanced multilevel degenerative disease with disc space narrowing and marginal endplate osteophytes  The pedicles appear intact   There are atherosclerotic calcifications  Soft tissues are otherwise unremarkable  Impression: No acute osseous abnormality  Degenerative changes as described  Workstation performed: [id number]     Procedure: XR hip/pelv 2-3 vws right    Result Date: 7/28/2021  Narrative: RIGHT HIP INDICATION:   pain, atraumatic  COMPARISON:  8/24/2020 CT VIEWS:  XR HIP/PELV 2-3 VWS RIGHT W PELVIS IF PERFORMED Images: 4 FINDINGS: There is no acute fracture or dislocation  Mild degenerative changes both hips No lytic or blastic osseous lesion  Soft tissues are unremarkable  Multilevel degenerative lumbar spondylosis     Impression: No acute osseous abnormality  Workstation performed: [id number]     Procedure: US kidney and bladder    Result Date: 7/29/2021  Narrative: RENAL ULTRASOUND INDICATION:   YOLANDE  COMPARISON: Renal ultrasound 8/11/2020 and CT abdomen and pelvis 7/28/2021 TECHNIQUE:   Ultrasound of the retroperitoneum was performed with a curvilinear transducer utilizing volumetric sweeps and still imaging techniques  FINDINGS: KIDNEYS: There is asymmetric right renal atrophy with measurements below  Right kidney:  9 4 x 5 6 cm  Left kidney:  11 1 x 6 1 cm  Right kidney The renal parenchyma is mildly echogenic consistent with medical renal disease with chronic cortical thinning  No suspicious masses detected  Several simple cysts, the largest of which measures 3 3 cm  No hydronephrosis  No shadowing calculi  No perinephric fluid collections  Left kidney The renal parenchyma is mildly echogenic consistent with medical renal disease with chronic cortical thinning  No suspicious masses detected  7 8 cm exophytic simple cyst  No hydronephrosis  No shadowing calculi  No perinephric fluid collections  URETERS: Nonvisualized  BLADDER: Normally distended  No focal thickening or mass lesions  Neither  ureteral jet is visualized  This is attributed to a spurious finding  No hydronephrosis       Impression: Evidence of chronic medical renal disease  No hydronephrosis or perinephric collection  Bilateral renal simple cysts, left larger than right  Workstation performed: [id number]       Current Facility-Administered Medications   Medication Dose Route Frequency    acetaminophen (TYLENOL) tablet 650 mg  650 mg Oral Q6H PRN    amLODIPine (NORVASC) tablet 5 mg  5 mg Oral Daily    apixaban (ELIQUIS) tablet 2 5 mg  2 5 mg Oral BID    aspirin (ECOTRIN LOW STRENGTH) EC tablet 81 mg  81 mg Oral Daily    cefepime (MAXIPIME) IVPB (premix in dextrose) 1,000 mg 50 mL  1,000 mg Intravenous Q12H    oxyCODONE (ROXICODONE) IR tablet 5 mg  5 mg Oral Q4H PRN    Or    HYDROmorphone (DILAUDID) injection 0 5 mg  0 5 mg Intravenous Q4H PRN    metoprolol tartrate (LOPRESSOR) tablet 100 mg  100 mg Oral BID    pravastatin (PRAVACHOL) tablet 40 mg  40 mg Oral Daily With Dinner    senna (SENOKOT) tablet 8 6 mg  8 6 mg Oral HS    vancomycin (VANCOCIN) 750 mg in sodium chloride 0 9 % 250 mL IVPB  10 mg/kg Intravenous Q24H     Medications Discontinued During This Encounter   Medication Reason    HYDROmorphone (DILAUDID) injection 0 2 mg     oxyCODONE (ROXICODONE) IR tablet 5 mg     acetaminophen (TYLENOL) tablet 650 mg     amLODIPine (NORVASC) tablet 5 mg     vancomycin (VANCOCIN) IVPB (premix in dextrose) 1,000 mg 200 mL        Yasemin Handy PA-C    Portions of the record may have been created with voice recognition software  Occasional wrong word or "sound a like" substitutions may have occurred due to the inherent limitations of voice recognition software  Read the chart carefully and recognize, using context, where substitutions have occurred

## 2021-07-31 PROBLEM — M10.9 ACUTE GOUT: Status: ACTIVE | Noted: 2021-07-31

## 2021-07-31 PROBLEM — N30.00 ACUTE CYSTITIS WITHOUT HEMATURIA: Status: ACTIVE | Noted: 2021-07-31

## 2021-07-31 LAB
ANION GAP SERPL CALCULATED.3IONS-SCNC: 10 MMOL/L (ref 4–13)
BUN SERPL-MCNC: 45 MG/DL (ref 5–25)
CALCIUM SERPL-MCNC: 8.9 MG/DL (ref 8.3–10.1)
CHLORIDE SERPL-SCNC: 100 MMOL/L (ref 100–108)
CO2 SERPL-SCNC: 28 MMOL/L (ref 21–32)
CREAT SERPL-MCNC: 2.22 MG/DL (ref 0.6–1.3)
GFR SERPL CREATININE-BSD FRML MDRD: 26 ML/MIN/1.73SQ M
GLUCOSE SERPL-MCNC: 136 MG/DL (ref 65–140)
POTASSIUM SERPL-SCNC: 3.8 MMOL/L (ref 3.5–5.3)
SODIUM SERPL-SCNC: 138 MMOL/L (ref 136–145)

## 2021-07-31 PROCEDURE — 0S9D3ZZ DRAINAGE OF LEFT KNEE JOINT, PERCUTANEOUS APPROACH: ICD-10-PCS | Performed by: ORTHOPAEDIC SURGERY

## 2021-07-31 PROCEDURE — 99232 SBSQ HOSP IP/OBS MODERATE 35: CPT | Performed by: INTERNAL MEDICINE

## 2021-07-31 PROCEDURE — 99232 SBSQ HOSP IP/OBS MODERATE 35: CPT | Performed by: ORTHOPAEDIC SURGERY

## 2021-07-31 PROCEDURE — 80048 BASIC METABOLIC PNL TOTAL CA: CPT | Performed by: PHYSICIAN ASSISTANT

## 2021-07-31 PROCEDURE — 97530 THERAPEUTIC ACTIVITIES: CPT

## 2021-07-31 PROCEDURE — 97530 THERAPEUTIC ACTIVITIES: CPT | Performed by: PHYSICAL THERAPIST

## 2021-07-31 RX ORDER — CEFTRIAXONE 1 G/50ML
1000 INJECTION, SOLUTION INTRAVENOUS EVERY 24 HOURS
Status: DISCONTINUED | OUTPATIENT
Start: 2021-07-31 | End: 2021-08-02

## 2021-07-31 RX ORDER — PREDNISONE 20 MG/1
40 TABLET ORAL DAILY
Status: DISCONTINUED | OUTPATIENT
Start: 2021-07-31 | End: 2021-08-01

## 2021-07-31 RX ADMIN — PRAVASTATIN SODIUM 40 MG: 40 TABLET ORAL at 17:23

## 2021-07-31 RX ADMIN — SODIUM CHLORIDE, SODIUM GLUCONATE, SODIUM ACETATE, POTASSIUM CHLORIDE, MAGNESIUM CHLORIDE, SODIUM PHOSPHATE, DIBASIC, AND POTASSIUM PHOSPHATE 75 ML/HR: .53; .5; .37; .037; .03; .012; .00082 INJECTION, SOLUTION INTRAVENOUS at 07:24

## 2021-07-31 RX ADMIN — APIXABAN 2.5 MG: 2.5 TABLET, FILM COATED ORAL at 17:23

## 2021-07-31 RX ADMIN — CEFTRIAXONE 1000 MG: 1 INJECTION, SOLUTION INTRAVENOUS at 09:32

## 2021-07-31 RX ADMIN — METOPROLOL TARTRATE 100 MG: 100 TABLET, FILM COATED ORAL at 17:23

## 2021-07-31 RX ADMIN — SENNOSIDES 8.6 MG: 8.6 TABLET, FILM COATED ORAL at 21:48

## 2021-07-31 RX ADMIN — APIXABAN 2.5 MG: 2.5 TABLET, FILM COATED ORAL at 09:32

## 2021-07-31 RX ADMIN — AMLODIPINE BESYLATE 5 MG: 5 TABLET ORAL at 09:32

## 2021-07-31 RX ADMIN — ASPIRIN 81 MG: 81 TABLET, COATED ORAL at 09:32

## 2021-07-31 RX ADMIN — METOPROLOL TARTRATE 100 MG: 100 TABLET, FILM COATED ORAL at 09:32

## 2021-07-31 RX ADMIN — PREDNISONE 40 MG: 20 TABLET ORAL at 09:32

## 2021-07-31 RX ADMIN — HYDROMORPHONE HYDROCHLORIDE 0.5 MG: 1 INJECTION, SOLUTION INTRAMUSCULAR; INTRAVENOUS; SUBCUTANEOUS at 09:31

## 2021-07-31 NOTE — PROGRESS NOTES
Progress Note - Orthopedics   Юлия Rivera 80 y o  male MRN: [id number]  Unit/Bed#: 282-98      Subjective:    80 y  o male still complains of low back pain and bilateral knee pain No acute events,  Pain controlled   Denies fevers chills, CP, SOB    Labs:  0   Lab Value Date/Time    HCT 30 4 (L) 07/30/2021 0417    HCT 33 1 (L) 07/29/2021 0457    HCT 30 2 (L) 07/28/2021 1225    HCT 27 8 (L) 07/04/2015 0501    HCT 30 1 (L) 07/03/2015 0935    HCT 30 0 (L) 07/02/2015 1223    HGB 9 5 (L) 07/30/2021 0417    HGB 10 4 (L) 07/29/2021 0457    HGB 9 8 (L) 07/28/2021 1225    HGB 9 4 (L) 07/04/2015 0501    HGB 9 9 (L) 07/03/2015 0935    HGB 9 8 (L) 07/02/2015 1223    INR 1 44 (H) 08/24/2020 1255    INR 1 27 (H) 07/02/2015 1223    WBC 17 22 (H) 07/30/2021 0417    WBC 13 57 (H) 07/29/2021 0457    WBC 18 58 (H) 07/28/2021 1225    WBC 12 50 (H) 07/04/2015 0501    WBC 16 11 (H) 07/03/2015 0935    WBC 19 17 (H) 07/02/2015 1223    ESR 50 (H) 07/30/2021 0417     8 (H) 07/29/2021 0457       Meds:    Current Facility-Administered Medications:     acetaminophen (TYLENOL) tablet 650 mg, 650 mg, Oral, Q6H PRN, Alf Montalvo DO, 650 mg at 07/29/21 2013    amLODIPine (NORVASC) tablet 5 mg, 5 mg, Oral, Daily, DIANDRA Kam, 5 mg at 07/30/21 6082    apixaban (ELIQUIS) tablet 2 5 mg, 2 5 mg, Oral, BID, Michael Willis MD, 2 5 mg at 07/30/21 1811    aspirin (ECOTRIN LOW STRENGTH) EC tablet 81 mg, 81 mg, Oral, Daily, Michael Willis MD, 81 mg at 07/30/21 7716    cefTRIAXone (ROCEPHIN) IVPB (premix in dextrose) 1,000 mg 50 mL, 1,000 mg, Intravenous, Q24H, Alf Montalvo DO    oxyCODONE (ROXICODONE) IR tablet 5 mg, 5 mg, Oral, Q4H PRN, 5 mg at 07/30/21 0841 **OR** HYDROmorphone (DILAUDID) injection 0 5 mg, 0 5 mg, Intravenous, Q4H PRN, Alf Montalvo DO, 0 5 mg at 07/30/21 1811    metoprolol tartrate (LOPRESSOR) tablet 100 mg, 100 mg, Oral, BID, Michael Willis MD, 100 mg at 07/30/21 1811   multi-electrolyte (PLASMALYTE-A/ISOLYTE-S PH 7 4) IV solution, 75 mL/hr, Intravenous, Continuous, Adolfo Juarez PA-C, Last Rate: 75 mL/hr at 07/31/21 0724, 75 mL/hr at 07/31/21 0724    ondansetron (ZOFRAN) injection 4 mg, 4 mg, Intravenous, Q4H PRN, [employer], DO, 4 mg at 07/30/21 1627    pravastatin (PRAVACHOL) tablet 40 mg, 40 mg, Oral, Daily With Олег Fitch MD, 40 mg at 07/29/21 1613    predniSONE tablet 40 mg, 40 mg, Oral, Daily, [employer], DO    senna (SENOKOT) tablet 8 6 mg, 8 6 mg, Oral, HS, Spencer Cuba MD, 8 6 mg at 07/30/21 2112    Blood Culture:   Lab Results   Component Value Date    BLOODCX No Growth at 48 hrs  07/28/2021    BLOODCX No Growth at 48 hrs  07/28/2021       Wound Culture:   No results found for: WOUNDCULT    Ins and Outs:  I/O last 24 hours: In: 0   Out: 900 [Urine:900]          Physical:  Vitals:    07/31/21 0834   BP: 146/65   Pulse: 91   Resp: 18   Temp: 98 4 °F (36 9 °C)   SpO2: 97%     · Examination unchanged mild effusion the left knee minimal effusion in the right knee with varus and flexion deformity further free flexion to 90 no distal deficits    Assessment:    80 y  o male go to effusion in the left knee was negative cultures and a positive urine culture     Plan:  · Patient allowed to WB as tolerated both lower extremities  · PT/OT  · Pain control  · DVT ppx  · Dispo: Ortho will follow    Lori Franco MD

## 2021-07-31 NOTE — ASSESSMENT & PLAN NOTE
Likely due to acute gout flare  No MRI due to pacemaker  · A bone scan was ordered to evaluate the patient's right hip, which did not show any evidence of fracture  · The left knee joint effusion was aspirated by Orthopedic Surgery on 07/30/2021, which showed monosodium urate crystals consistent with acute gout  · Initiate prednisone 40 mg PO Qdaily to treat the acute gout  Continue pain meds (PRN Tylenol 650mg Q6 PRN, Dilaudid 0 5mg Q4 PRN, Oxycodone 5mg Q4 PRN)  Consider arthrocentesis (in presence of )  PT/OT  · The patient will require short-term rehabilitation placement upon discharge

## 2021-07-31 NOTE — ASSESSMENT & PLAN NOTE
Lab Results   Component Value Date    EGFR 26 07/31/2021    EGFR 24 07/30/2021    EGFR 20 07/29/2021    CREATININE 2 22 (H) 07/31/2021    CREATININE 2 39 (H) 07/30/2021    CREATININE 2 75 (H) 07/29/2021     Acute pre renal injury  FENA 0 9  Treated with Isolyte IV fluids  Discontinue IV fluids on 07/31/2021 per Nephrology  Continue to hold loop diuretic  Continue to avoid all nephrotoxic agents    Chronic kidney disease stage 4  Creatinine 2 75 from 3 45 (baseline 1 8-2 2)  eGFR 20 from 14  Renal ultrasound shows chronic kidney disease and multiple bilateral cysts  Follow-up as outpatient with Nephrology

## 2021-07-31 NOTE — PLAN OF CARE
Problem: PHYSICAL THERAPY ADULT  Goal: Performs mobility at highest level of function for planned discharge setting  See evaluation for individualized goals  Description: Treatment/Interventions: Functional transfer training, LE strengthening/ROM, Elevations, Therapeutic exercise, Endurance training, Bed mobility, Gait training          See flowsheet documentation for full assessment, interventions and recommendations  Note: Prognosis: Guarded  Problem List: Decreased strength, Decreased range of motion, Decreased endurance, Impaired balance, Decreased mobility  Assessment: Patient agreeable to PT treatment  Chief complaint is right sided low back pain, and stiffness in left knee  Unable to safely complete a stand pivot transfer this session  Did not attempt to ambulate due to subjective comments of increased pain with activity  Mod A x 2 for transfers and bed mobility  Patient is easily distracted by pain  BP was 147/112 while sitting EOB  Would benefit from PT to help increase strength, balance, endurance, ROM, and overall functional activity tolerance  PT Discharge Recommendation: Post acute rehabilitation services          See flowsheet documentation for full assessment

## 2021-07-31 NOTE — PHYSICAL THERAPY NOTE
Physical Therapy Treatment     Patient Name: Carroll Phillips    CCDBM'G Date: 7/31/2021     Problem List  Principal Problem:    Acute right hip pain  Active Problems:    Dyslipidemia    GERD (gastroesophageal reflux disease)    Chronic diastolic CHF (congestive heart failure) (HCC)    Paroxysmal atrial fibrillation (HCC)    Acute kidney injury superimposed on CKD (HCC)    SIRS (systemic inflammatory response syndrome) (HCC)    Multiple renal cysts    Effusion of left knee    Mitral valve stenosis    Acute cystitis without hematuria       Past Medical History  Past Medical History:   Diagnosis Date    Aortic stenosis     ECHO -4-14-14  MODERATE TO SEVERE AORTIC STENOSIS; MILD AROTIC INSUFFICIENCY - LAST ASSESSED 10/26/16; RESOLVED 6/8/16    Aortic valve disorder     LAST ASSESSED 10/8/15; 10/8/15    Arthritis     [facility]) [facility]     Complete heart block ([facility] [address] ) 7/20/2020    Coronary artery disease     Hypertension     Hypertensive urgency 5/19/2019    Renal disorder     TIA (transient ischemic attack)     Transient cerebral ischemia         Past Surgical History  Past Surgical History:   Procedure Laterality Date    AORTIC VALVE REPLACEMENT  06/30/2015    avr with 23 mm Livingston Magna Ease bioprosthetic    CARDIAC PACEMAKER PLACEMENT Left 07/24/2020    CATARACT EXTRACTION Right 06/05/2000    COLONOSCOPY      CORONARY ARTERY BYPASS GRAFT  06/05/2000    x1 with argueta  to lad    EYE SURGERY      POLYPECTOMY  04/2014    enteroscopic - esophageal ulcer, gastric erosion, and duodenal ulcer   TONSILLECTOMY      unknown           07/31/21 1018   Pain Assessment   Pain Assessment Tool 0-10   Pain Score Worst Possible Pain   Pain Location/Orientation Location: Back;Orientation: Right   Restrictions/Precautions   Weight Bearing Precautions Per Order No   Other Precautions Chair Alarm; Bed Alarm;Telemetry; Fall Risk;O2;Multiple lines Cognition   Overall Cognitive Status WFL   Arousal/Participation Alert   Attention Within functional limits   Orientation Level Oriented X4   Memory Within functional limits   Following Commands Follows one step commands without difficulty   Bed Mobility   Rolling R 3  Moderate assistance   Additional items Assist x 2; Increased time required;Verbal cues;LE management   Supine to Sit 2  Maximal assistance   Additional items Assist x 2; Increased time required;Verbal cues; Bedrails;LE management   Additional Comments 1 Lo2 , did not ambulate this session, due to increased back pain  Cues for postural correction during standing EOB  Transfers   Sit to Stand 3  Moderate assistance   Additional items Assist x 2;Bedrails; Increased time required   Stand to Sit 3  Moderate assistance   Additional items Assist x 2; Increased time required; Bedrails   Additional Comments unable to complete stand pivot transfer to chair  Unable to initiate gait due to complaints of pain in weakness in low back and legs    Balance   Static Sitting Fair   Dynamic Sitting Fair   Static Standing Poor +   Dynamic Standing Poor +   Assessment   Prognosis Guarded   Problem List Decreased strength;Decreased range of motion;Decreased endurance; Impaired balance;Decreased mobility   Assessment Patient agreeable to PT treatment  Chief complaint is right sided low back pain, and stiffness in left knee  Unable to safely complete a stand pivot transfer this session  Did not attempt to ambulate due to subjective comments of increased pain with activity  Mod A x 2 for transfers and bed mobility  Patient is easily distracted by pain  BP was 147/112 while sitting EOB  Would benefit from PT to help increase strength, balance, endurance, ROM, and overall functional activity tolerance      Recommendation   PT Discharge Recommendation Post acute rehabilitation services   AM-PAC Basic Mobility Inpatient   Turning in Bed Without Bedrails 2   Lying on Back to Sitting on Edge of Flat Bed 2   Moving Bed to Chair 2   Standing Up From Chair 2   Walk in Room 2   Climb 3-5 Stairs 2   Basic Mobility Inpatient Raw Score 12   Basic Mobility Standardized Score 32 23   Turning Head Towards Sound 4   Follow Simple Instructions 4   Low Function Basic Mobility Raw Score 20   Low Function Basic Mobility Standardized Score 32 8     Patient in bed when PT entered  Patient seated in chair when PT left, all lines intact, all needs within reach  Patients raw score on the AM-PAC Basic Mobility inpatient short form is 12, standardized score is 32 23, less than) 42  9  patient's at this level are likely to benefit from/post acute rehab services, however, please refer to therapist recommendation for safe D/C Plan

## 2021-07-31 NOTE — ASSESSMENT & PLAN NOTE
· The left knee joint effusion was aspirated by Orthopedic Surgery on 07/30/2021, which showed monosodium urate crystals consistent with acute gout    · Initiate prednisone 40 mg PO Qdaily to treat the acute gout  · Await fluid culture results

## 2021-07-31 NOTE — PROGRESS NOTES
Vancomycin IV Pharmacy-to-Dose Consultation    Tiara Pedraza is a 80 y o  male who is currently receiving Vancomycin IV with management by the Pharmacy Consult service  Assessment/Plan:  The patient was reviewed  Renal function is stable and no signs or symptoms of nephrotoxicity and/or infusion reactions were documented in the chart  Based on todays assessment, continue current vancomycin (day # 2) dosing of 750mg Q24H, with a plan for trough to be drawn at 09:00 on 08/02/2021  We will continue to follow the patients culture results and clinical progress daily      Dale Le, Pharmacist

## 2021-07-31 NOTE — OCCUPATIONAL THERAPY NOTE
Occupational Therapy Progress Note     Patient Name: Malu Lawton  MPDRU'J Date: 7/31/2021  Problem List  Principal Problem:    Acute right hip pain  Active Problems:    Dyslipidemia    GERD (gastroesophageal reflux disease)    Chronic diastolic CHF (congestive heart failure) (HCC)    Paroxysmal atrial fibrillation (HCC)    Acute kidney injury superimposed on CKD (HCC)    SIRS (systemic inflammatory response syndrome) (HCC)    Multiple renal cysts    Effusion of left knee    Mitral valve stenosis    Acute cystitis without hematuria    Past Medical History  Past Medical History:   Diagnosis Date    Aortic stenosis     ECHO -4-14-14  MODERATE TO SEVERE AORTIC STENOSIS; MILD AROTIC INSUFFICIENCY - LAST ASSESSED 10/26/16; RESOLVED 6/8/16    Aortic valve disorder     LAST ASSESSED 10/8/15; 10/8/15    Arthritis     [facility])     Complete heart block ([facility] [address] ) 7/20/2020    Coronary artery disease     Hypertension     Hypertensive urgency 5/19/2019    Renal disorder     TIA (transient ischemic attack)     Transient cerebral ischemia      Past Surgical History  Past Surgical History:   Procedure Laterality Date    AORTIC VALVE REPLACEMENT  06/30/2015    avr with 23 mm [place] Magna Ease bioprosthetic    CARDIAC PACEMAKER PLACEMENT Left 07/24/2020    CATARACT EXTRACTION Right 06/05/2000    COLONOSCOPY      CORONARY ARTERY BYPASS GRAFT  06/05/2000    x1 with argueta  to lad    EYE SURGERY      POLYPECTOMY  04/2014    enteroscopic - esophageal ulcer, gastric erosion, and duodenal ulcer   TONSILLECTOMY      unknown             07/31/21 1017   OT Last Visit   OT Visit Date 07/31/21   Note Type   Note Type Treatment   Restrictions/Precautions   Weight Bearing Precautions Per Order No   Other Precautions Chair Alarm; Bed Alarm;Telemetry; Fall Risk;O2;Pain;Multiple lines   Pain Assessment   Pain Assessment Tool 0-10   Pain Score Worst Possible Pain   Pain Location/Orientation Orientation: Lower; Location: Back;Orientation: Right   ADL   Where Assessed Edge of bed   LB Dressing Assistance 2  Maximal Assistance   LB Dressing Deficit Don/doff R sock; Don/doff L sock   LB Dressing Comments pt unable to perform due to increased back pain   Toileting Assistance  2  Maximal Assistance   Toileting Comments pt with catheter in place   Bed Mobility   Rolling R 3  Moderate assistance   Additional items Assist x 2;Bedrails; Increased time required;LE management;Verbal cues   Supine to Sit 2  Maximal assistance   Additional items Assist x 2;Bedrails; Increased time required;Verbal cues;LE management   Sit to Supine   (seated in chair at end of session)   Additional Comments pt on 1L O2 during session; SPo2 [facility] with no complaints of SOB; pt seated EOB for ~10 minutes with increased pain to R lower back   Transfers   Sit to Stand 3  Moderate assistance   Additional items Assist x 2;Bedrails; Increased time required;Verbal cues  (RW)   Stand to Sit 3  Moderate assistance   Additional items Assist x 2; Increased time required;Verbal cues  (RW)   Additional Comments pt with significant time to achieve full stand; pt with dynamic stance of 2 minutes with minimal instability; attempted to advance forward and unable to complete due to increased pain   Functional Mobility   Additional Comments see above comment; pt in chair at end of session with bed moved and chair brought behind    Cognition   Overall Cognitive Status [facility]   Arousal/Participation Alert   Attention Attends with cues to redirect   Orientation Level Oriented X4   Memory Within functional limits   Following Commands Follows one step commands without difficulty   Comments pt is easily distracted by pain   Activity Tolerance   Activity Tolerance Patient limited by fatigue;Patient limited by pain   Assessment   Assessment Pt is a 80 y o  male seen for OT treatment day #1 s/p admit to [facility] on 7/28/2021 w/ Acute right hip pain    Pt requires mod-max (A) x2 with use of RW during functional transfers 2* the following deficits impacting occupational performance: weakness, decreased strength, decreased balance, decreased tolerance, impaired initiation, impaired sequencing, impaired problem solving, decreased safety awareness, increased pain and impaired interpersonal skills  Pt to benefit from continued skilled OT tx while in the hospital to address deficits as defined above and maximize level of functional independence w ADL's and functional mobility  The patient's raw score on the AM-PAC Daily Activity inpatient short form is 15, standardized score is 34 69, less than 39 4  Patients at this level are likely to benefit from discharge to post-acute rehabilitation services  Co treatment with PT secondary to complex medical condition of pt, possible A of 2 required to achieve and maintain transitional movements, requiring the need of skilled therapeutic intervention of 2 therapists to achieve delivery of services  Please refer to the recommendation of the Occupational Therapist for safe discharge planning  Plan   Goal Expiration Date 08/13/21   OT Treatment Day 1   OT Frequency 3-5x/wk   Recommendation   OT Discharge Recommendation Post acute rehabilitation services     Pt will benefit from continued OT services in order to maximize (I) c ADL performance, FM c RW, and improve overall endurance/strength required to complete functional tasks in preparation for d/c  Pt left seated in chair at end of session; all needs within reach; all lines intact

## 2021-07-31 NOTE — PROGRESS NOTES
[facility] [address]  Progress Note - Minerva Calender 1935, 80 y o  male MRN: [id number]  Unit/Bed#: 618-14 Encounter: [id number]  Primary Care Provider: Ashleigh Bañuelos DO   Date and time admitted to hospital: 7/28/2021 11:01 AM    * Acute right hip pain  Assessment & Plan  Likely due to acute gout flare  No MRI due to pacemaker  · A bone scan was ordered to evaluate the patient's right hip, which did not show any evidence of fracture  · The left knee joint effusion was aspirated by Orthopedic Surgery on 07/30/2021, which showed monosodium urate crystals consistent with acute gout  · Initiate prednisone 40 mg PO Qdaily to treat the acute gout  Continue pain meds (PRN Tylenol 650mg Q6 PRN, Dilaudid 0 5mg Q4 PRN, Oxycodone 5mg Q4 PRN)  PT/OT  · The patient will require short-term rehabilitation placement upon discharge  Acute gout  Assessment & Plan  · The left knee joint effusion was aspirated by Orthopedic Surgery on 07/30/2021, which showed monosodium urate crystals consistent with acute gout  · Initiate prednisone 40 mg PO Qdaily to treat the acute gout      Effusion of left knee  Assessment & Plan  · The left knee joint effusion was aspirated by Orthopedic Surgery on 07/30/2021, which showed monosodium urate crystals consistent with acute gout    · Initiate prednisone 40 mg PO Qdaily to treat the acute gout  · Await fluid culture results    Acute kidney injury superimposed on CKD [facility])  Assessment & Plan  Lab Results   Component Value Date    EGFR 26 07/31/2021    EGFR 24 07/30/2021    EGFR 20 07/29/2021    CREATININE 2 22 (H) 07/31/2021    CREATININE 2 39 (H) 07/30/2021    CREATININE 2 75 (H) 07/29/2021     Acute pre renal injury  FENA 0 9  Treated with Isolyte IV fluids  Discontinue IV fluids on 07/31/2021 per Nephrology  Continue to hold loop diuretic  Continue to avoid all nephrotoxic agents    Chronic kidney disease stage 4  Creatinine 2 75 from 3 45 (baseline 1 8-2 2)  eGFR 20 from 14  Renal ultrasound shows chronic kidney disease and multiple bilateral cysts  Follow-up as outpatient with Nephrology    Acute cystitis without hematuria  Assessment & Plan  · Treated with ceftriaxone 1000 mg IV every 24 hours  · Await the final urine culture results    07/30/2021 6311 07/31/2021 1219 Urine culture [[id number]]   (Abnormal)   Urine, Clean Catch    Preliminary result [address]  Component Value   Urine Culture >100,000 cfu/ml Escherichia coliAbnormal  P    10,000-19,000 cfu/ml Proteus mirabilisAbnormal  P                    Mitral valve stenosis  Assessment & Plan  · Outpatient follow-up with Cardiology    Multiple renal cysts  Assessment & Plan  Bilateral renal cysts found on ultrasound of kidneys  Outpatient surveillance imaging with PCP    SIRS (systemic inflammatory response syndrome) ([facility])  Assessment & Plan  · SIRS was present on admission with a leukocytosis and tachycardia  · Now with fevers  · Possible sepsis due to a possible right hip joint infection versus a left knee joint infection  · The patient was seen in consultation by Orthopedic Surgery  · Continue IV vancomycin for now  · The patient cannot have an MRI of the right hip due to an indwelling permanent pacemaker  · A bone scan was ordered to evaluate the patient's right hip, which did not show any evidence of fracture  · The left knee joint effusion was aspirated by Orthopedic Surgery on 07/30/2021, which showed monosodium urate crystals consistent with acute gout    · Initiate prednisone 40 mg PO Qdaily to treat the acute gout  · Follow culture results        Paroxysmal atrial fibrillation [facility])  Assessment & Plan  Continue metoprolol 100 mg PO every 12 hours  Continue anticoagulation with eliquis 2 5 mg PO BID    Chronic diastolic CHF (congestive heart failure) ([facility] [address] )  Assessment & Plan  Wt Readings from Last 3 Encounters:   07/30/21 70 1 kg (154 lb 8 7 oz)   07/01/21 78 kg (172 lb)   06/03/21 78 1 kg (172 lb 2 oz)     · Monitor his volume status closely  · Daily weights  · Strict intake/output measurements  · Outpatient follow-up with Cardiology      GERD (gastroesophageal reflux disease)  Assessment & Plan  Hold PPI in the setting of acute kidney injury    Dyslipidemia  Assessment & Plan  · Continue statin therapy        VTE Pharmacologic Prophylaxis: VTE Score: 7 High Risk (Score >/= 5) - Pharmacological DVT Prophylaxis Ordered: apixaban (Eliquis)  Sequential Compression Devices Ordered  Patient Centered Rounds: I performed bedside rounds with nursing staff today  Discussions with Specialists or Other Care Team Provider: I discussed the case with Dr Patti Johnson (Orthopedic Surgery)  Education and Discussions with Family / Patient: Updated  (wife and daughter) at bedside  Time Spent for Care: 30 minutes  More than 50% of total time spent on counseling and coordination of care as described above  Current Length of Stay: 3 day(s)  Current Patient Status: Inpatient   Certification Statement: The patient will continue to require additional inpatient hospital stay due to the need for IV antibiotic treatment, for treatment of the acute gout, and for short-term rehabilitation placement upon discharge  Discharge Plan: Anticipate discharge in 48-72 hrs to rehab facility  Code Status: Level 1 - Full Code    Subjective: The patient was seen and examined  The patient continues to experience severe right hip pain and severe left knee  Objective:     Vitals:   Temp (24hrs), Av 7 °F (37 1 °C), Min:98 4 °F (36 9 °C), Max:99 3 °F (37 4 °C)    Temp:  [98 4 °F (36 9 °C)-99 3 °F (37 4 °C)] 98 4 °F (36 9 °C)  HR:  [91-99] 97  Resp:  [16-18] 18  BP: (146-155)/(65-70) 146/65  SpO2:  [92 %-98 %] 98 %  Body mass index is 26 53 kg/m²  Input and Output Summary (last 24 hours):      Intake/Output Summary (Last 24 hours) at 2021 1409  Last data filed at 2021 0859  Gross per 24 hour   Intake 180 ml   Output 550 ml   Net -370 ml       Physical Exam:   Physical Exam   General:  NAD, follows commands  HEENT:  NC/AT, mucous membranes moist  Neck:  Supple, No JVP elevation  CV:  + S1, + S2, RRR  Pulm:  Lung fields are CTA bilaterally  Abd:  Soft, Non-tender, Non-distended  Ext:  No clubbing/cyanosis, Edema of the left knee  Skin:  No rashes  Neuro:  Awake, alert, oriented  Psych:  Normal mood and affect      Additional Data:    Labs:  Results from last 7 days   Lab Units 07/30/21  0417   WBC Thousand/uL 17 22*   HEMOGLOBIN g/dL 9 5*   HEMATOCRIT % 30 4*   PLATELETS Thousands/uL 332   NEUTROS PCT % 86*   LYMPHS PCT % 6*   MONOS PCT % 7   EOS PCT % 0     Results from last 7 days   Lab Units 07/31/21  0605 07/30/21  0417   SODIUM mmol/L 138 137   POTASSIUM mmol/L 3 8 3 7   CHLORIDE mmol/L 100 101   CO2 mmol/L 28 26   BUN mg/dL 45* 46*   CREATININE mg/dL 2 22* 2 39*   ANION GAP mmol/L 10 10   CALCIUM mg/dL 8 9 9 0   ALBUMIN g/dL  --  2 6*   TOTAL BILIRUBIN mg/dL  --  0 63   ALK PHOS U/L  --  72   ALT U/L  --  11*   AST U/L  --  20   GLUCOSE RANDOM mg/dL 136 89                 Results from last 7 days   Lab Units 07/30/21  0417 07/29/21  0457   LACTIC ACID mmol/L  --  1 0   PROCALCITONIN ng/ml 0 78* 0 68*       Lines/Drains:  Invasive Devices     Peripheral Intravenous Line            Peripheral IV 07/30/21 Dorsal (posterior); Left Hand <1 day          Drain            External Urinary Catheter Medium 2 days                      Imaging: No pertinent imaging reviewed  Recent Cultures (last 7 days):   Results from last 7 days   Lab Units 07/30/21  1300 07/30/21  0833 07/28/21  1656   BLOOD CULTURE   --   --  No Growth at 48 hrs  No Growth at 48 hrs     URINE CULTURE   --  >100,000 cfu/ml Escherichia coli*  10,000-19,000 cfu/ml Proteus mirabilis*  --    WOUND CULTURE  No growth  --   --        Last 24 Hours Medication List:   Current Facility-Administered Medications   Medication Dose Route Frequency Provider Last Rate    acetaminophen  650 mg Oral Q6H PRN Jerrell Arora DO      amLODIPine  5 mg Oral Daily DIANDRA Lion      apixaban  2 5 mg Oral BID Law Marte MD      aspirin  81 mg Oral Daily Law Marte MD      cefTRIAXone  1,000 mg Intravenous Q24H Jerrell Arora DO 1,000 mg (07/31/21 0932)    oxyCODONE  5 mg Oral Q4H PRN Jerrell Arora DO      Or    HYDROmorphone  0 5 mg Intravenous Q4H PRN Jerrell Arora DO      metoprolol tartrate  100 mg Oral BID Law Marte MD      ondansetron  4 mg Intravenous Q4H PRN Jerrell Arora DO      pravastatin  40 mg Oral Daily With Silvia Royal MD      predniSONE  40 mg Oral Daily Jerrell Arora DO      senna  8 6 mg Oral HS Law Marte MD          Today, Patient Was Seen By: Jerrell Arora DO    **Please Note: This note may have been constructed using a voice recognition system  **

## 2021-07-31 NOTE — ASSESSMENT & PLAN NOTE
· The left knee joint effusion was aspirated by Orthopedic Surgery on 07/30/2021, which showed monosodium urate crystals consistent with acute gout    · Initiate prednisone 40 mg PO Qdaily to treat the acute gout

## 2021-07-31 NOTE — PLAN OF CARE
Problem: OCCUPATIONAL THERAPY ADULT  Goal: Performs self-care activities at highest level of function for planned discharge setting  See evaluation for individualized goals  Description: Treatment Interventions: ADL retraining, Functional transfer training, UE strengthening/ROM, Endurance training, Patient/family training, Equipment evaluation/education, Activityengagement          See flowsheet documentation for full assessment, interventions and recommendations  Outcome: Progressing  Note: Limitation: Decreased ADL status, Decreased UE strength, Decreased Safe judgement during ADL, Decreased endurance, Decreased self-care trans, Decreased high-level ADLs, Decreased cognition     Assessment: Pt is a 80 y o  male seen for OT treatment day #1 s/p admit to [facility] on 7/28/2021 w/ Acute right hip pain  Pt requires mod-max (A) x2 with use of RW during functional transfers 2* the following deficits impacting occupational performance: weakness, decreased strength, decreased balance, decreased tolerance, impaired initiation, impaired sequencing, impaired problem solving, decreased safety awareness, increased pain and impaired interpersonal skills  Pt to benefit from continued skilled OT tx while in the hospital to address deficits as defined above and maximize level of functional independence w ADL's and functional mobility  The patient's raw score on the AM-PAC Daily Activity inpatient short form is 15, standardized score is 34 69, less than 39 4  Patients at this level are likely to benefit from discharge to post-acute rehabilitation services  Co treatment with PT secondary to complex medical condition of pt, possible A of 2 required to achieve and maintain transitional movements, requiring the need of skilled therapeutic intervention of 2 therapists to achieve delivery of services  Please refer to the recommendation of the Occupational Therapist for safe discharge planning        OT Discharge Recommendation: Post acute rehabilitation services

## 2021-07-31 NOTE — ASSESSMENT & PLAN NOTE
· SIRS was present on admission with a leukocytosis and tachycardia  · Now with fevers  · Possible sepsis due to a possible right hip joint infection versus a left knee joint infection  · The patient was seen in consultation by Orthopedic Surgery  · Continue IV vancomycin for now  · The patient cannot have an MRI of the right due to an indwelling permanent pacemaker  · A bone scan was ordered to evaluate the patient's right hip, which did not show any evidence of fracture  · The left knee joint effusion was aspirated by Orthopedic Surgery on 07/30/2021, which showed monosodium urate crystals consistent with acute gout    · Initiate prednisone 40 mg PO Qdaily to treat the acute gout  · Follow culture results

## 2021-07-31 NOTE — ASSESSMENT & PLAN NOTE
· Treated with ceftriaxone 1000 mg IV every 24 hours  · Await the final urine culture results    07/30/2021 4254 07/31/2021 1219 Urine culture [[id number]]   (Abnormal)   Urine, Clean Catch    Preliminary result [address] [facility] [address]  Component Value   Urine Culture >100,000 cfu/ml Escherichia coliAbnormal  P    10,000-19,000 cfu/ml Proteus mirabilisAbnormal  P

## 2021-08-01 PROBLEM — A41.9 SEPSIS (HCC): Status: ACTIVE | Noted: 2021-08-01

## 2021-08-01 PROBLEM — A41.9 SEPSIS (HCC): Status: ACTIVE | Noted: 2021-07-28

## 2021-08-01 LAB
ALBUMIN SERPL BCP-MCNC: 2.4 G/DL (ref 3.5–5)
ALP SERPL-CCNC: 89 U/L (ref 46–116)
ALT SERPL W P-5'-P-CCNC: 45 U/L (ref 12–78)
ANION GAP SERPL CALCULATED.3IONS-SCNC: 10 MMOL/L (ref 4–13)
ANISOCYTOSIS BLD QL SMEAR: PRESENT
AST SERPL W P-5'-P-CCNC: 67 U/L (ref 5–45)
BASOPHILS # BLD MANUAL: 0 THOUSAND/UL (ref 0–0.1)
BASOPHILS NFR MAR MANUAL: 0 % (ref 0–1)
BILIRUB SERPL-MCNC: 0.18 MG/DL (ref 0.2–1)
BUN SERPL-MCNC: 52 MG/DL (ref 5–25)
CALCIUM ALBUM COR SERPL-MCNC: 10.5 MG/DL (ref 8.3–10.1)
CALCIUM SERPL-MCNC: 9.2 MG/DL (ref 8.3–10.1)
CHLORIDE SERPL-SCNC: 100 MMOL/L (ref 100–108)
CO2 SERPL-SCNC: 26 MMOL/L (ref 21–32)
CREAT SERPL-MCNC: 2.28 MG/DL (ref 0.6–1.3)
EOSINOPHIL # BLD MANUAL: 0 THOUSAND/UL (ref 0–0.4)
EOSINOPHIL NFR BLD MANUAL: 0 % (ref 0–6)
ERYTHROCYTE [DISTWIDTH] IN BLOOD BY AUTOMATED COUNT: 13.5 % (ref 11.6–15.1)
GFR SERPL CREATININE-BSD FRML MDRD: 25 ML/MIN/1.73SQ M
GLUCOSE SERPL-MCNC: 157 MG/DL (ref 65–140)
HCT VFR BLD AUTO: 29.2 % (ref 36.5–49.3)
HGB BLD-MCNC: 9.1 G/DL (ref 12–17)
LYMPHOCYTES # BLD AUTO: 0.81 THOUSAND/UL (ref 0.6–4.47)
LYMPHOCYTES # BLD AUTO: 5 % (ref 14–44)
MAGNESIUM SERPL-MCNC: 2.9 MG/DL (ref 1.6–2.6)
MCH RBC QN AUTO: 29.2 PG (ref 26.8–34.3)
MCHC RBC AUTO-ENTMCNC: 31.2 G/DL (ref 31.4–37.4)
MCV RBC AUTO: 94 FL (ref 82–98)
MONOCYTES # BLD AUTO: 0.16 THOUSAND/UL (ref 0–1.22)
MONOCYTES NFR BLD: 1 % (ref 4–12)
NEUTROPHILS # BLD MANUAL: 15.29 THOUSAND/UL (ref 1.85–7.62)
NEUTS SEG NFR BLD AUTO: 94 % (ref 43–75)
NRBC BLD AUTO-RTO: 0 /100 WBCS
OVALOCYTES BLD QL SMEAR: PRESENT
PHOSPHATE SERPL-MCNC: 3.3 MG/DL (ref 2.3–4.1)
PLATELET # BLD AUTO: 371 THOUSANDS/UL (ref 149–390)
PLATELET BLD QL SMEAR: ADEQUATE
PMV BLD AUTO: 10.4 FL (ref 8.9–12.7)
POTASSIUM SERPL-SCNC: 4.4 MMOL/L (ref 3.5–5.3)
PROCALCITONIN SERPL-MCNC: 1.47 NG/ML
PROT SERPL-MCNC: 7.1 G/DL (ref 6.4–8.2)
RBC # BLD AUTO: 3.12 MILLION/UL (ref 3.88–5.62)
SODIUM SERPL-SCNC: 136 MMOL/L (ref 136–145)
TOTAL CELLS COUNTED SPEC: 100
WBC # BLD AUTO: 16.27 THOUSAND/UL (ref 4.31–10.16)

## 2021-08-01 PROCEDURE — 85027 COMPLETE CBC AUTOMATED: CPT | Performed by: INTERNAL MEDICINE

## 2021-08-01 PROCEDURE — 83735 ASSAY OF MAGNESIUM: CPT | Performed by: INTERNAL MEDICINE

## 2021-08-01 PROCEDURE — 84145 PROCALCITONIN (PCT): CPT | Performed by: INTERNAL MEDICINE

## 2021-08-01 PROCEDURE — 99232 SBSQ HOSP IP/OBS MODERATE 35: CPT | Performed by: INTERNAL MEDICINE

## 2021-08-01 PROCEDURE — 84100 ASSAY OF PHOSPHORUS: CPT | Performed by: INTERNAL MEDICINE

## 2021-08-01 PROCEDURE — 80053 COMPREHEN METABOLIC PANEL: CPT | Performed by: INTERNAL MEDICINE

## 2021-08-01 PROCEDURE — 85007 BL SMEAR W/DIFF WBC COUNT: CPT | Performed by: INTERNAL MEDICINE

## 2021-08-01 RX ORDER — METHYLPREDNISOLONE SODIUM SUCCINATE 40 MG/ML
40 INJECTION, POWDER, LYOPHILIZED, FOR SOLUTION INTRAMUSCULAR; INTRAVENOUS EVERY 12 HOURS SCHEDULED
Status: DISCONTINUED | OUTPATIENT
Start: 2021-08-01 | End: 2021-08-02

## 2021-08-01 RX ADMIN — OXYCODONE HYDROCHLORIDE 5 MG: 5 TABLET ORAL at 09:21

## 2021-08-01 RX ADMIN — SENNOSIDES 8.6 MG: 8.6 TABLET, FILM COATED ORAL at 21:31

## 2021-08-01 RX ADMIN — PRAVASTATIN SODIUM 40 MG: 40 TABLET ORAL at 17:49

## 2021-08-01 RX ADMIN — METHYLPREDNISOLONE SODIUM SUCCINATE 40 MG: 40 INJECTION, POWDER, FOR SOLUTION INTRAMUSCULAR; INTRAVENOUS at 21:30

## 2021-08-01 RX ADMIN — ASPIRIN 81 MG: 81 TABLET, COATED ORAL at 09:19

## 2021-08-01 RX ADMIN — CEFTRIAXONE 1000 MG: 1 INJECTION, SOLUTION INTRAVENOUS at 09:21

## 2021-08-01 RX ADMIN — METOPROLOL TARTRATE 100 MG: 100 TABLET, FILM COATED ORAL at 09:19

## 2021-08-01 RX ADMIN — APIXABAN 2.5 MG: 2.5 TABLET, FILM COATED ORAL at 09:19

## 2021-08-01 RX ADMIN — METHYLPREDNISOLONE SODIUM SUCCINATE 40 MG: 40 INJECTION, POWDER, FOR SOLUTION INTRAMUSCULAR; INTRAVENOUS at 09:21

## 2021-08-01 RX ADMIN — AMLODIPINE BESYLATE 5 MG: 5 TABLET ORAL at 09:19

## 2021-08-01 RX ADMIN — APIXABAN 2.5 MG: 2.5 TABLET, FILM COATED ORAL at 17:49

## 2021-08-01 NOTE — PROGRESS NOTES
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               blood pressure appropriate, continue current medications  5  Kidney cysts               patient has simple cyst, outpatient monitoring the as indicated  6  Chronic diastolic congestive heart failure               patient remains compensated, continue monitor, initiation of diuretics as indicated  7  Left knee inflammatory process               patient has significantly improved over last 24 hours, continue with prednisone according to hospitalist recommendations  Follow up reason for today's visit:  Acute kidney injury/chronic kidney disease/proteinuria    Acute right hip pain    Patient Active Problem List   Diagnosis    Acute blood loss anemia    Mild intermittent asthma without complication    CKD (chronic kidney disease) stage 4, GFR 15-29 ml/min ([facility])    Dyslipidemia    GERD (gastroesophageal reflux disease)    Late effects of cerebrovascular disease    Lumbar degenerative disc disease    Lumbar radiculopathy    Mitral regurgitation    Obesity (BMI 30-39  9)    Medicare annual wellness visit, subsequent    H/O aortic valve replacement    History of stroke    Chronic diastolic CHF (congestive heart failure) ([facility] [address] [facility]    Ambulatory dysfunction    [facility] fibrillation [facility]    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    Pacemaker    Oxygen dependent    Chronic combined systolic and diastolic congestive heart failure (HCC)    Melena    Mitral stenosis    Iron deficiency anemia    Need for immunization against influenza    Pulmonary emphysema (HCC)    Left wrist pain    Acute right hip pain    Acute kidney injury superimposed on CKD (HCC)    SIRS (systemic inflammatory response syndrome) (HCC)    Multiple renal cysts    Effusion of left knee    Mitral valve stenosis    Acute cystitis without hematuria    Acute gout         Subjective:   No acute events overnight, patient has bilateral knee pain improving, especially the left knee  Objective:     Vitals: Blood pressure 136/73, pulse 65, temperature 97 8 °F (36 6 °C), resp  rate 15, height 5' 4" (1 626 m), weight 70 5 kg (155 lb 6 8 oz), SpO2 96 %  ,Body mass index is 26 68 kg/m²  Weight (last 2 days)     Date/Time   Weight    08/01/21 0555   70 5 (155 42)    07/30/21 0600   70 1 (154 54)                Intake/Output Summary (Last 24 hours) at 8/1/2021 1131  Last data filed at 8/1/2021 0558  Gross per 24 hour   Intake 180 ml   Output 500 ml   Net -320 ml     I/O last 3 completed shifts:   In: 360 [P O :360]  Out: 1050 [Urine:1050]         Physical Exam: /73   Pulse 65   Temp 97 8 °F (36 6 °C)   Resp 15   Ht 5' 4" (1 626 m)   Wt 70 5 kg (155 lb 6 8 oz)   SpO2 96%   BMI 26 68 kg/m²     General Appearance:    Alert, cooperative, no distress, appears stated age   Head:    Normocephalic, without obvious abnormality, atraumatic   Eyes:    Conjunctiva/corneas clear   Ears:    Normal external ears   Nose:   Nares normal, septum midline, mucosa normal, no drainage    or sinus tenderness   Throat:   Lips, mucosa, and tongue normal; teeth and gums normal   Neck:   Supple   Back:     Symmetric, no curvature, ROM normal, no CVA tenderness   Lungs:     Clear to auscultation bilaterally, respirations unlabored   Chest wall:    No tenderness or deformity Heart:    Regular rate and rhythm, S1 and S2 normal, no murmur, rub   or gallop   Abdomen:     Soft, non-tender, bowel sounds active   Extremities:   Extremities normal, atraumatic, no cyanosis or edema   Skin:   Skin color, texture, turgor normal, no rashes or lesions   Lymph nodes:   Cervical normal   Neurologic:   CNII-XII intact            Lab, Imaging and other studies: I have personally reviewed pertinent labs  CBC:   Lab Results   Component Value Date    WBC 16 27 (H) 08/01/2021    HGB 9 1 (L) 08/01/2021    HCT 29 2 (L) 08/01/2021    MCV 94 08/01/2021     08/01/2021    MCH 29 2 08/01/2021    MCHC 31 2 (L) 08/01/2021    RDW 13 5 08/01/2021    MPV 10 4 08/01/2021    NRBC 0 08/01/2021     CMP:   Lab Results   Component Value Date    K 4 4 08/01/2021     08/01/2021    CO2 26 08/01/2021    BUN 52 (H) 08/01/2021    CREATININE 2 28 (H) 08/01/2021    CALCIUM 9 2 08/01/2021    AST 67 (H) 08/01/2021    ALT 45 08/01/2021    ALKPHOS 89 08/01/2021    EGFR 25 08/01/2021           Results from last 7 days   Lab Units 08/01/21  0538 07/31/21  0605 07/30/21  0417 07/29/21  0457   POTASSIUM mmol/L 4 4 3 8 3 7 3 6   CHLORIDE mmol/L 100 100 101 101   CO2 mmol/L 26 28 26 26   BUN mg/dL 52* 45* 46* 54*   CREATININE mg/dL 2 28* 2 22* 2 39* 2 75*   CALCIUM mg/dL 9 2 8 9 9 0 9 2   ALK PHOS U/L 89  --  72 83   ALT U/L 45  --  11* 11*   AST U/L 67*  --  20 19         Phosphorus:   Lab Results   Component Value Date    PHOS 3 3 08/01/2021     Magnesium:   Lab Results   Component Value Date    MG 2 9 (H) 08/01/2021     Urinalysis: No results found for: COLORU, CLARITYU, SPECGRAV, PHUR, LEUKOCYTESUR, NITRITE, PROTEINUA, GLUCOSEU, KETONESU, BILIRUBINUR, BLOODU  Ionized Calcium: No results found for: CAION  Coagulation: No results found for: PT, INR, APTT  Troponin: No results found for: TROPONINI  ABG: No results found for: PHART, UMF0DCX, PO2ART, ZNG9PAQ, L9KYSLED, BEART, SOURCE  Radiology review:     IMAGING  Procedure: XR knee 3 vw left non injury    Result Date: 7/30/2021  Narrative: LEFT KNEE INDICATION:   pain  COMPARISON:  7/29/2020 VIEWS:  XR KNEE 3 VW LEFT NON INJURY Images: 3 FINDINGS: There is no acute fracture or dislocation  Suprapatellar fullness suggesting joint effusion  Moderate tricompartmental degenerative osteoarthritis  No lytic or blastic osseous lesion  Vascular calcifications are seen posteriorly within the thigh and lower leg  Impression: No acute osseous abnormality  Degenerative changes as described  Suprapatellar fullness suggestive of joint effusion  Workstation performed: [id number]     Procedure: NM bone scan 3 phase    Result Date: 7/30/2021  Narrative: THREE PHASE BONE SCAN OF THE HIP/PELVIS INDICATION:  Right posterior pelvic pain PREVIOUS FILM CORRELATION:    X-rays of lumbar spine 7/29/2021, CT abdomen and pelvis 7/28/2021, right hip x-rays 7/28/2021 RADIOPHARMACEUTICAL 25 3 mCi Tc-99m MDP IV TECHNIQUE:  Perfusion images of the hip/pelvis were acquired in the anterior and posterior projection  Blood pool and 2-3 hour delayed images were acquired of the hip/pelvis in multiple projections  FINDINGS: PERFUSION:   Normal  BLOOD POOL:  Normal  DELAYED:  Assessment of the hips demonstrates no evidence of occult fracture  Hip activity is essentially symmetric and physiologic  No scintigraphic abnormalities of the bony pelvis are seen  There is increased activity involving the lumbar spine at approximately the level of L3  This likely corresponds to the presence of advanced degenerative disc disease and prominent hypertrophic spurring at this level  There is also increased activity of the knees consistent with arthritis     Impression: 1  No scintigraphic evidence of an occult fracture of the hips or pelvis 2  Increased activity of the lumbar spine particularly in the region of L3 likely related to the changes of degenerative disc disease and spurring seen on prior x-rays 3    Increased activity of the knees also felt to be consistent with arthritis Workstation performed: [id number]       Current Facility-Administered Medications   Medication Dose Route Frequency    acetaminophen (TYLENOL) tablet 650 mg  650 mg Oral Q6H PRN    amLODIPine (NORVASC) tablet 5 mg  5 mg Oral Daily    apixaban (ELIQUIS) tablet 2 5 mg  2 5 mg Oral BID    aspirin (ECOTRIN LOW STRENGTH) EC tablet 81 mg  81 mg Oral Daily    cefTRIAXone (ROCEPHIN) IVPB (premix in dextrose) 1,000 mg 50 mL  1,000 mg Intravenous Q24H    oxyCODONE (ROXICODONE) IR tablet 5 mg  5 mg Oral Q4H PRN    Or    HYDROmorphone (DILAUDID) injection 0 5 mg  0 5 mg Intravenous Q4H PRN    methylPREDNISolone sodium succinate (Solu-MEDROL) injection 40 mg  40 mg Intravenous Q12H [address]    metoprolol tartrate (LOPRESSOR) tablet 100 mg  100 mg Oral BID    ondansetron (ZOFRAN) injection 4 mg  4 mg Intravenous Q4H PRN    pravastatin (PRAVACHOL) tablet 40 mg  40 mg Oral Daily With Dinner    senna (SENOKOT) tablet 8 6 mg  8 6 mg Oral HS     Medications Discontinued During This Encounter   Medication Reason    HYDROmorphone (DILAUDID) injection 0 2 mg     oxyCODONE (ROXICODONE) IR tablet 5 mg     acetaminophen (TYLENOL) tablet 650 mg     amLODIPine (NORVASC) tablet 5 mg     vancomycin (VANCOCIN) IVPB (premix in dextrose) 1,000 mg 200 mL     sodium chloride 0 9 % infusion     cefepime (MAXIPIME) IVPB (premix in dextrose) 1,000 mg 50 mL     vancomycin (VANCOCIN) 750 mg in sodium chloride 0 9 % 250 mL IVPB     multi-electrolyte (PLASMALYTE-A/ISOLYTE-S PH 7 4) IV solution     predniSONE tablet 40 mg        Sadia Turner, DO      This progress note was produced in part using a dictation device which may document imprecise wording from author's original intent

## 2021-08-01 NOTE — ASSESSMENT & PLAN NOTE
· Continue ceftriaxone 1000 mg IV every 24 hours  · Check the patient post-void residual amounts every shift with bladder scanning, and follow the urinary retention protocol  · Await the final urine culture results  · Outpatient Urology evaluation    07/30/2021 0833 07/31/2021 1219 Urine culture [[id number]]   (Abnormal)   Urine, Clean Catch    Preliminary result [facility]  Component Value   Urine Culture >100,000 cfu/ml Escherichia coliAbnormal  P    10,000-19,000 cfu/ml Proteus mirabilisAbnormal  P

## 2021-08-01 NOTE — ASSESSMENT & PLAN NOTE
Likely due to acute gout flare  No MRI due to pacemaker  · A bone scan was ordered to evaluate the patient's right hip, which did not show any evidence of fracture  · The left knee joint effusion was aspirated by Orthopedic Surgery on 07/30/2021, which showed monosodium urate crystals consistent with acute gout  · Utilize methylprednisolone 40 mg IV every 12 hours to treat the acute gout  Continue pain meds (PRN Tylenol 650mg Q6 PRN, Dilaudid 0 5mg Q4 PRN, Oxycodone 5mg Q4 PRN)  Consider arthrocentesis (in presence of )  PT/OT  · The patient will require short-term rehabilitation placement upon discharge

## 2021-08-01 NOTE — PLAN OF CARE
Problem: PAIN - ADULT  Goal: Verbalizes/displays adequate comfort level or baseline comfort level  Description: Interventions:  - Encourage patient to monitor pain and request assistance  - Assess pain using appropriate pain scale (0-10 pain scale)  - Administer analgesics based on type and severity of pain and evaluate response  - Implement non-pharmacological measures as appropriate and evaluate response  - Consider cultural and social influences on pain and pain management  - Notify physician/advanced practitioner if interventions unsuccessful or patient reports new pain  Outcome: Progressing     Problem: INFECTION - ADULT  Goal: Absence or prevention of progression during hospitalization  Description: INTERVENTIONS:  - Assess and monitor for signs and symptoms of infection  - Monitor lab/diagnostic results  - Monitor all insertion sites, i e  indwelling lines, tubes, and drains  - Administer medications as ordered  - Instruct and encourage patient and family to use good hand hygiene technique  - Identify and instruct in appropriate isolation precautions for identified infection/condition  Outcome: Progressing     Problem: SAFETY ADULT  Goal: Patient will remain free of falls  Description: INTERVENTIONS:  - Educate patient/family on patient safety including physical limitations  - Instruct patient to call for assistance with activity   - Consult OT/PT to assist with strengthening/mobility   - Keep Call bell within reach  - Keep bed low and locked with side rails adjusted as appropriate  - Keep care items and personal belongings within reach  - Initiate and maintain comfort rounds  - Make Fall Risk Sign visible to staff  - Offer Toileting every 4 Hours, in advance of need  - Initiate/Maintain bed alarm  - Obtain necessary fall risk management equipment  - Apply yellow socks and bracelet for high fall risk patients  - Consider moving patient to room near nurses station  Outcome: Progressing  Goal: Maintain or return to baseline ADL function  Description: INTERVENTIONS:  -  Assess patient's ability to carry out ADLs; assess patient's baseline for ADL function and identify physical deficits which impact ability to perform ADLs (bathing, care of mouth/teeth, toileting, grooming, dressing, etc )  - Assess/evaluate cause of self-care deficits   - Assess range of motion  - Assess patient's mobility; develop plan if impaired  - Assess patient's need for assistive devices and provide as appropriate  - Encourage maximum independence but intervene and supervise when necessary  - Involve family in performance of ADLs  - Assess for home care needs following discharge   - Consider OT consult to assist with ADL evaluation and planning for discharge  - Provide patient education as appropriate  Outcome: Progressing  Goal: Maintains/Returns to pre admission functional level  Description: INTERVENTIONS:  - Perform BMAT or MOVE assessment daily    - Set and communicate daily mobility goal to care team and patient/family/caregiver  - Collaborate with rehabilitation services on mobility goals if consulted  - Perform Range of Motion 2 times a day  - Reposition patient every 2 hours    - Dangle patient 2 times a day  - Stand patient 2 times a day  - Ambulate patient 2 times a day  - Out of bed to chair 2 times a day   - Out of bed for meals 2 times a day  - Out of bed for toileting  - Record patient progress and toleration of activity level   Outcome: Progressing     Problem: DISCHARGE PLANNING  Goal: Discharge to home or other facility with appropriate resources  Description: INTERVENTIONS:  - Identify barriers to discharge w/patient and caregiver  - Arrange for needed discharge resources and transportation as appropriate  - Identify discharge learning needs (meds, wound care, etc )  - Refer to Case Management Department for coordinating discharge planning if the patient needs post-hospital services based on physician/advanced practitioner order or complex needs related to functional status, cognitive ability, or social support system  Outcome: Progressing     Problem: Knowledge Deficit  Goal: Patient/family/caregiver demonstrates understanding of disease process, treatment plan, medications, and discharge instructions  Description: Complete learning assessment and assess knowledge base    Interventions:  - Provide teaching at level of understanding  - Provide teaching via preferred learning methods  Outcome: Progressing     Problem: MUSCULOSKELETAL - ADULT  Goal: Maintain or return mobility to safest level of function  Description: INTERVENTIONS:  - Assess patient's ability to carry out ADLs; assess patient's baseline for ADL function and identify physical deficits which impact ability to perform ADLs (bathing, care of mouth/teeth, toileting, grooming, dressing, etc )  - Assess/evaluate cause of self-care deficits   - Assess range of motion  - Assess patient's mobility  - Assess patient's need for assistive devices and provide as appropriate  - Encourage maximum independence but intervene and supervise when necessary  - Involve family in performance of ADLs  - Assess for home care needs following discharge   - Consider OT consult to assist with ADL evaluation and planning for discharge  - Provide patient education as appropriate  Outcome: Progressing  Goal: Maintain proper alignment of affected body part  Description: INTERVENTIONS:  - Support, maintain and protect limb and body alignment  - Provide patient/ family with appropriate education  Outcome: Progressing     Problem: GENITOURINARY - ADULT  Goal: Maintains or returns to baseline urinary function  Description: INTERVENTIONS:  - Assess urinary function  - Encourage oral fluids to ensure adequate hydration if ordered  - Administer IV fluids as ordered to ensure adequate hydration  - Administer ordered medications as needed  - Offer frequent toileting  - Follow urinary retention protocol if ordered  Outcome: Progressing     Problem: METABOLIC, FLUID AND ELECTROLYTES - ADULT  Goal: Electrolytes maintained within normal limits  Description: INTERVENTIONS:  - Monitor labs and assess patient for signs and symptoms of electrolyte imbalances  - Administer electrolyte replacement as ordered  - Monitor response to electrolyte replacements, including repeat lab results as appropriate  - Instruct patient on fluid and nutrition as appropriate  Outcome: Progressing  Goal: Fluid balance maintained  Description: INTERVENTIONS:  - Monitor labs   - Monitor I/O and WT  - Instruct patient on fluid and nutrition as appropriate  - Assess for signs & symptoms of volume excess or deficit  Outcome: Progressing     Problem: MOBILITY - ADULT  Goal: Maintain or return to baseline ADL function  Description: INTERVENTIONS:  -  Assess patient's ability to carry out ADLs; assess patient's baseline for ADL function and identify physical deficits which impact ability to perform ADLs (bathing, care of mouth/teeth, toileting, grooming, dressing, etc )  - Assess/evaluate cause of self-care deficits   - Assess range of motion  - Assess patient's mobility; develop plan if impaired  - Assess patient's need for assistive devices and provide as appropriate  - Encourage maximum independence but intervene and supervise when necessary  - Involve family in performance of ADLs  - Assess for home care needs following discharge   - Consider OT consult to assist with ADL evaluation and planning for discharge  - Provide patient education as appropriate  Outcome: Progressing  Goal: Maintains/Returns to pre admission functional level  Description: INTERVENTIONS:  - Perform BMAT or MOVE assessment daily    - Set and communicate daily mobility goal to care team and patient/family/caregiver  - Collaborate with rehabilitation services on mobility goals if consulted  - Perform Range of Motion 2 times a day  - Reposition patient every 2 hours    - Dangle patient 2 times a day  - Stand patient 2 times a day  - Ambulate patient 2 times a day  - Out of bed to chair 2 times a day   - Out of bed for meals 2 times a day  - Out of bed for toileting  - Record patient progress and toleration of activity level   Outcome: Progressing     Problem: Prexisting or High Potential for Compromised Skin Integrity  Goal: Skin integrity is maintained or improved  Description: INTERVENTIONS:  - Identify patients at risk for skin breakdown  - Assess and monitor skin integrity  - Assess and monitor nutrition and hydration status  - Monitor labs   - Assess for incontinence   - Turn and reposition patient  - Assist with mobility/ambulation  - Relieve pressure over bony prominences  - Avoid friction and shearing  - Provide appropriate hygiene as needed including keeping skin clean and dry  - Evaluate need for skin moisturizer/barrier cream  - Collaborate with interdisciplinary team   - Patient/family teaching  - Consider wound care consult   Outcome: Progressing     Problem: Potential for Falls  Goal: Patient will remain free of falls  Description: INTERVENTIONS:  - Educate patient/family on patient safety including physical limitations  - Instruct patient to call for assistance with activity   - Consult OT/PT to assist with strengthening/mobility   - Keep Call bell within reach  - Keep bed low and locked with side rails adjusted as appropriate  - Keep care items and personal belongings within reach  - Initiate and maintain comfort rounds  - Make Fall Risk Sign visible to staff  - Offer Toileting every 4 Hours, in advance of need  - Initiate/Maintain bed alarm  - Obtain necessary fall risk management equipment  - Apply yellow socks and bracelet for high fall risk patients  - Consider moving patient to room near nurses station  Outcome: Progressing

## 2021-08-01 NOTE — ASSESSMENT & PLAN NOTE
· The left knee joint effusion was aspirated by Orthopedic Surgery on 07/30/2021, which showed monosodium urate crystals consistent with acute gout    · Utilize methylprednisolone 40 mg IV every 12 hours to treat the acute gout  · Await fluid culture results to rule-out a septic left knee joint

## 2021-08-01 NOTE — ASSESSMENT & PLAN NOTE
Wt Readings from Last 3 Encounters:   08/01/21 70 5 kg (155 lb 6 8 oz)   07/01/21 78 kg (172 lb)   06/03/21 78 1 kg (172 lb 2 oz)     · Monitor his volume status closely  · Daily weights  · Strict intake/output measurements  · Outpatient follow-up with Cardiology

## 2021-08-01 NOTE — ASSESSMENT & PLAN NOTE
Lab Results   Component Value Date    EGFR 25 08/01/2021    EGFR 26 07/31/2021    EGFR 24 07/30/2021    CREATININE 2 28 (H) 08/01/2021    CREATININE 2 22 (H) 07/31/2021    CREATININE 2 39 (H) 07/30/2021     Acute pre renal injury  FENA 0 9  Treated with Isolyte IV fluids  Discontinue IV fluids on 07/31/2021 per Nephrology  Continue to hold loop diuretic  Continue to avoid all nephrotoxic agents    Chronic kidney disease stage 4  Creatinine 2 75 from 3 45 (baseline 1 8-2 2)  eGFR 20 from 14  Renal ultrasound shows chronic kidney disease and multiple bilateral cysts  Follow-up as outpatient with Nephrology

## 2021-08-01 NOTE — ASSESSMENT & PLAN NOTE
· Sepsis was present on admission and secondary to acute cystitis  · SIRS criteria was met with a leukocytosis and tachycardia  · The patient then developed fevers during the hospitalization  · The patient was seen in consultation by Orthopedic Surgery  · Continue ceftriaxone 1000 mg IV every 24 hours  · The patient cannot have an MRI of the right hip due to an indwelling permanent pacemaker  · A bone scan was ordered to evaluate the patient's right hip, which did not show any evidence of fracture  · The left knee joint effusion was aspirated by Orthopedic Surgery on 07/30/2021, which showed monosodium urate crystals consistent with acute gout    · Utilize methylprednisolone 40 mg IV every 12 hours to treat the acute gout  · Follow culture results

## 2021-08-01 NOTE — PROGRESS NOTES
[address] [facility] [address] [place]  Progress Note - Mekhi Pour 1935, 80 y o  male MRN: [id number]  Unit/Bed#: 078-92 Encounter: [id number]  Primary Care Provider: Wanda Beltran DO   Date and time admitted to hospital: 7/28/2021 11:01 AM    * Acute right hip pain  Assessment & Plan  Likely due to acute gout flare  No MRI due to pacemaker  · A bone scan was ordered to evaluate the patient's right hip, which did not show any evidence of fracture  · The left knee joint effusion was aspirated by Orthopedic Surgery on 07/30/2021, which showed monosodium urate crystals consistent with acute gout  · Utilize methylprednisolone 40 mg IV every 12 hours to treat the acute gout  Continue pain meds (PRN Tylenol 650mg Q6 PRN, Dilaudid 0 5mg Q4 PRN, Oxycodone 5mg Q4 PRN)  Consider arthrocentesis (in presence of )  PT/OT  · The patient will require short-term rehabilitation placement upon discharge  Acute gout  Assessment & Plan  · The left knee joint effusion was aspirated by Orthopedic Surgery on 07/30/2021, which showed monosodium urate crystals consistent with an acute gout flare  · The right hip pain is also likely due to an acute gout flare  · Utilize methylprednisolone 40 mg IV every 12 hours to treat the acute gout      Effusion of left knee  Assessment & Plan  · The left knee joint effusion was aspirated by Orthopedic Surgery on 07/30/2021, which showed monosodium urate crystals consistent with acute gout  · Utilize methylprednisolone 40 mg IV every 12 hours to treat the acute gout  · Await fluid culture results to rule-out a septic left knee joint    Sepsis ([facility] [address] )  Assessment & Plan  · Sepsis was present on admission and secondary to acute cystitis  · SIRS criteria was met with a leukocytosis and tachycardia  · The patient then developed fevers during the hospitalization  · The patient was seen in consultation by Orthopedic Surgery    · Continue ceftriaxone 1000 mg IV every 24 hours  · The patient cannot have an MRI of the right hip due to an indwelling permanent pacemaker  · A bone scan was ordered to evaluate the patient's right hip, which did not show any evidence of fracture  · The left knee joint effusion was aspirated by Orthopedic Surgery on 07/30/2021, which showed monosodium urate crystals consistent with acute gout    · Utilize methylprednisolone 40 mg IV every 12 hours to treat the acute gout  · Follow culture results        Acute kidney injury superimposed on CKD [facility])  Assessment & Plan  Lab Results   Component Value Date    EGFR 25 08/01/2021    EGFR 26 07/31/2021    EGFR 24 07/30/2021    CREATININE 2 28 (H) 08/01/2021    CREATININE 2 22 (H) 07/31/2021    CREATININE 2 39 (H) 07/30/2021     Acute pre renal injury  FENA 0 9  Treated with Isolyte IV fluids  Discontinue IV fluids on 07/31/2021 per Nephrology  Continue to hold loop diuretic  Continue to avoid all nephrotoxic agents    Chronic kidney disease stage 4  Creatinine 2 75 from 3 45 (baseline 1 8-2 2)  eGFR 20 from 14  Renal ultrasound shows chronic kidney disease and multiple bilateral cysts  Follow-up as outpatient with Nephrology    Acute cystitis without hematuria  Assessment & Plan  · Continue ceftriaxone 1000 mg IV every 24 hours  · Check the patient post-void residual amounts every shift with bladder scanning, and follow the urinary retention protocol  · Await the final urine culture results  · Outpatient Urology evaluation    07/30/2021 0833 07/31/2021 1219 Urine culture [[id number]]   (Abnormal)   Urine, Clean Catch    Preliminary result [address] [place] [address] [place]  Component Value   Urine Culture >100,000 cfu/ml Escherichia coliAbnormal  P    10,000-19,000 cfu/ml Proteus mirabilisAbnormal  P                    Mitral valve stenosis  Assessment & Plan  · Outpatient follow-up with Cardiology    Multiple renal cysts  Assessment & Plan  Bilateral renal cysts found on ultrasound of kidneys  Outpatient surveillance imaging with PCP    Paroxysmal atrial fibrillation ([facility])  Assessment & Plan  Continue metoprolol 100 mg PO every 12 hours  Continue anticoagulation with eliquis 2 5 mg PO BID    Chronic diastolic CHF (congestive heart failure) ([facility])  Assessment & Plan  Wt Readings from Last 3 Encounters:   21 70 5 kg (155 lb 6 8 oz)   21 78 kg (172 lb)   21 78 1 kg (172 lb 2 oz)     · Monitor his volume status closely  · Daily weights  · Strict intake/output measurements  · Outpatient follow-up with Cardiology      GERD (gastroesophageal reflux disease)  Assessment & Plan  Hold PPI in the setting of acute kidney injury    Dyslipidemia  Assessment & Plan  · Continue statin therapy        VTE Pharmacologic Prophylaxis: VTE Score: 7 High Risk (Score >/= 5) - Pharmacological DVT Prophylaxis Ordered: apixaban (Eliquis)  Sequential Compression Devices Ordered  Patient Centered Rounds: I performed bedside rounds with nursing staff today  Education and Discussions with Family / Patient: Updated  (wife and daughter) at bedside  Time Spent for Care: 30 minutes  More than 50% of total time spent on counseling and coordination of care as described above  Current Length of Stay: 4 day(s)  Current Patient Status: Inpatient   Certification Statement: The patient will continue to require additional inpatient hospital stay due to the need for IV antibiotic treatment, for IV methylprednisolone treatment, and for short-term rehabilitation placement upon discharge  Discharge Plan: Anticipate discharge in 48-72 hrs to rehab facility  Code Status: Level 1 - Full Code    Subjective: The patient was seen and examined  The right hip pain and left knee pain are improving  No chest pain  No shortness of breath  No abdominal pain  No nausea or vomiting        Objective:     Vitals:   Temp (24hrs), Av 9 °F (36 6 °C), Min:97 8 °F (36 6 °C), Max:98 °F (36 7 °C)    Temp:  [97 8 °F (36 6 °C)-98 °F (36 7 °C)] 97 8 °F (36 6 °C)  HR:  [61-75] 65  Resp:  [15] 15  BP: (122-144)/(57-74) 136/73  SpO2:  [94 %-96 %] 96 %  Body mass index is 26 68 kg/m²  Input and Output Summary (last 24 hours): Intake/Output Summary (Last 24 hours) at 8/1/2021 1512  Last data filed at 8/1/2021 1300  Gross per 24 hour   Intake 480 ml   Output 900 ml   Net -420 ml       Physical Exam:   Physical Exam   General:  NAD, follows commands  HEENT:  NC/AT, mucous membranes moist  Neck:  Supple, No JVP elevation  CV:  + S1, + S2, RRR  Pulm:  Lung fields are CTA bilaterally  Abd:  Soft, Non-tender, Non-distended  Ext:  No clubbing/cyanosis, Improved edema of the left knee  Skin:  No rashes  Neuro:  Awake, alert, oriented  Psych:  Normal mood and affect      Additional Data:    Labs:  Results from last 7 days   Lab Units 08/01/21  0538 07/30/21  0417   WBC Thousand/uL 16 27* 17 22*   HEMOGLOBIN g/dL 9 1* 9 5*   HEMATOCRIT % 29 2* 30 4*   PLATELETS Thousands/uL 371 332   NEUTROS PCT %  --  86*   LYMPHS PCT %  --  6*   LYMPHO PCT % 5*  --    MONOS PCT %  --  7   MONO PCT % 1*  --    EOS PCT % 0 0     Results from last 7 days   Lab Units 08/01/21  0538   SODIUM mmol/L 136   POTASSIUM mmol/L 4 4   CHLORIDE mmol/L 100   CO2 mmol/L 26   BUN mg/dL 52*   CREATININE mg/dL 2 28*   ANION GAP mmol/L 10   CALCIUM mg/dL 9 2   ALBUMIN g/dL 2 4*   TOTAL BILIRUBIN mg/dL 0 18*   ALK PHOS U/L 89   ALT U/L 45   AST U/L 67*   GLUCOSE RANDOM mg/dL 157*                 Results from last 7 days   Lab Units 07/30/21  0417 07/29/21  0457   LACTIC ACID mmol/L  --  1 0   PROCALCITONIN ng/ml 0 78* 0 68*       Lines/Drains:  Invasive Devices     Peripheral Intravenous Line            Peripheral IV 07/30/21 Dorsal (posterior); Left Hand 1 day          Drain            External Urinary Catheter Medium 3 days                      Imaging: No pertinent imaging reviewed      Recent Cultures (last 7 days):   Results from last 7 days   Lab Units 07/30/21  1300 07/30/21  9977 07/28/21  1656   BLOOD CULTURE   --   --  No Growth at 72 hrs  No Growth at 72 hrs  GRAM STAIN RESULT  No Polys or Bacteria seen  --   --    URINE CULTURE   --  >100,000 cfu/ml Escherichia coli*  10,000-19,000 cfu/ml Proteus mirabilis*  --    WOUND CULTURE  No growth  --   --        Last 24 Hours Medication List:   Current Facility-Administered Medications   Medication Dose Route Frequency Provider Last Rate    acetaminophen  650 mg Oral Q6H PRN Thyra Lowers, DO      amLODIPine  5 mg Oral Daily DIANDRA Pulido      apixaban  2 5 mg Oral BID Jimi Sun MD      aspirin  81 mg Oral Daily Jimi Sun MD      cefTRIAXone  1,000 mg Intravenous Q24H Thyra Lowers, DO 1,000 mg (08/01/21 0211)    oxyCODONE  5 mg Oral Q4H PRN Thyra Lowers, DO      Or    HYDROmorphone  0 5 mg Intravenous Q4H PRN Thyra Lowers, DO      methylPREDNISolone sodium succinate  40 mg Intravenous Q12H [address] Bandar Rowell DO      metoprolol tartrate  100 mg Oral BID Jimi Sun MD      ondansetron  4 mg Intravenous Q4H PRN Pegra Lowers, DO      pravastatin  40 mg Oral Daily With Isa Goodwin MD      senna  8 6 mg Oral HS Jimi Sun MD          Today, Patient Was Seen By: Wero Avendano DO    **Please Note: This note may have been constructed using a voice recognition system  **

## 2021-08-01 NOTE — ASSESSMENT & PLAN NOTE
· The left knee joint effusion was aspirated by Orthopedic Surgery on 07/30/2021, which showed monosodium urate crystals consistent with an acute gout flare  · The right hip pain is also likely due to an acute gout flare    · Utilize methylprednisolone 40 mg IV every 12 hours to treat the acute gout

## 2021-08-02 PROBLEM — R74.01 TRANSAMINITIS: Status: ACTIVE | Noted: 2021-08-02

## 2021-08-02 LAB
ALBUMIN SERPL BCP-MCNC: 2.2 G/DL (ref 3.5–5)
ALP SERPL-CCNC: 87 U/L (ref 46–116)
ALT SERPL W P-5'-P-CCNC: 158 U/L (ref 12–78)
ANION GAP SERPL CALCULATED.3IONS-SCNC: 8 MMOL/L (ref 4–13)
AST SERPL W P-5'-P-CCNC: 202 U/L (ref 5–45)
BACTERIA BLD CULT: NORMAL
BACTERIA BLD CULT: NORMAL
BACTERIA UR CULT: ABNORMAL
BACTERIA UR CULT: ABNORMAL
BACTERIA WND AEROBE CULT: NO GROWTH
BILIRUB SERPL-MCNC: 0.16 MG/DL (ref 0.2–1)
BUN SERPL-MCNC: 57 MG/DL (ref 5–25)
CALCIUM ALBUM COR SERPL-MCNC: 10.1 MG/DL (ref 8.3–10.1)
CALCIUM SERPL-MCNC: 8.7 MG/DL (ref 8.3–10.1)
CHLORIDE SERPL-SCNC: 100 MMOL/L (ref 100–108)
CO2 SERPL-SCNC: 27 MMOL/L (ref 21–32)
CREAT SERPL-MCNC: 2.07 MG/DL (ref 0.6–1.3)
ERYTHROCYTE [DISTWIDTH] IN BLOOD BY AUTOMATED COUNT: 13.2 % (ref 11.6–15.1)
GFR SERPL CREATININE-BSD FRML MDRD: 28 ML/MIN/1.73SQ M
GLUCOSE SERPL-MCNC: 202 MG/DL (ref 65–140)
GRAM STN SPEC: NORMAL
HCT VFR BLD AUTO: 25.9 % (ref 36.5–49.3)
HGB BLD-MCNC: 8.4 G/DL (ref 12–17)
MAGNESIUM SERPL-MCNC: 2.6 MG/DL (ref 1.6–2.6)
MCH RBC QN AUTO: 29.7 PG (ref 26.8–34.3)
MCHC RBC AUTO-ENTMCNC: 32.4 G/DL (ref 31.4–37.4)
MCV RBC AUTO: 92 FL (ref 82–98)
NRBC BLD AUTO-RTO: 0 /100 WBCS
PHOSPHATE SERPL-MCNC: 3.5 MG/DL (ref 2.3–4.1)
PLATELET # BLD AUTO: 336 THOUSANDS/UL (ref 149–390)
PMV BLD AUTO: 10.2 FL (ref 8.9–12.7)
POTASSIUM SERPL-SCNC: 4 MMOL/L (ref 3.5–5.3)
PROCALCITONIN SERPL-MCNC: 0.92 NG/ML
PROT SERPL-MCNC: 6.1 G/DL (ref 6.4–8.2)
RBC # BLD AUTO: 2.83 MILLION/UL (ref 3.88–5.62)
SODIUM SERPL-SCNC: 135 MMOL/L (ref 136–145)
WBC # BLD AUTO: 12.19 THOUSAND/UL (ref 4.31–10.16)

## 2021-08-02 PROCEDURE — 99231 SBSQ HOSP IP/OBS SF/LOW 25: CPT | Performed by: PHYSICIAN ASSISTANT

## 2021-08-02 PROCEDURE — 97110 THERAPEUTIC EXERCISES: CPT

## 2021-08-02 PROCEDURE — 83735 ASSAY OF MAGNESIUM: CPT | Performed by: INTERNAL MEDICINE

## 2021-08-02 PROCEDURE — 97530 THERAPEUTIC ACTIVITIES: CPT

## 2021-08-02 PROCEDURE — 97116 GAIT TRAINING THERAPY: CPT

## 2021-08-02 PROCEDURE — 99232 SBSQ HOSP IP/OBS MODERATE 35: CPT | Performed by: PHYSICIAN ASSISTANT

## 2021-08-02 PROCEDURE — 84100 ASSAY OF PHOSPHORUS: CPT | Performed by: INTERNAL MEDICINE

## 2021-08-02 PROCEDURE — 99232 SBSQ HOSP IP/OBS MODERATE 35: CPT | Performed by: NURSE PRACTITIONER

## 2021-08-02 PROCEDURE — 80053 COMPREHEN METABOLIC PANEL: CPT | Performed by: INTERNAL MEDICINE

## 2021-08-02 PROCEDURE — 85027 COMPLETE CBC AUTOMATED: CPT | Performed by: INTERNAL MEDICINE

## 2021-08-02 PROCEDURE — 84145 PROCALCITONIN (PCT): CPT | Performed by: INTERNAL MEDICINE

## 2021-08-02 PROCEDURE — 97535 SELF CARE MNGMENT TRAINING: CPT

## 2021-08-02 RX ORDER — POLYETHYLENE GLYCOL 3350 17 G/17G
17 POWDER, FOR SOLUTION ORAL DAILY
Status: DISCONTINUED | OUTPATIENT
Start: 2021-08-02 | End: 2021-08-04 | Stop reason: HOSPADM

## 2021-08-02 RX ORDER — CIPROFLOXACIN 2 MG/ML
400 INJECTION, SOLUTION INTRAVENOUS EVERY 24 HOURS
Status: DISCONTINUED | OUTPATIENT
Start: 2021-08-02 | End: 2021-08-04 | Stop reason: HOSPADM

## 2021-08-02 RX ORDER — ALLOPURINOL 100 MG/1
50 TABLET ORAL DAILY
Status: DISCONTINUED | OUTPATIENT
Start: 2021-08-02 | End: 2021-08-04 | Stop reason: HOSPADM

## 2021-08-02 RX ORDER — METHYLPREDNISOLONE SODIUM SUCCINATE 40 MG/ML
20 INJECTION, POWDER, LYOPHILIZED, FOR SOLUTION INTRAMUSCULAR; INTRAVENOUS EVERY 12 HOURS SCHEDULED
Status: DISCONTINUED | OUTPATIENT
Start: 2021-08-02 | End: 2021-08-04

## 2021-08-02 RX ORDER — TORSEMIDE 20 MG/1
20 TABLET ORAL DAILY
Status: DISCONTINUED | OUTPATIENT
Start: 2021-08-02 | End: 2021-08-03

## 2021-08-02 RX ORDER — PANTOPRAZOLE SODIUM 40 MG/1
40 TABLET, DELAYED RELEASE ORAL
Status: DISCONTINUED | OUTPATIENT
Start: 2021-08-03 | End: 2021-08-04 | Stop reason: HOSPADM

## 2021-08-02 RX ADMIN — ASPIRIN 81 MG: 81 TABLET, COATED ORAL at 09:02

## 2021-08-02 RX ADMIN — AMLODIPINE BESYLATE 5 MG: 5 TABLET ORAL at 09:02

## 2021-08-02 RX ADMIN — CIPROFLOXACIN 400 MG: 2 INJECTION, SOLUTION INTRAVENOUS at 10:32

## 2021-08-02 RX ADMIN — APIXABAN 2.5 MG: 2.5 TABLET, FILM COATED ORAL at 09:02

## 2021-08-02 RX ADMIN — APIXABAN 2.5 MG: 2.5 TABLET, FILM COATED ORAL at 17:08

## 2021-08-02 RX ADMIN — TORSEMIDE 20 MG: 20 TABLET ORAL at 10:39

## 2021-08-02 RX ADMIN — SENNOSIDES 8.6 MG: 8.6 TABLET, FILM COATED ORAL at 21:56

## 2021-08-02 RX ADMIN — ALLOPURINOL 50 MG: 100 TABLET ORAL at 10:39

## 2021-08-02 RX ADMIN — METHYLPREDNISOLONE SODIUM SUCCINATE 40 MG: 40 INJECTION, POWDER, FOR SOLUTION INTRAMUSCULAR; INTRAVENOUS at 09:02

## 2021-08-02 RX ADMIN — METOPROLOL TARTRATE 100 MG: 100 TABLET, FILM COATED ORAL at 17:08

## 2021-08-02 RX ADMIN — METHYLPREDNISOLONE SODIUM SUCCINATE 20 MG: 40 INJECTION, POWDER, FOR SOLUTION INTRAMUSCULAR; INTRAVENOUS at 21:56

## 2021-08-02 RX ADMIN — METOPROLOL TARTRATE 100 MG: 100 TABLET, FILM COATED ORAL at 09:02

## 2021-08-02 NOTE — PHYSICAL THERAPY NOTE
PHYSICAL THERAPY TREATMENT NOTE  NAME:  Юлия Rivera  DATE: 08/02/21    Length Of Stay: 5  Performed at least 2 patient identifiers during session: Name and Birthday    TREATMENT:      08/02/21 0931   PT Last Visit   PT Visit Date 08/02/21   Note Type   Note Type Treatment   Pain Assessment   Pain Assessment Tool Pain Assessment not indicated - pt denies pain   Pain Score No Pain   Restrictions/Precautions   Weight Bearing Precautions Per Order No   Other Precautions Chair Alarm; Bed Alarm;Telemetry; Fall Risk   General   Chart Reviewed Yes   Response to Previous Treatment Patient with no complaints from previous session  Family/Caregiver Present No   Cognition   Arousal/Participation Alert; Responsive   Orientation Level Oriented X4   Bed Mobility   Additional Comments Pt seated on commode upon arrival on RA  At end of session, pt seated back on commode with call bell within reach and PCA Palmer Norm made aware  Transfers   Sit to Stand 4  Minimal assistance   Additional items Assist x 1; Armrests; Increased time required;Verbal cues   Stand to Sit 4  Minimal assistance   Additional items Assist x 1; Increased time required;Verbal cues   Stand pivot 4  Minimal assistance   Additional items Assist x 1; Increased time required;Verbal cues   Additional Comments All transfers with use of RW  VC for hand placement with all transfers and to miantain upright posture  Pt with no c/o of dizziness or SOB   Ambulation/Elevation   Gait pattern Improper Weight shift; Forward Flexion;Decreased foot clearance; Short stride; Excessively slow   Gait Assistance 4  Minimal assist   Additional items Assist x 1;Verbal cues   Assistive Device Rolling walker   Distance 30'x1 with RW and minAx1 for weight shifting with vc to walk into KRISTIN of RW      Balance   Static Sitting Fair   Dynamic Sitting Fair   Static Standing Fair -   Dynamic Standing Fair -   Ambulatory Poor +   Activity Tolerance   Activity Tolerance Patient limited by fatigue Exercises   Hip Flexion Sitting;10 reps;AROM; Bilateral   Hip Abduction Sitting;10 reps;AROM; Bilateral   Hip Adduction Sitting;10 reps;AROM; Bilateral   Knee AROM Long Arc Quad Sitting;10 reps;AROM; Bilateral   Ankle Pumps Sitting;10 reps;AROM; Bilateral   Assessment   Prognosis Fair   Problem List Decreased strength;Decreased range of motion;Decreased endurance; Impaired balance;Decreased mobility   Goals   Patient Goals "Go home"   PT Treatment Day 1   Plan   Treatment/Interventions Functional transfer training;LE strengthening/ROM; Elevations; Therapeutic exercise; Endurance training;Patient/family training;Equipment eval/education; Bed mobility;Gait training   Progress Progressing toward goals   PT Frequency   (3-5x/wk)   Recommendation   PT Discharge Recommendation Post acute rehabilitation services   PT - OK to Discharge   (When medically cleared)   AM-PAC Basic Mobility Inpatient   Turning in Bed Without Bedrails 3   Lying on Back to Sitting on Edge of Flat Bed 2   Moving Bed to Chair 3   Standing Up From Chair 3   Walk in Room 3   Climb 3-5 Stairs 2   Basic Mobility Inpatient Raw Score 16   Basic Mobility Standardized Score 38 32       The patient's AM-PAC Basic Mobility Inpatient Short Form Raw Score is 16, Standardized Score is 38 32  A standardized score less than 42 9 suggests the patient may benefit from discharge to post-acute rehabilitation services  Please also refer to the recommendation of the Physical Therapist for safe discharge planning  Pt seen for PT treatment session this date with interventions consisting of gait training w/ emphasis on improving pt's ability to ambulate level surfaces x 30'x1 with min A provided by therapist with RW, Therapeutic exercise consisting of: AROM 10 reps B LE in sitting position and therapeutic activity consisting of training: bed mobility, supine<>sit transfers and sit<>stand transfers   Pt agreeable to PT treatment session upon arrival, pt found seated OOB in chair, in no apparent distress  In comparison to previous session, pt with improvements in ambulation distance with decreased assistance levels with functional mobility  Pt requiring mod vc to focus on appropriate technique with therex  Vc for hand placement with transfers and to stay within KRISTIN of RW with ambulation    Continue to recommend STR at time of d/c in order to maximize pt's functional independence and safety w/ mobility  Pt continues to be functioning below baseline level, and remains limited 2* factors listed above  PT will continue to see pt while here in order to address the deficits listed above and provide interventions consistent w/ POC in effort to achieve STGs  Pt requires PT /OT co-treat due to signficant assistance with mobility and cognitive-behavioral impairments      Magy Seen, PTA

## 2021-08-02 NOTE — PLAN OF CARE
Problem: OCCUPATIONAL THERAPY ADULT  Goal: Performs self-care activities at highest level of function for planned discharge setting  See evaluation for individualized goals  Description: Treatment Interventions: ADL retraining, Functional transfer training          See flowsheet documentation for full assessment, interventions and recommendations  Outcome: Progressing  Note: Limitation: Decreased ADL status, Decreased UE strength, Decreased Safe judgement during ADL, Decreased endurance, Decreased self-care trans, Decreased high-level ADLs, Decreased cognition     Assessment:  Pt is a 80 y o  male seen for skilled OT treatment session today focused on ADL retraining and functional transfer training  due to the following deficits impacting occupational performance: weakness, decreased ROM, decreased strength, decreased balance, decreased tolerance and decreased safety awareness  Pt was seated on bedside commode and agreeable to OT treatment session as he noted that he tried to have bowel movement but unable to  Pt requested to use toilet in bathroom  PTA Jennifer Cates present for co-treat as pt is assistance of 2 for transfers and mobility  Pt STS min A x 2  Pt performed functional mobility to bathroom and back with use of RW and min A x 2  No LOB or unsteadiness  V C  for safety awareness and to stand up straight  Toilet transfer min A x 2, v c  to use grab bars  Pt unable to have bowel movement on toilet  STS with min A  Pt Stand to sit in recliner with min A x 2  Pt combed hair with set-up A when seated in recliner  Pt noted that he wanted to try and have a bowel movement again and sit on the commode for a while  Stand pivot to commode min A x 2 and use of RW  Pt left seated on bedside commode per his request as pt was trying to have a bowel movement  Pt with call bell next to him, instructed pt to use call barlow when done  Aid Jennifer Cates notified pt on commode   Pt to benefit from continued skilled OT tx while in the hospital to address deficits as defined above and maximize level of functional independence w ADL's and functional mobility  Occupational Performance areas to address include: grooming, bathing/shower, toilet hygiene, dressing, health maintenance and functional mobility  The patient's raw score on the AM-PAC Daily Activity inpatient short form is 18, standardized score is 38 66, less than 39 4  Patients at this level are likely to benefit from discharge to post-acute rehabilitation services  Pt continues to required skilled OT treatment to increase safety and maximize independence in ADLs and functional mobility  Please refer to the recommendation of the Occupational Therapist for safe discharge planning          OT Discharge Recommendation: Post acute rehabilitation services  OT - OK to Discharge: Yes (once medically cleared)    Lester Peres OT

## 2021-08-02 NOTE — PLAN OF CARE
Problem: PHYSICAL THERAPY ADULT  Goal: Performs mobility at highest level of function for planned discharge setting  See evaluation for individualized goals  Description: Treatment/Interventions: Functional transfer training, LE strengthening/ROM, Elevations, Therapeutic exercise, Endurance training, Patient/family training, Equipment eval/education, Bed mobility, Gait training          See flowsheet documentation for full assessment, interventions and recommendations  Outcome: Progressing  Note: Prognosis: Fair  Problem List: Decreased strength, Decreased range of motion, Decreased endurance, Impaired balance, Decreased mobility  Assessment: Pt seen for PT treatment session this date with interventions consisting of gait training w/ emphasis on improving pt's ability to ambulate level surfaces x 30'x1 with min A provided by therapist with RW, Therapeutic exercise consisting of: AROM 10 reps B LE in sitting position and therapeutic activity consisting of training: bed mobility, supine<>sit transfers and sit<>stand transfers  Pt agreeable to PT treatment session upon arrival, pt found seated OOB in chair, in no apparent distress  In comparison to previous session, pt with improvements in ambulation distance with decreased assistance levels with functional mobility  Pt requiring mod vc to focus on appropriate technique with therex  Vc for hand placement with transfers and to stay within KRISTIN of RW with ambulation    Continue to recommend STR at time of d/c in order to maximize pt's functional independence and safety w/ mobility  Pt continues to be functioning below baseline level, and remains limited 2* factors listed above  PT will continue to see pt while here in order to address the deficits listed above and provide interventions consistent w/ POC in effort to achieve STGs             PT Discharge Recommendation: Post acute rehabilitation services     PT - OK to Discharge:  (When medically cleared)    See flowsheet documentation for full assessment

## 2021-08-02 NOTE — ASSESSMENT & PLAN NOTE
AST and ALT elevated to 202 and 158 respectively today  Ceftriaxone likely the cause  Will change ceftriaxone to ciprofloxacin with renal dosing of 400 mg IV daily  Will hold statin and Tylenol for now  Continue to monitor LFTs

## 2021-08-02 NOTE — OCCUPATIONAL THERAPY NOTE
[address] Treatment Note     Patient Name: Юлия Rivera  [id number] Date: 8/2/2021  Problem List  Principal Problem:    Acute right hip pain  Active Problems:    Dyslipidemia    GERD (gastroesophageal reflux disease)    Chronic diastolic CHF (congestive heart failure) (HCC)    Paroxysmal atrial fibrillation (HCC)    Acute kidney injury superimposed on CKD (HCC)    Sepsis ([facility] [address] )    Multiple renal cysts    Effusion of left knee    Mitral valve stenosis    Acute cystitis without hematuria    Acute gout    Transaminitis          08/02/21 0957   OT Last Visit   OT Visit Date 08/02/21   Note Type   Note Type Treatment   Restrictions/Precautions   Weight Bearing Precautions Per Order No   Other Precautions Fall Risk;Telemetry; Chair Alarm; Bed Alarm   Pain Assessment   Pain Assessment Tool Pain Assessment not indicated - pt denies pain   Pain Score No Pain   ADL   Where Assessed Commode   Grooming Assistance 5  Supervision/Setup   Grooming Deficit Brushing hair   Grooming Comments Pt brushed hair with set-up A while seated in recliner  Toileting Assistance  3  Moderate Assistance   Toileting Deficit Supervison/safety;Verbal cueing; Bedside commode;Grab bar use   Toileting Comments Pt on bedside commode at start of session, but unable to go, pt wanted to try toilet in bathroom  Pt required v c  to use grab bars and slowly lower himself down on the toilet  Pt unable to go  At end of session, pt wanted to use commode again  Bed Mobility   Additional Comments Pt seated on commode at start of session  Pt on RA  Transfers   Sit to Stand 4  Minimal assistance   Additional items Assist x 1; Increased time required;Armrests; Verbal cues   Stand to Sit 4  Minimal assistance   Additional items Assist x 1; Increased time required;Verbal cues   Stand pivot 4  Minimal assistance   Additional items Assist x 1; Increased time required;Verbal cues   Additional Comments No LOB or unsteadiness durign transfers   V C  required for safety awareness and to stand up straight when walking  Functional Mobility   Functional Mobility 4  Minimal assistance   Additional Comments Pt performed functional mobility to bathroom with min A x 2, use of RW  No LOB or unsteadiness  Toilet Transfers   Toilet Transfer From Rolling walker   Toilet Transfer Type To   Toilet Transfer to [employer]ique Stand pivot   Toilet Transfers Minimal assistance   Toilet Transfers Comments V C  to use grab bars   Cognition   Arousal/Participation Alert; Cooperative   Attention Within functional limits   Orientation Level Oriented to person;Oriented to place   Memory Unable to assess   Following Commands Follows one step commands without difficulty   Comments Pt agreeable to OT treatment session  Activity Tolerance   Activity Tolerance Patient tolerated treatment well   Medical Staff Made Aware PTA Juany Bergeron present for co-treat to ensure pt's safety as he requires A of 2 for transfers and functional mobility, and has poor safety awareness  Notified Aid Juany Bergeron that pt is on commode at end of session with call bell in hand  Assessment   Assessment  Pt is a 80 y o  male seen for skilled OT treatment session today focused on ADL retraining and functional transfer training  due to the following deficits impacting occupational performance: weakness, decreased ROM, decreased strength, decreased balance, decreased tolerance and decreased safety awareness  Pt was seated on bedside commode and agreeable to OT treatment session as he noted that he tried to have bowel movement but unable to  Pt requested to use toilet in bathroom  PTA Juany Bergeron present for co-treat as pt is assistance of 2 for transfers and mobility  Pt STS min A x 2  Pt performed functional mobility to bathroom and back with use of RW and min A x 2  No LOB or unsteadiness  V C  for safety awareness and to stand up straight  Toilet transfer min A x 2, v c  to use grab bars   Pt unable to have bowel movement on toilet  STS with min A  Pt Stand to sit in recliner with min A x 2  Pt combed hair with set-up A when seated in recliner  Pt noted that he wanted to try and have a bowel movement again and sit on the commode for a while  Stand pivot to commode min A x 2 and use of RW  Pt left seated on bedside commode per his request as pt was trying to have a bowel movement  Pt with call bell next to him, instructed pt to use call barlow when done  Elizabeth Currie notified pt on commode  Pt to benefit from continued skilled OT tx while in the hospital to address deficits as defined above and maximize level of functional independence w ADL's and functional mobility  Occupational Performance areas to address include: grooming, bathing/shower, toilet hygiene, dressing, health maintenance and functional mobility  The patient's raw score on the AM-PAC Daily Activity inpatient short form is 18, standardized score is 38 66, less than 39 4  Patients at this level are likely to benefit from discharge to post-acute rehabilitation services  Pt continues to required skilled OT treatment to increase safety and maximize independence in ADLs and functional mobility  Please refer to the recommendation of the Occupational Therapist for safe discharge planning  Plan   Treatment Interventions ADL retraining;Functional transfer training   Goal Expiration Date 08/13/21   OT Treatment Day 1   OT Frequency 3-5x/wk   Recommendation   OT Discharge Recommendation Post acute rehabilitation services   OT - OK to Discharge Yes  (once medically cleared)   Additional Comments  Pt left seated on bedside commode per his request as pt was trying to have a bowel movement  Pt with call bell next to him, instructed pt to use call barlow when done  Elizabeth Currie notified pt on commode      AM-PAC Daily Activity Inpatient   Lower Body Dressing 3   Bathing 3   Toileting 2   Upper Body Dressing 3   Grooming 3   Eating 4   Daily Activity Raw Score 18 Daily Activity Standardized Score (Calc for Raw Score >=11) 38 66   AM-PAC Applied Cognition Inpatient   Following a Speech/Presentation 4   Understanding Ordinary Conversation 4   Taking Medications 4   Remembering Where Things Are Placed or Put Away 4   Remembering List of 4-5 Errands 3   Taking Care of Complicated Tasks 3   Applied Cognition Raw Score 22   Applied Cognition Standardized Score 47 83       Lestereden Peres, OT

## 2021-08-02 NOTE — [facility] ADMISSION
Referral received for [facility]  Case discussed with [facility] MD and patient has been accepted  Benefits checked for [facility]  Patient has a deductible of $1000 00  This has not been met yet  He also has a copay of $150 00/day for days 1-5 and no copay from days 6-90  Out of pocket maximum is $7500 00  $359 47 has been met so far  [facility] is in network  Auth initiated  Awaiting decision from insurance at this time

## 2021-08-02 NOTE — ASSESSMENT & PLAN NOTE
· The left knee joint effusion was aspirated by Orthopedic Surgery on 07/30/2021, which showed monosodium urate crystals consistent with an acute gout flare  · The right hip pain is also likely due to an acute gout flare    · Continue with methylprednisolone 40 mg IV every 12 hours to treat the acute gout

## 2021-08-02 NOTE — PROGRESS NOTES
NEPHROLOGY PROGRESS NOTE   Reginald Yan 80 y o  male MRN: [id number]  Unit/Bed#: 540-20 Encounter: [id number]    Assessment/Plan:    Reginald Yan is a 80 y o  male with CKD 4, CdHF, hypertension, admitted 7/28/21 with chief complaint of right hip pain being treated for acute right hip pain due to urate arthritis, YOLANDE, left knee effusion  Renal following along for YOLANDE  Plan outlined below  1  Acute kidney injury (POA) atop chronic kidney disease- resolved  2  Stage 4 chronic kidney disease with baseline creatinine around 1 8-2 2 mg/dL  3  Mild proteinuria  · Re-evaluate protein studies as outpatient when in usual state of health   4  Hypertensive nephrosclerosis, likely  · Blood pressure appropriate- no medication changes made  Hold non-cardiac essential anti-hypertensives to maintain SBP  >130 mmHg  5  Gouty arthopathy  · Left knee aspirate significant for urate crystals  Receiving solumedrol per primary team and s/p arthrocentesis per orthopedic surger  Will place on low dose allopurinol  6  E  Coli UTI without hematuria  · Management per primary team   7  Chronic diastolic congestive heart failure  · Will resume outpatient diuretic therapy- Torsemide 20 mg daily  Continue low sodium diet, IO daily weights   8  Renal cysts    ROS  No physical complaints  A complete 10 point review of systems have been performed and are otherwise negative  Historical Information   Past Medical History:   Diagnosis Date    Aortic stenosis     ECHO -4-14-14   MODERATE TO SEVERE AORTIC STENOSIS; MILD AROTIC INSUFFICIENCY - LAST ASSESSED 10/26/16; RESOLVED 6/8/16    Aortic valve disorder     LAST ASSESSED 10/8/15; 10/8/15    Arthritis     [facility]congest[facility] failure[facility] ([facility])     Complete heart block ([facility] [address] ) 7/20/2020    Coronary artery disease     Hypertension     Hypertensive urgency 5/19/2019    Renal disorder     TIA (transient ischemic attack)     Transient cerebral ischemia      Past Surgical History: Procedure Laterality Date    AORTIC VALVE REPLACEMENT  06/30/2015    avr with 23 mm [facility] Ease bioprosthetic    CARDIAC PACEMAKER PLACEMENT Left 07/24/2020    CATARACT EXTRACTION Right 06/05/2000    COLONOSCOPY      CORONARY ARTERY BYPASS GRAFT  06/05/2000    x1 with argueta  to lad    EYE SURGERY      POLYPECTOMY  04/2014    enteroscopic - esophageal ulcer, gastric erosion, and duodenal ulcer       TONSILLECTOMY      unknown     Social History   Social History     Substance and Sexual Activity   Alcohol Use Never    Alcohol/week: 0 0 standard drinks    Comment: very occasional     Social History     Substance and Sexual Activity   Drug Use Never     Social History     Tobacco Use   Smoking Status Never Smoker   Smokeless Tobacco Never Used       Family History:   Family History   Problem Relation Age of Onset    No Known Problems Mother     No Known Problems Father     Coronary artery disease Neg Hx        Medications:  Pertinent medications were reviewed  Current Facility-Administered Medications   Medication Dose Route Frequency Provider Last Rate    amLODIPine  5 mg Oral Daily DIANDRA Del Cid      apixaban  2 5 mg Oral BID Nadja Renae MD      aspirin  81 mg Oral Daily Nadja Renae MD      ciprofloxacin  400 mg Intravenous Q24H Maicol De La Cruz MD      oxyCODONE  5 mg Oral Q4H PRN Yamel Chen DO      Or    HYDROmorphone  0 5 mg Intravenous Q4H PRN Yamel Chen, DO      methylPREDNISolone sodium succinate  40 mg Intravenous Q12H [facility] Yamel Chen DO      metoprolol tartrate  100 mg Oral BID Nadja Renae MD      ondansetron  4 mg Intravenous Q4H PRN Yamel Chen,       senna  8 6 mg Oral HS Nadja Renae MD           Allergies   Allergen Reactions    Promethazine Delirium and Other (See Comments)         Vitals:   /89   Pulse 60   Temp 98 1 °F (36 7 °C)   Resp 15   Ht 5' 4" (1 626 m)   Wt 71 9 kg (158 lb 8 2 oz) Comment: patient refused standing scale  SpO2 94%   BMI 27 21 kg/m²   Body mass index is 27 21 kg/m²  SpO2: 94 %,   SpO2 Activity: At Rest,   O2 Device: None (Room air)      Intake/Output Summary (Last 24 hours) at 8/2/2021 0906  Last data filed at 8/2/2021 0641  Gross per 24 hour   Intake 480 ml   Output 1075 ml   Net -595 ml     Invasive Devices     Peripheral Intravenous Line            Peripheral IV 07/30/21 Dorsal (posterior); Left Hand 2 days          Drain            External Urinary Catheter Medium 4 days                Physical Exam  General: conscious, cooperative, in no acute distress   Eyes: conjunctivae pink, anicteric sclerae  ENT: lips and mucous membranes moist  Neck: supple, no JVD, no masses  Chest: essentially clear breath sounds bilaterally, no crackles, ronchus or wheezings  CVS: S1 & S2, normal rate, regular rhythm  Abdomen: soft, non-tender, non-distended, normoactive bowel sounds  Extremities: trace edema of both legs  Skin: no rash  Neuro: awake, alert, oriented      Diagnostic Data:  Lab: I have personally reviewed pertinent lab results  ,   CBC:  Results from last 7 days   Lab Units 08/02/21  0557   WBC Thousand/uL 12 19*   HEMOGLOBIN g/dL 8 4*   HEMATOCRIT % 25 9*   PLATELETS Thousands/uL 336      CMP:   Lab Results   Component Value Date    SODIUM 135 (L) 08/02/2021    K 4 0 08/02/2021     08/02/2021    CO2 27 08/02/2021    BUN 57 (H) 08/02/2021    CREATININE 2 07 (H) 08/02/2021    CALCIUM 8 7 08/02/2021     (H) 08/02/2021     (H) 08/02/2021    ALKPHOS 87 08/02/2021    EGFR 28 08/02/2021   ,   PT/INR: No results found for: PT, INR,   Magnesium: No components found for: MAG,  Phosphorous:   Lab Results   Component Value Date    PHOS 3 5 08/02/2021       Microbiology:  @LABRCNTIP,(urinecx:7)@        DIANDRA Clarke    Portions of the record may have been created with voice recognition software   Occasional wrong word or "sound a like" substitutions may have occurred due to the inherent limitations of voice recognition software  Read the chart carefully and recognize, using context, where substitutions have occurred

## 2021-08-02 NOTE — PROGRESS NOTES
[address] [facility] [address]  Progress Note - Martinez Pastor 1935, 80 y o  male MRN: [id number]  Unit/Bed#: 037-14 Encounter: [id number]  Primary Care Provider: Ben Blake DO   Date and time admitted to hospital: 7/28/2021 11:01 AM    Acute gout  Assessment & Plan  · The left knee joint effusion was aspirated by Orthopedic Surgery on 07/30/2021, which showed monosodium urate crystals consistent with an acute gout flare  · The right hip pain is also likely due to an acute gout flare  · Continue with methylprednisolone 40 mg IV every 12 hours to treat the acute gout      Transaminitis  Assessment & Plan  AST and ALT elevated to 202 and 158 respectively today  Ceftriaxone likely the cause  Will change ceftriaxone to ciprofloxacin with renal dosing of 400 mg IV daily  Will hold statin and Tylenol for now  Continue to monitor LFTs    Sepsis ([facility] [address] )  Assessment & Plan  · Sepsis was present on admission and secondary to acute cystitis  · SIRS criteria was met with a leukocytosis and tachycardia  · The patient then developed fevers during the hospitalization  · The patient was seen in consultation by Orthopedic Surgery  · Continue ceftriaxone 1000 mg IV every 24 hours  · The patient cannot have an MRI of the right hip due to an indwelling permanent pacemaker  · A bone scan was ordered to evaluate the patient's right hip, which did not show any evidence of fracture  · The left knee joint effusion was aspirated by Orthopedic Surgery on 07/30/2021, which showed monosodium urate crystals consistent with acute gout    · Utilize methylprednisolone 40 mg IV every 12 hours to treat the acute gout  · Follow culture results        Acute kidney injury superimposed on CKD [facility])  Assessment & Plan  Lab Results   Component Value Date    EGFR 28 08/02/2021    EGFR 25 08/01/2021    EGFR 26 07/31/2021    CREATININE 2 07 (H) 08/02/2021    CREATININE 2 28 (H) 08/01/2021    CREATININE 2 22 (H) 07/31/2021     Acute pre renal injury  FENA 0 9  Treated with Isolyte IV fluids  Discontinue IV fluids on 07/31/2021 per Nephrology  Continue to hold loop diuretic  Continue to avoid all nephrotoxic agents    Chronic kidney disease stage 4  Creatinine 2 75 from 3 45 (baseline 1 8-2 2)  eGFR 20 from 14  Renal ultrasound shows chronic kidney disease and multiple bilateral cysts  Follow-up as outpatient with Nephrology    Acute cystitis without hematuria  Assessment & Plan  · Continue ceftriaxone 1000 mg IV every 24 hours  · Check the patient post-void residual amounts every shift with bladder scanning, and follow the urinary retention protocol  · Urine cultures revealed E  Coli and Abbie Side   · Outpatient Urology evaluation    07/30/2021 9481 07/31/2021 1219 Urine culture [[id number]]   (Abnormal)   Urine, Clean Catch    Preliminary result [address]  Component Value   Urine Culture >100,000 cfu/ml Escherichia coliAbnormal  P    10,000-19,000 cfu/ml Proteus mirabilisAbnormal  P                        VTE Pharmacologic Prophylaxis: VTE Score: 7 High Risk (Score >/= 5) - Pharmacological DVT Prophylaxis Ordered: apixaban (Eliquis)  Sequential Compression Devices Ordered  Patient Centered Rounds: I performed bedside rounds with nursing staff today  Discussions with Specialists or Other Care Team Provider: case management    Education and Discussions with Family / Patient: Updated  (daughter) via phone  Time Spent for Care: 30 minutes  More than 50% of total time spent on counseling and coordination of care as described above  Current Length of Stay: 5 day(s)  Current Patient Status: Inpatient   Certification Statement: The patient will continue to require additional inpatient hospital stay due to IV antibiotics  Discharge Plan: Anticipate discharge in 48-72 hrs to rehab facility      Code Status: Level 1 - Full Code    Subjective:   Patient reports that he is feeling well today and only has pain during the night which is relieved with his pain medications  Offers no other complaints, otherwise  Objective:     Vitals:   Temp (24hrs), Av 1 °F (36 7 °C), Min:98 °F (36 7 °C), Max:98 2 °F (36 8 °C)    Temp:  [98 °F (36 7 °C)-98 2 °F (36 8 °C)] 98 1 °F (36 7 °C)  HR:  [60-83] 60  Resp:  [14-18] 15  BP: ()/(53-89) 133/89  SpO2:  [94 %-96 %] 94 %  Body mass index is 27 21 kg/m²  Input and Output Summary (last 24 hours): Intake/Output Summary (Last 24 hours) at 2021 1032  Last data filed at 2021 0641  Gross per 24 hour   Intake 240 ml   Output 675 ml   Net -435 ml       Physical Exam:   Physical Exam  Constitutional:       Appearance: Normal appearance  Comments: Overweight   HENT:      Head: Normocephalic and atraumatic  Cardiovascular:      Rate and Rhythm: Normal rate and regular rhythm  Pulmonary:      Effort: Pulmonary effort is normal       Breath sounds: Normal breath sounds  Abdominal:      General: Bowel sounds are normal       Palpations: Abdomen is soft  Musculoskeletal:      Comments: Mild swelling of left knee   Neurological:      Mental Status: He is alert and oriented to person, place, and time     Psychiatric:         Mood and Affect: Mood normal          Behavior: Behavior normal        Additional Data:     Labs:  Results from last 7 days   Lab Units 21  0557 21  0538 21  0417   WBC Thousand/uL 12 19* 16 27* 17 22*   HEMOGLOBIN g/dL 8 4* 9 1* 9 5*   HEMATOCRIT % 25 9* 29 2* 30 4*   PLATELETS Thousands/uL 336 371 332   NEUTROS PCT %  --   --  86*   LYMPHS PCT %  --   --  6*   LYMPHO PCT %  --  5*  --    MONOS PCT %  --   --  7   MONO PCT %  --  1*  --    EOS PCT %  --  0 0     Results from last 7 days   Lab Units 21  0557   SODIUM mmol/L 135*   POTASSIUM mmol/L 4 0   CHLORIDE mmol/L 100   CO2 mmol/L 27   BUN mg/dL 57*   CREATININE mg/dL 2 07*   ANION GAP mmol/L 8   CALCIUM mg/dL 8 7   ALBUMIN g/dL 2 2*   TOTAL BILIRUBIN mg/dL 0 16* ALK PHOS U/L 87   ALT U/L 158*   AST U/L 202*   GLUCOSE RANDOM mg/dL 202*                 Results from last 7 days   Lab Units 08/01/21  0539 07/30/21  0417 07/29/21  0457   LACTIC ACID mmol/L  --   --  1 0   PROCALCITONIN ng/ml 1 47* 0 78* 0 68*       Lines/Drains:  Invasive Devices     Peripheral Intravenous Line            Peripheral IV 07/30/21 Dorsal (posterior); Left Hand 2 days          Drain            External Urinary Catheter Medium 4 days                      Imaging: No pertinent imaging reviewed  Recent Cultures (last 7 days):   Results from last 7 days   Lab Units 07/30/21  1300 07/30/21  0833 07/28/21  1656   BLOOD CULTURE   --   --  No Growth After 4 Days  No Growth After 4 Days  GRAM STAIN RESULT  No Polys or Bacteria seen  --   --    URINE CULTURE   --  >100,000 cfu/ml Escherichia coli*  10,000-19,000 cfu/ml Proteus mirabilis*  --    WOUND CULTURE  No growth  --   --        Last 24 Hours Medication List:   Current Facility-Administered Medications   Medication Dose Route Frequency Provider Last Rate    allopurinol  50 mg Oral Daily DIANDRA Dove      amLODIPine  5 mg Oral Daily DIANDRA Dove      apixaban  2 5 mg Oral BID Brandon Beasley MD      aspirin  81 mg Oral Daily Brandon Beasley MD      ciprofloxacin  400 mg Intravenous Q24H Janak Villegas MD      oxyCODONE  5 mg Oral Q4H PRN Nickola Dearth, DO      Or    HYDROmorphone  0 5 mg Intravenous Q4H PRN Nickola Dearth, DO      methylPREDNISolone sodium succinate  40 mg Intravenous Q12H [address] Bandar Crainetter,       metoprolol tartrate  100 mg Oral BID Brandon Beasley MD      ondansetron  4 mg Intravenous Q4H PRN Jeff Deartsamantha, DO      senna  8 6 mg Oral HS Brandon Beasley MD      torsemide  20 mg Oral Daily DIANDRA Dove          Today, Patient Was Seen By: Maci Perry PA-C    **Please Note: This note may have been constructed using a voice recognition system  **

## 2021-08-02 NOTE — CASE MANAGEMENT
I was notified by Teresa Palmer at [employer] that patient would have copay of $150 for days 1-5   I notified patient's daughter Socrates Ornelas of copayments and she is agreeable to same ,

## 2021-08-02 NOTE — ASSESSMENT & PLAN NOTE
· Continue ceftriaxone 1000 mg IV every 24 hours  · Check the patient post-void residual amounts every shift with bladder scanning, and follow the urinary retention protocol  · Urine cultures revealed E   Coli and Loralie Gip   · Outpatient Urology evaluation    07/30/2021 1194 07/31/2021 1219 Urine culture [[id number]]   (Abnormal)   Urine, Clean Catch    Preliminary result [address]  Component Value   Urine Culture >100,000 cfu/ml Escherichia coliAbnormal  P    10,000-19,000 cfu/ml Proteus mirabilisAbnormal  P

## 2021-08-02 NOTE — CASE MANAGEMENT
Pt's daughter Sammy Smart she does not want her dad to go to [facility]  She would like me to make a referral to [address]  Referral made as requested  I reviewed with daughter that Patient has [address]  and they very rarely approve for patients to go to Acute Rehab  Arben Porras understands all info discussed

## 2021-08-02 NOTE — ASSESSMENT & PLAN NOTE
Lab Results   Component Value Date    EGFR 28 08/02/2021    EGFR 25 08/01/2021    EGFR 26 07/31/2021    CREATININE 2 07 (H) 08/02/2021    CREATININE 2 28 (H) 08/01/2021    CREATININE 2 22 (H) 07/31/2021     Acute pre renal injury  FENA 0 9  Treated with Isolyte IV fluids  Discontinue IV fluids on 07/31/2021 per Nephrology  Continue to hold loop diuretic  Continue to avoid all nephrotoxic agents    Chronic kidney disease stage 4  Creatinine 2 75 from 3 45 (baseline 1 8-2 2)  eGFR 20 from 14  Renal ultrasound shows chronic kidney disease and multiple bilateral cysts  Follow-up as outpatient with Nephrology

## 2021-08-02 NOTE — PROGRESS NOTES
Progress Note - Orthopedics   Damaso Johnson 80 y o  male MRN: [id number]  Unit/Bed#: 136-75      Subjective:    80 y  o male noting less pain about his left knee and right hip  He had aspirate of his left knee which came back for gout no signs infection  He is feeling better and is contemplating going home  No acute events, no complaints  Pt doing well  Pain controlled   Denies fevers chills, CP, SOB    Labs:  0   Lab Value Date/Time    HCT 25 9 (L) 08/02/2021 0557    HCT 29 2 (L) 08/01/2021 0538    HCT 30 4 (L) 07/30/2021 0417    HCT 27 8 (L) 07/04/2015 0501    HCT 30 1 (L) 07/03/2015 0935    HCT 30 0 (L) 07/02/2015 1223    HGB 8 4 (L) 08/02/2021 0557    HGB 9 1 (L) 08/01/2021 0538    HGB 9 5 (L) 07/30/2021 0417    HGB 9 4 (L) 07/04/2015 0501    HGB 9 9 (L) 07/03/2015 0935    HGB 9 8 (L) 07/02/2015 1223    INR 1 44 (H) 08/24/2020 1255    INR 1 27 (H) 07/02/2015 1223    WBC 12 19 (H) 08/02/2021 0557    WBC 16 27 (H) 08/01/2021 0538    WBC 17 22 (H) 07/30/2021 0417    WBC 12 50 (H) 07/04/2015 0501    WBC 16 11 (H) 07/03/2015 0935    WBC 19 17 (H) 07/02/2015 1223    ESR 50 (H) 07/30/2021 0417     8 (H) 07/29/2021 0457     Meds:    Current Facility-Administered Medications:     acetaminophen (TYLENOL) tablet 650 mg, 650 mg, Oral, Q6H PRN, Donald Davison DO, 650 mg at 07/29/21 2013    amLODIPine (NORVASC) tablet 5 mg, 5 mg, Oral, Daily, DIANDRA Shen, 5 mg at 08/01/21 0919    apixaban (ELIQUIS) tablet 2 5 mg, 2 5 mg, Oral, BID, Jesus Montero MD, 2 5 mg at 08/01/21 1749    aspirin (ECOTRIN LOW STRENGTH) EC tablet 81 mg, 81 mg, Oral, Daily, Jesus Montero MD, 81 mg at 08/01/21 0919    cefTRIAXone (ROCEPHIN) IVPB (premix in dextrose) 1,000 mg 50 mL, 1,000 mg, Intravenous, Q24H, Donald Davison DO, Last Rate: 100 mL/hr at 08/01/21 0921, 1,000 mg at 08/01/21 0921    oxyCODONE (ROXICODONE) IR tablet 5 mg, 5 mg, Oral, Q4H PRN, 5 mg at 08/01/21 0921 **OR** HYDROmorphone (DILAUDID) injection 0 5 mg, 0 5 mg, Intravenous, Q4H PRN, Berenice Riley, DO, 0 5 mg at 07/31/21 0931    methylPREDNISolone sodium succinate (Solu-MEDROL) injection 40 mg, 40 mg, Intravenous, Q12H [address], Arleene Riley, DO, 40 mg at 08/01/21 2130    metoprolol tartrate (LOPRESSOR) tablet 100 mg, 100 mg, Oral, BID, Spencer Cuba MD, 100 mg at 08/01/21 0919    ondansetron (ZOFRAN) injection 4 mg, 4 mg, Intravenous, Q4H PRN, Berenice Lin DO, 4 mg at 07/30/21 1627    pravastatin (PRAVACHOL) tablet 40 mg, 40 mg, Oral, Daily With Олег Fitch MD, 40 mg at 08/01/21 1749    senna (SENOKOT) tablet 8 6 mg, 8 6 mg, Oral, HS, Spencer Cuba MD, 8 6 mg at 08/01/21 2131    Blood Culture:   Lab Results   Component Value Date    BLOODCX No Growth After 4 Days  07/28/2021    BLOODCX No Growth After 4 Days  07/28/2021     Wound Culture:   Lab Results   Component Value Date    WOUNDCULT No growth 07/30/2021     Ins and Outs:  I/O last 24 hours: In: 480 [P O :480]  Out: 1075 [Urine:1075]    Physical:  Vitals:    08/02/21 0736   BP: 133/89   Pulse: 60   Resp: 15   Temp: 98 1 °F (36 7 °C)   SpO2: 94%     Musculoskeletal: right posterior hip notes mild discomfort in the gluteus muscle and ileum which is improved  There is pain-free log-rolling of the left hip  Less discomfort with internal external rotation of the hip today and with flexion  Left knee without effusion erythema or warmth  Improved range of motion of the left knee with active flexion extension without pain  · Skin without erythema  · Grossly neurovascular intact to bilateral lower extremities  No calf pain  Assessment:    80 y  o male with gout related left knee right hip pain which is resolving  There may be a component of lumbar disease contributing to the posterior right hip discomfort  Plan:  · WBAT bilateral lower extremities  · Continue prednisone    · May follow-up with Spine and Pain as an outpatient and Orthopedics · PT/OT  · Pain control and DVT prophylaxis per Primary Service  · Dispo: Ortho signing off    Calleen DARIN Mahoney

## 2021-08-03 LAB
ALBUMIN SERPL BCP-MCNC: 2.1 G/DL (ref 3.5–5)
ALBUMIN SERPL BCP-MCNC: 2.6 G/DL (ref 3.5–5)
ALP SERPL-CCNC: 69 U/L (ref 46–116)
ALP SERPL-CCNC: 78 U/L (ref 46–116)
ALT SERPL W P-5'-P-CCNC: 111 U/L (ref 12–78)
ALT SERPL W P-5'-P-CCNC: 16 U/L (ref 12–78)
ANION GAP SERPL CALCULATED.3IONS-SCNC: 11 MMOL/L (ref 4–13)
ANION GAP SERPL CALCULATED.3IONS-SCNC: 11 MMOL/L (ref 4–13)
AST SERPL W P-5'-P-CCNC: 23 U/L (ref 5–45)
AST SERPL W P-5'-P-CCNC: 55 U/L (ref 5–45)
BASOPHILS # BLD AUTO: 0.01 THOUSANDS/ΜL (ref 0–0.1)
BASOPHILS NFR BLD AUTO: 0 % (ref 0–1)
BILIRUB DIRECT SERPL-MCNC: 0.09 MG/DL (ref 0–0.2)
BILIRUB SERPL-MCNC: 0.21 MG/DL (ref 0.2–1)
BILIRUB SERPL-MCNC: 0.43 MG/DL (ref 0.2–1)
BUN SERPL-MCNC: 18 MG/DL (ref 5–25)
BUN SERPL-MCNC: 68 MG/DL (ref 5–25)
CALCIUM ALBUM COR SERPL-MCNC: 9.9 MG/DL (ref 8.3–10.1)
CALCIUM SERPL-MCNC: 8.4 MG/DL (ref 8.3–10.1)
CALCIUM SERPL-MCNC: 8.7 MG/DL (ref 8.3–10.1)
CHLORIDE SERPL-SCNC: 106 MMOL/L (ref 100–108)
CHLORIDE SERPL-SCNC: 99 MMOL/L (ref 100–108)
CO2 SERPL-SCNC: 25 MMOL/L (ref 21–32)
CO2 SERPL-SCNC: 25 MMOL/L (ref 21–32)
CREAT SERPL-MCNC: 2.26 MG/DL (ref 0.6–1.3)
CREAT SERPL-MCNC: 3.19 MG/DL (ref 0.6–1.3)
CREAT UR-MCNC: 67.2 MG/DL
EOSINOPHIL # BLD AUTO: 0 THOUSAND/ΜL (ref 0–0.61)
EOSINOPHIL NFR BLD AUTO: 0 % (ref 0–6)
ERYTHROCYTE [DISTWIDTH] IN BLOOD BY AUTOMATED COUNT: 13.3 % (ref 11.6–15.1)
GFR SERPL CREATININE-BSD FRML MDRD: 17 ML/MIN/1.73SQ M
GFR SERPL CREATININE-BSD FRML MDRD: 25 ML/MIN/1.73SQ M
GLUCOSE SERPL-MCNC: 266 MG/DL (ref 65–140)
GLUCOSE SERPL-MCNC: 86 MG/DL (ref 65–140)
HCT VFR BLD AUTO: 26 % (ref 36.5–49.3)
HGB BLD-MCNC: 8.3 G/DL (ref 12–17)
IMM GRANULOCYTES # BLD AUTO: 0.07 THOUSAND/UL (ref 0–0.2)
IMM GRANULOCYTES NFR BLD AUTO: 1 % (ref 0–2)
LYMPHOCYTES # BLD AUTO: 0.56 THOUSANDS/ΜL (ref 0.6–4.47)
LYMPHOCYTES NFR BLD AUTO: 4 % (ref 14–44)
MAGNESIUM SERPL-MCNC: 2.2 MG/DL (ref 1.6–2.6)
MCH RBC QN AUTO: 29 PG (ref 26.8–34.3)
MCHC RBC AUTO-ENTMCNC: 31.9 G/DL (ref 31.4–37.4)
MCV RBC AUTO: 91 FL (ref 82–98)
MONOCYTES # BLD AUTO: 0.31 THOUSAND/ΜL (ref 0.17–1.22)
MONOCYTES NFR BLD AUTO: 2 % (ref 4–12)
NEUTROPHILS # BLD AUTO: 11.73 THOUSANDS/ΜL (ref 1.85–7.62)
NEUTS SEG NFR BLD AUTO: 93 % (ref 43–75)
NRBC BLD AUTO-RTO: 0 /100 WBCS
PLATELET # BLD AUTO: 360 THOUSANDS/UL (ref 149–390)
PMV BLD AUTO: 9.7 FL (ref 8.9–12.7)
POTASSIUM SERPL-SCNC: 3.6 MMOL/L (ref 3.5–5.3)
POTASSIUM SERPL-SCNC: 3.9 MMOL/L (ref 3.5–5.3)
PROCALCITONIN SERPL-MCNC: 0.48 NG/ML
PROT SERPL-MCNC: 5.8 G/DL (ref 6.4–8.2)
PROT SERPL-MCNC: 6.4 G/DL (ref 6.4–8.2)
RBC # BLD AUTO: 2.86 MILLION/UL (ref 3.88–5.62)
SODIUM SERPL-SCNC: 135 MMOL/L (ref 136–145)
SODIUM SERPL-SCNC: 142 MMOL/L (ref 136–145)
UUN 24H UR-MCNC: 733 MG/DL
WBC # BLD AUTO: 12.68 THOUSAND/UL (ref 4.31–10.16)

## 2021-08-03 PROCEDURE — 99232 SBSQ HOSP IP/OBS MODERATE 35: CPT | Performed by: INTERNAL MEDICINE

## 2021-08-03 PROCEDURE — 80076 HEPATIC FUNCTION PANEL: CPT | Performed by: INTERNAL MEDICINE

## 2021-08-03 PROCEDURE — 82570 ASSAY OF URINE CREATININE: CPT | Performed by: NURSE PRACTITIONER

## 2021-08-03 PROCEDURE — 80053 COMPREHEN METABOLIC PANEL: CPT | Performed by: INTERNAL MEDICINE

## 2021-08-03 PROCEDURE — 97535 SELF CARE MNGMENT TRAINING: CPT

## 2021-08-03 PROCEDURE — 83735 ASSAY OF MAGNESIUM: CPT | Performed by: INTERNAL MEDICINE

## 2021-08-03 PROCEDURE — 99232 SBSQ HOSP IP/OBS MODERATE 35: CPT | Performed by: NURSE PRACTITIONER

## 2021-08-03 PROCEDURE — 80048 BASIC METABOLIC PNL TOTAL CA: CPT | Performed by: INTERNAL MEDICINE

## 2021-08-03 PROCEDURE — 97116 GAIT TRAINING THERAPY: CPT

## 2021-08-03 PROCEDURE — 97110 THERAPEUTIC EXERCISES: CPT

## 2021-08-03 PROCEDURE — 84145 PROCALCITONIN (PCT): CPT | Performed by: INTERNAL MEDICINE

## 2021-08-03 PROCEDURE — 85025 COMPLETE CBC W/AUTO DIFF WBC: CPT | Performed by: INTERNAL MEDICINE

## 2021-08-03 PROCEDURE — 84540 ASSAY OF URINE/UREA-N: CPT | Performed by: NURSE PRACTITIONER

## 2021-08-03 PROCEDURE — 99232 SBSQ HOSP IP/OBS MODERATE 35: CPT | Performed by: PHYSICIAN ASSISTANT

## 2021-08-03 PROCEDURE — 97530 THERAPEUTIC ACTIVITIES: CPT

## 2021-08-03 RX ORDER — TORSEMIDE 20 MG/1
20 TABLET ORAL 2 TIMES DAILY
Status: DISCONTINUED | OUTPATIENT
Start: 2021-08-03 | End: 2021-08-03

## 2021-08-03 RX ORDER — POTASSIUM CHLORIDE 20 MEQ/1
40 TABLET, EXTENDED RELEASE ORAL ONCE
Status: COMPLETED | OUTPATIENT
Start: 2021-08-03 | End: 2021-08-03

## 2021-08-03 RX ADMIN — METOPROLOL TARTRATE 100 MG: 100 TABLET, FILM COATED ORAL at 17:24

## 2021-08-03 RX ADMIN — METOPROLOL TARTRATE 100 MG: 100 TABLET, FILM COATED ORAL at 09:39

## 2021-08-03 RX ADMIN — METHYLPREDNISOLONE SODIUM SUCCINATE 20 MG: 40 INJECTION, POWDER, FOR SOLUTION INTRAMUSCULAR; INTRAVENOUS at 21:57

## 2021-08-03 RX ADMIN — APIXABAN 2.5 MG: 2.5 TABLET, FILM COATED ORAL at 17:23

## 2021-08-03 RX ADMIN — ASPIRIN 81 MG: 81 TABLET, COATED ORAL at 09:34

## 2021-08-03 RX ADMIN — CIPROFLOXACIN 400 MG: 2 INJECTION, SOLUTION INTRAVENOUS at 10:35

## 2021-08-03 RX ADMIN — POTASSIUM CHLORIDE 40 MEQ: 1500 TABLET, EXTENDED RELEASE ORAL at 10:33

## 2021-08-03 RX ADMIN — AMLODIPINE BESYLATE 5 MG: 5 TABLET ORAL at 09:34

## 2021-08-03 RX ADMIN — ALLOPURINOL 50 MG: 100 TABLET ORAL at 09:34

## 2021-08-03 RX ADMIN — POLYETHYLENE GLYCOL 3350 17 G: 17 POWDER, FOR SOLUTION ORAL at 09:40

## 2021-08-03 RX ADMIN — APIXABAN 2.5 MG: 2.5 TABLET, FILM COATED ORAL at 09:35

## 2021-08-03 RX ADMIN — PANTOPRAZOLE SODIUM 40 MG: 40 TABLET, DELAYED RELEASE ORAL at 06:03

## 2021-08-03 RX ADMIN — METHYLPREDNISOLONE SODIUM SUCCINATE 20 MG: 40 INJECTION, POWDER, FOR SOLUTION INTRAMUSCULAR; INTRAVENOUS at 09:35

## 2021-08-03 RX ADMIN — SENNOSIDES 8.6 MG: 8.6 TABLET, FILM COATED ORAL at 21:57

## 2021-08-03 NOTE — PROGRESS NOTES
[address] [facility] [address]  Progress Note - Jeremiah Vasquez 1935, 80 y o  male MRN: [id number]  Unit/Bed#: 288-11 Encounter: [id number]  Primary Care Provider: Jade Lisa DO   Date and time admitted to hospital: 7/28/2021 11:01 AM    Acute gout  Assessment & Plan  · The left knee joint effusion was aspirated by Orthopedic Surgery on 07/30/2021, which showed monosodium urate crystals consistent with an acute gout flare  · The right hip pain is also likely due to an acute gout flare  He reports that it has improved significantly  · methylprednisolone 40 mg IV every 12 hours was given to treat the acute gout, changed to 20 mg IV BID on 8/2, will likely be able to begin oral steroids soon      Effusion of left knee  Assessment & Plan  · The left knee joint effusion was aspirated by Orthopedic Surgery on 07/30/2021, which showed monosodium urate crystals consistent with acute gout  · Utilized methylprednisolone 40 mg IV every 12 hours to treat the acute gout, now decreased to 20 mg IV BID, likely able to start PO soon  · Fluid cultures show no growth, can rule out septic left knee    * Right hip pain  Assessment & Plan  Likely due to acute gout flare  No MRI due to pacemaker  · A bone scan was ordered to evaluate the patient's right hip, which did not show any evidence of fracture  · The left knee joint effusion was aspirated by Orthopedic Surgery on 07/30/2021, which showed monosodium urate crystals consistent with acute gout  · Utilized methylprednisolone 40 mg IV every 12 hours to treat the acute gout, now on 20 mg IV BID and likely will give PO soon  Continue pain meds (PRN Tylenol 650mg Q6 PRN, Dilaudid 0 5mg Q4 PRN, Oxycodone 5mg Q4 PRN)  PT/OT  · The patient will require short-term rehabilitation placement upon discharge        Transaminitis  Assessment & Plan  AST and ALT elevated to 202 and 158 respectively on 8/2 likely secondary to ceftriaxone which was dc and replaced with cipro  LFTs monitored and have reduced significantly today, will continue to trend LFTs  Continue to hold statin and Tylenol for now      Sepsis [facility])  Assessment & Plan  · Sepsis was present on admission and secondary to acute cystitis  · SIRS criteria was met with a leukocytosis and tachycardia  · The patient then developed fevers during the hospitalization  · The patient was seen in consultation by Orthopedic Surgery  · Continued ceftriaxone 1000 mg IV every 24 hours, switched to cipro 400 daily due to transaminitis on 8/2  · The patient cannot have an MRI of the right hip due to an indwelling permanent pacemaker  · A bone scan was ordered to evaluate the patient's right hip, which did not show any evidence of fracture  · The left knee joint effusion was aspirated by Orthopedic Surgery on 07/30/2021, which showed monosodium urate crystals consistent with acute gout    · Utilized methylprednisolone 40 mg IV every 12 hours to treat the acute gout, now switched to methylprednisolone 20 mg IV BID  · Culture results show no growth to date        Acute kidney injury superimposed on CKD [facility])  Assessment & Plan  Lab Results   Component Value Date    EGFR 25 08/03/2021    EGFR 17 08/03/2021    EGFR 28 08/02/2021    CREATININE 2 26 (H) 08/03/2021    CREATININE 3 19 (H) 08/03/2021    CREATININE 2 07 (H) 08/02/2021     Acute pre renal injury, FENA 0 9, Treated with Isolyte IV fluids  Discontinue IV fluids on 07/31/2021 per Nephrology  Attempted retrial of loop diuretic 8/2, labs showed significantly elevated Cr of 3 19, repeat labs showed Cr of 2 26  Considering the elevation of the Cr, will hold torsemide again  Continue to avoid all nephrotoxic agents    Chronic kidney disease stage 4  Renal ultrasound shows chronic kidney disease and multiple bilateral cysts  Follow-up as outpatient with Nephrology    Chronic diastolic CHF (congestive heart failure) ([facility] [address] )  Assessment & Plan  Wt Readings from Last 3 Encounters: 21 77 7 kg (171 lb 4 8 oz)   21 78 kg (172 lb)   21 78 1 kg (172 lb 2 oz)     · Monitor his volume status closely  · Daily weights  · Strict intake/output measurements  · Outpatient follow-up with Cardiology            VTE Pharmacologic Prophylaxis: VTE Score: 7 High Risk (Score >/= 5) - Pharmacological DVT Prophylaxis Ordered: apixaban (Eliquis)  Sequential Compression Devices Ordered  Patient Centered Rounds: I performed bedside rounds with nursing staff today  Discussions with Specialists or Other Care Team Provider: case management, nephrology    Education and Discussions with Family / Patient: Attempted to update  (wife) via phone  Unable to contact  Time Spent for Care: 30 minutes  More than 50% of total time spent on counseling and coordination of care as described above  Current Length of Stay: 6 day(s)  Current Patient Status: Inpatient   Certification Statement: The patient will continue to require additional inpatient hospital stay due to IV steroids  Discharge Plan: Anticipate discharge in 24-48 hrs to rehab facility  Code Status: Level 1 - Full Code    Subjective:   Patient reports that his pain has improved significantly and is only a 2/10 today  He denies any other complaints  Objective:     Vitals:   Temp (24hrs), Av °F (36 7 °C), Min:97 9 °F (36 6 °C), Max:98 1 °F (36 7 °C)    Temp:  [97 9 °F (36 6 °C)-98 1 °F (36 7 °C)] 98 °F (36 7 °C)  HR:  [60-95] 95  Resp:  [14-20] 17  BP: (126-132)/(54-59) 132/59  SpO2:  [95 %-96 %] 95 %  Body mass index is 29 4 kg/m²  Input and Output Summary (last 24 hours): Intake/Output Summary (Last 24 hours) at 8/3/2021 1319  Last data filed at 8/3/2021 1306  Gross per 24 hour   Intake 1020 ml   Output 1010 ml   Net 10 ml       Physical Exam:   Physical Exam  Constitutional:       Appearance: Normal appearance  He is obese  HENT:      Head: Normocephalic and atraumatic     Cardiovascular:      Rate and Rhythm: Normal rate and regular rhythm  Pulses: Normal pulses  Heart sounds: Normal heart sounds  Pulmonary:      Effort: Pulmonary effort is normal       Breath sounds: Normal breath sounds  Abdominal:      General: Bowel sounds are normal       Palpations: Abdomen is soft  Musculoskeletal:      Cervical back: Normal range of motion and neck supple  Comments: Swelling of left knee resolved, non tender to the touch of right hip   Skin:     General: Skin is warm and dry  Neurological:      Mental Status: He is alert and oriented to person, place, and time  Psychiatric:         Mood and Affect: Mood normal          Behavior: Behavior normal           Additional Data:     Labs:  Results from last 7 days   Lab Units 08/03/21  0528   WBC Thousand/uL 12 68*   HEMOGLOBIN g/dL 8 3*   HEMATOCRIT % 26 0*   PLATELETS Thousands/uL 360   NEUTROS PCT % 93*   LYMPHS PCT % 4*   MONOS PCT % 2*   EOS PCT % 0     Results from last 7 days   Lab Units 08/03/21  1033   SODIUM mmol/L 135*   POTASSIUM mmol/L 3 9   CHLORIDE mmol/L 99*   CO2 mmol/L 25   BUN mg/dL 68*   CREATININE mg/dL 2 26*   ANION GAP mmol/L 11   CALCIUM mg/dL 8 7   ALBUMIN g/dL 2 6*   TOTAL BILIRUBIN mg/dL 0 21   ALK PHOS U/L 78   ALT U/L 111*   AST U/L 55*   GLUCOSE RANDOM mg/dL 266*                 Results from last 7 days   Lab Units 08/03/21  0528 08/02/21  0557 08/01/21  0539 07/30/21  0417 07/29/21  0457   LACTIC ACID mmol/L  --   --   --   --  1 0   PROCALCITONIN ng/ml 0 48* 0 92* 1 47* 0 78* 0 68*       Lines/Drains:  Invasive Devices     Peripheral Intravenous Line            Peripheral IV 07/30/21 Dorsal (posterior); Left Hand 3 days          Drain            External Urinary Catheter Medium 5 days                      Imaging: No pertinent imaging reviewed  Recent Cultures (last 7 days):   Results from last 7 days   Lab Units 07/30/21  1300 07/30/21  0833 07/28/21  1656   BLOOD CULTURE   --   --  No Growth After 5 Days    No Growth After 5 Days  GRAM STAIN RESULT  No Polys or Bacteria seen  --   --    URINE CULTURE   --  >100,000 cfu/ml Escherichia coli*  10,000-19,000 cfu/ml Proteus mirabilis*  --    WOUND CULTURE  No growth  --   --        Last 24 Hours Medication List:   Current Facility-Administered Medications   Medication Dose Route Frequency Provider Last Rate    allopurinol  50 mg Oral Daily DIANDRA Mesa      amLODIPine  5 mg Oral Daily DIANDRA Mesa      apixaban  2 5 mg Oral BID Terrie Tam MD      aspirin  81 mg Oral Daily Terrie Tam MD      ciprofloxacin  400 mg Intravenous Q24H Conchis Schilling  mg (08/03/21 1035)    oxyCODONE  5 mg Oral Q4H PRN Bandar Baker DO      Or    HYDROmorphone  0 5 mg Intravenous Q4H PRN Bandar Baker DO      methylPREDNISolone sodium succinate  20 mg Intravenous Q12H [address] Conchis Schilling MD      metoprolol tartrate  100 mg Oral BID Terrie Tam MD      ondansetron  4 mg Intravenous Q4H PRN Bandar Baker DO      pantoprazole  40 mg Oral Early Morning Conchis Schilling MD      polyethylene glycol  17 g Oral Daily Conchis Schilling MD      senna  8 6 mg Oral HS Terrie Tam MD          Today, Patient Was Seen By: Oscar Su PA-C    **Please Note: This note may have been constructed using a voice recognition system  **

## 2021-08-03 NOTE — CASE MANAGEMENT
Case Management Progress Note    Patient name Martinez Pastor  Location [id number] MRN [id number]  : 1935 Date 8/3/2021       LOS (days): 6  Geometric Mean LOS (GMLOS) (days): 3 50  Days to GMLOS:-2 6        BUNDLE:      OBJECTIVE:  Pt is a 80y o  year old /Civil Union, white or  [1], male with Jew preference of Religious admitted on 2021 11:01 AM  Pt is admitted to [facility] at [address] [place] [address] [facility] with complaints of Right hip pain   Current admission status: Inpatient  Preferred Pharmacy:   [facility] [id number]  Phone: [phone] Fax: [phone]    Primary Care Provider: Ben Blake DO    Primary Insurance: MEDICARE  Secondary Insurance: [facility] REP    PROGRESS NOTE:  Pt was denied by [facility] to go to Acute Rehab  As per [facility] it is recommended that pt go home with home care , OP therapy or long term   They did not give an auth for SNF  If physician is interested in doing a P2P they can call [phone] by 12 noon tomorrow  I notified patient' daughter Dorota Mcelroy that patient was denied by [facility] to go to Acute Rehab  She does not think she wants her dad to go to a SNF  She is going to talk to her mother and call me back in the morning

## 2021-08-03 NOTE — PROGRESS NOTES
NEPHROLOGY PROGRESS NOTE   Melissa Pod 80 y o  male MRN: [id number]  Unit/Bed#: 675-70 Encounter: [id number]    Assessment/Plan:    Melissa Cotton is a 80 y o  male with CKD 4, CdHF, hypertension, admitted 7/28/21 with chief complaint of right hip pain being treated for acute right hip pain due to urate arthritis, YOLANDE, left knee effusion  Renal following along for YOLANDE  Plan outlined below       1  Acute kidney injury (POA) atop chronic kidney disease  · Creatinine increased on labs this morning, however, lab work may be inaccurate as BUN very decreased, LFTs now normalized, etc  Repeat lab work pending  In the interim, hold torsemide and check urine studies  Bladder scan was unremarkable  2  Stage 4 chronic kidney disease with baseline creatinine around 1 8-2 2 mg/dL  3  Mild proteinuria  ? Re-evaluate urine protein studies when in usual state of health  4  Hypertensive nephrosclerosis, likely  ? Blood pressure appropriate, no incidence of hypotension  Hold non-cardiac essential medications if SBP < 130 mmHg  Torsemide on hold as above  5  Gouty arthopathy  ? S/p left knee arthrocentesis significant for uric acid crystals receiving steroids per primary team  Continue allopurinol  6  E  Coli UTI without hematuria  ? Receiving ciprofloxacin per primary team- patient also on steroids, monitor closely  7  Chronic diastolic congestive heart failure  ? As above, torsemide held due to worsening renal function  If creatinine stable and back to baseline on repeat blood work, restart torsemide  Continue low sodium diet, daily weights  8  Renal cysts      ROS  Has absolutely no physical complaints  A complete 10 point review of systems have been performed and are otherwise negative  Historical Information   Past Medical History:   Diagnosis Date    Aortic stenosis     ECHO -4-14-14   MODERATE TO SEVERE AORTIC STENOSIS; MILD AROTIC INSUFFICIENCY - LAST ASSESSED 10/26/16; RESOLVED 6/8/16    Aortic valve disorder     LAST ASSESSED 10/8/15; 10/8/15    Arthritis     CHF (congestive heart failure) (HCC)     Complete heart block ([facility] [address] ) 7/20/2020    Coronary artery disease     Hypertension     Hypertensive urgency 5/19/2019    Renal disorder     TIA (transient ischemic attack)     Transient cerebral ischemia      Past Surgical History:   Procedure Laterality Date    AORTIC VALVE REPLACEMENT  06/30/2015    avr with 23 mm [place] Magna Ease bioprosthetic    CARDIAC PACEMAKER PLACEMENT Left 07/24/2020    CATARACT EXTRACTION Right 06/05/2000    COLONOSCOPY      CORONARY ARTERY BYPASS GRAFT  06/05/2000    x1 with argueta  to lad    EYE SURGERY      POLYPECTOMY  04/2014    enteroscopic - esophageal ulcer, gastric erosion, and duodenal ulcer       TONSILLECTOMY      unknown     Social History   Social History     Substance and Sexual Activity   Alcohol Use Never    Alcohol/week: 0 0 standard drinks    Comment: very occasional     Social History     Substance and Sexual Activity   Drug Use Never     Social History     Tobacco Use   Smoking Status Never Smoker   Smokeless Tobacco Never Used       Family History:   Family History   Problem Relation Age of Onset    No Known Problems Mother     No Known Problems Father     Coronary artery disease Neg Hx        Medications:  Pertinent medications were reviewed  Current Facility-Administered Medications   Medication Dose Route Frequency Provider Last Rate    allopurinol  50 mg Oral Daily DIANDRA Shaw      amLODIPine  5 mg Oral Daily DIANDRA Shaw      apixaban  2 5 mg Oral BID Dahlia Chong MD      aspirin  81 mg Oral Daily Dahlia Chong MD      ciprofloxacin  400 mg Intravenous Q24H Gio De La Fuente  mg (08/02/21 1032)    oxyCODONE  5 mg Oral Q4H PRN Preston Quevedo DO      Or    HYDROmorphone  0 5 mg Intravenous Q4H PRN Preston Quevedo DO      methylPREDNISolone sodium succinate  20 mg Intravenous Q12H Yogi Arreola MD  metoprolol tartrate  100 mg Oral BID Julian MD Leobardo      ondansetron  4 mg Intravenous Q4H PRN Renny Lopez DO      pantoprazole  40 mg Oral Early Morning Jammie Vitale, MD      polyethylene glycol  17 g Oral Daily Jammie Vitale, MD      potassium chloride  40 mEq Oral Once Jammie Vitale MD      senna  8 6 mg Oral HS Julian MD Leobardo           Allergies   Allergen Reactions    Promethazine Delirium and Other (See Comments)         Vitals:   /59 (BP Location: Right arm)   Pulse 95   Temp 98 °F (36 7 °C) (Oral)   Resp 17   Ht 5' 4" (1 626 m)   Wt 77 7 kg (171 lb 4 8 oz)   SpO2 95%   BMI 29 40 kg/m²   Body mass index is 29 4 kg/m²  SpO2: 95 %,   SpO2 Activity: At Rest,   O2 Device: None (Room air)      Intake/Output Summary (Last 24 hours) at 8/3/2021 0937  Last data filed at 8/3/2021 0842  Gross per 24 hour   Intake 1140 ml   Output 1430 ml   Net -290 ml     Invasive Devices     Peripheral Intravenous Line            Peripheral IV 07/30/21 Dorsal (posterior); Left Hand 3 days          Drain            External Urinary Catheter Medium 5 days                Physical Exam  General: conscious, cooperative, in no acute distress  Eyes: conjunctivae pink, anicteric sclerae  ENT: lips and mucous membranes moist  Neck: supple, no JVD, no masses  Chest: essentially clear breath sounds bilaterally, no crackles, ronchus or wheezings  CVS: S1 & S2, normal rate, regular rhythm  Abdomen: soft, non-tender, non-distended, normoactive bowel sounds  Extremities: no edema of both legs  Skin: no rash  Neuro: awake, alert, oriented      Diagnostic Data:  Lab: I have personally reviewed pertinent lab results  ,   CBC:  Results from last 7 days   Lab Units 08/03/21  0528   WBC Thousand/uL 12 68*   HEMOGLOBIN g/dL 8 3*   HEMATOCRIT % 26 0*   PLATELETS Thousands/uL 360      CMP:   Lab Results   Component Value Date    SODIUM 142 08/03/2021    K 3 6 08/03/2021     08/03/2021    CO2 25 08/03/2021    BUN 18 08/03/2021    CREATININE 3 19 (H) 08/03/2021    CALCIUM 8 4 08/03/2021    AST 23 08/03/2021    ALT 16 08/03/2021    ALKPHOS 69 08/03/2021    EGFR 17 08/03/2021   ,   PT/INR: No results found for: PT, INR,   Magnesium: No components found for: MAG,  Phosphorous: No results found for: PHOS    Microbiology:  @LABRCNTIP,(urinecx:7)@        DIANDRA Batres    Portions of the record may have been created with voice recognition software  Occasional wrong word or "sound a like" substitutions may have occurred due to the inherent limitations of voice recognition software  Read the chart carefully and recognize, using context, where substitutions have occurred

## 2021-08-03 NOTE — OCCUPATIONAL THERAPY NOTE
Occupational Therapy Treatment Note     Patient Name: Kerri Denver  [id number] Date: 8/3/2021  Problem List  Principal Problem:    Right hip pain  Active Problems:    Dyslipidemia    GERD (gastroesophageal reflux disease)    Chronic diastolic CHF (congestive heart failure) (HCC)    Paroxysmal atrial fibrillation (HCC)    Acute kidney injury superimposed on CKD (HCC)    Sepsis (HCC)    Multiple renal cysts    Effusion of left knee    Mitral valve stenosis    Acute cystitis without hematuria    Acute gout    Transaminitis       08/03/21 0928   Note Type   Note Type Treatment   Restrictions/Precautions   Other Precautions Fall Risk;Telemetry; Chair Alarm; Bed Alarm   Pain Assessment   Pain Assessment Tool Pain Assessment not indicated - pt denies pain   Pain Score No Pain   ADL   Grooming Assistance 5  Supervision/Setup   Grooming Deficit Setup;Verbal cueing;Supervision/safety; Increased time to complete   Grooming Comments Pt able to complete grooming tasks of washing face and combing hair with supervision, provided set-up  Transfers   Sit to Stand 5  Supervision   Additional items Increased time required;Verbal cues   Stand to Sit 5  Supervision   Additional items Increased time required;Verbal cues   Stand pivot 4  Minimal assistance   Additional items Increased time required;Verbal cues   Additional Comments Pt seated in recliner chair at beginning of session  Able to transfer from seated to RW with supervision  Pt able to ambulate approximately 70ft in hallway with RW and Min A x1 for safety concerns regarding posture and balance  Verbal cues required for RW management and upright posture  Pt able to return to seated in recliner chair with supervision at end of session  Call bell within reach  Chair alarm intact  All needs met at this time,   Cognition   Overall Cognitive Status Impaired   Arousal/Participation Alert; Responsive; Cooperative   Attention Within functional limits   Orientation Level Oriented to person;Oriented to place;Oriented to situation;Disoriented to time   Memory Within functional limits   Following Commands Follows one step commands with increased time or repetition   Comments Pt oriented to self but disoriented to birthdate  Required significant cues for month, oriented to year  Activity Tolerance   Activity Tolerance Patient tolerated treatment well   Medical Staff Made Aware Spoke to SAHRA, Shell Saucedo and RNJennifer   Assessment   Assessment Pt completed OT tx session #2 focused on ADL performance and functional mobility this tx  Pt able to complete grooming tasks of washing face/hair and brushing hair with supervision, provided set-up  Pt able to complete STS transfer to RW with SPV  Able to ambulate approximately 70ft in hallway with Min A  Verbal cues required for RW management and upright posture  Continue to recommend post acute rehabilitation services at this time  Plan   Treatment Interventions ADL retraining;Functional transfer training; Endurance training   Goal Expiration Date 08/13/21   OT Treatment Day 2   OT Frequency 3-5x/wk   Recommendation   OT Discharge Recommendation Post acute rehabilitation services   AM-PAC Daily Activity Inpatient   Lower Body Dressing 3   Bathing 3   Toileting 2   Upper Body Dressing 3   Grooming 3   Eating 4   Daily Activity Raw Score 18   Daily Activity Standardized Score (Calc for Raw Score >=11) 38 66   AM-PAC Applied Cognition Inpatient   Following a Speech/Presentation 4   Understanding Ordinary Conversation 4   Taking Medications 3   Remembering Where Things Are Placed or Put Away 3   Remembering List of 4-5 Errands 2   Taking Care of Complicated Tasks 2   Applied Cognition Raw Score 18   Applied Cognition Standardized Score 38 07     Alba Guzman, OTR/L

## 2021-08-03 NOTE — ASSESSMENT & PLAN NOTE
AST and ALT elevated to 202 and 158 respectively on 8/2 likely secondary to ceftriaxone which was dc and replaced with cipro  LFTs monitored and have reduced significantly today, will continue to trend LFTs  Continue to hold statin and Tylenol for now

## 2021-08-03 NOTE — PLAN OF CARE
Problem: OCCUPATIONAL THERAPY ADULT  Goal: Performs self-care activities at highest level of function for planned discharge setting  See evaluation for individualized goals  Note: Limitation: Decreased ADL status, Decreased UE strength, Decreased Safe judgement during ADL, Decreased endurance, Decreased self-care trans, Decreased high-level ADLs, Decreased cognition     Assessment: Pt completed OT tx session #2 focused on ADL performance and functional mobility this tx  Pt able to complete grooming tasks of washing face/hair and brushing hair with supervision, provided set-up  Pt able to complete STS transfer to RW with SPV  Able to ambulate approximately 70ft in hallway with Min A  Verbal cues required for RW management and upright posture  Continue to recommend post acute rehabilitation services at this time       OT Discharge Recommendation: Post acute rehabilitation services  OT - OK to Discharge: Yes (once medically cleared)

## 2021-08-03 NOTE — PLAN OF CARE
Problem: PHYSICAL THERAPY ADULT  Goal: Performs mobility at highest level of function for planned discharge setting  See evaluation for individualized goals  Description:  Outcome: Progressing  Note: Prognosis: Good  Problem List: Decreased strength, Decreased endurance, Impaired balance, Decreased mobility, Pain  Assessment: Pt  seen for PT treatment session this date with interventions consisting of  therapeutic exercises,transfers and  gait training w/ emphasis on improving pt's ability to ambulate  Pt  Currently performing  tx and ambulation at ( SUP-min) x 1 level of function  The patient's AM-PAC Basic Mobility Inpatient Short Form Raw Score is 18, Standardized Score is 41 05  A standardized score less than 42 9 suggests the patient may benefit from discharge to post-acute rehabilitation services  Please also refer to physical therapy recommendation for safe DC planning  In comparison to previous session, Pt  With improvements in activity tolerance  Cues for safety and technique  Pt is in need of continued activity in PT to improve strength balance endurance mobility transfers and ambulation with return to maximize LOF  From PT/mobility standpoint, recommendation at time of d/c would be post acute rehab  in order to promote return to PLOF and independence  PT Discharge Recommendation: Post acute rehabilitation services         See flowsheet documentation for full assessment

## 2021-08-03 NOTE — ASSESSMENT & PLAN NOTE
Likely due to acute gout flare  No MRI due to pacemaker  · A bone scan was ordered to evaluate the patient's right hip, which did not show any evidence of fracture  · The left knee joint effusion was aspirated by Orthopedic Surgery on 07/30/2021, which showed monosodium urate crystals consistent with acute gout  · Utilized methylprednisolone 40 mg IV every 12 hours to treat the acute gout, now on 20 mg IV BID and likely will give PO soon  Continue pain meds (PRN Tylenol 650mg Q6 PRN, Dilaudid 0 5mg Q4 PRN, Oxycodone 5mg Q4 PRN)  PT/OT  · The patient will require short-term rehabilitation placement upon discharge

## 2021-08-03 NOTE — ASSESSMENT & PLAN NOTE
Lab Results   Component Value Date    EGFR 25 08/03/2021    EGFR 17 08/03/2021    EGFR 28 08/02/2021    CREATININE 2 26 (H) 08/03/2021    CREATININE 3 19 (H) 08/03/2021    CREATININE 2 07 (H) 08/02/2021     Acute pre renal injury, FENA 0 9, Treated with Isolyte IV fluids  Discontinue IV fluids on 07/31/2021 per Nephrology  Attempted retrial of loop diuretic 8/2, labs showed significantly elevated Cr of 3 19, repeat labs showed Cr of 2 26  Considering the elevation of the Cr, will hold torsemide again  Continue to avoid all nephrotoxic agents    Chronic kidney disease stage 4  Renal ultrasound shows chronic kidney disease and multiple bilateral cysts  Follow-up as outpatient with Nephrology

## 2021-08-03 NOTE — ASSESSMENT & PLAN NOTE
· The left knee joint effusion was aspirated by Orthopedic Surgery on 07/30/2021, which showed monosodium urate crystals consistent with an acute gout flare  · The right hip pain is also likely due to an acute gout flare   He reports that it has improved significantly  · methylprednisolone 40 mg IV every 12 hours was given to treat the acute gout, changed to 20 mg IV BID on 8/2, will likely be able to begin oral steroids soon

## 2021-08-03 NOTE — ASSESSMENT & PLAN NOTE
· Sepsis was present on admission and secondary to acute cystitis  · SIRS criteria was met with a leukocytosis and tachycardia  · The patient then developed fevers during the hospitalization  · The patient was seen in consultation by Orthopedic Surgery  · Continued ceftriaxone 1000 mg IV every 24 hours, switched to cipro 400 daily due to transaminitis on 8/2  · The patient cannot have an MRI of the right hip due to an indwelling permanent pacemaker  · A bone scan was ordered to evaluate the patient's right hip, which did not show any evidence of fracture  · The left knee joint effusion was aspirated by Orthopedic Surgery on 07/30/2021, which showed monosodium urate crystals consistent with acute gout    · Utilized methylprednisolone 40 mg IV every 12 hours to treat the acute gout, now switched to methylprednisolone 20 mg IV BID  · Culture results show no growth to date

## 2021-08-03 NOTE — ASSESSMENT & PLAN NOTE
· The left knee joint effusion was aspirated by Orthopedic Surgery on 07/30/2021, which showed monosodium urate crystals consistent with acute gout    · Utilized methylprednisolone 40 mg IV every 12 hours to treat the acute gout, now decreased to 20 mg IV BID, likely able to start PO soon  · Fluid cultures show no growth, can rule out septic left knee

## 2021-08-03 NOTE — PHYSICAL THERAPY NOTE
PHYSICAL THERAPY NOTE          Patient Name: Diamond James  [id number]'[id number] Date: 8/3/2021     08/03/21 1006   Note Type   Note Type Treatment   Pain Assessment   Pain Assessment Tool Pain Assessment not indicated - pt denies pain   Pain Score No Pain   Restrictions/Precautions   Other Precautions Fall Risk;Telemetry; Chair Alarm; Bed Alarm   Cognition   Overall Cognitive Status Impaired   Arousal/Participation Alert; Cooperative   Following Commands Follows one step commands with increased time or repetition   Subjective   Subjective Agreeable to therapy  No c/o back pain, states his legs are sore  Transfers   Sit to Stand 5  Supervision   Additional items Assist x 1; Armrests; Increased time required;Verbal cues   Stand to Sit 5  Supervision   Additional items Assist x 1; Armrests; Increased time required;Verbal cues   Stand pivot 4  Minimal assistance   Additional items Verbal cues   Ambulation/Elevation   Gait pattern Improper Weight shift; Forward Flexion;Decreased foot clearance; Excessively slow   Gait Assistance 4  Minimal assist   Additional items Assist x 1;Verbal cues   Assistive Device Rolling walker   Distance 70'   Balance   Static Sitting Fair +   Dynamic Sitting Fair +   Static Standing Fair   Dynamic Standing Fair -   Ambulatory Fair -  (RW)   Endurance Deficit   Endurance Deficit Yes   Activity Tolerance   Activity Tolerance Patient limited by fatigue   Exercises   Hip Flexion Sitting;15 reps   Hip Abduction Sitting;15 reps   Hip Adduction Sitting;15 reps   Knee AROM Long Arc Quad Sitting;15 reps   Ankle Pumps Sitting;15 reps   Assessment   Prognosis Good   Problem List Decreased strength;Decreased endurance; Impaired balance;Decreased mobility;Pain   Assessment Pt  seen for PT treatment session this date with interventions consisting of  therapeutic exercises,transfers and  gait training w/ emphasis on improving pt's ability to ambulate  Pt  Currently performing  tx and ambulation at ( SUP-min) x 1 level of function  The patient's AM-PAC Basic Mobility Inpatient Short Form Raw Score is 18, Standardized Score is 41 05  A standardized score less than 42 9 suggests the patient may benefit from discharge to post-acute rehabilitation services  Please also refer to physical therapy recommendation for safe DC planning  In comparison to previous session, Pt  With improvements in activity tolerance  Cues for safety and technique  Pt is in need of continued activity in PT to improve strength balance endurance mobility transfers and ambulation with return to maximize LOF  From PT/mobility standpoint, recommendation at time of d/c would be post acute rehab  in order to promote return to PLOF and independence  Goals   LTG Expiration Date 08/13/21   Plan   Treatment/Interventions Functional transfer training;LE strengthening/ROM; Therapeutic exercise; Endurance training;Bed mobility;Gait training   Progress Progressing toward goals   Recommendation   PT Discharge Recommendation Post acute rehabilitation services   AM-PAC Basic Mobility Inpatient   Turning in Bed Without Bedrails 3   Lying on Back to Sitting on Edge of Flat Bed 3   Moving Bed to Chair 3   Standing Up From Chair 4   Walk in Room 3   Climb 3-5 Stairs 2   Basic Mobility Inpatient Raw Score 18   Basic Mobility Standardized Score 41 05   Pt  OOB in chair  with call bell within reach, all lines intact and alarm on at end of PT session  Discussed with  PT today's treatment and patient's current level of function for care coordination

## 2021-08-03 NOTE — ASSESSMENT & PLAN NOTE
Wt Readings from Last 3 Encounters:   08/03/21 77 7 kg (171 lb 4 8 oz)   07/01/21 78 kg (172 lb)   06/03/21 78 1 kg (172 lb 2 oz)     · Monitor his volume status closely  · Daily weights  · Strict intake/output measurements  · Outpatient follow-up with Cardiology

## 2021-08-04 ENCOUNTER — TRANSITIONAL CARE MANAGEMENT (OUTPATIENT)
Dept: INTERNAL MEDICINE CLINIC | Facility: CLINIC | Age: 86
End: 2021-08-04

## 2021-08-04 VITALS
BODY MASS INDEX: 29.28 KG/M2 | HEIGHT: 64 IN | OXYGEN SATURATION: 98 % | WEIGHT: 171.52 LBS | TEMPERATURE: 97.7 F | SYSTOLIC BLOOD PRESSURE: 138 MMHG | RESPIRATION RATE: 16 BRPM | HEART RATE: 60 BPM | DIASTOLIC BLOOD PRESSURE: 58 MMHG

## 2021-08-04 LAB
ALBUMIN SERPL BCP-MCNC: 2.4 G/DL (ref 3.5–5)
ALP SERPL-CCNC: 67 U/L (ref 46–116)
ALT SERPL W P-5'-P-CCNC: 90 U/L (ref 12–78)
ANION GAP SERPL CALCULATED.3IONS-SCNC: 6 MMOL/L (ref 4–13)
AST SERPL W P-5'-P-CCNC: 38 U/L (ref 5–45)
BILIRUB SERPL-MCNC: 0.17 MG/DL (ref 0.2–1)
BUN SERPL-MCNC: 68 MG/DL (ref 5–25)
CALCIUM ALBUM COR SERPL-MCNC: 9.9 MG/DL (ref 8.3–10.1)
CALCIUM SERPL-MCNC: 8.6 MG/DL (ref 8.3–10.1)
CHLORIDE SERPL-SCNC: 101 MMOL/L (ref 100–108)
CO2 SERPL-SCNC: 28 MMOL/L (ref 21–32)
CREAT SERPL-MCNC: 2.12 MG/DL (ref 0.6–1.3)
ERYTHROCYTE [DISTWIDTH] IN BLOOD BY AUTOMATED COUNT: 13.1 % (ref 11.6–15.1)
GFR SERPL CREATININE-BSD FRML MDRD: 28 ML/MIN/1.73SQ M
GLUCOSE SERPL-MCNC: 172 MG/DL (ref 65–140)
HCT VFR BLD AUTO: 27.2 % (ref 36.5–49.3)
HGB BLD-MCNC: 8.8 G/DL (ref 12–17)
MAGNESIUM SERPL-MCNC: 2.4 MG/DL (ref 1.6–2.6)
MCH RBC QN AUTO: 29.2 PG (ref 26.8–34.3)
MCHC RBC AUTO-ENTMCNC: 32.4 G/DL (ref 31.4–37.4)
MCV RBC AUTO: 90 FL (ref 82–98)
NRBC BLD AUTO-RTO: 0 /100 WBCS
PLATELET # BLD AUTO: 360 THOUSANDS/UL (ref 149–390)
PMV BLD AUTO: 9.9 FL (ref 8.9–12.7)
POTASSIUM SERPL-SCNC: 4.9 MMOL/L (ref 3.5–5.3)
PROCALCITONIN SERPL-MCNC: 0.22 NG/ML
PROT SERPL-MCNC: 5.9 G/DL (ref 6.4–8.2)
RBC # BLD AUTO: 3.01 MILLION/UL (ref 3.88–5.62)
SODIUM SERPL-SCNC: 135 MMOL/L (ref 136–145)
WBC # BLD AUTO: 11.98 THOUSAND/UL (ref 4.31–10.16)

## 2021-08-04 PROCEDURE — 80053 COMPREHEN METABOLIC PANEL: CPT | Performed by: INTERNAL MEDICINE

## 2021-08-04 PROCEDURE — 83735 ASSAY OF MAGNESIUM: CPT | Performed by: INTERNAL MEDICINE

## 2021-08-04 PROCEDURE — 97535 SELF CARE MNGMENT TRAINING: CPT

## 2021-08-04 PROCEDURE — 97530 THERAPEUTIC ACTIVITIES: CPT

## 2021-08-04 PROCEDURE — 84145 PROCALCITONIN (PCT): CPT | Performed by: INTERNAL MEDICINE

## 2021-08-04 PROCEDURE — 97110 THERAPEUTIC EXERCISES: CPT

## 2021-08-04 PROCEDURE — 85027 COMPLETE CBC AUTOMATED: CPT | Performed by: INTERNAL MEDICINE

## 2021-08-04 PROCEDURE — 99239 HOSP IP/OBS DSCHRG MGMT >30: CPT | Performed by: PHYSICIAN ASSISTANT

## 2021-08-04 PROCEDURE — 99232 SBSQ HOSP IP/OBS MODERATE 35: CPT | Performed by: NURSE PRACTITIONER

## 2021-08-04 PROCEDURE — 97116 GAIT TRAINING THERAPY: CPT

## 2021-08-04 RX ORDER — ALLOPURINOL 100 MG/1
50 TABLET ORAL DAILY
Qty: 30 TABLET | Refills: 0 | Status: SHIPPED | OUTPATIENT
Start: 2021-08-05 | End: 2021-08-10 | Stop reason: SDUPTHER

## 2021-08-04 RX ORDER — PREDNISONE 20 MG/1
40 TABLET ORAL DAILY
Status: DISCONTINUED | OUTPATIENT
Start: 2021-08-04 | End: 2021-08-04 | Stop reason: HOSPADM

## 2021-08-04 RX ORDER — PREDNISONE 20 MG/1
TABLET ORAL
Qty: 10 TABLET | Refills: 0 | Status: SHIPPED | OUTPATIENT
Start: 2021-08-05 | End: 2021-08-13

## 2021-08-04 RX ADMIN — PREDNISONE 40 MG: 20 TABLET ORAL at 09:03

## 2021-08-04 RX ADMIN — POLYETHYLENE GLYCOL 3350 17 G: 17 POWDER, FOR SOLUTION ORAL at 09:05

## 2021-08-04 RX ADMIN — CIPROFLOXACIN 400 MG: 2 INJECTION, SOLUTION INTRAVENOUS at 09:06

## 2021-08-04 RX ADMIN — METOPROLOL TARTRATE 100 MG: 100 TABLET, FILM COATED ORAL at 09:03

## 2021-08-04 RX ADMIN — PANTOPRAZOLE SODIUM 40 MG: 40 TABLET, DELAYED RELEASE ORAL at 05:40

## 2021-08-04 RX ADMIN — ALLOPURINOL 50 MG: 100 TABLET ORAL at 09:02

## 2021-08-04 RX ADMIN — ASPIRIN 81 MG: 81 TABLET, COATED ORAL at 09:03

## 2021-08-04 RX ADMIN — APIXABAN 2.5 MG: 2.5 TABLET, FILM COATED ORAL at 09:03

## 2021-08-04 RX ADMIN — AMLODIPINE BESYLATE 5 MG: 5 TABLET ORAL at 09:03

## 2021-08-04 NOTE — INCIDENTAL FINDINGS
The following findings require follow up:  Radiographic finding   Finding: bilateral simple renal cysts   Follow up required: nephrology visit   Follow up should be done within 3  month(s)    Please notify the following clinician to assist with the follow up:   Dr Ben Franklin

## 2021-08-04 NOTE — CASE MANAGEMENT
Case Management Progress Note    Patient name Malu Lawton  Location [address] [id number] MRN [id number]  : 1935 Date 2021       LOS (days): 7  Geometric Mean LOS (GMLOS) (days): 3 50  Days to GMLOS:-3 5        BUNDLE:      OBJECTIVE:  Pt is a 80y o  year old /[employer], white or  [1], male with Holiness preference of Oriental orthodox admitted on 2021 11:01 AM  Pt is admitted to [facility] at [address] [place] [address] [facility] with complaints of Right hip pain   Current admission status: Inpatient  Preferred Pharmacy:   [facility] [id number]  Phone: [phone] Fax: [phone]    Primary Care Provider: Ayla Guerrier DO    Primary Insurance: MEDICARE  Secondary Insurance: [address] [facility] [address] [facility]    PROGRESS NOTE:  Pt's daughter definitely does not want patient to go a SNF  She would like him to go home with Home care   A post acute care recommendation was made by your care team for [facility]  Discussed [place] [facility] with patient  List of agencies given to patient via in person  patient aware the list is custom filtered for them by preference  and that [facility] post acute providers are designated  Pt would like me to make a referral to [facility]   Referral made as requested

## 2021-08-04 NOTE — ASSESSMENT & PLAN NOTE
· Urine cultures showed E coli and P mirabilis, he was started on ceftrixone IV but due to transaminitis was given ciprofloxacin 400 daily IV  Pt now completed abx dose and no longer needs abx     · Outpatient Urology evaluation    07/30/2021 9389 07/31/2021 1219 Urine culture [[id number]]   (Abnormal)   Urine, Clean Catch    Preliminary result [facility] [address] [place]  Component Value   Urine Culture >100,000 cfu/ml Escherichia coliAbnormal  P    10,000-19,000 cfu/ml Proteus mirabilisAbnormal  P

## 2021-08-04 NOTE — DISCHARGE INSTRUCTIONS
1  Do not take torsemide for now  2  Weigh yourself daily in the mornings or with your home health nurses  3  You are currently 171 pounds, should you gain 1-2 pounds please contact your primary care physician so that they can instruct you on restarting your torsemide  4   Do not restart taking your clonidine, have home health nurses check your blood pressure every day

## 2021-08-04 NOTE — PLAN OF CARE
Problem: PHYSICAL THERAPY ADULT  Goal: Performs mobility at highest level of function for planned discharge setting  See evaluation for individualized goals  Description:   Outcome: Progressing  Note: Prognosis: Good  Problem List: Decreased strength, Decreased endurance, Impaired balance, Decreased mobility, Decreased safety awareness, Pain  Assessment: Pt  seen for PT treatment session this date with interventions consisting of  therapeutic exercises, bed mobility, transfers and  gait training w/ emphasis on improving pt's ability to ambulate  Pt  Currently performing  tx and ambulation at ( SUp-min) x 1 level of function  The patient's AM-PAC Basic Mobility Inpatient Short Form Raw Score is 17, Standardized Score is 39 67  A standardized score less than 42 9 suggests the patient may benefit from discharge to post-acute rehabilitation services  Please also refer to physical therapy recommendation for safe DC planning  In comparison to previous session, Pt  With improvements in activity tolerance  Demonstrates improving endurance and decreased c/o back pain  However, decreased safety awareness, increasing risk for falls  Pt is in need of continued activity in PT to improve strength balance endurance mobility transfers and ambulation with return to maximize LOF  From PT/mobility standpoint, recommendation at time of d/c would be post acute rehab  in order to promote return to PLOF and independence  PT Discharge Recommendation: Post acute rehabilitation services     PT - OK to Discharge:  (When medically cleared)    See flowsheet documentation for full assessment

## 2021-08-04 NOTE — PLAN OF CARE
Problem: PAIN - ADULT  Goal: Verbalizes/displays adequate comfort level or baseline comfort level  Description: Interventions:  - Encourage patient to monitor pain and request assistance  - Assess pain using appropriate pain scale (0-10 pain scale)  - Administer analgesics based on type and severity of pain and evaluate response  - Implement non-pharmacological measures as appropriate and evaluate response  - Consider cultural and social influences on pain and pain management  - Notify physician/advanced practitioner if interventions unsuccessful or patient reports new pain  8/4/2021 1705 by Radha Bailey RN  Outcome: Adequate for Discharge  8/4/2021 0719 by Radha Bailey RN  Outcome: Progressing     Problem: INFECTION - ADULT  Goal: Absence or prevention of progression during hospitalization  Description: INTERVENTIONS:  - Assess and monitor for signs and symptoms of infection  - Monitor lab/diagnostic results  - Monitor all insertion sites, i e  indwelling lines, tubes, and drains  - Administer medications as ordered  - Instruct and encourage patient and family to use good hand hygiene technique  - Identify and instruct in appropriate isolation precautions for identified infection/condition  8/4/2021 1705 by Radha Bailey RN  Outcome: Adequate for Discharge  8/4/2021 0719 by Radha Bailey RN  Outcome: Progressing     Problem: SAFETY ADULT  Goal: Patient will remain free of falls  Description: INTERVENTIONS:  - Educate patient/family on patient safety including physical limitations  - Instruct patient to call for assistance with activity   - Consult OT/PT to assist with strengthening/mobility   - Keep Call bell within reach  - Keep bed low and locked with side rails adjusted as appropriate  - Keep care items and personal belongings within reach  - Initiate and maintain comfort rounds  - Make Fall Risk Sign visible to staff  - Offer Toileting every 4 Hours, in advance of need  - Initiate/Maintain bed alarm  - Obtain necessary fall risk management equipment  - Apply yellow socks and bracelet for high fall risk patients  - Consider moving patient to room near nurses station  8/4/2021 1705 by Kory Choudhury RN  Outcome: Adequate for Discharge  8/4/2021 0719 by Kory Choudhury RN  Outcome: Progressing  Goal: Maintain or return to baseline ADL function  Description: INTERVENTIONS:  -  Assess patient's ability to carry out ADLs; assess patient's baseline for ADL function and identify physical deficits which impact ability to perform ADLs (bathing, care of mouth/teeth, toileting, grooming, dressing, etc )  - Assess/evaluate cause of self-care deficits   - Assess range of motion  - Assess patient's mobility; develop plan if impaired  - Assess patient's need for assistive devices and provide as appropriate  - Encourage maximum independence but intervene and supervise when necessary  - Involve family in performance of ADLs  - Assess for home care needs following discharge   - Consider OT consult to assist with ADL evaluation and planning for discharge  - Provide patient education as appropriate  8/4/2021 1705 by Kory Choudhury RN  Outcome: Adequate for Discharge  8/4/2021 0719 by Kory Choudhury RN  Outcome: Progressing  Goal: Maintains/Returns to pre admission functional level  Description: INTERVENTIONS:  - Perform BMAT or MOVE assessment daily    - Set and communicate daily mobility goal to care team and patient/family/caregiver  - Collaborate with rehabilitation services on mobility goals if consulted  - Perform Range of Motion 2 times a day  - Reposition patient every 2 hours    - Dangle patient 2 times a day  - Stand patient 2 times a day  - Ambulate patient 2 times a day  - Out of bed to chair 2 times a day   - Out of bed for meals 2 times a day  - Out of bed for toileting  - Record patient progress and toleration of activity level   8/4/2021 1705 by Kory Choudhury RN  Outcome: Adequate for Discharge  8/4/2021 0719 by Casa Mcguire Isabella Restrepo RN  Outcome: Progressing     Problem: DISCHARGE PLANNING  Goal: Discharge to home or other facility with appropriate resources  Description: INTERVENTIONS:  - Identify barriers to discharge w/patient and caregiver  - Arrange for needed discharge resources and transportation as appropriate  - Identify discharge learning needs (meds, wound care, etc )  - Refer to Case Management Department for coordinating discharge planning if the patient needs post-hospital services based on physician/advanced practitioner order or complex needs related to functional status, cognitive ability, or social support system  8/4/2021 1705 by Rosey Noble RN  Outcome: Adequate for Discharge  8/4/2021 0719 by Rosey Noble RN  Outcome: Progressing     Problem: Knowledge Deficit  Goal: Patient/family/caregiver demonstrates understanding of disease process, treatment plan, medications, and discharge instructions  Description: Complete learning assessment and assess knowledge base    Interventions:  - Provide teaching at level of understanding  - Provide teaching via preferred learning methods  8/4/2021 1705 by Rosey Noble RN  Outcome: Adequate for Discharge  8/4/2021 0719 by Rosey Noble RN  Outcome: Progressing     Problem: MUSCULOSKELETAL - ADULT  Goal: Maintain or return mobility to safest level of function  Description: INTERVENTIONS:  - Assess patient's ability to carry out ADLs; assess patient's baseline for ADL function and identify physical deficits which impact ability to perform ADLs (bathing, care of mouth/teeth, toileting, grooming, dressing, etc )  - Assess/evaluate cause of self-care deficits   - Assess range of motion  - Assess patient's mobility  - Assess patient's need for assistive devices and provide as appropriate  - Encourage maximum independence but intervene and supervise when necessary  - Involve family in performance of ADLs  - Assess for home care needs following discharge   - Consider OT consult to assist with ADL evaluation and planning for discharge  - Provide patient education as appropriate  8/4/2021 1705 by Kory Choudhury RN  Outcome: Adequate for Discharge  8/4/2021 0719 by Kory Choudhury RN  Outcome: Progressing  Goal: Maintain proper alignment of affected body part  Description: INTERVENTIONS:  - Support, maintain and protect limb and body alignment  - Provide patient/ family with appropriate education  8/4/2021 1705 by Kory Choudhury RN  Outcome: Adequate for Discharge  8/4/2021 0719 by Kory Choudhury RN  Outcome: Progressing     Problem: GENITOURINARY - ADULT  Goal: Maintains or returns to baseline urinary function  Description: INTERVENTIONS:  - Assess urinary function  - Encourage oral fluids to ensure adequate hydration if ordered  - Administer IV fluids as ordered to ensure adequate hydration  - Administer ordered medications as needed  - Offer frequent toileting  - Follow urinary retention protocol if ordered  8/4/2021 1705 by Kory Choudhury RN  Outcome: Adequate for Discharge  8/4/2021 0719 by Kory Choudhury RN  Outcome: Progressing     Problem: METABOLIC, FLUID AND ELECTROLYTES - ADULT  Goal: Electrolytes maintained within normal limits  Description: INTERVENTIONS:  - Monitor labs and assess patient for signs and symptoms of electrolyte imbalances  - Administer electrolyte replacement as ordered  - Monitor response to electrolyte replacements, including repeat lab results as appropriate  - Instruct patient on fluid and nutrition as appropriate  8/4/2021 1705 by Kory Choudhury RN  Outcome: Adequate for Discharge  8/4/2021 0719 by Kory Choudhury RN  Outcome: Progressing  Goal: Fluid balance maintained  Description: INTERVENTIONS:  - Monitor labs   - Monitor I/O and WT  - Instruct patient on fluid and nutrition as appropriate  - Assess for signs & symptoms of volume excess or deficit  8/4/2021 1705 by Kory Choudhury RN  Outcome: Adequate for Discharge  8/4/2021 0719 by Kory Choudhury RN  Outcome: Progressing     Problem: MOBILITY - ADULT  Goal: Maintain or return to baseline ADL function  Description: INTERVENTIONS:  -  Assess patient's ability to carry out ADLs; assess patient's baseline for ADL function and identify physical deficits which impact ability to perform ADLs (bathing, care of mouth/teeth, toileting, grooming, dressing, etc )  - Assess/evaluate cause of self-care deficits   - Assess range of motion  - Assess patient's mobility; develop plan if impaired  - Assess patient's need for assistive devices and provide as appropriate  - Encourage maximum independence but intervene and supervise when necessary  - Involve family in performance of ADLs  - Assess for home care needs following discharge   - Consider OT consult to assist with ADL evaluation and planning for discharge  - Provide patient education as appropriate  8/4/2021 1705 by Sheeba Schroeder RN  Outcome: Adequate for Discharge  8/4/2021 0719 by Sheeba Schroeder RN  Outcome: Progressing  Goal: Maintains/Returns to pre admission functional level  Description: INTERVENTIONS:  - Perform BMAT or MOVE assessment daily    - Set and communicate daily mobility goal to care team and patient/family/caregiver  - Collaborate with rehabilitation services on mobility goals if consulted  - Perform Range of Motion 2 times a day  - Reposition patient every 2 hours    - Dangle patient 2 times a day  - Stand patient 2 times a day  - Ambulate patient 2 times a day  - Out of bed to chair 2 times a day   - Out of bed for meals 2 times a day  - Out of bed for toileting  - Record patient progress and toleration of activity level   8/4/2021 1705 by Sheeba Schroeder RN  Outcome: Adequate for Discharge  8/4/2021 0719 by Sheeba Schroeder RN  Outcome: Progressing     Problem: Prexisting or High Potential for Compromised Skin Integrity  Goal: Skin integrity is maintained or improved  Description: INTERVENTIONS:  - Identify patients at risk for skin breakdown  - Assess and monitor skin integrity  - Assess and monitor nutrition and hydration status  - Monitor labs   - Assess for incontinence   - Turn and reposition patient  - Assist with mobility/ambulation  - Relieve pressure over bony prominences  - Avoid friction and shearing  - Provide appropriate hygiene as needed including keeping skin clean and dry  - Evaluate need for skin moisturizer/barrier cream  - Collaborate with interdisciplinary team   - Patient/family teaching  - Consider wound care consult   8/4/2021 1705 by Ban Du RN  Outcome: Adequate for Discharge  8/4/2021 0719 by Ban Du RN  Outcome: Progressing     Problem: Potential for Falls  Goal: Patient will remain free of falls  Description: INTERVENTIONS:  - Educate patient/family on patient safety including physical limitations  - Instruct patient to call for assistance with activity   - Consult OT/PT to assist with strengthening/mobility   - Keep Call bell within reach  - Keep bed low and locked with side rails adjusted as appropriate  - Keep care items and personal belongings within reach  - Initiate and maintain comfort rounds  - Make Fall Risk Sign visible to staff  - Offer Toileting every 4 Hours, in advance of need  - Initiate/Maintain bed alarm  - Obtain necessary fall risk management equipment  - Apply yellow socks and bracelet for high fall risk patients  - Consider moving patient to room near nurses station  8/4/2021 1705 by Ban Du RN  Outcome: Adequate for Discharge  8/4/2021 0719 by Ban Du RN  Outcome: Progressing

## 2021-08-04 NOTE — DISCHARGE SUMMARY
[address] [facility] [address]  Discharge- Юлия Rivera 1935, 80 y o  male MRN: [id number]  Unit/Bed#: 985-09 Encounter: [id number]  Primary Care Provider: Preet Gandara DO   Date and time admitted to hospital: 7/28/2021 11:01 AM    Sepsis [facility])  Assessment & Plan  · Sepsis was present on admission and secondary to acute cystitis  · SIRS criteria was met with a leukocytosis and tachycardia  · The patient then developed fevers during the hospitalization  · The patient was seen in consultation by Orthopedic Surgery  · Continued ceftriaxone 1000 mg IV every 24 hours, switched to cipro 400 daily due to transaminitis on 8/2  · The patient cannot have an MRI of the right hip due to an indwelling permanent pacemaker  · A bone scan was ordered to evaluate the patient's right hip, which did not show any evidence of fracture  · The left knee joint effusion was aspirated by Orthopedic Surgery on 07/30/2021, which showed monosodium urate crystals consistent with acute gout  · Culture results show no growth to date  · Patient completed 3 day course of cipro and no longer needs any at home antibiotics, currently no signs or symptoms of infection or cystitis        Acute kidney injury superimposed on CKD [facility])  Assessment & Plan  Lab Results   Component Value Date    EGFR 28 08/04/2021    EGFR 25 08/03/2021    EGFR 17 08/03/2021    CREATININE 2 12 (H) 08/04/2021    CREATININE 2 26 (H) 08/03/2021    CREATININE 3 19 (H) 08/03/2021     Acute pre renal injury, FENA 0 9, Treated with Isolyte IV fluids  Discontinued IV fluids on 07/31/2021 per Nephrology  Remained off torsemide at admission and retrialed on 8/2 but labs showed significant elevated Cr of 3 19, repeat labs showed Cr of 2 26, held torsemide again and will not resume on discharge due to the patient's weight remaining stable throughout discharge at 77 kg without his diuretic   Explained in detail to pt that he needs to weight himself every morning and call should he gain weight for instruction to restart torsemide  Home health also intructed to do daily weights  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, Cr today stable at 2 12  Renal ultrasound shows chronic kidney disease and multiple bilateral cysts  Follow-up as outpatient with Nephrology  Will get BMP 3 days after discharge to monitor status    Acute cystitis without hematuria  Assessment & Plan  · Urine cultures showed E coli and P mirabilis, he was started on ceftrixone IV but due to transaminitis was given ciprofloxacin 400 daily IV  Pt now completed abx dose and no longer needs abx  · Outpatient Urology evaluation    07/30/2021 1969 07/31/2021 1219 Urine culture [[id number]]   (Abnormal)   Urine, Clean Catch    Preliminary result [address]  Component Value   Urine Culture >100,000 cfu/ml Escherichia coliAbnormal  P    10,000-19,000 cfu/ml Proteus mirabilisAbnormal  P                    Acute gout  Assessment & Plan  · The left knee joint effusion was aspirated by Orthopedic Surgery on 07/30/2021, which showed monosodium urate crystals consistent with an acute gout flare  · The right hip pain also likely due to an acute gout flare  He reports that it has improved significantly  · methylprednisolone 40 mg IV every 12 hours was given to treat the acute gout, changed to 20 mg IV BID on 8/2  · Allopurinol 50 mg begun 8/2, will continue on discharge  · Now transitioned to prednisone 40 mg PO, will taper over the next 7 days  · Discussed with the patient to avoid trigger foods such as cured meats and alcohol      Effusion of left knee  Assessment & Plan  · Resolved  · The left knee joint effusion was aspirated by Orthopedic Surgery on 07/30/2021, which showed monosodium urate crystals consistent with acute gout    · Utilized methylprednisolone 40 mg IV every 12 hours to treat the acute gout, now decreased to 20 mg IV BID, now transitioned to prednisone 40 mg to be tapered over 7 days  · Fluid cultures show no growth, can rule out septic left knee    Transaminitis  Assessment & Plan  AST and ALT elevated to 202 and 158 respectively on 8/2 likely secondary to ceftriaxone which was dc and replaced with cipro  LFTs monitored and have reduced significantly 8/3 and today  Held statin and Tylenol, will resume statin at discharge      Chronic diastolic CHF (congestive heart failure) ([facility] [address] )  Assessment & Plan  Wt Readings from Last 3 Encounters:   08/04/21 77 8 kg (171 lb 8 3 oz)   07/01/21 78 kg (172 lb)   06/03/21 78 1 kg (172 lb 2 oz)     · Patient's weight remained stable throughout admission despite being off torsemide with one retrial on 8/2  · Will remain off torsemide for now and instructed pt and home health for daily weight checks to determine patient's need for any further torsemide  · Outpatient follow-up with Cardiology      Multiple renal cysts  Assessment & Plan  Bilateral renal cysts found on ultrasound of kidneys  Outpatient surveillance imaging with PCP and nephrology    HTN  · Note that the pt was on clonidine prior to admission and it was held during admission  Patient's BP remained stable   Will continue to hold clonidine on discharge    Medical Problems     Resolved Problems  Date Reviewed: 8/4/2021    None              Discharging Physician / Practitioner: Cici Brunner PA-C  PCP: Carlos Juarez, DO  Admission Date:   Admission Orders (From admission, onward)     Ordered        07/28/21 1352  Inpatient Admission  Once                   Discharge Date: 08/04/21    Consultations During Hospital Stay:  · Orthopedics - Dr Radha Moeller  · Nephrology - Dr Aroldo Tran    Procedures Performed:   · none    Significant Findings / Test Results:   · XR hip R - unremarkable  · CT abd/pelvis - cholelithiasis, colonic diverticulosis, incidental finding of bilateral renal cysts  · CXR - unremarkable  · XR spine - degenerative changes  · US kidney and bladder - evidence of chronic renal disease, no hydronephrosis or perinephric collection, bilateral renal simple cysts L > R  · Bone scan - no occult fractures, DDD, activity increased in knees consistent with arthritis  · XR knee L - suprapatellar fullness suggestive of joint effusion    Incidental Findings:   · Bilateral renal cysts seen on CT    Test Results Pending at Discharge (will require follow up):   · none     Outpatient Tests Requested:  · Will need BMP in 3 days    Complications:  none    Reason for Admission: acute gout, acute cystitis    Hospital Course:   Jeremiah Vasquez is a 80 y o  male patient who originally presented to the hospital on 7/28/2021 due to acute gout pain  He had been experiencing right hip pain for three days prior to admission  It got to the point where the patient was unable to move due to the severe pain  He was brought to the ED  Initial imaging of XR, CT unremarkable  Pt labs showed elevated WBC, elevated Cr  He was found to have acute cystitis and cultures eventually revealed E coli and P Miribilis  He was started on high dose IV steroids and IV ceftriaxone  Ortho consulted on 7/29 for left knee swelling  Knee was aspirated on 7/30  Cultures have no growth to date, ruling out septic arthritis  On 8/2 patient's labs revealed transaminitis, likely secondary to ceftriaxone so abx were switched to ciprofloxacin with renal dose of 400 mg IV daily  Patient tolerated well and completed three days of cipro in addition to 5 days of ceftriaxone  He no longer needs antibiotics at this time  Regarding his acute gout, this is a new problem for this patient  He attributes the flare to drinking daily orange juice  His pain was localized to his right hip and left knee  Swelling and pain has slowly resolved with initial solumedrol 40 mg IV BID which was switched to 20 mg IV BID on 8/2  He is now able to be discharged on prednisone 40 mg PO to be tapered over 7 days       Patient has known diastolic dysfunction and he takes daily torsemide  Weights at cardiology office one year ago was 87 kg  On admission, weight was 78 kg and torsemide was held due to YOLANDE  Patient was retrialed on torsemide on 8/2 but Cr on 8/3 elevated to 3 1 and on recheck was 2 26 (still elevated)  Due to this, torsemide was held again  Despite only one dose of torsemide this entire admission, patient's weight has remained stable at 77 kg  He is to continue holding his torsemide at discharge with daily weight checks  Please see above list of diagnoses and related plan for additional information  Condition at Discharge: stable    Discharge Day Visit / Exam:   Subjective:  Patient reports that he is feeling well today and was able to walk around his room without pain  He states he is ready to go home with home health  Denies fevers or chills, chest pain, SOB, swelling, pain  Vitals: Blood Pressure: 138/58 (08/04/21 0720)  Pulse: 60 (08/04/21 0720)  Temperature: 97 7 °F (36 5 °C) (08/04/21 0720)  Temp Source: Oral (08/04/21 0720)  Respirations: 16 (08/04/21 0720)  Height: 5' 4" (162 6 cm) (07/28/21 1432)  Weight - Scale: 77 8 kg (171 lb 8 3 oz) (08/04/21 0600)  SpO2: 98 % (08/04/21 0720)  Exam:   Physical Exam  Constitutional:       Appearance: Normal appearance  He is not ill-appearing  HENT:      Head: Normocephalic and atraumatic  Cardiovascular:      Rate and Rhythm: Normal rate and regular rhythm  Pulses: Normal pulses  Heart sounds: Normal heart sounds  Pulmonary:      Effort: Pulmonary effort is normal       Breath sounds: Normal breath sounds  Abdominal:      General: Bowel sounds are normal       Palpations: Abdomen is soft  Musculoskeletal:      Cervical back: Normal range of motion and neck supple  Comments: ROM greatly improved since admission due to resolution of pain, able to ambulate several steps with walker  No longer any swelling of left knee  Right hip non tender   Skin:     General: Skin is warm and dry  Neurological:      Mental Status: He is alert and oriented to person, place, and time  Psychiatric:         Mood and Affect: Mood normal          Behavior: Behavior normal          Discussion with Family: Attempted to update  (wife) via phone  Unable to contact  Discharge instructions/Information to patient and family:   See after visit summary for information provided to patient and family  Provisions for Follow-Up Care:  See after visit summary for information related to follow-up care and any pertinent home health orders  Disposition:   Home with [facility] (Reminder: Complete face to face encounter)    Planned Readmission: none     Discharge Statement:  I spent 45 minutes discharging the patient  This time was spent on the day of discharge  I had direct contact with the patient on the day of discharge  Greater than 50% of the total time was spent examining patient, answering all patient questions, arranging and discussing plan of care with patient as well as directly providing post-discharge instructions  Additional time then spent on discharge activities  Discharge Medications:  See after visit summary for reconciled discharge medications provided to patient and/or family        **Please Note: This note may have been constructed using a voice recognition system**

## 2021-08-04 NOTE — ASSESSMENT & PLAN NOTE
Bilateral renal cysts found on ultrasound of kidneys  Outpatient surveillance imaging with PCP and nephrology

## 2021-08-04 NOTE — OCCUPATIONAL THERAPY NOTE
[address] Treatment Note     Patient Name: Mandeep ALLISON'S Date: 8/4/2021  Problem List  Principal Problem:    Right hip pain  Active Problems:    Dyslipidemia    GERD (gastroesophageal reflux disease)    Chronic diastolic CHF (congestive heart failure) (HCC)    Paroxysmal atrial fibrillation (HCC)    Acute kidney injury superimposed on CKD (HCC)    Sepsis (HCC)    Multiple renal cysts    Effusion of left knee    Mitral valve stenosis    Acute cystitis without hematuria    Acute gout    Transaminitis        08/04/21 1140   OT Last Visit   OT Visit Date 08/04/21   Note Type   Note Type Treatment   Restrictions/Precautions   Weight Bearing Precautions Per Order No   Other Precautions Chair Alarm;Telemetry; Fall Risk;Pain   Pain Assessment   Pain Assessment Tool 0-10   Pain Score 7   Pain Location/Orientation Orientation: Right;Location: Back   ADL   Where Assessed Chair   Grooming Assistance 5  Supervision/Setup   Grooming Deficit Verbal cueing;Supervision/safety; Increased time to complete   Grooming Comments Pt combed hair with set-up A while seated in recliner  LB Dressing Assistance 4  Minimal Assistance   LB Dressing Deficit Steadying;Verbal cueing;Supervision/safety; Increased time to complete; Thread RLE into pants; Thread LLE into pants   LB Dressing Comments Pt required min A to don/doff pants while seated in recliner  Pt able to thread BLE with significantly increased time  Pt's SpO2 decrease to 85% when leaning forward trying to fix pants  V C  to pt to remember to breath when leaning forward and focusing on unthreading pants  A required to steady while he pulled up pants in the back  (Supervision to don/doff socks bilaterally)   Bed Mobility   Additional Comments Pt seated in recliner at start of session  Pt on RA  Vitals WNL  Transfers   Sit to Stand 4  Minimal assistance   Additional items Assist x 1; Increased time required;Verbal cues   Stand to Sit 4  Minimal assistance Additional items Assist x 1; Armrests; Increased time required;Verbal cues   Stand pivot 4  Minimal assistance   Additional items Assist x 1; Increased time required;Verbal cues   Additional Comments No LOB during transfers  Functional Mobility   Functional Mobility 4  Minimal assistance   Additional Comments Pt performed functional mobility from recliner to window and to door and then back to recliner with min A, and use of RW  No LOB or unsteadiness  V C  to stay inside walker  Therapeutic Exercise - ROM   UE-ROM Yes   ROM- Right Upper Extremities   R Shoulder Flexion; Extension; External rotation; Internal rotation  (forward/backward circles, chest press)   R Elbow Elbow flexion;Elbow extension   R Weight/Reps/Sets 15 reps, 2 sets, 1# bar   RUE ROM Comment Pt attempted to try 3# bar for shoulder flexion/extension but noted too much  Pt did however want 3# bar for bicep curls, denied any pain  ROM - Left Upper Extremities    L Shoulder Flexion; Extension; External rotation; Internal rotation  (forward/backward circles, chest press)   L Elbow Elbow flexion;Elbow extension   L Weight/Reps/Sets 15 reps, 2 sets, 1# bar   Cognition   Overall Cognitive Status WFL   Arousal/Participation Alert; Responsive; Cooperative   Attention Attends with cues to redirect   Orientation Level Oriented X4   Memory Within functional limits   Following Commands Follows all commands and directions without difficulty   Comments Pt agreeable to OT treatment session  Activity Tolerance   Activity Tolerance Patient tolerated treatment well   Medical Staff Made Aware RN verbalized pt appropriate for OT treatment session      Assessment   Assessment  Pt is a 80 y o  male seen for skilled OT treatment session today focused on ADL retraining, functional transfer training, UE strengthening/ROM due to the following deficits impacting occupational performance: weakness, decreased ROM, decreased strength, decreased balance, decreased tolerance and decreased safety awareness  Pt was seated in recliner and agreeable to OT treatment session  Pt combed hair with set-up A while seated in recliner  Pt don/doff pants with min A while seated in recliner, requiring A to pull pants over hips while standing supported on RW  No LOB  Pt min A STS and support on RW  Pt performed functional mobility within room, to window and to door and back to recliner with min A and use of RW  No LOB or unsteadiness  Pt denied any dizziness or lightheadedness  Pt stand to sit with min A, v c  to use grab bars  Pt completed BUE ROM exercises including shoulder flexion/extension, IR/ER, forward/backward circles, chest press, and bicep curls with 1# bar, 15 reps, 2 sets (except 3# bar with bicep curl)  Pt denied any pain  Pt don/doff socks while seated in recliner with supervision  Pt left seated in recliner at end of session, all needs met, call bell within reach, SCDs on and chair alarm on  Pt to benefit from continued skilled OT tx while in the hospital to address deficits as defined above and maximize level of functional independence w ADL's and functional mobility  Occupational Performance areas to address include: grooming, bathing/shower, toilet hygiene, dressing, health maintenance and functional mobility  The patient's raw score on the AM-PAC Daily Activity inpatient short form is 18, standardized score is 38 66, less than 39 4  Patients at this level are likely to benefit from discharge to post-acute rehabilitation services  Pt continues to required skilled OT treatment to increase safety and maximize independence in ADLs and functional mobility  Please refer to the recommendation of the Occupational Therapist for safe discharge planning        Plan   Treatment Interventions ADL retraining;Functional transfer training;UE strengthening/ROM   Goal Expiration Date 08/13/21   OT Treatment Day 3   OT Frequency 3-5x/wk   Recommendation   OT Discharge Recommendation Post acute rehabilitation services   OT - OK to Discharge Yes  (once medically cleared)   Additional Comments  Pt left seated in recliner at end of session  All needs met, call bell within reach  Chair alarm on       AM-PAC Daily Activity Inpatient   Lower Body Dressing 3   Bathing 3   Toileting 2   Upper Body Dressing 3   Grooming 3   Eating 4   Daily Activity Raw Score 18   Daily Activity Standardized Score (Calc for Raw Score >=11) 38 66   AM-PAC Applied Cognition Inpatient   Following a Speech/Presentation 4   Understanding Ordinary Conversation 4   Taking Medications 4   Remembering Where Things Are Placed or Put Away 4   Remembering List of 4-5 Errands 4   Taking Care of Complicated Tasks 4   Applied Cognition Raw Score 24   Applied Cognition Standardized Score 62 21          iMldred Galicia, OT

## 2021-08-04 NOTE — CASE MANAGEMENT
Case Management Discharge Planning Note    Patient name Diamond Cart  Location [address] [id number] MRN [id number]  : 1935 Date 2021       Current Admission Date: 2021  Current Admission Diagnosis:  Right hip pain   Patient Active Problem List   Diagnosis    Acute blood loss anemia    Mild intermittent asthma without complication    CKD (chronic kidney disease) stage 4, GFR 15-29 ml/min (HCC)    Dyslipidemia    GERD (gastroesophageal reflux disease)    Late effects of cerebrovascular disease    Lumbar degenerative disc disease    Lumbar radiculopathy    Mitral regurgitation    Obesity (BMI 30-39  9)    Medicare annual wellness visit, subsequent    H/O aortic valve replacement    History of stroke    Chronic diastolic CH[facility]congestive heart failure) ([facility])    Ambulatory dysfunction    Paroxysmal atrial fibrillation ([facility])    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    Pacemaker    Oxygen dependent    Chronic combined systolic and diastolic congestive heart failure ([facility])    Melena    Mitral stenosis    Iron deficiency anemia    Need for immunization against influenza    [facility] emphysema [facility]    Left wrist pain    Right hip pain    Acute kidney injury superimposed on CKD ([facility]    Sepsis ([facility]    Multiple renal cysts    Effusion of left knee    Mitral valve stenosis    Acute cystitis without hematuria    Acute gout    Transaminitis    Previous Admission - Discharge Date:21   LOS (days): 7  Geometric Mean LOS (GMLOS) (days): 3 50  Days to GMLOS:-3 5 Previous Discharge Diagnosis:  There are no discharge diagnoses documented for the most recent discharge       Risk of Unplanned Readmission Score  Predictive Model Details          25 (Low)  Factor Value    Calculated 2021 12:04 20% Number of hospitalizations in last year 5    Risk of Unplanned Readmission Model 13% Number of active Rx orders 23     9% Number of ED visits in last six months 2     7% Latest BUN high (68 mg/dL)     6% Age 85     6% Current length of stay 6 925 days     6% Imaging order present in last 6 months     5% Latest hemoglobin low (8 8 g/dL)     5% Phosphorous result present     5% Charlson Comorbidity Index 5     4% Diagnosis of deficiency anemia present     4% Active anticoagulant Rx order present     4% Active corticosteroid Rx order present     4% Latest creatinine high (2 12 mg/dL)     3% Diagnosis of renal failure present     2% Future appointment scheduled     1% Active ulcer medication Rx order present         BUNDLE:      OBJECTIVE:  Pt is a 80y o  year old /[employer], white or  [1], male with Yarsani preference of Scientologist admitted on 7/28/2021 11:01 AM  Pt is admitted to [facility] at [address] [facility] [address] with complaints of Right hip pain   Current admission status: Inpatient  Preferred Pharmacy:   [facility]   [address] [place] [address] Road [facility]  Phone: [phone] Fax: [phone]    Primary Care Provider: Jenn Arciniega DO    Primary Insurance: MEDICARE  Secondary Insurance: [employer] [facility] REP    DISCHARGE DETAILS:    Discharge planning discussed with[de-identified] Pt and daughter Trang Betancourt of Choice: Yes         Requested [email or web address]         Is the patient interested in [facility] at discharge?: Yes  [address] requested[de-identified] Nursing, Occupational Therapy, Physical [address] Name[de-identified] [address] [place] [address] Provider[de-identified] PCP  Home Health Services Needed[de-identified] Evaluate Functional Status and Safety, Gait/ADL Training, Heart Failure Management  Homebound Criteria Met[de-identified] Uses an Assist Device (i e  cane, walker, etc)  Supporting Clincal Findings[de-identified] Fatigues Easliy in [employer], Limited Endurance  Discharge Destination Plan[de-identified] Home  Transportation at Discharge?:  (Pt's daughter will give the patient a ride home)     IMM Given (Date):: 08/03/21  IMM Given to[de-identified] Patient      [facility] the [facility]  Pt has a follow up appointment already arranged with PCP  I in basket messaged Nephrology , urology and Cardiology  To call the patient with a follow up appointment

## 2021-08-04 NOTE — PHYSICAL THERAPY NOTE
PHYSICAL THERAPY NOTE          Patient Name: Jeremiah Vasquez  EDTBR'J Date: 8/4/2021 08/04/21 7826   Note Type   Note Type Treatment   Pain Assessment   Pain Assessment Tool 0-10   Pain Score 5  (with ambulation)   Pain Location/Orientation Location: Back;Orientation: Right   Restrictions/Precautions   Weight Bearing Precautions Per Order No   Other Precautions Chair Alarm; Fall Risk;Pain   General   Family/Caregiver Present No   Cognition   Overall Cognitive Status WFL   Following Commands Follows one step commands without difficulty   Subjective   Subjective c/o back pain at 5/10 with ambulation  States I think I'm doing much better  Bed Mobility   Additional Comments OOB in chair at start and end of PT session   Transfers   Sit to Stand 5  Supervision   Additional items Assist x 1; Armrests; Increased time required;Verbal cues   Stand to Sit 4  Minimal assistance   Additional items Assist x 1; Armrests; Increased time required;Verbal cues   Stand pivot 4  Minimal assistance   Additional items Assist x 1;Verbal cues   Additional Comments Performs transfers with RW  Cues for safety and backing up to chair  Ambulation/Elevation   Gait pattern Improper Weight shift; Forward Flexion;Decreased foot clearance; Short stride   Gait Assistance 4  Minimal assist   Assistive Device Rolling walker   Distance 75'   Balance   Static Sitting Fair +   Dynamic Sitting Fair +   Static Standing Fair   Dynamic Standing Fair   Ambulatory Fair  (RW)   Endurance Deficit   Endurance Deficit Yes   Activity Tolerance   Activity Tolerance Patient limited by fatigue;Patient limited by pain   Exercises   Hip Flexion Sitting;15 reps   Hip Abduction Sitting;15 reps   Hip Adduction Sitting;15 reps   Knee AROM Long Arc Quad Sitting;15 reps   Ankle Pumps Sitting;15 reps   Assessment   Prognosis Good   Problem List Decreased strength;Decreased endurance; Impaired balance;Decreased mobility; Decreased safety awareness;Pain   Assessment Pt  seen for PT treatment session this date with interventions consisting of  therapeutic exercises, bed mobility, transfers and  gait training w/ emphasis on improving pt's ability to ambulate  Pt  Currently performing  tx and ambulation at ( SUp-min) x 1 level of function  The patient's AM-PAC Basic Mobility Inpatient Short Form Raw Score is 17, Standardized Score is 39 67  A standardized score less than 42 9 suggests the patient may benefit from discharge to post-acute rehabilitation services  Please also refer to physical therapy recommendation for safe DC planning  In comparison to previous session, Pt  With improvements in activity tolerance  Demonstrates improving endurance and decreased c/o back pain  However, decreased safety awareness, increasing risk for falls  Pt is in need of continued activity in PT to improve strength balance endurance mobility transfers and ambulation with return to maximize LOF  From PT/mobility standpoint, recommendation at time of d/c would be post acute rehab  in order to promote return to PLOF and independence  Goals   LTG Expiration Date 08/13/21   Plan   Treatment/Interventions Functional transfer training;LE strengthening/ROM; Elevations; Therapeutic exercise; Bed mobility;Gait training   Progress Progressing toward goals   Recommendation   PT Discharge Recommendation Post acute rehabilitation services   AM-[facility] Basic Mobility Inpatient   Turning in Bed Without Bedrails 3   Lying on Back to Sitting on Edge of Flat Bed 3   Moving Bed to Chair 3   Standing Up From Chair 3   Walk in Room 3   Climb 3-5 Stairs 2   Basic Mobility Inpatient Raw Score 17   Basic Mobility Standardized Score 39 67   Pt  OOB in chair  with call bell within reach, all lines intact and alarm on at end of PT session  Discussed with  PT today's treatment and patient's current level of function for care coordination

## 2021-08-04 NOTE — ASSESSMENT & PLAN NOTE
AST and ALT elevated to 202 and 158 respectively on 8/2 likely secondary to ceftriaxone which was dc and replaced with cipro  LFTs monitored and have reduced significantly 8/3 and today  Held statin and Tylenol, will resume statin at discharge

## 2021-08-04 NOTE — PROGRESS NOTES
NEPHROLOGY PROGRESS NOTE   Reginald Yan 80 y o  male MRN: [id number]  Unit/Bed#: 030-70 Encounter: [id number]    Assessment/Plan:    Yair Tracy a 80 y  o  male with CKD 4, CdHF, hypertension, admitted 7/28/21 with chief complaint of right hip pain being treated for acute right hip pain due to urate arthritis, YOLANDE, left knee effusion  Renal following along for YOLANDE  Plan outlined below       1  Acute kidney injury (POA) atop chronic kidney disease- resolved  2  Stage 4 chronic kidney disease with baseline creatinine around 1 8-2 2 mg/dL  3  Mild proteinuria  ? Reevaluate urine protein studies in usual state of health  4  Hypertensive nephrosclerosis, likely  ? Blood pressure controlled, no medication changes made  5  Gouty arthopathy  ? S/p left knee arthrocentesis by ortho surgery found to have gouty arthropathy receiving steroids and allopurinol  6  E  Coli UTI without hematuria  ? Receiving ciprofloxacin per primary team  7  Chronic diastolic congestive heart failure  ? Torsemide on hold, monitor closely for need to re-initiate  Continue daily weights, strict IO, sodium restriction  Will add very gentle fluid restriction  8  Renal cysts      ROS  Complains of constipation  A complete 10 point review of systems have been performed and are otherwise negative  Historical Information   Past Medical History:   Diagnosis Date    Aortic stenosis     ECHO -4-14-14   MODERATE TO SEVERE AORTIC STENOSIS; MILD AROTIC INSUFFICIENCY - LAST ASSESSED 10/26/16; RESOLVED 6/8/16    Aortic valve disorder     LAST ASSESSED 10/8/15; 10/8/15    Arthritis     [facility]     Complete heart block ([address] ) 7/20/2020    Coronary artery disease     Hypertension     Hypertensive urgency 5/19/2019    Renal disorder     TIA (transient ischemic attack)     Transient cerebral ischemia      Past Surgical History:   Procedure Laterality Date    AORTIC VALVE REPLACEMENT  06/30/2015    avr with 23 mm [place] Magna Ease bioprosthetic    CARDIAC PACEMAKER PLACEMENT Left 07/24/2020    CATARACT EXTRACTION Right 06/05/2000    COLONOSCOPY      CORONARY ARTERY BYPASS GRAFT  06/05/2000    x1 with argueta  to lad    EYE SURGERY      POLYPECTOMY  04/2014    enteroscopic - esophageal ulcer, gastric erosion, and duodenal ulcer       TONSILLECTOMY      unknown     Social History   Social History     Substance and Sexual Activity   Alcohol Use Never    Alcohol/week: 0 0 standard drinks    Comment: very occasional     Social History     Substance and Sexual Activity   Drug Use Never     Social History     Tobacco Use   Smoking Status Never Smoker   Smokeless Tobacco Never Used       Family History:   Family History   Problem Relation Age of Onset    No Known Problems Mother     No Known Problems Father     Coronary artery disease Neg Hx        Medications:  Pertinent medications were reviewed  Current Facility-Administered Medications   Medication Dose Route Frequency Provider Last Rate    allopurinol  50 mg Oral Daily DmitriySTEPHANIE TeagueNP      amLODIPine  5 mg Oral Daily STEPHANIE NewmanNP      apixaban  2 5 mg Oral BID Jamir Steinberg MD      aspirin  81 mg Oral Daily Jamir Steinberg MD      ciprofloxacin  400 mg Intravenous Q24H Saji Ramírez  mg (08/04/21 0906)    oxyCODONE  5 mg Oral Q4H PRN Stefani Bound, DO      Or    HYDROmorphone  0 5 mg Intravenous Q4H PRN Stfeani Bound, DO      metoprolol tartrate  100 mg Oral BID Jamir Steinberg MD      ondansetron  4 mg Intravenous Q4H PRN Stefani Bound, DO      pantoprazole  40 mg Oral Early Morning Saji Ramírez MD      polyethylene glycol  17 g Oral Daily Saji Ramírez MD      predniSONE  40 mg Oral Daily Saji Ramírez MD      senna  8 6 mg Oral HS Jamir Steinberg MD           Allergies   Allergen Reactions    Promethazine Delirium and Other (See Comments)         Vitals:   /58 (BP Location: Right arm) Pulse 60   Temp 97 7 °F (36 5 °C) (Oral)   Resp 16   Ht 5' 4" (1 626 m)   Wt 77 8 kg (171 lb 8 3 oz)   SpO2 98%   BMI 29 44 kg/m²   Body mass index is 29 44 kg/m²  SpO2: 98 %,   SpO2 Activity: At Rest,   O2 Device: None (Room air)      Intake/Output Summary (Last 24 hours) at 8/4/2021 0911  Last data filed at 8/4/2021 9036  Gross per 24 hour   Intake 660 ml   Output 930 ml   Net -270 ml     Invasive Devices     Peripheral Intravenous Line            Peripheral IV 07/30/21 Dorsal (posterior); Left Hand 4 days          Drain            External Urinary Catheter Medium 6 days                Physical Exam  General: conscious, cooperative, in no acute distress  Eyes: conjunctivae pink, anicteric sclerae  ENT: lips and mucous membranes moist  Neck: supple, no JVD, no masses  Chest: clear breath sounds bilaterally, no crackles, ronchus or wheezings  CVS: S1 & S2, normal rate, regular rhythm  Abdomen: soft, non-tender, non-distended, normoactive bowel sounds  Extremities: no edema of both legs  Skin: no rash  Neuro: awake, alert, oriented      Diagnostic Data:  Lab: I have personally reviewed pertinent lab results  ,   CBC:  Results from last 7 days   Lab Units 08/04/21  0509   WBC Thousand/uL 11 98*   HEMOGLOBIN g/dL 8 8*   HEMATOCRIT % 27 2*   PLATELETS Thousands/uL 360      CMP:   Lab Results   Component Value Date    SODIUM 135 (L) 08/04/2021    K 4 9 08/04/2021     08/04/2021    CO2 28 08/04/2021    BUN 68 (H) 08/04/2021    CREATININE 2 12 (H) 08/04/2021    CALCIUM 8 6 08/04/2021    AST 38 08/04/2021    ALT 90 (H) 08/04/2021    ALKPHOS 67 08/04/2021    EGFR 28 08/04/2021   ,   PT/INR: No results found for: PT, INR,   Magnesium: No components found for: MAG,  Phosphorous: No results found for: PHOS    Microbiology:  @LABRCNTIP,(urinecx:7)@        DIANDRA Patrick    Portions of the record may have been created with voice recognition software   Occasional wrong word or "sound a like" substitutions may have occurred due to the inherent limitations of voice recognition software  Read the chart carefully and recognize, using context, where substitutions have occurred

## 2021-08-04 NOTE — ASSESSMENT & PLAN NOTE
Wt Readings from Last 3 Encounters:   08/04/21 77 8 kg (171 lb 8 3 oz)   07/01/21 78 kg (172 lb)   06/03/21 78 1 kg (172 lb 2 oz)     · Patient's weight remained stable throughout admission despite being off torsemide with one retrial on 8/2  · Will remain off torsemide for now and instructed pt and home health for daily weight checks to determine patient's need for any further torsemide  · Outpatient follow-up with Cardiology

## 2021-08-04 NOTE — ASSESSMENT & PLAN NOTE
· The left knee joint effusion was aspirated by Orthopedic Surgery on 07/30/2021, which showed monosodium urate crystals consistent with an acute gout flare  · The right hip pain also likely due to an acute gout flare   He reports that it has improved significantly  · methylprednisolone 40 mg IV every 12 hours was given to treat the acute gout, changed to 20 mg IV BID on 8/2  · Allopurinol 50 mg begun 8/2, will continue on discharge  · Now transitioned to prednisone 40 mg PO, will taper over the next 7 days  · Discussed with the patient to avoid trigger foods such as cured meats and alcohol

## 2021-08-04 NOTE — ASSESSMENT & PLAN NOTE
Lab Results   Component Value Date    EGFR 28 08/04/2021    EGFR 25 08/03/2021    EGFR 17 08/03/2021    CREATININE 2 12 (H) 08/04/2021    CREATININE 2 26 (H) 08/03/2021    CREATININE 3 19 (H) 08/03/2021     Acute pre renal injury, FENA 0 9, Treated with Isolyte IV fluids  Discontinued IV fluids on 07/31/2021 per Nephrology  Remained off torsemide at admission and retrialed on 8/2 but labs showed significant elevated Cr of 3 19, repeat labs showed Cr of 2 26, held torsemide again and will not resume on discharge due to the patient's weight remaining stable throughout discharge at 77 kg without his diuretic  Explained in detail to pt that he needs to weight himself every morning and call should he gain weight for instruction to restart torsemide  Home health also intructed to do daily weights  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, Cr today stable at 2 12    Renal ultrasound shows chronic kidney disease and multiple bilateral cysts  Follow-up as outpatient with Nephrology  Will get BMP 3 days after discharge to monitor status

## 2021-08-04 NOTE — PLAN OF CARE
Problem: OCCUPATIONAL THERAPY ADULT  Goal: Performs self-care activities at highest level of function for planned discharge setting  See evaluation for individualized goals  Description: Treatment Interventions: ADL retraining, Functional transfer training, UE strengthening/ROM          See flowsheet documentation for full assessment, interventions and recommendations  Outcome: Progressing  Note: Limitation: Decreased ADL status, Decreased UE strength, Decreased Safe judgement during ADL, Decreased endurance, Decreased self-care trans, Decreased high-level ADLs, Decreased cognition     Assessment:  Pt is a 80 y o  male seen for skilled OT treatment session today focused on ADL retraining, functional transfer training, UE strengthening/ROM due to the following deficits impacting occupational performance: weakness, decreased ROM, decreased strength, decreased balance, decreased tolerance and decreased safety awareness  Pt was seated in recliner and agreeable to OT treatment session  Pt combed hair with set-up A while seated in recliner  Pt don/doff pants with min A while seated in recliner, requiring A to pull pants over hips while standing supported on RW  No LOB  Pt min A STS and support on RW  Pt performed functional mobility within room, to window and to door and back to recliner with min A and use of RW  No LOB or unsteadiness  Pt denied any dizziness or lightheadedness  Pt stand to sit with min A, v c  to use grab bars  Pt completed BUE ROM exercises including shoulder flexion/extension, IR/ER, forward/backward circles, chest press, and bicep curls with 1# bar, 15 reps, 2 sets (except 3# bar with bicep curl)  Pt denied any pain  Pt don/doff socks while seated in recliner with supervision  Pt left seated in recliner at end of session, all needs met, call bell within reach, SCDs on and chair alarm on   Pt to benefit from continued skilled OT tx while in the hospital to address deficits as defined above and maximize level of functional independence w ADL's and functional mobility  Occupational Performance areas to address include: grooming, bathing/shower, toilet hygiene, dressing, health maintenance and functional mobility  The patient's raw score on the AM-PAC Daily Activity inpatient short form is 18, standardized score is 38 66, less than 39 4  Patients at this level are likely to benefit from discharge to post-acute rehabilitation services  Pt continues to required skilled OT treatment to increase safety and maximize independence in ADLs and functional mobility  Please refer to the recommendation of the Occupational Therapist for safe discharge planning          OT Discharge Recommendation: Post acute rehabilitation services  OT - OK to Discharge: Yes (once medically cleared)    Jose Quevedo OT

## 2021-08-04 NOTE — ASSESSMENT & PLAN NOTE
· Sepsis was present on admission and secondary to acute cystitis  · SIRS criteria was met with a leukocytosis and tachycardia  · The patient then developed fevers during the hospitalization  · The patient was seen in consultation by Orthopedic Surgery  · Continued ceftriaxone 1000 mg IV every 24 hours, switched to cipro 400 daily due to transaminitis on 8/2  · The patient cannot have an MRI of the right hip due to an indwelling permanent pacemaker  · A bone scan was ordered to evaluate the patient's right hip, which did not show any evidence of fracture  · The left knee joint effusion was aspirated by Orthopedic Surgery on 07/30/2021, which showed monosodium urate crystals consistent with acute gout    · Culture results show no growth to date  · Patient completed 3 day course of cipro and no longer needs any at home antibiotics, currently no signs or symptoms of infection or cystitis

## 2021-08-04 NOTE — ASSESSMENT & PLAN NOTE
· Resolved  · The left knee joint effusion was aspirated by Orthopedic Surgery on 07/30/2021, which showed monosodium urate crystals consistent with acute gout    · Utilized methylprednisolone 40 mg IV every 12 hours to treat the acute gout, now decreased to 20 mg IV BID, now transitioned to prednisone 40 mg to be tapered over 7 days  · Fluid cultures show no growth, can rule out septic left knee

## 2021-08-05 ENCOUNTER — TELEPHONE (OUTPATIENT)
Dept: NEPHROLOGY | Facility: CLINIC | Age: 86
End: 2021-08-05

## 2021-08-05 ENCOUNTER — RA CDI HCC (OUTPATIENT)
Dept: OTHER | Facility: HOSPITAL | Age: 86
End: 2021-08-05

## 2021-08-05 NOTE — TELEPHONE ENCOUNTER
----- Message from Miller Moeller RN sent at 8/4/2021  1:39 PM EDT -----  Regarding: follow up appt  Pt is being discharged today from [facility]  Please call  the patient to schedule a follow up appointment                                                Thanks                                     Case Management

## 2021-08-05 NOTE — PROGRESS NOTES
[place] [address]  coding opportunities             Chart reviewed, (number of) suggestions sent to provider: 1     Problem listed updated  Provider Accepted, (number of) suggestions accepted: 1            Number of suggestions NOT actually used: 1     Patients insurance company: [address]  (Medicare [employer]vantage and [employer])     Visit status: Patient arrived for their scheduled appointment        [place] [address]  coding opportunities             Chart reviewed, (number of) suggestions sent to provider: 1     Problem listed updated   Provider Accepted, (number of) suggestions accepted: 1               Patients insurance company: [address]  (Medicare [employer]vantage and [employer])           [place] [address]  coding opportunities        DX: 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

## 2021-08-06 ENCOUNTER — LAB REQUISITION (OUTPATIENT)
Dept: LAB | Facility: HOSPITAL | Age: 86
End: 2021-08-06
Payer: COMMERCIAL

## 2021-08-06 DIAGNOSIS — N17.9 ACUTE KIDNEY FAILURE, UNSPECIFIED (HCC): ICD-10-CM

## 2021-08-06 DIAGNOSIS — N18.9 CHRONIC KIDNEY DISEASE, UNSPECIFIED: ICD-10-CM

## 2021-08-06 PROBLEM — I13.0 HYPERTENSIVE HEART AND CHRONIC KIDNEY DISEASE WITH HEART FAILURE AND STAGE 1 THROUGH STAGE 4 CHRONIC KIDNEY DISEASE, OR CHRONIC KIDNEY DISEASE (HCC): Status: ACTIVE | Noted: 2021-08-06

## 2021-08-06 LAB
ANION GAP SERPL CALCULATED.3IONS-SCNC: 7 MMOL/L (ref 4–13)
BUN SERPL-MCNC: 69 MG/DL (ref 5–25)
CALCIUM SERPL-MCNC: 8.9 MG/DL (ref 8.3–10.1)
CHLORIDE SERPL-SCNC: 101 MMOL/L (ref 100–108)
CO2 SERPL-SCNC: 29 MMOL/L (ref 21–32)
CREAT SERPL-MCNC: 2.08 MG/DL (ref 0.6–1.3)
GFR SERPL CREATININE-BSD FRML MDRD: 28 ML/MIN/1.73SQ M
GLUCOSE SERPL-MCNC: 136 MG/DL (ref 65–140)
POTASSIUM SERPL-SCNC: 5.5 MMOL/L (ref 3.5–5.3)
SODIUM SERPL-SCNC: 137 MMOL/L (ref 136–145)

## 2021-08-06 PROCEDURE — 80048 BASIC METABOLIC PNL TOTAL CA: CPT | Performed by: INTERNAL MEDICINE

## 2021-08-10 ENCOUNTER — TELEPHONE (OUTPATIENT)
Dept: INTERNAL MEDICINE CLINIC | Facility: CLINIC | Age: 86
End: 2021-08-10

## 2021-08-10 ENCOUNTER — OFFICE VISIT (OUTPATIENT)
Dept: INTERNAL MEDICINE CLINIC | Facility: CLINIC | Age: 86
End: 2021-08-10
Payer: COMMERCIAL

## 2021-08-10 VITALS
DIASTOLIC BLOOD PRESSURE: 70 MMHG | HEART RATE: 63 BPM | TEMPERATURE: 97 F | SYSTOLIC BLOOD PRESSURE: 128 MMHG | OXYGEN SATURATION: 99 %

## 2021-08-10 DIAGNOSIS — M10.9 ACUTE GOUT OF MULTIPLE SITES, UNSPECIFIED CAUSE: ICD-10-CM

## 2021-08-10 DIAGNOSIS — M54.16 LUMBAR RADICULOPATHY: ICD-10-CM

## 2021-08-10 DIAGNOSIS — N17.9 ACUTE KIDNEY INJURY SUPERIMPOSED ON CKD (HCC): ICD-10-CM

## 2021-08-10 DIAGNOSIS — I50.32 CHRONIC DIASTOLIC CHF (CONGESTIVE HEART FAILURE) (HCC): ICD-10-CM

## 2021-08-10 DIAGNOSIS — G89.29 CHRONIC LOW BACK PAIN WITHOUT SCIATICA, UNSPECIFIED BACK PAIN LATERALITY: Primary | ICD-10-CM

## 2021-08-10 DIAGNOSIS — N30.00 ACUTE CYSTITIS WITHOUT HEMATURIA: ICD-10-CM

## 2021-08-10 DIAGNOSIS — M54.50 CHRONIC LOW BACK PAIN WITHOUT SCIATICA, UNSPECIFIED BACK PAIN LATERALITY: Primary | ICD-10-CM

## 2021-08-10 DIAGNOSIS — N18.9 ACUTE KIDNEY INJURY SUPERIMPOSED ON CKD (HCC): ICD-10-CM

## 2021-08-10 PROBLEM — K92.1 MELENA: Status: RESOLVED | Noted: 2020-08-24 | Resolved: 2021-08-10

## 2021-08-10 PROCEDURE — 99496 TRANSJ CARE MGMT HIGH F2F 7D: CPT | Performed by: INTERNAL MEDICINE

## 2021-08-10 PROCEDURE — 1111F DSCHRG MED/CURRENT MED MERGE: CPT | Performed by: INTERNAL MEDICINE

## 2021-08-10 RX ORDER — ALLOPURINOL 100 MG/1
50 TABLET ORAL DAILY
Qty: 30 TABLET | Refills: 5 | Status: SHIPPED | OUTPATIENT
Start: 2021-08-10 | End: 2021-08-10 | Stop reason: SDUPTHER

## 2021-08-10 RX ORDER — GABAPENTIN 100 MG/1
100 CAPSULE ORAL
Qty: 30 CAPSULE | Refills: 5 | Status: SHIPPED | OUTPATIENT
Start: 2021-08-10 | End: 2022-05-27 | Stop reason: SDUPTHER

## 2021-08-10 RX ORDER — ALLOPURINOL 100 MG/1
50 TABLET ORAL DAILY
Qty: 30 TABLET | Refills: 5 | Status: SHIPPED | OUTPATIENT
Start: 2021-08-10 | End: 2021-09-27 | Stop reason: SDUPTHER

## 2021-08-10 NOTE — PROGRESS NOTES
Assessment/Plan:         Diagnoses and all orders for this visit:    Chronic low back pain without sciatica, unspecified back pain laterality  -     gabapentin (NEURONTIN) 100 mg capsule; Take 1 capsule (100 mg total) by mouth daily at bedtime  PT at home     Acute gout of multiple sites, unspecified cause  -     allopurinol (ZYLOPRIM) 100 mg tablet; Take 0 5 tablets (50 mg total) by mouth daily  Finishing prednisone taper tomorrow  Acute cystitis without hematuria  Finished cipro and no complaints has urology followup    Acute kidney injury superimposed on CKD [facility])  Has nephrology appt in followup   Recheck bmp Increase water intake limit potassium in diet     Chronic diastolic CHF (congestive heart failure) ([facility])  Stable Compression socks and elevate lower leg     Lumbar radiculopathy  Gabapnetin in PM Tylenol BID      Rto October      Patient ID: Jamar Cheng is a 80 y o  male      HPI  Pt recently hospitalized for gout of knee and uti He is home and has home therapy started His wife states he is actually walking in the home with the walker more than he was prior No knee pain He finished prednisone today from the hospital taper No chest pain or sob No falls His complaint today is back pain No injury that he is aware Stiff in Am He finished antibx for uti and no sxs reported He has urology and nephro followup upcoming No change in bowels His appetite is good No fever or chills No n/v He takes Tylenol prn for the back but asking for rx Demadex was stopped in the hospital due to kidney dysfunction     TCM Call (since 7/10/2021)     Date and time call was made  8/4/2021  1:15 PM    Hospital care reviewed  Records reviewed    Patient was hospitialized at  [address] [place] [address]        Date of Admission  07/28/21    Date of discharge  08/04/21    Diagnosis  sepsis, CKD, acute cystitis without hematuria    Disposition  Home    Were the patients medications reviewed and updated  No    Current Symptoms  None      TCM Call (since 7/10/2021)     Post hospital issues  Reduced activity    Should patient be enrolled in anticoag monitoring? No    Scheduled for follow up? Yes    Patients specialists  Nephrologist (Comment)  ortho    Did you obtain your prescribed medications  Yes    Do you need help managing your prescriptions or medications  No    Is transportation to your appointment needed  No    I have advised the patient to call PCP with any new or worsening symptoms  Louise Whitaker,  II    Living Arrangements  Spouse or Significiant other; Family members    Support System  Family; Spouse    The type of support provided  Physical    Do you have social support  Yes, quite a bit    Are you recieving any outpatient services  No    Are you recieving home care services  Yes    Types of home care services  Home health aid    Are you using any community resources  No    Current waiver services  No    Have you fallen in the last 12 months  No    Interperter language line needed  No    Counseling  Patient          Review of Systems   Constitutional: Positive for activity change  Negative for chills, fatigue and fever  HENT: Negative  Eyes: Negative for visual disturbance  Respiratory: Negative for cough, chest tightness and shortness of breath  Cardiovascular: Positive for leg swelling  Negative for chest pain and palpitations  Gastrointestinal: Negative for abdominal distention, abdominal pain, blood in stool, constipation and diarrhea  Genitourinary: Negative for difficulty urinating, flank pain, frequency and hematuria  Musculoskeletal: Positive for arthralgias, back pain and gait problem  Negative for joint swelling  Neurological: Negative for dizziness, light-headedness and headaches  Psychiatric/Behavioral: Negative for sleep disturbance  The patient is not nervous/anxious  Past Medical History:   Diagnosis Date    Aortic stenosis     ECHO -4-14-14   MODERATE TO SEVERE AORTIC STENOSIS; MILD AROTIC INSUFFICIENCY - LAST ASSESSED 10/26/16; RESOLVED 6/8/16    Aortic valve disorder     LAST ASSESSED 10/8/15; 10/8/15    Arthritis     [facility]congest[facility] failure) ([facility])     Complete heart block ([facility] [address] ) 7/20/2020    Coronary artery disease     Hypertension     Hypertensive urgency 5/19/2019    Renal disorder     TIA (transient ischemic attack)     Transient cerebral ischemia      Past Surgical History:   Procedure Laterality Date    AORTIC VALVE REPLACEMENT  06/30/2015    avr with 23 mm [place] Magna Ease bioprosthetic    CARDIAC PACEMAKER PLACEMENT Left 07/24/2020    CATARACT EXTRACTION Right 06/05/2000    COLONOSCOPY      CORONARY ARTERY BYPASS GRAFT  06/05/2000    x1 with argueta  to lad    EYE SURGERY      POLYPECTOMY  04/2014    enteroscopic - esophageal ulcer, gastric erosion, and duodenal ulcer   TONSILLECTOMY      unknown     Social History     Socioeconomic History    Marital status: /Civil Union     Spouse name:  Randall Roth Number of children: 5    Years of education: Not on file    Highest education level: Not on file   Occupational History    Occupation: retired   Tobacco Use    Smoking status: Never Smoker    Smokeless tobacco: Never Used   Vaping Use    Vaping Use: Never used   Substance and Sexual Activity    Alcohol use: Never     Alcohol/week: 0 0 standard drinks     Comment: very occasional    Drug use: Never    Sexual activity: Not on file   Other Topics Concern    Not on file   Social History Narrative    Caffeine use     Dental care, regularly     Lives independently with spouse     Uses seatbelts      Social Determinants of Health     Financial Resource Strain: Low Risk     Difficulty of Paying Living Expenses: Not hard at all   Food Insecurity: No Food Insecurity    Worried About Running Out of Food in the Last Year: Never true    Catalina of Food in the Last Year: Never true   Transportation Needs: No Transportation Needs    Lack of Transportation (Medical): No    Lack of Transportation (Non-Medical): No   Physical Activity:     Days of Exercise per Week:     Minutes of Exercise per Session:    Stress:     Feeling of Stress :    Social Connections: Unknown    Frequency of Communication with Friends and Family: More than three times a week    Frequency of Social Gatherings with Friends and Family: More than three times a week    Attends Yazidi Services: Not on file   [employer] or Organizations: Not on file    Attends Club or Organization Meetings: Not on file    Marital Status:    Intimate Partner Violence:     Fear of Current or Ex-Partner:     Emotionally Abused:     Physically Abused:     Sexually Abused: Allergies   Allergen Reactions    Promethazine Delirium and Other (See Comments)       Falls Plan of Care: balance, strength, and gait training instructions were provided  /70   Pulse 63   Temp (!) 97 °F (36 1 °C) (Temporal)   SpO2 99%          Physical Exam  Vitals reviewed  Constitutional:       General: He is not in acute distress  Appearance: Normal appearance  He is not ill-appearing, toxic-appearing or diaphoretic  HENT:      Head: Normocephalic and atraumatic  Right Ear: Tympanic membrane, ear canal and external ear normal  There is no impacted cerumen  Left Ear: Tympanic membrane, ear canal and external ear normal  There is no impacted cerumen  Nose: Nose normal       Mouth/Throat:      Mouth: Mucous membranes are dry  Eyes:      General: No scleral icterus  Extraocular Movements: Extraocular movements intact  Conjunctiva/sclera: Conjunctivae normal       Pupils: Pupils are equal, round, and reactive to light  Cardiovascular:      Rate and Rhythm: Normal rate and regular rhythm  Pulses: Normal pulses  Heart sounds: Normal heart sounds  Pulmonary:      Effort: Pulmonary effort is normal  No respiratory distress        Breath sounds: Normal

## 2021-08-11 NOTE — PROGRESS NOTES
Cardiology Follow Up    Chava Baldwin  1935  [facility] [address] Melvi Ledesma  [place]  [place] [address]  [phone]    1  Coronary artery disease involving native coronary artery of 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     2  S/P CABG (coronary artery bypass graft)     3  Severe aortic stenosis     4  S/P TAVR (transcatheter aortic valve replacement)  Echo limited with contrast if indicated   5  Chronic diastolic (congestive) heart failure (HCC)  Basic metabolic panel   6  Paroxysmal atrial fibrillation (HCC)     7  S/P placement of cardiac pacemaker     8  Mitral valve stenosis, unspecified etiology  Echo limited with contrast if indicated   9  Essential hypertension     10  Dyslipidemia  Lipid Panel with Direct LDL reflex       Discussion/Summary:  Since his last office visit he was hospitalized with acute cystitis and gout  He had an acute kidney injury and his diuretics were held  He was previously taking torsemide 20 mg BID  He now examines volume overloaded  I will have him restart torsemide but at 20 mg daily for now  Pending his response he may need an increase back to BID dosing  Daily weights and a low sodium diet were reinforced  I will check a BMP next week to reassess his renal function and electrolytes  His coronary artery disease is stable without symptoms of angina  He is maintained on aspirin, statin, and beta-blocker therapy  He is status post bioprosthetic aortic valve replacement  He also has known moderate mitral stenosis  He will be due for a repeat echocardiogram     His pacemaker will be interrogated today  His blood pressure is controlled  His LDL from 2019 was suboptimal  He is due for re-assessment of his lipids  He will re-establish with Dr Magali Watkins in October   He and his wife are planning on going to [place] for the winter pending the COVID-19 pandemic and I will have him seen prior to their departure  Interval History:   Trice Guido is a 80 y o  male with coronary artery disease s/p CABG, severe aortic stenosis s/p aortic valve replacement, complete heart block s/p pacemaker placement in July 2020, recently diagnosed paroxysmal atrial fibrillation on anticoagulation with Eliquis - complicated by GI bleeding, chronic diastolic congestive heart failure, hypertension, dyslipidemia, and CKD Stage IV who presents to the office today for routine follow up  He was last seen by myself in September 2020  He was recently hospitalized with sepsis secondary to acute cystitis  He was treated with IV antibiotics  He was treated for an acute gout flare  He was noted to have an acute kidney injury  His diuretics were held at discharge  Since hospital discharge he has developed recurrent lower extremity edema  His wife has been weighing him intermittently at home with a weight of 171-172 lbs  His weight on our scale today is 180 lbs  He is sedentary at baseline  With the activity he performs he denies any shortness of breath or chest pain  He denies lightheadedness, dizziness, palpitations, and syncope  He denies orthopnea and paroxysmal nocturnal dyspnea  He denies bleeding consequences and falls while on Eliquis  His only complaint is back pain      Medical Problems     Problem List     Mild intermittent asthma without complication    CKD (chronic kidney disease) stage 4, GFR 15-29 ml/min ([facility])    Lab Results   Component Value Date    EGFR 28 08/06/2021    EGFR 28 08/04/2021    EGFR 25 08/03/2021    CREATININE 2 08 (H) 08/06/2021    CREATININE 2 12 (H) 08/04/2021    CREATININE 2 26 (H) 08/03/2021         Dyslipidemia    GERD (gastroesophageal reflux disease)    Late effects of cerebrovascular disease    Lumbar degenerative disc disease    Lumbar radiculopathy    Mitral regurgitation    Overview Signed 8/14/2018  9:07 AM by Marley Cook DO     Description: echo 04- - moderate         Obesity (BMI 30-39  9)    Medicare annual wellness visit, subsequent    H/O aortic valve replacement    History of stroke    Chronic diastolic CHF (congestive heart failure) ([facility] )    Wt Readings from Last 3 Encounters:   08/12/21 81 9 kg (180 lb 9 6 oz)   08/04/21 77 8 kg (171 lb 8 3 oz)   07/01/21 78 kg (172 lb)                 Ambulatory dysfunction    Paroxysmal atrial fibrillation (HCC)    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    Pacemaker (Chronic)    Oxygen dependent    Chronic combined systolic and diastolic congestive heart failure ([facility] )    Wt Readings from Last 3 Encounters:   08/12/21 81 9 kg (180 lb 9 6 oz)   08/04/21 77 8 kg (171 lb 8 3 oz)   07/01/21 78 kg (172 lb)                 Mitral stenosis    Iron deficiency anemia    Need for immunization against influenza    Pulmonary emphysema ([place] [address] )    Left wrist pain    Right hip pain    Acute kidney injury superimposed on CKD [facility])    Lab Results   Component Value Date    EGFR 28 08/06/2021    EGFR 28 08/04/2021    EGFR 25 08/03/2021    CREATININE 2 08 (H) 08/06/2021    CREATININE 2 12 (H) 08/04/2021    CREATININE 2 26 (H) 08/03/2021         Sepsis ([facility] )    Overview Deleted 8/4/2021 10:17 AM by Crista Gregory PA-C            Multiple renal cysts    Effusion of left knee    Mitral valve stenosis    Acute cystitis without hematuria    Acute gout    Transaminitis    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 kidney disease, or chronic kidney disease ([place] [address] )    Overview Signed 8/6/2021  5:04 AM by Tricia Stokes     Per CMS ICD 10 Guidelines--per Physician         Lab Results   Component Value Date    EGFR 28 08/06/2021    EGFR 28 08/04/2021    EGFR 25 08/03/2021    CREATININE 2 08 (H) 08/06/2021    CREATININE 2 12 (H) 08/04/2021    CREATININE 2 26 (H) 08/03/2021         Chronic low back pain without sciatica Past Medical History:   Diagnosis Date    Aortic stenosis     ECHO -4-14-14  MODERATE TO SEVERE AORTIC STENOSIS; MILD AROTIC INSUFFICIENCY - LAST ASSESSED 10/26/16; RESOLVED 6/8/16    Aortic valve disorder     LAST ASSESSED 10/8/15; 10/8/15    Arthritis     CH[facility]congestive heart failure) ([facility])     Complete heart block ([facility] ) 7/20/2020    Coronary artery disease     Hypertension     Hypertensive urgency 5/19/2019    Renal disorder     TIA (transient ischemic attack)     Transient cerebral ischemia      Social History     Socioeconomic History    Marital status: /Civil Union     Spouse name: Milly Osborn Number of children: 5    Years of education: Not on file    Highest education level: Not on file   Occupational History    Occupation: retired   Tobacco Use    Smoking status: Never Smoker    Smokeless tobacco: Never Used   Vaping Use    Vaping Use: Never used   Substance and Sexual Activity    Alcohol use: Never     Alcohol/week: 0 0 standard drinks     Comment: very occasional    Drug use: Never    Sexual activity: Not on file   Other Topics Concern    Not on file   Social History Narrative    Caffeine use     Dental care, regularly     Lives independently with spouse     Uses seatbelts      Social Determinants of Health     Financial Resource Strain: Low Risk     Difficulty of Paying Living Expenses: Not hard at all   Food Insecurity: No Food Insecurity    Worried About Running Out of Food in the Last Year: Never true    [address] in the Last Year: Never true   Transportation Needs: No Transportation Needs    Lack of Transportation (Medical): No    Lack of Transportation (Non-Medical):  No   Physical Activity:     Days of Exercise per Week:     Minutes of Exercise per Session:    Stress:     Feeling of Stress :    Social Connections: Unknown    Frequency of Communication with Friends and Family: More than three times a week    Frequency of Social Gatherings with Friends and Family: More than three times a week    Attends [employer] Services: Not on file    Active Member of Clubs or Organizations: Not on file    Attends Club or Organization Meetings: Not on file    Marital Status:    Intimate Partner Violence:     Fear of Current or Ex-Partner:     Emotionally Abused:     Physically Abused:     Sexually Abused:       Family History   Problem Relation Age of Onset    No Known Problems Mother     No Known Problems Father     Coronary artery disease Neg Hx      Past Surgical History:   Procedure Laterality Date    AORTIC VALVE REPLACEMENT  06/30/2015    avr with 23 mm [place] Magna Ease bioprosthetic    CARDIAC PACEMAKER PLACEMENT Left 07/24/2020    CATARACT EXTRACTION Right 06/05/2000    COLONOSCOPY      CORONARY ARTERY BYPASS GRAFT  06/05/2000    x1 with argueta  to [address] POLYPECTOMY  04/2014    enteroscopic - esophageal ulcer, gastric erosion, and duodenal ulcer       TONSILLECTOMY      unknown       Current Outpatient Medications:     allopurinol (ZYLOPRIM) 100 mg tablet, Take 0 5 tablets (50 mg total) by mouth daily, Disp: 30 tablet, Rfl: 5    amLODIPine (NORVASC) 5 mg tablet, Take 1 tablet by mouth once daily, Disp: 90 tablet, Rfl: 0    apixaban (ELIQUIS) 2 5 mg, Take 1 tablet (2 5 mg total) by mouth 2 (two) times a day, Disp: 180 tablet, Rfl: 3    aspirin (ECOTRIN LOW STRENGTH) 81 mg EC tablet, Take 1 tablet (81 mg total) by mouth daily, Disp:  , Rfl: 0    gabapentin (NEURONTIN) 100 mg capsule, Take 1 capsule (100 mg total) by mouth daily at bedtime, Disp: 30 capsule, Rfl: 5    metoprolol tartrate (LOPRESSOR) 100 mg tablet, Take 1 tablet by mouth twice daily, Disp: 180 tablet, Rfl: 0    pantoprazole (PROTONIX) 40 mg tablet, Take 1 tablet (40 mg total) by mouth 2 (two) times a day before meals, Disp: 180 tablet, Rfl: 3    pravastatin (PRAVACHOL) 40 mg tablet, Take 1 tablet (40 mg total) by mouth daily with dinner, Disp: 90 tablet, Rfl: 3    predniSONE 20 mg tablet, Take 2 tablets (40 mg total) by mouth daily for 2 days, THEN 1 5 tablets (30 mg total) daily for 2 days, THEN 1 tablet (20 mg total) daily for 2 days, THEN 0 5 tablets (10 mg total) daily for 2 days  , Disp: 10 tablet, Rfl: 0    senna (SENOKOT) 8 6 mg, Take 1 tablet (8 6 mg total) by mouth daily at bedtime, Disp: 90 tablet, Rfl: 3  Allergies   Allergen Reactions    Promethazine Delirium and Other (See Comments)       Labs:     Chemistry        Component Value Date/Time     10/01/2015 0832    K 5 5 (H) 08/06/2021 0910    K 4 6 10/01/2015 0832     08/06/2021 0910     10/01/2015 0832    CO2 29 08/06/2021 0910    CO2 27 4 10/01/2015 0832    BUN 69 (H) 08/06/2021 0910    BUN 28 (H) 10/01/2015 0832    CREATININE 2 08 (H) 08/06/2021 0910    CREATININE 1 88 (H) 10/01/2015 0832        Component Value Date/Time    CALCIUM 8 9 08/06/2021 0910    CALCIUM 9 1 10/01/2015 0832    ALKPHOS 67 08/04/2021 0510    ALKPHOS 100 10/01/2015 0832    AST 38 08/04/2021 0510    AST 18 10/01/2015 0832    ALT 90 (H) 08/04/2021 0510    ALT 17 10/01/2015 0832    BILITOT 0 37 10/01/2015 0832            Lab Results   Component Value Date    CHOL 139 10/01/2015    CHOL 147 06/15/2015    CHOL 151 04/30/2015     Lab Results   Component Value Date    HDL 32 (L) 05/20/2019    HDL 27 (L) 08/07/2018    HDL 29 (L) 05/18/2017     Lab Results   Component Value Date    LDLCALC 103 (H) 05/20/2019    LDLCALC 71 08/07/2018    LDLCALC 78 05/18/2017     Lab Results   Component Value Date    TRIG 162 (H) 05/20/2019    TRIG 279 (H) 08/07/2018    TRIG 252 (H) 05/18/2017     No results found for: CHOLHDL    Imaging: CT abdomen pelvis wo contrast    Result Date: 7/28/2021  Narrative: CT ABDOMEN AND PELVIS WITHOUT IV CONTRAST INDICATION:  Back and right hip pain   COMPARISON:  CT abdomen pelvis 8/24/2020 TECHNIQUE:  CT examination of the abdomen and pelvis was performed without intravenous contrast   Axial, sagittal, and coronal 2D reformatted images were created from the source data and submitted for interpretation  Radiation dose length product (DLP) for this visit:  674 29 mGy-cm  This examination, like all CT scans performed in the [facility], was performed utilizing techniques to minimize radiation dose exposure, including the use of iterative reconstruction and automated exposure control  Enteric contrast was not administered  FINDINGS: ABDOMEN Evaluation of the solid organs is limited without IV contrast  LOWER CHEST:  Minimal right middle lobe atelectasis or scarring  Aortic valve replacement, pacer wires, coronary artery calcifications and extensive mitral valve annulus calcification  LIVER/BILIARY TREE:  Calcified right lobe granuloma  GALLBLADDER:  There are gallstone(s) within the gallbladder, without pericholecystic inflammatory changes  SPLEEN:  Unremarkable  PANCREAS:  Moderate fatty replacement of the pancreas without acute findings  ADRENAL GLANDS:  Unremarkable  KIDNEYS/URETERS:  Bilateral renal cysts  No hydronephrosis  Punctate left upper pole calculus versus vascular calcification  STOMACH AND BOWEL:  Stomach is suboptimally distended, grossly unremarkable within limitations  Small bowel is normal caliber  Colonic diverticulosis without evidence of diverticulitis  APPENDIX:  The appendix is prominent measuring 8 mm diameter although this appears to be a chronic finding  No periappendiceal fat stranding  ABDOMINOPELVIC CAVITY:  No ascites  No pneumoperitoneum  No pathologic lymphadenopathy  VESSELS: Atherosclerotic changes abdominal aorta without evidence of aneurysm  PELVIS REPRODUCTIVE ORGANS:  Mild prostatomegaly  URINARY BLADDER:  Unremarkable  ABDOMINAL WALL/INGUINAL REGIONS:  Tiny fat-containing umbilical hernia  Small bilateral fat-containing inguinal hernias  OSSEOUS STRUCTURES:  No acute fracture or destructive osseous lesion    Diffuse multilevel degenerative changes of the spine   Prior median sternotomy  Mild degenerative changes both hips and pubic symphysis  Impression: No acute intra-abdominal abnormality  Cholelithiasis  Colonic diverticulosis  Multiple incidental findings as above  Limited study without contrast  Workstation performed: [id number]     XR chest portable    Result Date: 7/29/2021  Narrative: CHEST INDICATION:   Severe pain  COMPARISON:  July 29, 2020 EXAM PERFORMED/VIEWS:  XR CHEST PORTABLE FINDINGS:  Left-sided chest wall intracardiac device is identified  Leads are intact  Sternal wires noted  Prosthetic aortic valve noted  Cardiomediastinal silhouette appears unremarkable  Left basilar atelectasis/scarring noted  No significant pulmonary vascular congestion  No pleural effusion  No pneumothorax  Osseous structures appear within normal limits for patient age  Impression: No focal consolidation, pleural effusion, or pneumothorax  Workstation performed: [id number]     XR spine lumbar 2 or 3 views injury    Result Date: 7/29/2021  Narrative: LUMBAR SPINE INDICATION:  Pelvic pain  COMPARISON:  MR lumbar spine 6/7/2017 VIEWS:  XR SPINE LUMBAR 2 OR 3 VIEWS INJURY Images: 3 FINDINGS: Numbering of the spine will be consistent with the previous MRI with the lowest disc space labeled L5-S1  There is no evidence of acute fracture or destructive osseous lesion  Mild levoscoliosis  There is diffuse moderately advanced multilevel degenerative disease with disc space narrowing and marginal endplate osteophytes  The pedicles appear intact  There are atherosclerotic calcifications  Soft tissues are otherwise unremarkable  Impression: No acute osseous abnormality  Degenerative changes as described  Workstation performed: [id number]     XR hip/pelv 2-3 vws right    Result Date: 7/28/2021  Narrative: RIGHT HIP INDICATION:   pain, atraumatic   COMPARISON:  8/24/2020 CT VIEWS:  XR HIP/PELV 2-3 VWS RIGHT W PELVIS IF PERFORMED Images: 4 FINDINGS: There is no acute fracture or dislocation  Mild degenerative changes both hips No lytic or blastic osseous lesion  Soft tissues are unremarkable  Multilevel degenerative lumbar spondylosis     Impression: No acute osseous abnormality  Workstation performed: [id number]     XR knee 3 vw left non injury    Result Date: 7/30/2021  Narrative: LEFT KNEE INDICATION:   pain  COMPARISON:  7/29/2020 VIEWS:  XR KNEE 3 VW LEFT NON INJURY Images: 3 FINDINGS: There is no acute fracture or dislocation  Suprapatellar fullness suggesting joint effusion  Moderate tricompartmental degenerative osteoarthritis  No lytic or blastic osseous lesion  Vascular calcifications are seen posteriorly within the thigh and lower leg  Impression: No acute osseous abnormality  Degenerative changes as described  Suprapatellar fullness suggestive of joint effusion  Workstation performed: [id number]     NM bone scan 3 phase    Result Date: 7/30/2021  Narrative: THREE PHASE BONE SCAN OF THE HIP/PELVIS INDICATION:  Right posterior pelvic pain PREVIOUS FILM CORRELATION:    X-rays of lumbar spine 7/29/2021, CT abdomen and pelvis 7/28/2021, right hip x-rays 7/28/2021 RADIOPHARMACEUTICAL 25 3 mCi Tc-99m MDP IV TECHNIQUE:  Perfusion images of the hip/pelvis were acquired in the anterior and posterior projection  Blood pool and 2-3 hour delayed images were acquired of the hip/pelvis in multiple projections  FINDINGS: PERFUSION:   Normal  BLOOD POOL:  Normal  DELAYED:  Assessment of the hips demonstrates no evidence of occult fracture  Hip activity is essentially symmetric and physiologic  No scintigraphic abnormalities of the bony pelvis are seen  There is increased activity involving the lumbar spine at approximately the level of L3  This likely corresponds to the presence of advanced degenerative disc disease and prominent hypertrophic spurring at this level  There is also increased activity of the knees consistent with arthritis     Impression: 1   No scintigraphic evidence of an occult fracture of the hips or pelvis 2  Increased activity of the lumbar spine particularly in the region of L3 likely related to the changes of degenerative disc disease and spurring seen on prior x-rays 3  Increased activity of the knees also felt to be consistent with arthritis Workstation performed: [id number]     US kidney and bladder    Result Date: 7/29/2021  Narrative: RENAL ULTRASOUND INDICATION:   YOLANDE  COMPARISON: Renal ultrasound 8/11/2020 and CT abdomen and pelvis 7/28/2021 TECHNIQUE:   Ultrasound of the retroperitoneum was performed with a curvilinear transducer utilizing volumetric sweeps and still imaging techniques  FINDINGS: KIDNEYS: There is asymmetric right renal atrophy with measurements below  Right kidney:  9 4 x 5 6 cm  Left kidney:  11 1 x 6 1 cm  Right kidney The renal parenchyma is mildly echogenic consistent with medical renal disease with chronic cortical thinning  No suspicious masses detected  Several simple cysts, the largest of which measures 3 3 cm  No hydronephrosis  No shadowing calculi  No perinephric fluid collections  Left kidney The renal parenchyma is mildly echogenic consistent with medical renal disease with chronic cortical thinning  No suspicious masses detected  7 8 cm exophytic simple cyst  No hydronephrosis  No shadowing calculi  No perinephric fluid collections  URETERS: Nonvisualized  BLADDER: Normally distended  No focal thickening or mass lesions  Neither  ureteral jet is visualized  This is attributed to a spurious finding  No hydronephrosis  Impression: Evidence of chronic medical renal disease  No hydronephrosis or perinephric collection  Bilateral renal simple cysts, left larger than right  Workstation performed: [id number]         Review of Systems   Constitutional: Negative for chills and fever  HENT: Negative      Cardiovascular: Negative for chest pain, dyspnea on exertion, leg swelling, near-syncope, orthopnea, palpitations, paroxysmal nocturnal dyspnea and syncope  Respiratory: Negative for cough and shortness of breath  Musculoskeletal: Positive for back pain  Gastrointestinal: Negative for diarrhea, nausea and vomiting  Genitourinary: Negative  Neurological: Negative for dizziness and light-headedness  All other systems reviewed and are negative  Vitals:    08/12/21 1402   BP: 136/76   Pulse: 62   SpO2: 96%     Vitals:    08/12/21 1402   Weight: 81 9 kg (180 lb 9 6 oz)     Height: 5' 4" (162 6 cm)   Body mass index is 31 kg/m²  Physical Exam:  Physical Exam  Vitals reviewed  Constitutional:       General: He is not in acute distress  Appearance: He is well-developed  He is not diaphoretic  HENT:      Head: Normocephalic and atraumatic  Eyes:      Pupils: Pupils are equal, round, and reactive to light  Neck:      Vascular: No carotid bruit  Cardiovascular:      Rate and Rhythm: Normal rate and regular rhythm  Pulses:           Radial pulses are 2+ on the right side and 2+ on the left side  Heart sounds: S1 normal and S2 normal  No murmur heard  Pulmonary:      Effort: Pulmonary effort is normal  No respiratory distress  Breath sounds: Examination of the right-lower field reveals rales  Examination of the left-lower field reveals rales  Rales present  No wheezing  Abdominal:      General: There is no distension  Palpations: Abdomen is soft  Tenderness: There is no abdominal tenderness  Musculoskeletal:         General: Normal range of motion  Cervical back: Normal range of motion  Right lower leg: Edema (+1) present  Left lower leg: Edema (+2 pitting pre-tibial edema) present  Skin:     General: Skin is warm and dry  Findings: No erythema  Neurological:      General: No focal deficit present  Mental Status: He is alert and oriented to person, place, and time  Gait: Gait abnormal (sitting in a wheelchair)     Psychiatric: Mood and Affect: Mood normal          Behavior: Behavior normal

## 2021-08-12 ENCOUNTER — IN-CLINIC DEVICE VISIT (OUTPATIENT)
Dept: CARDIOLOGY CLINIC | Facility: HOSPITAL | Age: 86
End: 2021-08-12
Payer: COMMERCIAL

## 2021-08-12 ENCOUNTER — OFFICE VISIT (OUTPATIENT)
Dept: CARDIOLOGY CLINIC | Facility: HOSPITAL | Age: 86
End: 2021-08-12
Payer: COMMERCIAL

## 2021-08-12 VITALS
OXYGEN SATURATION: 96 % | HEART RATE: 62 BPM | WEIGHT: 180.6 LBS | BODY MASS INDEX: 30.83 KG/M2 | HEIGHT: 64 IN | SYSTOLIC BLOOD PRESSURE: 136 MMHG | DIASTOLIC BLOOD PRESSURE: 76 MMHG

## 2021-08-12 DIAGNOSIS — I05.0 MITRAL VALVE STENOSIS, UNSPECIFIED ETIOLOGY: ICD-10-CM

## 2021-08-12 DIAGNOSIS — I10 ESSENTIAL HYPERTENSION: ICD-10-CM

## 2021-08-12 DIAGNOSIS — Z95.0 PRESENCE OF CARDIAC PACEMAKER: Primary | ICD-10-CM

## 2021-08-12 DIAGNOSIS — I35.0 SEVERE AORTIC STENOSIS: ICD-10-CM

## 2021-08-12 DIAGNOSIS — Z95.1 S/P CABG (CORONARY ARTERY BYPASS GRAFT): ICD-10-CM

## 2021-08-12 DIAGNOSIS — I48.0 PAROXYSMAL ATRIAL FIBRILLATION (HCC): ICD-10-CM

## 2021-08-12 DIAGNOSIS — Z95.0 S/P PLACEMENT OF CARDIAC PACEMAKER: ICD-10-CM

## 2021-08-12 DIAGNOSIS — E78.5 DYSLIPIDEMIA: ICD-10-CM

## 2021-08-12 DIAGNOSIS — I50.32 CHRONIC DIASTOLIC (CONGESTIVE) HEART FAILURE (HCC): ICD-10-CM

## 2021-08-12 DIAGNOSIS — Z95.2 S/P TAVR (TRANSCATHETER AORTIC VALVE REPLACEMENT): ICD-10-CM

## 2021-08-12 DIAGNOSIS — I25.10 CORONARY ARTERY DISEASE INVOLVING NATIVE CORONARY ARTERY OF NATIVE HEART WITHOUT ANGINA PECTORIS: Primary | ICD-10-CM

## 2021-08-12 PROCEDURE — 1160F RVW MEDS BY RX/DR IN RCRD: CPT | Performed by: INTERNAL MEDICINE

## 2021-08-12 PROCEDURE — 93280 PM DEVICE PROGR EVAL DUAL: CPT | Performed by: INTERNAL MEDICINE

## 2021-08-12 PROCEDURE — 3078F DIAST BP <80 MM HG: CPT | Performed by: INTERNAL MEDICINE

## 2021-08-12 PROCEDURE — 1111F DSCHRG MED/CURRENT MED MERGE: CPT | Performed by: INTERNAL MEDICINE

## 2021-08-12 PROCEDURE — 3075F SYST BP GE 130 - 139MM HG: CPT | Performed by: INTERNAL MEDICINE

## 2021-08-12 PROCEDURE — 99214 OFFICE O/P EST MOD 30 MIN: CPT | Performed by: INTERNAL MEDICINE

## 2021-08-12 PROCEDURE — 1036F TOBACCO NON-USER: CPT | Performed by: INTERNAL MEDICINE

## 2021-08-12 NOTE — PROGRESS NOTES
Results for orders placed or performed in visit on 08/12/21   Cardiac EP device report    Narrative    MDT-DUAL CHAMBER PPM (DDD MODE)/ ACTIVE SYSTEM IS MRI CONDITIONAL  DEVICE INTERROGATED IN THE [facility]: BATTERY VOLTAGE ADEQUATE (10 9 YRS)  AP: 55%  : 99 6% (>40%~CHB)  ALL LEAD PARAMETERS WITHIN NORMAL LIMITS  VT MONITORED EPISODES PREVIOUSLY ADDRESSED  6 AT/AF EPISODES W/ AVAIL EGRMS SHOWING AF, MAX DURATION 19 MINS 28 SECS  PT TAKES ELIQUIS, METOPROLOL TART  EF: 60% (ECHO 07/21/20)  NO PROGRAMMING CHANGES MADE TO DEVICE PARAMETERS  PT SEEN TODAY BY ARUN PEREZ PA-C  PACEMAKER FUNCTIONING APPROPRIATELY    [address] [place] [address]

## 2021-08-19 ENCOUNTER — APPOINTMENT (OUTPATIENT)
Dept: LAB | Facility: MEDICAL CENTER | Age: 86
End: 2021-08-19
Payer: COMMERCIAL

## 2021-08-19 DIAGNOSIS — E78.5 DYSLIPIDEMIA: ICD-10-CM

## 2021-08-19 LAB
ANION GAP SERPL CALCULATED.3IONS-SCNC: 5 MMOL/L (ref 4–13)
BUN SERPL-MCNC: 32 MG/DL (ref 5–25)
CALCIUM SERPL-MCNC: 8.2 MG/DL (ref 8.3–10.1)
CHLORIDE SERPL-SCNC: 103 MMOL/L (ref 100–108)
CHOLEST SERPL-MCNC: 122 MG/DL (ref 50–200)
CO2 SERPL-SCNC: 28 MMOL/L (ref 21–32)
CREAT SERPL-MCNC: 2.25 MG/DL (ref 0.6–1.3)
GFR SERPL CREATININE-BSD FRML MDRD: 26 ML/MIN/1.73SQ M
GLUCOSE SERPL-MCNC: 63 MG/DL (ref 65–140)
HDLC SERPL-MCNC: 36 MG/DL
LDLC SERPL CALC-MCNC: 72 MG/DL (ref 0–100)
POTASSIUM SERPL-SCNC: 4.7 MMOL/L (ref 3.5–5.3)
SODIUM SERPL-SCNC: 136 MMOL/L (ref 136–145)
TRIGL SERPL-MCNC: 70 MG/DL

## 2021-08-19 PROCEDURE — 80061 LIPID PANEL: CPT

## 2021-08-19 PROCEDURE — 36415 COLL VENOUS BLD VENIPUNCTURE: CPT | Performed by: PHYSICIAN ASSISTANT

## 2021-08-19 PROCEDURE — 80048 BASIC METABOLIC PNL TOTAL CA: CPT | Performed by: PHYSICIAN ASSISTANT

## 2021-09-03 ENCOUNTER — HOSPITAL ENCOUNTER (OUTPATIENT)
Dept: NON INVASIVE DIAGNOSTICS | Facility: HOSPITAL | Age: 86
Discharge: HOME | End: 2021-09-03
Payer: COMMERCIAL

## 2021-09-03 DIAGNOSIS — I05.0 MITRAL VALVE STENOSIS, UNSPECIFIED ETIOLOGY: ICD-10-CM

## 2021-09-03 DIAGNOSIS — Z95.2 S/P TAVR (TRANSCATHETER AORTIC VALVE REPLACEMENT): ICD-10-CM

## 2021-09-03 PROCEDURE — 93325 DOPPLER ECHO COLOR FLOW MAPG: CPT | Performed by: INTERNAL MEDICINE

## 2021-09-03 PROCEDURE — 93321 DOPPLER ECHO F-UP/LMTD STD: CPT | Performed by: INTERNAL MEDICINE

## 2021-09-03 PROCEDURE — 93308 TTE F-UP OR LMTD: CPT | Performed by: INTERNAL MEDICINE

## 2021-09-03 PROCEDURE — 93308 TTE F-UP OR LMTD: CPT

## 2021-09-10 ENCOUNTER — OFFICE VISIT (OUTPATIENT)
Dept: NEPHROLOGY | Facility: CLINIC | Age: 86
End: 2021-09-10
Payer: COMMERCIAL

## 2021-09-10 VITALS
HEART RATE: 80 BPM | WEIGHT: 173 LBS | OXYGEN SATURATION: 97 % | DIASTOLIC BLOOD PRESSURE: 64 MMHG | BODY MASS INDEX: 29.53 KG/M2 | HEIGHT: 64 IN | SYSTOLIC BLOOD PRESSURE: 122 MMHG

## 2021-09-10 DIAGNOSIS — I50.42 CHRONIC COMBINED SYSTOLIC AND DIASTOLIC CONGESTIVE HEART FAILURE (HCC): ICD-10-CM

## 2021-09-10 DIAGNOSIS — I50.32 CHRONIC DIASTOLIC CHF (CONGESTIVE HEART FAILURE) (HCC): ICD-10-CM

## 2021-09-10 DIAGNOSIS — N18.4 CKD (CHRONIC KIDNEY DISEASE) STAGE 4, GFR 15-29 ML/MIN (HCC): ICD-10-CM

## 2021-09-10 DIAGNOSIS — N18.9 ACUTE KIDNEY INJURY SUPERIMPOSED ON CKD (HCC): Primary | ICD-10-CM

## 2021-09-10 DIAGNOSIS — K21.9 GASTROESOPHAGEAL REFLUX DISEASE, UNSPECIFIED WHETHER ESOPHAGITIS PRESENT: ICD-10-CM

## 2021-09-10 DIAGNOSIS — G89.29 CHRONIC BILATERAL LOW BACK PAIN WITHOUT SCIATICA: ICD-10-CM

## 2021-09-10 DIAGNOSIS — M54.50 CHRONIC BILATERAL LOW BACK PAIN WITHOUT SCIATICA: ICD-10-CM

## 2021-09-10 DIAGNOSIS — E66.9 OBESITY (BMI 30-39.9): ICD-10-CM

## 2021-09-10 DIAGNOSIS — M10.9 ACUTE GOUT OF MULTIPLE SITES, UNSPECIFIED CAUSE: ICD-10-CM

## 2021-09-10 DIAGNOSIS — I13.0 HYPERTENSIVE HEART AND CHRONIC KIDNEY DISEASE WITH HEART FAILURE AND STAGE 1 THROUGH STAGE 4 CHRONIC KIDNEY DISEASE, OR CHRONIC KIDNEY DISEASE (HCC): ICD-10-CM

## 2021-09-10 DIAGNOSIS — N17.9 ACUTE KIDNEY INJURY SUPERIMPOSED ON CKD (HCC): Primary | ICD-10-CM

## 2021-09-10 DIAGNOSIS — Q61.02 MULTIPLE RENAL CYSTS: ICD-10-CM

## 2021-09-10 PROCEDURE — 1160F RVW MEDS BY RX/DR IN RCRD: CPT | Performed by: PHYSICIAN ASSISTANT

## 2021-09-10 PROCEDURE — 1036F TOBACCO NON-USER: CPT | Performed by: PHYSICIAN ASSISTANT

## 2021-09-10 PROCEDURE — 99215 OFFICE O/P EST HI 40 MIN: CPT | Performed by: PHYSICIAN ASSISTANT

## 2021-09-10 RX ORDER — TORSEMIDE 20 MG/1
20 TABLET ORAL DAILY
COMMUNITY
End: 2021-12-15 | Stop reason: SDUPTHER

## 2021-09-10 NOTE — ASSESSMENT & PLAN NOTE
On proton pump inhibitor  If any worsening of kidney function, can consider trial discontinuation of PPI in favor of H2 blocker, although unlikely to be successful with the current dose and frequency he is requiring

## 2021-09-10 NOTE — ASSESSMENT & PLAN NOTE
Lab Results   Component Value Date    EGFR 26 08/19/2021    EGFR 28 08/06/2021    EGFR 28 08/04/2021    CREATININE 2 25 (H) 08/19/2021    CREATININE 2 08 (H) 08/06/2021    CREATININE 2 12 (H) 08/04/2021   Continue to avoid NSAIDs

## 2021-09-10 NOTE — ASSESSMENT & PLAN NOTE
May continue allopurinol 50 mg daily  Advised to discontinue and notify Dr  if he develops more rash or mouth sores

## 2021-09-10 NOTE — PATIENT INSTRUCTIONS
Please avoid NSAIDs (nonsteroidal anti-inflammatory drugs), such as Advil (ibuprofen), Aleve (naproxen), Naprosyn, BCs, Goody's powder  Tylenol (acetaminophen) is a safer option for the kidneys  Do not exceed the maximum dose of acetaminophen  Note that this may be in combination with other drugs NSAID medications  Please avoid herbal supplements, such as turmeric  Please consult your nephrologist before taking any over-the-counter medications

## 2021-09-10 NOTE — ASSESSMENT & PLAN NOTE
Blood pressure is 122/64 with a heart rate of 80  Continue torsemide 20 mg once daily, amlodipine 5 mg daily and metoprolol tartrate 100 mg twice daily  Low-sodium diet  Avoid NSAIDs

## 2021-09-10 NOTE — PROGRESS NOTES
Assessment & Plan:    1  Acute kidney injury superimposed on CKD [facility])  Assessment & Plan:  Lab Results   Component Value Date    EGFR 26 08/19/2021    EGFR 28 08/06/2021    EGFR 28 08/04/2021    CREATININE 2 25 (H) 08/19/2021    CREATININE 2 08 (H) 08/06/2021    CREATININE 2 12 (H) 08/04/2021   Renal function improved to baseline to a sCr 2 25 mg/dL and eGFR 26 mL/min on 08/19/2021 and remains stable after restarting torsemide at 20 mg once daily  2  CKD (chronic kidney disease) stage 4, GFR 15-29 ml/min [facility])  Assessment & Plan:  Lab Results   Component Value Date    EGFR 26 08/19/2021    EGFR 28 08/06/2021    EGFR 28 08/04/2021    CREATININE 2 25 (H) 08/19/2021    CREATININE 2 08 (H) 08/06/2021    CREATININE 2 12 (H) 08/04/2021   Continue to avoid NSAIDs  Orders:  -     Ambulatory referral to Nephrology  -     CBC and differential; Future; Expected date: 09/10/2021  -     Comprehensive metabolic panel; Future  -     Magnesium; Future  -     Microalbumin / creatinine urine ratio  -     Phosphorus; Future  -     Protein / creatinine ratio, urine  -     PTH, intact; Future  -     Urinalysis with microscopic  -     Vitamin D 25 hydroxy; Future    3  Multiple renal cysts  Assessment & Plan:  Consider follow-up ultrasound August 2022  Orders:  -     Ambulatory referral to Nephrology    4  Chronic diastolic CHF (congestive heart failure) ([facility] )    5  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 kidney disease, or chronic kidney disease ([facility] )  Assessment & Plan:  Blood pressure is 122/64 with a heart rate of 80  Continue torsemide 20 mg once daily, amlodipine 5 mg daily and metoprolol tartrate 100 mg twice daily  Low-sodium diet  Avoid NSAIDs  6  Acute gout of multiple sites, unspecified cause  Assessment & Plan:  May continue allopurinol 50 mg daily  Advised to discontinue and notify Dr  if he develops more rash or mouth sores      Orders:  - Uric acid; Future    7  Obesity (BMI 30-39 9)    8  Chronic combined systolic and diastolic congestive heart failure [facility])  Assessment & Plan:  Wt Readings from Last 3 Encounters:   09/10/21 78 5 kg (173 lb)   08/12/21 81 9 kg (180 lb 9 6 oz)   08/04/21 77 8 kg (171 lb 8 3 oz)   Only trace to 1+ edema to bilateral ankles  He does report increased urination response to torsemide 20 mg daily  Continue once daily  He was taking twice daily prior to hospitalization and was a bit volume depleted on admission  Once daily is certainly sufficient at this time  Continue low-sodium diet and compression socks  9  Gastroesophageal reflux disease, unspecified whether esophagitis present  Assessment & Plan: On proton pump inhibitor  If any worsening of kidney function, can consider trial discontinuation of PPI in favor of H2 blocker, although unlikely to be successful with the current dose and frequency he is requiring  10  Chronic bilateral low back pain without sciatica  Assessment & Plan: This has impacted patient's quality of life  Recommend discussion with primary care physician  The benefits, risks and alternatives to the treatment plan were discussed at this visit  Patient was advised of common adverse effects of any medical therapies prescribed  All questions were answered and discussed with the patient and any accompanying family members or caretakers  Subjective:      Patient ID: Lennox Samuel is a 80 y o  male seen at the [place] office  HPI     Today, patient presents for follow up of hospitalization Without acute complaints  He has been struggling with chronic low back pain  He is accompanied by his daughter and wife  He was seen at [address] [place] [address] from 07/28/2021 through 08/04/2021 when he presented with gout pain in his right hip and left knee  He was found to have acute cystitis   He was seen by Nephrology for acute kidney injury with peak sCr 3 45 mg/dL on 07/28/2021 on presentation  Renal function improved to a sCr 2 25 mg/dL and eGFR 26 mL/min on 08/19/2021  Blood pressure is 122/64 with a heart rate of 80  He reports adherence with torsemide 20 mg once daily, amlodipine 5 mg daily and metoprolol tartrate 100 mg twice daily  The following portions of the patient's history were reviewed and updated as appropriate: allergies, current medications, past family history, past medical history, past social history, past surgical history, and problem list     Review of Systems   Constitutional: Negative for activity change, chills, diaphoresis, fatigue and fever  HENT: Negative for mouth sores and trouble swallowing  Respiratory: Negative for apnea, cough, chest tightness, shortness of breath and wheezing  Cardiovascular: Negative for chest pain, palpitations and leg swelling (improved with compression socks)  Gastrointestinal: Negative for abdominal distention, abdominal pain, blood in stool, constipation, diarrhea and nausea  Genitourinary: Negative for decreased urine volume, difficulty urinating, dysuria, enuresis, frequency, hematuria and urgency  Musculoskeletal: Positive for back pain and gait problem  Negative for arthralgias and joint swelling  Skin: Negative for pallor, rash and wound  Neurological: Negative for dizziness, seizures, light-headedness, numbness and headaches  Hematological: Does not bruise/bleed easily  Psychiatric/Behavioral: Negative for agitation, behavioral problems and confusion  The patient is not nervous/anxious  Objective:      /64 (BP Location: Left arm, Patient Position: Sitting, Cuff Size: Standard)   Pulse 80   Ht 5' 4" (1 626 m)   Wt 78 5 kg (173 lb)   SpO2 97%   BMI 29 70 kg/m²          Physical Exam  Vitals and nursing note reviewed  Constitutional:       General: He is awake  He is not in acute distress  Appearance: Normal appearance  He is well-developed and normal weight   He is not ill-appearing, toxic-appearing or diaphoretic  HENT:      Head: Normocephalic and atraumatic  Jaw: There is normal jaw occlusion  Nose: Nose normal       Mouth/Throat:      Mouth: Mucous membranes are moist       Pharynx: Oropharynx is clear  No oropharyngeal exudate or posterior oropharyngeal erythema  Eyes:      General: Lids are normal  Vision grossly intact  Gaze aligned appropriately  No scleral icterus  Right eye: No discharge  Left eye: No discharge  Extraocular Movements: Extraocular movements intact  Conjunctiva/sclera: Conjunctivae normal       Pupils: Pupils are equal, round, and reactive to light  Neck:      Thyroid: No thyroid mass or thyromegaly  Trachea: Trachea and phonation normal    Cardiovascular:      Rate and Rhythm: Normal rate and regular rhythm  Heart sounds: Normal heart sounds, S1 normal and S2 normal  No murmur heard  No friction rub  No gallop  Comments: Edema to ankles only  Pulmonary:      Effort: Pulmonary effort is normal  No respiratory distress  Breath sounds: Normal breath sounds  No stridor  No wheezing, rhonchi or rales  Abdominal:      General: Abdomen is flat  Bowel sounds are normal  There is no distension  Palpations: Abdomen is soft  There is no mass  Tenderness: There is no abdominal tenderness  There is no guarding  Hernia: No hernia is present  Musculoskeletal:         General: Normal range of motion  Cervical back: Normal range of motion and neck supple  No rigidity or tenderness  Right lower le+ Pitting Edema present  Left lower le+ Pitting Edema present  Lymphadenopathy:      Cervical: No cervical adenopathy  Skin:     General: Skin is warm and dry  Coloration: Skin is not jaundiced  Findings: No bruising  Nails: There is no clubbing  Neurological:      General: No focal deficit present        Mental Status: He is alert and oriented to person, place, and time  Mental status is at baseline  Psychiatric:         Attention and Perception: Attention and perception normal          Mood and Affect: Mood and affect normal          Speech: Speech normal          Behavior: Behavior normal  Behavior is cooperative  Thought Content:  Thought content normal          Judgment: Judgment normal              Lab Results   Component Value Date     10/01/2015    SODIUM 136 08/19/2021    K 4 7 08/19/2021     08/19/2021    CO2 28 08/19/2021    ANIONGAP 9 10/01/2015    AGAP 5 08/19/2021    BUN 32 (H) 08/19/2021    CREATININE 2 25 (H) 08/19/2021    GLUC 63 (L) 08/19/2021    GLUF 105 (H) 06/11/2021    CALCIUM 8 2 (L) 08/19/2021    AST 38 08/04/2021    ALT 90 (H) 08/04/2021    ALKPHOS 67 08/04/2021    PROT 7 7 10/01/2015    TP 5 9 (L) 08/04/2021    BILITOT 0 37 10/01/2015    TBILI 0 17 (L) 08/04/2021    EGFR 26 08/19/2021      Lab Results   Component Value Date    CREATININE 2 25 (H) 08/19/2021    CREATININE 2 08 (H) 08/06/2021    CREATININE 2 12 (H) 08/04/2021    CREATININE 2 26 (H) 08/03/2021    CREATININE 3 19 (H) 08/03/2021    CREATININE 2 07 (H) 08/02/2021    CREATININE 2 28 (H) 08/01/2021    CREATININE 2 22 (H) 07/31/2021    CREATININE 2 39 (H) 07/30/2021    CREATININE 2 75 (H) 07/29/2021    CREATININE 3 45 (H) 07/28/2021    CREATININE 2 72 (H) 06/11/2021    CREATININE 2 19 (H) 09/11/2020    CREATININE 2 63 (H) 08/30/2020    CREATININE 2 60 (H) 08/29/2020      Lab Results   Component Value Date    COLORU Yellow 07/30/2021    CLARITYU Clear 07/30/2021    SPECGRAV 1 015 07/30/2021    PHUR 5 5 07/30/2021    LEUKOCYTESUR Negative 07/30/2021    NITRITE Negative 07/30/2021    PROTEIN UA Trace (A) 07/30/2021    GLUCOSEU Negative 07/30/2021    KETONESU Negative 07/30/2021    UROBILINOGEN 0 2 07/30/2021    BILIRUBINUR Negative 07/30/2021    BLOODU Negative 07/30/2021    RBCUA None Seen 07/30/2021    WBCUA None Seen 07/30/2021    EPIS None Seen 07/30/2021 BACTERIA Occasional 07/30/2021      No results found for: LABPROT  No results found for: Andrew Bohr Results   Component Value Date    WBC 11 98 (H) 08/04/2021    HGB 8 8 (L) 08/04/2021    HCT 27 2 (L) 08/04/2021    MCV 90 08/04/2021     08/04/2021      Lab Results   Component Value Date    HGB 8 8 (L) 08/04/2021    HGB 8 3 (L) 08/03/2021    HGB 8 4 (L) 08/02/2021    HGB 9 1 (L) 08/01/2021    HGB 9 5 (L) 07/30/2021      Lab Results   Component Value Date    IRON 38 (L) 06/11/2021    TIBC 391 06/11/2021    FERRITIN 77 06/11/2021      No results found for: PTHCALCIUM, [id number], PHOSPHORUS   Lab Results   Component Value Date    CHOLESTEROL 122 08/19/2021    HDL 36 (L) 08/19/2021    LDLCALC 72 08/19/2021    TRIG 70 08/19/2021      No results found for: URICACID   Lab Results   Component Value Date    HGBA1C 5 8 (H) 06/11/2021      No results found for: TSHANTIBODY, W1PTJNG, FREET4   No results found for: JEAN, DSDNAAB, RFIGM   Lab Results   Component Value Date    PROT 7 7 10/01/2015        Portions of the record may have been created with voice recognition software  Occasional wrong word or "sound a like" substitutions may have occurred due to the inherent limitations of voice recognition software  Read the chart carefully and recognize, using context, where substitutions have occurred  If you have any questions, please contact the dictating provider

## 2021-09-10 NOTE — ASSESSMENT & PLAN NOTE
Lab Results   Component Value Date    EGFR 26 08/19/2021    EGFR 28 08/06/2021    EGFR 28 08/04/2021    CREATININE 2 25 (H) 08/19/2021    CREATININE 2 08 (H) 08/06/2021    CREATININE 2 12 (H) 08/04/2021   Renal function improved to baseline to a sCr 2 25 mg/dL and eGFR 26 mL/min on 08/19/2021 and remains stable after restarting torsemide at 20 mg once daily

## 2021-09-21 ENCOUNTER — LAB REQUISITION (OUTPATIENT)
Dept: LAB | Facility: HOSPITAL | Age: 86
End: 2021-09-21
Payer: COMMERCIAL

## 2021-09-21 DIAGNOSIS — N18.4 CHRONIC KIDNEY DISEASE, STAGE 4 (SEVERE) (HCC): ICD-10-CM

## 2021-09-21 DIAGNOSIS — N17.9 ACUTE KIDNEY FAILURE, UNSPECIFIED (HCC): ICD-10-CM

## 2021-09-21 DIAGNOSIS — I50.32 CHRONIC DIASTOLIC (CONGESTIVE) HEART FAILURE (HCC): ICD-10-CM

## 2021-09-21 LAB
25(OH)D3 SERPL-MCNC: 35.7 NG/ML (ref 30–100)
ALBUMIN SERPL BCP-MCNC: 3.6 G/DL (ref 3.5–5)
ALP SERPL-CCNC: 106 U/L (ref 46–116)
ALT SERPL W P-5'-P-CCNC: 15 U/L (ref 12–78)
ANION GAP SERPL CALCULATED.3IONS-SCNC: 7 MMOL/L (ref 4–13)
AST SERPL W P-5'-P-CCNC: 18 U/L (ref 5–45)
BASOPHILS # BLD AUTO: 0.06 THOUSANDS/ΜL (ref 0–0.1)
BASOPHILS NFR BLD AUTO: 1 % (ref 0–1)
BILIRUB SERPL-MCNC: 0.3 MG/DL (ref 0.2–1)
BUN SERPL-MCNC: 51 MG/DL (ref 5–25)
CALCIUM SERPL-MCNC: 8.9 MG/DL (ref 8.3–10.1)
CHLORIDE SERPL-SCNC: 100 MMOL/L (ref 100–108)
CO2 SERPL-SCNC: 33 MMOL/L (ref 21–32)
CREAT SERPL-MCNC: 2.62 MG/DL (ref 0.6–1.3)
EOSINOPHIL # BLD AUTO: 0.44 THOUSAND/ΜL (ref 0–0.61)
EOSINOPHIL NFR BLD AUTO: 4 % (ref 0–6)
ERYTHROCYTE [DISTWIDTH] IN BLOOD BY AUTOMATED COUNT: 15.7 % (ref 11.6–15.1)
GFR SERPL CREATININE-BSD FRML MDRD: 21 ML/MIN/1.73SQ M
GLUCOSE P FAST SERPL-MCNC: 86 MG/DL (ref 65–99)
HCT VFR BLD AUTO: 31.6 % (ref 36.5–49.3)
HGB BLD-MCNC: 9.9 G/DL (ref 12–17)
IMM GRANULOCYTES # BLD AUTO: 0.06 THOUSAND/UL (ref 0–0.2)
IMM GRANULOCYTES NFR BLD AUTO: 1 % (ref 0–2)
LYMPHOCYTES # BLD AUTO: 1.31 THOUSANDS/ΜL (ref 0.6–4.47)
LYMPHOCYTES NFR BLD AUTO: 12 % (ref 14–44)
MAGNESIUM SERPL-MCNC: 2 MG/DL (ref 1.6–2.6)
MCH RBC QN AUTO: 29.6 PG (ref 26.8–34.3)
MCHC RBC AUTO-ENTMCNC: 31.3 G/DL (ref 31.4–37.4)
MCV RBC AUTO: 94 FL (ref 82–98)
MONOCYTES # BLD AUTO: 0.85 THOUSAND/ΜL (ref 0.17–1.22)
MONOCYTES NFR BLD AUTO: 8 % (ref 4–12)
NEUTROPHILS # BLD AUTO: 8.33 THOUSANDS/ΜL (ref 1.85–7.62)
NEUTS SEG NFR BLD AUTO: 74 % (ref 43–75)
NRBC BLD AUTO-RTO: 0 /100 WBCS
PHOSPHATE SERPL-MCNC: 4.4 MG/DL (ref 2.3–4.1)
PLATELET # BLD AUTO: 320 THOUSANDS/UL (ref 149–390)
PMV BLD AUTO: 10.2 FL (ref 8.9–12.7)
POTASSIUM SERPL-SCNC: 5.3 MMOL/L (ref 3.5–5.3)
PROT SERPL-MCNC: 6.9 G/DL (ref 6.4–8.2)
PTH-INTACT SERPL-MCNC: 62.2 PG/ML (ref 18.4–80.1)
RBC # BLD AUTO: 3.35 MILLION/UL (ref 3.88–5.62)
SODIUM SERPL-SCNC: 140 MMOL/L (ref 136–145)
URATE SERPL-MCNC: 8.3 MG/DL (ref 4.2–8)
WBC # BLD AUTO: 11.05 THOUSAND/UL (ref 4.31–10.16)

## 2021-09-21 PROCEDURE — 83735 ASSAY OF MAGNESIUM: CPT | Performed by: PHYSICIAN ASSISTANT

## 2021-09-21 PROCEDURE — 84100 ASSAY OF PHOSPHORUS: CPT | Performed by: PHYSICIAN ASSISTANT

## 2021-09-21 PROCEDURE — 85025 COMPLETE CBC W/AUTO DIFF WBC: CPT | Performed by: PHYSICIAN ASSISTANT

## 2021-09-21 PROCEDURE — 80053 COMPREHEN METABOLIC PANEL: CPT | Performed by: PHYSICIAN ASSISTANT

## 2021-09-21 PROCEDURE — 84550 ASSAY OF BLOOD/URIC ACID: CPT | Performed by: PHYSICIAN ASSISTANT

## 2021-09-21 PROCEDURE — 82306 VITAMIN D 25 HYDROXY: CPT | Performed by: PHYSICIAN ASSISTANT

## 2021-09-21 PROCEDURE — 83970 ASSAY OF PARATHORMONE: CPT | Performed by: PHYSICIAN ASSISTANT

## 2021-09-27 DIAGNOSIS — M10.9 ACUTE GOUT OF MULTIPLE SITES, UNSPECIFIED CAUSE: ICD-10-CM

## 2021-09-28 RX ORDER — ALLOPURINOL 100 MG/1
100 TABLET ORAL DAILY
Qty: 30 TABLET | Refills: 5 | Status: SHIPPED | OUTPATIENT
Start: 2021-09-28

## 2021-10-03 PROBLEM — Z95.0 PRESENCE OF PERMANENT CARDIAC PACEMAKER: Status: ACTIVE | Noted: 2020-08-11

## 2021-10-03 PROBLEM — I05.0 MITRAL STENOSIS: Status: RESOLVED | Noted: 2020-08-28 | Resolved: 2021-10-03

## 2021-10-03 PROBLEM — Z95.1 HX OF CABG: Status: ACTIVE | Noted: 2021-10-03

## 2021-10-03 PROBLEM — Z95.3 HISTORY OF AORTIC VALVE REPLACEMENT WITH BIOPROSTHETIC VALVE: Status: ACTIVE | Noted: 2019-05-19

## 2021-10-03 PROBLEM — I35.0 SEVERE AORTIC STENOSIS: Status: ACTIVE | Noted: 2021-10-03

## 2021-10-03 PROBLEM — Z95.2 H/O AORTIC VALVE REPLACEMENT: Status: RESOLVED | Noted: 2019-05-19 | Resolved: 2021-10-03

## 2021-10-04 ENCOUNTER — OFFICE VISIT (OUTPATIENT)
Dept: CARDIOLOGY CLINIC | Facility: HOSPITAL | Age: 86
End: 2021-10-04
Payer: COMMERCIAL

## 2021-10-04 VITALS
WEIGHT: 175 LBS | HEART RATE: 74 BPM | HEIGHT: 64 IN | BODY MASS INDEX: 29.88 KG/M2 | DIASTOLIC BLOOD PRESSURE: 62 MMHG | SYSTOLIC BLOOD PRESSURE: 120 MMHG

## 2021-10-04 DIAGNOSIS — I44.2 COMPLETE HEART BLOCK (HCC): ICD-10-CM

## 2021-10-04 DIAGNOSIS — I25.810 ATHEROSCLEROSIS OF CORONARY ARTERY BYPASS GRAFT OF NATIVE HEART WITHOUT ANGINA PECTORIS: ICD-10-CM

## 2021-10-04 DIAGNOSIS — Z95.0 PRESENCE OF PERMANENT CARDIAC PACEMAKER: ICD-10-CM

## 2021-10-04 DIAGNOSIS — I35.0 SEVERE AORTIC STENOSIS: Primary | ICD-10-CM

## 2021-10-04 DIAGNOSIS — I34.2 NONRHEUMATIC MITRAL VALVE STENOSIS: ICD-10-CM

## 2021-10-04 DIAGNOSIS — Z95.3 HISTORY OF AORTIC VALVE REPLACEMENT WITH BIOPROSTHETIC VALVE: ICD-10-CM

## 2021-10-04 DIAGNOSIS — I48.0 PAROXYSMAL ATRIAL FIBRILLATION (HCC): ICD-10-CM

## 2021-10-04 DIAGNOSIS — I50.32 CHRONIC DIASTOLIC CHF (CONGESTIVE HEART FAILURE) (HCC): ICD-10-CM

## 2021-10-04 DIAGNOSIS — E66.9 OBESITY (BMI 30-39.9): ICD-10-CM

## 2021-10-04 DIAGNOSIS — Z95.1 HX OF CABG: ICD-10-CM

## 2021-10-04 DIAGNOSIS — I13.0 HYPERTENSIVE HEART AND CHRONIC KIDNEY DISEASE WITH HEART FAILURE AND STAGE 1 THROUGH STAGE 4 CHRONIC KIDNEY DISEASE, OR CHRONIC KIDNEY DISEASE (HCC): ICD-10-CM

## 2021-10-04 DIAGNOSIS — E78.5 DYSLIPIDEMIA: ICD-10-CM

## 2021-10-04 PROCEDURE — 3078F DIAST BP <80 MM HG: CPT | Performed by: INTERNAL MEDICINE

## 2021-10-04 PROCEDURE — 99214 OFFICE O/P EST MOD 30 MIN: CPT | Performed by: INTERNAL MEDICINE

## 2021-10-04 PROCEDURE — 3074F SYST BP LT 130 MM HG: CPT | Performed by: INTERNAL MEDICINE

## 2021-10-08 ENCOUNTER — OFFICE VISIT (OUTPATIENT)
Dept: UROLOGY | Facility: CLINIC | Age: 86
End: 2021-10-08
Payer: COMMERCIAL

## 2021-10-08 VITALS
BODY MASS INDEX: 30.42 KG/M2 | HEIGHT: 64 IN | DIASTOLIC BLOOD PRESSURE: 76 MMHG | SYSTOLIC BLOOD PRESSURE: 124 MMHG | WEIGHT: 178.2 LBS | HEART RATE: 80 BPM

## 2021-10-08 DIAGNOSIS — N30.00 ACUTE CYSTITIS WITHOUT HEMATURIA: ICD-10-CM

## 2021-10-08 PROCEDURE — 99204 OFFICE O/P NEW MOD 45 MIN: CPT | Performed by: UROLOGY

## 2021-10-16 ENCOUNTER — OFFICE VISIT (OUTPATIENT)
Dept: FAMILY MEDICINE CLINIC | Facility: CLINIC | Age: 86
End: 2021-10-16
Payer: COMMERCIAL

## 2021-10-16 VITALS
SYSTOLIC BLOOD PRESSURE: 126 MMHG | HEART RATE: 78 BPM | HEIGHT: 64 IN | WEIGHT: 178.6 LBS | BODY MASS INDEX: 30.49 KG/M2 | TEMPERATURE: 97.1 F | RESPIRATION RATE: 18 BRPM | DIASTOLIC BLOOD PRESSURE: 78 MMHG

## 2021-10-16 DIAGNOSIS — Z00.00 MEDICARE ANNUAL WELLNESS VISIT, SUBSEQUENT: Primary | ICD-10-CM

## 2021-10-16 DIAGNOSIS — L30.9 DERMATITIS: ICD-10-CM

## 2021-10-16 DIAGNOSIS — I10 ESSENTIAL HYPERTENSION: ICD-10-CM

## 2021-10-16 PROBLEM — A41.9 SEPSIS (HCC): Status: RESOLVED | Noted: 2021-07-28 | Resolved: 2021-10-16

## 2021-10-16 PROCEDURE — 1170F FXNL STATUS ASSESSED: CPT | Performed by: INTERNAL MEDICINE

## 2021-10-16 PROCEDURE — G0439 PPPS, SUBSEQ VISIT: HCPCS | Performed by: INTERNAL MEDICINE

## 2021-10-16 PROCEDURE — 1036F TOBACCO NON-USER: CPT | Performed by: INTERNAL MEDICINE

## 2021-10-16 PROCEDURE — 1160F RVW MEDS BY RX/DR IN RCRD: CPT | Performed by: INTERNAL MEDICINE

## 2021-10-16 PROCEDURE — 3725F SCREEN DEPRESSION PERFORMED: CPT | Performed by: INTERNAL MEDICINE

## 2021-10-16 PROCEDURE — 3288F FALL RISK ASSESSMENT DOCD: CPT | Performed by: INTERNAL MEDICINE

## 2021-10-16 PROCEDURE — 1125F AMNT PAIN NOTED PAIN PRSNT: CPT | Performed by: INTERNAL MEDICINE

## 2021-10-16 RX ORDER — DESOXIMETASONE 0.5 MG/G
CREAM TOPICAL 2 TIMES DAILY
Qty: 30 G | Refills: 5 | Status: SHIPPED | OUTPATIENT
Start: 2021-10-16 | End: 2021-10-16

## 2021-10-16 RX ORDER — AMLODIPINE BESYLATE 5 MG/1
5 TABLET ORAL DAILY
Qty: 90 TABLET | Refills: 3 | Status: SHIPPED | OUTPATIENT
Start: 2021-10-16 | End: 2022-05-27 | Stop reason: SDUPTHER

## 2021-10-16 RX ORDER — METOPROLOL TARTRATE 100 MG/1
100 TABLET ORAL 2 TIMES DAILY
Qty: 180 TABLET | Refills: 3 | Status: SHIPPED | OUTPATIENT
Start: 2021-10-16 | End: 2022-07-30

## 2021-10-19 ENCOUNTER — IMMUNIZATIONS (OUTPATIENT)
Dept: FAMILY MEDICINE CLINIC | Facility: CLINIC | Age: 86
End: 2021-10-19
Payer: COMMERCIAL

## 2021-10-19 DIAGNOSIS — Z23 ENCOUNTER FOR IMMUNIZATION: Primary | ICD-10-CM

## 2021-10-19 PROCEDURE — 90662 IIV NO PRSV INCREASED AG IM: CPT | Performed by: INTERNAL MEDICINE

## 2021-10-19 PROCEDURE — G0008 ADMIN INFLUENZA VIRUS VAC: HCPCS | Performed by: INTERNAL MEDICINE

## 2021-11-19 ENCOUNTER — REMOTE DEVICE CLINIC VISIT (OUTPATIENT)
Dept: CARDIOLOGY CLINIC | Facility: CLINIC | Age: 86
End: 2021-11-19
Payer: COMMERCIAL

## 2021-11-19 DIAGNOSIS — Z95.0 S/P PLACEMENT OF CARDIAC PACEMAKER: Primary | ICD-10-CM

## 2021-11-19 PROCEDURE — 93294 REM INTERROG EVL PM/LDLS PM: CPT | Performed by: INTERNAL MEDICINE

## 2021-11-19 PROCEDURE — 93296 REM INTERROG EVL PM/IDS: CPT | Performed by: INTERNAL MEDICINE

## 2021-12-15 ENCOUNTER — TELEPHONE (OUTPATIENT)
Dept: FAMILY MEDICINE CLINIC | Facility: CLINIC | Age: 86
End: 2021-12-15

## 2021-12-15 DIAGNOSIS — I50.9 CHRONIC CONGESTIVE HEART FAILURE, UNSPECIFIED HEART FAILURE TYPE (HCC): Primary | ICD-10-CM

## 2021-12-15 RX ORDER — TORSEMIDE 20 MG/1
20 TABLET ORAL DAILY
Qty: 30 TABLET | Refills: 0 | Status: SHIPPED | OUTPATIENT
Start: 2021-12-15 | End: 2022-01-06 | Stop reason: SDUPTHER

## 2022-01-06 DIAGNOSIS — I50.9 CHRONIC CONGESTIVE HEART FAILURE, UNSPECIFIED HEART FAILURE TYPE (HCC): ICD-10-CM

## 2022-01-06 RX ORDER — TORSEMIDE 20 MG/1
20 TABLET ORAL DAILY
Qty: 30 TABLET | Refills: 2 | Status: SHIPPED | OUTPATIENT
Start: 2022-01-06 | End: 2022-05-27 | Stop reason: SDUPTHER

## 2022-02-18 ENCOUNTER — REMOTE DEVICE CLINIC VISIT (OUTPATIENT)
Dept: CARDIOLOGY CLINIC | Facility: CLINIC | Age: 87
End: 2022-02-18
Payer: COMMERCIAL

## 2022-02-18 DIAGNOSIS — Z95.0 CARDIAC PACEMAKER IN SITU: Primary | ICD-10-CM

## 2022-02-18 PROCEDURE — 93296 REM INTERROG EVL PM/IDS: CPT | Performed by: INTERNAL MEDICINE

## 2022-02-18 PROCEDURE — 93294 REM INTERROG EVL PM/LDLS PM: CPT | Performed by: INTERNAL MEDICINE

## 2022-02-18 NOTE — PROGRESS NOTES
Results for orders placed or performed in visit on 02/18/22   Cardiac EP device report    Narrative    MDT-DUAL CHAMBER PPM (DDD MODE)/ ACTIVE SYSTEM IS MRI CONDITIONAL  CARELINK TRANSMISSION: BATTERY VOLTAGE ADEQUATE (10 5 YRS)  AP-26%, >99% (DEPENDENT/CHB)  ALL AVAILABLE LEAD PARAMETERS WITHIN NORMAL LIMITS  NO SIGNIFICANT HIGH RATE EPISODES  NORMAL DEVICE FUNCTION   GV

## 2022-05-20 ENCOUNTER — REMOTE DEVICE CLINIC VISIT (OUTPATIENT)
Dept: CARDIOLOGY CLINIC | Facility: CLINIC | Age: 87
End: 2022-05-20
Payer: COMMERCIAL

## 2022-05-20 DIAGNOSIS — Z95.0 PRESENCE OF PERMANENT CARDIAC PACEMAKER: Primary | ICD-10-CM

## 2022-05-20 PROCEDURE — 93294 REM INTERROG EVL PM/LDLS PM: CPT | Performed by: INTERNAL MEDICINE

## 2022-05-20 PROCEDURE — 93296 REM INTERROG EVL PM/IDS: CPT | Performed by: INTERNAL MEDICINE

## 2022-05-23 ENCOUNTER — RA CDI HCC (OUTPATIENT)
Dept: OTHER | Facility: HOSPITAL | Age: 87
End: 2022-05-23

## 2022-05-23 NOTE — PROGRESS NOTES
[facility]  coding opportunities          Chart Reviewed number of suggestions sent to Provider: 2   I50 [id number]    I48 0    Patients Insurance     Medicare Insurance: [employer]age

## 2022-05-27 ENCOUNTER — OFFICE VISIT (OUTPATIENT)
Dept: FAMILY MEDICINE CLINIC | Facility: CLINIC | Age: 87
End: 2022-05-27
Payer: COMMERCIAL

## 2022-05-27 VITALS
RESPIRATION RATE: 18 BRPM | TEMPERATURE: 97.9 F | HEIGHT: 64 IN | WEIGHT: 192 LBS | SYSTOLIC BLOOD PRESSURE: 124 MMHG | DIASTOLIC BLOOD PRESSURE: 78 MMHG | HEART RATE: 76 BPM | BODY MASS INDEX: 32.78 KG/M2

## 2022-05-27 DIAGNOSIS — M54.50 CHRONIC LOW BACK PAIN WITHOUT SCIATICA, UNSPECIFIED BACK PAIN LATERALITY: ICD-10-CM

## 2022-05-27 DIAGNOSIS — I25.10 ATHEROSCLEROSIS OF NATIVE CORONARY ARTERY OF NATIVE HEART WITHOUT ANGINA PECTORIS: ICD-10-CM

## 2022-05-27 DIAGNOSIS — R26.2 AMBULATORY DYSFUNCTION: ICD-10-CM

## 2022-05-27 DIAGNOSIS — I10 ESSENTIAL HYPERTENSION: ICD-10-CM

## 2022-05-27 DIAGNOSIS — I50.9 CHRONIC CONGESTIVE HEART FAILURE, UNSPECIFIED HEART FAILURE TYPE (HCC): ICD-10-CM

## 2022-05-27 DIAGNOSIS — K92.2 GASTROINTESTINAL HEMORRHAGE, UNSPECIFIED GASTROINTESTINAL HEMORRHAGE TYPE: ICD-10-CM

## 2022-05-27 DIAGNOSIS — G89.29 CHRONIC LOW BACK PAIN WITHOUT SCIATICA, UNSPECIFIED BACK PAIN LATERALITY: ICD-10-CM

## 2022-05-27 DIAGNOSIS — J43.9 PULMONARY EMPHYSEMA, UNSPECIFIED EMPHYSEMA TYPE (HCC): ICD-10-CM

## 2022-05-27 DIAGNOSIS — J96.01 ACUTE RESPIRATORY FAILURE WITH HYPOXIA (HCC): ICD-10-CM

## 2022-05-27 DIAGNOSIS — L30.9 DERMATITIS: ICD-10-CM

## 2022-05-27 DIAGNOSIS — N18.4 CHRONIC KIDNEY DISEASE, STAGE 4 (SEVERE) (HCC): ICD-10-CM

## 2022-05-27 PROCEDURE — 99214 OFFICE O/P EST MOD 30 MIN: CPT | Performed by: INTERNAL MEDICINE

## 2022-05-27 PROCEDURE — 3725F SCREEN DEPRESSION PERFORMED: CPT | Performed by: INTERNAL MEDICINE

## 2022-05-27 PROCEDURE — 1160F RVW MEDS BY RX/DR IN RCRD: CPT | Performed by: INTERNAL MEDICINE

## 2022-05-27 RX ORDER — DESOXIMETASONE 0.5 MG/G
CREAM TOPICAL 2 TIMES DAILY
Qty: 60 G | Refills: 5 | Status: SHIPPED | OUTPATIENT
Start: 2022-05-27

## 2022-05-27 RX ORDER — SENNOSIDES 8.6 MG
8.6 TABLET ORAL
Qty: 90 TABLET | Refills: 3 | Status: SHIPPED | OUTPATIENT
Start: 2022-05-27

## 2022-05-27 RX ORDER — PANTOPRAZOLE SODIUM 40 MG/1
40 TABLET, DELAYED RELEASE ORAL
Qty: 180 TABLET | Refills: 3 | Status: SHIPPED | OUTPATIENT
Start: 2022-05-27 | End: 2022-08-25

## 2022-05-27 RX ORDER — AMLODIPINE BESYLATE 5 MG/1
5 TABLET ORAL DAILY
Qty: 90 TABLET | Refills: 3 | Status: SHIPPED | OUTPATIENT
Start: 2022-05-27

## 2022-05-27 RX ORDER — TORSEMIDE 20 MG/1
20 TABLET ORAL DAILY
Qty: 30 TABLET | Refills: 2 | Status: SHIPPED | OUTPATIENT
Start: 2022-05-27 | End: 2022-07-16

## 2022-05-27 RX ORDER — GABAPENTIN 100 MG/1
100 CAPSULE ORAL
Qty: 30 CAPSULE | Refills: 5 | Status: SHIPPED | OUTPATIENT
Start: 2022-05-27

## 2022-05-27 RX ORDER — PRAVASTATIN SODIUM 40 MG
40 TABLET ORAL
Qty: 90 TABLET | Refills: 3 | Status: SHIPPED | OUTPATIENT
Start: 2022-05-27

## 2022-05-27 NOTE — PROGRESS NOTES
Assessment/Plan:    BMI Counseling: Body mass index is 32 96 kg/m²  The BMI is above normal  Nutrition recommendations include moderation in carbohydrate intake and increasing intake of lean protein  Rationale for BMI follow-up plan is due to patient being overweight or obese  Depression Screening and Follow-up Plan: Patient was screened for depression during today's encounter  They screened negative with a PHQ-2 score of 0  Falls Plan of Care: balance, strength, and gait training instructions were provided  Diagnoses and all orders for this visit:    Atherosclerosis of native coronary artery of 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  -     pravastatin (PRAVACHOL) 40 mg tablet; Take 1 tablet (40 mg total) by mouth daily with dinner  Pt ok at home with assist of his wife He is ambulatory with walker in the home but is not very active   Followed by Dr Mona Ceron  Chronic respiratory failure with hypoxia ([facility] )  -     senna (SENOKOT) 8 6 mg; Take 1 tablet (8 6 mg total) by mouth daily at bedtime  Pt stable No oxygen use Denies sob     Ambulatory dysfunction  -     senna (SENOKOT) 8 6 mg; Take 1 tablet (8 6 mg total) by mouth daily at bedtime  Uses walker when mobile in the home and does not get out of the house other than appts     Chronic low back pain without sciatica, unspecified back pain laterality  -     gabapentin (NEURONTIN) 100 mg capsule; Take 1 capsule (100 mg total) by mouth daily at bedtime  Home exercises encouraged Pt can walk with walker but generally just gets to the bathroom r table for meals     Essential hypertension  -     amLODIPine (NORVASC) 5 mg tablet; Take 1 tablet (5 mg total) by mouth daily  No added salt diet  He has labs ordered for Nephrology and will go Tuesday     Chronic congestive heart failure, unspecified heart failure type ([facility] )  -     torsemide (DEMADEX) 20 mg tablet;  Take 1 tablet (20 mg total) by mouth daily  Continue current rx Has labs pending and Nephrology followup next week     Gastrointestinal hemorrhage, unspecified gastrointestinal hemorrhage type  -     pantoprazole (PROTONIX) 40 mg tablet; Take 1 tablet (40 mg total) by mouth 2 (two) times a day before meals  Cbc upcoming No sxs reported     Dermatitis  -     desoximetasone (TOPICORT) 0 05 % cream; Apply topically 2 (two) times a day  Prn use as rash has improved     Pulmonary emphysema, unspecified emphysema type ([facility])  Stable     Chronic kidney disease, stage 4 (severe) ([facility])  Pt daughter will take him for labs on Tuesday       6months/awv     Patient ID: Gabriela Jenkins is a 80 y o  male  HPI  Pt doing ok He sleeps a lot and is not very active but no falls No complaints No sob Appetite good He has nephrology appt next week His daughter was not aware of labs orders but will take him Tuesday now that she is He can transfer in the home with walker and stays on one floor Sleeps in lift chair No acute issues No syncope No melena No dizziness       Review of Systems   Constitutional: Negative for chills and fever  HENT: Negative  Eyes: Negative for visual disturbance  Respiratory: Negative for cough and shortness of breath  Cardiovascular: Positive for leg swelling  Negative for chest pain and palpitations  Gastrointestinal: Negative  Negative for blood in stool, constipation and diarrhea  Genitourinary: Negative  Musculoskeletal: Positive for arthralgias and gait problem  Neurological: Negative for dizziness, light-headedness and headaches  Psychiatric/Behavioral: Negative for sleep disturbance  The patient is not nervous/anxious  Past Medical History:   Diagnosis Date    Aortic stenosis     ECHO -4-14-14   MODERATE TO SEVERE AORTIC STENOSIS; MILD AROTIC INSUFFICIENCY - LAST ASSESSED 10/26/16; RESOLVED 6/8/16    Aortic valve disorder     LAST ASSESSED 10/8/15; 10/8/15    Arthritis     CHF (congestive heart failure) ([facility])     Complete heart block ([facility] ) 7/20/2020    Coronary artery disease     Hypertension     Hypertensive urgency 5/19/2019    Renal disorder     TIA (transient ischemic attack)     Transient cerebral ischemia      Past Surgical History:   Procedure Laterality Date    AORTIC VALVE REPLACEMENT  06/30/2015    avr with 23 mm [place] Magna Ease bioprosthetic    CARDIAC PACEMAKER PLACEMENT Left 07/24/2020    CATARACT EXTRACTION Right 06/05/2000    COLONOSCOPY      CORONARY ARTERY BYPASS GRAFT  06/05/2000    x1 with argueta  to lad    EYE SURGERY      POLYPECTOMY  04/2014    enteroscopic - esophageal ulcer, gastric erosion, and duodenal ulcer   TONSILLECTOMY      unknown     Social History     Socioeconomic History    Marital status: /Civil Union     Spouse name: Andrei Jasmine Number of children: 5    Years of education: Not on file    Highest education level: Not on file   Occupational History    Occupation: retired   Tobacco Use    Smoking status: Never Smoker    Smokeless tobacco: Never Used   Vaping Use    Vaping Use: Never used   Substance and Sexual Activity    Alcohol use: Never     Alcohol/week: 0 0 standard drinks     Comment: very occasional    Drug use: Never    Sexual activity: Not on file   Other Topics Concern    Not on file   Social History Narrative    Caffeine use     Dental care, regularly     Lives independently with spouse     Uses seatbelts      Social Determinants of Health     Financial Resource Strain: Low Risk     Difficulty of Paying Living Expenses: Not hard at all   Food Insecurity: No Food Insecurity    Worried About Running Out of Food in the Last Year: Never true    [address] in the Last Year: Never true   Transportation Needs: No Transportation Needs    Lack of Transportation (Medical): No    Lack of Transportation (Non-Medical):  No   Physical Activity: Not on file   Stress: Not on file   Social Connections: Not on file   Intimate Partner Violence: Not on file   Housing Stability: Not on file     Allergies   Allergen Reactions    Promethazine Delirium and Other (See Comments)           /78   Pulse 76   Temp 97 9 °F (36 6 °C) (Temporal)   Resp 18   Ht 5' 4" (1 626 m)   Wt 87 1 kg (192 lb)   BMI 32 96 kg/m²          Physical Exam  Vitals reviewed  Constitutional:       General: He is not in acute distress  Appearance: Normal appearance  He is not ill-appearing, toxic-appearing or diaphoretic  HENT:      Head: Normocephalic and atraumatic  Right Ear: External ear normal       Left Ear: External ear normal       Nose: Nose normal       Mouth/Throat:      Mouth: Mucous membranes are dry  Eyes:      General: No scleral icterus  Extraocular Movements: Extraocular movements intact  Conjunctiva/sclera: Conjunctivae normal       Pupils: Pupils are equal, round, and reactive to light  Cardiovascular:      Rate and Rhythm: Normal rate and regular rhythm  Pulses: Normal pulses  Heart sounds: Murmur heard  Pulmonary:      Effort: Pulmonary effort is normal  No respiratory distress  Breath sounds: Normal breath sounds  No wheezing  Abdominal:      General: Bowel sounds are normal  There is no distension  Palpations: Abdomen is soft  Tenderness: There is no abdominal tenderness  Musculoskeletal:      Cervical back: Normal range of motion and neck supple  Right lower leg: No edema  Left lower leg: No edema  Skin:     General: Skin is dry  Coloration: Skin is not jaundiced or pale  Neurological:      General: No focal deficit present  Mental Status: He is alert and oriented to person, place, and time  Mental status is at baseline  Psychiatric:         Mood and Affect: Mood normal          Behavior: Behavior normal          Thought Content:  Thought content normal          Judgment: Judgment normal

## 2022-05-31 ENCOUNTER — APPOINTMENT (OUTPATIENT)
Dept: LAB | Facility: MEDICAL CENTER | Age: 87
End: 2022-05-31
Payer: COMMERCIAL

## 2022-05-31 DIAGNOSIS — D50.9 IRON DEFICIENCY ANEMIA, UNSPECIFIED IRON DEFICIENCY ANEMIA TYPE: ICD-10-CM

## 2022-05-31 DIAGNOSIS — M10.9 ACUTE GOUT OF MULTIPLE SITES, UNSPECIFIED CAUSE: ICD-10-CM

## 2022-05-31 DIAGNOSIS — N18.4 CKD (CHRONIC KIDNEY DISEASE) STAGE 4, GFR 15-29 ML/MIN (HCC): ICD-10-CM

## 2022-05-31 DIAGNOSIS — N18.9 ACUTE KIDNEY INJURY SUPERIMPOSED ON CKD (HCC): ICD-10-CM

## 2022-05-31 DIAGNOSIS — N17.9 ACUTE KIDNEY INJURY SUPERIMPOSED ON CKD (HCC): ICD-10-CM

## 2022-05-31 LAB
25(OH)D3 SERPL-MCNC: 23.3 NG/ML (ref 30–100)
ALBUMIN SERPL BCP-MCNC: 3.9 G/DL (ref 3.5–5)
ALP SERPL-CCNC: 84 U/L (ref 46–116)
ALT SERPL W P-5'-P-CCNC: 19 U/L (ref 12–78)
ANION GAP SERPL CALCULATED.3IONS-SCNC: 9 MMOL/L (ref 4–13)
AST SERPL W P-5'-P-CCNC: 17 U/L (ref 5–45)
BACTERIA UR QL AUTO: ABNORMAL /HPF
BASOPHILS # BLD AUTO: 0.07 THOUSANDS/ΜL (ref 0–0.1)
BASOPHILS NFR BLD AUTO: 1 % (ref 0–1)
BILIRUB SERPL-MCNC: 0.28 MG/DL (ref 0.2–1)
BILIRUB UR QL STRIP: NEGATIVE
BUN SERPL-MCNC: 34 MG/DL (ref 5–25)
CALCIUM SERPL-MCNC: 9.4 MG/DL (ref 8.3–10.1)
CHLORIDE SERPL-SCNC: 108 MMOL/L (ref 100–108)
CLARITY UR: CLEAR
CO2 SERPL-SCNC: 23 MMOL/L (ref 21–32)
COLOR UR: ABNORMAL
CREAT SERPL-MCNC: 2.14 MG/DL (ref 0.6–1.3)
CREAT UR-MCNC: 76.5 MG/DL
CREAT UR-MCNC: 76.5 MG/DL
EOSINOPHIL # BLD AUTO: 0.73 THOUSAND/ΜL (ref 0–0.61)
EOSINOPHIL NFR BLD AUTO: 6 % (ref 0–6)
ERYTHROCYTE [DISTWIDTH] IN BLOOD BY AUTOMATED COUNT: 18.5 % (ref 11.6–15.1)
FERRITIN SERPL-MCNC: 11 NG/ML (ref 8–388)
GFR SERPL CREATININE-BSD FRML MDRD: 27 ML/MIN/1.73SQ M
GLUCOSE P FAST SERPL-MCNC: 92 MG/DL (ref 65–99)
GLUCOSE UR STRIP-MCNC: NEGATIVE MG/DL
HCT VFR BLD AUTO: 29.4 % (ref 36.5–49.3)
HGB BLD-MCNC: 8.2 G/DL (ref 12–17)
HGB UR QL STRIP.AUTO: NEGATIVE
HYALINE CASTS #/AREA URNS LPF: ABNORMAL /LPF
IMM GRANULOCYTES # BLD AUTO: 0.06 THOUSAND/UL (ref 0–0.2)
IMM GRANULOCYTES NFR BLD AUTO: 1 % (ref 0–2)
IRON SATN MFR SERPL: 6 % (ref 20–50)
IRON SERPL-MCNC: 29 UG/DL (ref 65–175)
KETONES UR STRIP-MCNC: NEGATIVE MG/DL
LEUKOCYTE ESTERASE UR QL STRIP: NEGATIVE
LYMPHOCYTES # BLD AUTO: 1.51 THOUSANDS/ΜL (ref 0.6–4.47)
LYMPHOCYTES NFR BLD AUTO: 13 % (ref 14–44)
MAGNESIUM SERPL-MCNC: 1.9 MG/DL (ref 1.6–2.6)
MCH RBC QN AUTO: 21.9 PG (ref 26.8–34.3)
MCHC RBC AUTO-ENTMCNC: 27.9 G/DL (ref 31.4–37.4)
MCV RBC AUTO: 79 FL (ref 82–98)
MICROALBUMIN UR-MCNC: 22.1 MG/L (ref 0–20)
MICROALBUMIN/CREAT 24H UR: 29 MG/G CREATININE (ref 0–30)
MONOCYTES # BLD AUTO: 0.96 THOUSAND/ΜL (ref 0.17–1.22)
MONOCYTES NFR BLD AUTO: 8 % (ref 4–12)
NEUTROPHILS # BLD AUTO: 8.21 THOUSANDS/ΜL (ref 1.85–7.62)
NEUTS SEG NFR BLD AUTO: 71 % (ref 43–75)
NITRITE UR QL STRIP: NEGATIVE
NON-SQ EPI CELLS URNS QL MICRO: ABNORMAL /HPF
NRBC BLD AUTO-RTO: 0 /100 WBCS
PH UR STRIP.AUTO: 5.5 [PH]
PHOSPHATE SERPL-MCNC: 3.3 MG/DL (ref 2.3–4.1)
PLATELET # BLD AUTO: 305 THOUSANDS/UL (ref 149–390)
PMV BLD AUTO: 10.1 FL (ref 8.9–12.7)
POTASSIUM SERPL-SCNC: 5.4 MMOL/L (ref 3.5–5.3)
PROT SERPL-MCNC: 7.6 G/DL (ref 6.4–8.2)
PROT UR STRIP-MCNC: NEGATIVE MG/DL
PROT UR-MCNC: 17 MG/DL
PROT/CREAT UR: 0.22 MG/G{CREAT} (ref 0–0.1)
PTH-INTACT SERPL-MCNC: 83.7 PG/ML (ref 18.4–80.1)
RBC # BLD AUTO: 3.74 MILLION/UL (ref 3.88–5.62)
RBC #/AREA URNS AUTO: ABNORMAL /HPF
SODIUM 24H UR-SCNC: 92 MOL/L
SODIUM SERPL-SCNC: 140 MMOL/L (ref 136–145)
SP GR UR STRIP.AUTO: 1.01 (ref 1–1.03)
TIBC SERPL-MCNC: 487 UG/DL (ref 250–450)
URATE SERPL-MCNC: 7.8 MG/DL (ref 4.2–8)
UROBILINOGEN UR STRIP-ACNC: <2 MG/DL
UUN 24H UR-MCNC: 564 MG/DL
WBC # BLD AUTO: 11.54 THOUSAND/UL (ref 4.31–10.16)
WBC #/AREA URNS AUTO: ABNORMAL /HPF

## 2022-05-31 PROCEDURE — 84156 ASSAY OF PROTEIN URINE: CPT | Performed by: PHYSICIAN ASSISTANT

## 2022-05-31 PROCEDURE — 81001 URINALYSIS AUTO W/SCOPE: CPT | Performed by: PHYSICIAN ASSISTANT

## 2022-05-31 PROCEDURE — 36415 COLL VENOUS BLD VENIPUNCTURE: CPT

## 2022-05-31 PROCEDURE — 85025 COMPLETE CBC W/AUTO DIFF WBC: CPT

## 2022-05-31 PROCEDURE — 83540 ASSAY OF IRON: CPT

## 2022-05-31 PROCEDURE — 84550 ASSAY OF BLOOD/URIC ACID: CPT

## 2022-05-31 PROCEDURE — 83550 IRON BINDING TEST: CPT

## 2022-05-31 PROCEDURE — 82570 ASSAY OF URINE CREATININE: CPT | Performed by: PHYSICIAN ASSISTANT

## 2022-05-31 PROCEDURE — 83735 ASSAY OF MAGNESIUM: CPT

## 2022-05-31 PROCEDURE — 82306 VITAMIN D 25 HYDROXY: CPT

## 2022-05-31 PROCEDURE — 84300 ASSAY OF URINE SODIUM: CPT

## 2022-05-31 PROCEDURE — 82728 ASSAY OF FERRITIN: CPT

## 2022-05-31 PROCEDURE — 83970 ASSAY OF PARATHORMONE: CPT

## 2022-05-31 PROCEDURE — 84540 ASSAY OF URINE/UREA-N: CPT

## 2022-05-31 PROCEDURE — 82043 UR ALBUMIN QUANTITATIVE: CPT | Performed by: PHYSICIAN ASSISTANT

## 2022-05-31 PROCEDURE — 80053 COMPREHEN METABOLIC PANEL: CPT

## 2022-05-31 PROCEDURE — 84100 ASSAY OF PHOSPHORUS: CPT

## 2022-06-01 ENCOUNTER — OFFICE VISIT (OUTPATIENT)
Dept: NEPHROLOGY | Facility: CLINIC | Age: 87
End: 2022-06-01
Payer: COMMERCIAL

## 2022-06-01 VITALS
BODY MASS INDEX: 32.79 KG/M2 | DIASTOLIC BLOOD PRESSURE: 70 MMHG | SYSTOLIC BLOOD PRESSURE: 140 MMHG | HEART RATE: 59 BPM | OXYGEN SATURATION: 98 % | WEIGHT: 191 LBS

## 2022-06-01 DIAGNOSIS — I35.0 SEVERE AORTIC STENOSIS: ICD-10-CM

## 2022-06-01 DIAGNOSIS — E87.5 HYPERKALEMIA: ICD-10-CM

## 2022-06-01 DIAGNOSIS — N18.4 CKD (CHRONIC KIDNEY DISEASE) STAGE 4, GFR 15-29 ML/MIN (HCC): ICD-10-CM

## 2022-06-01 DIAGNOSIS — I48.0 PAROXYSMAL ATRIAL FIBRILLATION (HCC): Primary | ICD-10-CM

## 2022-06-01 DIAGNOSIS — K21.9 GASTROESOPHAGEAL REFLUX DISEASE, UNSPECIFIED WHETHER ESOPHAGITIS PRESENT: ICD-10-CM

## 2022-06-01 DIAGNOSIS — I13.0 HYPERTENSIVE HEART AND CHRONIC KIDNEY DISEASE WITH HEART FAILURE AND STAGE 1 THROUGH STAGE 4 CHRONIC KIDNEY DISEASE, OR CHRONIC KIDNEY DISEASE (HCC): ICD-10-CM

## 2022-06-01 DIAGNOSIS — I44.2 COMPLETE HEART BLOCK (HCC): ICD-10-CM

## 2022-06-01 DIAGNOSIS — I50.32 CHRONIC DIASTOLIC CHF (CONGESTIVE HEART FAILURE) (HCC): ICD-10-CM

## 2022-06-01 PROCEDURE — 1036F TOBACCO NON-USER: CPT | Performed by: INTERNAL MEDICINE

## 2022-06-01 PROCEDURE — 99214 OFFICE O/P EST MOD 30 MIN: CPT | Performed by: INTERNAL MEDICINE

## 2022-06-01 PROCEDURE — 1160F RVW MEDS BY RX/DR IN RCRD: CPT | Performed by: INTERNAL MEDICINE

## 2022-06-01 NOTE — ASSESSMENT & PLAN NOTE
Potassium was 5 4 on last lab  He denies any use of dietary potassium intake such as orange juice or bananas  It is possible blood may have been hemolyzed  He continues to take torsemide which should lower his potassium    Will check labs in 1 month to verify

## 2022-06-01 NOTE — PROGRESS NOTES
[address] [facility], [place]    Name: Marisela Jones  YOB: 1935      Assessment/Plan:           Problem List Items Addressed This Visit        Digestive    GERD (gastroesophageal reflux disease)     Taking pantoprazole as a stomach protectant              Cardiovascular and Mediastinum    Complete heart block ([address] )     S/p PPMI 7/24/2020           Chronic diastolic CHF (congestive heart failure) ([address] )     Wt Readings from Last 3 Encounters:   06/01/22 86 6 kg (191 lb)   05/27/22 87 1 kg (192 lb)   10/16/21 81 kg (178 lb 9 6 oz)     Lungs sound fairly clear and weights are stable             Paroxysmal atrial fibrillation ([facility] ) - Primary     As good rate control           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 kidney disease, or chronic kidney disease ([facility] [address] )     Lab Results   Component Value Date    EGFR 27 05/31/2022    EGFR 21 09/21/2021    EGFR 26 08/19/2021    CREATININE 2 14 (H) 05/31/2022    CREATININE 2 62 (H) 09/21/2021    CREATININE 2 25 (H) 08/19/2021   Well controlled  He has slightly improved stable stage 4 chronic kidney disease           History of severe aortic stenosis     Sp AOVR 6/30/2015 bioprosthetic  valve              Genitourinary    CKD (chronic kidney disease) stage 4, GFR 15-29 ml/min ([facility])     Lab Results   Component Value Date    EGFR 27 05/31/2022    EGFR 21 09/21/2021    EGFR 26 08/19/2021    CREATININE 2 14 (H) 05/31/2022    CREATININE 2 62 (H) 09/21/2021    CREATININE 2 25 (H) 08/19/2021   He had a slight improvement in his creatinine with the dropped to 2 14 mg/dL  His GFR is 27 mils per minute  I explained the meaning of GFR to him and his family  He is complete very stable and does not take NSAIDs  He avoid salt  He should only drink to thirst   I suggested that he weigh himself from time to time to evaluate for fluid overload                Other Hyperkalemia     Potassium was 5 4 on last lab  He denies any use of dietary potassium intake such as orange juice or bananas  It is possible blood may have been hemolyzed  He continues to take torsemide which should lower his potassium  Will check labs in 1 month to verify           Relevant Orders    Basic metabolic panel            Subjective:      Patient ID: Cameron Lambert is a 80 y o  male  Referred by Dr Dom Osman    HPI  has a history of acute kidney injury superimposed on chronic kidney disease stage 4  His baseline creatinine is 2-2 2 mg/dL  He has a history of chronic systolic and diastolic congestive failure and hypertensive heart and chronic kidney disease with heart failure with stages 1 due for chronic kidney disease  He has been feeling well with no new medications or admissions to the hospital    The following portions of the patient's history were reviewed and updated as appropriate: allergies, current medications, past family history, past medical history, past social history, past surgical history and problem list     Review of Systems   Constitutional: Negative for fatigue  HENT: Negative for hearing loss  Eyes: Negative for visual disturbance  Respiratory: Negative for cough and shortness of breath  Cardiovascular: Negative for chest pain, palpitations and leg swelling  Gastrointestinal: Negative for abdominal pain, blood in stool, constipation and diarrhea  Genitourinary: Positive for frequency  Negative for decreased urine volume, difficulty urinating, dysuria and hematuria  Clear yellow and gets to bathroom on time  Nocturia x 2   Musculoskeletal: Positive for arthralgias and gait problem  He walks with a walker at home but is here with a cane   Neurological: Negative for dizziness, weakness, light-headedness and headaches  Hematological: Does not bruise/bleed easily  Psychiatric/Behavioral: Negative for confusion and dysphoric mood           Symmetric and normal size  Right kidney:  8 9 cm  Left kidney:  11 9 cm      Right kidney  The kidney is echogenic with thinning of the renal cortex  No suspicious masses detected  Few simple renal cysts measuring up to 2 5 cm  No hydronephrosis  No shadowing calculi  No perinephric fluid collections      Left kidney  The kidney is echogenic with thinning of the renal cortex  No suspicious masses detected  Dominant simple upper pole renal cyst is seen measuring 8 2 x 7 8 x 6 8 cm  No hydronephrosis  No shadowing calculi  No perinephric fluid collections      URETERS:  Nonvisualized  Social History     Socioeconomic History    Marital status: /Civil Union     Spouse name: Evelin Corral Number of children: 5    Years of education: None    Highest education level: None   Occupational History    Occupation: retired   Tobacco Use    Smoking status: Never Smoker    Smokeless tobacco: Never Used   Vaping Use    Vaping Use: Never used   Substance and Sexual Activity    Alcohol use: Never     Alcohol/week: 0 0 standard drinks     Comment: very occasional    Drug use: Never    Sexual activity: None   Other Topics Concern    None   Social History Narrative    Caffeine use     Dental care, regularly     Lives independently with spouse     Uses seatbelts      Social Determinants of Health     Financial Resource Strain: Low Risk     Difficulty of Paying Living Expenses: Not hard at all   Food Insecurity: No Food Insecurity    Worried About Running Out of Food in the Last Year: Never true    Catalina of Food in the Last Year: Never true   Transportation Needs: No Transportation Needs    Lack of Transportation (Medical): No    Lack of Transportation (Non-Medical): No   Physical Activity: Not on file   Stress: Not on file   Social Connections: Not on file   Intimate Partner Violence: Not on file   Housing Stability: Not on file     Past Medical History:   Diagnosis Date    Aortic stenosis     ECHO -4-14-14  MODERATE TO SEVERE AORTIC STENOSIS; MILD AROTIC INSUFFICIENCY - LAST ASSESSED 10/26/16; RESOLVED 6/8/16    Aortic valve disorder     LAST ASSESSED 10/8/15; 10/8/15    Arthritis     [facility]est[facility] failure) [facility]     Complete heart block ([facility] ) 7/20/2020    Coronary artery disease     Hypertension     Hypertensive urgency 5/19/2019    Renal disorder     TIA (transient ischemic attack)     Transient cerebral ischemia      Past Surgical History:   Procedure Laterality Date    AORTIC VALVE REPLACEMENT  06/30/2015    avr with 23 mm [place] Magna Ease bioprosthetic    CARDIAC PACEMAKER PLACEMENT Left 07/24/2020    CATARACT EXTRACTION Right 06/05/2000    COLONOSCOPY      CORONARY ARTERY BYPASS GRAFT  06/05/2000    x1 with argueta  to lad    EYE SURGERY      POLYPECTOMY  04/2014    enteroscopic - esophageal ulcer, gastric erosion, and duodenal ulcer       TONSILLECTOMY      unknown       Current Outpatient Medications:     allopurinol (ZYLOPRIM) 100 mg tablet, Take 1 tablet (100 mg total) by mouth daily, Disp: 30 tablet, Rfl: 5    amLODIPine (NORVASC) 5 mg tablet, Take 1 tablet (5 mg total) by mouth daily, Disp: 90 tablet, Rfl: 3    apixaban (ELIQUIS) 2 5 mg, Take 1 tablet (2 5 mg total) by mouth 2 (two) times a day, Disp: 180 tablet, Rfl: 3    aspirin (ECOTRIN LOW STRENGTH) 81 mg EC tablet, Take 1 tablet (81 mg total) by mouth daily, Disp:  , Rfl: 0    desoximetasone (TOPICORT) 0 05 % cream, Apply topically 2 (two) times a day, Disp: 60 g, Rfl: 5    gabapentin (NEURONTIN) 100 mg capsule, Take 1 capsule (100 mg total) by mouth daily at bedtime, Disp: 30 capsule, Rfl: 5    metoprolol tartrate (LOPRESSOR) 100 mg tablet, Take 1 tablet (100 mg total) by mouth 2 (two) times a day, Disp: 180 tablet, Rfl: 3    pantoprazole (PROTONIX) 40 mg tablet, Take 1 tablet (40 mg total) by mouth 2 (two) times a day before meals, Disp: 180 tablet, Rfl: 3    pravastatin (PRAVACHOL) 40 mg tablet, Take 1 tablet (40 mg total) by mouth daily with dinner, Disp: 90 tablet, Rfl: 3    senna (SENOKOT) 8 6 mg, Take 1 tablet (8 6 mg total) by mouth daily at bedtime, Disp: 90 tablet, Rfl: 3    torsemide (DEMADEX) 20 mg tablet, Take 1 tablet (20 mg total) by mouth daily, Disp: 30 tablet, Rfl: 2    Lab Results   Component Value Date     10/01/2015    SODIUM 140 05/31/2022    K 5 4 (H) 05/31/2022     05/31/2022    CO2 23 05/31/2022    ANIONGAP 9 10/01/2015    AGAP 9 05/31/2022    BUN 34 (H) 05/31/2022    CREATININE 2 14 (H) 05/31/2022    GLUC 63 (L) 08/19/2021    GLUF 92 05/31/2022    CALCIUM 9 4 05/31/2022    AST 17 05/31/2022    ALT 19 05/31/2022    ALKPHOS 84 05/31/2022    PROT 7 7 10/01/2015    TP 7 6 05/31/2022    BILITOT 0 37 10/01/2015    TBILI 0 28 05/31/2022    EGFR 27 05/31/2022     Lab Results   Component Value Date    WBC 11 54 (H) 05/31/2022    HGB 8 2 (L) 05/31/2022    HCT 29 4 (L) 05/31/2022    MCV 79 (L) 05/31/2022     05/31/2022     Lab Results   Component Value Date    CHOLESTEROL 122 08/19/2021    CHOLESTEROL 167 05/20/2019    CHOLESTEROL 154 08/07/2018     Lab Results   Component Value Date    HDL 36 (L) 08/19/2021    HDL 32 (L) 05/20/2019    HDL 27 (L) 08/07/2018     Lab Results   Component Value Date    LDLCALC 72 08/19/2021    LDLCALC 103 (H) 05/20/2019    LDLCALC 71 08/07/2018     Lab Results   Component Value Date    TRIG 70 08/19/2021    TRIG 162 (H) 05/20/2019    TRIG 279 (H) 08/07/2018     No results found for: [place], [place]  Lab Results   Component Value Date    [id number] 1 480 07/20/2020     Lab Results   Component Value Date    PTH 83 7 (H) 05/31/2022    CALCIUM 9 4 05/31/2022    PHOS 3 3 05/31/2022     No results found for: SPEP, UPEP  No results found for: [place] [place]        Objective:      /70   Pulse 59   Wt 86 6 kg (191 lb)   SpO2 98%   BMI 32 79 kg/m²          Physical Exam  Constitutional:       General: He is not in acute distress       Appearance: He is normal weight  He is not toxic-appearing  HENT:      Head: Normocephalic and atraumatic  Right Ear: External ear normal       Left Ear: External ear normal    Eyes:      Extraocular Movements: Extraocular movements intact  Conjunctiva/sclera: Conjunctivae normal       Pupils: Pupils are equal, round, and reactive to light  Neck:      Vascular: No carotid bruit  Cardiovascular:      Rate and Rhythm: Normal rate and regular rhythm  Pulmonary:      Effort: Pulmonary effort is normal  No respiratory distress  Breath sounds: No wheezing or rales  Comments: Dec BS  Abdominal:      General: Bowel sounds are normal  There is no distension  Palpations: Abdomen is soft  Tenderness: There is no abdominal tenderness  Musculoskeletal:         General: Normal range of motion  Cervical back: Normal range of motion  Right lower leg: No edema  Left lower leg: No edema  Lymphadenopathy:      Cervical: No cervical adenopathy  Skin:     General: Skin is warm and dry  Neurological:      General: No focal deficit present  Mental Status: He is alert and oriented to person, place, and time  Motor: Weakness present  Gait: Gait abnormal    Psychiatric:         Mood and Affect: Mood normal          Behavior: Behavior normal          Thought Content:  Thought content normal          Judgment: Judgment normal

## 2022-06-01 NOTE — ASSESSMENT & PLAN NOTE
Lab Results   Component Value Date    EGFR 27 05/31/2022    EGFR 21 09/21/2021    EGFR 26 08/19/2021    CREATININE 2 14 (H) 05/31/2022    CREATININE 2 62 (H) 09/21/2021    CREATININE 2 25 (H) 08/19/2021   He had a slight improvement in his creatinine with the dropped to 2 14 mg/dL  His GFR is 27 mils per minute  I explained the meaning of GFR to him and his family  He is complete very stable and does not take NSAIDs  He avoid salt  He should only drink to thirst   I suggested that he weigh himself from time to time to evaluate for fluid overload

## 2022-06-01 NOTE — ASSESSMENT & PLAN NOTE
Lab Results   Component Value Date    EGFR 27 05/31/2022    EGFR 21 09/21/2021    EGFR 26 08/19/2021    CREATININE 2 14 (H) 05/31/2022    CREATININE 2 62 (H) 09/21/2021    CREATININE 2 25 (H) 08/19/2021   Well controlled  He has slightly improved stable stage 4 chronic kidney disease

## 2022-06-01 NOTE — ASSESSMENT & PLAN NOTE
Wt Readings from Last 3 Encounters:   06/01/22 86 6 kg (191 lb)   05/27/22 87 1 kg (192 lb)   10/16/21 81 kg (178 lb 9 6 oz)     Lungs sound fairly clear and weights are stable

## 2022-07-14 NOTE — PROGRESS NOTES
Cardiology Follow Up    Wendy Amaro  1935  [address] [facility] [address]  [place] [address]  [phone]    1  Coronary artery disease involving native coronary artery of 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     2  S/P CABG (coronary artery bypass graft)     3  Chronic diastolic (congestive) heart failure ([address] )     4  History of severe aortic stenosis     5  History of aortic valve replacement with bioprosthetic valve     6  Nonrheumatic mitral valve stenosis     7  Nonrheumatic mitral valve regurgitation     8  Paroxysmal atrial fibrillation (HCC)     9  Complete heart block ([address] )     10  Presence of permanent cardiac pacemaker     11  Dyslipidemia         Discussion/Summary:  Overall he is doing well from a cardiac standpoint  He is sedentary but offers no cardiopulmonary complaints  His coronary artery disease without symptoms of angina  He is maintained on aspirin, statin, and beta-blocker therapy  He appears euvolemic on his current diuretic regimen of torsemide 20 mg daily  He will continue a low sodium diet  He underwent an echocardiogram in September 2021 which showed normal function of his bioprosthetic aortic valve along with stable moderate mitral stenosis and mild mitral regurgitation  We will continue to follow this  He has been maintaining sinus rhythm  His atrial fibrillation burden was <0 1% on his last device check  He will continue metoprolol tartrate 100 mg BID  He will remain on anticoagulation with Eliquis 2 5 mg BID (age, renal function)  His blood pressure is well controlled  His lipids are acceptable (LDL of 72) on current statin regimen  He will RTO in 6 months with Dr Oscar Whittaker or sooner if necessary  He will call with any concerns       Interval History:   Wendy Amaro is a 80 y o  male with coronary artery disease s/p CABG, severe aortic stenosis s/p aortic valve replacement, complete heart block s/p pacemaker placement in July 2020, paroxysmal atrial fibrillation on anticoagulation with Eliquis - complicated by GI bleeding, chronic diastolic congestive heart failure, hypertension, dyslipidemia, and CKD Stage IV who presents to the office today for routine follow up  Since his last office visit he has been feeling well  He offers no complaints  He is mainly sedentary  He ambulates with the use of a walker  With the activity he performs he denies any shortness of breath or chest pain/pressure/discomfort  He denies lower extremity edema, orthopnea, and PND  He denies lightheadedness, dizziness, and syncope  He denies palpitations  He denies bleeding consequences and falls on anticoagulation  Medical Problems             Problem List     Mild intermittent asthma without complication    CKD (chronic kidney disease) stage 4, GFR 15-29 ml/min ([facility])    Lab Results   Component Value Date    EGFR 27 05/31/2022    EGFR 21 09/21/2021    EGFR 26 08/19/2021    CREATININE 2 14 (H) 05/31/2022    CREATININE 2 62 (H) 09/21/2021    CREATININE 2 25 (H) 08/19/2021           Dyslipidemia    GERD (gastroesophageal reflux disease)    Late effects of cerebrovascular disease    Lumbar degenerative disc disease    Lumbar radiculopathy    Mitral regurgitation    Overview Signed 8/14/2018  9:07 AM by Brenna Moore DO     Description: echo 04- - moderate           Obesity (BMI 30-39  9)    Medicare annual wellness visit, subsequent    History of aortic valve replacement with bioprosthetic valve    History of stroke    Complete heart block (HCC)    Chronic diastolic CHF (congestive heart failure) ([address] )    Wt Readings from Last 3 Encounters:   07/15/22 89 4 kg (197 lb)   06/01/22 86 6 kg (191 lb)   05/27/22 87 1 kg (192 lb)                   Ambulatory dysfunction    Paroxysmal atrial fibrillation (HCC)    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    Presence of permanent cardiac pacemaker    Oxygen dependent    Iron deficiency anemia    Need for immunization against influenza    Pulmonary emphysema (HCC)    Left wrist pain    Right hip pain    Acute kidney injury superimposed on CKD [facility])    Lab Results   Component Value Date    EGFR 27 05/31/2022    EGFR 21 09/21/2021    EGFR 26 08/19/2021    CREATININE 2 14 (H) 05/31/2022    CREATININE 2 62 (H) 09/21/2021    CREATININE 2 25 (H) 08/19/2021           Multiple renal cysts    Effusion of left knee    Mitral valve stenosis    Acute cystitis without hematuria    Acute gout    Transaminitis    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 kidney disease, or chronic kidney disease ([facility] )    Overview Signed 8/6/2021  5:04 AM by Jeffery Esquivel     Per CMS ICD 10 Guidelines--per Physician           Lab Results   Component Value Date    EGFR 27 05/31/2022    EGFR 21 09/21/2021    EGFR 26 08/19/2021    CREATININE 2 14 (H) 05/31/2022    CREATININE 2 62 (H) 09/21/2021    CREATININE 2 25 (H) 08/19/2021           Chronic low back pain without sciatica    History of severe aortic stenosis    Hx of CABG    Essential hypertension    Dermatitis    Hyperkalemia              Past Medical History:   Diagnosis Date    Aortic stenosis     ECHO -4-14-14  MODERATE TO SEVERE AORTIC STENOSIS; MILD AROTIC INSUFFICIENCY - LAST ASSESSED 10/26/16; RESOLVED 6/8/16    Aortic valve disorder     LAST ASSESSED 10/8/15; 10/8/15    Arthritis     CHF (congestive heart failure) ([facility])     Complete heart block ([facility] ) 7/20/2020    Coronary artery disease     Hypertension     Hypertensive urgency 5/19/2019    Renal disorder     TIA (transient ischemic attack)     Transient cerebral ischemia      Social History     Socioeconomic History    Marital status: /Civil Union     Spouse name:  Heber Gregory Number of children: 5    Years of education: Not on file  Highest education level: Not on file   Occupational History    Occupation: retired   Tobacco Use    Smoking status: Never Smoker    Smokeless tobacco: Never Used   Vaping Use    Vaping Use: Never used   Substance and Sexual Activity    Alcohol use: Never     Alcohol/week: 0 0 standard drinks     Comment: very occasional    Drug use: Never    Sexual activity: Not on file   Other Topics Concern    Not on file   Social History Narrative    Caffeine use     Dental care, regularly     Lives independently with spouse     Uses seatbelts      Social Determinants of Health     Financial Resource Strain: Not on file   Food Insecurity: Not on file   Transportation Needs: Not on file   Physical Activity: Not on file   Stress: Not on file   Social Connections: Not on file   Intimate Partner Violence: Not on file   Housing Stability: Not on file      Family History   Problem Relation Age of Onset    No Known Problems Mother     No Known Problems Father     Coronary artery disease Neg Hx      Past Surgical History:   Procedure Laterality Date    AORTIC VALVE REPLACEMENT  06/30/2015    avr with 23 mm [place] Magna Ease bioprosthetic   [address] Left 07/24/2020    CATARACT EXTRACTION Right 06/05/2000    COLONOSCOPY      CORONARY ARTERY BYPASS GRAFT  06/05/2000    x1 with argueta  to [address] POLYPECTOMY  04/2014    enteroscopic - esophageal ulcer, gastric erosion, and duodenal ulcer       TONSILLECTOMY      unknown       Current Outpatient Medications:     allopurinol (ZYLOPRIM) 100 mg tablet, Take 1 tablet (100 mg total) by mouth daily, Disp: 30 tablet, Rfl: 5    amLODIPine (NORVASC) 5 mg tablet, Take 1 tablet (5 mg total) by mouth daily, Disp: 90 tablet, Rfl: 3    apixaban (ELIQUIS) 2 5 mg, Take 1 tablet (2 5 mg total) by mouth 2 (two) times a day, Disp: 180 tablet, Rfl: 3    aspirin (ECOTRIN LOW STRENGTH) 81 mg EC tablet, Take 1 tablet (81 mg total) by mouth daily, Disp:  , Rfl: 0    gabapentin (NEURONTIN) 100 mg capsule, Take 1 capsule (100 mg total) by mouth daily at bedtime, Disp: 30 capsule, Rfl: 5    metoprolol tartrate (LOPRESSOR) 100 mg tablet, Take 1 tablet (100 mg total) by mouth 2 (two) times a day, Disp: 180 tablet, Rfl: 3    pantoprazole (PROTONIX) 40 mg tablet, Take 1 tablet (40 mg total) by mouth 2 (two) times a day before meals, Disp: 180 tablet, Rfl: 3    pravastatin (PRAVACHOL) 40 mg tablet, Take 1 tablet (40 mg total) by mouth daily with dinner, Disp: 90 tablet, Rfl: 3    senna (SENOKOT) 8 6 mg, Take 1 tablet (8 6 mg total) by mouth daily at bedtime, Disp: 90 tablet, Rfl: 3    torsemide (DEMADEX) 20 mg tablet, Take 1 tablet (20 mg total) by mouth daily, Disp: 30 tablet, Rfl: 2    desoximetasone (TOPICORT) 0 05 % cream, Apply topically 2 (two) times a day (Patient not taking: Reported on 7/15/2022), Disp: 60 g, Rfl: 5  Allergies   Allergen Reactions    Promethazine Delirium and Other (See Comments)       Labs:     Chemistry        Component Value Date/Time     10/01/2015 0832    K 5 4 (H) 05/31/2022 0853    K 4 6 10/01/2015 0832     05/31/2022 0853     10/01/2015 0832    CO2 23 05/31/2022 0853    CO2 27 4 10/01/2015 0832    BUN 34 (H) 05/31/2022 0853    BUN 28 (H) 10/01/2015 0832    CREATININE 2 14 (H) 05/31/2022 0853    CREATININE 1 88 (H) 10/01/2015 0832        Component Value Date/Time    CALCIUM 9 4 05/31/2022 0853    CALCIUM 9 1 10/01/2015 0832    ALKPHOS 84 05/31/2022 0853    ALKPHOS 100 10/01/2015 0832    AST 17 05/31/2022 0853    AST 18 10/01/2015 0832    ALT 19 05/31/2022 0853    ALT 17 10/01/2015 0832    BILITOT 0 37 10/01/2015 0832            Lab Results   Component Value Date    CHOL 139 10/01/2015    CHOL 147 06/15/2015    CHOL 151 04/30/2015     Lab Results   Component Value Date    HDL 36 (L) 08/19/2021    HDL 32 (L) 05/20/2019    HDL 27 (L) 08/07/2018     Lab Results   Component Value Date    LDLCALC 72 08/19/2021    [address] (H) 05/20/2019    LDLCALC 71 08/07/2018     Lab Results   Component Value Date    TRIG 70 08/19/2021    TRIG 162 (H) 05/20/2019    TRIG 279 (H) 08/07/2018     No results found for: CHOLHDL    Imaging: No results found  ECG: paced rhythm with PVC's      Review of Systems   Cardiovascular: Negative for chest pain, dyspnea on exertion, leg swelling, near-syncope, orthopnea, palpitations, paroxysmal nocturnal dyspnea and syncope  Neurological: Negative for dizziness and light-headedness  All other systems reviewed and are negative  Vitals:    07/15/22 1213   BP: 112/67   Pulse: 79     Vitals:    07/15/22 1213   Weight: 89 4 kg (197 lb)     Height: 5' 4" (162 6 cm)   Body mass index is 33 81 kg/m²  Physical Exam:  Physical Exam  Vitals reviewed  Constitutional:       General: He is not in acute distress  Appearance: He is well-developed  He is obese  He is not diaphoretic  HENT:      Head: Normocephalic and atraumatic  Eyes:      Pupils: Pupils are equal, round, and reactive to light  Neck:      Vascular: No carotid bruit  Cardiovascular:      Rate and Rhythm: Normal rate and regular rhythm  Pulses:           Radial pulses are 2+ on the right side and 2+ on the left side  Dorsalis pedis pulses are 2+ on the right side and 2+ on the left side  Heart sounds: S1 normal and S2 normal  No murmur heard  Pulmonary:      Effort: Pulmonary effort is normal  No respiratory distress  Breath sounds: Normal breath sounds  No wheezing or rales  Abdominal:      General: There is no distension  Palpations: Abdomen is soft  Tenderness: There is no abdominal tenderness  Musculoskeletal:         General: Normal range of motion  Cervical back: Normal range of motion  Right lower leg: No edema  Left lower leg: No edema  Skin:     General: Skin is warm and dry  Findings: No erythema  Neurological:      General: No focal deficit present        Mental Status: He is alert and oriented to person, place, and time  Motor: Weakness: in wheelchair        Gait: Gait abnormal    Psychiatric:         Mood and Affect: Mood normal          Behavior: Behavior normal

## 2022-07-15 ENCOUNTER — OFFICE VISIT (OUTPATIENT)
Dept: CARDIOLOGY CLINIC | Facility: HOSPITAL | Age: 87
End: 2022-07-15
Payer: COMMERCIAL

## 2022-07-15 VITALS
WEIGHT: 197 LBS | DIASTOLIC BLOOD PRESSURE: 67 MMHG | BODY MASS INDEX: 33.63 KG/M2 | SYSTOLIC BLOOD PRESSURE: 112 MMHG | HEART RATE: 79 BPM | HEIGHT: 64 IN

## 2022-07-15 DIAGNOSIS — Z95.0 PRESENCE OF PERMANENT CARDIAC PACEMAKER: ICD-10-CM

## 2022-07-15 DIAGNOSIS — I48.0 PAROXYSMAL ATRIAL FIBRILLATION (HCC): ICD-10-CM

## 2022-07-15 DIAGNOSIS — I50.32 CHRONIC DIASTOLIC (CONGESTIVE) HEART FAILURE (HCC): ICD-10-CM

## 2022-07-15 DIAGNOSIS — I25.10 CORONARY ARTERY DISEASE INVOLVING NATIVE CORONARY ARTERY OF NATIVE HEART WITHOUT ANGINA PECTORIS: Primary | ICD-10-CM

## 2022-07-15 DIAGNOSIS — I34.2 NONRHEUMATIC MITRAL VALVE STENOSIS: ICD-10-CM

## 2022-07-15 DIAGNOSIS — I35.0 SEVERE AORTIC STENOSIS: ICD-10-CM

## 2022-07-15 DIAGNOSIS — E78.5 DYSLIPIDEMIA: ICD-10-CM

## 2022-07-15 DIAGNOSIS — Z95.3 HISTORY OF AORTIC VALVE REPLACEMENT WITH BIOPROSTHETIC VALVE: ICD-10-CM

## 2022-07-15 DIAGNOSIS — I34.0 NONRHEUMATIC MITRAL VALVE REGURGITATION: ICD-10-CM

## 2022-07-15 DIAGNOSIS — I44.2 COMPLETE HEART BLOCK (HCC): ICD-10-CM

## 2022-07-15 DIAGNOSIS — Z95.1 S/P CABG (CORONARY ARTERY BYPASS GRAFT): ICD-10-CM

## 2022-07-15 PROCEDURE — 93000 ELECTROCARDIOGRAM COMPLETE: CPT | Performed by: PHYSICIAN ASSISTANT

## 2022-07-15 PROCEDURE — 1160F RVW MEDS BY RX/DR IN RCRD: CPT | Performed by: PHYSICIAN ASSISTANT

## 2022-07-15 PROCEDURE — 99214 OFFICE O/P EST MOD 30 MIN: CPT | Performed by: PHYSICIAN ASSISTANT

## 2022-08-11 DIAGNOSIS — K92.2 GASTROINTESTINAL HEMORRHAGE, UNSPECIFIED GASTROINTESTINAL HEMORRHAGE TYPE: ICD-10-CM

## 2022-08-11 DIAGNOSIS — I48.0 PAF (PAROXYSMAL ATRIAL FIBRILLATION) (HCC): ICD-10-CM

## 2022-08-11 RX ORDER — PANTOPRAZOLE SODIUM 40 MG/1
40 TABLET, DELAYED RELEASE ORAL
Qty: 180 TABLET | Refills: 3 | Status: SHIPPED | OUTPATIENT
Start: 2022-08-11 | End: 2022-11-09

## 2022-08-16 ENCOUNTER — IN-CLINIC DEVICE VISIT (OUTPATIENT)
Dept: CARDIOLOGY CLINIC | Facility: HOSPITAL | Age: 87
End: 2022-08-16
Payer: COMMERCIAL

## 2022-08-16 DIAGNOSIS — Z95.0 PRESENCE OF CARDIAC PACEMAKER: Primary | ICD-10-CM

## 2022-08-16 PROCEDURE — 93280 PM DEVICE PROGR EVAL DUAL: CPT | Performed by: INTERNAL MEDICINE

## 2022-08-16 NOTE — PROGRESS NOTES
Results for orders placed or performed in visit on 08/16/22   Cardiac EP device report    Narrative    MDT-DUAL CHAMBER PPM (DDD MODE)/ ACTIVE SYSTEM IS MRI CONDITIONAL  DEVICE INTERROGATED IN THE [facility]: BATTERY VOLTAGE ADEQUATE (10 YRS)  AP: 34 6%  : 99 8% (>40%`MVP-OFF~CHB)  ALL LEAD PARAMETERS WITHIN NORMAL LIMITS  1 AT/AF EPISODE W/ AF IN PROGRESS (HX OF SAME)  AF BURDEN: 6%  VT EPISODES PREVIOUSLY ADDRESSED  PT TAKES METOPROLOL TART, ELIQUIS  EF: 60% (ECHO 9/3/21)  NO PROGRAMMING CHANGES MADE TO DEVICE PARAMETERS  PACEMAKER FUNCTIONING APPROPRIATELY    [address] [place] [address] Street

## 2022-09-25 NOTE — PROGRESS NOTES
Chief Complaint   Patient presents with   • URI     Left ear pain, right ear starting to ache, congestion, cough, sore throat, at home covid this morning was negative        SUBJECTIVE:  Jennifer is a 77 year old female who is seen for the nose congestion and allergy symptoms for months.  She does not take anything for allergies.  I did ask her what she takes for allergies she states she uses her inhaler and nebulizer once a day for her asthma.  I then said what you taking for ear congestion she says Sudafed once in a while but not recently.  Patient's primary complaint today is left ear pain since yesterday for which she had to take a left over hydrocodone last night to sleep.  She did not take any other over-the-counter analgesics recently or today.    Past Medical History:   Diagnosis Date   • Asthma    • Endometrial cancer (CMS/HCC) 2013    Grade 1   • GERD (gastroesophageal reflux disease)    • Hiatal hernia    • Hypercholesterolemia 10/23/2014   • Hypertension        Family History   Problem Relation Age of Onset   • Arthritis Mother    • Natural Causes Mother 98        Cause of death   • Hypertension Mother    • Seizure Disorder Father         Epilepsy    • Alcohol Abuse Father    • Arthritis Sister    • Cancer, Breast Sister 80   • Alcohol Abuse Sister    • Diabetes Sister    • Psychiatric Sister         s/p shock therapy   • High blood pressure Sister    • Congestive Heart Failure Brother    • Heart Brother         Valve replacement   • Pacemaker Brother    • Cancer, Lung Brother    • High blood pressure Brother    • Bipolar disorder Daughter    • Depression Daughter    • Anxiety disorder Daughter    • Cancer, Breast Maternal Grandmother        Social History     Tobacco Use   Smoking Status Never Smoker   Smokeless Tobacco Never Used       ALLERGIES:  No Known Allergies    Current Outpatient Medications   Medication Sig Dispense Refill   • budesonide (PULMICORT) 0.5 MG/2ML nebulizer suspension USE 2 ML VIA  Progress Note - Renetta Moseley 80 y o  male MRN: [id number]    Unit/Bed#: 127-79 Encounter: [id number]         Assessment/ Plan:  Anemia     Patient was admitted approximately 2 weeks ago for anemia and underwent endoscopy showing a duodenal ulcer  This was not actively bleeding at the time  He states since discharge he has noticed dark black stools  Hemoglobin has again decreased  It is unlikely that his bleeding is coming from the ulcer as it had a clean base  Possibly coming from small intestinal bleeding or colonic bleeding  Would continue to hold Eliquis and potentially plan for repeat endoscopy and colonoscopy on Friday   -follow H&H  -transfuse as necessary  -hold Eliquis  -EGD/colonoscopy Friday      Subjective:   Pt denies abdominal pain  No BM since admission  Objective:     Vitals: Blood pressure 114/54, pulse 60, temperature 99 1 °F (37 3 °C), resp  rate 18, height 5' 4" (1 626 m), weight 89 7 kg (197 lb 12 oz), SpO2 97 %  ,Body mass index is 33 94 kg/m²  Physical Exam: General appearance: alert and oriented, in no acute distress  Lungs: clear to auscultation bilaterally  Heart: regular rate and rhythm  Abdomen: soft, non-tender; bowel sounds normal; no masses,  no organomegaly  Skin: Skin color, texture, turgor normal  No rashes or lesions     Invasive Devices     Peripheral Intravenous Line            Peripheral IV 08/24/20 Left Antecubital 1 day    Peripheral IV 08/24/20 Right Forearm 1 day                Lab, Imaging and other studies: I have personally reviewed pertinent reports       CBC:   Lab Results   Component Value Date    WBC 8 98 08/26/2020    HGB 8 5 (L) 08/26/2020    HCT 25 4 (L) 08/26/2020    MCV 91 08/26/2020     08/26/2020    MCH 30 5 08/26/2020    MCHC 33 5 08/26/2020    RDW 13 1 08/26/2020    MPV 10 1 08/26/2020    NRBC 0 08/26/2020   ,   CMP:   Lab Results   Component Value Date    K 3 7 08/26/2020    CL 96 (L) 08/26/2020    CO2 32 08/26/2020    BUN 62 (H) 08/26/2020    CREATININE 2 74 (H) 08/26/2020    CALCIUM 8 4 08/26/2020    EGFR 20 08/26/2020   , NEBULIZER IN THE MORNING AND IN THE EVENING 360 mL 3   • ipratropium-albuterol (DUONEB) 0.5-2.5 (3) MG/3ML nebulizer solution USE BEFORE BUDESONIDE IN THE MORNING AND EVENING AS DIRECTED 810 mL 3   • pantoprazole (PROTONIX) 40 MG tablet Take 1 tablet by mouth 2 times daily. 180 tablet 3   • losartan (COZAAR) 100 MG tablet Take 1 tablet by mouth daily. 90 tablet 1   • amLODIPine (NORVASC) 5 MG tablet Take 1 tablet by mouth daily. 90 tablet 1   • Calcium Carbonate (CALCIUM 600 PO) Take 1 tablet by mouth daily.     • ipratropium (ATROVENT) 0.06 % nasal spray 1-2 sprays each nostril up to 3 times daily. May increase or decrease frequency based on need. 15 mL 12   • MAGNESIUM OXIDE PO Take 1 tablet by mouth daily.      • Multiple Vitamins-Minerals (MULTIVITAMIN ADULT PO) Take 1 tablet by mouth daily.        No current facility-administered medications for this visit.       REVIEW OF SYSTEMS:  In addition to that noted above, review of systems is remarkable for:  Chronic sinus congestion for months.  Negative COVID test this morning.  Left ear pain is her primary complaint  Rest of the 14-point review of systems reviewed with patient and are negative.    OBJECTIVE:   Visit Vitals  /82 (BP Location: RUE - Right upper extremity, Patient Position: Sitting, Cuff Size: Large Adult)   Pulse 66   Temp 98.1 °F (36.7 °C) (Oral)   Resp 14   Ht 5' 3.25\" (1.607 m)   Wt 92.2 kg (203 lb 3.2 oz)   SpO2 100%   BMI 35.71 kg/m²     General:  Appears stated age.  Eyes: No conjunctivitis; pupils equally round, react to light.  ENT: External ears normal left  tympanic membrane erythematous and bulging. Nasal mucosa within normal limits. No exudate in the oropharynx.  Dermatologic: No rashes on inspection. No tenderness on palpation around the ear.  Neck: No jugular venous distention. On inspection of the neck, normal thyroid on palpation.  Lymphatic:  No abnormal lymphadenopathy in the anterior, cervical or supraclavicular  region.  Lungs: Clear to auscultation bilaterally with normal respiratory effort.  Heart:  Regular rate rhythm, S1-S2.      LABS:        ASSESSMENT/ PLAN:  1. Left otitis media will be put on cefdinir twice daily for 10 days   2. Otalgia recommended Motrin 800 mg p.o. t.i.d. with food and or Tylenol 1000 mg 4 times daily as needed for pain.  These medicines when the pain goes away

## 2022-10-12 PROBLEM — N30.00 ACUTE CYSTITIS WITHOUT HEMATURIA: Status: RESOLVED | Noted: 2021-07-31 | Resolved: 2022-10-12

## 2022-10-21 DIAGNOSIS — G89.29 CHRONIC LOW BACK PAIN WITHOUT SCIATICA, UNSPECIFIED BACK PAIN LATERALITY: ICD-10-CM

## 2022-10-21 DIAGNOSIS — I10 ESSENTIAL HYPERTENSION: ICD-10-CM

## 2022-10-21 DIAGNOSIS — M54.50 CHRONIC LOW BACK PAIN WITHOUT SCIATICA, UNSPECIFIED BACK PAIN LATERALITY: ICD-10-CM

## 2022-10-21 RX ORDER — GABAPENTIN 100 MG/1
CAPSULE ORAL
Qty: 30 CAPSULE | Refills: 0 | Status: SHIPPED | OUTPATIENT
Start: 2022-10-21

## 2022-10-21 RX ORDER — METOPROLOL TARTRATE 100 MG/1
TABLET ORAL
Qty: 180 TABLET | Refills: 0 | Status: SHIPPED | OUTPATIENT
Start: 2022-10-21

## 2022-11-21 ENCOUNTER — REMOTE DEVICE CLINIC VISIT (OUTPATIENT)
Dept: CARDIOLOGY CLINIC | Facility: CLINIC | Age: 87
End: 2022-11-21

## 2022-11-21 ENCOUNTER — TELEPHONE (OUTPATIENT)
Dept: FAMILY MEDICINE CLINIC | Facility: CLINIC | Age: 87
End: 2022-11-21

## 2022-11-21 DIAGNOSIS — R11.0 NAUSEA: Primary | ICD-10-CM

## 2022-11-21 DIAGNOSIS — Z95.0 PRESENCE OF PERMANENT CARDIAC PACEMAKER: Primary | ICD-10-CM

## 2022-11-21 RX ORDER — ONDANSETRON 4 MG/1
4 TABLET, FILM COATED ORAL EVERY 8 HOURS PRN
Qty: 12 TABLET | Refills: 0 | Status: SHIPPED | OUTPATIENT
Start: 2022-11-21 | End: 2022-11-25

## 2022-11-21 NOTE — PROGRESS NOTES
MDT-DUAL CHAMBER PPM (DDD MODE)/ ACTIVE SYSTEM IS MRI CONDITIONAL   CARELINK TRANSMISSION:  BATTERY VOLTAGE ADEQUATE (9 7 YR )   AP <0 1%  99 8% (>40%/CHB/DEPENDENT/HIS)   ALL LEAD PARAMETERS WITHIN NORMAL LIMITS   1 AT/AF EPISODE IN PROGRESS SINCE 8/14/2022, ON ELIQUIS AND METOPROLOL   NORMAL DEVICE FUNCTION   RG

## 2022-11-21 NOTE — TELEPHONE ENCOUNTER
Daughter called for pt, said he gets carsick, very nauseous  Asking if he could having something prescribed to help with this? Uses [facility] in [place]

## 2022-12-01 ENCOUNTER — RA CDI HCC (OUTPATIENT)
Dept: OTHER | Facility: HOSPITAL | Age: 87
End: 2022-12-01

## 2022-12-01 NOTE — PROGRESS NOTES
[facility]  coding opportunities       Chart reviewed, no opportunity found:   [address]        Patients Insurance     Medicare Insurance: [employer] Advantage

## 2022-12-02 ENCOUNTER — APPOINTMENT (OUTPATIENT)
Dept: LAB | Facility: MEDICAL CENTER | Age: 87
End: 2022-12-02

## 2022-12-02 ENCOUNTER — OFFICE VISIT (OUTPATIENT)
Dept: FAMILY MEDICINE CLINIC | Facility: CLINIC | Age: 87
End: 2022-12-02

## 2022-12-02 VITALS — BODY MASS INDEX: 31.99 KG/M2 | WEIGHT: 187.4 LBS | HEIGHT: 64 IN | TEMPERATURE: 97.5 F

## 2022-12-02 DIAGNOSIS — R26.2 AMBULATORY DYSFUNCTION: ICD-10-CM

## 2022-12-02 DIAGNOSIS — D50.9 IRON DEFICIENCY ANEMIA, UNSPECIFIED IRON DEFICIENCY ANEMIA TYPE: ICD-10-CM

## 2022-12-02 DIAGNOSIS — Z23 NEED FOR IMMUNIZATION AGAINST INFLUENZA: ICD-10-CM

## 2022-12-02 DIAGNOSIS — R73.9 HYPERGLYCEMIA: ICD-10-CM

## 2022-12-02 DIAGNOSIS — Z00.00 MEDICARE ANNUAL WELLNESS VISIT, SUBSEQUENT: Primary | ICD-10-CM

## 2022-12-02 DIAGNOSIS — L89.312 PRESSURE ULCER OF RIGHT BUTTOCK, STAGE 2 (HCC): ICD-10-CM

## 2022-12-02 DIAGNOSIS — E87.5 HYPERKALEMIA: ICD-10-CM

## 2022-12-02 PROBLEM — E78.5 HYPERLIPIDEMIA, UNSPECIFIED: Status: ACTIVE | Noted: 2020-08-05

## 2022-12-02 PROBLEM — D64.9 ANEMIA: Status: ACTIVE | Noted: 2020-08-05

## 2022-12-02 LAB
ALBUMIN SERPL BCP-MCNC: 3.8 G/DL (ref 3.5–5)
ALP SERPL-CCNC: 96 U/L (ref 46–116)
ALT SERPL W P-5'-P-CCNC: 13 U/L (ref 12–78)
ANION GAP SERPL CALCULATED.3IONS-SCNC: 7 MMOL/L (ref 4–13)
AST SERPL W P-5'-P-CCNC: 11 U/L (ref 5–45)
BASOPHILS # BLD AUTO: 0.04 THOUSANDS/ÂΜL (ref 0–0.1)
BASOPHILS NFR BLD AUTO: 0 % (ref 0–1)
BILIRUB SERPL-MCNC: 0.48 MG/DL (ref 0.2–1)
BUN SERPL-MCNC: 83 MG/DL (ref 5–25)
CALCIUM SERPL-MCNC: 9.3 MG/DL (ref 8.3–10.1)
CHLORIDE SERPL-SCNC: 98 MMOL/L (ref 96–108)
CHOLEST SERPL-MCNC: 98 MG/DL
CO2 SERPL-SCNC: 29 MMOL/L (ref 21–32)
CREAT SERPL-MCNC: 3.53 MG/DL (ref 0.6–1.3)
EOSINOPHIL # BLD AUTO: 0.66 THOUSAND/ÂΜL (ref 0–0.61)
EOSINOPHIL NFR BLD AUTO: 5 % (ref 0–6)
ERYTHROCYTE [DISTWIDTH] IN BLOOD BY AUTOMATED COUNT: 18 % (ref 11.6–15.1)
EST. AVERAGE GLUCOSE BLD GHB EST-MCNC: 123 MG/DL
GFR SERPL CREATININE-BSD FRML MDRD: 14 ML/MIN/1.73SQ M
GLUCOSE SERPL-MCNC: 101 MG/DL (ref 65–140)
HBA1C MFR BLD: 5.9 %
HCT VFR BLD AUTO: 31.7 % (ref 36.5–49.3)
HDLC SERPL-MCNC: 22 MG/DL
HGB BLD-MCNC: 9.3 G/DL (ref 12–17)
IMM GRANULOCYTES # BLD AUTO: 0.06 THOUSAND/UL (ref 0–0.2)
IMM GRANULOCYTES NFR BLD AUTO: 1 % (ref 0–2)
LDLC SERPL CALC-MCNC: 36 MG/DL (ref 0–100)
LYMPHOCYTES # BLD AUTO: 1.13 THOUSANDS/ÂΜL (ref 0.6–4.47)
LYMPHOCYTES NFR BLD AUTO: 9 % (ref 14–44)
MCH RBC QN AUTO: 22 PG (ref 26.8–34.3)
MCHC RBC AUTO-ENTMCNC: 29.3 G/DL (ref 31.4–37.4)
MCV RBC AUTO: 75 FL (ref 82–98)
MONOCYTES # BLD AUTO: 1.02 THOUSAND/ÂΜL (ref 0.17–1.22)
MONOCYTES NFR BLD AUTO: 8 % (ref 4–12)
NEUTROPHILS # BLD AUTO: 9.21 THOUSANDS/ÂΜL (ref 1.85–7.62)
NEUTS SEG NFR BLD AUTO: 77 % (ref 43–75)
NONHDLC SERPL-MCNC: 76 MG/DL
NRBC BLD AUTO-RTO: 0 /100 WBCS
PLATELET # BLD AUTO: 378 THOUSANDS/UL (ref 149–390)
PMV BLD AUTO: 10.1 FL (ref 8.9–12.7)
POTASSIUM SERPL-SCNC: 4.3 MMOL/L (ref 3.5–5.3)
PROT SERPL-MCNC: 8 G/DL (ref 6.4–8.4)
RBC # BLD AUTO: 4.22 MILLION/UL (ref 3.88–5.62)
SODIUM SERPL-SCNC: 134 MMOL/L (ref 135–147)
TRIGL SERPL-MCNC: 202 MG/DL
WBC # BLD AUTO: 12.12 THOUSAND/UL (ref 4.31–10.16)

## 2022-12-02 NOTE — PROGRESS NOTES
Assessment and Plan:     Problem List Items Addressed This Visit        Other    Medicare annual wellness visit, subsequent - Primary    Ambulatory dysfunction    Relevant Orders    Ambulatory Referral to Physical Therapy    Need for immunization against influenza    Relevant Orders    influenza vaccine, high-dose, PF 0 7 mL (FLUZONE HIGH-DOSE)    Pressure ulcer of right buttock, stage 2 (HCC)    Relevant Medications    silver sulfadiazine (Silvadene) 1 % cream   Increase protein and discussed change of position Increase activity to offload pressure  In home PT eval   Silvdene Bid but if no change in 2 weeks, wound care evaluation  Flu shot today  Pt will go for labs today   Rto 3months    Depression Screening and Follow-up Plan: Patient was screened for depression during today's encounter  They screened negative with a PHQ-2 score of 0  Preventive health issues were discussed with patient, and age appropriate screening tests were ordered as noted in patient's After Visit Summary  Personalized health advice and appropriate referrals for health education or preventive services given if needed, as noted in patient's After Visit Summary  History of Present Illness:     Patient presents for a Medicare Wellness Visit    HPI   Pt not very active at home His wife would like me to check sacral wound which does drain at times and is uncomfortable to pt at times No falls He walks with walker but does not get up often No acute sxs otherwise   Patient Care Team:  Dionte Willoughby DO as PCP - [facility], [address] [place] [address], MD Malinda Anand DO     Review of Systems:     Review of Systems   Constitutional: Positive for activity change and fatigue  Negative for chills and fever  HENT: Negative  Eyes: Negative for visual disturbance  Respiratory: Negative for cough and shortness of breath  Cardiovascular: Negative for chest pain, palpitations and leg swelling  Gastrointestinal: Negative  Genitourinary: Negative  Musculoskeletal: Positive for arthralgias and gait problem  Skin: Positive for wound  Neurological: Negative for dizziness, light-headedness and headaches  Problem List:     Patient Active Problem List   Diagnosis   • Mild intermittent asthma without complication   • CKD (chronic kidney disease) stage 4, GFR 15-29 ml/min ([facility])   • Dyslipidemia   • GERD (gastroesophageal reflux disease)   • Late effects of cerebrovascular disease   • Lumbar degenerative disc disease   • Lumbar radiculopathy   • Mitral regurgitation   • Obesity (BMI 30-39  9)   • Medicare annual wellness visit, subsequent   • History of aortic valve replacement with bioprosthetic valve   • History of stroke   • Complete heart block ([facility])   • Chronic diastolic CHF (congestive heart failure) ([facility] )   • Ambulatory dysfunction   • Paroxysmal atrial fibrillation ([facility])   • 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   • Presence of permanent cardiac pacemaker   • Oxygen dependent   • Iron deficiency anemia   • Need for immunization against influenza   • Pulmonary emphysema ([facility])   • Left wrist pain   • Right hip pain   • Acute kidney injury superimposed on CKD ([facility] )   • Multiple renal cysts   • Effusion of left knee   • Mitral valve stenosis   • Acute gout   • Transaminitis   • 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 kidney disease, or chronic kidney disease ([facility])   • Chronic low back pain without sciatica   • History of severe aortic stenosis   • Hx of CABG   • Essential hypertension   • Dermatitis   • Hyperkalemia   • Pressure ulcer of right buttock, stage 2 ([facility])   • Anemia   • Hyperlipidemia, unspecified      Past Medical and Surgical History:     Past Medical History:   Diagnosis Date   • Aortic stenosis     ECHO -4-14-14   MODERATE TO SEVERE AORTIC STENOSIS; MILD AROTIC INSUFFICIENCY - LAST ASSESSED 10/26/16; RESOLVED 6/8/16   • Aortic valve disorder     LAST ASSESSED 10/8/15; 10/8/15   • Arthritis    • CHF (congestive heart failure) ([facility])    • Complete heart block ([address] ) 7/20/2020   • Coronary artery disease    • Hypertension    • Hypertensive urgency 5/19/2019   • Renal disorder    • TIA (transient ischemic attack)    • Transient cerebral ischemia      Past Surgical History:   Procedure Laterality Date   • AORTIC VALVE REPLACEMENT  06/30/2015    avr with 23 mm [place] Magna Ease bioprosthetic   • CARDIAC PACEMAKER PLACEMENT Left 07/24/2020   • CATARACT EXTRACTION Right 06/05/2000   • COLONOSCOPY     • CORONARY ARTERY BYPASS GRAFT  06/05/2000    x1 with argueta  to lad   • EYE SURGERY     • POLYPECTOMY  04/2014    enteroscopic - esophageal ulcer, gastric erosion, and duodenal ulcer  • TONSILLECTOMY      unknown      Family History:     Family History   Problem Relation Age of Onset   • No Known Problems Mother    • No Known Problems Father    • Coronary artery disease Neg Hx       Social History:     Social History     Socioeconomic History   • Marital status: /Civil Union     Spouse name:  Gosia   • Number of children: 5   • Years of education: None   • Highest education level: None   Occupational History   • Occupation: retired   Tobacco Use   • Smoking status: Never   • Smokeless tobacco: Never   Vaping Use   • Vaping Use: Never used   Substance and Sexual Activity   • Alcohol use: Never     Alcohol/week: 0 0 standard drinks     Comment: very occasional   • Drug use: Never   • Sexual activity: None   Other Topics Concern   • None   Social History Narrative    Caffeine use     Dental care, regularly     Lives independently with spouse     Uses seatbelts      Social Determinants of Health     Financial Resource Strain: Not on file   Food Insecurity: Not on file   Transportation Needs: Not on file   Physical Activity: Not on file   Stress: Not on file   Social Connections: Not on file   Intimate Partner Violence: Not on file   Housing Stability: Not on file      Medications and Allergies:     Current Outpatient Medications   Medication Sig Dispense Refill   • apixaban (ELIQUIS) 2 5 mg Take 1 tablet (2 5 mg total) by mouth 2 (two) times a day 180 tablet 3   • pantoprazole (PROTONIX) 40 mg tablet Take 1 tablet (40 mg total) by mouth 2 (two) times a day before meals 180 tablet 3   • silver sulfadiazine (Silvadene) 1 % cream Apply topically 2 (two) times a day 50 g 0   • allopurinol (ZYLOPRIM) 100 mg tablet Take 1 tablet (100 mg total) by mouth daily 30 tablet 5   • amLODIPine (NORVASC) 5 mg tablet Take 1 tablet (5 mg total) by mouth daily 90 tablet 3   • aspirin (ECOTRIN LOW STRENGTH) 81 mg EC tablet Take 1 tablet (81 mg total) by mouth daily  0   • desoximetasone (TOPICORT) 0 05 % cream Apply topically 2 (two) times a day (Patient not taking: Reported on 7/15/2022) 60 g 5   • gabapentin (NEURONTIN) 100 mg capsule Take 1 capsule by mouth once daily at bedtime 30 capsule 0   • metoprolol tartrate (LOPRESSOR) 100 mg tablet Take 1 tablet by mouth twice daily 180 tablet 0   • ondansetron (ZOFRAN) 4 mg tablet Take 1 tablet (4 mg total) by mouth every 8 (eight) hours as needed for nausea or vomiting for up to 4 days 12 tablet 0   • pravastatin (PRAVACHOL) 40 mg tablet Take 1 tablet (40 mg total) by mouth daily with dinner 90 tablet 3   • senna (SENOKOT) 8 6 mg Take 1 tablet (8 6 mg total) by mouth daily at bedtime 90 tablet 3   • torsemide (DEMADEX) 20 mg tablet Take 1 tablet by mouth once daily 30 tablet 5     No current facility-administered medications for this visit       Allergies   Allergen Reactions   • Promethazine Delirium and Other (See Comments)      Immunizations:     Immunization History   Administered Date(s) Administered   • COVID-19 MODERNA VACC 0 5 ML IM 01/21/2021, 02/19/2021   • INFLUENZA 10/26/2016   • Influenza Split High Dose Preservative Free IM 11/07/2012, 11/03/2014, 10/08/2015, 10/26/2016, 10/03/2017   • Influenza, high dose seasonal 0 7 mL 10/09/2018, 10/07/2019, 10/15/2020, 10/19/2021   • Influenza, seasonal, injectable 11/04/2013   • Pneumococcal Polysaccharide PPV23 11/07/2006      Health Maintenance:         Topic Date Due   • Hepatitis C Screening  Completed         Topic Date Due   • Hepatitis B Vaccine (1 of 3 - 3-dose series) Never done   • Pneumococcal Vaccine: 65+ Years (2 - PCV) 11/07/2007   • COVID-19 Vaccine (3 - Booster for Moderna series) 07/19/2021   • Influenza Vaccine (1) 09/01/2022      Medicare Screening Tests and Risk Assessments:     Ernie Cantrell is here for his Subsequent Wellness visit  Health Risk Assessment:   Patient rates overall health as good  Patient feels that their physical health rating is same  Patient is satisfied with their life  Eyesight was rated as same  Hearing was rated as same  Patient feels that their emotional and mental health rating is same  Patients states they are never, rarely angry  Patient states they are never, rarely unusually tired/fatigued  Pain experienced in the last 7 days has been some  Patient's pain rating has been 4/10  Patient states that he has experienced no weight loss or gain in last 6 months  Back pain  Fall Risk Screening: In the past year, patient has experienced: no history of falling in past year      Home Safety:  Patient has trouble with stairs inside or outside of their home  Patient has working smoke alarms and has working carbon monoxide detector  Home safety hazards include: none  Nutrition:   Current diet is Regular  Medications:   Patient is currently taking over-the-counter supplements  OTC medications include: see medication list  Patient is not able to manage medications  Activities of Daily Living (ADLs)/Instrumental Activities of Daily Living (IADLs):   Walk and transfer into and out of bed and chair?: No  Dress and groom yourself?: No    Bathe or shower yourself?: No    Feed yourself?  No  Do your laundry/housekeeping?: No  Manage your money, pay your bills and track your expenses?: No  Make your own meals?: No    Do your own shopping?: No    Previous Hospitalizations:   Any hospitalizations or ED visits within the last 12 months?: No      Advance Care Planning:   Living will: No      PREVENTIVE SCREENINGS      Cardiovascular Screening:    General: Screening Current      Diabetes Screening:     General: Screening Current      Colorectal Cancer Screening:     General: Screening Not Indicated      Prostate Cancer Screening:    General: Screening Not Indicated      Lung Cancer Screening:     General: Screening Not Indicated      Hepatitis C Screening:    General: Screening Current    Screening, Brief Intervention, and Referral to Treatment (SBIRT)    Screening  Typical number of drinks in a day: 0  Typical number of drinks in a week: 0  Interpretation: Low risk drinking behavior  Single Item Drug Screening:  How often have you used an illegal drug (including marijuana) or a prescription medication for non-medical reasons in the past year? never    Single Item Drug Screen Score: 0  Interpretation: Negative screen for possible drug use disorder    No results found  Physical Exam:     Temp 97 5 °F (36 4 °C) (Temporal)   Ht 5' 4" (1 626 m)   Wt 85 kg (187 lb 6 4 oz)   BMI 32 17 kg/m²     Physical Exam  Constitutional:       General: He is not in acute distress  Appearance: Normal appearance  He is not toxic-appearing  HENT:      Head: Normocephalic and atraumatic  Right Ear: External ear normal       Left Ear: External ear normal       Nose: Nose normal       Mouth/Throat:      Mouth: Mucous membranes are dry  Eyes:      General: No scleral icterus  Extraocular Movements: Extraocular movements intact  Conjunctiva/sclera: Conjunctivae normal       Pupils: Pupils are equal, round, and reactive to light  Cardiovascular:      Rate and Rhythm: Normal rate  Rhythm irregular     Pulmonary: Effort: Pulmonary effort is normal  No respiratory distress  Breath sounds: Normal breath sounds  No wheezing  Abdominal:      General: Bowel sounds are normal       Palpations: Abdomen is soft  Musculoskeletal:      Cervical back: Neck supple  Right lower leg: No edema  Left lower leg: No edema  Lymphadenopathy:      Cervical: No cervical adenopathy  Skin:     General: Skin is dry  Coloration: Skin is not jaundiced or pale  Findings: Lesion present  Comments: Stage 2 sacral decub right and stage 1 left buttock   Neurological:      General: No focal deficit present  Mental Status: He is alert and oriented to person, place, and time  Mental status is at baseline  Motor: Weakness present  Gait: Gait abnormal    Psychiatric:         Mood and Affect: Mood normal          Behavior: Behavior normal          Thought Content:  Thought content normal          Judgment: Judgment normal           Arloa Dancer,

## 2022-12-02 NOTE — PATIENT INSTRUCTIONS
Medicare Preventive Visit Patient Instructions  Thank you for completing your Welcome to Medicare Visit or Medicare Annual Wellness Visit today  Your next wellness visit will be due in one year (12/3/2023)  The screening/preventive services that you may require over the next 5-10 years are detailed below  Some tests may not apply to you based off risk factors and/or age  Screening tests ordered at today's visit but not completed yet may show as past due  Also, please note that scanned in results may not display below  Preventive Screenings:  Service Recommendations Previous Testing/Comments   Colorectal Cancer Screening  · Colonoscopy    · Fecal Occult Blood Test (FOBT)/Fecal Immunochemical Test (FIT)  · Fecal DNA/Cologuard Test  · Flexible Sigmoidoscopy Age: 39-70 years old   Colonoscopy: every 10 years (May be performed more frequently if at higher risk)  OR  FOBT/FIT: every 1 year  OR  Cologuard: every 3 years  OR  Sigmoidoscopy: every 5 years  Screening may be recommended earlier than age 39 if at higher risk for colorectal cancer  Also, an individualized decision between you and your healthcare provider will decide whether screening between the ages of 74-80 would be appropriate   Colonoscopy: 08/28/2020  FOBT/FIT: Not on file  Cologuard: Not on file  Sigmoidoscopy: Not on file    Screening Not Indicated     Prostate Cancer Screening Individualized decision between patient and health care provider in men between ages of 53-78   Medicare will cover every 12 months beginning on the day after your 50th birthday PSA: No results in last 5 years     Screening Not Indicated     Hepatitis C Screening Once for adults born between 1945 and 1965  More frequently in patients at high risk for Hepatitis C Hep C Antibody: 10/15/2020    Screening Current   Diabetes Screening 1-2 times per year if you're at risk for diabetes or have pre-diabetes Fasting glucose: 92 mg/dL (5/31/2022)  A1C: 5 8 % (6/11/2021)  Screening Current Cholesterol Screening Once every 5 years if you don't have a lipid disorder  May order more often based on risk factors  Lipid panel: 08/19/2021  Screening Current      Other Preventive Screenings Covered by Medicare:  1  Abdominal Aortic Aneurysm (AAA) Screening: covered once if your at risk  You're considered to be at risk if you have a family history of AAA or a male between the age of 73-68 who smoking at least 100 cigarettes in your lifetime  2  Lung Cancer Screening: covers low dose CT scan once per year if you meet all of the following conditions: (1) Age 50-69; (2) No signs or symptoms of lung cancer; (3) Current smoker or have quit smoking within the last 15 years; (4) You have a tobacco smoking history of at least 20 pack years (packs per day x number of years you smoked); (5) You get a written order from a healthcare provider  3  Glaucoma Screening: covered annually if you're considered high risk: (1) You have diabetes OR (2) Family history of glaucoma OR (3)  aged 48 and older OR (3)  American aged 72 and older  3  Osteoporosis Screening: covered every 2 years if you meet one of the following conditions: (1) Have a vertebral abnormality; (2) On glucocorticoid therapy for more than 3 months; (3) Have primary hyperparathyroidism; (4) On osteoporosis medications and need to assess response to drug therapy  5  HIV Screening: covered annually if you're between the age of 12-76  Also covered annually if you are younger than 13 and older than 72 with risk factors for HIV infection  For pregnant patients, it is covered up to 3 times per pregnancy      Immunizations:  Immunization Recommendations   Influenza Vaccine Annual influenza vaccination during flu season is recommended for all persons aged >= 6 months who do not have contraindications   Pneumococcal Vaccine   * Pneumococcal conjugate vaccine = PCV13 (Prevnar 13), PCV15 (Vaxneuvance), PCV20 (Prevnar 20)  * Pneumococcal polysaccharide vaccine = PPSV23 (Pneumovax) Adults 2364 years old: 1-3 doses may be recommended based on certain risk factors  Adults 72 years old: 1-2 doses may be recommended based off what pneumonia vaccine you previously received   Hepatitis B Vaccine 3 dose series if at intermediate or high risk (ex: diabetes, end stage renal disease, liver disease)   Tetanus (Td) Vaccine - COST NOT COVERED BY MEDICARE PART B Following completion of primary series, a booster dose should be given every 10 years to maintain immunity against tetanus  Td may also be given as tetanus wound prophylaxis  Tdap Vaccine - COST NOT COVERED BY MEDICARE PART B Recommended at least once for all adults  For pregnant patients, recommended with each pregnancy  Shingles Vaccine (Shingrix) - COST NOT COVERED BY MEDICARE PART B  2 shot series recommended in those aged 48 and above     Health Maintenance Due:      Topic Date Due   • Hepatitis C Screening  Completed     Immunizations Due:      Topic Date Due   • Hepatitis B Vaccine (1 of 3 - 3-dose series) Never done   • Pneumococcal Vaccine: 65+ Years (2 - PCV) 11/07/2007   • COVID-19 Vaccine (3 - Booster for Moderna series) 07/19/2021   • Influenza Vaccine (1) 09/01/2022     Advance Directives   What are advance directives? Advance directives are legal documents that state your wishes and plans for medical care  These plans are made ahead of time in case you lose your ability to make decisions for yourself  Advance directives can apply to any medical decision, such as the treatments you want, and if you want to donate organs  What are the types of advance directives? There are many types of advance directives, and each state has rules about how to use them  You may choose a combination of any of the following:  · Living will: This is a written record of the treatment you want  You can also choose which treatments you do not want, which to limit, and which to stop at a certain time   This includes surgery, medicine, IV fluid, and tube feedings  · Durable power of  for healthcare [facility]): This is a written record that states who you want to make healthcare choices for you when you are unable to make them for yourself  This person, called a proxy, is usually a family member or a friend  You may choose more than 1 proxy  · Do not resuscitate (DNR) order:  A DNR order is used in case your heart stops beating or you stop breathing  It is a request not to have certain forms of treatment, such as CPR  A DNR order may be included in other types of advance directives  · Medical directive: This covers the care that you want if you are in a coma, near death, or unable to make decisions for yourself  You can list the treatments you want for each condition  Treatment may include pain medicine, surgery, blood transfusions, dialysis, IV or tube feedings, and a ventilator (breathing machine)  · Values history: This document has questions about your views, beliefs, and how you feel and think about life  This information can help others choose the care that you would choose  Why are advance directives important? An advance directive helps you control your care  Although spoken wishes may be used, it is better to have your wishes written down  Spoken wishes can be misunderstood, or not followed  Treatments may be given even if you do not want them  An advance directive may make it easier for your family to make difficult choices about your care  Weight Management   Why it is important to manage your weight:  Being overweight increases your risk of health conditions such as heart disease, high blood pressure, type 2 diabetes, and certain types of cancer  It can also increase your risk for osteoarthritis, sleep apnea, and other respiratory problems  Aim for a slow, steady weight loss  Even a small amount of weight loss can lower your risk of health problems    How to lose weight safely:  A safe and healthy way to lose weight is to eat fewer calories and get regular exercise  You can lose up about 1 pound a week by decreasing the number of calories you eat by 500 calories each day  Healthy meal plan for weight management:  A healthy meal plan includes a variety of foods, contains fewer calories, and helps you stay healthy  A healthy meal plan includes the following:  · Eat whole-grain foods more often  A healthy meal plan should contain fiber  Fiber is the part of grains, fruits, and vegetables that is not broken down by your body  Whole-grain foods are healthy and provide extra fiber in your diet  Some examples of whole-grain foods are whole-wheat breads and pastas, oatmeal, brown rice, and bulgur  · Eat a variety of vegetables every day  Include dark, leafy greens such as spinach, kale, hemalatha greens, and mustard greens  Eat yellow and orange vegetables such as carrots, sweet potatoes, and winter squash  · Eat a variety of fruits every day  Choose fresh or canned fruit (canned in its own juice or light syrup) instead of juice  Fruit juice has very little or no fiber  · Eat low-fat dairy foods  Drink fat-free (skim) milk or 1% milk  Eat fat-free yogurt and low-fat cottage cheese  Try low-fat cheeses such as mozzarella and other reduced-fat cheeses  · Choose meat and other protein foods that are low in fat  Choose beans or other legumes such as split peas or lentils  Choose fish, skinless poultry (chicken or turkey), or lean cuts of red meat (beef or pork)  Before you cook meat or poultry, cut off any visible fat  · Use less fat and oil  Try baking foods instead of frying them  Add less fat, such as margarine, sour cream, regular salad dressing and mayonnaise to foods  Eat fewer high-fat foods  Some examples of high-fat foods include french fries, doughnuts, ice cream, and cakes  · Eat fewer sweets  Limit foods and drinks that are high in sugar   This includes candy, cookies, regular soda, and sweetened drinks  Exercise:  Exercise at least 30 minutes per day on most days of the week  Some examples of exercise include walking, biking, dancing, and swimming  You can also fit in more physical activity by taking the stairs instead of the elevator or parking farther away from stores  Ask your healthcare provider about the best exercise plan for you  © Copyright [employer] 2018 Information is for End User's use only and may not be sold, redistributed or otherwise used for commercial purposes   All illustrations and images included in CareNotes® are the copyrighted property of [employer]  or [address] [place] [address] [employer]

## 2022-12-03 DIAGNOSIS — N17.9 ACUTE RENAL FAILURE SUPERIMPOSED ON STAGE 4 CHRONIC KIDNEY DISEASE, UNSPECIFIED ACUTE RENAL FAILURE TYPE (HCC): Primary | ICD-10-CM

## 2022-12-03 DIAGNOSIS — N18.4 ACUTE RENAL FAILURE SUPERIMPOSED ON STAGE 4 CHRONIC KIDNEY DISEASE, UNSPECIFIED ACUTE RENAL FAILURE TYPE (HCC): Primary | ICD-10-CM

## 2022-12-06 ENCOUNTER — OFFICE VISIT (OUTPATIENT)
Dept: NEPHROLOGY | Facility: CLINIC | Age: 87
End: 2022-12-06

## 2022-12-06 VITALS
HEIGHT: 64 IN | SYSTOLIC BLOOD PRESSURE: 100 MMHG | WEIGHT: 190 LBS | HEART RATE: 61 BPM | DIASTOLIC BLOOD PRESSURE: 58 MMHG | BODY MASS INDEX: 32.44 KG/M2 | OXYGEN SATURATION: 98 %

## 2022-12-06 DIAGNOSIS — N18.4 CKD (CHRONIC KIDNEY DISEASE) STAGE 4, GFR 15-29 ML/MIN (HCC): ICD-10-CM

## 2022-12-06 DIAGNOSIS — N17.9 ACUTE KIDNEY INJURY SUPERIMPOSED ON CKD (HCC): ICD-10-CM

## 2022-12-06 DIAGNOSIS — N18.4 ACUTE RENAL FAILURE SUPERIMPOSED ON STAGE 4 CHRONIC KIDNEY DISEASE, UNSPECIFIED ACUTE RENAL FAILURE TYPE (HCC): ICD-10-CM

## 2022-12-06 DIAGNOSIS — I13.0 HYPERTENSIVE HEART AND CHRONIC KIDNEY DISEASE WITH HEART FAILURE AND STAGE 1 THROUGH STAGE 4 CHRONIC KIDNEY DISEASE, OR CHRONIC KIDNEY DISEASE (HCC): ICD-10-CM

## 2022-12-06 DIAGNOSIS — N17.9 ACUTE RENAL FAILURE SUPERIMPOSED ON STAGE 4 CHRONIC KIDNEY DISEASE, UNSPECIFIED ACUTE RENAL FAILURE TYPE (HCC): ICD-10-CM

## 2022-12-06 DIAGNOSIS — N18.9 ACUTE KIDNEY INJURY SUPERIMPOSED ON CKD (HCC): ICD-10-CM

## 2022-12-06 DIAGNOSIS — I50.32 CHRONIC DIASTOLIC CHF (CONGESTIVE HEART FAILURE) (HCC): Primary | ICD-10-CM

## 2022-12-06 NOTE — PROGRESS NOTES
Assessment & Plan:    1  Chronic diastolic CHF (congestive heart failure) (formerly [facility])  Assessment & Plan:  Wt Readings from Last 3 Encounters:   12/06/22 86 2 kg (190 lb)   12/02/22 85 kg (187 lb 6 4 oz)   07/15/22 89 4 kg (197 lb)   1+ bilateral lower extremity pitting edema  Without torsemide 2 days  Family will  from pharmacy  1+ bilateral lower extremity pitting edema  Without torsemide 2 days  Family will  from pharmacy  Low-sodium diet and torsemide  Low-sodium diet and torsemide  2  Acute renal failure superimposed on stage 4 chronic kidney disease, unspecified acute renal failure type [facility])  -     Ambulatory Referral to Nephrology  -     Comprehensive metabolic panel; Future; Expected date: 01/03/2023  -     Creatinine, urine, random; Future; Expected date: 01/03/2023  -     Sodium, urine, random; Future; Expected date: 01/03/2023  -     Urea nitrogen, urine; Future; Expected date: 01/03/2023    3  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 kidney disease, or chronic kidney disease [facility])  Assessment & Plan:  Lab Results   Component Value Date    EGFR 14 12/02/2022    EGFR 27 05/31/2022    EGFR 21 09/21/2021    CREATININE 3 53 (H) 12/02/2022    CREATININE 2 14 (H) 05/31/2022    CREATININE 2 62 (H) 09/21/2021   With hypotension  Discontinue amlodipine given acute kidney injuryWith hypotension  Discontinue amlodipine given acute kidney injury      4  Acute kidney injury superimposed on CKD [facility])  Assessment & Plan:  Lab Results   Component Value Date    EGFR 14 12/02/2022    EGFR 27 05/31/2022    EGFR 21 09/21/2021    CREATININE 3 53 (H) 12/02/2022    CREATININE 2 14 (H) 05/31/2022    CREATININE 2 62 (H) 09/21/2021   Denies NSAIDs, decreased urine outputDenies NSAIDs, decreased urine output  Orders:  -     Comprehensive metabolic panel; Future; Expected date: 01/03/2023  -     Creatinine, urine, random;  Future; Expected date: 01/03/2023  -     Sodium, urine, random; Future; Expected date: 01/03/2023  -     Urea nitrogen, urine; Future; Expected date: 01/03/2023    5  CKD (chronic kidney disease) stage 4, GFR 15-29 ml/min ([facility])  -     Ambulatory Referral to CKD Education Program; Future       The benefits, risks and alternatives to the treatment plan were discussed at this visit  Patient was advised of common adverse effects of any medical therapies prescribed  All questions were answered and discussed with the patient and any accompanying family members or caretakers  Subjective:      Patient ID: Sussy Vazquez is a 80 y o  male seen in the [place] office  Patient is followed by Dr Dina Clay for management of stage 4 chronic kidney disease  Last saw patient on 06/01/2022, at which time creatinine improved to 2 1 mg/dL and patient was instructed to keep to low-sodium, low-potassium diet, continue torsemide  HPI     Today, patient presents for routine follow-up without acute complaints relating to blood pressure, edema or nephrological concerns  Patient denies any changes in health, medication changes, hospitalizations, surgeries or trip to the emergency room, falls or bone fractures since last visit  Blood pressure is 100/58 with heart rate 61  Denies headaches, lightheadedness, dizziness  Patient reports adherence with antihypertensive regimen and denies adverse effects:  Amlodipine 5 mg daily, metoprolol tartrate 100 mg twice daily, torsemide 20 mg daily  Patient reports stable lower extremity swelling  Reviewed and discussed the results of labs performed 12/02/2022 which reveals renal function worsened to a creatinine 3 53 mg/dL with estimated GFR 14 mL/min  This is compared with prior renal baseline 27 mL/min on 05/31/2022  Denies NSAIDs, decreased urine output, poor p o  Intake, nausea vomiting  No evidence of recurrent hyperkalemia  History is obtained from patient, spouse and daughter      The following portions of the patient's history were reviewed and updated as appropriate: allergies, current medications, past family history, past medical history, past social history, past surgical history, and problem list     Review of Systems   Constitutional: Negative for activity change, chills, diaphoresis, fatigue and fever  HENT: Negative for mouth sores and trouble swallowing  Respiratory: Negative for apnea, cough, chest tightness, shortness of breath and wheezing  Cardiovascular: Negative for chest pain, palpitations and leg swelling  Gastrointestinal: Negative for abdominal distention, abdominal pain, blood in stool, constipation, diarrhea and nausea  Genitourinary: Negative for decreased urine volume, difficulty urinating, dysuria, enuresis, frequency, hematuria and urgency  Musculoskeletal: Negative for arthralgias, back pain and joint swelling  Skin: Negative for pallor, rash and wound  Neurological: Negative for dizziness, seizures, light-headedness, numbness and headaches  Hematological: Does not bruise/bleed easily  Psychiatric/Behavioral: Negative for agitation, behavioral problems and confusion  The patient is not nervous/anxious  Objective:      /58 (BP Location: Left arm, Patient Position: Sitting)   Pulse 61   Ht 5' 4" (1 626 m)   Wt 86 2 kg (190 lb)   SpO2 98%   BMI 32 61 kg/m²          Physical Exam  Vitals and nursing note reviewed  Constitutional:       General: He is awake  He is not in acute distress  Appearance: Normal appearance  He is well-developed and normal weight  He is not ill-appearing, toxic-appearing or diaphoretic  HENT:      Head: Normocephalic and atraumatic  Jaw: There is normal jaw occlusion  Nose: Nose normal       Mouth/Throat:      Mouth: Mucous membranes are moist       Pharynx: Oropharynx is clear  No oropharyngeal exudate or posterior oropharyngeal erythema     Eyes:      General: Lids are normal  Vision grossly intact  Gaze aligned appropriately  No scleral icterus  Right eye: No discharge  Left eye: No discharge  Extraocular Movements: Extraocular movements intact  Conjunctiva/sclera: Conjunctivae normal       Pupils: Pupils are equal, round, and reactive to light  Neck:      Thyroid: No thyroid mass or thyromegaly  Trachea: Trachea and phonation normal    Cardiovascular:      Rate and Rhythm: Normal rate and regular rhythm  Heart sounds: Normal heart sounds, S1 normal and S2 normal  No murmur heard  No friction rub  No gallop  Pulmonary:      Effort: Pulmonary effort is normal  No respiratory distress  Breath sounds: Normal breath sounds  No stridor  No wheezing, rhonchi or rales  Abdominal:      General: Abdomen is flat  Bowel sounds are normal  There is no distension  Palpations: Abdomen is soft  There is no mass  Tenderness: There is no abdominal tenderness  There is no guarding  Hernia: No hernia is present  Musculoskeletal:         General: Normal range of motion  Cervical back: Normal range of motion and neck supple  No rigidity or tenderness  Right lower le+ Pitting Edema present  Left lower le+ Pitting Edema present  Lymphadenopathy:      Cervical: No cervical adenopathy  Skin:     General: Skin is warm and dry  Coloration: Skin is not jaundiced  Findings: No bruising  Nails: There is no clubbing  Neurological:      General: No focal deficit present  Mental Status: He is alert and oriented to person, place, and time  Mental status is at baseline  Psychiatric:         Attention and Perception: Attention and perception normal          Mood and Affect: Mood and affect normal          Speech: Speech normal          Behavior: Behavior normal  Behavior is cooperative  Thought Content:  Thought content normal          Judgment: Judgment normal              Lab Results   Component Value Date  10/01/2015    SODIUM 134 (L) 12/02/2022    K 4 3 12/02/2022    CL 98 12/02/2022    CO2 29 12/02/2022    ANIONGAP 9 10/01/2015    AGAP 7 12/02/2022    BUN 83 (H) 12/02/2022    CREATININE 3 53 (H) 12/02/2022    GLUC 101 12/02/2022    GLUF 92 05/31/2022    CALCIUM 9 3 12/02/2022    AST 11 12/02/2022    ALT 13 12/02/2022    ALKPHOS 96 12/02/2022    PROT 7 7 10/01/2015    TP 8 0 12/02/2022    BILITOT 0 37 10/01/2015    TBILI 0 48 12/02/2022    EGFR 14 12/02/2022      Lab Results   Component Value Date    CREATININE 3 53 (H) 12/02/2022    CREATININE 2 14 (H) 05/31/2022    CREATININE 2 62 (H) 09/21/2021    CREATININE 2 25 (H) 08/19/2021    CREATININE 2 08 (H) 08/06/2021    CREATININE 2 12 (H) 08/04/2021    CREATININE 2 26 (H) 08/03/2021    CREATININE 3 19 (H) 08/03/2021    CREATININE 2 07 (H) 08/02/2021    CREATININE 2 28 (H) 08/01/2021    CREATININE 2 22 (H) 07/31/2021    CREATININE 2 39 (H) 07/30/2021    CREATININE 2 75 (H) 07/29/2021    CREATININE 3 45 (H) 07/28/2021    CREATININE 2 72 (H) 06/11/2021      Lab Results   Component Value Date    COLORU Light Yellow 05/31/2022    CLARITYU Clear 05/31/2022    SPECGRAV 1 015 05/31/2022    PHUR 5 5 05/31/2022    LEUKOCYTESUR Negative 05/31/2022    NITRITE Negative 05/31/2022    PROTEIN UA Negative 05/31/2022    GLUCOSEU Negative 05/31/2022    KETONESU Negative 05/31/2022    UROBILINOGEN <2 0 05/31/2022    BILIRUBINUR Negative 05/31/2022    BLOODU Negative 05/31/2022    RBCUA None Seen 05/31/2022    WBCUA None Seen 05/31/2022    EPIS None Seen 05/31/2022    BACTERIA None Seen 05/31/2022      No results found for: LABPROT  No results found for: Kim Durand  Lab Results   Component Value Date    WBC 12 12 (H) 12/02/2022    HGB 9 3 (L) 12/02/2022    HCT 31 7 (L) 12/02/2022    MCV 75 (L) 12/02/2022     12/02/2022      Lab Results   Component Value Date    HGB 9 3 (L) 12/02/2022    HGB 8 2 (L) 05/31/2022    HGB 9 9 (L) 09/21/2021    HGB 8 8 (L) 08/04/2021 HGB 8 3 (L) 08/03/2021      Lab Results   Component Value Date    IRON 29 (L) 05/31/2022    TIBC 487 (H) 05/31/2022    FERRITIN 11 05/31/2022      No results found for: PTHCALCIUM, [id number], PHOSPHORUS   Lab Results   Component Value Date    CHOLESTEROL 98 12/02/2022    HDL 22 (L) 12/02/2022    LDLCALC 36 12/02/2022    TRIG 202 (H) 12/02/2022      Lab Results   Component Value Date    URICACID 7 8 05/31/2022      Lab Results   Component Value Date    HGBA1C 5 9 (H) 12/02/2022      No results found for: TSHANTIBODY, [id number], FREET4   No results found for: JEAN, DSDNAAB, RFIGM   Lab Results   Component Value Date    PROT 7 7 10/01/2015        Portions of the record may have been created with voice recognition software  Occasional wrong word or "sound a like" substitutions may have occurred due to the inherent limitations of voice recognition software  Read the chart carefully and recognize, using context, where substitutions have occurred  If you have any questions, please contact the dictating provider

## 2022-12-06 NOTE — PATIENT INSTRUCTIONS
Please avoid NSAIDs (nonsteroidal anti-inflammatory drugs), such as Advil (ibuprofen), Aleve (naproxen), Naprosyn, BCs, Goody's powder  Tylenol (acetaminophen) is a safer option for the kidneys  Do not exceed the maximum dose of acetaminophen  Note that this may be in combination with other drugs NSAID medications  Please avoid herbal supplements, such as turmeric  Please consult your nephrologist before taking any over-the-counter medications  For the prevention of kidney injury: If you are sick and not eating well or drinking well OR vomiting or having diarrhea, temporarily hold your ACE-inhibitor / ARB / diuretic (torsemide) while sick and call office for further instructions  Otherwise, please take medications as prescribed

## 2022-12-06 NOTE — ASSESSMENT & PLAN NOTE
Lab Results   Component Value Date    EGFR 14 12/02/2022    EGFR 27 05/31/2022    EGFR 21 09/21/2021    CREATININE 3 53 (H) 12/02/2022    CREATININE 2 14 (H) 05/31/2022    CREATININE 2 62 (H) 09/21/2021   Denies NSAIDs, decreased urine outputDenies NSAIDs, decreased urine output

## 2022-12-06 NOTE — ASSESSMENT & PLAN NOTE
Wt Readings from Last 3 Encounters:   12/06/22 86 2 kg (190 lb)   12/02/22 85 kg (187 lb 6 4 oz)   07/15/22 89 4 kg (197 lb)   1+ bilateral lower extremity pitting edema  Without torsemide 2 days  Family will  from pharmacy  1+ bilateral lower extremity pitting edema  Without torsemide 2 days  Family will  from pharmacy  Low-sodium diet and torsemide  Low-sodium diet and torsemide

## 2022-12-06 NOTE — ASSESSMENT & PLAN NOTE
Lab Results   Component Value Date    EGFR 14 12/02/2022    EGFR 27 05/31/2022    EGFR 21 09/21/2021    CREATININE 3 53 (H) 12/02/2022    CREATININE 2 14 (H) 05/31/2022    CREATININE 2 62 (H) 09/21/2021   With hypotension  Discontinue amlodipine given acute kidney injuryWith hypotension    Discontinue amlodipine given acute kidney injury

## 2022-12-07 ENCOUNTER — TELEPHONE (OUTPATIENT)
Dept: FAMILY MEDICINE CLINIC | Facility: CLINIC | Age: 87
End: 2022-12-07

## 2022-12-07 NOTE — TELEPHONE ENCOUNTER
Pt's daughter calling  When pt was in to see you; He has sores on his bottom  They were given medication and the wife is putting it on  Daughter feels they are not getting better and he is refusing to sit down on his chair because of them  Daughter wondering if it is possible to get home health in there to take care of wounds instead of the wife  She is requesting [facility]     Please advise  Thank you       Fax [phone]

## 2022-12-08 NOTE — TELEPHONE ENCOUNTER
Henry, said no, achieve is part A and and they would be part B, it is considered duplicate therapy? ?

## 2022-12-08 NOTE — TELEPHONE ENCOUNTER
[facility] is just home therapy so I would think [facility] could still provide nursing/wound care but not sure

## 2022-12-08 NOTE — TELEPHONE ENCOUNTER
Linda Mathew [facility] health    The nurse went out, and they have another agency in [facility] rehab), family declined services

## 2022-12-19 DIAGNOSIS — L89.312 PRESSURE ULCER OF RIGHT BUTTOCK, STAGE 2 (HCC): ICD-10-CM

## 2022-12-19 DIAGNOSIS — M54.50 CHRONIC LOW BACK PAIN WITHOUT SCIATICA, UNSPECIFIED BACK PAIN LATERALITY: ICD-10-CM

## 2022-12-19 DIAGNOSIS — G89.29 CHRONIC LOW BACK PAIN WITHOUT SCIATICA, UNSPECIFIED BACK PAIN LATERALITY: ICD-10-CM

## 2022-12-19 RX ORDER — GABAPENTIN 100 MG/1
100 CAPSULE ORAL
Qty: 30 CAPSULE | Refills: 0 | Status: SHIPPED | OUTPATIENT
Start: 2022-12-19

## 2023-01-05 DIAGNOSIS — I50.9 CHRONIC CONGESTIVE HEART FAILURE, UNSPECIFIED HEART FAILURE TYPE (HCC): ICD-10-CM

## 2023-01-05 DIAGNOSIS — M54.50 CHRONIC LOW BACK PAIN WITHOUT SCIATICA, UNSPECIFIED BACK PAIN LATERALITY: ICD-10-CM

## 2023-01-05 DIAGNOSIS — G89.29 CHRONIC LOW BACK PAIN WITHOUT SCIATICA, UNSPECIFIED BACK PAIN LATERALITY: ICD-10-CM

## 2023-01-05 RX ORDER — GABAPENTIN 100 MG/1
100 CAPSULE ORAL
Qty: 30 CAPSULE | Refills: 0 | Status: SHIPPED | OUTPATIENT
Start: 2023-01-05

## 2023-01-05 RX ORDER — TORSEMIDE 20 MG/1
20 TABLET ORAL DAILY
Qty: 30 TABLET | Refills: 5 | Status: SHIPPED | OUTPATIENT
Start: 2023-01-05

## 2023-01-30 DIAGNOSIS — I10 ESSENTIAL HYPERTENSION: ICD-10-CM

## 2023-01-30 RX ORDER — METOPROLOL TARTRATE 100 MG/1
100 TABLET ORAL 2 TIMES DAILY
Qty: 180 TABLET | Refills: 0 | Status: ON HOLD | OUTPATIENT
Start: 2023-01-30

## 2023-02-07 ENCOUNTER — TELEPHONE (OUTPATIENT)
Dept: NEPHROLOGY | Facility: CLINIC | Age: 88
End: 2023-02-07

## 2023-02-08 ENCOUNTER — APPOINTMENT (EMERGENCY)
Dept: CT IMAGING | Facility: HOSPITAL | Age: 88
End: 2023-02-08

## 2023-02-08 ENCOUNTER — APPOINTMENT (EMERGENCY)
Dept: RADIOLOGY | Facility: HOSPITAL | Age: 88
End: 2023-02-08

## 2023-02-08 ENCOUNTER — HOSPITAL ENCOUNTER (INPATIENT)
Facility: HOSPITAL | Age: 88
LOS: 8 days | Discharge: NURSING, REHAB OR LONG-TERM CARE | End: 2023-02-16
Attending: EMERGENCY MEDICINE | Admitting: INTERNAL MEDICINE

## 2023-02-08 ENCOUNTER — APPOINTMENT (INPATIENT)
Dept: ULTRASOUND IMAGING | Facility: HOSPITAL | Age: 88
End: 2023-02-08
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
## 2023-02-08 DIAGNOSIS — R26.2 AMBULATORY DYSFUNCTION: ICD-10-CM

## 2023-02-08 DIAGNOSIS — D72.829 LEUKOCYTOSIS, UNSPECIFIED TYPE: ICD-10-CM

## 2023-02-08 DIAGNOSIS — I27.20 PULMONARY HYPERTENSION (HCC): ICD-10-CM

## 2023-02-08 DIAGNOSIS — N17.9 AKI (ACUTE KIDNEY INJURY) (HCC): ICD-10-CM

## 2023-02-08 DIAGNOSIS — Z47.89 AFTERCARE FOLLOWING SURGERY OF THE MUSCULOSKELETAL SYSTEM: ICD-10-CM

## 2023-02-08 DIAGNOSIS — Z79.01 CHRONIC ANTICOAGULATION: ICD-10-CM

## 2023-02-08 DIAGNOSIS — S72.001A CLOSED FRACTURE OF NECK OF RIGHT FEMUR, INITIAL ENCOUNTER (HCC): ICD-10-CM

## 2023-02-08 DIAGNOSIS — N18.4 CKD (CHRONIC KIDNEY DISEASE) STAGE 4, GFR 15-29 ML/MIN (HCC): ICD-10-CM

## 2023-02-08 DIAGNOSIS — S72.001A CLOSED DISPLACED FRACTURE OF RIGHT FEMORAL NECK (HCC): ICD-10-CM

## 2023-02-08 LAB
2HR DELTA HS TROPONIN: -3 NG/L
4HR DELTA HS TROPONIN: 1 NG/L
ABO GROUP BLD: NORMAL
ALBUMIN SERPL BCP-MCNC: 4.5 G/DL (ref 3.5–5)
ALP SERPL-CCNC: 104 U/L (ref 34–104)
ALT SERPL W P-5'-P-CCNC: 7 U/L (ref 7–52)
ANION GAP SERPL CALCULATED.3IONS-SCNC: 14 MMOL/L (ref 4–13)
APTT PPP: 31 SECONDS (ref 23–37)
AST SERPL W P-5'-P-CCNC: 14 U/L (ref 13–39)
BACTERIA UR QL AUTO: ABNORMAL /HPF
BASOPHILS # BLD AUTO: 0.06 THOUSANDS/ÂΜL (ref 0–0.1)
BASOPHILS NFR BLD AUTO: 0 % (ref 0–1)
BILIRUB SERPL-MCNC: 0.47 MG/DL (ref 0.2–1)
BILIRUB UR QL STRIP: NEGATIVE
BLD GP AB SCN SERPL QL: NEGATIVE
BUN SERPL-MCNC: 81 MG/DL (ref 5–25)
CALCIUM SERPL-MCNC: 9.5 MG/DL (ref 8.4–10.2)
CARDIAC TROPONIN I PNL SERPL HS: 23 NG/L
CARDIAC TROPONIN I PNL SERPL HS: 26 NG/L
CARDIAC TROPONIN I PNL SERPL HS: 27 NG/L
CHLORIDE SERPL-SCNC: 93 MMOL/L (ref 96–108)
CK SERPL-CCNC: 113 U/L (ref 39–308)
CLARITY UR: CLEAR
CO2 SERPL-SCNC: 30 MMOL/L (ref 21–32)
COLOR UR: YELLOW
CREAT SERPL-MCNC: 3.13 MG/DL (ref 0.6–1.3)
CREAT UR-MCNC: 34.2 MG/DL
EOSINOPHIL # BLD AUTO: 0.42 THOUSAND/ÂΜL (ref 0–0.61)
EOSINOPHIL NFR BLD AUTO: 3 % (ref 0–6)
ERYTHROCYTE [DISTWIDTH] IN BLOOD BY AUTOMATED COUNT: 18.6 % (ref 11.6–15.1)
FLUAV RNA RESP QL NAA+PROBE: NEGATIVE
FLUBV RNA RESP QL NAA+PROBE: NEGATIVE
GFR SERPL CREATININE-BSD FRML MDRD: 16 ML/MIN/1.73SQ M
GLUCOSE SERPL-MCNC: 119 MG/DL (ref 65–140)
GLUCOSE UR STRIP-MCNC: NEGATIVE MG/DL
HCT VFR BLD AUTO: 34.6 % (ref 36.5–49.3)
HGB BLD-MCNC: 10.3 G/DL (ref 12–17)
HGB UR QL STRIP.AUTO: ABNORMAL
IMM GRANULOCYTES # BLD AUTO: 0.1 THOUSAND/UL (ref 0–0.2)
IMM GRANULOCYTES NFR BLD AUTO: 1 % (ref 0–2)
INR PPP: 1.19 (ref 0.84–1.19)
KETONES UR STRIP-MCNC: NEGATIVE MG/DL
LEUKOCYTE ESTERASE UR QL STRIP: NEGATIVE
LYMPHOCYTES # BLD AUTO: 1.51 THOUSANDS/ÂΜL (ref 0.6–4.47)
LYMPHOCYTES NFR BLD AUTO: 11 % (ref 14–44)
MCH RBC QN AUTO: 22.9 PG (ref 26.8–34.3)
MCHC RBC AUTO-ENTMCNC: 29.8 G/DL (ref 31.4–37.4)
MCV RBC AUTO: 77 FL (ref 82–98)
MONOCYTES # BLD AUTO: 1.03 THOUSAND/ÂΜL (ref 0.17–1.22)
MONOCYTES NFR BLD AUTO: 7 % (ref 4–12)
NEUTROPHILS # BLD AUTO: 10.93 THOUSANDS/ÂΜL (ref 1.85–7.62)
NEUTS SEG NFR BLD AUTO: 78 % (ref 43–75)
NITRITE UR QL STRIP: NEGATIVE
NON-SQ EPI CELLS URNS QL MICRO: ABNORMAL /HPF
NRBC BLD AUTO-RTO: 0 /100 WBCS
PH UR STRIP.AUTO: 6.5 [PH]
PLATELET # BLD AUTO: 401 THOUSANDS/UL (ref 149–390)
PMV BLD AUTO: 10.2 FL (ref 8.9–12.7)
POTASSIUM SERPL-SCNC: 4.4 MMOL/L (ref 3.5–5.3)
PROT SERPL-MCNC: 7.9 G/DL (ref 6.4–8.4)
PROT UR STRIP-MCNC: NEGATIVE MG/DL
PROT UR-MCNC: 14 MG/DL
PROT/CREAT UR: 0.41 MG/G{CREAT} (ref 0–0.1)
PROTHROMBIN TIME: 15.3 SECONDS (ref 11.6–14.5)
RBC # BLD AUTO: 4.49 MILLION/UL (ref 3.88–5.62)
RBC #/AREA URNS AUTO: ABNORMAL /HPF
RH BLD: POSITIVE
RSV RNA RESP QL NAA+PROBE: NEGATIVE
SARS-COV-2 RNA RESP QL NAA+PROBE: NEGATIVE
SODIUM 24H UR-SCNC: 79 MOL/L
SODIUM SERPL-SCNC: 137 MMOL/L (ref 135–147)
SP GR UR STRIP.AUTO: <=1.005 (ref 1–1.03)
SPECIMEN EXPIRATION DATE: NORMAL
UROBILINOGEN UR QL STRIP.AUTO: 0.2 E.U./DL
WBC # BLD AUTO: 14.05 THOUSAND/UL (ref 4.31–10.16)
WBC #/AREA URNS AUTO: ABNORMAL /HPF

## 2023-02-08 RX ORDER — SODIUM CHLORIDE, SODIUM LACTATE, POTASSIUM CHLORIDE, CALCIUM CHLORIDE 600; 310; 30; 20 MG/100ML; MG/100ML; MG/100ML; MG/100ML
100 INJECTION, SOLUTION INTRAVENOUS CONTINUOUS
Status: DISCONTINUED | OUTPATIENT
Start: 2023-02-08 | End: 2023-02-08

## 2023-02-08 RX ORDER — ACETAMINOPHEN 325 MG/1
650 TABLET ORAL EVERY 6 HOURS PRN
Status: DISCONTINUED | OUTPATIENT
Start: 2023-02-08 | End: 2023-02-16 | Stop reason: HOSPADM

## 2023-02-08 RX ORDER — ACETAMINOPHEN 325 MG/1
650 TABLET ORAL ONCE
Status: COMPLETED | OUTPATIENT
Start: 2023-02-08 | End: 2023-02-08

## 2023-02-08 RX ORDER — LABETALOL HYDROCHLORIDE 5 MG/ML
10 INJECTION, SOLUTION INTRAVENOUS EVERY 4 HOURS PRN
Status: DISCONTINUED | OUTPATIENT
Start: 2023-02-08 | End: 2023-02-15

## 2023-02-08 RX ORDER — HYDROMORPHONE HCL/PF 1 MG/ML
0.5 SYRINGE (ML) INJECTION EVERY 4 HOURS PRN
Status: DISCONTINUED | OUTPATIENT
Start: 2023-02-08 | End: 2023-02-15

## 2023-02-08 RX ORDER — ONDANSETRON 2 MG/ML
4 INJECTION INTRAMUSCULAR; INTRAVENOUS EVERY 4 HOURS PRN
Status: DISCONTINUED | OUTPATIENT
Start: 2023-02-08 | End: 2023-02-16 | Stop reason: HOSPADM

## 2023-02-08 RX ORDER — ALLOPURINOL 100 MG/1
100 TABLET ORAL DAILY
Status: DISCONTINUED | OUTPATIENT
Start: 2023-02-09 | End: 2023-02-16 | Stop reason: HOSPADM

## 2023-02-08 RX ORDER — PANTOPRAZOLE SODIUM 40 MG/10ML
40 INJECTION, POWDER, LYOPHILIZED, FOR SOLUTION INTRAVENOUS
Status: DISCONTINUED | OUTPATIENT
Start: 2023-02-08 | End: 2023-02-11

## 2023-02-08 RX ORDER — PRAVASTATIN SODIUM 40 MG
40 TABLET ORAL
Status: DISCONTINUED | OUTPATIENT
Start: 2023-02-09 | End: 2023-02-16 | Stop reason: HOSPADM

## 2023-02-08 RX ORDER — SODIUM CHLORIDE, SODIUM GLUCONATE, SODIUM ACETATE, POTASSIUM CHLORIDE, MAGNESIUM CHLORIDE, SODIUM PHOSPHATE, DIBASIC, AND POTASSIUM PHOSPHATE .53; .5; .37; .037; .03; .012; .00082 G/100ML; G/100ML; G/100ML; G/100ML; G/100ML; G/100ML; G/100ML
100 INJECTION, SOLUTION INTRAVENOUS CONTINUOUS
Status: DISCONTINUED | OUTPATIENT
Start: 2023-02-08 | End: 2023-02-12

## 2023-02-08 RX ORDER — OXYCODONE HYDROCHLORIDE 5 MG/1
5 TABLET ORAL EVERY 4 HOURS PRN
Status: DISCONTINUED | OUTPATIENT
Start: 2023-02-08 | End: 2023-02-15

## 2023-02-08 RX ORDER — METOPROLOL TARTRATE 100 MG/1
100 TABLET ORAL 2 TIMES DAILY
Status: DISCONTINUED | OUTPATIENT
Start: 2023-02-08 | End: 2023-02-16 | Stop reason: HOSPADM

## 2023-02-08 RX ORDER — HEPARIN SODIUM 5000 [USP'U]/ML
5000 INJECTION, SOLUTION INTRAVENOUS; SUBCUTANEOUS EVERY 8 HOURS SCHEDULED
Status: COMPLETED | OUTPATIENT
Start: 2023-02-08 | End: 2023-02-10

## 2023-02-08 RX ORDER — GABAPENTIN 100 MG/1
100 CAPSULE ORAL
Status: DISCONTINUED | OUTPATIENT
Start: 2023-02-08 | End: 2023-02-11

## 2023-02-08 RX ORDER — FENTANYL CITRATE 50 UG/ML
25 INJECTION, SOLUTION INTRAMUSCULAR; INTRAVENOUS ONCE
Status: COMPLETED | OUTPATIENT
Start: 2023-02-08 | End: 2023-02-08

## 2023-02-08 RX ADMIN — IOHEXOL 100 ML: 350 INJECTION, SOLUTION INTRAVENOUS at 17:20

## 2023-02-08 RX ADMIN — ACETAMINOPHEN 650 MG: 325 TABLET, FILM COATED ORAL at 17:04

## 2023-02-08 RX ADMIN — SODIUM CHLORIDE, SODIUM GLUCONATE, SODIUM ACETATE, POTASSIUM CHLORIDE, MAGNESIUM CHLORIDE, SODIUM PHOSPHATE, DIBASIC, AND POTASSIUM PHOSPHATE 50 ML/HR: .53; .5; .37; .037; .03; .012; .00082 INJECTION, SOLUTION INTRAVENOUS at 19:57

## 2023-02-08 RX ADMIN — OXYCODONE HYDROCHLORIDE 5 MG: 5 TABLET ORAL at 22:19

## 2023-02-08 RX ADMIN — ONDANSETRON 4 MG: 2 INJECTION INTRAMUSCULAR; INTRAVENOUS at 23:17

## 2023-02-08 RX ADMIN — METOPROLOL TARTRATE 100 MG: 100 TABLET, FILM COATED ORAL at 22:18

## 2023-02-08 RX ADMIN — SODIUM CHLORIDE, SODIUM LACTATE, POTASSIUM CHLORIDE, AND CALCIUM CHLORIDE 100 ML/HR: .6; .31; .03; .02 INJECTION, SOLUTION INTRAVENOUS at 17:47

## 2023-02-08 RX ADMIN — PANTOPRAZOLE SODIUM 40 MG: 40 INJECTION, POWDER, FOR SOLUTION INTRAVENOUS at 23:38

## 2023-02-08 RX ADMIN — FENTANYL CITRATE 25 MCG: 50 INJECTION, SOLUTION INTRAMUSCULAR; INTRAVENOUS at 17:02

## 2023-02-08 RX ADMIN — HEPARIN SODIUM 5000 UNITS: 5000 INJECTION INTRAVENOUS; SUBCUTANEOUS at 22:18

## 2023-02-08 RX ADMIN — GABAPENTIN 100 MG: 100 CAPSULE ORAL at 22:18

## 2023-02-08 NOTE — ED PROVIDER NOTES
Emergency Department Trauma Note  Arlina Closs 80 y o  male MRN: [id number]  Unit/Bed#: RM06/RM06 Encounter: [id number]      Trauma Alert: Trauma Acuity: Trauma Evaluation  Model of Arrival: Mode of Arrival: BLS via    Trauma Team: Current Providers  Attending Provider: Norah Gustafson DO  Attending Provider: Bob Rudd DO  Registered Nurse: Sierra Romberg, RN  Consultants:     None      History of Present Illness      Chief Complaint:   Chief Complaint   Patient presents with   • Fall     Patient states he fell around noon today when he was going to refill his drink  Patient states his pants were too baggy which is why he slipped  Patient complains of right hip pain  Patient is unsure if he hit his head  Denies LOC  HPI:  Arlina Closs is a 80 y o  male who presents with right hip pain after fall form standing  Patient provides own history  Patient reports he was at his home  Patient reports he was standing and attempted to ambulate he reports his pants were too baggy which caused him to trip and fall he landed on his right lower extremity  He reports immediate onset of pain in the right hip  The pain is minimal/resolved with lying still and severe and sharp with any movement of the right leg  Unable to ambulate  Unsure if he hit his head but denies LOC  Denies pain elsewhere  He is on Eliquis  He denies prodromal symptoms prior to the fall denies chest pain shortness of breath lightheadedness dizziness nausea vomiting numbness tingling weakness  Mechanism:Details of Incident: Patient fell from a standing position and complains of right hip pain  Patient is unsure if he hit his head  Patient does take a blood thinner Injury Date: 02/08/23 Injury Time:  (around noon)      HPI  Review of Systems   Constitutional: Negative for chills and fever  HENT: Negative for ear pain and sore throat  Eyes: Negative for pain and visual disturbance     Respiratory: Negative for cough and shortness of breath  Cardiovascular: Negative for chest pain and palpitations  Gastrointestinal: Negative for abdominal pain and vomiting  Genitourinary: Negative for dysuria and hematuria  Musculoskeletal: Positive for gait problem  Negative for arthralgias and back pain  Right hip pain   Skin: Negative for color change and rash  Neurological: Negative for seizures and syncope  All other systems reviewed and are negative  Historical Information     Immunizations:   Immunization History   Administered Date(s) Administered   • COVID-19 MODERNA VACC 0 5 ML IM 01/21/2021, 02/19/2021   • INFLUENZA 10/26/2016   • Influenza Split High Dose Preservative Free IM 11/07/2012, 11/03/2014, 10/08/2015, 10/26/2016, 10/03/2017   • Influenza, high dose seasonal 0 7 mL 10/09/2018, 10/07/2019, 10/15/2020, 10/19/2021, 12/02/2022   • Influenza, seasonal, injectable 11/04/2013   • Pneumococcal Polysaccharide PPV23 11/07/2006       Past Medical History:   Diagnosis Date   • Aortic stenosis     ECHO -4-14-14   MODERATE TO SEVERE AORTIC STENOSIS; MILD AROTIC INSUFFICIENCY - LAST ASSESSED 10/26/16; RESOLVED 6/8/16   • Aortic valve disorder     LAST ASSESSED 10/8/15; 10/8/15   • Arthritis    • CHF (congestive heart failure) ([facility])    • Complete heart block ([facility] [address] ) 7/20/2020   • Coronary artery disease    • Hypertension    • Hypertensive urgency 5/19/2019   • Renal disorder    • TIA (transient ischemic attack)    • Transient cerebral ischemia        Family History   Problem Relation Age of Onset   • No Known Problems Mother    • No Known Problems Father    • Coronary artery disease Neg Hx      Past Surgical History:   Procedure Laterality Date   • AORTIC VALVE REPLACEMENT  06/30/2015    avr with 23 mm [place] Magna Ease bioprosthetic   • CARDIAC PACEMAKER PLACEMENT Left 07/24/2020   • CATARACT EXTRACTION Right 06/05/2000   • COLONOSCOPY     • CORONARY ARTERY BYPASS GRAFT  06/05/2000    x1 with argueta  to lad   • EYE SURGERY • POLYPECTOMY  04/2014    enteroscopic - esophageal ulcer, gastric erosion, and duodenal ulcer  • TONSILLECTOMY      unknown     Social History     Tobacco Use   • Smoking status: Never   • Smokeless tobacco: Never   Vaping Use   • Vaping Use: Never used   Substance Use Topics   • Alcohol use: Never     Alcohol/week: 0 0 standard drinks     Comment: very occasional   • Drug use: Never     E-Cigarette/Vaping   • E-Cigarette Use Never User      E-Cigarette/Vaping Substances   • Nicotine No    • THC No    • CBD No    • Flavoring No    • Other No    • Unknown No        Family History: non-contributory    Meds/Allergies   Prior to Admission Medications   Prescriptions Last Dose Informant Patient Reported?  Taking?   allopurinol (ZYLOPRIM) 100 mg tablet   No No   Sig: Take 1 tablet (100 mg total) by mouth daily   apixaban (ELIQUIS) 2 5 mg   No No   Sig: Take 1 tablet (2 5 mg total) by mouth 2 (two) times a day   aspirin (ECOTRIN LOW STRENGTH) 81 mg EC tablet   No No   Sig: Take 1 tablet (81 mg total) by mouth daily   desoximetasone (TOPICORT) 0 05 % cream   No No   Sig: Apply topically 2 (two) times a day   Patient not taking: Reported on 7/15/2022   gabapentin (NEURONTIN) 100 mg capsule   No No   Sig: Take 1 capsule (100 mg total) by mouth daily at bedtime   metoprolol tartrate (LOPRESSOR) 100 mg tablet   No No   Sig: Take 1 tablet (100 mg total) by mouth 2 (two) times a day   ondansetron (ZOFRAN) 4 mg tablet   No No   Sig: Take 1 tablet (4 mg total) by mouth every 8 (eight) hours as needed for nausea or vomiting for up to 4 days   pantoprazole (PROTONIX) 40 mg tablet   No No   Sig: Take 1 tablet (40 mg total) by mouth 2 (two) times a day before meals   pravastatin (PRAVACHOL) 40 mg tablet   No No   Sig: Take 1 tablet (40 mg total) by mouth daily with dinner   senna (SENOKOT) 8 6 mg   No No   Sig: Take 1 tablet (8 6 mg total) by mouth daily at bedtime   silver sulfadiazine (Silvadene) 1 % cream   No No   Sig: Apply topically 2 (two) times a day   torsemide (DEMADEX) 20 mg tablet   No No   Sig: Take 1 tablet (20 mg total) by mouth daily      Facility-Administered Medications: None       Allergies   Allergen Reactions   • Promethazine Delirium and Other (See Comments)       PHYSICAL EXAM    PE limited by: age, acuity    Objective   Vitals:   First set: Temperature: 97 8 °F (36 6 °C) (02/08/23 1638)  Pulse: 74 (02/08/23 1638)  Respirations: 17 (02/08/23 1638)  Blood Pressure: (!) 191/87 (02/08/23 1638)  SpO2: 94 % (02/08/23 1638)    Primary Survey:   (A) Airway: intact  (B) Breathing: spontaneous and unlabored  (C) Circulation: Pulses:   normal  (D) Disabliity:  GCS Total:  15  (E) Expose:  Completed    Secondary Survey: (Click on Physical Exam tab above)  Physical Exam  Vitals and nursing note reviewed  Exam conducted with a chaperone present  Constitutional:       General: He is not in acute distress  Appearance: Normal appearance  He is well-developed  HENT:      Head: Normocephalic and atraumatic  Comments: No frey sign, no periorbital ecchymosis, no hemotympanum  Right Ear: Tympanic membrane and external ear normal       Left Ear: Tympanic membrane and external ear normal       Nose: Nose normal       Mouth/Throat:      Mouth: Mucous membranes are moist       Pharynx: Oropharynx is clear  Eyes:      General: No scleral icterus  Conjunctiva/sclera: Conjunctivae normal    Cardiovascular:      Rate and Rhythm: Normal rate and regular rhythm  Heart sounds: No murmur heard  Pulmonary:      Effort: Pulmonary effort is normal  No respiratory distress  Breath sounds: Normal breath sounds  No wheezing, rhonchi or rales  Chest:      Chest wall: No tenderness  Abdominal:      Palpations: Abdomen is soft  Tenderness: There is no abdominal tenderness  There is no right CVA tenderness, left CVA tenderness, guarding or rebound     Musculoskeletal:         General: Tenderness and signs of injury present  No swelling  Cervical back: Neck supple  Right hip: Tenderness and bony tenderness present  Decreased range of motion  Normal strength  Left hip: Normal         Legs:       Comments: No posterior spinal tenderness step-offs or deformities cervical thoracic lumbar spine  No lacerations identified  No large ecchymosis  No evidence of joint effusions  Skin:     General: Skin is warm and dry  Capillary Refill: Capillary refill takes less than 2 seconds  Neurological:      General: No focal deficit present  Mental Status: He is alert  Mental status is at baseline  Comments: Normal sensation and motor function in the right lower extremity  Psychiatric:         Mood and Affect: Mood normal          Cervical spine cleared by clinical criteria? No (imaging required)      Invasive Devices     Peripheral Intravenous Line  Duration           Peripheral IV 07/30/21 Dorsal (posterior); Left Hand 557 days    Peripheral IV 02/08/23 Right Antecubital <1 day          Drain  Duration           External Urinary Catheter Medium 560 days                Lab Results:   Results Reviewed     Procedure Component Value Units Date/Time    HS Troponin 0hr (reflex protocol) [[id number]]  (Normal) Collected: 02/08/23 1700    Lab Status: Final result Specimen: Blood from Arm, Right Updated: 02/08/23 1801     hs TnI 0hr 26 ng/L     HS Troponin I 2hr [[id number]]     Lab Status: No result Specimen: Blood     CK (with reflex to MB) [[id number]]  (Normal) Collected: 02/08/23 1702    Lab Status: Final result Specimen: Blood from Arm, Right Updated: 02/08/23 1755     Total  U/L     Comprehensive metabolic panel [[id number]]  (Abnormal) Collected: 02/08/23 1700    Lab Status: Final result Specimen: Blood from Arm, Right Updated: 02/08/23 1754     Sodium 137 mmol/L      Potassium 4 4 mmol/L      Chloride 93 mmol/L      CO2 30 mmol/L      ANION GAP 14 mmol/L      BUN 81 mg/dL      Creatinine 3 13 mg/dL Glucose 119 mg/dL      Calcium 9 5 mg/dL      AST 14 U/L      ALT 7 U/L      Alkaline Phosphatase 104 U/L      Total Protein 7 9 g/dL      Albumin 4 5 g/dL      Total Bilirubin 0 47 mg/dL      eGFR 16 ml/min/1 73sq m     Narrative:      [place] guidelines for Chronic Kidney Disease (CKD):   •  Stage 1 with normal or high GFR (GFR > 90 mL/min/1 73 square meters)  •  Stage 2 Mild CKD (GFR = 60-89 mL/min/1 73 square meters)  •  Stage 3A Moderate CKD (GFR = 45-59 mL/min/1 73 square meters)  •  Stage 3B Moderate CKD (GFR = 30-44 mL/min/1 73 square meters)  •  Stage 4 Severe CKD (GFR = 15-29 mL/min/1 73 square meters)  •  Stage 5 End Stage CKD (GFR <15 mL/min/1 73 square meters)  Note: GFR calculation is accurate only with a steady state creatinine    Protime-INR [[id number]]  (Abnormal) Collected: 02/08/23 1700    Lab Status: Final result Specimen: Blood from Arm, Right Updated: 02/08/23 1752     Protime 15 3 seconds      INR 1 19    APTT [[id number]]  (Normal) Collected: 02/08/23 1700    Lab Status: Final result Specimen: Blood from Arm, Right Updated: 02/08/23 1752     PTT 31 seconds     CBC and differential [[id number]]  (Abnormal) Collected: 02/08/23 1700    Lab Status: Final result Specimen: Blood from Arm, Right Updated: 02/08/23 1735     WBC 14 05 Thousand/uL      RBC 4 49 Million/uL      Hemoglobin 10 3 g/dL      Hematocrit 34 6 %      MCV 77 fL      MCH 22 9 pg      MCHC 29 8 g/dL      RDW 18 6 %      MPV 10 2 fL      Platelets 818 Thousands/uL      nRBC 0 /100 WBCs      Neutrophils Relative 78 %      Immat GRANS % 1 %      Lymphocytes Relative 11 %      Monocytes Relative 7 %      Eosinophils Relative 3 %      Basophils Relative 0 %      Neutrophils Absolute 10 93 Thousands/µL      Immature Grans Absolute 0 10 Thousand/uL      Lymphocytes Absolute 1 51 Thousands/µL      Monocytes Absolute 1 03 Thousand/µL      Eosinophils Absolute 0 42 Thousand/µL      Basophils Absolute 0 06 Thousands/µL                  Imaging Studies:   Direct to CT: No  XR femur 2 views RIGHT   Final Result by Iraj Rivera MD (02/08 1818)      Acute impacted fracture of right femoral neck extending into right femoral head  The study was marked in [facility] for immediate notification  Workstation performed: [id number]         XR hip/pelv 2-3 vws right if performed   Final Result by Iraj Rivera MD (02/08 1818)      Acute impacted fracture of right femoral neck extending into right femoral head  The study was marked in [facility] for immediate notification  Workstation performed: [id number]         TRAUMA - CT head wo contrast   Final Result by Mahogany Rosa DO (02/08 1736)      No acute intracranial abnormality  Workstation performed: [id number]         TRAUMA - CT spine cervical wo contrast   Final Result by Mahogany Rosa DO (02/08 1744)      No cervical spine fracture or traumatic malalignment  Severe multilevel cervical spondylosis  Workstation performed: [id number]         TRAUMA - CT chest abdomen pelvis w contrast   Final Result by Iraj Rivera MD (02/08 1813)      Acute impacted fracture of right proximal femoral neck extending to right femoral head  No acute visceral organ or vascular injury of the chest, abdomen, or pelvis  Additional chronic/incidental findings as detailed above  The study was marked in [facility] for immediate notification  Workstation performed: [id number]         CT recon only lumbar spine   Final Result by Iraj Rivera MD (02/08 1822)      No acute osseous abnormality of lumbar spine  Degenerative changes as detailed above  Please see same-day CT chest abdomen pelvis for further evaluation  The study was marked in [facility] for immediate notification              Workstation performed: [id number]         XR Trauma pelvis ap only 1 or 2 vw   ED Interpretation by Suha Alcantara DO (02/08 1709)   Abnormal   R femoral neck fracture      Final Result by Bj Siu MD (02/08 1816)      Acute impacted right femoral neck fracture  The study was marked in [facility] for immediate notification  Workstation performed: [id number]         XR Trauma chest portable   Final Result by Bj Siu MD (02/08 1815)      No acute cardiopulmonary disease  Workstation performed: [id number]               Procedures  Procedures         ED Course  ED Course as of 02/08/23 1857   Wed Feb 08, 2023   1709 Tiger text sent to ortho on call for recs regarding R hip hx  Sent XR image to ortho  Adonis Farooq  1738 No significant acute changes on CBC, all chronic abnormal findings  18 Ortho reports if no other injuries on wokrup can remain at [facility] for mgmt of R hip fx     1739 No ICH seen on CT brain   1810 EKG interpreted by myself  EKG dated 6/1/9797 at 0698 complicated by baseline motion artifact  EKG based on my review demonstrates paced rhythm at 60 bpm normal QTc duration, normal QRS duration, no STEMI    1822 Discussing with [facility] for admission)  200 Ortho discussing to determine who/when for OR  [facility] requesting update  Ortho and [facility] discussing amongst themselves now  [address] accepts full IP           Medical Decision Making  27-year-old male presenting to the emergency department for evaluation of right hip pain after a mechanical fall from standing in his home  Patient on Ricky  Patient made a trauma evaluation after initial history based on age, blood thinner use  No evidence of head trauma on physical exam however patient unsure if he hit his head  Denies prodrome or syncope no current chest pain or shortness of breath no palpitations or diaphoresis  Suspect mechanical trip and fall  Based on pain and localized to the right hip region suspicious for hip fracture    Chest and pelvis x-rays obtained immediately prior to CTs did reveal a right femoral neck fracture Ortho made aware for recommendations thinks patient could [facility] if no other findings on trauma work-up  Trauma work-up did result negative for other injuries  Patient treated for pain  Labs near baseline  Low suspicion for cardiac etiology despite his cardiac history  No signs of neurovascular compromise compartment syndrome or large hematoma  Thus no indication for anticoagulation reversal     Ambulatory dysfunction: self-limited or minor problem  Chronic anticoagulation: chronic illness or injury that poses a threat to life or bodily functions  Closed displaced fracture of right femoral neck ([facility] [address] ): acute illness or injury  Fall from standing, initial encounter: complicated acute illness or injury  Amount and/or Complexity of Data Reviewed  External Data Reviewed: ECG  Labs: ordered  Radiology: ordered and independent interpretation performed  ECG/medicine tests: ordered and independent interpretation performed  Decision-making details documented in ED Course  Risk  OTC drugs  Prescription drug management  Decision regarding hospitalization  Disposition  [facility] Transfer: No  Final diagnoses:   Fall from standing, initial encounter   Chronic anticoagulation   Closed displaced fracture of right femoral neck (HCC)   Ambulatory dysfunction   CKD (chronic kidney disease) stage 4, GFR 15-29 ml/min ([facility] [address] )     Time reflects when diagnosis was documented in both MDM as applicable and the Disposition within this note     Time User Action Codes Description Comment    2/8/2023  5:23 PM Pamlico Giles Add Faustin Ink  XXXA] Fall from standing, initial encounter     2/8/2023  5:24 PM Pamlico Giles Add [Z79 01] Chronic anticoagulation     2/8/2023  5:24 PM Betehl Giles Add [S72 001A] Closed displaced fracture of right femoral neck ([facility] [address] )     2/8/2023  5:24 PM Bethel Giles Add [R26 2] Ambulatory dysfunction     2/8/2023  5:56 PM Jamar Angsherie Dozier [N18 4] CKD (chronic kidney disease) stage 4, GFR 15-29 ml/min [facility])       ED Disposition     ED Disposition   Admit    Condition   Stable    Date/Time   Wed Feb 8, 2023  6:32 PM    Comment   Case was discussed with RUPERT and the patient's admission status was agreed to be Admission Status: inpatient status to the service of Dr Doreen De La Garza   Follow-up Information    None       Patient's Medications   Discharge Prescriptions    No medications on file     No discharge procedures on file      PDMP Review     None          ED Provider  Electronically Signed by         Castillo Cornelius DO  02/08/23 9990

## 2023-02-09 ENCOUNTER — APPOINTMENT (INPATIENT)
Dept: CT IMAGING | Facility: HOSPITAL | Age: 88
End: 2023-02-09

## 2023-02-09 ENCOUNTER — APPOINTMENT (OUTPATIENT)
Dept: NON INVASIVE DIAGNOSTICS | Facility: HOSPITAL | Age: 88
End: 2023-02-09

## 2023-02-09 PROBLEM — E83.39 HYPERPHOSPHATEMIA: Status: ACTIVE | Noted: 2023-02-09

## 2023-02-09 LAB
ALBUMIN SERPL BCP-MCNC: 3.9 G/DL (ref 3.5–5)
ALP SERPL-CCNC: 90 U/L (ref 34–104)
ALT SERPL W P-5'-P-CCNC: 4 U/L (ref 7–52)
ANION GAP SERPL CALCULATED.3IONS-SCNC: 15 MMOL/L (ref 4–13)
AORTIC ROOT: 3.2 CM
APICAL FOUR CHAMBER EJECTION FRACTION: 55 %
AST SERPL W P-5'-P-CCNC: 13 U/L (ref 13–39)
ATRIAL RATE: 340 BPM
AV PEAK GRADIENT: 9 MMHG
BASOPHILS # BLD AUTO: 0.03 THOUSANDS/ÂΜL (ref 0–0.1)
BASOPHILS NFR BLD AUTO: 0 % (ref 0–1)
BILIRUB SERPL-MCNC: 0.63 MG/DL (ref 0.2–1)
BUN SERPL-MCNC: 75 MG/DL (ref 5–25)
CALCIUM SERPL-MCNC: 9 MG/DL (ref 8.4–10.2)
CHLORIDE SERPL-SCNC: 96 MMOL/L (ref 96–108)
CK SERPL-CCNC: 99 U/L (ref 39–308)
CO2 SERPL-SCNC: 27 MMOL/L (ref 21–32)
CREAT SERPL-MCNC: 2.99 MG/DL (ref 0.6–1.3)
DOP CALC MV VTI: 31.65 CM
E WAVE DECELERATION TIME: 242 MS
EOSINOPHIL # BLD AUTO: 0.01 THOUSAND/ÂΜL (ref 0–0.61)
EOSINOPHIL NFR BLD AUTO: 0 % (ref 0–6)
ERYTHROCYTE [DISTWIDTH] IN BLOOD BY AUTOMATED COUNT: 18.4 % (ref 11.6–15.1)
FRACTIONAL SHORTENING: 31 % (ref 28–44)
GFR SERPL CREATININE-BSD FRML MDRD: 17 ML/MIN/1.73SQ M
GLUCOSE SERPL-MCNC: 106 MG/DL (ref 65–140)
HCT VFR BLD AUTO: 30.1 % (ref 36.5–49.3)
HCT VFR BLD AUTO: 32.2 % (ref 36.5–49.3)
HGB BLD-MCNC: 8.8 G/DL (ref 12–17)
HGB BLD-MCNC: 9.6 G/DL (ref 12–17)
IMM GRANULOCYTES # BLD AUTO: 0.07 THOUSAND/UL (ref 0–0.2)
IMM GRANULOCYTES NFR BLD AUTO: 1 % (ref 0–2)
INTERVENTRICULAR SEPTUM IN DIASTOLE (PARASTERNAL SHORT AXIS VIEW): 1.6 CM
INTERVENTRICULAR SEPTUM: 1.6 CM (ref 0.6–1.1)
LACTATE SERPL-SCNC: 1.1 MMOL/L (ref 0.5–2)
LEFT ATRIUM AREA SYSTOLE SINGLE PLANE A4C: 23.5 CM2
LEFT ATRIUM SIZE: 4.9 CM
LEFT INTERNAL DIMENSION IN SYSTOLE: 2.2 CM (ref 2.1–4)
LEFT VENTRICULAR INTERNAL DIMENSION IN DIASTOLE: 3.2 CM (ref 3.5–6)
LEFT VENTRICULAR POSTERIOR WALL IN END DIASTOLE: 1.4 CM
LEFT VENTRICULAR STROKE VOLUME: 24 ML
LVSV (TEICH): 24 ML
LYMPHOCYTES # BLD AUTO: 0.6 THOUSANDS/ÂΜL (ref 0.6–4.47)
LYMPHOCYTES NFR BLD AUTO: 4 % (ref 14–44)
MAGNESIUM SERPL-MCNC: 1.5 MG/DL (ref 1.9–2.7)
MCH RBC QN AUTO: 22.4 PG (ref 26.8–34.3)
MCHC RBC AUTO-ENTMCNC: 29.2 G/DL (ref 31.4–37.4)
MCV RBC AUTO: 77 FL (ref 82–98)
MONOCYTES # BLD AUTO: 0.85 THOUSAND/ÂΜL (ref 0.17–1.22)
MONOCYTES NFR BLD AUTO: 6 % (ref 4–12)
MV E'TISSUE VEL-SEP: 5 CM/S
MV MEAN GRADIENT: 3 MMHG
MV PEAK A VEL: 1.07 M/S
MV PEAK E VEL: 118 CM/S
MV PEAK GRADIENT: 7 MMHG
MV STENOSIS PRESSURE HALF TIME: 70 MS
MV VALVE AREA P 1/2 METHOD: 3.14 CM2
NEUTROPHILS # BLD AUTO: 12.92 THOUSANDS/ÂΜL (ref 1.85–7.62)
NEUTS SEG NFR BLD AUTO: 89 % (ref 43–75)
NRBC BLD AUTO-RTO: 0 /100 WBCS
PHOSPHATE SERPL-MCNC: 5.2 MG/DL (ref 2.3–4.1)
PLATELET # BLD AUTO: 320 THOUSANDS/UL (ref 149–390)
PMV BLD AUTO: 10.2 FL (ref 8.9–12.7)
POTASSIUM SERPL-SCNC: 4.5 MMOL/L (ref 3.5–5.3)
PROCALCITONIN SERPL-MCNC: 0.35 NG/ML
PROT SERPL-MCNC: 6.8 G/DL (ref 6.4–8.4)
QRS AXIS: 61 DEGREES
QRSD INTERVAL: 108 MS
QT INTERVAL: 476 MS
QTC INTERVAL: 476 MS
RA PRESSURE ESTIMATED: 8 MMHG
RBC # BLD AUTO: 3.93 MILLION/UL (ref 3.88–5.62)
RIGHT ATRIUM AREA SYSTOLE A4C: 20.8 CM2
RIGHT VENTRICLE ID DIMENSION: 3.2 CM
RV PSP: 45 MMHG
SL CV LV EF: 65
SL CV PED ECHO LEFT VENTRICLE DIASTOLIC VOLUME (MOD BIPLANE) 2D: 41 ML
SL CV PED ECHO LEFT VENTRICLE SYSTOLIC VOLUME (MOD BIPLANE) 2D: 17 ML
SODIUM SERPL-SCNC: 138 MMOL/L (ref 135–147)
T WAVE AXIS: 15 DEGREES
TR MAX PG: 37 MMHG
TR PEAK VELOCITY: 3 M/S
TRICUSPID ANNULAR PLANE SYSTOLIC EXCURSION: 1.9 CM
TRICUSPID VALVE PEAK REGURGITATION VELOCITY: 3.03 M/S
VENTRICULAR RATE: 60 BPM
WBC # BLD AUTO: 14.48 THOUSAND/UL (ref 4.31–10.16)

## 2023-02-09 PROCEDURE — 30233N1 TRANSFUSION OF NONAUTOLOGOUS RED BLOOD CELLS INTO PERIPHERAL VEIN, PERCUTANEOUS APPROACH: ICD-10-PCS | Performed by: INTERNAL MEDICINE

## 2023-02-09 RX ORDER — MAGNESIUM SULFATE HEPTAHYDRATE 40 MG/ML
2 INJECTION, SOLUTION INTRAVENOUS ONCE
Status: COMPLETED | OUTPATIENT
Start: 2023-02-09 | End: 2023-02-10

## 2023-02-09 RX ORDER — CHLORHEXIDINE GLUCONATE 4 G/100ML
SOLUTION TOPICAL DAILY PRN
Status: DISCONTINUED | OUTPATIENT
Start: 2023-02-09 | End: 2023-02-10

## 2023-02-09 RX ORDER — METOCLOPRAMIDE HYDROCHLORIDE 5 MG/ML
10 INJECTION INTRAMUSCULAR; INTRAVENOUS EVERY 6 HOURS PRN
Status: DISCONTINUED | OUTPATIENT
Start: 2023-02-09 | End: 2023-02-15

## 2023-02-09 RX ORDER — CEFAZOLIN SODIUM 2 G/50ML
2000 SOLUTION INTRAVENOUS EVERY 8 HOURS
Status: COMPLETED | OUTPATIENT
Start: 2023-02-09 | End: 2023-02-10

## 2023-02-09 RX ADMIN — OXYCODONE HYDROCHLORIDE 5 MG: 5 TABLET ORAL at 17:06

## 2023-02-09 RX ADMIN — METOPROLOL TARTRATE 100 MG: 100 TABLET, FILM COATED ORAL at 08:11

## 2023-02-09 RX ADMIN — ALLOPURINOL 100 MG: 100 TABLET ORAL at 08:11

## 2023-02-09 RX ADMIN — HEPARIN SODIUM 5000 UNITS: 5000 INJECTION INTRAVENOUS; SUBCUTANEOUS at 05:20

## 2023-02-09 RX ADMIN — ONDANSETRON 4 MG: 2 INJECTION INTRAMUSCULAR; INTRAVENOUS at 03:19

## 2023-02-09 RX ADMIN — PANTOPRAZOLE SODIUM 40 MG: 40 INJECTION, POWDER, FOR SOLUTION INTRAVENOUS at 08:11

## 2023-02-09 RX ADMIN — GABAPENTIN 100 MG: 100 CAPSULE ORAL at 22:47

## 2023-02-09 RX ADMIN — PRAVASTATIN SODIUM 40 MG: 40 TABLET ORAL at 17:04

## 2023-02-09 RX ADMIN — CEFAZOLIN SODIUM 2000 MG: 2 SOLUTION INTRAVENOUS at 23:37

## 2023-02-09 RX ADMIN — SODIUM CHLORIDE, SODIUM GLUCONATE, SODIUM ACETATE, POTASSIUM CHLORIDE, MAGNESIUM CHLORIDE, SODIUM PHOSPHATE, DIBASIC, AND POTASSIUM PHOSPHATE 50 ML/HR: .53; .5; .37; .037; .03; .012; .00082 INJECTION, SOLUTION INTRAVENOUS at 17:08

## 2023-02-09 RX ADMIN — HEPARIN SODIUM 5000 UNITS: 5000 INJECTION INTRAVENOUS; SUBCUTANEOUS at 14:07

## 2023-02-09 RX ADMIN — OXYCODONE HYDROCHLORIDE 5 MG: 5 TABLET ORAL at 11:52

## 2023-02-09 RX ADMIN — MAGNESIUM SULFATE HEPTAHYDRATE 2 G: 40 INJECTION, SOLUTION INTRAVENOUS at 11:49

## 2023-02-09 RX ADMIN — METOPROLOL TARTRATE 100 MG: 100 TABLET, FILM COATED ORAL at 22:48

## 2023-02-09 RX ADMIN — HEPARIN SODIUM 5000 UNITS: 5000 INJECTION INTRAVENOUS; SUBCUTANEOUS at 23:37

## 2023-02-09 NOTE — ASSESSMENT & PLAN NOTE
Wt Readings from Last 3 Encounters:   02/09/23 84 4 kg (186 lb)   12/06/22 86 2 kg (190 lb)   12/02/22 85 kg (187 lb 6 4 oz)       · Hypovolemia improving  · Daily weights - 190 to 186  · Strict intake/output measurements - 240  · Transthoracic echocardiogram - EF% 60 with LVH  · Cardiac clearance obtained -moderate risk

## 2023-02-09 NOTE — ASSESSMENT & PLAN NOTE
· Admit to med/surg level of care  · Consult Orthopedic Surgery  · Hold PO eliquis  · The patient is medically cleared from my standpoint for an ORIF of the right hip    · ORIF planned for 2/10/2023  · Cardiology pre-operative cardiac risk stratification -moderate  · Pain control  · Incentive spirometry  · DVT prophylaxis with heparin 5000 Units SQ every 8 hours and SCD's on both lower extremities

## 2023-02-09 NOTE — INCIDENTAL FINDINGS
The following findings require follow up:  Radiographic finding   Finding:  Ultrasound of the kidneys and bladder (02/08/2023): Medical renal disease      Bilateral renal cysts, most of which are simple in appearance with the exception of a 3 2 x 3 3 x 3 2 cm suboptimally visualized right renal cyst, likely complex  A six-month follow-up renal ultrasound is recommended     Follow up required:  Yes   Follow up should be done within 6 month(s)    Please notify the following clinician to assist with the follow up:   Dr Carolina Hurst (PCP)

## 2023-02-09 NOTE — PROGRESS NOTES
[address] [facility] [address]  Progress Note - Hiwot Galeas 1935, 80 y o  male MRN: [id number]  Unit/Bed#: 882-81 Encounter: [id number]  Primary Care Provider: Brenda Arnold DO   Date and time admitted to hospital: 2/8/2023  4:30 PM    Chronic diastolic CHF (congestive heart failure) ([address] )  Assessment & Plan  Wt Readings from Last 3 Encounters:   02/09/23 84 4 kg (186 lb)   12/06/22 86 2 kg (190 lb)   12/02/22 85 kg (187 lb 6 4 oz)       · Hypovolemia improving  · Daily weights - 190 to 186  · Strict intake/output measurements - 240  · Transthoracic echocardiogram - EF% 60 with LVH  · Cardiac clearance obtained -moderate risk      Paroxysmal atrial fibrillation (HCC)  Assessment & Plan  · Hold eliquis for the upcoming surgical procedure 2/10  · Continue metoprolol tartrate 100 mg PO every 12 hours for heart rate control    Hyperphosphatemia  Assessment & Plan  Phosphorus 5 2  Recheck phosphorus level    Leukocytosis  Assessment & Plan  · Appears to have a chronic leukocytosis  · Check blood cultures x 2 sets  · Check a urine culture  · Check and follow the procalcitonin level, most recent 0 35  · Follow the CBC  · Outpatient Hematology evaluation    Multiple renal cysts  Assessment & Plan  · Outpatient surveillance imaging with PCP    CKD (chronic kidney disease) stage 4, GFR 15-29 ml/min [facility])  Assessment & Plan  Lab Results   Component Value Date    EGFR 17 02/09/2023    EGFR 16 02/08/2023    EGFR 14 12/02/2022    CREATININE 2 99 (H) 02/09/2023    CREATININE 3 13 (H) 02/08/2023    CREATININE 3 53 (H) 12/02/2022     · Creatinine 3 5 upon admission  · Baseline serum creatinine appears to be around 2 0-2 6 mg/dl  · Likely a component of prerenal azotemia  · FENA 5 3% post renal/restrictive    · Seen by nephrology:No obstruction on ultrasound however is at risk given femur fracture   Also, exposed to iodinated contrast 2/8 in presumed volume depleted state and creatinine may worsen before improving in this setting  Continue to hold torsemide and avoid other potential nephrotoxins particularly NSAIDs and contrast  Agree with gentle balanced salt solution    · Utilize Isolyte IV fluids at 50 ml/hr      * Fracture of femoral neck, right (HCC)  Assessment & Plan  · Admit to med/surg level of care  · Consult Orthopedic Surgery  · Hold PO eliquis  · The patient is medically cleared from my standpoint for an ORIF of the right hip  · ORIF planned for 2/10/2023  · Cardiology pre-operative cardiac risk stratification -moderate  · Pain control  · Incentive spirometry  · DVT prophylaxis with heparin 5000 Units SQ every 8 hours and SCD's on both lower extremities      VTE Pharmacologic Prophylaxis: VTE Score: 12 Anticoagulation on hold for surgery 2/10    Patient Centered Rounds: I performed bedside rounds with nursing staff today  Discussions with Specialists or Other Care Team Provider: Cardiology and nephrology    Education and Discussions with Family / Patient: Updated  (wife) at bedside  Time Spent for Care: 60 minutes  More than 50% of total time spent on counseling and coordination of care as described above  Current Length of Stay: 1 day(s)  Current Patient Status: Inpatient   Certification Statement: The patient will continue to require additional inpatient hospital stay due to Surgery procedure tomorrow 2/10  Discharge Plan: Anticipate discharge in 24-48 hrs to home  Code Status: Level 1 - Full Code    Subjective:   Patient seen at bedside this afternoon, laying in bed comfortably  No acute distress  Patient denies being in any pain  He states that his mom feels sore and will sometimes apply cream to it otherwise no new complaints  No shortness of breath  Patient is aware of repair that he will have surgery tomorrow      Objective:     Vitals:   Temp (24hrs), Av 9 °F (36 6 °C), Min:97 5 °F (36 4 °C), Max:98 9 °F (37 2 °C)    Temp:  [97 5 °F (36 4 °C)-98 9 °F (37 2 °C)] 98 9 °F (37 2 °C)  HR:  [59-80] 60  Resp:  [13-20] 16  BP: (121-190)/() 124/51  SpO2:  [86 %-100 %] 98 %  Body mass index is 31 93 kg/m²  Input and Output Summary (last 24 hours): Intake/Output Summary (Last 24 hours) at 2/9/2023 1700  Last data filed at 2/9/2023 1301  Gross per 24 hour   Intake 360 ml   Output 1710 ml   Net -1350 ml       Physical Exam:   Physical Exam  Vitals and nursing note reviewed  Constitutional:       General: He is not in acute distress  Appearance: He is well-developed  HENT:      Head: Normocephalic and atraumatic  Right Ear: External ear normal       Left Ear: External ear normal       Nose: No rhinorrhea  Mouth/Throat:      Mouth: Mucous membranes are dry  Eyes:      Conjunctiva/sclera: Conjunctivae normal    Cardiovascular:      Rate and Rhythm: Normal rate and regular rhythm  Heart sounds: No murmur heard  Pulmonary:      Effort: Pulmonary effort is normal  No respiratory distress  Breath sounds: Normal breath sounds  Abdominal:      Palpations: Abdomen is soft  Tenderness: There is no abdominal tenderness  Musculoskeletal:         General: Swelling, tenderness and signs of injury present  Cervical back: Neck supple  Comments: Externally rotated right lower extremity and pain with palpation of the right hip region   Skin:     General: Skin is warm and dry  Capillary Refill: Capillary refill takes less than 2 seconds  Neurological:      General: No focal deficit present  Mental Status: He is alert  Mental status is at baseline     Psychiatric:         Mood and Affect: Mood normal          Behavior: Behavior normal          Additional Data:     Labs:  Results from last 7 days   Lab Units 02/09/23  0506   WBC Thousand/uL 14 48*   HEMOGLOBIN g/dL 8 8*   HEMATOCRIT % 30 1*   PLATELETS Thousands/uL 320   NEUTROS PCT % 89*   LYMPHS PCT % 4*   MONOS PCT % 6   EOS PCT % 0     Results from last 7 days   Lab Units 02/09/23  0506 SODIUM mmol/L 138   POTASSIUM mmol/L 4 5   CHLORIDE mmol/L 96   CO2 mmol/L 27   BUN mg/dL 75*   CREATININE mg/dL 2 99*   ANION GAP mmol/L 15*   CALCIUM mg/dL 9 0   ALBUMIN g/dL 3 9   TOTAL BILIRUBIN mg/dL 0 63   ALK PHOS U/L 90   ALT U/L 4*   AST U/L 13   GLUCOSE RANDOM mg/dL 106     Results from last 7 days   Lab Units 02/08/23  1700   INR  1 19             Results from last 7 days   Lab Units 02/09/23  0506   LACTIC ACID mmol/L 1 1   PROCALCITONIN ng/ml 0 35*       Lines/Drains:  Invasive Devices     Peripheral Intravenous Line  Duration           Peripheral IV 02/08/23 Right Antecubital <1 day                      Imaging: Reviewed radiology reports from this admission including: xray(s)    Recent Cultures (last 7 days):   Results from last 7 days   Lab Units 02/09/23  0523 02/09/23  0510   BLOOD CULTURE  Received in Microbiology Lab  Culture in Progress  Received in Microbiology Lab  Culture in Progress         Last 24 Hours Medication List:   Current Facility-Administered Medications   Medication Dose Route Frequency Provider Last Rate   • acetaminophen  650 mg Oral Q6H PRN [facility] Rattler Sorin, DO     • allopurinol  100 mg Oral Daily [facility] Rattler Lowndes, DO     • gabapentin  100 mg Oral HS Johnita Rattler Sorin, DO     • heparin (porcine)  5,000 Units Subcutaneous [facility]     • HYDROmorphone  0 5 mg Intravenous Q4H PRN [facility] Rattler Lowndes, DO     • labetalol  10 mg Intravenous Q4H PRN Johnita Rattler Lowndes, DO     • metoclopramide  10 mg Intravenous Q6H PRN Arslan Cat PA-C     • metoprolol tartrate  100 mg Oral BID [facility] Rattler Lowndes, DO     • multi-electrolyte  50 mL/hr Intravenous Continuous [facility] Rattler Lowndes, DO 50 mL/hr (02/08/23 1957)   • ondansetron  4 mg Intravenous Q4H PRN Johnita Rattler Sorin, DO     • oxyCODONE  5 mg Oral Q4H PRN [facility], [facility]     • pantoprazole  40 mg Intravenous Q24H [facility] Len Ro PA-C     • pravastatin  40 mg Oral Daily With Jorge Alberto Ferro,           Today, Patient Was Seen By: Kavin Shah MD    **Please Note: This note may have been constructed using a voice recognition system  **

## 2023-02-09 NOTE — CASE MANAGEMENT
Case Management Assessment    Patient name Ezra Mayer  Location [address] 621/110-06 MRN [id number]  : 1935 Date 2023       Current Admission Date: 2023  Current Admission Diagnosis:Fracture of femoral neck, right [facility])   Patient Active Problem List    Diagnosis Date Noted   • Fracture of femoral neck, right ([facility] ) 2023   • Leukocytosis 2023   • Pressure ulcer of right buttock, stage 2 ([place] [address] ) 2022   • Hyperkalemia 2022   • Essential hypertension 10/16/2021   • Dermatitis 10/16/2021   • History of severe aortic stenosis 10/03/2021   • Hx of CABG 10/03/2021   • Chronic low back pain without sciatica 08/10/2021   • 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 kidney disease, or chronic kidney disease ([place] [address] ) 2021   • Transaminitis 2021   • Acute gout 2021   • Effusion of left knee 2021   • Mitral valve stenosis 2021   • Multiple renal cysts 2021   • Right hip pain 2021   • Acute kidney injury superimposed on CKD ([place] [address] ) 2021   • Left wrist pain 2021   • Iron deficiency anemia 10/15/2020   • Need for immunization against influenza 10/15/2020   • Pulmonary emphysema ([place] [address] ) 10/15/2020   • 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 2020   • Presence of permanent cardiac pacemaker 2020   • Oxygen dependent 2020   • Paroxysmal atrial fibrillation ([place] [address] ) 08/10/2020   • Anemia 2020   • Hyperlipidemia, unspecified 2020   • Ambulatory dysfunction 2020   • Complete heart block ([place] [address] ) 2020   • Chronic diastolic CHF (congestive heart failure) ([place] [address] ) 2020   • History of aortic valve replacement with bioprosthetic valve 2019   • History of stroke 2019   • Medicare annual wellness visit, subsequent 2018   • Lumbar degenerative disc disease 2017   • Lumbar radiculopathy 2017 • Obesity (BMI 30-39 9) 09/04/2014   • Mitral regurgitation 06/11/2014   • Mild intermittent asthma without complication 53/11/4382   • CKD (chronic kidney disease) stage 4, GFR 15-29 ml/min ([facility]) 06/10/2014   • GERD (gastroesophageal reflux disease) 06/10/2014   • Dyslipidemia 10/03/2012   • Late effects of cerebrovascular disease 05/30/2012      LOS (days): 1  Geometric Mean LOS (GMLOS) (days): 2 70  Days to GMLOS:1 9     OBJECTIVE:    Risk of Unplanned Readmission Score: 19 23         Current admission status: Inpatient       Preferred Pharmacy:   [facility] [id number]  Phone: [phone] Fax: [phone]    Primary Care Provider: Veronica Billings DO    Primary Insurance: [facility]  Secondary Insurance:     ASSESSMENT:  [facility] Pamela Pierce [facility] Representative - Spouse   Primary Phone: [phone] [facility])  Mobile Phone: [phone]               Advance Directives  Does patient have a [address] [place] [address] Avenue?: No  Was patient offered paperwork?: Yes (declined)  Does patient currently have a Health Care decision maker?: Yes, please see Health Care Proxy section  Does patient have Advance Directives?: No  Was patient offered paperwork?: Yes  Primary Contact: elijah Quiñonez-spouse         Readmission Root Cause  30 Day Readmission: No    Patient Information  Admitted from[de-identified] Home  Mental Status: Alert  During Assessment patient was accompanied by: Not accompanied during assessment  Assessment information provided by[de-identified] Patient  Primary Caregiver: Self  Support Systems: Self, Spouse/significant other, Daughter  [place] of Residence: [address] [facility] do you live in?: [address] entry access options   Select all that apply : Stairs  Number of steps to enter home : 3  Do the steps have railings?: Yes  Type of Current Residence: [address] home  Upon entering residence, is there a bedroom on the main floor (no further steps)?: No (pt states he sleeps in recliner on first floor)  A bedroom is located on the following floor levels of residence (select all that apply):: 2nd Floor  Upon entering residence, is there a bathroom on the main floor (no further steps)?: Yes  Number of steps to 2nd floor from main floor: One Flight (pt does not go upstairs)  In the last 12 months, was there a time when you were not able to pay the mortgage or rent on time?: No  In the last 12 months, how many places have you lived?: 1  In the last 12 months, was there a time when you did not have a steady place to sleep or slept in a shelter (including now)?: No  Homeless/housing insecurity resource given?: N/A  Living Arrangements: Lives w/ Spouse/significant other  Is patient a ?: No    Activities of Daily Living Prior to Admission  Functional Status: Independent  Completes ADLs independently?: Yes  Ambulates independently?: Yes  Does patient use assisted devices?: Yes  Assisted Devices (DME) used: Adeel Cunningham  Does patient currently own DME?: Yes  What DME does the patient currently own?: Adeel Cunningham  Does patient have a history of Outpatient Therapy (PT/OT)?: No  Does the patient have a history of Short-Term Rehab?: No  Does patient have a history of HHC?: No  Does patient currently have [facility]?: No         Patient Information Continued  Income Source: SSI/SSD  Does patient have prescription coverage?: Yes  Within the past 12 months, you worried that your food would run out before you got the money to buy more : Never true  Within the past 12 months, the food you bought just didn't last and you didn't have money to get more : Never true  Food insecurity resource given?: N/A  Does patient receive dialysis treatments?: No  Does patient have a history of substance abuse?: No  Does patient have a history of Mental Health Diagnosis?: Yes  Is patient receiving treatment for mental health?: No  Patient declined treatment information  (patient states he has some anxiety r/t the fall )  Has patient received inpatient treatment related to mental health in the last 2 years?: No         Means of Transportation  Means of Transport to Appts[de-identified] Family transport  In the past 12 months, has lack of transportation kept you from medical appointments or from getting medications?: No  In the past 12 months, has lack of transportation kept you from meetings, work, or from getting things needed for daily living?: No  Was application for public transport provided?: N/A  Cm met with the patient to evaluate the patients prior function and living situation and any barriers to d/c and form a safe d/c plan  Cm also evaluated the patient for any services in the home or needs for services  CM also discussed with patient that their preferences will be taken into account/consideration  Also spoke with daughter Shawn Webster  She states she should be first contact and she relays information to her mother  Pt had fall at home and is planned to go to OR tomorrow for R hemiathroplasty  Cm to follow for any discharge planning

## 2023-02-09 NOTE — ASSESSMENT & PLAN NOTE
· Appears to have a chronic leukocytosis  · Check blood cultures x 2 sets  · Check a urine culture  · Check and follow the procalcitonin level, most recent 0 35  · Follow the CBC  · Outpatient Hematology evaluation

## 2023-02-09 NOTE — PLAN OF CARE
Problem: MOBILITY - ADULT  Goal: Maintain or return to baseline ADL function  Description: INTERVENTIONS:  -  Assess patient's ability to carry out ADLs; (dependent)  - Assess/evaluate cause of self-care deficits (fracture, pain, limited movement)  - Assess range of motion  - Assess patient's mobility; (max assist/dependent)  - Assess patient's need for assistive devices and provide as appropriate  - Encourage maximum independence but intervene and supervise when necessary  - Involve family in performance of ADLs  - Assess for home care needs following discharge   - Consider OT consult to assist with ADL evaluation and planning for discharge  - Provide patient education as appropriate  Outcome: Progressing  Goal: Maintains/Returns to pre admission functional level  Description: INTERVENTIONS:  - Perform BMAT or MOVE assessment daily    - Set and communicate daily mobility goal to care team and patient/family/caregiver  - Collaborate with rehabilitation services on mobility goals if consulted  - Perform Range of Motion 3 times a day  - Reposition patient every 2 hours    - Dangle patient 3 times a day  - Stand patient 3 times a day  - Ambulate patient 3 times a day  - Out of bed to chair 3 times a day   - Out of bed for meals 3 times a day  - Out of bed for toileting  - Record patient progress and toleration of activity level   Outcome: Progressing     Problem: Prexisting or High Potential for Compromised Skin Integrity  Goal: Skin integrity is maintained or improved  Description: INTERVENTIONS:  - Identify patients at risk for skin breakdown  - Assess and monitor skin integrity  - Assess and monitor nutrition and hydration status  - Monitor labs   - Assess for incontinence (as needed)  - Turn and reposition patient (every 2 hours and prn)  - Assist with mobility/ambulation (max assist/dependent)  - Relieve pressure over bony prominences  - Avoid friction and shearing  - Provide appropriate hygiene as needed including keeping skin clean and dry  - Evaluate need for skin moisturizer/barrier cream  - Collaborate with interdisciplinary team   - Patient/family teaching  - Consider wound care consult   Outcome: Progressing     Problem: PAIN - ADULT  Goal: Verbalizes/displays adequate comfort level or baseline comfort level  Description: Interventions:  - Encourage patient to monitor pain and request assistance  - Assess pain using appropriate pain scale (0-10 pain scale)  - Administer analgesics based on type and severity of pain and evaluate response  - Implement non-pharmacological measures as appropriate and evaluate response  - Consider cultural and social influences on pain and pain management  - Notify physician/advanced practitioner if interventions unsuccessful or patient reports new pain  Outcome: Progressing     Problem: INFECTION - ADULT  Goal: Absence or prevention of progression during hospitalization  Description: INTERVENTIONS:  - Assess and monitor for signs and symptoms of infection  - Monitor lab/diagnostic results  - Monitor all insertion sites, i e  indwelling lines, tubes, and drains  - Administer medications as ordered  - Instruct and encourage patient and family to use good hand hygiene technique  Outcome: Progressing     Problem: SAFETY ADULT  Goal: Maintain or return to baseline ADL function  Description: INTERVENTIONS:  -  Assess patient's ability to carry out ADLs; (dependent)  - Assess/evaluate cause of self-care deficits (fracture, pain, limited movement)  - Assess range of motion  - Assess patient's mobility; (max assist/dependent)  - Assess patient's need for assistive devices and provide as appropriate  - Encourage maximum independence but intervene and supervise when necessary  - Involve family in performance of ADLs  - Assess for home care needs following discharge   - Consider OT consult to assist with ADL evaluation and planning for discharge  - Provide patient education as appropriate  Outcome: Progressing  Goal: Maintains/Returns to pre admission functional level  Description: INTERVENTIONS:  - Perform BMAT or MOVE assessment daily    - Set and communicate daily mobility goal to care team and patient/family/caregiver  - Collaborate with rehabilitation services on mobility goals if consulted  - Perform Range of Motion 3 times a day  - Reposition patient every 2 hours    - Dangle patient 3 times a day  - Stand patient 3 times a day  - Ambulate patient 3 times a day  - Out of bed to chair 3 times a day   - Out of bed for meals 3 times a day  - Out of bed for toileting  - Record patient progress and toleration of activity level   Outcome: Progressing  Goal: Patient will remain free of falls  Description: INTERVENTIONS:  -  Assess patient's ability to carry out ADLs; (dependent)  - Assess/evaluate cause of self-care deficits (fracture, pain, limited movement)  - Assess range of motion  - Assess patient's mobility; (max assist/dependent)  - Assess patient's need for assistive devices and provide as appropriate  - Encourage maximum independence but intervene and supervise when necessary  - Involve family in performance of ADLs  - Assess for home care needs following discharge   - Consider OT consult to assist with ADL evaluation and planning for discharge  - Provide patient education as appropriate  Outcome: Progressing     Problem: DISCHARGE PLANNING  Goal: Discharge to home or other facility with appropriate resources  Description: INTERVENTIONS:  - Identify barriers to discharge w/patient and caregiver  - Arrange for needed discharge resources and transportation as appropriate  - Identify discharge learning needs (meds, wound care, etc )  - Refer to Case Management Department for coordinating discharge planning if the patient needs post-hospital services based on physician/advanced practitioner order or complex needs related to functional status, cognitive ability, or social support system  Outcome: Progressing     Problem: Knowledge Deficit  Goal: Patient/family/caregiver demonstrates understanding of disease process, treatment plan, medications, and discharge instructions  Description: Complete learning assessment and assess knowledge base    Interventions:  - Provide teaching at level of understanding  - Provide teaching via preferred learning methods  Outcome: Progressing     Problem: METABOLIC, FLUID AND ELECTROLYTES - ADULT  Goal: Electrolytes maintained within normal limits  Description: INTERVENTIONS:  - Monitor labs and assess patient for signs and symptoms of electrolyte imbalances  - Administer electrolyte replacement as ordered  - Monitor response to electrolyte replacements, including repeat lab results as appropriate  - Instruct patient on fluid and nutrition as appropriate  Outcome: Progressing  Goal: Fluid balance maintained  Description: INTERVENTIONS:  - Monitor labs   - Monitor I/O and WT  - Instruct patient on fluid and nutrition as appropriate  - Assess for signs & symptoms of volume excess or deficit  Outcome: Progressing     Problem: SKIN/TISSUE INTEGRITY - ADULT  Goal: Skin Integrity remains intact(Skin Breakdown Prevention)  Description: Assess:  -Perform Duy assessment every shift  -Clean and moisturize skin every shift  -Inspect skin when repositioning, toileting, and assisting with ADLS  -Assess extremities for adequate circulation and sensation     Bed Management:  -Have minimal linens on bed & keep smooth, unwrinkled  -Change linens as needed when moist or perspiring  -Avoid sitting or lying in one position for more than 2 hours while in bed      Toileting:  -Offer bedside commode  -Assess for incontinence every 1-2 hours and prn      Activity:  -Mobilize patient 3 times a day  -Encourage activity and walks on unit  -Encourage or provide ROM exercises   -Turn and reposition patient every 2 Hours  -Use appropriate equipment to lift or move patient in bed  -Instruct/ Assist with weight shifting every  minutes when out of bed in chair  -Consider limitation of chair time 3-4 hour intervals    Skin Care:  -Avoid use of baby powder, tape, friction and shearing, hot water or constrictive clothing  -Relieve pressure over bony prominences using foam pad  -Do not massage red bony areas    Next Steps:  -Teach patient strategies to minimize risks     -Consider consults to  interdisciplinary teams   Outcome: Progressing  Goal: Incision(s), wounds(s) or drain site(s) healing without S/S of infection  Description: INTERVENTIONS  - Assess and document dressing, incision, wound bed, drain sites and surrounding tissue  - Provide patient and family education    Outcome: Progressing  Goal: Pressure injury heals and does not worsen  Description: Interventions:  - Implement low air loss mattress or specialty surface (Criteria met)  - Apply silicone foam dressing  - Instruct/assist with weight shifting every  minutes when in chair   - Limit chair time to 3-4 hour intervals  - Use special pressure reducing interventions such as waffle cushion when in chair   - Perform passive or active ROM every shift and prn  - Turn and reposition patient & offload bony prominences every 2 hours   - Utilize friction reducing device or surface for transfers   - Consider consults to  interdisciplinary teams   - Use incontinent care products after each incontinent episode   - Consider nutrition services referral as needed  Outcome: Progressing     Problem: MUSCULOSKELETAL - ADULT  Goal: Maintain or return mobility to safest level of function  Description: INTERVENTIONS:  - Assess patient's ability to carry out ADLs; (dependent)  - Assess/evaluate cause of self-care deficits (fracture, pain, limited movement)  - Assess range of motion  - Assess patient's mobility (max assist/dependent)  - Assess patient's need for assistive devices and provide as appropriate  - Encourage maximum independence but intervene and supervise when necessary  - Involve family in performance of ADLs  - Assess for home care needs following discharge   - Consider OT consult to assist with ADL evaluation and planning for discharge  - Provide patient education as appropriate  Outcome: Progressing  Goal: Maintain proper alignment of affected body part  Description: INTERVENTIONS:  - Support, maintain and protect limb and body alignment (RLE)  - Provide patient/ family with appropriate education  Outcome: Progressing

## 2023-02-09 NOTE — H&P
[address] [id number] [place]  H&P- Marylen Carter 1935, 80 y o  male MRN: [id number]  Unit/Bed#: 486-41 Encounter: [id number]  Primary Care Provider: Maday Alexander DO   Date and time admitted to hospital: 2/8/2023  4:30 PM    * Fracture of femoral neck, right [facility])  Assessment & Plan  · Admit to med/surg level of care  · Consult Orthopedic Surgery  · Hold PO eliquis  · The patient is medically cleared from my standpoint for an ORIF of the right hip    · Consult Cardiology for a pre-operative cardiac risk stratification  · Pain control  · Incentive spirometry  · DVT prophylaxis with heparin 5000 Units SQ every 8 hours and SCD's on both lower extremities    Leukocytosis  Assessment & Plan  · Appears to have a chronic leukocytosis  · Check blood cultures x 2 sets  · Check a urine culture  · Check and follow the procalcitonin level  · Follow the CBC  · Outpatient Hematology evaluation    Multiple renal cysts  Assessment & Plan  · Outpatient surveillance imaging with PCP    Paroxysmal atrial fibrillation (HCC)  Assessment & Plan  · Hold eliquis for the upcoming surgical procedure  · Continue metoprolol tartrate 100 mg PO every 12 hours for heart rate control    Chronic diastolic CHF (congestive heart failure) (HCC)  Assessment & Plan  Wt Readings from Last 3 Encounters:   02/08/23 84 4 kg (186 lb 1 1 oz)   12/06/22 86 2 kg (190 lb)   12/02/22 85 kg (187 lb 6 4 oz)       · Currently appears hypovolemic  · Daily weights  · Strict intake/output measurements  · Check a transthoracic echocardiogram  · Consult Cardiology for a pre-operative cardiac risk stratification      CKD (chronic kidney disease) stage 4, GFR 15-29 ml/min [facility])  Assessment & Plan  Lab Results   Component Value Date    EGFR 16 02/08/2023    EGFR 14 12/02/2022    EGFR 27 05/31/2022    CREATININE 3 13 (H) 02/08/2023    CREATININE 3 53 (H) 12/02/2022    CREATININE 2 14 (H) 05/31/2022     · Baseline serum creatinine appears to be around 2 0-2 6 mg/dl  · Likely a component of prerenal azotemia  · Utilize Isolyte IV fluids at 50 ml/hr  · Avoid all nephrotoxic agents  · Check a urine sodium level  · Check a urine protein/creatinine level  · Check the patient post-void residual amounts every shift with bladder scanning, and follow the urinary retention protocol  · Consult Nephrology  · Serial laboratory testing to monitor the patient's renal function and electrolyte levels      VTE Pharmacologic Prophylaxis: VTE Score: 12 High Risk:  Heparin 5000 Units SQ every 8 hours and SCD's on both lower extremities  Code Status: Level 1 - Full Code    Anticipated Length of Stay: Patient will be admitted on an inpatient basis with an anticipated length of stay of greater than 2 midnights secondary to the need for a surgical procedure to correct the right hip facture  Total Time for Visit, including Counseling / Coordination of Care: 75 minutes Greater than 50% of this total time spent on direct patient counseling and coordination of care  Chief Complaint:  Right hip pain    History of Present Illness:  Valentin Whipple is a 80 y o  male who presented to the emergency department after experiencing a mechanical fall at home  The patient was ambulating in his home, and he tripped over his pant leg  The patient landed on his right lower extremity  Since the fall, the patient has been experiencing severe right hip pain, which is constant in nature  Nothing seemed to relieve the pain  In addition, the patient is unable to ambulate due to the severe right hip pain  No chest pain  No shortness of breath  No abdominal pain  Review of Systems:  Review of Systems:  Per HPI, all other systems have been reviewed and were negative  Past Medical and Surgical History:   Past Medical History:   Diagnosis Date   • Aortic stenosis     ECHO -4-14-14   MODERATE TO SEVERE AORTIC STENOSIS; MILD AROTIC INSUFFICIENCY - LAST ASSESSED 10/26/16; RESOLVED 6/8/16   • Aortic valve disorder     LAST ASSESSED 10/8/15; 10/8/15   • Arthritis    • CHF (congestive heart failure) ([facility] [address] )    • Complete heart block ([facility] [address] ) 7/20/2020   • Coronary artery disease    • Hypertension    • Hypertensive urgency 5/19/2019   • Renal disorder    • TIA (transient ischemic attack)    • Transient cerebral ischemia        Past Surgical History:   Procedure Laterality Date   • AORTIC VALVE REPLACEMENT  06/30/2015    avr with 23 mm [place] Magna Ease bioprosthetic   • CARDIAC PACEMAKER PLACEMENT Left 07/24/2020   • CATARACT EXTRACTION Right 06/05/2000   • COLONOSCOPY     • CORONARY ARTERY BYPASS GRAFT  06/05/2000    x1 with argueta  to lad   • EYE SURGERY     • POLYPECTOMY  04/2014    enteroscopic - esophageal ulcer, gastric erosion, and duodenal ulcer  • TONSILLECTOMY      unknown       Meds/Allergies:  Prior to Admission medications    Medication Sig Start Date End Date Taking?  Authorizing Provider   allopurinol (ZYLOPRIM) 100 mg tablet Take 1 tablet (100 mg total) by mouth daily 9/28/21  Yes Mushtaq Amato PA-C   apixaban (ELIQUIS) 2 5 mg Take 1 tablet (2 5 mg total) by mouth 2 (two) times a day 8/11/22 11/9/22  Jessa Davis DO   aspirin (ECOTRIN LOW STRENGTH) 81 mg EC tablet Take 1 tablet (81 mg total) by mouth daily 8/6/20  Yes Whit Nicholson MD   gabapentin (NEURONTIN) 100 mg capsule Take 1 capsule (100 mg total) by mouth daily at bedtime 1/5/23  Yes Jessa Davis DO   metoprolol tartrate (LOPRESSOR) 100 mg tablet Take 1 tablet (100 mg total) by mouth 2 (two) times a day 1/30/23  Yes Jessa Davis DO   pantoprazole (PROTONIX) 40 mg tablet Take 1 tablet (40 mg total) by mouth 2 (two) times a day before meals 8/11/22 11/9/22  Jessa Davis DO   pravastatin (PRAVACHOL) 40 mg tablet Take 1 tablet (40 mg total) by mouth daily with dinner 5/27/22  Yes Jessa Davis DO   senna (SENOKOT) 8 6 mg Take 1 tablet (8 6 mg total) by mouth daily at bedtime 5/27/22  Yes Jorge Reid Monica Salcedo,    silver sulfadiazine (Silvadene) 1 % cream Apply topically 2 (two) times a day 12/19/22  Yes Adelina Silver DO   torsemide (DEMADEX) 20 mg tablet Take 1 tablet (20 mg total) by mouth daily 1/5/23  Yes Adelina Silver DO   desoximetasone (TOPICORT) 0 05 % cream Apply topically 2 (two) times a day  Patient not taking: Reported on 7/15/2022 5/27/22   Adelina Silver DO   ondansetron [facility] 4 mg tablet Take 1 tablet (4 mg total) by mouth every 8 (eight) hours as needed for nausea or vomiting for up to 4 days 11/21/22 11/25/22  Adelina Silver DO     I have reviewed home medications using recent Epic encounter  Allergies:    Allergies   Allergen Reactions   • Promethazine Delirium and Other (See Comments)       Social History:  Marital Status: /Civil Union     Substance Use History:   Social History     Substance and Sexual Activity   Alcohol Use Never   • Alcohol/week: 0 0 standard drinks    Comment: very occasional     Social History     Tobacco Use   Smoking Status Never   Smokeless Tobacco Never     Social History     Substance and Sexual Activity   Drug Use Never       Family History:  Non-contributory    Physical Exam:     Vitals:   Blood Pressure: 164/87 (02/08/23 1925)  Pulse: 67 (02/08/23 1925)  Temperature: 97 8 °F (36 6 °C) (02/08/23 1925)  Temp Source: Temporal (02/08/23 1900)  Respirations: 14 (02/08/23 1925)  Height: 5' 4" (162 6 cm) (02/08/23 1925)  Weight - Scale: 84 4 kg (186 lb 1 1 oz) (02/08/23 1925)  SpO2: 100 % (02/08/23 1925)    Physical Exam   General:  NAD, follows commands  HEENT:  NC/AT, mucous membranes moist  Neck:  Supple, No JVP elevation  CV:  + S1, + S2, RRR  Pulm:  Lung fields are CTA bilaterally  Abd:  Soft, Non-tender, Non-distended  Ext:  No clubbing/cyanosis/edema, Externally rotated right lower extremity and pain with palpation of the right hip region  Skin:  No rashes  Neuro:  Awake, alert, oriented  Psych:  Normal mood and affect      Additional Data:     Lab Results:  Results from last 7 days   Lab Units 02/08/23  1700   WBC Thousand/uL 14 05*   HEMOGLOBIN g/dL 10 3*   HEMATOCRIT % 34 6*   PLATELETS Thousands/uL 401*   NEUTROS PCT % 78*   LYMPHS PCT % 11*   MONOS PCT % 7   EOS PCT % 3     Results from last 7 days   Lab Units 02/08/23  1700   SODIUM mmol/L 137   POTASSIUM mmol/L 4 4   CHLORIDE mmol/L 93*   CO2 mmol/L 30   BUN mg/dL 81*   CREATININE mg/dL 3 13*   ANION GAP mmol/L 14*   CALCIUM mg/dL 9 5   ALBUMIN g/dL 4 5   TOTAL BILIRUBIN mg/dL 0 47   ALK PHOS U/L 104   ALT U/L 7   AST U/L 14   GLUCOSE RANDOM mg/dL 119     Results from last 7 days   Lab Units 02/08/23  1700   INR  1 19                   Lines/Drains:  Invasive Devices     Peripheral Intravenous Line  Duration           Peripheral IV 07/30/21 Dorsal (posterior); Left Hand 558 days    Peripheral IV 02/08/23 Right Antecubital <1 day          Drain  Duration           External Urinary Catheter Medium 560 days                    Imaging: Reviewed radiology reports from this admission including: chest CT scan, abdominal/pelvic CT, CT head and xray(s)  XR femur 2 views RIGHT   Final Result by Angela Workman MD (02/08 1818)      Acute impacted fracture of right femoral neck extending into right femoral head  The study was marked in [facility] for immediate notification  Workstation performed: [id number]         XR hip/pelv 2-3 vws right if performed   Final Result by Angela Workman MD (02/08 1818)      Acute impacted fracture of right femoral neck extending into right femoral head  The study was marked in [facility] for immediate notification  Workstation performed: [id number]         TRAUMA - CT head wo contrast   Final Result by Harleen Mcallister DO (02/08 1736)      No acute intracranial abnormality                    Workstation performed: [id number]         TRAUMA - CT spine cervical wo contrast   Final Result by Harleen Mcallister DO (02/08 1744) No cervical spine fracture or traumatic malalignment  Severe multilevel cervical spondylosis  Workstation performed: [id number]         TRAUMA - CT chest abdomen pelvis w contrast   Final Result by Carlos Rachel MD (02/08 1813)      Acute impacted fracture of right proximal femoral neck extending to right femoral head  No acute visceral organ or vascular injury of the chest, abdomen, or pelvis  Additional chronic/incidental findings as detailed above  The study was marked in [facility] for immediate notification  Workstation performed: [id number]         CT recon only lumbar spine   Final Result by Carlos Rachel MD (02/08 1822)      No acute osseous abnormality of lumbar spine  Degenerative changes as detailed above  Please see same-day CT chest abdomen pelvis for further evaluation  The study was marked in [facility] for immediate notification  Workstation performed: [id number]         XR Trauma pelvis ap only 1 or 2 vw   ED Interpretation by Octaviano Turner DO (02/08 1709)   Abnormal   R femoral neck fracture      Final Result by Carlos Rachel MD (02/08 1816)      Acute impacted right femoral neck fracture  The study was marked in [facility] for immediate notification  Workstation performed: [id number]         XR Trauma chest portable   Final Result by Carlos Rachel MD (02/08 1815)      No acute cardiopulmonary disease  Workstation performed: [id number]         US kidney and bladder    (Results Pending)       EKG and Other Studies Reviewed on Admission:   · EKG:  Study Result    Narrative & Impression   Junctional rhythm  Anteroseptal infarct , age undetermined  Abnormal ECG  When compared with ECG of 24-AUG-2020 12:53,  Junctional rhythm has replaced Electronic ventricular pacemaker         ** Please Note: This note has been constructed using a voice recognition system   **

## 2023-02-09 NOTE — CONSULTS
Sarah Quiñonez 80 y o  male MRN: [id number]  Unit/Bed#: 078-22 Encounter: [id number]        Assessment and Plan:    1  Acute kidney injury (POA) atop chronic kidney disease  · Treated for volume overloaded state in December with concomitant YOLANDE unresolved upon admission  Examines euvolemic to dry  Taking torsemide up until day of admission  Urine chemistries indicate obstructive etiology/indeterminant  No obstruction on ultrasound however is at risk given femur fracture  Also, exposed to iodinated contrast 2/8 in presumed volume depleted state and creatinine may worsen before improving in this setting  · Continue to hold torsemide and avoid other potential nephrotoxins particularly NSAIDs and contrast  · Agree with gentle balanced salt solution  · See preop risk strat below  · Maintain MAP > 65 mmHg  · Check PVR and follow retention protocol  Suspect he will get Alvares with surgery  · Serial BMP  2  Stage 4 chronic kidney disease with baseline creatinine around 2 2 mg/dL  · Etiology likely hypertensive nephrosclerosis, underlying CRS, age-related nephron loss  Follows with Dr Leo Kruse  3  At risk for contrast induced nephropathy  4  Right femur fracture   · Tentative surgical intervention tomorrow, 2/10  May proceed as moderate risk  Please avoid NSAIDs and additional iodinated contrast  Maintain MAP > 65 mmHg, avoid hemodynamic perturbation  Discussed risk of surgery in regards to risk of worsening kidney function and risk of dialysis with patient- he understood  All questions and concerns addressed  5  Azotemia  · No uremic s/s  Monitor downtrending hgb closely as may represent insidious bleeding  On PPI   6  Suspected right complex renal cyst  · Will need serial monitoring as outpatient  7  Chronic HFpEF  · Diuretic on hold and receiving gentle volume expansion  Examines compensated  Continue daily weight, strict IO  8   Hypertensive nephrosclerosis, likely  · Avoid over controlling blood pressure, maintain map > 65 mmHg        HPI:    Migdalia Morgan is a 80 y o  male with CKD 4, PAF on Eliquis, HFpEF, CAD s/p CABG, severe A/S s/p AVR, PPM present, hypertension who presented to [facility] ER 2/8 as trauma alert s/p fall found to have right femur fracture, YOLANDE  Tentative for surgical intervention with orthopedic colleagues tomorrow  Nephrology consulted for YOLANDE on CKD  Upon discussion with the patient he relays HPI as above in addition: states he was actually swollen last time evaluated by nephrologist  Denies NSAID use prior to presentation  Was taking all other medications as prescribed  Denies recent nausea, vomiting, diarrhea, dysuria, urgency, dizziness, chest pain  Does admit to urinating a lot from diuretic  From a nephrology perspective follows with Dr Jasper Borjas for CKD 4  Noted to have YOLANDE in December of 2022 attributed to volume overloaded state in setting of missed torsemide  Reason for Consult: YOLANDE on CKD    Review of Systems:  A complete 10-point review of systems was performed  Aside from what was mentioned in the HPI, it is otherwise negative  Historical Information   Past Medical History:   Diagnosis Date   • Aortic stenosis     ECHO -4-14-14   MODERATE TO SEVERE AORTIC STENOSIS; MILD AROTIC INSUFFICIENCY - LAST ASSESSED 10/26/16; RESOLVED 6/8/16   • Aortic valve disorder     LAST ASSESSED 10/8/15; 10/8/15   • Arthritis    • CHF (congestive heart failure) ([facility] [address] )    • Complete heart block ([facility] [address] ) 7/20/2020   • Coronary artery disease    • Hypertension    • Hypertensive urgency 5/19/2019   • Renal disorder    • TIA (transient ischemic attack)    • Transient cerebral ischemia      Past Surgical History:   Procedure Laterality Date   • AORTIC VALVE REPLACEMENT  06/30/2015    avr with 23 mm [place] Magna Ease bioprosthetic   • CARDIAC PACEMAKER PLACEMENT Left 07/24/2020   • CATARACT EXTRACTION Right 06/05/2000   • COLONOSCOPY     • CORONARY ARTERY BYPASS GRAFT  06/05/2000 x1 with lima  to lad   • EYE SURGERY     • POLYPECTOMY  04/2014    enteroscopic - esophageal ulcer, gastric erosion, and duodenal ulcer      • TONSILLECTOMY      unknown     Social History   Social History     Substance and Sexual Activity   Alcohol Use Never   • Alcohol/week: 0 0 standard drinks    Comment: very occasional     Social History     Substance and Sexual Activity   Drug Use Never     Social History     Tobacco Use   Smoking Status Never   Smokeless Tobacco Never       Family History:   Family History   Problem Relation Age of Onset   • No Known Problems Mother    • No Known Problems Father    • Coronary artery disease Neg Hx        Medications:  Pertinent medications were reviewed  Current Facility-Administered Medications   Medication Dose Route Frequency Provider Last Rate   • acetaminophen  650 mg Oral Q6H PRN Jennifer Rowell DO     • allopurinol  100 mg Oral Daily Jennifer Rowell DO     • gabapentin  100 mg Oral HS Jennifer Rowell DO     • heparin (porcine)  5,000 Units Subcutaneous [facility] Jennifer Rowell, DO     • HYDROmorphone  0 5 mg Intravenous Q4H PRN Jennifer Rowell DO     • labetalol  10 mg Intravenous Q4H PRN Jennifer Rowell DO     • metoclopramide  10 mg Intravenous Q6H PRN Tisha Ferguson PA-C     • metoprolol tartrate  100 mg Oral BID Jennifer Rowell DO     • multi-electrolyte  50 mL/hr Intravenous Continuous Jennifer Rowell DO 50 mL/hr (02/08/23 1957)   • ondansetron  4 mg Intravenous Q4H PRN Jennifer Rowell DO     • oxyCODONE  5 mg Oral Q4H PRN Jennifer Rowell DO     • pantoprazole  40 mg Intravenous Q24H [facility] Tisha Ferguson PA-C     • pravastatin  40 mg Oral Daily With Coden Bobbi Rowell DO           Allergies   Allergen Reactions   • Promethazine Delirium and Other (See Comments)         Vitals:   /54 (BP Location: Left arm)   Pulse 60   Temp 97 6 °F (36 4 °C) (Tympanic)   Resp 20   Ht 5' 4" (1 626 m) Wt 84 4 kg (186 lb)   SpO2 100%   BMI 31 93 kg/m²   Body mass index is 31 93 kg/m²  SpO2: 100 %,   SpO2 Activity: At Rest,   O2 Device: Nasal cannula      Intake/Output Summary (Last 24 hours) at 2/9/2023 1026  Last data filed at 2/9/2023 8031  Gross per 24 hour   Intake 0 ml   Output 1111 ml   Net -1111 ml     Invasive Devices     Peripheral Intravenous Line  Duration           Peripheral IV 02/08/23 Right Antecubital <1 day                Physical Exam:  General: conscious, cooperative, in no acute distress  Eyes: conjunctivae pink, anicteric sclerae  ENT: lips and mucous membranes moist  Neck: supple, no JVD, no masses  Chest: diminished breath sounds bilateral, no crackles, ronchus or wheezings on 2 liters nasal cannula  CVS: S1 & S2, normal rate, regular rhythm  Abdomen: soft, non-tender, non-distended, normoactive bowel sounds  Extremities: mild right LE edema in setting of racture  Skin: no rash  Neuro: awake, alert, oriented      Diagnostic Data:  Lab: I have personally reviewed pertinent lab results  ,   CBC:  Results from last 7 days   Lab Units 02/09/23  0506   WBC Thousand/uL 14 48*   HEMOGLOBIN g/dL 8 8*   HEMATOCRIT % 30 1*   PLATELETS Thousands/uL 320      CMP:   Lab Results   Component Value Date    SODIUM 138 02/09/2023    K 4 5 02/09/2023    CL 96 02/09/2023    CO2 27 02/09/2023    BUN 75 (H) 02/09/2023    CREATININE 2 99 (H) 02/09/2023    CALCIUM 9 0 02/09/2023    AST 13 02/09/2023    ALT 4 (L) 02/09/2023    ALKPHOS 90 02/09/2023    EGFR 17 02/09/2023   ,   PT/INR:   Lab Results   Component Value Date    INR 1 19 02/08/2023   ,   Magnesium: No components found for: MAG,  Phosphorous:   Lab Results   Component Value Date    PHOS 5 2 (H) 02/09/2023       Microbiology:  @[email or web address],(urinecx:7)@        DIANDRA Henning    Portions of the record may have been created with voice recognition software   Occasional wrong word or "sound a like" substitutions may have occurred due to the inherent limitations of voice recognition software  Read the chart carefully and recognize, using context, where substitutions have occurred

## 2023-02-09 NOTE — ASSESSMENT & PLAN NOTE
Wt Readings from Last 3 Encounters:   02/08/23 84 4 kg (186 lb 1 1 oz)   12/06/22 86 2 kg (190 lb)   12/02/22 85 kg (187 lb 6 4 oz)       · Currently appears hypovolemic  · Daily weights  · Strict intake/output measurements  · Check a transthoracic echocardiogram  · Consult Cardiology for a pre-operative cardiac risk stratification

## 2023-02-09 NOTE — CONSULTS
Consultation - Cardiology Team One  [facility] 80 y o  male MRN: [id number]  Unit/Bed#: 680-67 Encounter: [id number]    Inpatient consult to Cardiology  Consult performed by: DIANDRA Lawson  Consult ordered by: Caitie Montes DO          Physician Requesting Consult: Caitie Montes DO  Reason for Consult / Principal Problem: pre op risk evaluation    Assessment:    Pre-operative cardiac risk evaluation  Patient with right femoral neck fracture; plan for ORIF of right hip per documentation from [facility] requests pre op cardiac risk evaluation  He has not had any recent ischemic evaluation; he is not active and is mainly sedentary staying in his house most of the day, does not do steps; he has no complaints of CP or anginal equivalent while doing ADL's  His EKG on admit is of poor quality but R-R spacing appears to be regular so doubt Afib; likely V-paced rhythm   BP stable--rhythm regular on exam  Repeat echo is pending but WOULD NOT hold up OR waiting for results, echo results are unlikely to change recommendations  Patient is at least intermediate but not prohibitive risk for ORIF procedure based on age alone; recommend continue beta blockers perioperatively (recommend continuous cardiac monitoring); OK to hold Eliquis but would restart as soon as deemed safe postop    Right femoral neck fracture  S/p mechanical fall at home  Orthopedic surgery consulted  Probable plan for ORIF of right hip per [facility] documentation    CAD s/p CABG  Known hx of CAD s/p CABG with LIMA-LAD in 2015  He has no complaints of CP or anginal equivalent   Continue GDMT: aspirin 81 mg QD, metoprolol tartrate 100 mg BID and pravastatin 40 mg QD    Severe AS s/p AVR  Hx of AVR with 23 mm [facility] Ease bioprosthetic valve in 2015  Last echo from September 2021: EF 60%; no WMA; moderate MS/mild MR; aortic valve-bioprosthetic valve is present with peak gradient 15 mmHg    Mitral valve disease  Echo as above    Chronic HFpEF  He appears euvolemic if not dry on exam  Last echo as above; repeat echo pending  He is on torsemide 20 mg QD as outpatient, on hold at this time--restart by discharge    PAF  His EKG on admit is of poor quality but R-R spacing appears to be regular so doubt Afib; likely V-paced rhythm   Rhythm is regular on exam  Continue metoprolol tartrate 100 mg BID perioperatively   He is maintained on Eliquis 2 5 mg BID (reduced dose in setting of age/renal function); Ok to hold Eliquis pre-op but would restart post op as soon as deemed safe by surgical team    PPM  [employer] DC PPM placed in July 2020 for CHB  Last device check from November 2022: AP <0 1% ,  99 8%; 1 AT/AF episode since 8/14/2022    HLD  Lipid profile from December 2022  Triglycerides 202; HDL 22; LDL 36  Continue pravastatin 40 mg QD    HTN  Hypertensive on admit, likely in the setting of pain; /87  BP this AM is 126/56    CKD  Baseline creat ranging from 2 0-2 6 in the past  Creat on admit 3 1    Plan/Recommendations:  Repeat echo is pending but WOULD NOT hold up OR waiting for results, echo results are unlikely to change recommendations  Patient is at least intermediate but not prohibitive risk for ORIF procedure based on age alone; recommend continue beta blockers perioperatively (recommend continuous cardiac monitoring); OK to hold Eliquis but would restart as soon as deemed safe postop      _______________________________________________________________________      History of Present Illness   HPI: Nannette Mcgarry is a 80y o  year old male with PMH of coronary artery disease s/p CABG, severe aortic stenosis s/p aortic valve replacement, complete heart block s/p pacemaker placement in July 2020, paroxysmal atrial fibrillation on anticoagulation with Eliquis - complicated by GI bleeding in the past, chronic diastolic congestive heart failure, hypertension, dyslipidemia, and CKD Stage IV   He follows with cardiologist Dr Sophie Patterson and was last seen by Андрей Schaeffer on July 15, 2022  Patient comes to the OR yesterday afternoon after mechanical fall at home with injury to right hip and right hip pain leading to ambulate  Upon arrival x-ray notes right femoral neck fracture and likely plan for ORIF near future  Cardiology is consulted for preoperative risk assessment  Upon physical exam the patient is resting comfortably in bed flat in no apparent distress  He does however state that he has right leg pain and his right leg is visibly only rotated  Patient has no recent neck work-up CABG  Upon attempting to determine the patient's functional capacity he states that he is very sedentary mostly sitting around the house all day  He does not go up steps and typically does not walk very far  However, upon his ADLs he states that he has no complaints of chest discomfort or anginal equivalent  EKG reviewed personally: His EKG on admit is of poor quality but R-R spacing appears to be regular so doubt Afib; likely V-paced rhythm         Telemetry reviewed personally: not on tele        Review of Systems   Constitutional: Negative for chills, fever and malaise/fatigue  HENT: Negative for congestion  Cardiovascular: Negative for chest pain, dyspnea on exertion, leg swelling, orthopnea and palpitations  Respiratory: Negative for cough, shortness of breath (no SOB at rest) and wheezing  Endocrine: Negative  Hematologic/Lymphatic: Negative  Skin: Negative  Musculoskeletal: Negative  Right leg/hip pain     Gastrointestinal: Negative for bloating, abdominal pain, nausea and vomiting  Genitourinary: Negative  Neurological: Negative for dizziness and light-headedness  Psychiatric/Behavioral: Negative  All other systems reviewed and are negative  Historical Information   Past Medical History:   Diagnosis Date   • Aortic stenosis     ECHO -4-14-14   MODERATE TO SEVERE AORTIC STENOSIS; MILD AROTIC INSUFFICIENCY - LAST ASSESSED 10/26/16; RESOLVED 6/8/16   • Aortic valve disorder     LAST ASSESSED 10/8/15; 10/8/15   • Arthritis    • CHF (congestive heart failure) ([facility] [address] )    • Complete heart block ([facility] [address] ) 7/20/2020   • Coronary artery disease    • Hypertension    • Hypertensive urgency 5/19/2019   • Renal disorder    • TIA (transient ischemic attack)    • Transient cerebral ischemia      Past Surgical History:   Procedure Laterality Date   • AORTIC VALVE REPLACEMENT  06/30/2015    avr with 23 mm [place] Magna Ease bioprosthetic   • CARDIAC PACEMAKER PLACEMENT Left 07/24/2020   • CATARACT EXTRACTION Right 06/05/2000   • COLONOSCOPY     • CORONARY ARTERY BYPASS GRAFT  06/05/2000    x1 with argueta  to lad   • EYE SURGERY     • POLYPECTOMY  04/2014    enteroscopic - esophageal ulcer, gastric erosion, and duodenal ulcer      • TONSILLECTOMY      unknown     Social History     Substance and Sexual Activity   Alcohol Use Never   • Alcohol/week: 0 0 standard drinks    Comment: very occasional     Social History     Substance and Sexual Activity   Drug Use Never     Social History     Tobacco Use   Smoking Status Never   Smokeless Tobacco Never     Family History:   Family History   Problem Relation Age of Onset   • No Known Problems Mother    • No Known Problems Father    • Coronary artery disease Neg Hx        Meds/Allergies   current meds:   Current Facility-Administered Medications   Medication Dose Route Frequency   • acetaminophen (TYLENOL) tablet 650 mg  650 mg Oral Q6H PRN   • allopurinol (ZYLOPRIM) tablet 100 mg  100 mg Oral Daily   • gabapentin (NEURONTIN) capsule 100 mg  100 mg Oral HS   • heparin (porcine) subcutaneous injection 5,000 Units  5,000 Units Subcutaneous Q8H [address]   • HYDROmorphone (DILAUDID) injection 0 5 mg  0 5 mg Intravenous Q4H PRN   • labetalol (NORMODYNE) injection 10 mg  10 mg Intravenous Q4H PRN   • metoclopramide (REGLAN) injection 10 mg  10 mg Intravenous Q6H PRN   • metoprolol tartrate (LOPRESSOR) tablet 100 mg 100 mg Oral BID   • multi-electrolyte (PLASMALYTE-A/ISOLYTE-S PH 7 4) IV solution  50 mL/hr Intravenous Continuous   • ondansetron (ZOFRAN) injection 4 mg  4 mg Intravenous Q4H PRN   • oxyCODONE (ROXICODONE) IR tablet 5 mg  5 mg Oral Q4H PRN   • pantoprazole (PROTONIX) injection 40 mg  40 mg Intravenous Q24H LAKISHA   • pravastatin (PRAVACHOL) tablet 40 mg  40 mg Oral Daily With [employer], 50 mL/hr, Last Rate: 50 mL/hr (02/08/23 1957)        Allergies   Allergen Reactions   • Promethazine Delirium and Other (See Comments)       Objective   Vitals: Blood pressure 126/56, pulse 60, temperature 98 7 °F (37 1 °C), temperature source Oral, resp  rate 17, height 5' 4" (1 626 m), weight 82 7 kg (182 lb 5 1 oz), SpO2 99 %  ,     Body mass index is 31 3 kg/m²  ,     Systolic (60YPB), WJP:973 , Min:126 , ADH:484     Diastolic (53XKZ), YVJ:35, Min:56, Max:104    Wt Readings from Last 3 Encounters:   02/09/23 82 7 kg (182 lb 5 1 oz)   12/06/22 86 2 kg (190 lb)   12/02/22 85 kg (187 lb 6 4 oz)      Lab Results   Component Value Date    CREATININE 2 99 (H) 02/09/2023    CREATININE 3 13 (H) 02/08/2023    CREATININE 3 53 (H) 12/02/2022             Intake/Output Summary (Last 24 hours) at 2/9/2023 0750  Last data filed at 2/9/2023 0321  Gross per 24 hour   Intake --   Output 1110 ml   Net -1110 ml     Weight (last 2 days)     Date/Time Weight    02/09/23 0600 82 7 (182 32)    02/08/23 19:25:55 84 4 (186 07)    02/08/23 1638 80 9 (178 35)        Invasive Devices     Peripheral Intravenous Line  Duration           Peripheral IV 02/08/23 Right Antecubital <1 day                  Physical Exam  Vitals reviewed  Constitutional:       General: He is not in acute distress  HENT:      Head: Normocephalic and atraumatic  Mouth/Throat:      Mouth: Mucous membranes are moist    Cardiovascular:      Rate and Rhythm: Normal rate and regular rhythm        Heart sounds: Normal heart sounds, S1 normal and S2 normal  No murmur heard   Pulmonary:      Effort: Pulmonary effort is normal  No respiratory distress  Breath sounds: Normal breath sounds  Abdominal:      General: Bowel sounds are normal  There is no distension  Palpations: Abdomen is soft  Musculoskeletal:         General: Normal range of motion  Cervical back: Normal range of motion and neck supple  Right lower leg: No edema  Left lower leg: No edema  Comments: External rotation of right leg   Skin:     General: Skin is warm and dry  Neurological:      Mental Status: He is alert and oriented to person, place, and time     Psychiatric:         Mood and Affect: Mood normal            LABORATORY RESULTS:  Results from last 7 days   Lab Units 02/09/23  0506 02/08/23  1702   CK TOTAL U/L 99 113     CBC with diff:   Results from last 7 days   Lab Units 02/09/23  0506 02/09/23  0154 02/08/23  1700   WBC Thousand/uL 14 48*  --  14 05*   HEMOGLOBIN g/dL 8 8* 9 6* 10 3*   HEMATOCRIT % 30 1* 32 2* 34 6*   MCV fL 77*  --  77*   PLATELETS Thousands/uL 320  --  401*   MCH pg 22 4*  --  22 9*   MCHC g/dL 29 2*  --  29 8*   RDW % 18 4*  --  18 6*   MPV fL 10 2  --  10 2   NRBC AUTO /100 WBCs 0  --  0       CMP:  Results from last 7 days   Lab Units 02/09/23  0506 02/08/23  1700   POTASSIUM mmol/L 4 5 4 4   CHLORIDE mmol/L 96 93*   CO2 mmol/L 27 30   BUN mg/dL 75* 81*   CREATININE mg/dL 2 99* 3 13*   CALCIUM mg/dL 9 0 9 5   AST U/L 13 14   ALT U/L 4* 7   ALK PHOS U/L 90 104   EGFR ml/min/1 73sq m 17 16       BMP:  Results from last 7 days   Lab Units 02/09/23  0506 02/08/23  1700   POTASSIUM mmol/L 4 5 4 4   CHLORIDE mmol/L 96 93*   CO2 mmol/L 27 30   BUN mg/dL 75* 81*   CREATININE mg/dL 2 99* 3 13*   CALCIUM mg/dL 9 0 9 5          Lab Results   Component Value Date    NTBNP 3,481 (H) 08/24/2020    NTBNP 8,341 (H) 07/29/2020    NTBNP 8,770 (H) 07/20/2020            Results from last 7 days   Lab Units 02/09/23  0506   MAGNESIUM mg/dL 1 5* Results from last 7 days   Lab Units 23  1700   INR  1 19     Lipid Profile:   Lab Results   Component Value Date    CHOL 139 10/01/2015    CHOL 147 06/15/2015    CHOL 151 2015     Lab Results   Component Value Date    HDL 22 (L) 2022    HDL 36 (L) 2021    HDL 32 (L) 2019     Lab Results   Component Value Date    LDLCALC 36 2022    LDLCALC 72 2021    LDLCALC 103 (H) 2019     Lab Results   Component Value Date    TRIG 202 (H) 2022    TRIG 70 2021    TRIG 162 (H) 2019         Cardiac testing:   Results for orders placed during the hospital encounter of 19    Echo complete with contrast if indicated    Narrative  [address] [facility] [address]  [phone]    Transthoracic Echocardiogram  2D, M-mode, Doppler, and Color Doppler    Study date:  20-May-2019    Patient: Skyla Kamara  MR number: [id number]  Account number: [de-identified]  : [id number]  Age: 80 years  Gender: Male  Status: Inpatient  Location: Bedside  Height: 71 in  Weight: 214 1 lb  BP: 198/ 88 mmHg    Indications: Vertigo, vomiting    Diagnoses: 780 4 - DIZZINESS AND GIDDINESS    Sonographer:  Rox Avalos [facility], [facility]  Primary Physician:  Jessa Davis DO  Referring Physician:  Ha Porras DO  Group:  [facility]  Interpreting Physician:  Rebekah Cancino MD    SUMMARY    LEFT VENTRICLE:  Systolic function was normal  Ejection fraction was estimated to be 60 %  There were no regional wall motion abnormalities  Doppler parameters were consistent with abnormal left ventricular relaxation (grade 1 diastolic dysfunction)  RIGHT VENTRICLE:  The size was normal   Systolic function was normal     LEFT ATRIUM:  The atrium was mildly dilated  RIGHT ATRIUM:  The atrium was mildly dilated  MITRAL VALVE:  There was marked annular calcification    There was moderately reduced leaflet separation  Transmitral velocity was increased due to valvular stenosis  There was moderate stenosis  There was trace regurgitation  Not well visualized  Mean transmitral gradient was 6 mmHg  TRICUSPID VALVE:  There was trace regurgitation  Estimated peak PA pressure was 37 mmHg  The findings suggest mild pulmonary hypertension  HISTORY: PRIOR HISTORY: Aortic valve replacement    PROCEDURE: The procedure was performed at the bedside  This was a routine study  The transthoracic approach was used  The study included complete 2D imaging, M-mode, complete spectral Doppler, and color Doppler  The heart rate was 80 bpm,  at the start of the study  This was a technically difficult study  LEFT VENTRICLE: Size was normal  Systolic function was normal  Ejection fraction was estimated to be 60 %  There were no regional wall motion abnormalities  Wall thickness was normal  DOPPLER: Doppler parameters were consistent with  abnormal left ventricular relaxation (grade 1 diastolic dysfunction)  RIGHT VENTRICLE: The size was normal  Systolic function was normal  Wall thickness was normal     LEFT ATRIUM: The atrium was mildly dilated  RIGHT ATRIUM: The atrium was mildly dilated  MITRAL VALVE: There was marked annular calcification  Valve structure was normal  There was moderately reduced leaflet separation  Not well visualized  DOPPLER: Transmitral velocity was increased due to valvular stenosis  There was  moderate stenosis  There was trace regurgitation  AORTIC VALVE: The valve was trileaflet  Leaflets exhibited mildly increased thickness, normal cuspal separation, and sclerosis  DOPPLER: Transaortic velocity was within the normal range  There was no evidence for stenosis  There was no  significant regurgitation  TRICUSPID VALVE: The valve structure was normal  There was normal leaflet separation  DOPPLER: The transtricuspid velocity was within the normal range  There was no evidence for stenosis  There was trace regurgitation  Estimated peak PA  pressure was 37 mmHg  The findings suggest mild pulmonary hypertension  PULMONIC VALVE: Leaflets exhibited normal thickness, no calcification, and normal cuspal separation  DOPPLER: The transpulmonic velocity was within the normal range  There was no significant regurgitation  PERICARDIUM: There was no pericardial effusion  The pericardium was normal in appearance  AORTA: The root exhibited normal size  SYSTEMIC VEINS: IVC: The inferior vena cava was normal in size  Respirophasic changes were normal     MEASUREMENT TABLES    DOPPLER MEASUREMENTS  Mitral valve   (Reference normals)  Mean gradient   6 mmHg   (--)    SYSTEM MEASUREMENT TABLES    2D  %FS: 36 25 %  Ao Diam: 2 74 cm  EDV(Teich): 58 16 ml  EF(Teich): 66 79 %  ESV(Teich): 19 32 ml  IVSd: 1 57 cm  LA Area: 21 91 cm2  LA Diam: 3 82 cm  LVEDV MOD A4C: 50 14 ml  LVEF MOD A4C: 66 92 %  LVESV MOD A4C: 16 58 ml  LVIDd: 3 7 cm  LVIDs: 2 36 cm  LVLd A4C: 6 98 cm  LVLs A4C: 5 65 cm  LVPWd: 1 24 cm  RA Area: 18 45 cm2  RV DIAM: 2 36 cm  SV MOD A4C: 33 55 ml  SV(Teich): 38 84 ml    CW  AV Env  Ti: 301 04 ms  AV VTI: 35 11 cm  AV Vmax: 1 86 m/s  AV Vmax: 1 85 m/s  AV Vmean: 1 17 m/s  AV maxP 92 mmHg  AV maxP 76 mmHg  AV meanP 35 mmHg  MV PHT: 137 58 ms  MV VTI: 55 83 cm  MV Vmax: 2 2 m/s  MV Vmean: 1 37 m/s  MV maxP 41 mmHg  MV meanP 67 mmHg  MVA By PHT: 1 6 cm2  TR Vmax: 2 81 m/s  TR maxP 7 mmHg    MM  TAPSE: 3 07 cm    PW  E' Sept: 0 04 m/s  E/E' Sept: 30 69  LVOT Env  Ti: 294 12 ms  LVOT VTI: 21 84 cm  LVOT Vmax: 1 06 m/s  LVOT Vmax: 1 07 m/s  LVOT Vmean: 0 74 m/s  LVOT maxP 52 mmHg  LVOT maxP 6 mmHg  LVOT meanP 44 mmHg  MV A Guevara: 1 81 m/s  MV Dec [place]: 3 33 m/s2  MV DecT: 379 94 ms  MV E Guevara: 1 26 m/s  MV E/A Ratio: 0 7    [employer] Accredited Echocardiography Laboratory    Prepared and electronically signed by    Mica Johnson MD  Signed [id number] 11:38:07    Addendum Date: 5/20/2019 11:38:51 AM  A bioprosthetic aortic valve was present which was not well visualized but demonstrated normal gradients  Addendum entered by: Yara Rollins MD    No results found for this or any previous visit  No valid procedures specified  No results found for this or any previous visit  Imaging: I have personally reviewed pertinent reports  XR Trauma chest portable    Result Date: 2/8/2023  Narrative: CHEST INDICATION:   TRAUMA  COMPARISON:  Same day trauma studies  Portable chest radiograph July 28, 2021  EXAM PERFORMED/VIEWS:  XR CHEST PORTABLE Images:  1 FINDINGS: Cardiomegaly  Left chest pacemaker  Aortic valve replacement  Poststernotomy with multiple fractured sternotomy wires  Streaky opacities in bilateral lower lobes, likely subsegmental atelectasis  No focal consolidation  No pneumothorax or pleural effusion  Osseous structures appear within normal limits for patient age  Impression: No acute cardiopulmonary disease  Workstation performed: [id number]     XR hip/pelv 2-3 vws right if performed    Result Date: 2/8/2023  Narrative: RIGHT HIP, RIGHT FEMUR INDICATION:   fall , r hip pain  COMPARISON:  Same day right hip pelvis and right femur radiograph were dictated under the same report  Same-day trauma studies  VIEWS:  XR HIP/PELV 2-3 VWS RIGHT W PELVIS IF PERFORMED, XR FEMUR 2 VW RIGHT Images: 7 FINDINGS: Acute impacted fracture of right femoral neck extending into right femoral head  No hip or knee joint dislocation  Mild right hip osteoarthritis is seen  No lytic or blastic osseous lesion  There are atherosclerotic calcifications  Soft tissues are otherwise unremarkable  Degenerative changes visualized lower lumbar spine  Impression: Acute impacted fracture of right femoral neck extending into right femoral head  The study was marked in [facility] for immediate notification   Workstation performed: [id number]     XR femur 2 views RIGHT    Result Date: 2/8/2023  Narrative: RIGHT HIP, RIGHT FEMUR INDICATION:   fall , r hip pain  COMPARISON:  Same day right hip pelvis and right femur radiograph were dictated under the same report  Same-day trauma studies  VIEWS:  XR HIP/PELV 2-3 VWS RIGHT W PELVIS IF PERFORMED, XR FEMUR 2 VW RIGHT Images: 7 FINDINGS: Acute impacted fracture of right femoral neck extending into right femoral head  No hip or knee joint dislocation  Mild right hip osteoarthritis is seen  No lytic or blastic osseous lesion  There are atherosclerotic calcifications  Soft tissues are otherwise unremarkable  Degenerative changes visualized lower lumbar spine  Impression: Acute impacted fracture of right femoral neck extending into right femoral head  The study was marked in [facility] for immediate notification  Workstation performed: [id number]     TRAUMA - CT head wo contrast    Result Date: 2/8/2023  Narrative: CT BRAIN - WITHOUT CONTRAST INDICATION:   Head trauma, minor (Age >= 65y)  Fall, uncertain head strike  COMPARISON:  CT head 5/29/2021, MRI brain 5/20/2019 TECHNIQUE:  CT examination of the brain was performed  In addition to axial images, sagittal and coronal 2D reformatted images were created and submitted for interpretation  Radiation dose length product (DLP) for this visit:  974 mGy-cm  This examination, like all CT scans performed in the [facility], was performed utilizing techniques to minimize radiation dose exposure, including the use of iterative reconstruction and automated exposure control  IMAGE QUALITY:  Diagnostic  FINDINGS: PARENCHYMA:  No intracranial mass, mass effect or midline shift  No CT signs of acute infarction  No acute parenchymal hemorrhage  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  Old lacunar infarcts right basal ganglia and caudate nucleus  Age-related cortical atrophy   Periventricular white matter hypodensity which is nonspecific although most compatible with chronic small vessel ischemic disease  Vascular calcifications  VENTRICLES AND EXTRA-AXIAL SPACES:  Ventricles and extra-axial CSF spaces are prominent commensurate with the degree of volume loss  No hydrocephalus  No acute extra-axial hemorrhage  VISUALIZED ORBITS: No acute orbital pathology  Bilateral lens implants  PARANASAL SINUSES: Normal visualized paranasal sinuses  The mastoid air cells are clear  CALVARIUM AND EXTRACRANIAL SOFT TISSUES:  Normal      Impression: No acute intracranial abnormality  Workstation performed: [id number]     TRAUMA - CT spine cervical wo contrast    Result Date: 2/8/2023  Narrative: CT CERVICAL SPINE - WITHOUT CONTRAST INDICATION:   Neck trauma (Age >= 65y)  Fall  COMPARISON:  None TECHNIQUE:  CT examination of the cervical spine was performed without intravenous contrast   Contiguous axial images were obtained  Sagittal and coronal reconstructions were performed  Radiation dose length product (DLP) for this visit:  420 59 mGy-cm  This examination, like all CT scans performed in the [facility], was performed utilizing techniques to minimize radiation dose exposure, including the use of iterative reconstruction and automated exposure control  IMAGE QUALITY:  Diagnostic  FINDINGS: ALIGNMENT:  Normal alignment of the cervical spine  No subluxation  VERTEBRAE:  No fracture  DEGENERATIVE CHANGES:  Severe multilevel cervical degenerative changes are noted  No critical central canal stenosis  Posterior osteophytic ridge centrally at C5-6 causing mild to moderate central canal narrowing  Partial fusion of the C5-6 vertebrae suggested  PREVERTEBRAL AND PARASPINAL SOFT TISSUES: Unremarkable THORACIC INLET:  No pneumothorax  2 mm upper lobe nodules bilaterally  Based on current Fleischner Society 2017 Guidelines on incidental pulmonary nodule, no routine follow-up is needed if the patient is low risk    If the patient is high risk, optional follow-up chest CT at 12 months can be considered  Impression: No cervical spine fracture or traumatic malalignment  Severe multilevel cervical spondylosis  Workstation performed: [id number]     XR Trauma pelvis ap only 1 or 2 vw    Result Date: 2/8/2023  Narrative: PELVIS INDICATION:   TRAUMA  COMPARISON:  Same day trauma studies     Right hip pelvis radiograph July 20, 2021  VIEWS:  XR PELVIS AP ONLY 1 OR 2 VW 1 FINDINGS: Acute impacted right femoral neck fracture  Mild bilateral hip osteoarthritis  No lytic or blastic osseous lesion  Soft tissues are unremarkable  Degenerative changes visualized lower lumbar spine  Impression: Acute impacted right femoral neck fracture  The study was marked in [facility] for immediate notification  Workstation performed: [id number]     TRAUMA - CT chest abdomen pelvis w contrast    Result Date: 2/8/2023  Narrative: CT CHEST, ABDOMEN AND PELVIS WITH IV CONTRAST INDICATION:   TRAUMA  COMPARISON:  Same day trauma studies  CT abdomen pelvis without contrast July 20, 2021  TECHNIQUE: CT examination of the chest, abdomen and pelvis was performed  In addition to portal venous phase postcontrast scanning through the abdomen and pelvis, delayed phase postcontrast scanning was performed through the upper abdominal viscera  Axial, sagittal, and coronal 2D reformatted images were created from the source data and submitted for interpretation  3D reconstructions of the bony thorax were performed in order to improved sensitivity of evaluation for rib fractures  Radiation dose length product (DLP) for this visit:  1709 33 mGy-cm   This examination, like all CT scans performed in the [facility], was performed utilizing techniques to minimize radiation dose exposure, including the use of iterative reconstruction and automated exposure control  IV Contrast:  100 mL of iohexol (OMNIPAQUE) Enteric Contrast: Enteric contrast was not administered   FINDINGS: CHEST LUNGS:  There is no tracheal or endobronchial lesion  Subsegmental atelectasis in bilateral upper, right middle, and bilateral lower lobes  No focal consolidation  PLEURA:  Unremarkable  HEART/GREAT VESSELS: Left chest pacemaker leads in right atrial appendage and right ventricle  Cardiomegaly  Aortic valve replacement  Dense mitral annular valve calcifications  No pericardial effusion  No thoracic aortic aneurysm  Mild scattered calcified atherosclerotic disease  MEDIASTINUM AND SHRUTHI:  Unremarkable  CHEST WALL AND LOWER NECK:  Bilateral gynecomastia  ABDOMEN LIVER/BILIARY TREE:  One or more subcentimeter sharply circumscribed low-density hepatic lesion(s) are noted, too small to accurately characterize, but statistically most likely to represent subcentimeter hepatic cysts  No suspicious solid hepatic lesion is identified  Hepatic contours are normal   No biliary dilatation  GALLBLADDER:  There are gallstone(s) within the gallbladder, without pericholecystic inflammatory changes  SPLEEN:  Unremarkable  PANCREAS:  Unremarkable  ADRENAL GLANDS:  Unremarkable  KIDNEYS/URETERS:  Symmetric renal enhancement  No enhancing soft tissue renal mass, hydronephrosis, or urinary tract calculi  Bilateral renal cysts (left larger than right)  STOMACH AND BOWEL:  Normal CT appearance of the stomach  No dilated or thick-walled bowel loops  No acute inflammatory bowel change  Colonic diverticulosis, worse in sigmoid colon  APPENDIX:  No findings to suggest appendicitis  ABDOMINOPELVIC CAVITY:  No ascites  No pneumoperitoneum  No lymphadenopathy  VESSELS: No acute traumatic vascular injury in abdomen or pelvis  Moderate scattered calcified atherosclerotic disease  No abdominal aortic aneurysm  Portal vein, SMV, and splenic vein are patent  PELVIS REPRODUCTIVE ORGANS:  Unremarkable for patient's age  URINARY BLADDER:  Unremarkable  ABDOMINAL WALL/INGUINAL REGIONS:  Unremarkable   OSSEOUS STRUCTURES:  Acute impacted fracture of right proximal femoral neck extending to right femoral head  Poststernotomy without osseous fusion of midline sternum  Multilevel spinal degenerative changes  Mild levoscoliosis of lumbar spine  Please see same-day CT recon only lumbar spine for further evaluation  Impression: Acute impacted fracture of right proximal femoral neck extending to right femoral head  No acute visceral organ or vascular injury of the chest, abdomen, or pelvis  Additional chronic/incidental findings as detailed above  The study was marked in [facility] for immediate notification  Workstation performed: [id number]     CT recon only lumbar spine    Result Date: 2/8/2023  Narrative: CT RECON ONLY LUMBAR SPINE (NO CHARGE) INDICATION:   fall, RLE/low back pain  COMPARISON:  Same day trauma studies  Lumbar spine radiograph July 29, 2021  TECHNIQUE: Axial CT examination of the lumbar spine was obtained utilizing reconstructed images from CT of the chest, abdomen and pelvis performed the same day  Images were reformatted in the sagittal and coronal planes  This examination, like all CT scans performed in the [facility], was performed utilizing techniques to minimize radiation dose exposure, including the use of iterative reconstruction and automated exposure control  FINDINGS: ALIGNMENT: Mild levoscoliosis of lumbar spine  Unchanged mild kyphotic curvature of thoracolumbar spine  Unchanged grade 1 retrolisthesis L2-L3 and L3-L4  VERTEBRAE: No fracture  No compression deformity  DEGENERATIVE CHANGES: Moderate-to-severe multilevel degenerative disc disease, worse at L2-L3  No critical central canal stenosis  Multilevel foraminal narrowing, worse at right L3-L4 and right L4-5 where there is at least moderate foraminal narrowing  PREVERTEBRAL AND PARASPINAL SOFT TISSUES: Normal      Impression: No acute osseous abnormality of lumbar spine  Degenerative changes as detailed above  Please see same-day CT chest abdomen pelvis for further evaluation  The study was marked in [facility] for immediate notification  Workstation performed: [id number]           Counseling / Coordination of Care  Total floor / unit time spent today 45 minutes  Greater than 50% of total time was spent with the patient and / or family counseling and / or coordination of care  A description of the counseling / coordination of care: Review of history, current assessment, development of a plan  Code Status: Level 1 - Full Code    ** Please Note: Dragon 360 Dictation voice to text software may have been used in the creation of this document   **

## 2023-02-09 NOTE — ASSESSMENT & PLAN NOTE
· Hold eliquis for the upcoming surgical procedure 2/10  · Continue metoprolol tartrate 100 mg PO every 12 hours for heart rate control

## 2023-02-09 NOTE — ASSESSMENT & PLAN NOTE
Lab Results   Component Value Date    EGFR 17 02/09/2023    EGFR 16 02/08/2023    EGFR 14 12/02/2022    CREATININE 2 99 (H) 02/09/2023    CREATININE 3 13 (H) 02/08/2023    CREATININE 3 53 (H) 12/02/2022     · Creatinine 3 5 upon admission  · Baseline serum creatinine appears to be around 2 0-2 6 mg/dl  · Likely a component of prerenal azotemia  · FENA 5 3% post renal/restrictive    · Seen by nephrology:No obstruction on ultrasound however is at risk given femur fracture  Also, exposed to iodinated contrast 2/8 in presumed volume depleted state and creatinine may worsen before improving in this setting  Continue to hold torsemide and avoid other potential nephrotoxins particularly NSAIDs and contrast  Agree with gentle balanced salt solution    · Utilize Isolyte IV fluids at 50 ml/hr

## 2023-02-09 NOTE — ASSESSMENT & PLAN NOTE
Lab Results   Component Value Date    EGFR 16 02/08/2023    EGFR 14 12/02/2022    EGFR 27 05/31/2022    CREATININE 3 13 (H) 02/08/2023    CREATININE 3 53 (H) 12/02/2022    CREATININE 2 14 (H) 05/31/2022     · Baseline serum creatinine appears to be around 2 0-2 6 mg/dl  · Likely a component of prerenal azotemia  · Utilize Isolyte IV fluids at 50 ml/hr  · Avoid all nephrotoxic agents  · Check a urine sodium level  · Check a urine protein/creatinine level  · Check the patient post-void residual amounts every shift with bladder scanning, and follow the urinary retention protocol  · Consult Nephrology  · Serial laboratory testing to monitor the patient's renal function and electrolyte levels

## 2023-02-09 NOTE — APP STUDENT NOTE
Sarah Quiñonez 80 y o  male MRN: [id number]  Unit/Bed#: 040-26 Encounter: [id number]        Assessment and Plan:  1  YOLANDE POA atop CKD  · Creatinine 3 13 today, slight improvement from 3 13 on admission  Post contrast in ED  Etiology azotemia and contrast induced nephropathy  Possible unresolved YOLANDE prior to injury  On isolyte 50ml/hr  Appears dry  No edema noted  Torsemide on hold per cardiology at this time  · Will consider moderate risk for worsening YOLANDE post contrast to peak at 72 hours and risks of anesthesia 2/10  Possible complications including worsening kidney function and dialysis explained to patient  Will continue to monitor closely over next 24-48 hours post op  2  Chronic kidney diease stage 4   · Patient follows with Dr Leo Kruse as outpatient  Last seen 12/6/23  Creatinine then 3 53  GFR 14 ml/min  Prior renal Baseline GFR 27mL/min  Baseline creatinine around 2 2   · Denies any NSAID or decreased intake or output at home  States he is able to void in urinal here  · Will monitor daily weight, I/O  Avoid nephrotoxin and maximize hemodynamics  · BMP daily  · Watch for urinary obstruction and retention  May need urinary catheter  Urinary retention protocol  3  Renal cysts  · Renal US shows 1 complex cyst but negative for obstruction  · Follow up in 6 months  4  HFpEF  · Weight is 84 4kg  Torosimide on hold at this time per Cardiology  No edema at this time  Appears dry  Lungs clear  · Awaiting ECHO today  · Continue daily weights and I/O  5  Hypertension in the setting of CKD stage 4  · Ranging from 191/87 on admission  Today 121/54  On Metoprolol 100 mg BID and prn labetalol  No hypotension  Monitor and watch for hypotension during surgery  6  Fracture of right femoral neck  · Managed by Ortho and primary team  Pending ORIF 2/10/23  HPI:    Isidro Mancini is a 80 y o  male with History of CHF, CKD stage 4, HTN who follow with Dr Leo Kruse as an outpatient  Unfortunately he sustained a fall at home on 2/8/2023 and is awaiting ORIF of Right hip planned for 2/10/2023  Currently states he was eating and drinking ok at home  He takes the pills his wife gives him daily  Denies taking any ibuprofen or nephrotoxins at home prior to fall  At this time his only complaint is pain in right leg with any movement  Now on O2 2l/min via NC  Reason for Consult: YOLANDE atop of CKD    Review of Systems:  A complete 10-point review of systems was performed  Aside from what was mentioned in the HPI, it is otherwise negative  Historical Information   Past Medical History:   Diagnosis Date   • Aortic stenosis     ECHO -4-14-14  MODERATE TO SEVERE AORTIC STENOSIS; MILD AROTIC INSUFFICIENCY - LAST ASSESSED 10/26/16; RESOLVED 6/8/16   • Aortic valve disorder     LAST ASSESSED 10/8/15; 10/8/15   • Arthritis    • CHF (congestive heart failure) ([facility] [address] )    • Complete heart block ([facility] [address] ) 7/20/2020   • Coronary artery disease    • Hypertension    • Hypertensive urgency 5/19/2019   • Renal disorder    • TIA (transient ischemic attack)    • Transient cerebral ischemia      Past Surgical History:   Procedure Laterality Date   • AORTIC VALVE REPLACEMENT  06/30/2015    avr with 23 mm [place] [employer] Ease bioprosthetic   • CARDIAC PACEMAKER PLACEMENT Left 07/24/2020   • CATARACT EXTRACTION Right 06/05/2000   • COLONOSCOPY     • CORONARY ARTERY BYPASS GRAFT  06/05/2000    x1 with argueta  to lad   • EYE SURGERY     • POLYPECTOMY  04/2014    enteroscopic - esophageal ulcer, gastric erosion, and duodenal ulcer      • TONSILLECTOMY      unknown     Social History   Social History     Substance and Sexual Activity   Alcohol Use Never   • Alcohol/week: 0 0 standard drinks    Comment: very occasional     Social History     Substance and Sexual Activity   Drug Use Never     Social History     Tobacco Use   Smoking Status Never   Smokeless Tobacco Never       Family History:   Family History   Problem Relation Age of Onset   • No Known Problems Mother    • No Known Problems Father    • Coronary artery disease Neg Hx        Medications:  Pertinent medications were reviewed  Current Facility-Administered Medications   Medication Dose Route Frequency Provider Last Rate   • acetaminophen  650 mg Oral Q6H PRN Cailin Lamp Sorin, DO     • allopurinol  100 mg Oral Daily Cailin Lamp Sorin, DO     • gabapentin  100 mg Oral HS Cailin Lamp Pomona, DO     • heparin (porcine)  5,000 Units Subcutaneous [facility] [address] Cailin Lamp Pomona, DO     • HYDROmorphone  0 5 mg Intravenous Q4H PRN Cailin Lamp Sorin, DO     • labetalol  10 mg Intravenous Q4H PRN Cailin Lamp Sorin, DO     • metoclopramide  10 mg Intravenous Q6H PRN Primo Thomason PA-C     • metoprolol tartrate  100 mg Oral BID Cailin Lamp Pomona, DO     • multi-electrolyte  50 mL/hr Intravenous Continuous Cailin Lamp Sorin, DO 50 mL/hr (02/08/23 1957)   • ondansetron  4 mg Intravenous Q4H PRN Cailin Lamp Sorin, DO     • oxyCODONE  5 mg Oral Q4H PRN Cailin Lamp Pomona, DO     • pantoprazole  40 mg Intravenous Q24H [address] Primo Thomason PA-C     • pravastatin  40 mg Oral Daily With Hollister Preston M Pomona, DO           Allergies   Allergen Reactions   • Promethazine Delirium and Other (See Comments)         Vitals:   /54 (BP Location: Left arm)   Pulse 60   Temp 97 6 °F (36 4 °C) (Tympanic)   Resp 20   Ht 5' 4" (1 626 m)   Wt 84 4 kg (186 lb)   SpO2 100%   BMI 31 93 kg/m²   Body mass index is 31 93 kg/m²    SpO2: 100 %,   SpO2 Activity: At Rest,   O2 Device: Nasal cannula      Intake/Output Summary (Last 24 hours) at 2/9/2023 1027  Last data filed at 2/9/2023 4553  Gross per 24 hour   Intake 0 ml   Output 1111 ml   Net -1111 ml     Invasive Devices     Peripheral Intravenous Line  Duration           Peripheral IV 02/08/23 Right Antecubital <1 day                Physical Exam:  General: conscious, cooperative, in no acute distress  Eyes: conjunctivae pink, anicteric sclerae  ENT: lips and mucous membranes moist  Neck: supple, no JVD, no masses  Chest: clear breath sounds bilateral, no crackles, ronchus or wheezings  CVS: distinct S1 & S2, normal rate, regular rhythm  Abdomen: soft, non-tender, non-distended, normoactive bowel sounds  Extremities: slight nonpitting edema of RLE   Skin: no rash  Neuro: awake, alert, oriented      Diagnostic Data:  Lab: I have personally reviewed pertinent lab results  ,   CBC:  Results from last 7 days   Lab Units 02/09/23  0506   WBC Thousand/uL 14 48*   HEMOGLOBIN g/dL 8 8*   HEMATOCRIT % 30 1*   PLATELETS Thousands/uL 320      CMP:   Lab Results   Component Value Date    SODIUM 138 02/09/2023    K 4 5 02/09/2023    CL 96 02/09/2023    CO2 27 02/09/2023    BUN 75 (H) 02/09/2023    CREATININE 2 99 (H) 02/09/2023    CALCIUM 9 0 02/09/2023    AST 13 02/09/2023    ALT 4 (L) 02/09/2023    ALKPHOS 90 02/09/2023    EGFR 17 02/09/2023   ,   PT/INR:   Lab Results   Component Value Date    INR 1 19 02/08/2023   ,   Magnesium: No components found for: MAG,  Phosphorous:   Lab Results   Component Value Date    PHOS 5 2 (H) 02/09/2023       Microbiology:  @LABRCNTIP,(urinecx:7)@        Winn Pallas, RN    Portions of the record may have been created with voice recognition software  Occasional wrong word or "sound a like" substitutions may have occurred due to the inherent limitations of voice recognition software  Read the chart carefully and recognize, using context, where substitutions have occurred

## 2023-02-09 NOTE — ASSESSMENT & PLAN NOTE
· Admit to med/surg level of care  · Consult Orthopedic Surgery  · Hold PO eliquis  · The patient is medically cleared from my standpoint for an ORIF of the right hip    · Consult Cardiology for a pre-operative cardiac risk stratification  · Pain control  · Incentive spirometry  · DVT prophylaxis with heparin 5000 Units SQ every 8 hours and SCD's on both lower extremities

## 2023-02-09 NOTE — ASSESSMENT & PLAN NOTE
· Hold eliquis for the upcoming surgical procedure  · Continue metoprolol tartrate 100 mg PO every 12 hours for heart rate control

## 2023-02-09 NOTE — ASSESSMENT & PLAN NOTE
· Appears to have a chronic leukocytosis  · Check blood cultures x 2 sets  · Check a urine culture  · Check and follow the procalcitonin level  · Follow the CBC  · Outpatient Hematology evaluation

## 2023-02-09 NOTE — CONSULTS
Orthopedics   Megan Scott 80 y o  male MRN: [id number]  Unit/Bed#: 392-88      Chief Complaint:   Right femoral neck fracture that extends into femoral head    HPI:   80 y  o male ambulates with walker complaining of right hip pain and inability to bear weight  The patient tripped and fell directly onto the right hip yesterday and states he had excruciating pain as his son tried to pick him up from the floor  He was unsure if he hit his head during the fall, but he denies LOC  The patient is on Eliquis and states his last dose was yesterday morning  Patient found laying comfortably in bed  Pain in right hip is sharp with any hip movement and is minimal at rest  The pain does not radiate and patient denies referred groin pain  He denies numbness or tingling  There are no open wounds  No other complaints at this time  PMH significant for CKD IV, diastolic CHF, A fib on Eliquis, history aortic valve replacement, and presence of permanent pacemaker  Patient lives at home with his wife  Review Of Systems:   · Skin: Pt reports presence of decubitus ulcer on lower back  No rashes or ecchymosis  · Neuro: See HPI  · Musculoskeletal: See HPI  · 14 point review of systems negative except as stated above     Past Medical History:   Past Medical History:   Diagnosis Date   • Aortic stenosis     ECHO -4-14-14   MODERATE TO SEVERE AORTIC STENOSIS; MILD AROTIC INSUFFICIENCY - LAST ASSESSED 10/26/16; RESOLVED 6/8/16   • Aortic valve disorder     LAST ASSESSED 10/8/15; 10/8/15   • Arthritis    • CHF (congestive heart failure) ([address] )    • Complete heart block ([address] ) 7/20/2020   • Coronary artery disease    • Hypertension    • Hypertensive urgency 5/19/2019   • Renal disorder    • TIA (transient ischemic attack)    • Transient cerebral ischemia        Past Surgical History:   Past Surgical History:   Procedure Laterality Date   • AORTIC VALVE REPLACEMENT  06/30/2015    avr with 23 mm [facility] Ease bioprosthetic   • CARDIAC PACEMAKER PLACEMENT Left 07/24/2020   • CATARACT EXTRACTION Right 06/05/2000   • COLONOSCOPY     • CORONARY ARTERY BYPASS GRAFT  06/05/2000    x1 with argueta  to lad   • EYE SURGERY     • POLYPECTOMY  04/2014    enteroscopic - esophageal ulcer, gastric erosion, and duodenal ulcer  • TONSILLECTOMY      unknown       Family History:  Family history reviewed and non-contributory  Family History   Problem Relation Age of Onset   • No Known Problems Mother    • No Known Problems Father    • Coronary artery disease Neg Hx        Social History:  Social History     Socioeconomic History   • Marital status: /Civil Union     Spouse name: Gosia   • Number of children: 5   • Years of education: None   • Highest education level: None   Occupational History   • Occupation: retired   Tobacco Use   • Smoking status: Never   • Smokeless tobacco: Never   Vaping Use   • Vaping Use: Never used   Substance and Sexual Activity   • Alcohol use: Never     Alcohol/week: 0 0 standard drinks     Comment: very occasional   • Drug use: Never   • Sexual activity: None   Other Topics Concern   • None   Social History Narrative    Caffeine use     Dental care, regularly     Lives independently with spouse     Uses seatbelts      Social Determinants of Health     Financial Resource Strain: Not on file   Food Insecurity: Not on file   Transportation Needs: Not on file   Physical Activity: Not on file   Stress: Not on file   Social Connections: Not on file   Intimate Partner Violence: Not on file   Housing Stability: Not on file       Allergies:    Allergies   Allergen Reactions   • Promethazine Delirium and Other (See Comments)           Labs:  0   Lab Value Date/Time    HCT 30 1 (L) 02/09/2023 0506    HCT 32 2 (L) 02/09/2023 0154    HCT 34 6 (L) 02/08/2023 1700    HCT 27 8 (L) 07/04/2015 0501    HCT 30 1 (L) 07/03/2015 0935    HCT 30 0 (L) 07/02/2015 1223    HGB 8 8 (L) 02/09/2023 0506    HGB 9 6 (L) 02/09/2023 0154    HGB 10 3 (L) 02/08/2023 1700    HGB 9 4 (L) 07/04/2015 0501    HGB 9 9 (L) 07/03/2015 0935    HGB 9 8 (L) 07/02/2015 1223    INR 1 19 02/08/2023 1700    INR 1 27 (H) 07/02/2015 1223    WBC 14 48 (H) 02/09/2023 0506    WBC 14 05 (H) 02/08/2023 1700    WBC 12 12 (H) 12/02/2022 1212    WBC 12 50 (H) 07/04/2015 0501    WBC 16 11 (H) 07/03/2015 0935    WBC 19 17 (H) 07/02/2015 1223    ESR 50 (H) 07/30/2021 0417     8 (H) 07/29/2021 0457       Meds:    Current Facility-Administered Medications:   •  acetaminophen (TYLENOL) tablet 650 mg, 650 mg, Oral, Q6H PRN, [facility] Jordan[facility], DO  •  allopurinol (ZYLOPRIM) tablet 100 mg, 100 mg, Oral, Daily, Loreda Gaylearchana Jordanr, DO, 100 mg at 02/09/23 4335  •  gabapentin (NEURONTIN) capsule 100 mg, 100 mg, Oral, HS, Loreda Gayle Sorin, DO, 100 mg at 02/08/23 2218  •  heparin (porcine) subcutaneous injection 5,000 Units, 5,000 Units, Subcutaneous, Q8H [facility], [facility] Kaiser, DO, 5,000 Units at 02/09/23 9717  •  HYDROmorphone (DILAUDID) injection 0 5 mg, 0 5 mg, Intravenous, Q4H PRN, Loreda Gaylearchana Jordanr, DO  •  labetalol (NORMODYNE) injection 10 mg, 10 mg, Intravenous, Q4H PRN, Loreda Gaylearchana Jordanr, DO  •  metoclopramide (REGLAN) injection 10 mg, 10 mg, Intravenous, Q6H PRN, Shira Sparrow PA-C  •  metoprolol tartrate (LOPRESSOR) tablet 100 mg, 100 mg, Oral, BID, Loreda Gaylearchana Jordanr, DO, 100 mg at 02/09/23 7839  •  multi-electrolyte (PLASMALYTE-A/ISOLYTE-S PH 7 4) IV solution, 50 mL/hr, Intravenous, Continuous, Pascual Rowell DO, Last Rate: 50 mL/hr at 02/08/23 1957, 50 mL/hr at 02/08/23 1957  •  ondansetron (ZOFRAN) injection 4 mg, 4 mg, Intravenous, Q4H PRN, Pascual Rowell DO, 4 mg at 02/09/23 0319  •  oxyCODONE (ROXICODONE) IR tablet 5 mg, 5 mg, Oral, Q4H PRN, Pascual Rowell DO, 5 mg at 02/08/23 2219  •  pantoprazole (PROTONIX) injection 40 mg, 40 mg, Intravenous, Q24H [facility], Len Ro PA-C, 40 mg at 02/09/23 0811  •  pravastatin (PRAVACHOL) tablet 40 mg, 40 mg, Oral, Daily With Dina Rowell,     Blood Culture:   Lab Results   Component Value Date    BLOODCX No Growth After 5 Days  07/28/2021    BLOODCX No Growth After 5 Days  07/28/2021       Wound Culture:   Lab Results   Component Value Date    WOUNDCULT No growth 07/30/2021       Ins and Outs:  I/O last 24 hours: In: 0   Out: 1111 [Urine:651; Emesis/NG output:460]          Physical Exam:   /54 (BP Location: Left arm)   Pulse 60   Temp 97 6 °F (36 4 °C) (Tympanic)   Resp 20   Ht 5' 4" (1 626 m)   Wt 84 4 kg (186 lb)   SpO2 100%   BMI 31 93 kg/m²   Gen: No acute distress, resting comfortably in bed  HEENT: Eyes clear, moist mucus membranes, hearing intact  Respiratory: No audible wheezing or stridor  Cardiovascular: Well Perfused peripherally, 2+ distal pulse  Abdomen: nondistended, no peritoneal signs  Musculoskeletal: right lower extremity  · Skin: Stage 1 decubitus ulcer noted on right lumbar area about 20cm x 15 cm  Skin red and intact  No signs of infection  · Minimal tenderness to palpation over hip  Pain exacerbated upon turning patient to visualize decubitus ulcer  · Hip ROM not assessed 2/2 known fracture  · Sensation intact L3-S1 to light touch  · Positive ankle dorsi/plantar flexion, EHL/FHL  · 2+ DP/ PT pulse bilaterally  · Musculature is soft and compressible, no pain with passive stretch  · Leg length equal   · Hip externally rotated    Radiology:   I personally reviewed the films  X-rays AP/Lateral views right hip shows femoral neck fracture extending into right femoral head    _*_*_*_*_*_*_*_*_*_*_*_*_*_*_*_*_*_*_*_*_*_*_*_*_*_*_*_*_*_*_*_*_*_*_*_*_*_*_*_*_*    Assessment:  80 y  o male status post fall with right femoral neck fracture    Plan:   · Non weight bearing right lower extremity  · Analgesics for pain control, limit narcotics if possible  · DVT prophylaxis with bilateral SCDs and SQ Heparin today  Hold after midnight for OR  · Can eat today   NPO at midnight  · Medicine consult for all medical management and preoperative risk stratification  · Likely plan for OR for right hemiarthroplasty with Dr Adolfo Stanton  · Body mass index is 31 93 kg/m²    · Dispo: Ortho will follow    Stevan Mandujano PA-C

## 2023-02-09 NOTE — UTILIZATION REVIEW
Initial Clinical Review    Admission: Date/Time/Statement:   Admission Orders (From admission, onward)     Ordered        02/08/23 1854  INPATIENT ADMISSION  Once                      Orders Placed This Encounter   Procedures   • INPATIENT ADMISSION     Standing Status:   Standing     Number of Occurrences:   1     Order Specific Question:   Level of Care     Answer:   Med Surg [16]     Order Specific Question:   Estimated length of stay     Answer:   More than 2 Midnights     Order Specific Question:   Certification     Answer:   I certify that inpatient services are medically necessary for this patient for a duration of greater than two midnights  See H&P and MD Progress Notes for additional information about the patient's course of treatment  ED Arrival Information     Expected   -    Arrival   2/8/2023 16:30    Acuity   Emergent            Means of arrival   Ambulance    Escorted by   [facility]    Service   Hospitalist    Admission type   Emergency            Arrival complaint   hip pain            Chief Complaint   Patient presents with   • Fall     Patient states he fell around noon today when he was going to refill his drink  Patient states his pants were too baggy which is why he slipped  Patient complains of right hip pain  Patient is unsure if he hit his head  Denies LOC  Initial Presentation: 80 y o  male presents to ed from home via ems for evaluation and treatment of right hip pain due to a fall  Clinical assessment significant for hypertension, pain 10/10  BUN 81, CR 3 3  AN GAP 14, WBC 14 05  Imaging shows R femoral neck fracture  Initially treated wit iv fentanyl, iv /hr, po tylenol  Admit to inpatient med surg for R femoral neck fracture  Plan includes hold Eliquis, consult orthopedics  Follow up leukocytosis with urine culture and blood cultures  Consult cardiology for pre op risk assessment  Consult orthopedics for YOLANDE  Plan OR 2-10        Date: 2-9-23 Day 2: inpatient  Med surg  Urology consult recommends check PVR, serial BMPs  Baseline creatinine 2 2  Patient risks need dialysis after surgery  Monitor closely  Today creatinine 2 99  Continue iv multi electrolyte 50/hr  Cardiology risk assessment is moderate but not prohibitive  Orthopedic plan for right hip hemiarthroplasty 2-10 add on  Received iv zofran x1 and po oxycodone 11 52  Received iv mag x 1, iv protonix x 1  Date: 2-10-23 Day 3: Has surpassed a 2nd midnight with active treatments and services               ED Triage Vitals   02/08/23 1638 02/08/23 1638 02/08/23 1638 02/08/23 1638 02/08/23 1638   97 8 °F (36 6 °C) 74 17 (!) 191/87 94 %      Temporal Monitor           No Pain          Wt Readings from Last 1 Encounters:   02/09/23 84 4 kg (186 lb)     Additional Vital Signs:     Date/  Time Temp Pulse Resp BP MAP  SpO2 Nasal Cannula O2 Flow Rate (L/min)   02/09/23 08:10:20 97 6 °F (36 4 °C) 60 20 121/54 76 100 % 2 L/min   02/09/23 0735 -- 80 -- 126/56 -- -- --   02/09/23 03:14:14 98 7 °F (37 1 °C) 60 17 126/56 79 99 % 2 L/min   02/09/23 00:34:09 -- 61 -- -- -- 93 % 2 L/min   02/09/23 00:33:05 -- 60 -- -- -- 86 % Abnormal  --   02/08/23 21:56:35 97 5 °F (36 4 °C) 60 16 165/61 96 96 % --   02/08/23 21:54:56 97 5 °F (36 4 °C) 59 14 156/104   Abnormal  121 95 % --   02/08/23 2006 -- -- -- -- -- -- --   02/08/23 19:25:55 97 8 °F (36 6 °C) 67 14 164/87 113 100 % --   02/08/23 1900 97 8 °F (36 6 °C) 74 16 164/87 -- 100 % --   02/08/23 1830 97 8 °F (36 6 °C) 60 18 160/67 -- 92 % --   02/08/23 1800 97 8 °F (36 6 °C) 59 15 171/72   Abnormal  -- 99 % --   02/08/23 1745 97 8 °F (36 6 °C) 59 13 157/90 -- 97 % --   02/08/23 1730 97 8 °F (36 6 °C) 59 19 173/77   Abnormal  -- 97 % --   02/08/23 1715 97 8 °F (36 6 °C) 72 17 190/79   Abnormal  -- 95 % --   02/08/23 1700 97 8 °F (36 6 °C) 69 16 190/79   Abnormal  -- 95 % --   02/08/23 16:54:47 97 8 °F (36 6 °C) 74 17 191/87   Abnormal  -- 94 % --   02/08/23 1638 97 8 °F (36 6 °C) 74 17 191/87   Abnormal  -- 94 % --           Pertinent Labs/Diagnostic Test Results:     US kidney and bladder   Final  (02/09 0940)      No hydronephrosis  Medical renal disease  Bilateral renal cysts, most of which are simple in appearance with the exception of a 3 2 x 3 3 x 3 2 cm suboptimally visualized right renal cyst, likely complex  A six-month follow-up renal ultrasound is recommended  XR femur 2 views RIGHT   Final  (02/08 1818)      Acute impacted fracture of right femoral neck extending into right femoral head  XR hip/pelv 2-3 vws right if performed   Final  (02/08 1818)      Acute impacted fracture of right femoral neck extending into right femoral head  TRAUMA - CT head wo contrast   Final Result by Sravan Acosta DO (02/08 1736)      No acute intracranial abnormality  TRAUMA - CT spine cervical wo contrast   Final (02/08 1744)      No cervical spine fracture or traumatic malalignment  Severe multilevel cervical spondylosis  TRAUMA - CT chest abdomen pelvis w contrast   Final  (02/08 1813)      Acute impacted fracture of right proximal femoral neck extending to right femoral head  No acute visceral organ or vascular injury of the chest, abdomen, or pelvis  CT recon only lumbar spine   Final  (02/08 1822)      No acute osseous abnormality of lumbar spine  Degenerative changes as detailed above  XR Trauma pelvis ap only 1 or 2 vw      Final  (02/08 1816)      Acute impacted right femoral neck fracture  XR Trauma chest portable   Final  (02/08 1815)      No acute cardiopulmonary disease          Results from last 7 days   Lab Units 02/08/23 2015   SARS-COV-2  Negative     Results from last 7 days   Lab Units 02/09/23  0506 02/09/23  0154 02/08/23  1700   WBC Thousand/uL 14 48*  --  14 05*   HEMOGLOBIN g/dL 8 8* 9 6* 10 3*   HEMATOCRIT % 30 1* 32 2* 34 6*   PLATELETS Thousands/uL 320  --  401*   NEUTROS ABS Thousands/µL 12 92*  --  10 93*         Results from last 7 days   Lab Units 02/09/23  0506 02/08/23  1700   SODIUM mmol/L 138 137   POTASSIUM mmol/L 4 5 4 4   CHLORIDE mmol/L 96 93*   CO2 mmol/L 27 30   ANION GAP mmol/L 15* 14*   BUN mg/dL 75* 81*   CREATININE mg/dL 2 99* 3 13*   EGFR ml/min/1 73sq m 17 16   CALCIUM mg/dL 9 0 9 5   MAGNESIUM mg/dL 1 5*  --    PHOSPHORUS mg/dL 5 2*  --      Results from last 7 days   Lab Units 02/09/23  0506 02/08/23  1700   AST U/L 13 14   ALT U/L 4* 7   ALK PHOS U/L 90 104   TOTAL PROTEIN g/dL 6 8 7 9   ALBUMIN g/dL 3 9 4 5   TOTAL BILIRUBIN mg/dL 0 63 0 47         Results from last 7 days   Lab Units 02/09/23  0506 02/08/23  1700   GLUCOSE RANDOM mg/dL 106 119       Results from last 7 days   Lab Units 02/09/23  0506 02/08/23  1702   CK TOTAL U/L 99 113     Results from last 7 days   Lab Units 02/08/23  2135 02/08/23  1905 02/08/23  1700   HS TNI 0HR ng/L  --   --  26   HS TNI 2HR ng/L  --  23  --    HSTNI D2 ng/L  --  -3  --    HS TNI 4HR ng/L 27  --   --    HSTNI D4 ng/L 1  --   --          Results from last 7 days   Lab Units 02/08/23  1700   PROTIME seconds 15 3*   INR  1 19   PTT seconds 31         Results from last 7 days   Lab Units 02/09/23  0506   PROCALCITONIN ng/ml 0 35*     Results from last 7 days   Lab Units 02/09/23  0506   LACTIC ACID mmol/L 1 1       Results from last 7 days   Lab Units 02/08/23  2041   CLARITY UA  Clear   COLOR UA  Yellow   SPEC GRAV UA  <=1 005   PH UA  6 5   GLUCOSE UA mg/dl Negative   KETONES UA mg/dl Negative   BLOOD UA  Trace-Intact*   PROTEIN UA mg/dl Negative   NITRITE UA  Negative   BILIRUBIN UA  Negative   UROBILINOGEN UA E U /dl 0 2   LEUKOCYTES UA  Negative   WBC UA /hpf 0-1*   RBC UA /hpf 0-1*   BACTERIA UA /hpf None Seen   EPITHELIAL CELLS WET PREP /hpf Occasional   SODIUM UR  79   CREATININE UR mg/dL 34 2   PROTEIN UR mg/dL 14   PROT/CREAT RATIO UR  0 41*     Results from last 7 days   Lab Units 02/08/23 2015   INFLUENZA A PCR  Negative   INFLUENZA B PCR  Negative   RSV PCR  Negative       Results from last 7 days   Lab Units 02/09/23  0523 02/09/23  0510   BLOOD CULTURE  Received in Microbiology Lab  Culture in Progress  Received in Microbiology Lab  Culture in Progress  ED Treatment:   Medication Administration from 02/08/2023 1630 to 02/08/2023 1923       Date/Time Order Dose Route Action     02/08/2023 1702 EST fentanyl citrate (PF) 100 MCG/2ML 25 mcg 25 mcg Intravenous Given     02/08/2023 1704 EST acetaminophen (TYLENOL) tablet 650 mg 650 mg Oral Given     02/08/2023 1747 EST lactated ringers infusion 100 mL/hr Intravenous New Bag        Past Medical History:   Diagnosis Date   • Aortic stenosis     ECHO -4-14-14   MODERATE TO SEVERE AORTIC STENOSIS; MILD AROTIC INSUFFICIENCY - LAST ASSESSED 10/26/16; RESOLVED 6/8/16   • Aortic valve disorder     LAST ASSESSED 10/8/15; 10/8/15   • Arthritis    • CHF (congestive heart failure) ([facility])    • Complete heart block ([facility] [address] ) 7/20/2020   • Coronary artery disease    • Hypertension    • Hypertensive urgency 5/19/2019   • Renal disorder    • TIA (transient ischemic attack)    • Transient cerebral ischemia      Present on Admission:    • Chronic diastolic CHF (congestive heart failure) ([facility])  • Paroxysmal atrial fibrillation ([facility])  • CKD (chronic kidney disease) stage 4, GFR 15-29 ml/min ([facility])      Admitting Diagnosis:     Chronic anticoagulation [Z79 01]  CKD (chronic kidney disease) stage 4, GFR 15-29 ml/min ([facility]) [N18 4]  Ambulatory dysfunction [R26 2]  Closed displaced fracture of right femoral neck ([facility] [address] ) [S72 001A]    Age/Sex: 80 y o  male    Scheduled Medications:    allopurinol, 100 mg, Oral, Daily  gabapentin, 100 mg, Oral, HS  heparin (porcine), 5,000 Units, Subcutaneous, Q8H [address]  metoprolol tartrate, 100 mg, Oral, BID  pantoprazole, 40 mg, Intravenous, Q24H LAKISHA  pravastatin, 40 mg, Oral, Daily With Dinner      Continuous IV Infusions:  multi-electrolyte, 50 mL/hr, Intravenous, Continuous      PRN Meds:  acetaminophen, 650 mg, Oral, Q6H PRN  HYDROmorphone, 0 5 mg, Intravenous, Q4H PRN  labetalol, 10 mg, Intravenous, Q4H PRN  metoclopramide, 10 mg, Intravenous, Q6H PRN  ondansetron, 4 mg, Intravenous, Q4H PRN  oxyCODONE, 5 mg, Oral, Q4H PRN        IP CONSULT TO ORTHOPEDIC SURGERY  IP CONSULT TO ORTHOPEDIC SURGERY  IP CONSULT TO CARDIOLOGY  IP CONSULT TO NEPHROLOGY    Network Utilization Review Department  ATTENTION: Please call with any questions or concerns to [phone] and carefully listen to the prompts so that you are directed to the right person  All voicemails are confidential   Geetha Metcalf all requests for admission clinical reviews, approved or denied determinations and any other requests to dedicated fax number below belonging to the campus where the patient is receiving treatment   List of dedicated fax numbers for the Facilities:  [address] DENIALS (Administrative/Medical Necessity) [phone]   [address] (Maternity/NICU/Pediatrics) [phone]   [address] [phone]   [facility] [address] [phone]   [address] [place] [address] [facility] [address] [phone] [address] [address] [facility] [address] 815 [facility] [phone]

## 2023-02-10 ENCOUNTER — ANESTHESIA (INPATIENT)
Dept: PERIOP | Facility: HOSPITAL | Age: 88
End: 2023-02-10

## 2023-02-10 ENCOUNTER — ANESTHESIA EVENT (INPATIENT)
Dept: PERIOP | Facility: HOSPITAL | Age: 88
End: 2023-02-10

## 2023-02-10 ENCOUNTER — APPOINTMENT (INPATIENT)
Dept: RADIOLOGY | Facility: HOSPITAL | Age: 88
End: 2023-02-10

## 2023-02-10 LAB
ABO GROUP BLD BPU: NORMAL
ALBUMIN SERPL BCP-MCNC: 3.6 G/DL (ref 3.5–5)
ALP SERPL-CCNC: 77 U/L (ref 34–104)
ALT SERPL W P-5'-P-CCNC: 5 U/L (ref 7–52)
ANION GAP SERPL CALCULATED.3IONS-SCNC: 14 MMOL/L (ref 4–13)
AST SERPL W P-5'-P-CCNC: 13 U/L (ref 13–39)
BACTERIA UR CULT: NORMAL
BASOPHILS # BLD AUTO: 0.03 THOUSANDS/ÂΜL (ref 0–0.1)
BASOPHILS NFR BLD AUTO: 0 % (ref 0–1)
BILIRUB SERPL-MCNC: 0.54 MG/DL (ref 0.2–1)
BPU ID: NORMAL
BUN SERPL-MCNC: 76 MG/DL (ref 5–25)
CALCIUM SERPL-MCNC: 8.6 MG/DL (ref 8.4–10.2)
CHLORIDE SERPL-SCNC: 93 MMOL/L (ref 96–108)
CK SERPL-CCNC: 116 U/L (ref 39–308)
CO2 SERPL-SCNC: 29 MMOL/L (ref 21–32)
CREAT SERPL-MCNC: 3.47 MG/DL (ref 0.6–1.3)
CROSSMATCH: NORMAL
EOSINOPHIL # BLD AUTO: 0.24 THOUSAND/ÂΜL (ref 0–0.61)
EOSINOPHIL NFR BLD AUTO: 2 % (ref 0–6)
ERYTHROCYTE [DISTWIDTH] IN BLOOD BY AUTOMATED COUNT: 17.8 % (ref 11.6–15.1)
GFR SERPL CREATININE-BSD FRML MDRD: 14 ML/MIN/1.73SQ M
GLUCOSE SERPL-MCNC: 106 MG/DL (ref 65–140)
HCT VFR BLD AUTO: 31.5 % (ref 36.5–49.3)
HGB BLD-MCNC: 9.3 G/DL (ref 12–17)
IMM GRANULOCYTES # BLD AUTO: 0.08 THOUSAND/UL (ref 0–0.2)
IMM GRANULOCYTES NFR BLD AUTO: 1 % (ref 0–2)
INR PPP: 1.18 (ref 0.84–1.19)
LYMPHOCYTES # BLD AUTO: 0.85 THOUSANDS/ÂΜL (ref 0.6–4.47)
LYMPHOCYTES NFR BLD AUTO: 6 % (ref 14–44)
MAGNESIUM SERPL-MCNC: 2.1 MG/DL (ref 1.9–2.7)
MCH RBC QN AUTO: 22.6 PG (ref 26.8–34.3)
MCHC RBC AUTO-ENTMCNC: 29.5 G/DL (ref 31.4–37.4)
MCV RBC AUTO: 77 FL (ref 82–98)
MONOCYTES # BLD AUTO: 1.28 THOUSAND/ÂΜL (ref 0.17–1.22)
MONOCYTES NFR BLD AUTO: 9 % (ref 4–12)
MRSA NOSE QL CULT: NORMAL
NEUTROPHILS # BLD AUTO: 11.18 THOUSANDS/ÂΜL (ref 1.85–7.62)
NEUTS SEG NFR BLD AUTO: 82 % (ref 43–75)
NRBC BLD AUTO-RTO: 0 /100 WBCS
PHOSPHATE SERPL-MCNC: 4.6 MG/DL (ref 2.3–4.1)
PLATELET # BLD AUTO: 280 THOUSANDS/UL (ref 149–390)
PMV BLD AUTO: 10.2 FL (ref 8.9–12.7)
POTASSIUM SERPL-SCNC: 4.2 MMOL/L (ref 3.5–5.3)
PROCALCITONIN SERPL-MCNC: 0.54 NG/ML
PROT SERPL-MCNC: 6.2 G/DL (ref 6.4–8.4)
PROTHROMBIN TIME: 15.2 SECONDS (ref 11.6–14.5)
RBC # BLD AUTO: 4.12 MILLION/UL (ref 3.88–5.62)
SODIUM SERPL-SCNC: 136 MMOL/L (ref 135–147)
UNIT DISPENSE STATUS: NORMAL
UNIT PRODUCT CODE: NORMAL
UNIT PRODUCT VOLUME: 350 ML
UNIT RH: NORMAL
WBC # BLD AUTO: 13.66 THOUSAND/UL (ref 4.31–10.16)

## 2023-02-10 PROCEDURE — 0SRR0JZ REPLACEMENT OF RIGHT HIP JOINT, FEMORAL SURFACE WITH SYNTHETIC SUBSTITUTE, OPEN APPROACH: ICD-10-PCS | Performed by: INTERNAL MEDICINE

## 2023-02-10 DEVICE — SUMMIT FEMORAL STEM 12/14 TAPER TAPER ED W/POROCOAT SIZE 5 STD 145MM
Type: IMPLANTABLE DEVICE | Site: HIP | Status: FUNCTIONAL
Brand: SUMMIT POROCOAT

## 2023-02-10 DEVICE — SELF CENTERING BI-POLAR HEAD 28MM ID 52MM OD
Type: IMPLANTABLE DEVICE | Site: HIP | Status: FUNCTIONAL
Brand: SELF CENTERING

## 2023-02-10 DEVICE — ARTICUL/EZE FEMORAL HEAD DIAMETER 28MM +8.5 12/14 TAPER
Type: IMPLANTABLE DEVICE | Site: HIP | Status: FUNCTIONAL
Brand: ARTICUL/EZE

## 2023-02-10 RX ORDER — VANCOMYCIN HYDROCHLORIDE 1 G/20ML
INJECTION, POWDER, LYOPHILIZED, FOR SOLUTION INTRAVENOUS AS NEEDED
Status: DISCONTINUED | OUTPATIENT
Start: 2023-02-10 | End: 2023-02-10 | Stop reason: HOSPADM

## 2023-02-10 RX ORDER — CEFAZOLIN SODIUM 2 G/50ML
2000 SOLUTION INTRAVENOUS EVERY 8 HOURS
Status: COMPLETED | OUTPATIENT
Start: 2023-02-10 | End: 2023-02-11

## 2023-02-10 RX ORDER — SODIUM CHLORIDE, SODIUM LACTATE, POTASSIUM CHLORIDE, CALCIUM CHLORIDE 600; 310; 30; 20 MG/100ML; MG/100ML; MG/100ML; MG/100ML
INJECTION, SOLUTION INTRAVENOUS CONTINUOUS PRN
Status: DISCONTINUED | OUTPATIENT
Start: 2023-02-10 | End: 2023-02-10

## 2023-02-10 RX ORDER — CALCIUM CARBONATE 200(500)MG
1000 TABLET,CHEWABLE ORAL DAILY PRN
Status: DISCONTINUED | OUTPATIENT
Start: 2023-02-10 | End: 2023-02-16 | Stop reason: HOSPADM

## 2023-02-10 RX ORDER — ROCURONIUM BROMIDE 10 MG/ML
INJECTION, SOLUTION INTRAVENOUS AS NEEDED
Status: DISCONTINUED | OUTPATIENT
Start: 2023-02-10 | End: 2023-02-10

## 2023-02-10 RX ORDER — MAGNESIUM HYDROXIDE 1200 MG/15ML
LIQUID ORAL AS NEEDED
Status: DISCONTINUED | OUTPATIENT
Start: 2023-02-10 | End: 2023-02-10 | Stop reason: HOSPADM

## 2023-02-10 RX ORDER — PROPOFOL 10 MG/ML
INJECTION, EMULSION INTRAVENOUS CONTINUOUS PRN
Status: DISCONTINUED | OUTPATIENT
Start: 2023-02-10 | End: 2023-02-10

## 2023-02-10 RX ORDER — ONDANSETRON 2 MG/ML
4 INJECTION INTRAMUSCULAR; INTRAVENOUS ONCE AS NEEDED
Status: DISCONTINUED | OUTPATIENT
Start: 2023-02-10 | End: 2023-02-10 | Stop reason: HOSPADM

## 2023-02-10 RX ORDER — LIDOCAINE HYDROCHLORIDE 10 MG/ML
INJECTION, SOLUTION EPIDURAL; INFILTRATION; INTRACAUDAL; PERINEURAL AS NEEDED
Status: DISCONTINUED | OUTPATIENT
Start: 2023-02-10 | End: 2023-02-10

## 2023-02-10 RX ORDER — DEXAMETHASONE SODIUM PHOSPHATE 10 MG/ML
INJECTION, SOLUTION INTRAMUSCULAR; INTRAVENOUS AS NEEDED
Status: DISCONTINUED | OUTPATIENT
Start: 2023-02-10 | End: 2023-02-10 | Stop reason: HOSPADM

## 2023-02-10 RX ORDER — SODIUM CHLORIDE, SODIUM LACTATE, POTASSIUM CHLORIDE, CALCIUM CHLORIDE 600; 310; 30; 20 MG/100ML; MG/100ML; MG/100ML; MG/100ML
75 INJECTION, SOLUTION INTRAVENOUS CONTINUOUS
Status: DISCONTINUED | OUTPATIENT
Start: 2023-02-10 | End: 2023-02-10

## 2023-02-10 RX ORDER — ONDANSETRON 2 MG/ML
INJECTION INTRAMUSCULAR; INTRAVENOUS AS NEEDED
Status: DISCONTINUED | OUTPATIENT
Start: 2023-02-10 | End: 2023-02-10

## 2023-02-10 RX ORDER — SENNOSIDES 8.6 MG
1 TABLET ORAL DAILY
Status: DISCONTINUED | OUTPATIENT
Start: 2023-02-11 | End: 2023-02-16 | Stop reason: HOSPADM

## 2023-02-10 RX ORDER — DOCUSATE SODIUM 100 MG/1
100 CAPSULE, LIQUID FILLED ORAL 2 TIMES DAILY
Status: DISCONTINUED | OUTPATIENT
Start: 2023-02-10 | End: 2023-02-16 | Stop reason: HOSPADM

## 2023-02-10 RX ORDER — FENTANYL CITRATE/PF 50 MCG/ML
25 SYRINGE (ML) INJECTION
Status: DISCONTINUED | OUTPATIENT
Start: 2023-02-10 | End: 2023-02-10 | Stop reason: HOSPADM

## 2023-02-10 RX ORDER — PROPOFOL 10 MG/ML
INJECTION, EMULSION INTRAVENOUS AS NEEDED
Status: DISCONTINUED | OUTPATIENT
Start: 2023-02-10 | End: 2023-02-10

## 2023-02-10 RX ORDER — HEPARIN SODIUM 5000 [USP'U]/ML
5000 INJECTION, SOLUTION INTRAVENOUS; SUBCUTANEOUS EVERY 8 HOURS SCHEDULED
Status: DISCONTINUED | OUTPATIENT
Start: 2023-02-11 | End: 2023-02-16 | Stop reason: HOSPADM

## 2023-02-10 RX ORDER — FENTANYL CITRATE 50 UG/ML
INJECTION, SOLUTION INTRAMUSCULAR; INTRAVENOUS AS NEEDED
Status: DISCONTINUED | OUTPATIENT
Start: 2023-02-10 | End: 2023-02-10

## 2023-02-10 RX ORDER — BUPIVACAINE HYDROCHLORIDE 2.5 MG/ML
INJECTION, SOLUTION EPIDURAL; INFILTRATION; INTRACAUDAL AS NEEDED
Status: DISCONTINUED | OUTPATIENT
Start: 2023-02-10 | End: 2023-02-10 | Stop reason: HOSPADM

## 2023-02-10 RX ORDER — HYDROMORPHONE HCL IN WATER/PF 6 MG/30 ML
0.2 PATIENT CONTROLLED ANALGESIA SYRINGE INTRAVENOUS
Status: DISCONTINUED | OUTPATIENT
Start: 2023-02-10 | End: 2023-02-10 | Stop reason: HOSPADM

## 2023-02-10 RX ADMIN — CEFAZOLIN SODIUM 2000 MG: 2 SOLUTION INTRAVENOUS at 14:38

## 2023-02-10 RX ADMIN — ALLOPURINOL 100 MG: 100 TABLET ORAL at 08:23

## 2023-02-10 RX ADMIN — GABAPENTIN 100 MG: 100 CAPSULE ORAL at 21:00

## 2023-02-10 RX ADMIN — NOREPINEPHRINE BITARTRATE 5 MCG/MIN: 1 SOLUTION INTRAVENOUS at 14:40

## 2023-02-10 RX ADMIN — ROCURONIUM BROMIDE 20 MG: 10 INJECTION, SOLUTION INTRAVENOUS at 15:57

## 2023-02-10 RX ADMIN — OXYCODONE HYDROCHLORIDE 5 MG: 5 TABLET ORAL at 18:43

## 2023-02-10 RX ADMIN — CEFAZOLIN SODIUM 2000 MG: 2 SOLUTION INTRAVENOUS at 21:00

## 2023-02-10 RX ADMIN — SODIUM CHLORIDE 1000 MG: 9 INJECTION, SOLUTION INTRAVENOUS at 14:45

## 2023-02-10 RX ADMIN — METOPROLOL TARTRATE 100 MG: 100 TABLET, FILM COATED ORAL at 08:23

## 2023-02-10 RX ADMIN — ACETAMINOPHEN 650 MG: 325 TABLET, FILM COATED ORAL at 05:58

## 2023-02-10 RX ADMIN — FENTANYL CITRATE 50 MCG: 0.05 INJECTION, SOLUTION INTRAMUSCULAR; INTRAVENOUS at 15:14

## 2023-02-10 RX ADMIN — PROPOFOL 80 MCG/KG/MIN: 10 INJECTION, EMULSION INTRAVENOUS at 14:40

## 2023-02-10 RX ADMIN — Medication 20 MG: at 14:40

## 2023-02-10 RX ADMIN — SODIUM CHLORIDE, SODIUM LACTATE, POTASSIUM CHLORIDE, AND CALCIUM CHLORIDE: .6; .31; .03; .02 INJECTION, SOLUTION INTRAVENOUS at 14:26

## 2023-02-10 RX ADMIN — LIDOCAINE HYDROCHLORIDE 50 MG: 10 INJECTION, SOLUTION EPIDURAL; INFILTRATION; INTRACAUDAL; PERINEURAL at 14:40

## 2023-02-10 RX ADMIN — PANTOPRAZOLE SODIUM 40 MG: 40 INJECTION, POWDER, FOR SOLUTION INTRAVENOUS at 08:13

## 2023-02-10 RX ADMIN — PROPOFOL 40 MG: 10 INJECTION, EMULSION INTRAVENOUS at 14:41

## 2023-02-10 RX ADMIN — DOCUSATE SODIUM 100 MG: 100 CAPSULE, LIQUID FILLED ORAL at 21:00

## 2023-02-10 RX ADMIN — ONDANSETRON 4 MG: 2 INJECTION INTRAMUSCULAR; INTRAVENOUS at 15:06

## 2023-02-10 RX ADMIN — CEFAZOLIN SODIUM 2000 MG: 2 SOLUTION INTRAVENOUS at 08:13

## 2023-02-10 RX ADMIN — HEPARIN SODIUM 5000 UNITS: 5000 INJECTION INTRAVENOUS; SUBCUTANEOUS at 06:44

## 2023-02-10 RX ADMIN — ROCURONIUM BROMIDE 30 MG: 10 INJECTION, SOLUTION INTRAVENOUS at 15:01

## 2023-02-10 RX ADMIN — SUGAMMADEX 160 MG: 100 INJECTION, SOLUTION INTRAVENOUS at 16:54

## 2023-02-10 NOTE — NURSING NOTE
Pt received directly from OR via bed  Pt awake and alert  On 4LNC  Respirations easy and nonlabored  Toes on both feet dusky upon arrival   Cool to touch, sluggish cap refill  Doroteo Tony PA-C at bedside and evaluated toes  Right hip dressing clean, dry, intact  Right lower extremity wrapped with ACE wrap   +1 pedal pulse noted  Ice applied to right hip  Denies pain to surgical site  O2 titrated down to [facility]  Toes on both feet less dusky at d/c from PACU  Warm, cap refill < 2 sec  Dr Hyacinth Castillo also evaluated toes  PACU time [id number]  VSS  Pt returned to 4th floor room via bed

## 2023-02-10 NOTE — ANESTHESIA PREPROCEDURE EVALUATION
Procedure:  HEMIARTHROPLASTY HIP (BIPOLAR) (Right: Hip)    Relevant Problems   CARDIO   (+) 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   (+) Complete heart block (HCC)   (+) Essential hypertension   (+) History of aortic valve replacement with bioprosthetic valve   (+) History of severe aortic stenosis   (+) Hx of CABG   (+) Hyperlipidemia, unspecified   (+) Mitral regurgitation   (+) Paroxysmal atrial fibrillation (HCC)      GI/HEPATIC   (+) GERD (gastroesophageal reflux disease)      /RENAL   (+) Acute kidney injury superimposed on CKD (HCC)   (+) CKD (chronic kidney disease) stage 4, GFR 15-29 ml/min (HCC)   (+) 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 kidney disease, or chronic kidney disease (HCC)   (+) Multiple renal cysts      HEMATOLOGY   (+) Anemia   (+) Iron deficiency anemia      MUSCULOSKELETAL   (+) Acute gout   (+) Chronic low back pain without sciatica   (+) Lumbar degenerative disc disease      NEURO/PSYCH   (+) Chronic low back pain without sciatica   (+) History of stroke      PULMONARY   (+) Mild intermittent asthma without complication   (+) Oxygen dependent   (+) Pulmonary emphysema (HCC)      Status post remote CABG in 2015  Status post aortic valve replacement in 2015  Serial echocardiogram evidence of good bioprosthetic function with peak gradient 9 mmHg  Left ventricular function 65%  Of note the mitral valve is moderately calcified with moderate regurgitation  On this most recent echocardiogram there is only mild stenosis with a peak gradient of 7 and mean of 3  This is different than the last echo which showed a peak gradient of 11 and a mean gradient of 3  I suspect the patient still has moderate mitral stenosis with valve area roughly 1 6 cm  RVSP 45  Of note the patient has significant acute kidney injury  Off apixaban for 48 hours now      Physical Exam    Airway    Mallampati score: II  TM Distance: >3 FB  Neck ROM: full     Dental   upper dentures,     Cardiovascular  Rhythm: irregular, Rate: abnormal, Cardiovascular exam normal    Pulmonary  Pulmonary exam normal     Other Findings        Anesthesia Plan  ASA Score- 4     Anesthesia Type- general with ASA Monitors  Additional Monitors:   Airway Plan: LMA  Comment: LMA placement for preservation of spontaneous ventilation  High risk patient for moderate risk procedure  Discussed high risk for need for perioperative transfusion, worsening of acute kidney injury, myocardial injury, and hemodynamic instability          Plan Factors-Exercise tolerance (METS): <4 METS  Chart reviewed  EKG reviewed  Imaging results reviewed  Existing labs reviewed  Patient summary reviewed  Patient is not a current smoker  Induction- intravenous  Postoperative Plan-     Informed Consent- Anesthetic plan and risks discussed with patient  I personally reviewed this patient with the CRNA  Discussed and agreed on the Anesthesia Plan with the CRNA  Tarah Rico

## 2023-02-10 NOTE — PLAN OF CARE
Problem: MOBILITY - ADULT  Goal: Maintain or return to baseline ADL function  Description: INTERVENTIONS:  -  Assess patient's ability to carry out ADLs; (dependent)  - Assess/evaluate cause of self-care deficits (fracture, pain, limited movement)  - Assess range of motion  - Assess patient's mobility; (max assist/dependent)  - Assess patient's need for assistive devices and provide as appropriate  - Encourage maximum independence but intervene and supervise when necessary  - Involve family in performance of ADLs  - Assess for home care needs following discharge   - Consider OT consult to assist with ADL evaluation and planning for discharge  - Provide patient education as appropriate  Outcome: Progressing  Goal: Maintains/Returns to pre admission functional level  Description: INTERVENTIONS:  - Perform BMAT or MOVE assessment daily    - Set and communicate daily mobility goal to care team and patient/family/caregiver  - Collaborate with rehabilitation services on mobility goals if consulted  - Perform Range of Motion 3 times a day  - Reposition patient every 2 hours    - Dangle patient 3 times a day  - Stand patient 3 times a day  - Ambulate patient 3 times a day  - Out of bed to chair 3 times a day   - Out of bed for meals 3 times a day  - Out of bed for toileting  - Record patient progress and toleration of activity level   Outcome: Progressing     Problem: Prexisting or High Potential for Compromised Skin Integrity  Goal: Skin integrity is maintained or improved  Description: INTERVENTIONS:  - Identify patients at risk for skin breakdown  - Assess and monitor skin integrity  - Assess and monitor nutrition and hydration status  - Monitor labs   - Assess for incontinence (as needed)  - Turn and reposition patient (every 2 hours and prn)  - Assist with mobility/ambulation (max assist/dependent)  - Relieve pressure over bony prominences  - Avoid friction and shearing  - Provide appropriate hygiene as needed including keeping skin clean and dry  - Evaluate need for skin moisturizer/barrier cream  - Collaborate with interdisciplinary team   - Patient/family teaching  - Consider wound care consult   Outcome: Progressing     Problem: PAIN - ADULT  Goal: Verbalizes/displays adequate comfort level or baseline comfort level  Description: Interventions:  - Encourage patient to monitor pain and request assistance  - Assess pain using appropriate pain scale (0-10 pain scale)  - Administer analgesics based on type and severity of pain and evaluate response  - Implement non-pharmacological measures as appropriate and evaluate response  - Consider cultural and social influences on pain and pain management  - Notify physician/advanced practitioner if interventions unsuccessful or patient reports new pain  Outcome: Progressing     Problem: INFECTION - ADULT  Goal: Absence or prevention of progression during hospitalization  Description: INTERVENTIONS:  - Assess and monitor for signs and symptoms of infection  - Monitor lab/diagnostic results  - Monitor all insertion sites, i e  indwelling lines, tubes, and drains  - Administer medications as ordered  - Instruct and encourage patient and family to use good hand hygiene technique  Outcome: Progressing     Problem: SAFETY ADULT  Goal: Maintain or return to baseline ADL function  Description: INTERVENTIONS:  -  Assess patient's ability to carry out ADLs; (dependent)  - Assess/evaluate cause of self-care deficits (fracture, pain, limited movement)  - Assess range of motion  - Assess patient's mobility; (max assist/dependent)  - Assess patient's need for assistive devices and provide as appropriate  - Encourage maximum independence but intervene and supervise when necessary  - Involve family in performance of ADLs  - Assess for home care needs following discharge   - Consider OT consult to assist with ADL evaluation and planning for discharge  - Provide patient education as appropriate  Outcome: Progressing  Goal: Maintains/Returns to pre admission functional level  Description: INTERVENTIONS:  - Perform BMAT or MOVE assessment daily    - Set and communicate daily mobility goal to care team and patient/family/caregiver  - Collaborate with rehabilitation services on mobility goals if consulted  - Perform Range of Motion 3 times a day  - Reposition patient every 2 hours    - Dangle patient 3 times a day  - Stand patient 3 times a day  - Ambulate patient 3 times a day  - Out of bed to chair 3 times a day   - Out of bed for meals 3 times a day  - Out of bed for toileting  - Record patient progress and toleration of activity level   Outcome: Progressing  Goal: Patient will remain free of falls  Description: INTERVENTIONS:  -  Assess patient's ability to carry out ADLs; (dependent)  - Assess/evaluate cause of self-care deficits (fracture, pain, limited movement)  - Assess range of motion  - Assess patient's mobility; (max assist/dependent)  - Assess patient's need for assistive devices and provide as appropriate  - Encourage maximum independence but intervene and supervise when necessary  - Involve family in performance of ADLs  - Assess for home care needs following discharge   - Consider OT consult to assist with ADL evaluation and planning for discharge  - Provide patient education as appropriate  Outcome: Progressing     Problem: DISCHARGE PLANNING  Goal: Discharge to home or other facility with appropriate resources  Description: INTERVENTIONS:  - Identify barriers to discharge w/patient and caregiver  - Arrange for needed discharge resources and transportation as appropriate  - Identify discharge learning needs (meds, wound care, etc )  - Refer to Case Management Department for coordinating discharge planning if the patient needs post-hospital services based on physician/advanced practitioner order or complex needs related to functional status, cognitive ability, or social support system  Outcome: Progressing     Problem: Knowledge Deficit  Goal: Patient/family/caregiver demonstrates understanding of disease process, treatment plan, medications, and discharge instructions  Description: Complete learning assessment and assess knowledge base    Interventions:  - Provide teaching at level of understanding  - Provide teaching via preferred learning methods  Outcome: Progressing     Problem: METABOLIC, FLUID AND ELECTROLYTES - ADULT  Goal: Electrolytes maintained within normal limits  Description: INTERVENTIONS:  - Monitor labs and assess patient for signs and symptoms of electrolyte imbalances  - Administer electrolyte replacement as ordered  - Monitor response to electrolyte replacements, including repeat lab results as appropriate  - Instruct patient on fluid and nutrition as appropriate  Outcome: Progressing  Goal: Fluid balance maintained  Description: INTERVENTIONS:  - Monitor labs   - Monitor I/O and WT  - Instruct patient on fluid and nutrition as appropriate  - Assess for signs & symptoms of volume excess or deficit  Outcome: Progressing     Problem: SKIN/TISSUE INTEGRITY - ADULT  Goal: Skin Integrity remains intact(Skin Breakdown Prevention)  Description: Assess:  -Perform Duy assessment every shift  -Clean and moisturize skin every shift  -Inspect skin when repositioning, toileting, and assisting with ADLS  -Assess extremities for adequate circulation and sensation     Bed Management:  -Have minimal linens on bed & keep smooth, unwrinkled  -Change linens as needed when moist or perspiring  -Avoid sitting or lying in one position for more than 2 hours while in bed      Toileting:  -Offer bedside commode  -Assess for incontinence every 1-2 hours and prn      Activity:  -Mobilize patient 3 times a day  -Encourage activity and walks on unit  -Encourage or provide ROM exercises   -Turn and reposition patient every 2 Hours  -Use appropriate equipment to lift or move patient in bed  -Instruct/ Assist with weight shifting every  minutes when out of bed in chair  -Consider limitation of chair time 3-4 hour intervals    Skin Care:  -Avoid use of baby powder, tape, friction and shearing, hot water or constrictive clothing  -Relieve pressure over bony prominences using foam pad  -Do not massage red bony areas    Next Steps:  -Teach patient strategies to minimize risks     -Consider consults to  interdisciplinary teams   Outcome: Progressing  Goal: Incision(s), wounds(s) or drain site(s) healing without S/S of infection  Description: INTERVENTIONS  - Assess and document dressing, incision, wound bed, drain sites and surrounding tissue  - Provide patient and family education    Outcome: Progressing  Goal: Pressure injury heals and does not worsen  Description: Interventions:  - Implement low air loss mattress or specialty surface (Criteria met)  - Apply silicone foam dressing  - Instruct/assist with weight shifting every  minutes when in chair   - Limit chair time to 3-4 hour intervals  - Use special pressure reducing interventions such as waffle cushion when in chair   - Perform passive or active ROM every shift and prn  - Turn and reposition patient & offload bony prominences every 2 hours   - Utilize friction reducing device or surface for transfers   - Consider consults to  interdisciplinary teams   - Use incontinent care products after each incontinent episode   - Consider nutrition services referral as needed  Outcome: Progressing     Problem: MUSCULOSKELETAL - ADULT  Goal: Maintain or return mobility to safest level of function  Description: INTERVENTIONS:  - Assess patient's ability to carry out ADLs; (dependent)  - Assess/evaluate cause of self-care deficits (fracture, pain, limited movement)  - Assess range of motion  - Assess patient's mobility (max assist/dependent)  - Assess patient's need for assistive devices and provide as appropriate  - Encourage maximum independence but intervene and supervise when necessary  - Involve family in performance of ADLs  - Assess for home care needs following discharge   - Consider OT consult to assist with ADL evaluation and planning for discharge  - Provide patient education as appropriate  Outcome: Progressing  Goal: Maintain proper alignment of affected body part  Description: INTERVENTIONS:  - Support, maintain and protect limb and body alignment (RLE)  - Provide patient/ family with appropriate education  Outcome: Progressing     Problem: CARDIOVASCULAR - ADULT  Goal: Maintains optimal cardiac output and hemodynamic stability  Description: INTERVENTIONS:  - Monitor I/O, vital signs and rhythm  - Monitor for S/S and trends of decreased cardiac output  - Administer and titrate ordered vasoactive medications to optimize hemodynamic stability  - Assess quality of pulses, skin color and temperature  - Assess for signs of decreased coronary artery perfusion  - Instruct patient to report change in severity of symptoms  Outcome: Progressing

## 2023-02-10 NOTE — PROGRESS NOTES
NEPHROLOGY PROGRESS NOTE   Beltran Wall 80 y o  male MRN: [id number]  Unit/Bed#: 306-68 Encounter: [id number]    Assessment/Plan:    81 yo man with CKD 4, right complex renal cyst, HFpEF, htn admitted 2/8 as trauma alert after fall found to have right femur fracture in need of repair  Nephrology following for YOLANDE likely due to SHERON, CKD 4     1  Acute kidney injury (POA) atop chronic kidney disease  ? Kidney function continues to worsen now with creatinine around 3 5 mg/dL  Timeline consistent with contrast induced nephropathy atop volume depleted state  Torsemide is on hold and he is receiving gentle volume expansion  ? Tentative for orthopedic surgery today due to #4  Now moderate to high risk of worsening YOLANDE/dialysis if proceeds with surgery due to evolving acute kidney injury and inflammatory/hemodynamic perturbation associated with surgical intervention  I made patient aware of same and he voiced understanding  Will need to coordinate timing with our orthopedic colleagues  2  Stage 4 chronic kidney disease with baseline creatinine around 2 2 mg/dL  3  Probable contrast induced nephropathy  4  Right femur fracture   ? As mentioned above, moderate to high risk of worsening/decompensating renal failure with possibility of needing dialysis in setting of evolving YOLANDE and hemodynamic/inflammatory perturbations associated with surgery  Delaying surgery also presents its own risks  I explained renal risk to patient in detail and he voiced understanding  Awaiting input from orthopedic and anesthesia colleagues  5  Azotemia  ? No subtle uremic symptoms on exam  Hemoglobin stable  6  Suspected right complex renal cyst  ? Serial monitoring as an outpatient   7  Chronic HFpEF  ? Diuretics on hold and receiving gentle volume expansion  NPO at this time  Continue daily weight, IO  8  Altered mental status  ?  AMS overnight s/p CT read as normal  Potentially secondary to opioid in setting of YOLANDE/advanced renal disease  Mental status is improved upon exam         ROS  Examined lying in bed  States he has a lot of pain  A complete 10 point review of systems have been performed and are otherwise negative  Historical Information   Past Medical History:   Diagnosis Date   • Aortic stenosis     ECHO -4-14-14  MODERATE TO SEVERE AORTIC STENOSIS; MILD AROTIC INSUFFICIENCY - LAST ASSESSED 10/26/16; RESOLVED 6/8/16   • Aortic valve disorder     LAST ASSESSED 10/8/15; 10/8/15   • Arthritis    • CHF (congestive heart failure) ([facility] [address] )    • Complete heart block ([facility] [address] ) 7/20/2020   • Coronary artery disease    • Hypertension    • Hypertensive urgency 5/19/2019   • Renal disorder    • TIA (transient ischemic attack)    • Transient cerebral ischemia      Past Surgical History:   Procedure Laterality Date   • AORTIC VALVE REPLACEMENT  06/30/2015    avr with 23 mm [place] Magna Ease bioprosthetic   • CARDIAC PACEMAKER PLACEMENT Left 07/24/2020   • CATARACT EXTRACTION Right 06/05/2000   • COLONOSCOPY     • CORONARY ARTERY BYPASS GRAFT  06/05/2000    x1 with argueta  to lad   • EYE SURGERY     • POLYPECTOMY  04/2014    enteroscopic - esophageal ulcer, gastric erosion, and duodenal ulcer      • TONSILLECTOMY      unknown     Social History   Social History     Substance and Sexual Activity   Alcohol Use Never   • Alcohol/week: 0 0 standard drinks    Comment: very occasional     Social History     Substance and Sexual Activity   Drug Use Never     Social History     Tobacco Use   Smoking Status Never   Smokeless Tobacco Never       Family History:   Family History   Problem Relation Age of Onset   • No Known Problems Mother    • No Known Problems Father    • Coronary artery disease Neg Hx        Medications:  Pertinent medications were reviewed  Current Facility-Administered Medications   Medication Dose Route Frequency Provider Last Rate   • acetaminophen  650 mg Oral Q6H PRN Alf Rowell DO     • allopurinol  100 mg Oral Daily Kala Serra GIANNI Rowell DO     • cefazolin  2,000 mg Intravenous Q8H Charisma Schaffer PA-C 2,000 mg (02/10/23 0813)   • chlorhexidine   Topical Daily PRN Charisma Schaffer PA-C     • gabapentin  100 mg Oral HS Anali Rowell DO     • HYDROmorphone  0 5 mg Intravenous Q4H PRN Anali Rowell DO     • labetalol  10 mg Intravenous Q4H PRN Anali Rowell DO     • metoclopramide  10 mg Intravenous Q6H PRN Jeanette Gunter PA-C     • metoprolol tartrate  100 mg Oral BID Anali Rowell DO     • multi-electrolyte  50 mL/hr Intravenous Continuous Anali Rowell DO 50 mL/hr (02/09/23 2253)   • ondansetron  4 mg Intravenous Q4H PRN Anali Rowell DO     • oxyCODONE  5 mg Oral Q4H PRN Anali Rowell DO     • pantoprazole  40 mg Intravenous Q24H [address] Jeanette Gunter PA-C     • pravastatin  40 mg Oral Daily With [place]ay Bobbi Rowell DO     • tranexamic acid (CYKLOKAPRON) IV bolus  840 mg Intravenous Once Charisma Schaffer PA-C           Allergies   Allergen Reactions   • Promethazine Delirium and Other (See Comments)         Vitals:   /51 (BP Location: Left arm)   Pulse 60   Temp 98 2 °F (36 8 °C) (Oral)   Resp 18   Ht 5' 4" (1 626 m)   Wt 84 4 kg (186 lb 1 1 oz)   SpO2 99%   BMI 31 94 kg/m²   Body mass index is 31 94 kg/m²    SpO2: 99 %,   SpO2 Activity: At Rest,   O2 Device: Nasal cannula      Intake/Output Summary (Last 24 hours) at 2/10/2023 2151  Last data filed at 2/10/2023 0450  Gross per 24 hour   Intake 1950 ml   Output 825 ml   Net 1125 ml     Invasive Devices     Peripheral Intravenous Line  Duration           Peripheral IV 02/08/23 Right Antecubital 1 day                Physical Exam  General: conscious, cooperative, in no acute distress  Eyes: conjunctivae pink, anicteric sclerae  ENT: lips and mucous membranes moist  Neck: supple, no JVD, no masses  Chest: diminished breath sounds bilaterally, no crackles, ronchus or wheezings on 2 liters nasal cannula   CVS: S1 & S2, normal rate, regular rhythm  Abdomen: soft, non-tender, non-distended, normoactive bowel sounds  Extremities: trace right lower extremity edema  Skin: no rash  Neuro: awake, alert, oriented      Diagnostic Data:  Lab: I have personally reviewed pertinent lab results  ,   CBC:  Results from last 7 days   Lab Units 02/10/23  0455   WBC Thousand/uL 13 66*   HEMOGLOBIN g/dL 9 3*   HEMATOCRIT % 31 5*   PLATELETS Thousands/uL 280      CMP:   Lab Results   Component Value Date    SODIUM 136 02/10/2023    K 4 2 02/10/2023    CL 93 (L) 02/10/2023    CO2 29 02/10/2023    BUN 76 (H) 02/10/2023    CREATININE 3 47 (H) 02/10/2023    CALCIUM 8 6 02/10/2023    AST 13 02/10/2023    ALT 5 (L) 02/10/2023    ALKPHOS 77 02/10/2023    EGFR 14 02/10/2023   ,   PT/INR:   Lab Results   Component Value Date    INR 1 18 02/10/2023   ,   Magnesium: No components found for: MAG,  Phosphorous:   Lab Results   Component Value Date    PHOS 4 6 (H) 02/10/2023       Microbiology:  @LABNT,(urinecx:7)@        DIANDRA Ayala    Portions of the record may have been created with voice recognition software  Occasional wrong word or "sound a like" substitutions may have occurred due to the inherent limitations of voice recognition software  Read the chart carefully and recognize, using context, where substitutions have occurred

## 2023-02-10 NOTE — ASSESSMENT & PLAN NOTE
· Admit to med/surg level of care  · Consult Orthopedic Surgery  · Hold PO eliquis  · The patient is medically cleared from my standpoint for an ORIF of the right hip    · ORIF planned for 2/10/2023 - 2pm  · Cardiology pre-operative cardiac risk stratification -moderate  · Pain control  · Incentive spirometry  · DVT prophylaxis with heparin 5000 Units SQ every 8 hours and SCD's on both lower extremities

## 2023-02-10 NOTE — ASSESSMENT & PLAN NOTE
Lab Results   Component Value Date    EGFR 14 02/10/2023    EGFR 17 02/09/2023    EGFR 16 02/08/2023    CREATININE 3 47 (H) 02/10/2023    CREATININE 2 99 (H) 02/09/2023    CREATININE 3 13 (H) 02/08/2023     · Creatinine 3 5 upon admission  · Baseline serum creatinine appears to be around 2 0-2 6 mg/dl  · Likely a component of prerenal azotemia  · FENA 5 3% post renal/restrictive    · Seen by nephrology:No obstruction on ultrasound however is at risk given femur fracture  Also, exposed to iodinated contrast 2/8 in presumed volume depleted state and creatinine may worsen before improving in this setting  Continue to hold torsemide and avoid other potential nephrotoxins particularly NSAIDs and contrast  Agree with gentle balanced salt solution    · Utilize Isolyte IV fluids at 50 ml/hr

## 2023-02-10 NOTE — ASSESSMENT & PLAN NOTE
Wt Readings from Last 3 Encounters:   02/10/23 84 4 kg (186 lb 1 1 oz)   12/06/22 86 2 kg (190 lb)   12/02/22 85 kg (187 lb 6 4 oz)       · Hypovolemia improving  · Daily weights - 190 to 186  · Strict intake/output measurements - 240  · Transthoracic echocardiogram - EF% 60 with LVH  · Cardiac clearance obtained -moderate risk

## 2023-02-10 NOTE — PROGRESS NOTES
[address] [facility] [address]  Progress Note - Cesar Courts 1935, 80 y o  male MRN: [id number]  Unit/Bed#: OR POOL Encounter: [id number]  Primary Care Provider: Roselie Aase, DO   Date and time admitted to hospital: 2/8/2023  4:30 PM    Chronic diastolic CHF (congestive heart failure) ([address] )  Assessment & Plan  Wt Readings from Last 3 Encounters:   02/10/23 84 4 kg (186 lb 1 1 oz)   12/06/22 86 2 kg (190 lb)   12/02/22 85 kg (187 lb 6 4 oz)       · Hypovolemia improving  · Daily weights - 190 to 186  · Strict intake/output measurements - 240  · Transthoracic echocardiogram - EF% 60 with LVH  · Cardiac clearance obtained -moderate risk      Paroxysmal atrial fibrillation (HCC)  Assessment & Plan  · Hold eliquis for the upcoming surgical procedure 2/10  · Continue metoprolol tartrate 100 mg PO every 12 hours for heart rate control    Hyperphosphatemia  Assessment & Plan  Phosphorus 5 2  Recheck phosphorus level    Leukocytosis  Assessment & Plan  · Appears to have a chronic leukocytosis  · Check blood cultures x 2 sets  · Check a urine culture  · Check and follow the procalcitonin level, most recent 0 35  · Follow the CBC  · Outpatient Hematology evaluation    Multiple renal cysts  Assessment & Plan  · Outpatient surveillance imaging with PCP    CKD (chronic kidney disease) stage 4, GFR 15-29 ml/min [facility])  Assessment & Plan  Lab Results   Component Value Date    EGFR 14 02/10/2023    EGFR 17 02/09/2023    EGFR 16 02/08/2023    CREATININE 3 47 (H) 02/10/2023    CREATININE 2 99 (H) 02/09/2023    CREATININE 3 13 (H) 02/08/2023     · Creatinine 3 5 upon admission  · Baseline serum creatinine appears to be around 2 0-2 6 mg/dl  · Likely a component of prerenal azotemia  · FENA 5 3% post renal/restrictive    · Seen by nephrology:No obstruction on ultrasound however is at risk given femur fracture   Also, exposed to iodinated contrast 2/8 in presumed volume depleted state and creatinine may worsen before improving in this setting  Continue to hold torsemide and avoid other potential nephrotoxins particularly NSAIDs and contrast  Agree with gentle balanced salt solution    · Utilize Isolyte IV fluids at 50 ml/hr      * Fracture of femoral neck, right (HCC)  Assessment & Plan  · Admit to med/surg level of care  · Consult Orthopedic Surgery  · Hold PO eliquis  · The patient is medically cleared from my standpoint for an ORIF of the right hip  · ORIF planned for 2/10/2023 - 2pm  · Cardiology pre-operative cardiac risk stratification -moderate  · Pain control  · Incentive spirometry  · DVT prophylaxis with heparin 5000 Units SQ every 8 hours and SCD's on both lower extremities        VTE Pharmacologic Prophylaxis: VTE Score: 12 Anticoagulation on hold for surgery    Patient Centered Rounds: I performed bedside rounds with nursing staff today  Discussions with Specialists or Other Care Team Provider: ortho surgery    Education and Discussions with Family / Patient: Updated  (wife) at bedside  Time Spent for Care: 60 minutes  More than 50% of total time spent on counseling and coordination of care as described above  Current Length of Stay: 2 day(s)  Current Patient Status: Inpatient   Certification Statement: The patient will continue to require additional inpatient hospital stay due to post op  Discharge Plan: Anticipate discharge in 24-48 hrs to home with home services  Code Status: Level 1 - Full Code    Subjective:   Patient seen at bedside this morning, laying in bed uncomfortably  Patient states that he feels sore all over  Pain slightly worse compared to yesterday  Objective:     Vitals:   Temp (24hrs), Av 5 °F (36 9 °C), Min:98 2 °F (36 8 °C), Max:98 9 °F (37 2 °C)    Temp:  [98 2 °F (36 8 °C)-98 9 °F (37 2 °C)] 98 2 °F (36 8 °C)  HR:  [60-63] 60  Resp:  [16-20] 18  BP: (103-158)/() 117/51  SpO2:  [97 %-100 %] 99 %  Body mass index is 31 94 kg/m²       Input and Output Summary (last 24 hours): Intake/Output Summary (Last 24 hours) at 2/10/2023 1525  Last data filed at 2/10/2023 1445  Gross per 24 hour   Intake 1690 ml   Output 325 ml   Net 1365 ml       Physical Exam:   Physical Exam  Vitals and nursing note reviewed  Constitutional:       General: He is not in acute distress  Appearance: He is well-developed  HENT:      Head: Normocephalic and atraumatic  Right Ear: External ear normal       Left Ear: External ear normal       Nose: No rhinorrhea  Mouth/Throat:      Mouth: Mucous membranes are dry  Eyes:      Conjunctiva/sclera: Conjunctivae normal    Cardiovascular:      Rate and Rhythm: Normal rate and regular rhythm  Heart sounds: No murmur heard  Pulmonary:      Effort: Pulmonary effort is normal  No respiratory distress  Breath sounds: Normal breath sounds  Abdominal:      Palpations: Abdomen is soft  Tenderness: There is no abdominal tenderness  Musculoskeletal:         General: Swelling, tenderness and signs of injury present  Cervical back: Neck supple  Comments: Externally rotated right lower extremity and pain with palpation of the right hip region   Skin:     General: Skin is warm and dry  Capillary Refill: Capillary refill takes less than 2 seconds  Neurological:      General: No focal deficit present  Mental Status: He is alert  Mental status is at baseline     Psychiatric:         Mood and Affect: Mood normal          Behavior: Behavior normal          Additional Data:     Labs:  Results from last 7 days   Lab Units 02/10/23  0455   WBC Thousand/uL 13 66*   HEMOGLOBIN g/dL 9 3*   HEMATOCRIT % 31 5*   PLATELETS Thousands/uL 280   NEUTROS PCT % 82*   LYMPHS PCT % 6*   MONOS PCT % 9   EOS PCT % 2     Results from last 7 days   Lab Units 02/10/23  0455   SODIUM mmol/L 136   POTASSIUM mmol/L 4 2   CHLORIDE mmol/L 93*   CO2 mmol/L 29   BUN mg/dL 76*   CREATININE mg/dL 3 47*   ANION GAP mmol/L 14*   CALCIUM mg/dL 8 6 ALBUMIN g/dL 3 6   TOTAL BILIRUBIN mg/dL 0 54   ALK PHOS U/L 77   ALT U/L 5*   AST U/L 13   GLUCOSE RANDOM mg/dL 106     Results from last 7 days   Lab Units 02/10/23  0455   INR  1 18             Results from last 7 days   Lab Units 02/10/23  0550 02/09/23  0506   LACTIC ACID mmol/L  --  1 1   PROCALCITONIN ng/ml 0 54* 0 35*       Lines/Drains:  Invasive Devices     Peripheral Intravenous Line  Duration           Peripheral IV 02/08/23 Right Antecubital 1 day    Peripheral IV 02/10/23 Right Wrist <1 day          Drain  Duration           Urethral Catheter Latex 16 Fr  <1 day          Airway  Duration           ETT  Cuffed;Oral;Straight; Inflated 8 mm <1 day              Urinary Catheter:  Goal for removal: Remove after 48 hrs of I/O monitoring               Imaging: Reviewed radiology reports from this admission including: abdominal/pelvic CT    Recent Cultures (last 7 days):   Results from last 7 days   Lab Units 02/09/23  0523 02/09/23  0510 02/08/23  2041   BLOOD CULTURE  No Growth at 24 hrs   No Growth at 24 hrs   --    URINE CULTURE   --   --  <10,000 cfu/ml       Last 24 Hours Medication List:   Current Facility-Administered Medications   Medication Dose Route Frequency Provider Last Rate   • [MAR Hold] acetaminophen  650 mg Oral Q6H PRN Gelacio Jordanr, DO     • [facility] Hold] allopurinol  100 mg Oral Daily Gelacio uLcero Sorin, DO     • cefazolin  2,000 mg Intravenous [facility] Eros Jimenes PA-C 2,000 mg (02/10/23 0813)   • [MAR Hold] chlorhexidine   Topical Daily PRN Eros Jimenes PA-C     • [facility] Hold] gabapentin  100 mg Oral HS Gelacio Lucero [place], DO     • [facility] Hold] HYDROmorphone  0 5 mg Intravenous Q4H PRN Gelacio Jordanr, DO     • [facility] Hold] labetalol  10 mg Intravenous Q4H PRN Gelacio Jordanr, DO     • [facility] Hold] metoclopramide  10 mg Intravenous Q6H PRN Wyatt [place], PA-C     • [MAR Hold] metoprolol tartrate  100 mg Oral BID Gelaico Rowell DO     • multi-electrolyte  50 mL/hr Intravenous Continuous Zakiya Lipschutz [place], DO 50 mL/hr (02/09/23 2756)   • [MAR Hold] ondansetron  4 mg Intravenous Q4H PRN Zakiya Lipschutz Sorin, DO     • [MAR Hold] oxyCODONE  5 mg Oral Q4H PRN Zakiya Lipschutz [place], DO     • [facility] Hold] pantoprazole  40 mg Intravenous Q24H [facility] Len Ro PA-C     • [MAR Hold] pravastatin  40 mg Oral Daily With Paulrosemarye Friends, DO       Facility-Administered Medications Ordered in Other Encounters   Medication Dose Route Frequency Provider Last Rate   • fentanyl citrate (PF)   Intravenous PRN Sumi Second, CRNA     • Ketamine HCl-Sodium Chloride   Intravenous PRN Sumi Second, CRNA     • lactated ringers   Intravenous Continuous PRN Sumi Second, CRNA     • lidocaine (PF)   Intravenous PRN Sumi Second, CRNA     • norepinephrine (LEVOPHED) 4 mg (STANDARD CONCENTRATION) IV in sodium chloride 0 9% 250 mL   Intravenous Continuous PRN Sumi Second, CRNA 5 mcg/min (02/10/23 1440)   • ondansetron   Intravenous PRN Sumi Second, CRNA     • phenylephrine   Intravenous PRN Sumi Second, CRNA     • propofol   Intravenous Continuous PRN Usmi Second, CRNA 70 mcg/kg/min (02/10/23 1509)   • propofol   Intravenous PRN Sumi Second, CRNA     • ROCuronium   Intravenous PRN Sumi Second, CRNA          Today, Patient Was Seen By: Noé Garnica MD    **Please Note: This note may have been constructed using a voice recognition system  **

## 2023-02-10 NOTE — DISCHARGE INSTR - AVS FIRST PAGE
Discharge Instructions - Orthopedics  Abbie Funes 80 y o  male MRN: [id number]  Unit/Bed#: MI OR MAIN    Weight Bearing Status:                                           Weight Bearing as tolerated to the right lower extremity  DVT prophylaxis:  Complete course of anticoagulation per hospitalist    Pain:  Continue analgesics as directed    Showering Instructions: You can shower, maintain silver dressing clean, dry, and intact    Dressing Instructions:   Keep dressing clean, dry and intact until follow up appointment  Driving Instructions:  No driving until cleared by Orthopaedic Surgery  PT/OT:  Continue PT/OT on outpatient basis as directed    Appt Instructions: If you do not have your appointment, please call the clinic at [phone]  Otherwise followup as scheduled with Dr Diane Georgia the office sooner if you experience any increased numbness/tingling in the extremities        Miscellaneous:  None

## 2023-02-10 NOTE — ANESTHESIA POSTPROCEDURE EVALUATION
Post-Op Assessment Note    CV Status:  Stable  Pain Score: 3    Pain management: adequate     Mental Status:  Alert and awake   Hydration Status:  Euvolemic   PONV Controlled:  Controlled   Airway Patency:  Patent      Post Op Vitals Reviewed: Yes      Staff: Anesthesiologist         No notable events documented      BP 99/66 (02/10/23 1727)    Temp (!) 97 2 °F (36 2 °C) (02/10/23 1727)    Pulse 63 (02/10/23 1727)   Resp 18 (02/10/23 1727)    SpO2 93 % (02/10/23 1727)

## 2023-02-10 NOTE — PLAN OF CARE
Problem: MOBILITY - ADULT  Goal: Maintain or return to baseline ADL function  Description: INTERVENTIONS:  -  Assess patient's ability to carry out ADLs; (dependent)  - Assess/evaluate cause of self-care deficits (fracture, pain, limited movement)  - Assess range of motion  - Assess patient's mobility; (max assist/dependent)  - Assess patient's need for assistive devices and provide as appropriate  - Encourage maximum independence but intervene and supervise when necessary  - Involve family in performance of ADLs  - Assess for home care needs following discharge   - Consider OT consult to assist with ADL evaluation and planning for discharge  - Provide patient education as appropriate  2/10/2023 0053 by Darshana Gomez RN  Outcome: Progressing  2/10/2023 0052 by Darshana Gomez RN  Outcome: Progressing  Goal: Maintains/Returns to pre admission functional level  Description: INTERVENTIONS:  - Perform BMAT or MOVE assessment daily    - Set and communicate daily mobility goal to care team and patient/family/caregiver  - Collaborate with rehabilitation services on mobility goals if consulted  - Perform Range of Motion 3 times a day  - Reposition patient every 2 hours    - Dangle patient 3 times a day  - Stand patient 3 times a day  - Ambulate patient 3 times a day  - Out of bed to chair 3 times a day   - Out of bed for meals 3 times a day  - Out of bed for toileting  - Record patient progress and toleration of activity level   2/10/2023 0053 by Darshana Gomez RN  Outcome: Progressing  2/10/2023 0052 by Darshana Gomez RN  Outcome: Progressing     Problem: Prexisting or High Potential for Compromised Skin Integrity  Goal: Skin integrity is maintained or improved  Description: INTERVENTIONS:  - Identify patients at risk for skin breakdown  - Assess and monitor skin integrity  - Assess and monitor nutrition and hydration status  - Monitor labs   - Assess for incontinence (as needed)  - Turn and reposition patient (every 2 hours and prn)  - Assist with mobility/ambulation (max assist/dependent)  - Relieve pressure over bony prominences  - Avoid friction and shearing  - Provide appropriate hygiene as needed including keeping skin clean and dry  - Evaluate need for skin moisturizer/barrier cream  - Collaborate with interdisciplinary team   - Patient/family teaching  - Consider wound care consult   2/10/2023 0053 by Mar Ramsay RN  Outcome: Progressing  2/10/2023 0052 by Mar Ramsay RN  Outcome: Progressing     Problem: PAIN - ADULT  Goal: Verbalizes/displays adequate comfort level or baseline comfort level  Description: Interventions:  - Encourage patient to monitor pain and request assistance  - Assess pain using appropriate pain scale (0-10 pain scale)  - Administer analgesics based on type and severity of pain and evaluate response  - Implement non-pharmacological measures as appropriate and evaluate response  - Consider cultural and social influences on pain and pain management  - Notify physician/advanced practitioner if interventions unsuccessful or patient reports new pain  2/10/2023 0053 by Mar Ramsay RN  Outcome: Progressing  2/10/2023 0052 by Mar Ramsay RN  Outcome: Progressing     Problem: INFECTION - ADULT  Goal: Absence or prevention of progression during hospitalization  Description: INTERVENTIONS:  - Assess and monitor for signs and symptoms of infection  - Monitor lab/diagnostic results  - Monitor all insertion sites, i e  indwelling lines, tubes, and drains  - Administer medications as ordered  - Instruct and encourage patient and family to use good hand hygiene technique  2/10/2023 0053 by Mar Ramsay RN  Outcome: Progressing  2/10/2023 0052 by Mar Ramsay RN  Outcome: Progressing     Problem: SAFETY ADULT  Goal: Maintain or return to baseline ADL function  Description: INTERVENTIONS:  -  Assess patient's ability to carry out ADLs; (dependent)  - Assess/evaluate cause of self-care deficits (fracture, pain, limited movement)  - Assess range of motion  - Assess patient's mobility; (max assist/dependent)  - Assess patient's need for assistive devices and provide as appropriate  - Encourage maximum independence but intervene and supervise when necessary  - Involve family in performance of ADLs  - Assess for home care needs following discharge   - Consider OT consult to assist with ADL evaluation and planning for discharge  - Provide patient education as appropriate  2/10/2023 0053 by Robby Alpers, RN  Outcome: Progressing  2/10/2023 0052 by Robby Alpers, RN  Outcome: Progressing  Goal: Maintains/Returns to pre admission functional level  Description: INTERVENTIONS:  - Perform BMAT or MOVE assessment daily    - Set and communicate daily mobility goal to care team and patient/family/caregiver  - Collaborate with rehabilitation services on mobility goals if consulted  - Perform Range of Motion 3 times a day  - Reposition patient every 2 hours    - Dangle patient 3 times a day  - Stand patient 3 times a day  - Ambulate patient 3 times a day  - Out of bed to chair 3 times a day   - Out of bed for meals 3 times a day  - Out of bed for toileting  - Record patient progress and toleration of activity level   2/10/2023 0053 by Robby Alpers, RN  Outcome: Progressing  2/10/2023 0052 by Robby Alpers, RN  Outcome: Progressing  Goal: Patient will remain free of falls  Description: INTERVENTIONS:  -  Assess patient's ability to carry out ADLs; (dependent)  - Assess/evaluate cause of self-care deficits (fracture, pain, limited movement)  - Assess range of motion  - Assess patient's mobility; (max assist/dependent)  - Assess patient's need for assistive devices and provide as appropriate  - Encourage maximum independence but intervene and supervise when necessary  - Involve family in performance of ADLs  - Assess for home care needs following discharge   - Consider OT consult to assist with ADL evaluation and planning for discharge  - Provide patient education as appropriate  2/10/2023 0053 by All Peralta RN  Outcome: Progressing  2/10/2023 0052 by All Peralta RN  Outcome: Progressing     Problem: DISCHARGE PLANNING  Goal: Discharge to home or other facility with appropriate resources  Description: INTERVENTIONS:  - Identify barriers to discharge w/patient and caregiver  - Arrange for needed discharge resources and transportation as appropriate  - Identify discharge learning needs (meds, wound care, etc )  - Refer to Case Management Department for coordinating discharge planning if the patient needs post-hospital services based on physician/advanced practitioner order or complex needs related to functional status, cognitive ability, or social support system  2/10/2023 0053 by All Peralta RN  Outcome: Progressing  2/10/2023 0052 by All Peralta RN  Outcome: Progressing     Problem: Knowledge Deficit  Goal: Patient/family/caregiver demonstrates understanding of disease process, treatment plan, medications, and discharge instructions  Description: Complete learning assessment and assess knowledge base    Interventions:  - Provide teaching at level of understanding  - Provide teaching via preferred learning methods  2/10/2023 0053 by All Peralta RN  Outcome: Progressing  2/10/2023 0052 by All Peralta RN  Outcome: Progressing     Problem: METABOLIC, FLUID AND ELECTROLYTES - ADULT  Goal: Electrolytes maintained within normal limits  Description: INTERVENTIONS:  - Monitor labs and assess patient for signs and symptoms of electrolyte imbalances  - Administer electrolyte replacement as ordered  - Monitor response to electrolyte replacements, including repeat lab results as appropriate  - Instruct patient on fluid and nutrition as appropriate  2/10/2023 0053 by All Peralta RN  Outcome: Progressing  2/10/2023 0052 by All Peralta RN  Outcome: Progressing  Goal: Fluid balance maintained  Description: INTERVENTIONS:  - Monitor labs   - Monitor I/O and WT  - Instruct patient on fluid and nutrition as appropriate  - Assess for signs & symptoms of volume excess or deficit  2/10/2023 0053 by Devora Heath RN  Outcome: Progressing  2/10/2023 0052 by Devora Heath RN  Outcome: Progressing     Problem: SKIN/TISSUE INTEGRITY - ADULT  Goal: Skin Integrity remains intact(Skin Breakdown Prevention)  Description: Assess:  -Perform Duy assessment every shift  -Clean and moisturize skin every shift  -Inspect skin when repositioning, toileting, and assisting with ADLS  -Assess extremities for adequate circulation and sensation     Bed Management:  -Have minimal linens on bed & keep smooth, unwrinkled  -Change linens as needed when moist or perspiring  -Avoid sitting or lying in one position for more than 2 hours while in bed      Toileting:  -Offer bedside commode  -Assess for incontinence every 1-2 hours and prn      Activity:  -Mobilize patient 3 times a day  -Encourage activity and walks on unit  -Encourage or provide ROM exercises   -Turn and reposition patient every 2 Hours  -Use appropriate equipment to lift or move patient in bed  -Instruct/ Assist with weight shifting every  minutes when out of bed in chair  -Consider limitation of chair time 3-4 hour intervals    Skin Care:  -Avoid use of baby powder, tape, friction and shearing, hot water or constrictive clothing  -Relieve pressure over bony prominences using foam pad  -Do not massage red bony areas    Next Steps:  -Teach patient strategies to minimize risks     -Consider consults to  interdisciplinary teams   2/10/2023 0053 by Devora Heath RN  Outcome: Progressing  2/10/2023 0052 by Devora Heath RN  Outcome: Progressing  Goal: Incision(s), wounds(s) or drain site(s) healing without S/S of infection  Description: INTERVENTIONS  - Assess and document dressing, incision, wound bed, drain sites and surrounding tissue  - Provide patient and family education    2/10/2023 0053 by Yosef Peguero RN  Outcome: Progressing  2/10/2023 0052 by Yosef Peguero RN  Outcome: Progressing  Goal: Pressure injury heals and does not worsen  Description: Interventions:  - Implement low air loss mattress or specialty surface (Criteria met)  - Apply silicone foam dressing  - Instruct/assist with weight shifting every  minutes when in chair   - Limit chair time to 3-4 hour intervals  - Use special pressure reducing interventions such as waffle cushion when in chair   - Perform passive or active ROM every shift and prn  - Turn and reposition patient & offload bony prominences every 2 hours   - Utilize friction reducing device or surface for transfers   - Consider consults to  interdisciplinary teams   - Use incontinent care products after each incontinent episode   - Consider nutrition services referral as needed  2/10/2023 0053 by Yosef Peguero RN  Outcome: Progressing  2/10/2023 0052 by Yosef Peguero RN  Outcome: Progressing     Problem: MUSCULOSKELETAL - ADULT  Goal: Maintain or return mobility to safest level of function  Description: INTERVENTIONS:  - Assess patient's ability to carry out ADLs; (dependent)  - Assess/evaluate cause of self-care deficits (fracture, pain, limited movement)  - Assess range of motion  - Assess patient's mobility (max assist/dependent)  - Assess patient's need for assistive devices and provide as appropriate  - Encourage maximum independence but intervene and supervise when necessary  - Involve family in performance of ADLs  - Assess for home care needs following discharge   - Consider OT consult to assist with ADL evaluation and planning for discharge  - Provide patient education as appropriate  2/10/2023 0053 by Yosef Peguero RN  Outcome: Progressing  2/10/2023 0052 by Yosef Peguero RN  Outcome: Progressing  Goal: Maintain proper alignment of affected body part  Description: INTERVENTIONS:  - Support, maintain and protect limb and body alignment (RLE)  - Provide patient/ family with appropriate education  2/10/2023 0053 by Ella Peres RN  Outcome: Progressing  2/10/2023 0052 by Ella Peres RN  Outcome: Progressing     Problem: CARDIOVASCULAR - ADULT  Goal: Maintains optimal cardiac output and hemodynamic stability  Description: INTERVENTIONS:  - Monitor I/O, vital signs and rhythm  - Monitor for S/S and trends of decreased cardiac output  - Administer and titrate ordered vasoactive medications to optimize hemodynamic stability  - Assess quality of pulses, skin color and temperature  - Assess for signs of decreased coronary artery perfusion  - Instruct patient to report change in severity of symptoms  Outcome: Progressing

## 2023-02-10 NOTE — QUICK NOTE
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

## 2023-02-10 NOTE — OP NOTE
OPERATIVE REPORT  PATIENT NAME: Juliana Kang    :  1935  MRN: [id number]  Pt Location: [facility] OR ROOM 02    SURGERY DATE: 2/10/2023    Surgeon(s) and Role:     * Rene Bolivar DO - Primary     * Nitin Velasquez PA-C - Assisting    Preop Diagnosis:  Fall from standing, initial encounter [W19  XXXA]  Closed displaced fracture of right femoral neck ([address] ) [S72 001A]    Post-Op Diagnosis Codes:     * Fall from standing, initial encounter [W19  XXXA]     * Closed displaced fracture of right femoral neck ([address] ) [S72 001A]    Procedure(s):  Right - HEMIARTHROPLASTY HIP (BIPOLAR)  1  Fixation of femoral neck fracture with hemiarthroplasty CPT 26111    DePuy Trevett size 5 stem, 52 bipolar head +8-1/2    Specimen(s):  * No specimens in log *    Estimated Blood Loss:   150 mL    Drains:  Urethral Catheter Latex 16 Fr  (Active)   Number of days: 0       Anesthesia Type:   Choice    Implant Name Type Inv  Item Serial No   Lot No  LRB No  Used Action   HEAD FEMORAL 12/14 TPR 28MM STD +8 5MM ARTICULEZE - [id number]  HEAD FEMORAL 12/14 TPR 28MM STD +8 5MM ARTICULEZE  DEPUY [id number] Right 1 Implanted   COMPONENT ACETABULAR 52 X 28MM SLFCNTR - [id number]  COMPONENT ACETABULAR 52 X 28MM SLFCNTR  DEPUY [id number] Right 1 Implanted   STEM FEM CMNT 12/14 TPR SZ 5 STD OFFSET SUM[place] - [id number]  STEM FEM CMNT 1214 TPR SZ 5 STD OFFSET SUMMIT  DEPUY [id number] Right 1 Implanted         Operative Indications:  Fall from standing, initial encounter [W19  XXXA]  Closed displaced fracture of right femoral neck ([address] ) [S72 001A]  Patient is a 59-year-old man who sustained a fall from standing height onto his right hip  Second to the hospital where he was diagnosed with a displaced femoral neck fracture he was indicated for surgical treatment, hemiarthroplasty  The patient has failed non operative measures and has opted for surgical intervention   Risks and benefits of the treatment options and surgery were discussed in detail with the patient by Dr Ollie Truong  The risks of surgery including infection, bleeding, injury to nerves, injury to the vessels, excess scar tissue formation, risk of failure of the procedure, the possible need for further surgery, and potential risk of loss of limb and life, dislocation and leg length discrepancy  After weighing all the treatment options available, the patient has opted for surgical intervention and informed consent was obtained  Operative Findings:  Displaced femoral neck fracture right side    Complications:   None    Procedure and Technique:  Patient was brought to the operating room where he transferred the operating room table, Alvares catheter was placed in standard sterile fashion  Patient was intubated and sedated and transferred to the operating room table  He was placed in the left lateral decubitus position with the right hip up and all bony prominences well-padded and an axillary roll was placed  Beanbag positioner was deflated and patient was placed in a comfortable position  Perioperative antibiotics were given, Ancef  Right lower extremities prepped and draped in standard sterile fashion  Timeout was performed identifying all team members in the room and the right lower extremity as the proper operative extremity    Approximately 10 cm incision was made over the lateral thigh approximately extending 4 cm above the greater trochanter  Sharply incised down the fascia, fascia was opened up in line with the incision trailing slightly posteriorly proximally  Charnley retractor was placed bursa was resected  Fat pad was found between the anterior third of the gluteus medius musculature  This was developed, sharply incised with Bovie down to bone  This was extended distally raising a flap of vastus lateralis and anterior third of the gluteus medius off of the greater trochanter this is reflected with external rotation of the femoral head neck    This was taken down to the femoral head and neck, there was abundant capsule that was extremely thick  Capsule was excised and a T-shaped capsulotomy was performed on the femoral neck where the area of the fracture was  Femur was dislocated with the femoral head left within the acetabulum, napkin ring neck cut was then made using the piriformis fossa then a fingerbreadth above the lesser trochanter as a guide  Abundant thick capsule was removed, Pulvinar was resected from deep acetabulum, Estrella elevator was used as a wedge along with corkscrew to remove the femoral head  This is measured on the back table and deemed to be approximately 52 mm head, 52 mm bipolar head was then trialed with an acetabulum deemed to have good tight fit  This was removed, tension was then drawn to the proximal femur, leg was externally rotated and delivered with the distal leg hung over the side of the table in a Ferro bag  Patient had abundant posterior trochanteric overgrowth, this was resected  Box ostium was placed, canal finder and lateralizing reamer were then inserted  Proximal reaming was performed up to a size 5 6  Sequential broaching was then performed in standard fashion, a size 5 had good tight fit  Patient had thick cortices on x-ray  Trials were then performed with multiple size neck length, 8 5 neck length was deemed to be most stable in hip extension and external rotation  Patient was also stable in 90 degrees of flexion with 60 degrees of internal rotation, position of sleep, shuck test, banjo test   Trial components were then removed the wound was copiously irrigated with normal saline, size 5 Haywood stem was then inserted maintaining the proper anteversion of the femoral canal   Trial 8 5 was once again reduced and deemed to be stable in all planes as described before    This was hip was then dislocated once more, final 52 bipolar head with 8 5+ neck was then engaged in the Xiomara taper, hip was then reduced and deemed to be extremely stable once more as previously described  The wound was copiously irrigated with normal saline deep capsule was closed with #2 Ethibond, deep gluteal tissue was closed with #1 Vicryl, iliotibial band was closed with #1 Vicryl, deep fat was closed with #1 Vicryl, subcutaneous tissues closed with 2-0 Monocryl  Skin was closed with staples, skin was cleansed and dried, Mepilex dressing was placed, lower extremities wrapped in soft roll and Ace from toes to groin, patient was awakened from anesthesia and without complication sent to the PACU       I was present for the entire procedure, A qualified resident physician was not available and A physician assistant, Ashwin Mcgarry, was required during the procedure for retraction tissue handling,dissection and suturing    Patient Disposition:  PACU  and extubated and stable        SIGNATURE: Lucia Sandres DO  DATE: February 10, 2023  TIME: 5:12 PM

## 2023-02-11 PROBLEM — G92.9 TOXIC ENCEPHALOPATHY: Status: ACTIVE | Noted: 2023-02-11

## 2023-02-11 PROBLEM — I27.20 PULMONARY HYPERTENSION (HCC): Status: ACTIVE | Noted: 2023-02-11

## 2023-02-11 PROBLEM — I34.0 MITRAL VALVE INSUFFICIENCY: Status: ACTIVE | Noted: 2023-02-11

## 2023-02-11 PROBLEM — I07.1 TRICUSPID VALVE INSUFFICIENCY: Status: ACTIVE | Noted: 2023-02-11

## 2023-02-11 PROBLEM — N17.9 ACUTE KIDNEY INJURY (HCC): Status: ACTIVE | Noted: 2023-02-11

## 2023-02-11 LAB
ALBUMIN SERPL BCP-MCNC: 3.3 G/DL (ref 3.5–5)
ALP SERPL-CCNC: 65 U/L (ref 34–104)
ALT SERPL W P-5'-P-CCNC: <3 U/L (ref 7–52)
ANION GAP SERPL CALCULATED.3IONS-SCNC: 17 MMOL/L (ref 4–13)
ANISOCYTOSIS BLD QL SMEAR: PRESENT
AST SERPL W P-5'-P-CCNC: 23 U/L (ref 13–39)
BASE EXCESS BLDA CALC-SCNC: -0.4 MMOL/L
BASOPHILS # BLD MANUAL: 0 THOUSAND/UL (ref 0–0.1)
BASOPHILS NFR MAR MANUAL: 0 % (ref 0–1)
BILIRUB SERPL-MCNC: 0.31 MG/DL (ref 0.2–1)
BUN SERPL-MCNC: 81 MG/DL (ref 5–25)
CALCIUM ALBUM COR SERPL-MCNC: 9.3 MG/DL (ref 8.3–10.1)
CALCIUM SERPL-MCNC: 8.7 MG/DL (ref 8.4–10.2)
CHLORIDE SERPL-SCNC: 94 MMOL/L (ref 96–108)
CK MB SERPL-MCNC: 0.8 % (ref 0–2.5)
CK MB SERPL-MCNC: 5.3 NG/ML (ref 0.6–6.3)
CK SERPL-CCNC: 671 U/L (ref 39–308)
CO2 SERPL-SCNC: 25 MMOL/L (ref 21–32)
CREAT SERPL-MCNC: 4.29 MG/DL (ref 0.6–1.3)
EOSINOPHIL # BLD MANUAL: 0 THOUSAND/UL (ref 0–0.4)
EOSINOPHIL NFR BLD MANUAL: 0 % (ref 0–6)
ERYTHROCYTE [DISTWIDTH] IN BLOOD BY AUTOMATED COUNT: 18 % (ref 11.6–15.1)
GFR SERPL CREATININE-BSD FRML MDRD: 11 ML/MIN/1.73SQ M
GLUCOSE SERPL-MCNC: 126 MG/DL (ref 65–140)
HCO3 BLDA-SCNC: 25.4 MMOL/L (ref 22–28)
HCT VFR BLD AUTO: 27.1 % (ref 36.5–49.3)
HGB BLD-MCNC: 7.9 G/DL (ref 12–17)
LG PLATELETS BLD QL SMEAR: PRESENT
LYMPHOCYTES # BLD AUTO: 0.65 THOUSAND/UL (ref 0.6–4.47)
LYMPHOCYTES # BLD AUTO: 5 % (ref 14–44)
MAGNESIUM SERPL-MCNC: 2.1 MG/DL (ref 1.9–2.7)
MCH RBC QN AUTO: 22.6 PG (ref 26.8–34.3)
MCHC RBC AUTO-ENTMCNC: 29.2 G/DL (ref 31.4–37.4)
MCV RBC AUTO: 77 FL (ref 82–98)
MONOCYTES # BLD AUTO: 0.13 THOUSAND/UL (ref 0–1.22)
MONOCYTES NFR BLD: 1 % (ref 4–12)
NASAL CANNULA: ABNORMAL
NEUTROPHILS # BLD MANUAL: 12.3 THOUSAND/UL (ref 1.85–7.62)
NEUTS BAND NFR BLD MANUAL: 1 % (ref 0–8)
NEUTS SEG NFR BLD AUTO: 93 % (ref 43–75)
O2 CT BLDA-SCNC: 11.6 ML/DL (ref 16–23)
OVALOCYTES BLD QL SMEAR: PRESENT
OXYHGB MFR BLDA: 96.5 % (ref 94–97)
PCO2 BLDA: 47.2 MM HG (ref 36–44)
PH BLDA: 7.35 [PH] (ref 7.35–7.45)
PHOSPHATE SERPL-MCNC: 6.7 MG/DL (ref 2.3–4.1)
PLATELET # BLD AUTO: 234 THOUSANDS/UL (ref 149–390)
PLATELET BLD QL SMEAR: ADEQUATE
PMV BLD AUTO: 10.2 FL (ref 8.9–12.7)
PO2 BLDA: 112.6 MM HG (ref 75–129)
POTASSIUM SERPL-SCNC: 4.6 MMOL/L (ref 3.5–5.3)
PROCALCITONIN SERPL-MCNC: 1.83 NG/ML
PROT SERPL-MCNC: 5.9 G/DL (ref 6.4–8.4)
RBC # BLD AUTO: 3.5 MILLION/UL (ref 3.88–5.62)
SODIUM SERPL-SCNC: 136 MMOL/L (ref 135–147)
SPECIMEN SOURCE: ABNORMAL
WBC # BLD AUTO: 13.09 THOUSAND/UL (ref 4.31–10.16)

## 2023-02-11 RX ORDER — CEFTRIAXONE 1 G/50ML
1000 INJECTION, SOLUTION INTRAVENOUS EVERY 24 HOURS
Status: DISCONTINUED | OUTPATIENT
Start: 2023-02-11 | End: 2023-02-16 | Stop reason: HOSPADM

## 2023-02-11 RX ADMIN — CEFTRIAXONE 1000 MG: 1 INJECTION, SOLUTION INTRAVENOUS at 16:24

## 2023-02-11 RX ADMIN — METOPROLOL TARTRATE 100 MG: 100 TABLET, FILM COATED ORAL at 21:54

## 2023-02-11 RX ADMIN — SODIUM CHLORIDE, SODIUM GLUCONATE, SODIUM ACETATE, POTASSIUM CHLORIDE, MAGNESIUM CHLORIDE, SODIUM PHOSPHATE, DIBASIC, AND POTASSIUM PHOSPHATE 100 ML/HR: .53; .5; .37; .037; .03; .012; .00082 INJECTION, SOLUTION INTRAVENOUS at 20:03

## 2023-02-11 RX ADMIN — PRAVASTATIN SODIUM 40 MG: 40 TABLET ORAL at 16:23

## 2023-02-11 RX ADMIN — HEPARIN SODIUM 5000 UNITS: 5000 INJECTION INTRAVENOUS; SUBCUTANEOUS at 14:18

## 2023-02-11 RX ADMIN — HEPARIN SODIUM 5000 UNITS: 5000 INJECTION INTRAVENOUS; SUBCUTANEOUS at 21:52

## 2023-02-11 RX ADMIN — CEFAZOLIN SODIUM 2000 MG: 2 SOLUTION INTRAVENOUS at 05:44

## 2023-02-11 RX ADMIN — DOCUSATE SODIUM 100 MG: 100 CAPSULE, LIQUID FILLED ORAL at 18:02

## 2023-02-11 RX ADMIN — HYDROMORPHONE HYDROCHLORIDE 0.5 MG: 1 INJECTION, SOLUTION INTRAMUSCULAR; INTRAVENOUS; SUBCUTANEOUS at 03:42

## 2023-02-11 RX ADMIN — SODIUM CHLORIDE, SODIUM GLUCONATE, SODIUM ACETATE, POTASSIUM CHLORIDE, MAGNESIUM CHLORIDE, SODIUM PHOSPHATE, DIBASIC, AND POTASSIUM PHOSPHATE 50 ML/HR: .53; .5; .37; .037; .03; .012; .00082 INJECTION, SOLUTION INTRAVENOUS at 05:43

## 2023-02-11 RX ADMIN — HEPARIN SODIUM 5000 UNITS: 5000 INJECTION INTRAVENOUS; SUBCUTANEOUS at 05:44

## 2023-02-11 RX ADMIN — OXYCODONE HYDROCHLORIDE 5 MG: 5 TABLET ORAL at 00:55

## 2023-02-11 NOTE — ASSESSMENT & PLAN NOTE
· The patient was seen in consultation by Orthopedic Surgery  · Cardiology pre-operative cardiac risk stratification - moderate but not prohibitive  · POD #1 S/P Right hip hemiarthroplasty on 02/10/2023 by Dr Sherrill Monge (Orthopedic Surgery)  · Pain control  · Incentive spirometry  · DVT prophylaxis with heparin 5000 Units SQ every 8 hours and SCD's on both lower extremities  · PT/OT  · Transferred to ICU level of care on 02/11/2023 for worsening mentation and possible uremia

## 2023-02-11 NOTE — ASSESSMENT & PLAN NOTE
· Developed acute kidney injury during the hospitalization likely due to contrast-induced nephrology  · Baseline serum creatinine around 2 2 mg/dl  · Worsening renal function on 02/11/2023 with increased lethargy and muscle twitching  · Possible uremia  · Check an ABG  · Nephrology is following the patient in consultation  · Continue Isolyte IV fluids at 50 ml/hr  · Check the patient post-void residual amounts every shift with bladder scanning, and follow the urinary retention protocol    · Avoid all nephrotoxic agents  · Serial laboratory testing to monitor the patient's renal function and electrolyte levels

## 2023-02-11 NOTE — ASSESSMENT & PLAN NOTE
Wt Readings from Last 3 Encounters:   02/11/23 87 1 kg (192 lb 0 3 oz)   12/06/22 86 2 kg (190 lb)   12/02/22 85 kg (187 lb 6 4 oz)       · Daily weights  · Strict intake/output measurements  · The patient was evaluated by Cardiology pre-operatively and deemed a moderate cardiac risk, which was not prohibitive    · Monitor his volume status closely

## 2023-02-11 NOTE — ASSESSMENT & PLAN NOTE
· Likely due to anesthesia effect from 02/10/2023 and possible uremia  · Continue to monitor his neurologic status closely

## 2023-02-11 NOTE — PLAN OF CARE
Problem: PHYSICAL THERAPY ADULT  Goal: Performs mobility at highest level of function for planned discharge setting  See evaluation for individualized goals  Description: Treatment/Interventions: ADL retraining, Functional transfer training, Elevations, Therapeutic exercise, LE strengthening/ROM, Endurance training, Bed mobility, Gait training          See flowsheet documentation for full assessment, interventions and recommendations  Note: Prognosis: Fair  Problem List: Decreased strength, Decreased range of motion, Impaired balance, Decreased mobility, Decreased endurance  Assessment: Pt is 80 y o  male seen for PT evaluation s/p admit to [address] on 2/8/2023 w/ Fracture of femoral neck, right ([facility] [address] )  PT consulted to assess pt's functional mobility and d/c needs  Order placed for PT eval and tx, w/ up and OOB as tolerated order  Comorbidities affecting pt's physical performance at time of assessment include: Fracture of femoral neck WBAT   PTA, pt was independent w/ all functional mobility w/ RW  Personal factors affecting pt at time of IE include: inaccessible home environment, ambulating w/ assistive device, inability to ambulate household distances, inability to navigate community distances, inability to navigate level surfaces w/o external assistance, unable to perform dynamic tasks in community, unable to perform physical activity, limited insight into impairments, inability to perform IADLs, inability to perform ADLs and inability to live alone  Please find objective findings from PT assessment regarding body systems outlined above with impairments and limitations including weakness, decreased ROM, impaired balance, decreased endurance, impaired coordination, gait deviations, pain, decreased activity tolerance, decreased functional mobility tolerance, decreased safety awareness, impaired judgement, fall risk and orthopedic restrictions  From PT/mobility standpoint, recommendation at time of d/c would be post acute rehabilitation services pending progress in order to facilitate return to PLOF  PT Discharge Recommendation: Post acute rehabilitation services    See flowsheet documentation for full assessment

## 2023-02-11 NOTE — PHYSICAL THERAPY NOTE
Physical Therapy Evaluation     Patient Name: Caitie Ruggiero    CNJGO'R Date: 2/11/2023     Problem List  Principal Problem:    Fracture of femoral neck, right ([facility])  Active Problems:    CKD (chronic kidney disease) stage 4, GFR 15-29 ml/min ([facility])    Chronic diastolic CHF (congestive heart failure) ([facility])    Paroxysmal atrial fibrillation ([facility])    Multiple renal cysts    Leukocytosis    Hyperphosphatemia    Toxic encephalopathy       Past Medical History  Past Medical History:   Diagnosis Date   • Aortic stenosis     ECHO -4-14-14  MODERATE TO SEVERE AORTIC STENOSIS; MILD AROTIC INSUFFICIENCY - LAST ASSESSED 10/26/16; RESOLVED 6/8/16   • Aortic valve disorder     LAST ASSESSED 10/8/15; 10/8/15   • Arthritis    • CHF (congestive heart failure) ([facility] [address] )    • Complete heart block ([facility] [address] ) 7/20/2020   • Coronary artery disease    • Hypertension    • Hypertensive urgency 5/19/2019   • Renal disorder    • TIA (transient ischemic attack)    • Transient cerebral ischemia         Past Surgical History  Past Surgical History:   Procedure Laterality Date   • AORTIC VALVE REPLACEMENT  06/30/2015    avr with 23 mm [place] Magna Ease bioprosthetic   • CARDIAC PACEMAKER PLACEMENT Left 07/24/2020   • CATARACT EXTRACTION Right 06/05/2000   • COLONOSCOPY     • CORONARY ARTERY BYPASS GRAFT  06/05/2000    x1 with argueta  to lad   • EYE SURGERY     • POLYPECTOMY  04/2014    enteroscopic - esophageal ulcer, gastric erosion, and duodenal ulcer  • TONSILLECTOMY      unknown         02/11/23 1417   Note Type   Note type Evaluation   Pain Assessment   Pain Assessment Tool 0-10   Pain Score 10 - Worst Possible Pain   Pain Location/Orientation Orientation: Right;Location: Hip   Restrictions/Precautions   Weight Bearing Precautions Per Order Yes   RLE Weight Bearing Per Order WBAT   Other Precautions WBS; Fall Risk   Home Living   Type of [address] [place] [address] Two level; Able to live on main level with bedroom/bathroom   Bathroom Shower/Tub Walk-in shower   Bathroom Toilet Standard   Home Equipment Walker;Cane   Additional Comments Uses RW at baseline   Prior Function   Level of [place] Independent with ADLs   Lives With Spouse   Receives Help From Family   IADLs Family/Friend/Other provides transportation; Family/Friend/Other provides meals; Family/Friend/Other provides medication management   Falls in the last 6 months 1 to 4   Vocational Retired   [employer]   Overall Cognitive Status WFL   Arousal/Participation Alert   Orientation Level Oriented X4   Memory Within functional limits   Following Commands Follows one step commands with increased time or repetition   Subjective   Subjective Patient is agreeable to PT  Reports " he can't stand "   RLE Assessment   RLE Assessment X  (2+/5 grossly)   LLE Assessment   LLE Assessment WFL   Bed Mobility   Supine to Sit 2  Maximal assistance   Additional items Assist x 2; Increased time required;Verbal cues;LE management   Sit to Supine 2  Maximal assistance   Additional items Assist x 2;Leg ;Verbal cues;LE management   Transfers   Sit to Stand 2  Maximal assistance   Additional items Assist x 2; Increased time required;Verbal cues   Stand to Sit 2  Maximal assistance   Additional items Assist x 2; Increased time required;Verbal cues   Ambulation/Elevation   Ambulation/Elevation Additional Comments Cues for safety  Poor UE weight bearing and sequencing using RW for transfers  Not appropriate for ambulation   Balance   Static Sitting Fair -   Dynamic Sitting Fair   Static Standing Fair   Dynamic Standing Fair -   Ambulatory Fair -   Assessment   Prognosis Fair   Problem List Decreased strength;Decreased range of motion; Impaired balance;Decreased mobility; Decreased endurance   Assessment Pt is 80 y o  male seen for PT evaluation s/p admit to [address] on 2/8/2023 w/ Fracture of femoral neck, right ([address] )   PT consulted to assess pt's functional mobility and d/c needs  Order placed for PT eval and tx, w/ up and OOB as tolerated order  Comorbidities affecting pt's physical performance at time of assessment include: Fracture of femoral neck WBAT   PTA, pt was independent w/ all functional mobility w/ RW  Personal factors affecting pt at time of IE include: inaccessible home environment, ambulating w/ assistive device, inability to ambulate household distances, inability to navigate community distances, inability to navigate level surfaces w/o external assistance, unable to perform dynamic tasks in community, unable to perform physical activity, limited insight into impairments, inability to perform IADLs, inability to perform ADLs and inability to live alone  Please find objective findings from PT assessment regarding body systems outlined above with impairments and limitations including weakness, decreased ROM, impaired balance, decreased endurance, impaired coordination, gait deviations, pain, decreased activity tolerance, decreased functional mobility tolerance, decreased safety awareness, impaired judgement, fall risk and orthopedic restrictions  From PT/mobility standpoint, recommendation at time of d/c would be post acute rehabilitation services pending progress in order to facilitate return to PLOF  Goals   LTG Expiration Date 02/25/23   Long Term Goal #1 Patient will perform all transfers and bed mobility with minimal assistance   Long Term Goal #2 Patient will ambulate 25 feet to chair with minimal assitance   Plan   Treatment/Interventions ADL retraining;Functional transfer training;Elevations; Therapeutic exercise;LE strengthening/ROM; Endurance training;Bed mobility;Gait training   PT Frequency 7x per week   Recommendation   PT Discharge Recommendation Post acute rehabilitation services   AM-PAC Basic Mobility Inpatient   Turning in Flat Bed Without Bedrails 2   Lying on Back to Sitting on Edge of Flat Bed Without Bedrails 2   Moving Bed to Chair 2 Standing Up From Chair Using Arms 2   Walk in Room 2   Climb 3-5 Stairs With Railing 2   Basic Mobility Inpatient Raw Score 12   Basic Mobility Standardized Score 32 23   Highest Level Of Mobility   -[facility] Goal 4: Move to chair/commode   -[facility] Achieved 3: Sit at edge of bed   Patient in bed at the end of the session, all lines intact, all needs within reach  Patients raw score on the AM-PAC Basic Mobility inpatient short form is 12, standardized score is 32 23, (less than) 42  9  patient's at this level are likely to benefit from post acute rehab services, however, please refer to therapist recommendation for safe D/C Plan  Pt benefited from co-evaluation of skilled OT and PT therapists in order to most appropriately address functional deficits d/t extensive assistance required for safe functional mobility, decreased activity tolerance, and regression from functioning level prior to admission and/or onset of present illness  OT/PT objectives were addressed separately; please see OT note for specific goal areas targeted

## 2023-02-11 NOTE — PROGRESS NOTES
Progress Note - Nephrology   Martha Garrison 80 y o  male MRN: [id number]  Unit/Bed#:  Encounter: [id number]    A/P:  1  Acute kidney injury on top of chronic kidney disease   Creatinine continues to worsen at this time, will increase IV fluids from 50 mill/hour up to 100 mL/hour for the next 10- 24 hours  Patient is likely prerenal at this time  Continue to avoid potential for toxins and work on optimizing hemodynamic status as best as possible  In the meantime, patient needed to have arthroplasty of right hip performed  Continue to hold diuretics, as well as other potentially kidney interacting medications  2   Chronic disease stage IV with a baseline creatinine around 2 2 mg/dL  3  Possible right complex renal cyst   Follow in the outpatient setting, avoid further evaluation in the inpatient setting  4   Volume depletion   Has been seen above, will provide volume expansion, reassess the patient carefully  Patient had a poor appetite, I saw him during supper and he appeared to be eating well at that time  5   Chronic diastolic congestive heart failure   Patient will be given volume expansion, will continue to monitor volume status closely, stop IV fluids when clinically appropriate  6   Postop day 1 right hip Hemiarthroplasty    Follow up reason for today's visit:     Fracture of femoral neck, right [facility])    Patient Active Problem List   Diagnosis   • Acute blood loss anemia   • Mild intermittent asthma without complication   • CKD (chronic kidney disease) stage 4, GFR 15-29 ml/min ([facility])   • Dyslipidemia   • GERD (gastroesophageal reflux disease)   • Late effects of cerebrovascular disease   • Lumbar degenerative disc disease   • Lumbar radiculopathy   • Mitral regurgitation   • Obesity (BMI 30-39  9)   • Medicare annual wellness visit, subsequent   • History of aortic valve replacement with bioprosthetic valve   • History of stroke   • Complete heart block (HCC)   • Chronic diastolic CHF (congestive heart failure) (HCC)   • Ambulatory dysfunction   • Paroxysmal atrial fibrillation (HCC)   • 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   • Presence of permanent cardiac pacemaker   • Oxygen dependent   • Iron deficiency anemia   • Need for immunization against influenza   • Pulmonary emphysema (HCC)   • Left wrist pain   • Right hip pain   • Acute kidney injury superimposed on CKD [facility])   • Multiple renal cysts   • Effusion of left knee   • Mitral valve stenosis   • Acute gout   • Transaminitis   • 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 kidney disease, or chronic kidney disease (HCC)   • Chronic low back pain without sciatica   • History of severe aortic stenosis   • Hx of CABG   • Essential hypertension   • Dermatitis   • Hyperkalemia   • Pressure ulcer of right buttock, stage 2 (HCC)   • Anemia   • Hyperlipidemia, unspecified   • Fracture of femoral neck, right (HCC)   • Leukocytosis   • Hyperphosphatemia   • Toxic encephalopathy   • Acute kidney injury ([address] )   • Pulmonary hypertension (HCC)   • Mitral valve insufficiency   • Tricuspid valve insufficiency         Subjective:   No acute events overnight, however earlier this morning the patient had episodes of "desaturation" and was transferred to the intensive care unit for further evaluation and observation  Currently denies shortness of breath, on nasal cannula at 2 L/minute, at 100%  Objective:     Vitals: Blood pressure 109/75, pulse 60, temperature 98 2 °F (36 8 °C), resp  rate 17, height 5' 4" (1 626 m), weight 87 1 kg (192 lb 0 3 oz), SpO2 100 %  ,Body mass index is 32 96 kg/m²      Weight (last 2 days)     Date/Time Weight    02/11/23 0539 87 1 (192 02)    02/10/23 0600 84 4 (186 07)    02/09/23 0735 84 4 (186)    02/09/23 0600 82 7 (182 32)            Intake/Output Summary (Last 24 hours) at 2/11/2023 1635  Last data filed at 2/11/2023 1526  Gross per 24 hour   Intake 2540 ml   Output 350 ml   Net 2190 ml     I/O last 3 completed shifts: In: 2490 [P O :240; I V :1800; Blood:350; IV Piggyback:100]  Out: 0 [Urine:900; Blood:150]         Physical Exam: /75 (BP Location: Left arm)   Pulse 60   Temp 98 2 °F (36 8 °C)   Resp 17   Ht 5' 4" (1 626 m)   Wt 87 1 kg (192 lb 0 3 oz)   SpO2 100%   BMI 32 96 kg/m²     General Appearance:    Alert, cooperative, no distress, appears stated age   Head:    Normocephalic, without obvious abnormality, atraumatic   Eyes:    Conjunctiva/corneas clear   Ears:    Normal external ears   Nose:   Nares normal, septum midline, mucosa normal, no drainage    or sinus tenderness   Throat:   Lips, mucosa, and tongue normal; teeth and gums normal   Neck:   Supple   Back:     Symmetric, no curvature, ROM normal, no CVA tenderness   Lungs:     Clear to auscultation bilaterally, respirations unlabored   Chest wall:    No tenderness or deformity   Heart:    Regular rate and rhythm, S1 and S2 normal, no murmur, rub   or gallop   Abdomen:     Soft, non-tender, bowel sounds active   Extremities:   Extremities normal, atraumatic, no cyanosis or edema   Skin:   Skin color, texture, turgor normal, no rashes or lesions   Lymph nodes:   Cervical normal   Neurologic:   CNII-XII intact            Lab, Imaging and other studies: I have personally reviewed pertinent labs    CBC:   Lab Results   Component Value Date    WBC 13 09 (H) 02/11/2023    HGB 7 9 (L) 02/11/2023    HCT 27 1 (L) 02/11/2023    MCV 77 (L) 02/11/2023     02/11/2023    MCH 22 6 (L) 02/11/2023    MCHC 29 2 (L) 02/11/2023    RDW 18 0 (H) 02/11/2023    MPV 10 2 02/11/2023     CMP:   Lab Results   Component Value Date    K 4 6 02/11/2023    CL 94 (L) 02/11/2023    CO2 25 02/11/2023    BUN 81 (H) 02/11/2023    CREATININE 4 29 (H) 02/11/2023    CALCIUM 8 7 02/11/2023    AST 23 02/11/2023    ALT <3 (L) 02/11/2023    ALKPHOS 65 02/11/2023    EGFR 11 02/11/2023    Results from last 7 days   Lab Units 02/11/23  0547 02/10/23  0455 02/09/23  0506   POTASSIUM mmol/L 4 6 4 2 4 5   CHLORIDE mmol/L 94* 93* 96   CO2 mmol/L 25 29 27   BUN mg/dL 81* 76* 75*   CREATININE mg/dL 4 29* 3 47* 2 99*   CALCIUM mg/dL 8 7 8 6 9 0   ALK PHOS U/L 65 77 90   ALT U/L <3* 5* 4*   AST U/L 23 13 13         Phosphorus:   Lab Results   Component Value Date    PHOS 6 7 (H) 02/11/2023     Magnesium:   Lab Results   Component Value Date    MG 2 1 02/11/2023     Urinalysis: No results found for: COLORU, CLARITYU, SPECGRAV, PHUR, LEUKOCYTESUR, NITRITE, PROTEINUA, GLUCOSEU, KETONESU, BILIRUBINUR, BLOODU  Ionized Calcium: No results found for: CAION  Coagulation: No results found for: PT, INR, APTT  Troponin: No results found for: TROPONINI  ABG:   Lab Results   Component Value Date    PHART 7 348 (L) 02/11/2023    ESE0HQK 47 2 (H) 02/11/2023    PO2ART 112 6 02/11/2023    OUA3DGQ 25 4 02/11/2023    BEART -0 4 02/11/2023    SOURCE Brachial, Left 02/11/2023     Radiology review:     IMAGING  Procedure: XR Trauma chest portable    Result Date: 2/8/2023  Narrative: CHEST INDICATION:   TRAUMA  COMPARISON:  Same day trauma studies  Portable chest radiograph July 28, 2021  EXAM PERFORMED/VIEWS:  XR CHEST PORTABLE Images:  1 FINDINGS: Cardiomegaly  Left chest pacemaker  Aortic valve replacement  Poststernotomy with multiple fractured sternotomy wires  Streaky opacities in bilateral lower lobes, likely subsegmental atelectasis  No focal consolidation  No pneumothorax or pleural effusion  Osseous structures appear within normal limits for patient age  Impression: No acute cardiopulmonary disease  Workstation performed: [id number]     Procedure: XR hip/pelv 1 vw right if performed    Result Date: 2/11/2023  Narrative: RIGHT HIP INDICATION:   s/p right hemiarthroplasty  COMPARISON:  Right hip series 2/8/2023   VIEWS:  XR HIP/PELV 1 VW RIGHT W PELVIS IF PERFORMED FINDINGS: There is no acute fracture or dislocation  Right hip hemiarthroplasty is identified in satisfactory position without evidence of hardware complication  No lytic or blastic osseous lesion  Lateral skin staples  Scattered soft tissue gas likely postoperative  Impression: Status post right hip hemiarthroplasty  No acute osseous abnormality  Workstation performed: [id number]     Procedure: XR hip/pelv 2-3 vws right if performed    Result Date: 2/8/2023  Narrative: RIGHT HIP, RIGHT FEMUR INDICATION:   fall , r hip pain  COMPARISON:  Same day right hip pelvis and right femur radiograph were dictated under the same report  Same-day trauma studies  VIEWS:  XR HIP/PELV 2-3 VWS RIGHT W PELVIS IF PERFORMED, XR FEMUR 2 VW RIGHT Images: 7 FINDINGS: Acute impacted fracture of right femoral neck extending into right femoral head  No hip or knee joint dislocation  Mild right hip osteoarthritis is seen  No lytic or blastic osseous lesion  There are atherosclerotic calcifications  Soft tissues are otherwise unremarkable  Degenerative changes visualized lower lumbar spine  Impression: Acute impacted fracture of right femoral neck extending into right femoral head  The study was marked in [facility] for immediate notification  Workstation performed: [id number]     Procedure: XR femur 2 views RIGHT    Result Date: 2/8/2023  Narrative: RIGHT HIP, RIGHT FEMUR INDICATION:   fall , r hip pain  COMPARISON:  Same day right hip pelvis and right femur radiograph were dictated under the same report  Same-day trauma studies  VIEWS:  XR HIP/PELV 2-3 VWS RIGHT W PELVIS IF PERFORMED, XR FEMUR 2 VW RIGHT Images: 7 FINDINGS: Acute impacted fracture of right femoral neck extending into right femoral head  No hip or knee joint dislocation  Mild right hip osteoarthritis is seen  No lytic or blastic osseous lesion  There are atherosclerotic calcifications  Soft tissues are otherwise unremarkable  Degenerative changes visualized lower lumbar spine       Impression: Acute impacted fracture of right femoral neck extending into right femoral head  The study was marked in [facility] for immediate notification  Workstation performed: [id number]     Procedure: CT head wo contrast    Result Date: 2/9/2023  Narrative: CT BRAIN - WITHOUT CONTRAST INDICATION:   Neuro deficit, acute, stroke suspected decreased awareness, hx stroke and trauma  COMPARISON:  Multiple priors most recently 2/8/2023 TECHNIQUE:  CT examination of the brain was performed  In addition to axial images, sagittal and coronal 2D reformatted images were created and submitted for interpretation  Radiation dose length product (DLP) for this visit:  910 85 mGy-cm   This examination, like all CT scans performed in the [facility], was performed utilizing techniques to minimize radiation dose exposure, including the use of iterative  reconstruction and automated exposure control  IMAGE QUALITY:  Diagnostic  FINDINGS: PARENCHYMA: Decreased attenuation is noted in periventricular and subcortical white matter demonstrating an appearance that is statistically most likely to represent moderate microangiopathic change  Chronic lacunar infarction right caudate head and right basal ganglia  No CT signs of acute infarction  No intracranial mass, mass effect or midline shift  No acute parenchymal hemorrhage  Left frontal and right cerebellar encephalomalacia and gliosis  VENTRICLES AND EXTRA-AXIAL SPACES:  Volume loss without hydrocephalus  VISUALIZED ORBITS: Globes are aphakic  PARANASAL SINUSES: Mucoperiosteal thickening left maxillary sinus consistent with sequela of chronic sinusitis  Otherwise, paranasal sinuses are well aerated  CALVARIUM AND EXTRACRANIAL SOFT TISSUES:  Normal      Impression: No acute intracranial abnormality  Areas of encephalomalacia and gliosis, with chronic microangiopathy, as described, unchanged   Workstation performed: [id number]     Procedure: TRAUMA - CT head wo contrast    Result Date: 2/8/2023  Narrative: CT BRAIN - WITHOUT CONTRAST INDICATION:   Head trauma, minor (Age >= 65y)  Fall, uncertain head strike  COMPARISON:  CT head 5/29/2021, MRI brain 5/20/2019 TECHNIQUE:  CT examination of the brain was performed  In addition to axial images, sagittal and coronal 2D reformatted images were created and submitted for interpretation  Radiation dose length product (DLP) for this visit:  974 mGy-cm  This examination, like all CT scans performed in the [facility], was performed utilizing techniques to minimize radiation dose exposure, including the use of iterative reconstruction and automated exposure control  IMAGE QUALITY:  Diagnostic  FINDINGS: PARENCHYMA:  No intracranial mass, mass effect or midline shift  No CT signs of acute infarction  No acute parenchymal hemorrhage  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  Old lacunar infarcts right basal ganglia and caudate nucleus  Age-related cortical atrophy  Periventricular white matter hypodensity which is nonspecific although most compatible with chronic small vessel ischemic disease  Vascular calcifications  VENTRICLES AND EXTRA-AXIAL SPACES:  Ventricles and extra-axial CSF spaces are prominent commensurate with the degree of volume loss  No hydrocephalus  No acute extra-axial hemorrhage  VISUALIZED ORBITS: No acute orbital pathology  Bilateral lens implants  PARANASAL SINUSES: Normal visualized paranasal sinuses  The mastoid air cells are clear  CALVARIUM AND EXTRACRANIAL SOFT TISSUES:  Normal      Impression: No acute intracranial abnormality  Workstation performed: [id number]     Procedure: TRAUMA - CT spine cervical wo contrast    Result Date: 2/8/2023  Narrative: CT CERVICAL SPINE - WITHOUT CONTRAST INDICATION:   Neck trauma (Age >= 65y)  Fall  COMPARISON:  None TECHNIQUE:  CT examination of the cervical spine was performed without intravenous contrast   Contiguous axial images were obtained    Sagittal and coronal reconstructions were performed  Radiation dose length product (DLP) for this visit:  420 59 mGy-cm  This examination, like all CT scans performed in the [facility], was performed utilizing techniques to minimize radiation dose exposure, including the use of iterative reconstruction and automated exposure control  IMAGE QUALITY:  Diagnostic  FINDINGS: ALIGNMENT:  Normal alignment of the cervical spine  No subluxation  VERTEBRAE:  No fracture  DEGENERATIVE CHANGES:  Severe multilevel cervical degenerative changes are noted  No critical central canal stenosis  Posterior osteophytic ridge centrally at C5-6 causing mild to moderate central canal narrowing  Partial fusion of the C5-6 vertebrae suggested  PREVERTEBRAL AND PARASPINAL SOFT TISSUES: Unremarkable THORACIC INLET:  No pneumothorax  2 mm upper lobe nodules bilaterally  Based on current Fleischner Society 2017 Guidelines on incidental pulmonary nodule, no routine follow-up is needed if the patient is low risk  If the patient is high risk, optional follow-up chest CT at 12 months can be considered  Impression: No cervical spine fracture or traumatic malalignment  Severe multilevel cervical spondylosis  Workstation performed: [id number]     Procedure: XR Trauma pelvis ap only 1 or 2 vw    Result Date: 2/8/2023  Narrative: PELVIS INDICATION:   TRAUMA  COMPARISON:  Same day trauma studies     Right hip pelvis radiograph July 20, 2021  VIEWS:  XR PELVIS AP ONLY 1 OR 2 VW 1 FINDINGS: Acute impacted right femoral neck fracture  Mild bilateral hip osteoarthritis  No lytic or blastic osseous lesion  Soft tissues are unremarkable  Degenerative changes visualized lower lumbar spine  Impression: Acute impacted right femoral neck fracture  The study was marked in [facility] for immediate notification   Workstation performed: [id number]     Procedure: TRAUMA - CT chest abdomen pelvis w contrast    Result Date: 2/8/2023  Narrative: CT CHEST, ABDOMEN AND PELVIS WITH IV CONTRAST INDICATION:   TRAUMA  COMPARISON:  Same day trauma studies  CT abdomen pelvis without contrast July 20, 2021  TECHNIQUE: CT examination of the chest, abdomen and pelvis was performed  In addition to portal venous phase postcontrast scanning through the abdomen and pelvis, delayed phase postcontrast scanning was performed through the upper abdominal viscera  Axial, sagittal, and coronal 2D reformatted images were created from the source data and submitted for interpretation  3D reconstructions of the bony thorax were performed in order to improved sensitivity of evaluation for rib fractures  Radiation dose length product (DLP) for this visit:  1709 33 mGy-cm   This examination, like all CT scans performed in the [facility], was performed utilizing techniques to minimize radiation dose exposure, including the use of iterative reconstruction and automated exposure control  IV Contrast:  100 mL of iohexol (OMNIPAQUE) Enteric Contrast: Enteric contrast was not administered  FINDINGS: CHEST LUNGS:  There is no tracheal or endobronchial lesion  Subsegmental atelectasis in bilateral upper, right middle, and bilateral lower lobes  No focal consolidation  PLEURA:  Unremarkable  HEART/GREAT VESSELS: Left chest pacemaker leads in right atrial appendage and right ventricle  Cardiomegaly  Aortic valve replacement  Dense mitral annular valve calcifications  No pericardial effusion  No thoracic aortic aneurysm  Mild scattered calcified atherosclerotic disease  MEDIASTINUM AND SHRUTHI:  Unremarkable  CHEST WALL AND LOWER NECK:  Bilateral gynecomastia  ABDOMEN LIVER/BILIARY TREE:  One or more subcentimeter sharply circumscribed low-density hepatic lesion(s) are noted, too small to accurately characterize, but statistically most likely to represent subcentimeter hepatic cysts  No suspicious solid hepatic lesion is identified    Hepatic contours are normal   No biliary dilatation  GALLBLADDER:  There are gallstone(s) within the gallbladder, without pericholecystic inflammatory changes  SPLEEN:  Unremarkable  PANCREAS:  Unremarkable  ADRENAL GLANDS:  Unremarkable  KIDNEYS/URETERS:  Symmetric renal enhancement  No enhancing soft tissue renal mass, hydronephrosis, or urinary tract calculi  Bilateral renal cysts (left larger than right)  STOMACH AND BOWEL:  Normal CT appearance of the stomach  No dilated or thick-walled bowel loops  No acute inflammatory bowel change  Colonic diverticulosis, worse in sigmoid colon  APPENDIX:  No findings to suggest appendicitis  ABDOMINOPELVIC CAVITY:  No ascites  No pneumoperitoneum  No lymphadenopathy  VESSELS: No acute traumatic vascular injury in abdomen or pelvis  Moderate scattered calcified atherosclerotic disease  No abdominal aortic aneurysm  Portal vein, SMV, and splenic vein are patent  PELVIS REPRODUCTIVE ORGANS:  Unremarkable for patient's age  URINARY BLADDER:  Unremarkable  ABDOMINAL WALL/INGUINAL REGIONS:  Unremarkable  OSSEOUS STRUCTURES:  Acute impacted fracture of right proximal femoral neck extending to right femoral head  Poststernotomy without osseous fusion of midline sternum  Multilevel spinal degenerative changes  Mild levoscoliosis of lumbar spine  Please see same-day CT recon only lumbar spine for further evaluation  Impression: Acute impacted fracture of right proximal femoral neck extending to right femoral head  No acute visceral organ or vascular injury of the chest, abdomen, or pelvis  Additional chronic/incidental findings as detailed above  The study was marked in [facility] for immediate notification  Workstation performed: [id number]     Procedure: CT recon only lumbar spine    Result Date: 2/8/2023  Narrative: CT RECON ONLY LUMBAR SPINE (NO CHARGE) INDICATION:   fall, RLE/low back pain  COMPARISON:  Same day trauma studies  Lumbar spine radiograph July 29, 2021   TECHNIQUE: Axial CT examination of the lumbar spine was obtained utilizing reconstructed images from CT of the chest, abdomen and pelvis performed the same day  Images were reformatted in the sagittal and coronal planes  This examination, like all CT scans performed in the [facility], was performed utilizing techniques to minimize radiation dose exposure, including the use of iterative reconstruction and automated exposure control  FINDINGS: ALIGNMENT: Mild levoscoliosis of lumbar spine  Unchanged mild kyphotic curvature of thoracolumbar spine  Unchanged grade 1 retrolisthesis L2-L3 and L3-L4  VERTEBRAE: No fracture  No compression deformity  DEGENERATIVE CHANGES: Moderate-to-severe multilevel degenerative disc disease, worse at L2-L3  No critical central canal stenosis  Multilevel foraminal narrowing, worse at right L3-L4 and right L4-5 where there is at least moderate foraminal narrowing  PREVERTEBRAL AND PARASPINAL SOFT TISSUES: Normal      Impression: No acute osseous abnormality of lumbar spine  Degenerative changes as detailed above  Please see same-day CT chest abdomen pelvis for further evaluation  The study was marked in [facility] for immediate notification  Workstation performed: [id number]     Procedure: US kidney and bladder    Result Date: 2/9/2023  Narrative: RENAL ULTRASOUND INDICATION:   CKD  COMPARISON: Renal ultrasound dated 7/29/2021  TECHNIQUE:   Ultrasound of the retroperitoneum was performed with a curvilinear transducer utilizing volumetric sweeps and still imaging techniques  FINDINGS: KIDNEYS: Right kidney:  7 7 x 4 9 x 5 1 cm  Volume 102 2 mL Left kidney:  11 0 x 5 9 x 4 6 cm  Volume 156 2 mL Right kidney The renal parenchyma is diffusely echogenic consistent with medical renal disease  No mass is identified  1 3 x 2 1 x 1 5 cm simple cyst in the upper pole  3 2 x 3 3 x 3 2 cm suboptimally visualized cyst in the upper pole, likely complex  No hydronephrosis  No shadowing calculi  No perinephric fluid collections  Left kidney The renal parenchyma is diffusely echogenic consistent with medical renal disease  No mass is identified  7 2 x 6 7 x 6 0 cm simple renal cyst  No hydronephrosis  No shadowing calculi  No perinephric fluid collections  URETERS: Nonvisualized  BLADDER: Normally distended  No focal thickening or mass lesions  Bilateral ureteral jets detected  Impression: No hydronephrosis  Medical renal disease  Bilateral renal cysts, most of which are simple in appearance with the exception of a 3 2 x 3 3 x 3 2 cm suboptimally visualized right renal cyst, likely complex  A six-month follow-up renal ultrasound is recommended  The study was marked in [employer] for significant notification  Workstation performed: [id number]     Procedure: Cardiac EP device report    Result Date: 2/9/2023  Narrative: MDT-DUAL CHAMBER PPM (DDD MODE)/ ACTIVE SYSTEM IS MRI CONDITIONAL NON-BILLABLE DEVICE INTERROGATED IN THE [facility] OFFICE: PER   RK: DUE TO FALL: BATTERY VOLTAGE ADEQUATE (9 6 YRS)  AP: <0 1%  : 99 7% (>40%MVP-OFF~AVB)  ALL LEAD PARAMETERS WITHIN NORMAL LIMITS  1 AT/AF EPISODE W/ AF IN PROGRESS (HX OF SAME)  AF BURDEN: 100%  PT TAKES METOPROLOL TART, ELIQUIS, ASA 81MG  EF: 605 (ECHO 09/03/21)  NO PROGRAMMING CHANGES MADE TO DEVICE PARAMETERS  PACEMAKER FUNCTIONING APPROPRIATELY  [address] [place] [address]     Procedure: Echo complete w/ contrast if indicated    Result Date: 2/9/2023  Narrative: •  Left Ventricle: Left ventricular cavity size is normal  Wall thickness is increased  There is borderline concentric hypertrophy  The left ventricular ejection fraction is 65%  Systolic function is vigorous  Wall motion is normal  •  Left Atrium: The atrium is mildly dilated  •  Right Atrium: The atrium is mildly dilated  •  Aortic Valve: The aortic valve was not well visualized  There is a bioprosthetic valve  Peak gradient is 9 mmHg  •  Mitral Valve: There is moderate calcification  There is moderate annular calcification  There is moderate regurgitation  There is mild stenosis  Peak gradient is 7 mmHg, mean gradient is 3 mmHg  •  Tricuspid Valve: There is moderate regurgitation  The estimated right ventricular systolic pressure is 55 82 mmHg         Current Facility-Administered Medications   Medication Dose Route Frequency   • acetaminophen (TYLENOL) tablet 650 mg  650 mg Oral Q6H PRN   • allopurinol (ZYLOPRIM) tablet 100 mg  100 mg Oral Daily   • calcium carbonate (TUMS) chewable tablet 1,000 mg  1,000 mg Oral Daily PRN   • cefTRIAXone (ROCEPHIN) IVPB (premix in dextrose) 1,000 mg 50 mL  1,000 mg Intravenous Q24H   • docusate sodium (COLACE) capsule 100 mg  100 mg Oral BID   • heparin (porcine) subcutaneous injection 5,000 Units  5,000 Units Subcutaneous Q8H [address]   • HYDROmorphone (DILAUDID) injection 0 5 mg  0 5 mg Intravenous Q4H PRN   • labetalol (NORMODYNE) injection 10 mg  10 mg Intravenous Q4H PRN   • metoclopramide (REGLAN) injection 10 mg  10 mg Intravenous Q6H PRN   • metoprolol tartrate (LOPRESSOR) tablet 100 mg  100 mg Oral BID   • multi-electrolyte (PLASMALYTE-A/ISOLYTE-S PH 7 4) IV solution  50 mL/hr Intravenous Continuous   • ondansetron (ZOFRAN) injection 4 mg  4 mg Intravenous Q4H PRN   • oxyCODONE (ROXICODONE) IR tablet 5 mg  5 mg Oral Q4H PRN   • pravastatin (PRAVACHOL) tablet 40 mg  40 mg Oral Daily With Dinner   • senna (SENOKOT) tablet 8 6 mg  1 tablet Oral Daily     Medications Discontinued During This Encounter   Medication Reason   • lactated ringers infusion    • dexamethasone (PF) (DECADRON) injection Patient Discharge   • bupivacaine (PF) (MARCAINE) 0 25 % injection Patient Discharge   • vancomycin (VANCOCIN) injection Patient Discharge   • sodium chloride 0 9 % irrigation solution Patient Discharge   • chlorhexidine (HIBICLENS) 4 % topical liquid    • fentaNYL (SUBLIMAZE) injection 25 mcg Patient Transfer   • HYDROmorphone HCl (DILAUDID) injection 0 2 mg Patient Transfer   • ondansetron (ZOFRAN) injection 4 mg Patient Transfer   • lactated ringers infusion    • lactated ringers bolus 1,000 mL    • lactated ringers bolus 1,000 mL    • sodium chloride 0 9 % bolus 1,000 mL    • sodium chloride 0 9 % bolus 1,000 mL    • gabapentin (NEURONTIN) capsule 100 mg    • pantoprazole (PROTONIX) injection 40 mg        Juan Burr, DO      This progress note was produced in part using a dictation device which may document imprecise wording from author's original intent

## 2023-02-11 NOTE — RESPIRATORY THERAPY NOTE
02/11/23 0916   Respiratory Assessment   Resp Comments patient sleeping comfortably, he is in no respiratory distress,   Oxygen Therapy/Pulse Ox   O2 Device Nasal cannula   O2 Therapy Oxygen   Nasal Cannula O2 Flow Rate (L/min) 2 L/min   Calculated FIO2 (%) - Nasal Cannula 28   SpO2 Activity At Rest   $ Pulse Oximetry Spot Check Charge Completed       Dr Kenneth Freedman wishes an ABG to be drawn on patient, lab values resulted to Dr Kenneth Freedman via [facility]

## 2023-02-11 NOTE — PROGRESS NOTES
Orthopedics   Caitie Ruggiero 80 y o  male MRN: [id number]  Unit/Bed#:       Subjective:  80 y  o male post operative day 1 right hip hemiarthroplasty  Pt doing ok after being transferred to the ICU last evening with acute kidney injury, possibly contrast-induced neuropathy  Pain controlled at current and patient alert  He notes minimal right hip pain  Unable to stand with PT today      Labs:  0   Lab Value Date/Time    HCT 27 1 (L) 02/11/2023 0547    HCT 31 5 (L) 02/10/2023 0455    HCT 30 1 (L) 02/09/2023 0506    HCT 27 8 (L) 07/04/2015 0501    HCT 30 1 (L) 07/03/2015 0935    HCT 30 0 (L) 07/02/2015 1223    HGB 7 9 (L) 02/11/2023 0547    HGB 9 3 (L) 02/10/2023 0455    HGB 8 8 (L) 02/09/2023 0506    HGB 9 4 (L) 07/04/2015 0501    HGB 9 9 (L) 07/03/2015 0935    HGB 9 8 (L) 07/02/2015 1223    INR 1 18 02/10/2023 0455    INR 1 27 (H) 07/02/2015 1223    WBC 13 09 (H) 02/11/2023 0547    WBC 13 66 (H) 02/10/2023 0455    WBC 14 48 (H) 02/09/2023 0506    WBC 12 50 (H) 07/04/2015 0501    WBC 16 11 (H) 07/03/2015 0935    WBC 19 17 (H) 07/02/2015 1223    ESR 50 (H) 07/30/2021 0417     8 (H) 07/29/2021 0457     Meds:    Current Facility-Administered Medications:   •  acetaminophen (TYLENOL) tablet 650 mg, 650 mg, Oral, Q6H PRN, Melissa Patriciadeedee Jordanr DO, 650 mg at 02/10/23 1382  •  allopurinol (ZYLOPRIM) tablet 100 mg, 100 mg, Oral, Daily, Melissa Patriciadeedee Jordanr DO, 100 mg at 02/10/23 4041  •  calcium carbonate (TUMS) chewable tablet 1,000 mg, 1,000 mg, Oral, Daily PRN, Melissa Patricia [place],   •  cefTRIAXone (ROCEPHIN) IVPB (premix in dextrose) 1,000 mg 50 mL, 1,000 mg, Intravenous, Q24H, Melissa Rowell DO  •  docusate sodium (COLACE) capsule 100 mg, 100 mg, Oral, BID, Melissa Rowell DO, 100 mg at 02/10/23 2100  •  heparin (porcine) subcutaneous injection 5,000 Units, 5,000 Units, Subcutaneous, Q8H [address], Melissa Rowell DO, 5,000 Units at 02/11/23 1418  •  HYDROmorphone (DILAUDID) injection 0 5 mg, 0 5 mg, Intravenous, Q4H PRN, Andre Rowell, DO, 0 5 mg at 02/11/23 2470  •  labetalol (NORMODYNE) injection 10 mg, 10 mg, Intravenous, Q4H PRN, Andre Jordanr, DO  •  metoclopramide (REGLAN) injection 10 mg, 10 mg, Intravenous, Q6H PRN, Andre Rowell, DO  •  metoprolol tartrate (LOPRESSOR) tablet 100 mg, 100 mg, Oral, BID, Andre Rowell, DO, 100 mg at 02/10/23 0151  •  multi-electrolyte (PLASMALYTE-A/ISOLYTE-S PH 7 4) IV solution, 50 mL/hr, Intravenous, Continuous, Andre Rowell DO, Last Rate: 50 mL/hr at 02/11/23 0543, 50 mL/hr at 02/11/23 0543  •  ondansetron [facility]) injection 4 mg, 4 mg, Intravenous, Q4H PRN, Andre Rowell, DO, 4 mg at 02/09/23 6006  •  oxyCODONE (ROXICODONE) IR tablet 5 mg, 5 mg, Oral, Q4H PRN, Andre Rowell, DO, 5 mg at 02/11/23 0055  •  pravastatin (PRAVACHOL) tablet 40 mg, 40 mg, Oral, Daily With Nabila Rowell DO, 40 mg at 02/09/23 1704  •  senna (SENOKOT) tablet 8 6 mg, 1 tablet, Oral, Daily, Andre Rowell, DO    Blood Culture:   Lab Results   Component Value Date    BLOODCX No Growth at 48 hrs  02/09/2023     Wound Culture:   Lab Results   Component Value Date    WOUNDCULT No growth 07/30/2021     Ins and Outs:  I/O last 24 hours: In: 2640 [P O :740; I V :1800; IV Piggyback:100]  Out: 725 [Urine:575; Blood:150]    Physical:  Vitals:    02/11/23 1526   BP:    Pulse:    Resp:    Temp: 98 2 °F (36 8 °C)   SpO2:      right lower extremity  · Dressing clean dry intact  There is no ecchymosis  Thigh is soft minimal tenderness  Compartments thigh and calf soft nontender  Ace wrap in place  · Sensation intact L2-S1  · Motor intact to go plantar and dorsiflexion  Patient unable to extend the knee due to pain  · 2+ DP/PT pulses intact negative Homans' sign  Assessment: 80 y  o male post operative day 1 right hip hemiarthroplasty, stable       Plan:  · Up and out of bed PT OT weightbearing as tolerated RLE with walker  · Continue IV fluids  · DVT heparin  · Analgesics as needed but caution with narcotics due to cute kidney injury  · Dispo: Ortho will follow  · Patient noted to have acute blood loss anemia due to a drop in Hbg of > 2 0g from preop levels, will monitor vital signs and resuscitate with IV fluids as needed    Nivia Nguyễn PA-C

## 2023-02-11 NOTE — PLAN OF CARE
Problem: OCCUPATIONAL THERAPY ADULT  Goal: Performs self-care activities at highest level of function for planned discharge setting  See evaluation for individualized goals  Description: Treatment Interventions: ADL retraining, Functional transfer training, UE strengthening/ROM, Endurance training, Patient/family training, Equipment evaluation/education, Compensatory technique education, Energy conservation, Activityengagement          See flowsheet documentation for full assessment, interventions and recommendations  Note: Limitation: Decreased ADL status, Decreased UE strength, Decreased Safe judgement during ADL, Decreased endurance, Decreased self-care trans, Decreased high-level ADLs  Prognosis: Fair  Assessment: Pt is a 80 y o  male seen for OT evaluation s/p admit to [facility] on 2/8/2023 w/ Fracture of femoral neck, right ([address] )  Comorbidities affecting pt's functional performance at time of assessment include: CKD, CHF, PAF, multiple renal cysts, leukocytosis, hyperphosphatemia, toxic encenphalopy, asthma, dyslipidemia, GERD, lumbar degenerative disc disease, mitral regurgitation, obesity, hx of CVA,   Personal factors affecting pt at time of IE include:difficulty performing ADLS, difficulty performing IADLS , limited insight into deficits and health management   Prior to admission, pt was independent in ADLs, requiring A for IADls, and use of RW for functional mobility  Upon evaluation: Pt requires max to total A for LB ADLs, and min A to supervision for UB ADLs and max A x 2 for bed mobility 2* the following deficits impacting occupational performance: weakness, decreased strength, decreased balance, decreased tolerance, decreased safety awareness and increased pain  Pt to benefit from continued skilled OT tx while in the hospital to address deficits as defined above and maximize level of functional independence w ADL's and functional mobility   Occupational Performance areas to address include: grooming, bathing/shower, toilet hygiene, dressing, health maintenance and functional mobility  The patient's raw score on the AM-PAC Daily Activity inpatient short form is 15, standardized score is 34 69, less than 39 4  Patients at this level are likely to benefit from discharge to post-acute rehabilitation services  Please refer to the recommendation of the Occupational Therapist for safe discharge planning       OT Discharge Recommendation: Post acute rehabilitation services     Geovanny Espinal

## 2023-02-11 NOTE — ASSESSMENT & PLAN NOTE
· Held eliquis for the right hip hemiarthroplasty on 02/10/2023  · Can restart eliquis on 02/12/2023 if his hemoglobin/hematocrit levels are stable  · Continue metoprolol tartrate 100 mg PO every 12 hours for heart rate control

## 2023-02-11 NOTE — ASSESSMENT & PLAN NOTE
Lab Results   Component Value Date    EGFR 11 02/11/2023    EGFR 14 02/10/2023    EGFR 17 02/09/2023    CREATININE 4 29 (H) 02/11/2023    CREATININE 3 47 (H) 02/10/2023    CREATININE 2 99 (H) 02/09/2023     · Please see the assessment and plan for "Acute kidney injury"

## 2023-02-11 NOTE — PLAN OF CARE
Problem: MOBILITY - ADULT  Goal: Maintain or return to baseline ADL function  Description: INTERVENTIONS:  -  Assess patient's ability to carry out ADLs; (dependent)  - Assess/evaluate cause of self-care deficits (fracture, pain, limited movement)  - Assess range of motion  - Assess patient's mobility; (max assist/dependent)  - Assess patient's need for assistive devices and provide as appropriate  - Encourage maximum independence but intervene and supervise when necessary  - Involve family in performance of ADLs  - Assess for home care needs following discharge   - Consider OT consult to assist with ADL evaluation and planning for discharge  - Provide patient education as appropriate  Outcome: Progressing  Goal: Maintains/Returns to pre admission functional level  Description: INTERVENTIONS:  - Perform BMAT or MOVE assessment daily    - Set and communicate daily mobility goal to care team and patient/family/caregiver  - Collaborate with rehabilitation services on mobility goals if consulted  - Perform Range of Motion 3 times a day  - Reposition patient every 2 hours    - Dangle patient 3 times a day  - Stand patient 3 times a day  - Ambulate patient 3 times a day  - Out of bed to chair 3 times a day   - Out of bed for meals 3 times a day  - Out of bed for toileting  - Record patient progress and toleration of activity level   Outcome: Progressing     Problem: Prexisting or High Potential for Compromised Skin Integrity  Goal: Skin integrity is maintained or improved  Description: INTERVENTIONS:  - Identify patients at risk for skin breakdown  - Assess and monitor skin integrity  - Assess and monitor nutrition and hydration status  - Monitor labs   - Assess for incontinence (as needed)  - Turn and reposition patient (every 2 hours and prn)  - Assist with mobility/ambulation (max assist/dependent)  - Relieve pressure over bony prominences  - Avoid friction and shearing  - Provide appropriate hygiene as needed including keeping skin clean and dry  - Evaluate need for skin moisturizer/barrier cream  - Collaborate with interdisciplinary team   - Patient/family teaching  - Consider wound care consult   Outcome: Progressing     Problem: PAIN - ADULT  Goal: Verbalizes/displays adequate comfort level or baseline comfort level  Description: Interventions:  - Encourage patient to monitor pain and request assistance  - Assess pain using appropriate pain scale (0-10 pain scale)  - Administer analgesics based on type and severity of pain and evaluate response  - Implement non-pharmacological measures as appropriate and evaluate response  - Consider cultural and social influences on pain and pain management  - Notify physician/advanced practitioner if interventions unsuccessful or patient reports new pain  Outcome: Progressing     Problem: INFECTION - ADULT  Goal: Absence or prevention of progression during hospitalization  Description: INTERVENTIONS:  - Assess and monitor for signs and symptoms of infection  - Monitor lab/diagnostic results  - Monitor all insertion sites, i e  indwelling lines, tubes, and drains  - Administer medications as ordered  - Instruct and encourage patient and family to use good hand hygiene technique  Outcome: Progressing     Problem: SAFETY ADULT  Goal: Maintain or return to baseline ADL function  Description: INTERVENTIONS:  -  Assess patient's ability to carry out ADLs; (dependent)  - Assess/evaluate cause of self-care deficits (fracture, pain, limited movement)  - Assess range of motion  - Assess patient's mobility; (max assist/dependent)  - Assess patient's need for assistive devices and provide as appropriate  - Encourage maximum independence but intervene and supervise when necessary  - Involve family in performance of ADLs  - Assess for home care needs following discharge   - Consider OT consult to assist with ADL evaluation and planning for discharge  - Provide patient education as appropriate  Outcome: Progressing  Goal: Maintains/Returns to pre admission functional level  Description: INTERVENTIONS:  - Perform BMAT or MOVE assessment daily    - Set and communicate daily mobility goal to care team and patient/family/caregiver  - Collaborate with rehabilitation services on mobility goals if consulted  - Perform Range of Motion 3 times a day  - Reposition patient every 2 hours    - Dangle patient 3 times a day  - Stand patient 3 times a day  - Ambulate patient 3 times a day  - Out of bed to chair 3 times a day   - Out of bed for meals 3 times a day  - Out of bed for toileting  - Record patient progress and toleration of activity level   Outcome: Progressing  Goal: Patient will remain free of falls  Description: INTERVENTIONS:  -  Assess patient's ability to carry out ADLs; (dependent)  - Assess/evaluate cause of self-care deficits (fracture, pain, limited movement)  - Assess range of motion  - Assess patient's mobility; (max assist/dependent)  - Assess patient's need for assistive devices and provide as appropriate  - Encourage maximum independence but intervene and supervise when necessary  - Involve family in performance of ADLs  - Assess for home care needs following discharge   - Consider OT consult to assist with ADL evaluation and planning for discharge  - Provide patient education as appropriate  Outcome: Progressing     Problem: DISCHARGE PLANNING  Goal: Discharge to home or other facility with appropriate resources  Description: INTERVENTIONS:  - Identify barriers to discharge w/patient and caregiver  - Arrange for needed discharge resources and transportation as appropriate  - Identify discharge learning needs (meds, wound care, etc )  - Refer to Case Management Department for coordinating discharge planning if the patient needs post-hospital services based on physician/advanced practitioner order or complex needs related to functional status, cognitive ability, or social support system  Outcome: Progressing     Problem: Knowledge Deficit  Goal: Patient/family/caregiver demonstrates understanding of disease process, treatment plan, medications, and discharge instructions  Description: Complete learning assessment and assess knowledge base    Interventions:  - Provide teaching at level of understanding  - Provide teaching via preferred learning methods  Outcome: Progressing     Problem: METABOLIC, FLUID AND ELECTROLYTES - ADULT  Goal: Electrolytes maintained within normal limits  Description: INTERVENTIONS:  - Monitor labs and assess patient for signs and symptoms of electrolyte imbalances  - Administer electrolyte replacement as ordered  - Monitor response to electrolyte replacements, including repeat lab results as appropriate  - Instruct patient on fluid and nutrition as appropriate  Outcome: Progressing  Goal: Fluid balance maintained  Description: INTERVENTIONS:  - Monitor labs   - Monitor I/O and WT  - Instruct patient on fluid and nutrition as appropriate  - Assess for signs & symptoms of volume excess or deficit  Outcome: Progressing     Problem: SKIN/TISSUE INTEGRITY - ADULT  Goal: Skin Integrity remains intact(Skin Breakdown Prevention)  Description: Assess:  -Perform Duy assessment every shift  -Clean and moisturize skin every shift  -Inspect skin when repositioning, toileting, and assisting with ADLS  -Assess extremities for adequate circulation and sensation     Bed Management:  -Have minimal linens on bed & keep smooth, unwrinkled  -Change linens as needed when moist or perspiring  -Avoid sitting or lying in one position for more than 2 hours while in bed      Toileting:  -Offer bedside commode  -Assess for incontinence every 1-2 hours and prn      Activity:  -Mobilize patient 3 times a day  -Encourage activity and walks on unit  -Encourage or provide ROM exercises   -Turn and reposition patient every 2 Hours  -Use appropriate equipment to lift or move patient in bed  -Instruct/ Assist with weight shifting every  minutes when out of bed in chair  -Consider limitation of chair time 3-4 hour intervals    Skin Care:  -Avoid use of baby powder, tape, friction and shearing, hot water or constrictive clothing  -Relieve pressure over bony prominences using foam pad  -Do not massage red bony areas    Next Steps:  -Teach patient strategies to minimize risks     -Consider consults to  interdisciplinary teams   Outcome: Progressing  Goal: Incision(s), wounds(s) or drain site(s) healing without S/S of infection  Description: INTERVENTIONS  - Assess and document dressing, incision, wound bed, drain sites and surrounding tissue  - Provide patient and family education    Outcome: Progressing  Goal: Pressure injury heals and does not worsen  Description: Interventions:  - Implement low air loss mattress or specialty surface (Criteria met)  - Apply silicone foam dressing  - Instruct/assist with weight shifting every  minutes when in chair   - Limit chair time to 3-4 hour intervals  - Use special pressure reducing interventions such as waffle cushion when in chair   - Perform passive or active ROM every shift and prn  - Turn and reposition patient & offload bony prominences every 2 hours   - Utilize friction reducing device or surface for transfers   - Consider consults to  interdisciplinary teams   - Use incontinent care products after each incontinent episode   - Consider nutrition services referral as needed  Outcome: Progressing     Problem: MUSCULOSKELETAL - ADULT  Goal: Maintain or return mobility to safest level of function  Description: INTERVENTIONS:  - Assess patient's ability to carry out ADLs; (dependent)  - Assess/evaluate cause of self-care deficits (fracture, pain, limited movement)  - Assess range of motion  - Assess patient's mobility (max assist/dependent)  - Assess patient's need for assistive devices and provide as appropriate  - Encourage maximum independence but intervene and supervise when necessary  - Involve family in performance of ADLs  - Assess for home care needs following discharge   - Consider OT consult to assist with ADL evaluation and planning for discharge  - Provide patient education as appropriate  Outcome: Progressing  Goal: Maintain proper alignment of affected body part  Description: INTERVENTIONS:  - Support, maintain and protect limb and body alignment (RLE)  - Provide patient/ family with appropriate education  Outcome: Progressing     Problem: CARDIOVASCULAR - ADULT  Goal: Maintains optimal cardiac output and hemodynamic stability  Description: INTERVENTIONS:  - Monitor I/O, vital signs and rhythm  - Monitor for S/S and trends of decreased cardiac output  - Administer and titrate ordered vasoactive medications to optimize hemodynamic stability  - Assess quality of pulses, skin color and temperature  - Assess for signs of decreased coronary artery perfusion  - Instruct patient to report change in severity of symptoms  Outcome: Progressing     Problem: Nutrition/Hydration-ADULT  Goal: Nutrient/Hydration intake appropriate for improving, restoring or maintaining nutritional needs  Description: Monitor and assess patient's nutrition/hydration status for malnutrition  Collaborate with interdisciplinary team and initiate plan and interventions as ordered  Monitor patient's weight and dietary intake as ordered or per policy  Utilize nutrition screening tool and intervene as necessary  Determine patient's food preferences and provide high-protein, high-caloric foods as appropriate       INTERVENTIONS:  - Monitor oral intake, urinary output, labs, and treatment plans  - Assess nutrition and hydration status and recommend course of action  - Evaluate amount of meals eaten  - Assist patient with eating if necessary   - Allow adequate time for meals  - Recommend/ encourage appropriate diets, oral nutritional supplements, and vitamin/mineral supplements  - Order, calculate, and assess calorie counts as needed  - Recommend, monitor, and adjust tube feedings and TPN/PPN based on assessed needs  - Assess need for intravenous fluids  - Provide specific nutrition/hydration education as appropriate  - Include patient/family/caregiver in decisions related to nutrition  Outcome: Progressing

## 2023-02-11 NOTE — OCCUPATIONAL THERAPY NOTE
Occupational Therapy Evaluation     Patient Name: Ryan Dinh  [id number] Date: 2/11/2023  Problem List  Principal Problem:    Fracture of femoral neck, right ([facility])  Active Problems:    CKD (chronic kidney disease) stage 4, GFR 15-29 ml/min ([facility])    Chronic diastolic CHF (congestive heart failure) ([facility])    Paroxysmal atrial fibrillation ([facility])    Multiple renal cysts    Leukocytosis    Hyperphosphatemia    Toxic encephalopathy    Past Medical History  Past Medical History:   Diagnosis Date    Aortic stenosis     ECHO -4-14-14  MODERATE TO SEVERE AORTIC STENOSIS; MILD AROTIC INSUFFICIENCY - LAST ASSESSED 10/26/16; RESOLVED 6/8/16    Aortic valve disorder     LAST ASSESSED 10/8/15; 10/8/15    Arthritis     CHF (congestive heart failure) ([facility] )     Complete heart block ([facility] ) 7/20/2020    Coronary artery disease     Hypertension     Hypertensive urgency 5/19/2019    Renal disorder     TIA (transient ischemic attack)     Transient cerebral ischemia      Past Surgical History  Past Surgical History:   Procedure Laterality Date    AORTIC VALVE REPLACEMENT  06/30/2015    avr with 23 mm [place] Magna Ease bioprosthetic    CARDIAC PACEMAKER PLACEMENT Left 07/24/2020    CATARACT EXTRACTION Right 06/05/2000    COLONOSCOPY      CORONARY ARTERY BYPASS GRAFT  06/05/2000    x1 with argueta  to lad    EYE SURGERY      POLYPECTOMY  04/2014    enteroscopic - esophageal ulcer, gastric erosion, and duodenal ulcer  TONSILLECTOMY      unknown         02/11/23 1416   OT Last Visit   OT Visit Date 02/11/23   Note Type   Note type Evaluation   Pain Assessment   Pain Assessment Tool 0-10   Pain Score 10 - Worst Possible Pain   Pain Location/Orientation Orientation: Right;Location: Hip   Restrictions/Precautions   Weight Bearing Precautions Per Order Yes   LLE Weight Bearing Per Order WBAT   Other Precautions WBS; Fall Risk;Pain;O2;Chair Alarm   Home Living   Type of [address] [place] [address] Two level; Able to live on main level with bedroom/bathroom; Bed/bath upstairs   Bathroom Shower/Tub Walk-in shower   Bathroom Toilet Standard   Home Equipment Walker;Cane;Wheelchair-electric   Additional Comments Pt reports use of RW for functional mobility at baseline  Prior Function   Level of [place] Independent with ADLs; Needs assistance with IADLS   Lives With Spouse   Receives Help From Family   IADLs Family/Friend/Other provides transportation; Family/Friend/Other provides meals; Family/Friend/Other provides medication management   Falls in the last 6 months 1 to 4   Vocational Retired   Comments (-) driving  Lifestyle   Autonomy Pt reporting independence in ADls, requiring some A for IADLs from wife (cleaning, laundry), and use of RW for functional mobility at baseline  Pt lives with wife in [address] [place] [address]  pt sleeps in bed on first floor due to difficulty completing stairs  Reciprocal Relationships wife   Intrinsic Gratification Pt has a big family that he enjoys spending time with, very supportive family  Subjective   Subjective "I can't"   ADL   LB Dressing Assistance 1  Total Assistance   LB Dressing Deficit Don/doff R sock; Don/doff L sock   Additional Comments Pt total A to don socks while supine in bed  Pt with poor seated balance, unable to attempt don seated EOB  Bed Mobility   Supine to Sit 2  Maximal assistance   Additional items Assist x 2; Increased time required;LE management;Verbal cues; Bedrails;HOB elevated   Sit to Supine 2  Maximal assistance   Additional items Assist x 2; Increased time required;LE management;Verbal cues; Bedrails   Additional Comments Pt supine in bed at start of session  Pt on 2 5L nasal O2, vitals WNL  Pt agreeable to attempt sit EOB  Pt requiring significnat time, encouragement, and v c  for technique to sit EOB, Max A x 2  Once seated EOB, pt max A to min A to maintain balance   pt sat EOB for ~`10 minutes total  Post attempts for functional transfers, pt seated EOB to supine with max A x 2, multiple v c  Transfers   Sit to Stand 2  Maximal assistance   Additional items Assist x 2; Increased time required;Verbal cues   Stand to Sit 2  Maximal assistance   Additional items Assist x 2; Increased time required;Verbal cues; Bedrails;HOB elevated   Additional Comments Pt STS with max A x 2, 4 attempts made, however pt unable to stand up straight and putting all his weight on the RW, and leaning to R, despite multiple v c  for technique  Significant encouragement required throughout attempts and education on technique  Functional Mobility   Additional Comments Unable to attempt, unable to complete STS   Balance   Static Sitting Poor   Dynamic Sitting Poor -   Activity Tolerance   Activity Tolerance Patient limited by pain   Medical Staff Made Aware SELAM Taylor verbalized pt appropriate for OT evaluatoin  PT Josr present for co-evaluation due to pt's medical complexity warranting two skilled therapists to safely attempt functional movement  RUE Assessment   RUE Assessment WFL   LUE Assessment   LUE Assessment WFL   Hand Function   Gross Motor Coordination Functional   Fine Motor Coordination Functional   Psychosocial   Psychosocial (WDL) WDL   Cognition   Overall Cognitive Status WFL   Arousal/Participation Alert; Responsive; Cooperative   Attention Within functional limits   Orientation Level Oriented X4   Memory Within functional limits   Following Commands Follows one step commands without difficulty   Comments Pt pleasant overall throughout session, requiring significant encouragement  Pt oriented to month and year, place, and mostly oriented to situation, noting "I can't believe I had surgery yesterday"   Assessment   Limitation Decreased ADL status; Decreased UE strength;Decreased Safe judgement during ADL;Decreased endurance;Decreased self-care trans;Decreased high-level ADLs   Prognosis Fair   Assessment Pt is a 80 y o  male seen for OT evaluation s/p admit to [facility] on 2/8/2023 w/ Fracture of femoral neck, right ([facility] )  Comorbidities affecting pt's functional performance at time of assessment include: CKD, CHF, PAF, multiple renal cysts, leukocytosis, hyperphosphatemia, toxic encenphalopy, asthma, dyslipidemia, GERD, lumbar degenerative disc disease, mitral regurgitation, obesity, hx of CVA,   Personal factors affecting pt at time of IE include:difficulty performing ADLS, difficulty performing IADLS , limited insight into deficits and health management   Prior to admission, pt was independent in ADLs, requiring A for IADls, and use of RW for functional mobility  Upon evaluation: Pt requires max to total A for LB ADLs, and min A to supervision for UB ADLs and max A x 2 for bed mobility 2* the following deficits impacting occupational performance: weakness, decreased strength, decreased balance, decreased tolerance, decreased safety awareness and increased pain  Pt to benefit from continued skilled OT tx while in the hospital to address deficits as defined above and maximize level of functional independence w ADL's and functional mobility  Occupational Performance areas to address include: grooming, bathing/shower, toilet hygiene, dressing, health maintenance and functional mobility  The patient's raw score on the AM-PAC Daily Activity inpatient short form is 15, standardized score is 34 69, less than 39 4  Patients at this level are likely to benefit from discharge to post-acute rehabilitation services  Please refer to the recommendation of the Occupational Therapist for safe discharge planning  Goals   Patient Goals to improve pain   Plan   Treatment Interventions ADL retraining;Functional transfer training;UE strengthening/ROM; Endurance training;Patient/family training;Equipment evaluation/education; Compensatory technique education; Energy conservation; Activityengagement   Goal Expiration Date 02/25/23   OT Frequency 3-5x/wk   Recommendation   OT Discharge Recommendation Post acute rehabilitation services   Additional Comments Pt left supine in bed, all needs met, call bell in reach, bed alarm on  SCD on L on  AM-PAC Daily Activity Inpatient   Lower Body Dressing 1   Bathing 2   Toileting 2   Upper Body Dressing 3   Grooming 3   Eating 4   Daily Activity Raw Score 15   Daily Activity Standardized Score (Calc for Raw Score >=11) 34 69   AM-PAC Applied Cognition Inpatient   Following a Speech/Presentation 4   Understanding Ordinary Conversation 4   Taking Medications 3   Remembering Where Things Are Placed or Put Away 3   Remembering List of 4-5 Errands 3   Taking Care of Complicated Tasks 3   Applied Cognition Raw Score 20   Applied Cognition Standardized Score 41 76     Pt benefited for skilled co-evaluation by OT and PT therapists in order to most appropriately address functional deficits d/t extensive assistance required for safe functional mobility, decreased activity tolerance, and regression from functional level prior to admission  OT/PT goals addressed separately  Goals    Pt will perform bed mobility with mod A to increase participation in functional tasks  Pt will perform UB ADLs with (I) to maximize participation in ADLs  Pt will perform LB ADLs with min A to maximize participation in ADLs  Pt will perform toileting/toilet hygiene mod A to increase independence in self care  Pt will perform ADL transfers with mod A x 2 and use of RW to increase independence in ADLs  Pt will increase static and dynamic standing balance to fair -to increase safety awareness and participation in standing ADLs  Pt will increase standing tolerance to 2 minutes without LOB to increase participation in standing ADLs/purposeful tasks  Pt will complete B UE strengthening exercises to increase safety and participation in bed mobility, ADLs, and functional mobility       Kennedi Trejo

## 2023-02-11 NOTE — ASSESSMENT & PLAN NOTE
· POD #1 S/P Right hip hemiarthroplasty on 02/10/2023  · Hold eliquis for now  · Follow the CBC  · Transfuse for a hemoglobin less than 7 g/dl

## 2023-02-11 NOTE — ASSESSMENT & PLAN NOTE
· Appears to have a chronic leukocytosis  · Checked blood cultures x 2 sets, which are pending at this time  · Checked a urine culture, which is pending at this time  · Increasing procalcitonin level  · Treat with empiric ceftriaxone 1000 mg IV every 24 hours for now  · Follow the procalcitonin level  · Follow the CBC  · Outpatient Hematology evaluation

## 2023-02-11 NOTE — PROGRESS NOTES
[facility] [address] [place]  Progress Note - Beltran Duke 1935, 80 y o  male MRN: [id number]  Unit/Bed#:  Encounter: [id number]  Primary Care Provider: Maile Durán DO   Date and time admitted to hospital: 2/8/2023  4:30 PM    * Fracture of femoral neck, right [facility])  Assessment & Plan  · The patient was seen in consultation by Orthopedic Surgery  · Cardiology pre-operative cardiac risk stratification - moderate but not prohibitive  · POD #1 S/P Right hip hemiarthroplasty on 02/10/2023 by Dr Chastity Jacques (Orthopedic Surgery)  · Pain control  · Incentive spirometry  · DVT prophylaxis with heparin 5000 Units SQ every 8 hours and SCD's on both lower extremities  · PT/OT  · Transferred to ICU level of care on 02/11/2023 for worsening mentation and possible uremia    Acute kidney injury [facility])  Assessment & Plan  · Developed acute kidney injury during the hospitalization likely due to contrast-induced nephrology  · Baseline serum creatinine around 2 2 mg/dl  · Worsening renal function on 02/11/2023 with increased lethargy and muscle twitching  · Possible uremia  · Check an ABG  · Nephrology is following the patient in consultation  · Continue Isolyte IV fluids at 50 ml/hr  · Check the patient post-void residual amounts every shift with bladder scanning, and follow the urinary retention protocol    · Avoid all nephrotoxic agents  · Serial laboratory testing to monitor the patient's renal function and electrolyte levels    Toxic encephalopathy  Assessment & Plan  · Likely due to anesthesia effect from 02/10/2023 and possible uremia  · Continue to monitor his neurologic status closely    CKD (chronic kidney disease) stage 4, GFR 15-29 ml/min [facility])  Assessment & Plan  Lab Results   Component Value Date    EGFR 11 02/11/2023    EGFR 14 02/10/2023    EGFR 17 02/09/2023    CREATININE 4 29 (H) 02/11/2023    CREATININE 3 47 (H) 02/10/2023    CREATININE 2 99 (H) 02/09/2023     · Please see the assessment and plan for "Acute kidney injury"      Hyperphosphatemia  Assessment & Plan  · Treatment per Nephrology  · Follow the phosphorus level    Leukocytosis  Assessment & Plan  · Appears to have a chronic leukocytosis  · Checked blood cultures x 2 sets, which are pending at this time  · Checked a urine culture, which is pending at this time  · Increasing procalcitonin level  · Treat with empiric ceftriaxone 1000 mg IV every 24 hours for now  · Follow the procalcitonin level  · Follow the CBC  · Outpatient Hematology evaluation    Multiple renal cysts  Assessment & Plan  · Outpatient surveillance imaging with PCP    Paroxysmal atrial fibrillation ([facility])  Assessment & Plan  · Held eliquis for the right hip hemiarthroplasty on 02/10/2023  · Can restart eliquis on 02/12/2023 if his hemoglobin/hematocrit levels are stable  · Continue metoprolol tartrate 100 mg PO every 12 hours for heart rate control    Chronic diastolic CHF (congestive heart failure) ([facility])  Assessment & Plan  Wt Readings from Last 3 Encounters:   02/11/23 87 1 kg (192 lb 0 3 oz)   12/06/22 86 2 kg (190 lb)   12/02/22 85 kg (187 lb 6 4 oz)       · Daily weights  · Strict intake/output measurements  · The patient was evaluated by Cardiology pre-operatively and deemed a moderate cardiac risk, which was not prohibitive  · Monitor his volume status closely      Acute blood loss anemia  Assessment & Plan  · POD #1 S/P Right hip hemiarthroplasty on 02/10/2023  · Hold eliquis for now  · Follow the CBC  · Transfuse for a hemoglobin less than 7 g/dl        VTE Pharmacologic Prophylaxis: VTE Score: 12 High Risk (Score >/= 5) - Pharmacological DVT Prophylaxis Ordered: heparin  Sequential Compression Devices Ordered  Patient Centered Rounds: I performed bedside rounds with nursing staff today  Discussions with Specialists or Other Care Team Provider: I discussed the case with Nephrology      Education and Discussions with Family / Patient: Updated contact person (wife and daughter) via phone  Time Spent for Care: 75 minutes  More than 50% of total time spent on counseling and coordination of care as described above  Current Length of Stay: 3 day(s)  Current Patient Status: Inpatient   Certification Statement: The patient will continue to require additional inpatient hospital stay due to the need for IV antibiotic treatment, for continuous IV fluids, for a work-up of the worsening acute kidney injury, for serial laboratory testing to monitor the renal function, and for post-acute care rehabilitation placement upon discharge  Discharge Plan: Anticipate discharge in >72 hrs to rehab facility  Code Status: Level 1 - Full Code    Subjective: The patient was seen and examined  The patient is lethargic and minimally responsive this morning  Objective:     Vitals:   Temp (24hrs), Av 4 °F (36 9 °C), Min:97 2 °F (36 2 °C), Max:100 °F (37 8 °C)    Temp:  [97 2 °F (36 2 °C)-100 °F (37 8 °C)] 98 2 °F (36 8 °C)  HR:  [59-63] 60  Resp:  [13-19] 18  BP: ()/(42-79) 115/57  SpO2:  [93 %-100 %] 98 %  Body mass index is 32 96 kg/m²  Input and Output Summary (last 24 hours):      Intake/Output Summary (Last 24 hours) at 2023 1537  Last data filed at 2023 1526  Gross per 24 hour   Intake 2540 ml   Output 725 ml   Net 1815 ml       Physical Exam:   Physical Exam   General:  NAD, not following commands  HEENT:  NC/AT, mucous membranes dry  Neck:  Supple, No JVP elevation  CV:  + S1, + S2, Intermittent bradycardia, Regular rhythm  Pulm:  Scant bibasilar crackles  Abd:  Soft, Non-tender, Non-distended  Ext:  No clubbing/cyanosis/edema  Skin:  No rashes  Neuro:  Lethargic, Opens eyes to painful stimuli, not oriented  Psych:  Lethargic mood and affect      Additional Data:    Labs:  Results from last 7 days   Lab Units 23  0547 02/10/23  0455   WBC Thousand/uL 13 09* 13 66*   HEMOGLOBIN g/dL 7 9* 9 3*   HEMATOCRIT % 27 1* 31 5*   PLATELETS Thousands/uL 234 280   BANDS PCT % 1  --    NEUTROS PCT %  --  82*   LYMPHS PCT %  --  6*   LYMPHO PCT % 5*  --    MONOS PCT %  --  9   MONO PCT % 1*  --    EOS PCT % 0 2     Results from last 7 days   Lab Units 02/11/23  0547   SODIUM mmol/L 136   POTASSIUM mmol/L 4 6   CHLORIDE mmol/L 94*   CO2 mmol/L 25   BUN mg/dL 81*   CREATININE mg/dL 4 29*   ANION GAP mmol/L 17*   CALCIUM mg/dL 8 7   ALBUMIN g/dL 3 3*   TOTAL BILIRUBIN mg/dL 0 31   ALK PHOS U/L 65   ALT U/L <3*   AST U/L 23   GLUCOSE RANDOM mg/dL 126     Results from last 7 days   Lab Units 02/10/23  0455   INR  1 18             Results from last 7 days   Lab Units 02/11/23  0547 02/10/23  0550 02/09/23  0506   LACTIC ACID mmol/L  --   --  1 1   PROCALCITONIN ng/ml 1 83* 0 54* 0 35*       Lines/Drains:  Invasive Devices     Peripheral Intravenous Line  Duration           Peripheral IV 02/08/23 Right Antecubital 2 days    Peripheral IV 02/10/23 Right Wrist 1 day                      Imaging: No pertinent imaging reviewed  Recent Cultures (last 7 days):   Results from last 7 days   Lab Units 02/09/23  0523 02/09/23  0510 02/08/23  2041   BLOOD CULTURE  No Growth at 48 hrs  No Growth at 48 hrs    --    URINE CULTURE   --   --  <10,000 cfu/ml       Last 24 Hours Medication List:   Current Facility-Administered Medications   Medication Dose Route Frequency Provider Last Rate   • acetaminophen  650 mg Oral Q6H PRN Floyddie Patron Force, DO     • allopurinol  100 mg Oral Daily Roddie Patron Force, DO     • calcium carbonate  1,000 mg Oral Daily PRN Roddie Patron Force, DO     • cefTRIAXone  1,000 mg Intravenous Q24H Roddie Patron Sorin, DO     • docusate sodium  100 mg Oral BID Roddie Patron Force, DO     • heparin (porcine)  5,000 Units Subcutaneous [facility] Roddie Patron Force, DO     • HYDROmorphone  0 5 mg Intravenous Q4H PRN Rachaele Maiteron Sorin, DO     • labetalol  10 mg Intravenous Q4H PRN Kayce Crainetter, DO     • metoclopramide  10 mg Intravenous Q6H PRN Camacho Zuniga,      • metoprolol tartrate  100 mg Oral BID Roddie Patron Sorin, DO     • multi-electrolyte  50 mL/hr Intravenous Continuous Roddie Patron Delight, DO 50 mL/hr (02/11/23 0543)   • ondansetron  4 mg Intravenous Q4H PRN Roddie Patron Delight, DO     • oxyCODONE  5 mg Oral Q4H PRN Roddie Patron Delight, DO     • pravastatin  40 mg Oral Daily With [employer], DO     • senna  1 tablet Oral Daily Camacho Zuniga DO          Today, Patient Was Seen By: Camacho Zuniga DO    **Please Note: This note may have been constructed using a voice recognition system  **

## 2023-02-12 LAB
ABO GROUP BLD BPU: NORMAL
ABO GROUP BLD BPU: NORMAL
ALBUMIN SERPL BCP-MCNC: 2.9 G/DL (ref 3.5–5)
ALP SERPL-CCNC: 55 U/L (ref 34–104)
ALT SERPL W P-5'-P-CCNC: <3 U/L (ref 7–52)
ANION GAP SERPL CALCULATED.3IONS-SCNC: 11 MMOL/L (ref 4–13)
AST SERPL W P-5'-P-CCNC: 20 U/L (ref 13–39)
BASOPHILS # BLD AUTO: 0 THOUSANDS/ÂΜL (ref 0–0.1)
BASOPHILS NFR BLD AUTO: 0 % (ref 0–1)
BILIRUB SERPL-MCNC: 0.19 MG/DL (ref 0.2–1)
BPU ID: NORMAL
BPU ID: NORMAL
BUN SERPL-MCNC: 95 MG/DL (ref 5–25)
CALCIUM ALBUM COR SERPL-MCNC: 9 MG/DL (ref 8.3–10.1)
CALCIUM SERPL-MCNC: 8.1 MG/DL (ref 8.4–10.2)
CHLORIDE SERPL-SCNC: 90 MMOL/L (ref 96–108)
CK MB SERPL-MCNC: 0.9 % (ref 0–2.5)
CK MB SERPL-MCNC: 3.6 NG/ML (ref 0.6–6.3)
CK SERPL-CCNC: 414 U/L (ref 39–308)
CO2 SERPL-SCNC: 28 MMOL/L (ref 21–32)
CREAT SERPL-MCNC: 4.8 MG/DL (ref 0.6–1.3)
CROSSMATCH: NORMAL
CROSSMATCH: NORMAL
EOSINOPHIL # BLD AUTO: 0 THOUSAND/ÂΜL (ref 0–0.61)
EOSINOPHIL NFR BLD AUTO: 0 % (ref 0–6)
ERYTHROCYTE [DISTWIDTH] IN BLOOD BY AUTOMATED COUNT: 18.2 % (ref 11.6–15.1)
GFR SERPL CREATININE-BSD FRML MDRD: 10 ML/MIN/1.73SQ M
GLUCOSE SERPL-MCNC: 131 MG/DL (ref 65–140)
HCT VFR BLD AUTO: 22.9 % (ref 36.5–49.3)
HCT VFR BLD AUTO: 23.5 % (ref 36.5–49.3)
HGB BLD-MCNC: 7 G/DL (ref 12–17)
HGB BLD-MCNC: 7.2 G/DL (ref 12–17)
IMM GRANULOCYTES # BLD AUTO: 0.08 THOUSAND/UL (ref 0–0.2)
IMM GRANULOCYTES NFR BLD AUTO: 1 % (ref 0–2)
LYMPHOCYTES # BLD AUTO: 0.41 THOUSANDS/ÂΜL (ref 0.6–4.47)
LYMPHOCYTES NFR BLD AUTO: 4 % (ref 14–44)
MAGNESIUM SERPL-MCNC: 2.1 MG/DL (ref 1.9–2.7)
MCH RBC QN AUTO: 23.5 PG (ref 26.8–34.3)
MCHC RBC AUTO-ENTMCNC: 30.6 G/DL (ref 31.4–37.4)
MCV RBC AUTO: 77 FL (ref 82–98)
MONOCYTES # BLD AUTO: 0.88 THOUSAND/ÂΜL (ref 0.17–1.22)
MONOCYTES NFR BLD AUTO: 8 % (ref 4–12)
NEUTROPHILS # BLD AUTO: 10.36 THOUSANDS/ÂΜL (ref 1.85–7.62)
NEUTS SEG NFR BLD AUTO: 87 % (ref 43–75)
NRBC BLD AUTO-RTO: 0 /100 WBCS
PHOSPHATE SERPL-MCNC: 5 MG/DL (ref 2.3–4.1)
PLATELET # BLD AUTO: 216 THOUSANDS/UL (ref 149–390)
PMV BLD AUTO: 10 FL (ref 8.9–12.7)
POTASSIUM SERPL-SCNC: 4.3 MMOL/L (ref 3.5–5.3)
PROCALCITONIN SERPL-MCNC: 3.2 NG/ML
PROT SERPL-MCNC: 5.5 G/DL (ref 6.4–8.4)
RBC # BLD AUTO: 3.06 MILLION/UL (ref 3.88–5.62)
SODIUM SERPL-SCNC: 129 MMOL/L (ref 135–147)
UNIT DISPENSE STATUS: NORMAL
UNIT DISPENSE STATUS: NORMAL
UNIT PRODUCT CODE: NORMAL
UNIT PRODUCT CODE: NORMAL
UNIT PRODUCT VOLUME: 350 ML
UNIT PRODUCT VOLUME: 350 ML
UNIT RH: NORMAL
UNIT RH: NORMAL
WBC # BLD AUTO: 11.73 THOUSAND/UL (ref 4.31–10.16)

## 2023-02-12 RX ORDER — SODIUM CHLORIDE, SODIUM GLUCONATE, SODIUM ACETATE, POTASSIUM CHLORIDE, MAGNESIUM CHLORIDE, SODIUM PHOSPHATE, DIBASIC, AND POTASSIUM PHOSPHATE .53; .5; .37; .037; .03; .012; .00082 G/100ML; G/100ML; G/100ML; G/100ML; G/100ML; G/100ML; G/100ML
50 INJECTION, SOLUTION INTRAVENOUS CONTINUOUS
Status: DISCONTINUED | OUTPATIENT
Start: 2023-02-12 | End: 2023-02-14

## 2023-02-12 RX ADMIN — FUROSEMIDE 160 MG: 10 INJECTION, SOLUTION INTRAMUSCULAR; INTRAVENOUS at 12:47

## 2023-02-12 RX ADMIN — HEPARIN SODIUM 5000 UNITS: 5000 INJECTION INTRAVENOUS; SUBCUTANEOUS at 05:38

## 2023-02-12 RX ADMIN — METOPROLOL TARTRATE 100 MG: 100 TABLET, FILM COATED ORAL at 08:47

## 2023-02-12 RX ADMIN — HEPARIN SODIUM 5000 UNITS: 5000 INJECTION INTRAVENOUS; SUBCUTANEOUS at 13:11

## 2023-02-12 RX ADMIN — STANDARDIZED SENNA CONCENTRATE 8.6 MG: 8.6 TABLET ORAL at 08:47

## 2023-02-12 RX ADMIN — HEPARIN SODIUM 5000 UNITS: 5000 INJECTION INTRAVENOUS; SUBCUTANEOUS at 21:13

## 2023-02-12 RX ADMIN — DOCUSATE SODIUM 100 MG: 100 CAPSULE, LIQUID FILLED ORAL at 17:09

## 2023-02-12 RX ADMIN — ACETAMINOPHEN 650 MG: 325 TABLET, FILM COATED ORAL at 08:47

## 2023-02-12 RX ADMIN — ACETAMINOPHEN 650 MG: 325 TABLET, FILM COATED ORAL at 17:09

## 2023-02-12 RX ADMIN — METOPROLOL TARTRATE 100 MG: 100 TABLET, FILM COATED ORAL at 21:15

## 2023-02-12 RX ADMIN — DOCUSATE SODIUM 100 MG: 100 CAPSULE, LIQUID FILLED ORAL at 08:47

## 2023-02-12 RX ADMIN — PRAVASTATIN SODIUM 40 MG: 40 TABLET ORAL at 15:52

## 2023-02-12 RX ADMIN — SODIUM CHLORIDE, SODIUM GLUCONATE, SODIUM ACETATE, POTASSIUM CHLORIDE, MAGNESIUM CHLORIDE, SODIUM PHOSPHATE, DIBASIC, AND POTASSIUM PHOSPHATE 100 ML/HR: .53; .5; .37; .037; .03; .012; .00082 INJECTION, SOLUTION INTRAVENOUS at 05:37

## 2023-02-12 RX ADMIN — CEFTRIAXONE 1000 MG: 1 INJECTION, SOLUTION INTRAVENOUS at 15:50

## 2023-02-12 RX ADMIN — ALLOPURINOL 100 MG: 100 TABLET ORAL at 08:47

## 2023-02-12 RX ADMIN — SODIUM CHLORIDE, SODIUM GLUCONATE, SODIUM ACETATE, POTASSIUM CHLORIDE, MAGNESIUM CHLORIDE, SODIUM PHOSPHATE, DIBASIC, AND POTASSIUM PHOSPHATE 50 ML/HR: .53; .5; .37; .037; .03; .012; .00082 INJECTION, SOLUTION INTRAVENOUS at 21:17

## 2023-02-12 NOTE — ASSESSMENT & PLAN NOTE
Lab Results   Component Value Date    EGFR 10 02/12/2023    EGFR 11 02/11/2023    EGFR 14 02/10/2023    CREATININE 4 80 (H) 02/12/2023    CREATININE 4 29 (H) 02/11/2023    CREATININE 3 47 (H) 02/10/2023     · Please see the assessment and plan for "Acute kidney injury"

## 2023-02-12 NOTE — PLAN OF CARE
Problem: PHYSICAL THERAPY ADULT  Goal: Performs mobility at highest level of function for planned discharge setting  See evaluation for individualized goals  Description: Treatment/Interventions: ADL retraining, Functional transfer training, Elevations, Therapeutic exercise, LE strengthening/ROM, Endurance training, Bed mobility, Gait training          See flowsheet documentation for full assessment, interventions and recommendations  Note: Prognosis: Fair  Problem List: Decreased strength, Decreased range of motion, Decreased endurance, Impaired balance, Decreased mobility  Assessment: Pt is 80 y o  male seen for PT evaluation s/p [facility] on 2/8/2023 w/ Fracture of femoral neck, right ([facility] [address] )  PT consulted to assess pt's functional mobility and d/c needs  Order placed for PT eval and tx, w/ up and OOB as tolerated order  Comorbidities affecting pt's physical performance at time of assessment include: Right femoral neck fx    PTA, pt was independent w/ all functional mobility w/ RW  Personal factors affecting pt at time of IE include: inaccessible home environment, ambulating w/ assistive device, inability to ambulate household distances, inability to navigate community distances, inability to navigate level surfaces w/o external assistance, unable to perform dynamic tasks in community, positive fall history, unable to perform physical activity, limited insight into impairments, inability to perform IADLs, inability to perform ADLs and inability to live alone   Please find objective findings from PT assessment regarding body systems outlined above with impairments and limitations including weakness, decreased ROM, impaired balance, decreased endurance, impaired coordination, gait deviations, pain, decreased activity tolerance, decreased functional mobility tolerance, decreased safety awareness, impaired judgement, fall risk, orthopedic restrictions and decreased skin integrity Pt to benefit from continued PT tx to address deficits as defined above and maximize level of functional independent mobility and consistency  From PT/mobility standpoint, recommendation at time of d/c would be post acute rehabilitation services pending progress in order to facilitate return to PLOF  PT Discharge Recommendation: Post acute rehabilitation services    See flowsheet documentation for full assessment

## 2023-02-12 NOTE — ASSESSMENT & PLAN NOTE
· Held eliquis for the right hip hemiarthroplasty on 02/10/2023  · Continue metoprolol tartrate 100 mg PO every 12 hours for heart rate control  · Continue to hold Eliquis since hemoglobin unstable; 7 2 on 2/12 and received 1 unit of blood

## 2023-02-12 NOTE — PROGRESS NOTES
[address] [facility] [address] [place]  Progress Note - Dung Hopkins 1935, 80 y o  male MRN: [id number]  Unit/Bed#:  Encounter: [id number]  Primary Care Provider: Mychal Dye DO   Date and time admitted to hospital: 2/8/2023  4:30 PM    * Fracture of femoral neck, right [facility])  Assessment & Plan  · The patient was seen in consultation by Orthopedic Surgery  · Cardiology pre-operative cardiac risk stratification - moderate but not prohibitive  · POD #1 S/P Right hip hemiarthroplasty on 02/10/2023 by Dr Kathy Stevenson (Orthopedic Surgery)  · Incentive spirometry  · DVT prophylaxis with heparin 5000 Units SQ every 8 hours and SCD's on both lower extremities  · PT/OT  · Transferred to ICU level of care on 02/11/2023 for worsening mentation and possible uremia  · Today 2/12 mental status improved, most likely secondary to anesthesia  · Continue pain control  · Appreciate Ortho recs on Monday    Acute kidney injury superimposed on CKD [facility])  Assessment & Plan  Lab Results   Component Value Date    EGFR 10 02/12/2023    EGFR 11 02/11/2023    EGFR 14 02/10/2023    CREATININE 4 80 (H) 02/12/2023    CREATININE 4 29 (H) 02/11/2023    CREATININE 3 47 (H) 02/10/2023       • Developed acute kidney injury during the hospitalization likely due to contrast-induced nephrology  • Baseline serum creatinine around 2 2 mg/dl  • Worsening renal function on 02/11/2023 with increased lethargy and muscle twitching  • Possible uremia  • Check an ABG  • Nephrology is following the patient in consultation  • Continue Isolyte IV fluids at 50 ml/hr  • Check the patient post-void residual amounts every shift with bladder scanning, and follow the urinary retention protocol    • Avoid all nephrotoxic agents  • Serial laboratory testing to monitor the patient's renal function and electrolyte levels    Chronic diastolic CHF (congestive heart failure) ([facility])  Assessment & Plan  Wt Readings from Last 3 Encounters:   02/12/23 89 kg (196 lb 3 4 oz)   12/06/22 86 2 kg (190 lb)   12/02/22 85 kg (187 lb 6 4 oz)       · Daily weights  · Strict intake/output measurements  · The patient was evaluated by Cardiology pre-operatively and deemed a moderate cardiac risk, which was not prohibitive  · Monitor his volume status closely  · Low I/O's - 50, bladder scan for urinary retention      Paroxysmal atrial fibrillation (HCC)  Assessment & Plan  · Held eliquis for the right hip hemiarthroplasty on 02/10/2023  · Continue metoprolol tartrate 100 mg PO every 12 hours for heart rate control  · Continue to hold Eliquis since hemoglobin unstable; 7 2 on 2/12 and received 1 unit of blood    Tricuspid valve insufficiency  Assessment & Plan  Cardiology consult    Mitral valve insufficiency  Assessment & Plan  Cardiology consult    Pulmonary hypertension [facility])  Assessment & Plan  Pulmonary consult    YOLANDE (acute kidney injury) ([facility] [address] )  Assessment & Plan  · Developed acute kidney injury during the hospitalization likely due to contrast-induced nephrology  · Baseline serum creatinine around 2 2 mg/dl  · Worsening renal function on 02/11/2023 with increased lethargy and muscle twitching  · Possible uremia  · Check an ABG  · Nephrology is following the patient in consultation  · Continue Isolyte IV fluids at 50 ml/hr  · Check the patient post-void residual amounts every shift with bladder scanning, and follow the urinary retention protocol    · Avoid all nephrotoxic agents  · Serial laboratory testing to monitor the patient's renal function and electrolyte levels    Toxic encephalopathy  Assessment & Plan  · Likely due to anesthesia effect from 02/10/2023 and possible uremia  · Continue to monitor his neurologic status closely    Hyperphosphatemia  Assessment & Plan  · Treatment per Nephrology  · Follow the phosphorus level    Leukocytosis  Assessment & Plan  · Appears to have a chronic leukocytosis  · Blood cultures negative at 72 hours  · Urine culture shows mixed contaminants  · Increasing procalcitonin 3 2  · Continue ceftriaxone 1000 mg IV every 24 hours (day 2, day 4 total abx)  · Follow the procalcitonin level  · Follow the CBC  · Outpatient Hematology evaluation    Multiple renal cysts  Assessment & Plan  · Outpatient surveillance imaging with PCP    CKD (chronic kidney disease) stage 4, GFR 15-29 ml/min [facility])  Assessment & Plan  Lab Results   Component Value Date    EGFR 10 02/12/2023    EGFR 11 02/11/2023    EGFR 14 02/10/2023    CREATININE 4 80 (H) 02/12/2023    CREATININE 4 29 (H) 02/11/2023    CREATININE 3 47 (H) 02/10/2023     · Please see the assessment and plan for "Acute kidney injury"      Acute blood loss anemia  Assessment & Plan  · POD #1 S/P Right hip hemiarthroplasty on 02/10/2023  · Hold eliquis for now  · Follow the CBC  · Transfuse for a hemoglobin less than 7 g/dl    2/12 hemoglobin 7 2, received 1 unit of blood          VTE Pharmacologic Prophylaxis: VTE Score: 12 High Risk (Score >/= 5) - Pharmacological DVT Prophylaxis Ordered: enoxaparin (Lovenox)  Sequential Compression Devices Ordered  Patient Centered Rounds: I performed bedside rounds with nursing staff today  Discussions with Specialists or Other Care Team Provider: Nphro    Education and Discussions with Family / Patient: Attempted to update  (wife) via phone  Left voicemail  Time Spent for Care: 60 minutes  More than 50% of total time spent on counseling and coordination of care as described above  Current Length of Stay: 4 day(s)  Current Patient Status: Inpatient   Certification Statement: The patient will continue to require additional inpatient hospital stay due to IV abx  Discharge Plan: Anticipate discharge in 24-48 hrs to rehab facility  Code Status: Level 1 - Full Code    Subjective:   Patient seen Ana Pacheco in bed this morning, no acute distress  Patient seems much more alert compared to yesterday  He is oriented and not confused    He complains of leg pain which is to be expected after surgery  He is tolerating food well, without nausea vomiting  No new complaints  Objective:     Vitals:   Temp (24hrs), Av 3 °F (36 8 °C), Min:97 9 °F (36 6 °C), Max:98 6 °F (37 °C)    Temp:  [97 9 °F (36 6 °C)-98 6 °F (37 °C)] 97 9 °F (36 6 °C)  HR:  [59-61] 60  Resp:  [9-27] 11  BP: ()/(46-75) 144/67  SpO2:  [88 %-100 %] 100 %  Body mass index is 33 68 kg/m²  Input and Output Summary (last 24 hours): Intake/Output Summary (Last 24 hours) at 2023 1218  Last data filed at 2023 0900  Gross per 24 hour   Intake 3313 33 ml   Output 125 ml   Net 3188 33 ml       Physical Exam:   Physical Exam  Vitals and nursing note reviewed  Constitutional:       General: He is not in acute distress  Appearance: He is well-developed  HENT:      Head: Normocephalic and atraumatic  Right Ear: External ear normal       Left Ear: External ear normal       Nose: No rhinorrhea  Mouth/Throat:      Mouth: Mucous membranes are dry  Eyes:      Conjunctiva/sclera: Conjunctivae normal    Cardiovascular:      Rate and Rhythm: Normal rate and regular rhythm  Heart sounds: No murmur heard  Pulmonary:      Effort: Pulmonary effort is normal  No respiratory distress  Breath sounds: Normal breath sounds  Abdominal:      Palpations: Abdomen is soft  Tenderness: There is no abdominal tenderness  Musculoskeletal:         General: Swelling and tenderness present  Cervical back: Neck supple  Comments: Wrapped in bandages   Skin:     General: Skin is warm and dry  Capillary Refill: Capillary refill takes less than 2 seconds  Neurological:      General: No focal deficit present  Mental Status: He is alert  Mental status is at baseline     Psychiatric:         Mood and Affect: Mood normal          Behavior: Behavior normal          Additional Data:     Labs:  Results from last 7 days   Lab Units 23  0438 23  0547   WBC Thousand/uL 11 73* 13 09*   HEMOGLOBIN g/dL 7 2* 7 9*   HEMATOCRIT % 23 5* 27 1*   PLATELETS Thousands/uL 216 234   BANDS PCT %  --  1   NEUTROS PCT % 87*  --    LYMPHS PCT % 4*  --    LYMPHO PCT %  --  5*   MONOS PCT % 8  --    MONO PCT %  --  1*   EOS PCT % 0 0     Results from last 7 days   Lab Units 02/12/23  0438   SODIUM mmol/L 129*   POTASSIUM mmol/L 4 3   CHLORIDE mmol/L 90*   CO2 mmol/L 28   BUN mg/dL 95*   CREATININE mg/dL 4 80*   ANION GAP mmol/L 11   CALCIUM mg/dL 8 1*   ALBUMIN g/dL 2 9*   TOTAL BILIRUBIN mg/dL 0 19*   ALK PHOS U/L 55   ALT U/L <3*   AST U/L 20   GLUCOSE RANDOM mg/dL 131     Results from last 7 days   Lab Units 02/10/23  0455   INR  1 18             Results from last 7 days   Lab Units 02/12/23  0438 02/11/23  0547 02/10/23  0550 02/09/23  0506   LACTIC ACID mmol/L  --   --   --  1 1   PROCALCITONIN ng/ml 3 20* 1 83* 0 54* 0 35*       Lines/Drains:  Invasive Devices     Peripheral Intravenous Line  Duration           Peripheral IV 02/08/23 Right Antecubital 3 days    Peripheral IV 02/10/23 Right Wrist 1 day                      Imaging: Reviewed radiology reports from this admission including: xray(s)    Recent Cultures (last 7 days):   Results from last 7 days   Lab Units 02/09/23  0523 02/09/23  0510 02/08/23  2041   BLOOD CULTURE  No Growth at 72 hrs   No Growth at 72 hrs   --    URINE CULTURE   --   --  <10,000 cfu/ml       Last 24 Hours Medication List:   Current Facility-Administered Medications   Medication Dose Route Frequency Provider Last Rate   • acetaminophen  650 mg Oral Q6H PRN Cailin Lamp Sorin, DO     • allopurinol  100 mg Oral Daily Cailin Lamp Sorin, DO     • calcium carbonate  1,000 mg Oral Daily PRN Cailin Lamp Sorin, DO     • cefTRIAXone  1,000 mg Intravenous Q24H Cailin Lamp Sorin, DO 1,000 mg (02/11/23 1624)   • docusate sodium  100 mg Oral BID Cailin Lamp [place], DO     • furosemide  160 mg Intravenous Once [employer], DO     • heparin (porcine) 5,000 Units Subcutaneous [facility] Alf Jordanr, DO     • HYDROmorphone  0 5 mg Intravenous Q4H PRN Alf Jordanr, DO     • labetalol  10 mg Intravenous Q4H PRN Alf Crainetter, DO     • metoclopramide  10 mg Intravenous Q6H PRN Alf Crainetter, DO     • metoprolol tartrate  100 mg Oral BID Alffarida Jordanr, DO     • multi-electrolyte  50 mL/hr Intravenous Continuous Batsheva Co, DO 50 mL/hr (02/12/23 1119)   • ondansetron  4 mg Intravenous Q4H PRN Alf Crainetter, DO     • oxyCODONE  5 mg Oral Q4H PRN Alf Jordanr, DO     • pravastatin  40 mg Oral Daily With [employer], DO     • senna  1 tablet Oral Daily Justin Bower,           Today, Patient Was Seen By: Cecile Chavez MD    **Please Note: This note may have been constructed using a voice recognition system  **

## 2023-02-12 NOTE — OCCUPATIONAL THERAPY NOTE
Occupational Therapy Progress Note     Patient Name: Abbie Funes  CADWW'U Date: 2/12/2023  Problem List  Principal Problem:    Fracture of femoral neck, right ([facility])  Active Problems:    Acute blood loss anemia    CKD (chronic kidney disease) stage 4, GFR 15-29 ml/min (HCC)    Chronic diastolic CHF (congestive heart failure) ([facility])    Paroxysmal atrial fibrillation (HCC)    Multiple renal cysts    Leukocytosis    Hyperphosphatemia    Toxic encephalopathy    Acute kidney injury ([facility] [address] )    Pulmonary hypertension ([facility] [address] )    Mitral valve insufficiency    Tricuspid valve insufficiency              02/12/23 1010   OT Last Visit   OT Visit Date 02/12/23   Note Type   Note Type Treatment   Pain Assessment   Pain Assessment Tool 0-10   Pain Score 8   Pain Location/Orientation Orientation: Right;Location: Back; Location: Hip   Restrictions/Precautions   Weight Bearing Precautions Per Order Yes   RLE Weight Bearing Per Order WBAT   Other Precautions Multiple lines;Telemetry;O2;Fall Risk;Pain; Chair Alarm; Bed Alarm;WBS   ADL   Where Assessed Edge of bed   LB Dressing Assistance 2  Maximal Assistance   LB Dressing Deficit Don/doff R sock; Don/doff L sock   LB Dressing Comments pt with inability to perform LB dressing tasks due to poor sitting balance and inability to bring leg to knee nor bend forward appropriately to complete task   Bed Mobility   Supine to Sit 2  Maximal assistance   Additional items Assist x 2;Bedrails; Increased time required;Verbal cues;LE management   Sit to Supine   (seated in chair at end of session)   Additional Comments pt on 2L O2 throughout session; SPO2 WFL with no complaints of SOB; BP WFL and HR WFL; pt with significant (A) to perform bed mobility and appears very rigid   Transfers   Sit to Stand 3  Moderate assistance   Additional items Assist x 2; Increased time required;Verbal cues  (RW)   Stand to Sit 3  Moderate assistance   Additional items Assist x 2; Increased time required;Verbal cues  (RW) Stand pivot 2  Maximal assistance   Additional items Assist x 2; Increased time required;Verbal cues  (RW)   Additional Comments pt performs x2 functional transfers and stand for ~2 mintues on 1st transfer; 2nd transfer with attempt to perform SPT to chair, and moderately unsteady and with flexed knees attempting unsafe sit rather than perform SPT to chair   Functional Mobility   Additional Comments pt unable to advance forward this session; poor stability and poor SPT to chair   Subjective   Subjective "I'll tell ya what, this hurts"   Cognition   Overall Cognitive Status Impaired   Arousal/Participation Alert   Attention Attends with cues to redirect   Orientation Level Oriented to person;Oriented to place; Disoriented to time;Disoriented to situation   Memory Decreased long term memory;Decreased short term memory   Following Commands Follows one step commands without difficulty   Activity Tolerance   Activity Tolerance Patient limited by fatigue;Patient limited by pain   Assessment   Assessment Patient participated in Skilled OT session this date with interventions consisting of ADL re training with the use of correct body mechnaics, safety awareness and fall prevention techniques, maintaining weight bearing restrictions,  therapeutic activities to: increase activity tolerance, increase standing tolerance time with unilateral UE support to complete sink level ADLs, increase cardiovascular endurance , increase dynamic sit/ stand balance during functional activity , increase postural control, increase trunk control and increase OOB/ sitting tolerance   Co treatment with PT secondary to complex medical condition of pt, mod-max A of 2 required to achieve and maintain transitional movements, requiring the need of skilled therapeutic intervention of 2 therapists to achieve delivery of services  Patient agreeable to OT treatment session, upon arrival patient was found supine in bed   Patient requiring frequent re direction, verbal cues for safety, verbal cues for correct technique, verbal cues for pacing thru activity steps and frequent rest periods  Patient continues to be functioning below baseline level, occupational performance remains limited secondary to factors listed above and increased risk for falls and injury  From OT standpoint, recommendation at time of d/c would be Post acute rehabilitation services  The patient's raw score on the AM-PAC Daily Activity inpatient short form is 13, standardized score is 32 03, less than 39 4  Patients at this level are likely to benefit from discharge to post-acute rehabilitation services  Please refer to the recommendation of the Occupational Therapist for safe discharge planning     Plan   Goal Expiration Date 02/25/23   OT Treatment Day 1   OT Frequency 3-5x/wk   Recommendation   OT Discharge Recommendation Post acute rehabilitation services   AM-PAC Daily Activity Inpatient   Lower Body Dressing 1   Bathing 2   Toileting 2   Upper Body Dressing 2   Grooming 3   Eating 3   Daily Activity Raw Score 13   Daily Activity Standardized Score (Calc for Raw Score >=11) 32 03   AM-PAC Applied Cognition Inpatient   Following a Speech/Presentation 4   Understanding Ordinary Conversation 4   Taking Medications 2   Remembering Where Things Are Placed or Put Away 2   Remembering List of 4-5 Errands 2   Taking Care of Complicated Tasks 2   Applied Cognition Raw Score 16   Applied Cognition Standardized Score 35 03

## 2023-02-12 NOTE — PROGRESS NOTES
Orthopedics   Chaim Lin 80 y o  male MRN: [id number]  Unit/Bed#:       Subjective:  80 y  o male post operative day 2 right hip hemiarthroplasty  Pt doing ok  Pain controlled, but pain with weight bearing as expected  Denies CP, SOB, fever      Labs:  0   Lab Value Date/Time    HCT 23 5 (L) 02/12/2023 0438    HCT 27 1 (L) 02/11/2023 0547    HCT 31 5 (L) 02/10/2023 0455    HCT 27 8 (L) 07/04/2015 0501    HCT 30 1 (L) 07/03/2015 0935    HCT 30 0 (L) 07/02/2015 1223    HGB 7 2 (L) 02/12/2023 0438    HGB 7 9 (L) 02/11/2023 0547    HGB 9 3 (L) 02/10/2023 0455    HGB 9 4 (L) 07/04/2015 0501    HGB 9 9 (L) 07/03/2015 0935    HGB 9 8 (L) 07/02/2015 1223    INR 1 18 02/10/2023 0455    INR 1 27 (H) 07/02/2015 1223    WBC 11 73 (H) 02/12/2023 0438    WBC 13 09 (H) 02/11/2023 0547    WBC 13 66 (H) 02/10/2023 0455    WBC 12 50 (H) 07/04/2015 0501    WBC 16 11 (H) 07/03/2015 0935    WBC 19 17 (H) 07/02/2015 1223    ESR 50 (H) 07/30/2021 0417     8 (H) 07/29/2021 0457     Meds:    Current Facility-Administered Medications:   •  acetaminophen (TYLENOL) tablet 650 mg, 650 mg, Oral, Q6H PRN, Zakiya Rowell DO, 650 mg at 02/12/23 0847  •  allopurinol (ZYLOPRIM) tablet 100 mg, 100 mg, Oral, Daily, Zakiya Rowell DO, 100 mg at 02/12/23 2010  •  calcium carbonate (TUMS) chewable tablet 1,000 mg, 1,000 mg, Oral, Daily PRN, Zakiya Rowell DO  •  cefTRIAXone (ROCEPHIN) IVPB (premix in dextrose) 1,000 mg 50 mL, 1,000 mg, Intravenous, Q24H, Zakiya Rowell DO, Last Rate: 100 mL/hr at 02/11/23 1624, 1,000 mg at 02/11/23 1624  •  docusate sodium (COLACE) capsule 100 mg, 100 mg, Oral, BID, Zakiya Rowell, DO, 100 mg at 02/12/23 0847  •  heparin (porcine) subcutaneous injection 5,000 Units, 5,000 Units, Subcutaneous, Q8H [address], Zakiya Jordanr, DO, 5,000 Units at 02/12/23 7207  •  HYDROmorphone (DILAUDID) injection 0 5 mg, 0 5 mg, Intravenous, Q4H PRN, Zakiya Rowell, DO, 0 5 mg at 02/11/23 0436  •  labetalol (NORMODYNE) injection 10 mg, 10 mg, Intravenous, Q4H PRN, Tresea Satish Crainetter, DO  •  metoclopramide (REGLAN) injection 10 mg, 10 mg, Intravenous, Q6H PRN, Tresea Satish Sorin, DO  •  metoprolol tartrate (LOPRESSOR) tablet 100 mg, 100 mg, Oral, BID, Tresea Satish Sorin, DO, 100 mg at 23 0847  •  multi-electrolyte (PLASMALYTE-A/ISOLYTE-S PH 7 4) IV solution, 50 mL/hr, Intravenous, Continuous, Mynor Madison, DO  •  ondansetron [facility]) injection 4 mg, 4 mg, Intravenous, Q4H PRN, Theodora Jordanr, DO, 4 mg at 23 3535  •  oxyCODONE (ROXICODONE) IR tablet 5 mg, 5 mg, Oral, Q4H PRN, Theodora Jordanr, DO, 5 mg at 23 0055  •  pravastatin (PRAVACHOL) tablet 40 mg, 40 mg, Oral, Daily With Maria Isabel Aria Jordanr, DO, 40 mg at 23 1623  •  senna (SENOKOT) tablet 8 6 mg, 1 tablet, Oral, Daily, Theodora Jordanr, DO, 8 6 mg at 23 0847    Blood Culture:   Lab Results   Component Value Date    BLOODCX No Growth at 48 hrs  2023     Wound Culture:   Lab Results   Component Value Date    WOUNDCULT No growth 2021     Ins and Outs:  I/O last 24 hours: In: 3853 3 [P O :1340; I V :2513 3]  Out: 125 [Urine:125]    Physical:  Vitals:    23 0847   BP: 144/67   Pulse: 60   Resp:    Temp:    SpO2:      right lower extremity  · Dressing clean dry intact, minimal thigh swelling, soft  No gross ecchymosis  · Sensation intact L2-S1  · Motor intact to knee flexion/extension, limited slightly  Good plantar/dorsiflexion  · 2+ DP pulse, negative homans sign  Assessment: 80 y  o male post operative day 2 right hip hemiarthroplasty with blood loss anemia  Plan:  · Up and out of bed WBAT RLE  In PT/OT with walker  · Consider transfusion dependent upon nephrology and SLIM  · DVT prophylaxis on heparin  · Analgesics prn    · Dispo: Ortho will follow  · Patient noted to have acute blood loss anemia due to a drop in Hbg of > 2 0g from preop levels, will monitor vital signs and resuscitate with IV fluids as needed      Live Ortiz PA-C

## 2023-02-12 NOTE — PHYSICAL THERAPY NOTE
Physical Therapy Evaluation    Patient Name: Abbie Funes    BFAJF'Y Date: 2/12/2023     Problem List  Principal Problem:    Fracture of femoral neck, right ([facility])  Active Problems:    Acute blood loss anemia    CKD (chronic kidney disease) stage 4, GFR 15-29 ml/min ([facility])    Chronic diastolic CHF (congestive heart failure) ([facility])    Paroxysmal atrial fibrillation ([facility])    Multiple renal cysts    Leukocytosis    Hyperphosphatemia    Toxic encephalopathy    Acute kidney injury ([address] )    Pulmonary hypertension ([facility] )    Mitral valve insufficiency    Tricuspid valve insufficiency       Past Medical History  Past Medical History:   Diagnosis Date    Aortic stenosis     ECHO -4-14-14  MODERATE TO SEVERE AORTIC STENOSIS; MILD AROTIC INSUFFICIENCY - LAST ASSESSED 10/26/16; RESOLVED 6/8/16    Aortic valve disorder     LAST ASSESSED 10/8/15; 10/8/15    Arthritis     CHF (congestive heart failure) ([facility] )     Complete heart block ([facility] ) 7/20/2020    Coronary artery disease     Hypertension     Hypertensive urgency 5/19/2019    Renal disorder     TIA (transient ischemic attack)     Transient cerebral ischemia         Past Surgical History  Past Surgical History:   Procedure Laterality Date    AORTIC VALVE REPLACEMENT  06/30/2015    avr with 23 mm [place] Magna Ease bioprosthetic    CARDIAC PACEMAKER PLACEMENT Left 07/24/2020    CATARACT EXTRACTION Right 06/05/2000    COLONOSCOPY      CORONARY ARTERY BYPASS GRAFT  06/05/2000    x1 with argueta  to lad    EYE SURGERY      POLYPECTOMY  04/2014    enteroscopic - esophageal ulcer, gastric erosion, and duodenal ulcer  TONSILLECTOMY      unknown           02/12/23 1011   Pain Assessment   Pain Assessment Tool 0-10   Pain Score 8   Pain Location/Orientation Orientation: Right;Location: Back; Location: Hip   Restrictions/Precautions   Weight Bearing Precautions Per Order Yes   RLE Weight Bearing Per Order WBAT   Other Precautions Multiple lines;Telemetry;O2;Fall Risk; Chair Alarm; Bed Alarm   Cognition   Overall Cognitive Status Impaired   Arousal/Participation Alert   Attention Attends with cues to redirect   Orientation Level Oriented to person;Oriented to place; Disoriented to time;Disoriented to situation   Memory Decreased long term memory;Decreased short term memory   Following Commands Follows one step commands inconsistently   Subjective   Subjective Pt is agreeable to PT   Bed Mobility   Supine to Sit 2  Maximal assistance   Additional items Assist x 2; Increased time required;LE management;Verbal cues; Bedrails;HOB elevated;Leg    Transfers   Sit to Stand 3  Moderate assistance   Additional items Assist x 2; Increased time required;Verbal cues   Stand to Sit 3  Moderate assistance   Additional items Assist x 2; Increased time required;Verbal cues   Stand pivot 2  Maximal assistance   Additional items Assist x 2; Increased time required;Verbal cues   Additional Comments Did not attempt ambulation  Poor safety awareness during stand pivot transfer to the chair this date  Multiple LOB and cues for sequencing and safety awareness  Balance   Static Sitting Fair   Dynamic Sitting Fair   Static Standing Poor   Dynamic Standing Poor   Activity Tolerance   Activity Tolerance Patient limited by pain; Patient limited by fatigue   Assessment   Prognosis Fair   Problem List Decreased strength;Decreased range of motion;Decreased endurance; Impaired balance;Decreased mobility   Assessment Pt is 80 y o  male seen for PT evaluation s/p admit [address] on 2/8/2023 w/ Fracture of femoral neck, right ([address] )  PT consulted to assess pt's functional mobility and d/c needs  Order placed for PT eval and tx, w/ up and OOB as tolerated order  Comorbidities affecting pt's physical performance at time of assessment include: Right femoral neck fx    PTA, pt was independent w/ all functional mobility w/ RW   Personal factors affecting pt at time of IE include: inaccessible home environment, ambulating w/ assistive device, inability to ambulate household distances, inability to navigate community distances, inability to navigate level surfaces w/o external assistance, unable to perform dynamic tasks in community, positive fall history, unable to perform physical activity, limited insight into impairments, inability to perform IADLs, inability to perform ADLs and inability to live alone  Please find objective findings from PT assessment regarding body systems outlined above with impairments and limitations including weakness, decreased ROM, impaired balance, decreased endurance, impaired coordination, gait deviations, pain, decreased activity tolerance, decreased functional mobility tolerance, decreased safety awareness, impaired judgement, fall risk, orthopedic restrictions and decreased skin integrity Pt to benefit from continued PT tx to address deficits as defined above and maximize level of functional independent mobility and consistency  From PT/mobility standpoint, recommendation at time of d/c would be post acute rehabilitation services pending progress in order to facilitate return to PLOF  Goals   PT Treatment Day 2   Recommendation   PT Discharge Recommendation Post acute rehabilitation services   AM-PAC Basic Mobility Inpatient   Turning in Flat Bed Without Bedrails 2   Lying on Back to Sitting on Edge of Flat Bed Without Bedrails 2   Moving Bed to Chair 2   Standing Up From Chair Using Arms 2   Walk in Room 2   Climb 3-5 Stairs With Railing 2   Basic Mobility Inpatient Raw Score 12   Basic Mobility Standardized Score 32 23   Highest Level Of Mobility   -HLM Goal 4: Move to chair/commode   -HLM Achieved 4: Move to chair/commode     Patient seated in chair at the end of the session, all lines intact, all needs within reach  Patients raw score on the AM-PAC Basic Mobility inpatient short form is 12, standardized score is 32 23, less than) 42 9  patient's at this level are likely to benefit from post acute rehab services, however, please refer to therapist recommendation for safe D/C Plan  Pt benefited from co-evaluation of skilled OT and PT therapists in order to most appropriately address functional deficits d/t extensive assistance required for safe functional mobility, decreased activity tolerance, and regression from functioning level prior to admission and/or onset of present illness  OT/PT objectives were addressed separately; please see OT note for specific goal areas targeted

## 2023-02-12 NOTE — ASSESSMENT & PLAN NOTE
Wt Readings from Last 3 Encounters:   02/12/23 89 kg (196 lb 3 4 oz)   12/06/22 86 2 kg (190 lb)   12/02/22 85 kg (187 lb 6 4 oz)       · Daily weights  · Strict intake/output measurements  · The patient was evaluated by Cardiology pre-operatively and deemed a moderate cardiac risk, which was not prohibitive    · Monitor his volume status closely  · Low I/O's - 50, bladder scan for urinary retention

## 2023-02-12 NOTE — PLAN OF CARE
Problem: OCCUPATIONAL THERAPY ADULT  Goal: Performs self-care activities at highest level of function for planned discharge setting  See evaluation for individualized goals  Description: Treatment Interventions: ADL retraining, Functional transfer training, UE strengthening/ROM, Endurance training, Patient/family training, Equipment evaluation/education, Compensatory technique education, Energy conservation, Activityengagement          See flowsheet documentation for full assessment, interventions and recommendations  Outcome: Progressing  Note: Limitation: Decreased ADL status, Decreased UE strength, Decreased Safe judgement during ADL, Decreased endurance, Decreased self-care trans, Decreased high-level ADLs  Prognosis: Fair  Assessment: Patient participated in Skilled OT session this date with interventions consisting of ADL re training with the use of correct body mechnaics, safety awareness and fall prevention techniques, maintaining weight bearing restrictions,  therapeutic activities to: increase activity tolerance, increase standing tolerance time with unilateral UE support to complete sink level ADLs, increase cardiovascular endurance , increase dynamic sit/ stand balance during functional activity , increase postural control, increase trunk control and increase OOB/ sitting tolerance   Co treatment with PT secondary to complex medical condition of pt, mod-max A of 2 required to achieve and maintain transitional movements, requiring the need of skilled therapeutic intervention of 2 therapists to achieve delivery of services  Patient agreeable to OT treatment session, upon arrival patient was found supine in bed  Patient requiring frequent re direction, verbal cues for safety, verbal cues for correct technique, verbal cues for pacing thru activity steps and frequent rest periods   Patient continues to be functioning below baseline level, occupational performance remains limited secondary to factors listed above and increased risk for falls and injury  From OT standpoint, recommendation at time of d/c would be Post acute rehabilitation services  The patient's raw score on the AM-PAC Daily Activity inpatient short form is 13, standardized score is 32 03, less than 39 4  Patients at this level are likely to benefit from discharge to post-acute rehabilitation services  Please refer to the recommendation of the Occupational Therapist for safe discharge planning       OT Discharge Recommendation: Post acute rehabilitation services

## 2023-02-12 NOTE — ASSESSMENT & PLAN NOTE
Lab Results   Component Value Date    EGFR 10 02/12/2023    EGFR 11 02/11/2023    EGFR 14 02/10/2023    CREATININE 4 80 (H) 02/12/2023    CREATININE 4 29 (H) 02/11/2023    CREATININE 3 47 (H) 02/10/2023       • Developed acute kidney injury during the hospitalization likely due to contrast-induced nephrology  • Baseline serum creatinine around 2 2 mg/dl  • Worsening renal function on 02/11/2023 with increased lethargy and muscle twitching  • Possible uremia  • Check an ABG  • Nephrology is following the patient in consultation  • Continue Isolyte IV fluids at 50 ml/hr  • Check the patient post-void residual amounts every shift with bladder scanning, and follow the urinary retention protocol    • Avoid all nephrotoxic agents  • Serial laboratory testing to monitor the patient's renal function and electrolyte levels

## 2023-02-12 NOTE — ASSESSMENT & PLAN NOTE
· POD #1 S/P Right hip hemiarthroplasty on 02/10/2023  · Hold eliquis for now  · Follow the CBC  · Transfuse for a hemoglobin less than 7 g/dl    2/12 hemoglobin 7 2, received 1 unit of blood

## 2023-02-12 NOTE — ASSESSMENT & PLAN NOTE
· Appears to have a chronic leukocytosis  · Blood cultures negative at 72 hours  · Urine culture shows mixed contaminants  · Increasing procalcitonin 3 2  · Continue ceftriaxone 1000 mg IV every 24 hours (day 2, day 4 total abx)  · Follow the procalcitonin level  · Follow the CBC  · Outpatient Hematology evaluation

## 2023-02-12 NOTE — ASSESSMENT & PLAN NOTE
· The patient was seen in consultation by Orthopedic Surgery  · Cardiology pre-operative cardiac risk stratification - moderate but not prohibitive  · POD #1 S/P Right hip hemiarthroplasty on 02/10/2023 by Dr Kathy Stevenson (Orthopedic Surgery)  · Incentive spirometry  · DVT prophylaxis with heparin 5000 Units SQ every 8 hours and SCD's on both lower extremities  · PT/OT  · Transferred to ICU level of care on 02/11/2023 for worsening mentation and possible uremia  · Today 2/12 mental status improved, most likely secondary to anesthesia    · Continue pain control  · Appreciate Ortho recs on Monday

## 2023-02-13 ENCOUNTER — APPOINTMENT (INPATIENT)
Dept: RADIOLOGY | Facility: HOSPITAL | Age: 88
End: 2023-02-13

## 2023-02-13 LAB
ALBUMIN SERPL BCP-MCNC: 3.1 G/DL (ref 3.5–5)
ALP SERPL-CCNC: 56 U/L (ref 34–104)
ALT SERPL W P-5'-P-CCNC: <3 U/L (ref 7–52)
ANION GAP SERPL CALCULATED.3IONS-SCNC: 13 MMOL/L (ref 4–13)
AST SERPL W P-5'-P-CCNC: 22 U/L (ref 13–39)
BASOPHILS # BLD AUTO: 0.01 THOUSANDS/ÂΜL (ref 0–0.1)
BASOPHILS NFR BLD AUTO: 0 % (ref 0–1)
BILIRUB SERPL-MCNC: 0.24 MG/DL (ref 0.2–1)
BUN SERPL-MCNC: 98 MG/DL (ref 5–25)
CALCIUM ALBUM COR SERPL-MCNC: 9 MG/DL (ref 8.3–10.1)
CALCIUM SERPL-MCNC: 8.3 MG/DL (ref 8.4–10.2)
CARDIAC TROPONIN I PNL SERPL HS: 52 NG/L (ref 8–18)
CHLORIDE SERPL-SCNC: 92 MMOL/L (ref 96–108)
CK MB SERPL-MCNC: 0.9 % (ref 0–2.5)
CK MB SERPL-MCNC: 2.6 NG/ML (ref 0.6–6.3)
CK SERPL-CCNC: 288 U/L (ref 39–308)
CO2 SERPL-SCNC: 28 MMOL/L (ref 21–32)
CREAT SERPL-MCNC: 4.42 MG/DL (ref 0.6–1.3)
EOSINOPHIL # BLD AUTO: 0.09 THOUSAND/ÂΜL (ref 0–0.61)
EOSINOPHIL NFR BLD AUTO: 1 % (ref 0–6)
ERYTHROCYTE [DISTWIDTH] IN BLOOD BY AUTOMATED COUNT: 18.3 % (ref 11.6–15.1)
GFR SERPL CREATININE-BSD FRML MDRD: 11 ML/MIN/1.73SQ M
GLUCOSE SERPL-MCNC: 118 MG/DL (ref 65–140)
HCT VFR BLD AUTO: 24.1 % (ref 36.5–49.3)
HGB BLD-MCNC: 7.3 G/DL (ref 12–17)
IMM GRANULOCYTES # BLD AUTO: 0.07 THOUSAND/UL (ref 0–0.2)
IMM GRANULOCYTES NFR BLD AUTO: 1 % (ref 0–2)
LYMPHOCYTES # BLD AUTO: 0.63 THOUSANDS/ÂΜL (ref 0.6–4.47)
LYMPHOCYTES NFR BLD AUTO: 6 % (ref 14–44)
MAGNESIUM SERPL-MCNC: 2.3 MG/DL (ref 1.9–2.7)
MCH RBC QN AUTO: 23.3 PG (ref 26.8–34.3)
MCHC RBC AUTO-ENTMCNC: 30.3 G/DL (ref 31.4–37.4)
MCV RBC AUTO: 77 FL (ref 82–98)
MONOCYTES # BLD AUTO: 0.85 THOUSAND/ÂΜL (ref 0.17–1.22)
MONOCYTES NFR BLD AUTO: 7 % (ref 4–12)
NEUTROPHILS # BLD AUTO: 9.82 THOUSANDS/ÂΜL (ref 1.85–7.62)
NEUTS SEG NFR BLD AUTO: 85 % (ref 43–75)
NRBC BLD AUTO-RTO: 0 /100 WBCS
PHOSPHATE SERPL-MCNC: 4.7 MG/DL (ref 2.3–4.1)
PLATELET # BLD AUTO: 242 THOUSANDS/UL (ref 149–390)
PMV BLD AUTO: 10 FL (ref 8.9–12.7)
POTASSIUM SERPL-SCNC: 4.1 MMOL/L (ref 3.5–5.3)
PROCALCITONIN SERPL-MCNC: 2.39 NG/ML
PROT SERPL-MCNC: 5.7 G/DL (ref 6.4–8.4)
RBC # BLD AUTO: 3.13 MILLION/UL (ref 3.88–5.62)
SODIUM SERPL-SCNC: 133 MMOL/L (ref 135–147)
WBC # BLD AUTO: 11.47 THOUSAND/UL (ref 4.31–10.16)

## 2023-02-13 RX ORDER — POLYETHYLENE GLYCOL 3350 17 G/17G
17 POWDER, FOR SOLUTION ORAL DAILY PRN
Status: DISCONTINUED | OUTPATIENT
Start: 2023-02-13 | End: 2023-02-16 | Stop reason: HOSPADM

## 2023-02-13 RX ADMIN — SODIUM CHLORIDE, SODIUM GLUCONATE, SODIUM ACETATE, POTASSIUM CHLORIDE, MAGNESIUM CHLORIDE, SODIUM PHOSPHATE, DIBASIC, AND POTASSIUM PHOSPHATE 50 ML/HR: .53; .5; .37; .037; .03; .012; .00082 INJECTION, SOLUTION INTRAVENOUS at 16:07

## 2023-02-13 RX ADMIN — DOCUSATE SODIUM 100 MG: 100 CAPSULE, LIQUID FILLED ORAL at 16:50

## 2023-02-13 RX ADMIN — ALLOPURINOL 100 MG: 100 TABLET ORAL at 09:09

## 2023-02-13 RX ADMIN — HEPARIN SODIUM 5000 UNITS: 5000 INJECTION INTRAVENOUS; SUBCUTANEOUS at 05:50

## 2023-02-13 RX ADMIN — HEPARIN SODIUM 5000 UNITS: 5000 INJECTION INTRAVENOUS; SUBCUTANEOUS at 16:11

## 2023-02-13 RX ADMIN — OXYCODONE HYDROCHLORIDE 5 MG: 5 TABLET ORAL at 05:50

## 2023-02-13 RX ADMIN — CEFTRIAXONE 1000 MG: 1 INJECTION, SOLUTION INTRAVENOUS at 16:09

## 2023-02-13 RX ADMIN — STANDARDIZED SENNA CONCENTRATE 8.6 MG: 8.6 TABLET ORAL at 09:09

## 2023-02-13 RX ADMIN — DOCUSATE SODIUM 100 MG: 100 CAPSULE, LIQUID FILLED ORAL at 09:10

## 2023-02-13 RX ADMIN — HEPARIN SODIUM 5000 UNITS: 5000 INJECTION INTRAVENOUS; SUBCUTANEOUS at 21:03

## 2023-02-13 RX ADMIN — OXYCODONE HYDROCHLORIDE 5 MG: 5 TABLET ORAL at 12:20

## 2023-02-13 RX ADMIN — METOPROLOL TARTRATE 100 MG: 100 TABLET, FILM COATED ORAL at 21:03

## 2023-02-13 RX ADMIN — PRAVASTATIN SODIUM 40 MG: 40 TABLET ORAL at 16:11

## 2023-02-13 NOTE — PHYSICAL THERAPY NOTE
PHYSICAL THERAPY TREATMENT NOTE  NAME:  Martha Garrison  DATE: 02/13/23    Length Of Stay: 5  Performed at least 2 patient identifiers during session: Name and ID bracelet    TREATMENT FLOWSHEET:    02/13/23 0816   PT Last Visit   PT Visit Date 02/13/23   Note Type   Note Type Treatment   Pain Assessment   Pain Assessment Tool FLACC   Pain Score 6   Pain Location/Orientation Orientation: Right;Location: Hip;Location: Back   Pain Onset/Description Onset: Ongoing   Hospital Pain Intervention(s) Repositioned; Ambulation/increased activity; Emotional support;Elevated   Pain Rating: FLACC (Rest) - Face 0   Pain Rating: FLACC (Rest) - Legs 0   Pain Rating: FLACC (Rest) - Activity 0   Pain Rating: FLACC (Rest) - Cry 0   Pain Rating: FLACC (Rest) - Consolability 0   Score: FLACC (Rest) 0   Pain Rating: FLACC (Activity) - Face 2   Pain Rating: FLACC (Activity) - Legs 1   Pain Rating: FLACC (Activity) - Activity 1   Pain Rating: FLACC (Activity) - Cry 1   Pain Rating: FLACC (Activity) - Consolability 1   Score: FLACC (Activity) 6   Restrictions/Precautions   Weight Bearing Precautions Per Order Yes   LLE Weight Bearing Per Order WBAT   Other Precautions O2;Pain; Fall Risk;Multiple lines;Telemetry; Chair Alarm; Bed Alarm;Cognitive  (2LO2)   General   Chart Reviewed Yes   Response to Previous Treatment Patient unable to report, no changes reported from family or staff   Family/Caregiver Present No   Cognition   Overall Cognitive Status Impaired   Arousal/Participation Responsive;Arousable   Attention Attends with cues to redirect   Orientation Level Oriented to place;Oriented to person   Memory Decreased long term memory;Decreased short term memory;Decreased recall of biographical information   Following Commands Follows one step commands inconsistently   Comments Pt  very difficult to  arouse this morning, pt  only responsive to painful stimuli from RN at sternum     Subjective   Subjective "OUCH!"   Bed Mobility   Rolling R 2 Maximal assistance   Additional items Assist x 2;HOB elevated; Increased time required;Verbal cues;LE management   Rolling L 2  Maximal assistance   Additional items Assist x 2;HOB elevated; Bedrails; Increased time required;Verbal cues;LE management   Supine to Sit 2  Maximal assistance   Additional items Assist x 2;HOB elevated; Bedrails; Increased time required;Verbal cues;LE management  (UB management)   Additional Comments Pt  required VC for safety and direction and max encouragement to participate  Pt  tolerated sitting at EOB to perform BLE ther ex  and dynamic sitting balance activities with mod Ax1 to maintain upright sitting balance  Transfers   Sit to Stand 2  Maximal assistance   Additional items Assist x 2; Increased time required;Verbal cues  (bed elevated and RW)   Stand to Sit 2  Maximal assistance   Additional items Assist x 2; Increased time required;Verbal cues   Stand pivot 2  Maximal assistance   Additional items Assist x 2; Increased time required;Verbal cues  (RW)   Additional Comments STS transfer performed at EOB x2  First attempt pt  required sitting rest period  Max cues for safety and direction  SPS with max Ax2 with RW into recliner  Poor UB strength  Ambulation/Elevation   Gait pattern Not tested   Balance   Static Sitting Fair -   Dynamic Sitting Poor +   Static Standing Poor   Dynamic Standing Poor -   Endurance Deficit   Endurance Deficit Yes   Activity Tolerance   Activity Tolerance Patient limited by pain; Patient limited by fatigue   Medical Staff Made Aware RN aware, present, and assisted to get pt  OOB into recliner  Exercises   Quad Sets Supine;5 reps;AAROM; Bilateral   Heelslides Supine;5 reps;AAROM; Bilateral   Hip Flexion Supine;Sitting;5 reps;AAROM; Bilateral   Hip Abduction Supine;Sitting;5 reps;AAROM; Bilateral   Hip Adduction Supine;Sitting;5 reps;AAROM; Bilateral   Knee PROM Flexion Supine;Sitting;5 reps;AAROM; Bilateral   Knee PROM Extension Supine;Sitting;5 reps;AAROM; Bilateral   Knee AROM Long Arc Quad Sitting;5 reps;AAROM; Bilateral   Ankle Pumps Sitting;5 reps;Bilateral;AROM   Marching Sitting;5 reps;AAROM; Right;AROM; Left   Neuro re-ed dynamic sitting balance activities performed at EOB supported with BUE on mattress and mod Ax1 including multidirectional weight shifting and reaching   Assessment   Prognosis Fair   Problem List Decreased strength;Decreased range of motion;Decreased endurance; Impaired balance;Decreased mobility; Decreased coordination;Decreased cognition; Impaired judgement;Decreased safety awareness;Pain;Orthopedic restrictions;Decreased skin integrity   Goals   Patient Goals none verbalized   PT Treatment Day 3   Plan   Treatment/Interventions Functional transfer training;LE strengthening/ROM; Therapeutic exercise; Endurance training;Patient/family training;Cognitive reorientation;Equipment eval/education; Bed mobility;Gait training;Spoke to nursing   Progress Slow progress, decreased activity tolerance   PT Frequency 3-5x/wk   Recommendation   PT Discharge Recommendation Post acute rehabilitation services   Additional Comments increased time required do to inconsistant direction follow and arousal time   AM-PAC Basic Mobility Inpatient   Turning in Flat Bed Without Bedrails 1   Lying on Back to Sitting on Edge of Flat Bed Without Bedrails 1   Moving Bed to Chair 1   Standing Up From Chair Using Arms 1   Walk in Room 1   Climb 3-5 Stairs With Railing 1   Basic Mobility Inpatient Raw Score 6   Highest Level Of Mobility   JH-HLM Goal 2: Bed activities/Dependent transfer   JH-HLM Achieved 4: Move to chair/commode       The patient's AM-PAC Basic Mobility Inpatient Short Form Raw Score is 6  A raw score less than 16 suggests the patient may benefit from discharge to post-acute rehabilitation services  Please also refer to the recommendation of the Physical Therapist for safe discharge planning      Pt seen for PT treatment session this date with interventions consisting of bed mobility tasks, supine TE, transfer training, seated TE neuromuscular re-education of movement performed to improve dynamic sitting balance,sitting tolerance, and education provided as needed for safety and direction to improve functional mobility, safety awareness, and activity tolerance  Pt agreeable to PT treatment session upon arrival, pt found resting in bed  At end of session, pt left sitting OOB in recliner with chair alarm activated, BLE elevated, set up with breakfast tray, and with all needs in reach  In comparison to previous session, pt with no improvements as evidenced by no functional gains made this session   Continue to recommend STR at time of d/c in order to maximize pt's functional independence and safety w/ mobility  Pt continues to be functioning below baseline level  PT will continue to see pt while here in order to address the deficits listed above and provide interventions consistent w/ POC in effort to achieve STGs      Munroe Fallsisidro August [place], [place]

## 2023-02-13 NOTE — ASSESSMENT & PLAN NOTE
Wt Readings from Last 3 Encounters:   02/13/23 88 6 kg (195 lb 5 2 oz)   12/06/22 86 2 kg (190 lb)   12/02/22 85 kg (187 lb 6 4 oz)       • Daily weights  • Strict intake/output measurements  • The patient was evaluated by Cardiology pre-operatively and deemed a moderate cardiac risk, which was not prohibitive    • Monitor his volume status closely  • I/O- +852

## 2023-02-13 NOTE — ASSESSMENT & PLAN NOTE
Lab Results   Component Value Date    EGFR 11 02/13/2023    EGFR 10 02/12/2023    EGFR 11 02/11/2023    CREATININE 4 42 (H) 02/13/2023    CREATININE 4 80 (H) 02/12/2023    CREATININE 4 29 (H) 02/11/2023   •   • Developed acute kidney injury during the hospitalization likely due to contrast-induced nephrology  • Baseline serum creatinine around 2 2 mg/dl  • Worsening renal function on 02/11/2023 with increased lethargy and muscle twitching  • Possible uremia  • Continue Isolyte IV fluids at 50 ml/hr  • Avoid all nephrotoxic agents  Serial laboratory testing to monitor the patient's renal function and electrolyte levels

## 2023-02-13 NOTE — OCCUPATIONAL THERAPY NOTE
Occupational Therapy         Patient Name: Preet Gomez  IIVUE'L Date: 2/13/2023 02/13/23 1335   OT Last Visit   OT Visit Date 02/13/23   Note Type   Note Type Treatment   Pain Assessment   Pain Assessment Tool 0-10   Pain Score 9   Pain Location/Orientation Orientation: Right;Location: Hip   Restrictions/Precautions   Weight Bearing Precautions Per Order Yes   RLE Weight Bearing Per Order WBAT   Other Precautions Pain; Fall Risk;Multiple lines; Bed Alarm; Chair Alarm;Telemetry   Bed Mobility   Sit to Supine 2  Maximal assistance   Additional items Assist x 2; Increased time required;Verbal cues;LE management   Additional Comments Max A x 2 for transitioning to supine in bed at end of session per nsg request; Pt on RA, vitals remain WFL throughout session  Transfers   Sit to Stand 3  Moderate assistance   Additional items Assist x 2; Increased time required;Verbal cues;Armrests   Stand to Sit 3  Moderate assistance   Additional items Assist x 2; Increased time required;Verbal cues   Stand pivot 3  Moderate assistance   Additional items Assist x 2; Increased time required;Verbal cues  (toward pt's strong side)   Additional Comments RW used   Functional Mobility   Additional Comments unable   Therapeutic Exercise - ROM   UE-ROM Yes   ROM- Right Upper Extremities   R Shoulder AROM;ABduction; Flexion   R Elbow AROM;Elbow flexion;Elbow extension   R Wrist AROM; Wrist flexion;Wrist extension   R Weight/Reps/Sets x 10 each   ROM - Left Upper Extremities    L Shoulder AROM; Flexion;ABduction   L Elbow AROM;Elbow flexion;Elbow extension   L Wrist AROM; Wrist flexion;Wrist extension   L Weight/Reps/Sets x 10   Subjective   Subjective "I'm trying honeybun"   Cognition   Overall Cognitive Status Impaired   Arousal/Participation Alert; Cooperative   Attention Attends with cues to redirect   Orientation Level Oriented to person;Oriented to place; Disoriented to time;Disoriented to situation   Memory Decreased long term memory   Following Commands Follows one step commands with increased time or repetition   Activity Tolerance   Activity Tolerance Patient limited by fatigue;Patient limited by pain   Assessment   Assessment Patient participated in Skilled OT session this date with interventions consisting of ADL re training with the use of correct body mechnaics, Energy Conservation techniques, Work simplification skills , safety awareness and fall prevention techniques, therapeutic exercise to: increase functional use of BUEs, increase BUE muscle strength  and  therapeutic activities to: increase activity tolerance   Co treatment with PTA secondary to complex medical condition of pt, mod-max A of 2 required to achieve and maintain transitional movements, requiring the need of skilled therapeutic intervention of 2 therapists to achieve delivery of services  Patient agreeable to OT treatment session, upon arrival patient was found seated OOB to Chair  Patient requiring verbal cues for safety  Patient continues to be functioning below baseline level, occupational performance remains limited secondary to factors listed above and increased risk for falls and injury  From OT standpoint, recommendation at time of d/c would be Post acute rehabilitation services  The patient's raw score on the AM-PAC Daily Activity inpatient short form is 13, standardized score is 32 03, less than 39 4  Patients at this level are likely to benefit from discharge to post-acute rehabilitation services  Please refer to the recommendation of the Occupational Therapist for safe discharge planning  Plan   Treatment Interventions ADL retraining;Functional transfer training;UE strengthening/ROM; Endurance training;Energy conservation   Goal Expiration Date 02/25/23   OT Treatment Day 2   OT Frequency 3-5x/wk   Recommendation   OT Discharge Recommendation Post acute rehabilitation services   [facility] Daily Activity Inpatient   Lower Body Dressing 1   Bathing 2   Toileting 2   Upper Body Dressing 2   Grooming 3   Eating 3   Daily Activity Raw Score 13   Daily Activity Standardized Score (Calc for Raw Score >=11) 32 03   AM-PAC Applied Cognition Inpatient   Following a Speech/Presentation 4   Understanding Ordinary Conversation 4   Taking Medications 2   Remembering Where Things Are Placed or Put Away 3   Remembering List of 4-5 Errands 2   Taking Care of Complicated Tasks 2   Applied Cognition Raw Score 17   Applied Cognition Standardized Score 36 52       Pt will benefit from continued OT services in order to maximize (I) c ADL performance, FM c RW, and improve overall endurance/strength required to complete functional tasks in preparation for d/c  Pt benefited from co-treatment of skilled OT and PTA in order to most appropriately address functional deficits d/t extensive assistance required for safe functional mobility, decreased activity tolerance, and regression from functioning level prior to admission and/or onset of present illness  OT/PT objectives were addressed separately; please see PT note for specific goal areas targeted  Pt left seated in chair at end of session; all needs within reach; all lines intact      Minoo Tracey

## 2023-02-13 NOTE — ASSESSMENT & PLAN NOTE
• Likely due to anesthesia effect from 02/10/2023 and possible uremia  • Continue to monitor his neurologic status closely

## 2023-02-13 NOTE — ASSESSMENT & PLAN NOTE
Lab Results   Component Value Date    EGFR 11 02/13/2023    EGFR 10 02/12/2023    EGFR 11 02/11/2023    CREATININE 4 42 (H) 02/13/2023    CREATININE 4 80 (H) 02/12/2023    CREATININE 4 29 (H) 02/11/2023     • Please see the assessment and plan for "Acute kidney injury"

## 2023-02-13 NOTE — PROGRESS NOTES
Patient max asst x 2 w/walker  Needs to be coached when moving and also needs a lot of encouragement and kudos  Tolerated poorly when  being transferred from reclining chair to bsc and back to chair

## 2023-02-13 NOTE — PLAN OF CARE
Problem: MOBILITY - ADULT  Goal: Maintain or return to baseline ADL function  Description: INTERVENTIONS:  -  Assess patient's ability to carry out ADLs; (dependent)  - Assess/evaluate cause of self-care deficits (fracture, pain, limited movement)  - Assess range of motion  - Assess patient's mobility; (max assist/dependent)  - Assess patient's need for assistive devices and provide as appropriate  - Encourage maximum independence but intervene and supervise when necessary  - Involve family in performance of ADLs  - Assess for home care needs following discharge   - Consider OT consult to assist with ADL evaluation and planning for discharge  - Provide patient education as appropriate  Outcome: Progressing  Goal: Maintains/Returns to pre admission functional level  Description: INTERVENTIONS:  - Perform BMAT or MOVE assessment daily    - Set and communicate daily mobility goal to care team and patient/family/caregiver  - Collaborate with rehabilitation services on mobility goals if consulted  - Perform Range of Motion 3 times a day  - Reposition patient every 2 hours    - Dangle patient 3 times a day  - Stand patient 3 times a day  - Ambulate patient 3 times a day  - Out of bed to chair 3 times a day   - Out of bed for meals 3 times a day  - Out of bed for toileting  - Record patient progress and toleration of activity level   Outcome: Progressing     Problem: Prexisting or High Potential for Compromised Skin Integrity  Goal: Skin integrity is maintained or improved  Description: INTERVENTIONS:  - Identify patients at risk for skin breakdown  - Assess and monitor skin integrity  - Assess and monitor nutrition and hydration status  - Monitor labs   - Assess for incontinence (as needed)  - Turn and reposition patient (every 2 hours and prn)  - Assist with mobility/ambulation (max assist/dependent)  - Relieve pressure over bony prominences  - Avoid friction and shearing  - Provide appropriate hygiene as needed including keeping skin clean and dry  - Evaluate need for skin moisturizer/barrier cream  - Collaborate with interdisciplinary team   - Patient/family teaching  - Consider wound care consult   Outcome: Progressing     Problem: PAIN - ADULT  Goal: Verbalizes/displays adequate comfort level or baseline comfort level  Description: Interventions:  - Encourage patient to monitor pain and request assistance  - Assess pain using appropriate pain scale (0-10 pain scale)  - Administer analgesics based on type and severity of pain and evaluate response  - Implement non-pharmacological measures as appropriate and evaluate response  - Consider cultural and social influences on pain and pain management  - Notify physician/advanced practitioner if interventions unsuccessful or patient reports new pain  Outcome: Progressing     Problem: INFECTION - ADULT  Goal: Absence or prevention of progression during hospitalization  Description: INTERVENTIONS:  - Assess and monitor for signs and symptoms of infection  - Monitor lab/diagnostic results  - Monitor all insertion sites, i e  indwelling lines, tubes, and drains  - Administer medications as ordered  - Instruct and encourage patient and family to use good hand hygiene technique  Outcome: Progressing     Problem: SAFETY ADULT  Goal: Patient will remain free of falls  Description: INTERVENTIONS:  -  Assess patient's ability to carry out ADLs; (dependent)  - Assess/evaluate cause of self-care deficits (fracture, pain, limited movement)  - Assess range of motion  - Assess patient's mobility; (max assist/dependent)  - Assess patient's need for assistive devices and provide as appropriate  - Encourage maximum independence but intervene and supervise when necessary  - Involve family in performance of ADLs  - Assess for home care needs following discharge   - Consider OT consult to assist with ADL evaluation and planning for discharge  - Provide patient education as appropriate  Outcome: Progressing     Problem: DISCHARGE PLANNING  Goal: Discharge to home or other facility with appropriate resources  Description: INTERVENTIONS:  - Identify barriers to discharge w/patient and caregiver  - Arrange for needed discharge resources and transportation as appropriate  - Identify discharge learning needs (meds, wound care, etc )  - Refer to Case Management Department for coordinating discharge planning if the patient needs post-hospital services based on physician/advanced practitioner order or complex needs related to functional status, cognitive ability, or social support system  Outcome: Progressing     Problem: Knowledge Deficit  Goal: Patient/family/caregiver demonstrates understanding of disease process, treatment plan, medications, and discharge instructions  Description: Complete learning assessment and assess knowledge base    Interventions:  - Provide teaching at level of understanding  - Provide teaching via preferred learning methods  Outcome: Progressing     Problem: METABOLIC, FLUID AND ELECTROLYTES - ADULT  Goal: Electrolytes maintained within normal limits  Description: INTERVENTIONS:  - Monitor labs and assess patient for signs and symptoms of electrolyte imbalances  - Administer electrolyte replacement as ordered  - Monitor response to electrolyte replacements, including repeat lab results as appropriate  - Instruct patient on fluid and nutrition as appropriate  Outcome: Progressing  Goal: Fluid balance maintained  Description: INTERVENTIONS:  - Monitor labs   - Monitor I/O and WT  - Instruct patient on fluid and nutrition as appropriate  - Assess for signs & symptoms of volume excess or deficit  Outcome: Progressing     Problem: SKIN/TISSUE INTEGRITY - ADULT  Goal: Skin Integrity remains intact(Skin Breakdown Prevention)  Description: Assess:  -Perform Duy assessment every shift  -Clean and moisturize skin every shift  -Inspect skin when repositioning, toileting, and assisting with ADLS  -Assess extremities for adequate circulation and sensation     Bed Management:  -Have minimal linens on bed & keep smooth, unwrinkled  -Change linens as needed when moist or perspiring  -Avoid sitting or lying in one position for more than 2 hours while in bed      Toileting:  -Offer bedside commode  -Assess for incontinence every 1-2 hours and prn      Activity:  -Mobilize patient 3 times a day  -Encourage activity and walks on unit  -Encourage or provide ROM exercises   -Turn and reposition patient every 2 Hours  -Use appropriate equipment to lift or move patient in bed  -Instruct/ Assist with weight shifting every  minutes when out of bed in chair  -Consider limitation of chair time 3-4 hour intervals    Skin Care:  -Avoid use of baby powder, tape, friction and shearing, hot water or constrictive clothing  -Relieve pressure over bony prominences using foam pad  -Do not massage red bony areas    Next Steps:  -Teach patient strategies to minimize risks     -Consider consults to  interdisciplinary teams   Outcome: Progressing  Goal: Incision(s), wounds(s) or drain site(s) healing without S/S of infection  Description: INTERVENTIONS  - Assess and document dressing, incision, wound bed, drain sites and surrounding tissue  - Provide patient and family education    Outcome: Progressing  Goal: Pressure injury heals and does not worsen  Description: Interventions:  - Implement low air loss mattress or specialty surface (Criteria met)  - Apply silicone foam dressing  - Instruct/assist with weight shifting every  minutes when in chair   - Limit chair time to 3-4 hour intervals  - Use special pressure reducing interventions such as waffle cushion when in chair   - Perform passive or active ROM every shift and prn  - Turn and reposition patient & offload bony prominences every 2 hours   - Utilize friction reducing device or surface for transfers   - Consider consults to  interdisciplinary teams   - Use incontinent care products after each incontinent episode   - Consider nutrition services referral as needed  Outcome: Progressing     Problem: MUSCULOSKELETAL - ADULT  Goal: Maintain or return mobility to safest level of function  Description: INTERVENTIONS:  - Assess patient's ability to carry out ADLs; (dependent)  - Assess/evaluate cause of self-care deficits (fracture, pain, limited movement)  - Assess range of motion  - Assess patient's mobility (max assist/dependent)  - Assess patient's need for assistive devices and provide as appropriate  - Encourage maximum independence but intervene and supervise when necessary  - Involve family in performance of ADLs  - Assess for home care needs following discharge   - Consider OT consult to assist with ADL evaluation and planning for discharge  - Provide patient education as appropriate  Outcome: Progressing  Goal: Maintain proper alignment of affected body part  Description: INTERVENTIONS:  - Support, maintain and protect limb and body alignment (RLE)  - Provide patient/ family with appropriate education  Outcome: Progressing

## 2023-02-13 NOTE — ASSESSMENT & PLAN NOTE
• POD #3 S/P Right hip hemiarthroplasty on 02/10/2023  • Hold eliquis for now  • Follow the CBC  • Transfuse for a hemoglobin less than 7 g/dl     2/13 hemoglobin 7 3

## 2023-02-13 NOTE — ADDENDUM NOTE
Addendum  created 02/13/23 1053 by Merna Salazar DO    Attestation recorded in [address] [place] [address], [facility] 97 filed, Intraprocedure Event edited

## 2023-02-13 NOTE — PLAN OF CARE
Problem: PHYSICAL THERAPY ADULT  Goal: Performs mobility at highest level of function for planned discharge setting  See evaluation for individualized goals  Description: Treatment/Interventions: Functional transfer training, LE strengthening/ROM, Endurance training, Therapeutic exercise, Patient/family training, Equipment eval/education, Bed mobility, Gait training, Compensatory technique education, Spoke to nursing, OT          See flowsheet documentation for full assessment, interventions and recommendations  2/13/2023 1401 by Phoebe Torres PTA  Outcome: Progressing  Note: Prognosis: Fair  Problem List: Decreased strength, Decreased range of motion, Impaired balance, Decreased endurance, Decreased mobility, Decreased safety awareness, Decreased skin integrity, Pain, Orthopedic restrictions  Assessment: Pt seen for PT treatment session this date with interventions consisting of transfer training, gait training, standing tolerance, supine TE, and education provided as needed for safety and direction to improve functional mobility, safety awareness, and activity tolerance  Pt agreeable to PT treatment session upon arrival, pt found sitting OOB in recliner  At end of session, pt left supine in bed positioned for comfort with bed alarm activated, SCD's applied, and left with all needs in reach  In comparison to previous session, pt with improvements in amount of assistance required for STS and SPS transfers   Continue to recommend STR at time of d/c in order to maximize pt's functional independence and safety w/ mobility  Pt continues to be functioning below baseline level  PT will continue to see pt while here in order to address the deficits listed above and provide interventions consistent w/ POC in effort to achieve STGs  PT Discharge Recommendation: Post acute rehabilitation services    See flowsheet documentation for full assessment       2/13/2023 1183 by Phoebe Torres PTA  Outcome: Not Progressing  Note: Prognosis: Fair  Problem List: Decreased strength, Decreased range of motion, Decreased endurance, Impaired balance, Decreased mobility, Decreased coordination, Decreased cognition, Impaired judgement, Decreased safety awareness, Pain, Orthopedic restrictions, Decreased skin integrity  Assessment: Pt seen for PT treatment session this date with interventions consisting of bed mobility tasks, supine TE, transfer training, seated TE neuromuscular re-education of movement performed to improve dynamic sitting balance,sitting tolerance, and education provided as needed for safety and direction to improve functional mobility, safety awareness, and activity tolerance  Pt agreeable to PT treatment session upon arrival, pt found resting in bed  At end of session, pt left sitting OOB in recliner with chair alarm activated, BLE elevated, set up with breakfast tray, and with all needs in reach  In comparison to previous session, pt with no improvements as evidenced by no functional gains made this session   Continue to recommend STR at time of d/c in order to maximize pt's functional independence and safety w/ mobility  Pt continues to be functioning below baseline level  PT will continue to see pt while here in order to address the deficits listed above and provide interventions consistent w/ POC in effort to achieve STGs  PT Discharge Recommendation: Post acute rehabilitation services    See flowsheet documentation for full assessment

## 2023-02-13 NOTE — ASSESSMENT & PLAN NOTE
• Held eliquis for the right hip hemiarthroplasty on 02/10/2023  • Continue metoprolol tartrate 100 mg PO every 12 hours for heart rate control  • Continue to hold Eliquis since hemoglobin unstable; 7 2 on 2/12 and received 1 unit of blood  • Hb- 7 3 today

## 2023-02-13 NOTE — PROGRESS NOTES
Progress Note - Nephrology   Ezra Mayer 80 y o  male MRN: [id number]  Unit/Bed#:  Encounter: [id number]    Assessment and Plan    1  Acute kidney injury, superimposed on chronic kidney disease due to acute tubular necrosis  IV contrast exposure to 1023 may be contributory  Now improving from peak creatinine 4 8 mg/dL on 2/12/2023, down to 4 42 mg/dL  Trend renal function, optimize hemodynamics, hold ACE/ARB, avoid nephrotoxins, renally dose medications, monitor volume status, monitor for urinary retention  2  Chronic kidney disease, stage IV baseline creatinine around 2 2 mg/dL  Will need outpatient nephrology follow-up  3  Volume depletion  Patient examines euvolemic at this time  Continue to monitor  4  Chronic diastolic congestive heart failure  Strict I&Os  Standing scale daily weights  2 g sodium restriction  Fluid restriction  5  Right hip hemiarthroplasty on 2/10/2023  Management per surgical colleagues  Follow up reason for today's visit:     Fracture of femoral neck, right [facility])    Patient Active Problem List   Diagnosis   • Acute blood loss anemia   • Mild intermittent asthma without complication   • CKD (chronic kidney disease) stage 4, GFR 15-29 ml/min ([facility])   • Dyslipidemia   • GERD (gastroesophageal reflux disease)   • Late effects of cerebrovascular disease   • Lumbar degenerative disc disease   • Lumbar radiculopathy   • Mitral regurgitation   • Obesity (BMI 30-39  9)   • Medicare annual wellness visit, subsequent   • History of aortic valve replacement with bioprosthetic valve   • History of stroke   • Complete heart block ([facility])   • Chronic diastolic CHF (congestive heart failure) ([facility] [address] )   • Ambulatory dysfunction   • Paroxysmal atrial fibrillation ([facility])   • 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   • Presence of permanent cardiac pacemaker   • Oxygen dependent   • Iron deficiency anemia   • Need for immunization against influenza   • Pulmonary emphysema (HCC)   • Left wrist pain   • Right hip pain   • Acute kidney injury superimposed on CKD [facility])   • Multiple renal cysts   • Effusion of left knee   • Mitral valve stenosis   • Acute gout   • Transaminitis   • 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 kidney disease, or chronic kidney disease (HCC)   • Chronic low back pain without sciatica   • History of severe aortic stenosis   • Hx of CABG   • Essential hypertension   • Dermatitis   • Hyperkalemia   • Pressure ulcer of right buttock, stage 2 (HCC)   • Anemia   • Hyperlipidemia, unspecified   • Fracture of femoral neck, right (HCC)   • Leukocytosis   • Hyperphosphatemia   • Toxic encephalopathy   • YOLANDE (acute kidney injury) ([facility] [address] )   • Pulmonary hypertension (HCC)   • Mitral valve insufficiency   • Tricuspid valve insufficiency         Subjective:   Complains of pain  Occupational Therapy at bedside reports significant improvement with transitions compared with this morning  A complete 10 point review of systems was performed and is otherwise negative  Objective:     Vitals: Blood pressure 120/59, pulse 59, temperature (!) 96 2 °F (35 7 °C), temperature source Tympanic, resp  rate (!) 11, height 5' 4" (1 626 m), weight 88 6 kg (195 lb 5 2 oz), SpO2 97 %  ,Body mass index is 33 53 kg/m²  Weight (last 2 days)     Date/Time Weight    02/13/23 0558 88 6 (195 33)    02/12/23 0543 89 (196 21)    02/11/23 0539 87 1 (192 02)            Intake/Output Summary (Last 24 hours) at 2/13/2023 1344  Last data filed at 2/13/2023 1159  Gross per 24 hour   Intake 968 33 ml   Output 825 ml   Net 143 33 ml     I/O last 3 completed shifts: In: 3901 7 [P O :840;  I V :3011 7; IV Piggyback:50]  Out: 650 [Urine:650]         Physical Exam: /59   Pulse 59   Temp (!) 96 2 °F (35 7 °C) (Tympanic)   Resp (!) 11   Ht 5' 4" (1 626 m)   Wt 88 6 kg (195 lb 5 2 oz)   SpO2 97%   BMI 33 53 kg/m²     General Appearance:    No acute distress  Cooperative  Appears stated age  Head:    Normocephalic  Atraumatic  Normal jaw occlusion  Eyes:    Lids, conjunctiva normal  No scleral icterus  Ears:    Normal external ears  Nose:   Nares normal  No drainage  Mouth:   Lips, tongue normal  Mucosa normal  Phonation normal    Neck:   Supple  Symmetrical    Back:     Symmetric  No CVA tenderness  Lungs:     Normal respiratory effort  Clear to auscultation bilaterally  Chest wall:    No tenderness or deformity  Heart:    Regular rate and rhythm  Normal S1 and S2  No murmur  No JVD  No edema  Abdomen:     Soft  Non-tender  Bowel sounds active  Genitourinary:   No Alvares catheter present  Extremities:   Extremities normal  Atraumatic  No cyanosis  Right lower extremity bandaged   Skin:   Warm and dry  No pallor, jaundice, rash, ecchymoses  Neurologic:   Alert and oriented to person, place, time  No focal deficit  Lab, Imaging and other studies: I have personally reviewed pertinent labs  CBC:   Lab Results   Component Value Date    WBC 11 47 (H) 02/13/2023    HGB 7 3 (L) 02/13/2023    HCT 24 1 (L) 02/13/2023    MCV 77 (L) 02/13/2023     02/13/2023    MCH 23 3 (L) 02/13/2023    MCHC 30 3 (L) 02/13/2023    RDW 18 3 (H) 02/13/2023    MPV 10 0 02/13/2023    NRBC 0 02/13/2023     CMP:   Lab Results   Component Value Date    K 4 1 02/13/2023    CL 92 (L) 02/13/2023    CO2 28 02/13/2023    BUN 98 (H) 02/13/2023    CREATININE 4 42 (H) 02/13/2023    CALCIUM 8 3 (L) 02/13/2023    AST 22 02/13/2023    ALT <3 (L) 02/13/2023    ALKPHOS 56 02/13/2023    EGFR 11 02/13/2023           Results from last 7 days   Lab Units 02/13/23  0557 02/12/23  0438 02/11/23  0547   POTASSIUM mmol/L 4 1 4 3 4 6   CHLORIDE mmol/L 92* 90* 94*   CO2 mmol/L 28 28 25   BUN mg/dL 98* 95* 81*   CREATININE mg/dL 4 42* 4 80* 4 29*   CALCIUM mg/dL 8 3* 8 1* 8 7   ALK PHOS U/L 56 55 65   ALT U/L <3* <3* <3*   AST U/L 22 20 23         Phosphorus:   Lab Results   Component Value Date    PHOS 4 7 (H) 02/13/2023     Magnesium:   Lab Results   Component Value Date    MG 2 3 02/13/2023     Urinalysis: No results found for: Evangelist Rossi, SPECGRAV, PHUR, LEUKOCYTESUR, NITRITE, PROTEINUA, GLUCOSEU, KETONESU, BILIRUBINUR, BLOODU  Ionized Calcium: No results found for: CAION  Coagulation: No results found for: PT, INR, APTT  Troponin: No results found for: TROPONINI  ABG: No results found for: PHART, IBI8ROC, PO2ART, EZS3JGY, I8NBWOPR, BEART, SOURCE  Radiology review:     IMAGING  Procedure: XR chest portable    Result Date: 2/13/2023  Narrative: CHEST INDICATION:   possible PNA  COMPARISON:  2/8/2023 EXAM PERFORMED/VIEWS:  XR CHEST PORTABLE FINDINGS:  Left pacer is stable  Cardiomegaly noted as before  Left greater than right basilar effusions with probable right basilar discoid atelectasis  Mild central congestion  Lungs otherwise clear  Osseous structures appear within normal limits for patient age  Impression: Pulmonary edema pattern with left greater than right basilar effusions and probable right basilar discoid atelectasis with infiltrate not excluded  Workstation performed: [id number]     Procedure: XR hip/pelv 1 vw right if performed    Result Date: 2/11/2023  Narrative: RIGHT HIP INDICATION:   s/p right hemiarthroplasty  COMPARISON:  Right hip series 2/8/2023  VIEWS:  XR HIP/PELV 1 VW RIGHT W PELVIS IF PERFORMED FINDINGS: There is no acute fracture or dislocation  Right hip hemiarthroplasty is identified in satisfactory position without evidence of hardware complication  No lytic or blastic osseous lesion  Lateral skin staples  Scattered soft tissue gas likely postoperative  Impression: Status post right hip hemiarthroplasty  No acute osseous abnormality   Workstation performed: [id number]       Current Facility-Administered Medications   Medication Dose Route Frequency   • acetaminophen (TYLENOL) tablet 650 mg  650 mg Oral Q6H PRN   • allopurinol (ZYLOPRIM) tablet 100 mg  100 mg Oral Daily   • calcium carbonate (TUMS) chewable tablet 1,000 mg  1,000 mg Oral Daily PRN   • cefTRIAXone (ROCEPHIN) IVPB (premix in dextrose) 1,000 mg 50 mL  1,000 mg Intravenous Q24H   • docusate sodium (COLACE) capsule 100 mg  100 mg Oral BID   • heparin (porcine) subcutaneous injection 5,000 Units  5,000 Units Subcutaneous Q8H [address]   • HYDROmorphone (DILAUDID) injection 0 5 mg  0 5 mg Intravenous Q4H PRN   • labetalol (NORMODYNE) injection 10 mg  10 mg Intravenous Q4H PRN   • metoclopramide (REGLAN) injection 10 mg  10 mg Intravenous Q6H PRN   • metoprolol tartrate (LOPRESSOR) tablet 100 mg  100 mg Oral BID   • multi-electrolyte (PLASMALYTE-A/ISOLYTE-S PH 7 4) IV solution  50 mL/hr Intravenous Continuous   • ondansetron (ZOFRAN) injection 4 mg  4 mg Intravenous Q4H PRN   • oxyCODONE (ROXICODONE) IR tablet 5 mg  5 mg Oral Q4H PRN   • pravastatin (PRAVACHOL) tablet 40 mg  40 mg Oral Daily With Dinner   • senna (SENOKOT) tablet 8 6 mg  1 tablet Oral Daily     Medications Discontinued During This Encounter   Medication Reason   • lactated ringers infusion    • dexamethasone (PF) (DECADRON) injection Patient Discharge   • bupivacaine (PF) (MARCAINE) 0 25 % injection Patient Discharge   • vancomycin (VANCOCIN) injection Patient Discharge   • sodium chloride 0 9 % irrigation solution Patient Discharge   • chlorhexidine (HIBICLENS) 4 % topical liquid    • fentaNYL (SUBLIMAZE) injection 25 mcg Patient Transfer   • HYDROmorphone HCl (DILAUDID) injection 0 2 mg Patient Transfer   • ondansetron (ZOFRAN) injection 4 mg Patient Transfer   • lactated ringers infusion    • lactated ringers bolus 1,000 mL    • lactated ringers bolus 1,000 mL    • sodium chloride 0 9 % bolus 1,000 mL    • sodium chloride 0 9 % bolus 1,000 mL    • gabapentin (NEURONTIN) capsule 100 mg    • pantoprazole (PROTONIX) injection 40 mg    • multi-electrolyte (PLASMALYTE-A/ISOLYTE-S PH 7 4) IV solution        Aixa Garcia PA-C    Portions of the record may have been created with voice recognition software  Occasional wrong word or "sound a like" substitutions may have occurred due to the inherent limitations of voice recognition software  Read the chart carefully and recognize, using context, where substitutions have occurred

## 2023-02-13 NOTE — PLAN OF CARE
Problem: OCCUPATIONAL THERAPY ADULT  Goal: Performs self-care activities at highest level of function for planned discharge setting  See evaluation for individualized goals  Description: Treatment Interventions: ADL retraining, Functional transfer training, UE strengthening/ROM, Endurance training, Patient/family training, Equipment evaluation/education, Compensatory technique education, Energy conservation, Activityengagement          See flowsheet documentation for full assessment, interventions and recommendations  Outcome: Progressing  Note: Limitation: Decreased ADL status, Decreased UE strength, Decreased Safe judgement during ADL, Decreased endurance, Decreased self-care trans, Decreased high-level ADLs  Prognosis: Fair  Assessment: Patient participated in Skilled OT session this date with interventions consisting of ADL re training with the use of correct body mechnaics, Energy Conservation techniques, Work simplification skills , safety awareness and fall prevention techniques, therapeutic exercise to: increase functional use of BUEs, increase BUE muscle strength  and  therapeutic activities to: increase activity tolerance   Co treatment with PTA secondary to complex medical condition of pt, mod-max A of 2 required to achieve and maintain transitional movements, requiring the need of skilled therapeutic intervention of 2 therapists to achieve delivery of services  Patient agreeable to OT treatment session, upon arrival patient was found seated OOB to Chair  Patient requiring verbal cues for safety  Patient continues to be functioning below baseline level, occupational performance remains limited secondary to factors listed above and increased risk for falls and injury  From OT standpoint, recommendation at time of d/c would be Post acute rehabilitation services  The patient's raw score on the AM-PAC Daily Activity inpatient short form is 13, standardized score is 32 03, less than 39 4   Patients at this level are likely to benefit from discharge to post-acute rehabilitation services  Please refer to the recommendation of the Occupational Therapist for safe discharge planning       OT Discharge Recommendation: Post acute rehabilitation services

## 2023-02-13 NOTE — PROGRESS NOTES
Orthopedics   Andres Edwards 80 y o  male MRN: [id number]  Unit/Bed#:       Subjective:  80 y  o male POD#3 right hip hemiarthroplasty  Patient seen Jaquelin Robb comfortably in bed this afternoon with family and friends at bedside, with consent  Pain controlled, but pain with weight bearing per nursing and PT notes  Discussed pain control with primary team  Patient put on O2 via NC while in the room, family describes as secondary to him needing to remember to breathe deeply  He reports pain as well controlled  Denies abdominal pain  He has been tolerating PO diet well       Labs:  0   Lab Value Date/Time    HCT 24 1 (L) 02/13/2023 0557    HCT 22 9 (L) 02/12/2023 2026    HCT 23 5 (L) 02/12/2023 0438    HCT 27 8 (L) 07/04/2015 0501    HCT 30 1 (L) 07/03/2015 0935    HCT 30 0 (L) 07/02/2015 1223    HGB 7 3 (L) 02/13/2023 0557    HGB 7 0 (L) 02/12/2023 2026    HGB 7 2 (L) 02/12/2023 0438    HGB 9 4 (L) 07/04/2015 0501    HGB 9 9 (L) 07/03/2015 0935    HGB 9 8 (L) 07/02/2015 1223    INR 1 18 02/10/2023 0455    INR 1 27 (H) 07/02/2015 1223    WBC 11 47 (H) 02/13/2023 0557    WBC 11 73 (H) 02/12/2023 0438    WBC 13 09 (H) 02/11/2023 0547    WBC 12 50 (H) 07/04/2015 0501    WBC 16 11 (H) 07/03/2015 0935    WBC 19 17 (H) 07/02/2015 1223    ESR 50 (H) 07/30/2021 0417     8 (H) 07/29/2021 0457     Meds:    Current Facility-Administered Medications:   •  acetaminophen (TYLENOL) tablet 650 mg, 650 mg, Oral, Q6H PRN, Jeison Rowell DO, 650 mg at 02/12/23 1709  •  allopurinol (ZYLOPRIM) tablet 100 mg, 100 mg, Oral, Daily, Jeison Rowell DO, 100 mg at 02/13/23 0909  •  calcium carbonate (TUMS) chewable tablet 1,000 mg, 1,000 mg, Oral, Daily PRN, Jeison Rowell DO  •  cefTRIAXone (ROCEPHIN) IVPB (premix in dextrose) 1,000 mg 50 mL, 1,000 mg, Intravenous, Q24H, Jeison Rowell DO, Stopped at 02/12/23 1620  •  docusate sodium (COLACE) capsule 100 mg, 100 mg, Oral, BID, Jeison Rowell DO, 100 mg at 02/13/23 0910  •  heparin (porcine) subcutaneous injection 5,000 Units, 5,000 Units, Subcutaneous, Q8H [address], Sal Rowell DO, 5,000 Units at 02/13/23 0550  •  HYDROmorphone (DILAUDID) injection 0 5 mg, 0 5 mg, Intravenous, Q4H PRN, Sal Rowell DO, 0 5 mg at 02/11/23 0261  •  labetalol (NORMODYNE) injection 10 mg, 10 mg, Intravenous, Q4H PRN, Sal Rowell DO  •  metoclopramide (REGLAN) injection 10 mg, 10 mg, Intravenous, Q6H PRN, Sal Rowell DO  •  metoprolol tartrate (LOPRESSOR) tablet 100 mg, 100 mg, Oral, BID, Sal Rowell DO, 100 mg at 02/12/23 2115  •  multi-electrolyte (PLASMALYTE-A/ISOLYTE-S PH 7 4) IV solution, 50 mL/hr, Intravenous, Continuous, Dwaine Iqbal DO, Last Rate: 50 mL/hr at 02/12/23 2117, 50 mL/hr at 02/12/23 2117  •  ondansetron [facility]) injection 4 mg, 4 mg, Intravenous, Q4H PRN, Sal Rowell DO, 4 mg at 02/09/23 2672  •  oxyCODONE (ROXICODONE) IR tablet 5 mg, 5 mg, Oral, Q4H PRN, Sal Rowell DO, 5 mg at 02/13/23 1220  •  pravastatin (PRAVACHOL) tablet 40 mg, 40 mg, Oral, Daily With Katie Rowell DO, 40 mg at 02/12/23 1552  •  senna (SENOKOT) tablet 8 6 mg, 1 tablet, Oral, Daily, Sal Rowell DO, 8 6 mg at 02/13/23 0909    Blood Culture:   Lab Results   Component Value Date    BLOODCX No Growth After 4 Days  02/09/2023     Wound Culture:   Lab Results   Component Value Date    WOUNDCULT No growth 07/30/2021     Ins and Outs:  I/O last 24 hours: In: 1268 3 [P O :720; I V :498 3; IV Piggyback:50]  Out: 615 [Urine:875]    Physical:  Vitals:    02/13/23 1100   BP: 120/59   Pulse: 59   Resp: (!) 11   Temp:    SpO2: 97%     right lower extremity  · Mepilex dressing clean dry intact, with trace dried blood, nursing reports as unchanged  · periincisional TTP  · Minimal thigh swelling, soft, without tenderness  No ecchymosis around incision  · Sensation grossly intact  · Motor intact, Good plantar/dorsiflexion  +EHL/FHL  · Toes warm and well perfused with brisk cap refill  · ACE wrap and SCD in place to continue swelling control    Assessment: 80 y  o male POD#3 right hip hemiarthroplasty with blood loss anemia  Plan:  · Up and out of bed WBAT RLE  In PT/OT with walker  · Consider transfusion dependent upon nephrology and SLIM  · DVT prophylaxis on heparin per primary  · Analgesics per primary  · Continue management with PT/OT, CM, IM, and nephrology  · Dispo: Ortho will follow while in house, to short term rehab when cleared  · Patient noted to have acute blood loss anemia due to a drop in Hbg of > 2 0g from preop levels, will monitor vital signs and resuscitate with IV fluids as needed      Mitzi Murphy PA-C

## 2023-02-13 NOTE — ASSESSMENT & PLAN NOTE
• Appears to have a chronic leukocytosis, WBC- 11 47  • Blood cultures negative at 72 hours  • Urine culture shows mixed contaminants  • Procal down trending- 2 39  • Continue ceftriaxone 1000 mg IV every 24 hours (day 3, day 5 total abx)  • Follow the procalcitonin level  • Follow the CBC  • Outpatient Hematology evaluation

## 2023-02-13 NOTE — PLAN OF CARE
Problem: PHYSICAL THERAPY ADULT  Goal: Performs mobility at highest level of function for planned discharge setting  See evaluation for individualized goals  Description: Treatment/Interventions: Functional transfer training, LE strengthening/ROM, Therapeutic exercise, Endurance training, Patient/family training, Cognitive reorientation, Equipment eval/education, Bed mobility, Gait training, Spoke to nursing          See flowsheet documentation for full assessment, interventions and recommendations  Outcome: Not Progressing  Note: Prognosis: Fair  Problem List: Decreased strength, Decreased range of motion, Decreased endurance, Impaired balance, Decreased mobility, Decreased coordination, Decreased cognition, Impaired judgement, Decreased safety awareness, Pain, Orthopedic restrictions, Decreased skin integrity  Assessment: Pt seen for PT treatment session this date with interventions consisting of bed mobility tasks, supine TE, transfer training, seated TE neuromuscular re-education of movement performed to improve dynamic sitting balance,sitting tolerance, and education provided as needed for safety and direction to improve functional mobility, safety awareness, and activity tolerance  Pt agreeable to PT treatment session upon arrival, pt found resting in bed  At end of session, pt left sitting OOB in recliner with chair alarm activated, BLE elevated, set up with breakfast tray, and with all needs in reach  In comparison to previous session, pt with no improvements as evidenced by no functional gains made this session   Continue to recommend STR at time of d/c in order to maximize pt's functional independence and safety w/ mobility  Pt continues to be functioning below baseline level  PT will continue to see pt while here in order to address the deficits listed above and provide interventions consistent w/ POC in effort to achieve STGs          PT Discharge Recommendation: Post acute rehabilitation services    See flowsheet documentation for full assessment

## 2023-02-13 NOTE — CASE MANAGEMENT
Case Management Progress Note    Patient name Azra Bailey  Location / MRN [id number]  : 1935 Date 2023       LOS (days): 5  Geometric Mean LOS (GMLOS) (days): 6 20  Days to GMLOS:1 3        OBJECTIVE:        Current admission status: Inpatient  Preferred Pharmacy:   [facility] [address] [address]  Phone: [phone] Fax: [phone]    Primary Care Provider: Mariel Barr DO    Primary Insurance: [facility] REP  Secondary Insurance:     PROGRESS NOTE:spoke with daughter mahi  Family preference is for pt to go to [facility] pass acute rehab because family works there  Referral sent  Did explain the criteria to daughter and he could possibly be denied and then we would do SNF level for rehab with blanket referral  Moy Said verbalized understanding and will update her mom(pt wife) regarding same  Cm to follow

## 2023-02-13 NOTE — PHYSICAL THERAPY NOTE
PHYSICAL THERAPY TREATMENT NOTE  NAME:  Azra Bailey  DATE: 02/13/23    Length Of Stay: 5  Performed at least 2 patient identifiers during session: Name and ID bracelet    TREATMENT FLOWSHEET:    02/13/23 1340   PT Last Visit   PT Visit Date 02/13/23   Note Type   Note Type Treatment   Pain Assessment   Pain Assessment Tool 0-10   Pain Score 4   Pain Location/Orientation Orientation: Right;Location: Hip   Pain Onset/Description Onset: Ongoing   Patient's Stated Pain Goal No pain   Hospital Pain Intervention(s) Repositioned; Ambulation/increased activity   Restrictions/Precautions   Weight Bearing Precautions Per Order Yes   LLE Weight Bearing Per Order WBAT   Other Precautions O2;Fall Risk;Bed Alarm; Chair Alarm;Multiple lines;Pain   General   Chart Reviewed Yes   Response to Previous Treatment Patient with no complaints from previous session  Family/Caregiver Present No   Cognition   Overall Cognitive Status Impaired   Arousal/Participation Alert; Responsive   Attention Attends with cues to redirect   Orientation Level Oriented to person;Oriented to place;Oriented to situation   Memory Decreased long term memory   Following Commands Follows one step commands with increased time or repetition   Comments Pt  more alert and able to follow directions   Subjective   Subjective "It did not hurt as bad this time "   Bed Mobility   Sit to Supine 2  Maximal assistance   Additional items Assist x 2; Increased time required;Verbal cues;LE management  (UB management)   Transfers   Sit to Stand 3  Moderate assistance   Additional items Assist x 2;Armrests; Increased time required;Verbal cues  (RW)   Stand to Sit 3  Moderate assistance   Additional items Assist x 2; Increased time required;Verbal cues   Stand pivot 3  Moderate assistance   Additional items Assist x 2;Armrests; Increased time required;Verbal cues  (RW and moved toward unoperated side)   Additional Comments Step by step directions provided and VC's for proper hand placement during transition  Standing tolerance approximately 1 min  Ambulation/Elevation   Gait pattern Improper Weight shift;Decreased foot clearance; Short stride; Step to;Excessively slow   Gait Assistance 3  Moderate assist   Additional items Assist x 2;Verbal cues; Tactile cues   Assistive Device Rolling walker   Distance 3 steps laterally   Ambulation/Elevation Additional Comments step by step VC's required   Balance   Static Sitting Fair   Dynamic Sitting Fair -   Static Standing Poor +   Dynamic Standing Poor   Ambulatory Poor   Endurance Deficit   Endurance Deficit Yes   Activity Tolerance   Activity Tolerance Patient limited by pain; Patient limited by fatigue   Medical Staff Made Aware OTR aware and present for treatment due to amount of assistance required   Exercises   Quad Sets Supine;10 reps;AROM; Bilateral   Heelslides Supine;10 reps;AAROM; Right;AROM; Left   Glute Sets Supine;10 reps;AROM; Bilateral   Hip Flexion Supine;10 reps;AAROM; Right;AROM; Left   Hip Abduction Supine;10 reps;AROM; Bilateral   Hip Adduction Supine;10 reps;AROM; Bilateral   Knee AROM Short Arc Quad Supine;10 reps;AAROM; Right   Ankle Pumps Supine;10 reps;AROM; Bilateral   Assessment   Prognosis Fair   Problem List Decreased strength;Decreased range of motion; Impaired balance;Decreased endurance;Decreased mobility; Decreased safety awareness;Decreased skin integrity;Pain;Orthopedic restrictions   Goals   Patient Goals to have less pain   PT Treatment Day 4   Plan   Treatment/Interventions Functional transfer training;LE strengthening/ROM; Endurance training; Therapeutic exercise;Patient/family training;Equipment eval/education; Bed mobility;Gait training; Compensatory technique education;Spoke to nursing;OT   Progress Slow progress, decreased activity tolerance   PT Frequency 3-5x/wk   Recommendation   PT Discharge Recommendation Post acute rehabilitation services   AM-PAC Basic Mobility Inpatient   Turning in Flat Bed Without Bedrails 1 Lying on Back to Sitting on Edge of Flat Bed Without Bedrails 1   Moving Bed to Chair 2   Standing Up From Chair Using Arms 2   Walk in Room 2   Climb 3-5 Stairs With Railing 1   Basic Mobility Inpatient Raw Score 9   Highest Level Of Mobility   -[facility] Goal 3: Sit at edge of bed   JH-HLM Achieved 5: Stand (1 or more minutes)       The patient's AM-PAC Basic Mobility Inpatient Short Form Raw Score is 9  A raw score less than 16 suggests the patient may benefit from discharge to post-acute rehabilitation services  Please also refer to the recommendation of the Physical Therapist for safe discharge planning  Pt seen for PT treatment session this date with interventions consisting of transfer training, gait training, standing tolerance, supine TE, and education provided as needed for safety and direction to improve functional mobility, safety awareness, and activity tolerance  Pt agreeable to PT treatment session upon arrival, pt found sitting OOB in recliner  At end of session, pt left supine in bed positioned for comfort with bed alarm activated, SCD's applied, and left with all needs in reach  In comparison to previous session, pt with improvements in amount of assistance required for STS and SPS transfers   Continue to recommend STR at time of d/c in order to maximize pt's functional independence and safety w/ mobility  Pt continues to be functioning below baseline level  PT will continue to see pt while here in order to address the deficits listed above and provide interventions consistent w/ POC in effort to achieve STGs      Arelis Appling, [place]

## 2023-02-13 NOTE — ASSESSMENT & PLAN NOTE
• The patient was seen in consultation by Orthopedic Surgery  • Cardiology pre-operative cardiac risk stratification - moderate but not prohibitive  • POD #3 S/P Right hip hemiarthroplasty on 02/10/2023 by Dr Minerva Ji (Orthopedic Surgery)  • Incentive spirometry  • DVT prophylaxis with heparin 5000 Units SQ every 8 hours and SCD's on both lower extremities  • PT/OT  • Transferred to ICU level of care on 02/11/2023 for worsening mentation and possible uremia  • Today 2/13 mental status improved  • Pain controlled, but pain with weight bearing   • Continue pain control

## 2023-02-13 NOTE — PROGRESS NOTES
[address] [facility] [address]  Progress Note - Alix Chavez 1935, 80 y o  male MRN: [id number]  Unit/Bed#:  Encounter: [id number]  Primary Care Provider: Kathy Espinal DO   Date and time admitted to hospital: 2/8/2023  4:30 PM    * Fracture of femoral neck, right [facility])  Assessment & Plan  • The patient was seen in consultation by Orthopedic Surgery  • Cardiology pre-operative cardiac risk stratification - moderate but not prohibitive  • POD #3 S/P Right hip hemiarthroplasty on 02/10/2023 by Dr Jenifer Holland (Orthopedic Surgery)  • Incentive spirometry  • DVT prophylaxis with heparin 5000 Units SQ every 8 hours and SCD's on both lower extremities  • PT/OT  • Transferred to ICU level of care on 02/11/2023 for worsening mentation and possible uremia  • Today 2/13 mental status improved  • Pain controlled, but pain with weight bearing   • Continue pain control      Acute kidney injury superimposed on CKD [facility])  Assessment & Plan  Lab Results   Component Value Date    EGFR 11 02/13/2023    EGFR 10 02/12/2023    EGFR 11 02/11/2023    CREATININE 4 42 (H) 02/13/2023    CREATININE 4 80 (H) 02/12/2023    CREATININE 4 29 (H) 02/11/2023   •   • Developed acute kidney injury during the hospitalization likely due to contrast-induced nephrology  • Baseline serum creatinine around 2 2 mg/dl  • Worsening renal function on 02/11/2023 with increased lethargy and muscle twitching  • Possible uremia  • Continue Isolyte IV fluids at 50 ml/hr  • Avoid all nephrotoxic agents  Serial laboratory testing to monitor the patient's renal function and electrolyte levels    YOLANDE (acute kidney injury) [facility])  Assessment & Plan  • Please see the assessment and plan for "Acute Kidney Injury superimposed on CKD"     Paroxysmal atrial fibrillation [facility])  Assessment & Plan  • Held eliquis for the right hip hemiarthroplasty on 02/10/2023  • Continue metoprolol tartrate 100 mg PO every 12 hours for heart rate control  • Continue to hold Eliquis since hemoglobin unstable; 7 2 on 2/12 and received 1 unit of blood  • Hb- 7 3 today    Chronic diastolic CHF (congestive heart failure) (HCC)  Assessment & Plan  Wt Readings from Last 3 Encounters:   02/13/23 88 6 kg (195 lb 5 2 oz)   12/06/22 86 2 kg (190 lb)   12/02/22 85 kg (187 lb 6 4 oz)       • Daily weights  • Strict intake/output measurements  • The patient was evaluated by Cardiology pre-operatively and deemed a moderate cardiac risk, which was not prohibitive    • Monitor his volume status closely  • I/O- +112      Toxic encephalopathy  Assessment & Plan  • Likely due to anesthesia effect from 02/10/2023 and possible uremia  • Continue to monitor his neurologic status closely    Acute blood loss anemia  Assessment & Plan  • POD #3 S/P Right hip hemiarthroplasty on 02/10/2023  • Hold eliquis for now  • Follow the CBC  • Transfuse for a hemoglobin less than 7 g/dl     2/13 hemoglobin 7 3    Tricuspid valve insufficiency  Assessment & Plan  Cardiology consult    Leukocytosis  Assessment & Plan  • Appears to have a chronic leukocytosis, WBC- 11 47  • Blood cultures negative at 72 hours  • Urine culture shows mixed contaminants  • Procal down trending- 2 39  • Continue ceftriaxone 1000 mg IV every 24 hours (day 3, day 5 total abx)  • Follow the procalcitonin level  • Follow the CBC  • Outpatient Hematology evaluation    Mitral valve insufficiency  Assessment & Plan  Cardiology consult    Pulmonary hypertension ([address] )  Assessment & Plan  Pulmonary consult    Hyperphosphatemia  Assessment & Plan  • Phos- 4 7  • Treatment per Nephrology  • Follow the phosphorus level    Multiple renal cysts  Assessment & Plan  • Outpatient surveillance imaging with PCP    CKD (chronic kidney disease) stage 4, GFR 15-29 ml/min [facility])  Assessment & Plan  Lab Results   Component Value Date    EGFR 11 02/13/2023    EGFR 10 02/12/2023    EGFR 11 02/11/2023    CREATININE 4 42 (H) 02/13/2023    CREATININE 4 80 (H) 2023    CREATININE 4 29 (H) 2023     • Please see the assessment and plan for "Acute kidney injury"          VTE Pharmacologic Prophylaxis: VTE Score: 12 High Risk (Score >/= 5) - Pharmacological DVT Prophylaxis Ordered: heparin  Sequential Compression Devices Ordered  Patient Centered Rounds: I performed bedside rounds with nursing staff today  Discussions with Specialists or Other Care Team Provider: Orthopedic Surgery      Time Spent for Care: 30 minutes  More than 50% of total time spent on counseling and coordination of care as described above  Current Length of Stay: 5 day(s)  Current Patient Status: Inpatient   Certification Statement: The patient will continue to require additional inpatient hospital stay due to need for IV antibiotic treatment, for continuous IV fluids,  for serial laboratory testing to monitor the renal function, and for post-acute care rehabilitation placement upon discharge  Discharge Plan: Anticipate discharge in 24-48 hrs to rehab facility  Code Status: Level 1 - Full Code    Subjective: The patient was seen and examined  The patient is responsive this morning  Pt is seen sitting on a chair  He seemed alert, oriented  Continues to have leg pain on ambulation  No new complaints  Objective:     Vitals:   Temp (24hrs), Av 5 °F (36 4 °C), Min:96 2 °F (35 7 °C), Max:98 °F (36 7 °C)    Temp:  [96 2 °F (35 7 °C)-98 °F (36 7 °C)] 97 9 °F (36 6 °C)  HR:  [59-64] 59  Resp:  [11-40] 11  BP: ()/(48-93) 120/60  SpO2:  [83 %-100 %] 97 %  Body mass index is 33 53 kg/m²  Input and Output Summary (last 24 hours): Intake/Output Summary (Last 24 hours) at 2023 1717  Last data filed at 2023 1614  Gross per 24 hour   Intake 1860 ml   Output 625 ml   Net 1235 ml       Physical Exam:   Physical Exam  Vitals and nursing note reviewed  Constitutional:       General: He is not in acute distress  Appearance: He is well-developed     HENT:      Head: Normocephalic and atraumatic  Eyes:      Conjunctiva/sclera: Conjunctivae normal    Cardiovascular:      Rate and Rhythm: Normal rate and regular rhythm  Heart sounds: No murmur heard  Pulmonary:      Effort: Pulmonary effort is normal  No respiratory distress  Breath sounds: Normal breath sounds  Abdominal:      Palpations: Abdomen is soft  Tenderness: There is no abdominal tenderness  Musculoskeletal:      Cervical back: Neck supple  Skin:     General: Skin is warm and dry  Capillary Refill: Capillary refill takes less than 2 seconds  Neurological:      Mental Status: He is alert  Psychiatric:         Mood and Affect: Mood normal           Additional Data:     Labs:  Results from last 7 days   Lab Units 02/13/23  0557 02/12/23  0438 02/11/23  0547   WBC Thousand/uL 11 47*   < > 13 09*   HEMOGLOBIN g/dL 7 3*   < > 7 9*   HEMATOCRIT % 24 1*   < > 27 1*   PLATELETS Thousands/uL 242   < > 234   BANDS PCT %  --   --  1   NEUTROS PCT % 85*   < >  --    LYMPHS PCT % 6*   < >  --    LYMPHO PCT %  --   --  5*   MONOS PCT % 7   < >  --    MONO PCT %  --   --  1*   EOS PCT % 1   < > 0    < > = values in this interval not displayed       Results from last 7 days   Lab Units 02/13/23  0557   SODIUM mmol/L 133*   POTASSIUM mmol/L 4 1   CHLORIDE mmol/L 92*   CO2 mmol/L 28   BUN mg/dL 98*   CREATININE mg/dL 4 42*   ANION GAP mmol/L 13   CALCIUM mg/dL 8 3*   ALBUMIN g/dL 3 1*   TOTAL BILIRUBIN mg/dL 0 24   ALK PHOS U/L 56   ALT U/L <3*   AST U/L 22   GLUCOSE RANDOM mg/dL 118     Results from last 7 days   Lab Units 02/10/23  0455   INR  1 18             Results from last 7 days   Lab Units 02/13/23  0557 02/12/23  0438 02/11/23  0547 02/10/23  0550 02/09/23  0506   LACTIC ACID mmol/L  --   --   --   --  1 1   PROCALCITONIN ng/ml 2 39* 3 20* 1 83* 0 54* 0 35*       Lines/Drains:  Invasive Devices     Peripheral Intravenous Line  Duration           Peripheral IV 02/10/23 Right Wrist 3 days Imaging: Reviewed radiology reports from this admission including: chest xray    Recent Cultures (last 7 days):   Results from last 7 days   Lab Units 02/09/23  0523 02/09/23  0510 02/08/23 2041   BLOOD CULTURE  No Growth After 4 Days  No Growth After 4 Days  --    URINE CULTURE   --   --  <10,000 cfu/ml       Last 24 Hours Medication List:   Current Facility-Administered Medications   Medication Dose Route Frequency Provider Last Rate   • acetaminophen  650 mg Oral Q6H PRN Tiffany Rowell, DO     • allopurinol  100 mg Oral Daily Tiffany Rowell, DO     • calcium carbonate  1,000 mg Oral Daily PRN Tiffany Rowell, DO     • cefTRIAXone  1,000 mg Intravenous Q24H Tiffany Rowell, DO 1,000 mg (02/13/23 1609)   • docusate sodium  100 mg Oral BID Tiffany Rowell, DO     • heparin (porcine)  5,000 Units Subcutaneous [facility] [address] Tiffany Rowell, DO     • HYDROmorphone  0 5 mg Intravenous Q4H PRN Tiffany Rowell, DO     • labetalol  10 mg Intravenous Q4H PRN Tiffany Rowell, DO     • metoclopramide  10 mg Intravenous Q6H PRN Tiffany Rowell, DO     • metoprolol tartrate  100 mg Oral BID Tiffany Rowell, DO     • multi-electrolyte  50 mL/hr Intravenous Continuous Biju , DO 50 mL/hr (02/13/23 1607)   • ondansetron  4 mg Intravenous Q4H PRN Tiffany Rowell, DO     • oxyCODONE  5 mg Oral Q4H PRN Tiffany Rowell, DO     • polyethylene glycol  17 g Oral Daily PRN Hiwot House MD     • pravastatin  40 mg Oral Daily With [employer], DO     • senna  1 tablet Oral Daily Rip DO Arabella          Today, Patient Was Seen By: Hiwot House MD    **Please Note: This note may have been constructed using a voice recognition system  **

## 2023-02-14 LAB
ABO GROUP BLD: NORMAL
ALBUMIN SERPL BCP-MCNC: 3 G/DL (ref 3.5–5)
ALP SERPL-CCNC: 57 U/L (ref 34–104)
ALT SERPL W P-5'-P-CCNC: <3 U/L (ref 7–52)
ANION GAP SERPL CALCULATED.3IONS-SCNC: 13 MMOL/L (ref 4–13)
AST SERPL W P-5'-P-CCNC: 17 U/L (ref 13–39)
BACTERIA BLD CULT: NORMAL
BACTERIA BLD CULT: NORMAL
BASOPHILS # BLD AUTO: 0.01 THOUSANDS/ÂΜL (ref 0–0.1)
BASOPHILS NFR BLD AUTO: 0 % (ref 0–1)
BILIRUB SERPL-MCNC: 0.23 MG/DL (ref 0.2–1)
BLD GP AB SCN SERPL QL: NEGATIVE
BUN SERPL-MCNC: 91 MG/DL (ref 5–25)
CALCIUM ALBUM COR SERPL-MCNC: 8.8 MG/DL (ref 8.3–10.1)
CALCIUM SERPL-MCNC: 8 MG/DL (ref 8.4–10.2)
CHLORIDE SERPL-SCNC: 94 MMOL/L (ref 96–108)
CO2 SERPL-SCNC: 27 MMOL/L (ref 21–32)
CREAT SERPL-MCNC: 4 MG/DL (ref 0.6–1.3)
EOSINOPHIL # BLD AUTO: 0.33 THOUSAND/ÂΜL (ref 0–0.61)
EOSINOPHIL NFR BLD AUTO: 3 % (ref 0–6)
ERYTHROCYTE [DISTWIDTH] IN BLOOD BY AUTOMATED COUNT: 18.4 % (ref 11.6–15.1)
GFR SERPL CREATININE-BSD FRML MDRD: 12 ML/MIN/1.73SQ M
GLUCOSE SERPL-MCNC: 115 MG/DL (ref 65–140)
HCT VFR BLD AUTO: 23.8 % (ref 36.5–49.3)
HGB BLD-MCNC: 7 G/DL (ref 12–17)
IMM GRANULOCYTES # BLD AUTO: 0.05 THOUSAND/UL (ref 0–0.2)
IMM GRANULOCYTES NFR BLD AUTO: 1 % (ref 0–2)
LYMPHOCYTES # BLD AUTO: 0.66 THOUSANDS/ÂΜL (ref 0.6–4.47)
LYMPHOCYTES NFR BLD AUTO: 6 % (ref 14–44)
MCH RBC QN AUTO: 23 PG (ref 26.8–34.3)
MCHC RBC AUTO-ENTMCNC: 29.4 G/DL (ref 31.4–37.4)
MCV RBC AUTO: 78 FL (ref 82–98)
MONOCYTES # BLD AUTO: 0.88 THOUSAND/ÂΜL (ref 0.17–1.22)
MONOCYTES NFR BLD AUTO: 8 % (ref 4–12)
NEUTROPHILS # BLD AUTO: 8.91 THOUSANDS/ÂΜL (ref 1.85–7.62)
NEUTS SEG NFR BLD AUTO: 82 % (ref 43–75)
NRBC BLD AUTO-RTO: 0 /100 WBCS
PHOSPHATE SERPL-MCNC: 4.9 MG/DL (ref 2.3–4.1)
PLATELET # BLD AUTO: 222 THOUSANDS/UL (ref 149–390)
PMV BLD AUTO: 10.6 FL (ref 8.9–12.7)
POTASSIUM SERPL-SCNC: 4.5 MMOL/L (ref 3.5–5.3)
PROCALCITONIN SERPL-MCNC: 1.35 NG/ML
PROT SERPL-MCNC: 5.7 G/DL (ref 6.4–8.4)
RBC # BLD AUTO: 3.05 MILLION/UL (ref 3.88–5.62)
RH BLD: POSITIVE
SODIUM SERPL-SCNC: 134 MMOL/L (ref 135–147)
SPECIMEN EXPIRATION DATE: NORMAL
WBC # BLD AUTO: 10.84 THOUSAND/UL (ref 4.31–10.16)

## 2023-02-14 RX ADMIN — CEFTRIAXONE 1000 MG: 1 INJECTION, SOLUTION INTRAVENOUS at 15:28

## 2023-02-14 RX ADMIN — HEPARIN SODIUM 5000 UNITS: 5000 INJECTION INTRAVENOUS; SUBCUTANEOUS at 15:06

## 2023-02-14 RX ADMIN — ONDANSETRON 4 MG: 2 INJECTION INTRAMUSCULAR; INTRAVENOUS at 09:32

## 2023-02-14 RX ADMIN — DOCUSATE SODIUM 100 MG: 100 CAPSULE, LIQUID FILLED ORAL at 08:16

## 2023-02-14 RX ADMIN — METOCLOPRAMIDE HYDROCHLORIDE 10 MG: 5 INJECTION INTRAMUSCULAR; INTRAVENOUS at 22:57

## 2023-02-14 RX ADMIN — HEPARIN SODIUM 5000 UNITS: 5000 INJECTION INTRAVENOUS; SUBCUTANEOUS at 21:49

## 2023-02-14 RX ADMIN — OXYCODONE HYDROCHLORIDE 5 MG: 5 TABLET ORAL at 01:58

## 2023-02-14 RX ADMIN — PRAVASTATIN SODIUM 40 MG: 40 TABLET ORAL at 15:43

## 2023-02-14 RX ADMIN — STANDARDIZED SENNA CONCENTRATE 8.6 MG: 8.6 TABLET ORAL at 08:16

## 2023-02-14 RX ADMIN — ACETAMINOPHEN 650 MG: 325 TABLET, FILM COATED ORAL at 21:47

## 2023-02-14 RX ADMIN — DOCUSATE SODIUM 100 MG: 100 CAPSULE, LIQUID FILLED ORAL at 17:55

## 2023-02-14 RX ADMIN — METOPROLOL TARTRATE 100 MG: 100 TABLET, FILM COATED ORAL at 08:16

## 2023-02-14 RX ADMIN — METOPROLOL TARTRATE 100 MG: 100 TABLET, FILM COATED ORAL at 21:47

## 2023-02-14 RX ADMIN — ONDANSETRON 4 MG: 2 INJECTION INTRAMUSCULAR; INTRAVENOUS at 15:28

## 2023-02-14 RX ADMIN — HEPARIN SODIUM 5000 UNITS: 5000 INJECTION INTRAVENOUS; SUBCUTANEOUS at 05:14

## 2023-02-14 RX ADMIN — ALLOPURINOL 100 MG: 100 TABLET ORAL at 08:16

## 2023-02-14 NOTE — PROGRESS NOTES
Orthopedics   Arlina Closs 80 y o  male MRN: [id number]  Unit/Bed#:       Subjective:  80 y  o male POD#4 right hip hemiarthroplasty  Patient seen eating in bedside chair  He reports pain as well controlled  Denies abdominal pain  He has been tolerating PO diet well       Labs:  0   Lab Value Date/Time    HCT 23 8 (L) 02/14/2023 0449    HCT 24 1 (L) 02/13/2023 0557    HCT 22 9 (L) 02/12/2023 2026    HCT 27 8 (L) 07/04/2015 0501    HCT 30 1 (L) 07/03/2015 0935    HCT 30 0 (L) 07/02/2015 1223    HGB 7 0 (L) 02/14/2023 0449    HGB 7 3 (L) 02/13/2023 0557    HGB 7 0 (L) 02/12/2023 2026    HGB 9 4 (L) 07/04/2015 0501    HGB 9 9 (L) 07/03/2015 0935    HGB 9 8 (L) 07/02/2015 1223    INR 1 18 02/10/2023 0455    INR 1 27 (H) 07/02/2015 1223    WBC 10 84 (H) 02/14/2023 0449    WBC 11 47 (H) 02/13/2023 0557    WBC 11 73 (H) 02/12/2023 0438    WBC 12 50 (H) 07/04/2015 0501    WBC 16 11 (H) 07/03/2015 0935    WBC 19 17 (H) 07/02/2015 1223    ESR 50 (H) 07/30/2021 0417     8 (H) 07/29/2021 0457     Meds:    Current Facility-Administered Medications:   •  acetaminophen (TYLENOL) tablet 650 mg, 650 mg, Oral, Q6H PRN, Narayan Hampton MD, 650 mg at 02/12/23 1709  •  allopurinol (ZYLOPRIM) tablet 100 mg, 100 mg, Oral, Daily, Narayan Hampton MD, 100 mg at 02/14/23 0816  •  calcium carbonate (TUMS) chewable tablet 1,000 mg, 1,000 mg, Oral, Daily PRN, Narayan Hampton MD  •  cefTRIAXone (ROCEPHIN) IVPB (premix in dextrose) 1,000 mg 50 mL, 1,000 mg, Intravenous, Q24H, Narayan Hampton MD, Stopped at 02/13/23 1729  •  docusate sodium (COLACE) capsule 100 mg, 100 mg, Oral, BID, Narayan Hampton MD, 100 mg at 02/14/23 0816  •  heparin (porcine) subcutaneous injection 5,000 Units, 5,000 Units, Subcutaneous, Q8H [address], Narayan Hampton MD, 5,000 Units at 02/14/23 0514  •  HYDROmorphone (DILAUDID) injection 0 5 mg, 0 5 mg, Intravenous, Q4H PRN, Narayan Hampton MD, 0 5 mg at 02/11/23 0342  • labetalol (NORMODYNE) injection 10 mg, 10 mg, Intravenous, Q4H PRN, Kiley Grayson MD  •  metoclopramide (REGLAN) injection 10 mg, 10 mg, Intravenous, Q6H PRN, Kiley Grayson MD  •  metoprolol tartrate (LOPRESSOR) tablet 100 mg, 100 mg, Oral, BID, Kiley Grayson MD, 100 mg at 02/14/23 0816  •  ondansetron (ZOFRAN) injection 4 mg, 4 mg, Intravenous, Q4H PRN, Kiley Grayson MD, 4 mg at 02/14/23 0932  •  oxyCODONE (ROXICODONE) IR tablet 5 mg, 5 mg, Oral, Q4H PRN, Kiley Grayson MD, 5 mg at 02/14/23 0158  •  polyethylene glycol (MIRALAX) packet 17 g, 17 g, Oral, Daily PRN, Kiley Grayson MD  •  pravastatin (PRAVACHOL) tablet 40 mg, 40 mg, Oral, Daily With Vira Medley MD, 40 mg at 02/13/23 1611  •  senna (SENOKOT) tablet 8 6 mg, 1 tablet, Oral, Daily, Kiley Grayson MD, 8 6 mg at 02/14/23 0816    Blood Culture:   Lab Results   Component Value Date    BLOODCX No Growth After 5 Days  02/09/2023     Wound Culture:   Lab Results   Component Value Date    WOUNDCULT No growth 07/30/2021     Ins and Outs:  I/O last 24 hours: In: 2733 3 [P O :840; I V :1893 3]  Out: 1001 [Urine:1000; Emesis/NG output:1]    Physical:  Vitals:    02/14/23 1335   BP: 138/64   Pulse: 60   Resp: 18   Temp: 98 1 °F (36 7 °C)   SpO2: 96%     right lower extremity  · Mepilex dressing clean dry intact, with trace dried blood  · periincisional TTP  · Minimal thigh swelling, soft, without tenderness  No ecchymosis around incision  · Motor intact, intact plantar/dorsiflexion  +EHL/FHL  · Toes in socks, distal calves warm and well perfused  · ACE wrap in place to continue swelling control    Assessment: 80 y  o male POD#4 right hip hemiarthroplasty with blood loss anemia  Plan:  · Up and out of bed WBAT RLE  In PT/OT with walker    · DVT prophylaxis on heparin per primary  · Analgesics per primary  · Continue management with PT/OT, CM, IM, and nephrology  · Dispo: Ortho will follow while in house, to short term rehab when cleared  · Patient noted to have acute blood loss anemia due to a drop in Hbg of > 2 0g from preop levels, will monitor vital signs and resuscitate with IV fluids as needed      Bin Boone PA-C

## 2023-02-14 NOTE — ASSESSMENT & PLAN NOTE
Lab Results   Component Value Date    EGFR 12 02/14/2023    EGFR 11 02/13/2023    EGFR 10 02/12/2023    CREATININE 4 00 (H) 02/14/2023    CREATININE 4 42 (H) 02/13/2023    CREATININE 4 80 (H) 02/12/2023     • Please see the assessment and plan for "Acute kidney injury"

## 2023-02-14 NOTE — PLAN OF CARE
Problem: OCCUPATIONAL THERAPY ADULT  Goal: Performs self-care activities at highest level of function for planned discharge setting  See evaluation for individualized goals  Description: Treatment Interventions: ADL retraining, Functional transfer training, UE strengthening/ROM, Endurance training, Patient/family training, Equipment evaluation/education, Compensatory technique education, Energy conservation, Activityengagement          See flowsheet documentation for full assessment, interventions and recommendations  Outcome: Progressing  Note: Limitation: Decreased ADL status, Decreased UE strength, Decreased Safe judgement during ADL, Decreased endurance, Decreased self-care trans, Decreased high-level ADLs  Prognosis: Fair  Assessment: Patient participated in Skilled OT session this date with interventions consisting of ADL re training with the use of correct body mechnaics and therapeutic exercise to: increase functional use of BUEs, increase BUE muscle strength    Patient agreeable to OT treatment session, upon arrival patient was found seated OOB to Recliner  Per nursing patient receiving blood this afternoon with functional mobility differed that this time per nursing  Pt completed LB dressing Max A to jose and doff B  socks  Pt participated in UE exercises BUE AROM 2x10 all planes to increase strength, endurance, ROM, txfrs, self cares  Pt required verbal and visual cues throughout to complete proper ROM  Pt required rest breaks throughout exercises  Pt reporting nausea at end of treatment with nursing notified  Patient requiring verbal cues for safety and verbal cues for correct technique  Patient continues to be functioning below baseline level, occupational performance remains limited secondary to factors listed above and increased risk for falls and injury  From OT standpoint, recommendation at time of d/c would be Post acute rehabilitation services    The patient's raw score on the AM-PAC Daily Activity inpatient short form is 13, standardized score is 32 03, less than 39 4  Patients at this level are likely to benefit from discharge to post-acute rehabilitation services  Please refer to the recommendation of the Occupational Therapist for safe discharge planning       OT Discharge Recommendation: Post acute rehabilitation services

## 2023-02-14 NOTE — PLAN OF CARE
Problem: PHYSICAL THERAPY ADULT  Goal: Performs mobility at highest level of function for planned discharge setting  See evaluation for individualized goals  Description  2/14/2023 1356 by Jerel Arredondo PTA  Outcome: Progressing  Note: Prognosis: Fair  Problem List:  (v)  Assessment: Pt  seen for PT treatment session this date with interventions consisting of  therapeutic exercises to improve ROM, strength, endurance and function  Tx/gait training held due to awaiting transfusion for low hemoglobin  In comparison to previous session, Pt  With no change  in activity tolerance  Pt is in need of continued activity in PT to improve strength balance endurance mobility transfers and ambulation with return to maximize LOF  From PT/mobility standpoint, recommendation at time of d/c would be post acute rehab in order to promote return to PLOF and independence  The patient's AM-PAC Basic Mobility Inpatient Short Form Raw Score is 6  A Raw score of less than or equal to 16 suggests the patient may benefit from discharge to post-acute rehabilitation services  Please also refer to physical therapy recommendation for safe DC planning  Co-treat with ROSALINA this session due to complexity of pt and may require A x 2 to provided skilled interventions  PT Discharge Recommendation: Post acute rehabilitation services    See flowsheet documentation for full assessment  2/14/2023 1346 by Jerel Arredondo PTA  Outcome: Progressing  Note: Prognosis: Fair  Problem List:  (Decreased strength; Decreased range of motion; Impaired balance; Decreased mobility; Decreased endurance)  Assessment: Pt  seen for PT treatment session this date with interventions consisting of  therapeutic exercises, bed mobility, transfers  w/ emphasis on improving pt's mobility  Pt  Requiring max cues for sequence and safety  In comparison to previous session, Pt  With no change  in activity tolerance     Pt is in need of continued activity in PT to improve strength balance endurance mobility transfers and ambulation with return to maximize LOF  From PT/mobility standpoint, recommendation at time of d/c would be post acute rehab  in order to promote return to PLOF and independence  The patient's AM-PAC Basic Mobility Inpatient Short Form Raw Score is 8  A Raw score of less than or equal to 16 suggests the patient may benefit from discharge to post-acute rehabilitation services  Please also refer to physical therapy recommendation for safe DC planning  PT Discharge Recommendation: Post acute rehabilitation services    See flowsheet documentation for full assessment

## 2023-02-14 NOTE — ASSESSMENT & PLAN NOTE
• Appears to have a chronic leukocytosis  • Blood cultures negative at 72 hours  • Completed course of IV ceftriaxone, 5 days total  • Outpatient Hematology evaluation

## 2023-02-14 NOTE — PROGRESS NOTES
Progress Note - Nephrology   Graciela Bean 80 y o  male MRN: [id number]  Unit/Bed#:  Encounter: [id number]    A/P:  1   Acute kidney injury on top of chronic kidney disease due to acute kidney necrosis              Patient slowly improved, now down to 4 mg/dL  Continue to offer supportive care and avoid potential nephrotoxins  2   Chronic disease stage IV with a baseline creatinine around 2 2 mg/dL  3   Possible right complex renal cyst             Further As indicated in the outpatient setting, will defer for now  4   Volume depletion              EVXYEQP appropriate this time, will discontinue IV fluids  5   Chronic diastolic congestive heart failure              Patient is compensated, continue monitor for now  6   Postop day 4 right hip Hemiarthroplasty   Continue local care according to surgical colleagues    Follow up reason for today's visit: Acute kidney injury/acute tubular necrosis/chronic kidney disease    Fracture of femoral neck, right [facility])    Patient Active Problem List   Diagnosis   • Acute blood loss anemia   • Mild intermittent asthma without complication   • CKD (chronic kidney disease) stage 4, GFR 15-29 ml/min ([facility])   • Dyslipidemia   • GERD (gastroesophageal reflux disease)   • Late effects of cerebrovascular disease   • Lumbar degenerative disc disease   • Lumbar radiculopathy   • Mitral regurgitation   • Obesity (BMI 30-39  9)   • Medicare annual wellness visit, subsequent   • History of aortic valve replacement with bioprosthetic valve   • History of stroke   • Complete heart block ([facility])   • Chronic diastolic CHF (congestive heart failure) ([facility] )   • Ambulatory dysfunction   • Paroxysmal atrial fibrillation ([facility])   • 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   • Presence of permanent cardiac pacemaker   • Oxygen dependent   • Iron deficiency anemia   • Need for immunization against influenza   • Pulmonary emphysema ([facility])   • Left wrist pain   • Right hip pain   • Acute kidney injury superimposed on CKD [facility])   • Multiple renal cysts   • Effusion of left knee   • Mitral valve stenosis   • Acute gout   • Transaminitis   • 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 kidney disease, or chronic kidney disease (HCC)   • Chronic low back pain without sciatica   • History of severe aortic stenosis   • Hx of CABG   • Essential hypertension   • Dermatitis   • Hyperkalemia   • Pressure ulcer of right buttock, stage 2 (HCC)   • Anemia   • Hyperlipidemia, unspecified   • Fracture of femoral neck, right (HCC)   • Leukocytosis   • Hyperphosphatemia   • Toxic encephalopathy   • YOLANDE (acute kidney injury) ([facility] [address] )   • Pulmonary hypertension (HCC)   • Mitral valve insufficiency   • Tricuspid valve insufficiency         Subjective:   He continues have a 4 appetite, however he is eating and drinking with no nausea or vomiting at this time  Objective:     Vitals: Blood pressure 134/62, pulse 60, temperature 98 1 °F (36 7 °C), temperature source Tympanic, resp  rate 12, height 5' 4" (1 626 m), weight 88 6 kg (195 lb 5 2 oz), SpO2 98 %  ,Body mass index is 33 53 kg/m²  Weight (last 2 days)     Date/Time Weight    02/13/23 0558 88 6 (195 33)    02/12/23 0543 89 (196 21)            Intake/Output Summary (Last 24 hours) at 2/14/2023 1020  Last data filed at 2/14/2023 0928  Gross per 24 hour   Intake 1541 67 ml   Output 701 ml   Net 840 67 ml     I/O last 3 completed shifts:   In: 1980 [P O :540; I V :1440]  Out: 1126 [Urine:1125; Emesis/NG output:1]         Physical Exam: /62 (BP Location: Right arm)   Pulse 60   Temp 98 1 °F (36 7 °C) (Tympanic)   Resp 12   Ht 5' 4" (1 626 m)   Wt 88 6 kg (195 lb 5 2 oz)   SpO2 98%   BMI 33 53 kg/m²     General Appearance:    Alert, cooperative, no distress, appears stated age   Head:    Normocephalic, without obvious abnormality, atraumatic   Eyes: Conjunctiva/corneas clear   Ears:    Normal external ears   Nose:   Nares normal, septum midline, mucosa normal, no drainage    or sinus tenderness   Throat:   Lips, mucosa, and tongue normal; teeth and gums normal   Neck:   Supple   Back:     Symmetric, no curvature, ROM normal, no CVA tenderness   Lungs:     Clear to auscultation bilaterally, respirations unlabored   Chest wall:    No tenderness or deformity   Heart:    Regular rate and rhythm, S1 and S2 normal, no murmur, rub   or gallop   Abdomen:     Soft, non-tender, bowel sounds active   Extremities:   Extremities normal, atraumatic, no cyanosis, mild bilateral lower extremity edema up to the sacral region   Skin:   Skin color, texture, turgor normal, no rashes or lesions   Lymph nodes:   Cervical normal   Neurologic:   CNII-XII intact            Lab, Imaging and other studies: I have personally reviewed pertinent labs  CBC:   Lab Results   Component Value Date    WBC 10 84 (H) 02/14/2023    HGB 7 0 (L) 02/14/2023    HCT 23 8 (L) 02/14/2023    MCV 78 (L) 02/14/2023     02/14/2023    MCH 23 0 (L) 02/14/2023    MCHC 29 4 (L) 02/14/2023    RDW 18 4 (H) 02/14/2023    MPV 10 6 02/14/2023    NRBC 0 02/14/2023     CMP:   Lab Results   Component Value Date    K 4 5 02/14/2023    CL 94 (L) 02/14/2023    CO2 27 02/14/2023    BUN 91 (H) 02/14/2023    CREATININE 4 00 (H) 02/14/2023    CALCIUM 8 0 (L) 02/14/2023    AST 17 02/14/2023    ALT <3 (L) 02/14/2023    ALKPHOS 57 02/14/2023    EGFR 12 02/14/2023           Results from last 7 days   Lab Units 02/14/23  0449 02/13/23  0557 02/12/23  0438   POTASSIUM mmol/L 4 5 4 1 4 3   CHLORIDE mmol/L 94* 92* 90*   CO2 mmol/L 27 28 28   BUN mg/dL 91* 98* 95*   CREATININE mg/dL 4 00* 4 42* 4 80*   CALCIUM mg/dL 8 0* 8 3* 8 1*   ALK PHOS U/L 57 56 55   ALT U/L <3* <3* <3*   AST U/L 17 22 20         Phosphorus:   Lab Results   Component Value Date    PHOS 4 9 (H) 02/14/2023     Magnesium: No results found for: MG  Urinalysis: No results found for: Pauline Vicente, SPECGRAV, PHUR, LEUKOCYTESUR, NITRITE, PROTEINUA, GLUCOSEU, KETONESU, BILIRUBINUR, BLOODU  Ionized Calcium: No results found for: CAION  Coagulation: No results found for: PT, INR, APTT  Troponin: No results found for: TROPONINI  ABG: No results found for: PHART, TWD2RJK, PO2ART, PRG9SOQ, B2RBMUXN, BEART, SOURCE  Radiology review:     IMAGING  Procedure: XR chest portable    Result Date: 2/13/2023  Narrative: CHEST INDICATION:   possible PNA  COMPARISON:  2/8/2023 EXAM PERFORMED/VIEWS:  XR CHEST PORTABLE FINDINGS:  Left pacer is stable  Cardiomegaly noted as before  Left greater than right basilar effusions with probable right basilar discoid atelectasis  Mild central congestion  Lungs otherwise clear  Osseous structures appear within normal limits for patient age  Impression: Pulmonary edema pattern with left greater than right basilar effusions and probable right basilar discoid atelectasis with infiltrate not excluded   Workstation performed: [id number]       Current Facility-Administered Medications   Medication Dose Route Frequency   • acetaminophen (TYLENOL) tablet 650 mg  650 mg Oral Q6H PRN   • allopurinol (ZYLOPRIM) tablet 100 mg  100 mg Oral Daily   • calcium carbonate (TUMS) chewable tablet 1,000 mg  1,000 mg Oral Daily PRN   • cefTRIAXone (ROCEPHIN) IVPB (premix in dextrose) 1,000 mg 50 mL  1,000 mg Intravenous Q24H   • docusate sodium (COLACE) capsule 100 mg  100 mg Oral BID   • heparin (porcine) subcutaneous injection 5,000 Units  5,000 Units Subcutaneous Q8H [address]   • HYDROmorphone (DILAUDID) injection 0 5 mg  0 5 mg Intravenous Q4H PRN   • labetalol (NORMODYNE) injection 10 mg  10 mg Intravenous Q4H PRN   • metoclopramide (REGLAN) injection 10 mg  10 mg Intravenous Q6H PRN   • metoprolol tartrate (LOPRESSOR) tablet 100 mg  100 mg Oral BID   • multi-electrolyte (PLASMALYTE-A/ISOLYTE-S PH 7 4) IV solution  50 mL/hr Intravenous Continuous   • ondansetron [facility]) injection 4 mg  4 mg Intravenous Q4H PRN   • oxyCODONE (ROXICODONE) IR tablet 5 mg  5 mg Oral Q4H PRN   • polyethylene glycol (MIRALAX) packet 17 g  17 g Oral Daily PRN   • pravastatin (PRAVACHOL) tablet 40 mg  40 mg Oral Daily With Dinner   • senna (SENOKOT) tablet 8 6 mg  1 tablet Oral Daily     Medications Discontinued During This Encounter   Medication Reason   • lactated ringers infusion    • dexamethasone (PF) (DECADRON) injection Patient Discharge   • bupivacaine (PF) (MARCAINE) 0 25 % injection Patient Discharge   • vancomycin (VANCOCIN) injection Patient Discharge   • sodium chloride 0 9 % irrigation solution Patient Discharge   • chlorhexidine (HIBICLENS) 4 % topical liquid    • fentaNYL (SUBLIMAZE) injection 25 mcg Patient Transfer   • HYDROmorphone HCl (DILAUDID) injection 0 2 mg Patient Transfer   • ondansetron (ZOFRAN) injection 4 mg Patient Transfer   • lactated ringers infusion    • lactated ringers bolus 1,000 mL    • lactated ringers bolus 1,000 mL    • sodium chloride 0 9 % bolus 1,000 mL    • sodium chloride 0 9 % bolus 1,000 mL    • gabapentin (NEURONTIN) capsule 100 mg    • pantoprazole (PROTONIX) injection 40 mg    • multi-electrolyte (PLASMALYTE-A/ISOLYTE-S PH 7 4) IV solution        Lizy Wick,       This progress note was produced in part using a dictation device which may document imprecise wording from author's original intent

## 2023-02-14 NOTE — PHYSICAL THERAPY NOTE
PHYSICAL THERAPY NOTE          Patient Name: Ryan Augustin  HTFDS'Z Date: 2/14/2023 02/14/23 1200   PT Last Visit   PT Visit Date 02/14/23   Note Type   Note Type Treatment   Restrictions/Precautions   Weight Bearing Precautions Per Order Yes   RLE Weight Bearing Per Order WBAT   Other Precautions   (O2; Fall Risk; Multiple lines; Chair Alarm; Bed Alarm; Pain; Telemetry)   General   Response to Previous Treatment Patient unable to report, no changes reported from family or staff   Family/Caregiver Present No   Cognition   Overall Cognitive Status Impaired   Arousal/Participation Alert   Following Commands Follows one step commands with increased time or repetition   Subjective   Subjective c/o R hip pain with movment and nausea   Bed Mobility   Additional Comments deferred tx training, pt awaiting blood transfusion   Balance   Static Sitting Fair +   Dynamic Sitting Fair   Endurance Deficit   Endurance Deficit Yes   Activity Tolerance   Activity Tolerance Patient limited by fatigue;Patient limited by pain   Exercises   Hip Flexion Sitting;AAROM  (2 x 10)   Hip Abduction Sitting  (2 x 10)   Hip Adduction Sitting  (+ add sets 2 x 10)   Knee AROM Long Arc Quad Sitting;AROM;AAROM  (2 x 10)   Ankle Pumps Sitting;20 reps;AROM   Assessment   Prognosis Fair   Problem List   (v)   Assessment Pt  seen for PT treatment session this date with interventions consisting of  therapeutic exercises to improve ROM, strength, endurance and function  Tx/gait training held due to awaiting transfusion for low hemoglobin  In comparison to previous session, Pt  With no change  in activity tolerance  Pt is in need of continued activity in PT to improve strength balance endurance mobility transfers and ambulation with return to maximize LOF   From PT/mobility standpoint, recommendation at time of d/c would be post acute rehab in order to promote return to PLOF and independence  The patient's AM-PAC Basic Mobility Inpatient Short Form Raw Score is 6  A Raw score of less than or equal to 16 suggests the patient may benefit from discharge to post-acute rehabilitation services  Please also refer to physical therapy recommendation for safe DC planning  Co-treat with ROSALINA this session due to complexity of pt and may require A x 2 to provided skilled interventions  Goals   LTG Expiration Date 02/25/23   Plan   Treatment/Interventions   (Functional transfer training; Elevations; Therapeutic exercise; LE strengthening/ROM; Endurance training; Bed mobility; Gait training)   Progress Slow progress, decreased activity tolerance   PT Frequency Twice a day  (sat& Sun once a day)   Recommendation   PT Discharge Recommendation Post acute rehabilitation services   AM-PAC Basic Mobility Inpatient   Turning in Flat Bed Without Bedrails 1   Lying on Back to Sitting on Edge of Flat Bed Without Bedrails 1   Moving Bed to Chair 1   Standing Up From Chair Using Arms 1   Walk in Room 1   Climb 3-5 Stairs With Railing 1   Basic Mobility Inpatient Raw Score 6   Turning Head Towards Sound 4   Follow Simple Instructions 3   Low Function Basic Mobility Raw Score 13   Low Function Basic Mobility Standardized Score 20 14   Highest Level Of Mobility   -[facility] Goal 2: Bed activities/Dependent transfer   End of Consult   Patient Position at End of Consult Bedside chair;Bed/Chair alarm activated; All needs within reach   End of Consult Comments discussed POC with PT

## 2023-02-14 NOTE — ASSESSMENT & PLAN NOTE
· Ultrasound of the kidneys and bladder (02/08/2023): - Bilateral renal cysts, most of which are simple in appearance with the exception of a 3 2 x 3 3 x 3 2 cm suboptimally visualized right renal cyst, likely complex  A six-month follow-up renal ultrasound is recommended    · Outpatient surveillance imaging with PCP

## 2023-02-14 NOTE — PROGRESS NOTES
[address] [facility] [address] [place]  Progress Note - Dung Hopkins 1935, 80 y o  male MRN: [id number]  Unit/Bed#:  Encounter: [id number]  Primary Care Provider: Mychal Dye DO   Date and time admitted to hospital: 2/8/2023  4:30 PM    * Fracture of femoral neck, right ([address] )  Assessment & Plan  · POD #4 S/P Right hip hemiarthroplasty on 02/10/2023 by Dr Kathy Stevenson (Orthopedic Surgery)  • Incentive spirometry  • DVT prophylaxis with heparin 5000 Units SQ every 8 hours and SCD's on both lower extremities  • PT/OT  • Transferred to ICU level of care on 02/11/2023 for worsening mentation and possible uremia  • Today 2/14 mental status improved  • Downgraded from ICU to Med-Surg unit  • Pain controlled, but pain with weight bearing   • Continue pain control  • Per Nurse- mid 80s O2 sat at night during sleep  Check overnight Pulse ox  Acute kidney injury superimposed on CKD [facility])  Assessment & Plan  Lab Results   Component Value Date    EGFR 12 02/14/2023    EGFR 11 02/13/2023    EGFR 10 02/12/2023    CREATININE 4 00 (H) 02/14/2023    CREATININE 4 42 (H) 02/13/2023    CREATININE 4 80 (H) 02/12/2023     • Developed acute kidney injury during the hospitalization likely due to contrast-induced nephrology  • Baseline serum creatinine around 2 2 mg/dl  • Worsening renal function on 02/11/2023 with increased lethargy and muscle twitching, possible uremia  • Cr- 4 00 today  • IVF discontinued per nephro recommendations    • Avoid all nephrotoxic agents  Serial laboratory testing to monitor the patient's renal function and electrolyte levels    YOLANDE (acute kidney injury) [facility])  Assessment & Plan  • Please see the assessment and plan for "Acute Kidney Injury superimposed on CKD"     Paroxysmal atrial fibrillation [facility])  Assessment & Plan  • Held eliquis for the right hip hemiarthroplasty on 02/10/2023  • Continue metoprolol tartrate 100 mg PO every 12 hours for heart rate control  • Continue to hold Eliquis since hemoglobin unstable; 7 2 on 2/12 and received 1 unit of blood  • Hb- 7 0 today      Chronic diastolic CHF (congestive heart failure) (HCC)  Assessment & Plan  Wt Readings from Last 3 Encounters:   02/13/23 88 6 kg (195 lb 5 2 oz)   12/06/22 86 2 kg (190 lb)   12/02/22 85 kg (187 lb 6 4 oz)       • Daily weights  • Strict intake/output measurements  • The patient was evaluated by Cardiology pre-operatively and deemed a moderate cardiac risk, which was not prohibitive    • Monitor his volume status closely  • I/O- +400 7      Toxic encephalopathy  Assessment & Plan  • Likely due to anesthesia effect from 02/10/2023 and possible uremia  • Continue to monitor his neurologic status closely    Acute blood loss anemia  Assessment & Plan  • POD #4 S/P Right hip hemiarthroplasty on 02/10/2023  • Hold eliquis for now  • Follow the CBC  • Transfuse for a hemoglobin less than 7 g/dl     2/13 hemoglobin 7 3  2/14 Hb- 7 00    • Start 1 Unit of blood  • Follow Hb closely      Tricuspid valve insufficiency  Assessment & Plan  Cardiology consult    Leukocytosis  Assessment & Plan  • Appears to have a chronic leukocytosis, WBC down trending- 10 84  • Blood cultures negative at 72 hours  • Urine culture shows mixed contaminants  • Procal down trending- 1 35  • Continue ceftriaxone 1000 mg IV every 24 hours (day 4, day 6 total abx)  • Follow the procalcitonin level  • Follow white count  • Outpatient Hematology evaluation    Mitral valve insufficiency  Assessment & Plan  Cardiology consult    Pulmonary hypertension [facility])  Assessment & Plan  Pulmonary consult    Hyperphosphatemia  Assessment & Plan  • Phos- 4 9  • Treatment per Nephrology  • Follow the phosphorus level    Multiple renal cysts  Assessment & Plan  • Outpatient surveillance imaging with PCP    CKD (chronic kidney disease) stage 4, GFR 15-29 ml/min [facility])  Assessment & Plan  Lab Results   Component Value Date    EGFR 12 02/14/2023    EGFR 11 02/13/2023    EGFR 10 2023    CREATININE 4 00 (H) 2023    CREATININE 4 42 (H) 2023    CREATININE 4 80 (H) 2023     • Please see the assessment and plan for "Acute kidney injury"        VTE Pharmacologic Prophylaxis: VTE Score: 12 High Risk (Score >/= 5) - Pharmacological DVT Prophylaxis Ordered: heparin  Sequential Compression Devices Ordered  Patient Centered Rounds: I performed bedside rounds with nursing staff today  Discussions with Specialists or Other Care Team Provider: Orthopedic Surgery      Total Time Spent on Date of Encounter in care of patient: 25 minutes This time was spent on one or more of the following: performing physical exam; counseling and coordination of care; obtaining or reviewing history; documenting in the medical record; reviewing/ordering tests, medications or procedures; communicating with other healthcare professionals and discussing with patient's family/caregivers  Current Length of Stay: 6 day(s)  Current Patient Status: Inpatient   Certification Statement: The patient will continue to require additional inpatient hospital stay due to  need for IV antibiotic treatment, for continuous IV fluids,  for serial laboratory testing to monitor the renal function, and for post-acute care rehabilitation placement upon discharge  Discharge Plan: Anticipate discharge in 24-48 hrs to rehab facility  Code Status: Level 1 - Full Code    Subjective:   Pt seen and examined  is seen sitting on a chair  He seemed alert, oriented  Continues to have leg pain on ambulation  States that he is unable to move his right leg  Makes slight movements  Sensations intact  No new complaints        Objective:     Vitals:   Temp (24hrs), Av °F (36 7 °C), Min:97 8 °F (36 6 °C), Max:98 1 °F (36 7 °C)    Temp:  [97 8 °F (36 6 °C)-98 1 °F (36 7 °C)] 97 9 °F (36 6 °C)  HR:  [59-61] 61  Resp:  [10-17] 11  BP: (108-165)/(53-69) 165/69  SpO2:  [84 %-100 %] 97 %  Body mass index is 33 53 kg/m²       Input and Output Summary (last 24 hours): Intake/Output Summary (Last 24 hours) at 2/14/2023 1235  Last data filed at 2/14/2023 1109  Gross per 24 hour   Intake 2493 34 ml   Output 701 ml   Net 1792 34 ml       Physical Exam:   Physical Exam  Vitals and nursing note reviewed  Constitutional:       General: He is not in acute distress  Appearance: He is well-developed  HENT:      Head: Normocephalic and atraumatic  Eyes:      Conjunctiva/sclera: Conjunctivae normal    Cardiovascular:      Rate and Rhythm: Normal rate and regular rhythm  Heart sounds: No murmur heard  Pulmonary:      Effort: Pulmonary effort is normal  No respiratory distress  Breath sounds: Normal breath sounds  Abdominal:      Palpations: Abdomen is soft  Tenderness: There is no abdominal tenderness  Musculoskeletal:         General: No swelling  Cervical back: Neck supple  Comments: ROM nl- left leg, ROM decreased Rt leg, makes minimal movements  Sensations intact  Skin:     General: Skin is warm and dry  Capillary Refill: Capillary refill takes less than 2 seconds  Neurological:      Mental Status: He is alert  Psychiatric:         Mood and Affect: Mood normal          Additional Data:     Labs:  Results from last 7 days   Lab Units 02/14/23  0449 02/12/23  0438 02/11/23  0547   WBC Thousand/uL 10 84*   < > 13 09*   HEMOGLOBIN g/dL 7 0*   < > 7 9*   HEMATOCRIT % 23 8*   < > 27 1*   PLATELETS Thousands/uL 222   < > 234   BANDS PCT %  --   --  1   NEUTROS PCT % 82*   < >  --    LYMPHS PCT % 6*   < >  --    LYMPHO PCT %  --   --  5*   MONOS PCT % 8   < >  --    MONO PCT %  --   --  1*   EOS PCT % 3   < > 0    < > = values in this interval not displayed       Results from last 7 days   Lab Units 02/14/23  0449   SODIUM mmol/L 134*   POTASSIUM mmol/L 4 5   CHLORIDE mmol/L 94*   CO2 mmol/L 27   BUN mg/dL 91*   CREATININE mg/dL 4 00*   ANION GAP mmol/L 13   CALCIUM mg/dL 8 0*   ALBUMIN g/dL 3 0*   TOTAL BILIRUBIN mg/dL 0 23   ALK PHOS U/L 57   ALT U/L <3*   AST U/L 17   GLUCOSE RANDOM mg/dL 115     Results from last 7 days   Lab Units 02/10/23  0455   INR  1 18             Results from last 7 days   Lab Units 02/14/23  0449 02/13/23  0557 02/12/23  0438 02/11/23  0547 02/10/23  0550 02/09/23  0506   LACTIC ACID mmol/L  --   --   --   --   --  1 1   PROCALCITONIN ng/ml 1 35* 2 39* 3 20* 1 83* 0 54* 0 35*       Lines/Drains:  Invasive Devices     Peripheral Intravenous Line  Duration           Peripheral IV 02/10/23 Right Wrist 3 days                      Imaging: Reviewed radiology reports from this admission including: chest xray    Recent Cultures (last 7 days):   Results from last 7 days   Lab Units 02/09/23  0523 02/09/23  0510 02/08/23  2041   BLOOD CULTURE  No Growth After 5 Days  No Growth After 5 Days    --    URINE CULTURE   --   --  <10,000 cfu/ml       Last 24 Hours Medication List:   Current Facility-Administered Medications   Medication Dose Route Frequency Provider Last Rate   • acetaminophen  650 mg Oral Q6H PRN Narayan Hampton MD     • allopurinol  100 mg Oral Daily Narayan Hampton MD     • calcium carbonate  1,000 mg Oral Daily PRN Narayan Hampton MD     • cefTRIAXone  1,000 mg Intravenous Q24H Narayan Hampton MD Stopped (02/13/23 1729)   • docusate sodium  100 mg Oral BID Narayan Hampton MD     • heparin (porcine)  5,000 Units Subcutaneous Q8H [address] Narayan Hampton MD     • HYDROmorphone  0 5 mg Intravenous Q4H PRN Narayan Hampton MD     • labetalol  10 mg Intravenous Q4H PRN Narayan Hampton MD     • metoclopramide  10 mg Intravenous Q6H PRN Narayan Hampton MD     • metoprolol tartrate  100 mg Oral BID Narayan Hampton MD     • ondansetron  4 mg Intravenous Q4H PRN Narayan Hampton MD     • oxyCODONE  5 mg Oral Q4H PRN Narayan Hampton MD     • polyethylene glycol  17 g Oral Daily PRN Narayan Hampton MD     • pravastatin  40 mg Oral Daily With Citlali Campos MD     • senna  1 tablet Oral Daily Rich Hernandez MD          Today, Patient Was Seen By: Rich Hernandez MD    **Please Note: This note may have been constructed using a voice recognition system  **

## 2023-02-14 NOTE — OCCUPATIONAL THERAPY NOTE
Occupational Therapy Progress Note     Patient Name: Isidro [facility]  JLEXR'G Date: 2/14/2023  Problem List  Principal Problem:    Fracture of femoral neck, right ([facility])  Active Problems:    Acute blood loss anemia    CKD (chronic kidney disease) stage 4, GFR 15-29 ml/min (HCC)    Chronic diastolic CHF (congestive heart failure) ([facility])    Paroxysmal atrial fibrillation (HCC)    Acute kidney injury superimposed on CKD ([facility])    Multiple renal cysts    Leukocytosis    Hyperphosphatemia    Toxic encephalopathy    YOLANDE (acute kidney injury) ([facility] )    Pulmonary hypertension ([facility] )    Mitral valve insufficiency    Tricuspid valve insufficiency            02/14/23 1201   OT Last Visit   OT Visit Date 02/14/23   Note Type   Note Type Treatment   Restrictions/Precautions   Weight Bearing Precautions Per Order Yes   RLE Weight Bearing Per Order WBAT   Other Precautions O2;Fall Risk;Multiple lines; Chair Alarm; Bed Alarm;Pain;Telemetry   ADL   Where Assessed Chair   LB Dressing Assistance 2  Maximal Assistance   LB Dressing Deficit Don/doff R sock; Don/doff L sock   LB Dressing Comments Pt unable to bend forward or bring leg to knee to complete donning and doffing B  socks   Functional Mobility   Additional Comments Per nursing patient to recieve blood this afternoon with functional mobility differed at this time  Therapeutic Exercise - ROM   UE-ROM Yes   ROM- Right Upper Extremities   R Shoulder AROM;ABduction; Flexion; Extension;Horizontal ABduction   R Elbow AROM;Elbow flexion;Elbow extension   R Wrist AROM; Wrist flexion;Wrist extension   R Weight/Reps/Sets 2x10   ROM - Left Upper Extremities    L Shoulder AROM; Flexion;ABduction; Extension;Horizontal ABduction   L Elbow AROM;Elbow flexion;Elbow extension   L Wrist AROM; Wrist flexion;Wrist extension   L Weight/Reps/Sets 2x10   Cognition   Overall Cognitive Status Impaired   Arousal/Participation Alert; Responsive   Attention Attends with cues to redirect   Orientation Level Disoriented to time   Memory Decreased long term memory   Following Commands Follows one step commands with increased time or repetition   Activity Tolerance   Activity Tolerance Patient limited by fatigue   Medical Staff Made Aware Pt reporting nausea at end of treatment with nursing notified at this time  Assessment   Assessment Patient participated in Skilled OT session this date with interventions consisting of ADL re training with the use of correct body mechnaics and therapeutic exercise to: increase functional use of BUEs, increase BUE muscle strength    Patient agreeable to OT treatment session, upon arrival patient was found seated OOB to Recliner  Per nursing patient receiving blood this afternoon with functional mobility differed that this time per nursing  Pt completed LB dressing Max A to jose and doff B  socks  Pt participated in UE exercises BUE AROM 2x10 all planes to increase strength, endurance, ROM, txfrs, self cares  Pt required verbal and visual cues throughout to complete proper ROM  Pt required rest breaks throughout exercises  Pt reporting nausea at end of treatment with nursing notified  Patient requiring verbal cues for safety and verbal cues for correct technique  Patient continues to be functioning below baseline level, occupational performance remains limited secondary to factors listed above and increased risk for falls and injury  From OT standpoint, recommendation at time of d/c would be Post acute rehabilitation services  The patient's raw score on the AM-PAC Daily Activity inpatient short form is 13, standardized score is 32 03, less than 39 4  Patients at this level are likely to benefit from discharge to post-acute rehabilitation services  Please refer to the recommendation of the Occupational Therapist for safe discharge planning     Plan   Goal Expiration Date 02/25/23   OT Treatment Day 3   OT Frequency 3-5x/wk   Recommendation   OT Discharge Recommendation Post acute rehabilitation services   AM-PAC Daily Activity Inpatient   Lower Body Dressing 1   Bathing 2   Toileting 2   Upper Body Dressing 2   Grooming 3   Eating 3   Daily Activity Raw Score 13   Daily Activity Standardized Score (Calc for Raw Score >=11) 32 03   AM-PAC Applied Cognition Inpatient   Following a Speech/Presentation 4   Understanding Ordinary Conversation 4   Taking Medications 2   Remembering Where Things Are Placed or Put Away 3   Remembering List of 4-5 Errands 2   Taking Care of Complicated Tasks 2   Applied Cognition Raw Score 17   Applied Cognition Standardized Score 36 52       Pt left seated in adrian chair with chair alarm on, B LE SCD's donned, and all needs in reach

## 2023-02-14 NOTE — ASSESSMENT & PLAN NOTE
• POD #4 S/P Right hip hemiarthroplasty on 02/10/2023  • Hold eliquis for now  • Follow the CBC  • Transfuse for a hemoglobin less than 7 g/dl     2/13 hemoglobin 7 3  2/14 Hb- 7 00    • Start 1 Unit of blood  • Follow Hb closely

## 2023-02-14 NOTE — ASSESSMENT & PLAN NOTE
Lab Results   Component Value Date    EGFR 12 02/14/2023    EGFR 11 02/13/2023    EGFR 10 02/12/2023    CREATININE 4 00 (H) 02/14/2023    CREATININE 4 42 (H) 02/13/2023    CREATININE 4 80 (H) 02/12/2023     • Developed acute kidney injury during the hospitalization likely due to contrast-induced nephrology  • Baseline serum creatinine around 2 2 mg/dl  • Worsening renal function on 02/11/2023 with increased lethargy and muscle twitching, possible uremia  • Cr- 4 00 today  • IVF discontinued per nephro recommendations    • Avoid all nephrotoxic agents  Serial laboratory testing to monitor the patient's renal function and electrolyte levels

## 2023-02-14 NOTE — PHYSICAL THERAPY NOTE
PHYSICAL THERAPY NOTE          Patient Name: Bharath Woodall  TJFHB'D Date: 2/14/2023 02/14/23 0826   PT Last Visit   PT Visit Date 02/14/23   Note Type   Note Type Treatment   Pain Assessment   Pain Assessment Tool 0-10   Pain Location/Orientation Orientation: Right;Location: Hip   Pain Rating: FLACC (Rest) - Face 0   Pain Rating: FLACC (Rest) - Legs 0   Pain Rating: FLACC (Rest) - Activity 0   Pain Rating: FLACC (Rest) - Cry 0   Pain Rating: FLACC (Rest) - Consolability 0   Score: FLACC (Rest) 0   Pain Rating: FLACC (Activity) - Face 1   Pain Rating: FLACC (Activity) - Legs 1   Pain Rating: FLACC (Activity) - Activity 1   Pain Rating: FLACC (Activity) - Cry 0   Pain Rating: FLACC (Activity) - Consolability 1   Score: FLACC (Activity) 4   Restrictions/Precautions   RLE Weight Bearing Per Order WBAT   General   Response to Previous Treatment Patient unable to report, no changes reported from family or staff   Family/Caregiver Present No   Cognition   Overall Cognitive Status Impaired   Arousal/Participation Alert   Following Commands Follows one step commands with increased time or repetition   Subjective   Subjective Agreeable to therapy  "What's wrong with me" c/o nausea   Bed Mobility   Supine to Sit 3  Moderate assistance   Additional items Assist x 2;HOB elevated; Bedrails; Increased time required;Verbal cues;LE management   Additional Comments Increased time to transition to EOB  Sat EOB with fair+ sitting balance  Transfers   Sit to Stand 3  Moderate assistance   Additional items Assist x 2; Increased time required;Verbal cues   Stand to Sit 3  Moderate assistance   Additional items Assist x 2; Increased time required;Verbal cues   Additional Comments tx training x 4 reps  Stood with RW forward flexed  Pt unable to advance LE's to ambulate  Stood ~ 1 min x 3  Max cues and visual  demonstrated of correct technique   Unable to weightshift to advance LE's  Performed SPT no device bed to chair  Balance   Static Sitting Fair +   Dynamic Sitting Fair   Static Standing Fair -   Dynamic Standing Poor +  (Rw)   Endurance Deficit   Endurance Deficit Yes   Activity Tolerance   Activity Tolerance Patient limited by fatigue;Patient limited by pain   Assessment   Prognosis Fair   Problem List   (Decreased strength; Decreased range of motion; Impaired balance; Decreased mobility; Decreased endurance)   Assessment Pt  seen for PT treatment session this date with interventions consisting of  therapeutic exercises, bed mobility, transfers  w/ emphasis on improving pt's mobility  Pt  Requiring max cues for sequence and safety  In comparison to previous session, Pt  With no change  in activity tolerance  Pt is in need of continued activity in PT to improve strength balance endurance mobility transfers and ambulation with return to maximize LOF  From PT/mobility standpoint, recommendation at time of d/c would be post acute rehab  in order to promote return to PLOF and independence  The patient's [facility] Basic Mobility Inpatient Short Form Raw Score is 8  A Raw score of less than or equal to 16 suggests the patient may benefit from discharge to post-acute rehabilitation services  Please also refer to physical therapy recommendation for safe DC planning  Goals   LTG Expiration Date 02/25/23   Plan   Treatment/Interventions   (Functional transfer training; Elevations; Therapeutic exercise; LE strengthening/ROM;  Endurance training; Bed mobility; Gait training)   Progress Slow progress, decreased activity tolerance   PT Frequency 3-5x/wk   Recommendation   PT Discharge Recommendation Post acute rehabilitation services   [facility] Basic Mobility Inpatient   Turning in Flat Bed Without Bedrails 1   Lying on Back to Sitting on Edge of Flat Bed Without Bedrails 1   Moving Bed to Chair 2   Standing Up From Chair Using Arms 2   Walk in Room 1   Climb 3-5 Stairs With Railing 1   Basic Mobility Inpatient Raw Score 8   Turning Head Towards Sound 4   Follow Simple Instructions 3   Low Function Basic Mobility Raw Score 15   Low Function Basic Mobility Standardized Score 23 9   Highest Level Of Mobility   -M Goal 3: Sit at edge of bed   -HL Achieved 4: Move to chair/commode   Education   Education Provided Mobility training   Patient Reinforcement needed   End of Consult   Patient Position at End of Consult Bedside chair;Bed/Chair alarm activated; All needs within reach   End of Consult Comments discussed POC with PT

## 2023-02-14 NOTE — ASSESSMENT & PLAN NOTE
• Held eliquis for the right hip hemiarthroplasty on 02/10/2023  • Continue metoprolol tartrate 100 mg PO every 12 hours for heart rate control  • Continue to hold Eliquis since hemoglobin unstable; 7 2 on 2/12 and received 1 unit of blood  • Hb- 7 0 today

## 2023-02-14 NOTE — CASE MANAGEMENT
Janet Meyers 50 received request for authorization from Care Manager    Authorization request for: Acute Rehab  Facility Name: [facility]  NPI: [id number]  Facility MD:  Dr Ashley Asp   NPI: [id number]  Authorization initiated by contacting insurance: [employer] [place]: [employer]ail[employer]  Pending Reference #: [id number]   Clinicals submitted via: Josué Cervantes

## 2023-02-14 NOTE — PLAN OF CARE
Problem: PHYSICAL THERAPY ADULT  Goal: Performs mobility at highest level of function for planned discharge setting  See evaluation for individualized goals  Description:   Outcome: Progressing  Note: Prognosis: Fair  Problem List:  (Decreased strength; Decreased range of motion; Impaired balance; Decreased mobility; Decreased endurance)  Assessment: Pt  seen for PT treatment session this date with interventions consisting of  therapeutic exercises, bed mobility, transfers  w/ emphasis on improving pt's mobility  Pt  Requiring max cues for sequence and safety  In comparison to previous session, Pt  With no change  in activity tolerance  Pt is in need of continued activity in PT to improve strength balance endurance mobility transfers and ambulation with return to maximize LOF  From PT/mobility standpoint, recommendation at time of d/c would be post acute rehab  in order to promote return to PLOF and independence  The patient's AM-PAC Basic Mobility Inpatient Short Form Raw Score is 8  A Raw score of less than or equal to 16 suggests the patient may benefit from discharge to post-acute rehabilitation services  Please also refer to physical therapy recommendation for safe DC planning  PT Discharge Recommendation: Post acute rehabilitation services    See flowsheet documentation for full assessment

## 2023-02-14 NOTE — ASSESSMENT & PLAN NOTE
• Continue metoprolol tartrate 100 mg PO every 12 hours for heart rate control  • Resume Eliquis since hemoglobin is stable

## 2023-02-14 NOTE — ASSESSMENT & PLAN NOTE
· POD #4 S/P Right hip hemiarthroplasty on 02/10/2023 by Dr Carey Martinez (Orthopedic Surgery)  • Incentive spirometry  • DVT prophylaxis with heparin 5000 Units SQ every 8 hours and SCD's on both lower extremities  • PT/OT  • Transferred to ICU level of care on 02/11/2023 for worsening mentation and possible uremia  • Today 2/14 mental status improved  • Downgraded from ICU to Med-Surg unit    • Pain controlled, but pain with weight bearing   • Continue pain control

## 2023-02-14 NOTE — CASE MANAGEMENT
Case Management Progress Note    Patient name Preet Gomez  Location / MRN [id number]  : 1935 Date 2023       LOS (days): 6  Geometric Mean LOS (GMLOS) (days): 6 20  Days to GMLOS:0 4        OBJECTIVE:        Current admission status: Inpatient  Preferred Pharmacy:   [facility] [address] [address]  Phone: [phone] Fax: [phone]    Primary Care Provider: Sangeeta Pelaez DO    Primary Insurance: [facility] REP  Secondary Insurance:     Chichi Arun with daughter mahi via phone and wife at bedside  Pt was accepted by [facility]  Request placed to discharge support to submit for authorization  English

## 2023-02-14 NOTE — ASSESSMENT & PLAN NOTE
Lab Results   Component Value Date    EGFR 12 02/14/2023    EGFR 11 02/13/2023    EGFR 10 02/12/2023    CREATININE 4 00 (H) 02/14/2023    CREATININE 4 42 (H) 02/13/2023    CREATININE 4 80 (H) 02/12/2023     • Developed acute kidney injury during the hospitalization likely due to contrast-induced nephrology  • Renal function is improving from peak creatinine 4 8 mg/dL down to 3 0 mg/dL, working towards baseline creatinine 2 2 mg/dL  Weight is stable  • Avoid all nephrotoxic agents  • Patient will need nephrology follow-up within 2 weeks with BMP prior

## 2023-02-14 NOTE — ASSESSMENT & PLAN NOTE
Lab Results   Component Value Date    EGFR 12 02/14/2023    EGFR 11 02/13/2023    EGFR 10 02/12/2023    CREATININE 4 00 (H) 02/14/2023    CREATININE 4 42 (H) 02/13/2023    CREATININE 4 80 (H) 02/12/2023     • Please see the assessment and plan for "Acute kidney injury superimposed on CKD"

## 2023-02-14 NOTE — ASSESSMENT & PLAN NOTE
Wt Readings from Last 3 Encounters:   02/13/23 88 6 kg (195 lb 5 2 oz)   12/06/22 86 2 kg (190 lb)   12/02/22 85 kg (187 lb 6 4 oz)       The patient was evaluated by Cardiology pre-operatively and deemed a moderate cardiac risk, which was not prohibitive

## 2023-02-14 NOTE — ASSESSMENT & PLAN NOTE
Wt Readings from Last 3 Encounters:   02/13/23 88 6 kg (195 lb 5 2 oz)   12/06/22 86 2 kg (190 lb)   12/02/22 85 kg (187 lb 6 4 oz)       • Daily weights  • Strict intake/output measurements  • The patient was evaluated by Cardiology pre-operatively and deemed a moderate cardiac risk, which was not prohibitive    • Monitor his volume status closely  • I/O- +400 7

## 2023-02-14 NOTE — ASSESSMENT & PLAN NOTE
• Appears to have a chronic leukocytosis, WBC down trending- 10 84  • Blood cultures negative at 72 hours  • Urine culture shows mixed contaminants  • Procal down trending- 1 35  • Continue ceftriaxone 1000 mg IV every 24 hours (day 4, day 6 total abx)  • Follow the procalcitonin level  • Follow white count  • Outpatient Hematology evaluation

## 2023-02-15 PROBLEM — G92.9 TOXIC ENCEPHALOPATHY: Status: RESOLVED | Noted: 2023-02-11 | Resolved: 2023-02-15

## 2023-02-15 LAB
ABO GROUP BLD BPU: NORMAL
ALBUMIN SERPL BCP-MCNC: 2.9 G/DL (ref 3.5–5)
ALP SERPL-CCNC: 55 U/L (ref 34–104)
ALT SERPL W P-5'-P-CCNC: <3 U/L (ref 7–52)
ANION GAP SERPL CALCULATED.3IONS-SCNC: 11 MMOL/L (ref 4–13)
AST SERPL W P-5'-P-CCNC: 16 U/L (ref 13–39)
BASOPHILS # BLD AUTO: 0.01 THOUSANDS/ÂΜL (ref 0–0.1)
BASOPHILS NFR BLD AUTO: 0 % (ref 0–1)
BILIRUB SERPL-MCNC: 0.33 MG/DL (ref 0.2–1)
BPU ID: NORMAL
BUN SERPL-MCNC: 88 MG/DL (ref 5–25)
CALCIUM ALBUM COR SERPL-MCNC: 9 MG/DL (ref 8.3–10.1)
CALCIUM SERPL-MCNC: 8.1 MG/DL (ref 8.4–10.2)
CHLORIDE SERPL-SCNC: 96 MMOL/L (ref 96–108)
CO2 SERPL-SCNC: 31 MMOL/L (ref 21–32)
CREAT SERPL-MCNC: 3.57 MG/DL (ref 0.6–1.3)
CROSSMATCH: NORMAL
EOSINOPHIL # BLD AUTO: 0.29 THOUSAND/ÂΜL (ref 0–0.61)
EOSINOPHIL NFR BLD AUTO: 2 % (ref 0–6)
ERYTHROCYTE [DISTWIDTH] IN BLOOD BY AUTOMATED COUNT: 18.1 % (ref 11.6–15.1)
GFR SERPL CREATININE-BSD FRML MDRD: 14 ML/MIN/1.73SQ M
GLUCOSE SERPL-MCNC: 114 MG/DL (ref 65–140)
HCT VFR BLD AUTO: 26.7 % (ref 36.5–49.3)
HGB BLD-MCNC: 8 G/DL (ref 12–17)
IMM GRANULOCYTES # BLD AUTO: 0.08 THOUSAND/UL (ref 0–0.2)
IMM GRANULOCYTES NFR BLD AUTO: 1 % (ref 0–2)
LYMPHOCYTES # BLD AUTO: 0.54 THOUSANDS/ÂΜL (ref 0.6–4.47)
LYMPHOCYTES NFR BLD AUTO: 4 % (ref 14–44)
MCH RBC QN AUTO: 23.5 PG (ref 26.8–34.3)
MCHC RBC AUTO-ENTMCNC: 30 G/DL (ref 31.4–37.4)
MCV RBC AUTO: 78 FL (ref 82–98)
MONOCYTES # BLD AUTO: 0.99 THOUSAND/ÂΜL (ref 0.17–1.22)
MONOCYTES NFR BLD AUTO: 8 % (ref 4–12)
NEUTROPHILS # BLD AUTO: 11.12 THOUSANDS/ÂΜL (ref 1.85–7.62)
NEUTS SEG NFR BLD AUTO: 85 % (ref 43–75)
NRBC BLD AUTO-RTO: 0 /100 WBCS
PHOSPHATE SERPL-MCNC: 4.4 MG/DL (ref 2.3–4.1)
PLATELET # BLD AUTO: 248 THOUSANDS/UL (ref 149–390)
PMV BLD AUTO: 10.5 FL (ref 8.9–12.7)
POTASSIUM SERPL-SCNC: 4.1 MMOL/L (ref 3.5–5.3)
PROCALCITONIN SERPL-MCNC: 1.02 NG/ML
PROT SERPL-MCNC: 5.6 G/DL (ref 6.4–8.4)
RBC # BLD AUTO: 3.41 MILLION/UL (ref 3.88–5.62)
SODIUM SERPL-SCNC: 138 MMOL/L (ref 135–147)
UNIT DISPENSE STATUS: NORMAL
UNIT PRODUCT CODE: NORMAL
UNIT PRODUCT VOLUME: 350 ML
UNIT RH: NORMAL
WBC # BLD AUTO: 13.03 THOUSAND/UL (ref 4.31–10.16)

## 2023-02-15 RX ORDER — OXYCODONE HYDROCHLORIDE 10 MG/1
10 TABLET ORAL EVERY 4 HOURS PRN
Status: DISCONTINUED | OUTPATIENT
Start: 2023-02-15 | End: 2023-02-16 | Stop reason: HOSPADM

## 2023-02-15 RX ORDER — OXYCODONE HYDROCHLORIDE 5 MG/1
5 TABLET ORAL EVERY 4 HOURS PRN
Status: DISCONTINUED | OUTPATIENT
Start: 2023-02-15 | End: 2023-02-16 | Stop reason: HOSPADM

## 2023-02-15 RX ADMIN — CEFTRIAXONE 1000 MG: 1 INJECTION, SOLUTION INTRAVENOUS at 15:00

## 2023-02-15 RX ADMIN — METOPROLOL TARTRATE 100 MG: 100 TABLET, FILM COATED ORAL at 21:01

## 2023-02-15 RX ADMIN — METOPROLOL TARTRATE 100 MG: 100 TABLET, FILM COATED ORAL at 09:40

## 2023-02-15 RX ADMIN — ALLOPURINOL 100 MG: 100 TABLET ORAL at 09:40

## 2023-02-15 RX ADMIN — HEPARIN SODIUM 5000 UNITS: 5000 INJECTION INTRAVENOUS; SUBCUTANEOUS at 21:02

## 2023-02-15 RX ADMIN — HEPARIN SODIUM 5000 UNITS: 5000 INJECTION INTRAVENOUS; SUBCUTANEOUS at 15:00

## 2023-02-15 RX ADMIN — PRAVASTATIN SODIUM 40 MG: 40 TABLET ORAL at 17:29

## 2023-02-15 RX ADMIN — HEPARIN SODIUM 5000 UNITS: 5000 INJECTION INTRAVENOUS; SUBCUTANEOUS at 06:06

## 2023-02-15 NOTE — PROGRESS NOTES
PHYSICAL MEDICINE AND REHABILITATION   PREADMISSION ASSESSMENT     Projected [facility] Diagnoses:  Impairment of mobility, safety and Activities of Daily Living (ADLs) due to Orthopedic Disorders:  08 11  Unilateral Hip Fracture  Etiologic: Acute impacted fracture of right femoral neck extending into right femoral head  Date of Onset: 2/8/23   Date of surgery: 2/10/23    PATIENT INFORMATION  Name: Ezra Mayer Phone #: [phone] (home)   Address: [place] [address]  YOB: 1935 Age: 80 y o  SS#   Marital Status: /Civil Union  Ethnicity: White  Employment Status: retired  Extended Emergency Contact Information  Primary Emergency Contact: [employer]  Address: [address] [place], [place] [address] [address] Phone: [phone]  Mobile Phone: [phone]  Relation: Spouse  Secondary Emergency Contact: Ralf Douglass  Mobile Phone: [phone]  Relation: Daughter  Advance Directive: Level 1 Full Code (no ACP docs)    INSURANCE/COVERAGE:     Primary Payor: QUINTON RICHARD REP / Plan: Pablo Browning PPO [facility] REP / Product Type: Medicare PPO /   Secondary Payer:Self Pay   Payer Contact:  Payer Contact:   Contact Phone:  Contact Phone:     Authorization #: ***  Coverage Dates:***  LCD: ***  MEDICARE #: [id number]  Medical Record #: [id number]    REFERRAL SOURCE:   Referring provider: Umer Ward DO  Referring facility: [facility]  Room: /  PCP: Jessa Davis DO PCP phone number: [phone]    MEDICAL INFORMATION  HPI: Pt is a 80year old male with PMH significant for CKD IV, diastolic CHF, A fib on Eliquis, history aortic valve replacement, and presence of permanent pacemaker who presented to the emergency department after experiencing a mechanical fall at home  Imaging showed shows femoral neck fracture extending into right femoral head  Ortho consulted   Cardiology pre-operative cardiac risk stratification - moderate but not prohibitive  Pt is s/p Right hip hemiarthroplasty on 02/10/2023  WBAT on R LE  DVT prophylaxis with heparin 5000 Units SQ every 8 hours and SCD's on both lower extremities  Transferred to ICU level of care on 02/11/2023 for worsening mentation likely due to anesthesia effect from 02/10/2023 and possible uremia  As of 2/13 mental status improved  Acute blood loss anemia, currently Hgb is 7 on 2/14 and received 1 unit of pRBCs  hgb currently 8 0  Developed acute kidney injury during the hospitalization likely due to contrast-induced nephrology  Baseline serum creatinine around 2 2 mg/dl  Worsening renal function on 02/11/2023 with increased lethargy and muscle twitching  Possible uremia  Continue Isolyte IV fluids at 50 ml/hr  Creatine currently 4 0  Multiple renal cysts-Outpatient surveillance imaging with PCP ***    PT and OT have been consulted and are recommending post-acute rehab services  Patient's case has been reviewed with [facility] medical director, patient meets medical criteria for acute rehab and has demonstrated the ability to tolerate three or more hours of therapy per day  Patient is medically stable and ready for discharge to [facility]  Past Medical History:   Past Surgical History: Allergies:     Past Medical History:   Diagnosis Date   • Aortic stenosis     ECHO -4-14-14   MODERATE TO SEVERE AORTIC STENOSIS; MILD AROTIC INSUFFICIENCY - LAST ASSESSED 10/26/16; RESOLVED 6/8/16   • Aortic valve disorder     LAST ASSESSED 10/8/15; 10/8/15   • Arthritis    • CHF (congestive heart failure) ([facility])    • Complete heart block ([address] ) 7/20/2020   • Coronary artery disease    • Hypertension    • Hypertensive urgency 5/19/2019   • Renal disorder    • TIA (transient ischemic attack)    • Transient cerebral ischemia     Past Surgical History:   Procedure Laterality Date   • AORTIC VALVE REPLACEMENT  06/30/2015    avr with 23 mm [employer] bioprosthetic   • CARDIAC PACEMAKER PLACEMENT Left 07/24/2020   • CATARACT EXTRACTION Right 06/05/2000   • COLONOSCOPY     • CORONARY ARTERY BYPASS GRAFT  06/05/2000    x1 with argueta  to lad   • EYE SURGERY     • POLYPECTOMY  04/2014    enteroscopic - esophageal ulcer, gastric erosion, and duodenal ulcer      • TONSILLECTOMY      unknown     Allergies   Allergen Reactions   • Promethazine Delirium and Other (See Comments)         Medical/functional conditions requiring inpatient rehabilitation: ABLA, Fx right femoral neck, Decreased self care, Decreased mobility, Decreased strength/endurance    Risk for medical/clinical complications: Risk for falls, Risk for blood loss and need for transfusions, Risk for skin breakdown secondary to decreased mobility, Risk for hypertensive episodes    Comorbidities: YOLANDE, Paroxysmal atrial fibrillation, CHF, Toxic encephalopathy, ABLA, Tricuspid valve insufficiency, Leukocytosis, Pulmonary HTN, Hyperphosphatemia, Multiple renal cysts, CKD    Surgeries in the last 100 days: s/p Right hip hemiarthroplasty on 02/10/2023     CURRENT VITAL SIGNS:   Temp:  [96 8 °F (36 °C)-98 6 °F (37 °C)] 96 8 °F (36 °C)  HR:  [60-65] 60  Resp:  [18] 18  BP: (123-164)/(60-87) 138/61   Intake/Output Summary (Last 24 hours) at 2/15/2023 1340  Last data filed at 2/15/2023 0601  Gross per 24 hour   Intake 806 67 ml   Output 1225 ml   Net -418 33 ml        LABORATORY RESULTS:      Lab Results   Component Value Date    HGB 8 0 (L) 02/15/2023    HGB 9 4 (L) 07/04/2015    HCT 26 7 (L) 02/15/2023    HCT 27 8 (L) 07/04/2015    WBC 13 03 (H) 02/15/2023    WBC 12 50 (H) 07/04/2015     Lab Results   Component Value Date    BUN 88 (H) 02/15/2023    BUN 28 (H) 10/01/2015     10/01/2015    K 4 1 02/15/2023    K 4 6 10/01/2015    CL 96 02/15/2023     10/01/2015    GLUCOSE 93 10/01/2015    CREATININE 3 57 (H) 02/15/2023    CREATININE 1 88 (H) 10/01/2015     Lab Results   Component Value Date    PROTIME 15 2 (H) 02/10/2023    PROTIME 15 5 (H) 07/02/2015    INR 1 18 02/10/2023    INR 1 27 (H) 07/02/2015        DIAGNOSTIC STUDIES:  XR chest portable    Result Date: 2/13/2023  Impression: Pulmonary edema pattern with left greater than right basilar effusions and probable right basilar discoid atelectasis with infiltrate not excluded  Workstation performed: [id number]     XR Trauma chest portable    Result Date: 2/8/2023  Impression: No acute cardiopulmonary disease  Workstation performed: [id number]     XR hip/pelv 1 vw right if performed    Result Date: 2/11/2023  Impression: Status post right hip hemiarthroplasty  No acute osseous abnormality  Workstation performed: [id number]     XR hip/pelv 2-3 vws right if performed    Result Date: 2/8/2023  Impression: Acute impacted fracture of right femoral neck extending into right femoral head  The study was marked in [facility] for immediate notification  Workstation performed: [id number]     XR femur 2 views RIGHT    Result Date: 2/8/2023  Impression: Acute impacted fracture of right femoral neck extending into right femoral head  The study was marked in [facility] for immediate notification  Workstation performed: [id number]     CT head wo contrast    Result Date: 2/9/2023  Impression: No acute intracranial abnormality  Areas of encephalomalacia and gliosis, with chronic microangiopathy, as described, unchanged  Workstation performed: [id number]     TRAUMA - CT head wo contrast    Result Date: 2/8/2023  Impression: No acute intracranial abnormality  Workstation performed: [id number]     TRAUMA - CT spine cervical wo contrast    Result Date: 2/8/2023  Impression: No cervical spine fracture or traumatic malalignment  Severe multilevel cervical spondylosis  Workstation performed: [id number]     XR Trauma pelvis ap only 1 or 2 vw    Result Date: 2/8/2023  Impression: Acute impacted right femoral neck fracture  The study was marked in [facility] for immediate notification   Workstation performed: [id number]     TRAUMA - CT chest abdomen pelvis w contrast    Result Date: 2/8/2023  Impression: Acute impacted fracture of right proximal femoral neck extending to right femoral head  No acute visceral organ or vascular injury of the chest, abdomen, or pelvis  Additional chronic/incidental findings as detailed above  The study was marked in [facility] for immediate notification  Workstation performed: [id number]     CT recon only lumbar spine    Result Date: 2/8/2023  Impression: No acute osseous abnormality of lumbar spine  Degenerative changes as detailed above  Please see same-day CT chest abdomen pelvis for further evaluation  The study was marked in [facility] for immediate notification  Workstation performed: [id number]     US kidney and bladder    Result Date: 2/9/2023  Impression: No hydronephrosis  Medical renal disease  Bilateral renal cysts, most of which are simple in appearance with the exception of a 3 2 x 3 3 x 3 2 cm suboptimally visualized right renal cyst, likely complex  A six-month follow-up renal ultrasound is recommended  The study was marked in [facility] for significant notification   Workstation performed: [id number]       PRECAUTIONS/SPECIAL NEEDS:  Weight Bearing Precautions:  WBAT R LE, Anticoagulation:  heparin, Edema Management, Safety Concerns, Pain Management, Requires O2: 1 L/min, Bladder Incontinence: # of accidents 2, Bowel Incontinence: # of accidents 3, Dietary Restrictions: Dysphagia 2 Mechanical Soft, Thin liquid and Language Preference: English    MEDICATIONS:     Current Facility-Administered Medications:   •  acetaminophen (TYLENOL) tablet 650 mg, 650 mg, Oral, Q6H PRN, Dorys Ospina MD, 650 mg at 02/14/23 2145  •  allopurinol (ZYLOPRIM) tablet 100 mg, 100 mg, Oral, Daily, Dorys Ospina MD, 100 mg at 02/15/23 0940  •  calcium carbonate (TUMS) chewable tablet 1,000 mg, 1,000 mg, Oral, Daily PRN, Dorys Ospina MD  •  cefTRIAXone (ROCEPHIN) IVPB (premix in dextrose) 1,000 mg 50 mL, 1,000 mg, Intravenous, Q24H, Luz Ma MD, Stopped at 02/14/23 1608  •  docusate sodium (COLACE) capsule 100 mg, 100 mg, Oral, BID, Luz Ma MD, 100 mg at 02/14/23 1755  •  heparin (porcine) subcutaneous injection 5,000 Units, 5,000 Units, Subcutaneous, Q8H [address], Luz Ma MD, 5,000 Units at 02/15/23 0606  •  HYDROmorphone (DILAUDID) injection 0 5 mg, 0 5 mg, Intravenous, Q4H PRN, Luz Ma MD, 0 5 mg at 02/11/23 0342  •  labetalol (NORMODYNE) injection 10 mg, 10 mg, Intravenous, Q4H PRN, Luz Ma MD  •  metoclopramide (REGLAN) injection 10 mg, 10 mg, Intravenous, Q6H PRN, Luz Ma MD, 10 mg at 02/14/23 2257  •  metoprolol tartrate (LOPRESSOR) tablet 100 mg, 100 mg, Oral, BID, Luz Ma MD, 100 mg at 02/15/23 0940  •  ondansetron (ZOFRAN) injection 4 mg, 4 mg, Intravenous, Q4H PRN, Luz Ma MD, 4 mg at 02/14/23 1528  •  oxyCODONE (ROXICODONE) IR tablet 5 mg, 5 mg, Oral, Q4H PRN, Luz Ma MD, 5 mg at 02/14/23 0158  •  polyethylene glycol (MIRALAX) packet 17 g, 17 g, Oral, Daily PRN, Luz Ma MD  •  pravastatin (PRAVACHOL) tablet 40 mg, 40 mg, Oral, Daily With Finn Jeronimo MD, 40 mg at 02/14/23 1543  •  senna (SENOKOT) tablet 8 6 mg, 1 tablet, Oral, Daily, Luz Ma MD, 8 6 mg at 02/14/23 0816    SKIN INTEGRITY:   Pressure injury right and left buttocks  Right hip    PRIOR LEVEL OF FUNCTION:  He lives in a(n) single family home  [place] is  and lives with their spouse  Self Care: Independent, Indoor Mobility: Independent with RW, Stairs (in/outdoor): Independent and Cognition: Independent    FALLS IN THE LAST 6 MONTHS: 1-4    HOME ENVIRONMENT:  The living area: Two level; Able to live on main level with bedroom/bathroom; Bed/bath upstairs  There are 3 steps to enter the home      The patient will not have 24 hour supervision/physical assistance available upon discharge  PREVIOUS DME:  Equipment in home (previous DME): [employer] and [address]:***  Physical Therapy Occupational Therapy Speech Therapy          CARE SCORES:  Self Care:  Eating: {ARC Pre Admission Screenin}  Oral hygiene: {ARC Pre Admission Screenin}  Toilet hygiene: {ARC Pre Admission Screenin}  Shower/bathing self: {ARC Pre Admission Screenin}  Upper body dressing: {ARC Pre Admission Screenin}  Lower body dressing: {ARC Pre Admission Screenin}  Putting on/taking off footwear: {ARC Pre Admission Screenin}  Transfers:  Roll left and right: {ARC Pre Admission Screenin}  Sit to lying: {ARC Pre Admission Screenin}  Lying to sitting on side of bed: {ARC Pre Admission Screenin}  Sit to stand: {ARC Pre Admission Screenin}  Chair/bed to chair transfer: {ARC Pre Admission Screenin}  Toilet transfer: {ARC Pre Admission Screenin}  Mobility:  Walk 10 ft: {ARC Pre Admission Screenin}  Walk 50 ft with two turns: {ARC Pre Admission Screenin}  Walk 150ft: {ARC Pre Admission Screenin}    CURRENT GAP IN FUNCTION  Prior to Admission: Functional Status: Patient was independent with mobility/ambulation, transfers, ADL's, IADL's  Estimated length of stay: 10 to 14 days    Anticipated Post-Discharge Disposition/Treatment  Disposition: Return to previous home/apartment  Outpatient Services: Physical Therapy (PT) and Occupational Therapy (OT)    BARRIERS TO DISCHARGE  Weakness, Pain, Balance Difficulty, Fatigue, Home Accessibility, Caregiver Accessibility and Equipment Needs    INTERVENTIONS FOR DISCHARGE  Adaptive equipment, Patient/Family/Caregiver Education, [employer], Arrange DME needs, Therapy exercises and Energy conservation education     REQUIRED THERAPY:  Patient will require PT and OT 90 minutes each per day, five days per week to achieve rehab goals  REQUIRED FUNCTIONAL AND MEDICAL MANAGEMENT FOR INPATIENT REHABILITATION:  Skin:  Monitor skin for breakdown secondary to decreased mobility, Pain Management: Overall pain is moderately controlled, Deep Vein Thrombosis (DVT) Prophylaxis:  Per MD orders, further internal medicine management of additional medical conditions while on ARC, PT/OT intervention, patient/family education and training, and any needed consults PRN  RECOMMENDED LEVEL OF CARE:    Pt is a 80year old male with PMH significant for CKD IV, diastolic CHF, A fib on Eliquis, history aortic valve replacement, and presence of permanent pacemaker who presented to the emergency department after experiencing a mechanical fall at home  Imaging showed shows femoral neck fracture extending into right femoral head  Ortho consulted  Cardiology pre-operative cardiac risk stratification - moderate but not prohibitive  Pt is s/p Right hip hemiarthroplasty on 02/10/2023  WBAT on R LE  DVT prophylaxis with heparin 5000 Units SQ every 8 hours and SCD's on both lower extremities  Transferred to ICU level of care on 02/11/2023 for worsening mentation likely due to anesthesia effect from 02/10/2023 and possible uremia  As of 2/13 mental status improved  Acute blood loss anemia, currently Hgb is 7 on 2/14 and received 1 unit of pRBCs  hgb currently 8 0  Developed acute kidney injury during the hospitalization likely due to contrast-induced nephrology  Baseline serum creatinine around 2 2 mg/dl  Worsening renal function on 02/11/2023 with increased lethargy and muscle twitching  Possible uremia  Continue Isolyte IV fluids at 50 ml/hr  Creatine currently 4 0  Multiple renal cysts-Outpatient surveillance imaging with PCP  Patient was independent with mobility/ambulation using RW, transfers, ADL's, IADL's  Pt lives with is spouse in a 2 story home with 3 [address]  Pt can stay on first floor and sleep in a recliner   Pt is currently functioning below baseline needing *** A for transfers/amb and *** A for ADLs  Pt would benefit from [facility] admission to have close medical management while participating in 3 hours of therapy per day that will include physical and occupational therapy  Physical and occupational therapists will address functional mobility deficits and assist patient in improving their strength, endurance, ROM, and self care  Pt will have 24/7 nursing care to monitor routine vitals, I/Os, skin integrity, and overall condition  The rehab nursing staff will follow therapy recommendations to have 24 hour follow through during non-therapy hours  PM&R to maximize function and provide medical oversight  The MD will monitor patient co-morbidities while on the unit as well as order any additional tests, labs, and consults needed  The [facility] specialized interdisciplinary team will meet weekly to discuss patient overall medical status and rehab goals in preparation for D/C home  Inpatient acute rehab is recommended for patient to maximize overall strength, endurance, self care, and mobility for a safe and timely transition back home

## 2023-02-15 NOTE — CASE MANAGEMENT
Support Center has received intent to deny     Denial received for: Acute Rehab  Facility: [facility]    Denial #: [id number]  Denial Reason: does not meet CMS guidelines  Peer to Peer phone#: [phone]      Deadline: 2/16 @12pm  [address] [place] [address] Appeal P#: [phone]  Will approve SNF if initiated in 50 hrs  Care Manager notified: Angie Santizo

## 2023-02-15 NOTE — ASSESSMENT & PLAN NOTE
Lab Results   Component Value Date    EGFR 14 02/15/2023    EGFR 12 02/14/2023    EGFR 11 02/13/2023    CREATININE 3 57 (H) 02/15/2023    CREATININE 4 00 (H) 02/14/2023    CREATININE 4 42 (H) 02/13/2023     • He is discussed with nephrology, component of ATN, renal function is improving

## 2023-02-15 NOTE — OCCUPATIONAL THERAPY NOTE
Occupational Therapy Progress Note     Patient Name: Graciela Bean  ZYQSZ'S Date: 2/15/2023  Problem List  Principal Problem:    Fracture of femoral neck, right (HCC)  Active Problems:    Acute blood loss anemia    CKD (chronic kidney disease) stage 4, GFR 15-29 ml/min (HCC)    Chronic diastolic CHF (congestive heart failure) (HCC)    Paroxysmal atrial fibrillation (HCC)    Acute kidney injury superimposed on CKD ([facility])    Multiple renal cysts    Leukocytosis    Hyperphosphatemia    Toxic encephalopathy    YOLANDE (acute kidney injury) ([facility] [address] )    Pulmonary hypertension ([facility] )    Mitral valve insufficiency    Tricuspid valve insufficiency              02/15/23 0901   OT Last Visit   OT Visit Date 02/15/23   Note Type   Note Type Treatment   Pain Assessment   Pain Assessment Tool 0-10   Pain Score 7   Pain Location/Orientation Orientation: Right;Location: Hip   Restrictions/Precautions   Weight Bearing Precautions Per Order Yes   RLE Weight Bearing Per Order WBAT   Other Precautions Multiple lines; Fall Risk; Chair Alarm; Bed Alarm;Pain   ADL   Where Assessed Chair   LB Dressing Assistance 2  Maximal Assistance   LB Dressing Deficit Don/doff R sock; Don/doff L sock; Increased time to complete   Toileting Assistance  2  Maximal Assistance   Toileting Deficit Clothing management up;Clothing management down;Perineal hygiene; Increased time to complete   Toileting Comments pt incontinent of bowel during session and then utilizes [facility]; pt unable to perform suha hygiene due to B UE support required on RW or support of x2   Bed Mobility   Supine to Sit 2  Maximal assistance   Additional items Assist x 2;Bedrails; Increased time required;Verbal cues;LE management   Sit to Supine 2  Maximal assistance   Additional items Assist x 2; Increased time required;Verbal cues;LE management   Additional Comments pt on RA during session; SpO2 WFL with no complaints of SOB   Transfers   Sit to Stand 2  Maximal assistance   Additional items Assist x 2;Bedrails; Increased time required;Verbal cues  (RW)   Stand to Sit 2  Maximal assistance   Additional items Assist x 2;Bedrails; Increased time required;Verbal cues  (RW)   Stand pivot 2  Maximal assistance   Additional items Assist x 2; Increased time required;Verbal cues  (RW)   Toilet transfer 2  Maximal assistance   Additional items Assist x 2; Increased time required;Verbal cues; Commode  (RW)   Additional Comments pt trials RW and unable to achieve full stand multiple times; pt performs multiple transfers without device and (A) x2 level with max (A) as well as SPT to chair   Functional Mobility   Additional Comments unable to perform functional mobility   Subjective   Subjective "I don't know why I am so helpless"   Cognition   Overall Cognitive Status Impaired   Arousal/Participation Alert   Attention Attends with cues to redirect   Orientation Level Oriented to person;Oriented to place; Disoriented to time;Disoriented to situation   Memory Decreased long term memory;Decreased short term memory;Decreased recall of recent events;Decreased recall of precautions   Following Commands Follows one step commands with increased time or repetition   Comments pt is moderately Lummi   Activity Tolerance   Activity Tolerance Patient limited by fatigue;Patient limited by pain   Assessment   Assessment Patient participated in Skilled OT session this date with interventions consisting of ADL re training with the use of correct body mechnaics, safety awareness and fall prevention techniques,  therapeutic activities to: increase activity tolerance, increase standing tolerance time with unilateral UE support to complete sink level ADLs, increase cardiovascular endurance , normalize muscle tone, increase dynamic sit/ stand balance during functional activity , increase postural control, increase trunk control and increase OOB/ sitting tolerance    Co treatment with PT secondary to complex medical condition of pt, mod-max A of 2 required to achieve and maintain transitional movements, requiring the need of skilled therapeutic intervention of 2 therapists to achieve delivery of services  Patient agreeable to OT treatment session, upon arrival patient was found supine in bed  Patient requiring frequent re direction, verbal cues for safety, verbal cues for correct technique, verbal cues for pacing thru activity steps, cognitive assistance to anticipate next step, one step directives and frequent rest periods  Patient continues to be functioning below baseline level, occupational performance remains limited secondary to factors listed above and increased risk for falls and injury  The patient's raw score on the AM-PAC Daily Activity Inpatient Short Form is 13  A raw score of less than 19 suggests the patient may benefit from discharge to post-acute rehabilitation services  Please refer to the recommendation of the Occupational Therapist for safe discharge planning  From OT standpoint, recommendation at time of d/c would be Post acute rehabilitation services     Plan   Goal Expiration Date 02/25/23   OT Treatment Day 4   OT Frequency 3-5x/wk   Recommendation   OT Discharge Recommendation Post acute rehabilitation services   AM-[facility] Daily Activity Inpatient   Lower Body Dressing 1   Bathing 2   Toileting 2   Upper Body Dressing 2   Grooming 3   Eating 3   Daily Activity Raw Score 13   Daily Activity Standardized Score (Calc for Raw Score >=11) 32 03   AM-PAC Applied Cognition Inpatient   Following a Speech/Presentation 3   Understanding Ordinary Conversation 3   Taking Medications 1   Remembering Where Things Are Placed or Put Away 1   Remembering List of 4-5 Errands 1   Taking Care of Complicated Tasks 1   Applied Cognition Raw Score 10   Applied Cognition Standardized Score 24 98

## 2023-02-15 NOTE — PLAN OF CARE
Problem: PHYSICAL THERAPY ADULT  Goal: Performs mobility at highest level of function for planned discharge setting  See evaluation for individualized goals  Description:  Outcome: Progressing  Note: Prognosis: Fair  Problem List:  (Decreased strength; Decreased endurance; Impaired balance; Decreased mobility; Decreased cognition; Impaired judgement; Decreased safety awareness; Pain)  Assessment: Pt  seen for PT treatment session this date with interventions consisting of  therapeutic exercises and  transfers  w/ emphasis on improving pt's strength, endurance, carrie and mobility  Pt  Requiring max  cues for sequence and safety  In comparison to previous session, Pt  With no change  in activity tolerance  Pt is in need of continued activity in PT to improve strength balance endurance mobility transfers and ambulation with return to maximize LOF  From PT/mobility standpoint, recommendation at time of d/c would be post acute rehab in order to promote return to PLOF and independence  The patient's AM-PAC Basic Mobility Inpatient Short Form Raw Score is 7  A Raw score of less than or equal to 16 suggests the patient may benefit from discharge to post-acute rehabilitation services  Please also refer to physical therapy recommendation for safe DC planning  Barriers to Discharge: Inaccessible home environment, Decreased caregiver support     PT Discharge Recommendation: Post acute rehabilitation services    See flowsheet documentation for full assessment

## 2023-02-15 NOTE — ASSESSMENT & PLAN NOTE
· S/P Right hip hemiarthroplasty on 02/10/2023  Hemoglobin stable after transfusion yesterday of 1 unit of packed red blood cells

## 2023-02-15 NOTE — PLAN OF CARE
Problem: PAIN - ADULT  Goal: Verbalizes/displays adequate comfort level or baseline comfort level  Description: Interventions:  - Encourage patient to monitor pain and request assistance  - Assess pain using appropriate pain scale (0-10 pain scale)  - Administer analgesics based on type and severity of pain and evaluate response  - Implement non-pharmacological measures as appropriate and evaluate response  - Consider cultural and social influences on pain and pain management  - Notify physician/advanced practitioner if interventions unsuccessful or patient reports new pain  Outcome: Progressing     Problem: INFECTION - ADULT  Goal: Absence or prevention of progression during hospitalization  Description: INTERVENTIONS:  - Assess and monitor for signs and symptoms of infection  - Monitor lab/diagnostic results  - Monitor all insertion sites, i e  indwelling lines, tubes, and drains  - Administer medications as ordered  - Instruct and encourage patient and family to use good hand hygiene technique  Outcome: Progressing

## 2023-02-15 NOTE — ASSESSMENT & PLAN NOTE
Wt Readings from Last 3 Encounters:   02/15/23 90 6 kg (199 lb 11 8 oz)   12/06/22 86 2 kg (190 lb)   12/02/22 85 kg (187 lb 6 4 oz)     · The patient was evaluated by Cardiology pre-operatively and deemed a moderate cardiac risk, which was not prohibitive

## 2023-02-15 NOTE — PHYSICAL THERAPY NOTE
PHYSICAL THERAPY NOTE          Patient Name: Dian Albrecht  HNZHN'T Date: 2/15/2023   02/15/23 1412   PT Last Visit   PT Visit Date 02/15/23   Note Type   Note Type Treatment   Pain Assessment   Pain Assessment Tool 0-10   Pain Score 7   Pain Location/Orientation Orientation: Right;Location: Hip   Restrictions/Precautions   Weight Bearing Precautions Per Order Yes   LLE Weight Bearing Per Order WBAT   Other Precautions   (Pain; Fall Risk; Multiple lines; Bed Alarm; Chair Alarm; Cognitive)   General   Response to Previous Treatment Patient unable to report, no changes reported from family or staff   Family/Caregiver Present Yes   Cognition   Overall Cognitive Status Impaired   Arousal/Participation Alert   Following Commands Follows one step commands without difficulty   Subjective   Subjective Agreeable to therapy  c/o back and hip pain   Bed Mobility   Additional Comments OOB in chair start/end PT session   Transfers   Sit to Stand 3  Moderate assistance   Additional items Assist x 1; Armrests; Increased time required;Verbal cues   Stand to Sit 3  Moderate assistance   Additional items Assist x 1; Armrests; Increased time required;Verbal cues   Additional Comments tx training x 6 reps  Able to clear chair, LE extended, with trunk forward flexed  Unable to stand upright  Verbal and tactile cues provided  Stood 30 sec-1 min x 6 reps  Ambulation/Elevation   Gait pattern Not tested; Not appropriate   Balance   Static Sitting Fair +   Dynamic Sitting Fair   Static Standing Poor +   Dynamic Standing Poor  (RW)   Endurance Deficit   Endurance Deficit Yes   Activity Tolerance   Activity Tolerance Patient limited by fatigue;Patient limited by pain   Exercises   Quad Sets 20 reps   Heelslides 20 reps;AAROM   Hip Abduction 20 reps;AAROM   Hip Adduction 20 reps;AAROM  (+ add sets)   Knee AROM Long Arc Quad 20 reps;AAROM;AROM   Ankle Pumps 20 reps;AROM   Assessment   Prognosis Fair   Problem List   (Decreased strength; Decreased endurance; Impaired balance; Decreased mobility; Decreased cognition; Impaired judgement; Decreased safety awareness; Pain)   Assessment Pt  seen for PT treatment session this date with interventions consisting of  therapeutic exercises and  transfers  w/ emphasis on improving pt's strength, endurance, carrie and mobility  Pt  Requiring max  cues for sequence and safety  In comparison to previous session, Pt  With no change  in activity tolerance  Pt is in need of continued activity in PT to improve strength balance endurance mobility transfers and ambulation with return to maximize LOF  From PT/mobility standpoint, recommendation at time of d/c would be post acute rehab in order to promote return to PLOF and independence  The patient's AM-[facility] Basic Mobility Inpatient Short Form Raw Score is 7  A Raw score of less than or equal to 16 suggests the patient may benefit from discharge to post-acute rehabilitation services  Please also refer to physical therapy recommendation for safe DC planning  Barriers to Discharge Inaccessible home environment;Decreased caregiver support   Goals   LTG Expiration Date 02/25/23   PT Treatment Day 5   Plan   Treatment/Interventions   (Functional transfer training; LE strengthening/ROM; Therapeutic exercise; Endurance training; Bed mobility; Gait training)   Progress Slow progress, decreased activity tolerance   PT Frequency   (Twice a day;  Other (Comment)  1x/day Sat & Sun)   Recommendation   PT Discharge Recommendation Post acute rehabilitation services   AM-PAC Basic Mobility Inpatient   Turning in Flat Bed Without Bedrails 1   Lying on Back to Sitting on Edge of Flat Bed Without Bedrails 1   Moving Bed to Chair 1   Standing Up From Chair Using Arms 2   Walk in Room 1   Climb 3-5 Stairs With Railing 1   Basic Mobility Inpatient Raw Score 7   Turning Head Towards Sound 4   Follow Simple Instructions 3   Low Function Basic Mobility Raw Score 14   Low Function Basic Mobility Standardized Score 22 01   Highest Level Of Mobility   -[facility] Goal 2: Bed activities/Dependent transfer   -[facility] Achieved 5: Stand (1 or more minutes)   Education   Education Provided Mobility training   Patient Reinforcement needed   End of Consult   Patient Position at End of Consult Bedside chair;Bed/Chair alarm activated; All needs within reach   End of Consult Comments discussed POC with PT

## 2023-02-15 NOTE — ASSESSMENT & PLAN NOTE
· Appears to have a chronic leukocytosis  · Blood cultures negative at 72 hours  · Urine culture shows mixed contaminants  · Increasing procalcitonin 3 2  · Complete course of IV ceftriaxone, 5 days total

## 2023-02-15 NOTE — PLAN OF CARE
Problem: OCCUPATIONAL THERAPY ADULT  Goal: Performs self-care activities at highest level of function for planned discharge setting  See evaluation for individualized goals  Description: Treatment Interventions: ADL retraining, Functional transfer training, UE strengthening/ROM, Endurance training, Patient/family training, Equipment evaluation/education, Compensatory technique education, Energy conservation, Activityengagement          See flowsheet documentation for full assessment, interventions and recommendations  Outcome: Progressing  Note: Limitation: Decreased ADL status, Decreased UE strength, Decreased Safe judgement during ADL, Decreased endurance, Decreased self-care trans, Decreased high-level ADLs  Prognosis: Fair  Assessment: Patient participated in Skilled OT session this date with interventions consisting of ADL re training with the use of correct body mechnaics, safety awareness and fall prevention techniques,  therapeutic activities to: increase activity tolerance, increase standing tolerance time with unilateral UE support to complete sink level ADLs, increase cardiovascular endurance , normalize muscle tone, increase dynamic sit/ stand balance during functional activity , increase postural control, increase trunk control and increase OOB/ sitting tolerance   Co treatment with PT secondary to complex medical condition of pt, mod-max A of 2 required to achieve and maintain transitional movements, requiring the need of skilled therapeutic intervention of 2 therapists to achieve delivery of services  Patient agreeable to OT treatment session, upon arrival patient was found supine in bed  Patient requiring frequent re direction, verbal cues for safety, verbal cues for correct technique, verbal cues for pacing thru activity steps, cognitive assistance to anticipate next step, one step directives and frequent rest periods   Patient continues to be functioning below baseline level, occupational performance remains limited secondary to factors listed above and increased risk for falls and injury  The patient's raw score on the AM-PAC Daily Activity Inpatient Short Form is 13  A raw score of less than 19 suggests the patient may benefit from discharge to post-acute rehabilitation services  Please refer to the recommendation of the Occupational Therapist for safe discharge planning  From OT standpoint, recommendation at time of d/c would be Post acute rehabilitation services       OT Discharge Recommendation: Post acute rehabilitation services

## 2023-02-15 NOTE — PHYSICAL THERAPY NOTE
Physical Therapy Progress Note    Patient Name: Breann Huizar    DWZMF'V Date: 2/15/2023     Problem List  Principal Problem:    Fracture of femoral neck, right ([facility])  Active Problems:    Acute blood loss anemia    CKD (chronic kidney disease) stage 4, GFR 15-29 ml/min ([facility])    Chronic diastolic CHF (congestive heart failure) ([facility])    Paroxysmal atrial fibrillation ([facility])    Acute kidney injury superimposed on CKD ([facility])    Multiple renal cysts    Leukocytosis    Hyperphosphatemia    Toxic encephalopathy    YOLANDE (acute kidney injury) ([facility] [address] )    Pulmonary hypertension ([facility] [address] )    Mitral valve insufficiency    Tricuspid valve insufficiency       Past Medical History  Past Medical History:   Diagnosis Date    Aortic stenosis     ECHO -4-14-14  MODERATE TO SEVERE AORTIC STENOSIS; MILD AROTIC INSUFFICIENCY - LAST ASSESSED 10/26/16; RESOLVED 6/8/16    Aortic valve disorder     LAST ASSESSED 10/8/15; 10/8/15    Arthritis     CHF (congestive heart failure) ([facility] [address] )     Complete heart block ([facility] [address] ) 7/20/2020    Coronary artery disease     Hypertension     Hypertensive urgency 5/19/2019    Renal disorder     TIA (transient ischemic attack)     Transient cerebral ischemia         Past Surgical History  Past Surgical History:   Procedure Laterality Date    AORTIC VALVE REPLACEMENT  06/30/2015    avr with 23 mm [place] Magna Ease bioprosthetic    CARDIAC PACEMAKER PLACEMENT Left 07/24/2020    CATARACT EXTRACTION Right 06/05/2000    COLONOSCOPY      CORONARY ARTERY BYPASS GRAFT  06/05/2000    x1 with argueta  to lad    EYE SURGERY      POLYPECTOMY  04/2014    enteroscopic - esophageal ulcer, gastric erosion, and duodenal ulcer       TONSILLECTOMY      unknown           02/15/23 0902   PT Last Visit   PT Visit Date 02/15/23   Note Type   Note Type BID visit/treatment   Pain Assessment   Pain Assessment Tool 0-10   Pain Score 7   Pain Location/Orientation Orientation: Right;Location: Hip Restrictions/Precautions   Weight Bearing Precautions Per Order Yes   RLE Weight Bearing Per Order WBAT   Other Precautions Pain; Fall Risk;Multiple lines; Bed Alarm; Chair Alarm;Cognitive   General   Chart Reviewed Yes   Family/Caregiver Present No   Cognition   Overall Cognitive Status Impaired   Arousal/Participation Alert   Attention Attends with cues to redirect   Orientation Level Oriented to person;Oriented to place; Disoriented to time;Disoriented to situation   Following Commands Follows one step commands without difficulty   Subjective   Subjective "Why am I so helpless"   Bed Mobility   Supine to Sit 2  Maximal assistance   Additional items Assist x 2; Increased time required;Verbal cues;LE management;HOB elevated; Bedrails   Sit to Supine 2  Maximal assistance   Additional items Assist x 2; Increased time required;Verbal cues;LE management;HOB elevated; Bedrails   Transfers   Sit to Stand 2  Maximal assistance   Additional items Assist x 2; Increased time required;Verbal cues   Stand to Sit 2  Maximal assistance   Additional items Assist x 2; Increased time required;Verbal cues   Stand pivot 2  Maximal assistance   Additional items Assist x 2; Increased time required;Verbal cues   Toilet transfer 2  Maximal assistance   Additional items Assist x 2; Increased time required;Verbal cues; Commode   Additional Comments trialed with and without RW   Ambulation/Elevation   Gait pattern Not tested; Not appropriate   Balance   Static Sitting Fair +   Dynamic Sitting Fair   Static Standing Poor +   Dynamic Standing Poor   Endurance Deficit   Endurance Deficit Yes   Endurance Deficit Description pt easily fatigued with mobility   Activity Tolerance   Activity Tolerance Patient limited by fatigue;Patient limited by pain   Assessment   Prognosis Fair   Problem List Decreased strength;Decreased endurance; Impaired balance;Decreased mobility; Decreased cognition; Impaired judgement;Decreased safety awareness;Pain   Assessment Patient seen this date for PT treatment session to increase level of mobility and functional activity tolerance  This date, pt able to perform all functional mobility with Max A x 2 , RW, and increased time  Frequent verbal cuing provided for safety awareness and sequencing  Transfer training attempted numerous times with both RW and hand held assist  Pt performing better with hand held assist of 2 staff members  Pt encouraged to perform stand pivot transfer from bed to chair, however pt unable to  either LE this date to initiate, so bed replaced with chair behind patient to encourage OOB activity  HR and SpO2 remained WFL on RA throughout  No true LOB experienced  The patient's AM-PAC Basic Mobility Inpatient Short Form Raw Score is 6  A Raw score of less than or equal to 16 suggests the patient may benefit from discharge to post-acute rehabilitation services  Please also refer to the recommendation of the Physical Therapist for safe discharge planning  Co treatment with OT secondary to complex medical condition of pt, possible A of 2 required to achieve and maintain transitional movements, requiring the need of skilled therapeutic intervention of 2 therapists to achieve delivery of services  D/c recommendation continues to be post-acute rehabilitation services  Goals   LTG Expiration Date 02/25/23   PT Treatment Day 5   Plan   Treatment/Interventions Functional transfer training;LE strengthening/ROM; Therapeutic exercise; Endurance training;Bed mobility;Gait training   Progress Progressing toward goals   PT Frequency Twice a day; Other (Comment)  (1x/day Sat & Sun)   Recommendation   PT Discharge Recommendation Post acute rehabilitation services   AM-PAC Basic Mobility Inpatient   Turning in Flat Bed Without Bedrails 1   Lying on Back to Sitting on Edge of Flat Bed Without Bedrails 1   Moving Bed to Chair 1   Standing Up From Chair Using Arms 1   Walk in Room 1   Climb 3-5 Stairs With Railing 1   Basic Mobility Inpatient Raw Score 6   Turning Head Towards Sound 4   Follow Simple Instructions 3   Low Function Basic Mobility Raw Score 13   Low Function Basic Mobility Standardized Score 20 14   Highest Level Of Mobility   -[facility] Goal 2: Bed activities/Dependent transfer   -[facility] Achieved 4: Move to chair/commode   Education   Education Provided Mobility training;Assistive device   Patient Reinforcement needed   End of Consult   Patient Position at End of Consult Bedside chair;Bed/Chair alarm activated; All needs within reach

## 2023-02-15 NOTE — PROGRESS NOTES
[address] [facility] [address]  Progress Note - Michial Party 1935, 80 y o  male MRN: [id number]  Unit/Bed#:  Encounter: [id number]  Primary Care Provider: Derrick Marcos DO   Date and time admitted to hospital: 2023  4:30 PM    * Fracture of femoral neck, right ([address] )  Assessment & Plan  · Continue to increase activity as tolerated, discharge to short-term rehab tomorrow    Acute blood loss anemia  Assessment & Plan  · S/P Right hip hemiarthroplasty on 02/10/2023  Hemoglobin stable after transfusion yesterday of 1 unit of packed red blood cells    Acute kidney injury superimposed on CKD [facility])  Assessment & Plan  Lab Results   Component Value Date    EGFR 14 02/15/2023    EGFR 12 2023    EGFR 11 2023    CREATININE 3 57 (H) 02/15/2023    CREATININE 4 00 (H) 2023    CREATININE 4 42 (H) 2023     • He is discussed with nephrology, component of ATN, renal function is improving    Leukocytosis  Assessment & Plan  · Appears to have a chronic leukocytosis  · Blood cultures negative at 72 hours  · Urine culture shows mixed contaminants  · Increasing procalcitonin 3 2  · Complete course of IV ceftriaxone, 5 days total      Chronic diastolic CHF (congestive heart failure) (HCC)  Assessment & Plan  Wt Readings from Last 3 Encounters:   02/15/23 90 6 kg (199 lb 11 8 oz)   22 86 2 kg (190 lb)   22 85 kg (187 lb 6 4 oz)     · The patient was evaluated by Cardiology pre-operatively and deemed a moderate cardiac risk, which was not prohibitive  Case discussed with case management today, patient denied for acute rehab stay by his insurance company, [id number] was completed  Insurance company confirms denial, recommends SNF level of care at discharge    Family may appeal   Per insurance company          Code Status: Level 1 - Full Code    Subjective:   No acute complaints, tolerating diet    Objective:     Vitals:   Temp (24hrs), Av 8 °F (36 6 °C), Min:96 8 °F (36 °C), Max:98 6 °F (37 °C)    Temp:  [96 8 °F (36 °C)-98 6 °F (37 °C)] 97 1 °F (36 2 °C)  HR:  [60-65] 60  Resp:  [18] 18  BP: (123-164)/(60-87) 138/61  SpO2:  [85 %-99 %] 99 %  Body mass index is 34 28 kg/m²  Input and Output Summary (last 24 hours): Intake/Output Summary (Last 24 hours) at 2/15/2023 1804  Last data filed at 2/15/2023 1729  Gross per 24 hour   Intake 300 ml   Output 1005 ml   Net -705 ml       Physical Exam:   Physical Exam  Vitals and nursing note reviewed  HENT:      Head: Normocephalic and atraumatic  Right Ear: External ear normal       Left Ear: External ear normal    Cardiovascular:      Rate and Rhythm: Normal rate  Pulses: Normal pulses  Pulmonary:      Effort: Pulmonary effort is normal       Breath sounds: Normal breath sounds  No rales  Chest:      Chest wall: No tenderness  Abdominal:      General: Abdomen is flat  Bowel sounds are normal  There is no distension  Palpations: Abdomen is soft  Musculoskeletal:         General: Normal range of motion  Cervical back: Normal range of motion  Neurological:      General: No focal deficit present  Mental Status: He is alert and oriented to person, place, and time  Mental status is at baseline  Cranial Nerves: No cranial nerve deficit  Psychiatric:         Mood and Affect: Mood normal          Behavior: Behavior normal          Thought Content:  Thought content normal          Judgment: Judgment normal           Additional Data:     Labs:  Results from last 7 days   Lab Units 02/15/23  0440 02/12/23  0438 02/11/23  0547   WBC Thousand/uL 13 03*   < > 13 09*   HEMOGLOBIN g/dL 8 0*   < > 7 9*   HEMATOCRIT % 26 7*   < > 27 1*   PLATELETS Thousands/uL 248   < > 234   BANDS PCT %  --   --  1   NEUTROS PCT % 85*   < >  --    LYMPHS PCT % 4*   < >  --    LYMPHO PCT %  --   --  5*   MONOS PCT % 8   < >  --    MONO PCT %  --   --  1*   EOS PCT % 2   < > 0    < > = values in this interval not displayed  Results from last 7 days   Lab Units 02/15/23  0440   SODIUM mmol/L 138   POTASSIUM mmol/L 4 1   CHLORIDE mmol/L 96   CO2 mmol/L 31   BUN mg/dL 88*   CREATININE mg/dL 3 57*   ANION GAP mmol/L 11   CALCIUM mg/dL 8 1*   ALBUMIN g/dL 2 9*   TOTAL BILIRUBIN mg/dL 0 33   ALK PHOS U/L 55   ALT U/L <3*   AST U/L 16   GLUCOSE RANDOM mg/dL 114     Results from last 7 days   Lab Units 02/10/23  0455   INR  1 18             Results from last 7 days   Lab Units 02/15/23  0440 02/14/23  0449 02/13/23  0557 02/12/23  0438 02/11/23  0547 02/10/23  0550 02/09/23  0506   LACTIC ACID mmol/L  --   --   --   --   --   --  1 1   PROCALCITONIN ng/ml 1 02* 1 35* 2 39* 3 20* 1 83*   < > 0 35*    < > = values in this interval not displayed  Lines/Drains:  Invasive Devices     Peripheral Intravenous Line  Duration           Peripheral IV 02/14/23 Left Antecubital 1 day                      Imaging: No pertinent imaging reviewed  Recent Cultures (last 7 days):   Results from last 7 days   Lab Units 02/09/23  0523 02/09/23  0510 02/08/23 2041   BLOOD CULTURE  No Growth After 5 Days  No Growth After 5 Days    --    URINE CULTURE   --   --  <10,000 cfu/ml       Last 24 Hours Medication List:   Current Facility-Administered Medications   Medication Dose Route Frequency Provider Last Rate   • acetaminophen  650 mg Oral Q6H PRN Luz Ma MD     • allopurinol  100 mg Oral Daily Luz Ma MD     • calcium carbonate  1,000 mg Oral Daily PRN Luz Ma MD     • cefTRIAXone  1,000 mg Intravenous Q24H Luz Ma MD 1,000 mg (02/15/23 1500)   • docusate sodium  100 mg Oral BID Luz Ma MD     • heparin (porcine)  5,000 Units Subcutaneous Q8H [address] Luz Ma MD     • metoprolol tartrate  100 mg Oral BID Luz Ma MD     • ondansetron  4 mg Intravenous Q4H PRN Luz Ma MD     • oxyCODONE  10 mg Oral Q4H PRN Kari Cooler, DO     • oxyCODONE 5 mg Oral Q4H PRN Lerethadam Nguyen DO     • polyethylene glycol  17 g Oral Daily PRN Semaj Orlando MD     • pravastatin  40 mg Oral Daily With Lee Almanzar MD     • senna  1 tablet Oral Daily Semaj Orlando MD          Today, Patient Was Seen By: Akila Anderson DO    **Please Note: This note may have been constructed using a voice recognition system  **

## 2023-02-15 NOTE — CASE MANAGEMENT
Case Management Discharge Planning Note    Patient name Osmin Houston  Location /CCU 18 MRN [id number]  : 1935 Date 2/15/2023       Current Admission Date: 2023  Current Admission Diagnosis:Fracture of femoral neck, right [facility])   Patient Active Problem List    Diagnosis Date Noted   • Toxic encephalopathy 2023   • YOLANDE (acute kidney injury) ([facility] ) 2023   • Pulmonary hypertension ([place] [address] ) 2023   • Mitral valve insufficiency 2023   • Tricuspid valve insufficiency 2023   • Hyperphosphatemia 2023   • Fracture of femoral neck, right ([place] [address] ) 2023   • Leukocytosis 2023   • Pressure ulcer of right buttock, stage 2 ([place] [address] ) 2022   • Hyperkalemia 2022   • Essential hypertension 10/16/2021   • Dermatitis 10/16/2021   • History of severe aortic stenosis 10/03/2021   • Hx of CABG 10/03/2021   • Chronic low back pain without sciatica 08/10/2021   • 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 kidney disease, or chronic kidney disease ([place] [address] ) 2021   • Transaminitis 2021   • Acute gout 2021   • Effusion of left knee 2021   • Mitral valve stenosis 2021   • Multiple renal cysts 2021   • Right hip pain 2021   • Acute kidney injury superimposed on CKD ([place] [address] ) 2021   • Left wrist pain 2021   • Iron deficiency anemia 10/15/2020   • Need for immunization against influenza 10/15/2020   • Pulmonary emphysema ([facility] ) 10/15/2020   • 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 2020   • Presence of permanent cardiac pacemaker 2020   • Oxygen dependent 2020   • Paroxysmal atrial fibrillation ([facility] ) 08/10/2020   • Anemia 2020   • Hyperlipidemia, unspecified 2020   • Ambulatory dysfunction 2020   • Complete heart block ([facility] ) 2020   • Chronic diastolic CHF (congestive heart failure) ([facility] ) 07/20/2020   • History of aortic valve replacement with bioprosthetic valve 05/19/2019   • History of stroke 05/19/2019   • Medicare annual wellness visit, subsequent 08/14/2018   • Lumbar degenerative disc disease 06/29/2017   • Lumbar radiculopathy 06/29/2017   • Obesity (BMI 30-39 9) 09/04/2014   • Mitral regurgitation 06/11/2014   • Acute blood loss anemia 06/10/2014   • Mild intermittent asthma without complication 49/89/9398   • CKD (chronic kidney disease) stage 4, GFR 15-29 ml/min ([facility]) 06/10/2014   • GERD (gastroesophageal reflux disease) 06/10/2014   • Dyslipidemia 10/03/2012   • Late effects of cerebrovascular disease 05/30/2012      LOS (days): 7  Geometric Mean LOS (GMLOS) (days): 6 20  Days to GMLOS:-0 6     OBJECTIVE:  Risk of Unplanned Readmission Score: 23 95         Current admission status: Inpatient   Preferred Pharmacy:   [facility] [id number]  Phone: [phone] Fax: [phone]    Primary Care Provider: Wilfred Lyman DO    Primary Insurance: [facility]  Secondary Insurance:     DISCHARGE DETAILS:    [employer] denied patient to go to Acute Rehab  I notified patient's Daughter Camelia Watt that Richie Sic denied him to go to Acute Rehab  Camelia Watt is agreeable to me making a blanket referral to SNF  Referral made as requested   As per Richie Sic they will approve a SNF if initiated in 48 hours

## 2023-02-15 NOTE — PLAN OF CARE
Problem: PHYSICAL THERAPY ADULT  Goal: Performs mobility at highest level of function for planned discharge setting  See evaluation for individualized goals  Description: Treatment/Interventions: Functional transfer training, LE strengthening/ROM, Therapeutic exercise, Endurance training, Bed mobility, Gait training          See flowsheet documentation for full assessment, interventions and recommendations  Outcome: Progressing  Note: Prognosis: Fair  Problem List: Decreased strength, Decreased endurance, Impaired balance, Decreased mobility, Decreased cognition, Impaired judgement, Decreased safety awareness, Pain  Assessment: Patient seen this date for PT treatment session to increase level of mobility and functional activity tolerance  This date, pt able to perform all functional mobility with Max A x 2 , RW, and increased time  Frequent verbal cuing provided for safety awareness and sequencing  Transfer training attempted numerous times with both RW and hand held assist  Pt performing better with hand held assist of 2 staff members  Pt encouraged to perform stand pivot transfer from bed to chair, however pt unable to  either LE this date to initiate, so bed replaced with chair behind patient to encourage OOB activity  HR and SpO2 remained WFL on RA throughout  No true LOB experienced  The patient's AM-PAC Basic Mobility Inpatient Short Form Raw Score is 6  A Raw score of less than or equal to 16 suggests the patient may benefit from discharge to post-acute rehabilitation services  Please also refer to the recommendation of the Physical Therapist for safe discharge planning  Co treatment with OT secondary to complex medical condition of pt, possible A of 2 required to achieve and maintain transitional movements, requiring the need of skilled therapeutic intervention of 2 therapists to achieve delivery of services  D/c recommendation continues to be post-acute rehabilitation services          PT Discharge Recommendation: Post acute rehabilitation services    See flowsheet documentation for full assessment

## 2023-02-15 NOTE — PROGRESS NOTES
Progress Note - Nephrology   Arlina Closs 80 y o  male MRN: [id number]  Unit/Bed#:  Encounter: [id number]    A/P:  1   Acute kidney injury on top of chronic kidney disease due to acute kidney necrosis              Creatinine continues to improve, now down to 3 6 mg/dL from 4 mg/dL yesterday  Continue optimize care and avoid potential nephrotoxins  2   Chronic disease stage IV with a baseline creatinine around 2 2 mg/dL  3   Possible right complex renal cyst             Further work up as indicated in the outpatient setting  4   Volume depletion              Continue to monitor patient's volume status which is currently appropriate  IV fluids were discontinued yesterday  5   Chronic diastolic congestive heart failure              Patient continues to remain compensated continue with supportive care at this time  6   Postop day 5 right hip Hemiarthroplasty   Continue care according to surgical colleagues    Follow up reason for today's visit: Acute kidney injury/chronic kidney disease/acute tubular necrosis/right kidney cyst    Fracture of femoral neck, right [facility])    Patient Active Problem List   Diagnosis   • Acute blood loss anemia   • Mild intermittent asthma without complication   • CKD (chronic kidney disease) stage 4, GFR 15-29 ml/min ([facility])   • Dyslipidemia   • GERD (gastroesophageal reflux disease)   • Late effects of cerebrovascular disease   • Lumbar degenerative disc disease   • Lumbar radiculopathy   • Mitral regurgitation   • Obesity (BMI 30-39  9)   • Medicare annual wellness visit, subsequent   • History of aortic valve replacement with bioprosthetic valve   • History of stroke   • Complete heart block ([facility])   • Chronic diastolic CHF (congestive heart failure) ([facility] )   • Ambulatory dysfunction   • Paroxysmal atrial fibrillation (HCC)   • 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   • Presence of permanent cardiac pacemaker   • Oxygen dependent • Iron deficiency anemia   • Need for immunization against influenza   • Pulmonary emphysema (HCC)   • Left wrist pain   • Right hip pain   • Acute kidney injury superimposed on CKD [facility])   • Multiple renal cysts   • Effusion of left knee   • Mitral valve stenosis   • Acute gout   • Transaminitis   • 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 kidney disease, or chronic kidney disease (HCC)   • Chronic low back pain without sciatica   • History of severe aortic stenosis   • Hx of CABG   • Essential hypertension   • Dermatitis   • Hyperkalemia   • Pressure ulcer of right buttock, stage 2 (HCC)   • Anemia   • Hyperlipidemia, unspecified   • Fracture of femoral neck, right (HCC)   • Leukocytosis   • Hyperphosphatemia   • Toxic encephalopathy   • YOLANDE (acute kidney injury) ([facility] )   • Pulmonary hypertension (HCC)   • Mitral valve insufficiency   • Tricuspid valve insufficiency         Subjective:   Patient feeling better in general, he is eating and drinking as best as he can  Objective:     Vitals: Blood pressure 138/61, pulse 60, temperature (!) 96 8 °F (36 °C), temperature source Tympanic, resp  rate 18, height 5' 4" (1 626 m), weight 90 6 kg (199 lb 11 8 oz), SpO2 91 %  ,Body mass index is 34 28 kg/m²  Weight (last 2 days)     Date/Time Weight    02/15/23 0600 90 6 (199 74)    02/14/23 1600 89 (196 21)    02/13/23 0558 88 6 (195 33)            Intake/Output Summary (Last 24 hours) at 2/15/2023 1020  Last data filed at 2/15/2023 0601  Gross per 24 hour   Intake 1998 34 ml   Output 1325 ml   Net 673 34 ml     I/O last 3 completed shifts: In: 2358 3 [P O :1140;  I V :951 7; Blood:216 7; IV Piggyback:50]  Out: 1206 [Urine:1750; Emesis/NG output:75]         Physical Exam: /61   Pulse 60   Temp (!) 96 8 °F (36 °C) (Tympanic)   Resp 18   Ht 5' 4" (1 626 m)   Wt 90 6 kg (199 lb 11 8 oz)   SpO2 91%   BMI 34 28 kg/m²     General Appearance:    Alert, cooperative, no distress, appears stated age   Head:    Normocephalic, without obvious abnormality, atraumatic   Eyes:    Conjunctiva/corneas clear   Ears:    Normal external ears   Nose:   Nares normal, septum midline, mucosa normal, no drainage    or sinus tenderness   Throat:   Lips, mucosa, and tongue normal; teeth and gums normal   Neck:   Supple   Back:     Symmetric, no curvature, ROM normal, no CVA tenderness   Lungs:     Clear to auscultation bilaterally, respirations unlabored   Chest wall:    No tenderness or deformity   Heart:    Regular rate and rhythm, S1 and S2 normal, no murmur, rub   or gallop   Abdomen:     Soft, non-tender, bowel sounds active   Extremities:   Extremities normal, atraumatic, no cyanosis, mild bilateral lower extremity edema   Skin:   Skin color, texture, turgor normal, no rashes or lesions   Lymph nodes:   Cervical normal   Neurologic:   CNII-XII intact            Lab, Imaging and other studies: I have personally reviewed pertinent labs  CBC:   Lab Results   Component Value Date    WBC 13 03 (H) 02/15/2023    HGB 8 0 (L) 02/15/2023    HCT 26 7 (L) 02/15/2023    MCV 78 (L) 02/15/2023     02/15/2023    MCH 23 5 (L) 02/15/2023    MCHC 30 0 (L) 02/15/2023    RDW 18 1 (H) 02/15/2023    MPV 10 5 02/15/2023    NRBC 0 02/15/2023     CMP:   Lab Results   Component Value Date    K 4 1 02/15/2023    CL 96 02/15/2023    CO2 31 02/15/2023    BUN 88 (H) 02/15/2023    CREATININE 3 57 (H) 02/15/2023    CALCIUM 8 1 (L) 02/15/2023    AST 16 02/15/2023    ALT <3 (L) 02/15/2023    ALKPHOS 55 02/15/2023    EGFR 14 02/15/2023           Results from last 7 days   Lab Units 02/15/23  0440 02/14/23  0449 02/13/23  0557   POTASSIUM mmol/L 4 1 4 5 4 1   CHLORIDE mmol/L 96 94* 92*   CO2 mmol/L 31 27 28   BUN mg/dL 88* 91* 98*   CREATININE mg/dL 3 57* 4 00* 4 42*   CALCIUM mg/dL 8 1* 8 0* 8 3*   ALK PHOS U/L 55 57 56   ALT U/L <3* <3* <3*   AST U/L 16 17 22         Phosphorus:   Lab Results   Component Value Date    PHOS 4 4 (H) 02/15/2023     Magnesium: No results found for: MG  Urinalysis: No results found for: Stephanie Alamin, SPECGRAV, PHUR, LEUKOCYTESUR, NITRITE, PROTEINUA, GLUCOSEU, KETONESU, BILIRUBINUR, BLOODU  Ionized Calcium: No results found for: CAION  Coagulation: No results found for: PT, INR, APTT  Troponin: No results found for: TROPONINI  ABG: No results found for: PHART, TBZ5GKG, PO2ART, QNQ1UOV, X9UOTOIL, BEART, SOURCE  Radiology review:     IMAGING  Procedure: XR chest portable    Result Date: 2/13/2023  Narrative: CHEST INDICATION:   possible PNA  COMPARISON:  2/8/2023 EXAM PERFORMED/VIEWS:  XR CHEST PORTABLE FINDINGS:  Left pacer is stable  Cardiomegaly noted as before  Left greater than right basilar effusions with probable right basilar discoid atelectasis  Mild central congestion  Lungs otherwise clear  Osseous structures appear within normal limits for patient age  Impression: Pulmonary edema pattern with left greater than right basilar effusions and probable right basilar discoid atelectasis with infiltrate not excluded   Workstation performed: [id number]       Current Facility-Administered Medications   Medication Dose Route Frequency   • acetaminophen (TYLENOL) tablet 650 mg  650 mg Oral Q6H PRN   • allopurinol (ZYLOPRIM) tablet 100 mg  100 mg Oral Daily   • calcium carbonate (TUMS) chewable tablet 1,000 mg  1,000 mg Oral Daily PRN   • cefTRIAXone (ROCEPHIN) IVPB (premix in dextrose) 1,000 mg 50 mL  1,000 mg Intravenous Q24H   • docusate sodium (COLACE) capsule 100 mg  100 mg Oral BID   • heparin (porcine) subcutaneous injection 5,000 Units  5,000 Units Subcutaneous Q8H [facility]   • HYDROmorphone (DILAUDID) injection 0 5 mg  0 5 mg Intravenous Q4H PRN   • labetalol (NORMODYNE) injection 10 mg  10 mg Intravenous Q4H PRN   • metoclopramide (REGLAN) injection 10 mg  10 mg Intravenous Q6H PRN   • metoprolol tartrate (LOPRESSOR) tablet 100 mg  100 mg Oral BID   • ondansetron (ZOFRAN) injection 4 mg  4 mg Intravenous Q4H PRN   • oxyCODONE (ROXICODONE) IR tablet 5 mg  5 mg Oral Q4H PRN   • polyethylene glycol (MIRALAX) packet 17 g  17 g Oral Daily PRN   • pravastatin (PRAVACHOL) tablet 40 mg  40 mg Oral Daily With Dinner   • senna (SENOKOT) tablet 8 6 mg  1 tablet Oral Daily     Medications Discontinued During This Encounter   Medication Reason   • lactated ringers infusion    • dexamethasone (PF) (DECADRON) injection Patient Discharge   • bupivacaine (PF) (MARCAINE) 0 25 % injection Patient Discharge   • vancomycin (VANCOCIN) injection Patient Discharge   • sodium chloride 0 9 % irrigation solution Patient Discharge   • chlorhexidine (HIBICLENS) 4 % topical liquid    • fentaNYL (SUBLIMAZE) injection 25 mcg Patient Transfer   • HYDROmorphone HCl (DILAUDID) injection 0 2 mg Patient Transfer   • ondansetron (ZOFRAN) injection 4 mg Patient Transfer   • lactated ringers infusion    • lactated ringers bolus 1,000 mL    • lactated ringers bolus 1,000 mL    • sodium chloride 0 9 % bolus 1,000 mL    • sodium chloride 0 9 % bolus 1,000 mL    • gabapentin (NEURONTIN) capsule 100 mg    • pantoprazole (PROTONIX) injection 40 mg    • multi-electrolyte (PLASMALYTE-A/ISOLYTE-S PH 7 4) IV solution    • multi-electrolyte (PLASMALYTE-A/ISOLYTE-S PH 7 4) IV solution        Lennox Valencia,       This progress note was produced in part using a dictation device which may document imprecise wording from author's original intent

## 2023-02-16 VITALS
HEIGHT: 64 IN | SYSTOLIC BLOOD PRESSURE: 147 MMHG | WEIGHT: 199.08 LBS | BODY MASS INDEX: 33.99 KG/M2 | TEMPERATURE: 97.7 F | DIASTOLIC BLOOD PRESSURE: 67 MMHG | HEART RATE: 60 BPM | OXYGEN SATURATION: 96 % | RESPIRATION RATE: 18 BRPM

## 2023-02-16 PROBLEM — I27.20 PULMONARY HYPERTENSION (HCC): Status: RESOLVED | Noted: 2023-02-11 | Resolved: 2023-02-16

## 2023-02-16 LAB
ANION GAP SERPL CALCULATED.3IONS-SCNC: 9 MMOL/L (ref 4–13)
BASOPHILS # BLD AUTO: 0.03 THOUSANDS/ÂΜL (ref 0–0.1)
BASOPHILS NFR BLD AUTO: 0 % (ref 0–1)
BUN SERPL-MCNC: 86 MG/DL (ref 5–25)
CALCIUM SERPL-MCNC: 8.4 MG/DL (ref 8.4–10.2)
CHLORIDE SERPL-SCNC: 96 MMOL/L (ref 96–108)
CO2 SERPL-SCNC: 30 MMOL/L (ref 21–32)
CREAT SERPL-MCNC: 3 MG/DL (ref 0.6–1.3)
EOSINOPHIL # BLD AUTO: 0.79 THOUSAND/ÂΜL (ref 0–0.61)
EOSINOPHIL NFR BLD AUTO: 6 % (ref 0–6)
ERYTHROCYTE [DISTWIDTH] IN BLOOD BY AUTOMATED COUNT: 18.2 % (ref 11.6–15.1)
GFR SERPL CREATININE-BSD FRML MDRD: 17 ML/MIN/1.73SQ M
GLUCOSE SERPL-MCNC: 103 MG/DL (ref 65–140)
HCT VFR BLD AUTO: 29.5 % (ref 36.5–49.3)
HGB BLD-MCNC: 8.5 G/DL (ref 12–17)
IMM GRANULOCYTES # BLD AUTO: 0.07 THOUSAND/UL (ref 0–0.2)
IMM GRANULOCYTES NFR BLD AUTO: 1 % (ref 0–2)
LYMPHOCYTES # BLD AUTO: 0.69 THOUSANDS/ÂΜL (ref 0.6–4.47)
LYMPHOCYTES NFR BLD AUTO: 5 % (ref 14–44)
MCH RBC QN AUTO: 22.9 PG (ref 26.8–34.3)
MCHC RBC AUTO-ENTMCNC: 28.8 G/DL (ref 31.4–37.4)
MCV RBC AUTO: 80 FL (ref 82–98)
MONOCYTES # BLD AUTO: 0.86 THOUSAND/ÂΜL (ref 0.17–1.22)
MONOCYTES NFR BLD AUTO: 7 % (ref 4–12)
NEUTROPHILS # BLD AUTO: 10.29 THOUSANDS/ÂΜL (ref 1.85–7.62)
NEUTS SEG NFR BLD AUTO: 81 % (ref 43–75)
NRBC BLD AUTO-RTO: 0 /100 WBCS
PLATELET # BLD AUTO: 255 THOUSANDS/UL (ref 149–390)
PMV BLD AUTO: 10.2 FL (ref 8.9–12.7)
POTASSIUM SERPL-SCNC: 4.2 MMOL/L (ref 3.5–5.3)
RBC # BLD AUTO: 3.71 MILLION/UL (ref 3.88–5.62)
SARS-COV-2 RNA RESP QL NAA+PROBE: NEGATIVE
SODIUM SERPL-SCNC: 135 MMOL/L (ref 135–147)
WBC # BLD AUTO: 12.73 THOUSAND/UL (ref 4.31–10.16)

## 2023-02-16 RX ADMIN — HEPARIN SODIUM 5000 UNITS: 5000 INJECTION INTRAVENOUS; SUBCUTANEOUS at 05:53

## 2023-02-16 RX ADMIN — METOPROLOL TARTRATE 100 MG: 100 TABLET, FILM COATED ORAL at 08:36

## 2023-02-16 RX ADMIN — HEPARIN SODIUM 5000 UNITS: 5000 INJECTION INTRAVENOUS; SUBCUTANEOUS at 13:15

## 2023-02-16 RX ADMIN — ALLOPURINOL 100 MG: 100 TABLET ORAL at 08:40

## 2023-02-16 NOTE — PROGRESS NOTES
-- Patient: Nirali Moreno  -- MRN: [id number]  -- Aidin Request ID: [id number]  -- Level of care reserved: [facility]  -- Partner Reserved: [address] [phone]  -- Clinical needs requested:  -- Geography searched: [place] miles around [phone]  -- Start of Service:  -- Request sent: 3:01pm EST on 2/15/2023 by Leela Murphy  -- Partner reserved: 9:58am EST on 2/16/2023 by Leela Murphy  -- Choice list shared: 9:58am EST on 2/16/2023 by Leela Murphy

## 2023-02-16 NOTE — CASE MANAGEMENT
Case Management Discharge Planning Note    Patient name Valentin Serve  Location /CCU 18 MRN [id number]  : 1935 Date 2023       Current Admission Date: 2023  Current Admission Diagnosis:Fracture of femoral neck, right [facility])   Patient Active Problem List    Diagnosis Date Noted   • YOLANDE (acute kidney injury) ([facility] ) 2023   • Pulmonary hypertension ([place] [address] ) 2023   • Mitral valve insufficiency 2023   • Tricuspid valve insufficiency 2023   • Hyperphosphatemia 2023   • Fracture of femoral neck, right ([facility] ) 2023   • Leukocytosis 2023   • Pressure ulcer of right buttock, stage 2 ([place] [address] ) 2022   • Hyperkalemia 2022   • Essential hypertension 10/16/2021   • Dermatitis 10/16/2021   • History of severe aortic stenosis 10/03/2021   • Hx of CABG 10/03/2021   • Chronic low back pain without sciatica 08/10/2021   • 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 kidney disease, or chronic kidney disease ([place] [address] ) 2021   • Transaminitis 2021   • Acute gout 2021   • Effusion of left knee 2021   • Mitral valve stenosis 2021   • Multiple renal cysts 2021   • Right hip pain 2021   • Acute kidney injury superimposed on CKD ([place] [address] ) 2021   • Left wrist pain 2021   • Iron deficiency anemia 10/15/2020   • Need for immunization against influenza 10/15/2020   • Pulmonary emphysema ([facility] ) 10/15/2020   • 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 2020   • Presence of permanent cardiac pacemaker 2020   • Oxygen dependent 2020   • Paroxysmal atrial fibrillation ([facility] ) 08/10/2020   • Anemia 2020   • Hyperlipidemia, unspecified 2020   • Ambulatory dysfunction 2020   • Complete heart block ([place] [address] ) 2020   • Chronic diastolic CHF (congestive heart failure) ([place] [address] ) 2020   • History of aortic valve replacement with bioprosthetic valve 05/19/2019   • History of stroke 05/19/2019   • Medicare annual wellness visit, subsequent 08/14/2018   • Lumbar degenerative disc disease 06/29/2017   • Lumbar radiculopathy 06/29/2017   • Obesity (BMI 30-39 9) 09/04/2014   • Mitral regurgitation 06/11/2014   • Acute blood loss anemia 06/10/2014   • Mild intermittent asthma without complication 71/51/3961   • CKD (chronic kidney disease) stage 4, GFR 15-29 ml/min ([facility]) 06/10/2014   • GERD (gastroesophageal reflux disease) 06/10/2014   • Dyslipidemia 10/03/2012   • Late effects of cerebrovascular disease 05/30/2012      LOS (days): 8  Geometric Mean LOS (GMLOS) (days): 6 20  Days to GMLOS:-1 5     OBJECTIVE:  Risk of Unplanned Readmission Score: 20 88         Current admission status: Inpatient   [facility]acy:   [facility] [id number]  Phone: [phone] Fax: [phone]    Primary Care Provider: Phyllis Rivera DO    Primary Insurance: [facility]  Secondary Insurance:     DISCHARGE DETAILS:    Discharge planning discussed with[de-identified] Pt's daughter Sarmad Burr of Choice: Yes     CM contacted family/caregiver?: Yes  Were Treatment Team discharge recommendations reviewed with patient/caregiver?: Yes  Did patient/caregiver verbalize understanding of patient care needs?: Yes  Were patient/caregiver advised of the risks associated with not following Treatment Team discharge recommendations?: Yes         Requested [address]         Is the patient interested in [facility] at discharge?: No    DME Referral Provided  Referral made for DME?: No              Treatment Team Recommendation: SNF, Short Term Rehab  Discharge Destination Plan[de-identified] Short Term Rehab, SNF  Transport at Discharge :  (Pt will be transported via bed with all belongings to 5th floor)            [facility]  5th floor       Discussed with patient preferences on discharge : Understanding how to manage health at home , purpose of taking meds, importance of follow up appointments and symptoms to watch out for  Nurse to review AVS with patient  All follow up providers listed

## 2023-02-16 NOTE — PHYSICAL THERAPY NOTE
Physical Therapy Progress Note    Patient Name: Breann Huizar    [id number] Date: 2/16/2023     Problem List  Principal Problem:    Fracture of femoral neck, right (HCC)  Active Problems:    Acute blood loss anemia    Chronic diastolic CHF (congestive heart failure) ([facility])    Paroxysmal atrial fibrillation ([facility])    Acute kidney injury superimposed on CKD ([facility] )    Multiple renal cysts    Leukocytosis    Hyperphosphatemia    Pulmonary hypertension ([facility] )    Mitral valve insufficiency    Tricuspid valve insufficiency       Past Medical History  Past Medical History:   Diagnosis Date    Aortic stenosis     ECHO -4-14-14  MODERATE TO SEVERE AORTIC STENOSIS; MILD AROTIC INSUFFICIENCY - LAST ASSESSED 10/26/16; RESOLVED 6/8/16    Aortic valve disorder     LAST ASSESSED 10/8/15; 10/8/15    Arthritis     CHF (congestive heart failure) ([email or web address] )     Complete heart block ([email or web address] ) 7/20/2020    Coronary artery disease     Hypertension     Hypertensive urgency 5/19/2019    Renal disorder     TIA (transient ischemic attack)     Transient cerebral ischemia         Past Surgical History  Past Surgical History:   Procedure Laterality Date    AORTIC VALVE REPLACEMENT  06/30/2015    avr with 23 mm [place] Magna Ease bioprosthetic    CARDIAC PACEMAKER PLACEMENT Left 07/24/2020    CATARACT EXTRACTION Right 06/05/2000    COLONOSCOPY      CORONARY ARTERY BYPASS GRAFT  06/05/2000    x1 with argueta  to lad    EYE SURGERY      POLYPECTOMY  04/2014    enteroscopic - esophageal ulcer, gastric erosion, and duodenal ulcer  GA HEMIARTHROPLASTY HIP PARTIAL Right 2/10/2023    Procedure: HEMIARTHROPLASTY HIP (BIPOLAR);   Surgeon: Jyoti Stewart DO;  Location: [facility];  Service: Orthopedics    TONSILLECTOMY      unknown           02/16/23 1018   PT Last Visit   PT Visit Date 02/16/23   Note Type   Note Type Treatment   Pain Assessment   Pain Assessment Tool 0-10   Pain Score 5   Pain Location/Orientation Orientation: Right;Location: Hip   Restrictions/Precautions   Weight Bearing Precautions Per Order Yes   RLE Weight Bearing Per Order WBAT   Other Precautions Fall Risk;Pain; Chair Alarm;Cognitive   General   Chart Reviewed Yes   Response to Previous Treatment Patient with no complaints from previous session  Family/Caregiver Present No   Cognition   Overall Cognitive Status Impaired   Arousal/Participation Alert   Attention Attends with cues to redirect   Orientation Level Oriented to person;Disoriented to place; Disoriented to time;Disoriented to situation   Following Commands Follows one step commands with increased time or repetition   Subjective   Subjective "My legs don't wanna walk"   Bed Mobility   Additional Comments pt OOB at start/end of session   Transfers   Sit to Stand 4  Minimal assistance   Additional items Assist x 2; Increased time required;Verbal cues   Stand to Sit 4  Minimal assistance   Additional items Assist x 2; Increased time required;Verbal cues   Toilet transfer 4  Minimal assistance   Additional items Assist x 2; Increased time required;Verbal cues; Commode   Additional Comments RW used   Ambulation/Elevation   Gait pattern Not tested   Balance   Static Sitting Fair +   Dynamic Sitting Fair   Static Standing Poor +   Dynamic Standing Poor  (RW)   Endurance Deficit   Endurance Deficit Yes   Endurance Deficit Description pt easily fatigued with transfer training   Activity Tolerance   Activity Tolerance Patient limited by pain; Patient limited by fatigue   Assessment   Prognosis Good   Problem List Decreased strength;Decreased endurance; Impaired balance;Decreased mobility; Decreased cognition; Impaired judgement;Decreased safety awareness;Pain;Orthopedic restrictions   Assessment Patient seen this date for PT treatment session to increase level of mobility and functional activity tolerance   This date, pt able to perform all functional mobility with Min A-ModA x 2 , RW, and increased time  Occasional verbal cuing provided for safety awareness and sequencing  Multiple STS transfers performed from recliner and BSC with Ayaka x 2, however modA x 2 required to maintain standing balance d/t postural instability  Encouraged pt to attempt steps forward but pt unable to clear either LE from the floor at this time  Pt verbalizing frustration with current LOF but remains motivated to participate in skilled services  HR and SpO2 remained WFL on RA throughout  No true LOB experienced  The patient's AM-PAC Basic Mobility Inpatient Short Form Raw Score is 7  A Raw score of less than or equal to 16 suggests the patient may benefit from discharge to post-acute rehabilitation services  Please also refer to the recommendation of the Physical Therapist for safe discharge planning  Co treatment with OT secondary to complex medical condition of pt, possible A of 2 required to achieve and maintain transitional movements, requiring the need of skilled therapeutic intervention of 2 therapists to achieve delivery of services  D/c recommendation continues to be post-acute rehabilitation services  Goals   LTG Expiration Date 02/25/23   PT Treatment Day 6   Plan   Treatment/Interventions Functional transfer training;LE strengthening/ROM; Elevations; Therapeutic exercise; Endurance training;Bed mobility;Gait training   Progress Progressing toward goals   PT Frequency Twice a day; Other (Comment)  (1x/day Sat & Sun)   Recommendation   PT Discharge Recommendation Post acute rehabilitation services   AM-PAC Basic Mobility Inpatient   Turning in Flat Bed Without Bedrails 1   Lying on Back to Sitting on Edge of Flat Bed Without Bedrails 1   Moving Bed to Chair 1   Standing Up From Chair Using Arms 2   Walk in Room 1   Climb 3-5 Stairs With Railing 1   Basic Mobility Inpatient Raw Score 7   Turning Head Towards Sound 4   Follow Simple Instructions 3   Low Function Basic Mobility Raw Score 14   Low Function Basic Mobility Standardized Score 22 01   Highest Level Of Mobility   -[facility] Goal 2: Bed activities/Dependent transfer   -HL Achieved 5: Stand (1 or more minutes)   Education   Education Provided Mobility training;Assistive device   Patient Reinforcement needed   End of Consult   Patient Position at End of Consult Bedside chair; All needs within reach

## 2023-02-16 NOTE — CASE MANAGEMENT
Case Management Discharge Planning Note    Patient name Mary Ann Eid  Location /CCU 18 MRN [id number]  : 1935 Date 2023       Current Admission Date: 2023  Current Admission Diagnosis:Fracture of femoral neck, right [facility])   Patient Active Problem List    Diagnosis Date Noted   • YOLANDE (acute kidney injury) ([facility] ) 2023   • Pulmonary hypertension ([facility] ) 2023   • Mitral valve insufficiency 2023   • Tricuspid valve insufficiency 2023   • Hyperphosphatemia 2023   • Fracture of femoral neck, right ([facility] ) 2023   • Leukocytosis 2023   • Pressure ulcer of right buttock, stage 2 ([place] [address] ) 2022   • Hyperkalemia 2022   • Essential hypertension 10/16/2021   • Dermatitis 10/16/2021   • History of severe aortic stenosis 10/03/2021   • Hx of CABG 10/03/2021   • Chronic low back pain without sciatica 08/10/2021   • 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 kidney disease, or chronic kidney disease ([place] [address] ) 2021   • Transaminitis 2021   • Acute gout 2021   • Effusion of left knee 2021   • Mitral valve stenosis 2021   • Multiple renal cysts 2021   • Right hip pain 2021   • Acute kidney injury superimposed on CKD ([place] [address] ) 2021   • Left wrist pain 2021   • Iron deficiency anemia 10/15/2020   • Need for immunization against influenza 10/15/2020   • Pulmonary emphysema ([facility] ) 10/15/2020   • 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 2020   • Presence of permanent cardiac pacemaker 2020   • Oxygen dependent 2020   • Paroxysmal atrial fibrillation ([facility] ) 08/10/2020   • Anemia 2020   • Hyperlipidemia, unspecified 2020   • Ambulatory dysfunction 2020   • Complete heart block ([place] [address] ) 2020   • Chronic diastolic CHF (congestive heart failure) ([place] [address] ) 2020   • History of aortic valve replacement with bioprosthetic valve 05/19/2019   • History of stroke 05/19/2019   • Medicare annual wellness visit, subsequent 08/14/2018   • Lumbar degenerative disc disease 06/29/2017   • Lumbar radiculopathy 06/29/2017   • Obesity (BMI 30-39 9) 09/04/2014   • Mitral regurgitation 06/11/2014   • Acute blood loss anemia 06/10/2014   • Mild intermittent asthma without complication 43/49/5904   • CKD (chronic kidney disease) stage 4, GFR 15-29 ml/min ([facility]) 06/10/2014   • GERD (gastroesophageal reflux disease) 06/10/2014   • Dyslipidemia 10/03/2012   • Late effects of cerebrovascular disease 05/30/2012      LOS (days): 8  Geometric Mean LOS (GMLOS) (days): 6 20  Days to GMLOS:-1 5     OBJECTIVE:  Risk of Unplanned Readmission Score: 20 88         Current admission status: Inpatient   [facility]:   [facility] [address] [place] [id number]  Phone: [phone] Fax: [phone]    Primary Care Provider: Sangeeta Pelaez DO    Primary Insurance: [facility]  Secondary Insurance:     DISCHARGE DETAILS:      Pt was approved to Go to [facility]  [place]  Auth Number [id number]  Start of care 2/16/23  NRD 2/18/23  Care Coordinator Kedar Castaneda Phone [phone]  Submit next review to [facility] portal or [phone]  5th floor notified of same

## 2023-02-16 NOTE — PROGRESS NOTES
Progress Note - Nephrology   Ryan Dinh 80 y o  male MRN: [id number]  Unit/Bed#:  Encounter: [id number]    Assessment and Plan    1  Acute kidney injury, superimposed on chronic kidney disease, due to acute tubular necrosis  Renal function is improving from peak creatinine 4 8 mg/dL down to 3 0 mg/dL, working towards baseline creatinine 2 2 mg/dL  Weight is stable  Blood pressure is acceptable, 147/67 at time of evaluation  Urine output not reliable as urinary incontinence noted  Patient is stable for discharge from a renal standpoint  Expect continued slow improvement due to acute tubular necrosis  Patient will need nephrology follow-up within 2 weeks with BMP prior  2  Chronic kidney disease, stage IV baseline creatinine around 2 2 mg/dL  Previously followed by Dr Giana Waller  We will see patient in outpatient follow-up  3  Possible right complex renal cyst   Consider further work-up in outpatient setting, if clinically indicated  4  Volume depletion, resolved  Patient is eating and drinking appropriately and not requiring IV fluids  5  Chronic diastolic congestive heart failure  Patient examines euvolemic  Strict I&Os  Standing scale daily weights  2 g sodium restriction  Fluid restriction  6  Right hip hemiarthroplasty on 2/10/2023  Management per surgical colleagues  Follow up reason for today's visit:     Fracture of femoral neck, right [facility])    Patient Active Problem List   Diagnosis   • Acute blood loss anemia   • Mild intermittent asthma without complication   • CKD (chronic kidney disease) stage 4, GFR 15-29 ml/min ([facility])   • Dyslipidemia   • GERD (gastroesophageal reflux disease)   • Late effects of cerebrovascular disease   • Lumbar degenerative disc disease   • Lumbar radiculopathy   • Mitral regurgitation   • Obesity (BMI 30-39  9)   • Medicare annual wellness visit, subsequent   • History of aortic valve replacement with bioprosthetic valve   • History of stroke • Complete heart block (HCC)   • Chronic diastolic CHF (congestive heart failure) (HCC)   • Ambulatory dysfunction   • Paroxysmal atrial fibrillation (HCC)   • 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   • Presence of permanent cardiac pacemaker   • Oxygen dependent   • Iron deficiency anemia   • Need for immunization against influenza   • Pulmonary emphysema (HCC)   • Left wrist pain   • Right hip pain   • Acute kidney injury superimposed on CKD (HCC)   • Multiple renal cysts   • Effusion of left knee   • Mitral valve stenosis   • Acute gout   • Transaminitis   • 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 kidney disease, or chronic kidney disease (HCC)   • Chronic low back pain without sciatica   • History of severe aortic stenosis   • Hx of CABG   • Essential hypertension   • Dermatitis   • Hyperkalemia   • Pressure ulcer of right buttock, stage 2 (HCC)   • Anemia   • Hyperlipidemia, unspecified   • Fracture of femoral neck, right (HCC)   • Leukocytosis   • Hyperphosphatemia   • YOLANDE (acute kidney injury) ([facility] )   • Pulmonary hypertension (HCC)   • Mitral valve insufficiency   • Tricuspid valve insufficiency         Subjective:   Patient denies any specific complaints  He is trying to urinate at this time  He thinks he is eating and drinking appropriately, and he believes his hip feels okay  A complete 10 point review of systems was performed and is otherwise negative  Objective:     Vitals: Blood pressure 147/67, pulse 60, temperature 97 7 °F (36 5 °C), temperature source Temporal, resp  rate 18, height 5' 4" (1 626 m), weight 90 3 kg (199 lb 1 2 oz), SpO2 96 %  ,Body mass index is 34 17 kg/m²      Weight (last 2 days)     Date/Time Weight    02/16/23 0600 90 3 (199 08)    02/15/23 0600 90 6 (199 74)    02/14/23 1600 89 (196 21)            Intake/Output Summary (Last 24 hours) at 2/16/2023 0534  Last data filed at 2/16/2023 0900  Gross per 24 hour   Intake 600 ml   Output 555 ml   Net 45 ml     I/O last 3 completed shifts: In: 5 [P O :540]  Out: 1380 [Urine:1305; Emesis/NG output:75]         Physical Exam: /67   Pulse 60   Temp 97 7 °F (36 5 °C) (Temporal)   Resp 18   Ht 5' 4" (1 626 m)   Wt 90 3 kg (199 lb 1 2 oz)   SpO2 96%   BMI 34 17 kg/m²     General Appearance:    No acute distress  Cooperative  Appears stated age  Head:    Normocephalic  Atraumatic  Normal jaw occlusion  Eyes:    Lids, conjunctiva normal  No scleral icterus  Ears:    Normal external ears  Nose:   Nares normal  No drainage  Mouth:   Lips, tongue normal  Mucosa normal  Phonation normal    Neck:   Supple  Symmetrical    Back:     Symmetric  No CVA tenderness  Lungs:     Normal respiratory effort  Clear to auscultation bilaterally  Chest wall:    No tenderness or deformity  Heart:    Regular rate and rhythm  Normal S1 and S2  No murmur  No JVD  No edema  Abdomen:     Soft  Non-tender  Bowel sounds active  Genitourinary:   No Alvares catheter present  Extremities:   Extremities normal  Atraumatic  No cyanosis  Right lower extremity bandaged  Skin:   Warm and dry  No pallor, jaundice, rash, ecchymoses  Neurologic:   Alert and oriented to person, place, time  No focal deficit  Lab, Imaging and other studies: I have personally reviewed pertinent labs  CBC:   Lab Results   Component Value Date    WBC 12 73 (H) 02/16/2023    HGB 8 5 (L) 02/16/2023    HCT 29 5 (L) 02/16/2023    MCV 80 (L) 02/16/2023     02/16/2023    MCH 22 9 (L) 02/16/2023    MCHC 28 8 (L) 02/16/2023    RDW 18 2 (H) 02/16/2023    MPV 10 2 02/16/2023    NRBC 0 02/16/2023     CMP:   Lab Results   Component Value Date    K 4 2 02/16/2023    CL 96 02/16/2023    CO2 30 02/16/2023    BUN 86 (H) 02/16/2023    CREATININE 3 00 (H) 02/16/2023    CALCIUM 8 4 02/16/2023    EGFR 17 02/16/2023           Results from last 7 days   Lab Units 02/16/23  0425 02/15/23  0440 02/14/23  0449 02/13/23  0557   POTASSIUM mmol/L 4 2 4 1 4 5 4 1   CHLORIDE mmol/L 96 96 94* 92*   CO2 mmol/L 30 31 27 28   BUN mg/dL 86* 88* 91* 98*   CREATININE mg/dL 3 00* 3 57* 4 00* 4 42*   CALCIUM mg/dL 8 4 8 1* 8 0* 8 3*   ALK PHOS U/L  --  55 57 56   ALT U/L  --  <3* <3* <3*   AST U/L  --  16 17 22         Phosphorus: No results found for: PHOS  Magnesium: No results found for: MG  Urinalysis: No results found for: COLORU, CLARITYU, SPECGRAV, PHUR, LEUKOCYTESUR, NITRITE, PROTEINUA, GLUCOSEU, KETONESU, BILIRUBINUR, BLOODU  Ionized Calcium: No results found for: CAION  Coagulation: No results found for: PT, INR, APTT  Troponin: No results found for: TROPONINI  ABG: No results found for: PHART, SOX8LDY, PO2ART, NFA9OQW, D6YJRJWF, BEART, SOURCE  Radiology review:     IMAGING  Procedure: XR chest portable    Result Date: 2/13/2023  Narrative: CHEST INDICATION:   possible PNA  COMPARISON:  2/8/2023 EXAM PERFORMED/VIEWS:  XR CHEST PORTABLE FINDINGS:  Left pacer is stable  Cardiomegaly noted as before  Left greater than right basilar effusions with probable right basilar discoid atelectasis  Mild central congestion  Lungs otherwise clear  Osseous structures appear within normal limits for patient age  Impression: Pulmonary edema pattern with left greater than right basilar effusions and probable right basilar discoid atelectasis with infiltrate not excluded   Workstation performed: [id number]       Current Facility-Administered Medications   Medication Dose Route Frequency   • acetaminophen (TYLENOL) tablet 650 mg  650 mg Oral Q6H PRN   • allopurinol (ZYLOPRIM) tablet 100 mg  100 mg Oral Daily   • calcium carbonate (TUMS) chewable tablet 1,000 mg  1,000 mg Oral Daily PRN   • cefTRIAXone (ROCEPHIN) IVPB (premix in dextrose) 1,000 mg 50 mL  1,000 mg Intravenous Q24H   • docusate sodium (COLACE) capsule 100 mg  100 mg Oral BID   • heparin (porcine) subcutaneous injection 5,000 Units 5,000 Units Subcutaneous Q8H [address]   • metoprolol tartrate (LOPRESSOR) tablet 100 mg  100 mg Oral BID   • ondansetron (ZOFRAN) injection 4 mg  4 mg Intravenous Q4H PRN   • oxyCODONE (ROXICODONE) immediate release tablet 10 mg  10 mg Oral Q4H PRN   • oxyCODONE (ROXICODONE) IR tablet 5 mg  5 mg Oral Q4H PRN   • polyethylene glycol (MIRALAX) packet 17 g  17 g Oral Daily PRN   • pravastatin (PRAVACHOL) tablet 40 mg  40 mg Oral Daily With Dinner   • senna (SENOKOT) tablet 8 6 mg  1 tablet Oral Daily     Medications Discontinued During This Encounter   Medication Reason   • lactated ringers infusion    • dexamethasone (PF) (DECADRON) injection Patient Discharge   • bupivacaine (PF) (MARCAINE) 0 25 % injection Patient Discharge   • vancomycin (VANCOCIN) injection Patient Discharge   • sodium chloride 0 9 % irrigation solution Patient Discharge   • chlorhexidine (HIBICLENS) 4 % topical liquid    • fentaNYL (SUBLIMAZE) injection 25 mcg Patient Transfer   • HYDROmorphone HCl (DILAUDID) injection 0 2 mg Patient Transfer   • ondansetron (ZOFRAN) injection 4 mg Patient Transfer   • lactated ringers infusion    • lactated ringers bolus 1,000 mL    • lactated ringers bolus 1,000 mL    • sodium chloride 0 9 % bolus 1,000 mL    • sodium chloride 0 9 % bolus 1,000 mL    • gabapentin (NEURONTIN) capsule 100 mg    • pantoprazole (PROTONIX) injection 40 mg    • multi-electrolyte (PLASMALYTE-A/ISOLYTE-S PH 7 4) IV solution    • multi-electrolyte (PLASMALYTE-A/ISOLYTE-S PH 7 4) IV solution    • HYDROmorphone (DILAUDID) injection 0 5 mg    • labetalol (NORMODYNE) injection 10 mg    • metoclopramide (REGLAN) injection 10 mg    • oxyCODONE (ROXICODONE) IR tablet 5 mg        German Martinez PA-C    Portions of the record may have been created with voice recognition software  Occasional wrong word or "sound a like" substitutions may have occurred due to the inherent limitations of voice recognition software   Read the chart carefully and recognize, using context, where substitutions have occurred

## 2023-02-16 NOTE — CASE MANAGEMENT
Case Management Discharge Planning Note    Patient name America Krabbe  Location /CCU 18 MRN [id number]  : 1935 Date 2023       Current Admission Date: 2023  Current Admission Diagnosis:Fracture of femoral neck, right [facility])   Patient Active Problem List    Diagnosis Date Noted   • YOLANDE (acute kidney injury) ([facility] ) 2023   • Pulmonary hypertension ([place] [address] ) 2023   • Mitral valve insufficiency 2023   • Tricuspid valve insufficiency 2023   • Hyperphosphatemia 2023   • Fracture of femoral neck, right ([place] [address] ) 2023   • Leukocytosis 2023   • Pressure ulcer of right buttock, stage 2 ([place] [address] ) 2022   • Hyperkalemia 2022   • Essential hypertension 10/16/2021   • Dermatitis 10/16/2021   • History of severe aortic stenosis 10/03/2021   • Hx of CABG 10/03/2021   • Chronic low back pain without sciatica 08/10/2021   • 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 kidney disease, or chronic kidney disease ([place] [address] ) 2021   • Transaminitis 2021   • Acute gout 2021   • Effusion of left knee 2021   • Mitral valve stenosis 2021   • Multiple renal cysts 2021   • Right hip pain 2021   • Acute kidney injury superimposed on CKD ([place] [address] ) 2021   • Left wrist pain 2021   • Iron deficiency anemia 10/15/2020   • Need for immunization against influenza 10/15/2020   • Pulmonary emphysema ([facility] ) 10/15/2020   • 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 2020   • Presence of permanent cardiac pacemaker 2020   • Oxygen dependent 2020   • Paroxysmal atrial fibrillation ([facility] ) 08/10/2020   • Anemia 2020   • Hyperlipidemia, unspecified 2020   • Ambulatory dysfunction 2020   • Complete heart block ([place] [address] ) 2020   • Chronic diastolic CHF (congestive heart failure) ([place] [address] ) 2020   • History of aortic valve replacement with bioprosthetic valve 05/19/2019   • History of stroke 05/19/2019   • Medicare annual wellness visit, subsequent 08/14/2018   • Lumbar degenerative disc disease 06/29/2017   • Lumbar radiculopathy 06/29/2017   • Obesity (BMI 30-39 9) 09/04/2014   • Mitral regurgitation 06/11/2014   • Acute blood loss anemia 06/10/2014   • Mild intermittent asthma without complication 40/48/9936   • CKD (chronic kidney disease) stage 4, GFR 15-29 ml/min ([facility]) 06/10/2014   • GERD (gastroesophageal reflux disease) 06/10/2014   • Dyslipidemia 10/03/2012   • Late effects of cerebrovascular disease 05/30/2012      LOS (days): 8  Geometric Mean LOS (GMLOS) (days): 6 20  Days to GMLOS:-1 5     OBJECTIVE:  Risk of Unplanned Readmission Score: 20 88         Current admission status: Inpatient   Preferred Pharmacy:   [facility]  [place]   [address] [place] [address] [address] [address]  Phone: [phone] Fax: [phone]    Primary Care Provider: Mariel Barr DO    Primary Insurance: [facility] REP  Secondary Insurance:     [address] [place] [address] Avenue Number: [id number] ([facility])

## 2023-02-16 NOTE — CASE MANAGEMENT
Janet Meyers 50 has received approved authorization from insurance:   [address] received for: [facility]  Facility: [facility] 6 #:[id number]  Start of Care:2/16/23  Next Review Date:2/18/23  [address] [place] [address]#: [phone]  Submit next review to: Marisol avina [phone]   Care Manager notified:Maite Reid

## 2023-02-16 NOTE — PLAN OF CARE
Problem: PHYSICAL THERAPY ADULT  Goal: Performs mobility at highest level of function for planned discharge setting  See evaluation for individualized goals  Description: Treatment/Interventions: Functional transfer training, LE strengthening/ROM, Elevations, Therapeutic exercise, Endurance training, Bed mobility, Gait training          See flowsheet documentation for full assessment, interventions and recommendations  2/16/2023 1126 by April Awad PT  Outcome: Progressing  Note: Prognosis: Good  Problem List: Decreased strength, Decreased endurance, Impaired balance, Decreased mobility, Decreased cognition, Impaired judgement, Decreased safety awareness, Pain, Orthopedic restrictions  Assessment: Patient seen this date for PT treatment session to increase level of mobility and functional activity tolerance  This date, pt able to perform all functional mobility with Min A-ModA x 2 , RW, and increased time  Occasional verbal cuing provided for safety awareness and sequencing  Multiple STS transfers performed from recliner and BSC with Ayaka x 2, however modA x 2 required to maintain standing balance d/t postural instability  Encouraged pt to attempt steps forward but pt unable to clear either LE from the floor at this time  Pt verbalizing frustration with current LOF but remains motivated to participate in skilled services  HR and SpO2 remained WFL on RA throughout  No true LOB experienced  The patient's AM-PAC Basic Mobility Inpatient Short Form Raw Score is 7  A Raw score of less than or equal to 16 suggests the patient may benefit from discharge to post-acute rehabilitation services  Please also refer to the recommendation of the Physical Therapist for safe discharge planning   Co treatment with OT secondary to complex medical condition of pt, possible A of 2 required to achieve and maintain transitional movements, requiring the need of skilled therapeutic intervention of 2 therapists to achieve delivery of services  D/c recommendation continues to be post-acute rehabilitation services  Barriers to Discharge: Inaccessible home environment, Decreased caregiver support     PT Discharge Recommendation: Post acute rehabilitation services    See flowsheet documentation for full assessment

## 2023-02-16 NOTE — PLAN OF CARE
Problem: Prexisting or High Potential for Compromised Skin Integrity  Goal: Skin integrity is maintained or improved  Description: INTERVENTIONS:  - Identify patients at risk for skin breakdown  - Assess and monitor skin integrity  - Assess and monitor nutrition and hydration status  - Monitor labs   - Assess for incontinence (as needed)  - Turn and reposition patient (every 2 hours and prn)  - Assist with mobility/ambulation (max assist/dependent)  - Relieve pressure over bony prominences  - Avoid friction and shearing  - Provide appropriate hygiene as needed including keeping skin clean and dry  - Evaluate need for skin moisturizer/barrier cream  - Collaborate with interdisciplinary team   - Patient/family teaching  - Consider wound care consult   Outcome: Progressing

## 2023-02-16 NOTE — CASE MANAGEMENT
Case Management Discharge Planning Note    Patient name Juliana Kang  Location /CCU 18 MRN [id number]  : 1935 Date 2023       Current Admission Date: 2023  Current Admission Diagnosis:Fracture of femoral neck, right [facility])   Patient Active Problem List    Diagnosis Date Noted   • [facility]) ([facility] ) 2023   • Pulmonary hypertension ([place] [address] ) 2023   • Mitral valve insufficiency 2023   • Tricuspid valve insufficiency 2023   • Hyperphosphatemia 2023   • Fracture of femoral neck, right ([facility] ) 2023   • Leukocytosis 2023   • Pressure ulcer of right buttock, stage 2 ([place] [address] ) 2022   • Hyperkalemia 2022   • Essential hypertension 10/16/2021   • Dermatitis 10/16/2021   • History of severe aortic stenosis 10/03/2021   • Hx of CABG 10/03/2021   • Chronic low back pain without sciatica 08/10/2021   • 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 kidney disease, or chronic kidney disease ([place] [address] ) 2021   • Transaminitis 2021   • Acute gout 2021   • Effusion of left knee 2021   • Mitral valve stenosis 2021   • Multiple renal cysts 2021   • Right hip pain 2021   • Acute kidney injury superimposed on CKD ([place] [address] ) 2021   • Left wrist pain 2021   • Iron deficiency anemia 10/15/2020   • Need for immunization against influenza 10/15/2020   • Pulmonary emphysema ([facility] ) 10/15/2020   • 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 2020   • Presence of permanent cardiac pacemaker 2020   • Oxygen dependent 2020   • Paroxysmal atrial fibrillation ([facility] ) 08/10/2020   • Anemia 2020   • Hyperlipidemia, unspecified 2020   • Ambulatory dysfunction 2020   • Complete heart block ([facility] ) 2020   • Chronic diastolic CHF (congestive heart failure) ([place] [address] ) 2020   • History of aortic valve replacement with bioprosthetic valve 05/19/2019   • History of stroke 05/19/2019   • Medicare annual wellness visit, subsequent 08/14/2018   • Lumbar degenerative disc disease 06/29/2017   • Lumbar radiculopathy 06/29/2017   • Obesity (BMI 30-39 9) 09/04/2014   • Mitral regurgitation 06/11/2014   • Acute blood loss anemia 06/10/2014   • Mild intermittent asthma without complication 58/14/0496   • CKD (chronic kidney disease) stage 4, GFR 15-29 ml/min ([facility]) 06/10/2014   • GERD (gastroesophageal reflux disease) 06/10/2014   • Dyslipidemia 10/03/2012   • Late effects of cerebrovascular disease 05/30/2012      LOS (days): 8  Geometric Mean LOS (GMLOS) (days): 6 20  Days to GMLOS:-1 4     OBJECTIVE:  Risk of Unplanned Readmission Score: 20 88         Current admission status: Inpatient   Preferred Pharmacy:   [facility] [address] [address]  Phone: [phone] Fax: [phone]    Primary Care Provider: Luis Hernandes DO    Primary Insurance: [facility]  Secondary Insurance:     DISCHARGE DETAILS:          I reviewed with patient's daughter Deidre Chery that doctor did peer to peer with Brigida Nephew and patient is still being denied Acute Level but was approved SNF level of care  I reviewed with her that patient was accepted at 5th floor, [facility] and [place] Chery was requesting [facility] but they already Denied patient  Deidre Chery is agreeable to patient going to 5th floor , I placed reservation in for 5th floor  I notified Case Management support to get updated auth

## 2023-02-16 NOTE — OCCUPATIONAL THERAPY NOTE
Occupational Therapy Evaluation     Patient Name: Bharath Woodall  DILHQ'T Date: 2/16/2023  Problem List  Principal Problem:    Fracture of femoral neck, right (HCC)  Active Problems:    Acute blood loss anemia    Chronic diastolic CHF (congestive heart failure) ([facility])    Paroxysmal atrial fibrillation ([facility])    Acute kidney injury superimposed on CKD ([facility] )    Multiple renal cysts    Leukocytosis    Hyperphosphatemia    Pulmonary hypertension ([facility] )    Mitral valve insufficiency    Tricuspid valve insufficiency    Past Medical History  Past Medical History:   Diagnosis Date    Aortic stenosis     ECHO -4-14-14  MODERATE TO SEVERE AORTIC STENOSIS; MILD AROTIC INSUFFICIENCY - LAST ASSESSED 10/26/16; RESOLVED 6/8/16    Aortic valve disorder     LAST ASSESSED 10/8/15; 10/8/15    Arthritis     CHF (congestive heart failure) ([facility] )     [facility] block [facility] 7/20/2020    Coronary artery disease     Hypertension     Hypertensive urgency 5/19/2019    Renal disorder     TIA (transient ischemic attack)     Transient cerebral ischemia      Past Surgical History  Past Surgical History:   Procedure Laterality Date    AORTIC VALVE REPLACEMENT  06/30/2015    avr with 23 mm [place] Magna Ease bioprosthetic    CARDIAC PACEMAKER PLACEMENT Left 07/24/2020    CATARACT EXTRACTION Right 06/05/2000    COLONOSCOPY      CORONARY ARTERY BYPASS GRAFT  06/05/2000    x1 with argueta  to lad    EYE SURGERY      POLYPECTOMY  04/2014    enteroscopic - esophageal ulcer, gastric erosion, and duodenal ulcer  SC HEMIARTHROPLASTY HIP PARTIAL Right 2/10/2023    Procedure: HEMIARTHROPLASTY HIP (BIPOLAR);   Surgeon: Mustapha Pelaez DO;  Location: [facility];  Service: Orthopedics    TONSILLECTOMY      unknown             02/16/23 1017   OT Last Visit   OT Visit Date 02/16/23   Pain Assessment   Pain Assessment Tool 0-10   Pain Score 5   Pain Location/Orientation Orientation: Right;Location: Hip   Restrictions/Precautions   Weight Bearing Precautions Per Order Yes   RLE Weight Bearing Per Order WBAT   Other Precautions Pain; Fall Risk; Chair Alarm; Bed Alarm;Hard of hearing   ADL   Where Assessed Chair   LB Bathing Assistance 2  Maximal Assistance   LB Dressing Assistance 2  Maximal Assistance   Toileting Assistance  2  Maximal Assistance   Toileting Comments pt with max (A) for LB dressing, toileting, and suha hygiene during multiple episodes of bowel movements during session   Bed Mobility   Additional Comments pt seated in chair at start and end of session; pt on RA during session with no complaints of SOB; SpO2 WFL   Transfers   Sit to Stand 4  Minimal assistance   Additional items Assist x 2; Increased time required;Verbal cues  (RW)   Stand to Sit 4  Minimal assistance   Additional items Assist x 2; Increased time required;Verbal cues  (RW)   Additional Comments pt performs multiple functional transfers during session with RW; pt with improved functional transfer status from previous session; pt with inability to perform full stand without significant physical and verbal cues; pt with standing tolerance of ~2-3 minutes for suha hygiene multiple times   Functional Mobility   Additional Comments pt unable to perform functional mobility this session due to inability to weight shift and attempt step; pt continously stating "I can't" without trial and attempting posterior lean   Subjective   Subjective "I just can't get over there"   Cognition   Overall Cognitive Status Impaired   Arousal/Participation Alert; Cooperative   Attention Attends with cues to redirect   Orientation Level Oriented to person;Disoriented to place; Disoriented to time;Disoriented to situation   Memory Decreased long term memory;Decreased short term memory;Decreased recall of recent events;Decreased recall of precautions   Following Commands Follows one step commands with increased time or repetition   Comments pt is moderately [place] and requires repetition of commands in R ear Activity Tolerance   Activity Tolerance Patient limited by fatigue;Patient limited by pain   Assessment   Assessment Patient participated in Skilled OT session this date with interventions consisting of ADL re training with the use of correct body mechnaics, safety awareness and fall prevention techniques,  therapeutic activities to: increase activity tolerance, increase standing tolerance time with unilateral UE support to complete sink level ADLs, increase cardiovascular endurance , increase dynamic sit/ stand balance during functional activity , increase postural control, increase trunk control and increase OOB/ sitting tolerance   Co treatment with PT secondary to complex medical condition of pt, mod-max A of 2 required to achieve and maintain transitional movements, requiring the need of skilled therapeutic intervention of 2 therapists to achieve delivery of services  Patient agreeable to OT treatment session, upon arrival patient was found seated OOB to Chair  Patient requiring verbal cues for safety, verbal cues for correct technique, verbal cues for pacing thru activity steps, cognitive assistance to anticipate next step, one step directives and frequent rest periods  Patient continues to be functioning below baseline level, occupational performance remains limited secondary to factors listed above and increased risk for falls and injury  The patient's raw score on the AM-PAC Daily Activity Inpatient Short Form is 12  A raw score of less than 19 suggests the patient may benefit from discharge to post-acute rehabilitation services  Please refer to the recommendation of the Occupational Therapist for safe discharge planning  From OT standpoint, recommendation at time of d/c would be Post acute rehabilitation services     Plan   Goal Expiration Date 02/25/23   OT Treatment Day 5   OT Frequency 3-5x/wk   Recommendation   OT Discharge Recommendation Post acute rehabilitation services   [facility] Daily Activity Inpatient   Lower Body Dressing 1   Bathing 2   Toileting 1   Upper Body Dressing 2   Grooming 3   Eating 3   Daily Activity Raw Score 12   Daily Activity Standardized Score (Calc for Raw Score >=11) 30 6   AM-PAC Applied Cognition Inpatient   Following a Speech/Presentation 3   Understanding Ordinary Conversation 3   Taking Medications 1   Remembering Where Things Are Placed or Put Away 1   Remembering List of 4-5 Errands 1   Taking Care of Complicated Tasks 1   Applied Cognition Raw Score 10   Applied Cognition Standardized Score 24 98

## 2023-02-16 NOTE — PLAN OF CARE
Problem: OCCUPATIONAL THERAPY ADULT  Goal: Performs self-care activities at highest level of function for planned discharge setting  See evaluation for individualized goals  Description: Treatment Interventions: ADL retraining, Functional transfer training, UE strengthening/ROM, Endurance training, Energy conservation          See flowsheet documentation for full assessment, interventions and recommendations  Outcome: Progressing  Note: Limitation: Decreased ADL status, Decreased UE strength, Decreased Safe judgement during ADL, Decreased endurance, Decreased self-care trans, Decreased high-level ADLs  Prognosis: Fair  Assessment: Patient participated in Skilled OT session this date with interventions consisting of ADL re training with the use of correct body mechnaics, safety awareness and fall prevention techniques,  therapeutic activities to: increase activity tolerance, increase standing tolerance time with unilateral UE support to complete sink level ADLs, increase cardiovascular endurance , increase dynamic sit/ stand balance during functional activity , increase postural control, increase trunk control and increase OOB/ sitting tolerance   Co treatment with PT secondary to complex medical condition of pt, mod-max A of 2 required to achieve and maintain transitional movements, requiring the need of skilled therapeutic intervention of 2 therapists to achieve delivery of services  Patient agreeable to OT treatment session, upon arrival patient was found seated OOB to Chair  Patient requiring verbal cues for safety, verbal cues for correct technique, verbal cues for pacing thru activity steps, cognitive assistance to anticipate next step, one step directives and frequent rest periods  Patient continues to be functioning below baseline level, occupational performance remains limited secondary to factors listed above and increased risk for falls and injury   The patient's raw score on the AM-PAC Daily Activity Inpatient Short Form is 12  A raw score of less than 19 suggests the patient may benefit from discharge to post-acute rehabilitation services  Please refer to the recommendation of the Occupational Therapist for safe discharge planning  From OT standpoint, recommendation at time of d/c would be Post acute rehabilitation services       OT Discharge Recommendation: Post acute rehabilitation services

## 2023-02-16 NOTE — DISCHARGE SUMMARY
[address] [facility] [address]  Discharge- Alix Chavez 1935, 80 y o  male MRN: [id number]  Unit/Bed#:  Encounter: [id number]  Primary Care Provider: Kathy Espinal DO   Date and time admitted to hospital: 2/8/2023  4:30 PM    * Fracture of femoral neck, right [facility])  Assessment & Plan  • Continue to increase activity as tolerated, discharge to [facility]      Acute kidney injury superimposed on CKD [facility])  Assessment & Plan  Lab Results   Component Value Date    EGFR 12 02/14/2023    EGFR 11 02/13/2023    EGFR 10 02/12/2023    CREATININE 4 00 (H) 02/14/2023    CREATININE 4 42 (H) 02/13/2023    CREATININE 4 80 (H) 02/12/2023     • Developed acute kidney injury during the hospitalization likely due to contrast-induced nephrology  • Renal function is improving from peak creatinine 4 8 mg/dL down to 3 0 mg/dL, working towards baseline creatinine 2 2 mg/dL  Weight is stable  • Avoid all nephrotoxic agents  • Patient will need nephrology follow-up within 2 weeks with BMP prior  YOLANDE (acute kidney injury) ([facility] [address] )  Assessment & Plan  • Please see the assessment and plan for "Acute Kidney Injury superimposed on CKD"     Paroxysmal atrial fibrillation (HCC)  Assessment & Plan  • Continue metoprolol tartrate 100 mg PO every 12 hours for heart rate control  • Resume Eliquis since hemoglobin is stable    Chronic diastolic CHF (congestive heart failure) ([facility])  Assessment & Plan  Wt Readings from Last 3 Encounters:   02/13/23 88 6 kg (195 lb 5 2 oz)   12/06/22 86 2 kg (190 lb)   12/02/22 85 kg (187 lb 6 4 oz)       The patient was evaluated by Cardiology pre-operatively and deemed a moderate cardiac risk, which was not prohibitive        Acute blood loss anemia  Assessment & Plan    • Hemoglobin stable     Toxic encephalopathy-resolved as of 2/15/2023  Assessment & Plan  • Likely due to anesthesia effect from 02/10/2023 and possible uremia  • Resolved      Leukocytosis  Assessment & Plan  • Appears to have a chronic leukocytosis  • Blood cultures negative at 72 hours  • Completed course of IV ceftriaxone, 5 days total  • Outpatient Hematology evaluation    Hyperphosphatemia  Assessment & Plan  • F/u with nephrology outpatient    Multiple renal cysts  Assessment & Plan  · Ultrasound of the kidneys and bladder (02/08/2023): - Bilateral renal cysts, most of which are simple in appearance with the exception of a 3 2 x 3 3 x 3 2 cm suboptimally visualized right renal cyst, likely complex  A six-month follow-up renal ultrasound is recommended  · Outpatient surveillance imaging with PCP    CKD (chronic kidney disease) stage 4, GFR 15-29 ml/min [facility])  Assessment & Plan  Lab Results   Component Value Date    EGFR 12 02/14/2023    EGFR 11 02/13/2023    EGFR 10 02/12/2023    CREATININE 4 00 (H) 02/14/2023    CREATININE 4 42 (H) 02/13/2023    CREATININE 4 80 (H) 02/12/2023     • Please see the assessment and plan for "Acute kidney injury superimposed on CKD"    Medical Problems     Resolved Problems  Date Reviewed: 2/16/2023          Resolved    Toxic encephalopathy 2/15/2023     Resolved by  Cici Schreiber DO        Discharging Physician / Practitioner: Dorys Ospina MD  PCP: Monica Oliva DO  Admission Date:   Admission Orders (From admission, onward)     Ordered        02/08/23 [address]  Once                      Discharge Date: 02/16/23    Consultations During Hospital Stay:  · Nephrology  · Orthopedic Surgery  · Cardiology    Procedures Performed:   XR chest portable   Final Result   Pulmonary edema pattern with left greater than right basilar effusions and probable right basilar discoid atelectasis with infiltrate not excluded  Workstation performed: [id number]         XR hip/pelv 1 vw right if performed   Final Result      Status post right hip hemiarthroplasty  No acute osseous abnormality              Workstation performed: [id number]         CT head wo contrast   Final Result      No acute intracranial abnormality  Areas of encephalomalacia and gliosis, with chronic microangiopathy, as described, unchanged  Workstation performed: [id number]         US kidney and bladder   Final Result      No hydronephrosis  Medical renal disease  Bilateral renal cysts, most of which are simple in appearance with the exception of a 3 2 x 3 3 x 3 2 cm suboptimally visualized right renal cyst, likely complex  A six-month follow-up renal ultrasound is recommended  The study was marked in EPIC for significant notification  Workstation performed: [id number]         XR femur 2 views RIGHT   Final Result      Acute impacted fracture of right femoral neck extending into right femoral head  The study was marked in [facility] for immediate notification  Workstation performed: [id number]         XR hip/pelv 2-3 vws right if performed   Final Result      Acute impacted fracture of right femoral neck extending into right femoral head  The study was marked in [facility] for immediate notification  Workstation performed: [id number]         TRAUMA - CT head wo contrast   Final Result      No acute intracranial abnormality  Workstation performed: [id number]         TRAUMA - CT spine cervical wo contrast   Final Result      No cervical spine fracture or traumatic malalignment  Severe multilevel cervical spondylosis  Workstation performed: [id number]         TRAUMA - CT chest abdomen pelvis w contrast   Final Result      Acute impacted fracture of right proximal femoral neck extending to right femoral head  No acute visceral organ or vascular injury of the chest, abdomen, or pelvis  Additional chronic/incidental findings as detailed above  The study was marked in [facility] for immediate notification              Workstation performed: [id number]         CT recon only lumbar spine   Final Result      No acute osseous abnormality of lumbar spine  Degenerative changes as detailed above  Please see same-day CT chest abdomen pelvis for further evaluation  The study was marked in [facility] for immediate notification  Workstation performed: [id number]         XR Trauma pelvis ap only 1 or 2 vw   ED Interpretation   Abnormal   R femoral neck fracture      Final Result      Acute impacted right femoral neck fracture  The study was marked in [facility] for immediate notification  Workstation performed: [id number]         XR Trauma chest portable   Final Result      No acute cardiopulmonary disease  Workstation performed: [id number]             Significant Findings / Test Results:   Results for orders placed or performed during the hospital encounter of 02/08/23   Urine culture    Specimen: Urine, Clean Catch   Result Value Ref Range    Urine Culture <10,000 cfu/ml    COVID19, Influenza A/B, RSV PCR, [facility]    Specimen: Nose; Nares   Result Value Ref Range    SARS-CoV-2 Negative Negative    INFLUENZA A PCR Negative Negative    INFLUENZA B PCR Negative Negative    RSV PCR Negative Negative   MRSA culture    Specimen: Nose; Nares   Result Value Ref Range    MRSA Culture Only       No Methicillin Resistant Staphlyococcus aureus (MRSA) isolated   Blood culture    Specimen: Arm, Left; Blood   Result Value Ref Range    Blood Culture No Growth After 5 Days  Blood culture    Specimen: Hand, Right; Blood   Result Value Ref Range    Blood Culture No Growth After 5 Days      COVID only    Specimen: Nose; Nares   Result Value Ref Range    SARS-CoV-2 Negative Negative   CBC and differential   Result Value Ref Range    WBC 14 05 (H) 4 31 - 10 16 Thousand/uL    RBC 4 49 3 88 - 5 62 Million/uL    Hemoglobin 10 3 (L) 12 0 - 17 0 g/dL    Hematocrit 34 6 (L) 36 5 - 49 3 %    MCV 77 (L) 82 - 98 fL    MCH 22 9 (L) 26 8 - 34 3 pg    MCHC 29 8 (L) 31 4 - 37 4 g/dL    RDW 18 6 (H) 11 6 - 15 1 %    MPV 10 2 8 9 - 12 7 fL Platelets 099 (H) 069 - 390 Thousands/uL    nRBC 0 /100 WBCs    Neutrophils Relative 78 (H) 43 - 75 %    Immat GRANS % 1 0 - 2 %    Lymphocytes Relative 11 (L) 14 - 44 %    Monocytes Relative 7 4 - 12 %    Eosinophils Relative 3 0 - 6 %    Basophils Relative 0 0 - 1 %    Neutrophils Absolute 10 93 (H) 1 85 - 7 62 Thousands/µL    Immature Grans Absolute 0 10 0 00 - 0 20 Thousand/uL    Lymphocytes Absolute 1 51 0 60 - 4 47 Thousands/µL    Monocytes Absolute 1 03 0 17 - 1 22 Thousand/µL    Eosinophils Absolute 0 42 0 00 - 0 61 Thousand/µL    Basophils Absolute 0 06 0 00 - 0 10 Thousands/µL   Comprehensive metabolic panel   Result Value Ref Range    Sodium 137 135 - 147 mmol/L    Potassium 4 4 3 5 - 5 3 mmol/L    Chloride 93 (L) 96 - 108 mmol/L    CO2 30 21 - 32 mmol/L    ANION GAP 14 (H) 4 - 13 mmol/L    BUN 81 (H) 5 - 25 mg/dL    Creatinine 3 13 (H) 0 60 - 1 30 mg/dL    Glucose 119 65 - 140 mg/dL    Calcium 9 5 8 4 - 10 2 mg/dL    AST 14 13 - 39 U/L    ALT 7 7 - 52 U/L    Alkaline Phosphatase 104 34 - 104 U/L    Total Protein 7 9 6 4 - 8 4 g/dL    Albumin 4 5 3 5 - 5 0 g/dL    Total Bilirubin 0 47 0 20 - 1 00 mg/dL    eGFR 16 ml/min/1 73sq m   Protime-INR   Result Value Ref Range    Protime 15 3 (H) 11 6 - 14 5 seconds    INR 1 19 0 84 - 1 19   APTT   Result Value Ref Range    PTT 31 23 - 37 seconds   HS Troponin 0hr (reflex protocol)   Result Value Ref Range    hs TnI 0hr 26 "Refer to ACS Flowchart"- see link ng/L   CK (with reflex to MB)   Result Value Ref Range    Total  39 - 308 U/L   HS Troponin I 2hr   Result Value Ref Range    hs TnI 2hr 23 "Refer to ACS Flowchart"- see link ng/L    Delta 2hr hsTnI -3 <20 ng/L   HS Troponin I 4hr   Result Value Ref Range    hs TnI 4hr 27 "Refer to ACS Flowchart"- see link ng/L    Delta 4hr hsTnI 1 <20 ng/L   Urinalysis with microscopic   Result Value Ref Range    Color, UA Yellow     Clarity, UA Clear     Specific Gravity, UA <=1 005 1 003 - 1 030    pH, UA 6 5 4 5, 5  0, 5 5, 6 0, 6 5, 7 0, 7 5, 8 0    Leukocytes, UA Negative Negative    Nitrite, UA Negative Negative    Protein, UA Negative Negative mg/dl    Glucose, UA Negative Negative mg/dl    Ketones, UA Negative Negative mg/dl    Urobilinogen, UA 0 2 0 2, 1 0 E U /dl E U /dl    Bilirubin, UA Negative Negative    Occult Blood, UA Trace-Intact (A) Negative    RBC, UA 0-1 (A) None Seen, 2-4 /hpf    WBC, UA 0-1 (A) None Seen, 2-4, 5-60 /hpf    Epithelial Cells Occasional None Seen, Occasional /hpf    Bacteria, UA None Seen None Seen, Occasional /hpf   Sodium, urine, random   Result Value Ref Range    Sodium, Ur 79    Protein / creatinine ratio, urine   Result Value Ref Range    Creatinine, Ur 34 2 mg/dL    Protein Urine Random 14 mg/dL    Prot/Creat Ratio, Ur 0 41 (H) 0 00 - 0 10   Hemoglobin and hematocrit, blood   Result Value Ref Range    Hemoglobin 9 6 (L) 12 0 - 17 0 g/dL    Hematocrit 32 2 (L) 36 5 - 49 3 %   CBC and differential   Result Value Ref Range    WBC 14 48 (H) 4 31 - 10 16 Thousand/uL    RBC 3 93 3 88 - 5 62 Million/uL    Hemoglobin 8 8 (L) 12 0 - 17 0 g/dL    Hematocrit 30 1 (L) 36 5 - 49 3 %    MCV 77 (L) 82 - 98 fL    MCH 22 4 (L) 26 8 - 34 3 pg    MCHC 29 2 (L) 31 4 - 37 4 g/dL    RDW 18 4 (H) 11 6 - 15 1 %    MPV 10 2 8 9 - 12 7 fL    Platelets 548 837 - 504 Thousands/uL    nRBC 0 /100 WBCs    Neutrophils Relative 89 (H) 43 - 75 %    Immat GRANS % 1 0 - 2 %    Lymphocytes Relative 4 (L) 14 - 44 %    Monocytes Relative 6 4 - 12 %    Eosinophils Relative 0 0 - 6 %    Basophils Relative 0 0 - 1 %    Neutrophils Absolute 12 92 (H) 1 85 - 7 62 Thousands/µL    Immature Grans Absolute 0 07 0 00 - 0 20 Thousand/uL    Lymphocytes Absolute 0 60 0 60 - 4 47 Thousands/µL    Monocytes Absolute 0 85 0 17 - 1 22 Thousand/µL    Eosinophils Absolute 0 01 0 00 - 0 61 Thousand/µL    Basophils Absolute 0 03 0 00 - 0 10 Thousands/µL   CK   Result Value Ref Range    Total CK 99 39 - 308 U/L   Comprehensive metabolic panel Result Value Ref Range    Sodium 138 135 - 147 mmol/L    Potassium 4 5 3 5 - 5 3 mmol/L    Chloride 96 96 - 108 mmol/L    CO2 27 21 - 32 mmol/L    ANION GAP 15 (H) 4 - 13 mmol/L    BUN 75 (H) 5 - 25 mg/dL    Creatinine 2 99 (H) 0 60 - 1 30 mg/dL    Glucose 106 65 - 140 mg/dL    Calcium 9 0 8 4 - 10 2 mg/dL    AST 13 13 - 39 U/L    ALT 4 (L) 7 - 52 U/L    Alkaline Phosphatase 90 34 - 104 U/L    Total Protein 6 8 6 4 - 8 4 g/dL    Albumin 3 9 3 5 - 5 0 g/dL    Total Bilirubin 0 63 0 20 - 1 00 mg/dL    eGFR 17 ml/min/1 73sq m   Magnesium   Result Value Ref Range    Magnesium 1 5 (L) 1 9 - 2 7 mg/dL   Phosphorus   Result Value Ref Range    Phosphorus 5 2 (H) 2 3 - 4 1 mg/dL   Procalcitonin   Result Value Ref Range    Procalcitonin 0 35 (H) <=0 25 ng/ml   Lactic acid, plasma   Result Value Ref Range    LACTIC ACID 1 1 0 5 - 2 0 mmol/L   CBC and differential   Result Value Ref Range    WBC 13 66 (H) 4 31 - 10 16 Thousand/uL    RBC 4 12 3 88 - 5 62 Million/uL    Hemoglobin 9 3 (L) 12 0 - 17 0 g/dL    Hematocrit 31 5 (L) 36 5 - 49 3 %    MCV 77 (L) 82 - 98 fL    MCH 22 6 (L) 26 8 - 34 3 pg    MCHC 29 5 (L) 31 4 - 37 4 g/dL    RDW 17 8 (H) 11 6 - 15 1 %    MPV 10 2 8 9 - 12 7 fL    Platelets 646 011 - 793 Thousands/uL    nRBC 0 /100 WBCs    Neutrophils Relative 82 (H) 43 - 75 %    Immat GRANS % 1 0 - 2 %    Lymphocytes Relative 6 (L) 14 - 44 %    Monocytes Relative 9 4 - 12 %    Eosinophils Relative 2 0 - 6 %    Basophils Relative 0 0 - 1 %    Neutrophils Absolute 11 18 (H) 1 85 - 7 62 Thousands/µL    Immature Grans Absolute 0 08 0 00 - 0 20 Thousand/uL    Lymphocytes Absolute 0 85 0 60 - 4 47 Thousands/µL    Monocytes Absolute 1 28 (H) 0 17 - 1 22 Thousand/µL    Eosinophils Absolute 0 24 0 00 - 0 61 Thousand/µL    Basophils Absolute 0 03 0 00 - 0 10 Thousands/µL   CK   Result Value Ref Range    Total  39 - 308 U/L   Comprehensive metabolic panel   Result Value Ref Range    Sodium 136 135 - 147 mmol/L    Potassium 4 2 3 5 - 5 3 mmol/L    Chloride 93 (L) 96 - 108 mmol/L    CO2 29 21 - 32 mmol/L    ANION GAP 14 (H) 4 - 13 mmol/L    BUN 76 (H) 5 - 25 mg/dL    Creatinine 3 47 (H) 0 60 - 1 30 mg/dL    Glucose 106 65 - 140 mg/dL    Calcium 8 6 8 4 - 10 2 mg/dL    AST 13 13 - 39 U/L    ALT 5 (L) 7 - 52 U/L    Alkaline Phosphatase 77 34 - 104 U/L    Total Protein 6 2 (L) 6 4 - 8 4 g/dL    Albumin 3 6 3 5 - 5 0 g/dL    Total Bilirubin 0 54 0 20 - 1 00 mg/dL    eGFR 14 ml/min/1 73sq m   Magnesium   Result Value Ref Range    Magnesium 2 1 1 9 - 2 7 mg/dL   Phosphorus   Result Value Ref Range    Phosphorus 4 6 (H) 2 3 - 4 1 mg/dL   Procalcitonin   Result Value Ref Range    Procalcitonin 0 54 (H) <=0 25 ng/ml   Protime-INR   Result Value Ref Range    Protime 15 2 (H) 11 6 - 14 5 seconds    INR 1 18 0 84 - 1 19   CBC and differential   Result Value Ref Range    WBC 13 09 (H) 4 31 - 10 16 Thousand/uL    RBC 3 50 (L) 3 88 - 5 62 Million/uL    Hemoglobin 7 9 (L) 12 0 - 17 0 g/dL    Hematocrit 27 1 (L) 36 5 - 49 3 %    MCV 77 (L) 82 - 98 fL    MCH 22 6 (L) 26 8 - 34 3 pg    MCHC 29 2 (L) 31 4 - 37 4 g/dL    RDW 18 0 (H) 11 6 - 15 1 %    MPV 10 2 8 9 - 12 7 fL    Platelets 123 837 - 495 Thousands/uL   Comprehensive metabolic panel   Result Value Ref Range    Sodium 136 135 - 147 mmol/L    Potassium 4 6 3 5 - 5 3 mmol/L    Chloride 94 (L) 96 - 108 mmol/L    CO2 25 21 - 32 mmol/L    ANION GAP 17 (H) 4 - 13 mmol/L    BUN 81 (H) 5 - 25 mg/dL    Creatinine 4 29 (H) 0 60 - 1 30 mg/dL    Glucose 126 65 - 140 mg/dL    Calcium 8 7 8 4 - 10 2 mg/dL    Corrected Calcium 9 3 8 3 - 10 1 mg/dL    AST 23 13 - 39 U/L    ALT <3 (L) 7 - 52 U/L    Alkaline Phosphatase 65 34 - 104 U/L    Total Protein 5 9 (L) 6 4 - 8 4 g/dL    Albumin 3 3 (L) 3 5 - 5 0 g/dL    Total Bilirubin 0 31 0 20 - 1 00 mg/dL    eGFR 11 ml/min/1 73sq m   Phosphorus   Result Value Ref Range    Phosphorus 6 7 (H) 2 3 - 4 1 mg/dL   Magnesium   Result Value Ref Range    Magnesium 2 1 1 9 - 2 7 mg/dL   CK   Result Value Ref Range    Total  (H) 39 - 308 U/L   Procalcitonin   Result Value Ref Range    Procalcitonin 1 83 (H) <=0 25 ng/ml   CKMB   Result Value Ref Range    CK-MB Index 0 8 0 0 - 2 5 %    CK-MB 5 3 0 6 - 6 3 ng/mL   Blood gas, arterial   Result Value Ref Range    pH, Arterial 7 348 (L) 7 350 - 7 450    pCO2, Arterial 47 2 (H) 36 0 - 44 0 mm Hg    pO2, Arterial 112 6 75 0 - 129 0 mm Hg    HCO3, Arterial 25 4 22 0 - 28 0 mmol/L    Base Excess, Arterial -0 4 mmol/L    O2 Content, Arterial 11 6 (L) 16 0 - 23 0 mL/dL    O2 HGB,Arterial  96 5 94 0 - 97 0 %    SOURCE Brachial, Left     Nasal Cannula 2 lpm    CBC and differential   Result Value Ref Range    WBC 11 73 (H) 4 31 - 10 16 Thousand/uL    RBC 3 06 (L) 3 88 - 5 62 Million/uL    Hemoglobin 7 2 (L) 12 0 - 17 0 g/dL    Hematocrit 23 5 (L) 36 5 - 49 3 %    MCV 77 (L) 82 - 98 fL    MCH 23 5 (L) 26 8 - 34 3 pg    MCHC 30 6 (L) 31 4 - 37 4 g/dL    RDW 18 2 (H) 11 6 - 15 1 %    MPV 10 0 8 9 - 12 7 fL    Platelets 451 645 - 510 Thousands/uL    nRBC 0 /100 WBCs    Neutrophils Relative 87 (H) 43 - 75 %    Immat GRANS % 1 0 - 2 %    Lymphocytes Relative 4 (L) 14 - 44 %    Monocytes Relative 8 4 - 12 %    Eosinophils Relative 0 0 - 6 %    Basophils Relative 0 0 - 1 %    Neutrophils Absolute 10 36 (H) 1 85 - 7 62 Thousands/µL    Immature Grans Absolute 0 08 0 00 - 0 20 Thousand/uL    Lymphocytes Absolute 0 41 (L) 0 60 - 4 47 Thousands/µL    Monocytes Absolute 0 88 0 17 - 1 22 Thousand/µL    Eosinophils Absolute 0 00 0 00 - 0 61 Thousand/µL    Basophils Absolute 0 00 0 00 - 0 10 Thousands/µL   CK   Result Value Ref Range    Total  (H) 39 - 308 U/L   Comprehensive metabolic panel   Result Value Ref Range    Sodium 129 (L) 135 - 147 mmol/L    Potassium 4 3 3 5 - 5 3 mmol/L    Chloride 90 (L) 96 - 108 mmol/L    CO2 28 21 - 32 mmol/L    ANION GAP 11 4 - 13 mmol/L    BUN 95 (H) 5 - 25 mg/dL    Creatinine 4 80 (H) 0 60 - 1 30 mg/dL    Glucose 131 65 - 140 mg/dL    Calcium 8 1 (L) 8 4 - 10 2 mg/dL    Corrected Calcium 9 0 8 3 - 10 1 mg/dL    AST 20 13 - 39 U/L    ALT <3 (L) 7 - 52 U/L    Alkaline Phosphatase 55 34 - 104 U/L    Total Protein 5 5 (L) 6 4 - 8 4 g/dL    Albumin 2 9 (L) 3 5 - 5 0 g/dL    Total Bilirubin 0 19 (L) 0 20 - 1 00 mg/dL    eGFR 10 ml/min/1 73sq m   Magnesium   Result Value Ref Range    Magnesium 2 1 1 9 - 2 7 mg/dL   Phosphorus   Result Value Ref Range    Phosphorus 5 0 (H) 2 3 - 4 1 mg/dL   Procalcitonin   Result Value Ref Range    Procalcitonin 3 20 (H) <=0 25 ng/ml   CKMB   Result Value Ref Range    CK-MB Index 0 9 0 0 - 2 5 %    CK-MB 3 6 0 6 - 6 3 ng/mL   Hemoglobin and hematocrit, blood   Result Value Ref Range    Hemoglobin 7 0 (L) 12 0 - 17 0 g/dL    Hematocrit 22 9 (L) 36 5 - 49 3 %   CBC and differential   Result Value Ref Range    WBC 11 47 (H) 4 31 - 10 16 Thousand/uL    RBC 3 13 (L) 3 88 - 5 62 Million/uL    Hemoglobin 7 3 (L) 12 0 - 17 0 g/dL    Hematocrit 24 1 (L) 36 5 - 49 3 %    MCV 77 (L) 82 - 98 fL    MCH 23 3 (L) 26 8 - 34 3 pg    MCHC 30 3 (L) 31 4 - 37 4 g/dL    RDW 18 3 (H) 11 6 - 15 1 %    MPV 10 0 8 9 - 12 7 fL    Platelets 374 841 - 018 Thousands/uL    nRBC 0 /100 WBCs    Neutrophils Relative 85 (H) 43 - 75 %    Immat GRANS % 1 0 - 2 %    Lymphocytes Relative 6 (L) 14 - 44 %    Monocytes Relative 7 4 - 12 %    Eosinophils Relative 1 0 - 6 %    Basophils Relative 0 0 - 1 %    Neutrophils Absolute 9 82 (H) 1 85 - 7 62 Thousands/µL    Immature Grans Absolute 0 07 0 00 - 0 20 Thousand/uL    Lymphocytes Absolute 0 63 0 60 - 4 47 Thousands/µL    Monocytes Absolute 0 85 0 17 - 1 22 Thousand/µL    Eosinophils Absolute 0 09 0 00 - 0 61 Thousand/µL    Basophils Absolute 0 01 0 00 - 0 10 Thousands/µL   Comprehensive metabolic panel   Result Value Ref Range    Sodium 133 (L) 135 - 147 mmol/L    Potassium 4 1 3 5 - 5 3 mmol/L    Chloride 92 (L) 96 - 108 mmol/L    CO2 28 21 - 32 mmol/L    ANION GAP 13 4 - 13 mmol/L    BUN 98 (H) 5 - 25 mg/dL    Creatinine 4 42 (H) 0 60 - 1 30 mg/dL    Glucose 118 65 - 140 mg/dL    Calcium 8 3 (L) 8 4 - 10 2 mg/dL    Corrected Calcium 9 0 8 3 - 10 1 mg/dL    AST 22 13 - 39 U/L    ALT <3 (L) 7 - 52 U/L    Alkaline Phosphatase 56 34 - 104 U/L    Total Protein 5 7 (L) 6 4 - 8 4 g/dL    Albumin 3 1 (L) 3 5 - 5 0 g/dL    Total Bilirubin 0 24 0 20 - 1 00 mg/dL    eGFR 11 ml/min/1 73sq m   CK (with reflex to MB)   Result Value Ref Range    Total  39 - 308 U/L   Magnesium   Result Value Ref Range    Magnesium 2 3 1 9 - 2 7 mg/dL   Phosphorus   Result Value Ref Range    Phosphorus 4 7 (H) 2 3 - 4 1 mg/dL   Procalcitonin   Result Value Ref Range    Procalcitonin 2 39 (H) <=0 25 ng/ml   High Sensitivity Troponin I Random   Result Value Ref Range    HS TnI random 52 (H) 8 - 18 ng/L   CKMB   Result Value Ref Range    CK-MB Index 0 9 0 0 - 2 5 %    CK-MB 2 6 0 6 - 6 3 ng/mL   CBC and differential   Result Value Ref Range    WBC 10 84 (H) 4 31 - 10 16 Thousand/uL    RBC 3 05 (L) 3 88 - 5 62 Million/uL    Hemoglobin 7 0 (L) 12 0 - 17 0 g/dL    Hematocrit 23 8 (L) 36 5 - 49 3 %    MCV 78 (L) 82 - 98 fL    MCH 23 0 (L) 26 8 - 34 3 pg    MCHC 29 4 (L) 31 4 - 37 4 g/dL    RDW 18 4 (H) 11 6 - 15 1 %    MPV 10 6 8 9 - 12 7 fL    Platelets 113 350 - 169 Thousands/uL    nRBC 0 /100 WBCs    Neutrophils Relative 82 (H) 43 - 75 %    Immat GRANS % 1 0 - 2 %    Lymphocytes Relative 6 (L) 14 - 44 %    Monocytes Relative 8 4 - 12 %    Eosinophils Relative 3 0 - 6 %    Basophils Relative 0 0 - 1 %    Neutrophils Absolute 8 91 (H) 1 85 - 7 62 Thousands/µL    Immature Grans Absolute 0 05 0 00 - 0 20 Thousand/uL    Lymphocytes Absolute 0 66 0 60 - 4 47 Thousands/µL    Monocytes Absolute 0 88 0 17 - 1 22 Thousand/µL    Eosinophils Absolute 0 33 0 00 - 0 61 Thousand/µL    Basophils Absolute 0 01 0 00 - 0 10 Thousands/µL   Comprehensive metabolic panel   Result Value Ref Range    Sodium 134 (L) 135 - 147 mmol/L    Potassium 4 5 3 5 - 5 3 mmol/L    Chloride 94 (L) 96 - 108 mmol/L    CO2 27 21 - 32 mmol/L    ANION GAP 13 4 - 13 mmol/L    BUN 91 (H) 5 - 25 mg/dL    Creatinine 4 00 (H) 0 60 - 1 30 mg/dL    Glucose 115 65 - 140 mg/dL    Calcium 8 0 (L) 8 4 - 10 2 mg/dL    Corrected Calcium 8 8 8 3 - 10 1 mg/dL    AST 17 13 - 39 U/L    ALT <3 (L) 7 - 52 U/L    Alkaline Phosphatase 57 34 - 104 U/L    Total Protein 5 7 (L) 6 4 - 8 4 g/dL    Albumin 3 0 (L) 3 5 - 5 0 g/dL    Total Bilirubin 0 23 0 20 - 1 00 mg/dL    eGFR 12 ml/min/1 73sq m   Procalcitonin   Result Value Ref Range    Procalcitonin 1 35 (H) <=0 25 ng/ml   Phosphorus   Result Value Ref Range    Phosphorus 4 9 (H) 2 3 - 4 1 mg/dL   CBC and differential   Result Value Ref Range    WBC 13 03 (H) 4 31 - 10 16 Thousand/uL    RBC 3 41 (L) 3 88 - 5 62 Million/uL    Hemoglobin 8 0 (L) 12 0 - 17 0 g/dL    Hematocrit 26 7 (L) 36 5 - 49 3 %    MCV 78 (L) 82 - 98 fL    MCH 23 5 (L) 26 8 - 34 3 pg    MCHC 30 0 (L) 31 4 - 37 4 g/dL    RDW 18 1 (H) 11 6 - 15 1 %    MPV 10 5 8 9 - 12 7 fL    Platelets 751 581 - 453 Thousands/uL    nRBC 0 /100 WBCs    Neutrophils Relative 85 (H) 43 - 75 %    Immat GRANS % 1 0 - 2 %    Lymphocytes Relative 4 (L) 14 - 44 %    Monocytes Relative 8 4 - 12 %    Eosinophils Relative 2 0 - 6 %    Basophils Relative 0 0 - 1 %    Neutrophils Absolute 11 12 (H) 1 85 - 7 62 Thousands/µL    Immature Grans Absolute 0 08 0 00 - 0 20 Thousand/uL    Lymphocytes Absolute 0 54 (L) 0 60 - 4 47 Thousands/µL    Monocytes Absolute 0 99 0 17 - 1 22 Thousand/µL    Eosinophils Absolute 0 29 0 00 - 0 61 Thousand/µL    Basophils Absolute 0 01 0 00 - 0 10 Thousands/µL   Procalcitonin   Result Value Ref Range    Procalcitonin 1 02 (H) <=0 25 ng/ml   Comprehensive metabolic panel   Result Value Ref Range    Sodium 138 135 - 147 mmol/L    Potassium 4 1 3 5 - 5 3 mmol/L    Chloride 96 96 - 108 mmol/L    CO2 31 21 - 32 mmol/L    ANION GAP 11 4 - 13 mmol/L    BUN 88 (H) 5 - 25 mg/dL    Creatinine 3 57 (H) 0 60 - 1 30 mg/dL    Glucose 114 65 - 140 mg/dL    Calcium 8 1 (L) 8 4 - 10 2 mg/dL    Corrected Calcium 9 0 8 3 - 10 1 mg/dL    AST 16 13 - 39 U/L    ALT <3 (L) 7 - 52 U/L    Alkaline Phosphatase 55 34 - 104 U/L    Total Protein 5 6 (L) 6 4 - 8 4 g/dL    Albumin 2 9 (L) 3 5 - 5 0 g/dL    Total Bilirubin 0 33 0 20 - 1 00 mg/dL    eGFR 14 ml/min/1 73sq m   Phosphorus   Result Value Ref Range    Phosphorus 4 4 (H) 2 3 - 4 1 mg/dL   CBC and differential   Result Value Ref Range    WBC 12 73 (H) 4 31 - 10 16 Thousand/uL    RBC 3 71 (L) 3 88 - 5 62 Million/uL    Hemoglobin 8 5 (L) 12 0 - 17 0 g/dL    Hematocrit 29 5 (L) 36 5 - 49 3 %    MCV 80 (L) 82 - 98 fL    MCH 22 9 (L) 26 8 - 34 3 pg    MCHC 28 8 (L) 31 4 - 37 4 g/dL    RDW 18 2 (H) 11 6 - 15 1 %    MPV 10 2 8 9 - 12 7 fL    Platelets 989 572 - 537 Thousands/uL    nRBC 0 /100 WBCs    Neutrophils Relative 81 (H) 43 - 75 %    Immat GRANS % 1 0 - 2 %    Lymphocytes Relative 5 (L) 14 - 44 %    Monocytes Relative 7 4 - 12 %    Eosinophils Relative 6 0 - 6 %    Basophils Relative 0 0 - 1 %    Neutrophils Absolute 10 29 (H) 1 85 - 7 62 Thousands/µL    Immature Grans Absolute 0 07 0 00 - 0 20 Thousand/uL    Lymphocytes Absolute 0 69 0 60 - 4 47 Thousands/µL    Monocytes Absolute 0 86 0 17 - 1 22 Thousand/µL    Eosinophils Absolute 0 79 (H) 0 00 - 0 61 Thousand/µL    Basophils Absolute 0 03 0 00 - 0 10 Thousands/µL   Basic metabolic panel   Result Value Ref Range    Sodium 135 135 - 147 mmol/L    Potassium 4 2 3 5 - 5 3 mmol/L    Chloride 96 96 - 108 mmol/L    CO2 30 21 - 32 mmol/L    ANION GAP 9 4 - 13 mmol/L    BUN 86 (H) 5 - 25 mg/dL    Creatinine 3 00 (H) 0 60 - 1 30 mg/dL    Glucose 103 65 - 140 mg/dL    Calcium 8 4 8 4 - 10 2 mg/dL    eGFR 17 ml/min/1 73sq m   ECG 12 lead   Result Value Ref Range    Ventricular Rate 60 BPM    Atrial Rate 340 BPM    CO Interval  ms    QRSD Interval 108 ms    QT Interval 476 ms    QTC Interval 476 ms    P Axis  degrees    QRS Axis 61 degrees T Wave Axis 15 degrees   Echo complete w/ contrast if indicated   Result Value Ref Range    LA size 4 9 cm    Triscuspid Valve Regurgitation Peak Gradient 37 0 mmHg    Tricuspid valve peak regurgitation velocity 3 03 m/s    LVPWd 1 40 cm    MV E' Tissue Velocity Septal 5 cm/s    Tricuspid annular plane systolic excursion 3 04 cm    TR Peak Guevara 3 0 m/s    Right Ventricular Peak Systolic Pressure 64 86 mmHg    IVSd 2 96 cm    LV DIASTOLIC VOLUME (MOD BIPLANE) 2D 41 mL    LEFT VENTRICLE SYSTOLIC VOLUME (MOD BIPLANE) 2D 17 mL    Left ventricular stroke volume (2D) 24 00 mL    A4C EF 55 %    LVIDd 3 20 cm    IVS 1 6 cm    LVIDS 2 20 cm    FS 31 28 - 44 %    Ao root 3 20 cm    RVID d 3 2 cm    Est  RA pres 8 0 mmHg    MV mean gradient antegrade 3 mmHg    MV valve area p 1/2 method 3 14 cm2    E wave deceleration time 242 ms    AV peak gradient 9 mmHg    MV peak gradient antegrade 7 mmHg    MV Peak E Guevara 118 cm/s    MV VTI 31 65 cm    MV Peak A Guevara 1 07 m/s    ARASH A4C 23 5 cm2    RAA A4C 20 8 cm2    MV stenosis pressure 1/2 time 70 ms    LVSV, 2D 24 mL    LV EF 65    Type and screen   Result Value Ref Range    ABO Grouping A     Rh Factor Positive     Antibody Screen Negative     Specimen Expiration Date 20230211    Prepare Leukoreduced RBC: 1 Units   Result Value Ref Range    Unit Product Code C1381K33     Unit Number [id number]     Unit ABO A     Unit RH POS     Crossmatch Compatible     Unit Dispense Status Presumed Trans     Unit Product Volume 350 ml   Prepare Leukoreduced RBC: 2 Units   Result Value Ref Range    Unit Product Code D6536N44     Unit Number [id number]     Unit ABO A     Unit RH POS     Crossmatch Compatible     Unit Dispense Status Return to [facility]     Unit Product Volume 350 ml    Unit Product Code F3221M98     Unit Number [id number]     Unit ABO A     Unit RH POS     Crossmatch Compatible     Unit Dispense Status Return to [facility]     Unit Product Volume 350 ml   Type and screen   Result Value Ref Range    ABO Grouping A     Rh Factor Positive     Antibody Screen Negative     Specimen Expiration Date 20230217    Prepare Leukoreduced RBC: 1 Units   Result Value Ref Range    Unit Product Code H9936L04     Unit Number [id number]     Unit ABO A     Unit RH POS     Crossmatch Compatible     Unit Dispense Status Presumed Trans     Unit Product Volume 350 ml   Manual Differential(PHLEBS Do Not Order)   Result Value Ref Range    Segmented % 93 (H) 43 - 75 %    Bands % 1 0 - 8 %    Lymphocytes % 5 (L) 14 - 44 %    Monocytes % 1 (L) 4 - 12 %    Eosinophils, % 0 0 - 6 %    Basophils % 0 0 - 1 %    Absolute Neutrophils 12 30 (H) 1 85 - 7 62 Thousand/uL    Lymphocytes Absolute 0 65 0 60 - 4 47 Thousand/uL    Monocytes Absolute 0 13 0 00 - 1 22 Thousand/uL    Eosinophils Absolute 0 00 0 00 - 0 40 Thousand/uL    Basophils Absolute 0 00 0 00 - 0 10 Thousand/uL    Total Counted      Anisocytosis Present     Ovalocytes Present     Platelet Estimate Adequate Adequate    Large Platelet Present        Incidental Findings:   Ultrasound of the kidneys and bladder (02/08/2023): Medical renal disease      Bilateral renal cysts, most of which are simple in appearance with the exception of a 3 2 x 3 3 x 3 2 cm suboptimally visualized right renal cyst, likely complex  A six-month follow-up renal ultrasound is recommended  Follow up required:  Yes    Follow up should be done within 6 month(s)      Test Results Pending at Discharge (will require follow up):   · none     Outpatient Tests Requested:  · BMP prior to Nephrology follow up    Complications:  none    Reason for Admission: Right Hip Pain    Hospital Course:   Mary Ann Eid is a 80 y o  male patient who originally presented to the hospital on 2/8/2023 due to mechanical fall at home with severe right hip femoral neck fracture  He was admitted to med/surg level of care for further evaluation  Orthopedic Surgery was consulted   Cardiology pre-operative cardiac risk stratification was moderate but not prohibitive  Right hip hemiarthroplasty was performed without complication on 6/97/48 after cardiology clearance  Patient was maintained on pain control and incentive spirometry  He was later transferred to ICU level of care on 02/11/2023 for worsening mentation from toxic encephalopathy likely due to anesthesia effect from 02/10/2023 and possible uremia  His neurologic status was closely monitored  He also developed YOLANDE during the hospitalization likely due to multiple etiologies including contrast-induced nephrology, ATN and prerenal azotemia  Nephrology was following the patient in consultation  He was treated with IVF  His renal function and electrolytes were monitored closely  His mental status continued to improve through the course of his hospitalization  He also appeared to have chronic leukocytosis and was treated with abx  His blood cultures remained negative, procalcitonin was downtrending  During the course of his hospitalization he developed acute blood loss anemia and received 2 units of blood total  His Hb remained stable at the time of d/c  He continued to receive PT, OT until d/c  Patient was hemodynamically stable at the time of discharge  Please see above list of diagnoses and related plan for additional information  Condition at Discharge: stable    Discharge Day Visit / Exam:   Subjective:  Pt seen and examined  He seemed alert, oriented  Makes slight movements  Sensations intact  No new complaints  Vitals: Blood Pressure: 147/67 (02/16/23 0842)  Pulse: 60 (02/16/23 0842)  Temperature: 97 7 °F (36 5 °C) (02/16/23 0818)  Temp Source: Temporal (02/16/23 0818)  Respirations: 18 (02/16/23 0818)  Height: 5' 4" (162 6 cm) (02/14/23 1600)  Weight - Scale: 90 3 kg (199 lb 1 2 oz) (02/16/23 0600)  SpO2: 96 % (02/16/23 0842)  Exam:   Physical Exam  Vitals and nursing note reviewed     Constitutional:       General: He is not in acute distress  Appearance: He is well-developed  HENT:      Head: Normocephalic and atraumatic  Eyes:      Conjunctiva/sclera: Conjunctivae normal    Cardiovascular:      Rate and Rhythm: Normal rate and regular rhythm  Heart sounds: No murmur heard  Pulmonary:      Effort: Pulmonary effort is normal  No respiratory distress  Breath sounds: Normal breath sounds  Abdominal:      Palpations: Abdomen is soft  Tenderness: There is no abdominal tenderness  Musculoskeletal:         General: No swelling  Cervical back: Neck supple  Skin:     General: Skin is warm and dry  Capillary Refill: Capillary refill takes less than 2 seconds  Neurological:      Mental Status: He is alert  Psychiatric:         Mood and Affect: Mood normal          Discharge instructions/Information to patient and family:   See after visit summary for information provided to patient and family  Provisions for Follow-Up Care:  See after visit summary for information related to follow-up care and any pertinent home health orders  Disposition:   [facility] 8977: [facility]       Planned Readmission: no     Discharge Statement:  I spent 30 minutes discharging the patient  This time was spent on the day of discharge  I had direct contact with the patient on the day of discharge  Greater than 50% of the total time was spent examining patient, answering all patient questions, arranging and discussing plan of care with patient as well as directly providing post-discharge instructions  Additional time then spent on discharge activities  Discharge Medications:  See after visit summary for reconciled discharge medications provided to patient and/or family        **Please Note: This note may have been constructed using a voice recognition system**

## 2023-02-17 NOTE — ADDENDUM NOTE
Addendum  created 02/17/23 1024 by Dayana Bailey, CRNA    Intraprocedure Event deleted, Intraprocedure Event edited

## 2023-02-18 ENCOUNTER — LAB REQUISITION (OUTPATIENT)
Dept: LAB | Facility: HOSPITAL | Age: 88
End: 2023-02-18

## 2023-02-18 DIAGNOSIS — N18.4 CHRONIC KIDNEY DISEASE, STAGE 4 (SEVERE) (HCC): ICD-10-CM

## 2023-02-18 DIAGNOSIS — D62 ACUTE POSTHEMORRHAGIC ANEMIA: ICD-10-CM

## 2023-02-18 LAB
BASOPHILS # BLD AUTO: 0.02 THOUSANDS/ÂΜL (ref 0–0.1)
BASOPHILS NFR BLD AUTO: 0 % (ref 0–1)
EOSINOPHIL # BLD AUTO: 0.99 THOUSAND/ÂΜL (ref 0–0.61)
EOSINOPHIL NFR BLD AUTO: 8 % (ref 0–6)
ERYTHROCYTE [DISTWIDTH] IN BLOOD BY AUTOMATED COUNT: 18.7 % (ref 11.6–15.1)
HCT VFR BLD AUTO: 30.5 % (ref 36.5–49.3)
HGB BLD-MCNC: 9.1 G/DL (ref 12–17)
IMM GRANULOCYTES # BLD AUTO: 0.09 THOUSAND/UL (ref 0–0.2)
IMM GRANULOCYTES NFR BLD AUTO: 1 % (ref 0–2)
LYMPHOCYTES # BLD AUTO: 1.05 THOUSANDS/ÂΜL (ref 0.6–4.47)
LYMPHOCYTES NFR BLD AUTO: 9 % (ref 14–44)
MCH RBC QN AUTO: 24.1 PG (ref 26.8–34.3)
MCHC RBC AUTO-ENTMCNC: 29.8 G/DL (ref 31.4–37.4)
MCV RBC AUTO: 81 FL (ref 82–98)
MONOCYTES # BLD AUTO: 0.79 THOUSAND/ÂΜL (ref 0.17–1.22)
MONOCYTES NFR BLD AUTO: 7 % (ref 4–12)
NEUTROPHILS # BLD AUTO: 9.04 THOUSANDS/ÂΜL (ref 1.85–7.62)
NEUTS SEG NFR BLD AUTO: 75 % (ref 43–75)
NRBC BLD AUTO-RTO: 0 /100 WBCS
PLATELET # BLD AUTO: 279 THOUSANDS/UL (ref 149–390)
PMV BLD AUTO: 9.8 FL (ref 8.9–12.7)
RBC # BLD AUTO: 3.77 MILLION/UL (ref 3.88–5.62)
WBC # BLD AUTO: 11.98 THOUSAND/UL (ref 4.31–10.16)

## 2023-02-19 LAB
ANION GAP SERPL CALCULATED.3IONS-SCNC: 10 MMOL/L (ref 4–13)
BUN SERPL-MCNC: 64 MG/DL (ref 5–25)
CALCIUM SERPL-MCNC: 8.4 MG/DL (ref 8.3–10.1)
CHLORIDE SERPL-SCNC: 99 MMOL/L (ref 96–108)
CO2 SERPL-SCNC: 27 MMOL/L (ref 21–32)
CREAT SERPL-MCNC: 2.54 MG/DL (ref 0.6–1.3)
GFR SERPL CREATININE-BSD FRML MDRD: 21 ML/MIN/1.73SQ M
GLUCOSE P FAST SERPL-MCNC: 89 MG/DL (ref 65–99)
POTASSIUM SERPL-SCNC: 5.2 MMOL/L (ref 3.5–5.3)
SODIUM SERPL-SCNC: 136 MMOL/L (ref 135–147)

## 2023-02-21 ENCOUNTER — OFFICE VISIT (OUTPATIENT)
Dept: NEPHROLOGY | Facility: CLINIC | Age: 88
End: 2023-02-21

## 2023-02-21 DIAGNOSIS — N17.9 AKI (ACUTE KIDNEY INJURY) (HCC): Primary | ICD-10-CM

## 2023-02-21 DIAGNOSIS — N18.4 CKD (CHRONIC KIDNEY DISEASE) STAGE 4, GFR 15-29 ML/MIN (HCC): ICD-10-CM

## 2023-02-21 DIAGNOSIS — I50.32 CHRONIC DIASTOLIC CHF (CONGESTIVE HEART FAILURE) (HCC): ICD-10-CM

## 2023-02-21 DIAGNOSIS — I13.0 HYPERTENSIVE HEART AND CHRONIC KIDNEY DISEASE WITH HEART FAILURE AND STAGE 1 THROUGH STAGE 4 CHRONIC KIDNEY DISEASE, OR CHRONIC KIDNEY DISEASE (HCC): ICD-10-CM

## 2023-02-21 DIAGNOSIS — S72.001D CLOSED FRACTURE OF NECK OF RIGHT FEMUR WITH ROUTINE HEALING, SUBSEQUENT ENCOUNTER: ICD-10-CM

## 2023-02-21 NOTE — PROGRESS NOTES
Nephrology   Office Follow-Up  Dave Mix 80 y o  male MRN: [id number]    Encounter: [id number]        Assessment & Plan    Dave Mix was seen at [facility] 5th floor SNU    1  YOLANDE (acute kidney injury) ([facility] [address] )  · With peak creatinine nearing 5 0 mg/dL due to prerenal azotemia with concomitant contrast-induced nephropathy  Most recent creatinine 2 5 mg/dL which is nearing baseline  · Continue to monitor weights with current torsemide dose  May need to be escalated  Repeat BMP on Thursday  Discussed with nursing staff  Will need follow-up in 4 to 6 weeks  2  CKD (chronic kidney disease) stage 4, GFR 15-29 ml/min ([facility])  · Creatinine around 2 2 mg/dL  Etiology likely hypertensive nephrosclerosis, underlying cardiorenal syndrome, age-related nephron loss  Creatinine is nearing baseline  3  Chronic diastolic CHF (congestive heart failure) ([facility])  · Examines mildly hypervolemic but compensated  Edema is worse in surgical leg  On torsemide 20 mg daily  May need escalation of dose  Continue to monitor daily weights  Continue low-sodium diet  4  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 kidney disease, or chronic kidney disease (HCC)  · Blood pressures appropriate-no change  Not a candidate for ACE or ARB  5  Closed fracture of neck of right femur with routine healing, subsequent encounter  · Management per orthopedic colleagues-has follow-up later this month  6  Possible right complex renal cyst  · Further work-up as indicated once discharged from rehab      HPI: Dave Mix is a 80 y o  male who is being evaluated for post-hospital follow-up for YOLANDE in setting of fall and hip fracture s/p  Repair now undergoing inpatient rehabilitation  Pertinent medical problems include CKD 4, PAF on Eliquis, HFpEF, CAD s/p CABG, severe A/S s/p AVR, PPM present, hypertension  Typically follows with this group for renal needs      Hospitalized at [facility] for fall suffering right femur fracture and concomitant YOLANDE in the setting of volume depletion and probable contrast-induced nephropathy  He underwent right hip hemiarthroplasty 4/21/5276 was complicated by blood loss anemia  Peak creatinine was near 5 0 mg/dL with oliguria  He required Lasix challenge  Discharge creatinine 3 0 mg/dL and 2 54 mg/dL on most recent check  He is nonoliguric  His edema is moderate and worsened surgical legs  Recommend continuing daily weights, low-sodium diet  Currently receiving torsemide 20 mg daily which may need to be escalated to higher dose due to advanced kidney disease  We will repeat lab work on Thursday  This was discussed with nursing staff  ROS:   Review of Systems   Constitutional: Positive for fatigue  Negative for chills and fever  HENT: Negative for ear pain and sore throat  Eyes: Negative for pain and visual disturbance  Respiratory: Negative for cough and shortness of breath  Cardiovascular: Positive for leg swelling  Negative for chest pain and palpitations  Gastrointestinal: Negative for abdominal pain and vomiting  Genitourinary: Negative for dysuria and hematuria  Musculoskeletal: Positive for arthralgias, gait problem and joint swelling  Negative for back pain  Skin: Negative for color change and rash  Neurological: Negative for seizures and syncope  All other systems reviewed and are negative        Allergies: Promethazine    Medications:   Current Outpatient Medications:   •  apixaban (ELIQUIS) 2 5 mg, Take 1 tablet (2 5 mg total) by mouth 2 (two) times a day, Disp: 180 tablet, Rfl: 3  •  pantoprazole (PROTONIX) 40 mg tablet, Take 1 tablet (40 mg total) by mouth 2 (two) times a day before meals, Disp: 180 tablet, Rfl: 3  •  allopurinol (ZYLOPRIM) 100 mg tablet, Take 1 tablet (100 mg total) by mouth daily, Disp: 30 tablet, Rfl: 5  •  aspirin (ECOTRIN LOW STRENGTH) 81 mg EC tablet, Take 1 tablet (81 mg total) by mouth daily, Disp:  , Rfl: 0  •  desoximetasone (TOPICORT) 0 05 % cream, Apply topically 2 (two) times a day, Disp: 60 g, Rfl: 5  •  gabapentin (NEURONTIN) 100 mg capsule, Take 1 capsule (100 mg total) by mouth daily at bedtime, Disp: 30 capsule, Rfl: 0  •  metoprolol tartrate (LOPRESSOR) 100 mg tablet, Take 1 tablet (100 mg total) by mouth 2 (two) times a day, Disp: 180 tablet, Rfl: 0  •  ondansetron (ZOFRAN) 4 mg tablet, Take 1 tablet (4 mg total) by mouth every 8 (eight) hours as needed for nausea or vomiting for up to 4 days, Disp: 12 tablet, Rfl: 0  •  pravastatin (PRAVACHOL) 40 mg tablet, Take 1 tablet (40 mg total) by mouth daily with dinner, Disp: 90 tablet, Rfl: 3  •  senna (SENOKOT) 8 6 mg, Take 1 tablet (8 6 mg total) by mouth daily at bedtime, Disp: 90 tablet, Rfl: 3  •  silver sulfadiazine (Silvadene) 1 % cream, Apply topically 2 (two) times a day, Disp: 50 g, Rfl: 5  •  torsemide (DEMADEX) 20 mg tablet, Take 1 tablet (20 mg total) by mouth daily, Disp: 30 tablet, Rfl: 5    Past Medical History:   Diagnosis Date   • Aortic stenosis     ECHO -4-14-14  MODERATE TO SEVERE AORTIC STENOSIS; MILD AROTIC INSUFFICIENCY - LAST ASSESSED 10/26/16; RESOLVED 6/8/16   • Aortic valve disorder     LAST ASSESSED 10/8/15; 10/8/15   • Arthritis    • CHF (congestive heart failure) ([address] )    • Complete heart block ([address] ) 7/20/2020   • Coronary artery disease    • Hypertension    • Hypertensive urgency 5/19/2019   • Renal disorder    • TIA (transient ischemic attack)    • Transient cerebral ischemia      Past Surgical History:   Procedure Laterality Date   • AORTIC VALVE REPLACEMENT  06/30/2015    avr with 23 mm [place] Magna Ease bioprosthetic   • CARDIAC PACEMAKER PLACEMENT Left 07/24/2020   • CATARACT EXTRACTION Right 06/05/2000   • COLONOSCOPY     • CORONARY ARTERY BYPASS GRAFT  06/05/2000    x1 with argueta  to lad   • EYE SURGERY     • POLYPECTOMY  04/2014    enteroscopic - esophageal ulcer, gastric erosion, and duodenal ulcer  • CO HEMIARTHROPLASTY HIP PARTIAL Right 2/10/2023    Procedure: HEMIARTHROPLASTY HIP (BIPOLAR); Surgeon: Cassie Grace DO;  Location: [facility];  Service: Orthopedics   • TONSILLECTOMY      unknown     Family History   Problem Relation Age of Onset   • No Known Problems Mother    • No Known Problems Father    • Coronary artery disease Neg Hx       reports that he has never smoked  He has never used smokeless tobacco  He reports that he does not drink alcohol and does not use drugs  Physical Exam:   There were no vitals filed for this visit  There is no height or weight on file to calculate BMI  General: conscious, cooperative, in no acute distress, appears stated age  Eyes: conjunctivae pale, anicteric sclerae  ENT: lips and mucous membranes moist  Neck: supple, no JVD, no masses  Chest:  essentially clear breath sounds bilaterally, no crackles, ronchus or wheezings  CVS: S1 & S2, normal rate, regular rhythm  Abdomen: soft, non-tender, non-distended, normoactive bowel sounds, rounded  Extremities: moderate right greater than left edema of both legs  Skin: no rash   Neuro: awake, alert, oriented       Diagnostic Data:  Lab: I have personally reviewed pertinent lab results  ,   CBC:  Results from last 7 days   Lab Units 02/18/23  0615   WBC Thousand/uL 11 98*   HEMOGLOBIN g/dL 9 1*   HEMATOCRIT % 30 5*   PLATELETS Thousands/uL 279      CMP: No results found for: SODIUM, K, CL, CO2, ANIONGAP, BUN, CREATININE, GLUCOSE, CALCIUM, AST, ALT, ALKPHOS, PROT, BILITOT, EGFR,   PT/INR: No results found for: PT, INR,   Magnesium: No components found for: MAG,  Phosphorous: No results found for: PHOS    There are no Patient Instructions on file for this visit  Portions of the record may have been created with voice recognition software  Occasional wrong word or "sound a like" substitutions may have occurred due to the inherent limitations of voice recognition software   Read the chart carefully and recognize, using context, where substitutions have occurred  If you have any questions, please contact the dictating provider

## 2023-02-23 ENCOUNTER — DOCUMENTATION (OUTPATIENT)
Dept: OTHER | Facility: HOSPITAL | Age: 88
End: 2023-02-23

## 2023-02-23 ENCOUNTER — LAB REQUISITION (OUTPATIENT)
Dept: LAB | Facility: HOSPITAL | Age: 88
End: 2023-02-23

## 2023-02-23 DIAGNOSIS — N18.4 CHRONIC KIDNEY DISEASE, STAGE 4 (SEVERE) (HCC): ICD-10-CM

## 2023-02-23 LAB
ANION GAP SERPL CALCULATED.3IONS-SCNC: 7 MMOL/L (ref 4–13)
BUN SERPL-MCNC: 51 MG/DL (ref 5–25)
CALCIUM SERPL-MCNC: 8.4 MG/DL (ref 8.4–10.2)
CHLORIDE SERPL-SCNC: 96 MMOL/L (ref 96–108)
CO2 SERPL-SCNC: 30 MMOL/L (ref 21–32)
CREAT SERPL-MCNC: 2.7 MG/DL (ref 0.6–1.3)
ERYTHROCYTE [DISTWIDTH] IN BLOOD BY AUTOMATED COUNT: 20.6 % (ref 11.6–15.1)
GFR SERPL CREATININE-BSD FRML MDRD: 20 ML/MIN/1.73SQ M
GLUCOSE SERPL-MCNC: 95 MG/DL (ref 65–140)
HCT VFR BLD AUTO: 29.5 % (ref 36.5–49.3)
HGB BLD-MCNC: 8.8 G/DL (ref 12–17)
MCH RBC QN AUTO: 24.4 PG (ref 26.8–34.3)
MCHC RBC AUTO-ENTMCNC: 29.8 G/DL (ref 31.4–37.4)
MCV RBC AUTO: 82 FL (ref 82–98)
PLATELET # BLD AUTO: 348 THOUSANDS/UL (ref 149–390)
PMV BLD AUTO: 10.1 FL (ref 8.9–12.7)
POTASSIUM SERPL-SCNC: 5.4 MMOL/L (ref 3.5–5.3)
RBC # BLD AUTO: 3.61 MILLION/UL (ref 3.88–5.62)
SODIUM SERPL-SCNC: 133 MMOL/L (ref 135–147)
WBC # BLD AUTO: 11.25 THOUSAND/UL (ref 4.31–10.16)

## 2023-02-23 NOTE — PROGRESS NOTES
Lab work reviewed  Mild hyperkalemia, mild hyponatremia and mildly elevated creatinine from previous  Patient examined in therapy gym  Severe RLE edema however surgical leg  Trace edema left leg  No physical complaints except pain at surgical site  Verbal order 2g potassium restricted diet and repeat BMP tomorrow

## 2023-02-24 ENCOUNTER — RA CDI HCC (OUTPATIENT)
Dept: OTHER | Facility: HOSPITAL | Age: 88
End: 2023-02-24

## 2023-02-24 ENCOUNTER — OFFICE VISIT (OUTPATIENT)
Dept: OBGYN CLINIC | Facility: CLINIC | Age: 88
End: 2023-02-24

## 2023-02-24 ENCOUNTER — LAB REQUISITION (OUTPATIENT)
Dept: LAB | Facility: HOSPITAL | Age: 88
End: 2023-02-24

## 2023-02-24 DIAGNOSIS — S72.001D CLOSED DISPLACED FRACTURE OF RIGHT FEMORAL NECK WITH ROUTINE HEALING: Primary | ICD-10-CM

## 2023-02-24 DIAGNOSIS — N18.4 CHRONIC KIDNEY DISEASE, STAGE 4 (SEVERE) (HCC): ICD-10-CM

## 2023-02-24 LAB
ANION GAP SERPL CALCULATED.3IONS-SCNC: 8 MMOL/L (ref 4–13)
BUN SERPL-MCNC: 49 MG/DL (ref 5–25)
CALCIUM SERPL-MCNC: 8.8 MG/DL (ref 8.4–10.2)
CHLORIDE SERPL-SCNC: 94 MMOL/L (ref 96–108)
CO2 SERPL-SCNC: 30 MMOL/L (ref 21–32)
CREAT SERPL-MCNC: 2.81 MG/DL (ref 0.6–1.3)
GFR SERPL CREATININE-BSD FRML MDRD: 19 ML/MIN/1.73SQ M
GLUCOSE SERPL-MCNC: 111 MG/DL (ref 65–140)
POTASSIUM SERPL-SCNC: 5.3 MMOL/L (ref 3.5–5.3)
SODIUM SERPL-SCNC: 132 MMOL/L (ref 135–147)

## 2023-02-24 NOTE — PROGRESS NOTES
[facility]  coding opportunities       Chart reviewed, no opportunity found:   [address]        Patients Insurance     Medicare Insurance: [employer]

## 2023-03-01 ENCOUNTER — LAB REQUISITION (OUTPATIENT)
Dept: LAB | Facility: HOSPITAL | Age: 88
End: 2023-03-01

## 2023-03-01 DIAGNOSIS — D62 ACUTE POSTHEMORRHAGIC ANEMIA: ICD-10-CM

## 2023-03-01 DIAGNOSIS — N18.4 CHRONIC KIDNEY DISEASE, STAGE 4 (SEVERE) (HCC): ICD-10-CM

## 2023-03-01 LAB
ANION GAP SERPL CALCULATED.3IONS-SCNC: 9 MMOL/L (ref 4–13)
BUN SERPL-MCNC: 52 MG/DL (ref 5–25)
CALCIUM SERPL-MCNC: 8.5 MG/DL (ref 8.4–10.2)
CHLORIDE SERPL-SCNC: 96 MMOL/L (ref 96–108)
CO2 SERPL-SCNC: 28 MMOL/L (ref 21–32)
CREAT SERPL-MCNC: 2.53 MG/DL (ref 0.6–1.3)
ERYTHROCYTE [DISTWIDTH] IN BLOOD BY AUTOMATED COUNT: 21.5 % (ref 11.6–15.1)
FERRITIN SERPL-MCNC: 154 NG/ML (ref 8–388)
GFR SERPL CREATININE-BSD FRML MDRD: 21 ML/MIN/1.73SQ M
GLUCOSE SERPL-MCNC: 93 MG/DL (ref 65–140)
HCT VFR BLD AUTO: 28.6 % (ref 36.5–49.3)
HGB BLD-MCNC: 8.5 G/DL (ref 12–17)
IRON SATN MFR SERPL: 9 % (ref 20–50)
IRON SERPL-MCNC: 27 UG/DL (ref 65–175)
MCH RBC QN AUTO: 25 PG (ref 26.8–34.3)
MCHC RBC AUTO-ENTMCNC: 29.7 G/DL (ref 31.4–37.4)
MCV RBC AUTO: 84 FL (ref 82–98)
PLATELET # BLD AUTO: 347 THOUSANDS/UL (ref 149–390)
PMV BLD AUTO: 10.4 FL (ref 8.9–12.7)
POTASSIUM SERPL-SCNC: 4.8 MMOL/L (ref 3.5–5.3)
RBC # BLD AUTO: 3.4 MILLION/UL (ref 3.88–5.62)
SODIUM SERPL-SCNC: 133 MMOL/L (ref 135–147)
TIBC SERPL-MCNC: 293 UG/DL (ref 250–450)
WBC # BLD AUTO: 8.17 THOUSAND/UL (ref 4.31–10.16)

## 2023-03-03 ENCOUNTER — TRANSITIONAL CARE MANAGEMENT (OUTPATIENT)
Dept: FAMILY MEDICINE CLINIC | Facility: CLINIC | Age: 88
End: 2023-03-03

## 2023-03-06 ENCOUNTER — TELEPHONE (OUTPATIENT)
Dept: FAMILY MEDICINE CLINIC | Facility: CLINIC | Age: 88
End: 2023-03-06

## 2023-03-06 DIAGNOSIS — N18.9 CHRONIC KIDNEY DISEASE, UNSPECIFIED CKD STAGE: Primary | ICD-10-CM

## 2023-03-06 NOTE — TELEPHONE ENCOUNTER
The prior order is active in the chart I did place an additional order but their original should be good

## 2023-03-06 NOTE — TELEPHONE ENCOUNTER
Pt did not get his BMP done today as he had already eaten  Nurse is asking for another script for BMP for tomorrow to be drawn please

## 2023-03-09 ENCOUNTER — TELEPHONE (OUTPATIENT)
Dept: FAMILY MEDICINE CLINIC | Facility: CLINIC | Age: 88
End: 2023-03-09

## 2023-03-09 ENCOUNTER — TELEPHONE (OUTPATIENT)
Dept: LAB | Facility: HOSPITAL | Age: 88
End: 2023-03-09

## 2023-03-16 ENCOUNTER — TELEPHONE (OUTPATIENT)
Dept: FAMILY MEDICINE CLINIC | Facility: CLINIC | Age: 88
End: 2023-03-16

## 2023-03-16 NOTE — TELEPHONE ENCOUNTER
Since I have not seen since he got dc as of yet I would recommend they try the compression socks to start They are sold over the counter at local stores   They usually are s,med, large, xl - they can measure mid calf around as there probably are suggested measurements for the sizes

## 2023-03-20 ENCOUNTER — TELEPHONE (OUTPATIENT)
Dept: FAMILY MEDICINE CLINIC | Facility: CLINIC | Age: 88
End: 2023-03-20

## 2023-03-20 ENCOUNTER — HOSPITAL ENCOUNTER (EMERGENCY)
Facility: HOSPITAL | Age: 88
Discharge: HOME | End: 2023-03-20
Attending: EMERGENCY MEDICINE

## 2023-03-20 ENCOUNTER — APPOINTMENT (EMERGENCY)
Dept: CT IMAGING | Facility: HOSPITAL | Age: 88
End: 2023-03-20

## 2023-03-20 VITALS
SYSTOLIC BLOOD PRESSURE: 132 MMHG | HEART RATE: 61 BPM | TEMPERATURE: 97.6 F | RESPIRATION RATE: 16 BRPM | OXYGEN SATURATION: 98 % | DIASTOLIC BLOOD PRESSURE: 60 MMHG

## 2023-03-20 DIAGNOSIS — S00.81XA ABRASION OF FACE, INITIAL ENCOUNTER: ICD-10-CM
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
## 2023-03-20 DIAGNOSIS — Z71.89 ENCOUNTER FOR COUNSELING FOR CARE MANAGEMENT OF PATIENT WITH CHRONIC CONDITIONS AND COMPLEX HEALTH NEEDS USING NURSE-BASED MODEL: Primary | ICD-10-CM

## 2023-03-20 LAB
ANION GAP SERPL CALCULATED.3IONS-SCNC: 12 MMOL/L (ref 4–13)
APTT PPP: 34 SECONDS (ref 23–37)
ATRIAL RATE: 60 BPM
BASOPHILS # BLD AUTO: 0.04 THOUSANDS/ÂΜL (ref 0–0.1)
BASOPHILS NFR BLD AUTO: 0 % (ref 0–1)
BUN SERPL-MCNC: 61 MG/DL (ref 5–25)
CALCIUM SERPL-MCNC: 9.2 MG/DL (ref 8.4–10.2)
CHLORIDE SERPL-SCNC: 98 MMOL/L (ref 96–108)
CO2 SERPL-SCNC: 28 MMOL/L (ref 21–32)
CREAT SERPL-MCNC: 2.85 MG/DL (ref 0.6–1.3)
EOSINOPHIL # BLD AUTO: 0.65 THOUSAND/ÂΜL (ref 0–0.61)
EOSINOPHIL NFR BLD AUTO: 5 % (ref 0–6)
ERYTHROCYTE [DISTWIDTH] IN BLOOD BY AUTOMATED COUNT: 23.1 % (ref 11.6–15.1)
GFR SERPL CREATININE-BSD FRML MDRD: 18 ML/MIN/1.73SQ M
GLUCOSE SERPL-MCNC: 118 MG/DL (ref 65–140)
HCT VFR BLD AUTO: 34.5 % (ref 36.5–49.3)
HGB BLD-MCNC: 10.6 G/DL (ref 12–17)
IMM GRANULOCYTES # BLD AUTO: 0.05 THOUSAND/UL (ref 0–0.2)
IMM GRANULOCYTES NFR BLD AUTO: 0 % (ref 0–2)
INR PPP: 1.29 (ref 0.84–1.19)
LYMPHOCYTES # BLD AUTO: 1.08 THOUSANDS/ÂΜL (ref 0.6–4.47)
LYMPHOCYTES NFR BLD AUTO: 8 % (ref 14–44)
MCH RBC QN AUTO: 27.2 PG (ref 26.8–34.3)
MCHC RBC AUTO-ENTMCNC: 30.7 G/DL (ref 31.4–37.4)
MCV RBC AUTO: 89 FL (ref 82–98)
MONOCYTES # BLD AUTO: 1.05 THOUSAND/ÂΜL (ref 0.17–1.22)
MONOCYTES NFR BLD AUTO: 8 % (ref 4–12)
NEUTROPHILS # BLD AUTO: 10.4 THOUSANDS/ÂΜL (ref 1.85–7.62)
NEUTS SEG NFR BLD AUTO: 79 % (ref 43–75)
NRBC BLD AUTO-RTO: 0 /100 WBCS
PLATELET # BLD AUTO: 281 THOUSANDS/UL (ref 149–390)
PMV BLD AUTO: 10 FL (ref 8.9–12.7)
POTASSIUM SERPL-SCNC: 4.1 MMOL/L (ref 3.5–5.3)
PROTHROMBIN TIME: 16.4 SECONDS (ref 11.6–14.5)
QRS AXIS: -33 DEGREES
QRSD INTERVAL: 118 MS
QT INTERVAL: 462 MS
QTC INTERVAL: 462 MS
RBC # BLD AUTO: 3.9 MILLION/UL (ref 3.88–5.62)
SODIUM SERPL-SCNC: 138 MMOL/L (ref 135–147)
T WAVE AXIS: 5 DEGREES
VENTRICULAR RATE: 60 BPM
WBC # BLD AUTO: 13.27 THOUSAND/UL (ref 4.31–10.16)

## 2023-03-20 RX ORDER — FENTANYL CITRATE 50 UG/ML
50 INJECTION, SOLUTION INTRAMUSCULAR; INTRAVENOUS ONCE
Status: COMPLETED | OUTPATIENT
Start: 2023-03-20 | End: 2023-03-20

## 2023-03-20 RX ORDER — HYDROCODONE BITARTRATE AND ACETAMINOPHEN 5; 325 MG/1; MG/1
1 TABLET ORAL EVERY 6 HOURS PRN
Qty: 10 TABLET | Refills: 0 | Status: SHIPPED | OUTPATIENT
Start: 2023-03-20 | End: 2023-03-30

## 2023-03-20 RX ADMIN — FENTANYL CITRATE 50 MCG: 50 INJECTION INTRAMUSCULAR; INTRAVENOUS at 14:09

## 2023-03-20 NOTE — TELEPHONE ENCOUNTER
Patient had a fall on Saturday has abrasions on the left side of his face and nose, no other injuries but on Eliquis and ASA, family states his walking is poor and having hard time  He sent him to the ER due to this  States wife left him lay on the floor for 5 hours due to didn't want to bother anyone to help her get him up

## 2023-03-20 NOTE — ED PROVIDER NOTES
Emergency Department Trauma Note  Denver Ohara 80 y o  male MRN: [id number]  Unit/Bed#: TR05/TR05 Encounter: [id number]      Trauma Alert: Trauma Acuity: Trauma Evaluation  Model of Arrival: Mode of Arrival: Other (Comment) (private car) via    Trauma Team: Current Providers  Attending Provider: Jailyn Bauer DO  Unit Clerk: Rosalinda Holley  Registered Nurse: Treasa Epley, RN  Consultants:     None      History of Present Illness     Chief Complaint:   Chief Complaint   Patient presents with   • Trauma     Pt c/o back pain since he fell last night, head strike on thinners     HPI:  Denver Ohara is a 80 y o  male who presents with facial injuries after a fall at home 2 nights ago  Mechanism:Details of Incident: Pt went to get oob last night when he fell, hitting his head, no loc, is on thinners Injury Date: 03/18/23 Injury Time: 2300 Injury Occurence Location - [address] [place] [address] Way: [place]    59-year-old male lives at home with family present after a fall 2 nights ago  Patient was being seen by his physical therapist today who had noticed abrasions on the left side of his face and when asked what happened he said he fell and struck his head  Patient is on Eliquis  Patient is a trauma evaluation  Patient accompanied by spouse and daughter        Review of Systems   Constitutional: Negative for activity change, appetite change, chills and fever  HENT: Negative for ear pain, hearing loss, rhinorrhea, sneezing, sore throat and trouble swallowing  Eyes: Negative for pain and visual disturbance  Respiratory: Negative for cough, choking, chest tightness, shortness of breath, wheezing and stridor  Cardiovascular: Negative for chest pain, palpitations and leg swelling  Gastrointestinal: Negative for abdominal pain, constipation, diarrhea, nausea and vomiting  Genitourinary: Negative for difficulty urinating, dysuria, frequency, hematuria and testicular pain     Musculoskeletal: Negative for arthralgias, back pain, gait problem and neck pain  Skin: Negative for color change and rash  Allergic/Immunologic: Negative for immunocompromised state  Neurological: Positive for weakness  Negative for dizziness, seizures, syncope, speech difficulty, light-headedness, numbness and headaches  Psychiatric/Behavioral: Negative for agitation  The patient is not nervous/anxious  All other systems reviewed and are negative  Historical Information     Immunizations:   Immunization History   Administered Date(s) Administered   • COVID-19 MODERNA VACC 0 5 ML IM 01/21/2021, 02/19/2021   • INFLUENZA 10/26/2016   • Influenza Split High Dose Preservative Free IM 11/07/2012, 11/03/2014, 10/08/2015, 10/26/2016, 10/03/2017   • Influenza, high dose seasonal 0 7 mL 10/09/2018, 10/07/2019, 10/15/2020, 10/19/2021, 12/02/2022   • Influenza, seasonal, injectable 11/04/2013   • Pneumococcal Polysaccharide PPV23 11/07/2006       Past Medical History:   Diagnosis Date   • Aortic stenosis     ECHO -4-14-14   MODERATE TO SEVERE AORTIC STENOSIS; MILD AROTIC INSUFFICIENCY - LAST ASSESSED 10/26/16; RESOLVED 6/8/16   • Aortic valve disorder     LAST ASSESSED 10/8/15; 10/8/15   • Arthritis    • CHF (congestive heart failure) ([facility])    • Complete heart block ([facility] [address] ) 7/20/2020   • Coronary artery disease    • Hypertension    • Hypertensive urgency 5/19/2019   • Renal disorder    • TIA (transient ischemic attack)    • Transient cerebral ischemia        Family History   Problem Relation Age of Onset   • No Known Problems Mother    • No Known Problems Father    • Coronary artery disease Neg Hx      Past Surgical History:   Procedure Laterality Date   • AORTIC VALVE REPLACEMENT  06/30/2015    avr with 23 mm [place] Magna Ease bioprosthetic   • CARDIAC PACEMAKER PLACEMENT Left 07/24/2020   • CATARACT EXTRACTION Right 06/05/2000   • COLONOSCOPY     • CORONARY ARTERY BYPASS GRAFT  06/05/2000    x1 with argueta  to lad   • EYE SURGERY     • POLYPECTOMY  04/2014    enteroscopic - esophageal ulcer, gastric erosion, and duodenal ulcer  • DC HEMIARTHROPLASTY HIP PARTIAL Right 2/10/2023    Procedure: HEMIARTHROPLASTY HIP (BIPOLAR); Surgeon: Melissa Duffy DO;  Location: [facility];  Service: Orthopedics   • TONSILLECTOMY      unknown     Social History     Tobacco Use   • Smoking status: Never   • Smokeless tobacco: Never   Vaping Use   • Vaping Use: Never used   Substance Use Topics   • Alcohol use: Never     Alcohol/week: 0 0 standard drinks     Comment: very occasional   • Drug use: Never     E-Cigarette/Vaping   • E-Cigarette Use Never User      E-Cigarette/Vaping Substances   • Nicotine No    • THC No    • CBD No    • Flavoring No    • Other No    • Unknown No        Family History: non-contributory    Meds/Allergies   Prior to Admission Medications   Prescriptions Last Dose Informant Patient Reported?  Taking?   allopurinol (ZYLOPRIM) 100 mg tablet   No No   Sig: Take 1 tablet (100 mg total) by mouth daily   apixaban (ELIQUIS) 2 5 mg   No No   Sig: Take 1 tablet (2 5 mg total) by mouth 2 (two) times a day   aspirin (ECOTRIN LOW STRENGTH) 81 mg EC tablet   No No   Sig: Take 1 tablet (81 mg total) by mouth daily   desoximetasone (TOPICORT) 0 05 % cream   No No   Sig: Apply topically 2 (two) times a day   gabapentin (NEURONTIN) 100 mg capsule   No No   Sig: Take 1 capsule (100 mg total) by mouth daily at bedtime   metoprolol tartrate (LOPRESSOR) 100 mg tablet   No No   Sig: Take 1 tablet (100 mg total) by mouth 2 (two) times a day   ondansetron (ZOFRAN) 4 mg tablet   No No   Sig: Take 1 tablet (4 mg total) by mouth every 8 (eight) hours as needed for nausea or vomiting for up to 4 days   pantoprazole (PROTONIX) 40 mg tablet   No No   Sig: Take 1 tablet (40 mg total) by mouth 2 (two) times a day before meals   pravastatin (PRAVACHOL) 40 mg tablet   No No   Sig: Take 1 tablet (40 mg total) by mouth daily with dinner   senna (SENOKOT) 8 6 mg   No No   Sig: Take 1 tablet (8 6 mg total) by mouth daily at bedtime   silver sulfadiazine (Silvadene) 1 % cream   No No   Sig: Apply topically 2 (two) times a day   torsemide (DEMADEX) 20 mg tablet   No No   Sig: Take 1 tablet (20 mg total) by mouth daily      Facility-Administered Medications: None       Allergies   Allergen Reactions   • Promethazine Delirium and Other (See Comments)       PHYSICAL EXAM    PE limited by: none    Objective   Vitals:   First set: Temperature: 97 6 °F (36 4 °C) (03/20/23 1100)  Pulse: 60 (03/20/23 1100)  Respirations: 18 (03/20/23 1100)  Blood Pressure: (!) 114/45 (03/20/23 1100)  SpO2: 96 % (03/20/23 1100)    Primary Survey:   (A) Airway: intact  (B) Breathing: spontaneouis and non-labored  (C) Circulation: Pulses:   normal  (D) Disabliity:  GCS Total:  15  (E) Expose:  Completed    Secondary Survey: (Click on Physical Exam tab above)  Physical Exam  Vitals and nursing note reviewed  Constitutional:       General: He is not in acute distress  Appearance: He is well-developed  He is not diaphoretic  HENT:      Head: Normocephalic  Nose: Nose normal    Eyes:      General: No scleral icterus  Extraocular Movements: Extraocular movements intact  Conjunctiva/sclera: Conjunctivae normal    Cardiovascular:      Rate and Rhythm: Normal rate and regular rhythm  Heart sounds: Murmur heard  Comments: Pacemaker present left anterior chest wall  Pulmonary:      Effort: Pulmonary effort is normal  No respiratory distress  Breath sounds: Normal breath sounds  No wheezing or rales  Chest:      Chest wall: No tenderness  Abdominal:      General: Bowel sounds are normal  There is no distension  Palpations: Abdomen is soft  There is no mass  Tenderness: There is no abdominal tenderness  There is no guarding or rebound  Musculoskeletal:         General: No swelling, tenderness or deformity  Normal range of motion        Cervical back: Normal range of motion and neck supple  Right lower leg: Edema present  Left lower leg: Edema present  Lymphadenopathy:      Cervical: No cervical adenopathy  Skin:     General: Skin is warm and dry  Capillary Refill: Capillary refill takes less than 2 seconds  Findings: No erythema or rash  Neurological:      General: No focal deficit present  Mental Status: He is alert and oriented to person, place, and time  Motor: No abnormal muscle tone  Psychiatric:         Mood and Affect: Mood normal          Behavior: Behavior normal          Cervical spine cleared by clinical criteria?  No (imaging required)      Invasive Devices     Peripheral Intravenous Line  Duration           Peripheral IV 03/20/23 Left Antecubital <1 day                Lab Results:   Results Reviewed     Procedure Component Value Units Date/Time    Basic metabolic panel [[id number]]  (Abnormal) Collected: 03/20/23 1114    Lab Status: Final result Specimen: Blood Updated: 03/20/23 1133     Sodium 138 mmol/L      Potassium 4 1 mmol/L      Chloride 98 mmol/L      CO2 28 mmol/L      ANION GAP 12 mmol/L      BUN 61 mg/dL      Creatinine 2 85 mg/dL      Glucose 118 mg/dL      Calcium 9 2 mg/dL      eGFR 18 ml/min/1 73sq m     Narrative:      [place] guidelines for Chronic Kidney Disease (CKD):   •  Stage 1 with normal or high GFR (GFR > 90 mL/min/1 73 square meters)  •  Stage 2 Mild CKD (GFR = 60-89 mL/min/1 73 square meters)  •  Stage 3A Moderate CKD (GFR = 45-59 mL/min/1 73 square meters)  •  Stage 3B Moderate CKD (GFR = 30-44 mL/min/1 73 square meters)  •  Stage 4 Severe CKD (GFR = 15-29 mL/min/1 73 square meters)  •  Stage 5 End Stage CKD (GFR <15 mL/min/1 73 square meters)  Note: GFR calculation is accurate only with a steady state creatinine    Protime-INR [[id number]]  (Abnormal) Collected: 03/20/23 1114    Lab Status: Final result Specimen: Blood Updated: 03/20/23 1128     Protime 16 4 seconds INR 1 29    APTT [[id number]]  (Normal) Collected: 03/20/23 1114    Lab Status: Final result Specimen: Blood Updated: 03/20/23 1128     PTT 34 seconds     CBC and differential [[id number]]  (Abnormal) Collected: 03/20/23 1114    Lab Status: Final result Specimen: Blood Updated: 03/20/23 1117     WBC 13 27 Thousand/uL      RBC 3 90 Million/uL      Hemoglobin 10 6 g/dL      Hematocrit 34 5 %      MCV 89 fL      MCH 27 2 pg      MCHC 30 7 g/dL      RDW 23 1 %      MPV 10 0 fL      Platelets 341 Thousands/uL      nRBC 0 /100 WBCs      Neutrophils Relative 79 %      Immat GRANS % 0 %      Lymphocytes Relative 8 %      Monocytes Relative 8 %      Eosinophils Relative 5 %      Basophils Relative 0 %      Neutrophils Absolute 10 40 Thousands/µL      Immature Grans Absolute 0 05 Thousand/uL      Lymphocytes Absolute 1 08 Thousands/µL      Monocytes Absolute 1 05 Thousand/µL      Eosinophils Absolute 0 65 Thousand/µL      Basophils Absolute 0 04 Thousands/µL                  Imaging Studies:   Direct to CT: No  TRAUMA - CT head wo contrast   Final Result by Danielle Wilson MD (03/20 1150)      No acute intracranial abnormality  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  Atrophy and chronic microvascular ischemic changes  Workstation performed: [id number]         TRAUMA - CT spine cervical wo contrast   Final Result by Danielle Wilson MD (03/20 1153)      No cervical spine fracture or traumatic malalignment  Moderate to severe cervical spondylosis  Stable 2 mm pulmonary nodule right upper lobe apex  Workstation performed: [id number]         TRAUMA - CT facial bones wo contrast   Final Result by Danielle Wilson MD (03/20 1200)      Minimal left frontal scalp soft tissue swelling  No acute fracture                 Workstation performed: [id number]               Procedures  ECG 12 Lead Documentation Only    Date/Time: 3/20/2023 11:28 AM  Performed by: Gely Cantor DO  Authorized by: Missy Montiel DO     Indications / Diagnosis:  Fall  ECG reviewed by me, the ED Provider: yes    Patient location:  ED  Previous ECG:     Comparison to cardiac monitor: Yes    Rate:     ECG rate:  60    ECG rate assessment: normal    Rhythm:     Rhythm: paced    Pacing:     Capture:  Complete    Type of pacing:  Atrial  Ectopy:     Ectopy: none    QRS:     QRS intervals: Wide  ST segments:     ST segments:  Non-specific  T waves:     T waves: non-specific               ED Course     Lengthy discussion with patient and his spouse and his daughter  Discussed potential admission for evaluation for skilled nursing facility and/or nursing home placement  They state they have had previous skilled nursing facility placement and he does have physical therapy at home  He does have a lift chair at home as well as a wheelchair as well as a hospital bed at home  Pain improved  Medical Decision Making  66-year-old male trauma evaluation due to head injury on Eliquis  This occurred 2 nights ago  Patient lives at home with family  Amount and/or Complexity of Data Reviewed  External Data Reviewed: notes  Labs: ordered  Decision-making details documented in ED Course  Radiology: ordered and independent interpretation performed  Decision-making details documented in ED Course  Details: No acute intracranial injuries or fractures  No facial fractures or cervical spine fracture  ECG/medicine tests: ordered and independent interpretation performed  Decision-making details documented in ED Course  Risk  OTC drugs  Prescription drug management  Decision regarding hospitalization  Risk Details: Negative for intracranial hemorrhage or cervical spine fracture or injury  No facial fractures noted                  Disposition  [facility] Transfer: No  Final diagnoses:   Fall, initial encounter   Abrasion of face, initial encounter     Time reflects when diagnosis was documented in both MDM as applicable and the Disposition within this note     Time User Action Codes Description Comment    3/20/2023  2:21 PM Ricardo HENRIQUEZ Add [D76  XXXA] Fall, initial encounter     3/20/2023  2:22 PM Mare Montiel Ervin Add [S00 81XA] Abrasion of face, initial encounter       ED Disposition     ED Disposition   Discharge    Condition   Stable    Date/Time   Mon Mar 20, 2023  2:21 PM    [address] discharge to home/self care  Follow-up Information     Follow up With Specialties Details Why Contact Info    Jessa Vanessa DO Internal Medicine, Hospice Services Schedule an appointment as soon as possible for a visit   [address] [place] [address]  [phone]          Patient's Medications   Discharge Prescriptions    HYDROCODONE-ACETAMINOPHEN (NORCO) 5-325 MG PER TABLET    Take 1 tablet by mouth every 6 (six) hours as needed for pain for up to 10 days Max Daily Amount: 4 tablets       Start Date: 3/20/2023 End Date: 3/30/2023       Order Dose: 1 tablet       Quantity: 10 tablet    Refills: 0     No discharge procedures on file      PDMP Review     None          ED Provider  Electronically Signed by         Jessa Solorio DO  03/20/23 8045

## 2023-03-22 ENCOUNTER — PATIENT OUTREACH (OUTPATIENT)
Dept: FAMILY MEDICINE CLINIC | Facility: CLINIC | Age: 88
End: 2023-03-22

## 2023-03-22 NOTE — PROGRESS NOTES
TOM JENKINS reviewed chart  Patient was referred by PCP d/t needing assistance at home  Patient was recently seen at ED d/t a fall at home  It was reported that wife let patient lay on the ground for 5 hours d/t not wanting to bother someone to help her get him up  TOM JENKINS placed call to patient  Phone number in chart is for daughter, Catarino Davis  Catarino Davis provided patient's home number - chart was updated  Stephani shared that patient is active with [facility]  Pt has ramps and she helps him with getting to appointments  Per Catarino Davis, patient and wife have no current financial difficulties  Catarino Davis stated that patient's wife is not interested in personal care aides coming into the home  She shared that she and her sister both tried to get aides in the home, but wife wants to be the one to help him  Catarino Davis shared that wife did let patient lay on the floor for hours when he fell  Wife is open to nursing and PT, but not aides  Catarino Davis stated that all of her siblings and patient's grandchildren have tried to discuss this with her  Catarino Davis stated that she needs someone she trusts who is outside of the family to encourage her to do this  Catarino Davis shared that wife often complains about the help she has to provide to patient  Catarino Davis stated that when the topic of aides was brought up, patient's wife stated that if they bring someone in that she will kill him and kill herself  Catarino Davis stated patient only said that because she was upset  Catarino Davis questioned her mother's competence  Patient's wife assists with bathing  Wife drives herself to the grocery store  Daughter goes with her and helps her get groceries into the car  Catarino Davis goes to patient's home to put his compression socks on every day  Patient uses a wheelchair  He is working with PT to walk a little bit  Catarino Davis takes patient to all of his appointments  She stated that she is a little nervous about getting patient out of the home, but someone helps her      Patient does not have a will or POA in place  One daughter helps patient/wife with paying their bills  Donnie Reynoso expressed hopefulness that someone from PCP's office may be able to convince her mother to allow aides to come in  Call placed to patient's home  Spoke with patient's wife  She shared that patient's visiting nurse and PT were there  Wife stated that was the first time patient fell  She stated that they do not go upstairs  He sleeps in a chair downstairs  She also sleeps downstairs on the couch  She washes him at the sink in a half bath downstairs  Wife stated she is capable of doing her own cleaning and does not need help  TOM JENKINS asked about report that patient was laying on the floor for hours after he fell  Wife stated patient never yelled for her and just laid there, but he did go to the hospital  She shared that her son lives next door and visits every day  Wife refused any assistance at this time  TOM JENKINS made protective services referral to [address] [facility] Referral     Note forwarded to PCP

## 2023-03-23 ENCOUNTER — TELEPHONE (OUTPATIENT)
Dept: FAMILY MEDICINE CLINIC | Facility: CLINIC | Age: 88
End: 2023-03-23

## 2023-03-23 ENCOUNTER — TELEPHONE (OUTPATIENT)
Dept: OBGYN CLINIC | Facility: OTHER | Age: 88
End: 2023-03-23

## 2023-03-23 NOTE — TELEPHONE ENCOUNTER
Patient Call Form   Reason for patient call? Pt's daughter called to ask if office had wheelchairs and ramps  Informed her that all offices have wheelchair and are wheelchair accessible    Patient's primary oncologist? Rowan Muro, [address]    Name of person the patient was calling for?  staff   Does the patient issue require a call back? No   If call back required, inform patient that the message will be forwarded to the team and someone will get back to them as soon as possible    Did you relay this information to the patient?  N/A

## 2023-03-23 NOTE — TELEPHONE ENCOUNTER
PTS WIFE REQUESTING F/U APPT FROM ER VISIT ON 3/20  SHE IS REQUESTING AN APPT AFTER 4/4/22 NOT ON A Tuesday OR Thursday AFTERNOON  SHE ASKED IF THE VISIT COULD BE  VIRTUAL  PLEASE ADVISE

## 2023-03-23 NOTE — TELEPHONE ENCOUNTER
Prefer in person since have not seen patient since 5th floor discharge  If is virtual should be video visit

## 2023-03-24 ENCOUNTER — OFFICE VISIT (OUTPATIENT)
Dept: OBGYN CLINIC | Facility: CLINIC | Age: 88
End: 2023-03-24

## 2023-03-24 ENCOUNTER — APPOINTMENT (OUTPATIENT)
Dept: RADIOLOGY | Facility: MEDICAL CENTER | Age: 88
End: 2023-03-24

## 2023-03-24 DIAGNOSIS — S72.001D CLOSED DISPLACED FRACTURE OF RIGHT FEMORAL NECK WITH ROUTINE HEALING: ICD-10-CM

## 2023-03-24 DIAGNOSIS — S72.001D CLOSED DISPLACED FRACTURE OF RIGHT FEMORAL NECK WITH ROUTINE HEALING: Primary | ICD-10-CM

## 2023-03-24 NOTE — PROGRESS NOTES
Dr Tae Jose  Hip nancy Arthroplasty    Assessment/Plan:  80 y o  status post right hip hemiarthroplasty    · Wound Care   · Continue current care , OK to shower  , Dab dry with towel , Steristrips will fall off on their own  · Pain control: Per rehab  · Continue ice packs/elevation  · Can continue compression with ACE wrap or compression stockings  · Continue with physical therapy  · Continue activities as tolerated, weightbearing as tolerated  · DVT ppx 1 month completed  ·   · Follow-up 6 weeks with new x-rays AP pelvis right hip, greater trochanter has displaced, patient can still ambulate  Subjective:  80 y o  status post right hip hemiarthroplasty 6 weeks ago  At home currently  , pain is well controlled    Pain is well controlled  Able to stand with walker assistance  Is ambulating with walker assistance    Current concerns: none   Patient is taking OTC pain medications for pain  Numbness/weakness extremity: Expected postoperatively    Physical Therapy Progress: Progressing as tolerated   DVT ppx: Continue DVT prophylaxis until 1 month postop, finished      Eating/Drinking improving  Bowel/Bladder: WNL   Patient denies symptoms of an infection       Physical Exam:   Exam: right hip  Incision: healing well no significant drainage no dehiscence no significant erythema   ROM: Able to flex hip, internally rotate and externally rotate without pain  Sensation: Sensation intact to light touch to dp/sp/tib/kanu/saph distributions   Brisk Capillary Refill  Positive tib ant, EHL, gastrocsoleus complex      Imaging:   Xrays of right hip from 3/24/2023: Well-positioned hemiarthroplasty without dislocation, greater trochanter displacement

## 2023-03-28 ENCOUNTER — TELEPHONE (OUTPATIENT)
Dept: HEMATOLOGY ONCOLOGY | Facility: CLINIC | Age: 88
End: 2023-03-28

## 2023-03-28 ENCOUNTER — TELEPHONE (OUTPATIENT)
Dept: FAMILY MEDICINE CLINIC | Facility: CLINIC | Age: 88
End: 2023-03-28

## 2023-03-28 NOTE — TELEPHONE ENCOUNTER
Patients daughter Anselmo Castro calling to discuss cancellation of patients 03/31/23 appointment with Isidro Partida  I advised Anselmo Castro that I cannot speak to whether or not this appointment is necessary for patient and suggested she consult with his PCP for advice on this  During the call, the appointment was cancelled via patients [place]

## 2023-03-28 NOTE — TELEPHONE ENCOUNTER
Erick Zabala with [facility] called after having visit with pt  Asking if his weight parameters could be changed from 188-192 lbs to 175-185 lbs? Said his weight today was 178lbs  Also, said pt takes Eliquis 2 5mg BID as well as a low dose aspirin daily, asking if you want him to be on both? Please advise

## 2023-03-28 NOTE — TELEPHONE ENCOUNTER
Ok to change weight parameters  If those were on dc would continue as I have not seen patient or may want to check with cardiology regarding meds

## 2023-03-28 NOTE — TELEPHONE ENCOUNTER
She wants to know if he really needs to go to pulmonology on Monday and hematology on Friday, these appt were made while he was in the hospital

## 2023-03-28 NOTE — TELEPHONE ENCOUNTER
The hemtaology followup is in regards to anemia so would probably plan for that followup - she can call and see if they can do via telehealth  If he is not having any breathing issues at this time can delay pulmonary followup - it was entered to followup emphysema

## 2023-03-31 ENCOUNTER — TELEPHONE (OUTPATIENT)
Dept: HEMATOLOGY ONCOLOGY | Facility: CLINIC | Age: 88
End: 2023-03-31

## 2023-04-03 ENCOUNTER — TELEPHONE (OUTPATIENT)
Dept: FAMILY MEDICINE CLINIC | Facility: CLINIC | Age: 88
End: 2023-04-03

## 2023-04-03 NOTE — TELEPHONE ENCOUNTER
Patient has Right buttock dermatitis, wants to know if you can ok using [employer] P[employer] daily until resolved? Please advise

## 2023-04-14 PROBLEM — M25.462 EFFUSION OF LEFT KNEE: Status: RESOLVED | Noted: 2021-07-30 | Resolved: 2023-04-14

## 2023-04-14 PROBLEM — Z95.1 PRESENCE OF AORTOCORONARY BYPASS GRAFT: Status: ACTIVE | Noted: 2023-02-16

## 2023-04-24 ENCOUNTER — TELEPHONE (OUTPATIENT)
Dept: FAMILY MEDICINE CLINIC | Facility: CLINIC | Age: 88
End: 2023-04-24

## 2023-04-24 NOTE — TELEPHONE ENCOUNTER
[facility] called and need med list and problem list faxed to [phone]  Antonio De Paz is

## 2023-04-25 DIAGNOSIS — G89.29 CHRONIC LOW BACK PAIN WITHOUT SCIATICA, UNSPECIFIED BACK PAIN LATERALITY: ICD-10-CM

## 2023-04-25 DIAGNOSIS — M54.50 CHRONIC LOW BACK PAIN WITHOUT SCIATICA, UNSPECIFIED BACK PAIN LATERALITY: ICD-10-CM

## 2023-04-25 RX ORDER — GABAPENTIN 100 MG/1
100 CAPSULE ORAL
Qty: 30 CAPSULE | Refills: 0 | Status: SHIPPED | OUTPATIENT
Start: 2023-04-25

## 2023-04-28 ENCOUNTER — TELEMEDICINE (OUTPATIENT)
Dept: NEPHROLOGY | Facility: CLINIC | Age: 88
End: 2023-04-28

## 2023-04-28 DIAGNOSIS — N18.4 CKD (CHRONIC KIDNEY DISEASE) STAGE 4, GFR 15-29 ML/MIN (HCC): Primary | ICD-10-CM

## 2023-04-28 DIAGNOSIS — N25.81 SECONDARY HYPERPARATHYROIDISM (HCC): ICD-10-CM

## 2023-04-28 DIAGNOSIS — E61.1 IRON DEFICIENCY: ICD-10-CM

## 2023-04-28 DIAGNOSIS — E55.9 VITAMIN D DEFICIENCY: ICD-10-CM

## 2023-04-28 RX ORDER — FERROUS SULFATE 325(65) MG
325 TABLET ORAL 2 TIMES DAILY
COMMUNITY

## 2023-04-28 NOTE — PATIENT INSTRUCTIONS
Increase torsemide to 40 mg per day (two pills)  You can reduce it back to once a day if the swelling gets better      Lab work in 6-8 weeks    Continue iron

## 2023-05-05 ENCOUNTER — APPOINTMENT (OUTPATIENT)
Dept: RADIOLOGY | Facility: MEDICAL CENTER | Age: 88
End: 2023-05-05

## 2023-05-05 ENCOUNTER — OFFICE VISIT (OUTPATIENT)
Dept: OBGYN CLINIC | Facility: CLINIC | Age: 88
End: 2023-05-05

## 2023-05-05 DIAGNOSIS — S72.001D CLOSED DISPLACED FRACTURE OF RIGHT FEMORAL NECK WITH ROUTINE HEALING: Primary | ICD-10-CM

## 2023-05-05 DIAGNOSIS — G89.29 CHRONIC BILATERAL LOW BACK PAIN WITHOUT SCIATICA: ICD-10-CM

## 2023-05-05 DIAGNOSIS — S72.001D CLOSED DISPLACED FRACTURE OF RIGHT FEMORAL NECK WITH ROUTINE HEALING: ICD-10-CM

## 2023-05-05 DIAGNOSIS — M54.50 CHRONIC BILATERAL LOW BACK PAIN WITHOUT SCIATICA: ICD-10-CM

## 2023-05-05 NOTE — PROGRESS NOTES
Orthopedic Surgery Post-Operative Note  Christi Salomon (70 y o  male)  : 1935 Encounter Date: 2023  Dr Thony Bai, DO, Orthopedic Surgeon  Orthopedic Oncology & Sarcoma Surgery   Assessment/Plan:  80 y o  male 12 weeks follow up status post right hemiarthroplasty    · Wound Care   · Continue current care  · Pain control: gabapentin for chronic low back pain  · Continue ice packs/elevation  · Continue compression with compression stockings  · At home physical therapy signed off   · Continue full weight bearing with walker assistance  · DVT ppx: completed  · Chronic low back pain  · Referral to spine program  Return in about 3 months (around 2023)  · for new x-rays AP pelvis right hip  Patient ambulatory  Subjective:  80 y o  male presenting to the office for 12 week follow up, status post right hemiarthroplasty  DOS: 02/10/2023    Pain is well controlled  Able to stand and ambulate with walker assistance  Patient denies any other radicular symptoms  Patient denies instability  Current concerns: chronic low back pain  Patient is taking gabapentin for chronic low back pain, which is unrelated to his hip surgery  Numbness/weakness extremity: none  Physical Therapy Progress: completed  DVT ppx: completed  Antibiotics: none  Eating/Drinking well  Bowel/Bladder: WNL  Patient denies symptoms of an infection such as local warmth, erythema, cellulitis, as well as fevers  Review of Systems  Review of systems negative unless otherwise specified in HPI    Past Medical History  Past Medical History:   Diagnosis Date    Aortic stenosis     ECHO -14   MODERATE TO SEVERE AORTIC STENOSIS; MILD AROTIC INSUFFICIENCY - LAST ASSESSED 10/26/16; RESOLVED 16    Aortic valve disorder     LAST ASSESSED 10/8/15; 10/8/15    Arthritis     [facility]     Complete heart block ([facility] [address] ) 2020    Coronary artery disease     Hypertension     Hypertensive urgency 5/19/2019    Renal disorder     TIA (transient ischemic attack)     Transient cerebral ischemia      Past Surgical History  Past Surgical History:   Procedure Laterality Date    AORTIC VALVE REPLACEMENT  06/30/2015    avr with 23 mm Livingston Magna Ease bioprosthetic    CARDIAC PACEMAKER PLACEMENT Left 07/24/2020    CATARACT EXTRACTION Right 06/05/2000    COLONOSCOPY      CORONARY ARTERY BYPASS GRAFT  06/05/2000    x1 with argueta  to lad    EYE SURGERY      POLYPECTOMY  04/2014    enteroscopic - esophageal ulcer, gastric erosion, and duodenal ulcer   [place] HEMIARTHROPLASTY HIP PARTIAL Right 2/10/2023    Procedure: HEMIARTHROPLASTY HIP (BIPOLAR);   Surgeon: Clint Kimball DO;  Location: [facility];  Service: Orthopedics    TONSILLECTOMY      unknown     Current Medications  Current Outpatient Medications on File Prior to Visit   Medication Sig Dispense Refill    allopurinol (ZYLOPRIM) 100 mg tablet Take 1 tablet (100 mg total) by mouth daily 90 tablet 3    apixaban (ELIQUIS) 2 5 mg Take 1 tablet (2 5 mg total) by mouth 2 (two) times a day 180 tablet 3    aspirin (ECOTRIN LOW STRENGTH) 81 mg EC tablet Take 1 tablet (81 mg total) by mouth daily  0    desoximetasone (TOPICORT) 0 05 % cream Apply topically 2 (two) times a day 60 g 5    ferrous sulfate 325 (65 Fe) mg tablet Take 325 mg by mouth 2 (two) times a day      gabapentin (NEURONTIN) 100 mg capsule Take 1 capsule (100 mg total) by mouth daily at bedtime 30 capsule 0    metoprolol tartrate (LOPRESSOR) 100 mg tablet Take 1 tablet (100 mg total) by mouth 2 (two) times a day 180 tablet 0    ondansetron (ZOFRAN) 4 mg tablet Take 1 tablet (4 mg total) by mouth every 8 (eight) hours as needed for nausea or vomiting for up to 4 days 12 tablet 0    pantoprazole (PROTONIX) 40 mg tablet Take 1 tablet (40 mg total) by mouth 2 (two) times a day before meals 180 tablet 3    pravastatin (PRAVACHOL) 40 mg tablet Take 1 tablet (40 mg total) by mouth daily with dinner 90 tablet 3    senna (SENOKOT) 8 6 mg Take 1 tablet (8 6 mg total) by mouth daily at bedtime 90 tablet 3    silver sulfadiazine (Silvadene) 1 % cream Apply topically 2 (two) times a day 50 g 5    torsemide (DEMADEX) 20 mg tablet Take 1 tablet (20 mg total) by mouth daily 30 tablet 5     No current facility-administered medications on file prior to visit  Physical exam  Exam: right hip  Incision: incision healed well, no warmth or erythema  ROM: able to flex hip, internally and externally rotate without pain  Sensation: sensation intact to light touch  Bilateral pitting edema noted  Brisk Capillary Refill    Imaging:  Study type: XRAY right hip  Date: 05/05/2023  I have personally reviewed all relevant imaging  No radiologist report was available at this time  My impression is as follows:  stable alignment of rip hip hemiarthroplasty  Unchanged from 03/24/2023  No evdience of dislocation, fracture, or osteolysis  Continued displacement of the greater trochanter

## 2023-05-05 NOTE — PROGRESS NOTES
"Orthopedic Surgery Post-Operative Note  Amy Morrison (51 y o  male)  : 1935 Encounter Date: 2023  Dr Nica Beasley DO, Orthopedic Surgeon  Orthopedic Oncology & Sarcoma Surgery   Assessment/Plan:  80 y o  male  {NUMBERS:10294} {Time; units week/month/year w plurals:} follow up status post {left/right/bilateral:16524}***    · Wound Care   · { wound care:57114}  · Pain control: ***  · Continue ice packs/elevation  · Can continue compression with ACE wrap or compression stockings  · *** physical therapy  · Continue with ***WB*** to ***  · Complete DVT ppx: ***  · No follow-ups on file  · for evaluation {WITH/WITHOUT:88117} x-ray      Subjective:  80 y o  male presenting to the office for {NUMBERS:71570} {Time; units week/month/year w plurals:} follow up, status post {left/right/bilateral:89031} ***   DOS: ***    Patient states that their pain is {Pain assessment:30087::\"present - adequately treated\"}   Patient {has/denies:197728} functional limitations about the surgical extremity  Patient denies any other radicular symptoms  Patient {Actions; denies/admits/improvin::\"denies\"} any instability  Current concerns: {NONE:59142::\"none\"}    Patient {is/is not:51298::\"is\"} taking {PAIN MED:45952} for pain  Numbness/weakness extremity: {none / NA:03492::\"None Reported\"}   Physical Therapy Progress: ***  DVT ppx: {sl amb wound initiated_continue_complete:55647} ***  Antibiotics: ***  Eating/Drinking improving  Bowel/Bladder: WNL  Patient {Actions; denies-reports:303220::\"denies\"} symptoms of an infection  Review of Systems  Review of systems negative unless otherwise specified in HPI    Past Medical History  Past Medical History:   Diagnosis Date    Aortic stenosis     ECHO -14   MODERATE TO SEVERE AORTIC STENOSIS; MILD AROTIC INSUFFICIENCY - LAST ASSESSED 10/26/16; RESOLVED 16    Aortic valve disorder     LAST ASSESSED 10/8/15; 10/8/15    Arthritis     CHF " (congestive heart failure) (HCC)     Complete heart block ([facility] [address] ) 7/20/2020    Coronary artery disease     Hypertension     Hypertensive urgency 5/19/2019    Renal disorder     TIA (transient ischemic attack)     Transient cerebral ischemia      Past Surgical History  Past Surgical History:   Procedure Laterality Date    AORTIC VALVE REPLACEMENT  06/30/2015    avr with 23 mm Livingston Magna Ease bioprosthetic    CARDIAC PACEMAKER PLACEMENT Left 07/24/2020    CATARACT EXTRACTION Right 06/05/2000    COLONOSCOPY      CORONARY ARTERY BYPASS GRAFT  06/05/2000    x1 with argueta  to lad    EYE SURGERY      POLYPECTOMY  04/2014    enteroscopic - esophageal ulcer, gastric erosion, and duodenal ulcer   CT HEMIARTHROPLASTY HIP PARTIAL Right 2/10/2023    Procedure: HEMIARTHROPLASTY HIP (BIPOLAR);   Surgeon: Douglas Esteban DO;  Location: [facility];  Service: Orthopedics    TONSILLECTOMY      unknown     Current Medications  Current Outpatient Medications on File Prior to Visit   Medication Sig Dispense Refill    allopurinol (ZYLOPRIM) 100 mg tablet Take 1 tablet (100 mg total) by mouth daily 90 tablet 3    apixaban (ELIQUIS) 2 5 mg Take 1 tablet (2 5 mg total) by mouth 2 (two) times a day 180 tablet 3    aspirin (ECOTRIN LOW STRENGTH) 81 mg EC tablet Take 1 tablet (81 mg total) by mouth daily  0    desoximetasone (TOPICORT) 0 05 % cream Apply topically 2 (two) times a day 60 g 5    ferrous sulfate 325 (65 Fe) mg tablet Take 325 mg by mouth 2 (two) times a day      gabapentin (NEURONTIN) 100 mg capsule Take 1 capsule (100 mg total) by mouth daily at bedtime 30 capsule 0    metoprolol tartrate (LOPRESSOR) 100 mg tablet Take 1 tablet (100 mg total) by mouth 2 (two) times a day 180 tablet 0    ondansetron (ZOFRAN) 4 mg tablet Take 1 tablet (4 mg total) by mouth every 8 (eight) hours as needed for nausea or vomiting for up to 4 days 12 tablet 0    pantoprazole (PROTONIX) 40 mg tablet Take 1 tablet (40 mg "total) by mouth 2 (two) times a day before meals 180 tablet 3    pravastatin (PRAVACHOL) 40 mg tablet Take 1 tablet (40 mg total) by mouth daily with dinner 90 tablet 3    senna (SENOKOT) 8 6 mg Take 1 tablet (8 6 mg total) by mouth daily at bedtime 90 tablet 3    silver sulfadiazine (Silvadene) 1 % cream Apply topically 2 (two) times a day 50 g 5    torsemide (DEMADEX) 20 mg tablet Take 1 tablet (20 mg total) by mouth daily 30 tablet 5     No current facility-administered medications on file prior to visit  Recent Labs (HCT,HGB,PT,INR,ESR,CRP,GLU,HgA1C)  {reviewed images, lab data, meds, allergies:47852}    Physical exam  Exam: {Right/left:48654} ***  Incision: {Exam; incision:39765:s:\"healing well\"} ***  ROM: {rom:691987}  Motor: ***  Sensation: ***  Brisk Capillary Refill    Imaging:  Study type: {RAD VFVW:78304} {ANATOMY; COIXGB:46599}  Date: ***  { radiology:70973::\"I have personally reviewed all relevant imaging  \",\"My impression is as follows: \"} ***    Scribe Attestation    I,:   am acting as a scribe while in the presence of the attending physician :       I,:   personally performed the services described in this documentation    as scribed in my presence  :             "

## 2023-05-08 ENCOUNTER — TELEPHONE (OUTPATIENT)
Dept: PHYSICAL THERAPY | Facility: OTHER | Age: 88
End: 2023-05-08

## 2023-05-08 ENCOUNTER — TELEPHONE (OUTPATIENT)
Dept: FAMILY MEDICINE CLINIC | Facility: CLINIC | Age: 88
End: 2023-05-08

## 2023-05-08 DIAGNOSIS — I25.10 ATHEROSCLEROSIS OF NATIVE CORONARY ARTERY OF NATIVE HEART WITHOUT ANGINA PECTORIS: ICD-10-CM

## 2023-05-08 RX ORDER — PRAVASTATIN SODIUM 40 MG
TABLET ORAL
Qty: 90 TABLET | Refills: 0 | Status: SHIPPED | OUTPATIENT
Start: 2023-05-08

## 2023-05-08 NOTE — TELEPHONE ENCOUNTER
"Patient's sister called back 05/08 @9:15am (Mari Amador)  Explained [facility] and reason for the call  They both declined services  Patient sister states \"they prefer at home PT\"  Patient/sister would need to follow up with PCP for at home PT  [facility] do not offer at home PT services  Will close [facility] referral per protocol    "

## 2023-05-08 NOTE — TELEPHONE ENCOUNTER
Do they know who is in [address] [place] [address] or [facility] are the ones I know service this area

## 2023-05-08 NOTE — TELEPHONE ENCOUNTER
Daughter called asking for referral for Home PT for his back and needs to be in the [address]  Please advise

## 2023-05-08 NOTE — TELEPHONE ENCOUNTER
Carolyn Roland was unsure who is in network  She is going to call insurance company to see if Cassi Vargas or Alexandr Guerra are covered  She will be calling office back to let us know

## 2023-05-08 NOTE — TELEPHONE ENCOUNTER
Call placed to the patient per [facility] referral     Voice message left for patient to call back  Phone number and hours of business provided  This is the 1st attempt to reach the patient  Will defer per protocol      Patient has PM appt for 5/19/23

## 2023-05-09 ENCOUNTER — TELEPHONE (OUTPATIENT)
Dept: FAMILY MEDICINE CLINIC | Facility: CLINIC | Age: 88
End: 2023-05-09

## 2023-05-09 NOTE — TELEPHONE ENCOUNTER
[facility] has [facility], and an order was sent to another agency, in [place]  he is only allow one agency, and they do provide physical therapy    There fax # for [facility] [phone]

## 2023-05-09 NOTE — TELEPHONE ENCOUNTER
Someon please clarify as I wrote the rx but last message stated Pennelope Simpler so it can go to wherever his insurance will cover

## 2023-05-11 ENCOUNTER — TELEPHONE (OUTPATIENT)
Dept: FAMILY MEDICINE CLINIC | Facility: CLINIC | Age: 88
End: 2023-05-11

## 2023-05-11 NOTE — TELEPHONE ENCOUNTER
Pt's daughter called stating the place we sent the referral to for PT in [facility] can not come in due to [facility] being in the home  Daughter stated [facility] has their own PT and asking if we can please fax a referral to them  Their fax is [phone]  Also wanted to let you know that she will be taking pt to pain management on 5/19  Please advise

## 2023-05-17 ENCOUNTER — TELEPHONE (OUTPATIENT)
Dept: FAMILY MEDICINE CLINIC | Facility: CLINIC | Age: 88
End: 2023-05-17

## 2023-05-17 NOTE — TELEPHONE ENCOUNTER
Was in for eval today, he is doing the same as he was after his discharge  Nothing more to do or change since patient has not motivation to do anything  They will not be going in anymore

## 2023-05-19 ENCOUNTER — CONSULT (OUTPATIENT)
Dept: PAIN MEDICINE | Facility: CLINIC | Age: 88
End: 2023-05-19

## 2023-05-19 ENCOUNTER — APPOINTMENT (OUTPATIENT)
Dept: RADIOLOGY | Facility: MEDICAL CENTER | Age: 88
End: 2023-05-19

## 2023-05-19 VITALS
WEIGHT: 184 LBS | HEIGHT: 64 IN | DIASTOLIC BLOOD PRESSURE: 67 MMHG | BODY MASS INDEX: 31.41 KG/M2 | RESPIRATION RATE: 16 BRPM | HEART RATE: 60 BPM | SYSTOLIC BLOOD PRESSURE: 120 MMHG

## 2023-05-19 DIAGNOSIS — M54.50 CHRONIC BILATERAL LOW BACK PAIN WITHOUT SCIATICA: ICD-10-CM

## 2023-05-19 DIAGNOSIS — Z96.641 HISTORY OF RIGHT HIP HEMIARTHROPLASTY: ICD-10-CM

## 2023-05-19 DIAGNOSIS — M54.50 CHRONIC BILATERAL LOW BACK PAIN WITHOUT SCIATICA: Primary | ICD-10-CM

## 2023-05-19 DIAGNOSIS — G89.29 CHRONIC BILATERAL LOW BACK PAIN WITHOUT SCIATICA: Primary | ICD-10-CM

## 2023-05-19 DIAGNOSIS — G89.29 CHRONIC BILATERAL LOW BACK PAIN WITHOUT SCIATICA: ICD-10-CM

## 2023-05-19 RX ORDER — GABAPENTIN 100 MG/1
200 CAPSULE ORAL
Qty: 60 CAPSULE | Refills: 2 | Status: SHIPPED | OUTPATIENT
Start: 2023-05-19

## 2023-05-19 NOTE — PATIENT INSTRUCTIONS
Start physical therapy  Increase gabapentin to 200 mg (two 100 mg tablets) at night, daily  If you have any concerning side effects with this medication, please call the office to discuss  Obtain low back xrays

## 2023-05-19 NOTE — PROGRESS NOTES
"Assessment:  1  Chronic bilateral low back pain without sciatica    2  History of right hip hemiarthroplasty        Portions of the record may have been created with voice recognition software  Occasional wrong word or \"sound a like\" substitutions may have occurred due to the inherent limitations of voice recognition software  Read the chart carefully and recognize, using context, where substitutions have occurred  Contact me with any questions  Plan:  45-year-old male with past medical history of CHF, emphysema, GERD, CKD, referred by Dr Vi Jain, here for initial evaluation of several months of bilateral, axial, low back pain  Patient and family present today note symptoms started around the time patient fell and suffered a closed displaced fracture of the right femoral neck requiring right hip hemiarthroplasty on 2/10/2023  Patient has completed inpatient rehab since, however continues to have increased difficulty with standing and ambulation  Denies radicular pain, paresthesias, new or progressive weakness, saddle anesthesia, bowel/bladder incontinence  CT of lumbar spine from February 2023 reviewed and shows moderate to severe multilevel DDD with varying levels of central and foraminal stenosis  He has been taking gabapentin 100 nightly without significant relief  Patient was recently recommended to undergo home PT for low back pain but patient declined to continue after the first session  Symptoms likely due to myofascial pain, deconditioning after fall and hemiarthroplasty, wheelchair status, lumbar DDD  1   Recommend a course of PT for core/back strengthening, myofascial release, addressing gait/balance  Referral to home PT provided  2   Increase gabapentin to 200 mg nightly  Risks and benefits discussed  Patient and family expressed understanding  3   Obtain lumbar spine x-ray for further evaluation  4   Follow-up in 3 months or as needed  History of Present Illness:     The " patient is a 80 y o  male who presents for consultation in regards to Back Pain  Symptoms have been present for several months  Symptoms began after fall resulting in right femoral neck fx and subsequent R hip hemiarthroplasty  Pain is reported to be 5 on the numeric rating scale  Symptoms are felt intermittently and worst in the no typical pattern  Symptoms are characterized as burning  Symptoms are associated with difficulty with standing and ambulation  Aggravating factors include standing and walking  Relieving factors include sitting  Recommended to undergo physical therapy but patient declined after for session  Currently taking gabapentin 100 mg nightly without significant relief, denies side effects  Review of Systems:    Review of Systems   Cardiovascular: Positive for leg swelling  Musculoskeletal: Positive for back pain and gait problem  All other systems reviewed and are negative  Past Medical History:   Diagnosis Date   • Aortic stenosis     ECHO -4-14-14  MODERATE TO SEVERE AORTIC STENOSIS; MILD AROTIC INSUFFICIENCY - LAST ASSESSED 10/26/16; RESOLVED 6/8/16   • Aortic valve disorder     LAST ASSESSED 10/8/15; 10/8/15   • Arthritis    • CHF (congestive heart failure) ([facility] [address] )    • Complete heart block ([address] ) 7/20/2020   • Coronary artery disease    • Hypertension    • Hypertensive urgency 5/19/2019   • Renal disorder    • TIA (transient ischemic attack)    • Transient cerebral ischemia        Past Surgical History:   Procedure Laterality Date   • AORTIC VALVE REPLACEMENT  06/30/2015    avr with 23 mm Livingston Magna Ease bioprosthetic   • CARDIAC PACEMAKER PLACEMENT Left 07/24/2020   • CATARACT EXTRACTION Right 06/05/2000   • COLONOSCOPY     • CORONARY ARTERY BYPASS GRAFT  06/05/2000    x1 with argueta  to lad   • EYE SURGERY     • POLYPECTOMY  04/2014    enteroscopic - esophageal ulcer, gastric erosion, and duodenal ulcer      • [place] HEMIARTHROPLASTY HIP PARTIAL Right 2/10/2023 Procedure: HEMIARTHROPLASTY HIP (BIPOLAR);   Surgeon: Madeline Khan DO;  Location: [facility];  Service: Orthopedics   • TONSILLECTOMY      unknown       Family History   Problem Relation Age of Onset   • No Known Problems Mother    • No Known Problems Father    • Coronary artery disease Neg Hx        Social History     Occupational History   • Occupation: retired   Tobacco Use   • Smoking status: Never   • Smokeless tobacco: Never   Vaping Use   • Vaping Use: Never used   Substance and Sexual Activity   • Alcohol use: Never     Alcohol/week: 0 0 standard drinks of alcohol     Comment: very occasional   • Drug use: Never   • Sexual activity: Not Currently         Current Outpatient Medications:   •  allopurinol (ZYLOPRIM) 100 mg tablet, Take 1 tablet (100 mg total) by mouth daily, Disp: 90 tablet, Rfl: 3  •  apixaban (ELIQUIS) 2 5 mg, Take 1 tablet (2 5 mg total) by mouth 2 (two) times a day, Disp: 180 tablet, Rfl: 3  •  aspirin (ECOTRIN LOW STRENGTH) 81 mg EC tablet, Take 1 tablet (81 mg total) by mouth daily, Disp:  , Rfl: 0  •  desoximetasone (TOPICORT) 0 05 % cream, Apply topically 2 (two) times a day, Disp: 60 g, Rfl: 5  •  ferrous sulfate 325 (65 Fe) mg tablet, Take 325 mg by mouth 2 (two) times a day, Disp: , Rfl:   •  gabapentin (NEURONTIN) 100 mg capsule, Take 2 capsules (200 mg total) by mouth daily at bedtime, Disp: 60 capsule, Rfl: 2  •  metoprolol tartrate (LOPRESSOR) 100 mg tablet, Take 1 tablet (100 mg total) by mouth 2 (two) times a day, Disp: 180 tablet, Rfl: 0  •  ondansetron (ZOFRAN) 4 mg tablet, Take 1 tablet (4 mg total) by mouth every 8 (eight) hours as needed for nausea or vomiting for up to 4 days, Disp: 12 tablet, Rfl: 0  •  pantoprazole (PROTONIX) 40 mg tablet, Take 1 tablet (40 mg total) by mouth 2 (two) times a day before meals, Disp: 180 tablet, Rfl: 3  •  pravastatin (PRAVACHOL) 40 mg tablet, TAKE 1 TABLET BY MOUTH ONCE DAILY WITH  DINNER, Disp: 90 tablet, Rfl: 0  •  silver "sulfadiazine (Silvadene) 1 % cream, Apply topically 2 (two) times a day, Disp: 50 g, Rfl: 5  •  senna (SENOKOT) 8 6 mg, Take 1 tablet (8 6 mg total) by mouth daily at bedtime, Disp: 90 tablet, Rfl: 3  •  torsemide (DEMADEX) 20 mg tablet, Take 1 tablet (20 mg total) by mouth daily, Disp: 30 tablet, Rfl: 5    Allergies   Allergen Reactions   • Promethazine Delirium and Other (See Comments)       Physical Exam:    /67 (BP Location: Left arm, Patient Position: Sitting, Cuff Size: Large)   Pulse 60   Resp 16   Ht 5' 4\" (1 626 m)   Wt 83 5 kg (184 lb)   BMI 31 58 kg/m²     Constitutional: normal, well developed, well nourished, alert, in no distress and non-toxic and no overt pain behavior  Eyes: anicteric  HEENT: grossly intact  Neck: supple, symmetric, trachea midline and no masses   Pulmonary:even and unlabored  Cardiovascular:No edema or pitting edema present  Skin:Normal without rashes or lesions and well hydrated  Psychiatric:Mood and affect appropriate  Neurologic:Cranial Nerves II-XII grossly intact  Musculoskeletal:in wheelchair, pain to palpation over b/l lower lumbar paraspinals, limited endurance for manual muscle testing, grossly 4/5 throughout BLE    Imaging    Narrative & Impression   CT RECON ONLY LUMBAR SPINE (NO CHARGE)     INDICATION:   fall, RLE/low back pain      COMPARISON:  Same day trauma studies  Lumbar spine radiograph July 29, 2021      TECHNIQUE: Axial CT examination of the lumbar spine was obtained utilizing reconstructed images from CT of the chest, abdomen and pelvis performed the same day  Images were reformatted in the sagittal and coronal planes      This examination, like all CT scans performed in the [facility], was performed utilizing techniques to minimize radiation dose exposure, including the use of iterative reconstruction and automated exposure control        FINDINGS:     ALIGNMENT: Mild levoscoliosis of lumbar spine    Unchanged mild kyphotic " curvature of thoracolumbar spine  Unchanged grade 1 retrolisthesis L2-L3 and L3-L4      VERTEBRAE: No fracture  No compression deformity      DEGENERATIVE CHANGES: Moderate-to-severe multilevel degenerative disc disease, worse at L2-L3  No critical central canal stenosis    Multilevel foraminal narrowing, worse at right L3-L4 and right L4-5 where there is at least moderate foraminal narrowing      PREVERTEBRAL AND PARASPINAL SOFT TISSUES: Normal      IMPRESSION:     No acute osseous abnormality of lumbar spine      Degenerative changes as detailed above      Please see same-day CT chest abdomen pelvis for further evaluation      The study was marked in EPIC for immediate notification               Orders Placed This Encounter   Procedures   • X-ray lumbar spine 2 or 3 views   • Referral to [address] [facility]

## 2023-05-22 ENCOUNTER — REMOTE DEVICE CLINIC VISIT (OUTPATIENT)
Dept: CARDIOLOGY CLINIC | Facility: CLINIC | Age: 88
End: 2023-05-22

## 2023-05-22 DIAGNOSIS — Z95.0 PRESENCE OF PERMANENT CARDIAC PACEMAKER: Primary | ICD-10-CM

## 2023-05-22 NOTE — PROGRESS NOTES
Results for orders placed or performed in visit on 05/22/23   Cardiac EP device report    Narrative    MDT-DUAL CHAMBER PPM (DDD MODE)/ ACTIVE SYSTEM IS MRI CONDITIONAL  CARELINK TRANSMISSION: BATTERY VOLTAGE ADEQUATE  (9 3 YRS) AP <1%  99%  ALL AVAILABLE LEAD PARAMETERS WITHIN NORMAL LIMITS  NO SIGNIFICANT HIGH RATE EPISODES  CURRENTLY IN AF  PATIENT IS ON ELIQUIS AND METOPROLOL TART  NORMAL DEVICE FUNCTION  ---FRENCH

## 2023-05-24 ENCOUNTER — HOME HEALTH ADMISSION (OUTPATIENT)
Dept: HOME HEALTH SERVICES | Facility: HOME HEALTHCARE | Age: 88
End: 2023-05-24

## 2023-05-24 ENCOUNTER — HOME CARE VISIT (OUTPATIENT)
Dept: HOME HEALTH SERVICES | Facility: HOME HEALTHCARE | Age: 88
End: 2023-05-24

## 2023-05-24 NOTE — CASE COMMUNICATION
I spoke to the patient's daughter Martha Peterson regarding home physical therapy  Patient is currently receving SN services through [address] [place] [address]  Martha Peterson is agreeable to [facility] for PT services as well  I spoke to Emile Ruiz at [address] [place] [address] to confirm that the patient is open to [facility] services and he is  Fax number for [facility] is [phone]  Please fax a script for home PT to [address] [place] [address]  Patient not admitted to [facility]   Thank you

## 2023-05-25 ENCOUNTER — TELEPHONE (OUTPATIENT)
Dept: FAMILY MEDICINE CLINIC | Facility: CLINIC | Age: 88
End: 2023-05-25

## 2023-05-25 ENCOUNTER — TELEPHONE (OUTPATIENT)
Dept: PAIN MEDICINE | Facility: CLINIC | Age: 88
End: 2023-05-25

## 2023-05-25 NOTE — TELEPHONE ENCOUNTER
Nimco Ley from [facility] home health care- PT calling, they received another PT order looks like from [facility] office  They d/c on 4/24, got another referral from you and went out for eval on 5/17  They received another referral yesterday  There is nothing more than they can do for him  They can only go out once a week  Patient does exercises while there with PT but not on his own  Looks like daughter wants PT in there 3x because he is having difficulty moving  Nimco Ley recommending if they truly want PT to stop services with [facility], bc PT cannot go in there while home health/nurses are in, and get an outpatient physical therapy to come into the home and they can do up to 3x/week  Nimco Ley calling because he is unsure at what to do at this point and referrals keep coming in but they cannot accommodate the needs

## 2023-05-25 NOTE — TELEPHONE ENCOUNTER
Spoke with Lisette with pain mgmt and she is going to put a message in for Dr Marysol Ashford as to what she wants to do for the pt

## 2023-05-25 NOTE — TELEPHONE ENCOUNTER
Caller: Dr. Lewis Valenzuela office, [place]    Doctor: Linda Gold    Reason for call: Dr. Lewis Valenzuela says patient had been evaluated by [facility] and has plateued for home health PT so they cannot do further PT at this time. What does dr Linda Gold recommend?     Call back#: [phone]

## 2023-05-26 NOTE — TELEPHONE ENCOUNTER
Lissy Heller MD  [facility] 7 minutes ago (8:20 AM)     RD  In that case, patient should continue home exercise program as recommended by past PT evaluation.

## 2023-05-31 ENCOUNTER — TELEPHONE (OUTPATIENT)
Dept: FAMILY MEDICINE CLINIC | Facility: CLINIC | Age: 88
End: 2023-05-31

## 2023-05-31 NOTE — TELEPHONE ENCOUNTER
Azael Hernandez with [facility] called on pt, said his wound in improving  Also, reported he did have a fall but pt doesn't remember the date, noted that there is a bruise on his left hip  Just an FYI

## 2023-06-03 NOTE — PROGRESS NOTES
Dr Cortney Vallejo  Hip nancy Arthroplasty    Assessment/Plan:  80 y o  status post right hip hemiarthroplasty    · Wound Care   · Continue current care , OK to shower  , Dab dry with towel , Steristrips will fall off on their own  · Pain control: Per rehab  · Continue ice packs/elevation  · Can continue compression with ACE wrap or compression stockings  · Continue with physical therapy  · Continue activities as tolerated, weightbearing as tolerated  · Complete DVT ppx 1 month  ·   · Follow-up 1 month with new x-rays AP pelvis right hip      Subjective:  80 y o  status post right hip hemiarthroplasty 2 weeks ago  Doing well, currently a resident at [place] [id number] rehab    Pain is well controlled  Able to stand with walker assistance  Has begun ambulating  Current concerns: none   Patient is taking OTC pain medications for pain  Numbness/weakness extremity: Expected postoperatively    Physical Therapy Progress: Progressing as tolerated   DVT ppx: Continue DVT prophylaxis until 1 month postop      Eating/Drinking improving  Bowel/Bladder: WNL   Patient denies symptoms of an infection       Physical Exam:   Exam: right hip  Incision: healing well no significant drainage no dehiscence no significant erythema   ROM: Limited secondary to postoperative state  Sensation: Sensation intact to light touch to dp/sp/tib/kanu/saph distributions   Brisk Capillary Refill  Positive tib ant, EHL, gastrocsoleus complex      Imaging:   Xrays of right hip from 2/10/2023: Well-positioned hemiarthroplasty without evidence of fracture dislocation            Sigifredo Blandon PA-C Detail Level: Detailed Depth Of Biopsy: dermis Was A Bandage Applied: Yes Size Of Lesion In Cm: 0 Biopsy Type: H and E Biopsy Method: Dermablade Anesthesia Type: 1% lidocaine with epinephrine Anesthesia Volume In Cc: 0.5 Hemostasis: Drysol Wound Care: Petrolatum Dressing: bandage Destruction After The Procedure: No Type Of Destruction Used: Curettage Curettage Text: The wound bed was treated with curettage after the biopsy was performed. Cryotherapy Text: The wound bed was treated with cryotherapy after the biopsy was performed. Electrodesiccation Text: The wound bed was treated with electrodesiccation after the biopsy was performed. Electrodesiccation And Curettage Text: The wound bed was treated with electrodesiccation and curettage after the biopsy was performed. Silver Nitrate Text: The wound bed was treated with silver nitrate after the biopsy was performed. Lab: [id number] Lab Facility: [id number] Path Notes (To The Dermatopathologist): adjacent to prior mod diff SCC Consent: Written consent was obtained and risks were reviewed including but not limited to scarring, infection, bleeding, scabbing, incomplete removal, nerve damage and allergy to anesthesia. Post-Care Instructions: I reviewed with the patient in detail post-care instructions. Patient is to keep the biopsy site dry overnight, and then apply bacitracin twice daily until healed. Patient may apply hydrogen peroxide soaks to remove any crusting. Notification Instructions: Patient will be notified of biopsy results. However, patient instructed to call the office if not contacted within 2 weeks. Billing Type: Third-Party Bill Information: Selecting Yes will display possible errors in your note based on the variables you have selected. This validation is only offered as a suggestion for you. PLEASE NOTE THAT THE VALIDATION TEXT WILL BE REMOVED WHEN YOU FINALIZE YOUR NOTE. IF YOU WANT TO FAX A PRELIMINARY NOTE YOU WILL NEED TO TOGGLE THIS TO 'NO' IF YOU DO NOT WANT IT IN YOUR FAXED NOTE.

## 2023-06-05 DIAGNOSIS — I10 ESSENTIAL HYPERTENSION: ICD-10-CM

## 2023-06-05 RX ORDER — METOPROLOL TARTRATE 100 MG/1
100 TABLET ORAL 2 TIMES DAILY
Qty: 180 TABLET | Refills: 0 | Status: SHIPPED | OUTPATIENT
Start: 2023-06-05 | End: 2023-06-14 | Stop reason: SDUPTHER

## 2023-06-14 DIAGNOSIS — I10 ESSENTIAL HYPERTENSION: ICD-10-CM

## 2023-06-14 DIAGNOSIS — I50.9 CHRONIC CONGESTIVE HEART FAILURE, UNSPECIFIED HEART FAILURE TYPE (HCC): ICD-10-CM

## 2023-06-14 RX ORDER — METOPROLOL TARTRATE 100 MG/1
100 TABLET ORAL 2 TIMES DAILY
Qty: 180 TABLET | Refills: 0 | Status: SHIPPED | OUTPATIENT
Start: 2023-06-14

## 2023-06-14 RX ORDER — TORSEMIDE 20 MG/1
20 TABLET ORAL DAILY
Qty: 30 TABLET | Refills: 5 | Status: SHIPPED | OUTPATIENT
Start: 2023-06-14

## 2023-07-07 DIAGNOSIS — I10 ESSENTIAL HYPERTENSION: ICD-10-CM

## 2023-07-07 DIAGNOSIS — I48.0 PAF (PAROXYSMAL ATRIAL FIBRILLATION) (HCC): ICD-10-CM

## 2023-07-07 RX ORDER — METOPROLOL TARTRATE 100 MG/1
100 TABLET ORAL 2 TIMES DAILY
Qty: 180 TABLET | Refills: 0 | Status: SHIPPED | OUTPATIENT
Start: 2023-07-07

## 2023-07-11 ENCOUNTER — APPOINTMENT (OUTPATIENT)
Dept: LAB | Facility: HOSPITAL | Age: 88
End: 2023-07-11
Attending: INTERNAL MEDICINE
Payer: COMMERCIAL

## 2023-07-11 ENCOUNTER — OFFICE VISIT (OUTPATIENT)
Dept: CARDIOLOGY CLINIC | Facility: HOSPITAL | Age: 88
End: 2023-07-11
Payer: COMMERCIAL

## 2023-07-11 VITALS
DIASTOLIC BLOOD PRESSURE: 68 MMHG | BODY MASS INDEX: 29.81 KG/M2 | SYSTOLIC BLOOD PRESSURE: 118 MMHG | OXYGEN SATURATION: 97 % | WEIGHT: 174.6 LBS | HEART RATE: 61 BPM | HEIGHT: 64 IN

## 2023-07-11 DIAGNOSIS — I10 BENIGN ESSENTIAL HYPERTENSION: ICD-10-CM

## 2023-07-11 DIAGNOSIS — Z95.3 HISTORY OF AORTIC VALVE REPLACEMENT WITH BIOPROSTHETIC VALVE: ICD-10-CM

## 2023-07-11 DIAGNOSIS — E78.5 DYSLIPIDEMIA: ICD-10-CM

## 2023-07-11 DIAGNOSIS — I48.0 PAROXYSMAL ATRIAL FIBRILLATION (HCC): Primary | ICD-10-CM

## 2023-07-11 DIAGNOSIS — I44.2 COMPLETE HEART BLOCK (HCC): ICD-10-CM

## 2023-07-11 DIAGNOSIS — I50.32 CHRONIC DIASTOLIC CHF (CONGESTIVE HEART FAILURE) (HCC): ICD-10-CM

## 2023-07-11 DIAGNOSIS — Z95.0 PRESENCE OF PERMANENT CARDIAC PACEMAKER: ICD-10-CM

## 2023-07-11 DIAGNOSIS — I48.0 PAROXYSMAL ATRIAL FIBRILLATION (HCC): ICD-10-CM

## 2023-07-11 DIAGNOSIS — I34.2 NONRHEUMATIC MITRAL VALVE STENOSIS: ICD-10-CM

## 2023-07-11 DIAGNOSIS — N18.4 CKD (CHRONIC KIDNEY DISEASE) STAGE 4, GFR 15-29 ML/MIN (HCC): ICD-10-CM

## 2023-07-11 DIAGNOSIS — I35.0 SEVERE AORTIC STENOSIS: Primary | ICD-10-CM

## 2023-07-11 DIAGNOSIS — Z95.1 HX OF CABG: ICD-10-CM

## 2023-07-11 DIAGNOSIS — I25.810 ATHEROSCLEROSIS OF CORONARY ARTERY BYPASS GRAFT OF NATIVE HEART WITHOUT ANGINA PECTORIS: ICD-10-CM

## 2023-07-11 LAB
ANION GAP SERPL CALCULATED.3IONS-SCNC: 13 MMOL/L
BUN SERPL-MCNC: 66 MG/DL (ref 5–25)
CALCIUM SERPL-MCNC: 9.4 MG/DL (ref 8.4–10.2)
CHLORIDE SERPL-SCNC: 96 MMOL/L (ref 96–108)
CO2 SERPL-SCNC: 27 MMOL/L (ref 21–32)
CREAT SERPL-MCNC: 2.97 MG/DL (ref 0.6–1.3)
GFR SERPL CREATININE-BSD FRML MDRD: 18 ML/MIN/1.73SQ M
GLUCOSE SERPL-MCNC: 93 MG/DL (ref 65–140)
POTASSIUM SERPL-SCNC: 4.4 MMOL/L (ref 3.5–5.3)
SODIUM SERPL-SCNC: 136 MMOL/L (ref 135–147)

## 2023-07-11 PROCEDURE — 36415 COLL VENOUS BLD VENIPUNCTURE: CPT

## 2023-07-11 PROCEDURE — 80048 BASIC METABOLIC PNL TOTAL CA: CPT

## 2023-07-11 PROCEDURE — 99214 OFFICE O/P EST MOD 30 MIN: CPT | Performed by: INTERNAL MEDICINE

## 2023-07-11 NOTE — PROGRESS NOTES
Cardiology Follow Up    Lizzette Carmenza  1935  [facility] [address] [address] [phone]    1. History of severe aortic stenosis        2. History of aortic valve replacement with bioprosthetic valve        3. Paroxysmal atrial fibrillation (HCC)        4. Complete heart block ([address])        5. Presence of permanent cardiac pacemaker        6. 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        7. Hx of CABG        8. Chronic diastolic CHF (congestive heart failure) ([address])        9. Nonrheumatic mitral valve stenosis        10. Benign essential hypertension        11. Dyslipidemia        12. CKD (chronic kidney disease) stage 4, GFR 15-29 ml/min ([facility])  Basic metabolic panel          Discussion/Summary:  Mr. Tejal Cruz  Is a pleasant 71-year-old gentleman who presents to the office today for follow-up. In the recent past he has been feeling relatively well. He offers no cardiac complaints today. He appears euvolemic on exam on his current diuretic regimen. I have asked him to have his renal function reassessed given the increase in the torsemide a few months ago. If his renal function is stable he will remain on the crease dose of 40 mg daily. Otherwise his blood pressure is well controlled in the office today on his current antihypertensive medication regimen to which no changes were made. His most recent lipids from last year reveal an acceptable LDL on his current statin regimen. He has a high triglyceride level and low HDL for which therapeutic lifestyle modifications were recommended. His most recent device checks reveal he has been persistently in atrial fibrillation since 2022. No changes were made to his AV david blocking regimen. He is on anticoagulation with Eliquis dosed for his age and renal function.     He underwent a recent echocardiogram revealing a normally functioning bioprosthetic aortic valve. He has mitral valve diease as well. I will see him back in the office in six months. Interval History:  Mr. Patrick Sampson is a pleasant 26-year-old gentleman who presents to the office today for follow-up. He was last seen by me in 2021. He was admitted to the hospital earlier this year after he suffered a mechanical fall resulting in a hip fracture necessitating a right hip hemiarthroplasty. He reports that a few months ago he was seen by his nephrologist.  He had significant lower extremity edema. He was instructed to increase his torsemide to 40 mg daily until the edema resolved and then decrease this back down to 20 mg daily. He is currently receiving 40 mg daily. He reports marked improvement in the edema but just recently. He has been utilizing compression stockings. He is very sedentary. He ambulates with the aid of a walker or cane in his home. With the activity he performs he is asymptomatic. He denies any chest pain or shortness of breath with physical activity. He denies any signs or symptoms of volume overload including paroxysmal nocturnal dyspnea, orthopnea, acute weight gain or increasing abdominal girth. However he has been sleeping in a recliner since his hip fracture for comfort. His wife weighs him on a regular basis with a weight of about 180 pounds on his home scale. He is compliant with a low-salt diet. He denies lightheadedness, syncope or presyncope. He denies any sensation of palpitations. He denies any bleeding consequences on Eliquis. He denies symptoms of claudication.     Medical Problems     Problem List     Mild intermittent asthma without complication    CKD (chronic kidney disease) stage 4, GFR 15-29 ml/min [facility])    Lab Results   Component Value Date    EGFR 18 07/11/2023    EGFR 17 04/14/2023    EGFR 18 03/20/2023    CREATININE 2.97 (H) 07/11/2023    CREATININE 3.11 (H) 04/14/2023    CREATININE 2.85 (H) 03/20/2023         Dyslipidemia    GERD (gastroesophageal reflux disease)    Late effects of cerebrovascular disease    Lumbar degenerative disc disease    Lumbar radiculopathy    Mitral regurgitation    Overview Signed 8/14/2018  9:07 AM by Heavenly Olsen DO     Description: echo 04- - moderate         Obesity (BMI 30-39. 9)    Medicare annual wellness visit, subsequent    H/O aortic valve replacement    History of stroke    Chronic diastolic CHF (congestive heart failure) ([address])    Wt Readings from Last 3 Encounters:   07/11/23 79.2 kg (174 lb 9.6 oz)   05/19/23 83.5 kg (184 lb)   04/14/23 83.5 kg (184 lb)                 Ambulatory dysfunction    Paroxysmal atrial fibrillation (HCC)    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    Pacemaker (Chronic)    Oxygen dependent    Chronic combined systolic and diastolic congestive heart failure ([address])    Wt Readings from Last 3 Encounters:   07/11/23 79.2 kg (174 lb 9.6 oz)   05/19/23 83.5 kg (184 lb)   04/14/23 83.5 kg (184 lb)                 Mitral stenosis    Iron deficiency anemia    Need for immunization against influenza    Pulmonary emphysema ([address])    Left wrist pain    Right hip pain    Acute kidney injury superimposed on CKD [facility])    Lab Results   Component Value Date    EGFR 18 07/11/2023    EGFR 17 04/14/2023    EGFR 18 03/20/2023    CREATININE 2.97 (H) 07/11/2023    CREATININE 3.11 (H) 04/14/2023    CREATININE 2.85 (H) 03/20/2023         Sepsis ([address])    Overview Deleted 8/4/2021 10:17 AM by Sarath Manajrrez PA-C            Multiple renal cysts    Effusion of left knee    Mitral valve stenosis    Acute cystitis without hematuria    Acute gout    Transaminitis    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 kidney disease, or chronic kidney disease ([address])    Overview Signed 8/6/2021  5:04 AM by Chetna Vásquez Per CMS ICD 10 Guidelines--per Physician         Lab Results   Component Value Date    EGFR 18 07/11/2023    EGFR 17 04/14/2023    EGFR 18 03/20/2023    CREATININE 2.97 (H) 07/11/2023    CREATININE 3.11 (H) 04/14/2023    CREATININE 2.85 (H) 03/20/2023         Chronic low back pain without sciatica              Past Medical History:   Diagnosis Date   • Aortic stenosis     ECHO -4-14-14. MODERATE TO SEVERE AORTIC STENOSIS; MILD AROTIC INSUFFICIENCY - LAST ASSESSED 10/26/16; RESOLVED 6/8/16   • Aortic valve disorder     LAST ASSESSED 10/8/15; 10/8/15   • Arthritis    • CHF (congestive heart failure) ([facility])    • Complete heart block ([address]) 7/20/2020   • Coronary artery disease    • Hypertension    • Hypertensive urgency 5/19/2019   • Renal disorder    • TIA (transient ischemic attack)    • Transient cerebral ischemia      Social History     Socioeconomic History   • Marital status: /Civil Union     Spouse name:  Gosia   • Number of children: 5   • Years of education: Not on file   • Highest education level: Not on file   Occupational History   • Occupation: retired   Tobacco Use   • Smoking status: Never   • Smokeless tobacco: Never   Vaping Use   • Vaping Use: Never used   Substance and Sexual Activity   • Alcohol use: Never     Alcohol/week: 0.0 standard drinks of alcohol     Comment: very occasional   • Drug use: Never   • Sexual activity: Not Currently   Other Topics Concern   • Not on file   Social History Narrative    Caffeine use     Dental care, regularly     Lives independently with spouse     Uses seatbelts      Social Determinants of Health     Financial Resource Strain: Low Risk  (6/9/2021)    Overall Financial Resource Strain (CARDIA)    • Difficulty of Paying Living Expenses: Not hard at all   Food Insecurity: No Food Insecurity (2/9/2023)    Hunger Vital Sign    • Worried About Running Out of Food in the Last Year: Never true    • Ran Out of Food in the Last Year: Never true   Transportation Needs: No Transportation Needs (2/9/2023)    PRAPARE - Transportation    • Lack of Transportation (Medical): No    • Lack of Transportation (Non-Medical): No   Physical Activity: Not on file   Stress: Not on file   Social Connections: Unknown (9/3/2020)    Social Connection and Isolation Panel [NHANES]    • Frequency of Communication with Friends and Family: More than three times a week    • Frequency of Social Gatherings with Friends and Family: More than three times a week    • Attends [employer] Services: Not on file    • Active Member of [address] or Organizations: Not on file    • Attends Club or Organization Meetings: Not on file    • Marital Status:    Intimate Partner Violence: Not on file   Housing Stability: Low Risk  (2/9/2023)    Housing Stability Vital Sign    • Unable to Pay for Housing in the Last Year: No    • Number of Places Lived in the Last Year: 1    • Unstable Housing in the Last Year: No      Family History   Problem Relation Age of Onset   • No Known Problems Mother    • No Known Problems Father    • Coronary artery disease Neg Hx      Past Surgical History:   Procedure Laterality Date   • AORTIC VALVE REPLACEMENT  06/30/2015    avr with 23 mm [place] Magna Ease bioprosthetic   • CARDIAC PACEMAKER PLACEMENT Left 07/24/2020   • CATARACT EXTRACTION Right 06/05/2000   • COLONOSCOPY     • CORONARY ARTERY BYPASS GRAFT  06/05/2000    x1 with argueta  to lad   • EYE SURGERY     • POLYPECTOMY  04/2014    enteroscopic - esophageal ulcer, gastric erosion, and duodenal ulcer. • [place] HEMIARTHROPLASTY HIP PARTIAL Right 2/10/2023    Procedure: HEMIARTHROPLASTY HIP (BIPOLAR);   Surgeon: Maury Claudio DO;  Location: [facility];  Service: Orthopedics   • TONSILLECTOMY      unknown       Current Outpatient Medications:   •  allopurinol (ZYLOPRIM) 100 mg tablet, Take 1 tablet (100 mg total) by mouth daily, Disp: 90 tablet, Rfl: 3  •  apixaban (ELIQUIS) 2.5 mg, Take 1 tablet (2.5 mg total) by mouth 2 (two) times a day, Disp: 180 tablet, Rfl: 3  •  apixaban (Eliquis) 2.5 mg, Take 1 tablet (2.5 mg total) by mouth 2 (two) times a day, Disp: 56 tablet, Rfl: 0  •  aspirin (ECOTRIN LOW STRENGTH) 81 mg EC tablet, Take 1 tablet (81 mg total) by mouth daily, Disp:  , Rfl: 0  •  desoximetasone (TOPICORT) 0.05 % cream, Apply topically 2 (two) times a day, Disp: 60 g, Rfl: 5  •  ferrous sulfate 325 (65 Fe) mg tablet, Take 325 mg by mouth 2 (two) times a day, Disp: , Rfl:   •  gabapentin (NEURONTIN) 100 mg capsule, Take 2 capsules (200 mg total) by mouth daily at bedtime, Disp: 60 capsule, Rfl: 2  •  metoprolol tartrate (LOPRESSOR) 100 mg tablet, Take 1 tablet (100 mg total) by mouth 2 (two) times a day, Disp: 180 tablet, Rfl: 0  •  ondansetron (ZOFRAN) 4 mg tablet, Take 1 tablet (4 mg total) by mouth every 8 (eight) hours as needed for nausea or vomiting for up to 4 days, Disp: 12 tablet, Rfl: 0  •  pantoprazole (PROTONIX) 40 mg tablet, Take 1 tablet (40 mg total) by mouth 2 (two) times a day before meals, Disp: 180 tablet, Rfl: 3  •  pravastatin (PRAVACHOL) 40 mg tablet, TAKE 1 TABLET BY MOUTH ONCE DAILY WITH  DINNER, Disp: 90 tablet, Rfl: 0  •  senna (SENOKOT) 8.6 mg, Take 1 tablet (8.6 mg total) by mouth daily at bedtime, Disp: 90 tablet, Rfl: 3  •  silver sulfadiazine (Silvadene) 1 % cream, Apply topically 2 (two) times a day, Disp: 50 g, Rfl: 5  •  torsemide (DEMADEX) 20 mg tablet, Take 1 tablet (20 mg total) by mouth daily, Disp: 30 tablet, Rfl: 5  Allergies   Allergen Reactions   • Promethazine Delirium and Other (See Comments)       Labs:     Chemistry        Component Value Date/Time     10/01/2015 0832    K 4.4 07/11/2023 1520    K 4.6 10/01/2015 0832    CL 96 07/11/2023 1520     10/01/2015 0832    CO2 27 07/11/2023 1520    CO2 27.4 10/01/2015 0832    BUN 66 (H) 07/11/2023 1520    BUN 28 (H) 10/01/2015 0832    CREATININE 2.97 (H) 07/11/2023 1520    CREATININE 1.88 (H) 10/01/2015 0832        Component Value Date/Time CALCIUM 9.4 07/11/2023 1520    CALCIUM 9.1 10/01/2015 0832    ALKPHOS 140 (H) 04/14/2023 1214    ALKPHOS 100 10/01/2015 0832    AST 15 04/14/2023 1214    AST 18 10/01/2015 0832    ALT 9 (L) 04/14/2023 1214    ALT 17 10/01/2015 0832    BILITOT 0.37 10/01/2015 0832            Lab Results   Component Value Date    CHOL 139 10/01/2015    CHOL 147 06/15/2015    CHOL 151 04/30/2015     Lab Results   Component Value Date    HDL 22 (L) 12/02/2022    HDL 36 (L) 08/19/2021    HDL 32 (L) 05/20/2019     Lab Results   Component Value Date    LDLCALC 36 12/02/2022    LDLCALC 72 08/19/2021    LDLCALC 103 (H) 05/20/2019     Lab Results   Component Value Date    TRIG 202 (H) 12/02/2022    TRIG 70 08/19/2021    TRIG 162 (H) 05/20/2019     No results found for: "CHOLHDL"    Imaging: No results found. Review of Systems   Cardiovascular: Negative for chest pain, claudication, cyanosis, dyspnea on exertion, irregular heartbeat, leg swelling, near-syncope and orthopnea. All other systems reviewed and are negative. Vitals:    07/11/23 1438   BP: 118/68   Pulse: 61   SpO2: 97%     Vitals:    07/11/23 1438   Weight: 79.2 kg (174 lb 9.6 oz)     Height: 5' 4" (162.6 cm)   Body mass index is 29.97 kg/m².     Physical Exam:  General:  Alert and cooperative, appears stated age  HEENT:  PERRLA, EOMI, no scleral icterus, no conjunctival pallor  Neck:  No lymphadenopathy, no thyromegaly, no carotid bruits, no elevated JVP  Heart:  Regular rate and rhythm, normal S1/S2, no S3/S4, no murmur  Lungs:  Clear to auscultation bilaterally   Abdomen:  Soft, non-tender, positive bowel sounds, no rebound or guarding,   no organomegaly   Extremities:  + pedal edema   Skin:  No rashes or lesions on exposed skin  Neurologic:  Cranial nerves II-XII grossly intact without focal deficits

## 2023-07-14 DIAGNOSIS — M54.50 CHRONIC BILATERAL LOW BACK PAIN WITHOUT SCIATICA: ICD-10-CM

## 2023-07-14 DIAGNOSIS — G89.29 CHRONIC BILATERAL LOW BACK PAIN WITHOUT SCIATICA: ICD-10-CM

## 2023-07-14 RX ORDER — GABAPENTIN 100 MG/1
200 CAPSULE ORAL
Qty: 60 CAPSULE | Refills: 2 | Status: SHIPPED | OUTPATIENT
Start: 2023-07-14

## 2023-07-19 RX ORDER — GABAPENTIN 100 MG/1
CAPSULE ORAL
Qty: 60 CAPSULE | Refills: 0 | OUTPATIENT
Start: 2023-07-19

## 2023-08-30 ENCOUNTER — TELEPHONE (OUTPATIENT)
Dept: NEPHROLOGY | Facility: CLINIC | Age: 88
End: 2023-08-30

## 2023-09-01 ENCOUNTER — APPOINTMENT (OUTPATIENT)
Dept: LAB | Facility: MEDICAL CENTER | Age: 88
End: 2023-09-01
Payer: COMMERCIAL

## 2023-09-01 DIAGNOSIS — N18.4 CKD (CHRONIC KIDNEY DISEASE) STAGE 4, GFR 15-29 ML/MIN (HCC): ICD-10-CM

## 2023-09-01 DIAGNOSIS — M54.50 CHRONIC BILATERAL LOW BACK PAIN WITHOUT SCIATICA: ICD-10-CM

## 2023-09-01 DIAGNOSIS — N25.81 SECONDARY HYPERPARATHYROIDISM (HCC): ICD-10-CM

## 2023-09-01 DIAGNOSIS — E61.1 IRON DEFICIENCY: ICD-10-CM

## 2023-09-01 DIAGNOSIS — G89.29 CHRONIC BILATERAL LOW BACK PAIN WITHOUT SCIATICA: ICD-10-CM

## 2023-09-01 DIAGNOSIS — I10 ESSENTIAL HYPERTENSION: ICD-10-CM

## 2023-09-01 DIAGNOSIS — E55.9 VITAMIN D DEFICIENCY: ICD-10-CM

## 2023-09-01 DIAGNOSIS — K92.2 GASTROINTESTINAL HEMORRHAGE, UNSPECIFIED GASTROINTESTINAL HEMORRHAGE TYPE: ICD-10-CM

## 2023-09-01 LAB
25(OH)D3 SERPL-MCNC: 18.2 NG/ML (ref 30–100)
ALBUMIN SERPL BCP-MCNC: 4 G/DL (ref 3.5–5)
ALP SERPL-CCNC: 103 U/L (ref 34–104)
ALT SERPL W P-5'-P-CCNC: 7 U/L (ref 7–52)
ANION GAP SERPL CALCULATED.3IONS-SCNC: 14 MMOL/L
AST SERPL W P-5'-P-CCNC: 19 U/L (ref 13–39)
BASOPHILS # BLD AUTO: 0.04 THOUSANDS/ÂΜL (ref 0–0.1)
BASOPHILS NFR BLD AUTO: 1 % (ref 0–1)
BILIRUB SERPL-MCNC: 0.51 MG/DL (ref 0.2–1)
BUN SERPL-MCNC: 54 MG/DL (ref 5–25)
CALCIUM SERPL-MCNC: 9.3 MG/DL (ref 8.4–10.2)
CHLORIDE SERPL-SCNC: 98 MMOL/L (ref 96–108)
CO2 SERPL-SCNC: 28 MMOL/L (ref 21–32)
CREAT SERPL-MCNC: 2.82 MG/DL (ref 0.6–1.3)
EOSINOPHIL # BLD AUTO: 0.48 THOUSAND/ÂΜL (ref 0–0.61)
EOSINOPHIL NFR BLD AUTO: 6 % (ref 0–6)
ERYTHROCYTE [DISTWIDTH] IN BLOOD BY AUTOMATED COUNT: 14.3 % (ref 11.6–15.1)
GFR SERPL CREATININE-BSD FRML MDRD: 19 ML/MIN/1.73SQ M
GLUCOSE SERPL-MCNC: 103 MG/DL (ref 65–140)
HCT VFR BLD AUTO: 42 % (ref 36.5–49.3)
HGB BLD-MCNC: 13.1 G/DL (ref 12–17)
IMM GRANULOCYTES # BLD AUTO: 0.05 THOUSAND/UL (ref 0–0.2)
IMM GRANULOCYTES NFR BLD AUTO: 1 % (ref 0–2)
LYMPHOCYTES # BLD AUTO: 1.11 THOUSANDS/ÂΜL (ref 0.6–4.47)
LYMPHOCYTES NFR BLD AUTO: 13 % (ref 14–44)
MAGNESIUM SERPL-MCNC: 1.6 MG/DL (ref 1.9–2.7)
MCH RBC QN AUTO: 33 PG (ref 26.8–34.3)
MCHC RBC AUTO-ENTMCNC: 31.2 G/DL (ref 31.4–37.4)
MCV RBC AUTO: 106 FL (ref 82–98)
MONOCYTES # BLD AUTO: 0.68 THOUSAND/ÂΜL (ref 0.17–1.22)
MONOCYTES NFR BLD AUTO: 8 % (ref 4–12)
NEUTROPHILS # BLD AUTO: 6.36 THOUSANDS/ÂΜL (ref 1.85–7.62)
NEUTS SEG NFR BLD AUTO: 71 % (ref 43–75)
NRBC BLD AUTO-RTO: 0 /100 WBCS
PHOSPHATE SERPL-MCNC: 3 MG/DL (ref 2.3–4.1)
PLATELET # BLD AUTO: 208 THOUSANDS/UL (ref 149–390)
PMV BLD AUTO: 11.1 FL (ref 8.9–12.7)
POTASSIUM SERPL-SCNC: 4.2 MMOL/L (ref 3.5–5.3)
PROT SERPL-MCNC: 7.1 G/DL (ref 6.4–8.4)
PTH-INTACT SERPL-MCNC: 158.2 PG/ML (ref 12–88)
RBC # BLD AUTO: 3.97 MILLION/UL (ref 3.88–5.62)
SODIUM SERPL-SCNC: 140 MMOL/L (ref 135–147)
WBC # BLD AUTO: 8.72 THOUSAND/UL (ref 4.31–10.16)

## 2023-09-01 PROCEDURE — 83735 ASSAY OF MAGNESIUM: CPT

## 2023-09-01 PROCEDURE — 83970 ASSAY OF PARATHORMONE: CPT

## 2023-09-01 PROCEDURE — 84100 ASSAY OF PHOSPHORUS: CPT

## 2023-09-01 PROCEDURE — 36415 COLL VENOUS BLD VENIPUNCTURE: CPT

## 2023-09-01 PROCEDURE — 80053 COMPREHEN METABOLIC PANEL: CPT

## 2023-09-01 PROCEDURE — 82306 VITAMIN D 25 HYDROXY: CPT

## 2023-09-01 PROCEDURE — 85025 COMPLETE CBC W/AUTO DIFF WBC: CPT

## 2023-09-01 RX ORDER — GABAPENTIN 100 MG/1
200 CAPSULE ORAL
Qty: 60 CAPSULE | Refills: 2 | Status: SHIPPED | OUTPATIENT
Start: 2023-09-01

## 2023-09-01 RX ORDER — PANTOPRAZOLE SODIUM 40 MG/1
40 TABLET, DELAYED RELEASE ORAL
Qty: 180 TABLET | Refills: 3 | Status: SHIPPED | OUTPATIENT
Start: 2023-09-01 | End: 2023-11-30

## 2023-09-01 RX ORDER — METOPROLOL TARTRATE 100 MG/1
100 TABLET ORAL 2 TIMES DAILY
Qty: 180 TABLET | Refills: 0 | Status: SHIPPED | OUTPATIENT
Start: 2023-09-01

## 2023-09-05 ENCOUNTER — OFFICE VISIT (OUTPATIENT)
Dept: NEPHROLOGY | Facility: CLINIC | Age: 88
End: 2023-09-05
Payer: COMMERCIAL

## 2023-09-05 VITALS
SYSTOLIC BLOOD PRESSURE: 108 MMHG | HEIGHT: 64 IN | WEIGHT: 175 LBS | HEART RATE: 61 BPM | DIASTOLIC BLOOD PRESSURE: 60 MMHG | BODY MASS INDEX: 29.88 KG/M2 | OXYGEN SATURATION: 96 %

## 2023-09-05 DIAGNOSIS — I13.0 HYPERTENSIVE HEART AND CHRONIC KIDNEY DISEASE WITH HEART FAILURE AND STAGE 1 THROUGH STAGE 4 CHRONIC KIDNEY DISEASE, OR CHRONIC KIDNEY DISEASE (HCC): ICD-10-CM

## 2023-09-05 DIAGNOSIS — I50.9 CHRONIC CONGESTIVE HEART FAILURE, UNSPECIFIED HEART FAILURE TYPE (HCC): ICD-10-CM

## 2023-09-05 DIAGNOSIS — N18.4 STAGE 4 CHRONIC KIDNEY DISEASE (HCC): Primary | ICD-10-CM

## 2023-09-05 DIAGNOSIS — E55.9 VITAMIN D DEFICIENCY: ICD-10-CM

## 2023-09-05 DIAGNOSIS — N25.81 SECONDARY HYPERPARATHYROIDISM (HCC): ICD-10-CM

## 2023-09-05 DIAGNOSIS — E83.42 HYPOMAGNESEMIA: ICD-10-CM

## 2023-09-05 PROCEDURE — 99214 OFFICE O/P EST MOD 30 MIN: CPT | Performed by: INTERNAL MEDICINE

## 2023-09-05 RX ORDER — MELATONIN
2000 DAILY
COMMUNITY

## 2023-09-05 RX ORDER — TORSEMIDE 20 MG/1
40 TABLET ORAL DAILY
Qty: 30 TABLET | Refills: 5 | Status: SHIPPED | OUTPATIENT
Start: 2023-09-05

## 2023-09-05 RX ORDER — MAGNESIUM OXIDE 400 MG/1
400 TABLET ORAL DAILY
Qty: 90 TABLET | Refills: 1 | Status: SHIPPED | OUTPATIENT
Start: 2023-09-05

## 2023-09-05 NOTE — PROGRESS NOTES
Assessment & Plan:    1. Stage 4 chronic kidney disease (HCC)  -     Urinalysis with microscopic; Future; Expected date: 12/05/2023  -     Uric acid; Future; Expected date: 12/05/2023  -     PTH, intact; Future; Expected date: 12/05/2023  -     Comprehensive metabolic panel; Future; Expected date: 12/05/2023  -     Magnesium; Future; Expected date: 12/05/2023  -     Albumin / creatinine urine ratio; Future; Expected date: 12/05/2023  -     Phosphorus; Future; Expected date: 12/05/2023  -     CBC and differential; Future; Expected date: 12/05/2023    2. Chronic congestive heart failure, unspecified heart failure type (HCC)  -     torsemide (DEMADEX) 20 mg tablet; Take 2 tablets (40 mg total) by mouth daily  -     Urinalysis with microscopic; Future; Expected date: 12/05/2023  -     Uric acid; Future; Expected date: 12/05/2023  -     PTH, intact; Future; Expected date: 12/05/2023  -     Comprehensive metabolic panel; Future; Expected date: 12/05/2023  -     Magnesium; Future; Expected date: 12/05/2023  -     Albumin / creatinine urine ratio; Future; Expected date: 12/05/2023  -     Phosphorus; Future; Expected date: 12/05/2023  -     CBC and differential; Future; Expected date: 12/05/2023    3. Hypomagnesemia  -     magnesium oxide (MAG-OX) 400 mg tablet; Take 1 tablet (400 mg total) by mouth daily  -     Urinalysis with microscopic; Future; Expected date: 12/05/2023  -     Uric acid; Future; Expected date: 12/05/2023  -     PTH, intact; Future; Expected date: 12/05/2023  -     Comprehensive metabolic panel; Future; Expected date: 12/05/2023  -     Magnesium; Future; Expected date: 12/05/2023  -     Albumin / creatinine urine ratio; Future; Expected date: 12/05/2023  -     Phosphorus; Future; Expected date: 12/05/2023  -     CBC and differential; Future; Expected date: 12/05/2023    4. Vitamin D deficiency  -     Vitamin D 25 hydroxy; Future; Expected date: 12/05/2023    5.  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 kidney disease, or chronic kidney disease ([address])    6. Secondary hyperparathyroidism ([address])      1. CKD 4. Sp severe YOLANDE in February 2023. Peak Cr 5. Cr 2.5 on 2/18. Cr nadja at 2.5. Cr 2.8-3.1 since YOLANDE. Most recent Cr 2.8 with eGFR 19 ml/min. Metabolic parameters stable. No uremic s/s.  hgb improved to 13.1. Bone mineral parameters show increasing PTH to 158, likely in part due to vitamin D deficiency. Volume status appears compensated. Vitamin D3 2000 unit added. Will trend PTH/vitamin D level. BP and volume status well controlled. Start magnesium oxide 400mg/day. Fu labs in December. FU in January. 2. Chronic diastolic CHF, volume status appears compensated. Weight stable 178-180 lb at home. Continue torsemide 40mg/day and maintain a low sodium diet. 3. Hypomagnesemia due to protonix and torsemide. Mildly low and asymptomatic. Magnesium 1.6, start magnesium oxide 400mg/day. If any gi disturbances develop,stop taking and call the office. 4. Vitamin D deficiency  25 vitamin D level 18.2. Fu 25 vitamin D level in December. Start D3 2000 unit per day. 5. Hypertensive renal disease. BP well controlled. No change to current regimen. 6. Sec HPT due to renal disease+ vitamin D deficiency. Start replacing vitamin D 2000 unit per day. Fu 25 vitamin D level in December. PTH increasing to 158. Once vitamin D replaced, assess PTH trends. The benefits, risks and alternatives to the treatment plan were discussed at this visit. Patient was advised of common adverse effects of any medical therapies prescribed. All questions were answered and discussed with the patient and any accompanying family members or caretakers. Subjective:      Patient ID: Jaja Zambrano is a 80 y.o. male presenting for follow up of CKD4. Last seen in the office on 4/28.  He had YOLANDE in the past with Cr 5 due to ATN from [facility]+ hemodynamic /fluid shifts. HPI  In the interim since last visit, denies any new medical conditions,surgeries,allergies or medicaitons. Cr 3.11 on 4/14. Cr 2.97 on 7/11 and stable at 2.82 on 9/1. Reports great appetite. Does not walk without the walker. NO recent gout flare, takes allopurinol 100mg/day. Told by cardiologist to remain on torsemide 40mg/day. Swelling stable in his legs. Does not check BP at home. Weigh has been stable 178-180 at home. Does not have to wear compression stockings. The following portions of the patient's history were reviewed and updated as appropriate: allergies, current medications, past family history, past medical history, past social history, past surgical history, and problem list.    Review of Systems   All other systems reviewed and are negative. Objective:      /60 Comment: left arm  Pulse 61   Ht 5' 4" (1.626 m)   Wt 79.4 kg (175 lb)   SpO2 96%   BMI 30.04 kg/m²          Physical Exam  Vitals reviewed. Constitutional:       General: He is not in acute distress. HENT:      Head: Atraumatic. Nose: Nose normal.      Mouth/Throat:      Mouth: Mucous membranes are moist.   Eyes:      Extraocular Movements: Extraocular movements intact. Conjunctiva/sclera: Conjunctivae normal.   Cardiovascular:      Rate and Rhythm: Normal rate and regular rhythm. Heart sounds: No friction rub. Pulmonary:      Breath sounds: No wheezing, rhonchi or rales. Abdominal:      Palpations: Abdomen is soft. Tenderness: There is no abdominal tenderness. Musculoskeletal:      Right lower leg: Edema present. Left lower leg: Edema present. Comments: Mild edema BL LE   Skin:     General: Skin is warm and dry. Coloration: Skin is not jaundiced. Findings: No bruising or rash. Neurological:      Mental Status: He is alert. Cranial Nerves: No cranial nerve deficit.       Gait: Gait abnormal.             Lab Results Component Value Date     10/01/2015    SODIUM 140 09/01/2023    K 4.2 09/01/2023    CL 98 09/01/2023    CO2 28 09/01/2023    ANIONGAP 9 10/01/2015    AGAP 14 09/01/2023    BUN 54 (H) 09/01/2023    CREATININE 2.82 (H) 09/01/2023    GLUC 103 09/01/2023    GLUF 89 02/18/2023    CALCIUM 9.3 09/01/2023    AST 19 09/01/2023    ALT 7 09/01/2023    ALKPHOS 103 09/01/2023    PROT 7.7 10/01/2015    TP 7.1 09/01/2023    BILITOT 0.37 10/01/2015    TBILI 0.51 09/01/2023    EGFR 19 09/01/2023      Lab Results   Component Value Date    CREATININE 2.82 (H) 09/01/2023    CREATININE 2.97 (H) 07/11/2023    CREATININE 3.11 (H) 04/14/2023    CREATININE 2.85 (H) 03/20/2023    CREATININE 2.53 (H) 03/01/2023    CREATININE 2.81 (H) 02/24/2023    CREATININE 2.70 (H) 02/23/2023    CREATININE 2.54 (H) 02/18/2023    CREATININE 3.00 (H) 02/16/2023    CREATININE 3.57 (H) 02/15/2023    CREATININE 4.00 (H) 02/14/2023    CREATININE 4.42 (H) 02/13/2023    CREATININE 4.80 (H) 02/12/2023    CREATININE 4.29 (H) 02/11/2023    CREATININE 3.47 (H) 02/10/2023      Lab Results   Component Value Date    COLORU Yellow 02/08/2023    CLARITYU Clear 02/08/2023    SPECGRAV <=1.005 02/08/2023    PHUR 6.5 02/08/2023    LEUKOCYTESUR Negative 02/08/2023    NITRITE Negative 02/08/2023    PROTEIN UA Negative 02/08/2023    GLUCOSEU Negative 02/08/2023    KETONESU Negative 02/08/2023    UROBILINOGEN 0.2 02/08/2023    BILIRUBINUR Negative 02/08/2023    BLOODU Trace-Intact (A) 02/08/2023    RBCUA 0-1 (A) 02/08/2023    WBCUA 0-1 (A) 02/08/2023    EPIS Occasional 02/08/2023    BACTERIA None Seen 02/08/2023      No results found for: "LABPROT"  No results found for: "Antoine Nassar", "[id number]"  Lab Results   Component Value Date    WBC 8.72 09/01/2023    HGB 13.1 09/01/2023    HCT 42.0 09/01/2023     (H) 09/01/2023     09/01/2023      Lab Results   Component Value Date    HGB 13.1 09/01/2023    HGB 10.7 (L) 04/14/2023    HGB 10.6 (L) 03/20/2023    HGB 8.5 (L) 03/01/2023    HGB 8.8 (L) 02/23/2023      Lab Results   Component Value Date    IRON 27 (L) 03/01/2023    TIBC 293 03/01/2023    FERRITIN 154 03/01/2023      No results found for: "PTHCALCIUM", "[id number]", "PHOSPHORUS"   Lab Results   Component Value Date    CHOLESTEROL 98 12/02/2022    HDL 22 (L) 12/02/2022    LDLCALC 36 12/02/2022    TRIG 202 (H) 12/02/2022      Lab Results   Component Value Date    URICACID 7.8 05/31/2022      Lab Results   Component Value Date    HGBA1C 5.9 (H) 12/02/2022      No results found for: "TSHANTIBODY", "[id number]", "[employer]"   No results found for: "JEAN", "DSDNAAB", "[address]"   Lab Results   Component Value Date    PROT 7.7 10/01/2015        Portions of the record may have been created with voice recognition software. Occasional wrong word or "sound a like" substitutions may have occurred due to the inherent limitations of voice recognition software. Read the chart carefully and recognize, using context, where substitutions have occurred. If you have any questions, please contact the dictating provider.

## 2023-09-05 NOTE — PATIENT INSTRUCTIONS
Your kidney function is stable at 19%. For now, recommend vitamin D3 2000 unit ( two 1000 unit) once a day to help out levels. Starting magnesium supplement magnesium oxide 400mg per day. If any diarrhea develops, stop taking and call the office. Magnesium is low from the torsemide.

## 2023-09-13 ENCOUNTER — REMOTE DEVICE CLINIC VISIT (OUTPATIENT)
Dept: CARDIOLOGY CLINIC | Facility: CLINIC | Age: 88
End: 2023-09-13
Payer: COMMERCIAL

## 2023-09-13 DIAGNOSIS — Z95.0 CARDIAC PACEMAKER IN SITU: Primary | ICD-10-CM

## 2023-09-13 PROCEDURE — 93294 REM INTERROG EVL PM/LDLS PM: CPT | Performed by: INTERNAL MEDICINE

## 2023-09-13 PROCEDURE — 93296 REM INTERROG EVL PM/IDS: CPT | Performed by: INTERNAL MEDICINE

## 2023-09-13 NOTE — PROGRESS NOTES
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 AP<0.1%, >99% (DEPENDENT/CHB). ALL AVAILABLE LEAD PARAMETERS WITHIN NORMAL LIMITS. NO NEW SIGNIFICANT HIGH RATE EPISODES. PT IN % OF TIME & ON ELIQUIS, ASA 81MG & METOPROLOL. NORMAL DEVICE FUNCTION.  GV

## 2023-10-07 NOTE — PROGRESS NOTES
Progress Note - Nephrology   William Dean 80 y o  male MRN: [id number]  Unit/Bed#: 419-01 Encounter: [id number]    A/P:  1  Acute kidney injury on top of chronic kidney disease   Patient's kidney function is back to baseline this morning, continue to closely monitor avoid potential nephrotoxins  Will discontinue IV fluids as the patient's volume appears to be appropriate  2  Chronic kidney disease stage 4 the baseline creatinine between 1 8-2 2 mg/dL  3  Mild proteinuria   Should be reassessed in the outpatient setting when the patient is usual state health, if it persists further evaluation may be indicated  4  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   Continue with antihypertensive medications, adjustments as indicated pending the patient's clinical progress  5  Kidney cysts   Patient noted to have simple cysts, consider monitoring the outpatient setting if clinically indicated  6  Chronic diastolic congestive heart failure   Continue monitor closely from the clinical standpoint, as mentioned above patient appears to be appropriate from volume standpoint and we will discontinue IV fluids at this time  7  Left knee inflammatory process   Potentially crystal arthropathy, patient on prednisone 40 mg by mouth, continue closely monitor the patient from a clinical standpoint  Follow up reason for today's visit:  Acute kidney injury/chronic kidney disease/proteinuria/hypertension    Acute right hip pain    Patient Active Problem List   Diagnosis    Acute blood loss anemia    Mild intermittent asthma without complication    CKD (chronic kidney disease) stage 4, GFR 15-29 ml/min ([facility])    Dyslipidemia    GERD (gastroesophageal reflux disease)    Late effects of cerebrovascular disease    Lumbar degenerative disc disease    Lumbar radiculopathy    Mitral regurgitation    Obesity (BMI 30-39  9)    Medicare annual wellness visit, subsequent    H/O aortic valve replacement    History of stroke    Chronic diastolic CHF (congestive heart failure) (HCC)    Ambulatory dysfunction    Paroxysmal atrial fibrillation (HCC)    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    Pacemaker    Oxygen dependent    Chronic combined systolic and diastolic congestive heart failure (HCC)    Melena    Mitral stenosis    Iron deficiency anemia    Need for immunization against influenza    Pulmonary emphysema ([facility] )    Left wrist pain    Acute right hip pain    [facility] kidney [facility] superimposed on CKD ([facility] )    SIRS (systemic inflammatory response syndrome) ([facility])    Multiple renal cysts    Effusion of left knee    Mitral valve stenosis    Acute cystitis without hematuria         Subjective:   Patient is complaining of significant pain especially left knee, otherwise no acute events  Objective:     Vitals: Blood pressure 146/65, pulse 97, temperature 98 4 °F (36 9 °C), temperature source Oral, resp  rate 18, height 5' 4" (1 626 m), weight 70 1 kg (154 lb 8 7 oz), SpO2 98 %  ,Body mass index is 26 53 kg/m²  Weight (last 2 days)     Date/Time   Weight    07/30/21 0600   70 1 (154 54)                Intake/Output Summary (Last 24 hours) at 7/31/2021 1037  Last data filed at 7/31/2021 0859  Gross per 24 hour   Intake 180 ml   Output 550 ml   Net -370 ml     I/O last 3 completed shifts:   In: 0   Out: 1350 [Urine:1350]         Physical Exam: /65 (BP Location: Right arm)   Pulse 97   Temp 98 4 °F (36 9 °C) (Oral)   Resp 18   Ht 5' 4" (1 626 m)   Wt 70 1 kg (154 lb 8 7 oz)   SpO2 98%   BMI 26 53 kg/m²     General Appearance:    Alert, cooperative, no distress, appears stated age   Head:    Normocephalic, without obvious abnormality, atraumatic   Eyes:    Conjunctiva/corneas clear   Ears:    Normal external ears   Nose:   Nares normal, septum midline, mucosa normal, no drainage    or sinus tenderness   Throat:   Lips, mucosa, and tongue normal; teeth and gums normal   Neck: Supple   Back:     Symmetric, no curvature, ROM normal, no CVA tenderness   Lungs:     Clear to auscultation bilaterally, respirations unlabored   Chest wall:    No tenderness or deformity   Heart:    Regular rate and rhythm, S1 and S2 normal, no murmur, rub   or gallop   Abdomen:     Soft, non-tender, bowel sounds active   Extremities:   Extremities normal, atraumatic, no cyanosis or edema   Skin:   Skin color, texture, turgor normal, no rashes or lesions   Lymph nodes:   Cervical normal   Neurologic:   CNII-XII intact            Lab, Imaging and other studies: I have personally reviewed pertinent labs  CBC: No results found for: WBC, HGB, HCT, MCV, PLT, ADJUSTEDWBC, MCH, MCHC, RDW, MPV, NRBC  CMP:   Lab Results   Component Value Date    K 3 8 07/31/2021     07/31/2021    CO2 28 07/31/2021    BUN 45 (H) 07/31/2021    CREATININE 2 22 (H) 07/31/2021    CALCIUM 8 9 07/31/2021    EGFR 26 07/31/2021         Results from last 7 days   Lab Units 07/31/21  0605 07/30/21  0417 07/29/21  0457   POTASSIUM mmol/L 3 8 3 7 3 6   CHLORIDE mmol/L 100 101 101   CO2 mmol/L 28 26 26   BUN mg/dL 45* 46* 54*   CREATININE mg/dL 2 22* 2 39* 2 75*   CALCIUM mg/dL 8 9 9 0 9 2   ALK PHOS U/L  --  72 83   ALT U/L  --  11* 11*   AST U/L  --  20 19         Phosphorus: No results found for: PHOS  Magnesium: No results found for: MG  Urinalysis: No results found for: COLORU, CLARITYU, SPECGRAV, PHUR, LEUKOCYTESUR, NITRITE, PROTEINUA, GLUCOSEU, KETONESU, BILIRUBINUR, BLOODU  Ionized Calcium: No results found for: CAION  Coagulation: No results found for: PT, INR, APTT  Troponin: No results found for: TROPONINI  ABG: No results found for: PHART, XAD0CBW, PO2ART, VGI2NXK, O6UTFEYT, BEART, SOURCE  Radiology review:     IMAGING  Procedure: CT abdomen pelvis wo contrast    Result Date: 7/28/2021  Narrative: CT ABDOMEN AND PELVIS WITHOUT IV CONTRAST INDICATION:  Back and right hip pain   COMPARISON:  CT abdomen pelvis 8/24/2020 TECHNIQUE:  CT examination of the abdomen and pelvis was performed without intravenous contrast   Axial, sagittal, and coronal 2D reformatted images were created from the source data and submitted for interpretation  Radiation dose length product (DLP) for this visit:  674 29 mGy-cm  This examination, like all CT scans performed in the [facility], was performed utilizing techniques to minimize radiation dose exposure, including the use of iterative reconstruction and automated exposure control  Enteric contrast was not administered  FINDINGS: ABDOMEN Evaluation of the solid organs is limited without IV contrast  LOWER CHEST:  Minimal right middle lobe atelectasis or scarring  Aortic valve replacement, pacer wires, coronary artery calcifications and extensive mitral valve annulus calcification  LIVER/BILIARY TREE:  Calcified right lobe granuloma  GALLBLADDER:  There are gallstone(s) within the gallbladder, without pericholecystic inflammatory changes  SPLEEN:  Unremarkable  PANCREAS:  Moderate fatty replacement of the pancreas without acute findings  ADRENAL GLANDS:  Unremarkable  KIDNEYS/URETERS:  Bilateral renal cysts  No hydronephrosis  Punctate left upper pole calculus versus vascular calcification  STOMACH AND BOWEL:  Stomach is suboptimally distended, grossly unremarkable within limitations  Small bowel is normal caliber  Colonic diverticulosis without evidence of diverticulitis  APPENDIX:  The appendix is prominent measuring 8 mm diameter although this appears to be a chronic finding  No periappendiceal fat stranding  ABDOMINOPELVIC CAVITY:  No ascites  No pneumoperitoneum  No pathologic lymphadenopathy  VESSELS: Atherosclerotic changes abdominal aorta without evidence of aneurysm  PELVIS REPRODUCTIVE ORGANS:  Mild prostatomegaly  URINARY BLADDER:  Unremarkable  ABDOMINAL WALL/INGUINAL REGIONS:  Tiny fat-containing umbilical hernia  Small bilateral fat-containing inguinal hernias   OSSEOUS STRUCTURES: No acute fracture or destructive osseous lesion  Diffuse multilevel degenerative changes of the spine  Prior median sternotomy  Mild degenerative changes both hips and pubic symphysis  Impression: No acute intra-abdominal abnormality  Cholelithiasis  Colonic diverticulosis  Multiple incidental findings as above  Limited study without contrast  Workstation performed: [id number]     Procedure: XR chest portable    Result Date: 7/29/2021  Narrative: CHEST INDICATION:   Severe pain  COMPARISON:  July 29, 2020 EXAM PERFORMED/VIEWS:  XR CHEST PORTABLE FINDINGS:  Left-sided chest wall intracardiac device is identified  Leads are intact  Sternal wires noted  Prosthetic aortic valve noted  Cardiomediastinal silhouette appears unremarkable  Left basilar atelectasis/scarring noted  No significant pulmonary vascular congestion  No pleural effusion  No pneumothorax  Osseous structures appear within normal limits for patient age  Impression: No focal consolidation, pleural effusion, or pneumothorax  Workstation performed: [id number]     Procedure: XR spine lumbar 2 or 3 views injury    Result Date: 7/29/2021  Narrative: LUMBAR SPINE INDICATION:  Pelvic pain  COMPARISON:  MR lumbar spine 6/7/2017 VIEWS:  XR SPINE LUMBAR 2 OR 3 VIEWS INJURY Images: 3 FINDINGS: Numbering of the spine will be consistent with the previous MRI with the lowest disc space labeled L5-S1  There is no evidence of acute fracture or destructive osseous lesion  Mild levoscoliosis  There is diffuse moderately advanced multilevel degenerative disease with disc space narrowing and marginal endplate osteophytes  The pedicles appear intact  There are atherosclerotic calcifications  Soft tissues are otherwise unremarkable  Impression: No acute osseous abnormality  Degenerative changes as described   Workstation performed: [id number]     Procedure: XR hip/pelv 2-3 vws right    Result Date: 7/28/2021  Narrative: RIGHT HIP INDICATION:   pain, atraumatic  COMPARISON:  8/24/2020 CT VIEWS:  XR HIP/PELV 2-3 VWS RIGHT W PELVIS IF PERFORMED Images: 4 FINDINGS: There is no acute fracture or dislocation  Mild degenerative changes both hips No lytic or blastic osseous lesion  Soft tissues are unremarkable  Multilevel degenerative lumbar spondylosis     Impression: No acute osseous abnormality  Workstation performed: [id number]     Procedure: XR knee 3 vw left non injury    Result Date: 7/30/2021  Narrative: LEFT KNEE INDICATION:   pain  COMPARISON:  7/29/2020 VIEWS:  XR KNEE 3 VW LEFT NON INJURY Images: 3 FINDINGS: There is no acute fracture or dislocation  Suprapatellar fullness suggesting joint effusion  Moderate tricompartmental degenerative osteoarthritis  No lytic or blastic osseous lesion  Vascular calcifications are seen posteriorly within the thigh and lower leg  Impression: No acute osseous abnormality  Degenerative changes as described  Suprapatellar fullness suggestive of joint effusion  Workstation performed: [id number]     Procedure: NM bone scan 3 phase    Result Date: 7/30/2021  Narrative: THREE PHASE BONE SCAN OF THE HIP/PELVIS INDICATION:  Right posterior pelvic pain PREVIOUS FILM CORRELATION:    X-rays of lumbar spine 7/29/2021, CT abdomen and pelvis 7/28/2021, right hip x-rays 7/28/2021 RADIOPHARMACEUTICAL 25 3 mCi Tc-99m MDP IV TECHNIQUE:  Perfusion images of the hip/pelvis were acquired in the anterior and posterior projection  Blood pool and 2-3 hour delayed images were acquired of the hip/pelvis in multiple projections  FINDINGS: PERFUSION:   Normal  BLOOD POOL:  Normal  DELAYED:  Assessment of the hips demonstrates no evidence of occult fracture  Hip activity is essentially symmetric and physiologic  No scintigraphic abnormalities of the bony pelvis are seen  There is increased activity involving the lumbar spine at approximately the level of L3    This likely corresponds to the presence of advanced degenerative disc disease and prominent hypertrophic spurring at this level  There is also increased activity of the knees consistent with arthritis     Impression: 1  No scintigraphic evidence of an occult fracture of the hips or pelvis 2  Increased activity of the lumbar spine particularly in the region of L3 likely related to the changes of degenerative disc disease and spurring seen on prior x-rays 3  Increased activity of the knees also felt to be consistent with arthritis Workstation performed: [id number]     Procedure: US kidney and bladder    Result Date: 7/29/2021  Narrative: RENAL ULTRASOUND INDICATION:   YOLANDE  COMPARISON: Renal ultrasound 8/11/2020 and CT abdomen and pelvis 7/28/2021 TECHNIQUE:   Ultrasound of the retroperitoneum was performed with a curvilinear transducer utilizing volumetric sweeps and still imaging techniques  FINDINGS: KIDNEYS: There is asymmetric right renal atrophy with measurements below  Right kidney:  9 4 x 5 6 cm  Left kidney:  11 1 x 6 1 cm  Right kidney The renal parenchyma is mildly echogenic consistent with medical renal disease with chronic cortical thinning  No suspicious masses detected  Several simple cysts, the largest of which measures 3 3 cm  No hydronephrosis  No shadowing calculi  No perinephric fluid collections  Left kidney The renal parenchyma is mildly echogenic consistent with medical renal disease with chronic cortical thinning  No suspicious masses detected  7 8 cm exophytic simple cyst  No hydronephrosis  No shadowing calculi  No perinephric fluid collections  URETERS: Nonvisualized  BLADDER: Normally distended  No focal thickening or mass lesions  Neither  ureteral jet is visualized  This is attributed to a spurious finding  No hydronephrosis  Impression: Evidence of chronic medical renal disease  No hydronephrosis or perinephric collection  Bilateral renal simple cysts, left larger than right   Workstation performed: [id number]       Current Facility-Administered Medications   Medication Dose Route Frequency    acetaminophen (TYLENOL) tablet 650 mg  650 mg Oral Q6H PRN    amLODIPine (NORVASC) tablet 5 mg  5 mg Oral Daily    apixaban (ELIQUIS) tablet 2 5 mg  2 5 mg Oral BID    aspirin (ECOTRIN LOW STRENGTH) EC tablet 81 mg  81 mg Oral Daily    cefTRIAXone (ROCEPHIN) IVPB (premix in dextrose) 1,000 mg 50 mL  1,000 mg Intravenous Q24H    oxyCODONE (ROXICODONE) IR tablet 5 mg  5 mg Oral Q4H PRN    Or    HYDROmorphone (DILAUDID) injection 0 5 mg  0 5 mg Intravenous Q4H PRN    metoprolol tartrate (LOPRESSOR) tablet 100 mg  100 mg Oral BID    multi-electrolyte (PLASMALYTE-A/ISOLYTE-S PH 7 4) IV solution  75 mL/hr Intravenous Continuous    ondansetron (ZOFRAN) injection 4 mg  4 mg Intravenous Q4H PRN    pravastatin (PRAVACHOL) tablet 40 mg  40 mg Oral Daily With Dinner    predniSONE tablet 40 mg  40 mg Oral Daily    senna (SENOKOT) tablet 8 6 mg  8 6 mg Oral HS     Medications Discontinued During This Encounter   Medication Reason    HYDROmorphone (DILAUDID) injection 0 2 mg     oxyCODONE (ROXICODONE) IR tablet 5 mg     acetaminophen (TYLENOL) tablet 650 mg     amLODIPine (NORVASC) tablet 5 mg     vancomycin (VANCOCIN) IVPB (premix in dextrose) 1,000 mg 200 mL     sodium chloride 0 9 % infusion     cefepime (MAXIPIME) IVPB (premix in dextrose) 1,000 mg 50 mL     vancomycin (VANCOCIN) 750 mg in sodium chloride 0 9 % 250 mL IVPB        Anali Carbajal,       This progress note was produced in part using a dictation device which may document imprecise wording from author's original intent  No

## 2023-10-20 DIAGNOSIS — I10 ESSENTIAL HYPERTENSION: ICD-10-CM

## 2023-10-20 DIAGNOSIS — I50.9 CHRONIC CONGESTIVE HEART FAILURE, UNSPECIFIED HEART FAILURE TYPE (HCC): ICD-10-CM

## 2023-10-20 RX ORDER — METOPROLOL TARTRATE 100 MG/1
100 TABLET ORAL 2 TIMES DAILY
Qty: 180 TABLET | Refills: 0 | Status: SHIPPED | OUTPATIENT
Start: 2023-10-20

## 2023-10-20 RX ORDER — TORSEMIDE 20 MG/1
40 TABLET ORAL DAILY
Qty: 60 TABLET | Refills: 5 | Status: SHIPPED | OUTPATIENT
Start: 2023-10-20

## 2023-11-14 DIAGNOSIS — I25.10 ATHEROSCLEROSIS OF NATIVE CORONARY ARTERY OF NATIVE HEART WITHOUT ANGINA PECTORIS: ICD-10-CM

## 2023-11-14 RX ORDER — PRAVASTATIN SODIUM 40 MG
TABLET ORAL
Qty: 90 TABLET | Refills: 0 | Status: SHIPPED | OUTPATIENT
Start: 2023-11-14 | End: 2023-11-16

## 2023-11-16 DIAGNOSIS — I25.10 ATHEROSCLEROSIS OF NATIVE CORONARY ARTERY OF NATIVE HEART WITHOUT ANGINA PECTORIS: ICD-10-CM

## 2023-11-16 RX ORDER — PRAVASTATIN SODIUM 40 MG
TABLET ORAL
Qty: 90 TABLET | Refills: 0 | Status: SHIPPED | OUTPATIENT
Start: 2023-11-16

## 2023-11-21 ENCOUNTER — TELEPHONE (OUTPATIENT)
Dept: FAMILY MEDICINE CLINIC | Facility: CLINIC | Age: 88
End: 2023-11-21

## 2023-11-21 NOTE — TELEPHONE ENCOUNTER
Daughter called to report pt insists he is going to [place]. States this is not accurate as he does not have proper bedding at family's house he stays with when he goes as he needs to sleep in a recliner.

## 2023-11-29 ENCOUNTER — RA CDI HCC (OUTPATIENT)
Dept: OTHER | Facility: HOSPITAL | Age: 88
End: 2023-11-29

## 2023-11-29 NOTE — PROGRESS NOTES
[address] [facility] coding opportunities          Chart Reviewed number of suggestions sent to Provider: 2     Patients Insurance     Medicare Insurance: [id number]
Yes

## 2023-12-06 ENCOUNTER — OFFICE VISIT (OUTPATIENT)
Dept: FAMILY MEDICINE CLINIC | Facility: CLINIC | Age: 88
End: 2023-12-06
Payer: COMMERCIAL

## 2023-12-06 VITALS
WEIGHT: 176 LBS | BODY MASS INDEX: 30.05 KG/M2 | HEIGHT: 64 IN | TEMPERATURE: 97.9 F | DIASTOLIC BLOOD PRESSURE: 78 MMHG | SYSTOLIC BLOOD PRESSURE: 126 MMHG | RESPIRATION RATE: 18 BRPM | HEART RATE: 70 BPM

## 2023-12-06 DIAGNOSIS — L89.151 PRESSURE INJURY OF SACRAL REGION, STAGE 1: ICD-10-CM

## 2023-12-06 DIAGNOSIS — L89.312 PRESSURE ULCER OF RIGHT BUTTOCK, STAGE 2 (HCC): ICD-10-CM

## 2023-12-06 DIAGNOSIS — Z23 ENCOUNTER FOR IMMUNIZATION: ICD-10-CM

## 2023-12-06 DIAGNOSIS — Z00.00 MEDICARE ANNUAL WELLNESS VISIT, SUBSEQUENT: Primary | ICD-10-CM

## 2023-12-06 PROCEDURE — 90662 IIV NO PRSV INCREASED AG IM: CPT | Performed by: INTERNAL MEDICINE

## 2023-12-06 PROCEDURE — G0008 ADMIN INFLUENZA VIRUS VAC: HCPCS | Performed by: INTERNAL MEDICINE

## 2023-12-06 PROCEDURE — G0439 PPPS, SUBSEQ VISIT: HCPCS | Performed by: INTERNAL MEDICINE

## 2023-12-06 RX ORDER — OINTMENT BASE NO.104
OINTMENT (GRAM) TOPICAL AS NEEDED
Qty: 113 G | Refills: 1 | Status: SHIPPED | OUTPATIENT
Start: 2023-12-06

## 2023-12-06 NOTE — PATIENT INSTRUCTIONS
Medicare Preventive Visit Patient Instructions  Thank you for completing your Welcome to Medicare Visit or Medicare Annual Wellness Visit today. Your next wellness visit will be due in one year (12/6/2024). The screening/preventive services that you may require over the next 5-10 years are detailed below. Some tests may not apply to you based off risk factors and/or age. Screening tests ordered at today's visit but not completed yet may show as past due. Also, please note that scanned in results may not display below. Preventive Screenings:  Service Recommendations Previous Testing/Comments   Colorectal Cancer Screening  Colonoscopy    Fecal Occult Blood Test (FOBT)/Fecal Immunochemical Test (FIT)  Fecal DNA/Cologuard Test  Flexible Sigmoidoscopy Age: 43-73 years old   Colonoscopy: every 10 years (May be performed more frequently if at higher risk)  OR  FOBT/FIT: every 1 year  OR  Cologuard: every 3 years  OR  Sigmoidoscopy: every 5 years  Screening may be recommended earlier than age 39 if at higher risk for colorectal cancer. Also, an individualized decision between you and your healthcare provider will decide whether screening between the ages of 77-80 would be appropriate.  Colonoscopy: 08/28/2020  FOBT/FIT: Not on file  Cologuard: Not on file  Sigmoidoscopy: Not on file    Screening Not Indicated     Prostate Cancer Screening Individualized decision between patient and health care provider in men between ages of 53-66   Medicare will cover every 12 months beginning on the day after your 50th birthday PSA: No results in last 5 years     Screening Not Indicated     Hepatitis C Screening Once for adults born between 1945 and 1965  More frequently in patients at high risk for Hepatitis C Hep C Antibody: 10/15/2020    Screening Current   Diabetes Screening 1-2 times per year if you're at risk for diabetes or have pre-diabetes Fasting glucose: 89 mg/dL (2/18/2023)  A1C: 5.9 % (12/2/2022)  Screening Current Cholesterol Screening Once every 5 years if you don't have a lipid disorder. May order more often based on risk factors. Lipid panel: 12/02/2022  Screening Current      Other Preventive Screenings Covered by Medicare:  Abdominal Aortic Aneurysm (AAA) Screening: covered once if your at risk. You're considered to be at risk if you have a family history of AAA or a male between the age of 70-76 who smoking at least 100 cigarettes in your lifetime. Lung Cancer Screening: covers low dose CT scan once per year if you meet all of the following conditions: (1) Age 48-67; (2) No signs or symptoms of lung cancer; (3) Current smoker or have quit smoking within the last 15 years; (4) You have a tobacco smoking history of at least 20 pack years (packs per day x number of years you smoked); (5) You get a written order from a healthcare provider. Glaucoma Screening: covered annually if you're considered high risk: (1) You have diabetes OR (2) Family history of glaucoma OR (3)  aged 48 and older OR (3)  American aged 72 and older  Osteoporosis Screening: covered every 2 years if you meet one of the following conditions: (1) Have a vertebral abnormality; (2) On glucocorticoid therapy for more than 3 months; (3) Have primary hyperparathyroidism; (4) On osteoporosis medications and need to assess response to drug therapy. HIV Screening: covered annually if you're between the age of 14-79. Also covered annually if you are younger than 13 and older than 72 with risk factors for HIV infection. For pregnant patients, it is covered up to 3 times per pregnancy.     Immunizations:  Immunization Recommendations   Influenza Vaccine Annual influenza vaccination during flu season is recommended for all persons aged >= 6 months who do not have contraindications   Pneumococcal Vaccine   * Pneumococcal conjugate vaccine = PCV13 (Prevnar 13), PCV15 (Vaxneuvance), PCV20 (Prevnar 20)  * Pneumococcal polysaccharide vaccine = PPSV23 (Pneumovax) Adults 41-04 yo with certain risk factors or if 69+ yo  If never received any pneumonia vaccine: recommend Prevnar 20 (PCV20)  Give PCV20 if previously received 1 dose of PCV13 or PPSV23   Hepatitis B Vaccine 3 dose series if at intermediate or high risk (ex: diabetes, end stage renal disease, liver disease)   Respiratory syncytial virus (RSV) Vaccine - COVERED BY MEDICARE PART D  * RSVPreF3 (Arexvy) CDC recommends that adults 61years of age and older may receive a single dose of RSV vaccine using shared clinical decision-making (SCDM)   Tetanus (Td) Vaccine - COST NOT COVERED BY MEDICARE PART B Following completion of primary series, a booster dose should be given every 10 years to maintain immunity against tetanus. Td may also be given as tetanus wound prophylaxis. Tdap Vaccine - COST NOT COVERED BY MEDICARE PART B Recommended at least once for all adults. For pregnant patients, recommended with each pregnancy. Shingles Vaccine (Shingrix) - COST NOT COVERED BY MEDICARE PART B  2 shot series recommended in those 19 years and older who have or will have weakened immune systems or those 50 years and older     Health Maintenance Due:      Topic Date Due   • Hepatitis C Screening  Completed     Immunizations Due:      Topic Date Due   • Pneumococcal Vaccine: 65+ Years (2 - PCV) 11/07/2007   • COVID-19 Vaccine (3 - Moderna series) 04/16/2021   • Influenza Vaccine (1) 09/01/2023     Advance Directives   What are advance directives? Advance directives are legal documents that state your wishes and plans for medical care. These plans are made ahead of time in case you lose your ability to make decisions for yourself. Advance directives can apply to any medical decision, such as the treatments you want, and if you want to donate organs. What are the types of advance directives? There are many types of advance directives, and each state has rules about how to use them.  You may choose a combination of any of the following:  Living will: This is a written record of the treatment you want. You can also choose which treatments you do not want, which to limit, and which to stop at a certain time. This includes surgery, medicine, IV fluid, and tube feedings. Durable power of  for [facility]): This is a written record that states who you want to make healthcare choices for you when you are unable to make them for yourself. This person, called a proxy, is usually a family member or a friend. You may choose more than 1 proxy. Do not resuscitate (DNR) order:  A DNR order is used in case your heart stops beating or you stop breathing. It is a request not to have certain forms of treatment, such as CPR. A DNR order may be included in other types of advance directives. Medical directive: This covers the care that you want if you are in a coma, near death, or unable to make decisions for yourself. You can list the treatments you want for each condition. Treatment may include pain medicine, surgery, blood transfusions, dialysis, IV or tube feedings, and a ventilator (breathing machine). Values history: This document has questions about your views, beliefs, and how you feel and think about life. This information can help others choose the care that you would choose. Why are advance directives important? An advance directive helps you control your care. Although spoken wishes may be used, it is better to have your wishes written down. Spoken wishes can be misunderstood, or not followed. Treatments may be given even if you do not want them. An advance directive may make it easier for your family to make difficult choices about your care. Weight Management   Why it is important to manage your weight:  Being overweight increases your risk of health conditions such as heart disease, high blood pressure, type 2 diabetes, and certain types of cancer.  It can also increase your risk for osteoarthritis, sleep apnea, and other respiratory problems. Aim for a slow, steady weight loss. Even a small amount of weight loss can lower your risk of health problems. How to lose weight safely:  A safe and healthy way to lose weight is to eat fewer calories and get regular exercise. You can lose up about 1 pound a week by decreasing the number of calories you eat by 500 calories each day. Healthy meal plan for weight management:  A healthy meal plan includes a variety of foods, contains fewer calories, and helps you stay healthy. A healthy meal plan includes the following:  Eat whole-grain foods more often. A healthy meal plan should contain fiber. Fiber is the part of grains, fruits, and vegetables that is not broken down by your body. Whole-grain foods are healthy and provide extra fiber in your diet. Some examples of whole-grain foods are whole-wheat breads and pastas, oatmeal, brown rice, and bulgur. Eat a variety of vegetables every day. Include dark, leafy greens such as spinach, kale, hemalatha greens, and mustard greens. Eat yellow and orange vegetables such as carrots, sweet potatoes, and winter squash. Eat a variety of fruits every day. Choose fresh or canned fruit (canned in its own juice or light syrup) instead of juice. Fruit juice has very little or no fiber. Eat low-fat dairy foods. Drink fat-free (skim) milk or 1% milk. Eat fat-free yogurt and low-fat cottage cheese. Try low-fat cheeses such as mozzarella and other reduced-fat cheeses. Choose meat and other protein foods that are low in fat. Choose beans or other legumes such as split peas or lentils. Choose fish, skinless poultry (chicken or turkey), or lean cuts of red meat (beef or pork). Before you cook meat or poultry, cut off any visible fat. Use less fat and oil. Try baking foods instead of frying them. Add less fat, such as margarine, sour cream, regular salad dressing and mayonnaise to foods. Eat fewer high-fat foods.  Some examples of high-fat foods include french fries, doughnuts, ice cream, and cakes. Eat fewer sweets. Limit foods and drinks that are high in sugar. This includes candy, cookies, regular soda, and sweetened drinks. Exercise:  Exercise at least 30 minutes per day on most days of the week. Some examples of exercise include walking, biking, dancing, and swimming. You can also fit in more physical activity by taking the stairs instead of the elevator or parking farther away from stores. Ask your healthcare provider about the best exercise plan for you. © Copyright [employer] 2018 Information is for End User's use only and may not be sold, redistributed or otherwise used for commercial purposes.  All illustrations and images included in CareNotes® are the copyrighted property of [employer] or [address] [place] [address]

## 2023-12-06 NOTE — PROGRESS NOTES
Assessment and Plan:     Problem List Items Addressed This Visit          Other    Medicare annual wellness visit, subsequent - Primary    Pressure ulcer of right buttock, stage 2 (HCC)    Relevant Medications    silver sulfadiazine (SILVADENE,SSD) 1 % cream     Other Visit Diagnoses       Encounter for immunization        Relevant Orders    influenza vaccine, high-dose, PF 0.7 mL (FLUZONE HIGH-DOSE) (Completed)    Pressure injury of sacral region, stage 1        Relevant Medications    hydrophilic ointment        Flu shot today  Change position while seated and use barrier protection Encouraged home exercise  Pt daughter has POA and she will bring copy for chart  Labs upcoming - they are aware labs ordered   Rto 6months     Depression Screening and Follow-up Plan: Patient was screened for depression during today's encounter. They screened negative with a PHQ-2 score of 0. Preventive health issues were discussed with patient, and age appropriate screening tests were ordered as noted in patient's After Visit Summary. Personalized health advice and appropriate referrals for health education or preventive services given if needed, as noted in patient's After Visit Summary. History of Present Illness:     Patient presents for a Medicare Wellness Visit    HPI   Pt doing ok His wife is caregiver and they are managing at home He stays on first floor and uses lift chair He can get to the bathroom with walker No falls No recent illness   Patient Care Team:  Norma Marquez DO as PCP - MD Melvi Isaacs Rox President,  (Orthopedic Surgery)  Lissy Heller MD (Pain Medicine)     Review of Systems:     Review of Systems   Constitutional:  Negative for activity change, chills and fever. HENT: Negative. Eyes:  Negative for visual disturbance. Respiratory:  Negative for cough and shortness of breath.     Cardiovascular:  Negative for chest pain, palpitations and leg swelling. Gastrointestinal:  Negative for abdominal distention and abdominal pain. Genitourinary: Negative. Musculoskeletal:  Positive for arthralgias and gait problem. Neurological:  Negative for dizziness, light-headedness and headaches. Psychiatric/Behavioral:  Negative for sleep disturbance. The patient is not nervous/anxious. Problem List:     Patient Active Problem List   Diagnosis   • Acute blood loss anemia   • Mild intermittent asthma without complication   • CKD (chronic kidney disease) stage 4, GFR 15-29 ml/min ([facility])   • Dyslipidemia   • GERD (gastroesophageal reflux disease)   • Late effects of cerebrovascular disease   • Lumbar degenerative disc disease   • Lumbar radiculopathy   • Mitral regurgitation   • Obesity (BMI 30-39. 9)   • Medicare annual wellness visit, subsequent   • History of aortic valve replacement with bioprosthetic valve   • History of stroke   • Complete heart block ([facility])   • Chronic diastolic CHF (congestive heart failure) ([address])   • Ambulatory dysfunction   • Paroxysmal atrial fibrillation ([facility])   • 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   • Presence of permanent cardiac pacemaker   • Oxygen dependent   • Iron deficiency anemia   • Need for immunization against influenza   • Pulmonary emphysema ([facility])   • Left wrist pain   • Right hip pain   • Acute kidney injury superimposed on CKD    • Multiple renal cysts   • Mitral valve stenosis   • Acute gout   • Transaminitis   • 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 kidney disease, or chronic kidney disease ([facility])   • Chronic low back pain without sciatica   • History of severe aortic stenosis   • Hx of CABG   • Benign essential hypertension   • Dermatitis   • Hyperkalemia   • Pressure ulcer of right buttock, stage 2 ([facility])   • Anemia   • Fracture of femoral neck, right ([facility])   • Leukocytosis   • Hyperphosphatemia   • YOLANDE (acute kidney injury) [facility])      Past Medical and Surgical History:     Past Medical History:   Diagnosis Date   • Aortic stenosis     ECHO -4-14-14. MODERATE TO SEVERE AORTIC STENOSIS; MILD AROTIC INSUFFICIENCY - LAST ASSESSED 10/26/16; RESOLVED 6/8/16   • Aortic valve disorder     LAST ASSESSED 10/8/15; 10/8/15   • Arthritis    • CHF (congestive heart failure) ([address])    • Complete heart block ([address]) 7/20/2020   • Coronary artery disease    • Hypertension    • Hypertensive urgency 5/19/2019   • Renal disorder    • TIA (transient ischemic attack)    • Transient cerebral ischemia      Past Surgical History:   Procedure Laterality Date   • AORTIC VALVE REPLACEMENT  06/30/2015    avr with 23 mm [place] Magna Ease bioprosthetic   • CARDIAC PACEMAKER PLACEMENT Left 07/24/2020   • CATARACT EXTRACTION Right 06/05/2000   • COLONOSCOPY     • CORONARY ARTERY BYPASS GRAFT  06/05/2000    x1 with argueta  to lad   • EYE SURGERY     • POLYPECTOMY  04/2014    enteroscopic - esophageal ulcer, gastric erosion, and duodenal ulcer. • [place] HEMIARTHROPLASTY HIP PARTIAL Right 2/10/2023    Procedure: HEMIARTHROPLASTY HIP (BIPOLAR); Surgeon: Sarah Parrish DO;  Location: [facility];  Service: Orthopedics   • TONSILLECTOMY      unknown      Family History:     Family History   Problem Relation Age of Onset   • No Known Problems Mother    • No Known Problems Father    • Coronary artery disease Neg Hx       Social History:     Social History     Socioeconomic History   • Marital status: /Civil Union     Spouse name:  Gosia   • Number of children: 5   • Years of education: None   • Highest education level: None   Occupational History   • Occupation: retired   Tobacco Use   • Smoking status: Never   • Smokeless tobacco: Never   Vaping Use   • Vaping Use: Never used   Substance and Sexual Activity   • Alcohol use: Never     Alcohol/week: 0.0 standard drinks of alcohol     Comment: very occasional   • Drug use: Never • Sexual activity: Not Currently   Other Topics Concern   • None   Social History Narrative    Caffeine use     Dental care, regularly     Lives independently with spouse     Uses seatbelts      Social Determinants of Health     Financial Resource Strain: Low Risk  (12/6/2023)    Overall Financial Resource Strain (CARDIA)    • Difficulty of Paying Living Expenses: Not hard at all   Food Insecurity: No Food Insecurity (2/9/2023)    Hunger Vital Sign    • Worried About Running Out of Food in the Last Year: Never true    • Ran Out of Food in the Last Year: Never true   Transportation Needs: No Transportation Needs (12/6/2023)    PRAPARE - Transportation    • Lack of Transportation (Medical): No    • Lack of Transportation (Non-Medical):  No   Physical Activity: Not on file   Stress: Not on file   Social Connections: Unknown (9/3/2020)    Social Connection and Isolation Panel [NHANES]    • Frequency of Communication with Friends and Family: More than three times a week    • Frequency of Social Gatherings with Friends and Family: More than three times a week    • Attends Latter-day Services: Not on file    • Active Member of Clubs or Organizations: Not on file    • Attends Club or Organization Meetings: Not on file    • Marital Status:    Intimate Partner Violence: Not on file   Housing Stability: [address]  (2/9/2023)    Housing Stability Vital Sign    • Unable to Pay for Housing in the Last Year: No    • Number of Places Lived in the Last Year: 1    • Unstable Housing in the Last Year: No      Medications and Allergies:     Current Outpatient Medications   Medication Sig Dispense Refill   • allopurinol (ZYLOPRIM) 100 mg tablet Take 1 tablet (100 mg total) by mouth daily 90 tablet 3   • apixaban (ELIQUIS) 2.5 mg Take 1 tablet (2.5 mg total) by mouth 2 (two) times a day 180 tablet 3   • aspirin (ECOTRIN LOW STRENGTH) 81 mg EC tablet Take 1 tablet (81 mg total) by mouth daily  0   • cholecalciferol (VITAMIN D3) 1,000 units tablet Take 2,000 Units by mouth daily     • desoximetasone (TOPICORT) 0.05 % cream Apply topically 2 (two) times a day 60 g 5   • ferrous sulfate 325 (65 Fe) mg tablet Take 325 mg by mouth 2 (two) times a day     • gabapentin (NEURONTIN) 100 mg capsule Take 2 capsules (200 mg total) by mouth daily at bedtime 60 capsule 2   • hydrophilic ointment Apply topically as needed for dry skin 113 g 1   • metoprolol tartrate (LOPRESSOR) 100 mg tablet Take 1 tablet by mouth twice daily 180 tablet 0   • pantoprazole (PROTONIX) 40 mg tablet Take 1 tablet (40 mg total) by mouth 2 (two) times a day before meals 180 tablet 3   • pravastatin (PRAVACHOL) 40 mg tablet TAKE 1 TABLET BY MOUTH ONCE DAILY WITH SUPPER 90 tablet 0   • senna (SENOKOT) 8.6 mg Take 1 tablet (8.6 mg total) by mouth daily at bedtime 90 tablet 3   • silver sulfadiazine (Silvadene) 1 % cream Apply topically 2 (two) times a day 50 g 5   • silver sulfadiazine (SILVADENE,SSD) 1 % cream Apply topically daily 50 g 1   • torsemide (DEMADEX) 20 mg tablet Take 2 tablets (40 mg total) by mouth daily 60 tablet 5   • apixaban (Eliquis) 2.5 mg Take 1 tablet (2.5 mg total) by mouth 2 (two) times a day (Patient not taking: Reported on 12/6/2023) 56 tablet 0   • magnesium oxide (MAG-OX) 400 mg tablet Take 1 tablet (400 mg total) by mouth daily (Patient not taking: Reported on 12/6/2023) 90 tablet 1   • ondansetron (ZOFRAN) 4 mg tablet Take 1 tablet (4 mg total) by mouth every 8 (eight) hours as needed for nausea or vomiting for up to 4 days (Patient not taking: Reported on 12/6/2023) 12 tablet 0     No current facility-administered medications for this visit.      Allergies   Allergen Reactions   • Promethazine Delirium and Other (See Comments)      Immunizations:     Immunization History   Administered Date(s) Administered   • COVID-19 MODERNA VACC 0.5 ML IM 01/21/2021, 02/19/2021   • INFLUENZA 10/26/2016   • Influenza Split High Dose Preservative Free IM 11/07/2012, 11/03/2014, 10/08/2015, 10/26/2016, 10/03/2017   • Influenza, high dose seasonal 0.7 mL 10/09/2018, 10/07/2019, 10/15/2020, 10/19/2021, 12/02/2022, 12/06/2023   • Influenza, seasonal, injectable 11/04/2013   • Pneumococcal Polysaccharide PPV23 11/07/2006      Health Maintenance:         Topic Date Due   • Hepatitis C Screening  Completed         Topic Date Due   • Pneumococcal Vaccine: 65+ Years (2 - PCV) 11/07/2007   • COVID-19 Vaccine (3 - Moderna series) 04/16/2021      Medicare Screening Tests and Risk Assessments:     Kristyn Camilo is here for his Subsequent Wellness visit. Last Medicare Wellness visit information reviewed, patient interviewed, no change since last AWV. Health Risk Assessment:   Patient rates overall health as good. Patient feels that their physical health rating is same. Patient is very satisfied with their life. Eyesight was rated as same. Hearing was rated as same. Patient feels that their emotional and mental health rating is same. Patients states they are never, rarely angry. Patient states they are never, rarely unusually tired/fatigued. Pain experienced in the last 7 days has been none. Patient states that he has experienced no weight loss or gain in last 6 months. Depression Screening:   PHQ-2 Score: 0      Fall Risk Screening: In the past year, patient has experienced: no history of falling in past year      Home Safety:  Patient has trouble with stairs inside or outside of their home. Patient has working smoke alarms and has working carbon monoxide detector. Home safety hazards include: none. Nutrition:   Current diet is Regular. Medications:   Patient is currently taking over-the-counter supplements. OTC medications include: see medication list. Patient is not able to manage medications.      Activities of Daily Living (ADLs)/Instrumental Activities of Daily Living (IADLs):   Walk and transfer into and out of bed and chair?: Yes  Dress and groom yourself?: Yes Bathe or shower yourself?: Yes    Feed yourself? Yes  Do your laundry/housekeeping?: No  Manage your money, pay your bills and track your expenses?: No  Make your own meals?: No    Do your own shopping?: No    Previous Hospitalizations:   Any hospitalizations or ED visits within the last 12 months?: Yes    How many hospitalizations have you had in the last year?: 1-2    Advance Care Planning:   Living will: Yes    Durable POA for healthcare: Yes    Advanced directive: Yes    End of Life Decisions reviewed with patient: Yes    Provider agrees with end of life decisions: Yes      Cognitive Screening:   Provider or family/friend/caregiver concerned regarding cognition?: No    PREVENTIVE SCREENINGS      Cardiovascular Screening:    General: Screening Current      Diabetes Screening:     General: Screening Current      Colorectal Cancer Screening:     General: Screening Not Indicated      Prostate Cancer Screening:    General: Screening Not Indicated      Osteoporosis Screening:    General: Patient Declines      Abdominal Aortic Aneurysm (AAA) Screening:        General: Screening Not Indicated      Lung Cancer Screening:     General: Screening Not Indicated      Hepatitis C Screening:    General: Screening Current    Screening, Brief Intervention, and Referral to Treatment (SBIRT)    Screening  Typical number of drinks in a day: 0  Typical number of drinks in a week: 0  Interpretation: Low risk drinking behavior.     AUDIT-C Screenin) How often did you have a drink containing alcohol in the past year? never  2) How many drinks did you have on a typical day when you were drinking in the past year? 0  3) How often did you have 6 or more drinks on one occasion in the past year? never    AUDIT-C Score: 0  Interpretation: Score 0-3 (male): Negative screen for alcohol misuse    Single Item Drug Screening:  How often have you used an illegal drug (including marijuana) or a prescription medication for non-medical reasons in the past year? never    Single Item Drug Screen Score: 0  Interpretation: Negative screen for possible drug use disorder    Brief Intervention  Alcohol & drug use screenings were reviewed. No concerns regarding substance use disorder identified. Other Counseling Topics:   Regular weightbearing exercise. No results found. Physical Exam:     /78   Pulse 70   Temp 97.9 °F (36.6 °C) (Temporal)   Resp 18   Ht 5' 4" (1.626 m)   Wt 79.8 kg (176 lb)   BMI 30.21 kg/m²     Physical Exam  Vitals and nursing note reviewed. Constitutional:       General: He is not in acute distress. Appearance: Normal appearance. He is not ill-appearing, toxic-appearing or diaphoretic. HENT:      Head: Normocephalic and atraumatic. Right Ear: External ear normal.      Left Ear: External ear normal.      Nose: Nose normal.      Mouth/Throat:      Mouth: Mucous membranes are moist.   Eyes:      General: No scleral icterus. Extraocular Movements: Extraocular movements intact. Conjunctiva/sclera: Conjunctivae normal.      Pupils: Pupils are equal, round, and reactive to light. Cardiovascular:      Rate and Rhythm: Normal rate. Rhythm irregular. Pulses: Normal pulses. Heart sounds: Murmur heard. Pulmonary:      Effort: Pulmonary effort is normal. No respiratory distress. Breath sounds: No wheezing or rhonchi. Abdominal:      General: Abdomen is flat. Bowel sounds are normal. There is no distension. Palpations: Abdomen is soft. Tenderness: There is no abdominal tenderness. Musculoskeletal:      Cervical back: Normal range of motion. Right lower leg: No edema. Left lower leg: No edema. Lymphadenopathy:      Cervical: No cervical adenopathy. Skin:     General: Skin is warm and dry. Coloration: Skin is not jaundiced or pale. Findings: Lesion present.       Comments: Pinpoint area left buttock stage 1   Neurological:      General: No focal deficit present. Mental Status: He is alert and oriented to person, place, and time. Mental status is at baseline. Psychiatric:         Mood and Affect: Mood normal.         Behavior: Behavior normal.         Thought Content:  Thought content normal.         Judgment: Judgment normal.        Kuldip Colón, DO

## 2023-12-13 ENCOUNTER — REMOTE DEVICE CLINIC VISIT (OUTPATIENT)
Dept: CARDIOLOGY CLINIC | Facility: CLINIC | Age: 88
End: 2023-12-13
Payer: COMMERCIAL

## 2023-12-13 DIAGNOSIS — Z95.0 CARDIAC PACEMAKER IN SITU: Primary | ICD-10-CM

## 2023-12-13 PROCEDURE — 93294 REM INTERROG EVL PM/LDLS PM: CPT | Performed by: INTERNAL MEDICINE

## 2023-12-13 PROCEDURE — 93296 REM INTERROG EVL PM/IDS: CPT | Performed by: INTERNAL MEDICINE

## 2023-12-13 NOTE — PROGRESS NOTES
MDT-DUAL CHAMBER PPM (DDD MODE)/ ACTIVE SYSTEM IS MRI CONDITIONAL   CARELINK TRANSMISSION: BATTERY VOLTAGE ADEQUATE (8.8 YRS). AP<0.1%,  99.7% (>40%/CHB- DEPENDENT/DDD 60PPM). ALL AVAILABLE LEAD PARAMETERS WITHIN NORMAL LIMITS. NO NEW SIGNIFICANT HIGH RATE EPISODES. 1 AF EPISODE WITH AF BURDEN 100%; PATIENT ON ASA 81 MG, ELIQUIS, METOPROLOL TART. NORMAL DEVICE FUNCTION.   ES

## 2023-12-19 ENCOUNTER — HOSPITAL ENCOUNTER (INPATIENT)
Facility: HOSPITAL | Age: 88
LOS: 6 days | Discharge: NURSING, REHAB OR LONG-TERM CARE | DRG: 177 | End: 2023-12-26
Attending: STUDENT IN AN ORGANIZED HEALTH CARE EDUCATION/TRAINING PROGRAM | Admitting: HOSPITALIST
Payer: COMMERCIAL

## 2023-12-19 ENCOUNTER — APPOINTMENT (EMERGENCY)
Dept: RADIOLOGY | Facility: HOSPITAL | Age: 88
DRG: 177 | End: 2023-12-19
Payer: COMMERCIAL

## 2023-12-19 ENCOUNTER — APPOINTMENT (EMERGENCY)
Dept: CT IMAGING | Facility: HOSPITAL | Age: 88
DRG: 177 | End: 2023-12-19
Payer: COMMERCIAL

## 2023-12-19 DIAGNOSIS — G89.29 CHRONIC BILATERAL LOW BACK PAIN WITHOUT SCIATICA: ICD-10-CM

## 2023-12-19 DIAGNOSIS — J96.01 ACUTE RESPIRATORY FAILURE WITH HYPOXIA (HCC): ICD-10-CM

## 2023-12-19 DIAGNOSIS — U07.1 COVID: ICD-10-CM

## 2023-12-19 DIAGNOSIS — G93.41 ACUTE METABOLIC ENCEPHALOPATHY: Primary | ICD-10-CM

## 2023-12-19 DIAGNOSIS — K92.1 MELENA: ICD-10-CM

## 2023-12-19 DIAGNOSIS — E55.9 VITAMIN D DEFICIENCY: ICD-10-CM

## 2023-12-19 DIAGNOSIS — R26.2 AMBULATORY DYSFUNCTION: ICD-10-CM

## 2023-12-19 DIAGNOSIS — I25.810 ATHEROSCLEROSIS OF CORONARY ARTERY BYPASS GRAFT OF NATIVE HEART WITHOUT ANGINA PECTORIS: ICD-10-CM

## 2023-12-19 DIAGNOSIS — K92.2 GASTROINTESTINAL HEMORRHAGE, UNSPECIFIED GASTROINTESTINAL HEMORRHAGE TYPE: ICD-10-CM

## 2023-12-19 DIAGNOSIS — I10 ESSENTIAL HYPERTENSION: ICD-10-CM

## 2023-12-19 DIAGNOSIS — N18.4 CKD (CHRONIC KIDNEY DISEASE) STAGE 4, GFR 15-29 ML/MIN (HCC): ICD-10-CM

## 2023-12-19 DIAGNOSIS — M54.50 CHRONIC BILATERAL LOW BACK PAIN WITHOUT SCIATICA: ICD-10-CM

## 2023-12-19 PROBLEM — G62.9 NEUROPATHY: Status: ACTIVE | Noted: 2023-12-19

## 2023-12-19 LAB
2HR DELTA HS TROPONIN: 3 NG/L
4HR DELTA HS TROPONIN: 9 NG/L
ABO GROUP BLD: NORMAL
ALBUMIN SERPL BCP-MCNC: 4 G/DL (ref 3.5–5)
ALP SERPL-CCNC: 92 U/L (ref 34–104)
ALT SERPL W P-5'-P-CCNC: 13 U/L (ref 7–52)
ANION GAP SERPL CALCULATED.3IONS-SCNC: 12 MMOL/L
APTT PPP: 35 SECONDS (ref 23–37)
AST SERPL W P-5'-P-CCNC: 33 U/L (ref 13–39)
ATRIAL RATE: 340 BPM
BASOPHILS # BLD AUTO: 0.01 THOUSANDS/ÂΜL (ref 0–0.1)
BASOPHILS NFR BLD AUTO: 0 % (ref 0–1)
BILIRUB SERPL-MCNC: 0.45 MG/DL (ref 0.2–1)
BLD GP AB SCN SERPL QL: NEGATIVE
BNP SERPL-MCNC: 372 PG/ML (ref 0–100)
BUN SERPL-MCNC: 57 MG/DL (ref 5–25)
CALCIUM SERPL-MCNC: 8.9 MG/DL (ref 8.4–10.2)
CARDIAC TROPONIN I PNL SERPL HS: 42 NG/L
CARDIAC TROPONIN I PNL SERPL HS: 45 NG/L
CARDIAC TROPONIN I PNL SERPL HS: 51 NG/L
CHLORIDE SERPL-SCNC: 96 MMOL/L (ref 96–108)
CO2 SERPL-SCNC: 30 MMOL/L (ref 21–32)
CREAT SERPL-MCNC: 3.13 MG/DL (ref 0.6–1.3)
EOSINOPHIL # BLD AUTO: 0.01 THOUSAND/ÂΜL (ref 0–0.61)
EOSINOPHIL NFR BLD AUTO: 0 % (ref 0–6)
ERYTHROCYTE [DISTWIDTH] IN BLOOD BY AUTOMATED COUNT: 13.7 % (ref 11.6–15.1)
FLUAV RNA RESP QL NAA+PROBE: NEGATIVE
FLUBV RNA RESP QL NAA+PROBE: NEGATIVE
GFR SERPL CREATININE-BSD FRML MDRD: 16 ML/MIN/1.73SQ M
GLUCOSE SERPL-MCNC: 98 MG/DL (ref 65–140)
HCT VFR BLD AUTO: 23.5 % (ref 36.5–49.3)
HCT VFR BLD AUTO: 38.4 % (ref 36.5–49.3)
HGB BLD-MCNC: 12.2 G/DL (ref 12–17)
HGB BLD-MCNC: 7.2 G/DL (ref 12–17)
IMM GRANULOCYTES # BLD AUTO: 0.03 THOUSAND/UL (ref 0–0.2)
IMM GRANULOCYTES NFR BLD AUTO: 0 % (ref 0–2)
INR PPP: 1.37 (ref 0.84–1.19)
LACTATE SERPL-SCNC: 1.7 MMOL/L (ref 0.5–2)
LYMPHOCYTES # BLD AUTO: 0.6 THOUSANDS/ÂΜL (ref 0.6–4.47)
LYMPHOCYTES NFR BLD AUTO: 7 % (ref 14–44)
MCH RBC QN AUTO: 33.2 PG (ref 26.8–34.3)
MCHC RBC AUTO-ENTMCNC: 31.8 G/DL (ref 31.4–37.4)
MCV RBC AUTO: 105 FL (ref 82–98)
MONOCYTES # BLD AUTO: 0.93 THOUSAND/ÂΜL (ref 0.17–1.22)
MONOCYTES NFR BLD AUTO: 11 % (ref 4–12)
NEUTROPHILS # BLD AUTO: 7.03 THOUSANDS/ÂΜL (ref 1.85–7.62)
NEUTS SEG NFR BLD AUTO: 82 % (ref 43–75)
NRBC BLD AUTO-RTO: 0 /100 WBCS
PLATELET # BLD AUTO: 162 THOUSANDS/UL (ref 149–390)
PMV BLD AUTO: 10.3 FL (ref 8.9–12.7)
POTASSIUM SERPL-SCNC: 4.2 MMOL/L (ref 3.5–5.3)
PROCALCITONIN SERPL-MCNC: 0.55 NG/ML
PROT SERPL-MCNC: 6.6 G/DL (ref 6.4–8.4)
PROTHROMBIN TIME: 16.7 SECONDS (ref 11.6–14.5)
QRS AXIS: 63 DEGREES
QRSD INTERVAL: 108 MS
QT INTERVAL: 432 MS
QTC INTERVAL: 479 MS
RBC # BLD AUTO: 3.67 MILLION/UL (ref 3.88–5.62)
RH BLD: POSITIVE
RSV RNA RESP QL NAA+PROBE: NEGATIVE
SARS-COV-2 RNA RESP QL NAA+PROBE: POSITIVE
SODIUM SERPL-SCNC: 138 MMOL/L (ref 135–147)
SPECIMEN EXPIRATION DATE: NORMAL
T WAVE AXIS: 29 DEGREES
VENTRICULAR RATE: 74 BPM
WBC # BLD AUTO: 8.61 THOUSAND/UL (ref 4.31–10.16)

## 2023-12-19 PROCEDURE — 99285 EMERGENCY DEPT VISIT HI MDM: CPT | Performed by: STUDENT IN AN ORGANIZED HEALTH CARE EDUCATION/TRAINING PROGRAM

## 2023-12-19 PROCEDURE — 85610 PROTHROMBIN TIME: CPT | Performed by: STUDENT IN AN ORGANIZED HEALTH CARE EDUCATION/TRAINING PROGRAM

## 2023-12-19 PROCEDURE — 99223 1ST HOSP IP/OBS HIGH 75: CPT | Performed by: HOSPITALIST

## 2023-12-19 PROCEDURE — 70450 CT HEAD/BRAIN W/O DYE: CPT

## 2023-12-19 PROCEDURE — 85730 THROMBOPLASTIN TIME PARTIAL: CPT | Performed by: STUDENT IN AN ORGANIZED HEALTH CARE EDUCATION/TRAINING PROGRAM

## 2023-12-19 PROCEDURE — 86850 RBC ANTIBODY SCREEN: CPT | Performed by: STUDENT IN AN ORGANIZED HEALTH CARE EDUCATION/TRAINING PROGRAM

## 2023-12-19 PROCEDURE — 85014 HEMATOCRIT: CPT | Performed by: INTERNAL MEDICINE

## 2023-12-19 PROCEDURE — 99285 EMERGENCY DEPT VISIT HI MDM: CPT

## 2023-12-19 PROCEDURE — 84484 ASSAY OF TROPONIN QUANT: CPT | Performed by: HOSPITALIST

## 2023-12-19 PROCEDURE — 83880 ASSAY OF NATRIURETIC PEPTIDE: CPT | Performed by: STUDENT IN AN ORGANIZED HEALTH CARE EDUCATION/TRAINING PROGRAM

## 2023-12-19 PROCEDURE — C9113 INJ PANTOPRAZOLE SODIUM, VIA: HCPCS | Performed by: INTERNAL MEDICINE

## 2023-12-19 PROCEDURE — 93005 ELECTROCARDIOGRAM TRACING: CPT

## 2023-12-19 PROCEDURE — 84145 PROCALCITONIN (PCT): CPT | Performed by: STUDENT IN AN ORGANIZED HEALTH CARE EDUCATION/TRAINING PROGRAM

## 2023-12-19 PROCEDURE — 80053 COMPREHEN METABOLIC PANEL: CPT | Performed by: STUDENT IN AN ORGANIZED HEALTH CARE EDUCATION/TRAINING PROGRAM

## 2023-12-19 PROCEDURE — 87040 BLOOD CULTURE FOR BACTERIA: CPT | Performed by: STUDENT IN AN ORGANIZED HEALTH CARE EDUCATION/TRAINING PROGRAM

## 2023-12-19 PROCEDURE — 83605 ASSAY OF LACTIC ACID: CPT | Performed by: STUDENT IN AN ORGANIZED HEALTH CARE EDUCATION/TRAINING PROGRAM

## 2023-12-19 PROCEDURE — 71045 X-RAY EXAM CHEST 1 VIEW: CPT

## 2023-12-19 PROCEDURE — 84484 ASSAY OF TROPONIN QUANT: CPT | Performed by: STUDENT IN AN ORGANIZED HEALTH CARE EDUCATION/TRAINING PROGRAM

## 2023-12-19 PROCEDURE — 86900 BLOOD TYPING SEROLOGIC ABO: CPT | Performed by: STUDENT IN AN ORGANIZED HEALTH CARE EDUCATION/TRAINING PROGRAM

## 2023-12-19 PROCEDURE — 85018 HEMOGLOBIN: CPT | Performed by: INTERNAL MEDICINE

## 2023-12-19 PROCEDURE — 85025 COMPLETE CBC W/AUTO DIFF WBC: CPT | Performed by: STUDENT IN AN ORGANIZED HEALTH CARE EDUCATION/TRAINING PROGRAM

## 2023-12-19 PROCEDURE — 36415 COLL VENOUS BLD VENIPUNCTURE: CPT | Performed by: STUDENT IN AN ORGANIZED HEALTH CARE EDUCATION/TRAINING PROGRAM

## 2023-12-19 PROCEDURE — 0241U HB NFCT DS VIR RESP RNA 4 TRGT: CPT | Performed by: STUDENT IN AN ORGANIZED HEALTH CARE EDUCATION/TRAINING PROGRAM

## 2023-12-19 PROCEDURE — 86901 BLOOD TYPING SEROLOGIC RH(D): CPT | Performed by: STUDENT IN AN ORGANIZED HEALTH CARE EDUCATION/TRAINING PROGRAM

## 2023-12-19 RX ORDER — PRAVASTATIN SODIUM 40 MG
40 TABLET ORAL
Status: DISCONTINUED | OUTPATIENT
Start: 2023-12-19 | End: 2023-12-26 | Stop reason: HOSPADM

## 2023-12-19 RX ORDER — PANTOPRAZOLE SODIUM 40 MG/10ML
40 INJECTION, POWDER, LYOPHILIZED, FOR SOLUTION INTRAVENOUS EVERY 12 HOURS SCHEDULED
Status: DISCONTINUED | OUTPATIENT
Start: 2023-12-19 | End: 2023-12-21

## 2023-12-19 RX ORDER — ALLOPURINOL 100 MG/1
100 TABLET ORAL DAILY
Status: DISCONTINUED | OUTPATIENT
Start: 2023-12-20 | End: 2023-12-26 | Stop reason: HOSPADM

## 2023-12-19 RX ORDER — METOPROLOL TARTRATE 100 MG/1
100 TABLET ORAL 2 TIMES DAILY
Status: DISCONTINUED | OUTPATIENT
Start: 2023-12-19 | End: 2023-12-26 | Stop reason: HOSPADM

## 2023-12-19 RX ORDER — POLYETHYLENE GLYCOL 3350 17 G/17G
17 POWDER, FOR SOLUTION ORAL DAILY PRN
Status: DISCONTINUED | OUTPATIENT
Start: 2023-12-19 | End: 2023-12-26 | Stop reason: HOSPADM

## 2023-12-19 RX ORDER — SODIUM CHLORIDE 9 MG/ML
100 INJECTION, SOLUTION INTRAVENOUS CONTINUOUS
Status: DISCONTINUED | OUTPATIENT
Start: 2023-12-19 | End: 2023-12-20

## 2023-12-19 RX ORDER — MELATONIN
2000 DAILY
Status: DISCONTINUED | OUTPATIENT
Start: 2023-12-19 | End: 2023-12-26 | Stop reason: HOSPADM

## 2023-12-19 RX ORDER — PANTOPRAZOLE SODIUM 40 MG/1
40 TABLET, DELAYED RELEASE ORAL
Status: DISCONTINUED | OUTPATIENT
Start: 2023-12-20 | End: 2023-12-19

## 2023-12-19 RX ORDER — GABAPENTIN 100 MG/1
100 CAPSULE ORAL
Status: DISCONTINUED | OUTPATIENT
Start: 2023-12-19 | End: 2023-12-21

## 2023-12-19 RX ORDER — ACETAMINOPHEN 325 MG/1
975 TABLET ORAL EVERY 6 HOURS PRN
Status: DISCONTINUED | OUTPATIENT
Start: 2023-12-19 | End: 2023-12-26 | Stop reason: HOSPADM

## 2023-12-19 RX ORDER — SENNOSIDES 8.6 MG
8.6 TABLET ORAL
Status: DISCONTINUED | OUTPATIENT
Start: 2023-12-19 | End: 2023-12-26 | Stop reason: HOSPADM

## 2023-12-19 RX ADMIN — ACETAMINOPHEN 975 MG: 325 TABLET, FILM COATED ORAL at 21:00

## 2023-12-19 RX ADMIN — SODIUM CHLORIDE 100 ML/HR: 0.9 INJECTION, SOLUTION INTRAVENOUS at 14:18

## 2023-12-19 RX ADMIN — APIXABAN 2.5 MG: 2.5 TABLET, FILM COATED ORAL at 17:59

## 2023-12-19 RX ADMIN — PANTOPRAZOLE SODIUM 40 MG: 40 INJECTION, POWDER, LYOPHILIZED, FOR SOLUTION INTRAVENOUS at 21:53

## 2023-12-19 RX ADMIN — GABAPENTIN 100 MG: 100 CAPSULE ORAL at 21:00

## 2023-12-19 RX ADMIN — SENNOSIDES 8.6 MG: 8.6 TABLET, FILM COATED ORAL at 21:00

## 2023-12-19 RX ADMIN — METOPROLOL TARTRATE 100 MG: 100 TABLET, FILM COATED ORAL at 17:59

## 2023-12-19 RX ADMIN — PRAVASTATIN SODIUM 40 MG: 40 TABLET ORAL at 17:59

## 2023-12-19 NOTE — PLAN OF CARE
Problem: PAIN - ADULT  Goal: Verbalizes/displays adequate comfort level or baseline comfort level  Description: Interventions:  - Encourage patient to monitor pain and request assistance  - Assess pain using appropriate pain scale  - Administer analgesics based on type and severity of pain and evaluate response  - Implement non-pharmacological measures as appropriate and evaluate response  - Consider cultural and social influences on pain and pain management  - Notify physician/advanced practitioner if interventions unsuccessful or patient reports new pain  Outcome: Progressing     Problem: INFECTION - ADULT  Goal: Absence or prevention of progression during hospitalization  Description: INTERVENTIONS:  - Assess and monitor for signs and symptoms of infection  - Monitor lab/diagnostic results  - Monitor all insertion sites, i.e. indwelling lines, tubes, and drains  - Monitor endotracheal if appropriate and nasal secretions for changes in amount and color  - [place]ard appropriate cooling/warming therapies per order  - Administer medications as ordered  - Instruct and encourage patient and family to use good hand hygiene technique  - Identify and instruct in appropriate isolation precautions for identified infection/condition  Outcome: Progressing  Goal: Absence of fever/infection during neutropenic period  Description: INTERVENTIONS:  - Monitor WBC    Outcome: Progressing     Problem: SAFETY ADULT  Goal: Patient will remain free of falls  Description: INTERVENTIONS:  - Educate patient/family on patient safety including physical limitations  - Instruct patient to call for assistance with activity   - Consult OT/PT to assist with strengthening/mobility   - Keep Call bell within reach  - Keep bed low and locked with side rails adjusted as appropriate  - Keep care items and personal belongings within reach  - Initiate and maintain comfort rounds  - Make Fall Risk Sign visible to staff  - Offer Toileting every 2 Hours,  in advance of need  - Initiate/Maintain bed/chair alarm  - Obtain necessary fall risk management equipment: bed/chair alarm, nonskid socks   - Apply yellow socks and bracelet for high fall risk patients  - Consider moving patient to room near nurses station  Outcome: Progressing  Goal: Maintain or return to baseline ADL function  Description: INTERVENTIONS:  -  Assess patient's ability to carry out ADLs; assess patient's baseline for ADL function and identify physical deficits which impact ability to perform ADLs (bathing, care of mouth/teeth, toileting, grooming, dressing, etc.)  - Assess/evaluate cause of self-care deficits   - Assess range of motion  - Assess patient's mobility; develop plan if impaired  - Assess patient's need for assistive devices and provide as appropriate  - Encourage maximum independence but intervene and supervise when necessary  - Involve family in performance of ADLs  - Assess for home care needs following discharge   - Consider OT consult to assist with ADL evaluation and planning for discharge  - Provide patient education as appropriate  Outcome: Progressing  Goal: Maintains/Returns to pre admission functional level  Description: INTERVENTIONS:  - Perform AM-PAC 6 Click Basic Mobility/ Daily Activity assessment daily.  - Set and communicate daily mobility goal to care team and patient/family/caregiver.   - Collaborate with rehabilitation services on mobility goals if consulted  - Perform Range of Motion 3 times a day.  - Reposition patient every 3 hours.  - Dangle patient 3 times a day  - Stand patient 3 times a day  - Ambulate patient 3 times a day  - Out of bed to chair 3 times a day   - Out of bed for meals 3 times a day  - Out of bed for toileting  - Record patient progress and toleration of activity level   Outcome: Progressing     Problem: DISCHARGE PLANNING  Goal: Discharge to home or other facility with appropriate resources  Description: INTERVENTIONS:  - Identify barriers to  discharge w/patient and caregiver  - Arrange for needed discharge resources and transportation as appropriate  - Identify discharge learning needs (meds, wound care, etc.)  - Arrange for interpretive services to assist at discharge as needed  - Refer to Case Management Department for coordinating discharge planning if the patient needs post-hospital services based on physician/advanced practitioner order or complex needs related to functional status, cognitive ability, or social support system  Outcome: Progressing     Problem: Knowledge Deficit  Goal: Patient/family/caregiver demonstrates understanding of disease process, treatment plan, medications, and discharge instructions  Description: Complete learning assessment and assess knowledge base.  Interventions:  - Provide teaching at level of understanding  - Provide teaching via preferred learning methods  Outcome: Progressing

## 2023-12-19 NOTE — ASSESSMENT & PLAN NOTE
Lab Results   Component Value Date    EGFR 16 12/19/2023    EGFR 19 09/01/2023    EGFR 18 07/11/2023    CREATININE 3.13 (H) 12/19/2023    CREATININE 2.82 (H) 09/01/2023    CREATININE 2.97 (H) 07/11/2023     Patient baseline around 2.9-3, on admission seems mildly hypovolemic/dry and creatinine slightly elevated, not true YOLANDE  Gentle IV fluids  Avoid nephrotoxic medications

## 2023-12-19 NOTE — ASSESSMENT & PLAN NOTE
Patient is not hypoxic, has had poor oral intake and suspect acute encephalopathy due to COVID infection  Supportive care  Can start steroids and further COVID treatment if he becomes hypoxic  Elevated procalcitonin at 0.55, suspect elevated due to COVID infection and mild dehydration, will trend and if any signs of infection, fever will start empiric antibiotics

## 2023-12-19 NOTE — ED NOTES
PT had bowel movement. Staff assisted the patient with cleaning and linen change. Black stool noted. Provider made aware. Pt resting comfortably on the litter at this time with call bell in reach.      Tor Young  12/19/23 [id number]

## 2023-12-19 NOTE — ED PROVIDER NOTES
Emergency Department Trauma Note  Les Quiñonez 88 y.o. male MRN: [id number]  Unit/Bed#: RM01/RM01 Encounter: [id number]      Trauma Alert: Trauma Acuity: Trauma Evaluation  Model of Arrival: Mode of Arrival: BLS via    Trauma Team: Current Providers  Attending Provider: Amadou Aguilera MD  Attending Provider: Mauricio Zambrano DO  Registered Nurse: Kathy Mcdowell RN  Consultants:     None      History of Present Illness     Chief Complaint:   Chief Complaint   Patient presents with    Difficulty Walking     Pt brought in by EMS for ambulatory dysfunction since this morning.      HPI:  Les Quiñonez is a 88 y.o. male who presents with ambulatory dysfunction.  Mechanism:Details of Incident: according to family fell last night, unknown loc, +thinners Injury Date: 12/18/23 Injury Time: 2000 Injury Occurence Location - Specify County: [facility]    HPI    This is an 88-year-old male brought in by EMS.  Patient altered and unable to provide meaningful past medical history.  Per EMS they were called to patient's household by their son after son reported that patient was having some ambulatory dysfunction and cannot walk around the house.  Additionally per son patient fell sometime last night which was unwitnessed and is unable to provide any additional details.  Upon arrival to the emergency department patient is in no acute distress is able to speak but unable to provide any meaningful answers/conversation.  Unclear if this is baseline or new AMS.      Review of Systems   Unable to perform ROS: Mental status change       Historical Information     Immunizations:   Immunization History   Administered Date(s) Administered    COVID-19 MODERNA VACC 0.5 ML IM 01/21/2021, 02/19/2021    INFLUENZA 10/26/2016    Influenza Split High Dose Preservative Free IM 11/07/2012, 11/03/2014, 10/08/2015, 10/26/2016, 10/03/2017    Influenza, high dose seasonal 0.7 mL 10/09/2018, 10/07/2019, 10/15/2020, 10/19/2021, 12/02/2022,  12/06/2023    Influenza, seasonal, injectable 11/04/2013    Pneumococcal Polysaccharide PPV23 11/07/2006       Past Medical History:   Diagnosis Date    Aortic stenosis     ECHO -4-14-14. MODERATE TO SEVERE AORTIC STENOSIS; MILD AROTIC INSUFFICIENCY - LAST ASSESSED 10/26/16; RESOLVED 6/8/16    Aortic valve disorder     LAST ASSESSED 10/8/15; 10/8/15    Arthritis     CHF (congestive heart failure) ([facility])     Complete heart block ([facility]) 07/20/2020    Coronary artery disease     Hypertension     Hypertensive urgency 05/19/2019    Pulmonary emphysema ([facility]) 10/15/2020    Renal disorder     TIA (transient ischemic attack)     Transient cerebral ischemia        Family History   Problem Relation Age of Onset    No Known Problems Mother     No Known Problems Father     Coronary artery disease Neg Hx      Past Surgical History:   Procedure Laterality Date    AORTIC VALVE REPLACEMENT  06/30/2015    avr with 23 mm [place] Magna Ease bioprosthetic    CARDIAC PACEMAKER PLACEMENT Left 07/24/2020    CATARACT EXTRACTION Right 06/05/2000    COLONOSCOPY      CORONARY ARTERY BYPASS GRAFT  06/05/2000    x1 with argueta  to lad    EYE SURGERY      POLYPECTOMY  04/2014    enteroscopic - esophageal ulcer, gastric erosion, and duodenal ulcer.     NM HEMIARTHROPLASTY HIP PARTIAL Right 2/10/2023    Procedure: HEMIARTHROPLASTY HIP (BIPOLAR);  Surgeon: Andrei Jackson DO;  Location: [facility];  Service: Orthopedics    TONSILLECTOMY      unknown     Social History     Tobacco Use    Smoking status: Never    Smokeless tobacco: Never   Vaping Use    Vaping status: Never Used   Substance Use Topics    Alcohol use: Never     Alcohol/week: 0.0 standard drinks of alcohol     Comment: very occasional    Drug use: Never     E-Cigarette/Vaping    E-Cigarette Use Never User      E-Cigarette/Vaping Substances    Nicotine No     THC No     CBD No     Flavoring No     Other No     Unknown No        Family History: non-contributory    Meds/Allergies   Prior to  Admission Medications   Prescriptions Last Dose Informant Patient Reported? Taking?   allopurinol (ZYLOPRIM) 100 mg tablet  Self No No   Sig: Take 1 tablet (100 mg total) by mouth daily   apixaban (ELIQUIS) 2.5 mg   No No   Sig: Take 1 tablet (2.5 mg total) by mouth 2 (two) times a day   apixaban (Eliquis) 2.5 mg   No No   Sig: Take 1 tablet (2.5 mg total) by mouth 2 (two) times a day   Patient not taking: Reported on 12/6/2023   aspirin (ECOTRIN LOW STRENGTH) 81 mg EC tablet  Self No No   Sig: Take 1 tablet (81 mg total) by mouth daily   cholecalciferol (VITAMIN D3) 1,000 units tablet   Yes No   Sig: Take 2,000 Units by mouth daily   desoximetasone (TOPICORT) 0.05 % cream  Self No No   Sig: Apply topically 2 (two) times a day   ferrous sulfate 325 (65 Fe) mg tablet   Yes No   Sig: Take 325 mg by mouth 2 (two) times a day   gabapentin (NEURONTIN) 100 mg capsule   No No   Sig: Take 2 capsules (200 mg total) by mouth daily at bedtime   hydrophilic ointment   No No   Sig: Apply topically as needed for dry skin   magnesium oxide (MAG-OX) 400 mg tablet   No No   Sig: Take 1 tablet (400 mg total) by mouth daily   Patient not taking: Reported on 12/6/2023   metoprolol tartrate (LOPRESSOR) 100 mg tablet   No No   Sig: Take 1 tablet by mouth twice daily   ondansetron (ZOFRAN) 4 mg tablet   No No   Sig: Take 1 tablet (4 mg total) by mouth every 8 (eight) hours as needed for nausea or vomiting for up to 4 days   Patient not taking: Reported on 12/6/2023   pantoprazole (PROTONIX) 40 mg tablet   No No   Sig: Take 1 tablet (40 mg total) by mouth 2 (two) times a day before meals   pravastatin (PRAVACHOL) 40 mg tablet   No No   Sig: TAKE 1 TABLET BY MOUTH ONCE DAILY WITH SUPPER   senna (SENOKOT) 8.6 mg  Self No No   Sig: Take 1 tablet (8.6 mg total) by mouth daily at bedtime   silver sulfadiazine (SILVADENE,SSD) 1 % cream   No No   Sig: Apply topically daily   silver sulfadiazine (Silvadene) 1 % cream  Self No No   Sig: Apply  topically 2 (two) times a day   torsemide (DEMADEX) 20 mg tablet   No No   Sig: Take 2 tablets (40 mg total) by mouth daily      Facility-Administered Medications: None       Allergies   Allergen Reactions    Promethazine Delirium and Other (See Comments)       PHYSICAL EXAM    PE limited by: Mental status change    Objective   Vitals:   First set: Temperature: (!) 97.3 °F (36.3 °C) (12/19/23 0854)  Pulse: 75 (12/19/23 0854)  Respirations: 20 (12/19/23 0854)  Blood Pressure: 111/56 (12/19/23 0854)  SpO2: 94 % (12/19/23 0854)    Primary Survey:   (A) Airway: Patent self maintained  (B) Breathing: Good bilateral chest expansion  (C) Circulation: Pulses:   normal  (D) Disabliity:  GCS Total:  15  (E) Expose:  Completed    Secondary Survey: (Click on Physical Exam tab above)  Physical Exam  Vitals and nursing note reviewed.   Constitutional:       Appearance: He is well-developed. He is obese. He is not ill-appearing or diaphoretic.   HENT:      Head: Normocephalic.   Eyes:      Pupils: Pupils are equal, round, and reactive to light.   Cardiovascular:      Rate and Rhythm: Normal rate and regular rhythm.      Comments: Surgical incision correlating with previous CABG  Pulmonary:      Effort: Pulmonary effort is normal.      Breath sounds: Normal breath sounds.   Abdominal:      General: Bowel sounds are normal.      Palpations: Abdomen is soft.   Musculoskeletal:         General: Normal range of motion.      Cervical back: Normal range of motion and neck supple.   Skin:     General: Skin is warm.   Neurological:      Mental Status: He is disoriented.         Cervical spine cleared by clinical criteria? Yes     Invasive Devices       Peripheral Intravenous Line  Duration             Peripheral IV 12/19/23 Dorsal (posterior);Left Hand <1 day    Peripheral IV 12/19/23 Right;Ventral (anterior) Forearm <1 day                    Lab Results:   Results Reviewed       Procedure Component Value Units Date/Time    HS Troponin I  2hr [[id number]]  (Normal) Collected: 12/19/23 1121    Lab Status: Final result Specimen: Blood from Arm, Right Updated: 12/19/23 1158     hs TnI 2hr 45 ng/L      Delta 2hr hsTnI 3 ng/L     HS Troponin I 4hr [[id number]]     Lab Status: No result Specimen: Blood     FLU/RSV/COVID - if FLU/RSV clinically relevant [[id number]]  (Abnormal) Collected: 12/19/23 0934    Lab Status: Final result Specimen: Nares from Nose Updated: 12/19/23 1024     SARS-CoV-2 Positive     INFLUENZA A PCR Negative     INFLUENZA B PCR Negative     RSV PCR Negative    Narrative:      FOR PEDIATRIC PATIENTS - copy/paste COVID Guidelines URL to browser: [email or web address]    SARS-CoV-2 assay is a Nucleic Acid Amplification assay intended for the  qualitative detection of nucleic acid from SARS-CoV-2 in nasopharyngeal  swabs. Results are for the presumptive identification of SARS-CoV-2 RNA.    Positive results are indicative of infection with SARS-CoV-2, the virus  causing COVID-19, but do not rule out bacterial infection or co-infection  with other viruses. Laboratories within the [place] and its  territories are required to report all positive results to the appropriate  public health authorities. Negative results do not preclude SARS-CoV-2  infection and should not be used as the sole basis for treatment or other  patient management decisions. Negative results must be combined with  clinical observations, patient history, and epidemiological information.  This test has not been FDA cleared or approved.    This test has been authorized by FDA under an Emergency Use Authorization  (EUA). This test is only authorized for the duration of time the  declaration that circumstances exist justifying the authorization of the  emergency use of an in vitro diagnostic tests for detection of SARS-CoV-2  virus and/or diagnosis of COVID-19 infection under section 564(b)(1) of  the Act, 21 U.S.C.  360bbb-3(b)(1), unless the authorization is terminated  or revoked sooner. The test has been validated but independent review by FDA  and CLIA is pending.    Test performed using [employer]: This RT-PCR assay targets N2,  a region unique to SARS-CoV-2. A conserved region in the E-gene was chosen  for pan-Sarbecovirus detection which includes SARS-CoV-2.    According to CMS-2020-01-R, this platform meets the definition of high-throughput technology.    Procalcitonin [[id number]]  (Abnormal) Collected: 12/19/23 0934    Lab Status: Final result Specimen: Blood from Arm, Right Updated: 12/19/23 1023     Procalcitonin 0.55 ng/ml     B-Type Natriuretic Peptide(BNP) [[id number]]  (Abnormal) Collected: 12/19/23 0934    Lab Status: Final result Specimen: Blood from Arm, Right Updated: 12/19/23 1020      pg/mL     HS Troponin 0hr (reflex protocol) [[id number]]  (Normal) Collected: 12/19/23 0934    Lab Status: Final result Specimen: Blood from Arm, Right Updated: 12/19/23 1008     hs TnI 0hr 42 ng/L     Comprehensive metabolic panel [[id number]]  (Abnormal) Collected: 12/19/23 0934    Lab Status: Final result Specimen: Blood from Arm, Right Updated: 12/19/23 1004     Sodium 138 mmol/L      Potassium 4.2 mmol/L      Chloride 96 mmol/L      CO2 30 mmol/L      ANION GAP 12 mmol/L      BUN 57 mg/dL      Creatinine 3.13 mg/dL      Glucose 98 mg/dL      Calcium 8.9 mg/dL      AST 33 U/L      ALT 13 U/L      Alkaline Phosphatase 92 U/L      Total Protein 6.6 g/dL      Albumin 4.0 g/dL      Total Bilirubin 0.45 mg/dL      eGFR 16 ml/min/1.73sq m     Narrative:      National Kidney Disease Foundation guidelines for Chronic Kidney Disease (CKD):     Stage 1 with normal or high GFR (GFR > 90 mL/min/1.73 square meters)    Stage 2 Mild CKD (GFR = 60-89 mL/min/1.73 square meters)    Stage 3A Moderate CKD (GFR = 45-59 mL/min/1.73 square meters)    Stage 3B Moderate CKD (GFR = 30-44 mL/min/1.73 square meters)    Stage 4 Severe CKD  (GFR = 15-29 mL/min/1.73 square meters)    Stage 5 End Stage CKD (GFR <15 mL/min/1.73 square meters)  Note: GFR calculation is accurate only with a steady state creatinine    Lactic acid [[id number]]  (Normal) Collected: 12/19/23 0934    Lab Status: Final result Specimen: Blood from Arm, Right Updated: 12/19/23 1004     LACTIC ACID 1.7 mmol/L     Narrative:      Result may be elevated if tourniquet was used during collection.    Blood culture #2 [[id number]] Collected: 12/19/23 0955    Lab Status: In process Specimen: Blood from Arm, Right Updated: 12/19/23 0958    Protime-INR [[id number]]  (Abnormal) Collected: 12/19/23 0934    Lab Status: Final result Specimen: Blood from Arm, Right Updated: 12/19/23 0957     Protime 16.7 seconds      INR 1.37    APTT [[id number]]  (Normal) Collected: 12/19/23 0934    Lab Status: Final result Specimen: Blood from Arm, Right Updated: 12/19/23 0957     PTT 35 seconds     CBC and differential [[id number]]  (Abnormal) Collected: 12/19/23 0934    Lab Status: Final result Specimen: Blood from Arm, Right Updated: 12/19/23 0944     WBC 8.61 Thousand/uL      RBC 3.67 Million/uL      Hemoglobin 12.2 g/dL      Hematocrit 38.4 %       fL      MCH 33.2 pg      MCHC 31.8 g/dL      RDW 13.7 %      MPV 10.3 fL      Platelets 162 Thousands/uL      nRBC 0 /100 WBCs      Neutrophils Relative 82 %      Immat GRANS % 0 %      Lymphocytes Relative 7 %      Monocytes Relative 11 %      Eosinophils Relative 0 %      Basophils Relative 0 %      Neutrophils Absolute 7.03 Thousands/µL      Immature Grans Absolute 0.03 Thousand/uL      Lymphocytes Absolute 0.60 Thousands/µL      Monocytes Absolute 0.93 Thousand/µL      Eosinophils Absolute 0.01 Thousand/µL      Basophils Absolute 0.01 Thousands/µL     Blood culture #1 [[id number]] Collected: 12/19/23 0934    Lab Status: In process Specimen: Blood from Arm, Right Updated: 12/19/23 0941    UA w Reflex to Microscopic w Reflex to Culture [[id number]]     Lab  Status: No result Specimen: Urine                    Imaging Studies:   Direct to CT: Yes  XR Trauma chest portable   Final Result by Suma Russo MD (12/19 0925)      Linear right basal densities suggestive of subsegmental atelectasis or scarring.                  Workstation performed: [id number]         TRAUMA - CT head wo contrast   Final Result by Suma Russo MD (12/19 0922)      No acute intracranial abnormality.   Chronic intracranial findings, as above.                  Workstation performed: [id number]               Procedures  POC FAST US    Date/Time: 12/19/2023 9:05 AM    Performed by: Amadou Aguilera MD  Authorized by: Amadou Aguilera MD    Patient location:  ED  Other Assisting Provider: No    Procedure details:     Exam Type:  Diagnostic    Technique: FAST      Views obtained:  Heart - Pericardial sac, Suprapubic - Pouch of Suraj, RUQ - Reyes's Pouch and LUQ - Splenorenal space    Image quality: diagnostic      Image availability:  Not saved  FAST Findings:     RUQ (Hepatorenal) free fluid: absent      LUQ (Splenorenal) free fluid: absent      Suprapubic free fluid: absent      Cardiac wall motion: identified      Pericardial effusion: absent    Interpretation:     Impressions: negative    ECG 12 Lead Documentation Only    Date/Time: 12/19/2023 9:36 AM    Performed by: Amadou Aguilera MD  Authorized by: Amadou Aguilera MD    Indications / Diagnosis:  Altered mental status  Patient location:  ED  Previous ECG:     Previous ECG:  Unavailable    Comparison to cardiac monitor: Yes    Interpretation:     Interpretation: abnormal    Rate:     ECG rate:  74    ECG rate assessment: normal    Rhythm:     Rhythm: junctional      Rhythm comment:  Ventricular paced rhythm  QRS:     QRS axis:  Normal  Conduction:     Conduction: abnormal    ST segments:     ST segments:  Normal  T waves:     T waves: normal             ED Course  ED Course as of 12/19/23 1211   Tue Dec 19, 2023   0906 On chart review it  appears patient has chronic ambulatory dysfunction is unable to utilize stairs and walks around his house using a walker he does have a stair lift to go up and down the steps.  He lives primarily with his wife who is also his caregiver.   0931 CT head and x-ray of chest reviewed no acute findings   0958 POCT INR(!): 1.37   0958 PTT: 35   0958 INR subtherapeutic   1005 Creatinine(!): 3.13   1005 Creatinine relatively baseline.   1012 hs TnI 0hr: 42   1024 Procalcitonin(!): 0.55   1024 BNP(!): 372   1025 SARS-COV-2(!): Positive   1039 SARS-COV-2(!): Positive   1039 Will await delta troponin acute metabolic encephalopathy in the setting of COVID patient will likely require admission.   1202 [place] text to hospitalist  Discussed case           Medical Decision Making  Amount and/or Complexity of Data Reviewed  Labs: ordered. Decision-making details documented in ED Course.  Radiology: ordered.                Disposition  [facility] Transfer: No  Final diagnoses:   Acute metabolic encephalopathy   COVID     Time reflects when diagnosis was documented in both MDM as applicable and the Disposition within this note       Time User Action Codes Description Comment    12/19/2023 12:10 PM Amadou Aguilera Add [G93.41] Acute metabolic encephalopathy     12/19/2023 12:10 PM Amadou Aguilera Add [U07.1] COVID           ED Disposition       ED Disposition   Admit    Condition   Stable    Date/Time   Tue Dec 19, 2023 12:10 PM    Comment   --             Follow-up Information    None       Patient's Medications   Discharge Prescriptions    No medications on file     No discharge procedures on file.    PDMP Review       None            ED Provider  Electronically Signed by           Amadou Aguilera MD  12/19/23 1217

## 2023-12-19 NOTE — ASSESSMENT & PLAN NOTE
Wt Readings from Last 3 Encounters:   12/19/23 85.5 kg (188 lb 7.9 oz)   12/06/23 79.8 kg (176 lb)   09/05/23 79.4 kg (175 lb)     Echo in February of this year ejection fraction 65%, mild to moderate valvular disease at the mitral and tricuspid  Hold diuretics at this time

## 2023-12-19 NOTE — H&P
[facility]  H&P  Name: Les Quiñonez 88 y.o. male I MRN: [id number]  Unit/Bed#:  I Date of Admission: 12/19/2023   Date of Service: 12/19/2023 I Hospital Day: 1      Assessment/Plan   * Acute metabolic encephalopathy  Assessment & Plan  Family reports patient has mild forgetfulness at baseline but no significant cognitive deficits.  On admission oriented to person, partial place and time.  -Suspect due to underlying COVID infection  -Supportive care  -Mildly dehydrated due to decreased oral intake recently  -Gentle IV fluids given known CHF, monitor fluid status  -Virtual one-to-one monitoring    COVID-19 virus infection  Assessment & Plan  Patient is not hypoxic, has had poor oral intake and suspect acute encephalopathy due to COVID infection  Supportive care  Can start steroids and further COVID treatment if he becomes hypoxic  Elevated procalcitonin at 0.55, suspect elevated due to COVID infection and mild dehydration, will trend and if any signs of infection, fever will start empiric antibiotics    Neuropathy  Assessment & Plan  He takes 200 mg gabapentin at night, will reduce to 100 mg given encephalopathy    Hx of CABG  Assessment & Plan  Continue aspirin, beta-blocker, and statin      Paroxysmal atrial fibrillation (HCC)  Assessment & Plan  Continue renally dosed Eliquis  Continue beta-blocker  He has a pacemaker    Ambulatory dysfunction  Assessment & Plan  PT/OT    Chronic diastolic CHF (congestive heart failure) (HCC)  Assessment & Plan  Wt Readings from Last 3 Encounters:   12/19/23 85.5 kg (188 lb 7.9 oz)   12/06/23 79.8 kg (176 lb)   09/05/23 79.4 kg (175 lb)     Echo in February of this year ejection fraction 65%, mild to moderate valvular disease at the mitral and tricuspid  Hold diuretics at this time          Complete heart block (HCC)  Assessment & Plan  Patient has pacemaker, currently appears paced at 60  Pacemaker site is nontender    History of aortic valve  replacement with bioprosthetic valve  Assessment & Plan  Continue Eliquis    GERD (gastroesophageal reflux disease)  Assessment & Plan  Continue PPI    CKD (chronic kidney disease) stage 4, GFR 15-29 ml/min ([facility])  Assessment & Plan  Lab Results   Component Value Date    EGFR 16 12/19/2023    EGFR 19 09/01/2023    EGFR 18 07/11/2023    CREATININE 3.13 (H) 12/19/2023    CREATININE 2.82 (H) 09/01/2023    CREATININE 2.97 (H) 07/11/2023     Patient baseline around 2.9-3, on admission seems mildly hypovolemic/dry and creatinine slightly elevated, not true YOLANDE  Gentle IV fluids  Avoid nephrotoxic medications           VTE Pharmacologic Prophylaxis:   High Risk (Score >/= 5) - Pharmacological DVT Prophylaxis Ordered: apixaban (Eliquis). Sequential Compression Devices Ordered.  Code Status: Level 1 - Full Code   Discussion with family: Updated  (wife and daughter) at bedside.    Anticipated Length of Stay: Patient will be admitted on an observation basis with an anticipated length of stay of less than 2 midnights secondary to CoVID encephaklopathy.    Total Time Spent on Date of Encounter in care of patient: 48 mins. This time was spent on one or more of the following: performing physical exam; counseling and coordination of care; obtaining or reviewing history; documenting in the medical record; reviewing/ordering tests, medications or procedures; communicating with other healthcare professionals and discussing with patient's family/caregivers.    Chief Complaint: AMS    History of Present Illness:  Les Quiñonez is a 88 y.o. male with a PMH of history aortic bioprosthetic valve replacement, complete heart block with pacemaker, chronic diastolic CHF, paroxysmal A-fib, prior stroke history, CKD stage IV who presents with AMS.  Patient unable to provide history, provided by patient's family, primarily his daughter.  Patient was brought to the hospital as he has been having some difficulty ambulating around  the house and has had some worsened mental status over the past several days.  He lives with his wife who also has some level of dementia, so daughter reports that there are no specific symptoms reported to her as far as respiratory symptoms.  The patient can answer simple questions and he denied any feeling of shortness of breath or coughing at this time.  He did have a fall last night, unclear if he hit his head.  Patient was found to be COVID positive.  Admitted to medicine for further management    Review of Systems:  Review of Systems   Unable to perform ROS: Mental status change       Past Medical and Surgical History:   Past Medical History:   Diagnosis Date    Aortic stenosis     ECHO -4-14-14. MODERATE TO SEVERE AORTIC STENOSIS; MILD AROTIC INSUFFICIENCY - LAST ASSESSED 10/26/16; RESOLVED 6/8/16    Aortic valve disorder     LAST ASSESSED 10/8/15; 10/8/15    Arthritis     CHF (congestive heart failure) ([facility])     Complete heart block ([facility]) 07/20/2020    Coronary artery disease     Hypertension     Hypertensive urgency 05/19/2019    Pulmonary emphysema ([facility]) 10/15/2020    Renal disorder     TIA (transient ischemic attack)     Transient cerebral ischemia        Past Surgical History:   Procedure Laterality Date    AORTIC VALVE REPLACEMENT  06/30/2015    avr with 23 mm [place] Magna Ease bioprosthetic    CARDIAC PACEMAKER PLACEMENT Left 07/24/2020    CATARACT EXTRACTION Right 06/05/2000    COLONOSCOPY      CORONARY ARTERY BYPASS GRAFT  06/05/2000    x1 with argueta  to lad    EYE SURGERY      POLYPECTOMY  04/2014    enteroscopic - esophageal ulcer, gastric erosion, and duodenal ulcer.     PA HEMIARTHROPLASTY HIP PARTIAL Right 2/10/2023    Procedure: HEMIARTHROPLASTY HIP (BIPOLAR);  Surgeon: Andrei Jackson DO;  Location: [facility];  Service: Orthopedics    TONSILLECTOMY      unknown       Meds/Allergies:  Prior to Admission medications    Medication Sig Start Date End Date Taking? Authorizing Provider    allopurinol (ZYLOPRIM) 100 mg tablet Take 1 tablet (100 mg total) by mouth daily 4/14/23  Yes Cristine Hill DO   apixaban (Eliquis) 2.5 mg Take 1 tablet (2.5 mg total) by mouth 2 (two) times a day 7/11/23  Yes Homa Brower DO   aspirin (ECOTRIN LOW STRENGTH) 81 mg EC tablet Take 1 tablet (81 mg total) by mouth daily 8/6/20  Yes Maren Dye MD   ferrous sulfate 325 (65 Fe) mg tablet Take 325 mg by mouth 2 (two) times a day   Yes Historical Provider, MD   gabapentin (NEURONTIN) 100 mg capsule Take 2 capsules (200 mg total) by mouth daily at bedtime 9/1/23  Yes Cristine Hill DO   metoprolol tartrate (LOPRESSOR) 100 mg tablet Take 1 tablet by mouth twice daily 10/20/23  Yes Cristine Hill DO   pravastatin (PRAVACHOL) 40 mg tablet TAKE 1 TABLET BY MOUTH ONCE DAILY WITH SUPPER 11/16/23  Yes Cristine Hill DO   senna (SENOKOT) 8.6 mg Take 1 tablet (8.6 mg total) by mouth daily at bedtime 5/27/22  Yes Cristine Hill DO   torsemide (DEMADEX) 20 mg tablet Take 2 tablets (40 mg total) by mouth daily 10/20/23  Yes Marta Gomez DO   pantoprazole (PROTONIX) 40 mg tablet Take 1 tablet (40 mg total) by mouth 2 (two) times a day before meals 9/1/23 12/6/23  Cristine Hill DO   apixaban (ELIQUIS) 2.5 mg Take 1 tablet (2.5 mg total) by mouth 2 (two) times a day 7/7/23 12/19/23  Cristine Hill DO   cholecalciferol (VITAMIN D3) 1,000 units tablet Take 2,000 Units by mouth daily  12/19/23  Historical Provider, MD   desoximetasone (TOPICORT) 0.05 % cream Apply topically 2 (two) times a day 5/27/22 12/19/23  Cristine Hill DO   hydrophilic ointment Apply topically as needed for dry skin 12/6/23 12/19/23  Cristine Hill,    magnesium oxide (MAG-OX) 400 mg tablet Take 1 tablet (400 mg total) by mouth daily  Patient not taking: Reported on 12/6/2023 9/5/23 12/19/23  Marta Gomez DO   ondansetron (ZOFRAN) 4 mg tablet Take 1 tablet (4 mg total) by mouth every 8 (eight) hours as needed for nausea  "or vomiting for up to 4 days  Patient not taking: Reported on 12/6/2023 11/21/22 12/19/23  Cristine Hill, DO   silver sulfadiazine (Silvadene) 1 % cream Apply topically 2 (two) times a day 12/19/22 12/19/23  Cristine Hill DO   silver sulfadiazine (SILVADENE,SSD) 1 % cream Apply topically daily 12/6/23 12/19/23  Cristine Hill DO     Medicine reviewed using med list provided by family.     Allergies:   Allergies   Allergen Reactions    Promethazine Delirium and Other (See Comments)       Social History:  Marital Status: /Civil Union   Occupation: n/a  Patient Pre-hospital Living Situation: Home  Patient Pre-hospital Level of Mobility: walks  Patient Pre-hospital Diet Restrictions: cardiac  Substance Use History:   Social History     Substance and Sexual Activity   Alcohol Use Not Currently    Comment: very occasional     Social History     Tobacco Use   Smoking Status Never   Smokeless Tobacco Never     Social History     Substance and Sexual Activity   Drug Use Not Currently       Family History:  Family History   Problem Relation Age of Onset    No Known Problems Mother     No Known Problems Father     Coronary artery disease Neg Hx        Physical Exam:     Vitals:   Blood Pressure: 157/70 (12/19/23 1357)  Pulse: 63 (12/19/23 1357)  Temperature: (!) 97.1 °F (36.2 °C) (12/19/23 1357)  Temp Source: Temporal (12/19/23 1357)  Respirations: 15 (12/19/23 1357)  Height: 5' 7\" (170.2 cm) (12/19/23 1357)  Weight - Scale: 76.3 kg (168 lb 3.4 oz) (12/19/23 1357)  SpO2: 100 % (12/19/23 1357)    Physical Exam  Vitals and nursing note reviewed.   Constitutional:       General: He is not in acute distress.     Appearance: He is well-developed. He is ill-appearing.   HENT:      Head: Normocephalic and atraumatic.   Eyes:      Conjunctiva/sclera: Conjunctivae normal.   Cardiovascular:      Rate and Rhythm: Regular rhythm.      Heart sounds: No murmur heard.     Comments: Paced  Pulmonary:      Effort: Pulmonary " effort is normal. No respiratory distress.      Comments: Reduced, stable on room air  Abdominal:      Palpations: Abdomen is soft.      Tenderness: There is no abdominal tenderness.   Musculoskeletal:         General: No swelling.      Cervical back: Neck supple.   Skin:     General: Skin is warm and dry.      Capillary Refill: Capillary refill takes less than 2 seconds.   Neurological:      Mental Status: He is alert.      Comments: Patient is awake, alert, follows commands, answers simple questions, oriented to person, partially to place and time          Additional Data:     Lab Results:  Results from last 7 days   Lab Units 12/19/23  0934   WBC Thousand/uL 8.61   HEMOGLOBIN g/dL 12.2   HEMATOCRIT % 38.4   PLATELETS Thousands/uL 162   NEUTROS PCT % 82*   LYMPHS PCT % 7*   MONOS PCT % 11   EOS PCT % 0     Results from last 7 days   Lab Units 12/19/23  0934   SODIUM mmol/L 138   POTASSIUM mmol/L 4.2   CHLORIDE mmol/L 96   CO2 mmol/L 30   BUN mg/dL 57*   CREATININE mg/dL 3.13*   ANION GAP mmol/L 12   CALCIUM mg/dL 8.9   ALBUMIN g/dL 4.0   TOTAL BILIRUBIN mg/dL 0.45   ALK PHOS U/L 92   ALT U/L 13   AST U/L 33   GLUCOSE RANDOM mg/dL 98     Results from last 7 days   Lab Units 12/19/23  0934   INR  1.37*             Results from last 7 days   Lab Units 12/19/23  0934   LACTIC ACID mmol/L 1.7   PROCALCITONIN ng/ml 0.55*       Lines/Drains:  Invasive Devices       Peripheral Intravenous Line  Duration             Peripheral IV 12/19/23 Dorsal (posterior);Left Hand <1 day    Peripheral IV 12/19/23 Right;Ventral (anterior) Forearm <1 day                        Imaging: Reviewed radiology reports from this admission including: CT head  XR Trauma chest portable   Final Result by Suma Russo MD (12/19 1395)      Linear right basal densities suggestive of subsegmental atelectasis or scarring.                  Workstation performed: [id number]         TRAUMA - CT head wo contrast   Final Result by Suma Russo MD (12/19  0922)      No acute intracranial abnormality.   Chronic intracranial findings, as above.                  Workstation performed: [id number]             EKG and Other Studies Reviewed on Admission:   EKG: Paced rhythm. HR 74.    ** Please Note: This note has been constructed using a voice recognition system. **

## 2023-12-19 NOTE — ASSESSMENT & PLAN NOTE
Family reports patient has mild forgetfulness at baseline but no significant cognitive deficits.  On admission oriented to person, partial place and time.  -Suspect due to underlying COVID infection  -Supportive care  -Mildly dehydrated due to decreased oral intake recently  -Gentle IV fluids given known CHF, monitor fluid status  -Virtual one-to-one monitoring

## 2023-12-20 LAB
ALBUMIN SERPL BCP-MCNC: 3.9 G/DL (ref 3.5–5)
ALP SERPL-CCNC: 82 U/L (ref 34–104)
ALT SERPL W P-5'-P-CCNC: 18 U/L (ref 7–52)
ANION GAP SERPL CALCULATED.3IONS-SCNC: 11 MMOL/L
AST SERPL W P-5'-P-CCNC: 46 U/L (ref 13–39)
BILIRUB SERPL-MCNC: 0.41 MG/DL (ref 0.2–1)
BUN SERPL-MCNC: 62 MG/DL (ref 5–25)
CALCIUM SERPL-MCNC: 8.8 MG/DL (ref 8.4–10.2)
CHLORIDE SERPL-SCNC: 98 MMOL/L (ref 96–108)
CO2 SERPL-SCNC: 31 MMOL/L (ref 21–32)
CREAT SERPL-MCNC: 3.06 MG/DL (ref 0.6–1.3)
ERYTHROCYTE [DISTWIDTH] IN BLOOD BY AUTOMATED COUNT: 13.8 % (ref 11.6–15.1)
FERRITIN SERPL-MCNC: 468 NG/ML (ref 24–336)
GFR SERPL CREATININE-BSD FRML MDRD: 17 ML/MIN/1.73SQ M
GLUCOSE SERPL-MCNC: 87 MG/DL (ref 65–140)
HCT VFR BLD AUTO: 36.1 % (ref 36.5–49.3)
HCT VFR BLD AUTO: 39.6 % (ref 36.5–49.3)
HGB BLD-MCNC: 11.3 G/DL (ref 12–17)
HGB BLD-MCNC: 12.3 G/DL (ref 12–17)
IRON SERPL-MCNC: 679 UG/DL (ref 50–212)
MCH RBC QN AUTO: 33.2 PG (ref 26.8–34.3)
MCHC RBC AUTO-ENTMCNC: 31.1 G/DL (ref 31.4–37.4)
MCV RBC AUTO: 107 FL (ref 82–98)
PLATELET # BLD AUTO: 148 THOUSANDS/UL (ref 149–390)
PMV BLD AUTO: 10.7 FL (ref 8.9–12.7)
POTASSIUM SERPL-SCNC: 4.1 MMOL/L (ref 3.5–5.3)
PROCALCITONIN SERPL-MCNC: 0.55 NG/ML
PROT SERPL-MCNC: 6.5 G/DL (ref 6.4–8.4)
RBC # BLD AUTO: 3.7 MILLION/UL (ref 3.88–5.62)
SODIUM SERPL-SCNC: 140 MMOL/L (ref 135–147)
TIBC SERPL-MCNC: <734 UG/DL (ref 250–450)
UIBC SERPL-MCNC: <55 UG/DL (ref 155–355)
WBC # BLD AUTO: 8.75 THOUSAND/UL (ref 4.31–10.16)

## 2023-12-20 PROCEDURE — 99233 SBSQ HOSP IP/OBS HIGH 50: CPT | Performed by: HOSPITALIST

## 2023-12-20 PROCEDURE — 97116 GAIT TRAINING THERAPY: CPT

## 2023-12-20 PROCEDURE — 84145 PROCALCITONIN (PCT): CPT | Performed by: HOSPITALIST

## 2023-12-20 PROCEDURE — 97167 OT EVAL HIGH COMPLEX 60 MIN: CPT

## 2023-12-20 PROCEDURE — 83540 ASSAY OF IRON: CPT | Performed by: INTERNAL MEDICINE

## 2023-12-20 PROCEDURE — 83550 IRON BINDING TEST: CPT | Performed by: INTERNAL MEDICINE

## 2023-12-20 PROCEDURE — 97163 PT EVAL HIGH COMPLEX 45 MIN: CPT

## 2023-12-20 PROCEDURE — 85018 HEMOGLOBIN: CPT | Performed by: INTERNAL MEDICINE

## 2023-12-20 PROCEDURE — 99223 1ST HOSP IP/OBS HIGH 75: CPT | Performed by: STUDENT IN AN ORGANIZED HEALTH CARE EDUCATION/TRAINING PROGRAM

## 2023-12-20 PROCEDURE — 85014 HEMATOCRIT: CPT | Performed by: INTERNAL MEDICINE

## 2023-12-20 PROCEDURE — C9113 INJ PANTOPRAZOLE SODIUM, VIA: HCPCS | Performed by: FAMILY MEDICINE

## 2023-12-20 PROCEDURE — 97530 THERAPEUTIC ACTIVITIES: CPT

## 2023-12-20 PROCEDURE — 80053 COMPREHEN METABOLIC PANEL: CPT | Performed by: HOSPITALIST

## 2023-12-20 PROCEDURE — C9113 INJ PANTOPRAZOLE SODIUM, VIA: HCPCS | Performed by: INTERNAL MEDICINE

## 2023-12-20 PROCEDURE — 85027 COMPLETE CBC AUTOMATED: CPT | Performed by: HOSPITALIST

## 2023-12-20 PROCEDURE — 82728 ASSAY OF FERRITIN: CPT | Performed by: INTERNAL MEDICINE

## 2023-12-20 RX ORDER — ONDANSETRON 4 MG/1
4 TABLET, ORALLY DISINTEGRATING ORAL EVERY 6 HOURS PRN
Status: DISCONTINUED | OUTPATIENT
Start: 2023-12-20 | End: 2023-12-26 | Stop reason: HOSPADM

## 2023-12-20 RX ORDER — TORSEMIDE 20 MG/1
40 TABLET ORAL DAILY
Status: DISCONTINUED | OUTPATIENT
Start: 2023-12-21 | End: 2023-12-25

## 2023-12-20 RX ADMIN — GABAPENTIN 100 MG: 100 CAPSULE ORAL at 21:30

## 2023-12-20 RX ADMIN — SENNOSIDES 8.6 MG: 8.6 TABLET, FILM COATED ORAL at 21:30

## 2023-12-20 RX ADMIN — PANTOPRAZOLE SODIUM 40 MG: 40 INJECTION, POWDER, LYOPHILIZED, FOR SOLUTION INTRAVENOUS at 11:15

## 2023-12-20 RX ADMIN — PANTOPRAZOLE SODIUM 40 MG: 40 INJECTION, POWDER, LYOPHILIZED, FOR SOLUTION INTRAVENOUS at 20:53

## 2023-12-20 RX ADMIN — SODIUM CHLORIDE 100 ML/HR: 0.9 INJECTION, SOLUTION INTRAVENOUS at 02:20

## 2023-12-20 RX ADMIN — IRON SUCROSE 300 MG: 20 INJECTION, SOLUTION INTRAVENOUS at 11:15

## 2023-12-20 RX ADMIN — PRAVASTATIN SODIUM 40 MG: 40 TABLET ORAL at 17:12

## 2023-12-20 RX ADMIN — METOPROLOL TARTRATE 100 MG: 100 TABLET, FILM COATED ORAL at 17:12

## 2023-12-20 NOTE — PROGRESS NOTES
[facility]  Progress Note  Name: Les Quiñonez I  MRN: [id number]  Unit/Bed#:  I Date of Admission: 12/19/2023   Date of Service: 12/20/2023 I Hospital Day: 1    Assessment/Plan   * Acute metabolic encephalopathy  Assessment & Plan  Family reports patient has mild forgetfulness at baseline but no significant cognitive deficits.  On admission oriented to person, partial place and time.  -Suspect due to underlying COVID infection  -Supportive care  -Mildly dehydrated due to decreased oral intake recently  -s/p gentla IVF  -Virtual one-to-one monitoring    COVID-19 virus infection  Assessment & Plan  Patient is not hypoxic, has had poor oral intake and suspect acute encephalopathy due to COVID infection  Supportive care  Hold on steroids/remdesivir - he is on 2L but suspect due to IVF, if worsens can consider steroids however at this time CoVID mild  Elevated procalcitonin at 0.55, rechecked and stable, doing well without antibiotics, continue to hold    Neuropathy  Assessment & Plan  He takes 200 mg gabapentin at night, will reduce to 100 mg given encephalopathy    Hx of CABG  Assessment & Plan  Continue aspirin, beta-blocker, and statin      Paroxysmal atrial fibrillation (HCC)  Assessment & Plan  Continue renally dosed Eliquis  Continue beta-blocker  He has a pacemaker    Ambulatory dysfunction  Assessment & Plan  PT/OT, Level 1    Chronic diastolic CHF (congestive heart failure) (HCC)  Assessment & Plan  Wt Readings from Last 3 Encounters:   12/19/23 76.3 kg (168 lb 3.4 oz)   12/06/23 79.8 kg (176 lb)   09/05/23 79.4 kg (175 lb)     Echo in February of this year ejection fraction 65%, mild to moderate valvular disease at the mitral and tricuspid  Resume diuretics tomorrow          Complete heart block (HCC)  Assessment & Plan  Patient has pacemaker, currently appears paced at 60  Pacemaker site is nontender    History of aortic valve replacement with bioprosthetic valve  Assessment  & Plan  Continue Eliquis    GERD (gastroesophageal reflux disease)  Assessment & Plan  Continue PPI    CKD (chronic kidney disease) stage 4, GFR 15-29 ml/min ([facility])  Assessment & Plan  Lab Results   Component Value Date    EGFR 17 12/20/2023    EGFR 16 12/19/2023    EGFR 19 09/01/2023    CREATININE 3.06 (H) 12/20/2023    CREATININE 3.13 (H) 12/19/2023    CREATININE 2.82 (H) 09/01/2023     Patient baseline around 2.9-3, on admission seems mildly hypovolemic/dry and creatinine slightly elevated, not true YOLANDE  Gentle IV fluids  Avoid nephrotoxic medications               VTE Pharmacologic Prophylaxis:   High Risk (Score >/= 5) - Pharmacological DVT Prophylaxis Ordered: apixaban (Eliquis). Sequential Compression Devices Ordered.    Mobility:   Basic Mobility Inpatient Raw Score: 14  -HLM Goal: 4: Move to chair/commode  JH-HLM Achieved: 6: Walk 10 steps or more  HLM Goal achieved. Continue to encourage appropriate mobility.    Patient Centered Rounds: I performed bedside rounds with nursing staff today.   Discussions with Specialists or Other Care Team Provider: none    Education and Discussions with Family / Patient: Updated  (daughter) via phone.    Total Time Spent on Date of Encounter in care of patient: 32 mins. This time was spent on one or more of the following: performing physical exam; counseling and coordination of care; obtaining or reviewing history; documenting in the medical record; reviewing/ordering tests, medications or procedures; communicating with other healthcare professionals and discussing with patient's family/caregivers.    Current Length of Stay: 1 day(s)  Current Patient Status: Inpatient   Certification Statement: The patient will continue to require additional inpatient hospital stay due to covid, encephalopathy  Discharge Plan: Anticipate discharge in 24-48 hrs to rehab facility.    Code Status: Level 3 - DNAR and DNI    Subjective:   Patient feels improved, deneis respiratory  complaints,    Objective:     Vitals:   Temp (24hrs), Av °F (37.2 °C), Min:98.5 °F (36.9 °C), Max:99.8 °F (37.7 °C)    Temp:  [98.5 °F (36.9 °C)-99.8 °F (37.7 °C)] 98.5 °F (36.9 °C)  HR:  [60-61] 60  Resp:  [15-16] 15  BP: (113-182)/(56-83) 139/63  SpO2:  [91 %-95 %] 95 %  Body mass index is 26.35 kg/m².     Input and Output Summary (last 24 hours):     Intake/Output Summary (Last 24 hours) at 2023 1620  Last data filed at 2023 1324  Gross per 24 hour   Intake 120 ml   Output --   Net 120 ml       Physical Exam:   Physical Exam  Vitals and nursing note reviewed.   Constitutional:       General: He is not in acute distress.     Appearance: He is well-developed. He is not ill-appearing.   HENT:      Head: Normocephalic and atraumatic.   Eyes:      Conjunctiva/sclera: Conjunctivae normal.   Cardiovascular:      Rate and Rhythm: Regular rhythm.      Heart sounds: No murmur heard.     Comments: Paced  Pulmonary:      Effort: Pulmonary effort is normal. No respiratory distress.      Comments: Reduced, stable on room air  Abdominal:      Palpations: Abdomen is soft.      Tenderness: There is no abdominal tenderness.   Musculoskeletal:         General: No swelling.      Cervical back: Neck supple.   Skin:     General: Skin is warm and dry.      Capillary Refill: Capillary refill takes less than 2 seconds.   Neurological:      Mental Status: He is alert.      Comments: Patient is awake, alert, follows commands, answers simple questions, oriented to person, place, partially time          Additional Data:     Labs:  Results from last 7 days   Lab Units 23  0504 23  2150 23  0934   WBC Thousand/uL 8.75  --  8.61   HEMOGLOBIN g/dL 12.3   < > 12.2   HEMATOCRIT % 39.6   < > 38.4   PLATELETS Thousands/uL 148*  --  162   NEUTROS PCT %  --   --  82*   LYMPHS PCT %  --   --  7*   MONOS PCT %  --   --  11   EOS PCT %  --   --  0    < > = values in this interval not displayed.     Results from last 7  days   Lab Units 12/20/23  0504   SODIUM mmol/L 140   POTASSIUM mmol/L 4.1   CHLORIDE mmol/L 98   CO2 mmol/L 31   BUN mg/dL 62*   CREATININE mg/dL 3.06*   ANION GAP mmol/L 11   CALCIUM mg/dL 8.8   ALBUMIN g/dL 3.9   TOTAL BILIRUBIN mg/dL 0.41   ALK PHOS U/L 82   ALT U/L 18   AST U/L 46*   GLUCOSE RANDOM mg/dL 87     Results from last 7 days   Lab Units 12/19/23  0934   INR  1.37*             Results from last 7 days   Lab Units 12/20/23  0504 12/19/23  0934   LACTIC ACID mmol/L  --  1.7   PROCALCITONIN ng/ml 0.55* 0.55*       Lines/Drains:  Invasive Devices       Peripheral Intravenous Line  Duration             Peripheral IV 12/19/23 Dorsal (posterior);Right Hand 1 day    Peripheral IV 12/19/23 Ventral (anterior);Left Forearm 1 day                          Imaging: No pertinent imaging reviewed.    Recent Cultures (last 7 days):   Results from last 7 days   Lab Units 12/19/23  0955 12/19/23  0934   BLOOD CULTURE  No Growth at 24 hrs. No Growth at 24 hrs.       Last 24 Hours Medication List:   Current Facility-Administered Medications   Medication Dose Route Frequency Provider Last Rate    acetaminophen  975 mg Oral Q6H PRN Mauricio Suraj Counts, DO      allopurinol  100 mg Oral Daily Mauricio Suraj Counts, DO      cholecalciferol  2,000 Units Oral Daily Mauricio Suraj Counts, DO      gabapentin  100 mg Oral HS Mauricio Suraj Counts, DO      iron sucrose  300 mg Intravenous Daily Diego Leiva,       metoprolol tartrate  100 mg Oral BID Mauricio Suraj Counts, DO      ondansetron  4 mg Oral Q6H PRN DIANDRA Mitchell      pantoprazole  40 mg Intravenous Q12H [facility] Diego Leiva, DO      polyethylene glycol  17 g Oral Daily PRN Mauricio Suraj Counts, DO      pravastatin  40 mg Oral Daily With Dinner Mauricio Suraj Counts, DO      senna  8.6 mg Oral HS Mauricio Suraj Counts, DO      [START ON 12/21/2023] torsemide  40 mg Oral Daily Mauricio Suraj Counts, DO          Today, Patient Was Seen By: Mauricio Zambrano, DO    **Please  Note: This note may have been constructed using a voice recognition system.**

## 2023-12-20 NOTE — ASSESSMENT & PLAN NOTE
Family reports patient has mild forgetfulness at baseline but no significant cognitive deficits.  On admission oriented to person, partial place and time.  -Suspect due to underlying COVID infection  -Supportive care  -Mildly dehydrated due to decreased oral intake recently  -s/p gentla IVF  -Virtual one-to-one monitoring

## 2023-12-20 NOTE — UTILIZATION REVIEW
Initial Clinical Review    Admission: Date/Time/Statement:     OBSERVATION 12- 19-23 CHANGED TO INPATIENT 12-20-23 FOR IV FLUIDS, IV PROTONIX, IV VENOFER.    Admission Orders (From admission, onward)       Ordered        12/20/23 0839  Inpatient Admission  Once            12/19/23 1211  Place in Observation  Once                            ED Arrival Information       Expected   12/19/2023     Arrival   12/19/2023 08:50    Acuity   Urgent              Means of arrival   Ambulance    Escorted by   [facility]    Service   Hospitalist    Admission type   Emergency              Arrival complaint   weakness             Chief Complaint   Patient presents with    Difficulty Walking     Pt brought in by EMS for ambulatory dysfunction since this morning.        Initial Presentation: 88 y.o. male presents to ed from home on 12-19  via ems for evaluation and treatment of ambulatory dysfunction. Son reports patient fell sometime during the night.   In ed patient's mental status  questionable - able to answer questions but not meaningfully and is disoriented- unclear baseline. COVID positive, procalcitonin 0.55, , BUN 57, CR 3.13 ( baseline 2.9  -3) , HB 7.2.  Imaging shows linear R basal densities suggesting atelectasis or scarring.  Admit to observation for acute metabolic encephalopathy, covid 19, CKD to baseline 2.9 to 3.      Date: 12- 20-23    Day 2: observation changed to inpatient     Bun increased to 62 CR improved to 3.2. Continue gentle iv fluids 100/hr, trend BMP, avoid nephrotoxic medication. Placed  virtual one to one monitoring. Melenic stool noted.  Patient non bloody and non bilious emesis.   Plan to consult GI. Start iv protonix and  iv venofer. Hold eliqus.   Trend HH. PT/OT evaluations found maximum rehab needs.         ED Triage Vitals   12/19/23 0854 12/19/23 0854 12/19/23 0854 12/19/23 0854 12/19/23 0854   (!) 97.3 °F (36.3 °C) 75 20 111/56 94 %      Tympanic Monitor           No Pain           12/19/23 76.3 kg (168 lb 3.4 oz)     Additional Vital Signs:       Date/Time Temp Pulse Resp BP MAP (mmHg) SpO2 O2 Device   12/20/23 0747 98.6 °F (37 °C) 61 16 113/56 81 92 % --   12/19/23 2303 99.8 °F (37.7 °C) 60 16 182/83   Abnormal  119 91 % None (Room air)   12/19/23 1717 -- -- -- 169/78 112 -- --   12/19/23 1357 97.1 °F (36.2 °C)   Abnormal  63 15 157/70 101 100 % --   12/19/23 1030 97.8 °F (36.6 °C) 60 19 121/59 -- 96 % None (Room air)   12/19/23 1025 -- 60 11   Abnormal  -- -- 96 % --   12/19/23 1000 97.8 °F (36.6 °C) 60 14 108/53 -- 95 % None (Room air)   12/19/23 0945 97.7 °F (36.5 °C) 60 14 83/44   Abnormal  -- 92 % None (Room air)   12/19/23 0940 -- 60 14 95/49   Abnormal  -- 94 % --   12/19/23 0935 -- 60 14 105/49   Abnormal  -- 91 % --   12/19/23 0930 97.6 °F (36.4 °C) 62 15 110/54 -- 92 % None (Room air)   12/19/23 0925 -- 62 15 98/47   Abnormal  -- 91 % --   12/19/23 0921 -- -- -- 100/54 -- -- --   12/19/23 0915 97.8 °F (36.6 °C) 70 20 110/60 -- 95 % None (Room air)   12/19/23 0900 97.8 °F (36.6 °C) 68 20 111/56 -- 95 % None (Room air)   12/19/23 0854 97.3 °F (36.3 °C)   Abnormal  75 20 111/56 78 94 % None (Room air)     Date and Time Eye Opening Best Verbal Response Best Motor Response [place] Coma Scale Score   12/19/23 1200 4 5 6 15   12/19/23 1130 4 5 6 15   12/19/23 1100 4 5 6 15   12/19/23 1030 4 5 6 15   12/19/23 1000 4 5 6 15   12/19/23 0945 4 5 6 15   12/19/23 0930 4 5 6 15   12/19/23 0915 4 5 6 15   12/19/23 0900 4 5 6 15         Pertinent Labs/Diagnostic Test Results:       Date/Time: 12/19/2023 9:36 AM    Indications / Diagnosis:  Altered mental status    Previous ECG:     Previous ECG:  Unavailable    Comparison to cardiac monitor: Yes    Interpretation:     Interpretation: abnormal    Rate:     ECG rate:  74    ECG rate assessment: normal    Rhythm:     Rhythm: junctional      Rhythm comment:  Ventricular paced rhythm  QRS:     QRS axis:  Normal  Conduction:     Conduction:  abnormal    ST segments:     ST segments:  Normal  T waves:     T waves: normal        XR Trauma chest portable   Final (12/19 0925)      Linear right basal densities suggestive of subsegmental atelectasis or scarring.         TRAUMA - CT head wo contrast   Final (12/19 0922)      No acute intracranial abnormality.   Chronic intracranial findings, as above.        Results from last 7 days   Lab Units 12/19/23  0934   SARS-COV-2  Positive*     Results from last 7 days   Lab Units 12/20/23  0504 12/19/23  2150 12/19/23  0934   WBC Thousand/uL 8.75  --  8.61   HEMOGLOBIN g/dL 12.3 7.2* 12.2   HEMATOCRIT % 39.6 23.5* 38.4   PLATELETS Thousands/uL 148*  --  162   NEUTROS ABS Thousands/µL  --   --  7.03         Results from last 7 days   Lab Units 12/20/23  0504 12/19/23  0934   SODIUM mmol/L 140 138   POTASSIUM mmol/L 4.1 4.2   CHLORIDE mmol/L 98 96   CO2 mmol/L 31 30   ANION GAP mmol/L 11 12   BUN mg/dL 62* 57*   CREATININE mg/dL 3.06* 3.13*   EGFR ml/min/1.73sq m 17 16   CALCIUM mg/dL 8.8 8.9     Results from last 7 days   Lab Units 12/20/23  0504 12/19/23  0934   AST U/L 46* 33   ALT U/L 18 13   ALK PHOS U/L 82 92   TOTAL PROTEIN g/dL 6.5 6.6   ALBUMIN g/dL 3.9 4.0   TOTAL BILIRUBIN mg/dL 0.41 0.45         Results from last 7 days   Lab Units 12/20/23  0504 12/19/23  0934   GLUCOSE RANDOM mg/dL 87 98     Results from last 7 days   Lab Units 12/19/23  1406 12/19/23  1121 12/19/23  0934   HS TNI 0HR ng/L  --   --  42   HS TNI 2HR ng/L  --  45  --    HSTNI D2 ng/L  --  3  --    HS TNI 4HR ng/L 51*  --   --    HSTNI D4 ng/L 9  --   --          Results from last 7 days   Lab Units 12/19/23  0934   PROTIME seconds 16.7*   INR  1.37*   PTT seconds 35         Results from last 7 days   Lab Units 12/20/23  0504 12/19/23  0934   PROCALCITONIN ng/ml 0.55* 0.55*     Results from last 7 days   Lab Units 12/19/23  0934   LACTIC ACID mmol/L 1.7     Results from last 7 days   Lab Units 12/19/23  0934   BNP pg/mL 372*     Results from  last 7 days   Lab Units 12/19/23  0934   INFLUENZA A PCR  Negative   INFLUENZA B PCR  Negative   RSV PCR  Negative       Results from last 7 days   Lab Units 12/19/23  0955 12/19/23  0934   BLOOD CULTURE  No Growth at 24 hrs. No Growth at 24 hrs.         ED Treatment:   Medication Administration from 12/19/2023 0850 to 12/19/2023 1325       None          Past Medical History:   Diagnosis Date    Aortic stenosis     ECHO -4-14-14. MODERATE TO SEVERE AORTIC STENOSIS; MILD AROTIC INSUFFICIENCY - LAST ASSESSED 10/26/16; RESOLVED 6/8/16    Aortic valve disorder     LAST ASSESSED 10/8/15; 10/8/15    Arthritis     CHF (congestive heart failure) ([facility])     Complete heart block ([facility]) 07/20/2020    Coronary artery disease     Hypertension     Hypertensive urgency 05/19/2019    Pulmonary emphysema ([facility]) 10/15/2020    Renal disorder     TIA (transient ischemic attack)     Transient cerebral ischemia      Present on Admission:   Ambulatory dysfunction   Chronic diastolic CHF (congestive heart failure) ([facility])   CKD (chronic kidney disease) stage 4, GFR 15-29 ml/min ([facility])   Complete heart block ([facility])   GERD (gastroesophageal reflux disease)   Paroxysmal atrial fibrillation ([facility])   Acute metabolic encephalopathy   COVID-19 virus infection      Admitting Diagnosis:     Weakness [R53.1]  Difficulty walking [R26.2]  Acute metabolic encephalopathy [G93.41]  COVID [U07.1]    Age/Sex: 88 y.o. male      Scheduled Medications:    allopurinol, 100 mg, Oral, Daily  cholecalciferol, 2,000 Units, Oral, Daily  gabapentin, 100 mg, Oral, HS  iron sucrose, 300 mg, Intravenous, Daily  metoprolol tartrate, 100 mg, Oral, BID  pantoprazole, 40 mg, Intravenous, Q12H LAKISHA  pravastatin, 40 mg, Oral, Daily With Dinner  senna, 8.6 mg, Oral, HS      Continuous IV Infusions:  sodium chloride, 100 mL/hr, Intravenous, Continuous      PRN Meds:  acetaminophen, 975 mg, Oral, Q6H PRN  ondansetron, 4 mg, Oral, Q6H PRN  polyethylene glycol, 17 g, Oral, Daily  PRN        IP CONSULT TO GASTROENTEROLOGY    Network Utilization Review Department  ATTENTION: Please call with any questions or concerns to [phone] and carefully listen to the prompts so that you are directed to the right person. All voicemails are confidential.   For Discharge needs, contact Care Management DC Support Team at [phone] opt. [phone]  Send all requests for admission clinical reviews, approved or denied determinations and any other requests to dedicated fax number below belonging to the campus where the patient is receiving treatment. List of dedicated fax numbers for the Facilities:  FACILITY NAME UR FAX NUMBER   ADMISSION DENIALS (Administrative/Medical Necessity) [phone]   DISCHARGE SUPPORT TEAM (NETWORK) [phone]   PARENT CHILD HEALTH (Maternity/NICU/Pediatrics) [phone]   [facility] [phone]   [facility] [phone]   [facility] [phone]   [facility] [phone]   [facility] [phone]   [facility] [phone]   [facility] [phone]   [facility] [phone]   [facility] [phone]   [facility] [phone]   [facility] [phone]

## 2023-12-20 NOTE — CONSULTS
Consultation -  [facility]  Les Quiñonez 88 y.o. male MRN: [id number]  Unit/Bed#:  Encounter: [id number]        Inpatient consult to gastroenterology  Consult performed by: DIANDRA Mitchell  Consult ordered by: Diego Leiva DO          Reason for Consult / Principal Problem:     Melena      ASSESSMENT AND PLAN:     The patient is a 88 y.o. male with a PMH of history aortic bioprosthetic valve replacement, complete heart block with pacemaker, chronic diastolic CHF, paroxysmal A-fib, prior stroke history, CKD stage IVwho is being seen for a consultation today for his 1 episode of vomiting and melena although the patient is on chronic oral iron.    Serology: (12/20/23): BUN/creatinine 62/3.06, AST 46, procalcitonin 0.55, RBC 3.70, , MCHC 31.1, platelet count 148.    Exam:  Oral mucosa normal upon visual inspection, without any sores, lesions, or ulcerations. Sclera without icterus and benign. Lung sounds CTA b/l. Normal S1 & S2 upon exam. Abdomen is softly distended, nontender, with hypoactive bowel sounds x 4. No edema noted of the b/l lower extremities upon exam today. Skin is non-icteric.     Melana  Nausea & Vomiting  Positive COVID-19    At this point in time as it does appear that the several grams hemoglobin drop was some kind of an error and there is no overt signs of GI bleeding at this point, for now, we are going to hold off any kind of endoscopic procedures unless he would start to develop overt signs of GI bleeding and his hemoglobin would become unstable.    Start Zofran 4 mg, QID as needed for nausea and vomiting.    From a GI standpoint he can be switched to a clear liquid diet and then advance as tolerated.  Continue to maintain a large bore IV.  Continue to monitor hemoglobin and transfuse as per protocol.   Continue with serial abdominal exams.   Continue to monitor stool output for any overt signs of GI bleeding.   Continue PPI as ordered.   Continue rest  of medications as per primary team.   Continue supportive care.     GI will continue to follow.     ______________________________________________________________________    HPI: Patient is a 88 y.o. male with a PMH of history aortic bioprosthetic valve replacement, complete heart block with pacemaker, chronic diastolic CHF, paroxysmal A-fib, prior stroke history, CKD stage IV who presented to the ER yesterday with altered mental status, weakness, and fatigue.  The patient is positive for COVID.  Last night the patient did vomit and was reported to have melena in his stool and when they rechecked his hemoglobin it was 7.2, however, when rechecked this morning it was back up to 12.3.  The patient was alert and oriented x 3 and currently denies any nausea, vomiting, or abdominal pain.  The patient is currently n.p.o. for possible EGD.    The patient denies any bloating, diarrhea, constipation, straining, melena or bloody stools. .    Serology Upon Admission: (12/19/23) BUN/creatinine 57/3.13, , procalcitonin 0.55, RBC 3.67, .    Colon Cancer  Personal: Denied  Family: Denied    Any Types of Cancer  Personal: Denied  Family: Denied      Tobacco/Vaping: Denied  ETOH: Denied  Marijuana: Denied  Illicit Drug Use: Denied    Meds: Iron sucorse 300 mg daily and Protonix 40 mg BID.   NSAID Use: Denied    Imaging: (None)    Endoscopy History: EGD with push: (8/28/20): Normal with mild gastric erythema status post biopsies to rule out H. Pylori. Path: Normal    COLONOSCOPY: (8/28/20): 1 tubular adenoma removed with internal and external hemorrhoids hold off routine screening secondary to age.    REVIEW OF SYSTEMS:    CONSTITUTIONAL: Denies any fever, chills, rigors, and weight loss.  HEENT: No earache or tinnitus. Denies hearing loss or visual disturbances.  CARDIOVASCULAR: No chest pain or palpitations.   RESPIRATORY: Denies any cough, hemoptysis, shortness of breath or dyspnea on exertion.  GASTROINTESTINAL: As  noted in the History of Present Illness.   GENITOURINARY: No problems with urination. Denies any hematuria or dysuria.  NEUROLOGIC: No dizziness or vertigo, denies headaches.   MUSCULOSKELETAL: Denies any muscle or joint pain.   SKIN: Denies skin rashes or itching.   ENDOCRINE: Denies excessive thirst. Denies intolerance to heat or cold.  PSYCHOSOCIAL: Denies depression or anxiety. Denies any recent memory loss.       Historical Information   Past Medical History:   Diagnosis Date    Aortic stenosis     ECHO -4-14-14. MODERATE TO SEVERE AORTIC STENOSIS; MILD AROTIC INSUFFICIENCY - LAST ASSESSED 10/26/16; RESOLVED 6/8/16    Aortic valve disorder     LAST ASSESSED 10/8/15; 10/8/15    Arthritis     CHF (congestive heart failure) ([facility])     Complete heart block ([facility]) 07/20/2020    Coronary artery disease     Hypertension     Hypertensive urgency 05/19/2019    Pulmonary emphysema ([facility]) 10/15/2020    Renal disorder     TIA (transient ischemic attack)     Transient cerebral ischemia      Past Surgical History:   Procedure Laterality Date    AORTIC VALVE REPLACEMENT  06/30/2015    avr with 23 mm [place] Magna Ease bioprosthetic    CARDIAC PACEMAKER PLACEMENT Left 07/24/2020    CATARACT EXTRACTION Right 06/05/2000    COLONOSCOPY      CORONARY ARTERY BYPASS GRAFT  06/05/2000    x1 with argueta  to lad    EYE SURGERY      POLYPECTOMY  04/2014    enteroscopic - esophageal ulcer, gastric erosion, and duodenal ulcer.     GA HEMIARTHROPLASTY HIP PARTIAL Right 2/10/2023    Procedure: HEMIARTHROPLASTY HIP (BIPOLAR);  Surgeon: Andrei Jackson DO;  Location: [facility];  Service: Orthopedics    TONSILLECTOMY      unknown     Social History   Social History     Substance and Sexual Activity   Alcohol Use Not Currently    Comment: very occasional     Social History     Substance and Sexual Activity   Drug Use Not Currently     Social History     Tobacco Use   Smoking Status Never   Smokeless Tobacco Never     Family History   Problem  "Relation Age of Onset    No Known Problems Mother     No Known Problems Father     Coronary artery disease Neg Hx        Meds/Allergies     Medications Prior to Admission   Medication    allopurinol (ZYLOPRIM) 100 mg tablet    apixaban (Eliquis) 2.5 mg    aspirin (ECOTRIN LOW STRENGTH) 81 mg EC tablet    ferrous sulfate 325 (65 Fe) mg tablet    gabapentin (NEURONTIN) 100 mg capsule    metoprolol tartrate (LOPRESSOR) 100 mg tablet    pravastatin (PRAVACHOL) 40 mg tablet    senna (SENOKOT) 8.6 mg    torsemide (DEMADEX) 20 mg tablet    pantoprazole (PROTONIX) 40 mg tablet     Current Facility-Administered Medications   Medication Dose Route Frequency    acetaminophen (TYLENOL) tablet 975 mg  975 mg Oral Q6H PRN    allopurinol (ZYLOPRIM) tablet 100 mg  100 mg Oral Daily    cholecalciferol (VITAMIN D3) tablet 2,000 Units  2,000 Units Oral Daily    gabapentin (NEURONTIN) capsule 100 mg  100 mg Oral HS    iron sucrose (VENOFER) 300 mg in sodium chloride 0.9 % 250 mL IVPB  300 mg Intravenous Daily    metoprolol tartrate (LOPRESSOR) tablet 100 mg  100 mg Oral BID    pantoprazole (PROTONIX) injection 40 mg  40 mg Intravenous Q12H LAKISHA    polyethylene glycol (MIRALAX) packet 17 g  17 g Oral Daily PRN    pravastatin (PRAVACHOL) tablet 40 mg  40 mg Oral Daily With Dinner    senna (SENOKOT) tablet 8.6 mg  8.6 mg Oral HS    sodium chloride 0.9 % infusion  100 mL/hr Intravenous Continuous       Allergies   Allergen Reactions    Promethazine Delirium and Other (See Comments)           Objective     Blood pressure (!) 182/83, pulse 60, temperature 99.8 °F (37.7 °C), temperature source Tympanic, resp. rate 16, height 5' 7\" (1.702 m), weight 76.3 kg (168 lb 3.4 oz), SpO2 91%. Body mass index is 26.35 kg/m².      Intake/Output Summary (Last 24 hours) at 12/20/2023 7451  Last data filed at 12/19/2023 1716  Gross per 24 hour   Intake 120 ml   Output --   Net 120 ml         PHYSICAL EXAM:      General Appearance:   Alert, cooperative, no " distress   HEENT:   Normocephalic, atraumatic, anicteric.     Neck:  Supple, symmetrical, trachea midline   Lungs:   Clear to auscultation bilaterally; no rales, rhonchi or wheezing; respirations unlabored    Heart::   Regular rate and rhythm; no murmur, rub, or gallop.   Abdomen:   Soft, non-tender, non-distended; normal bowel sounds; no masses, no organomegaly    Genitalia:   Deferred    Rectal:   Deferred    Extremities:  No cyanosis, clubbing or edema    Pulses:  2+ and symmetric all extremities    Skin:  No jaundice, rashes, or lesions    Lymph nodes:  No palpable cervical lymphadenopathy        Lab Results:   Admission on 12/19/2023   Component Date Value    ABO Grouping 12/19/2023 A     Rh Factor 12/19/2023 Positive     Antibody Screen 12/19/2023 Negative     Specimen Expiration Date 12/19/2023 20231222     WBC 12/19/2023 8.61     RBC 12/19/2023 3.67 (L)     Hemoglobin 12/19/2023 12.2     Hematocrit 12/19/2023 38.4     MCV 12/19/2023 105 (H)     MCH 12/19/2023 33.2     MCHC 12/19/2023 31.8     RDW 12/19/2023 13.7     MPV 12/19/2023 10.3     Platelets 12/19/2023 162     nRBC 12/19/2023 0     Neutrophils Relative 12/19/2023 82 (H)     Immat GRANS % 12/19/2023 0     Lymphocytes Relative 12/19/2023 7 (L)     Monocytes Relative 12/19/2023 11     Eosinophils Relative 12/19/2023 0     Basophils Relative 12/19/2023 0     Neutrophils Absolute 12/19/2023 7.03     Immature Grans Absolute 12/19/2023 0.03     Lymphocytes Absolute 12/19/2023 0.60     Monocytes Absolute 12/19/2023 0.93     Eosinophils Absolute 12/19/2023 0.01     Basophils Absolute 12/19/2023 0.01     Sodium 12/19/2023 138     Potassium 12/19/2023 4.2     Chloride 12/19/2023 96     CO2 12/19/2023 30     ANION GAP 12/19/2023 12     BUN 12/19/2023 57 (H)     Creatinine 12/19/2023 3.13 (H)     Glucose 12/19/2023 98     Calcium 12/19/2023 8.9     AST 12/19/2023 33     ALT 12/19/2023 13     Alkaline Phosphatase 12/19/2023 92     Total Protein 12/19/2023 6.6      Albumin 12/19/2023 4.0     Total Bilirubin 12/19/2023 0.45     eGFR 12/19/2023 16     LACTIC ACID 12/19/2023 1.7     Procalcitonin 12/19/2023 0.55 (H)     Protime 12/19/2023 16.7 (H)     INR 12/19/2023 1.37 (H)     PTT 12/19/2023 35     Blood Culture 12/19/2023 Received in Microbiology Lab. Culture in Progress.     Blood Culture 12/19/2023 Received in Microbiology Lab. Culture in Progress.     BNP 12/19/2023 372 (H)     SARS-CoV-2 12/19/2023 Positive (A)     INFLUENZA A PCR 12/19/2023 Negative     INFLUENZA B PCR 12/19/2023 Negative     RSV PCR 12/19/2023 Negative     hs TnI 0hr 12/19/2023 42     Ventricular Rate 12/19/2023 74     Atrial Rate 12/19/2023 340     QRSD Interval 12/19/2023 108     QT Interval 12/19/2023 432     QTC Interval 12/19/2023 479     QRS Axis 12/19/2023 63     T Wave [place] 12/19/2023 29     hs TnI 2hr 12/19/2023 45     Delta 2hr hsTnI 12/19/2023 3     hs TnI 4hr 12/19/2023 51 (H)     Delta 4hr hsTnI 12/19/2023 9     Hemoglobin 12/19/2023 7.2 (L)     Hematocrit 12/19/2023 23.5 (L)     Sodium 12/20/2023 140     Potassium 12/20/2023 4.1     Chloride 12/20/2023 98     CO2 12/20/2023 31     ANION GAP 12/20/2023 11     BUN 12/20/2023 62 (H)     Creatinine 12/20/2023 3.06 (H)     Glucose 12/20/2023 87     Calcium 12/20/2023 8.8     AST 12/20/2023 46 (H)     ALT 12/20/2023 18     Alkaline Phosphatase 12/20/2023 82     Total Protein 12/20/2023 6.5     Albumin 12/20/2023 3.9     Total Bilirubin 12/20/2023 0.41     eGFR 12/20/2023 17     WBC 12/20/2023 8.75     RBC 12/20/2023 3.70 (L)     Hemoglobin 12/20/2023 12.3     Hematocrit 12/20/2023 39.6     MCV 12/20/2023 107 (H)     MCH 12/20/2023 33.2     MCHC 12/20/2023 31.1 (L)     RDW 12/20/2023 13.8     Platelets 12/20/2023 148 (L)     MPV 12/20/2023 10.7     Procalcitonin 12/20/2023 0.55 (H)        Imaging Studies: I have personally reviewed pertinent imaging studies.

## 2023-12-20 NOTE — PHYSICAL THERAPY NOTE
Physical Therapy Evaluation    Patient Name: Les Quiñonez    Today's Date: 12/20/2023     Problem List  Principal Problem:    Acute metabolic encephalopathy  Active Problems:    CKD (chronic kidney disease) stage 4, GFR 15-29 ml/min ([facility])    GERD (gastroesophageal reflux disease)    History of aortic valve replacement with bioprosthetic valve    Complete heart block (HCC)    Chronic diastolic CHF (congestive heart failure) ([facility])    Ambulatory dysfunction    Paroxysmal atrial fibrillation (HCC)    Hx of CABG    COVID-19 virus infection    Neuropathy       Past Medical History  Past Medical History:   Diagnosis Date    Aortic stenosis     ECHO -4-14-14. MODERATE TO SEVERE AORTIC STENOSIS; MILD AROTIC INSUFFICIENCY - LAST ASSESSED 10/26/16; RESOLVED 6/8/16    Aortic valve disorder     LAST ASSESSED 10/8/15; 10/8/15    Arthritis     CHF (congestive heart failure) ([facility])     Complete heart block ([facility]) 07/20/2020    Coronary artery disease     Hypertension     Hypertensive urgency 05/19/2019    Pulmonary emphysema ([facility]) 10/15/2020    Renal disorder     TIA (transient ischemic attack)     Transient cerebral ischemia         Past Surgical History  Past Surgical History:   Procedure Laterality Date    AORTIC VALVE REPLACEMENT  06/30/2015    avr with 23 mm [place] Magna Ease bioprosthetic    CARDIAC PACEMAKER PLACEMENT Left 07/24/2020    CATARACT EXTRACTION Right 06/05/2000    COLONOSCOPY      CORONARY ARTERY BYPASS GRAFT  06/05/2000    x1 with argueta  to lad    EYE SURGERY      POLYPECTOMY  04/2014    enteroscopic - esophageal ulcer, gastric erosion, and duodenal ulcer.     WI HEMIARTHROPLASTY HIP PARTIAL Right 2/10/2023    Procedure: HEMIARTHROPLASTY HIP (BIPOLAR);  Surgeon: Andrei Jackson DO;  Location: [facility];  Service: Orthopedics    TONSILLECTOMY      unknown           12/20/23 0954   PT Last Visit   PT Visit Date 12/20/23   Note Type   Note type Evaluation  "  Pain Assessment   Pain Assessment Tool 0-10   Pain Score No Pain   Restrictions/Precautions   Weight Bearing Precautions Per Order No   Other Precautions Contact/isolation;Airborne/isolation;1:1;Cognitive;Bed Alarm;Multiple lines;O2;Fall Risk   Home Living   Type of Home House   Home Layout Two level;Performs ADLs on one level;Able to live on main level with bedroom/bathroom   Bathroom Shower/Tub Walk-in shower   Bathroom Toilet Standard   Bathroom Accessibility Accessible   Home Equipment Walker;Cane;Wheelchair-electric   Additional Comments pt reports use of RW at baseline   Prior Function   Level of [place] Independent with functional mobility;Independent with ADLs;Needs assistance with IADLS   Lives With Spouse   Receives Help From Family   IADLs Family/Friend/Other provides transportation   Falls in the last 6 months 1 to 4   Vocational Retired   General   Family/Caregiver Present No   Cognition   Overall Cognitive Status Impaired   Arousal/Participation Cooperative   Orientation Level Oriented to person;Oriented to place;Disoriented to time;Disoriented to situation   Following Commands Follows one step commands with increased time or repetition   Comments pt is significantly [place] which impacts command following at times   Subjective   Subjective \"The floor must be creaky\"   RLE Assessment   RLE Assessment X  (4-/5 grossly)   LLE Assessment   LLE Assessment X  (4-/5 grossly)   Bed Mobility   Supine to Sit 4  Minimal assistance   Additional items Assist x 2;Increased time required;Verbal cues   Sit to Supine   (pt seated EOB at end of session)   Transfers   Sit to Stand 4  Minimal assistance   Additional items Assist x 1;Increased time required;Verbal cues   Stand to Sit 4  Minimal assistance   Additional items Assist x 1;Increased time required;Verbal cues   Toilet transfer 4  Minimal assistance   Additional items Assist x 1;Increased time required;Verbal cues;Standard toilet   Additional Comments RW used "   Ambulation/Elevation   Gait pattern Excessively slow;Short stride;Foward flexed;Decreased foot clearance;Wide KRISTIN;Improper Weight shift;Poor UE support   Gait Assistance 4  Minimal assist   Additional items Assist x 2;Verbal cues   Assistive Device Rolling walker   Distance 10' x 2   Balance   Static Sitting Good   Dynamic Sitting Fair +   Static Standing Fair -   Dynamic Standing Fair -   Ambulatory Poor +  (with RW)   Endurance Deficit   Endurance Deficit Yes   Endurance Deficit Description pt easily fatigued with ambulation   Activity Tolerance   Activity Tolerance Patient limited by fatigue   Assessment   Prognosis Good   Problem List Decreased strength;Decreased endurance;Impaired balance;Decreased mobility;Decreased cognition;Impaired judgement;Decreased safety awareness   Assessment Patient is a 88 y.o. male evaluated by Physical Therapy s/p admit to [facility] on 12/19/2023 with admitting diagnosis of: Weakness, Difficulty walking, Acute metabolic encephalopathy, COVID, and principal problem of: Acute metabolic encephalopathy. PT was consulted to assess patient's functional mobility and discharge needs. Ordered are PT Evaluation and treatment with activity level of: up and OOB as tolerated. Comorbidities affecting patient's physical performance at time of assessment include:  CKD, GERD, hx of aortic valve replacement, CHF, ambulatory dysfunction, hx of CABG, neuropathy, lumbar DDD & radiculopathy, hx of CVA, pulmonary emphysema, chronic LBP, hx of R femur fracture 2/23 . Personal factors affecting the patient at time of IE include: lives in 2 story home, ambulating with assistive device, step(s) to enter home, inability to navigate community distances, impaired cognition, history of fall(s), impaired safety awareness, hearing impairments, limited insight into impairments, inability/difficulty performing IADLs, and inability/difficulty performing ADLs. Please locate objective findings from PT  assessment regarding body systems outlined above. Upon evaluation, pt able to perform all functional mobility with [place] x 1-2, Rw, and increased time. Frequent verbal, visual, and tactile cuing provided for safety awareness, sequencing, and correct RW use. Ambulation limited to short functional distances within room to and from bathroom d/t fatigue and SOB. Despite symptoms, HR and SpO2 remained WFL on 2L O2 throughout. Moderate postural sway demonstrated but no true LOB experienced. The patient's AM-[facility] Basic Mobility Inpatient Short Form Raw Score is 14. A Raw score of less than or equal to 16 suggests the patient may benefit from discharge to post-acute rehabilitation services. Please also refer to the recommendation of the Physical Therapist for safe discharge planning. Co treatment with OT secondary to complex medical condition of pt, possible A of 2 required to achieve and maintain transitional movements, requiring the need of skilled therapeutic intervention of 2 therapists to achieve delivery of services. Pt will benefit from continued PT intervention during LOS to address current deficits, increase LOF, and facilitate safe d/c to next level of care when medically appropriate. D/c recommendation at this time is rehabilitation resource intensity level I.   Goals   Patient Goals to go home   LTG Expiration Date 01/03/24   Long Term Goal #1 Pt will participate in B LE strengthening exercises to facilitate improved functional activity tolerance. Pt will perform all functional transfers and bed mobility mod(I) with good safety awareness. Pt will ambulate 250' mod(I) with LRAD while maintaining good functional dynamic balance.   Plan   Treatment/Interventions Functional transfer training;LE strengthening/ROM;Elevations;Therapeutic exercise;Endurance training;Bed mobility;Gait training   PT Frequency 3-5x/wk   Discharge Recommendation   Rehab Resource Intensity Level, PT I (Maximum Resource Intensity)   AM-PAC  Basic Mobility Inpatient   Turning in Flat Bed Without Bedrails 2   Lying on Back to Sitting on Edge of Flat Bed Without Bedrails 2   Moving Bed to Chair 3   Standing Up From Chair Using Arms 3   Walk in Room 2   Climb 3-5 Stairs With Railing 2   Basic Mobility Inpatient Raw Score 14   Basic Mobility Standardized Score 35.55   Highest Level Of Mobility   -[facility] Goal 4: Move to chair/commode   -HLM Achieved 6: Walk 10 steps or more   End of Consult   Patient Position at End of Consult Bed/Chair alarm activated;All needs within reach;Seated edge of bed   End of Consult Comments virtual 1:1 intact and RN made aware of pt's location

## 2023-12-20 NOTE — OCCUPATIONAL THERAPY NOTE
Occupational Therapy Evaluation     Patient Name: Les Quiñonez  Today's Date: 12/20/2023  Problem List  Principal Problem:    Acute metabolic encephalopathy  Active Problems:    CKD (chronic kidney disease) stage 4, GFR 15-29 ml/min ([facility])    GERD (gastroesophageal reflux disease)    History of aortic valve replacement with bioprosthetic valve    Complete heart block (HCC)    Chronic diastolic CHF (congestive heart failure) ([facility])    Ambulatory dysfunction    Paroxysmal atrial fibrillation (HCC)    Hx of CABG    COVID-19 virus infection    Neuropathy    Past Medical History  Past Medical History:   Diagnosis Date    Aortic stenosis     ECHO -4-14-14. MODERATE TO SEVERE AORTIC STENOSIS; MILD AROTIC INSUFFICIENCY - LAST ASSESSED 10/26/16; RESOLVED 6/8/16    Aortic valve disorder     LAST ASSESSED 10/8/15; 10/8/15    Arthritis     CHF (congestive heart failure) ([facility])     Complete heart block ([facility]) 07/20/2020    Coronary artery disease     Hypertension     Hypertensive urgency 05/19/2019    Pulmonary emphysema ([facility]) 10/15/2020    Renal disorder     TIA (transient ischemic attack)     Transient cerebral ischemia      Past Surgical History  Past Surgical History:   Procedure Laterality Date    AORTIC VALVE REPLACEMENT  06/30/2015    avr with 23 mm [place] Magna Ease bioprosthetic    CARDIAC PACEMAKER PLACEMENT Left 07/24/2020    CATARACT EXTRACTION Right 06/05/2000    COLONOSCOPY      CORONARY ARTERY BYPASS GRAFT  06/05/2000    x1 with argueta  to lad    EYE SURGERY      POLYPECTOMY  04/2014    enteroscopic - esophageal ulcer, gastric erosion, and duodenal ulcer.     [place] HEMIARTHROPLASTY HIP PARTIAL Right 2/10/2023    Procedure: HEMIARTHROPLASTY HIP (BIPOLAR);  Surgeon: Andrei Jackson DO;  Location: [facility];  Service: Orthopedics    TONSILLECTOMY      unknown           12/20/23 0952   OT Last Visit   OT Visit Date 12/20/23   Note Type   Note type Evaluation   Pain Assessment   Pain Score No Pain  "  Restrictions/Precautions   Weight Bearing Precautions Per Order No   Other Precautions Airborne/isolation;Droplet precautions;Multiple lines;Fall Risk   Home Living   Type of Home House   Home Layout Two level;Able to live on main level with bedroom/bathroom;Performs ADLs on one level   Bathroom Shower/Tub Walk-in shower   Bathroom Toilet Standard   Bathroom Accessibility Accessible   Home Equipment Walker;Cane;Wheelchair-electric   Additional Comments pt reports utilizing RW at baseline during functional mobility   Prior Function   Level of [place] Needs assistance with ADLs;Needs assistance with functional mobility;Needs assistance with IADLS   Lives With Spouse   Receives Help From Family   IADLs Family/Friend/Other provides transportation;Family/Friend/Other provides meals;Family/Friend/Other provides medication management   Falls in the last 6 months 1 to 4   Vocational Retired   Comments pt is a poor historian in regards to PLOF and home living; information may be missing from this evaluation   Subjective   Subjective \"It's time to get up?\"   ADL   Where Assessed Other (Comment)  (bathroom)   LB Dressing Assistance 2  Maximal Assistance   LB Dressing Deficit Don/doff R sock;Don/doff L sock   Toileting Assistance  3  Moderate Assistance   Toileting Deficit Other (Comment)  (pt incontinent of large amount of urine in bed; requires new pad as well as performs toileting in bathroom; no significant difficulties with urination while seated)   Bed Mobility   Supine to Sit 4  Minimal assistance   Additional items Assist x 2;Bedrails;Increased time required;Verbal cues   Sit to Supine   (seated EOB at end of session with 1-1 present and nurse aware of the same)   Additional Comments pt on RA upon entering room and sleeping-SpO2  ~70%; nurse present and places 2L O2 on patient and SPO2 >90% throughout remainder of session   Transfers   Sit to Stand 4  Minimal assistance   Additional items Assist x " 1;Bedrails;Increased time required;Verbal cues  (RW)   Stand to Sit 4  Minimal assistance   Additional items Assist x 1;Increased time required;Verbal cues;Bedrails  (RW)   Toilet transfer 4  Minimal assistance   Additional items Assist x 1;Increased time required;Verbal cues;Standard toilet  (RW)   Additional Comments pt performs functional transfers with min (A) x1-2 with mild instability and consistent cues for safety and stability with RW   Functional Mobility   Functional Mobility 4  Minimal assistance   Additional Comments x2 with use of RW; performs ~30ft in room with poor safety awareness and use of RW; no significant LOB, however moderate instability at times and posterior lean with physical (A) to correct   Additional items Rolling walker   Balance   Static Sitting Good   Dynamic Sitting Fair +   Static Standing Fair   Dynamic Standing Fair -   Ambulatory Fair -   Activity Tolerance   Activity Tolerance Patient limited by fatigue   RUE Assessment   RUE Assessment WFL   LUE Assessment   LUE Assessment WFL   Hand Function   Gross Motor Coordination Functional   Fine Motor Coordination Functional   Sensation   Light Touch No apparent deficits   Sharp/Dull No apparent deficits   Psychosocial   Psychosocial (WDL) WDL   Cognition   Overall Cognitive Status Impaired   Arousal/Participation Alert;Cooperative   Attention Attends with cues to redirect   Orientation Level Oriented to person;Oriented to place;Disoriented to time;Disoriented to situation   Memory Decreased long term memory;Decreased short term memory;Decreased recall of recent events   Following Commands Follows one step commands with increased time or repetition   Comments pt is [place] requiring command repetition in R ear occassionally   Assessment   Limitation Decreased ADL status;Decreased UE strength;Decreased Safe judgement during ADL;Decreased cognition;Decreased endurance;Decreased self-care trans;Decreased high-level ADLs   Assessment Pt is a 88  y.o. male seen for OT evaluation s/p admit to [facility] on 12/19/2023 w/ Acute metabolic encephalopathy.  Comorbidities affecting pt's functional performance at time of assessment include:  CKD, GERD, aortic calve replacement, CHF, a-fib, COVID, metabolic encephalopathy, anemia . Personal factors affecting pt at time of IE include:steps to enter environment, limited home support, difficulty performing ADLS, difficulty performing IADLS , limited insight into deficits, compliance, decreased initiation and engagement , and health management . Prior to admission, pt was (A) with ADLs and IADLs with use of RW during mobility. Upon evaluation: Pt requires min-max (A) x1-2 with use of RW during mobility 2* the following deficits impacting occupational performance: weakness, decreased strength, decreased balance, decreased tolerance, impaired initiation, impaired memory, impaired sequencing, impaired problem solving, impulsivity, decreased safety awareness, and impaired interpersonal skills. Pt to benefit from continued skilled OT tx while in the hospital to address deficits as defined above and maximize level of functional independence w ADL's and functional mobility. Occupational Performance areas to address include: grooming, bathing/shower, toilet hygiene, dressing, functional mobility, community mobility, and clothing management. The patient's raw score on the AM-PAC Daily Activity Inpatient Short Form is 16. A raw score of less than 19 suggests the patient may benefit from discharge to post-acute rehabilitation services. Discharge recommendation at this time is level I maximum resource intensity.  Pt benefited from co-evaluation of skilled OT and PT therapists in order to most appropriately address functional deficits d/t extensive assistance required for safe functional mobility, decreased activity tolerance, and regression from functioning level prior to admission and/or onset of present illness. OT/PT objectives were  addressed separately; please see PT note for specific goal areas targeted.   Goals   Patient Goals to go home   Short Term Goal  pt will perform UE strengthening exercises   Long Term Goal #1 pt will perform UB/LB bathing and grooming tasks with or without LH AE at min (A) level   Long Term Goal #2 pt will demonstrate toilet transfers and hygiene at min (A) level   Long Term Goal pt will demonstrate functional mobility with (S) level and use of RW   Plan   Treatment Interventions ADL retraining;Functional transfer training;UE strengthening/ROM;Endurance training;Cognitive reorientation;Patient/family training;Equipment evaluation/education;Activityengagement   Goal Expiration Date 01/03/24   OT Frequency 3-5x/wk   Discharge Recommendation   Rehab Resource Intensity Level, OT I (Maximum Resource Intensity)   AM-PAC Daily Activity Inpatient   Lower Body Dressing 2   Bathing 2   Toileting 2   Upper Body Dressing 3   Grooming 3   Eating 4   Daily Activity Raw Score 16   Daily Activity Standardized Score (Calc for Raw Score >=11) 35.96   AM-PAC Applied Cognition Inpatient   Following a Speech/Presentation 4   Understanding Ordinary Conversation 3   Taking Medications 2   Remembering Where Things Are Placed or Put Away 1   Remembering List of 4-5 Errands 1   Taking Care of Complicated Tasks 1   Applied Cognition Raw Score 12   Applied Cognition Standardized Score 28.82

## 2023-12-20 NOTE — PLAN OF CARE
Problem: PAIN - ADULT  Goal: Verbalizes/displays adequate comfort level or baseline comfort level  Description: Interventions:  - Encourage patient to monitor pain and request assistance  - Assess pain using appropriate pain scale  - Administer analgesics based on type and severity of pain and evaluate response  - Implement non-pharmacological measures as appropriate and evaluate response  - Consider cultural and social influences on pain and pain management  - Notify physician/advanced practitioner if interventions unsuccessful or patient reports new pain  Outcome: Progressing     Problem: INFECTION - ADULT  Goal: Absence or prevention of progression during hospitalization  Description: INTERVENTIONS:  - Assess and monitor for signs and symptoms of infection  - Monitor lab/diagnostic results  - Monitor all insertion sites, i.e. indwelling lines, tubes, and drains  - Monitor endotracheal if appropriate and nasal secretions for changes in amount and color  - [place] appropriate cooling/warming therapies per order  - Administer medications as ordered  - Instruct and encourage patient and family to use good hand hygiene technique  - Identify and instruct in appropriate isolation precautions for identified infection/condition  Outcome: Progressing  Goal: Absence of fever/infection during neutropenic period  Description: INTERVENTIONS:  - Monitor WBC    Outcome: Progressing     Problem: SAFETY ADULT  Goal: Patient will remain free of falls  Description: INTERVENTIONS:  - Educate patient/family on patient safety including physical limitations  - Instruct patient to call for assistance with activity   - Consult OT/PT to assist with strengthening/mobility   - Keep Call bell within reach  - Keep bed low and locked with side rails adjusted as appropriate  - Keep care items and personal belongings within reach  - Initiate and maintain comfort rounds  - Make Fall Risk Sign visible to staff  - Offer Toileting every 2 Hours,  ----- Message from Ed Manning MD sent at 8/10/2023  9:25 AM CDT -----  Benign colon polyp, colonoscopy for surveillance in five years   in advance of need  - Initiate/Maintain bed/chair alarm  - Obtain necessary fall risk management equipment: bed/chair alarm, nonskid socks   - Apply yellow socks and bracelet for high fall risk patients  - Consider moving patient to room near nurses station  Outcome: Progressing  Goal: Maintain or return to baseline ADL function  Description: INTERVENTIONS:  -  Assess patient's ability to carry out ADLs; assess patient's baseline for ADL function and identify physical deficits which impact ability to perform ADLs (bathing, care of mouth/teeth, toileting, grooming, dressing, etc.)  - Assess/evaluate cause of self-care deficits   - Assess range of motion  - Assess patient's mobility; develop plan if impaired  - Assess patient's need for assistive devices and provide as appropriate  - Encourage maximum independence but intervene and supervise when necessary  - Involve family in performance of ADLs  - Assess for home care needs following discharge   - Consider OT consult to assist with ADL evaluation and planning for discharge  - Provide patient education as appropriate  Outcome: Progressing  Goal: Maintains/Returns to pre admission functional level  Description: INTERVENTIONS:  - Perform AM-PAC 6 Click Basic Mobility/ Daily Activity assessment daily.  - Set and communicate daily mobility goal to care team and patient/family/caregiver.   - Collaborate with rehabilitation services on mobility goals if consulted  - Perform Range of Motion 3 times a day.  - Reposition patient every 3 hours.  - Dangle patient 3 times a day  - Stand patient 3 times a day  - Ambulate patient 3 times a day  - Out of bed to chair 3 times a day   - Out of bed for meals 3 times a day  - Out of bed for toileting  - Record patient progress and toleration of activity level   Outcome: Progressing     Problem: DISCHARGE PLANNING  Goal: Discharge to home or other facility with appropriate resources  Description: INTERVENTIONS:  - Identify barriers to  discharge w/patient and caregiver  - Arrange for needed discharge resources and transportation as appropriate  - Identify discharge learning needs (meds, wound care, etc.)  - Arrange for interpretive services to assist at discharge as needed  - Refer to Case Management Department for coordinating discharge planning if the patient needs post-hospital services based on physician/advanced practitioner order or complex needs related to functional status, cognitive ability, or social support system  Outcome: Progressing     Problem: Knowledge Deficit  Goal: Patient/family/caregiver demonstrates understanding of disease process, treatment plan, medications, and discharge instructions  Description: Complete learning assessment and assess knowledge base.  Interventions:  - Provide teaching at level of understanding  - Provide teaching via preferred learning methods  Outcome: Progressing

## 2023-12-20 NOTE — ASSESSMENT & PLAN NOTE
Wt Readings from Last 3 Encounters:   12/19/23 76.3 kg (168 lb 3.4 oz)   12/06/23 79.8 kg (176 lb)   09/05/23 79.4 kg (175 lb)     Echo in February of this year ejection fraction 65%, mild to moderate valvular disease at the mitral and tricuspid  Resume diuretics tomorrow

## 2023-12-20 NOTE — PLAN OF CARE
Problem: PAIN - ADULT  Goal: Verbalizes/displays adequate comfort level or baseline comfort level  Description: Interventions:  - Encourage patient to monitor pain and request assistance  - Assess pain using appropriate pain scale  - Administer analgesics based on type and severity of pain and evaluate response  - Implement non-pharmacological measures as appropriate and evaluate response  - Consider cultural and social influences on pain and pain management  - Notify physician/advanced practitioner if interventions unsuccessful or patient reports new pain  Outcome: Progressing     Problem: INFECTION - ADULT  Goal: Absence or prevention of progression during hospitalization  Description: INTERVENTIONS:  - Assess and monitor for signs and symptoms of infection  - Monitor lab/diagnostic results  - Monitor all insertion sites, i.e. indwelling lines, tubes, and drains  - Monitor endotracheal if appropriate and nasal secretions for changes in amount and color  - [place] appropriate cooling/warming therapies per order  - Administer medications as ordered  - Instruct and encourage patient and family to use good hand hygiene technique  - Identify and instruct in appropriate isolation precautions for identified infection/condition  Outcome: Progressing  Goal: Absence of fever/infection during neutropenic period  Description: INTERVENTIONS:  - Monitor WBC    Outcome: Progressing     Problem: SAFETY ADULT  Goal: Patient will remain free of falls  Description: INTERVENTIONS:  - Educate patient/family on patient safety including physical limitations  - Instruct patient to call for assistance with activity   - Consult OT/PT to assist with strengthening/mobility   - Keep Call bell within reach  - Keep bed low and locked with side rails adjusted as appropriate  - Keep care items and personal belongings within reach  - Initiate and maintain comfort rounds  - Make Fall Risk Sign visible to staff  - Offer Toileting every 2 Hours,  in advance of need  - Initiate/Maintain bed/chair alarm  - Obtain necessary fall risk management equipment: bed/chair alarm, nonskid socks   - Apply yellow socks and bracelet for high fall risk patients  - Consider moving patient to room near nurses station  Outcome: Progressing  Goal: Maintain or return to baseline ADL function  Description: INTERVENTIONS:  -  Assess patient's ability to carry out ADLs; assess patient's baseline for ADL function and identify physical deficits which impact ability to perform ADLs (bathing, care of mouth/teeth, toileting, grooming, dressing, etc.)  - Assess/evaluate cause of self-care deficits   - Assess range of motion  - Assess patient's mobility; develop plan if impaired  - Assess patient's need for assistive devices and provide as appropriate  - Encourage maximum independence but intervene and supervise when necessary  - Involve family in performance of ADLs  - Assess for home care needs following discharge   - Consider OT consult to assist with ADL evaluation and planning for discharge  - Provide patient education as appropriate  Outcome: Progressing  Goal: Maintains/Returns to pre admission functional level  Description: INTERVENTIONS:  - Perform AM-PAC 6 Click Basic Mobility/ Daily Activity assessment daily.  - Set and communicate daily mobility goal to care team and patient/family/caregiver.   - Collaborate with rehabilitation services on mobility goals if consulted  - Perform Range of Motion 3 times a day.  - Reposition patient every 3 hours.  - Dangle patient 3 times a day  - Stand patient 3 times a day  - Ambulate patient 3 times a day  - Out of bed to chair 3 times a day   - Out of bed for meals 3 times a day  - Out of bed for toileting  - Record patient progress and toleration of activity level   Outcome: Progressing     Problem: DISCHARGE PLANNING  Goal: Discharge to home or other facility with appropriate resources  Description: INTERVENTIONS:  - Identify barriers to  discharge w/patient and caregiver  - Arrange for needed discharge resources and transportation as appropriate  - Identify discharge learning needs (meds, wound care, etc.)  - Arrange for interpretive services to assist at discharge as needed  - Refer to Case Management Department for coordinating discharge planning if the patient needs post-hospital services based on physician/advanced practitioner order or complex needs related to functional status, cognitive ability, or social support system  Outcome: Progressing     Problem: Knowledge Deficit   Goal: Patient/family/caregiver demonstrates understanding of disease process, treatment plan, medications, and discharge instructions  Description: Complete learning assessment and assess knowledge base.  Interventions:  - Provide teaching at level of understanding  - Provide teaching via preferred learning methods  Outcome: Progressing

## 2023-12-20 NOTE — ASSESSMENT & PLAN NOTE
Patient is not hypoxic, has had poor oral intake and suspect acute encephalopathy due to COVID infection  Supportive care  Hold on steroids/remdesivir - he is on 2L but suspect due to IVF, if worsens can consider steroids however at this time CoVID mild  Elevated procalcitonin at 0.55, rechecked and stable, doing well without antibiotics, continue to hold

## 2023-12-20 NOTE — CASE MANAGEMENT
Case Management Discharge Planning Note    Patient name Les Quiñonez  Location / MRN [id number]  : 1935 Date 2023       Current Admission Date: 2023  Current Admission Diagnosis:Acute metabolic encephalopathy   Patient Active Problem List    Diagnosis Date Noted    Acute metabolic encephalopathy 2023    COVID-19 virus infection 2023    Neuropathy 2023    YOLANDE (acute kidney injury) ([facility]) 2023    Hyperphosphatemia 2023    Fracture of femoral neck, right ([facility]) 2023    Leukocytosis 2023    Pressure ulcer of right buttock, stage 2 ([facility]) 2022    Hyperkalemia 2022    Benign essential hypertension 10/16/2021    Dermatitis 10/16/2021    History of severe aortic stenosis 10/03/2021    Hx of CABG 10/03/2021    Chronic low back pain without sciatica 08/10/2021    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 kidney disease, or chronic kidney disease (HCC) 2021    Transaminitis 2021    Acute gout 2021    Mitral valve stenosis 2021    Multiple renal cysts 2021    Right hip pain 2021    Acute kidney injury superimposed on CKD  2021    Left wrist pain 2021    Iron deficiency anemia 10/15/2020    Need for immunization against influenza 10/15/2020    Pulmonary emphysema (HCC) 10/15/2020    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 2020    Presence of permanent cardiac pacemaker 2020    Oxygen dependent 2020    Paroxysmal atrial fibrillation (HCC) 08/10/2020    Anemia 2020    Ambulatory dysfunction 2020    Complete heart block (HCC) 2020    Chronic diastolic CHF (congestive heart failure) (HCC) 2020    History of aortic valve replacement with bioprosthetic valve 2019    History of stroke 2019    Medicare annual wellness visit, subsequent  08/14/2018    Lumbar degenerative disc disease 06/29/2017    Lumbar radiculopathy 06/29/2017    Obesity (BMI 30-39.9) 09/04/2014    Mitral regurgitation 06/11/2014    Acute blood loss anemia 06/10/2014    Mild intermittent asthma without complication 06/10/2014    CKD (chronic kidney disease) stage 4, GFR 15-29 ml/min ([facility]) 06/10/2014    GERD (gastroesophageal reflux disease) 06/10/2014    Dyslipidemia 10/03/2012    Late effects of cerebrovascular disease 05/30/2012      LOS (days): 1  Geometric Mean LOS (GMLOS) (days): 4.9  Days to GMLOS:4.6     OBJECTIVE:  Risk of Unplanned Readmission Score: 17.99         Current admission status: Inpatient   Preferred Pharmacy:   [facility] [address] [place] [address], [address]  [address] [place] [address], [address] [facility] [address]  Phone: [phone] Fax: [phone]    Primary Care Provider: Cristine Hill DO    Primary Insurance: [facility]  Secondary Insurance:     DISCHARGE DETAILS:  Pt is COVID positive. Cm spoke with daughter/POTITO Gaston over the phone [phone]. Family is in agreement to pt going to a SNF for STR. Agreeable to blanket referrals to SNF's in 20 mi radius. Referrals made, CM will f/u.

## 2023-12-20 NOTE — ASSESSMENT & PLAN NOTE
Lab Results   Component Value Date    EGFR 17 12/20/2023    EGFR 16 12/19/2023    EGFR 19 09/01/2023    CREATININE 3.06 (H) 12/20/2023    CREATININE 3.13 (H) 12/19/2023    CREATININE 2.82 (H) 09/01/2023     Patient baseline around 2.9-3, on admission seems mildly hypovolemic/dry and creatinine slightly elevated, not true YOLANDE  Gentle IV fluids  Avoid nephrotoxic medications

## 2023-12-20 NOTE — PROGRESS NOTES
Patient with melenic stools.  Will check hemoglobin and hematocrit.  Initiate IV PPI twice daily.  Gastroenterology consultation.  Patient also with episode of nonbloody, nonbilious emesis.

## 2023-12-20 NOTE — PLAN OF CARE
Problem: PHYSICAL THERAPY ADULT  Goal: Performs mobility at highest level of function for planned discharge setting.  See evaluation for individualized goals.  Description: Treatment/Interventions: Functional transfer training, LE strengthening/ROM, Elevations, Therapeutic exercise, Endurance training, Bed mobility, Gait training          See flowsheet documentation for full assessment, interventions and recommendations.  Note: Prognosis: Good  Problem List: Decreased strength, Decreased endurance, Impaired balance, Decreased mobility, Decreased cognition, Impaired judgement, Decreased safety awareness  Assessment: Patient is a 88 y.o. male evaluated by Physical Therapy s/p admit to [facility] on 12/19/2023 with admitting diagnosis of: Weakness, Difficulty walking, Acute metabolic encephalopathy, COVID, and principal problem of: Acute metabolic encephalopathy. PT was consulted to assess patient's functional mobility and discharge needs. Ordered are PT Evaluation and treatment with activity level of: up and OOB as tolerated. Comorbidities affecting patient's physical performance at time of assessment include:  CKD, GERD, hx of aortic valve replacement, CHF, ambulatory dysfunction, hx of CABG, neuropathy, lumbar DDD & radiculopathy, hx of CVA, pulmonary emphysema, chronic LBP, hx of R femur fracture 2/23 . Personal factors affecting the patient at time of IE include: lives in 2 story home, ambulating with assistive device, step(s) to enter home, inability to navigate community distances, impaired cognition, history of fall(s), impaired safety awareness, hearing impairments, limited insight into impairments, inability/difficulty performing IADLs, and inability/difficulty performing ADLs. Please locate objective findings from PT assessment regarding body systems outlined above. Upon evaluation, pt able to perform all functional mobility with [place] x 1-2, Rw, and increased time. Frequent verbal, visual,  and tactile cuing provided for safety awareness, sequencing, and correct RW use. Ambulation limited to short functional distances within room to and from bathroom d/t fatigue and SOB. Despite symptoms, HR and SpO2 remained WFL on 2L O2 throughout. Moderate postural sway demonstrated but no true LOB experienced. The patient's AM-PAC Basic Mobility Inpatient Short Form Raw Score is 14. A Raw score of less than or equal to 16 suggests the patient may benefit from discharge to post-acute rehabilitation services. Please also refer to the recommendation of the Physical Therapist for safe discharge planning. Co treatment with OT secondary to complex medical condition of pt, possible A of 2 required to achieve and maintain transitional movements, requiring the need of skilled therapeutic intervention of 2 therapists to achieve delivery of services. Pt will benefit from continued PT intervention during LOS to address current deficits, increase LOF, and facilitate safe d/c to next level of care when medically appropriate. D/c recommendation at this time is rehabilitation resource intensity level I.        Rehab Resource Intensity Level, PT: I (Maximum Resource Intensity)    See flowsheet documentation for full assessment.

## 2023-12-20 NOTE — PROGRESS NOTES
Hemoglobin 7.2 down from 12.2.  With melenic stools likely secondary to upper GI bleed.  Continue IV PPI 2 times daily.  Initiate Venofer 300 mg IV daily.  Check iron panel.  Patient is n.p.o. for GI consultation and likely endoscopic evaluation.  The patient is hemodynamically stable and without complaints.

## 2023-12-20 NOTE — PLAN OF CARE
Problem: OCCUPATIONAL THERAPY ADULT  Goal: Performs self-care activities at highest level of function for planned discharge setting.  See evaluation for individualized goals.  Description: Treatment Interventions: ADL retraining, Functional transfer training, UE strengthening/ROM, Endurance training, Cognitive reorientation, Patient/family training, Equipment evaluation/education, Activityengagement          See flowsheet documentation for full assessment, interventions and recommendations.   Note: Limitation: Decreased ADL status, Decreased UE strength, Decreased Safe judgement during ADL, Decreased cognition, Decreased endurance, Decreased self-care trans, Decreased high-level ADLs     Assessment: Pt is a 88 y.o. male seen for OT evaluation s/p admit to [facility] on 12/19/2023 w/ Acute metabolic encephalopathy.  Comorbidities affecting pt's functional performance at time of assessment include:  CKD, GERD, aortic calve replacement, CHF, a-fib, COVID, metabolic encephalopathy, anemia . Personal factors affecting pt at time of IE include:steps to enter environment, limited home support, difficulty performing ADLS, difficulty performing IADLS , limited insight into deficits, compliance, decreased initiation and engagement , and health management . Prior to admission, pt was (A) with ADLs and IADLs with use of RW during mobility. Upon evaluation: Pt requires min-max (A) x1-2 with use of RW during mobility 2* the following deficits impacting occupational performance: weakness, decreased strength, decreased balance, decreased tolerance, impaired initiation, impaired memory, impaired sequencing, impaired problem solving, impulsivity, decreased safety awareness, and impaired interpersonal skills. Pt to benefit from continued skilled OT tx while in the hospital to address deficits as defined above and maximize level of functional independence w ADL's and functional mobility. Occupational Performance areas to address include:  grooming, bathing/shower, toilet hygiene, dressing, functional mobility, community mobility, and clothing management. The patient's raw score on the -PAC Daily Activity Inpatient Short Form is 16. A raw score of less than 19 suggests the patient may benefit from discharge to post-acute rehabilitation services. Discharge recommendation at this time is level I maximum resource intensity.  Pt benefited from co-evaluation of skilled OT and PT therapists in order to most appropriately address functional deficits d/t extensive assistance required for safe functional mobility, decreased activity tolerance, and regression from functioning level prior to admission and/or onset of present illness. OT/PT objectives were addressed separately; please see PT note for specific goal areas targeted.     Rehab Resource Intensity Level, OT: I (Maximum Resource Intensity)

## 2023-12-20 NOTE — NURSING NOTE
Upon 2100 rounding, pt had brown emesis on gown and sheet; Pt also was found to be incontinent of melenic stools. Provider and supervisor made aware; VS stable, although a slight temperature was noted and pt was given tylenol. H&H drawn, pt given 1/2 OR baths for GI consult; Pt comfortable and seated at 90 degrees in the event of reoccurring emesis; VRC made aware to be observant of patient movements, watch out for emesis, etc; VRC expressed understanding; Pt resting comfortably; Will continue to frequently monitor pt and make provider aware of any changes;

## 2023-12-21 PROBLEM — D64.89 OTHER SPECIFIED ANEMIAS: Status: ACTIVE | Noted: 2020-08-05

## 2023-12-21 LAB
ANION GAP SERPL CALCULATED.3IONS-SCNC: 13 MMOL/L
BUN SERPL-MCNC: 58 MG/DL (ref 5–25)
CALCIUM SERPL-MCNC: 8.2 MG/DL (ref 8.4–10.2)
CHLORIDE SERPL-SCNC: 102 MMOL/L (ref 96–108)
CO2 SERPL-SCNC: 25 MMOL/L (ref 21–32)
CREAT SERPL-MCNC: 2.55 MG/DL (ref 0.6–1.3)
ERYTHROCYTE [DISTWIDTH] IN BLOOD BY AUTOMATED COUNT: 13.8 % (ref 11.6–15.1)
GFR SERPL CREATININE-BSD FRML MDRD: 21 ML/MIN/1.73SQ M
GLUCOSE SERPL-MCNC: 49 MG/DL (ref 65–140)
HCT VFR BLD AUTO: 34.8 % (ref 36.5–49.3)
HCT VFR BLD AUTO: 35.8 % (ref 36.5–49.3)
HCT VFR BLD AUTO: 36.5 % (ref 36.5–49.3)
HGB BLD-MCNC: 10.7 G/DL (ref 12–17)
HGB BLD-MCNC: 11.2 G/DL (ref 12–17)
HGB BLD-MCNC: 11.2 G/DL (ref 12–17)
MCH RBC QN AUTO: 32.9 PG (ref 26.8–34.3)
MCHC RBC AUTO-ENTMCNC: 30.7 G/DL (ref 31.4–37.4)
MCV RBC AUTO: 107 FL (ref 82–98)
PLATELET # BLD AUTO: 119 THOUSANDS/UL (ref 149–390)
PMV BLD AUTO: 10.7 FL (ref 8.9–12.7)
POTASSIUM SERPL-SCNC: 3.8 MMOL/L (ref 3.5–5.3)
RBC # BLD AUTO: 3.25 MILLION/UL (ref 3.88–5.62)
SODIUM SERPL-SCNC: 140 MMOL/L (ref 135–147)
WBC # BLD AUTO: 6.02 THOUSAND/UL (ref 4.31–10.16)

## 2023-12-21 PROCEDURE — 85018 HEMOGLOBIN: CPT | Performed by: FAMILY MEDICINE

## 2023-12-21 PROCEDURE — 85014 HEMATOCRIT: CPT | Performed by: FAMILY MEDICINE

## 2023-12-21 PROCEDURE — 99232 SBSQ HOSP IP/OBS MODERATE 35: CPT | Performed by: HOSPITALIST

## 2023-12-21 PROCEDURE — 97116 GAIT TRAINING THERAPY: CPT

## 2023-12-21 PROCEDURE — 80048 BASIC METABOLIC PNL TOTAL CA: CPT | Performed by: FAMILY MEDICINE

## 2023-12-21 PROCEDURE — C9113 INJ PANTOPRAZOLE SODIUM, VIA: HCPCS | Performed by: FAMILY MEDICINE

## 2023-12-21 PROCEDURE — 85027 COMPLETE CBC AUTOMATED: CPT | Performed by: FAMILY MEDICINE

## 2023-12-21 PROCEDURE — 97110 THERAPEUTIC EXERCISES: CPT

## 2023-12-21 RX ORDER — PANTOPRAZOLE SODIUM 40 MG/1
40 TABLET, DELAYED RELEASE ORAL
Status: DISCONTINUED | OUTPATIENT
Start: 2023-12-21 | End: 2023-12-26 | Stop reason: HOSPADM

## 2023-12-21 RX ORDER — GABAPENTIN 100 MG/1
200 CAPSULE ORAL
Status: DISCONTINUED | OUTPATIENT
Start: 2023-12-21 | End: 2023-12-26 | Stop reason: HOSPADM

## 2023-12-21 RX ADMIN — PANTOPRAZOLE SODIUM 40 MG: 40 TABLET, DELAYED RELEASE ORAL at 17:04

## 2023-12-21 RX ADMIN — METOPROLOL TARTRATE 100 MG: 100 TABLET, FILM COATED ORAL at 17:05

## 2023-12-21 RX ADMIN — TORSEMIDE 40 MG: 20 TABLET ORAL at 08:56

## 2023-12-21 RX ADMIN — PANTOPRAZOLE SODIUM 40 MG: 40 INJECTION, POWDER, LYOPHILIZED, FOR SOLUTION INTRAVENOUS at 08:57

## 2023-12-21 RX ADMIN — ALLOPURINOL 100 MG: 100 TABLET ORAL at 08:57

## 2023-12-21 RX ADMIN — Medication 2000 UNITS: at 08:56

## 2023-12-21 RX ADMIN — METOPROLOL TARTRATE 100 MG: 100 TABLET, FILM COATED ORAL at 08:56

## 2023-12-21 RX ADMIN — APIXABAN 2.5 MG: 2.5 TABLET, FILM COATED ORAL at 17:04

## 2023-12-21 RX ADMIN — GABAPENTIN 200 MG: 100 CAPSULE ORAL at 21:35

## 2023-12-21 RX ADMIN — PRAVASTATIN SODIUM 40 MG: 40 TABLET ORAL at 17:04

## 2023-12-21 NOTE — PLAN OF CARE
Problem: INFECTION - ADULT  Goal: Absence of fever/infection during neutropenic period  Description: INTERVENTIONS:  - Monitor WBC    Outcome: Progressing     Problem: SAFETY ADULT  Goal: Patient will remain free of falls  Description: INTERVENTIONS:  - Educate patient/family on patient safety including physical limitations  - Instruct patient to call for assistance with activity   - Consult OT/PT to assist with strengthening/mobility   - Keep Call bell within reach  - Keep bed low and locked with side rails adjusted as appropriate  - Keep care items and personal belongings within reach  - Initiate and maintain comfort rounds  - Make Fall Risk Sign visible to staff  - Offer Toileting every 2 Hours, in advance of need  - Initiate/Maintain bed/chair alarm  - Obtain necessary fall risk management equipment: bed/chair alarm, nonskid socks   - Apply yellow socks and bracelet for high fall risk patients  - Consider moving patient to room near nurses station  Outcome: Progressing

## 2023-12-21 NOTE — CASE MANAGEMENT
[facility] received request for authorization from Care Manager.  Authorization request for: SNF  [facility] Name: [facility]  NPI:[id number]  Facility MD:  Dr. Manuel Arshad    NPI: [id number]  Authorization initiated by contacting insurance:  [employer]   Via: [employer]   Clinicals submitted via: [employer] attachment   Pending Reference #:[id number]

## 2023-12-21 NOTE — PROGRESS NOTES
Patient:  NAVIN AREVALO    MRN:  [id number]    Aidin Request ID:  [id number]    Level of care reserved:  Skilled Nursing Facility    Partner Reserved:  [facility], [place] [address] [phone]    Clinical needs requested:    Geography searched:  20 miles around [id number]    Start of Service:    Request sent:  3:48pm EST on 12/20/2023 by Blank Maguire    Partner reserved:  10:23am EST on 12/21/2023 by Blank Maguire    Choice list shared:  10:23am EST on 12/21/2023 by Blank Maguire

## 2023-12-21 NOTE — ASSESSMENT & PLAN NOTE
Family reports patient has mild forgetfulness at baseline but no significant cognitive deficits.  On admission oriented to person, partial place and time.  -Suspect due to underlying COVID infection  -Supportive care  -Mildly dehydrated due to decreased oral intake recently  -s/p gentla IVF  -improved

## 2023-12-21 NOTE — PLAN OF CARE
Problem: PAIN - ADULT  Goal: Verbalizes/displays adequate comfort level or baseline comfort level  Description: Interventions:  - Encourage patient to monitor pain and request assistance  - Assess pain using appropriate pain scale  - Administer analgesics based on type and severity of pain and evaluate response  - Implement non-pharmacological measures as appropriate and evaluate response  - Consider cultural and social influences on pain and pain management  - Notify physician/advanced practitioner if interventions unsuccessful or patient reports new pain  Outcome: Progressing     Problem: INFECTION - ADULT  Goal: Absence or prevention of progression during hospitalization  Description: INTERVENTIONS:  - Assess and monitor for signs and symptoms of infection  - Monitor lab/diagnostic results  - Monitor all insertion sites, i.e. indwelling lines, tubes, and drains  - Monitor endotracheal if appropriate and nasal secretions for changes in amount and color  - [place] appropriate cooling/warming therapies per order  - Administer medications as ordered  - Instruct and encourage patient and family to use good hand hygiene technique  - Identify and instruct in appropriate isolation precautions for identified infection/condition  Outcome: Progressing  Goal: Absence of fever/infection during neutropenic period  Description: INTERVENTIONS:  - Monitor WBC    Outcome: Progressing     Problem: SAFETY ADULT  Goal: Patient will remain free of falls  Description: INTERVENTIONS:  - Educate patient/family on patient safety including physical limitations  - Instruct patient to call for assistance with activity   - Consult OT/PT to assist with strengthening/mobility   - Keep Call bell within reach  - Keep bed low and locked with side rails adjusted as appropriate  - Keep care items and personal belongings within reach  - Initiate and maintain comfort rounds  - Make Fall Risk Sign visible to staff  - Offer Toileting every 2 Hours,  in advance of need  - Initiate/Maintain bed/chair alarm  - Obtain necessary fall risk management equipment: bed/chair alarm, nonskid socks   - Apply yellow socks and bracelet for high fall risk patients  - Consider moving patient to room near nurses station  Outcome: Progressing  Goal: Maintain or return to baseline ADL function  Description: INTERVENTIONS:  -  Assess patient's ability to carry out ADLs; assess patient's baseline for ADL function and identify physical deficits which impact ability to perform ADLs (bathing, care of mouth/teeth, toileting, grooming, dressing, etc.)  - Assess/evaluate cause of self-care deficits   - Assess range of motion  - Assess patient's mobility; develop plan if impaired  - Assess patient's need for assistive devices and provide as appropriate  - Encourage maximum independence but intervene and supervise when necessary  - Involve family in performance of ADLs  - Assess for home care needs following discharge   - Consider OT consult to assist with ADL evaluation and planning for discharge  - Provide patient education as appropriate  Outcome: Progressing  Goal: Maintains/Returns to pre admission functional level  Description: INTERVENTIONS:  - Perform AM-PAC 6 Click Basic Mobility/ Daily Activity assessment daily.  - Set and communicate daily mobility goal to care team and patient/family/caregiver.   - Collaborate with rehabilitation services on mobility goals if consulted  - Perform Range of Motion 3 times a day.  - Reposition patient every 3 hours.  - Dangle patient 3 times a day  - Stand patient 3 times a day  - Ambulate patient 3 times a day  - Out of bed to chair 3 times a day   - Out of bed for meals 3 times a day  - Out of bed for toileting  - Record patient progress and toleration of activity level   Outcome: Progressing     Problem: DISCHARGE PLANNING  Goal: Discharge to home or other facility with appropriate resources  Description: INTERVENTIONS:  - Identify barriers to  discharge w/patient and caregiver  - Arrange for needed discharge resources and transportation as appropriate  - Identify discharge learning needs (meds, wound care, etc.)  - Arrange for interpretive services to assist at discharge as needed  - Refer to Case Management Department for coordinating discharge planning if the patient needs post-hospital services based on physician/advanced practitioner order or complex needs related to functional status, cognitive ability, or social support system  Outcome: Progressing     Problem: Knowledge Deficit  Goal: Patient/family/caregiver demonstrates understanding of disease process, treatment plan, medications, and discharge instructions  Description: Complete learning assessment and assess knowledge base.  Interventions:  - Provide teaching at level of understanding  - Provide teaching via preferred learning methods  Outcome: Progressing

## 2023-12-21 NOTE — PROGRESS NOTES
[facility]  Progress Note  Name: Les Quiñonez I  MRN: [id number]  Unit/Bed#: 413-01 I Date of Admission: 12/19/2023   Date of Service: 12/21/2023 I Hospital Day: 2    Assessment/Plan   * Acute metabolic encephalopathy  Assessment & Plan  Family reports patient has mild forgetfulness at baseline but no significant cognitive deficits.  On admission oriented to person, partial place and time.  -Suspect due to underlying COVID infection  -Supportive care  -Mildly dehydrated due to decreased oral intake recently  -s/p gentla IVF  -improved    COVID-19 virus infection  Assessment & Plan  Patient is not hypoxic, has had poor oral intake and suspect acute encephalopathy due to COVID infection  Supportive care  Hold on steroids/remdesivir - on room air  Elevated procalcitonin at 0.55, rechecked and stable, doing well without antibiotics, continue to hold    Neuropathy  Assessment & Plan  He takes 200 mg gabapentin at night, will increase back to home dose    Other specified anemias  Assessment & Plan  Chronic iron deficiency anemia and is on iron supplement  Overnight 12/20 reported an episode of melena millimeter hemoglobin was checked was found to be 7.2, on recheck his hemoglobin is normal  There was concern for GI bleed, appreciate GI consult  With further monitoring, it is clear that the Hgb of 7.2 is likely a lab error  No further signs of bleeding  Switch IV PPI twice daily back to his home dose, he was on PPI twice daily p.o.  He did have an iron panel checked, significantly abnormal, however this obtained while the patient was on IV Venofer so disregard    Hx of CABG  Assessment & Plan  Continue aspirin, beta-blocker, and statin      Paroxysmal atrial fibrillation (HCC)  Assessment & Plan  Continue renally dosed Eliquis  Continue beta-blocker  He has a pacemaker    Ambulatory dysfunction  Assessment & Plan  PT/OT, Level 1    Chronic diastolic CHF (congestive heart failure)  ([facility])  Assessment & Plan  Wt Readings from Last 3 Encounters:   12/19/23 76.3 kg (168 lb 3.4 oz)   12/06/23 79.8 kg (176 lb)   09/05/23 79.4 kg (175 lb)     Echo in February of this year ejection fraction 65%, mild to moderate valvular disease at the mitral and tricuspid  Resume diuretics          Complete heart block (HCC)  Assessment & Plan  Patient has pacemaker, currently appears paced at 60  Pacemaker site is nontender    History of aortic valve replacement with bioprosthetic valve  Assessment & Plan  Continue Eliquis    GERD (gastroesophageal reflux disease)  Assessment & Plan  Continue PPI    CKD (chronic kidney disease) stage 4, GFR 15-29 ml/min ([facility])  Assessment & Plan  Lab Results   Component Value Date    EGFR 21 12/21/2023    EGFR 17 12/20/2023    EGFR 16 12/19/2023    CREATININE 2.55 (H) 12/21/2023    CREATININE 3.06 (H) 12/20/2023    CREATININE 3.13 (H) 12/19/2023     Patient baseline around 2.9-3, on admission seems mildly hypovolemic/dry and creatinine slightly elevated, not true YOLANDE  Gentle IV fluids  Avoid nephrotoxic medications               VTE Pharmacologic Prophylaxis:   Moderate Risk (Score 3-4) - Pharmacological DVT Prophylaxis Ordered: apixaban (Eliquis).    Mobility:   Basic Mobility Inpatient Raw Score: 16  JH-HLM Goal: 5: Stand one or more mins  JH-HLM Achieved: 7: Walk 25 feet or more  HLM Goal achieved. Continue to encourage appropriate mobility.    Patient Centered Rounds: I performed bedside rounds with nursing staff today.   Discussions with Specialists or Other Care Team Provider: none    Education and Discussions with Family / Patient: Updated  (daughter) via phone.    Total Time Spent on Date of Encounter in care of patient: 28 mins. This time was spent on one or more of the following: performing physical exam; counseling and coordination of care; obtaining or reviewing history; documenting in the medical record; reviewing/ordering tests, medications or procedures;  communicating with other healthcare professionals and discussing with patient's family/caregivers.    Current Length of Stay: 2 day(s)  Current Patient Status: Inpatient   Certification Statement: The patient will continue to require additional inpatient hospital stay due to covid  Discharge Plan: Anticipate discharge tomorrow to rehab facility.    Code Status: Level 3 - DNAR and DNI    Subjective:   Patient has no com;laints    Objective:     Vitals:   Temp (24hrs), Av.3 °F (36.8 °C), Min:98.2 °F (36.8 °C), Max:98.4 °F (36.9 °C)    Temp:  [98.2 °F (36.8 °C)-98.4 °F (36.9 °C)] 98.2 °F (36.8 °C)  HR:  [60-61] 60  Resp:  [16-20] 18  BP: (116-137)/(59) 122/59  SpO2:  [93 %-94 %] 94 %  Body mass index is 26.35 kg/m².     Input and Output Summary (last 24 hours):     Intake/Output Summary (Last 24 hours) at 2023 1522  Last data filed at 2023 0906  Gross per 24 hour   Intake 295 ml   Output 250 ml   Net 45 ml       Physical Exam:   Physical Exam  Vitals and nursing note reviewed.   Constitutional:       General: He is not in acute distress.     Appearance: He is well-developed. He is not ill-appearing.   HENT:      Head: Normocephalic and atraumatic.   Eyes:      Conjunctiva/sclera: Conjunctivae normal.   Cardiovascular:      Rate and Rhythm: Regular rhythm.      Heart sounds: No murmur heard.     Comments: Paced  Pulmonary:      Effort: Pulmonary effort is normal. No respiratory distress.      Comments: Reduced, stable on room air  Abdominal:      Palpations: Abdomen is soft.      Tenderness: There is no abdominal tenderness.   Musculoskeletal:         General: No swelling.      Cervical back: Neck supple.   Skin:     General: Skin is warm and dry.      Capillary Refill: Capillary refill takes less than 2 seconds.   Neurological:      Mental Status: He is alert.      Comments: Patient is awake, alert, follows commands, answers simple questions, oriented to person, place, partially time          Additional  Data:     Labs:  Results from last 7 days   Lab Units 12/21/23  0906 12/21/23  0552 12/19/23  2150 12/19/23  0934   WBC Thousand/uL  --  6.02   < > 8.61   HEMOGLOBIN g/dL 11.2* 10.7*   < > 12.2   HEMATOCRIT % 36.5 34.8*   < > 38.4   PLATELETS Thousands/uL  --  119*   < > 162   NEUTROS PCT %  --   --   --  82*   LYMPHS PCT %  --   --   --  7*   MONOS PCT %  --   --   --  11   EOS PCT %  --   --   --  0    < > = values in this interval not displayed.     Results from last 7 days   Lab Units 12/21/23  0552 12/20/23  0504   SODIUM mmol/L 140 140   POTASSIUM mmol/L 3.8 4.1   CHLORIDE mmol/L 102 98   CO2 mmol/L 25 31   BUN mg/dL 58* 62*   CREATININE mg/dL 2.55* 3.06*   ANION GAP mmol/L 13 11   CALCIUM mg/dL 8.2* 8.8   ALBUMIN g/dL  --  3.9   TOTAL BILIRUBIN mg/dL  --  0.41   ALK PHOS U/L  --  82   ALT U/L  --  18   AST U/L  --  46*   GLUCOSE RANDOM mg/dL 49* 87     Results from last 7 days   Lab Units 12/19/23  0934   INR  1.37*             Results from last 7 days   Lab Units 12/20/23  0504 12/19/23  0934   LACTIC ACID mmol/L  --  1.7   PROCALCITONIN ng/ml 0.55* 0.55*       Lines/Drains:  Invasive Devices       Peripheral Intravenous Line  Duration             Peripheral IV 12/19/23 Dorsal (posterior);Right Hand 2 days    Peripheral IV 12/19/23 Ventral (anterior);Left Forearm 2 days                          Imaging: No pertinent imaging reviewed.    Recent Cultures (last 7 days):   Results from last 7 days   Lab Units 12/19/23  0955 12/19/23  0934   BLOOD CULTURE  No Growth at 48 hrs. No Growth at 48 hrs.       Last 24 Hours Medication List:   Current Facility-Administered Medications   Medication Dose Route Frequency Provider Last Rate    acetaminophen  975 mg Oral Q6H PRN Len Wayne MD      allopurinol  100 mg Oral Daily Len Wayne MD      cholecalciferol  2,000 Units Oral Daily Len Wayne MD      gabapentin  200 mg Oral HS Mauricio Zambrano, DO      metoprolol tartrate  100 mg Oral BID  Len Wayne MD      ondansetron  4 mg Oral Q6H PRN Len Wayne MD      pantoprazole  40 mg Intravenous Q12H LAKISHA Len Wayne MD      polyethylene glycol  17 g Oral Daily PRN Len Wayne MD      pravastatin  40 mg Oral Daily With Dinner Len Wayne MD      senna  8.6 mg Oral HS Len Wayne MD      torsemide  40 mg Oral Daily Len Wayne MD          Today, Patient Was Seen By: Mauricio Zambrano DO    **Please Note: This note may have been constructed using a voice recognition system.**

## 2023-12-21 NOTE — UTILIZATION REVIEW
Date: 12/21    Day 3: Has surpassed a 2nd midnight with active treatments and services, which include IV fluids, IV Venofer. See initial review completed by Nubia Anand on 12/20.

## 2023-12-21 NOTE — ASSESSMENT & PLAN NOTE
Lab Results   Component Value Date    EGFR 21 12/21/2023    EGFR 17 12/20/2023    EGFR 16 12/19/2023    CREATININE 2.55 (H) 12/21/2023    CREATININE 3.06 (H) 12/20/2023    CREATININE 3.13 (H) 12/19/2023     Patient baseline around 2.9-3, on admission seems mildly hypovolemic/dry and creatinine slightly elevated, not true YOLANDE  Gentle IV fluids  Avoid nephrotoxic medications

## 2023-12-21 NOTE — PHYSICAL THERAPY NOTE
PHYSICAL THERAPY NOTE          Patient Name: Les Quiñonez  Today's Date: 12/21/2023 12/21/23 1426   PT Last Visit   PT Visit Date 12/21/23   Note Type   Note Type Treatment   Pain Assessment   Pain Assessment Tool FLACC   Pain Rating: FLACC (Rest) - Face 0   Pain Rating: FLACC (Rest) - Legs 0   Pain Rating: FLACC (Rest) - Activity 0   Pain Rating: FLACC (Rest) - Cry 0   Pain Rating: FLACC (Rest) - Consolability 0   Score: FLACC (Rest) 0   Pain Rating: FLACC (Activity) - Face 0   Pain Rating: FLACC (Activity) - Legs 0   Pain Rating: FLACC (Activity) - Activity 0   Pain Rating: FLACC (Activity) - Cry 0   Pain Rating: FLACC (Activity) - Consolability 0   Score: FLACC (Activity) 0   Restrictions/Precautions   Weight Bearing Precautions Per Order No   Other Precautions   (Airborne/isolation; Droplet precautions; Multiple lines; Fall Risk)   General   Family/Caregiver Present No   Cognition   Overall Cognitive Status Impaired   Arousal/Participation Alert;Cooperative   Following Commands Follows one step commands with increased time or repetition   Subjective   Subjective Agreeable to therapy.   Bed Mobility   Additional Comments OOB in chair start/end PT session   Transfers   Sit to Stand 4  Minimal assistance   Additional items Assist x 1;Armrests;Increased time required;Verbal cues   Stand to Sit 4  Minimal assistance   Additional items Assist x 1;Armrests;Increased time required;Verbal cues   Additional Comments RW used. tx training x 6 reps with cues for hand placemenert   Ambulation/Elevation   Gait pattern   (Airborne/isolation; Droplet precautions; Multiple lines; Fall Risk)   Gait Assistance 4  Minimal assist   Additional items Assist x 1;Verbal cues   Assistive Device Rolling walker   Distance 25' x 2   Balance   Static Sitting Good   Dynamic Sitting Fair +   Static Standing Fair -   Dynamic Standing Fair -   Ambulatory Fair  -  (RW)   Endurance Deficit   Endurance Deficit Yes   Activity Tolerance   Activity Tolerance Patient limited by fatigue   Exercises   Hip Flexion Standing;20 reps;AROM;Bilateral   Hip Abduction Sitting;20 reps;AROM;Bilateral   Hip Adduction Sitting;20 reps;AROM;Bilateral   Knee AROM Long Arc Quad Sitting;20 reps;AROM;Bilateral   Ankle Pumps Sitting;20 reps;AROM;Bilateral   Assessment   Prognosis Good   Problem List   (Airborne/isolation; Droplet precautions; Multiple lines; Fall Risk)   Assessment Pt. seen for PT treatment session this date with interventions consisting of  therapeutic exercises, transfers and  gait training w/ emphasis on improving pt's ability to ambulate. Pt. Requiring cues for sequence and safety. In comparison to previous session, Pt. With improvements in activity tolerance.   Pt is in need of continued activity in PT to improve strength balance endurance mobility transfers and ambulation with return to maximize LOF. From PT/mobility standpoint, recommendation at time of d/c would be level I: Max resource intensity  in order to promote return to PLOF and independence.   The patient's -[facility] Basic Mobility Inpatient Short Form Raw Score is 16. A Raw score of less than or equal to 16 suggests the patient may benefit from discharge to post-acute rehabilitation services.  Please also refer to physical therapy recommendation for safe DC planning.   Goals   LTG Expiration Date 01/03/24   PT Treatment Day 1   Plan   Treatment/Interventions Functional transfer training;LE strengthening/ROM;Therapeutic exercise;Endurance training;Bed mobility;Gait training;Spoke to nursing   Progress Progressing toward goals   PT Frequency 3-5x/wk   Discharge Recommendation   Rehab Resource Intensity Level, PT I (Maximum Resource Intensity)   AM-PAC Basic Mobility Inpatient   Turning in Flat Bed Without Bedrails 3   Lying on Back to Sitting on Edge of Flat Bed Without Bedrails 2   Moving Bed to Chair 3   Standing Up  From Chair Using Arms 3   Walk in Room 3   Climb 3-5 Stairs With Railing 2   Basic Mobility Inpatient Raw Score 16   Basic Mobility Standardized Score 38.32   Highest Level Of Mobility   JH-HLM Goal 5: Stand one or more mins   JH-HLM Achieved 7: Walk 25 feet or more   Education   Education Provided Mobility training   Patient Demonstrates verbal understanding;Reinforcement needed   End of Consult   Patient Position at End of Consult Bedside chair;Bed/Chair alarm activated;All needs within reach   End of Consult Comments discussed POC with PT  (virtual 1:1 in room)

## 2023-12-21 NOTE — PLAN OF CARE
Problem: PHYSICAL THERAPY ADULT  Goal: Performs mobility at highest level of function for planned discharge setting.  See evaluation for individualized goals.  Description: Treatment/Interventions: Functional transfer training, LE strengthening/ROM, Elevations, Therapeutic exercise, Endurance training, Bed mobility, Gait training          See flowsheet documentation for full assessment, interventions and recommendations.  Outcome: Progressing  Note: Prognosis: Good  Problem List:  (Airborne/isolation; Droplet precautions; Multiple lines; Fall Risk)  Assessment: Pt. seen for PT treatment session this date with interventions consisting of  therapeutic exercises, transfers and  gait training w/ emphasis on improving pt's ability to ambulate. Pt. Requiring cues for sequence and safety. In comparison to previous session, Pt. With improvements in activity tolerance.   Pt is in need of continued activity in PT to improve strength balance endurance mobility transfers and ambulation with return to maximize LOF. From PT/mobility standpoint, recommendation at time of d/c would be level I: Max resource intensity  in order to promote return to PLOF and independence.   The patient's -[facility] Basic Mobility Inpatient Short Form Raw Score is 16. A Raw score of less than or equal to 16 suggests the patient may benefit from discharge to post-acute rehabilitation services.  Please also refer to physical therapy recommendation for safe DC planning.        Rehab Resource Intensity Level, PT: I (Maximum Resource Intensity)    See flowsheet documentation for full assessment.

## 2023-12-21 NOTE — ASSESSMENT & PLAN NOTE
Patient is not hypoxic, has had poor oral intake and suspect acute encephalopathy due to COVID infection  Supportive care  Hold on steroids/remdesivir - on room air  Elevated procalcitonin at 0.55, rechecked and stable, doing well without antibiotics, continue to hold

## 2023-12-21 NOTE — ASSESSMENT & PLAN NOTE
Chronic iron deficiency anemia and is on iron supplement  Overnight 12/20 reported an episode of melena millimeter hemoglobin was checked was found to be 7.2, on recheck his hemoglobin is normal  There was concern for GI bleed, appreciate GI consult  With further monitoring, it is clear that the Hgb of 7.2 is likely a lab error  No further signs of bleeding  Switch IV PPI twice daily back to his home dose, he was on PPI twice daily p.o.  He did have an iron panel checked, significantly abnormal, however this obtained while the patient was on IV Venofer so disregard

## 2023-12-21 NOTE — CASE MANAGEMENT
Case Management Discharge Planning Note    Patient name Les Quiñonez  Location /413-01 MRN [id number]  : 1935 Date 2023       Current Admission Date: 2023  Current Admission Diagnosis:Acute metabolic encephalopathy   Patient Active Problem List    Diagnosis Date Noted    Acute metabolic encephalopathy 2023    COVID-19 virus infection 2023    Neuropathy 2023    YOLANDE (acute kidney injury) ([facility]) 2023    Hyperphosphatemia 2023    Fracture of femoral neck, right ([facility]) 2023    Leukocytosis 2023    Pressure ulcer of right buttock, stage 2 ([facility]) 2022    Hyperkalemia 2022    Benign essential hypertension 10/16/2021    Dermatitis 10/16/2021    History of severe aortic stenosis 10/03/2021    Hx of CABG 10/03/2021    Chronic low back pain without sciatica 08/10/2021    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 kidney disease, or chronic kidney disease (HCC) 2021    Transaminitis 2021    Acute gout 2021    Mitral valve stenosis 2021    Multiple renal cysts 2021    Right hip pain 2021    Acute kidney injury superimposed on CKD  2021    Left wrist pain 2021    Iron deficiency anemia 10/15/2020    Need for immunization against influenza 10/15/2020    Pulmonary emphysema (HCC) 10/15/2020    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 2020    Presence of permanent cardiac pacemaker 2020    Oxygen dependent 2020    Paroxysmal atrial fibrillation (HCC) 08/10/2020    Anemia 2020    Ambulatory dysfunction 2020    Complete heart block (HCC) 2020    Chronic diastolic CHF (congestive heart failure) (HCC) 2020    History of aortic valve replacement with bioprosthetic valve 2019    History of stroke 2019    Medicare annual wellness visit, subsequent  08/14/2018    Lumbar degenerative disc disease 06/29/2017    Lumbar radiculopathy 06/29/2017    Obesity (BMI 30-39.9) 09/04/2014    Mitral regurgitation 06/11/2014    Acute blood loss anemia 06/10/2014    Mild intermittent asthma without complication 06/10/2014    CKD (chronic kidney disease) stage 4, GFR 15-29 ml/min ([facility]) 06/10/2014    GERD (gastroesophageal reflux disease) 06/10/2014    Dyslipidemia 10/03/2012    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  [address] [place] [address], [address] [facility] [address]  Phone: [phone] Fax: [phone]    Primary Care Provider: Cristine Hill DO    Primary Insurance: ALETHEA  REP  Secondary Insurance:     DISCHARGE DETAILS:  Spoke with daughter/POA Aimee Gaston (Daughter)   [phone] (M) ; aware of SNF's who can accept her dad. Chose [facility] and CM will put in request to  discharge support to start auth process with Ale[employer]a.

## 2023-12-22 ENCOUNTER — TELEPHONE (OUTPATIENT)
Dept: GASTROENTEROLOGY | Facility: CLINIC | Age: 88
End: 2023-12-22

## 2023-12-22 LAB
BILIRUB UR QL STRIP: NEGATIVE
CLARITY UR: CLEAR
COLOR UR: YELLOW
GLUCOSE UR STRIP-MCNC: NEGATIVE MG/DL
HGB UR QL STRIP.AUTO: NEGATIVE
KETONES UR STRIP-MCNC: NEGATIVE MG/DL
LEUKOCYTE ESTERASE UR QL STRIP: NEGATIVE
NITRITE UR QL STRIP: NEGATIVE
PH UR STRIP.AUTO: 6 [PH]
PROT UR STRIP-MCNC: NEGATIVE MG/DL
SP GR UR STRIP.AUTO: 1.01 (ref 1–1.03)
UROBILINOGEN UR QL STRIP.AUTO: 0.2 E.U./DL

## 2023-12-22 PROCEDURE — 99232 SBSQ HOSP IP/OBS MODERATE 35: CPT | Performed by: HOSPITALIST

## 2023-12-22 PROCEDURE — 81003 URINALYSIS AUTO W/O SCOPE: CPT | Performed by: FAMILY MEDICINE

## 2023-12-22 RX ADMIN — METOPROLOL TARTRATE 100 MG: 100 TABLET, FILM COATED ORAL at 08:18

## 2023-12-22 RX ADMIN — Medication 2000 UNITS: at 08:18

## 2023-12-22 RX ADMIN — APIXABAN 2.5 MG: 2.5 TABLET, FILM COATED ORAL at 17:01

## 2023-12-22 RX ADMIN — SENNOSIDES 8.6 MG: 8.6 TABLET, FILM COATED ORAL at 21:29

## 2023-12-22 RX ADMIN — APIXABAN 2.5 MG: 2.5 TABLET, FILM COATED ORAL at 08:17

## 2023-12-22 RX ADMIN — GABAPENTIN 200 MG: 100 CAPSULE ORAL at 21:29

## 2023-12-22 RX ADMIN — PANTOPRAZOLE SODIUM 40 MG: 40 TABLET, DELAYED RELEASE ORAL at 06:06

## 2023-12-22 RX ADMIN — TORSEMIDE 40 MG: 20 TABLET ORAL at 08:18

## 2023-12-22 RX ADMIN — PRAVASTATIN SODIUM 40 MG: 40 TABLET ORAL at 17:01

## 2023-12-22 RX ADMIN — ALLOPURINOL 100 MG: 100 TABLET ORAL at 08:17

## 2023-12-22 RX ADMIN — METOPROLOL TARTRATE 100 MG: 100 TABLET, FILM COATED ORAL at 17:01

## 2023-12-22 RX ADMIN — PANTOPRAZOLE SODIUM 40 MG: 40 TABLET, DELAYED RELEASE ORAL at 17:01

## 2023-12-22 NOTE — PROGRESS NOTES
[facility]  Progress Note  Name: Les Quiñonez I  MRN: [id number]  Unit/Bed#: 413-01 I Date of Admission: 12/19/2023   Date of Service: 12/22/2023 I Hospital Day: 3    Assessment/Plan   * Acute metabolic encephalopathy  Assessment & Plan  Family reports patient has mild forgetfulness at baseline but no significant cognitive deficits.  On admission oriented to person, partial place and time.  -Suspect due to underlying COVID infection  -Supportive care  -Mildly dehydrated due to decreased oral intake recently  -s/p gentle IVF  -improved    COVID-19 virus infection  Assessment & Plan  Patient is not hypoxic, has had poor oral intake and suspect acute encephalopathy due to COVID infection  Supportive care  Hold on steroids/remdesivir - on room air  Elevated procalcitonin at 0.55, rechecked and stable, doing well without antibiotics, continue to hold    Neuropathy  Assessment & Plan  He takes 200 mg gabapentin at night    Other specified anemias  Assessment & Plan  Chronic iron deficiency anemia and is on iron supplement  Overnight 12/20 reported an episode of melena millimeter hemoglobin was checked was found to be 7.2, on recheck his hemoglobin is normal  There was concern for GI bleed, appreciate GI consult  With further monitoring, it is clear that the Hgb of 7.2 is likely a lab error  No further signs of bleeding  Switch IV PPI twice daily back to his home dose, he was on PPI twice daily p.o.  He did have an iron panel checked, significantly abnormal, however this obtained while the patient was on IV Venofer so disregard    Hx of CABG  Assessment & Plan  Continue aspirin, beta-blocker, and statin      Paroxysmal atrial fibrillation (HCC)  Assessment & Plan  Continue renally dosed Eliquis  Continue beta-blocker  He has a pacemaker    Ambulatory dysfunction  Assessment & Plan  PT/OT, Level 1    Chronic diastolic CHF (congestive heart failure) (HCC)  Assessment & Plan  Wt Readings from Last  3 Encounters:   12/19/23 76.3 kg (168 lb 3.4 oz)   12/06/23 79.8 kg (176 lb)   09/05/23 79.4 kg (175 lb)     Echo in February of this year ejection fraction 65%, mild to moderate valvular disease at the mitral and tricuspid  Resume diuretics          Complete heart block (HCC)  Assessment & Plan  Patient has pacemaker, currently appears paced at 60  Pacemaker site is nontender    History of aortic valve replacement with bioprosthetic valve  Assessment & Plan  Continue Eliquis    GERD (gastroesophageal reflux disease)  Assessment & Plan  Continue PPI    CKD (chronic kidney disease) stage 4, GFR 15-29 ml/min ([facility])  Assessment & Plan  Lab Results   Component Value Date    EGFR 21 12/21/2023    EGFR 17 12/20/2023    EGFR 16 12/19/2023    CREATININE 2.55 (H) 12/21/2023    CREATININE 3.06 (H) 12/20/2023    CREATININE 3.13 (H) 12/19/2023     Patient baseline around 2.9-3, on admission seems mildly hypovolemic/dry and creatinine slightly elevated, not true YOLANDE  Gentle IV fluids  Avoid nephrotoxic medications               VTE Pharmacologic Prophylaxis:   Moderate Risk (Score 3-4) - Pharmacological DVT Prophylaxis Ordered: apixaban (Eliquis).    Mobility:   Basic Mobility Inpatient Raw Score: 16  -HLM Goal: 5: Stand one or more mins  JH-HLM Achieved: 4: Move to chair/commode  HLM Goal NOT achieved. Continue with multidisciplinary rounding and encourage appropriate mobility to improve upon HLM goals.    Patient Centered Rounds: I performed bedside rounds with nursing staff today.   Discussions with Specialists or Other Care Team Provider: none    Education and Discussions with Family / Patient: Updated  (daughter) via phone.    Total Time Spent on Date of Encounter in care of patient: 27 mins. This time was spent on one or more of the following: performing physical exam; counseling and coordination of care; obtaining or reviewing history; documenting in the medical record; reviewing/ordering tests, medications  or procedures; communicating with other healthcare professionals and discussing with patient's family/caregivers.    Current Length of Stay: 3 day(s)  Current Patient Status: Inpatient   Certification Statement: The patient will continue to require additional inpatient hospital stay due to covid, needs rehab  Discharge Plan: Anticipate discharge tomorrow to rehab facility.    Code Status: Level 3 - DNAR and DNI    Subjective:   No complaints    Objective:     Vitals:   Temp (24hrs), Av °F (36.7 °C), Min:97.9 °F (36.6 °C), Max:98 °F (36.7 °C)    Temp:  [97.9 °F (36.6 °C)-98 °F (36.7 °C)] 98 °F (36.7 °C)  HR:  [56-60] 56  Resp:  [18-20] 18  BP: (124-128)/(60-61) 128/61  SpO2:  [94 %-97 %] 95 %  Body mass index is 26.35 kg/m².     Input and Output Summary (last 24 hours):     Intake/Output Summary (Last 24 hours) at 2023 1546  Last data filed at 2023 0602  Gross per 24 hour   Intake 420 ml   Output 1250 ml   Net -830 ml       Physical Exam:   Physical Exam  Vitals and nursing note reviewed.   Constitutional:       General: He is not in acute distress.     Appearance: He is well-developed. He is not ill-appearing.   HENT:      Head: Normocephalic and atraumatic.   Eyes:      Conjunctiva/sclera: Conjunctivae normal.   Cardiovascular:      Rate and Rhythm: Regular rhythm.      Heart sounds: No murmur heard.     Comments: Paced  Pulmonary:      Effort: Pulmonary effort is normal. No respiratory distress.      Breath sounds: Normal breath sounds.   Abdominal:      Palpations: Abdomen is soft.      Tenderness: There is no abdominal tenderness.   Musculoskeletal:         General: No swelling.      Cervical back: Neck supple.   Skin:     General: Skin is warm and dry.      Capillary Refill: Capillary refill takes less than 2 seconds.   Neurological:      Mental Status: He is alert.      Comments: Patient is awake, alert, follows commands, answers simple questions, oriented to person, place, partially time           Additional Data:     Labs:  Results from last 7 days   Lab Units 12/21/23  0906 12/21/23  0552 12/19/23  2150 12/19/23  0934   WBC Thousand/uL  --  6.02   < > 8.61   HEMOGLOBIN g/dL 11.2* 10.7*   < > 12.2   HEMATOCRIT % 36.5 34.8*   < > 38.4   PLATELETS Thousands/uL  --  119*   < > 162   NEUTROS PCT %  --   --   --  82*   LYMPHS PCT %  --   --   --  7*   MONOS PCT %  --   --   --  11   EOS PCT %  --   --   --  0    < > = values in this interval not displayed.     Results from last 7 days   Lab Units 12/21/23  0552 12/20/23  0504   SODIUM mmol/L 140 140   POTASSIUM mmol/L 3.8 4.1   CHLORIDE mmol/L 102 98   CO2 mmol/L 25 31   BUN mg/dL 58* 62*   CREATININE mg/dL 2.55* 3.06*   ANION GAP mmol/L 13 11   CALCIUM mg/dL 8.2* 8.8   ALBUMIN g/dL  --  3.9   TOTAL BILIRUBIN mg/dL  --  0.41   ALK PHOS U/L  --  82   ALT U/L  --  18   AST U/L  --  46*   GLUCOSE RANDOM mg/dL 49* 87     Results from last 7 days   Lab Units 12/19/23  0934   INR  1.37*             Results from last 7 days   Lab Units 12/20/23  0504 12/19/23  0934   LACTIC ACID mmol/L  --  1.7   PROCALCITONIN ng/ml 0.55* 0.55*       Lines/Drains:  Invasive Devices       Peripheral Intravenous Line  Duration             Peripheral IV 12/19/23 Dorsal (posterior);Right Hand 3 days    Peripheral IV 12/19/23 Ventral (anterior);Left Forearm 3 days                          Imaging: No pertinent imaging reviewed.    Recent Cultures (last 7 days):   Results from last 7 days   Lab Units 12/19/23  0955 12/19/23  0934   BLOOD CULTURE  No Growth at 72 hrs. No Growth at 72 hrs.       Last 24 Hours Medication List:   Current Facility-Administered Medications   Medication Dose Route Frequency Provider Last Rate    acetaminophen  975 mg Oral Q6H PRN Len Wayne MD      allopurinol  100 mg Oral Daily Len Wayne MD      apixaban  2.5 mg Oral BID Mauricio Suraj Counts, DO      cholecalciferol  2,000 Units Oral Daily Len Wayne MD      gabapentin  200 mg Oral  HS Mauricio Zambrano DO      metoprolol tartrate  100 mg Oral BID Len Wayne MD      ondansetron  4 mg Oral Q6H PRN Len Wayne MD      pantoprazole  40 mg Oral BID AC Mauricio Zambrano DO      polyethylene glycol  17 g Oral Daily PRN Len Wayne MD      pravastatin  40 mg Oral Daily With Dinner Len Wayne MD      senna  8.6 mg Oral HS Len Wayne MD      torsemide  40 mg Oral Daily Len Wayne MD          Today, Patient Was Seen By: Mauricio Zambrano DO    **Please Note: This note may have been constructed using a voice recognition system.**

## 2023-12-22 NOTE — ASSESSMENT & PLAN NOTE
Family reports patient has mild forgetfulness at baseline but no significant cognitive deficits.  On admission oriented to person, partial place and time.  -Suspect due to underlying COVID infection  -Supportive care  -Mildly dehydrated due to decreased oral intake recently  -s/p gentle IVF  -improved

## 2023-12-22 NOTE — ASSESSMENT & PLAN NOTE
Wt Readings from Last 3 Encounters:   12/19/23 76.3 kg (168 lb 3.4 oz)   12/06/23 79.8 kg (176 lb)   09/05/23 79.4 kg (175 lb)     Echo in February of this year ejection fraction 65%, mild to moderate valvular disease at the mitral and tricuspid  Resume diuretics

## 2023-12-22 NOTE — TELEPHONE ENCOUNTER
Can we please call the patient next week once he is discharged to schedule him for a follow-up office visit in the next 4 to 6 weeks here in the [place] office with the first available provider?  Thank you.

## 2023-12-22 NOTE — PLAN OF CARE
Problem: PAIN - ADULT  Goal: Verbalizes/displays adequate comfort level or baseline comfort level  Description: Interventions:  - Encourage patient to monitor pain and request assistance  - Assess pain using appropriate pain scale  - Administer analgesics based on type and severity of pain and evaluate response  - Implement non-pharmacological measures as appropriate and evaluate response  - Consider cultural and social influences on pain and pain management  - Notify physician/advanced practitioner if interventions unsuccessful or patient reports new pain  Outcome: Progressing     Problem: INFECTION - ADULT  Goal: Absence or prevention of progression during hospitalization  Description: INTERVENTIONS:  - Assess and monitor for signs and symptoms of infection  - Monitor lab/diagnostic results  - Monitor all insertion sites, i.e. indwelling lines, tubes, and drains  - Monitor endotracheal if appropriate and nasal secretions for changes in amount and color  - [place] appropriate cooling/warming therapies per order  - Administer medications as ordered  - Instruct and encourage patient and family to use good hand hygiene technique  - Identify and instruct in appropriate isolation precautions for identified infection/condition  Outcome: Progressing     Problem: SAFETY ADULT  Goal: Patient will remain free of falls  Description: INTERVENTIONS:  - Educate patient/family on patient safety including physical limitations  - Instruct patient to call for assistance with activity   - Consult OT/PT to assist with strengthening/mobility   - Keep Call bell within reach  - Keep bed low and locked with side rails adjusted as appropriate  - Keep care items and personal belongings within reach  - Initiate and maintain comfort rounds  - Make Fall Risk Sign visible to staff  - Offer Toileting every 2 Hours, in advance of need  - Initiate/Maintain bed/chair alarm  - Obtain necessary fall risk management equipment: bed/chair alarm,  nonskid socks   - Apply yellow socks and bracelet for high fall risk patients  - Consider moving patient to room near nurses station  Outcome: Progressing  Goal: Maintain or return to baseline ADL function  Description: INTERVENTIONS:  -  Assess patient's ability to carry out ADLs; assess patient's baseline for ADL function and identify physical deficits which impact ability to perform ADLs (bathing, care of mouth/teeth, toileting, grooming, dressing, etc.)  - Assess/evaluate cause of self-care deficits   - Assess range of motion  - Assess patient's mobility; develop plan if impaired  - Assess patient's need for assistive devices and provide as appropriate  - Encourage maximum independence but intervene and supervise when necessary  - Involve family in performance of ADLs  - Assess for home care needs following discharge   - Consider OT consult to assist with ADL evaluation and planning for discharge  - Provide patient education as appropriate  Outcome: Progressing  Goal: Maintains/Returns to pre admission functional level  Description: INTERVENTIONS:  - Perform AM-PAC 6 Click Basic Mobility/ Daily Activity assessment daily.  - Set and communicate daily mobility goal to care team and patient/family/caregiver.   - Collaborate with rehabilitation services on mobility goals if consulted  - Perform Range of Motion 3 times a day.  - Reposition patient every 3 hours.  - Dangle patient 3 times a day  - Stand patient 3 times a day  - Ambulate patient 3 times a day  - Out of bed to chair 3 times a day   - Out of bed for meals 3 times a day  - Out of bed for toileting  - Record patient progress and toleration of activity level   Outcome: Progressing     Problem: DISCHARGE PLANNING  Goal: Discharge to home or other facility with appropriate resources  Description: INTERVENTIONS:  - Identify barriers to discharge w/patient and caregiver  - Arrange for needed discharge resources and transportation as appropriate  - Identify  discharge learning needs (meds, wound care, etc.)  - Arrange for interpretive services to assist at discharge as needed  - Refer to Case Management Department for coordinating discharge planning if the patient needs post-hospital services based on physician/advanced practitioner order or complex needs related to functional status, cognitive ability, or social support system  Outcome: Progressing     Problem: Knowledge Deficit  Goal: Patient/family/caregiver demonstrates understanding of disease process, treatment plan, medications, and discharge instructions  Description: Complete learning assessment and assess knowledge base.  Interventions:  - Provide teaching at level of understanding  - Provide teaching via preferred learning methods  Outcome: Progressing     Problem: Prexisting or High Potential for Compromised Skin Integrity  Goal: Skin integrity is maintained or improved  Description: INTERVENTIONS:  - Identify patients at risk for skin breakdown  - Assess and monitor skin integrity  - Assess and monitor nutrition and hydration status  - Monitor labs   - Assess for incontinence   - Turn and reposition patient  - Assist with mobility/ambulation  - Relieve pressure over bony prominences  - Avoid friction and shearing  - Provide appropriate hygiene as needed including keeping skin clean and dry  - Evaluate need for skin moisturizer/barrier cream  - Collaborate with interdisciplinary team   - Patient/family teaching  - Consider wound care consult   Outcome: Progressing

## 2023-12-22 NOTE — CASE MANAGEMENT
Case Management Progress Note    Patient name Les Quiñonez  Location /413-01 MRN [id number]  : 1935 Date 2023       LOS (days): 3  Geometric Mean LOS (GMLOS) (days): 4.9  Days to GMLOS:2.6        OBJECTIVE:        Current admission status: Inpatient  Preferred Pharmacy:   [facility] [address] [place] [address], [address]  [address] [place] [address], [address] [facility] [address]  Phone: [phone] Fax: [phone]    Primary Care Provider: Cristine Hill DO    Primary Insurance: [employer]  REP  Secondary Insurance:     PROGRESS NOTE:received message from  that wife called wanting update on patient. Given update that we are waiting on insurance authorization for STR. Call also placed to daughter mahi to update on same.

## 2023-12-23 LAB
GLUCOSE SERPL-MCNC: 87 MG/DL (ref 65–140)
GLUCOSE SERPL-MCNC: 89 MG/DL (ref 65–140)

## 2023-12-23 PROCEDURE — 82948 REAGENT STRIP/BLOOD GLUCOSE: CPT

## 2023-12-23 PROCEDURE — 99232 SBSQ HOSP IP/OBS MODERATE 35: CPT | Performed by: HOSPITALIST

## 2023-12-23 RX ADMIN — PANTOPRAZOLE SODIUM 40 MG: 40 TABLET, DELAYED RELEASE ORAL at 17:31

## 2023-12-23 RX ADMIN — APIXABAN 2.5 MG: 2.5 TABLET, FILM COATED ORAL at 17:31

## 2023-12-23 RX ADMIN — PANTOPRAZOLE SODIUM 40 MG: 40 TABLET, DELAYED RELEASE ORAL at 06:07

## 2023-12-23 RX ADMIN — Medication 2000 UNITS: at 09:11

## 2023-12-23 RX ADMIN — APIXABAN 2.5 MG: 2.5 TABLET, FILM COATED ORAL at 09:11

## 2023-12-23 RX ADMIN — ALLOPURINOL 100 MG: 100 TABLET ORAL at 09:11

## 2023-12-23 RX ADMIN — GABAPENTIN 200 MG: 100 CAPSULE ORAL at 21:38

## 2023-12-23 RX ADMIN — PRAVASTATIN SODIUM 40 MG: 40 TABLET ORAL at 17:31

## 2023-12-23 RX ADMIN — TORSEMIDE 40 MG: 20 TABLET ORAL at 09:11

## 2023-12-23 RX ADMIN — METOPROLOL TARTRATE 100 MG: 100 TABLET, FILM COATED ORAL at 17:31

## 2023-12-23 RX ADMIN — METOPROLOL TARTRATE 100 MG: 100 TABLET, FILM COATED ORAL at 09:12

## 2023-12-23 NOTE — ASSESSMENT & PLAN NOTE
Family reports patient has mild forgetfulness at baseline but no significant cognitive deficits.  On admission oriented to person, partial place and time.  -Suspect due to underlying COVID infection  -Supportive care  -s/p gentle IVF  -improved

## 2023-12-23 NOTE — PLAN OF CARE
Problem: PAIN - ADULT  Goal: Verbalizes/displays adequate comfort level or baseline comfort level  Description: Interventions:  - Encourage patient to monitor pain and request assistance  - Assess pain using appropriate pain scale  - Administer analgesics based on type and severity of pain and evaluate response  - Implement non-pharmacological measures as appropriate and evaluate response  - Consider cultural and social influences on pain and pain management  - Notify physician/advanced practitioner if interventions unsuccessful or patient reports new pain  Outcome: Progressing     Problem: INFECTION - ADULT  Goal: Absence or prevention of progression during hospitalization  Description: INTERVENTIONS:  - Assess and monitor for signs and symptoms of infection  - Monitor lab/diagnostic results  - Monitor all insertion sites, i.e. indwelling lines, tubes, and drains  - Monitor endotracheal if appropriate and nasal secretions for changes in amount and color  - [place] appropriate cooling/warming therapies per order  - Administer medications as ordered  - Instruct and encourage patient and family to use good hand hygiene technique  - Identify and instruct in appropriate isolation precautions for identified infection/condition  Outcome: Progressing     Problem: SAFETY ADULT  Goal: Patient will remain free of falls  Description: INTERVENTIONS:  - Educate patient/family on patient safety including physical limitations  - Instruct patient to call for assistance with activity   - Consult OT/PT to assist with strengthening/mobility   - Keep Call bell within reach  - Keep bed low and locked with side rails adjusted as appropriate  - Keep care items and personal belongings within reach  - Initiate and maintain comfort rounds  - Make Fall Risk Sign visible to staff  - Offer Toileting every 2 Hours, in advance of need  - Initiate/Maintain bed/chair alarm  - Obtain necessary fall risk management equipment: bed/chair alarm,  nonskid socks   - Apply yellow socks and bracelet for high fall risk patients  - Consider moving patient to room near nurses station  Outcome: Progressing  Goal: Maintain or return to baseline ADL function  Description: INTERVENTIONS:  -  Assess patient's ability to carry out ADLs; assess patient's baseline for ADL function and identify physical deficits which impact ability to perform ADLs (bathing, care of mouth/teeth, toileting, grooming, dressing, etc.)  - Assess/evaluate cause of self-care deficits   - Assess range of motion  - Assess patient's mobility; develop plan if impaired  - Assess patient's need for assistive devices and provide as appropriate  - Encourage maximum independence but intervene and supervise when necessary  - Involve family in performance of ADLs  - Assess for home care needs following discharge   - Consider OT consult to assist with ADL evaluation and planning for discharge  - Provide patient education as appropriate  Outcome: Progressing  Goal: Maintains/Returns to pre admission functional level  Description: INTERVENTIONS:  - Perform AM-PAC 6 Click Basic Mobility/ Daily Activity assessment daily.  - Set and communicate daily mobility goal to care team and patient/family/caregiver.   - Collaborate with rehabilitation services on mobility goals if consulted  - Perform Range of Motion 3 times a day.  - Reposition patient every 3 hours.  - Dangle patient 3 times a day  - Stand patient 3 times a day  - Ambulate patient 3 times a day  - Out of bed to chair 3 times a day   - Out of bed for meals 3 times a day  - Out of bed for toileting  - Record patient progress and toleration of activity level   Outcome: Progressing     Problem: DISCHARGE PLANNING  Goal: Discharge to home or other facility with appropriate resources  Description: INTERVENTIONS:  - Identify barriers to discharge w/patient and caregiver  - Arrange for needed discharge resources and transportation as appropriate  - Identify  discharge learning needs (meds, wound care, etc.)  - Arrange for interpretive services to assist at discharge as needed  - Refer to Case Management Department for coordinating discharge planning if the patient needs post-hospital services based on physician/advanced practitioner order or complex needs related to functional status, cognitive ability, or social support system  Outcome: Progressing     Problem: Knowledge Deficit  Goal: Patient/family/caregiver demonstrates understanding of disease process, treatment plan, medications, and discharge instructions  Description: Complete learning assessment and assess knowledge base.  Interventions:  - Provide teaching at level of understanding  - Provide teaching via preferred learning methods  Outcome: Progressing     Problem: Prexisting or High Potential for Compromised Skin Integrity  Goal: Skin integrity is maintained or improved  Description: INTERVENTIONS:  - Identify patients at risk for skin breakdown  - Assess and monitor skin integrity  - Assess and monitor nutrition and hydration status  - Monitor labs   - Assess for incontinence   - Turn and reposition patient  - Assist with mobility/ambulation  - Relieve pressure over bony prominences  - Avoid friction and shearing  - Provide appropriate hygiene as needed including keeping skin clean and dry  - Evaluate need for skin moisturizer/barrier cream  - Collaborate with interdisciplinary team   - Patient/family teaching  - Consider wound care consult   Outcome: Progressing

## 2023-12-23 NOTE — PLAN OF CARE
Problem: PAIN - ADULT  Goal: Verbalizes/displays adequate comfort level or baseline comfort level  Description: Interventions:  - Encourage patient to monitor pain and request assistance  - Assess pain using appropriate pain scale  - Administer analgesics based on type and severity of pain and evaluate response  - Implement non-pharmacological measures as appropriate and evaluate response  - Consider cultural and social influences on pain and pain management  - Notify physician/advanced practitioner if interventions unsuccessful or patient reports new pain  Outcome: Progressing     Problem: INFECTION - ADULT  Goal: Absence or prevention of progression during hospitalization  Description: INTERVENTIONS:  - Assess and monitor for signs and symptoms of infection  - Monitor lab/diagnostic results  - Monitor all insertion sites, i.e. indwelling lines, tubes, and drains  - Monitor endotracheal if appropriate and nasal secretions for changes in amount and color  - [place] appropriate cooling/warming therapies per order  - Administer medications as ordered  - Instruct and encourage patient and family to use good hand hygiene technique  - Identify and instruct in appropriate isolation precautions for identified infection/condition  Outcome: Progressing     Problem: SAFETY ADULT  Goal: Patient will remain free of falls  Description: INTERVENTIONS:  - Educate patient/family on patient safety including physical limitations  - Instruct patient to call for assistance with activity   - Consult OT/PT to assist with strengthening/mobility   - Keep Call bell within reach  - Keep bed low and locked with side rails adjusted as appropriate  - Keep care items and personal belongings within reach  - Initiate and maintain comfort rounds  - Make Fall Risk Sign visible to staff  - Offer Toileting every 2 Hours, in advance of need  - Initiate/Maintain bed/chair alarm  - Obtain necessary fall risk management equipment: bed/chair alarm,  nonskid socks   - Apply yellow socks and bracelet for high fall risk patients  - Consider moving patient to room near nurses station  Outcome: Progressing  Goal: Maintain or return to baseline ADL function  Description: INTERVENTIONS:  -  Assess patient's ability to carry out ADLs; assess patient's baseline for ADL function and identify physical deficits which impact ability to perform ADLs (bathing, care of mouth/teeth, toileting, grooming, dressing, etc.)  - Assess/evaluate cause of self-care deficits   - Assess range of motion  - Assess patient's mobility; develop plan if impaired  - Assess patient's need for assistive devices and provide as appropriate  - Encourage maximum independence but intervene and supervise when necessary  - Involve family in performance of ADLs  - Assess for home care needs following discharge   - Consider OT consult to assist with ADL evaluation and planning for discharge  - Provide patient education as appropriate  Outcome: Progressing  Goal: Maintains/Returns to pre admission functional level  Description: INTERVENTIONS:  - Perform AM-PAC 6 Click Basic Mobility/ Daily Activity assessment daily.  - Set and communicate daily mobility goal to care team and patient/family/caregiver.   - Collaborate with rehabilitation services on mobility goals if consulted  - Perform Range of Motion 3 times a day.  - Reposition patient every 3 hours.  - Dangle patient 3 times a day  - Stand patient 3 times a day  - Ambulate patient 3 times a day  - Out of bed to chair 3 times a day   - Out of bed for meals 3 times a day  - Out of bed for toileting  - Record patient progress and toleration of activity level   Outcome: Progressing     Problem: DISCHARGE PLANNING  Goal: Discharge to home or other facility with appropriate resources  Description: INTERVENTIONS:  - Identify barriers to discharge w/patient and caregiver  - Arrange for needed discharge resources and transportation as appropriate  - Identify  discharge learning needs (meds, wound care, etc.)  - Arrange for interpretive services to assist at discharge as needed  - Refer to Case Management Department for coordinating discharge planning if the patient needs post-hospital services based on physician/advanced practitioner order or complex needs related to functional status, cognitive ability, or social support system  Outcome: Progressing     Problem: Knowledge Deficit  Goal: Patient/family/caregiver demonstrates understanding of disease process, treatment plan, medications, and discharge instructions  Description: Complete learning assessment and assess knowledge base.  Interventions:  - Provide teaching at level of understanding  - Provide teaching via preferred learning methods  Outcome: Progressing     Problem: Prexisting or High Potential for Compromised Skin Integrity  Goal: Skin integrity is maintained or improved  Description: INTERVENTIONS:  - Identify patients at risk for skin breakdown  - Assess and monitor skin integrity  - Assess and monitor nutrition and hydration status  - Monitor labs   - Assess for incontinence   - Turn and reposition patient  - Assist with mobility/ambulation  - Relieve pressure over bony prominences  - Avoid friction and shearing  - Provide appropriate hygiene as needed including keeping skin clean and dry  - Evaluate need for skin moisturizer/barrier cream  - Collaborate with interdisciplinary team   - Patient/family teaching  - Consider wound care consult   Outcome: Progressing

## 2023-12-23 NOTE — ASSESSMENT & PLAN NOTE
Lab Results   Component Value Date    EGFR 21 12/21/2023    EGFR 17 12/20/2023    EGFR 16 12/19/2023    CREATININE 2.55 (H) 12/21/2023    CREATININE 3.06 (H) 12/20/2023    CREATININE 3.13 (H) 12/19/2023     Patient baseline around 2.9-3, on admission seems mildly hypovolemic/dry and creatinine slightly elevated, not true YOLANDE  S/p  IV fluids  Avoid nephrotoxic medications

## 2023-12-23 NOTE — PROGRESS NOTES
[facility]  Progress Note  Name: Les Quiñonez I  MRN: [id number]  Unit/Bed#: 413-01 I Date of Admission: 12/19/2023   Date of Service: 12/23/2023 I Hospital Day: 4    Assessment/Plan   * Acute metabolic encephalopathy  Assessment & Plan  Family reports patient has mild forgetfulness at baseline but no significant cognitive deficits.  On admission oriented to person, partial place and time.  -Suspect due to underlying COVID infection  -Supportive care  -s/p gentle IVF  -improved    COVID-19 virus infection  Assessment & Plan  Patient is not hypoxic, has had poor oral intake and suspect acute encephalopathy due to COVID infection  Supportive care  Hold on steroids/remdesivir - on room air  Elevated procalcitonin at 0.55, rechecked and stable, doing well without antibiotics, continue to hold    Neuropathy  Assessment & Plan  He takes 200 mg gabapentin at night    Other specified anemias  Assessment & Plan  Chronic iron deficiency anemia and is on iron supplement  Overnight 12/20 reported an episode of melena millimeter hemoglobin was checked was found to be 7.2, on recheck his hemoglobin is normal  There was concern for GI bleed, appreciate GI consult  With further monitoring, it is clear that the Hgb of 7.2 is likely a lab error  No further signs of bleeding  Switch IV PPI twice daily back to his home dose, he was on PPI twice daily p.o.  He did have an iron panel checked, significantly abnormal, however this obtained while the patient was on IV Venofer so disregard    Hx of CABG  Assessment & Plan  Continue aspirin, beta-blocker, and statin      Paroxysmal atrial fibrillation (HCC)  Assessment & Plan  Continue renally dosed Eliquis  Continue beta-blocker  He has a pacemaker    Ambulatory dysfunction  Assessment & Plan  PT/OT, Level 1    Chronic diastolic CHF (congestive heart failure) (HCC)  Assessment & Plan  Wt Readings from Last 3 Encounters:   12/19/23 76.3 kg (168 lb 3.4 oz)    12/06/23 79.8 kg (176 lb)   09/05/23 79.4 kg (175 lb)     Echo in February of this year ejection fraction 65%, mild to moderate valvular disease at the mitral and tricuspid  Resume diuretics          Complete heart block (HCC)  Assessment & Plan  Patient has pacemaker, currently appears paced at 60  Pacemaker site is nontender    History of aortic valve replacement with bioprosthetic valve  Assessment & Plan  Continue Eliquis    GERD (gastroesophageal reflux disease)  Assessment & Plan  Continue PPI    CKD (chronic kidney disease) stage 4, GFR 15-29 ml/min ([facility])  Assessment & Plan  Lab Results   Component Value Date    EGFR 21 12/21/2023    EGFR 17 12/20/2023    EGFR 16 12/19/2023    CREATININE 2.55 (H) 12/21/2023    CREATININE 3.06 (H) 12/20/2023    CREATININE 3.13 (H) 12/19/2023     Patient baseline around 2.9-3, on admission seems mildly hypovolemic/dry and creatinine slightly elevated, not true YOLANDE  S/p  IV fluids  Avoid nephrotoxic medications               VTE Pharmacologic Prophylaxis:   Moderate Risk (Score 3-4) - Pharmacological DVT Prophylaxis Ordered: apixaban (Eliquis).    Mobility:   Basic Mobility Inpatient Raw Score: 14  -HLM Goal: 4: Move to chair/commode  JH-HLM Achieved: 1: Laying in bed  HLM Goal achieved. Continue to encourage appropriate mobility.    Patient Centered Rounds: I performed bedside rounds with nursing staff today.   Discussions with Specialists or Other Care Team Provider: none    Education and Discussions with Family / Patient: Updated  (daughter) via phone.    Total Time Spent on Date of Encounter in care of patient: 26 mins. This time was spent on one or more of the following: performing physical exam; counseling and coordination of care; obtaining or reviewing history; documenting in the medical record; reviewing/ordering tests, medications or procedures; communicating with other healthcare professionals and discussing with patient's  family/caregivers.    Current Length of Stay: 4 day(s)  Current Patient Status: Inpatient   Certification Statement: The patient will continue to require additional inpatient hospital stay due to AMS resolved, needs palcement waiting insurance auth  Discharge Plan:  pending insurance auth    Code Status: Level 3 - DNAR and DNI    Subjective:   No complaints, states he coughed earier and had some loose stool/incontinance, will monitor    Objective:     Vitals:   Temp (24hrs), Av.7 °F (36.5 °C), Min:97.3 °F (36.3 °C), Max:98 °F (36.7 °C)    Temp:  [97.3 °F (36.3 °C)-98 °F (36.7 °C)] 97.3 °F (36.3 °C)  HR:  [58-61] 58  Resp:  [18-20] 20  BP: (110-135)/(49-66) 110/49  SpO2:  [95 %-97 %] 96 %  Body mass index is 26.35 kg/m².     Input and Output Summary (last 24 hours):     Intake/Output Summary (Last 24 hours) at 2023 1218  Last data filed at 2023 0900  Gross per 24 hour   Intake 420 ml   Output 225 ml   Net 195 ml       Physical Exam:   Physical Exam  Vitals and nursing note reviewed.   Constitutional:       General: He is not in acute distress.     Appearance: He is well-developed. He is not ill-appearing.   HENT:      Head: Normocephalic and atraumatic.   Eyes:      Conjunctiva/sclera: Conjunctivae normal.   Cardiovascular:      Rate and Rhythm: Regular rhythm.      Heart sounds: No murmur heard.     Comments: Paced  Pulmonary:      Effort: Pulmonary effort is normal. No respiratory distress.      Breath sounds: Normal breath sounds.   Abdominal:      Palpations: Abdomen is soft.      Tenderness: There is no abdominal tenderness.   Musculoskeletal:         General: No swelling.      Cervical back: Neck supple.   Skin:     General: Skin is warm and dry.      Capillary Refill: Capillary refill takes less than 2 seconds.   Neurological:      Mental Status: He is alert.      Comments: Patient is awake, alert, follows commands, answers simple questions, oriented to person, place, partially time           Additional Data:     Labs:  Results from last 7 days   Lab Units 12/21/23  0906 12/21/23  0552 12/19/23  2150 12/19/23  0934   WBC Thousand/uL  --  6.02   < > 8.61   HEMOGLOBIN g/dL 11.2* 10.7*   < > 12.2   HEMATOCRIT % 36.5 34.8*   < > 38.4   PLATELETS Thousands/uL  --  119*   < > 162   NEUTROS PCT %  --   --   --  82*   LYMPHS PCT %  --   --   --  7*   MONOS PCT %  --   --   --  11   EOS PCT %  --   --   --  0    < > = values in this interval not displayed.     Results from last 7 days   Lab Units 12/21/23  0552 12/20/23  0504   SODIUM mmol/L 140 140   POTASSIUM mmol/L 3.8 4.1   CHLORIDE mmol/L 102 98   CO2 mmol/L 25 31   BUN mg/dL 58* 62*   CREATININE mg/dL 2.55* 3.06*   ANION GAP mmol/L 13 11   CALCIUM mg/dL 8.2* 8.8   ALBUMIN g/dL  --  3.9   TOTAL BILIRUBIN mg/dL  --  0.41   ALK PHOS U/L  --  82   ALT U/L  --  18   AST U/L  --  46*   GLUCOSE RANDOM mg/dL 49* 87     Results from last 7 days   Lab Units 12/19/23  0934   INR  1.37*     Results from last 7 days   Lab Units 12/23/23  0751 12/23/23  0742   POC GLUCOSE mg/dl 89 87         Results from last 7 days   Lab Units 12/20/23  0504 12/19/23  0934   LACTIC ACID mmol/L  --  1.7   PROCALCITONIN ng/ml 0.55* 0.55*       Lines/Drains:  Invasive Devices       Peripheral Intravenous Line  Duration             Peripheral IV 12/19/23 Dorsal (posterior);Right Hand 4 days    Peripheral IV 12/19/23 Ventral (anterior);Left Forearm 4 days                          Imaging: No pertinent imaging reviewed.    Recent Cultures (last 7 days):   Results from last 7 days   Lab Units 12/19/23  0955 12/19/23  0934   BLOOD CULTURE  No Growth at 72 hrs. No Growth at 72 hrs.       Last 24 Hours Medication List:   Current Facility-Administered Medications   Medication Dose Route Frequency Provider Last Rate    acetaminophen  975 mg Oral Q6H PRN Len Wayne MD      allopurinol  100 mg Oral Daily Len Wayne MD      apixaban  2.5 mg Oral BID Mauricio Zambrano, DO       cholecalciferol  2,000 Units Oral Daily Len Wayne MD      gabapentin  200 mg Oral HS Mauricio Zambrano DO      metoprolol tartrate  100 mg Oral BID Len Wayne MD      ondansetron  4 mg Oral Q6H PRN Len Wayne MD      pantoprazole  40 mg Oral BID AC Mauricio Zambrano DO      polyethylene glycol  17 g Oral Daily PRN Len Wayne MD      pravastatin  40 mg Oral Daily With Dinner Len Wayne MD      senna  8.6 mg Oral HS Len Wayne MD      torsemide  40 mg Oral Daily Len Wayne MD          Today, Patient Was Seen By: Mauricio Zambrano DO    **Please Note: This note may have been constructed using a voice recognition system.**

## 2023-12-24 LAB
BACTERIA BLD CULT: NORMAL
BACTERIA BLD CULT: NORMAL

## 2023-12-24 PROCEDURE — 99232 SBSQ HOSP IP/OBS MODERATE 35: CPT | Performed by: HOSPITALIST

## 2023-12-24 RX ORDER — LOPERAMIDE HYDROCHLORIDE 2 MG/1
2 CAPSULE ORAL EVERY 4 HOURS PRN
Status: DISCONTINUED | OUTPATIENT
Start: 2023-12-24 | End: 2023-12-26 | Stop reason: HOSPADM

## 2023-12-24 RX ADMIN — TORSEMIDE 40 MG: 20 TABLET ORAL at 09:40

## 2023-12-24 RX ADMIN — METOPROLOL TARTRATE 100 MG: 100 TABLET, FILM COATED ORAL at 17:34

## 2023-12-24 RX ADMIN — GABAPENTIN 200 MG: 100 CAPSULE ORAL at 21:18

## 2023-12-24 RX ADMIN — PANTOPRAZOLE SODIUM 40 MG: 40 TABLET, DELAYED RELEASE ORAL at 06:00

## 2023-12-24 RX ADMIN — METOPROLOL TARTRATE 100 MG: 100 TABLET, FILM COATED ORAL at 09:40

## 2023-12-24 RX ADMIN — PANTOPRAZOLE SODIUM 40 MG: 40 TABLET, DELAYED RELEASE ORAL at 17:28

## 2023-12-24 RX ADMIN — ALLOPURINOL 100 MG: 100 TABLET ORAL at 08:22

## 2023-12-24 RX ADMIN — PRAVASTATIN SODIUM 40 MG: 40 TABLET ORAL at 17:28

## 2023-12-24 RX ADMIN — APIXABAN 2.5 MG: 2.5 TABLET, FILM COATED ORAL at 17:28

## 2023-12-24 RX ADMIN — APIXABAN 2.5 MG: 2.5 TABLET, FILM COATED ORAL at 08:22

## 2023-12-24 RX ADMIN — Medication 2000 UNITS: at 08:22

## 2023-12-24 NOTE — ASSESSMENT & PLAN NOTE
Family reports patient has mild forgetfulness at baseline but no significant cognitive deficits.  On admission oriented to person, partial place and time.  -Suspect due to underlying COVID infection  -Supportive care  -s/p gentle IVF  -improved toward baseline and has been stable

## 2023-12-24 NOTE — PROGRESS NOTES
[facility]  Progress Note  Name: Les Quiñonez I  MRN: [id number]  Unit/Bed#: 413-01 I Date of Admission: 12/19/2023   Date of Service: 12/24/2023 I Hospital Day: 5    Assessment/Plan   * Acute metabolic encephalopathy  Assessment & Plan  Family reports patient has mild forgetfulness at baseline but no significant cognitive deficits.  On admission oriented to person, partial place and time.  -Suspect due to underlying COVID infection  -Supportive care  -s/p gentle IVF  -improved toward baseline and has been stable    COVID-19 virus infection  Assessment & Plan  Patient is not hypoxic, has had poor oral intake and suspect acute encephalopathy due to COVID infection  Supportive care  Hold on steroids/remdesivir - on room air and has been stable several days    Ambulatory dysfunction  Assessment & Plan  PT/OT, Level 1  Has been medically stable since 12/21, pending insurance auth for [facility] discharge    Neuropathy  Assessment & Plan  He takes 200 mg gabapentin at night    Other specified anemias  Assessment & Plan  Chronic iron deficiency anemia and is on iron supplement  Overnight 12/20 reported an episode of melena millimeter hemoglobin was checked was found to be 7.2, on recheck his hemoglobin is normal  There was concern for GI bleed, appreciate GI consult  With further monitoring, it is clear that the Hgb of 7.2 is likely a lab error  No further signs of bleeding  Switch IV PPI twice daily back to his home dose, he was on PPI twice daily p.o.  He did have an iron panel checked, significantly abnormal, however this obtained while the patient was on IV Venofer so disregard    Hx of CABG  Assessment & Plan  Continue aspirin, beta-blocker, and statin      Paroxysmal atrial fibrillation (HCC)  Assessment & Plan  Continue renally dosed Eliquis  Continue beta-blocker  He has a pacemaker    Chronic diastolic CHF (congestive heart failure) (HCC)  Assessment & Plan  Wt Readings from Last 3  Encounters:   12/19/23 76.3 kg (168 lb 3.4 oz)   12/06/23 79.8 kg (176 lb)   09/05/23 79.4 kg (175 lb)     Echo in February of this year ejection fraction 65%, mild to moderate valvular disease at the mitral and tricuspid  Resume diuretics          Complete heart block (HCC)  Assessment & Plan  Patient has pacemaker, currently appears paced at 60  Pacemaker site is nontender    History of aortic valve replacement with bioprosthetic valve  Assessment & Plan  Continue Eliquis    GERD (gastroesophageal reflux disease)  Assessment & Plan  Continue PPI    CKD (chronic kidney disease) stage 4, GFR 15-29 ml/min ([facility])  Assessment & Plan  Lab Results   Component Value Date    EGFR 21 12/21/2023    EGFR 17 12/20/2023    EGFR 16 12/19/2023    CREATININE 2.55 (H) 12/21/2023    CREATININE 3.06 (H) 12/20/2023    CREATININE 3.13 (H) 12/19/2023     Patient baseline around 2.9-3, on admission seems mildly hypovolemic/dry and creatinine slightly elevated, not true YOLANDE  S/p  IV fluids  Avoid nephrotoxic medications               VTE Pharmacologic Prophylaxis:   Moderate Risk (Score 3-4) - Pharmacological DVT Prophylaxis Ordered: apixaban (Eliquis).    Mobility:   Basic Mobility Inpatient Raw Score: 14  -HLM Goal: 4: Move to chair/commode  JH-HLM Achieved: 4: Move to chair/commode  HLM Goal achieved. Continue to encourage appropriate mobility.    Patient Centered Rounds: I performed bedside rounds with nursing staff today.   Discussions with Specialists or Other Care Team Provider: none      Total Time Spent on Date of Encounter in care of patient: 23 mins. This time was spent on one or more of the following: performing physical exam; counseling and coordination of care; obtaining or reviewing history; documenting in the medical record; reviewing/ordering tests, medications or procedures; communicating with other healthcare professionals and discussing with patient's family/caregivers.    Current Length of Stay: 5 day(s)  Current  Patient Status: Inpatient   Certification Statement: The patient will continue to require additional inpatient hospital stay due to awaiting ivy  Discharge Plan: Anticipate discharge in 48 hrs to rehab facility.    Code Status: Level 3 - DNAR and DNI    Subjective:   Patient has no acute complaints    Objective:     Vitals:   Temp (24hrs), Av.8 °F (36.6 °C), Min:97.7 °F (36.5 °C), Max:98 °F (36.7 °C)    Temp:  [97.7 °F (36.5 °C)-98 °F (36.7 °C)] 97.7 °F (36.5 °C)  HR:  [60-67] 60  Resp:  [17-18] 17  BP: (106-142)/(49-64) 110/54  SpO2:  [96 %-98 %] 97 %  Body mass index is 26.35 kg/m².     Input and Output Summary (last 24 hours):     Intake/Output Summary (Last 24 hours) at 2023 1010  Last data filed at 2023 0825  Gross per 24 hour   Intake 1290 ml   Output 350 ml   Net 940 ml       Physical Exam:   Physical Exam  Vitals and nursing note reviewed.   Constitutional:       General: He is not in acute distress.     Appearance: He is well-developed. He is not ill-appearing.   HENT:      Head: Normocephalic and atraumatic.   Eyes:      Conjunctiva/sclera: Conjunctivae normal.   Cardiovascular:      Rate and Rhythm: Regular rhythm.      Heart sounds: No murmur heard.     Comments: Paced  Pulmonary:      Effort: Pulmonary effort is normal. No respiratory distress.      Breath sounds: Normal breath sounds.   Abdominal:      Palpations: Abdomen is soft.      Tenderness: There is no abdominal tenderness.   Musculoskeletal:         General: No swelling.      Cervical back: Neck supple.   Skin:     General: Skin is warm and dry.      Capillary Refill: Capillary refill takes less than 2 seconds.   Neurological:      Mental Status: He is alert.      Comments: Patient is awake, alert, follows commands, answers simple questions, oriented to person, place, partially time          Additional Data:     Labs:  Results from last 7 days   Lab Units 23  0906 23  0552 23  2150 23  0934   WBC  Thousand/uL  --  6.02   < > 8.61   HEMOGLOBIN g/dL 11.2* 10.7*   < > 12.2   HEMATOCRIT % 36.5 34.8*   < > 38.4   PLATELETS Thousands/uL  --  119*   < > 162   NEUTROS PCT %  --   --   --  82*   LYMPHS PCT %  --   --   --  7*   MONOS PCT %  --   --   --  11   EOS PCT %  --   --   --  0    < > = values in this interval not displayed.     Results from last 7 days   Lab Units 12/21/23  0552 12/20/23  0504   SODIUM mmol/L 140 140   POTASSIUM mmol/L 3.8 4.1   CHLORIDE mmol/L 102 98   CO2 mmol/L 25 31   BUN mg/dL 58* 62*   CREATININE mg/dL 2.55* 3.06*   ANION GAP mmol/L 13 11   CALCIUM mg/dL 8.2* 8.8   ALBUMIN g/dL  --  3.9   TOTAL BILIRUBIN mg/dL  --  0.41   ALK PHOS U/L  --  82   ALT U/L  --  18   AST U/L  --  46*   GLUCOSE RANDOM mg/dL 49* 87     Results from last 7 days   Lab Units 12/19/23  0934   INR  1.37*     Results from last 7 days   Lab Units 12/23/23  0751 12/23/23  0742   POC GLUCOSE mg/dl 89 87         Results from last 7 days   Lab Units 12/20/23  0504 12/19/23  0934   LACTIC ACID mmol/L  --  1.7   PROCALCITONIN ng/ml 0.55* 0.55*       Lines/Drains:  Invasive Devices       Peripheral Intravenous Line  Duration             Peripheral IV 12/23/23 Dorsal (posterior);Left Forearm <1 day                          Imaging: No pertinent imaging reviewed.    Recent Cultures (last 7 days):   Results from last 7 days   Lab Units 12/19/23  0955 12/19/23  0934   BLOOD CULTURE  No Growth After 4 Days. No Growth After 4 Days.       Last 24 Hours Medication List:   Current Facility-Administered Medications   Medication Dose Route Frequency Provider Last Rate    acetaminophen  975 mg Oral Q6H PRN Len Wayne MD      allopurinol  100 mg Oral Daily Len Wayne MD      apixaban  2.5 mg Oral BID Mauricio Ruelas Counts, DO      cholecalciferol  2,000 Units Oral Daily Len Wayne MD      gabapentin  200 mg Oral HS Mauricio Suraj Counts, DO      metoprolol tartrate  100 mg Oral BID Len Wayne MD       ondansetron  4 mg Oral Q6H PRN Len Wayne MD      pantoprazole  40 mg Oral BID AC Mauricio Zambrano DO      polyethylene glycol  17 g Oral Daily PRN Len Wayne MD      pravastatin  40 mg Oral Daily With Dinner Len Wayne MD      senna  8.6 mg Oral HS Len Wayne MD      torsemide  40 mg Oral Daily Len Wayne MD          Today, Patient Was Seen By: Mauricio Zambrano DO    **Please Note: This note may have been constructed using a voice recognition system.**

## 2023-12-24 NOTE — PLAN OF CARE
Problem: PAIN - ADULT  Goal: Verbalizes/displays adequate comfort level or baseline comfort level  Description: Interventions:  - Encourage patient to monitor pain and request assistance  - Assess pain using appropriate pain scale  - Administer analgesics based on type and severity of pain and evaluate response  - Implement non-pharmacological measures as appropriate and evaluate response  - Consider cultural and social influences on pain and pain management  - Notify physician/advanced practitioner if interventions unsuccessful or patient reports new pain  Outcome: Progressing     Problem: INFECTION - ADULT  Goal: Absence or prevention of progression during hospitalization  Description: INTERVENTIONS:  - Assess and monitor for signs and symptoms of infection  - Monitor lab/diagnostic results  - Monitor all insertion sites, i.e. indwelling lines, tubes, and drains  - Monitor endotracheal if appropriate and nasal secretions for changes in amount and color  - [place] appropriate cooling/warming therapies per order  - Administer medications as ordered  - Instruct and encourage patient and family to use good hand hygiene technique  - Identify and instruct in appropriate isolation precautions for identified infection/condition  Outcome: Progressing     Problem: SAFETY ADULT  Goal: Patient will remain free of falls  Description: INTERVENTIONS:  - Educate patient/family on patient safety including physical limitations  - Instruct patient to call for assistance with activity   - Consult OT/PT to assist with strengthening/mobility   - Keep Call bell within reach  - Keep bed low and locked with side rails adjusted as appropriate  - Keep care items and personal belongings within reach  - Initiate and maintain comfort rounds  - Make Fall Risk Sign visible to staff  - Offer Toileting every 2 Hours, in advance of need  - Initiate/Maintain bed/chair alarm  - Obtain necessary fall risk management equipment: bed/chair alarm,  nonskid socks   - Apply yellow socks and bracelet for high fall risk patients  - Consider moving patient to room near nurses station  Outcome: Progressing  Goal: Maintain or return to baseline ADL function  Description: INTERVENTIONS:  -  Assess patient's ability to carry out ADLs; assess patient's baseline for ADL function and identify physical deficits which impact ability to perform ADLs (bathing, care of mouth/teeth, toileting, grooming, dressing, etc.)  - Assess/evaluate cause of self-care deficits   - Assess range of motion  - Assess patient's mobility; develop plan if impaired  - Assess patient's need for assistive devices and provide as appropriate  - Encourage maximum independence but intervene and supervise when necessary  - Involve family in performance of ADLs  - Assess for home care needs following discharge   - Consider OT consult to assist with ADL evaluation and planning for discharge  - Provide patient education as appropriate  Outcome: Progressing  Goal: Maintains/Returns to pre admission functional level  Description: INTERVENTIONS:  - Perform AM-PAC 6 Click Basic Mobility/ Daily Activity assessment daily.  - Set and communicate daily mobility goal to care team and patient/family/caregiver.   - Collaborate with rehabilitation services on mobility goals if consulted  - Perform Range of Motion 3 times a day.  - Reposition patient every 3 hours.  - Dangle patient 3 times a day  - Stand patient 3 times a day  - Ambulate patient 3 times a day  - Out of bed to chair 3 times a day   - Out of bed for meals 3 times a day  - Out of bed for toileting  - Record patient progress and toleration of activity level   Outcome: Progressing     Problem: DISCHARGE PLANNING  Goal: Discharge to home or other facility with appropriate resources  Description: INTERVENTIONS:  - Identify barriers to discharge w/patient and caregiver  - Arrange for needed discharge resources and transportation as appropriate  - Identify  discharge learning needs (meds, wound care, etc.)  - Arrange for interpretive services to assist at discharge as needed  - Refer to Case Management Department for coordinating discharge planning if the patient needs post-hospital services based on physician/advanced practitioner order or complex needs related to functional status, cognitive ability, or social support system  Outcome: Progressing     Problem: Knowledge Deficit  Goal: Patient/family/caregiver demonstrates understanding of disease process, treatment plan, medications, and discharge instructions  Description: Complete learning assessment and assess knowledge base.  Interventions:  - Provide teaching at level of understanding  - Provide teaching via preferred learning methods  Outcome: Progressing     Problem: Prexisting or High Potential for Compromised Skin Integrity  Goal: Skin integrity is maintained or improved  Description: INTERVENTIONS:  - Identify patients at risk for skin breakdown  - Assess and monitor skin integrity  - Assess and monitor nutrition and hydration status  - Monitor labs   - Assess for incontinence   - Turn and reposition patient  - Assist with mobility/ambulation  - Relieve pressure over bony prominences  - Avoid friction and shearing  - Provide appropriate hygiene as needed including keeping skin clean and dry  - Evaluate need for skin moisturizer/barrier cream  - Collaborate with interdisciplinary team   - Patient/family teaching  - Consider wound care consult   Outcome: Progressing

## 2023-12-24 NOTE — ASSESSMENT & PLAN NOTE
Patient is not hypoxic, has had poor oral intake and suspect acute encephalopathy due to COVID infection  Supportive care  Hold on steroids/remdesivir - on room air and has been stable several days

## 2023-12-24 NOTE — PLAN OF CARE
Problem: PAIN - ADULT  Goal: Verbalizes/displays adequate comfort level or baseline comfort level  Description: Interventions:  - Encourage patient to monitor pain and request assistance  - Assess pain using appropriate pain scale  - Administer analgesics based on type and severity of pain and evaluate response  - Implement non-pharmacological measures as appropriate and evaluate response  - Consider cultural and social influences on pain and pain management  - Notify physician/advanced practitioner if interventions unsuccessful or patient reports new pain  Outcome: Progressing     Problem: INFECTION - ADULT  Goal: Absence or prevention of progression during hospitalization  Description: INTERVENTIONS:  - Assess and monitor for signs and symptoms of infection  - Monitor lab/diagnostic results  - Monitor all insertion sites, i.e. indwelling lines, tubes, and drains  - Monitor endotracheal if appropriate and nasal secretions for changes in amount and color  - [place] appropriate cooling/warming therapies per order  - Administer medications as ordered  - Instruct and encourage patient and family to use good hand hygiene technique  - Identify and instruct in appropriate isolation precautions for identified infection/condition  Outcome: Progressing     Problem: SAFETY ADULT  Goal: Patient will remain free of falls  Description: INTERVENTIONS:  - Educate patient/family on patient safety including physical limitations  - Instruct patient to call for assistance with activity   - Consult OT/PT to assist with strengthening/mobility   - Keep Call bell within reach  - Keep bed low and locked with side rails adjusted as appropriate  - Keep care items and personal belongings within reach  - Initiate and maintain comfort rounds  - Make Fall Risk Sign visible to staff  - Offer Toileting every 2 Hours, in advance of need  - Initiate/Maintain bed/chair alarm  - Obtain necessary fall risk management equipment: bed/chair alarm,  nonskid socks   - Apply yellow socks and bracelet for high fall risk patients  - Consider moving patient to room near nurses station  Outcome: Progressing  Goal: Maintain or return to baseline ADL function  Description: INTERVENTIONS:  -  Assess patient's ability to carry out ADLs; assess patient's baseline for ADL function and identify physical deficits which impact ability to perform ADLs (bathing, care of mouth/teeth, toileting, grooming, dressing, etc.)  - Assess/evaluate cause of self-care deficits   - Assess range of motion  - Assess patient's mobility; develop plan if impaired  - Assess patient's need for assistive devices and provide as appropriate  - Encourage maximum independence but intervene and supervise when necessary  - Involve family in performance of ADLs  - Assess for home care needs following discharge   - Consider OT consult to assist with ADL evaluation and planning for discharge  - Provide patient education as appropriate  Outcome: Progressing  Goal: Maintains/Returns to pre admission functional level  Description: INTERVENTIONS:  - Perform AM-PAC 6 Click Basic Mobility/ Daily Activity assessment daily.  - Set and communicate daily mobility goal to care team and patient/family/caregiver.   - Collaborate with rehabilitation services on mobility goals if consulted  - Perform Range of Motion 3 times a day.  - Reposition patient every 3 hours.  - Dangle patient 3 times a day  - Stand patient 3 times a day  - Ambulate patient 3 times a day  - Out of bed to chair 3 times a day   - Out of bed for meals 3 times a day  - Out of bed for toileting  - Record patient progress and toleration of activity level   Outcome: Progressing     Problem: DISCHARGE PLANNING  Goal: Discharge to home or other facility with appropriate resources  Description: INTERVENTIONS:  - Identify barriers to discharge w/patient and caregiver  - Arrange for needed discharge resources and transportation as appropriate  - Identify  discharge learning needs (meds, wound care, etc.)  - Arrange for interpretive services to assist at discharge as needed  - Refer to Case Management Department for coordinating discharge planning if the patient needs post-hospital services based on physician/advanced practitioner order or complex needs related to functional status, cognitive ability, or social support system  Outcome: Progressing     Problem: Knowledge Deficit  Goal: Patient/family/caregiver demonstrates understanding of disease process, treatment plan, medications, and discharge instructions  Description: Complete learning assessment and assess knowledge base.  Interventions:  - Provide teaching at level of understanding  - Provide teaching via preferred learning methods  Outcome: Progressing     Problem: Prexisting or High Potential for Compromised Skin Integrity  Goal: Skin integrity is maintained or improved  Description: INTERVENTIONS:  - Identify patients at risk for skin breakdown  - Assess and monitor skin integrity  - Assess and monitor nutrition and hydration status  - Monitor labs   - Assess for incontinence   - Turn and reposition patient  - Assist with mobility/ambulation  - Relieve pressure over bony prominences  - Avoid friction and shearing  - Provide appropriate hygiene as needed including keeping skin clean and dry  - Evaluate need for skin moisturizer/barrier cream  - Collaborate with interdisciplinary team   - Patient/family teaching  - Consider wound care consult   Outcome: Progressing

## 2023-12-24 NOTE — ASSESSMENT & PLAN NOTE
PT/OT, Level 1  Has been medically stable since 12/21, pending insurance auth for [facility] discharge

## 2023-12-25 PROBLEM — D53.9 MACROCYTIC ANEMIA: Status: ACTIVE | Noted: 2023-12-25

## 2023-12-25 PROCEDURE — 99232 SBSQ HOSP IP/OBS MODERATE 35: CPT | Performed by: INTERNAL MEDICINE

## 2023-12-25 RX ADMIN — APIXABAN 2.5 MG: 2.5 TABLET, FILM COATED ORAL at 08:57

## 2023-12-25 RX ADMIN — PRAVASTATIN SODIUM 40 MG: 40 TABLET ORAL at 17:11

## 2023-12-25 RX ADMIN — APIXABAN 2.5 MG: 2.5 TABLET, FILM COATED ORAL at 17:11

## 2023-12-25 RX ADMIN — GABAPENTIN 200 MG: 100 CAPSULE ORAL at 22:20

## 2023-12-25 RX ADMIN — ALLOPURINOL 100 MG: 100 TABLET ORAL at 08:57

## 2023-12-25 RX ADMIN — SENNOSIDES 8.6 MG: 8.6 TABLET, FILM COATED ORAL at 22:20

## 2023-12-25 RX ADMIN — PANTOPRAZOLE SODIUM 40 MG: 40 TABLET, DELAYED RELEASE ORAL at 06:13

## 2023-12-25 RX ADMIN — PANTOPRAZOLE SODIUM 40 MG: 40 TABLET, DELAYED RELEASE ORAL at 17:11

## 2023-12-25 RX ADMIN — METOPROLOL TARTRATE 100 MG: 100 TABLET, FILM COATED ORAL at 17:12

## 2023-12-25 RX ADMIN — Medication 2000 UNITS: at 08:56

## 2023-12-25 NOTE — PLAN OF CARE
Problem: PAIN - ADULT  Goal: Verbalizes/displays adequate comfort level or baseline comfort level  Description: Interventions:  - Encourage patient to monitor pain and request assistance  - Assess pain using appropriate pain scale  - Administer analgesics based on type and severity of pain and evaluate response  - Implement non-pharmacological measures as appropriate and evaluate response  - Consider cultural and social influences on pain and pain management  - Notify physician/advanced practitioner if interventions unsuccessful or patient reports new pain  Outcome: Progressing     Problem: INFECTION - ADULT  Goal: Absence or prevention of progression during hospitalization  Description: INTERVENTIONS:  - Assess and monitor for signs and symptoms of infection  - Monitor lab/diagnostic results  - Monitor all insertion sites, i.e. indwelling lines, tubes, and drains  - Monitor endotracheal if appropriate and nasal secretions for changes in amount and color  - [place] appropriate cooling/warming therapies per order  - Administer medications as ordered  - Instruct and encourage patient and family to use good hand hygiene technique  - Identify and instruct in appropriate isolation precautions for identified infection/condition  Outcome: Progressing     Problem: SAFETY ADULT  Goal: Patient will remain free of falls  Description: INTERVENTIONS:  - Educate patient/family on patient safety including physical limitations  - Instruct patient to call for assistance with activity   - Consult OT/PT to assist with strengthening/mobility   - Keep Call bell within reach  - Keep bed low and locked with side rails adjusted as appropriate  - Keep care items and personal belongings within reach  - Initiate and maintain comfort rounds  - Make Fall Risk Sign visible to staff  - Offer Toileting every 2 Hours, in advance of need  - Initiate/Maintain bed/chair alarm  - Obtain necessary fall risk management equipment: bed/chair alarm,  nonskid socks   - Apply yellow socks and bracelet for high fall risk patients  - Consider moving patient to room near nurses station  Outcome: Progressing  Goal: Maintain or return to baseline ADL function  Description: INTERVENTIONS:  -  Assess patient's ability to carry out ADLs; assess patient's baseline for ADL function and identify physical deficits which impact ability to perform ADLs (bathing, care of mouth/teeth, toileting, grooming, dressing, etc.)  - Assess/evaluate cause of self-care deficits   - Assess range of motion  - Assess patient's mobility; develop plan if impaired  - Assess patient's need for assistive devices and provide as appropriate  - Encourage maximum independence but intervene and supervise when necessary  - Involve family in performance of ADLs  - Assess for home care needs following discharge   - Consider OT consult to assist with ADL evaluation and planning for discharge  - Provide patient education as appropriate  Outcome: Progressing  Goal: Maintains/Returns to pre admission functional level  Description: INTERVENTIONS:  - Perform AM-PAC 6 Click Basic Mobility/ Daily Activity assessment daily.  - Set and communicate daily mobility goal to care team and patient/family/caregiver.   - Collaborate with rehabilitation services on mobility goals if consulted  - Perform Range of Motion 3 times a day.  - Reposition patient every 3 hours.  - Dangle patient 3 times a day  - Stand patient 3 times a day  - Ambulate patient 3 times a day  - Out of bed to chair 3 times a day   - Out of bed for meals 3 times a day  - Out of bed for toileting  - Record patient progress and toleration of activity level   Outcome: Progressing     Problem: DISCHARGE PLANNING  Goal: Discharge to home or other facility with appropriate resources  Description: INTERVENTIONS:  - Identify barriers to discharge w/patient and caregiver  - Arrange for needed discharge resources and transportation as appropriate  - Identify  discharge learning needs (meds, wound care, etc.)  - Arrange for interpretive services to assist at discharge as needed  - Refer to Case Management Department for coordinating discharge planning if the patient needs post-hospital services based on physician/advanced practitioner order or complex needs related to functional status, cognitive ability, or social support system  Outcome: Progressing     Problem: Knowledge Deficit  Goal: Patient/family/caregiver demonstrates understanding of disease process, treatment plan, medications, and discharge instructions  Description: Complete learning assessment and assess knowledge base.  Interventions:  - Provide teaching at level of understanding  - Provide teaching via preferred learning methods  Outcome: Progressing     Problem: Prexisting or High Potential for Compromised Skin Integrity  Goal: Skin integrity is maintained or improved  Description: INTERVENTIONS:  - Identify patients at risk for skin breakdown  - Assess and monitor skin integrity  - Assess and monitor nutrition and hydration status  - Monitor labs   - Assess for incontinence   - Turn and reposition patient  - Assist with mobility/ambulation  - Relieve pressure over bony prominences  - Avoid friction and shearing  - Provide appropriate hygiene as needed including keeping skin clean and dry  - Evaluate need for skin moisturizer/barrier cream  - Collaborate with interdisciplinary team   - Patient/family teaching  - Consider wound care consult   Outcome: Progressing

## 2023-12-25 NOTE — ASSESSMENT & PLAN NOTE
Check a folate level and vitamin B12 level  Follow the CBC  Transfuse for a hemoglobin less than 7 g/dl     Latest Reference Range & Units 12/20/23 16:18   Iron 50 - 212 ug/dL 679 (H)   Ferritin 24 - 336 ng/mL 468 (H)   Iron Saturation  See Comment   TIBC 250 - 450 ug/dL <734 (H)   UIBC 155 - 355 ug/dL <55 (L)   (H): Data is abnormally high  (L): Data is abnormally low

## 2023-12-25 NOTE — ASSESSMENT & PLAN NOTE
Present on admission  Likely due to the COVID-19 virus infection  Monitor his mental status closely

## 2023-12-25 NOTE — ASSESSMENT & PLAN NOTE
Lab Results   Component Value Date    EGFR 21 12/21/2023    EGFR 17 12/20/2023    EGFR 16 12/19/2023    CREATININE 2.55 (H) 12/21/2023    CREATININE 3.06 (H) 12/20/2023    CREATININE 3.13 (H) 12/19/2023     Baseline serum creatinine 2.5-3.0 mg/dl  Avoid all nephrotoxic agents  Serial laboratory testing to monitor the patient's renal function and electrolyte levels  Outpatient follow-up with Nephrology

## 2023-12-25 NOTE — PROGRESS NOTES
[facility]  Progress Note  Name: Les Quiñonez I  MRN: [id number]  Unit/Bed#: 413-01 I Date of Admission: 12/19/2023   Date of Service: 12/25/2023 I Hospital Day: 6    Assessment/Plan   * Acute metabolic encephalopathy  Assessment & Plan  Present on admission  Likely due to the COVID-19 virus infection  Monitor his mental status closely    Ambulatory dysfunction  Assessment & Plan  PT/OT evaluations  Post-acute care rehabilitation placement is recommended upon discharge.    COVID-19 virus infection  Assessment & Plan  Normal oxygen saturation levels on room air  Continue to monitor his respiratory status closely    CKD (chronic kidney disease) stage 4, GFR 15-29 ml/min ([facility])  Assessment & Plan  Lab Results   Component Value Date    EGFR 21 12/21/2023    EGFR 17 12/20/2023    EGFR 16 12/19/2023    CREATININE 2.55 (H) 12/21/2023    CREATININE 3.06 (H) 12/20/2023    CREATININE 3.13 (H) 12/19/2023     Baseline serum creatinine 2.5-3.0 mg/dl  Avoid all nephrotoxic agents  Serial laboratory testing to monitor the patient's renal function and electrolyte levels  Outpatient follow-up with Nephrology    Macrocytic anemia  Assessment & Plan  Check a folate level and vitamin B12 level  Follow the CBC  Transfuse for a hemoglobin less than 7 g/dl     Latest Reference Range & Units 12/20/23 16:18   Iron 50 - 212 ug/dL 679 (H)   Ferritin 24 - 336 ng/mL 468 (H)   Iron Saturation  See Comment   TIBC 250 - 450 ug/dL <734 (H)   UIBC 155 - 355 ug/dL <55 (L)   (H): Data is abnormally high  (L): Data is abnormally low    Paroxysmal atrial fibrillation (HCC)  Assessment & Plan  Continue Eliquis 2.5 mg PO BID  Continue metoprolol tartrate 100 mg PO BID        VTE Pharmacologic Prophylaxis: VTE Score: 7 High Risk (Score >/= 5) - Pharmacological DVT Prophylaxis Ordered: apixaban (Eliquis). Sequential Compression Devices Ordered.    Mobility:   Basic Mobility Inpatient Raw Score: 14  -[facility] Goal: 4: Move to  chair/commode  -HLM Achieved: 4: Move to chair/commode  HLM Goal achieved. Continue to encourage appropriate mobility.    Patient Centered Rounds: I performed bedside rounds with nursing staff today.     Total Time Spent on Date of Encounter in care of patient: 25 mins. This time was spent on one or more of the following: performing physical exam; counseling and coordination of care; obtaining or reviewing history; documenting in the medical record; reviewing/ordering tests, medications or procedures; communicating with other healthcare professionals and discussing with patient's family/caregivers.    Current Length of Stay: 6 day(s)  Current Patient Status: Inpatient   Certification Statement: The patient will continue to require additional inpatient hospital stay due to the need for close respiratory status monitoring and for close neurologic status monitoring.  Discharge Plan: Anticipate discharge in 24-48 hrs to rehab facility.    Code Status: Level 3 - DNAR and DNI    Subjective:   The patient was seen and examined.  The patient is doing better.  No chest pain.  No shortness of breath.  No abdominal pain.  No nausea or vomiting.      Objective:     Vitals:   Temp (24hrs), Av °F (36.7 °C), Min:97.5 °F (36.4 °C), Max:98.3 °F (36.8 °C)    Temp:  [97.5 °F (36.4 °C)-98.3 °F (36.8 °C)] 97.5 °F (36.4 °C)  HR:  [60-61] 60  Resp:  [15-18] 18  BP: ()/(52-59) 97/59  SpO2:  [96 %-97 %] 97 %  Body mass index is 26.35 kg/m².     Input and Output Summary (last 24 hours):     Intake/Output Summary (Last 24 hours) at 2023 1108  Last data filed at 2023 0832  Gross per 24 hour   Intake 120 ml   Output 150 ml   Net -30 ml       Physical Exam:   Physical Exam  General:  NAD, follows commands  HEENT:  NC/AT, mucous membranes moist  Neck:  Supple, No JVP elevation  CV:  + S1, + S2, RRR  Pulm:  Lung fields are CTA bilaterally  Abd:  Soft, Non-tender, Non-distended  Ext:  No clubbing/cyanosis/edema  Skin:  No  rashes  Neuro:  Awake, alert, oriented  Psych:  Normal mood and affect      Additional Data:    Labs:  Results from last 7 days   Lab Units 12/21/23  0906 12/21/23  0552 12/19/23  2150 12/19/23  0934   WBC Thousand/uL  --  6.02   < > 8.61   HEMOGLOBIN g/dL 11.2* 10.7*   < > 12.2   HEMATOCRIT % 36.5 34.8*   < > 38.4   PLATELETS Thousands/uL  --  119*   < > 162   NEUTROS PCT %  --   --   --  82*   LYMPHS PCT %  --   --   --  7*   MONOS PCT %  --   --   --  11   EOS PCT %  --   --   --  0    < > = values in this interval not displayed.     Results from last 7 days   Lab Units 12/21/23  0552 12/20/23  0504   SODIUM mmol/L 140 140   POTASSIUM mmol/L 3.8 4.1   CHLORIDE mmol/L 102 98   CO2 mmol/L 25 31   BUN mg/dL 58* 62*   CREATININE mg/dL 2.55* 3.06*   ANION GAP mmol/L 13 11   CALCIUM mg/dL 8.2* 8.8   ALBUMIN g/dL  --  3.9   TOTAL BILIRUBIN mg/dL  --  0.41   ALK PHOS U/L  --  82   ALT U/L  --  18   AST U/L  --  46*   GLUCOSE RANDOM mg/dL 49* 87     Results from last 7 days   Lab Units 12/19/23  0934   INR  1.37*     Results from last 7 days   Lab Units 12/23/23  0751 12/23/23  0742   POC GLUCOSE mg/dl 89 87         Results from last 7 days   Lab Units 12/20/23  0504 12/19/23  0934   LACTIC ACID mmol/L  --  1.7   PROCALCITONIN ng/ml 0.55* 0.55*       Lines/Drains:  Invasive Devices       Peripheral Intravenous Line  Duration             Peripheral IV 12/23/23 Dorsal (posterior);Left Forearm 1 day                          Imaging: No pertinent imaging reviewed.    Recent Cultures (last 7 days):   Results from last 7 days   Lab Units 12/19/23  0955 12/19/23  0934   BLOOD CULTURE  No Growth After 5 Days. No Growth After 5 Days.       Last 24 Hours Medication List:   Current Facility-Administered Medications   Medication Dose Route Frequency Provider Last Rate    acetaminophen  975 mg Oral Q6H PRN Len Wayne MD      allopurinol  100 mg Oral Daily Len Wayne MD      apixaban  2.5 mg Oral BID Mauricio Ruelas  Counts, DO      cholecalciferol  2,000 Units Oral Daily Len Wayne MD      gabapentin  200 mg Oral HS Mauricio Ruelas Counts, DO      loperamide  2 mg Oral Q4H PRN Mauricio Ruelas Counts, DO      metoprolol tartrate  100 mg Oral BID Len Wayne MD      ondansetron  4 mg Oral Q6H PRN Len Wayne MD      pantoprazole  40 mg Oral BID AC Mauricio Ruelas Counts, DO      polyethylene glycol  17 g Oral Daily PRN Len Wayne MD      pravastatin  40 mg Oral Daily With Dinner Len Wayne MD      senna  8.6 mg Oral HS Len Wayne MD      torsemide  40 mg Oral Daily Len Wayne MD          Today, Patient Was Seen By: Bandar Rowell DO    **Please Note: This note may have been constructed using a voice recognition system.**

## 2023-12-26 VITALS
SYSTOLIC BLOOD PRESSURE: 119 MMHG | TEMPERATURE: 98.5 F | BODY MASS INDEX: 26.4 KG/M2 | HEIGHT: 67 IN | OXYGEN SATURATION: 95 % | WEIGHT: 168.21 LBS | DIASTOLIC BLOOD PRESSURE: 60 MMHG | HEART RATE: 73 BPM | RESPIRATION RATE: 18 BRPM

## 2023-12-26 PROBLEM — R79.89 ELEVATED D-DIMER: Status: ACTIVE | Noted: 2023-12-26

## 2023-12-26 PROBLEM — J98.11 ATELECTASIS: Status: ACTIVE | Noted: 2023-12-26

## 2023-12-26 PROBLEM — E83.39 HYPOPHOSPHATEMIA: Status: ACTIVE | Noted: 2023-12-26

## 2023-12-26 PROBLEM — E83.42 HYPOMAGNESEMIA: Status: ACTIVE | Noted: 2023-12-26

## 2023-12-26 PROBLEM — E55.9 VITAMIN D DEFICIENCY: Status: ACTIVE | Noted: 2023-12-26

## 2023-12-26 LAB
ALBUMIN SERPL BCP-MCNC: 3.4 G/DL (ref 3.5–5)
ALP SERPL-CCNC: 68 U/L (ref 34–104)
ALT SERPL W P-5'-P-CCNC: 10 U/L (ref 7–52)
ANION GAP SERPL CALCULATED.3IONS-SCNC: 9 MMOL/L
AST SERPL W P-5'-P-CCNC: 16 U/L (ref 13–39)
BASOPHILS # BLD AUTO: 0.01 THOUSANDS/ÂΜL (ref 0–0.1)
BASOPHILS NFR BLD AUTO: 0 % (ref 0–1)
BILIRUB SERPL-MCNC: 0.71 MG/DL (ref 0.2–1)
BUN SERPL-MCNC: 45 MG/DL (ref 5–25)
CALCIUM ALBUM COR SERPL-MCNC: 8.6 MG/DL (ref 8.3–10.1)
CALCIUM SERPL-MCNC: 8.1 MG/DL (ref 8.4–10.2)
CHLORIDE SERPL-SCNC: 94 MMOL/L (ref 96–108)
CO2 SERPL-SCNC: 31 MMOL/L (ref 21–32)
CREAT SERPL-MCNC: 2.71 MG/DL (ref 0.6–1.3)
CRP SERPL QL: 33.4 MG/L
D DIMER PPP FEU-MCNC: 0.68 UG/ML FEU
EOSINOPHIL # BLD AUTO: 0.28 THOUSAND/ÂΜL (ref 0–0.61)
EOSINOPHIL NFR BLD AUTO: 4 % (ref 0–6)
ERYTHROCYTE [DISTWIDTH] IN BLOOD BY AUTOMATED COUNT: 12.8 % (ref 11.6–15.1)
FOLATE SERPL-MCNC: >22.3 NG/ML
GFR SERPL CREATININE-BSD FRML MDRD: 20 ML/MIN/1.73SQ M
GLUCOSE SERPL-MCNC: 89 MG/DL (ref 65–140)
HCT VFR BLD AUTO: 32 % (ref 36.5–49.3)
HGB BLD-MCNC: 10.6 G/DL (ref 12–17)
IMM GRANULOCYTES # BLD AUTO: 0.07 THOUSAND/UL (ref 0–0.2)
IMM GRANULOCYTES NFR BLD AUTO: 1 % (ref 0–2)
LYMPHOCYTES # BLD AUTO: 1.18 THOUSANDS/ÂΜL (ref 0.6–4.47)
LYMPHOCYTES NFR BLD AUTO: 19 % (ref 14–44)
MAGNESIUM SERPL-MCNC: 1.4 MG/DL (ref 1.9–2.7)
MCH RBC QN AUTO: 33.1 PG (ref 26.8–34.3)
MCHC RBC AUTO-ENTMCNC: 33.1 G/DL (ref 31.4–37.4)
MCV RBC AUTO: 100 FL (ref 82–98)
MONOCYTES # BLD AUTO: 0.73 THOUSAND/ÂΜL (ref 0.17–1.22)
MONOCYTES NFR BLD AUTO: 11 % (ref 4–12)
NEUTROPHILS # BLD AUTO: 4.11 THOUSANDS/ÂΜL (ref 1.85–7.62)
NEUTS SEG NFR BLD AUTO: 65 % (ref 43–75)
NRBC BLD AUTO-RTO: 0 /100 WBCS
PHOSPHATE SERPL-MCNC: 1.8 MG/DL (ref 2.3–4.1)
PLATELET # BLD AUTO: 145 THOUSANDS/UL (ref 149–390)
PMV BLD AUTO: 11.1 FL (ref 8.9–12.7)
POTASSIUM SERPL-SCNC: 3.7 MMOL/L (ref 3.5–5.3)
PROCALCITONIN SERPL-MCNC: 0.27 NG/ML
PROT SERPL-MCNC: 5.6 G/DL (ref 6.4–8.4)
RBC # BLD AUTO: 3.2 MILLION/UL (ref 3.88–5.62)
SODIUM SERPL-SCNC: 134 MMOL/L (ref 135–147)
VIT B12 SERPL-MCNC: 517 PG/ML (ref 180–914)
WBC # BLD AUTO: 6.38 THOUSAND/UL (ref 4.31–10.16)

## 2023-12-26 PROCEDURE — 84100 ASSAY OF PHOSPHORUS: CPT | Performed by: INTERNAL MEDICINE

## 2023-12-26 PROCEDURE — 97110 THERAPEUTIC EXERCISES: CPT

## 2023-12-26 PROCEDURE — 85379 FIBRIN DEGRADATION QUANT: CPT | Performed by: INTERNAL MEDICINE

## 2023-12-26 PROCEDURE — 97530 THERAPEUTIC ACTIVITIES: CPT

## 2023-12-26 PROCEDURE — 84145 PROCALCITONIN (PCT): CPT | Performed by: INTERNAL MEDICINE

## 2023-12-26 PROCEDURE — 86140 C-REACTIVE PROTEIN: CPT | Performed by: INTERNAL MEDICINE

## 2023-12-26 PROCEDURE — 82746 ASSAY OF FOLIC ACID SERUM: CPT | Performed by: INTERNAL MEDICINE

## 2023-12-26 PROCEDURE — 97116 GAIT TRAINING THERAPY: CPT

## 2023-12-26 PROCEDURE — 82607 VITAMIN B-12: CPT | Performed by: INTERNAL MEDICINE

## 2023-12-26 PROCEDURE — 87081 CULTURE SCREEN ONLY: CPT | Performed by: INTERNAL MEDICINE

## 2023-12-26 PROCEDURE — 80053 COMPREHEN METABOLIC PANEL: CPT | Performed by: INTERNAL MEDICINE

## 2023-12-26 PROCEDURE — 83735 ASSAY OF MAGNESIUM: CPT | Performed by: INTERNAL MEDICINE

## 2023-12-26 PROCEDURE — 85025 COMPLETE CBC W/AUTO DIFF WBC: CPT | Performed by: INTERNAL MEDICINE

## 2023-12-26 PROCEDURE — 99239 HOSP IP/OBS DSCHRG MGMT >30: CPT | Performed by: INTERNAL MEDICINE

## 2023-12-26 RX ORDER — MELATONIN
2000 DAILY
Start: 2023-12-26

## 2023-12-26 RX ORDER — ATORVASTATIN CALCIUM 40 MG/1
40 TABLET, FILM COATED ORAL
Start: 2023-12-26

## 2023-12-26 RX ORDER — POLYETHYLENE GLYCOL 3350 17 G/17G
17 POWDER, FOR SOLUTION ORAL DAILY PRN
Start: 2023-12-26

## 2023-12-26 RX ORDER — PANTOPRAZOLE SODIUM 40 MG/1
40 TABLET, DELAYED RELEASE ORAL
Start: 2023-12-26

## 2023-12-26 RX ORDER — GABAPENTIN 100 MG/1
100 CAPSULE ORAL
Start: 2023-12-26

## 2023-12-26 RX ORDER — SENNOSIDES 8.6 MG
8.6 TABLET ORAL
Start: 2023-12-26

## 2023-12-26 RX ORDER — METOPROLOL TARTRATE 100 MG/1
100 TABLET ORAL 2 TIMES DAILY
Start: 2023-12-26

## 2023-12-26 RX ORDER — ASPIRIN 81 MG/1
81 TABLET, CHEWABLE ORAL DAILY
Status: DISCONTINUED | OUTPATIENT
Start: 2023-12-26 | End: 2023-12-26 | Stop reason: HOSPADM

## 2023-12-26 RX ORDER — MAGNESIUM SULFATE HEPTAHYDRATE 40 MG/ML
2 INJECTION, SOLUTION INTRAVENOUS ONCE
Status: COMPLETED | OUTPATIENT
Start: 2023-12-26 | End: 2023-12-26

## 2023-12-26 RX ORDER — ACETAMINOPHEN 325 MG/1
650 TABLET ORAL EVERY 6 HOURS PRN
Start: 2023-12-26

## 2023-12-26 RX ADMIN — PANTOPRAZOLE SODIUM 40 MG: 40 TABLET, DELAYED RELEASE ORAL at 05:12

## 2023-12-26 RX ADMIN — Medication 2000 UNITS: at 08:05

## 2023-12-26 RX ADMIN — ASPIRIN 81 MG: 81 TABLET, CHEWABLE ORAL at 12:30

## 2023-12-26 RX ADMIN — POTASSIUM PHOSPHATE, MONOBASIC POTASSIUM PHOSPHATE, DIBASIC 12 MMOL: 224; 236 INJECTION, SOLUTION, CONCENTRATE INTRAVENOUS at 10:00

## 2023-12-26 RX ADMIN — ALLOPURINOL 100 MG: 100 TABLET ORAL at 08:05

## 2023-12-26 RX ADMIN — MAGNESIUM SULFATE HEPTAHYDRATE 2 G: 40 INJECTION, SOLUTION INTRAVENOUS at 07:57

## 2023-12-26 RX ADMIN — METOPROLOL TARTRATE 100 MG: 100 TABLET, FILM COATED ORAL at 08:04

## 2023-12-26 RX ADMIN — APIXABAN 2.5 MG: 2.5 TABLET, FILM COATED ORAL at 08:01

## 2023-12-26 NOTE — DISCHARGE SUMMARY
[facility]  Discharge- Les Quiñonez 1935, 88 y.o. male MRN: [id number]  Unit/Bed#: 413-01 Encounter: [id number]  Primary Care Provider: Cristine Hill DO   Date and time admitted to hospital: 12/19/2023  8:50 AM    * Acute metabolic encephalopathy  Assessment & Plan  Present on admission  Likely due to the COVID-19 virus infection  The metabolic encephalopathy resolved by the time of discharge.    Ambulatory dysfunction  Assessment & Plan  PT and OT evaluations were completed during the hospitalization.  Post-acute care rehabilitation placement was recommended upon discharge.    COVID-19 virus infection  Assessment & Plan  Normal oxygen saturation levels on room air  Continue to monitor his respiratory status closely    CKD (chronic kidney disease) stage 4, GFR 15-29 ml/min ([facility])  Assessment & Plan  Lab Results   Component Value Date    EGFR 20 12/26/2023    EGFR 21 12/21/2023    EGFR 17 12/20/2023    CREATININE 2.71 (H) 12/26/2023    CREATININE 2.55 (H) 12/21/2023    CREATININE 3.06 (H) 12/20/2023     Baseline serum creatinine 2.5-3.0 mg/dl  Avoid all nephrotoxic agents  Monitor the patient's renal function and electrolyte levels after discharge with his PCP  Outpatient follow-up with Nephrology    Atelectasis  Assessment & Plan  Incentive spirometry was utilized during the hospitalization.    Elevated d-dimer  Assessment & Plan  Age-adjusted D-dimer level is within normal limits (0.01 x age)  Continue Eliquis 2.5 mg PO BID     Latest Reference Range & Units 12/26/23 05:28   D-Dimer, Quant <0.50 ug/ml FEU 0.68 (H)   (H): Data is abnormally high    Vitamin D deficiency  Assessment & Plan  Continue cholecalciferol 2000 I.U. PO Qdaily  Outpatient follow-up with PCP in regards to this matter    Hypophosphatemia  Assessment & Plan  Received potassium phosphate 12 mmoL IV x 1 dose on 12/26/2023  Follow the phosphorus level after discharge with his PCP    Hypomagnesemia  Assessment &  Plan  Received magnesium sulfate 2 grams IV x 1 dose on 12/26/2023  Follow the magnesium level after discharge with his PCP    Results from last 7 days   Lab Units 12/26/23  0528   MAGNESIUM mg/dL 1.4*         Macrocytic anemia  Assessment & Plan  Checked a folate level and vitamin B12 level, which are pending at the time of discharge  Follow the CBC after discharge with his PCP  Transfuse for a hemoglobin less than 7 g/dl     Latest Reference Range & Units 12/20/23 16:18   Iron 50 - 212 ug/dL 679 (H)   Ferritin 24 - 336 ng/mL 468 (H)   Iron Saturation  See Comment   TIBC 250 - 450 ug/dL <734 (H)   UIBC 155 - 355 ug/dL <55 (L)   (H): Data is abnormally high  (L): Data is abnormally low    Neuropathy  Assessment & Plan  Continue PO gabapentin at 100 mg PO QHS (renally-dosed)  Outpatient follow-up with PCP in regards to this matter    Hx of CABG  Assessment & Plan  Continue aspirin 81 mg PO Qdaily and metoprolol tartrate 100 mg PO BID  Change pravastatin to high-intensity statin therapy with atorvastatin 40 mg PO Qdaily with dinner  Outpatient follow-up with Cardiology      Paroxysmal atrial fibrillation (HCC)  Assessment & Plan  Continue Eliquis 2.5 mg PO BID  Continue metoprolol tartrate 100 mg PO BID  Outpatient follow-up with Cardiology      Chronic diastolic CHF (congestive heart failure) (HCC)  Assessment & Plan  Wt Readings from Last 3 Encounters:   12/19/23 76.3 kg (168 lb 3.4 oz)   12/06/23 79.8 kg (176 lb)   09/05/23 79.4 kg (175 lb)     Outpatient follow-up with Cardiology      Discharging Physician / Practitioner: Bandar Rowell DO  PCP: Cristine Hill DO  Admission Date:   Admission Orders (From admission, onward)       Ordered        12/20/23 0839  Inpatient Admission  Once            12/19/23 1211  Place in Observation  Once            12/19/23 1210  Inpatient Admission  Once,   Status:  Canceled                          Discharge Date: 12/26/23    Consultations During Hospital  Stay:  Gastroenterology    Procedures Performed:   None    Significant Findings / Test Results:   XR Trauma chest portable    Result Date: 12/19/2023  Impression: Linear right basal densities suggestive of subsegmental atelectasis or scarring. Workstation performed: [id number]     TRAUMA - CT head wo contrast    Result Date: 12/19/2023  Impression: No acute intracranial abnormality. Chronic intracranial findings, as above. Workstation performed: [id number]      EKG (12/19/2023):  Narrative & Impression    Ventricular-paced rhythm  Abnormal ECG  When compared with ECG of 20-MAR-2023 11:08,  Vent. rate has increased BY  14 BPM  Confirmed by Len Johnson ([id number]) on 12/19/2023 11:01:21 AM     Results from last 7 days   Lab Units 12/26/23  0528 12/21/23  0906 12/21/23  0552 12/20/23  1618 12/20/23  0504   WBC Thousand/uL 6.38  --  6.02  --  8.75   HEMOGLOBIN g/dL 10.6* 11.2* 10.7*   < > 12.3   HEMATOCRIT % 32.0* 36.5 34.8*   < > 39.6   PLATELETS Thousands/uL 145*  --  119*  --  148*    < > = values in this interval not displayed.     Results from last 7 days   Lab Units 12/26/23  0528 12/21/23  0552 12/20/23  0504   SODIUM mmol/L 134* 140 140   POTASSIUM mmol/L 3.7 3.8 4.1   CHLORIDE mmol/L 94* 102 98   CO2 mmol/L 31 25 31   BUN mg/dL 45* 58* 62*   CREATININE mg/dL 2.71* 2.55* 3.06*   CALCIUM mg/dL 8.1* 8.2* 8.8     Results from last 7 days   Lab Units 12/26/23  0528   MAGNESIUM mg/dL 1.4*     Results from last 7 days   Lab Units 12/26/23  0528 12/20/23  0504   ALK PHOS U/L 68 82   ALT U/L 10 18   AST U/L 16 46*     Microbiology Results (last 21 days)    Collected Updated Procedure Result Status Patient Facility Result Comment    12/19/2023 0955 12/24/2023 1301 Blood culture #2 [[id number]]   Blood from Arm, Right    Final result [facility]  Component Value   Blood Culture No Growth After 5 Days.             12/19/2023 0934 12/24/2023 1301 Blood culture #1 [[id number]]   Blood from Arm, Right     Final result [facility]  Component Value   Blood Culture No Growth After 5 Days.             12/19/2023 0934 12/19/2023 1024 FLU/RSV/COVID - if FLU/RSV clinically relevant [[id number]]    (Abnormal)   Nares from Nose    Final result [facility] - copy/paste COVID Guidelines URL to browser: [email or web address]   SARS-CoV-2 assay is a Nucleic Acid Amplification assay intended for the   qualitative detection of nucleic acid from SARS-CoV-2 in nasopharyngeal   swabs. Results are for the presumptive identification of SARS-CoV-2 RNA.   Positive results are indicative of infection with SARS-CoV-2, the virus   causing COVID-19, but do not rule out bacterial infection or co-infection   with other viruses. Laboratories within the [place] and its   territories are required to report all positive results to the appropriate   public health authorities. Negative results do not preclude SARS-CoV-2   infection and should not be used as the sole basis for treatment or other   patient management decisions. Negative results must be combined with   clinical observations, patient history, and epidemiological information.   This test has not been FDA cleared or approved.   This test has been authorized by FDA under an Emergency Use Authorization   (EUA). This test is only authorized for the duration of time the   declaration that circumstances exist justifying the authorization of the   emergency use of an in vitro diagnostic tests for detection of SARS-CoV-2   virus and/or diagnosis of COVID-19 infection under section 564(b)(1) of   the Act, 21 U.S.C. 360bbb-3(b)(1), unless the authorization is terminated   or revoked sooner. The test has been validated but independent review by FDA   and CLIA is pending.   Test performed using [employer]: This RT-PCR assay targets N2,   a region unique to  "SARS-CoV-2. A conserved region in the E-gene was chosen   for pan-Sarbecovirus detection which includes SARS-CoV-2.   According to CMS-2020-01-R, this platform meets the definition of high-throughput technology.    Component Value   SARS-CoV-2 Positive Abnormal    INFLUENZA A PCR Negative   INFLUENZA B PCR Negative   RSV PCR Negative        Incidental Findings:   As above    Test Results Pending at Discharge (will require follow-up):  Folate level, MRSA nasal culture, and Vitamin B12 level from 12/26/2023     Outpatient Tests Requested:  CBC with differential, CMP, Magnesium level, Phosphorus level, Ionized Calcium level, and Procalcitonin level in 1 day and weekly while at the SNF for post-acute rehabilitation    Complications:  None    Reason for Admission:  Acute metabolic encephalopathy    Hospital Course:   Les Quiñonez is a 88 y.o. male patient who originally presented to the hospital on 12/19/2023 due to confusion and difficulty with ambulation.    Please see above list of diagnoses and related plan for additional information.     Condition at Discharge: good    Discharge Day Visit / Exam:   Subjective:  The patient was seen and examined.  The patient is doing better.  No chest pain.  No shortness of breath.  No abdominal pain.  No nausea or vomiting.    Vitals: Blood Pressure: 119/60 (12/26/23 0756)  Pulse: 73 (12/26/23 0756)  Temperature: 98.5 °F (36.9 °C) (12/26/23 0756)  Temp Source: Oral (12/26/23 0756)  Respirations: 18 (12/26/23 0756)  Height: 5' 7\" (170.2 cm) (12/19/23 1357)  Weight - Scale: 76.3 kg (168 lb 3.4 oz) (12/19/23 1357)  SpO2: 95 % (12/26/23 0756)  Exam:   Physical Exam  General:  NAD, follows commands  HEENT:  NC/AT, mucous membranes moist  Neck:  Supple, No JVP elevation  CV:  + S1, + S2, RRR  Pulm:  Lung fields are CTA bilaterally  Abd:  Soft, Non-tender, Non-distended  Ext:  No clubbing/cyanosis/edema  Skin:  No rashes  Neuro:  Awake, alert, oriented  Psych:  Normal mood and " affect      Discharge instructions/Information to patient and family:  See after visit summary for information provided to patient and family.      Provisions for Follow-Up Care:  See after visit summary for information related to follow-up care and any pertinent home health orders.      Mobility at time of Discharge:   Basic Mobility Inpatient Raw Score: 14  JH-HLM Goal: 4: Move to chair/commode  JH-HLM Achieved: 4: Move to chair/commode  HLM Goal achieved. Continue to encourage appropriate mobility.     Disposition:   Other Skilled Nursing Facility at [facility] SNF for post-acute care rehabilitation     Discharge Statement:  I spent 40 minutes discharging the patient. This time was spent on the day of discharge. I had direct contact with the patient on the day of discharge. Greater than 50% of the total time was spent examining patient, answering all patient questions, arranging and discussing plan of care with patient as well as directly providing post-discharge instructions.  Additional time then spent on discharge activities.    Discharge Medications:  See after visit summary for reconciled discharge medications provided to patient and/or family.      **Please Note: This note may have been constructed using a voice recognition system**

## 2023-12-26 NOTE — ASSESSMENT & PLAN NOTE
Received potassium phosphate 12 mmoL IV x 1 dose on 12/26/2023  Follow the phosphorus level after discharge with his PCP

## 2023-12-26 NOTE — ASSESSMENT & PLAN NOTE
Continue aspirin 81 mg PO Qdaily and metoprolol tartrate 100 mg PO BID  Change pravastatin to high-intensity statin therapy with atorvastatin 40 mg PO Qdaily with dinner  Outpatient follow-up with Cardiology

## 2023-12-26 NOTE — CASE MANAGEMENT
[facility] has received approved authorization from insurance:   [employer]  Called in by Rep:taryn ANGULO#  [phone]  Authorization received for: SNF  Facility: [facility]   Authorization #:[id number]  Start of Care:12/23  Next Review Date:12/29  Continued Stay Care Coordinator: jose ANGULO#: [phone]   Submit next review to: fax [phone]   Care Manager notified: lorene case management email

## 2023-12-26 NOTE — PLAN OF CARE
Problem: PAIN - ADULT  Goal: Verbalizes/displays adequate comfort level or baseline comfort level  Description: Interventions:  - Encourage patient to monitor pain and request assistance  - Assess pain using appropriate pain scale  - Administer analgesics based on type and severity of pain and evaluate response  - Implement non-pharmacological measures as appropriate and evaluate response  - Consider cultural and social influences on pain and pain management  - Notify physician/advanced practitioner if interventions unsuccessful or patient reports new pain  Outcome: Progressing     Problem: INFECTION - ADULT  Goal: Absence or prevention of progression during hospitalization  Description: INTERVENTIONS:  - Assess and monitor for signs and symptoms of infection  - Monitor lab/diagnostic results  - Monitor all insertion sites, i.e. indwelling lines, tubes, and drains  - Monitor endotracheal if appropriate and nasal secretions for changes in amount and color  - [place] appropriate cooling/warming therapies per order  - Administer medications as ordered  - Instruct and encourage patient and family to use good hand hygiene technique  - Identify and instruct in appropriate isolation precautions for identified infection/condition  Outcome: Progressing     Problem: SAFETY ADULT  Goal: Patient will remain free of falls  Description: INTERVENTIONS:  - Educate patient/family on patient safety including physical limitations  - Instruct patient to call for assistance with activity   - Consult OT/PT to assist with strengthening/mobility   - Keep Call bell within reach  - Keep bed low and locked with side rails adjusted as appropriate  - Keep care items and personal belongings within reach  - Initiate and maintain comfort rounds  - Make Fall Risk Sign visible to staff  - Offer Toileting every 2 Hours, in advance of need  - Initiate/Maintain bed/chair alarm  - Obtain necessary fall risk management equipment: bed/chair alarm,  nonskid socks   - Apply yellow socks and bracelet for high fall risk patients  - Consider moving patient to room near nurses station  Outcome: Progressing

## 2023-12-26 NOTE — ASSESSMENT & PLAN NOTE
Present on admission  Likely due to the COVID-19 virus infection  The metabolic encephalopathy resolved by the time of discharge.

## 2023-12-26 NOTE — ASSESSMENT & PLAN NOTE
Lab Results   Component Value Date    EGFR 20 12/26/2023    EGFR 21 12/21/2023    EGFR 17 12/20/2023    CREATININE 2.71 (H) 12/26/2023    CREATININE 2.55 (H) 12/21/2023    CREATININE 3.06 (H) 12/20/2023     Baseline serum creatinine 2.5-3.0 mg/dl  Avoid all nephrotoxic agents  Monitor the patient's renal function and electrolyte levels after discharge with his PCP  Outpatient follow-up with Nephrology

## 2023-12-26 NOTE — CASE MANAGEMENT
Case Management Discharge Planning Note    Patient name Les Quiñonez  Location /413-01 MRN [id number]  : 1935 Date 2023       Current Admission Date: 2023  Current Admission Diagnosis:Acute metabolic encephalopathy   Patient Active Problem List    Diagnosis Date Noted    Hypomagnesemia 2023    Hypophosphatemia 2023    Macrocytic anemia 2023    Acute metabolic encephalopathy 2023    COVID-19 virus infection 2023    Neuropathy 2023    YOLANDE (acute kidney injury) ([facility]) 2023    Hyperphosphatemia 2023    Fracture of femoral neck, right ([facility]) 2023    Leukocytosis 2023    Pressure ulcer of right buttock, stage 2 ([facility]) 2022    Hyperkalemia 2022    Benign essential hypertension 10/16/2021    Dermatitis 10/16/2021    History of severe aortic stenosis 10/03/2021    Hx of CABG 10/03/2021    Chronic low back pain without sciatica 08/10/2021    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 kidney disease, or chronic kidney disease (HCC) 2021    Transaminitis 2021    Acute gout 2021    Mitral valve stenosis 2021    Multiple renal cysts 2021    Right hip pain 2021    Acute kidney injury superimposed on CKD  2021    Left wrist pain 2021    Iron deficiency anemia 10/15/2020    Need for immunization against influenza 10/15/2020    Pulmonary emphysema (HCC) 10/15/2020    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 2020    Presence of permanent cardiac pacemaker 2020    Oxygen dependent 2020    Paroxysmal atrial fibrillation (HCC) 08/10/2020    Other specified anemias 2020    Ambulatory dysfunction 2020    Complete heart block (HCC) 2020    Chronic diastolic CHF (congestive heart failure) (HCC) 2020    History of aortic valve replacement with  bioprosthetic valve 05/19/2019    History of stroke 05/19/2019    Medicare annual wellness visit, subsequent 08/14/2018    Lumbar degenerative disc disease 06/29/2017    Lumbar radiculopathy 06/29/2017    Obesity (BMI 30-39.9) 09/04/2014    Mitral regurgitation 06/11/2014    Acute blood loss anemia 06/10/2014    Mild intermittent asthma without complication 06/10/2014    CKD (chronic kidney disease) stage 4, GFR 15-29 ml/min ([facility]) 06/10/2014    Dyslipidemia 10/03/2012    Late effects of cerebrovascular disease 05/30/2012      LOS (days): 7  Geometric Mean LOS (GMLOS) (days): 4.9  Days to GMLOS:-1.2     OBJECTIVE:  Risk of Unplanned Readmission Score: 24.26         Current admission status: Inpatient   Preferred Pharmacy:   [facility] [facility] [address]  [address] [place] [address] [address], [address]  [facility] [address]  Phone: [phone] Fax: [phone]    Primary Care Provider: Cristine Hill DO    Primary Insurance: [facility]  Secondary Insurance:     DISCHARGE DETAILS:    Discharge planning discussed with:: patient wife via phone     Comments - Freedom of Choice: agreeable to [facility] contacted family/caregiver?: Yes  Were Treatment Team discharge recommendations reviewed with patient/caregiver?: Yes  Did patient/caregiver verbalize understanding of patient care needs?: Yes  Were patient/caregiver advised of the risks associated with not following Treatment Team discharge recommendations?: Yes    Contacts  Contact Method: Phone  Reason/Outcome: Discharge Planning      Other Referral/Resources/Interventions Provided:  Interventions: Short Term Rehab    Would you like to participate in our [facility] service program?  : No - Declined    Treatment Team Recommendation: Short Term Rehab  Discharge Destination Plan:: Short Term Rehab       IMM Given (Date):: 12/26/23  IMM Given to:: Family (reviewed with wife via phone)   Patient ready for discharge to [facility]  center for STR today. Spoke with wife and updated via phone. Meghan at [facility] provided with insurance auth information via aidin. Provided primary nurse with medical necessity form/report number.

## 2023-12-26 NOTE — ASSESSMENT & PLAN NOTE
Wt Readings from Last 3 Encounters:   12/19/23 76.3 kg (168 lb 3.4 oz)   12/06/23 79.8 kg (176 lb)   09/05/23 79.4 kg (175 lb)     Outpatient follow-up with Cardiology

## 2023-12-26 NOTE — PHYSICAL THERAPY NOTE
PHYSICAL THERAPY NOTE          Patient Name: Les Quiñonez  Today's Date: 12/26/2023 12/26/23 1148   PT Last Visit   PT Visit Date 12/26/23   Note Type   Note Type Treatment   Pain Assessment   Pain Assessment Tool 0-10   Pain Score No Pain   Restrictions/Precautions   Weight Bearing Precautions Per Order No   Other Precautions   (Airborne/isolation; Droplet precautions; Multiple lines; Fall Risk)   General   Response to Previous Treatment Patient unable to report, no changes reported from family or staff   Family/Caregiver Present No   Cognition   Overall Cognitive Status Impaired   Arousal/Participation Alert;Cooperative   Following Commands Follows one step commands with increased time or repetition   Comments [place]   Subjective   Subjective States he's tired and just waking up.  Agreeable to therapy.   Bed Mobility   Additional Comments OOB in chair start/end PT session   Transfers   Sit to Stand 4  Minimal assistance   Additional items Assist x 1;Armrests;Increased time required;Verbal cues   Stand to Sit 4  Minimal assistance   Additional items Assist x 1;Armrests;Increased time required;Verbal cues   Stand pivot 4  Minimal assistance   Additional items Increased time required;Verbal cues   Additional Comments RW used. Tx training from recliner and chair in room x 5 reps with cues hand placement   Ambulation/Elevation   Gait pattern   (Excessively slow; Short stride; Foward flexed; Decreased foot clearance; Wide KRISTIN; Improper Weight shift; Poor UE support)   Gait Assistance 4  Minimal assist   Additional items Assist x 1;Verbal cues   Assistive Device Rolling walker   Distance 30' x 2   Balance   Static Sitting Fair +   Dynamic Sitting Fair +   Static Standing Fair -   Dynamic Standing Fair -   Ambulatory Fair -  (RW)   Endurance Deficit   Endurance Deficit Yes   Activity Tolerance   Activity Tolerance Patient limited by  fatigue   Exercises   Hip Flexion Sitting;20 reps;AROM;Bilateral   Hip Abduction Sitting;20 reps;AROM;Bilateral   Hip Adduction Sitting;20 reps;AROM;Bilateral   Knee AROM Long Arc Quad Sitting;20 reps;AROM;Bilateral   Ankle Pumps Sitting;20 reps;AROM;Bilateral   Assessment   Prognosis Good   Problem List   (Decreased strength; Decreased endurance; Impaired balance; Decreased mobility; Decreased cognition; Impaired judgement; Decreased safety awareness)   Assessment Pt. seen for PT treatment session this date with interventions consisting of  therapeutic exercises,  transfers and  gait training w/ emphasis on improving pt's ability to ambulate. Pt. Requiring frequent cues for sequence and safety. In comparison to previous session, Pt. With min improvements in activity tolerance.   Pt is in need of continued activity in PT to improve strength balance endurance mobility transfers and ambulation with return to maximize LOF. From PT/mobility standpoint, recommendation at time of d/c would be level I: Max resource intensity in order to promote return to PLOF and independence.   The patient's [facility] Basic Mobility Inpatient Short Form Raw Score is 16. A Raw score of less than or equal to 16 suggests the patient may benefit from discharge to post-acute rehabilitation services.  Please also refer to physical therapy recommendation for safe DC planning.   Goals   LTG Expiration Date 01/03/24   PT Treatment Day 2   Plan   Treatment/Interventions Functional transfer training;LE strengthening/ROM;Elevations;Therapeutic exercise;Endurance training;Bed mobility;Gait training;Spoke to nursing   Progress Progressing toward goals   PT Frequency 3-5x/wk   Discharge Recommendation   Rehab Resource Intensity Level, PT I (Maximum Resource Intensity)   AM-PAC Basic Mobility Inpatient   Turning in Flat Bed Without Bedrails 3   Lying on Back to Sitting on Edge of Flat Bed Without Bedrails 2   Moving Bed to Chair 3   Standing Up From Chair  Using Arms 3   Walk in Room 3   Climb 3-5 Stairs With Railing 2   Basic Mobility Inpatient Raw Score 16   Basic Mobility Standardized Score 38.32   Highest Level Of Mobility   -HLM Goal 5: Stand one or more mins   -HLM Achieved 7: Walk 25 feet or more   Education   Education Provided Mobility training   Patient Demonstrates verbal understanding;Reinforcement needed   End of Consult   Patient Position at End of Consult Bedside chair;Bed/Chair alarm activated;All needs within reach   End of Consult Comments discussed POC with PT

## 2023-12-26 NOTE — ASSESSMENT & PLAN NOTE
Checked a folate level and vitamin B12 level, which are pending at the time of discharge  Follow the CBC after discharge with his PCP  Transfuse for a hemoglobin less than 7 g/dl     Latest Reference Range & Units 12/20/23 16:18   Iron 50 - 212 ug/dL 679 (H)   Ferritin 24 - 336 ng/mL 468 (H)   Iron Saturation  See Comment   TIBC 250 - 450 ug/dL <734 (H)   UIBC 155 - 355 ug/dL <55 (L)   (H): Data is abnormally high  (L): Data is abnormally low

## 2023-12-26 NOTE — ASSESSMENT & PLAN NOTE
Continue PO gabapentin at 100 mg PO QHS (renally-dosed)  Outpatient follow-up with PCP in regards to this matter

## 2023-12-26 NOTE — ASSESSMENT & PLAN NOTE
Received magnesium sulfate 2 grams IV x 1 dose on 12/26/2023  Follow the magnesium level after discharge with his PCP    Results from last 7 days   Lab Units 12/26/23  0528   MAGNESIUM mg/dL 1.4*

## 2023-12-26 NOTE — ASSESSMENT & PLAN NOTE
PT and OT evaluations were completed during the hospitalization.  Post-acute care rehabilitation placement was recommended upon discharge.

## 2023-12-26 NOTE — ASSESSMENT & PLAN NOTE
Age-adjusted D-dimer level is within normal limits (0.01 x age)  Continue Eliquis 2.5 mg PO BID     Latest Reference Range & Units 12/26/23 05:28   D-Dimer, Quant <0.50 ug/ml FEU 0.68 (H)   (H): Data is abnormally high

## 2023-12-26 NOTE — CASE MANAGEMENT
Case Management Discharge Planning Note    Patient name Les Quiñonez  Location /413-01 MRN [id number]  : 1935 Date 2023       Current Admission Date: 2023  Current Admission Diagnosis:Acute metabolic encephalopathy   Patient Active Problem List    Diagnosis Date Noted    Hypomagnesemia 2023    Hypophosphatemia 2023    Macrocytic anemia 2023    Acute metabolic encephalopathy 2023    COVID-19 virus infection 2023    Neuropathy 2023    YOLANDE (acute kidney injury) ([facility]) 2023    Hyperphosphatemia 2023    Fracture of femoral neck, right ([facility]) 2023    Leukocytosis 2023    Pressure ulcer of right buttock, stage 2 ([facility]) 2022    Hyperkalemia 2022    Benign essential hypertension 10/16/2021    Dermatitis 10/16/2021    History of severe aortic stenosis 10/03/2021    Hx of CABG 10/03/2021    Chronic low back pain without sciatica 08/10/2021    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 kidney disease, or chronic kidney disease (HCC) 2021    Transaminitis 2021    Acute gout 2021    Mitral valve stenosis 2021    Multiple renal cysts 2021    Right hip pain 2021    Acute kidney injury superimposed on CKD  2021    Left wrist pain 2021    Iron deficiency anemia 10/15/2020    Need for immunization against influenza 10/15/2020    Pulmonary emphysema (HCC) 10/15/2020    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 2020    Presence of permanent cardiac pacemaker 2020    Oxygen dependent 2020    Paroxysmal atrial fibrillation (HCC) 08/10/2020    Other specified anemias 2020    Ambulatory dysfunction 2020    Complete heart block (HCC) 2020    Chronic diastolic CHF (congestive heart failure) (HCC) 2020    History of aortic valve replacement with  bioprosthetic valve 05/19/2019    History of stroke 05/19/2019    Medicare annual wellness visit, subsequent 08/14/2018    Lumbar degenerative disc disease 06/29/2017    Lumbar radiculopathy 06/29/2017    Obesity (BMI 30-39.9) 09/04/2014    Mitral regurgitation 06/11/2014    Acute blood loss anemia 06/10/2014    Mild intermittent asthma without complication 06/10/2014    CKD (chronic kidney disease) stage 4, GFR 15-29 ml/min ([facility]) 06/10/2014    Dyslipidemia 10/03/2012    Late effects of cerebrovascular disease 05/30/2012      LOS (days): 7  Geometric Mean LOS (GMLOS) (days): 4.9  Days to GMLOS:-1.1     OBJECTIVE:  Risk of Unplanned Readmission Score: 24.26         Current admission status: Inpatient   Preferred Pharmacy:   [facility] [address] [place], [place] [address] [address], [address]  [address] [place] [address] [address], [address] [facility] [address]  Phone: [phone] Fax: [phone]    Primary Care Provider: Cristine Hill DO    Primary Insurance: [employer]  Secondary Insurance:     DISCHARGE DETAILS:                                                                                                               Facility Insurance Auth Number: [id number]

## 2023-12-26 NOTE — PLAN OF CARE
Problem: PAIN - ADULT  Goal: Verbalizes/displays adequate comfort level or baseline comfort level  Description: Interventions:  - Encourage patient to monitor pain and request assistance  - Assess pain using appropriate pain scale  - Administer analgesics based on type and severity of pain and evaluate response  - Implement non-pharmacological measures as appropriate and evaluate response  - Consider cultural and social influences on pain and pain management  - Notify physician/advanced practitioner if interventions unsuccessful or patient reports new pain  Outcome: Progressing     Problem: INFECTION - ADULT  Goal: Absence or prevention of progression during hospitalization  Description: INTERVENTIONS:  - Assess and monitor for signs and symptoms of infection  - Monitor lab/diagnostic results  - Monitor all insertion sites, i.e. indwelling lines, tubes, and drains  - Monitor endotracheal if appropriate and nasal secretions for changes in amount and color  - [place] appropriate cooling/warming therapies per order  - Administer medications as ordered  - Instruct and encourage patient and family to use good hand hygiene technique  - Identify and instruct in appropriate isolation precautions for identified infection/condition  Outcome: Progressing     Problem: SAFETY ADULT  Goal: Patient will remain free of falls  Description: INTERVENTIONS:  - Educate patient/family on patient safety including physical limitations  - Instruct patient to call for assistance with activity   - Consult OT/PT to assist with strengthening/mobility   - Keep Call bell within reach  - Keep bed low and locked with side rails adjusted as appropriate  - Keep care items and personal belongings within reach  - Initiate and maintain comfort rounds  - Make Fall Risk Sign visible to staff  - Offer Toileting every 2 Hours, in advance of need  - Initiate/Maintain bed/chair alarm  - Obtain necessary fall risk management equipment: bed/chair alarm,  nonskid socks   - Apply yellow socks and bracelet for high fall risk patients  - Consider moving patient to room near nurses station  Outcome: Progressing  Goal: Maintain or return to baseline ADL function  Description: INTERVENTIONS:  -  Assess patient's ability to carry out ADLs; assess patient's baseline for ADL function and identify physical deficits which impact ability to perform ADLs (bathing, care of mouth/teeth, toileting, grooming, dressing, etc.)  - Assess/evaluate cause of self-care deficits   - Assess range of motion  - Assess patient's mobility; develop plan if impaired  - Assess patient's need for assistive devices and provide as appropriate  - Encourage maximum independence but intervene and supervise when necessary  - Involve family in performance of ADLs  - Assess for home care needs following discharge   - Consider OT consult to assist with ADL evaluation and planning for discharge  - Provide patient education as appropriate  Outcome: Progressing  Goal: Maintains/Returns to pre admission functional level  Description: INTERVENTIONS:  - Perform AM-PAC 6 Click Basic Mobility/ Daily Activity assessment daily.  - Set and communicate daily mobility goal to care team and patient/family/caregiver.   - Collaborate with rehabilitation services on mobility goals if consulted  - Perform Range of Motion 3 times a day.  - Reposition patient every 3 hours.  - Dangle patient 3 times a day  - Stand patient 3 times a day  - Ambulate patient 3 times a day  - Out of bed to chair 3 times a day   - Out of bed for meals 3 times a day  - Out of bed for toileting  - Record patient progress and toleration of activity level   Outcome: Progressing     Problem: DISCHARGE PLANNING  Goal: Discharge to home or other facility with appropriate resources  Description: INTERVENTIONS:  - Identify barriers to discharge w/patient and caregiver  - Arrange for needed discharge resources and transportation as appropriate  - Identify  discharge learning needs (meds, wound care, etc.)  - Arrange for interpretive services to assist at discharge as needed  - Refer to Case Management Department for coordinating discharge planning if the patient needs post-hospital services based on physician/advanced practitioner order or complex needs related to functional status, cognitive ability, or social support system  Outcome: Progressing     Problem: Knowledge Deficit  Goal: Patient/family/caregiver demonstrates understanding of disease process, treatment plan, medications, and discharge instructions  Description: Complete learning assessment and assess knowledge base.  Interventions:  - Provide teaching at level of understanding  - Provide teaching via preferred learning methods  Outcome: Progressing     Problem: Prexisting or High Potential for Compromised Skin Integrity  Goal: Skin integrity is maintained or improved  Description: INTERVENTIONS:  - Identify patients at risk for skin breakdown  - Assess and monitor skin integrity  - Assess and monitor nutrition and hydration status  - Monitor labs   - Assess for incontinence   - Turn and reposition patient  - Assist with mobility/ambulation  - Relieve pressure over bony prominences  - Avoid friction and shearing  - Provide appropriate hygiene as needed including keeping skin clean and dry  - Evaluate need for skin moisturizer/barrier cream  - Collaborate with interdisciplinary team   - Patient/family teaching  - Consider wound care consult   Outcome: Progressing

## 2023-12-26 NOTE — ASSESSMENT & PLAN NOTE
Continue cholecalciferol 2000 I.U. PO Qdaily  Outpatient follow-up with PCP in regards to this matter

## 2023-12-26 NOTE — PLAN OF CARE
Problem: PHYSICAL THERAPY ADULT  Goal: Performs mobility at highest level of function for planned discharge setting.  See evaluation for individualized goals.  Description: Treatment/Interventions: Functional transfer training, LE strengthening/ROM, Elevations, Therapeutic exercise, Endurance training, Bed mobility, Gait training          See flowsheet documentation for full assessment, interventions and recommendations.  Outcome: Progressing  Note: Prognosis: Good  Problem List:  (Decreased strength; Decreased endurance; Impaired balance; Decreased mobility; Decreased cognition; Impaired judgement; Decreased safety awareness)  Assessment: Pt. seen for PT treatment session this date with interventions consisting of  therapeutic exercises,  transfers and  gait training w/ emphasis on improving pt's ability to ambulate. Pt. Requiring frequent cues for sequence and safety. In comparison to previous session, Pt. With min improvements in activity tolerance.   Pt is in need of continued activity in PT to improve strength balance endurance mobility transfers and ambulation with return to maximize LOF. From PT/mobility standpoint, recommendation at time of d/c would be level I: Max resource intensity in order to promote return to PLOF and independence.   The patient's -[facility] Basic Mobility Inpatient Short Form Raw Score is 16. A Raw score of less than or equal to 16 suggests the patient may benefit from discharge to post-acute rehabilitation services.  Please also refer to physical therapy recommendation for safe DC planning.        Rehab Resource Intensity Level, PT: I (Maximum Resource Intensity)    See flowsheet documentation for full assessment.

## 2023-12-26 NOTE — ASSESSMENT & PLAN NOTE
Continue Eliquis 2.5 mg PO BID  Continue metoprolol tartrate 100 mg PO BID  Outpatient follow-up with Cardiology

## 2023-12-27 LAB — MRSA NOSE QL CULT: NORMAL

## 2023-12-27 NOTE — UTILIZATION REVIEW
NOTIFICATION OF ADMISSION DISCHARGE   This is a Notification of Discharge from [facility]. Please be advised that this patient has been discharge from our facility. Below you will find the admission and discharge date and time including the patient’s disposition.   UTILIZATION REVIEW CONTACT:  Ebenezer Byrd  Utilization   Network Utilization Review Department  Phone: [phone] x carefully listen to the prompts. All voicemails are confidential.  Email: [email or web address]     ADMISSION INFORMATION  PRESENTATION DATE: 12/19/2023  8:50 AM  OBERVATION ADMISSION DATE:   INPATIENT ADMISSION DATE: 12/19/23 12:10 PM   DISCHARGE DATE: 12/26/2023  2:46 PM   [facility]    Network Utilization Review Department  ATTENTION: Please call with any questions or concerns to [phone] and carefully listen to the prompts so that you are directed to the right person. All voicemails are confidential.   For Discharge needs, contact Care Management DC Support Team at [phone] opt. 2  Send all requests for admission clinical reviews, approved or denied determinations and any other requests to dedicated fax number below belonging to the campus where the patient is receiving treatment. List of dedicated fax numbers for the Facilities:  FACILITY NAME UR FAX NUMBER   ADMISSION DENIALS (Administrative/Medical Necessity) [phone]   DISCHARGE SUPPORT TEAM ([facility]) [phone]   PARENT CHILD HEALTH (Maternity/NICU/Pediatrics) [phone]   [facility] [phone]   [facility] [phone]   [facility] [phone]   General acute hospital [phone]   [facility] [phone]   [facility] [phone]   [facility] [phone]   [facility]  [phone]   [facility] [phone]   [facility] [phone]   [facility] [phone]

## 2024-01-08 ENCOUNTER — TRANSITIONAL CARE MANAGEMENT (OUTPATIENT)
Dept: FAMILY MEDICINE CLINIC | Facility: CLINIC | Age: 89
End: 2024-01-08

## 2024-01-09 ENCOUNTER — RA CDI HCC (OUTPATIENT)
Dept: OTHER | Facility: HOSPITAL | Age: 89
End: 2024-01-09

## 2024-01-09 ENCOUNTER — TELEPHONE (OUTPATIENT)
Dept: NEPHROLOGY | Facility: CLINIC | Age: 89
End: 2024-01-09

## 2024-01-09 NOTE — TELEPHONE ENCOUNTER
Called and spoke with Katherine (caretaker)she is aware Please let patient know I did not discontinue these medications. He was recently seen in the hospital at [facility] and medications discontinued at that time by the hospital providers. He needs follow up in the office and we can decide on medication management. Patient has appointment on 01/19/24

## 2024-01-09 NOTE — PROGRESS NOTES
HCC coding opportunities          Chart Reviewed number of suggestions sent to Provider: 1     Patients Insurance   I13.0  Medicare Insurance: [employer]age

## 2024-01-09 NOTE — TELEPHONE ENCOUNTER
Patient cesar Murphy Alek called stated you canceled patient Les Fofana magnesium oxide 400mg and torsemide 20mg. On 09/05/2023 and would like to know whyalexae would like you to call her @[phone]

## 2024-01-12 ENCOUNTER — APPOINTMENT (OUTPATIENT)
Dept: LAB | Facility: MEDICAL CENTER | Age: 89
End: 2024-01-12
Payer: COMMERCIAL

## 2024-01-12 ENCOUNTER — OFFICE VISIT (OUTPATIENT)
Dept: FAMILY MEDICINE CLINIC | Facility: CLINIC | Age: 89
End: 2024-01-12
Payer: COMMERCIAL

## 2024-01-12 VITALS
RESPIRATION RATE: 18 BRPM | DIASTOLIC BLOOD PRESSURE: 78 MMHG | BODY MASS INDEX: 29.16 KG/M2 | TEMPERATURE: 97.1 F | HEART RATE: 72 BPM | WEIGHT: 170.8 LBS | SYSTOLIC BLOOD PRESSURE: 126 MMHG | HEIGHT: 64 IN

## 2024-01-12 DIAGNOSIS — U07.1 COVID-19 VIRUS INFECTION: ICD-10-CM

## 2024-01-12 DIAGNOSIS — N18.9 ACUTE KIDNEY INJURY SUPERIMPOSED ON CKD: ICD-10-CM

## 2024-01-12 DIAGNOSIS — N17.9 ACUTE KIDNEY INJURY SUPERIMPOSED ON CKD: ICD-10-CM

## 2024-01-12 DIAGNOSIS — N25.81 SECONDARY HYPERPARATHYROIDISM (HCC): ICD-10-CM

## 2024-01-12 DIAGNOSIS — N18.4 CHRONIC RENAL INSUFFICIENCY, STAGE 4 (SEVERE) (HCC): ICD-10-CM

## 2024-01-12 DIAGNOSIS — I50.9 CHRONIC CONGESTIVE HEART FAILURE, UNSPECIFIED HEART FAILURE TYPE (HCC): ICD-10-CM

## 2024-01-12 DIAGNOSIS — N18.4 STAGE 4 CHRONIC KIDNEY DISEASE (HCC): ICD-10-CM

## 2024-01-12 DIAGNOSIS — J43.9 PULMONARY EMPHYSEMA, UNSPECIFIED EMPHYSEMA TYPE (HCC): ICD-10-CM

## 2024-01-12 DIAGNOSIS — G93.41 ACUTE METABOLIC ENCEPHALOPATHY: Primary | ICD-10-CM

## 2024-01-12 DIAGNOSIS — E55.9 VITAMIN D DEFICIENCY: ICD-10-CM

## 2024-01-12 DIAGNOSIS — G89.29 CHRONIC BILATERAL LOW BACK PAIN WITHOUT SCIATICA: ICD-10-CM

## 2024-01-12 DIAGNOSIS — I48.0 PAROXYSMAL ATRIAL FIBRILLATION (HCC): ICD-10-CM

## 2024-01-12 DIAGNOSIS — I50.32 CHRONIC DIASTOLIC (CONGESTIVE) HEART FAILURE (HCC): ICD-10-CM

## 2024-01-12 DIAGNOSIS — L89.312 PRESSURE ULCER OF RIGHT BUTTOCK, STAGE 2 (HCC): ICD-10-CM

## 2024-01-12 DIAGNOSIS — E83.42 HYPOMAGNESEMIA: ICD-10-CM

## 2024-01-12 DIAGNOSIS — M54.50 CHRONIC BILATERAL LOW BACK PAIN WITHOUT SCIATICA: ICD-10-CM

## 2024-01-12 PROBLEM — J98.11 ATELECTASIS: Status: RESOLVED | Noted: 2023-12-26 | Resolved: 2024-01-12

## 2024-01-12 PROBLEM — M10.9 ACUTE GOUT: Status: RESOLVED | Noted: 2021-07-31 | Resolved: 2024-01-12

## 2024-01-12 PROBLEM — Z95.1 HX OF CABG: Status: RESOLVED | Noted: 2021-10-03 | Resolved: 2024-01-12

## 2024-01-12 PROBLEM — R79.89 ELEVATED D-DIMER: Status: RESOLVED | Noted: 2023-12-26 | Resolved: 2024-01-12

## 2024-01-12 PROBLEM — D72.829 LEUKOCYTOSIS: Status: RESOLVED | Noted: 2023-02-08 | Resolved: 2024-01-12

## 2024-01-12 PROBLEM — E87.5 HYPERKALEMIA: Status: RESOLVED | Noted: 2022-06-01 | Resolved: 2024-01-12

## 2024-01-12 PROBLEM — Z99.81 OXYGEN DEPENDENT: Status: RESOLVED | Noted: 2020-08-11 | Resolved: 2024-01-12

## 2024-01-12 LAB
ALBUMIN SERPL BCP-MCNC: 3.4 G/DL (ref 3.5–5)
ALP SERPL-CCNC: 95 U/L (ref 34–104)
ALT SERPL W P-5'-P-CCNC: 8 U/L (ref 7–52)
ANION GAP SERPL CALCULATED.3IONS-SCNC: 5 MMOL/L
AST SERPL W P-5'-P-CCNC: 14 U/L (ref 13–39)
BASOPHILS # BLD AUTO: 0.03 THOUSANDS/ÂΜL (ref 0–0.1)
BASOPHILS NFR BLD AUTO: 0 % (ref 0–1)
BILIRUB SERPL-MCNC: 0.43 MG/DL (ref 0.2–1)
BUN SERPL-MCNC: 39 MG/DL (ref 5–25)
CALCIUM ALBUM COR SERPL-MCNC: 8.9 MG/DL (ref 8.3–10.1)
CALCIUM SERPL-MCNC: 8.4 MG/DL (ref 8.4–10.2)
CHLORIDE SERPL-SCNC: 104 MMOL/L (ref 96–108)
CO2 SERPL-SCNC: 29 MMOL/L (ref 21–32)
CREAT SERPL-MCNC: 2.46 MG/DL (ref 0.6–1.3)
EOSINOPHIL # BLD AUTO: 0.3 THOUSAND/ÂΜL (ref 0–0.61)
EOSINOPHIL NFR BLD AUTO: 4 % (ref 0–6)
ERYTHROCYTE [DISTWIDTH] IN BLOOD BY AUTOMATED COUNT: 13.2 % (ref 11.6–15.1)
GFR SERPL CREATININE-BSD FRML MDRD: 22 ML/MIN/1.73SQ M
GLUCOSE P FAST SERPL-MCNC: 106 MG/DL (ref 65–99)
HCT VFR BLD AUTO: 33.8 % (ref 36.5–49.3)
HGB BLD-MCNC: 10.6 G/DL (ref 12–17)
IMM GRANULOCYTES # BLD AUTO: 0.07 THOUSAND/UL (ref 0–0.2)
IMM GRANULOCYTES NFR BLD AUTO: 1 % (ref 0–2)
LYMPHOCYTES # BLD AUTO: 0.96 THOUSANDS/ÂΜL (ref 0.6–4.47)
LYMPHOCYTES NFR BLD AUTO: 12 % (ref 14–44)
MAGNESIUM SERPL-MCNC: 1.4 MG/DL (ref 1.9–2.7)
MCH RBC QN AUTO: 33 PG (ref 26.8–34.3)
MCHC RBC AUTO-ENTMCNC: 31.4 G/DL (ref 31.4–37.4)
MCV RBC AUTO: 105 FL (ref 82–98)
MONOCYTES # BLD AUTO: 0.64 THOUSAND/ÂΜL (ref 0.17–1.22)
MONOCYTES NFR BLD AUTO: 8 % (ref 4–12)
NEUTROPHILS # BLD AUTO: 5.96 THOUSANDS/ÂΜL (ref 1.85–7.62)
NEUTS SEG NFR BLD AUTO: 75 % (ref 43–75)
NRBC BLD AUTO-RTO: 0 /100 WBCS
PHOSPHATE SERPL-MCNC: 2.5 MG/DL (ref 2.3–4.1)
PLATELET # BLD AUTO: 295 THOUSANDS/UL (ref 149–390)
PMV BLD AUTO: 10.6 FL (ref 8.9–12.7)
POTASSIUM SERPL-SCNC: 4.7 MMOL/L (ref 3.5–5.3)
PROT SERPL-MCNC: 6.1 G/DL (ref 6.4–8.4)
PTH-INTACT SERPL-MCNC: 154.2 PG/ML (ref 12–88)
RBC # BLD AUTO: 3.21 MILLION/UL (ref 3.88–5.62)
SODIUM SERPL-SCNC: 138 MMOL/L (ref 135–147)
URATE SERPL-MCNC: 8 MG/DL (ref 3.5–8.5)
WBC # BLD AUTO: 7.96 THOUSAND/UL (ref 4.31–10.16)

## 2024-01-12 PROCEDURE — 83970 ASSAY OF PARATHORMONE: CPT

## 2024-01-12 PROCEDURE — 99495 TRANSJ CARE MGMT MOD F2F 14D: CPT | Performed by: INTERNAL MEDICINE

## 2024-01-12 PROCEDURE — 84100 ASSAY OF PHOSPHORUS: CPT

## 2024-01-12 PROCEDURE — 82306 VITAMIN D 25 HYDROXY: CPT

## 2024-01-12 PROCEDURE — 85025 COMPLETE CBC W/AUTO DIFF WBC: CPT

## 2024-01-12 PROCEDURE — 83735 ASSAY OF MAGNESIUM: CPT

## 2024-01-12 PROCEDURE — 80053 COMPREHEN METABOLIC PANEL: CPT

## 2024-01-12 PROCEDURE — 84550 ASSAY OF BLOOD/URIC ACID: CPT

## 2024-01-12 PROCEDURE — 36415 COLL VENOUS BLD VENIPUNCTURE: CPT

## 2024-01-12 RX ORDER — GABAPENTIN 100 MG/1
100 CAPSULE ORAL
Qty: 90 CAPSULE | Refills: 1 | Status: SHIPPED | OUTPATIENT
Start: 2024-01-12

## 2024-01-12 NOTE — PROGRESS NOTES
Name: Les Quiñonez      : 1935      MRN: [id number]  Encounter Provider: Cristine Hill DO  Encounter Date: 2024   Encounter department: [facility]    Assessment & Plan     1. Acute metabolic encephalopathy  Likely associated to recent covid infection/dehydration which are improved  He will get repeat Cmp and magnesium level today and has appt with Nephrology and cardio within the week     2. Hypomagnesemia  -     Comprehensive metabolic panel; Future  -     Magnesium; Future  Level was decreased at hospital and received IV med  His oral rx was held it appears as not on either dc list  Recheck level and assess     3. Chronic renal insufficiency, stage 4 (severe) (HCC)  -     Comprehensive metabolic panel; Future  -     Magnesium; Future  Level was higher on admission but seemed to return to his baseline Appears diuretic was held   Cmp today to reassess     4. Secondary hyperparathyroidism (HCC)  Has labs for today and nephro appt next week    5. Pressure ulcer of right buttock, stage 2 (HCC)  Improved/resolved     6. Pulmonary emphysema, unspecified emphysema type (HCC)  Stable no issues despite covid infection    7. Chronic diastolic (congestive) heart failure (HCC)  Pt denies sxs Unable to weight at home  Diuretic has been on hold but will reeval based on labs today     8. Paroxysmal atrial fibrillation (HCC)  Stable Continue AC longterm    9. Acute kidney injury superimposed on CKD   Repeat cmp today - his levels did improve to baseline prior to transfer to str    10. COVID-19 virus infection  Sxs resolved He has homecare and home PT/OT and is participating       Rto as scheduled     Subjective      HPI  Pt home after hospital and str stay He had covid and became weak when he got up and fell Family brought him to Er His kidney function was further elevated and patient wasmore confused likely due to covid infection He was transferred to [facility] for str and is now home for  past week and doing ok His wife and daughter care for him - he lives with his wife and his daughter is there often Has homecare and started home therapy He can walk with the walker to the bathroom Appeitte good Sacral wound closed No sob or chest pain NO cough Has cardio and nephro appts next week His daughter is not sure if he should be on diuretic or magnesium supplement that he had been on  TCM Call       Date and time call was made  1/8/2024  2:47 PM    Hospital care reviewed  Records reviewed    Patient was hospitialized at  --  [facility]    Comment  [facility]     Date of Admission  12/26/23    Date of discharge  01/05/24    Diagnosis  METABOLIC ENCEPHALOPATHY    Disposition  Home    Were the patients medications reviewed and updated  No    Current Symptoms  None          TCM Call       Post hospital issues  Reduced activity    Should patient be enrolled in anticoag monitoring?  No    Scheduled for follow up?  Yes  TCM 1/12    Not clinically warranted  pt received into short term rehab - [facility]    Patients specialists  Nephrologist    Nephrologist name  DR ZARCO    Did you obtain your prescribed medications  Yes    Do you need help managing your prescriptions or medications  No    Is transportation to your appointment needed  No    I have advised the patient to call PCP with any new or worsening symptoms  MARINA RAMIREZ-    Living Arrangements  Spouse or Significiant other; Family members    Support System  Family; Spouse    The type of support provided  Physical    Do you have social support  Yes, quite a bit    Are you recieving any outpatient services  No    Are you recieving home care services  Yes    Types of home care services  Home health aid    Are you using any community resources  No    Current waiver services  No    Have you fallen in the last 12 months  No    Interperter language line needed  No    Counseling  Patient            Review of Systems   Constitutional:   Positive for activity change. Negative for chills and fever.   HENT: Negative.     Eyes:  Negative for visual disturbance.   Respiratory:  Negative for cough and shortness of breath.    Cardiovascular:  Negative for chest pain, palpitations and leg swelling.   Gastrointestinal:  Negative for abdominal distention, abdominal pain, constipation and diarrhea.   Genitourinary: Negative.    Musculoskeletal:  Positive for arthralgias, back pain and gait problem.   Skin:  Negative for pallor and rash.   Neurological:  Negative for dizziness, light-headedness and headaches.   Psychiatric/Behavioral:  Negative for sleep disturbance. The patient is not nervous/anxious.        Current Outpatient Medications on File Prior to Visit   Medication Sig   • acetaminophen (TYLENOL) 325 mg tablet Take 2 tablets (650 mg total) by mouth every 6 (six) hours as needed for mild pain, headaches, fever or moderate pain Do not exceed a total of 3 grams of tylenol/acetaminophen in a 24-hour period.   • allopurinol (ZYLOPRIM) 100 mg tablet Take 1 tablet (100 mg total) by mouth daily   • apixaban (Eliquis) 2.5 mg Take 1 tablet (2.5 mg total) by mouth 2 (two) times a day   • aspirin (ECOTRIN LOW STRENGTH) 81 mg EC tablet Take 1 tablet (81 mg total) by mouth daily   • atorvastatin (LIPITOR) 40 mg tablet Take 1 tablet (40 mg total) by mouth daily with dinner In place of pravastatin, to better prevent a heart attack or stroke   • cholecalciferol (VITAMIN D3) 1,000 units tablet Take 2 tablets (2,000 Units total) by mouth daily For vitamin D deficiency   • gabapentin (NEURONTIN) 100 mg capsule Take 1 capsule (100 mg total) by mouth daily at bedtime   • metoprolol tartrate (LOPRESSOR) 100 mg tablet Take 1 tablet (100 mg total) by mouth 2 (two) times a day Hold for a HR<60 bpm or a SBP<110 mmHg   • pantoprazole (PROTONIX) 40 mg tablet Take 1 tablet (40 mg total) by mouth daily before breakfast   • polyethylene glycol (MIRALAX) 17 g packet Take 17 g by  "mouth daily as needed (constipation)   • senna (SENOKOT) 8.6 mg Take 1 tablet (8.6 mg total) by mouth daily at bedtime       Objective     /78   Pulse 72   Temp (!) 97.1 °F (36.2 °C) (Temporal)   Resp 18   Ht 5' 4\" (1.626 m)   Wt 77.5 kg (170 lb 12.8 oz)   BMI 29.32 kg/m²     Physical Exam  Vitals and nursing note reviewed.   Constitutional:       General: He is not in acute distress.     Appearance: Normal appearance. He is not ill-appearing, toxic-appearing or diaphoretic.   HENT:      Head: Normocephalic and atraumatic.      Right Ear: External ear normal.      Left Ear: External ear normal.      Nose: Nose normal.      Mouth/Throat:      Mouth: Mucous membranes are dry.   Eyes:      General: No scleral icterus.     Extraocular Movements: Extraocular movements intact.      Conjunctiva/sclera: Conjunctivae normal.      Pupils: Pupils are equal, round, and reactive to light.   Cardiovascular:      Rate and Rhythm: Normal rate. Rhythm irregular.      Pulses: Normal pulses.   Pulmonary:      Effort: Pulmonary effort is normal. No respiratory distress.      Breath sounds: Normal breath sounds. No wheezing.   Abdominal:      General: Bowel sounds are normal.      Palpations: Abdomen is soft.   Musculoskeletal:      Cervical back: Neck supple. No tenderness.      Right lower leg: Edema present.      Left lower leg: Edema present.      Comments: Trace ankle edema bilateral   Lymphadenopathy:      Cervical: No cervical adenopathy.   Skin:     General: Skin is warm and dry.      Coloration: Skin is pale. Skin is not jaundiced.      Findings: No erythema or rash.   Neurological:      General: No focal deficit present.      Mental Status: He is alert and oriented to person, place, and time. Mental status is at baseline.      Cranial Nerves: No cranial nerve deficit.      Gait: Gait abnormal.   Psychiatric:         Mood and Affect: Mood normal.         Behavior: Behavior normal.         Thought Content: Thought " content normal.         Judgment: Judgment normal.     Cristine Hill, DO

## 2024-01-13 ENCOUNTER — APPOINTMENT (OUTPATIENT)
Dept: LAB | Facility: MEDICAL CENTER | Age: 89
End: 2024-01-13
Payer: COMMERCIAL

## 2024-01-13 LAB
25(OH)D3 SERPL-MCNC: 23.4 NG/ML (ref 30–100)
BACTERIA UR QL AUTO: NORMAL /HPF
BILIRUB UR QL STRIP: NEGATIVE
BUDDING YEAST: PRESENT
CLARITY UR: CLEAR
COLOR UR: YELLOW
CREAT UR-MCNC: 132.6 MG/DL
GLUCOSE UR STRIP-MCNC: NEGATIVE MG/DL
HGB UR QL STRIP.AUTO: NEGATIVE
KETONES UR STRIP-MCNC: NEGATIVE MG/DL
LEUKOCYTE ESTERASE UR QL STRIP: NEGATIVE
MICROALBUMIN UR-MCNC: 8.6 MG/L
MICROALBUMIN/CREAT 24H UR: 6 MG/G CREATININE (ref 0–30)
NITRITE UR QL STRIP: NEGATIVE
NON-SQ EPI CELLS URNS QL MICRO: NORMAL /HPF
PH UR STRIP.AUTO: 5.5 [PH]
PROT UR STRIP-MCNC: NEGATIVE MG/DL
RBC #/AREA URNS AUTO: NORMAL /HPF
SP GR UR STRIP.AUTO: 1.02 (ref 1–1.03)
UROBILINOGEN UR STRIP-ACNC: <2 MG/DL
WBC #/AREA URNS AUTO: NORMAL /HPF

## 2024-01-13 PROCEDURE — 82570 ASSAY OF URINE CREATININE: CPT

## 2024-01-13 PROCEDURE — 82043 UR ALBUMIN QUANTITATIVE: CPT

## 2024-01-13 PROCEDURE — 81001 URINALYSIS AUTO W/SCOPE: CPT

## 2024-01-15 ENCOUNTER — TELEPHONE (OUTPATIENT)
Dept: FAMILY MEDICINE CLINIC | Facility: CLINIC | Age: 89
End: 2024-01-15

## 2024-01-15 NOTE — TELEPHONE ENCOUNTER
Pt has 2 small open areas on bottom, 1 on each cheek.  Is it ok for them to apply protectant?    Ok to leave voice mail for home health nurse

## 2024-01-22 NOTE — ANESTHESIA POSTPROCEDURE EVALUATION
How Severe Is Your Condition?: mild
Post-Op Assessment Note    CV Status:  Stable  Pain Score: 0    Pain management: adequate     Mental Status:  Alert and awake   Hydration Status:  Euvolemic   PONV Controlled:  Controlled   Airway Patency:  Patent      Post Op Vitals Reviewed: Yes      Staff: CRNA         No complications documented      BP   130/63   Temp      Pulse  60   Resp   16   SpO2   100
No

## 2024-02-07 NOTE — PROGRESS NOTES
Cardiology Follow Up    Les Quiñonez  1935  [id number]  [facility] [address] [id number] [phone]    1. 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        2. Paroxysmal atrial fibrillation (HCC)        3. Chronic diastolic CHF (congestive heart failure) (HCC)  torsemide (DEMADEX) 20 mg tablet    Basic metabolic panel      4. Complete heart block (HCC)        5. Presence of permanent cardiac pacemaker        6. Nonrheumatic mitral valve regurgitation        7. Nonrheumatic mitral valve stenosis        8. History of severe aortic stenosis        9. History of aortic valve replacement with bioprosthetic valve        10. Benign essential hypertension        11. Dyslipidemia            Discussion/Summary:  He presents to the office today for routine follow up.    He was hospitalized in December 2023 with encephalopathy/COVID-19 infection. During this time his torsemide was discontinued due to elevated creatinine and poor oral intake. His diuretics were not resumed. He has significant bilateral lower extremity edema on exam today. I will resume his prior regimen of torsemide 40 mg daily. Check BMP in 1-2 weeks.     His coronary artery disease is stable without symptoms of angina. Continue on aspirin, statin, and beta-blocker therapy.     His last echo from February 2023 showed preserved LV systolic function. Previously placed bioprosthetic valve was not well visualized but gradients were within normal range. He also has moderate mitral and tricuspid valve regurgitation as well as mild mitral stenosis.      His pacemaker was interrogated in the office today. He has been persistently in atrial fibrillation per device checks since 2022. He is asymptomatic and rate controlled. He will continue metoprolol tartrate 100 mg BID. He will remain on  anticoagulation with Eliquis 2.5 mg BID (age, renal function).      His blood pressure is mildly elevated but I expect this will improve with diuresis.     His LDL is at goal on current statin regimen.      He will RTO in 6 months with Dr. Brower or sooner if necessary. He will call with any concerns.     Interval History:   Les Quiñonez is a 88 y.o. male with coronary artery disease s/p CABG, severe aortic stenosis s/p aortic valve replacement, complete heart block s/p pacemaker placement in July 2020, paroxysmal atrial fibrillation on anticoagulation with Eliquis - complicated by GI bleeding, chronic diastolic congestive heart failure, hypertension, dyslipidemia, and CKD Stage IV who presents to the office today for routine follow up     Since his last office visit he was hospitalized with COVID-19 in December. During this time his torsemide was discontinued given elevated creatinine. It was not resumed.    He presents today with his wife and daughter who help provide some of the history.  Since discontinuation of his diuretic he has had progressively worsening bilateral lower extremity edema.  He is sedentary but with the activity he performs he has not noticed any shortness of breath or chest pain/pressure/discomfort.  He denies any orthopnea or PND.  Denies lightheadedness, dizziness, or recent falls.  He denies palpitations.  He has been taking all medications as prescribed.    Medical Problems       Problem List       Acute blood loss anemia    Mild intermittent asthma without complication    CKD (chronic kidney disease) stage 4, GFR 15-29 ml/min ([facility])    Lab Results   Component Value Date    EGFR 22 01/12/2024    EGFR 20 12/26/2023    EGFR 21 12/21/2023    CREATININE 2.46 (H) 01/12/2024    CREATININE 2.71 (H) 12/26/2023    CREATININE 2.55 (H) 12/21/2023         Dyslipidemia    Late effects of cerebrovascular disease    Lumbar degenerative disc disease    Lumbar radiculopathy    Mitral regurgitation     Overview Signed 8/14/2018  9:07 AM by Cristine Hill DO     Description: echo 04- - moderate         Obesity (BMI 30-39.9)    Medicare annual wellness visit, subsequent    History of aortic valve replacement with bioprosthetic valve    History of stroke    Complete heart block (HCC)    Chronic diastolic CHF (congestive heart failure) (HCC)    Wt Readings from Last 3 Encounters:   02/08/24 81.2 kg (179 lb)   01/12/24 77.5 kg (170 lb 12.8 oz)   12/19/23 76.3 kg (168 lb 3.4 oz)                 Ambulatory dysfunction    Paroxysmal atrial fibrillation (HCC)    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    Presence of permanent cardiac pacemaker    Iron deficiency anemia    Need for immunization against influenza    Pulmonary emphysema (HCC)    Left wrist pain    Right hip pain    Acute kidney injury superimposed on CKD     Lab Results   Component Value Date    EGFR 22 01/12/2024    EGFR 20 12/26/2023    EGFR 21 12/21/2023    CREATININE 2.46 (H) 01/12/2024    CREATININE 2.71 (H) 12/26/2023    CREATININE 2.55 (H) 12/21/2023         Multiple renal cysts    Mitral valve stenosis    Transaminitis    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 kidney disease, or chronic kidney disease (HCC)    Overview Signed 8/6/2021  5:04 AM by Chante Ochoa     Per CMS ICD 10 Guidelines--per Physician         Wt Readings from Last 3 Encounters:   02/08/24 81.2 kg (179 lb)   01/12/24 77.5 kg (170 lb 12.8 oz)   12/19/23 76.3 kg (168 lb 3.4 oz)                 Chronic low back pain without sciatica    History of severe aortic stenosis    Benign essential hypertension    Dermatitis    Pressure ulcer of right buttock, stage 2 (HCC)    Fracture of femoral neck, right (HCC)    Hyperphosphatemia    YOLANDE (acute kidney injury) (HCC)    Acute metabolic encephalopathy    COVID-19 virus infection    Neuropathy    Macrocytic anemia     Hypomagnesemia    Hypophosphatemia    Vitamin D deficiency    Secondary hyperparathyroidism (HCC)        Past Medical History:   Diagnosis Date    Aortic stenosis     ECHO -4-14-14. MODERATE TO SEVERE AORTIC STENOSIS; MILD AROTIC INSUFFICIENCY - LAST ASSESSED 10/26/16; RESOLVED 6/8/16    Aortic valve disorder     LAST ASSESSED 10/8/15; 10/8/15    Arthritis     CHF (congestive heart failure) ([facility])     Complete heart block ([facility]) 07/20/2020    Coronary artery disease     Hx of CABG     Hypertension     Hypertensive urgency 05/19/2019    Pulmonary emphysema ([facility]) 10/15/2020    Renal disorder     TIA (transient ischemic attack)     Transient cerebral ischemia      Social History     Socioeconomic History    Marital status: /Civil Union     Spouse name: Gosia    Number of children: 5    Years of education: Not on file    Highest education level: Not on file   Occupational History    Occupation: retired   Tobacco Use    Smoking status: Never    Smokeless tobacco: Never   Vaping Use    Vaping status: Never Used   Substance and Sexual Activity    Alcohol use: Not Currently     Comment: very occasional    Drug use: Not Currently    Sexual activity: Not on file   Other Topics Concern    Not on file   Social History Narrative    Caffeine use     Dental care, regularly     Lives independently with spouse     Uses seatbelts      Social Determinants of Health     Financial Resource Strain: Low Risk  (12/6/2023)    Overall Financial Resource Strain (CARDIA)     Difficulty of Paying Living Expenses: Not hard at all   Food Insecurity: No Food Insecurity (2/9/2023)    Hunger Vital Sign     Worried About Running Out of Food in the Last Year: Never true     Ran Out of Food in the Last Year: Never true   Transportation Needs: No Transportation Needs (12/6/2023)    PRAPARE - Transportation     Lack of Transportation (Medical): No     Lack of Transportation (Non-Medical): No   Physical Activity: Not on file   Stress: Not on file    Social Connections: Unknown (9/3/2020)    Social Connection and Isolation Panel [NHANES]     Frequency of Communication with Friends and Family: More than three times a week     Frequency of Social Gatherings with Friends and Family: More than three times a week     Attends Pentec[employer]ism Services: Not on file     Active Member of Clubs or Organizations: Not on file     Attends Club or Organization Meetings: Not on file     Marital Status:    Intimate Partner Violence: Not on file   Housing Stability: Low Risk  (2/9/2023)    Housing Stability Vital Sign     Unable to Pay for Housing in the Last Year: No     Number of Places Lived in the Last Year: 1     Unstable Housing in the Last Year: No      Family History   Problem Relation Age of Onset    No Known Problems Mother     No Known Problems Father     Coronary artery disease Neg Hx      Past Surgical History:   Procedure Laterality Date    AORTIC VALVE REPLACEMENT  06/30/2015    avr with 23 mm [place] Magna Ease bioprosthetic    CARDIAC PACEMAKER PLACEMENT Left 07/24/2020    CATARACT EXTRACTION Right 06/05/2000    COLONOSCOPY      CORONARY ARTERY BYPASS GRAFT  06/05/2000    x1 with argueta  to lad    EYE SURGERY      POLYPECTOMY  04/2014    enteroscopic - esophageal ulcer, gastric erosion, and duodenal ulcer.     MD HEMIARTHROPLASTY HIP PARTIAL Right 2/10/2023    Procedure: HEMIARTHROPLASTY HIP (BIPOLAR);  Surgeon: Andrei Jackson DO;  Location: [facility];  Service: Orthopedics    TONSILLECTOMY      unknown       Current Outpatient Medications:     acetaminophen (TYLENOL) 325 mg tablet, Take 2 tablets (650 mg total) by mouth every 6 (six) hours as needed for mild pain, headaches, fever or moderate pain Do not exceed a total of 3 grams of tylenol/acetaminophen in a 24-hour period., Disp: , Rfl:     allopurinol (ZYLOPRIM) 100 mg tablet, Take 1 tablet (100 mg total) by mouth daily, Disp: 90 tablet, Rfl: 3    apixaban (Eliquis) 2.5 mg, Take 1 tablet (2.5 mg total) by  mouth 2 (two) times a day, Disp: 56 tablet, Rfl: 0    aspirin (ECOTRIN LOW STRENGTH) 81 mg EC tablet, Take 1 tablet (81 mg total) by mouth daily, Disp:  , Rfl: 0    atorvastatin (LIPITOR) 40 mg tablet, Take 1 tablet (40 mg total) by mouth daily with dinner In place of pravastatin, to better prevent a heart attack or stroke, Disp: , Rfl:     cholecalciferol (VITAMIN D3) 1,000 units tablet, Take 2 tablets (2,000 Units total) by mouth daily For vitamin D deficiency, Disp: , Rfl:     gabapentin (NEURONTIN) 100 mg capsule, Take 1 capsule (100 mg total) by mouth daily at bedtime, Disp: 90 capsule, Rfl: 1    metoprolol tartrate (LOPRESSOR) 100 mg tablet, Take 1 tablet (100 mg total) by mouth 2 (two) times a day Hold for a HR<60 bpm or a SBP<110 mmHg, Disp: , Rfl:     pantoprazole (PROTONIX) 40 mg tablet, Take 1 tablet (40 mg total) by mouth daily before breakfast, Disp: , Rfl:     polyethylene glycol (MIRALAX) 17 g packet, Take 17 g by mouth daily as needed (constipation), Disp: , Rfl:     senna (SENOKOT) 8.6 mg, Take 1 tablet (8.6 mg total) by mouth daily at bedtime, Disp: , Rfl:     torsemide (DEMADEX) 20 mg tablet, Take 2 tablets (40 mg total) by mouth daily, Disp: 180 tablet, Rfl: 3  Allergies   Allergen Reactions    Promethazine Delirium and Other (See Comments)       Labs:     Chemistry        Component Value Date/Time     10/01/2015 0832    K 4.7 01/12/2024 1250    K 4.6 10/01/2015 0832     01/12/2024 1250     10/01/2015 0832    CO2 29 01/12/2024 1250    CO2 27.4 10/01/2015 0832    BUN 39 (H) 01/12/2024 1250    BUN 28 (H) 10/01/2015 0832    CREATININE 2.46 (H) 01/12/2024 1250    CREATININE 1.88 (H) 10/01/2015 0832        Component Value Date/Time    CALCIUM 8.4 01/12/2024 1250    CALCIUM 9.1 10/01/2015 0832    ALKPHOS 95 01/12/2024 1250    ALKPHOS 100 10/01/2015 0832    AST 14 01/12/2024 1250    AST 18 10/01/2015 0832    ALT 8 01/12/2024 1250    ALT 17 10/01/2015 0832    BILITOT 0.37 10/01/2015  "0832            Lab Results   Component Value Date    CHOL 139 10/01/2015    CHOL 147 06/15/2015    CHOL 151 04/30/2015     Lab Results   Component Value Date    HDL 22 (L) 12/02/2022    HDL 36 (L) 08/19/2021    HDL 32 (L) 05/20/2019     Lab Results   Component Value Date    LDLCALC 36 12/02/2022    LDLCALC 72 08/19/2021    LDLCALC 103 (H) 05/20/2019     Lab Results   Component Value Date    TRIG 202 (H) 12/02/2022    TRIG 70 08/19/2021    TRIG 162 (H) 05/20/2019     No results found for: \"CHOLHDL\"    Imaging: No results found.    Review of Systems   Cardiovascular:  Positive for leg swelling.   All other systems reviewed and are negative.      Vitals:    02/08/24 1311   BP: 142/80   Pulse: 64   SpO2: 98%     Vitals:    02/08/24 1311   Weight: 81.2 kg (179 lb)     Height: 5' 4\" (162.6 cm)   Body mass index is 30.73 kg/m².    Physical Exam:  Physical Exam  Vitals reviewed.   Constitutional:       General: He is not in acute distress.     Appearance: He is well-developed. He is not diaphoretic.      Comments: Elderly male sitting in a wheelchair in no acute distress   HENT:      Head: Normocephalic and atraumatic.   Eyes:      Pupils: Pupils are equal, round, and reactive to light.   Neck:      Vascular: No carotid bruit.   Cardiovascular:      Rate and Rhythm: Normal rate. Rhythm irregularly irregular.      Pulses:           Radial pulses are 2+ on the right side and 2+ on the left side.      Heart sounds: S1 normal and S2 normal. No murmur heard.  Pulmonary:      Effort: Pulmonary effort is normal. No respiratory distress.      Breath sounds: Normal breath sounds. No wheezing or rales.   Abdominal:      General: There is no distension.      Palpations: Abdomen is soft.      Tenderness: There is no abdominal tenderness.   Musculoskeletal:         General: Normal range of motion.      Cervical back: Normal range of motion.      Right lower leg: Edema (+2 pitting edema bilaterally) present.      Left lower leg: Edema " present.   Skin:     General: Skin is warm and dry.      Findings: No erythema.   Neurological:      General: No focal deficit present.      Mental Status: He is alert and oriented to person, place, and time.      Gait: Gait abnormal (not assessed, in wheelchair).   Psychiatric:         Mood and Affect: Mood normal.         Behavior: Behavior normal.

## 2024-02-08 ENCOUNTER — IN-CLINIC DEVICE VISIT (OUTPATIENT)
Dept: CARDIOLOGY CLINIC | Facility: HOSPITAL | Age: 89
End: 2024-02-08
Payer: COMMERCIAL

## 2024-02-08 ENCOUNTER — OFFICE VISIT (OUTPATIENT)
Dept: CARDIOLOGY CLINIC | Facility: HOSPITAL | Age: 89
End: 2024-02-08
Payer: COMMERCIAL

## 2024-02-08 VITALS
WEIGHT: 179 LBS | OXYGEN SATURATION: 98 % | HEIGHT: 64 IN | BODY MASS INDEX: 30.56 KG/M2 | SYSTOLIC BLOOD PRESSURE: 142 MMHG | HEART RATE: 64 BPM | DIASTOLIC BLOOD PRESSURE: 80 MMHG

## 2024-02-08 DIAGNOSIS — I34.2 NONRHEUMATIC MITRAL VALVE STENOSIS: ICD-10-CM

## 2024-02-08 DIAGNOSIS — I44.2 COMPLETE HEART BLOCK (HCC): ICD-10-CM

## 2024-02-08 DIAGNOSIS — Z95.3 HISTORY OF AORTIC VALVE REPLACEMENT WITH BIOPROSTHETIC VALVE: ICD-10-CM

## 2024-02-08 DIAGNOSIS — I50.32 CHRONIC DIASTOLIC CHF (CONGESTIVE HEART FAILURE) (HCC): ICD-10-CM

## 2024-02-08 DIAGNOSIS — I34.0 NONRHEUMATIC MITRAL VALVE REGURGITATION: ICD-10-CM

## 2024-02-08 DIAGNOSIS — I48.0 PAROXYSMAL ATRIAL FIBRILLATION (HCC): ICD-10-CM

## 2024-02-08 DIAGNOSIS — Z95.0 PRESENCE OF CARDIAC PACEMAKER: Primary | ICD-10-CM

## 2024-02-08 DIAGNOSIS — I10 BENIGN ESSENTIAL HYPERTENSION: ICD-10-CM

## 2024-02-08 DIAGNOSIS — E78.5 DYSLIPIDEMIA: ICD-10-CM

## 2024-02-08 DIAGNOSIS — Z95.0 PRESENCE OF PERMANENT CARDIAC PACEMAKER: ICD-10-CM

## 2024-02-08 DIAGNOSIS — I25.810 ATHEROSCLEROSIS OF CORONARY ARTERY BYPASS GRAFT OF NATIVE HEART WITHOUT ANGINA PECTORIS: Primary | ICD-10-CM

## 2024-02-08 DIAGNOSIS — I35.0 SEVERE AORTIC STENOSIS: ICD-10-CM

## 2024-02-08 PROCEDURE — 99214 OFFICE O/P EST MOD 30 MIN: CPT | Performed by: PHYSICIAN ASSISTANT

## 2024-02-08 PROCEDURE — 93280 PM DEVICE PROGR EVAL DUAL: CPT | Performed by: STUDENT IN AN ORGANIZED HEALTH CARE EDUCATION/TRAINING PROGRAM

## 2024-02-08 RX ORDER — TORSEMIDE 20 MG/1
40 TABLET ORAL DAILY
Qty: 180 TABLET | Refills: 3 | Status: SHIPPED | OUTPATIENT
Start: 2024-02-08

## 2024-02-08 NOTE — PROGRESS NOTES
Results for orders placed or performed in visit on 02/08/24   Cardiac EP device report    Narrative    MDT-DUAL CHAMBER PPM (DDD MODE)/ ACTIVE SYSTEM IS MRI CONDITIONAL  DEVICE INTERROGATED IN THE [facility]: BATTERY VOLTAGE ADEQUATE (8.6 YRS). AP: <0.1%. : 99.5% (>40%~MVP-OFF~CHB). ALL LEAD PARAMETERS WITHIN NORMAL LIMITS. NO SIGNIFICANT HIGH RATE EPISODES. AF IN PROGRESS (HX OF SAME). AF BURDEN: 100%. PT TAKES ELIQUIS, ASA 81MG, METOPROLOL TART. EF: 65% (ECHO 2/9/23). NO PROGRAMMING CHANGES MADE TO DEVICE PARAMETERS. PT SEEN TODAY BY DARIN VARELA. NORMAL DEVICE FUNCTION. CH

## 2024-02-21 PROBLEM — Z00.00 MEDICARE ANNUAL WELLNESS VISIT, SUBSEQUENT: Status: RESOLVED | Noted: 2018-08-14 | Resolved: 2024-02-21

## 2024-02-28 ENCOUNTER — TELEPHONE (OUTPATIENT)
Dept: LAB | Facility: HOSPITAL | Age: 89
End: 2024-02-28

## 2024-03-05 ENCOUNTER — APPOINTMENT (OUTPATIENT)
Dept: LAB | Facility: HOSPITAL | Age: 89
End: 2024-03-05
Payer: COMMERCIAL

## 2024-03-05 DIAGNOSIS — I50.32 CHRONIC DIASTOLIC CHF (CONGESTIVE HEART FAILURE) (HCC): ICD-10-CM

## 2024-03-05 LAB
ANION GAP SERPL CALCULATED.3IONS-SCNC: 10 MMOL/L
BUN SERPL-MCNC: 65 MG/DL (ref 5–25)
CALCIUM SERPL-MCNC: 9.3 MG/DL (ref 8.4–10.2)
CHLORIDE SERPL-SCNC: 94 MMOL/L (ref 96–108)
CO2 SERPL-SCNC: 32 MMOL/L (ref 21–32)
CREAT SERPL-MCNC: 2.97 MG/DL (ref 0.6–1.3)
GFR SERPL CREATININE-BSD FRML MDRD: 17 ML/MIN/1.73SQ M
GLUCOSE P FAST SERPL-MCNC: 97 MG/DL (ref 65–99)
POTASSIUM SERPL-SCNC: 4.9 MMOL/L (ref 3.5–5.3)
SODIUM SERPL-SCNC: 136 MMOL/L (ref 135–147)

## 2024-03-05 PROCEDURE — 80048 BASIC METABOLIC PNL TOTAL CA: CPT

## 2024-03-05 PROCEDURE — 36415 COLL VENOUS BLD VENIPUNCTURE: CPT

## 2024-03-08 ENCOUNTER — TELEPHONE (OUTPATIENT)
Dept: CARDIOLOGY CLINIC | Facility: HOSPITAL | Age: 89
End: 2024-03-08

## 2024-03-08 NOTE — TELEPHONE ENCOUNTER
Received return call from pts daughter, Stephani. She states that his legs are completely down since restarting the torsemide at 40 mgm daily.     She also said that the nurse that is seeing him wants his legs to be wrapped in ace wraps. She is asking if this is necessary since the swelling has gone down. Also asking if he should continue taking the 2 torsemide tablets daily.

## 2024-03-11 ENCOUNTER — TELEPHONE (OUTPATIENT)
Dept: CARDIOLOGY CLINIC | Facility: HOSPITAL | Age: 89
End: 2024-03-11

## 2024-03-11 DIAGNOSIS — Z51.81 ENCOUNTER FOR MONITORING DIURETIC THERAPY: Primary | ICD-10-CM

## 2024-03-11 DIAGNOSIS — Z79.899 ENCOUNTER FOR MONITORING DIURETIC THERAPY: Primary | ICD-10-CM

## 2024-03-11 NOTE — TELEPHONE ENCOUNTER
S/W patients daughter, Stephani. Informed her that Tia would like for pt to decrease torsemide to 20 mgm daily, however he can increase to two tablets daily if he notices worsening swelling in the interim.   Also, she would like a BMP to be done prior to his upcoming appt with Nephrology on 3/22. Order is in his chart.    Daughter also informs that [facility] is no longer seeing pt. She will have the Mobile [facility] come for the BMP. She also states that currently there is no swelling of his legs, therefore she has not been wrapping them.     Verbalized understanding.

## 2024-03-11 NOTE — PROGRESS NOTES
MDT-DUAL CHAMBER PPM (DDD MODE)/ ACTIVE SYSTEM IS MRI CONDITIONAL   CARELINK TRANSMISSION:  BATTERY VOLTAGE ADEQUATE (10 1 YR )   AP 27 5%  99 8% (>40%/CHB/HIS)   ALL AVAILABLE LEAD PARAMETERS WITHIN NORMAL LIMITS   2 VT-NS EPISODES WITH EGMS SHOWING NSVT (7 @ 167 BPM, 8 @ 200 BPM)   EF 60% (ECHO 09/2021)   PT TAKES ASA, ELIQUIS, AND METOPROLOL   TASK TO DR MEDINA   NORMAL DEVICE FUNCTION   RG javier

## 2024-03-11 NOTE — TELEPHONE ENCOUNTER
S/W patients daughter, Stephani. Per Ana Shafer PA-C instructed her to have pt decrease torsemide 20 mgm daily

## 2024-05-06 DIAGNOSIS — N18.4 STAGE 4 CHRONIC KIDNEY DISEASE (HCC): Primary | ICD-10-CM

## 2024-05-07 ENCOUNTER — TELEPHONE (OUTPATIENT)
Age: 89
End: 2024-05-07

## 2024-05-07 ENCOUNTER — TELEPHONE (OUTPATIENT)
Dept: NEPHROLOGY | Facility: CLINIC | Age: 89
End: 2024-05-07

## 2024-05-07 NOTE — TELEPHONE ENCOUNTER
I called and left a VM on Aimee's cell regarding Les completing lab work prior to upcoming appointment.

## 2024-05-07 NOTE — TELEPHONE ENCOUNTER
Patients daughter was calling to see if we can get mobile labs set up for this patient? Please call daughter back with an update.

## 2024-05-09 ENCOUNTER — REMOTE DEVICE CLINIC VISIT (OUTPATIENT)
Dept: CARDIOLOGY CLINIC | Facility: CLINIC | Age: 89
End: 2024-05-09
Payer: COMMERCIAL

## 2024-05-09 ENCOUNTER — TELEPHONE (OUTPATIENT)
Dept: LAB | Facility: HOSPITAL | Age: 89
End: 2024-05-09

## 2024-05-09 DIAGNOSIS — Z95.0 PRESENCE OF CARDIAC PACEMAKER: Primary | ICD-10-CM

## 2024-05-09 PROCEDURE — 93294 REM INTERROG EVL PM/LDLS PM: CPT | Performed by: INTERNAL MEDICINE

## 2024-05-09 PROCEDURE — 93296 REM INTERROG EVL PM/IDS: CPT | Performed by: INTERNAL MEDICINE

## 2024-05-09 NOTE — TELEPHONE ENCOUNTER
Called and spoke with daughter, Aimee.  She is aware lab will be reaching out to her set up appt for her father.

## 2024-05-09 NOTE — PROGRESS NOTES
Results for orders placed or performed in visit on 05/09/24   Cardiac EP device report    Narrative    MDT-DUAL CHAMBER PPM (DDD MODE)/ ACTIVE SYSTEM IS MRI CONDITIONAL  CARELINK TRANSMISSION: BATTERY VOLTAGE ADEQUATE (8.5 YRS). AP 0.1%  98.1% (>40%/CHB) ALL AVAILABLE LEAD PARAMETERS WITHIN NORMAL LIMITS. NO SIGNIFICANT HIGH RATE EPISODES; IN AF SINCE 8/14/22. AT/AF BURDEN 100% SINCE 2/9/24. TAKES ELIQUIS, METOPROLOL TART. NORMAL DEVICE FUNCTION. AM

## 2024-05-14 ENCOUNTER — OFFICE VISIT (OUTPATIENT)
Dept: NEPHROLOGY | Facility: CLINIC | Age: 89
End: 2024-05-14
Payer: COMMERCIAL

## 2024-05-14 ENCOUNTER — APPOINTMENT (OUTPATIENT)
Dept: LAB | Facility: MEDICAL CENTER | Age: 89
End: 2024-05-14
Payer: COMMERCIAL

## 2024-05-14 VITALS
WEIGHT: 145.6 LBS | DIASTOLIC BLOOD PRESSURE: 74 MMHG | BODY MASS INDEX: 24.86 KG/M2 | HEIGHT: 64 IN | SYSTOLIC BLOOD PRESSURE: 134 MMHG | HEART RATE: 64 BPM | OXYGEN SATURATION: 98 %

## 2024-05-14 DIAGNOSIS — E55.9 VITAMIN D DEFICIENCY: ICD-10-CM

## 2024-05-14 DIAGNOSIS — N18.4 CKD (CHRONIC KIDNEY DISEASE) STAGE 4, GFR 15-29 ML/MIN (HCC): Primary | ICD-10-CM

## 2024-05-14 DIAGNOSIS — N25.81 SECONDARY HYPERPARATHYROIDISM OF RENAL ORIGIN (HCC): ICD-10-CM

## 2024-05-14 DIAGNOSIS — N18.4 STAGE 4 CHRONIC KIDNEY DISEASE (HCC): ICD-10-CM

## 2024-05-14 DIAGNOSIS — I50.32 CHRONIC DIASTOLIC CHF (CONGESTIVE HEART FAILURE) (HCC): ICD-10-CM

## 2024-05-14 DIAGNOSIS — Z79.899 ENCOUNTER FOR MONITORING DIURETIC THERAPY: ICD-10-CM

## 2024-05-14 DIAGNOSIS — E61.1 IRON DEFICIENCY: ICD-10-CM

## 2024-05-14 DIAGNOSIS — Z51.81 ENCOUNTER FOR MONITORING DIURETIC THERAPY: ICD-10-CM

## 2024-05-14 DIAGNOSIS — N28.1 CYST OF KIDNEY, ACQUIRED: ICD-10-CM

## 2024-05-14 DIAGNOSIS — N18.4 CKD (CHRONIC KIDNEY DISEASE) STAGE 4, GFR 15-29 ML/MIN (HCC): ICD-10-CM

## 2024-05-14 LAB
25(OH)D3 SERPL-MCNC: 22.3 NG/ML (ref 30–100)
ALBUMIN SERPL BCP-MCNC: 4 G/DL (ref 3.5–5)
ALP SERPL-CCNC: 129 U/L (ref 34–104)
ALT SERPL W P-5'-P-CCNC: 8 U/L (ref 7–52)
ANION GAP SERPL CALCULATED.3IONS-SCNC: 15 MMOL/L (ref 4–13)
AST SERPL W P-5'-P-CCNC: 15 U/L (ref 13–39)
BASOPHILS # BLD AUTO: 0.07 THOUSANDS/ÂΜL (ref 0–0.1)
BASOPHILS NFR BLD AUTO: 1 % (ref 0–1)
BILIRUB SERPL-MCNC: 0.55 MG/DL (ref 0.2–1)
BUN SERPL-MCNC: 42 MG/DL (ref 5–25)
CALCIUM SERPL-MCNC: 8.6 MG/DL (ref 8.4–10.2)
CHLORIDE SERPL-SCNC: 97 MMOL/L (ref 96–108)
CO2 SERPL-SCNC: 25 MMOL/L (ref 21–32)
CREAT SERPL-MCNC: 2.56 MG/DL (ref 0.6–1.3)
EOSINOPHIL # BLD AUTO: 0.5 THOUSAND/ÂΜL (ref 0–0.61)
EOSINOPHIL NFR BLD AUTO: 5 % (ref 0–6)
ERYTHROCYTE [DISTWIDTH] IN BLOOD BY AUTOMATED COUNT: 14.5 % (ref 11.6–15.1)
FERRITIN SERPL-MCNC: 360 NG/ML (ref 24–336)
GFR SERPL CREATININE-BSD FRML MDRD: 21 ML/MIN/1.73SQ M
GLUCOSE SERPL-MCNC: 101 MG/DL (ref 65–140)
HCT VFR BLD AUTO: 41.8 % (ref 36.5–49.3)
HGB BLD-MCNC: 13.4 G/DL (ref 12–17)
IMM GRANULOCYTES # BLD AUTO: 0.05 THOUSAND/UL (ref 0–0.2)
IMM GRANULOCYTES NFR BLD AUTO: 1 % (ref 0–2)
IRON SATN MFR SERPL: 33 % (ref 15–50)
IRON SERPL-MCNC: 83 UG/DL (ref 50–212)
LYMPHOCYTES # BLD AUTO: 1.27 THOUSANDS/ÂΜL (ref 0.6–4.47)
LYMPHOCYTES NFR BLD AUTO: 13 % (ref 14–44)
MAGNESIUM SERPL-MCNC: 1.3 MG/DL (ref 1.9–2.7)
MCH RBC QN AUTO: 33.1 PG (ref 26.8–34.3)
MCHC RBC AUTO-ENTMCNC: 32.1 G/DL (ref 31.4–37.4)
MCV RBC AUTO: 103 FL (ref 82–98)
MONOCYTES # BLD AUTO: 0.68 THOUSAND/ÂΜL (ref 0.17–1.22)
MONOCYTES NFR BLD AUTO: 7 % (ref 4–12)
NEUTROPHILS # BLD AUTO: 6.92 THOUSANDS/ÂΜL (ref 1.85–7.62)
NEUTS SEG NFR BLD AUTO: 73 % (ref 43–75)
NRBC BLD AUTO-RTO: 0 /100 WBCS
PHOSPHATE SERPL-MCNC: 3.7 MG/DL (ref 2.3–4.1)
PLATELET # BLD AUTO: 249 THOUSANDS/UL (ref 149–390)
PMV BLD AUTO: 10.6 FL (ref 8.9–12.7)
POTASSIUM SERPL-SCNC: 4 MMOL/L (ref 3.5–5.3)
PROT SERPL-MCNC: 6.8 G/DL (ref 6.4–8.4)
PTH-INTACT SERPL-MCNC: 197 PG/ML (ref 12–88)
RBC # BLD AUTO: 4.05 MILLION/UL (ref 3.88–5.62)
SODIUM SERPL-SCNC: 137 MMOL/L (ref 135–147)
TIBC SERPL-MCNC: 254 UG/DL (ref 250–450)
UIBC SERPL-MCNC: 171 UG/DL (ref 155–355)
WBC # BLD AUTO: 9.49 THOUSAND/UL (ref 4.31–10.16)

## 2024-05-14 PROCEDURE — 99214 OFFICE O/P EST MOD 30 MIN: CPT | Performed by: NURSE PRACTITIONER

## 2024-05-14 PROCEDURE — 83540 ASSAY OF IRON: CPT

## 2024-05-14 PROCEDURE — 83970 ASSAY OF PARATHORMONE: CPT

## 2024-05-14 PROCEDURE — 82306 VITAMIN D 25 HYDROXY: CPT

## 2024-05-14 PROCEDURE — 83550 IRON BINDING TEST: CPT

## 2024-05-14 PROCEDURE — 82728 ASSAY OF FERRITIN: CPT

## 2024-05-14 PROCEDURE — 85025 COMPLETE CBC W/AUTO DIFF WBC: CPT

## 2024-05-14 PROCEDURE — 80053 COMPREHEN METABOLIC PANEL: CPT

## 2024-05-14 PROCEDURE — 83735 ASSAY OF MAGNESIUM: CPT

## 2024-05-14 PROCEDURE — 36415 COLL VENOUS BLD VENIPUNCTURE: CPT

## 2024-05-14 PROCEDURE — 84100 ASSAY OF PHOSPHORUS: CPT

## 2024-05-14 NOTE — PROGRESS NOTES
Nephrology   Office Follow-Up  Les Quiñonez 88 y.o. male MRN: [id number]    Encounter: [id number]        Assessment & Plan    Les Quiñonez was seen in the [place] office today. All diagnoses and orders for visit:     1. CKD (chronic kidney disease) stage 4, GFR 15-29 ml/min ([facility])  Baseline creatinine 2.5-3.0 mg/dL. Lab work pending for today. Most recent creatinine 3.0 mg/dL. Etiology age-related nephron loss, prerenal effect of diuretic, HTN nephrosclerosis, hx severe YOLANDE 2023. Grade a1 albuminuria. Ultrasound 2023 significant for potentially complex renal cyst  Examines euvolemic, normotensive and in typical state of health. Will have non fasting labs done today. Will provide slip to evaluate complex cyst   Return in 4 months via telemedicine visit with nephrologist  -     Ambulatory Referral to Nephrology  -     CBC and differential; Future  -     Comprehensive metabolic panel; Future  -     Magnesium; Future  -     Phosphorus; Future  -     Vitamin D 25 hydroxy; Future  -     PTH, intact; Future  -     Iron Panel (Includes Ferritin, Iron Sat%, Iron, and TIBC); Future  2. Chronic diastolic CHF (congestive heart failure) (HCC)  Examines compensated on current dose of torsemide. Absolutely no edema on exam. No changes made  3. Cyst of kidney, acquired  Pt not sure if he wants to pursue repeat imaging given his advanced age and limited mobility. A script was provided in case he decides he would like to   -     US kidney and bladder; Future; Expected date: 05/14/2024  4. Iron deficiency  -     Iron Panel (Includes Ferritin, Iron Sat%, Iron, and TIBC); Future  5. Vitamin D deficiency  -     Vitamin D 25 hydroxy; Future  6. Secondary hyperparathyroidism of renal origin (HCC)  -     Phosphorus; Future          HPI: Les Quiñonez is a 88 y.o. male who is here for scheduled follow-up     Pertinent medical problems include CKD 4, PAF, HFpEF, CAD s/p CABG, severe AS s/p AVR, PPM, HTN, complex renal cyst, hip  fracture in 2023, history of severe YOLANDE    Patient presents with wife and daughter. They report he is wheelchair bound and his mobility is limited. He has a good appetite. Labs not done as mobile lab did not show up    No changes to medications. Will order non fasting labs for today. Have ordered repeat renal ultrasound to evaluate complex cyst, however, as discussed today with pt/family, given advanced age and mobility issues, unclear if any treatment option would be pursued. Return to office in 4 months with nephrologist via telemedicine visit.       ROS:   Review of Systems   Constitutional: Negative.  Negative for activity change, appetite change, chills and fever.        Wife reports patient has a good appetite   HENT: Negative.  Negative for ear pain and sore throat.    Eyes: Negative.  Negative for pain and visual disturbance.   Respiratory: Negative.  Negative for cough and shortness of breath.    Cardiovascular: Negative.  Negative for chest pain and palpitations.   Gastrointestinal: Negative.  Negative for abdominal pain and vomiting.   Genitourinary: Negative.  Negative for decreased urine volume, difficulty urinating, dysuria, frequency, hematuria and urgency.   Musculoskeletal:  Positive for arthralgias and gait problem. Negative for back pain.   Skin: Negative.  Negative for color change and rash.   Neurological:  Negative for dizziness, seizures, syncope, weakness and headaches.   Psychiatric/Behavioral: Negative.     All other systems reviewed and are negative.      Allergies: Promethazine    Medications:   Current Outpatient Medications:     acetaminophen (TYLENOL) 325 mg tablet, Take 2 tablets (650 mg total) by mouth every 6 (six) hours as needed for mild pain, headaches, fever or moderate pain Do not exceed a total of 3 grams of tylenol/acetaminophen in a 24-hour period., Disp: , Rfl:     allopurinol (ZYLOPRIM) 100 mg tablet, Take 1 tablet (100 mg total) by mouth daily, Disp: 90 tablet, Rfl: 3     apixaban (Eliquis) 2.5 mg, Take 1 tablet (2.5 mg total) by mouth 2 (two) times a day, Disp: 56 tablet, Rfl: 0    aspirin (ECOTRIN LOW STRENGTH) 81 mg EC tablet, Take 1 tablet (81 mg total) by mouth daily, Disp:  , Rfl: 0    atorvastatin (LIPITOR) 40 mg tablet, Take 1 tablet (40 mg total) by mouth daily with dinner In place of pravastatin, to better prevent a heart attack or stroke, Disp: , Rfl:     cholecalciferol (VITAMIN D3) 1,000 units tablet, Take 2 tablets (2,000 Units total) by mouth daily For vitamin D deficiency, Disp: , Rfl:     gabapentin (NEURONTIN) 100 mg capsule, Take 1 capsule (100 mg total) by mouth daily at bedtime, Disp: 90 capsule, Rfl: 1    metoprolol tartrate (LOPRESSOR) 100 mg tablet, Take 1 tablet (100 mg total) by mouth 2 (two) times a day Hold for a HR<60 bpm or a SBP<110 mmHg, Disp: , Rfl:     pantoprazole (PROTONIX) 40 mg tablet, Take 1 tablet (40 mg total) by mouth daily before breakfast, Disp: , Rfl:     polyethylene glycol (MIRALAX) 17 g packet, Take 17 g by mouth daily as needed (constipation), Disp: , Rfl:     senna (SENOKOT) 8.6 mg, Take 1 tablet (8.6 mg total) by mouth daily at bedtime, Disp: , Rfl:     torsemide (DEMADEX) 20 mg tablet, Take 2 tablets (40 mg total) by mouth daily, Disp: 180 tablet, Rfl: 3    Past Medical History:   Diagnosis Date    Aortic stenosis     ECHO -4-14-14. MODERATE TO SEVERE AORTIC STENOSIS; MILD AROTIC INSUFFICIENCY - LAST ASSESSED 10/26/16; RESOLVED 6/8/16    Aortic valve disorder     LAST ASSESSED 10/8/15; 10/8/15    Arthritis     CHF (congestive heart failure) (HCC)     Complete heart block (HCC) 07/20/2020    Coronary artery disease     Hx of CABG     Hypertension     Hypertensive urgency 05/19/2019    Pulmonary emphysema (HCC) 10/15/2020    Renal disorder     TIA (transient ischemic attack)     Transient cerebral ischemia      Past Surgical History:   Procedure Laterality Date    AORTIC VALVE REPLACEMENT  06/30/2015    avr with 23 mm [place] Magna  "Ease bioprosthetic    CARDIAC PACEMAKER PLACEMENT Left 07/24/2020    CATARACT EXTRACTION Right 06/05/2000    COLONOSCOPY      CORONARY ARTERY BYPASS GRAFT  06/05/2000    x1 with argueta  to lad    EYE SURGERY      POLYPECTOMY  04/2014    enteroscopic - esophageal ulcer, gastric erosion, and duodenal ulcer.     MT HEMIARTHROPLASTY HIP PARTIAL Right 2/10/2023    Procedure: HEMIARTHROPLASTY HIP (BIPOLAR);  Surgeon: Andrei Jackson DO;  Location: [facility];  Service: Orthopedics    TONSILLECTOMY      unknown     Family History   Problem Relation Age of Onset    No Known Problems Mother     No Known Problems Father     Coronary artery disease Neg Hx       reports that he has never smoked. He has never used smokeless tobacco. He reports that he does not drink alcohol and does not use drugs.      Physical Exam:   Vitals:    05/14/24 1357   BP: 134/74   BP Location: Left arm   Patient Position: Sitting   Pulse: 64   SpO2: 98%   Weight: 66 kg (145 lb 9.6 oz)   Height: 5' 4\" (1.626 m)     Body mass index is 24.99 kg/m².    General: conscious, cooperative, in no acute distress, appears stated age  Eyes: conjunctivae pale, anicteric sclerae  ENT: lips and mucous membranes moist  Neck: supple, no JVD, no masses  Chest:  essentially clear breath sounds bilaterally, no crackles, ronchus or wheezings  CVS: S1 & S2, normal rate, regular rhythm  Abdomen: soft, non-tender, non-distended, normoactive bowel sounds, rounded  Extremities: no edema of both legs  Skin: no rash   Neuro: awake, alert, oriented   Wheelchair bound    Diagnostic Data:  Lab: I have personally reviewed pertinent lab results.,   CBC:       CMP: No results found for: \"SODIUM\", \"K\", \"CL\", \"CO2\", \"ANIONGAP\", \"BUN\", \"CREATININE\", \"GLUCOSE\", \"CALCIUM\", \"AST\", \"ALT\", \"ALKPHOS\", \"PROT\", \"BILITOT\", \"EGFR\",   PT/INR: No results found for: \"PT\", \"INR\",   Magnesium: No components found for: \"MAG\",  Phosphorous: No results found for: \"PHOS\"    Patient Instructions   Please get " "labs now  Consider scheduling ultrasound of kidneys to look at complex cyst.   Follow-up in 4 months virtually     Portions of the record may have been created with voice recognition software. Occasional wrong word or \"sound a like\" substitutions may have occurred due to the inherent limitations of voice recognition software. Read the chart carefully and recognize, using context, where substitutions have occurred.    If you have any questions, please contact the dictating provider.  "

## 2024-05-14 NOTE — PATIENT INSTRUCTIONS
Please get labs now  Consider scheduling ultrasound of kidneys to look at complex cyst.   Follow-up in 4 months virtually

## 2024-05-15 ENCOUNTER — TELEPHONE (OUTPATIENT)
Dept: NEPHROLOGY | Facility: CLINIC | Age: 89
End: 2024-05-15

## 2024-05-15 DIAGNOSIS — N18.4 CKD (CHRONIC KIDNEY DISEASE) STAGE 4, GFR 15-29 ML/MIN (HCC): ICD-10-CM

## 2024-05-15 DIAGNOSIS — E55.9 VITAMIN D DEFICIENCY: Primary | ICD-10-CM

## 2024-05-15 RX ORDER — ERGOCALCIFEROL 1.25 MG/1
50000 CAPSULE ORAL WEEKLY
Qty: 12 CAPSULE | Refills: 1 | Status: SHIPPED | OUTPATIENT
Start: 2024-05-15

## 2024-05-15 NOTE — TELEPHONE ENCOUNTER
Patient's daughter returning call for lab results - transferred to triage nurse for further discussion.

## 2024-05-15 NOTE — TELEPHONE ENCOUNTER
Patient's daughter returning call. Made aware:      ----- Message from DIANDRA Fulton sent at 5/15/2024  8:01 AM EDT -----  Kidney function is stable and slightly stronger than previous check. Blood count is appropriate.  Magnesium is very low and vitamin D is low. Please ask if he is taking supplementation. If not I recommend magnesium oxide 500 mg daily over the counter and vitamin D supplement weekly which I will send to pharmacy if agreeable  Please let me know    Daughter verbalized understanding. Please send Vitamin D rx to [facility]. Thank you.

## 2024-05-15 NOTE — TELEPHONE ENCOUNTER
I called and left a message for patient to call back to obtain results.       ----- Message from DIANDRA Fulton sent at 5/15/2024  8:01 AM EDT -----  Kidney function is stable and slightly stronger than previous check. Blood count is appropriate.  Magnesium is very low and vitamin D is low. Please ask if he is taking supplementation. If not I recommend magnesium oxide 500 mg daily over the counter and vitamin D supplement weekly which I will send to pharmacy if agreeable  Please let me know

## 2024-05-16 NOTE — TELEPHONE ENCOUNTER
LVM for daughter: Okay I sent in weekly vitamin D pill  Magnesium is over the counter  Repeat labs in 3-4 months.

## 2024-05-17 ENCOUNTER — TELEPHONE (OUTPATIENT)
Age: 89
End: 2024-05-17

## 2024-05-17 NOTE — TELEPHONE ENCOUNTER
Patient called stating they only have the 400mg of Magnesium oxide. She picked up the 400mg. Please call her back with an update.

## 2024-05-17 NOTE — TELEPHONE ENCOUNTER
I spoke with his daughter. She is aware to have him start with one every other day and if he tolerates the medication well- to increase to one daily.

## 2024-05-17 NOTE — TELEPHONE ENCOUNTER
Daughter at pharmacy now looking for the OTC magnesium.  She will check with pharmacist to see if they have in stock and call us back if not.

## 2024-05-24 NOTE — TELEPHONE ENCOUNTER
May 28, 2024      Regarding:  Mando Enrique  [address] [place] [address]    Dear Mando,    Upon review of your test results, please take note of the following comments/recommendations:    - Your electrolyte, liver, and kidney numbers are normal  - Your white blood cell count is a touch low. This can be from recent viral illness. I would recommend we recheck this number in two weeks with a pathology smear.   - Your cholesterol is normal     Lab orders have been placed for two weeks. You do not need to fast.     Lab Services on 05/23/2024   Component Date Value Ref Range Status    Sodium 05/23/2024 141  135 - 145 mmol/L Final    Potassium 05/23/2024 4.4  3.4 - 5.1 mmol/L Final    Chloride 05/23/2024 106  97 - 110 mmol/L Final    Carbon Dioxide 05/23/2024 30  21 - 32 mmol/L Final    Anion Gap 05/23/2024 9  7 - 19 mmol/L Final    Glucose 05/23/2024 90  70 - 99 mg/dL Final    BUN 05/23/2024 11  6 - 20 mg/dL Final    Creatinine 05/23/2024 1.07  0.67 - 1.17 mg/dL Final    Glomerular Filtration Rate 05/23/2024 >90  >=60 Final    BUN/Cr 05/23/2024 10  7 - 25 Final    Calcium 05/23/2024 8.8  8.4 - 10.2 mg/dL Final    Bilirubin, Total 05/23/2024 0.6  0.2 - 1.0 mg/dL Final    GOT/AST 05/23/2024 22  <=37 Units/L Final    GPT/ALT 05/23/2024 32  <64 Units/L Final    Alkaline Phosphatase 05/23/2024 129 (H)  45 - 117 Units/L Final    Albumin 05/23/2024 3.9  3.6 - 5.1 g/dL Final    Protein, Total 05/23/2024 7.3  6.4 - 8.2 g/dL Final    Globulin 05/23/2024 3.4  2.0 - 4.0 g/dL Final    A/G Ratio 05/23/2024 1.1  1.0 - 2.4 Final    Cholesterol 05/23/2024 164  <=189 mg/dL Final    Triglycerides 05/23/2024 27  <=114 mg/dL Final    HDL 05/23/2024 46  >=46 mg/dL Final    LDL 05/23/2024 113  <=119 mg/dL Final    Non-HDL Cholesterol 05/23/2024 118  <=149 mg/dL Final    Cholesterol/ HDL Ratio 05/23/2024 3.6  <=4.4 Final    WBC 05/23/2024 3.7 (L)  4.2 - 11.0 K/mcL Final    RBC 05/23/2024 4.68  4.50 - 5.90 mil/mcL Final    HGB  Nurse from [facility] called stated she went to house today attempted to do BMP on pt today, wife had pt eat so was going to do a non-fasting bmp on pt  Nurse stated pt didn't have any good veins so she is going to contact the mobile lab to come next week  Telephone Encounter by Cullen Bryson, [address] [place] [address] at 02/25/17 10:40 AM     Author:  KI Crane Service:  (none) Author Type:  Certified Medical Assistant     Filed:  02/25/17 10:41 AM Encounter Date:  2/25/2017 Status:  Signed     :  Cullen Bryson [facility] (Certified Medical Assistant)            Fwd to Dr Jones  Please advise  No protocol  Last refill was 2/2/2017 #10 no refills[TS1.1M]      Revision History        User Key Date/Time User Provider Type Action    > TS1.1 02/25/17 10:41 AM KI Crane Certified Medical Assistant Sign    M - Manual 05/23/2024 13.7  13.0 - 17.0 g/dL Final    HCT 05/23/2024 40.4  39.0 - 51.0 % Final    MCV 05/23/2024 86.3  78.0 - 100.0 fl Final    MCH 05/23/2024 29.3  26.0 - 34.0 pg Final    MCHC 05/23/2024 33.9  32.0 - 36.5 g/dL Final    RDW-CV 05/23/2024 11.9  11.0 - 15.0 % Final    RDW-SD 05/23/2024 37.8 (L)  39.0 - 50.0 fL Final    PLT 05/23/2024 214  140 - 450 K/mcL Final    NRBC 05/23/2024 0  <=0 /100 WBC Final    Neutrophil, Percent 05/23/2024 29  % Final    Lymphocytes, Percent 05/23/2024 59  % Final    Mono, Percent 05/23/2024 9  % Final    Eosinophils, Percent 05/23/2024 2  % Final    Basophils, Percent 05/23/2024 1  % Final    Immature Granulocytes 05/23/2024 0  % Final    Absolute Neutrophils 05/23/2024 1.1 (L)  1.8 - 7.7 K/mcL Final    Absolute Lymphocytes 05/23/2024 2.2  1.0 - 4.8 K/mcL Final    Absolute Monocytes 05/23/2024 0.3  0.3 - 0.9 K/mcL Final    Absolute Eosinophils  05/23/2024 0.1  0.0 - 0.5 K/mcL Final    Absolute Basophils 05/23/2024 0.0  0.0 - 0.3 K/mcL Final    Absolute Immature Granulocytes 05/23/2024 0.0  0.0 - 0.2 K/mcL Final       Sincerely,      Rita Wolf, DO   [facility], [address]  [address] [address] [place] [id number] [phone]

## 2024-05-31 ENCOUNTER — RA CDI HCC (OUTPATIENT)
Dept: OTHER | Facility: HOSPITAL | Age: 89
End: 2024-05-31

## 2024-06-07 ENCOUNTER — OFFICE VISIT (OUTPATIENT)
Dept: FAMILY MEDICINE CLINIC | Facility: CLINIC | Age: 89
End: 2024-06-07
Payer: COMMERCIAL

## 2024-06-07 VITALS
HEART RATE: 64 BPM | OXYGEN SATURATION: 98 % | HEIGHT: 64 IN | WEIGHT: 166.8 LBS | TEMPERATURE: 97.5 F | BODY MASS INDEX: 28.48 KG/M2 | RESPIRATION RATE: 18 BRPM | DIASTOLIC BLOOD PRESSURE: 78 MMHG | SYSTOLIC BLOOD PRESSURE: 126 MMHG

## 2024-06-07 DIAGNOSIS — M54.50 CHRONIC BILATERAL LOW BACK PAIN WITHOUT SCIATICA: ICD-10-CM

## 2024-06-07 DIAGNOSIS — G89.29 CHRONIC BILATERAL LOW BACK PAIN WITHOUT SCIATICA: ICD-10-CM

## 2024-06-07 DIAGNOSIS — I10 ESSENTIAL HYPERTENSION: ICD-10-CM

## 2024-06-07 DIAGNOSIS — I25.810 ATHEROSCLEROSIS OF CORONARY ARTERY BYPASS GRAFT OF NATIVE HEART WITHOUT ANGINA PECTORIS: ICD-10-CM

## 2024-06-07 PROBLEM — N17.9 AKI (ACUTE KIDNEY INJURY) (HCC): Status: RESOLVED | Noted: 2023-02-11 | Resolved: 2024-06-07

## 2024-06-07 PROCEDURE — 99214 OFFICE O/P EST MOD 30 MIN: CPT | Performed by: INTERNAL MEDICINE

## 2024-06-07 PROCEDURE — G2211 COMPLEX E/M VISIT ADD ON: HCPCS | Performed by: INTERNAL MEDICINE

## 2024-06-07 RX ORDER — GABAPENTIN 100 MG/1
100 CAPSULE ORAL
Qty: 90 CAPSULE | Refills: 1 | Status: SHIPPED | OUTPATIENT
Start: 2024-06-07

## 2024-06-07 RX ORDER — METOPROLOL TARTRATE 100 MG/1
100 TABLET ORAL 2 TIMES DAILY
Qty: 60 TABLET | Refills: 5 | Status: SHIPPED | OUTPATIENT
Start: 2024-06-07

## 2024-06-07 RX ORDER — ATORVASTATIN CALCIUM 40 MG/1
40 TABLET, FILM COATED ORAL
Qty: 30 TABLET | Refills: 5 | Status: SHIPPED | OUTPATIENT
Start: 2024-06-07

## 2024-06-07 NOTE — PROGRESS NOTES
Ambulatory Visit  Name: Les Quiñonez      : 1935      MRN: [id number]  Encounter Provider: Cristine Hill DO  Encounter Date: 2024   Encounter department: [facility]    Assessment & Plan   1. Chronic bilateral low back pain without sciatica  -     gabapentin (NEURONTIN) 100 mg capsule; Take 1 capsule (100 mg total) by mouth daily at bedtime  Sxs stable on current rx Pt can transfer and get to bathroom with assist and walker   2. Essential hypertension  -     metoprolol tartrate (LOPRESSOR) 100 mg tablet; Take 1 tablet (100 mg total) by mouth 2 (two) times a day Hold for a HR<60 bpm or a SBP<110 mmHg  Lo sodium diet Continue current rx Stay hydrated   3. 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  -     atorvastatin (LIPITOR) 40 mg tablet; Take 1 tablet (40 mg total) by mouth daily with dinner In place of pravastatin, to better prevent a heart attack or stroke  Pravastatin Dc anf family aware - take lipitor daily Rx sent       Depression Screening and Follow-up Plan: Patient was screened for depression during today's encounter. They screened negative with a PHQ-2 score of 0.  6months/prn  History of Present Illness     HPI  Pt doing ok He has been getting to bathroom/kitchen with his walker No falls No chest pain or sob He rests a lot during the day and does not get out of the house often since needs assistance of 1-2 people to do so He had recent labs and kidney function is a bit better No recent illness His wife is primary caregiver Appetite good Bowels regular Denies pain     Review of Systems   Constitutional:  Negative for chills and fever.   HENT: Negative.     Eyes:  Negative for visual disturbance.   Respiratory:  Negative for cough and shortness of breath.    Cardiovascular:  Negative for chest pain, palpitations and leg swelling.   Gastrointestinal: Negative.    Genitourinary:  Positive for frequency.   Musculoskeletal:  Positive  for arthralgias, back pain and gait problem.   Skin:  Positive for pallor.   Neurological:  Negative for dizziness, light-headedness and headaches.   Psychiatric/Behavioral:  Negative for sleep disturbance. The patient is not nervous/anxious.      Past Medical History:   Diagnosis Date   • Acute blood loss anemia 06/10/2014   • Aortic stenosis     ECHO -4-14-14. MODERATE TO SEVERE AORTIC STENOSIS; MILD AROTIC INSUFFICIENCY - LAST ASSESSED 10/26/16; RESOLVED 6/8/16   • Aortic valve disorder     LAST ASSESSED 10/8/15; 10/8/15   • Arthritis    • CHF (congestive heart failure) ([facility])    • Complete heart block ([facility]) 07/20/2020   • Coronary artery disease    • Hx of CABG    • Hypertension    • Hypertensive urgency 05/19/2019   • Pulmonary emphysema ([facility]) 10/15/2020   • Renal disorder    • TIA (transient ischemic attack)    • Transient cerebral ischemia      Past Surgical History:   Procedure Laterality Date   • AORTIC VALVE REPLACEMENT  06/30/2015    avr with 23 mm Livingston Magna Ease bioprosthetic   • CARDIAC PACEMAKER PLACEMENT Left 07/24/2020   • CATARACT EXTRACTION Right 06/05/2000   • COLONOSCOPY     • CORONARY ARTERY BYPASS GRAFT  06/05/2000    x1 with argueta  to lad   • EYE SURGERY     • POLYPECTOMY  04/2014    enteroscopic - esophageal ulcer, gastric erosion, and duodenal ulcer.    • AK HEMIARTHROPLASTY HIP PARTIAL Right 2/10/2023    Procedure: HEMIARTHROPLASTY HIP (BIPOLAR);  Surgeon: Andrei Jackson DO;  Location: [facility];  Service: Orthopedics   • TONSILLECTOMY      unknown     Social History     Socioeconomic History   • Marital status: /Civil Union     Spouse name: Gosia   • Number of children: 5   • Years of education: Not on file   • Highest education level: Not on file   Occupational History   • Occupation: retired   Tobacco Use   • Smoking status: Never   • Smokeless tobacco: Never   Vaping Use   • Vaping status: Never Used   Substance and Sexual Activity   • Alcohol use: Never     Comment: very  "occasional   • Drug use: Never   • Sexual activity: Not Currently     Partners: Female     Birth control/protection: Other   Other Topics Concern   • Not on file   Social History Narrative    Caffeine use     Dental care, regularly     Lives independently with spouse     Uses seatbelts      Social Determinants of Health     Financial Resource Strain: Low Risk  (12/6/2023)    Overall Financial Resource Strain (CARDIA)    • Difficulty of Paying Living Expenses: Not hard at all   Food Insecurity: No Food Insecurity (2/9/2023)    Hunger Vital Sign    • Worried About Running Out of Food in the Last Year: Never true    • Ran Out of Food in the Last Year: Never true   Transportation Needs: No Transportation Needs (12/6/2023)    PRAPARE - Transportation    • Lack of Transportation (Medical): No    • Lack of Transportation (Non-Medical): No   Physical Activity: Not on file   Stress: Not on file   Social Connections: Unknown (9/3/2020)    Social Connection and Isolation Panel [NHANES]    • Frequency of Communication with Friends and Family: More than three times a week    • Frequency of Social Gatherings with Friends and Family: More than three times a week    • Attends Protestant Services: Not on file    • Active Member of Clubs or Organizations: Not on file    • Attends Club or Organization Meetings: Not on file    • Marital Status:    Intimate Partner Violence: Not on file   Housing Stability: Low Risk  (2/9/2023)    Housing Stability Vital Sign    • Unable to Pay for Housing in the Last Year: No    • Number of Places Lived in the Last Year: 1    • Unstable Housing in the Last Year: No     Allergies   Allergen Reactions   • Promethazine Delirium and Other (See Comments)       Objective     /78   Pulse 64   Temp 97.5 °F (36.4 °C) (Temporal)   Resp 18   Ht 5' 4\" (1.626 m)   Wt 75.7 kg (166 lb 12.8 oz)   SpO2 98%   BMI 28.63 kg/m²     Physical Exam  Vitals and nursing note reviewed.   Constitutional:       " General: He is not in acute distress.     Appearance: Normal appearance. He is not ill-appearing, toxic-appearing or diaphoretic.   HENT:      Head: Normocephalic and atraumatic.      Right Ear: External ear normal.      Left Ear: External ear normal.      Nose: Nose normal.      Mouth/Throat:      Mouth: Mucous membranes are moist.   Eyes:      General: No scleral icterus.     Extraocular Movements: Extraocular movements intact.      Conjunctiva/sclera: Conjunctivae normal.      Pupils: Pupils are equal, round, and reactive to light.   Cardiovascular:      Rate and Rhythm: Normal rate. Rhythm irregular.      Pulses: Normal pulses.      Heart sounds: Normal heart sounds.   Pulmonary:      Effort: Pulmonary effort is normal. No respiratory distress.      Breath sounds: Normal breath sounds. No wheezing.   Abdominal:      General: Bowel sounds are normal. There is no distension.      Palpations: Abdomen is soft.      Tenderness: There is no abdominal tenderness.   Musculoskeletal:      Cervical back: Normal range of motion and neck supple.      Right lower leg: No edema.      Left lower leg: No edema.   Lymphadenopathy:      Cervical: No cervical adenopathy.   Skin:     General: Skin is warm and dry.   Neurological:      General: No focal deficit present.      Mental Status: He is alert and oriented to person, place, and time. Mental status is at baseline.      Cranial Nerves: No cranial nerve deficit.      Sensory: No sensory deficit.   Psychiatric:         Mood and Affect: Mood normal.         Behavior: Behavior normal.         Thought Content: Thought content normal.         Judgment: Judgment normal.   Administrative Statements

## 2024-06-13 ENCOUNTER — TELEPHONE (OUTPATIENT)
Dept: PODIATRY | Facility: CLINIC | Age: 89
End: 2024-06-13

## 2024-06-13 ENCOUNTER — TELEPHONE (OUTPATIENT)
Age: 89
End: 2024-06-13

## 2024-06-13 NOTE — TELEPHONE ENCOUNTER
Called and left a message that we are getting a new provider. At this time will be adding patient to his recall list. Someone from our office will contact them to schedule appt.

## 2024-06-13 NOTE — TELEPHONE ENCOUNTER
Caller: Patient daughter    Doctor: Podiatry    Reason for call:     Daughter called to say she already found another podiatry.      Call back#: n/a

## 2024-06-21 NOTE — TELEPHONE ENCOUNTER
Patient's daugher called to request a refill for their Norvasc 5mg advised medication was d/c'd on  12/6/22 and daughter double checked med list and said nevermind. Patient verbalized understanding and is in agreement.

## 2024-07-01 DIAGNOSIS — I25.810 ATHEROSCLEROSIS OF CORONARY ARTERY BYPASS GRAFT OF NATIVE HEART WITHOUT ANGINA PECTORIS: ICD-10-CM

## 2024-07-01 RX ORDER — ATORVASTATIN CALCIUM 40 MG/1
40 TABLET, FILM COATED ORAL
Qty: 90 TABLET | Refills: 3 | Status: SHIPPED | OUTPATIENT
Start: 2024-07-01

## 2024-07-09 ENCOUNTER — TELEPHONE (OUTPATIENT)
Dept: FAMILY MEDICINE CLINIC | Facility: CLINIC | Age: 89
End: 2024-07-09

## 2024-07-09 ENCOUNTER — TELEPHONE (OUTPATIENT)
Age: 89
End: 2024-07-09

## 2024-07-09 NOTE — TELEPHONE ENCOUNTER
Pt daughter called she is aware of message she stated yes ok to send Atorvastatin 100 day supply to [facility].

## 2024-07-09 NOTE — TELEPHONE ENCOUNTER
Attempted to reach pt's daughter to check if ok to send 100 day rx of Atorvastatin to [facility] per insurance. Asking daughter to call back to let us know if we should send the 100 day supply.

## 2024-07-10 DIAGNOSIS — I25.810 ATHEROSCLEROSIS OF CORONARY ARTERY BYPASS GRAFT OF NATIVE HEART WITHOUT ANGINA PECTORIS: ICD-10-CM

## 2024-07-10 RX ORDER — ATORVASTATIN CALCIUM 40 MG/1
40 TABLET, FILM COATED ORAL
Qty: 100 TABLET | Refills: 2 | Status: SHIPPED | OUTPATIENT
Start: 2024-07-10

## 2024-07-19 ENCOUNTER — TELEPHONE (OUTPATIENT)
Age: 89
End: 2024-07-19

## 2024-07-19 DIAGNOSIS — K92.2 GASTROINTESTINAL HEMORRHAGE, UNSPECIFIED GASTROINTESTINAL HEMORRHAGE TYPE: ICD-10-CM

## 2024-07-19 DIAGNOSIS — M54.50 CHRONIC BILATERAL LOW BACK PAIN WITHOUT SCIATICA: ICD-10-CM

## 2024-07-19 DIAGNOSIS — G89.29 CHRONIC BILATERAL LOW BACK PAIN WITHOUT SCIATICA: ICD-10-CM

## 2024-07-19 DIAGNOSIS — L89.150 PRESSURE INJURY OF SACRAL REGION, UNSTAGEABLE (HCC): Primary | ICD-10-CM

## 2024-07-19 RX ORDER — PANTOPRAZOLE SODIUM 40 MG/1
TABLET, DELAYED RELEASE ORAL
Qty: 180 TABLET | Refills: 1 | Status: SHIPPED | OUTPATIENT
Start: 2024-07-19

## 2024-07-19 RX ORDER — GABAPENTIN 100 MG/1
100 CAPSULE ORAL
Qty: 90 CAPSULE | Refills: 1 | Status: SHIPPED | OUTPATIENT
Start: 2024-07-19

## 2024-07-19 NOTE — TELEPHONE ENCOUNTER
1.Medication: Gabapentin    Dose/Frequency: 100mg- take 1 capsule by mouth at bedtime  Quantity: 90      This med is on discontinued list but asking hoping for a refill.   2. Medication: Silvadene     Dose/Frequency: 1% cream- appy topically daily  Quantity: 50g    Pharmacy: [facility] [address] [place] [address] [address]      Office:   [x] PCP/Provider - Cristine Hill  [] Speciality/Provider -     Does the patient have enough for 3 days?   [x] Yes   [] No - Send as HP to POD

## 2024-07-25 ENCOUNTER — TELEPHONE (OUTPATIENT)
Dept: PODIATRY | Facility: CLINIC | Age: 89
End: 2024-07-25

## 2024-07-30 ENCOUNTER — TELEPHONE (OUTPATIENT)
Dept: PODIATRY | Facility: CLINIC | Age: 89
End: 2024-07-30

## 2024-07-30 NOTE — TELEPHONE ENCOUNTER
Called and left a message for daughter Aimee to call us to schedule and appt for dad with Dr Nava podiatry [place].

## 2024-08-02 DIAGNOSIS — I48.0 PAROXYSMAL ATRIAL FIBRILLATION (HCC): ICD-10-CM

## 2024-08-02 RX ORDER — APIXABAN 2.5 MG/1
2.5 TABLET, FILM COATED ORAL 2 TIMES DAILY
Qty: 180 TABLET | Refills: 0 | Status: SHIPPED | OUTPATIENT
Start: 2024-08-02

## 2024-08-08 ENCOUNTER — REMOTE DEVICE CLINIC VISIT (OUTPATIENT)
Dept: CARDIOLOGY CLINIC | Facility: CLINIC | Age: 89
End: 2024-08-08
Payer: COMMERCIAL

## 2024-08-08 DIAGNOSIS — Z95.0 CARDIAC PACEMAKER IN SITU: Primary | ICD-10-CM

## 2024-08-08 PROCEDURE — 93294 REM INTERROG EVL PM/LDLS PM: CPT | Performed by: INTERNAL MEDICINE

## 2024-08-08 PROCEDURE — 93296 REM INTERROG EVL PM/IDS: CPT | Performed by: INTERNAL MEDICINE

## 2024-08-08 NOTE — PROGRESS NOTES
Results for orders placed or performed in visit on 08/08/24   Cardiac EP device report    Narrative    MDT-DUAL CHAMBER PPM (DDD MODE)/ ACTIVE SYSTEM IS MRI CONDITIONAL  CARELINK TRANSMISSION: BATTERY VOLTAGE ADEQUATE (8.3 YRS). AP-0.1%, >99% (DEPENDENT/CHB). ALL AVAILABLE LEAD PARAMETERS WITHIN NORMAL LIMITS. NO NEW SIGNIFICANT HIGH RATE EPISODES. PT IN AF SINCE 8/14/22 & ON ELIQUIS, ASA 81MG & METOPROLOL. NORMAL DEVICE FUNCTION. GV

## 2024-09-05 ENCOUNTER — TELEMEDICINE (OUTPATIENT)
Age: 89
End: 2024-09-05
Payer: COMMERCIAL

## 2024-09-05 DIAGNOSIS — I10 BENIGN ESSENTIAL HYPERTENSION: ICD-10-CM

## 2024-09-05 DIAGNOSIS — N18.4 CKD (CHRONIC KIDNEY DISEASE) STAGE 4, GFR 15-29 ML/MIN (HCC): Primary | ICD-10-CM

## 2024-09-05 DIAGNOSIS — R80.9 PROTEINURIA, UNSPECIFIED TYPE: ICD-10-CM

## 2024-09-05 DIAGNOSIS — M10.9 ACUTE GOUT OF MULTIPLE SITES, UNSPECIFIED CAUSE: ICD-10-CM

## 2024-09-05 DIAGNOSIS — D50.9 IRON DEFICIENCY ANEMIA, UNSPECIFIED IRON DEFICIENCY ANEMIA TYPE: ICD-10-CM

## 2024-09-05 DIAGNOSIS — N25.81 SECONDARY HYPERPARATHYROIDISM (HCC): ICD-10-CM

## 2024-09-05 DIAGNOSIS — E55.9 VITAMIN D DEFICIENCY: ICD-10-CM

## 2024-09-05 DIAGNOSIS — I50.32 CHRONIC DIASTOLIC CHF (CONGESTIVE HEART FAILURE) (HCC): ICD-10-CM

## 2024-09-05 DIAGNOSIS — E83.42 HYPOMAGNESEMIA: ICD-10-CM

## 2024-09-05 PROCEDURE — 1159F MED LIST DOCD IN RCRD: CPT

## 2024-09-05 PROCEDURE — 99214 OFFICE O/P EST MOD 30 MIN: CPT

## 2024-09-05 PROCEDURE — 1160F RVW MEDS BY RX/DR IN RCRD: CPT

## 2024-09-05 RX ORDER — MAGNESIUM OXIDE 400 MG/1
400 TABLET ORAL DAILY
COMMUNITY

## 2024-09-05 RX ORDER — ERGOCALCIFEROL 1.25 MG/1
50000 CAPSULE ORAL WEEKLY
Qty: 12 CAPSULE | Refills: 1 | Status: SHIPPED | OUTPATIENT
Start: 2024-09-05

## 2024-09-05 RX ORDER — ERGOCALCIFEROL 1.25 MG/1
50000 CAPSULE, LIQUID FILLED ORAL WEEKLY
Qty: 12 CAPSULE | Refills: 0 | Status: SHIPPED | OUTPATIENT
Start: 2024-09-05

## 2024-09-05 NOTE — PROGRESS NOTES
OFFICE FOLLOW UP - Nephrology   Les Quiñonez 88 y.o. male MRN: [id number]       ASSESSMENT/PLAN:      Les was seen today for follow-up.    Diagnoses and all orders for this visit:    Secondary hyperparathyroidism (HCC)    CKD (chronic kidney disease) stage 4, GFR 15-29 ml/min (HCC)    Benign essential hypertension    Hypomagnesemia    Vitamin D deficiency    Chronic diastolic CHF (congestive heart failure) ([facility])      Stage IV chronic kidney disease, baseline creatinine 2.5-3.0 mg/dL  Etiology: Age-related nephron loss, prerenal effect of diuretic, hypertensive nephrosclerosis, previous severe YOLANDE in 2023.  Previous grade A1 albuminuria.  Ultrasound February 2023 was significant for potentially complex renal cyst, with recommended follow-up again in 6 months; however, patient did not complete.  Most recent labs were completed in May 2024 with creatinine 2.56 mg/dL, GFR 21 ml/min.  Will have repeat labs including urine studies completed next week.    Denies complaints including headache, dizziness, nausea, vomiting, excessive fatigue or decreased urine output.  Continue to avoid NSAIDs and nephrotoxins. Uses tylenol as needed for pain. Stay well hydrated.  Will advise patient when lab results are available.    Essential hypertension  Video visit, unable to assess blood pressure and does not check at home.  Most recent blood pressure was 126/78 mmHg at MD visit in June 2024. Denies headaches, dizziness, n/v/d, appetite is good. Continue 2 gm sodium restriction. Continue torsemide 20 mg daily, metoprolol 100 mg twice daily,     Secondary hyperparathyroidism of renal origin  Most recent labs 5/14 with .0 PG/mL, phosphorus 3.7 mg/dL, calcium 8.6 mg/dL.  Daughter states patient has physician appointment and will have labs completed at that time next week.    Proteinuria  Will have urine studies completed this week per patient and daughter.    Hypomagnesemia  Most recent magnesium 1.3 mg/dL, 5/14.  Will  repeat with magnesium with labs next week. Continue magnesium oxide 400 mg daily.    Vitamin D deficiency  Vitamin D 22.3 ng/mL, decreased, 5/14.  Will repeat with other labs this week.  Previously received ergocalciferol 50,000 weekly.    Iron deficiency anemia  We will repeat iron panel with lab work.  Patient states he has been taking iron supplement.    Heart failure with preserved ejection fraction   ECHO EF 65%, 2/9/2023.  Continue management as per cardiology.    Start time: 9/5/2024 10:57 AM EDT End time: 9/5/2024 11:20 AM EDT   Telemedicine consent    Patient: Les Quiñonez  Provider: DIANDRA Alexis  Provider located at [facility] [address] [address] [phone]    The patient was identified by name and date of birth. Les Quiñonez was informed that this is a telemedicine visit and that the visit is being conducted through the [employer] platform. He agrees to proceed..  My office door was closed. No one else was in the room. Patient's wife and daughter were present in the room with him at home.  He acknowledged consent and understanding of privacy and security of the video platform. The patient has agreed to participate and understands they can discontinue the visit at any time.    Patient is aware this is a billable service.     I spent 23 minutes with the patient during this visit.     PATIENT INSTRUCTIONS:  There are no Patient Instructions on file for this visit.    HPI: Les Quiñonez is a 88 y.o. male who seen via video visit in follow-up to chronic kidney disease stage IV, essential hypertension, secondary hyperparathyroidism, hypomagnesemia, and vitamin D deficiency.  PMH includes HFpEF, A-fib, CABG, emphysema, and iron deficiency anemia.    Denies recent hospitalizations, emergency department visits or illness. States overall has been feeling well.     ROS:   A complete  review of systems was done. Pertinent positives and negatives as noted in the HPI, otherwise the review of systems is negative.    Constitutional: Denies fever, excessive fatigue, or unintentional weight loss.  HEENT: Denies headaches, dizziness, lightheadedness, or blurred vision.  Respiratory: Denies cough, shortness of breath, wheezing or chest pain.  Cardiovascular: Denies palpitation, chest pain, cramping or edema.  Gastrointestinal: Denies abdominal pain, nausea, vomiting, diarrhea, constipation, or changes in appetite or bowel habits.  Genitourinary: Denies changes in urinary frequency or amount, dysuria, hematuria, flank pain, abdominal pain, difficulty with urinary stream or incomplete bladder emptying.  Musculoskeletal: Denies joint pain, muscle weakness, back pain or cramping.  Neurological: Denies headaches, dizziness or tingling.  Psychiatric: Denies depression, anxiety, or changes in mood.  Endocrine: Denies heat or cold intolerance, excessive thirst or excessive hunger.  Hematological/lymphatic: Denies easy bruising, bleeding or swollen lymph nodes.  Integumentary: Denies skin lesions, rashes or wounds.        Physical Exam:   There were no vitals filed for this visit.  There is no height or weight on file to calculate BMI.    General: no acute distress   Eyes: conjunctivae pink, anicteric sclerae  ENT: mucous membranes moist  Neck: supple, no visible JVD  Chest: Video visit, unable to assess  CVS: video visit, unable to assess   Abdomen: no complaints  Extremities: denies lower extremity edema   Skin: no rash  Neuro: awake and alert       Allergies: Promethazine    Medications:   Current Outpatient Medications:     acetaminophen (TYLENOL) 325 mg tablet, Take 2 tablets (650 mg total) by mouth every 6 (six) hours as needed for mild pain, headaches, fever or moderate pain Do not exceed a total of 3 grams of tylenol/acetaminophen in a 24-hour period., Disp: , Rfl:     allopurinol (ZYLOPRIM) 100 mg  tablet, Take 1 tablet (100 mg total) by mouth daily, Disp: 90 tablet, Rfl: 3    aspirin (ECOTRIN LOW STRENGTH) 81 mg EC tablet, Take 1 tablet (81 mg total) by mouth daily, Disp:  , Rfl: 0    atorvastatin (LIPITOR) 40 mg tablet, Take 1 tablet (40 mg total) by mouth daily with dinner In place of pravastatin, to better prevent a heart attack or stroke, Disp: 100 tablet, Rfl: 2    Eliquis 2.5 MG, Take 1 tablet by mouth twice daily, Disp: 180 tablet, Rfl: 0    ergocalciferol (ERGOCALCIFEROL) 1.25 MG (97611 UT) capsule, Take 1 capsule (50,000 Units total) by mouth once a week, Disp: 12 capsule, Rfl: 1    gabapentin (NEURONTIN) 100 mg capsule, Take 1 capsule (100 mg total) by mouth daily at bedtime, Disp: 90 capsule, Rfl: 1    magnesium oxide (MAG-OX) 400 mg tablet, Take 400 mg by mouth daily Takes this Saturday Sunday Monday, Disp: , Rfl:     metoprolol tartrate (LOPRESSOR) 100 mg tablet, Take 1 tablet (100 mg total) by mouth 2 (two) times a day Hold for a HR<60 bpm or a SBP<110 mmHg, Disp: 60 tablet, Rfl: 5    pantoprazole (PROTONIX) 40 mg tablet, TAKE 1 TABLET BY MOUTH TWICE DAILY BEFORE MEAL(S), Disp: 180 tablet, Rfl: 1    polyethylene glycol (MIRALAX) 17 g packet, Take 17 g by mouth daily as needed (constipation), Disp: , Rfl:     senna (SENOKOT) 8.6 mg, Take 1 tablet (8.6 mg total) by mouth daily at bedtime, Disp: , Rfl:     silver sulfadiazine (SILVADENE,SSD) 1 % cream, Apply topically daily, Disp: 85 g, Rfl: 1    torsemide (DEMADEX) 20 mg tablet, Take 2 tablets (40 mg total) by mouth daily, Disp: 180 tablet, Rfl: 3    Past Medical History:   Diagnosis Date    Acute blood loss anemia 06/10/2014    Aortic stenosis     ECHO -4-14-14. MODERATE TO SEVERE AORTIC STENOSIS; MILD AROTIC INSUFFICIENCY - LAST ASSESSED 10/26/16; RESOLVED 6/8/16    Aortic valve disorder     LAST ASSESSED 10/8/15; 10/8/15    Arthritis     CHF (congestive heart failure) (HCC)     Complete heart block (HCC) 07/20/2020    Coronary artery disease      Hx of CABG     Hypertension     Hypertensive urgency 05/19/2019    Pulmonary emphysema (HCC) 10/15/2020    Renal disorder     TIA (transient ischemic attack)     Transient cerebral ischemia      Past Surgical History:   Procedure Laterality Date    AORTIC VALVE REPLACEMENT  06/30/2015    avr with 23 mm Livingston Magna Ease bioprosthetic    CARDIAC PACEMAKER PLACEMENT Left 07/24/2020    CATARACT EXTRACTION Right 06/05/2000    COLONOSCOPY      CORONARY ARTERY BYPASS GRAFT  06/05/2000    x1 with argueta  to lad    EYE SURGERY      POLYPECTOMY  04/2014    enteroscopic - esophageal ulcer, gastric erosion, and duodenal ulcer.     NE HEMIARTHROPLASTY HIP PARTIAL Right 2/10/2023    Procedure: HEMIARTHROPLASTY HIP (BIPOLAR);  Surgeon: Andrei Jackson DO;  Location: [facility];  Service: Orthopedics    TONSILLECTOMY      unknown     Family History   Problem Relation Age of Onset    No Known Problems Mother     No Known Problems Father     Coronary artery disease Neg Hx       reports that he has never smoked. He has never used smokeless tobacco. He reports that he does not drink alcohol and does not use drugs.      Lab Results:  Results for orders placed or performed in visit on 05/14/24   Comprehensive metabolic panel   Result Value Ref Range    Sodium 137 135 - 147 mmol/L    Potassium 4.0 3.5 - 5.3 mmol/L    Chloride 97 96 - 108 mmol/L    CO2 25 21 - 32 mmol/L    ANION GAP 15 (H) 4 - 13 mmol/L    BUN 42 (H) 5 - 25 mg/dL    Creatinine 2.56 (H) 0.60 - 1.30 mg/dL    Glucose 101 65 - 140 mg/dL    Calcium 8.6 8.4 - 10.2 mg/dL    AST 15 13 - 39 U/L    ALT 8 7 - 52 U/L    Alkaline Phosphatase 129 (H) 34 - 104 U/L    Total Protein 6.8 6.4 - 8.4 g/dL    Albumin 4.0 3.5 - 5.0 g/dL    Total Bilirubin 0.55 0.20 - 1.00 mg/dL    eGFR 21 ml/min/1.73sq m   CBC and differential   Result Value Ref Range    WBC 9.49 4.31 - 10.16 Thousand/uL    RBC 4.05 3.88 - 5.62 Million/uL    Hemoglobin 13.4 12.0 - 17.0 g/dL    Hematocrit 41.8 36.5 - 49.3 %  "    (H) 82 - 98 fL    MCH 33.1 26.8 - 34.3 pg    MCHC 32.1 31.4 - 37.4 g/dL    RDW 14.5 11.6 - 15.1 %    MPV 10.6 8.9 - 12.7 fL    Platelets 249 149 - 390 Thousands/uL    nRBC 0 /100 WBCs    Segmented % 73 43 - 75 %    Immature Grans % 1 0 - 2 %    Lymphocytes % 13 (L) 14 - 44 %    Monocytes % 7 4 - 12 %    Eosinophils Relative 5 0 - 6 %    Basophils Relative 1 0 - 1 %    Absolute Neutrophils 6.92 1.85 - 7.62 Thousands/µL    Absolute Immature Grans 0.05 0.00 - 0.20 Thousand/uL    Absolute Lymphocytes 1.27 0.60 - 4.47 Thousands/µL    Absolute Monocytes 0.68 0.17 - 1.22 Thousand/µL    Eosinophils Absolute 0.50 0.00 - 0.61 Thousand/µL    Basophils Absolute 0.07 0.00 - 0.10 Thousands/µL   Magnesium   Result Value Ref Range    Magnesium 1.3 (L) 1.9 - 2.7 mg/dL   Phosphorus   Result Value Ref Range    Phosphorus 3.7 2.3 - 4.1 mg/dL   Vitamin D 25 hydroxy   Result Value Ref Range    Vit D, 25-Hydroxy 22.3 (L) 30.0 - 100.0 ng/mL   PTH, intact   Result Value Ref Range    .0 (H) 12.0 - 88.0 pg/mL   TIBC Panel (incl. Iron, TIBC, % Iron Saturation)   Result Value Ref Range    Iron Saturation 33 15 - 50 %    TIBC 254 250 - 450 ug/dL    Iron 83 50 - 212 ug/dL    UIBC 171 155 - 355 ug/dL   Ferritin   Result Value Ref Range    Ferritin 360 (H) 24 - 336 ng/mL             Invalid input(s): \"ALBUMIN\"      Portions of the record may have been created with voice recognition software. Occasional wrong word or \"sound a like\" substitutions may have occurred due to the inherent limitations of voice recognition software. Read the chart carefully and recognize, using context, where substitutions have occurred.If you have any questions, please contact the dictating provider.   "

## 2024-09-05 NOTE — PATIENT INSTRUCTIONS
Pleasure seeing you today.    Your most recent lab work was from May 2024.  At that time your kidney function was stable at baseline with a creatinine of 2.56 mg/dL and a GFR of 21 mL/min.  Recommend repeating lab work earliest convenience to reassess kidney function.  Will advise when lab results are available.    Also, Mihaela Sanches previously recommended ultrasound to monitor potentially complex renal cyst which should have been due around August 2023.  Recommend repeating this ultrasound at your earliest convenience.  Telephone number was provided for patient to schedule.    Recommend recheck in 3 months with labs prior to that visit.  Can be a video appointment if more convenient for patient.

## 2024-09-05 NOTE — TELEPHONE ENCOUNTER
Reason for call:   [x] Refill   [] Prior Auth  [] Other:     Office:   [] PCP/Provider -   [x] Specialty/Provider - Neph        Does the patient have enough for 3 days?   [x] Yes   [] No - Send as HP to POD

## 2024-09-06 RX ORDER — ALLOPURINOL 100 MG/1
100 TABLET ORAL DAILY
Qty: 90 TABLET | Refills: 0 | Status: SHIPPED | OUTPATIENT
Start: 2024-09-06

## 2024-09-10 ENCOUNTER — APPOINTMENT (OUTPATIENT)
Dept: LAB | Facility: MEDICAL CENTER | Age: 89
End: 2024-09-10
Payer: COMMERCIAL

## 2024-09-10 DIAGNOSIS — R80.9 PROTEINURIA, UNSPECIFIED TYPE: ICD-10-CM

## 2024-09-10 DIAGNOSIS — N18.4 CKD (CHRONIC KIDNEY DISEASE) STAGE 4, GFR 15-29 ML/MIN (HCC): ICD-10-CM

## 2024-09-10 DIAGNOSIS — D50.9 IRON DEFICIENCY ANEMIA, UNSPECIFIED IRON DEFICIENCY ANEMIA TYPE: ICD-10-CM

## 2024-09-10 DIAGNOSIS — I50.32 CHRONIC DIASTOLIC CHF (CONGESTIVE HEART FAILURE) (HCC): ICD-10-CM

## 2024-09-10 DIAGNOSIS — N25.81 SECONDARY HYPERPARATHYROIDISM (HCC): ICD-10-CM

## 2024-09-10 DIAGNOSIS — E55.9 VITAMIN D DEFICIENCY: ICD-10-CM

## 2024-09-10 DIAGNOSIS — E83.42 HYPOMAGNESEMIA: ICD-10-CM

## 2024-09-10 LAB
25(OH)D3 SERPL-MCNC: 48 NG/ML (ref 30–100)
ALBUMIN SERPL BCG-MCNC: 3.7 G/DL (ref 3.5–5)
ALP SERPL-CCNC: 125 U/L (ref 34–104)
ALT SERPL W P-5'-P-CCNC: 9 U/L (ref 7–52)
ANION GAP SERPL CALCULATED.3IONS-SCNC: 9 MMOL/L (ref 4–13)
AST SERPL W P-5'-P-CCNC: 18 U/L (ref 13–39)
BASOPHILS # BLD AUTO: 0.04 THOUSANDS/ÂΜL (ref 0–0.1)
BASOPHILS NFR BLD AUTO: 0 % (ref 0–1)
BILIRUB SERPL-MCNC: 0.49 MG/DL (ref 0.2–1)
BUN SERPL-MCNC: 48 MG/DL (ref 5–25)
CALCIUM SERPL-MCNC: 8.5 MG/DL (ref 8.4–10.2)
CHLORIDE SERPL-SCNC: 96 MMOL/L (ref 96–108)
CO2 SERPL-SCNC: 33 MMOL/L (ref 21–32)
CREAT SERPL-MCNC: 2.84 MG/DL (ref 0.6–1.3)
EOSINOPHIL # BLD AUTO: 0.48 THOUSAND/ÂΜL (ref 0–0.61)
EOSINOPHIL NFR BLD AUTO: 5 % (ref 0–6)
ERYTHROCYTE [DISTWIDTH] IN BLOOD BY AUTOMATED COUNT: 13.6 % (ref 11.6–15.1)
FERRITIN SERPL-MCNC: 276 NG/ML (ref 24–336)
GFR SERPL CREATININE-BSD FRML MDRD: 18 ML/MIN/1.73SQ M
GLUCOSE SERPL-MCNC: 93 MG/DL (ref 65–140)
HCT VFR BLD AUTO: 36.4 % (ref 36.5–49.3)
HGB BLD-MCNC: 11.8 G/DL (ref 12–17)
IMM GRANULOCYTES # BLD AUTO: 0.1 THOUSAND/UL (ref 0–0.2)
IMM GRANULOCYTES NFR BLD AUTO: 1 % (ref 0–2)
IRON SATN MFR SERPL: 39 % (ref 15–50)
IRON SERPL-MCNC: 94 UG/DL (ref 50–212)
LYMPHOCYTES # BLD AUTO: 1.17 THOUSANDS/ÂΜL (ref 0.6–4.47)
LYMPHOCYTES NFR BLD AUTO: 11 % (ref 14–44)
MAGNESIUM SERPL-MCNC: 1.3 MG/DL (ref 1.9–2.7)
MCH RBC QN AUTO: 33.2 PG (ref 26.8–34.3)
MCHC RBC AUTO-ENTMCNC: 32.4 G/DL (ref 31.4–37.4)
MCV RBC AUTO: 103 FL (ref 82–98)
MONOCYTES # BLD AUTO: 0.71 THOUSAND/ÂΜL (ref 0.17–1.22)
MONOCYTES NFR BLD AUTO: 7 % (ref 4–12)
NEUTROPHILS # BLD AUTO: 7.81 THOUSANDS/ÂΜL (ref 1.85–7.62)
NEUTS SEG NFR BLD AUTO: 76 % (ref 43–75)
NRBC BLD AUTO-RTO: 0 /100 WBCS
PHOSPHATE SERPL-MCNC: 3.4 MG/DL (ref 2.3–4.1)
PLATELET # BLD AUTO: 215 THOUSANDS/UL (ref 149–390)
PMV BLD AUTO: 10.5 FL (ref 8.9–12.7)
POTASSIUM SERPL-SCNC: 4.7 MMOL/L (ref 3.5–5.3)
PROT SERPL-MCNC: 6.4 G/DL (ref 6.4–8.4)
PTH-INTACT SERPL-MCNC: 177.4 PG/ML (ref 12–88)
RBC # BLD AUTO: 3.55 MILLION/UL (ref 3.88–5.62)
SODIUM SERPL-SCNC: 138 MMOL/L (ref 135–147)
TIBC SERPL-MCNC: 240 UG/DL (ref 250–450)
UIBC SERPL-MCNC: 146 UG/DL (ref 155–355)
WBC # BLD AUTO: 10.31 THOUSAND/UL (ref 4.31–10.16)

## 2024-09-10 PROCEDURE — 80053 COMPREHEN METABOLIC PANEL: CPT

## 2024-09-10 PROCEDURE — 84100 ASSAY OF PHOSPHORUS: CPT

## 2024-09-10 PROCEDURE — 82728 ASSAY OF FERRITIN: CPT

## 2024-09-10 PROCEDURE — 82306 VITAMIN D 25 HYDROXY: CPT

## 2024-09-10 PROCEDURE — 83735 ASSAY OF MAGNESIUM: CPT

## 2024-09-10 PROCEDURE — 83550 IRON BINDING TEST: CPT

## 2024-09-10 PROCEDURE — 85025 COMPLETE CBC W/AUTO DIFF WBC: CPT

## 2024-09-10 PROCEDURE — 36415 COLL VENOUS BLD VENIPUNCTURE: CPT

## 2024-09-10 PROCEDURE — 83970 ASSAY OF PARATHORMONE: CPT

## 2024-09-10 PROCEDURE — 83540 ASSAY OF IRON: CPT

## 2024-09-12 ENCOUNTER — APPOINTMENT (OUTPATIENT)
Dept: LAB | Facility: MEDICAL CENTER | Age: 89
End: 2024-09-12
Payer: COMMERCIAL

## 2024-09-12 LAB
BACTERIA UR QL AUTO: ABNORMAL /HPF
BILIRUB UR QL STRIP: NEGATIVE
BUDDING YEAST: PRESENT
CLARITY UR: CLEAR
COLOR UR: ABNORMAL
CREAT UR-MCNC: 78.8 MG/DL
GLUCOSE UR STRIP-MCNC: NEGATIVE MG/DL
HGB UR QL STRIP.AUTO: NEGATIVE
HYALINE CASTS #/AREA URNS LPF: ABNORMAL /LPF
KETONES UR STRIP-MCNC: NEGATIVE MG/DL
LEUKOCYTE ESTERASE UR QL STRIP: NEGATIVE
MICROALBUMIN UR-MCNC: <7 MG/L
NITRITE UR QL STRIP: NEGATIVE
NON-SQ EPI CELLS URNS QL MICRO: ABNORMAL /HPF
PH UR STRIP.AUTO: 6 [PH]
PROT UR STRIP-MCNC: NEGATIVE MG/DL
RBC #/AREA URNS AUTO: ABNORMAL /HPF
SP GR UR STRIP.AUTO: 1.01 (ref 1–1.03)
UROBILINOGEN UR STRIP-ACNC: <2 MG/DL
WBC #/AREA URNS AUTO: ABNORMAL /HPF

## 2024-09-12 PROCEDURE — 82043 UR ALBUMIN QUANTITATIVE: CPT

## 2024-09-12 PROCEDURE — 81001 URINALYSIS AUTO W/SCOPE: CPT

## 2024-09-12 PROCEDURE — 82570 ASSAY OF URINE CREATININE: CPT

## 2024-10-07 DIAGNOSIS — K92.2 GASTROINTESTINAL HEMORRHAGE, UNSPECIFIED GASTROINTESTINAL HEMORRHAGE TYPE: ICD-10-CM

## 2024-10-07 DIAGNOSIS — I48.0 PAROXYSMAL ATRIAL FIBRILLATION (HCC): ICD-10-CM

## 2024-10-07 DIAGNOSIS — M10.9 ACUTE GOUT OF MULTIPLE SITES, UNSPECIFIED CAUSE: ICD-10-CM

## 2024-10-07 NOTE — TELEPHONE ENCOUNTER
Reason for call: : Cristine Hill,  manage this medication!    [x] Refill   [] Prior Auth  [] Other:     Office:   [x] PCP/Provider -   [] Specialty/Provider -     Medication:     allopurinol (ZYLOPRIM) 100 mg tablet       Dose/Frequency: Take 1 tablet (100 mg total) by mouth daily,   Quality:  90 tablet     pantoprazole (PROTONIX) 40 mg tablet      TAKE 1 TABLET BY MOUTH TWICE DAILY BEFORE MEAL(S)     Quantity: 180 tablet       Eliquis 2.5 MG     Take 1 tablet by mouth twice daily,     Quantity: 180 tablet     Pharmacy: [address], [address] [phone]     Does the patient have enough for 3 days?   [] Yes   [] No - Send as HP to POD

## 2024-10-08 RX ORDER — PANTOPRAZOLE SODIUM 40 MG/1
40 TABLET, DELAYED RELEASE ORAL DAILY
Qty: 180 TABLET | Refills: 0 | Status: SHIPPED | OUTPATIENT
Start: 2024-10-08

## 2024-10-08 RX ORDER — ALLOPURINOL 100 MG/1
100 TABLET ORAL DAILY
Qty: 90 TABLET | Refills: 0 | Status: SHIPPED | OUTPATIENT
Start: 2024-10-08

## 2024-11-07 ENCOUNTER — REMOTE DEVICE CLINIC VISIT (OUTPATIENT)
Dept: CARDIOLOGY CLINIC | Facility: CLINIC | Age: 89
End: 2024-11-07
Payer: COMMERCIAL

## 2024-11-07 DIAGNOSIS — I44.2 CHB (COMPLETE HEART BLOCK) (HCC): ICD-10-CM

## 2024-11-07 DIAGNOSIS — I48.91 ATRIAL FIBRILLATION, UNSPECIFIED TYPE (HCC): Primary | ICD-10-CM

## 2024-11-07 PROCEDURE — 93294 REM INTERROG EVL PM/LDLS PM: CPT | Performed by: INTERNAL MEDICINE

## 2024-11-07 PROCEDURE — 93296 REM INTERROG EVL PM/IDS: CPT | Performed by: INTERNAL MEDICINE

## 2024-11-07 NOTE — PROGRESS NOTES
Results for orders placed or performed in visit on 11/07/24   Cardiac EP device report    Narrative    MDT-DUAL CHAMBER PPM (DDD MODE)/ ACTIVE SYSTEM IS MRI CONDITIONAL  CARELINK TRANSMISSION: BATTERY VOLTAGE ADEQUATE (7.8 YRS). AP: 0.1%. : 99.5% (>40%~MVP-OFF~CHB). ALL AVAILABLE LEAD PARAMETERS WITHIN NORMAL LIMITS. 1 AT/AF EPISODE W/ AF IN PROGRESS (HX OF SAME). AF BURDEN: 100%. PT TAKES ELIQUIS, ASA 81MG. EF: 65% (ECHO 2/9/23). NORMAL DEVICE FUNCTION. CH

## 2024-11-11 DIAGNOSIS — I10 ESSENTIAL HYPERTENSION: ICD-10-CM

## 2024-11-11 RX ORDER — METOPROLOL TARTRATE 100 MG/1
100 TABLET ORAL 2 TIMES DAILY
Qty: 60 TABLET | Refills: 5 | Status: SHIPPED | OUTPATIENT
Start: 2024-11-11

## 2024-12-05 ENCOUNTER — RA CDI HCC (OUTPATIENT)
Dept: OTHER | Facility: HOSPITAL | Age: 89
End: 2024-12-05

## 2024-12-05 ENCOUNTER — TELEMEDICINE (OUTPATIENT)
Age: 89
End: 2024-12-05
Payer: COMMERCIAL

## 2024-12-05 DIAGNOSIS — R80.1 PERSISTENT PROTEINURIA: ICD-10-CM

## 2024-12-05 DIAGNOSIS — D50.9 IRON DEFICIENCY ANEMIA, UNSPECIFIED IRON DEFICIENCY ANEMIA TYPE: ICD-10-CM

## 2024-12-05 DIAGNOSIS — E83.42 HYPOMAGNESEMIA: ICD-10-CM

## 2024-12-05 DIAGNOSIS — N18.9 ACUTE KIDNEY INJURY SUPERIMPOSED ON CKD  (HCC): ICD-10-CM

## 2024-12-05 DIAGNOSIS — N18.4 CKD (CHRONIC KIDNEY DISEASE) STAGE 4, GFR 15-29 ML/MIN (HCC): Primary | ICD-10-CM

## 2024-12-05 DIAGNOSIS — N25.81 SECONDARY HYPERPARATHYROIDISM (HCC): ICD-10-CM

## 2024-12-05 DIAGNOSIS — Q61.02 MULTIPLE RENAL CYSTS: ICD-10-CM

## 2024-12-05 DIAGNOSIS — E55.9 VITAMIN D DEFICIENCY: ICD-10-CM

## 2024-12-05 DIAGNOSIS — I10 BENIGN ESSENTIAL HYPERTENSION: ICD-10-CM

## 2024-12-05 DIAGNOSIS — E66.9 OBESITY (BMI 30-39.9): ICD-10-CM

## 2024-12-05 DIAGNOSIS — D50.8 IRON DEFICIENCY ANEMIA SECONDARY TO INADEQUATE DIETARY IRON INTAKE: ICD-10-CM

## 2024-12-05 DIAGNOSIS — N17.9 ACUTE KIDNEY INJURY SUPERIMPOSED ON CKD  (HCC): ICD-10-CM

## 2024-12-05 PROCEDURE — 99214 OFFICE O/P EST MOD 30 MIN: CPT

## 2024-12-05 NOTE — PATIENT INSTRUCTIONS
Pleasure seeing you today.     Your kidney function is stable with a creatinine of 2.84 mg/dL and a GFR of 18 mL/min on 9/10/24. Please have updated labs completed as per our discussion.  Please continue to avoid NSAIDs such as Advil, Motrin, Aleve, ibuprofen and naproxen.  Please continue to follow 2 g sodium restriction.    When you get the new BP cuff. Please check blood pressure at home 3-4 times per week and keep a record of readings.  If blood pressure upper number is consistently less than 100 or greater than 150 please contact the office.  Please also call the office if you are experiencing increased headaches, dizziness, lightheadedness, chest pain or blurred vision.  Please continue to maintain a 2 g sodium restriction.    Please continue taking all medications as previously ordered.    Please return for follow-up appointment in 6 months with lab work completed prior to that visit.

## 2024-12-05 NOTE — ASSESSMENT & PLAN NOTE
Home blood pressure - does not check, does not have BP cuff. Sent cuff rx to pharmacy.   Continue 2-3 g sodium restriction  Continue metoprolol 100 mg twice daily, torsemide 40 mg dailyOrders:    Blood Pressure Monitoring KIT; Use 3 (three) times a week

## 2024-12-05 NOTE — ASSESSMENT & PLAN NOTE
US KUB 2/8/2023 noting 2 right possibly complex renal cyst, 1 left renal cyst.  Follow-up US KUB requested in May 2024; however, patient has not yet completed. Would like to wait until the spring to have US KUB completed, new order entered. Orders:    US kidney and bladder; Future

## 2024-12-05 NOTE — ASSESSMENT & PLAN NOTE
.4 PG/mL, 9/10/2024.  Improved from 197.0 on 5/14/2024.  Phosphorus 3.4 mg/dL, calcium 8.5 mg/dL, 9/10.  Continue ergocalciferol 50,000 units weekly  Orders:    Phosphorus; Future    PTH, intact; Future

## 2024-12-05 NOTE — ASSESSMENT & PLAN NOTE
Lab Results   Component Value Date    EGFR 18 09/10/2024    EGFR 21 05/14/2024    EGFR 17 03/05/2024    CREATININE 2.84 (H) 09/10/2024    CREATININE 2.56 (H) 05/14/2024    CREATININE 2.97 (H) 03/05/2024   Previous YOLANDE in February 2023 due to acute tubular necrosis with peak creatinine 4.80 mg/dL on 2/12/2023. Repeat labs this week.

## 2024-12-05 NOTE — ASSESSMENT & PLAN NOTE
Lab Results   Component Value Date    EGFR 18 09/10/2024    EGFR 21 05/14/2024    EGFR 17 03/05/2024    CREATININE 2.84 (H) 09/10/2024    CREATININE 2.56 (H) 05/14/2024    CREATININE 2.97 (H) 03/05/2024   Baseline creatinine 2.5-3.0 mg/DL  Etiology age-related nephron loss, prerenal effect of diuretic, hypertensive nephrosclerosis and previous severe YOLANDE in 2023.  Previous grade 1 albuminuria.  Follows with Dr. Gomez.   Creatinine 2.84 mg/dL, GFR 18 mL/min, 9/10/2024.-Most recent  UA with 10-25 hyaline cast, otherwise unremarkable, 9/12/2024.  UACR unable to calculate, 9/12.  US KUB ordered in May 2024 but not completed.  US KUB with 2 simple cysts in right kidney, single simple cyst in left kidney, no hydronephrosis bilaterally, 2/8/2023.  Virtual visit, unable to examine, family states no edema in lower extremities.  Continue to avoid NSAIDs and nephrotoxins.  Stay well-hydrated.  Request patient complete updated lab work this week at his convenience, beth have done next Fri 12/13.  Orders:    Comprehensive metabolic panel; Future    Blood Pressure Monitoring KIT; Use 3 (three) times a week    US kidney and bladder; Future

## 2024-12-05 NOTE — ASSESSMENT & PLAN NOTE
Iron panel with ISAT 39%, appropriate, 9/10/2024.  Will continue to monitor  Orders:    CBC and differential; Future    Iron Panel (Includes Ferritin, Iron Sat%, Iron, and TIBC); Future

## 2024-12-05 NOTE — PROGRESS NOTES
Name: Les Quiñonez      : 1935      MRN: [id number]  Encounter Provider: DIANDRA Alexis  Encounter Date: 2024   Encounter department: [facility]  :  Assessment & Plan  CKD (chronic kidney disease) stage 4, GFR 15-29 ml/min ([facility])  Lab Results   Component Value Date    EGFR 18 09/10/2024    EGFR 21 2024    EGFR 17 2024    CREATININE 2.84 (H) 09/10/2024    CREATININE 2.56 (H) 2024    CREATININE 2.97 (H) 2024   Baseline creatinine 2.5-3.0 mg/DL  Etiology age-related nephron loss, prerenal effect of diuretic, hypertensive nephrosclerosis and previous severe YOLANDE in .  Previous grade 1 albuminuria.  Follows with Dr. Gomez.   Creatinine 2.84 mg/dL, GFR 18 mL/min, 9/10/2024.-Most recent  UA with 10-25 hyaline cast, otherwise unremarkable, 2024.  UACR unable to calculate, .  US KUB ordered in May 2024 but not completed.  US KUB with 2 simple cysts in right kidney, single simple cyst in left kidney, no hydronephrosis bilaterally, 2023.  Virtual visit, unable to examine, family states no edema in lower extremities.  Continue to avoid NSAIDs and nephrotoxins.  Stay well-hydrated.  Request patient complete updated lab work this week at his convenience, qwill have done next .  Orders:    Comprehensive metabolic panel; Future    Blood Pressure Monitoring KIT; Use 3 (three) times a week    US kidney and bladder; Future    Benign essential hypertension  Home blood pressure - does not check, does not have BP cuff. Sent cuff rx to pharmacy.   Continue 2-3 g sodium restriction  Continue metoprolol 100 mg twice daily, torsemide 40 mg dailyOrders:    Blood Pressure Monitoring KIT; Use 3 (three) times a week    Secondary hyperparathyroidism (HCC)  .4 PG/mL, 9/10/2024.  Improved from 197.0 on 2024.  Phosphorus 3.4 mg/dL, calcium 8.5 mg/dL, 9/10.  Continue ergocalciferol 50,000 units weekly  Orders:     Phosphorus; Future    PTH, intact; Future    Multiple renal cysts  US KUB 2/8/2023 noting 2 right possibly complex renal cyst, 1 left renal cyst.  Follow-up US KUB requested in May 2024; however, patient has not yet completed. Would like to wait until the spring to have US KUB completed, new order entered. Orders:    US kidney and bladder; Future    Acute kidney injury superimposed on CKD  (HCC)  Lab Results   Component Value Date    EGFR 18 09/10/2024    EGFR 21 05/14/2024    EGFR 17 03/05/2024    CREATININE 2.84 (H) 09/10/2024    CREATININE 2.56 (H) 05/14/2024    CREATININE 2.97 (H) 03/05/2024   Previous YOLANDE in February 2023 due to acute tubular necrosis with peak creatinine 4.80 mg/dL on 2/12/2023. Repeat labs this week.      Iron deficiency anemia, unspecified iron deficiency anemia type  Iron panel with ISAT 39%, appropriate, 9/10/2024.  Will continue to monitor  Orders:    CBC and differential; Future    Iron Panel (Includes Ferritin, Iron Sat%, Iron, and TIBC); Future    Obesity (BMI 30-39.9)  Continue encourage weight loss       Vitamin D deficiency  Vitamin D40 8.0 ng/mL, 9/10.  Continue ergocalciferol 50,000 units weekly         VIRTUAL CARE DOCUMENTATION:     1. This service was provided via Telemedicine using Other: [employer]       2. Parties in the room with patient during teleconsult Family member: Wife, Daughter Stephani     3. Confidentiality My office door was closed     4. Participants No one else was in the room    5. Patient acknowledged consent and understanding of privacy and security of the  Telemedicine consult. I informed the patient that I have reviewed their record in Epic and presented the opportunity for them to ask any questions regarding the visit today.  The patient agreed to participate.    6. Time spent 30        History of Present Illness     HPI  Les Quiñonez is a 89 y.o. male who presents to [facility] for follow-up of CKD stage IV, benign hypertension, secondary  hyperparathyroidism, multiple renal cysts, and previous acute kidney injury.  PMH includes iron deficiency anemia, obesity, vitamin D deficiency, CHF, A-fib on Eliquis, emphysema, metabolic encephalopathy, stroke, and obesity.    Denies recent hospitalizations, emergency department visits or illness.  States overall has been feeling well. Sleeps most of the day at baseline, has not changes recently.           Review of Systems   Constitutional:  Negative for activity change, appetite change, chills, fatigue and fever.   HENT:  Negative for ear pain and sore throat.    Eyes:  Negative for pain and visual disturbance.   Respiratory:  Negative for cough and shortness of breath.    Cardiovascular:  Negative for chest pain, palpitations and leg swelling.   Gastrointestinal:  Negative for abdominal pain, constipation, diarrhea, nausea and vomiting.   Genitourinary:  Negative for decreased urine volume, difficulty urinating, dysuria, flank pain, hematuria and urgency.   Musculoskeletal:  Negative for arthralgias and back pain.   Skin:  Negative for color change and rash.   Neurological:  Negative for dizziness, seizures, syncope, weakness, light-headedness and headaches.   Hematological: Negative.    Psychiatric/Behavioral: Negative.     All other systems reviewed and are negative.         Objective   There were no vitals taken for this visit.     Physical Exam  HENT:      Right Ear: External ear normal.      Nose: Nose normal.      Mouth/Throat:      Mouth: Mucous membranes are moist.      Pharynx: Oropharynx is clear.   Eyes:      Extraocular Movements: Extraocular movements intact.      Conjunctiva/sclera: Conjunctivae normal.      Pupils: Pupils are equal, round, and reactive to light.   Cardiovascular:      Comments: Virtual visit, unable to assess    Pulmonary:      Effort: Pulmonary effort is normal. No respiratory distress.      Comments: Virtual visit, unable to assess  Abdominal:      Comments: Virtual visit,  unable to assess   Musculoskeletal:      Right lower leg: No edema.      Left lower leg: No edema.      Comments: Virtual visit, unable to assess - no edema per family   Skin:     Coloration: Skin is pale.      Comments: Virtual visit, unable to assess   Neurological:      General: No focal deficit present.      Mental Status: He is oriented to person, place, and time.

## 2024-12-13 ENCOUNTER — OFFICE VISIT (OUTPATIENT)
Dept: FAMILY MEDICINE CLINIC | Facility: CLINIC | Age: 89
End: 2024-12-13
Payer: COMMERCIAL

## 2024-12-13 ENCOUNTER — APPOINTMENT (OUTPATIENT)
Dept: LAB | Facility: MEDICAL CENTER | Age: 89
End: 2024-12-13
Payer: COMMERCIAL

## 2024-12-13 VITALS
RESPIRATION RATE: 18 BRPM | OXYGEN SATURATION: 99 % | SYSTOLIC BLOOD PRESSURE: 120 MMHG | DIASTOLIC BLOOD PRESSURE: 76 MMHG | TEMPERATURE: 97 F | WEIGHT: 171 LBS | HEIGHT: 64 IN | HEART RATE: 60 BPM | BODY MASS INDEX: 29.19 KG/M2

## 2024-12-13 DIAGNOSIS — N18.4 CKD (CHRONIC KIDNEY DISEASE) STAGE 4, GFR 15-29 ML/MIN (HCC): ICD-10-CM

## 2024-12-13 DIAGNOSIS — M10.9 ACUTE GOUT OF MULTIPLE SITES, UNSPECIFIED CAUSE: ICD-10-CM

## 2024-12-13 DIAGNOSIS — Z00.00 MEDICARE ANNUAL WELLNESS VISIT, SUBSEQUENT: Primary | ICD-10-CM

## 2024-12-13 DIAGNOSIS — R80.1 PERSISTENT PROTEINURIA: ICD-10-CM

## 2024-12-13 DIAGNOSIS — I10 ESSENTIAL HYPERTENSION: ICD-10-CM

## 2024-12-13 DIAGNOSIS — G89.29 CHRONIC BILATERAL LOW BACK PAIN WITHOUT SCIATICA: ICD-10-CM

## 2024-12-13 DIAGNOSIS — D50.8 IRON DEFICIENCY ANEMIA SECONDARY TO INADEQUATE DIETARY IRON INTAKE: ICD-10-CM

## 2024-12-13 DIAGNOSIS — N25.81 SECONDARY HYPERPARATHYROIDISM (HCC): ICD-10-CM

## 2024-12-13 DIAGNOSIS — E83.42 HYPOMAGNESEMIA: ICD-10-CM

## 2024-12-13 DIAGNOSIS — Z23 ENCOUNTER FOR IMMUNIZATION: ICD-10-CM

## 2024-12-13 DIAGNOSIS — M54.50 CHRONIC BILATERAL LOW BACK PAIN WITHOUT SCIATICA: ICD-10-CM

## 2024-12-13 DIAGNOSIS — D50.9 IRON DEFICIENCY ANEMIA, UNSPECIFIED IRON DEFICIENCY ANEMIA TYPE: ICD-10-CM

## 2024-12-13 LAB
ALBUMIN SERPL BCG-MCNC: 4.2 G/DL (ref 3.5–5)
ALP SERPL-CCNC: 108 U/L (ref 34–104)
ALT SERPL W P-5'-P-CCNC: 15 U/L (ref 7–52)
ANION GAP SERPL CALCULATED.3IONS-SCNC: 8 MMOL/L (ref 4–13)
AST SERPL W P-5'-P-CCNC: 19 U/L (ref 13–39)
BASOPHILS # BLD AUTO: 0.05 THOUSANDS/ÂΜL (ref 0–0.1)
BASOPHILS NFR BLD AUTO: 1 % (ref 0–1)
BILIRUB SERPL-MCNC: 0.62 MG/DL (ref 0.2–1)
BUN SERPL-MCNC: 51 MG/DL (ref 5–25)
CALCIUM SERPL-MCNC: 9.2 MG/DL (ref 8.4–10.2)
CHLORIDE SERPL-SCNC: 99 MMOL/L (ref 96–108)
CO2 SERPL-SCNC: 35 MMOL/L (ref 21–32)
CREAT SERPL-MCNC: 2.87 MG/DL (ref 0.6–1.3)
EOSINOPHIL # BLD AUTO: 0.55 THOUSAND/ÂΜL (ref 0–0.61)
EOSINOPHIL NFR BLD AUTO: 5 % (ref 0–6)
ERYTHROCYTE [DISTWIDTH] IN BLOOD BY AUTOMATED COUNT: 14.6 % (ref 11.6–15.1)
FERRITIN SERPL-MCNC: 282 NG/ML (ref 24–336)
GFR SERPL CREATININE-BSD FRML MDRD: 18 ML/MIN/1.73SQ M
GLUCOSE SERPL-MCNC: 87 MG/DL (ref 65–140)
HCT VFR BLD AUTO: 38.4 % (ref 36.5–49.3)
HGB BLD-MCNC: 12.1 G/DL (ref 12–17)
IMM GRANULOCYTES # BLD AUTO: 0.06 THOUSAND/UL (ref 0–0.2)
IMM GRANULOCYTES NFR BLD AUTO: 1 % (ref 0–2)
IRON SATN MFR SERPL: 33 % (ref 15–50)
IRON SERPL-MCNC: 91 UG/DL (ref 50–212)
LYMPHOCYTES # BLD AUTO: 1.26 THOUSANDS/ÂΜL (ref 0.6–4.47)
LYMPHOCYTES NFR BLD AUTO: 11 % (ref 14–44)
MAGNESIUM SERPL-MCNC: 3.1 MG/DL (ref 1.9–2.7)
MCH RBC QN AUTO: 33.2 PG (ref 26.8–34.3)
MCHC RBC AUTO-ENTMCNC: 31.5 G/DL (ref 31.4–37.4)
MCV RBC AUTO: 105 FL (ref 82–98)
MONOCYTES # BLD AUTO: 0.7 THOUSAND/ÂΜL (ref 0.17–1.22)
MONOCYTES NFR BLD AUTO: 6 % (ref 4–12)
NEUTROPHILS # BLD AUTO: 8.41 THOUSANDS/ÂΜL (ref 1.85–7.62)
NEUTS SEG NFR BLD AUTO: 76 % (ref 43–75)
NRBC BLD AUTO-RTO: 0 /100 WBCS
PHOSPHATE SERPL-MCNC: 5.1 MG/DL (ref 2.3–4.1)
PLATELET # BLD AUTO: 215 THOUSANDS/UL (ref 149–390)
PMV BLD AUTO: 10.7 FL (ref 8.9–12.7)
POTASSIUM SERPL-SCNC: 4.5 MMOL/L (ref 3.5–5.3)
PROT SERPL-MCNC: 7.2 G/DL (ref 6.4–8.4)
PTH-INTACT SERPL-MCNC: 209.9 PG/ML (ref 12–88)
RBC # BLD AUTO: 3.65 MILLION/UL (ref 3.88–5.62)
SODIUM SERPL-SCNC: 142 MMOL/L (ref 135–147)
TIBC SERPL-MCNC: 274 UG/DL (ref 250–450)
UIBC SERPL-MCNC: 183 UG/DL (ref 155–355)
WBC # BLD AUTO: 11.03 THOUSAND/UL (ref 4.31–10.16)

## 2024-12-13 PROCEDURE — G0439 PPPS, SUBSEQ VISIT: HCPCS | Performed by: INTERNAL MEDICINE

## 2024-12-13 PROCEDURE — 85025 COMPLETE CBC W/AUTO DIFF WBC: CPT

## 2024-12-13 PROCEDURE — 84100 ASSAY OF PHOSPHORUS: CPT

## 2024-12-13 PROCEDURE — 83970 ASSAY OF PARATHORMONE: CPT

## 2024-12-13 PROCEDURE — 36415 COLL VENOUS BLD VENIPUNCTURE: CPT

## 2024-12-13 PROCEDURE — 90662 IIV NO PRSV INCREASED AG IM: CPT

## 2024-12-13 PROCEDURE — 80053 COMPREHEN METABOLIC PANEL: CPT

## 2024-12-13 PROCEDURE — 83540 ASSAY OF IRON: CPT

## 2024-12-13 PROCEDURE — 90471 IMMUNIZATION ADMIN: CPT

## 2024-12-13 PROCEDURE — 83550 IRON BINDING TEST: CPT

## 2024-12-13 PROCEDURE — 83735 ASSAY OF MAGNESIUM: CPT

## 2024-12-13 PROCEDURE — 82728 ASSAY OF FERRITIN: CPT

## 2024-12-13 RX ORDER — METOPROLOL TARTRATE 100 MG/1
100 TABLET ORAL 2 TIMES DAILY
Qty: 180 TABLET | Refills: 1 | Status: SHIPPED | OUTPATIENT
Start: 2024-12-13

## 2024-12-13 RX ORDER — GABAPENTIN 100 MG/1
100 CAPSULE ORAL
Qty: 90 CAPSULE | Refills: 1 | Status: SHIPPED | OUTPATIENT
Start: 2024-12-13

## 2024-12-13 RX ORDER — ALLOPURINOL 100 MG/1
100 TABLET ORAL DAILY
Qty: 90 TABLET | Refills: 1 | Status: SHIPPED | OUTPATIENT
Start: 2024-12-13

## 2024-12-13 NOTE — PROGRESS NOTES
Name: Les Quiñonez      : 1935      MRN: [id number]  Encounter Provider: Cristine Hill DO  Encounter Date: 2024   Encounter department: [facility]    Assessment & Plan  Encounter for immunization    Orders:  •  influenza vaccine, high-dose, PF 0.5 mL (Fluzone High Dose)  Flu shot today  Medicare annual wellness visit, subsequent  Pt going for labs today  Fall precautions  Has ACP documents on chart        Rto 6months    Depression Screening and Follow-up Plan: Patient was screened for depression during today's encounter. They screened negative with a PHQ-2 score of 0.    Preventive health issues were discussed with patient, and age appropriate screening tests were ordered as noted in patient's After Visit Summary. Personalized health advice and appropriate referrals for health education or preventive services given if needed, as noted in patient's After Visit Summary.    History of Present Illness     HPI   Pt doing ok No new issues He is going for labs today No falls No chest pain or sob Appetite good No recent illness Wants flu shot today  Patient Care Team:  Cristine Hill DO as PCP - General  DO Abraham Holm MD Lynn Moran, DO Justin M Miller, DO (Orthopedic Surgery)  Muriel Olea MD (Pain Medicine)  DIANDRA Fulton as Nurse Practitioner (Nephrology)  DIANDRA Alexis as Nurse Practitioner (Nephrology)    Review of Systems   Constitutional:  Negative for activity change, chills and fever.   HENT: Negative.     Respiratory: Negative.     Cardiovascular: Negative.    Gastrointestinal: Negative.    Genitourinary: Negative.    Musculoskeletal: Negative.    Neurological: Negative.    Psychiatric/Behavioral: Negative.       Medical History Reviewed by provider this encounter:       Annual Wellness Visit Questionnaire   Les is here for his Subsequent Wellness visit. Last Medicare Wellness visit information reviewed, patient interviewed,  no change since last AWV.     Health Risk Assessment:   Patient rates overall health as good. Patient feels that their physical health rating is same. Patient is satisfied with their life. Eyesight was rated as same. Hearing was rated as same. Patient feels that their emotional and mental health rating is same. Patients states they are never, rarely angry. Patient states they are never, rarely unusually tired/fatigued. Pain experienced in the last 7 days has been none. Patient states that he has experienced no weight loss or gain in last 6 months.     Depression Screening:   PHQ-2 Score: 0      Fall Risk Screening:   In the past year, patient has experienced: no history of falling in past year      Home Safety:  Patient has trouble with stairs inside or outside of their home. Patient has working smoke alarms and has no working carbon monoxide detector. Home safety hazards include: none.     Nutrition:   Current diet is Regular and Frequent junk food.     Medications:   Patient is currently taking over-the-counter supplements. OTC medications include: see medication list. Patient is not able to manage medications. Met by wife    Activities of Daily Living (ADLs)/Instrumental Activities of Daily Living (IADLs):   Walk and transfer into and out of bed and chair?: No  Dress and groom yourself?: No    Bathe or shower yourself?: No    Feed yourself? Yes  Do your laundry/housekeeping?: No  Manage your money, pay your bills and track your expenses?: No  Make your own meals?: No    Do your own shopping?: No    ADL comments: Met by wife    Previous Hospitalizations:   Any hospitalizations or ED visits within the last 12 months?: No      Advance Care Planning:   Living will: Yes    Durable POA for healthcare: Yes    Advanced directive: Yes    End of Life Decisions reviewed with patient: Yes    Provider agrees with end of life decisions: Yes      Cognitive Screening:   Provider or family/friend/caregiver concerned regarding  cognition?: No    PREVENTIVE SCREENINGS      Cardiovascular Screening:    General: Screening Current      Diabetes Screening:     General: Screening Current      Colorectal Cancer Screening:     General: Screening Not Indicated      Prostate Cancer Screening:    General: Screening Not Indicated      Osteoporosis Screening:    General: Patient Declines      Abdominal Aortic Aneurysm (AAA) Screening:        General: Screening Current      Lung Cancer Screening:     General: Screening Not Indicated      Hepatitis C Screening:    General: Screening Current    Screening, Brief Intervention, and Referral to Treatment (SBIRT)    Screening  Typical number of drinks in a day: 0  Typical number of drinks in a week: 0  Interpretation: Low risk drinking behavior.    AUDIT-C Screenin) How often did you have a drink containing alcohol in the past year? never  2) How many drinks did you have on a typical day when you were drinking in the past year? 0  3) How often did you have 6 or more drinks on one occasion in the past year? never    AUDIT-C Score: 0  Interpretation: Score 0-3 (male): Negative screen for alcohol misuse    Single Item Drug Screening:  How often have you used an illegal drug (including marijuana) or a prescription medication for non-medical reasons in the past year? never    Single Item Drug Screen Score: 0  Interpretation: Negative screen for possible drug use disorder    Brief Intervention  Alcohol & drug use screenings were reviewed. No concerns regarding substance use disorder identified.     Other Counseling Topics:   Calcium and vitamin D intake.     Social Drivers of Health     Financial Resource Strain: Low Risk  (2023)    Overall Financial Resource Strain (CARDIA)    • Difficulty of Paying Living Expenses: Not hard at all   Food Insecurity: No Food Insecurity (2023)    Hunger Vital Sign    • Worried About Running Out of Food in the Last Year: Never true    • Ran Out of Food in the Last Year:  "Never true   Transportation Needs: No Transportation Needs (12/6/2023)    PRAPARE - Transportation    • Lack of Transportation (Medical): No    • Lack of Transportation (Non-Medical): No   Housing Stability: Low Risk  (2/9/2023)    Housing Stability Vital Sign    • Unable to Pay for Housing in the Last Year: No    • Number of Places Lived in the Last Year: 1    • Unstable Housing in the Last Year: No     No results found.    Objective   Pulse 60   Temp (!) 97 °F (36.1 °C) (Temporal)   Ht 5' 4\" (1.626 m)   Wt 77.6 kg (171 lb)   SpO2 99%   BMI 29.35 kg/m²     Physical Exam  Vitals and nursing note reviewed.   Constitutional:       General: He is not in acute distress.     Appearance: Normal appearance. He is not ill-appearing, toxic-appearing or diaphoretic.   HENT:      Head: Normocephalic and atraumatic.      Mouth/Throat:      Mouth: Mucous membranes are moist.   Eyes:      General: No scleral icterus.  Cardiovascular:      Rate and Rhythm: Normal rate. Rhythm irregular.   Pulmonary:      Effort: Pulmonary effort is normal. No respiratory distress.      Breath sounds: Normal breath sounds. No wheezing.   Skin:     General: Skin is warm and dry.      Coloration: Skin is pale. Skin is not jaundiced.   Neurological:      General: No focal deficit present.      Mental Status: He is alert and oriented to person, place, and time. Mental status is at baseline.      Cranial Nerves: No cranial nerve deficit.      Gait: Gait abnormal.   Psychiatric:         Mood and Affect: Mood normal.         Behavior: Behavior normal.         Thought Content: Thought content normal.         Judgment: Judgment normal.       "

## 2024-12-13 NOTE — TELEPHONE ENCOUNTER
Reason for call:   [x] Refill   [] Prior Auth  [] Other:     Office:   [x] PCP/Provider - Dr Hill  [] Specialty/Provider -     Medication:   Allopurinol 100 mg, 1 qd, 90  Metoprolol tartrate 100 mg, 1 bid, 180  Gabapentin 100 mg, 1 hs, 90       Pharmacy:   [place]  [facility]    Does the patient have enough for 3 days?   [x] Yes   [] No - Send as HP to POD

## 2024-12-16 ENCOUNTER — RESULTS FOLLOW-UP (OUTPATIENT)
Age: 89
End: 2024-12-16

## 2024-12-16 NOTE — TELEPHONE ENCOUNTER
Called pt and left vm notifying that we will need to cancel 3/24 device appt.  Want to reschedule device appt for same day pt sees Dr. Watkins on 5/4   I called and spoke with Les's daughter Aimee regarding the following:      ----- Message from DIANDRA Fulton sent at 12/16/2024 12:06 PM EST -----  Kidney function is unchanged from 3 months ago.  Please ask if he is having any symptoms including, but not limited to, weakness, fatigue, appetite change, bowel/bladder issues.       She is aware of his lab results. She states he is doing very well and he eats very good. She had no questions or concerns.

## 2025-01-12 PROBLEM — Z00.00 MEDICARE ANNUAL WELLNESS VISIT, SUBSEQUENT: Status: RESOLVED | Noted: 2018-08-14 | Resolved: 2025-01-12

## 2025-01-27 DIAGNOSIS — K92.2 GASTROINTESTINAL HEMORRHAGE, UNSPECIFIED GASTROINTESTINAL HEMORRHAGE TYPE: ICD-10-CM

## 2025-01-27 DIAGNOSIS — I48.0 PAROXYSMAL ATRIAL FIBRILLATION (HCC): ICD-10-CM

## 2025-01-27 RX ORDER — APIXABAN 2.5 MG/1
2.5 TABLET, FILM COATED ORAL 2 TIMES DAILY
Qty: 180 TABLET | Refills: 1 | Status: SHIPPED | OUTPATIENT
Start: 2025-01-27

## 2025-01-27 RX ORDER — PANTOPRAZOLE SODIUM 40 MG/1
40 TABLET, DELAYED RELEASE ORAL DAILY
Qty: 180 TABLET | Refills: 1 | Status: SHIPPED | OUTPATIENT
Start: 2025-01-27

## 2025-02-10 DIAGNOSIS — I50.32 CHRONIC DIASTOLIC CHF (CONGESTIVE HEART FAILURE) (HCC): ICD-10-CM

## 2025-02-10 NOTE — TELEPHONE ENCOUNTER
Reason for call:   [x] Refill   [] Prior Auth  [] Other:     Office:   [] PCP/Provider -   [x] Specialty/Provider - Ana Shafer PA-C / [facility] [place]    Medication: torsemide (DEMADEX) 20 mg tablet / Take 2 tablets (40 mg total) by mouth daily     Pharmacy: [address], [address]     Does the patient have enough for 3 days?   [x] Yes   [] No - Send as HP to POD

## 2025-02-11 RX ORDER — TORSEMIDE 20 MG/1
40 TABLET ORAL DAILY
Qty: 180 TABLET | Refills: 0 | Status: SHIPPED | OUTPATIENT
Start: 2025-02-11

## 2025-02-28 DIAGNOSIS — E55.9 VITAMIN D DEFICIENCY: ICD-10-CM

## 2025-02-28 RX ORDER — ERGOCALCIFEROL 1.25 MG/1
50000 CAPSULE, LIQUID FILLED ORAL WEEKLY
Qty: 12 CAPSULE | Refills: 1 | OUTPATIENT
Start: 2025-02-28

## 2025-02-28 NOTE — TELEPHONE ENCOUNTER
Reason for call:   [x] Refill   [] Prior Auth  [] Other:     Office:   [] PCP/Provider -   [x] Specialty/Provider -nephrology      Medication: vitamin D2    Dose/Frequency: 50,000 units take 1 weekly     Quantity: 12    Pharmacy: [facility] [address] [place]    Does the patient have enough for 3 days?   [] Yes   [x] No - Send as HP to POD- he's out of medication

## 2025-03-04 ENCOUNTER — TELEPHONE (OUTPATIENT)
Dept: CARDIOLOGY CLINIC | Facility: CLINIC | Age: OVER 89
End: 2025-03-04

## 2025-03-04 NOTE — TELEPHONE ENCOUNTER
Left voicemail message for patient advising of change to appointment for Pacemaker interrogation.  originally scheduled 3/13/25 in [place] and is now rescheduled on 3/11/25 [place]

## 2025-03-11 ENCOUNTER — IN-CLINIC DEVICE VISIT (OUTPATIENT)
Dept: CARDIOLOGY CLINIC | Facility: HOSPITAL | Age: OVER 89
End: 2025-03-11
Payer: COMMERCIAL

## 2025-03-11 DIAGNOSIS — I44.2 CHB (COMPLETE HEART BLOCK) (HCC): ICD-10-CM

## 2025-03-11 DIAGNOSIS — I48.91 ATRIAL FIBRILLATION, UNSPECIFIED TYPE (HCC): Primary | ICD-10-CM

## 2025-03-11 PROCEDURE — 93280 PM DEVICE PROGR EVAL DUAL: CPT | Performed by: INTERNAL MEDICINE

## 2025-03-11 NOTE — PROGRESS NOTES
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 AP: 0.1%. : 99.2% (>40%~MVP-OFF~CHB). ALL LEAD PARAMETERS WITHIN NORMAL LIMITS. AF IN PROGRESS (HX OF SAME). AF BURDEN: 100%. PT TAKES ELIQUIS, METOPROLOL TART, ASA 81MG. EF: 65% (ECHO 2/9/23). NO PROGRAMMING CHANGES MADE TO DEVICE PARAMETERS. NORMAL DEVICE FUNCTION. CH

## 2025-03-12 ENCOUNTER — RESULTS FOLLOW-UP (OUTPATIENT)
Dept: NON INVASIVE DIAGNOSTICS | Facility: HOSPITAL | Age: OVER 89
End: 2025-03-12

## 2025-03-13 ENCOUNTER — OFFICE VISIT (OUTPATIENT)
Dept: PODIATRY | Facility: CLINIC | Age: OVER 89
End: 2025-03-13
Payer: COMMERCIAL

## 2025-03-13 VITALS
RESPIRATION RATE: 16 BRPM | OXYGEN SATURATION: 98 % | BODY MASS INDEX: 29.19 KG/M2 | TEMPERATURE: 97.7 F | HEART RATE: 78 BPM | WEIGHT: 171 LBS | HEIGHT: 64 IN

## 2025-03-13 DIAGNOSIS — I48.0 PAROXYSMAL ATRIAL FIBRILLATION (HCC): ICD-10-CM

## 2025-03-13 DIAGNOSIS — Z79.01 LONG TERM CURRENT USE OF ANTICOAGULANT: ICD-10-CM

## 2025-03-13 DIAGNOSIS — L85.3 XEROSIS OF SKIN: ICD-10-CM

## 2025-03-13 DIAGNOSIS — B35.1 ONYCHOMYCOSIS: Primary | ICD-10-CM

## 2025-03-13 DIAGNOSIS — G62.9 NEUROPATHY: ICD-10-CM

## 2025-03-13 PROCEDURE — 99202 OFFICE O/P NEW SF 15 MIN: CPT | Performed by: PODIATRIST

## 2025-03-13 PROCEDURE — 11721 DEBRIDE NAIL 6 OR MORE: CPT | Performed by: PODIATRIST

## 2025-03-13 NOTE — PROGRESS NOTES
Multiple attempts have been made to contact the patient to schedule with no response from the patient.   Progress Note - Sussy Vazquez 1935, 80 y o  male MRN: [id number]    Unit/Bed#: [facility] 525-01 Encounter: [id number]    Primary Care Provider: Pervis Primrose, DO   Date and time admitted to hospital: 7/20/2020  4:04 PM        Acute respiratory failure with hypoxia [facility])  Assessment & Plan  Patient requires nasal cannula  Did not use at home  Now on increased oxygen  Chest x-ray vascular congestion, increased pleural effusions, and persistent opacities at the right lung base  Most recent echo showed an EF of 60% with no regional wall abnormalities  There does appear to be some mild pulmonary hypertension with a PA pressure of 48 mmHg  Differential concerning for CHF versus pneumonia  Respiratory status appears to be improving somewhat today  · Continue nasal cannula oxygen  · Monitor respiratory status closely  · Incentive spirometry  · Discontinue antibiotics-discussed with infectious disease  · Increase Lasix to 60 mg b i d  Due to inadequate urinary output, adjust accordingly  · Fluid/sodium restriction  · Appreciate cardiology and ID input-  · Continue with the IV diuresis  · Repeat chest x-ray showed presence of fluid overload, BNP is elevated    Ambulatory dysfunction  Assessment & Plan  · PT/OT recommending inpatient rehab    Leukocytosis  Assessment & Plan  Unclear etiology  Initially thought reactive, but now concern for infection given worsening respiratory status and elevated procalcitonin  Procalcitonin also possibly elevated secondary to CHF  · Initial blood cultures negative  Repeat blood cultures negative to date  · Restart antibiotics, currently on cefepime and vancomycin  · Trend white blood cell count and fever curve  · Appreciate ID recommendations, currently monitoring off of antibiotics    Essential hypertension  Assessment & Plan  · Continue amlodipine, clonidine  · Continue scheduled labetalol, now 100 mg b i d      Complete heart block (HCC)  Assessment & Plan  · Continue to hold metoprolol  · Patient receiving scheduled labetalol  · Status post permanent pacemaker placement on 07/24  · See plan for bradycardia    History of stroke  Assessment & Plan  Patient with prior stroke last year  · Continue aspirin and Plavix  · Stat CT if acute change in neurological status  · Patient appears to be more confused 9 baseline per family, improved as the day progressed  Patient had a CT scan today which showed the presence of previous CVA no acute intracranial pathology, discussed with radiology in person    H/O aortic valve replacement  Assessment & Plan  Patient prior history bioprosthetic valve  · Valve area 1 38 cm2 per echo  Acute kidney injury superimposed on chronic kidney disease ([facility] [address] )  Assessment & Plan  Previous baseline around 1 8-1 9 in 2019  Creatinine initially 2 74 on admission  Now trending 2 16 today This possibly represents his new baseline  · Avoid nephrotoxins  · Monitor electrolytes and renal function  · Ensure adequate oral intake and hydration  · Continue diuresis-patient appears to be fluid overloaded on the x-ray  · Cardiology evaluation appreciated  · Chest x-ray reviewed-continue with diuresis  · Recheck creatinine tomorrow morning with diuresis    Mild intermittent asthma without complication  Assessment & Plan  Continue albuterol nebulization p r n     * Bradycardia  Assessment & Plan  Asymptomatic currently  Patient with complete heart block  Western blot shows elevated Lyme IgG  However, do not suspect active Lyme infection  · Hold metoprolol  · Appreciate ID and Cardiology input  · Status post permanent pacemaker placement        VTE Pharmacologic Prophylaxis:   Pharmacologic: Apixaban (Eliquis)  Mechanical VTE Prophylaxis in Place: Yes    Patient Centered Rounds: I have performed bedside rounds with nursing staff today      Discussions with Specialists or Other Care Team Provider:  Cardiology    Education and Discussions with Family / Patient:  Wife updated in the room    Time Spent for Care: 30 minutes  More than 50% of total time spent on counseling and coordination of care as described above  Current Length of Stay: 10 day(s)    Current Patient Status: Inpatient   Certification Statement: The patient will continue to require additional inpatient hospital stay due to IV diuresis    Discharge Plan:     Code Status: Level 1 - Full Code      Subjective:   Patient seen and examined  Sitting in the chair  Patient still with periods of confusion in between  But in general appears to be improving    Objective:     Vitals:   Temp (24hrs), Av 9 °F (36 6 °C), Min:97 5 °F (36 4 °C), Max:98 4 °F (36 9 °C)    Temp:  [97 5 °F (36 4 °C)-98 4 °F (36 9 °C)] 97 6 °F (36 4 °C)  HR:  [60-84] 60  Resp:  [17-18] 17  BP: (131-144)/(55-68) 131/59  SpO2:  [96 %-98 %] 98 %  Body mass index is 35 96 kg/m²  Input and Output Summary (last 24 hours): Intake/Output Summary (Last 24 hours) at 2020  Last data filed at 2020 1913  Gross per 24 hour   Intake 838 ml   Output 2484 ml   Net -1646 ml       Physical Exam:     Physical Exam   Constitutional: He appears well-developed  No distress  HENT:   Head: Normocephalic and atraumatic  Eyes: Right eye exhibits no discharge  Left eye exhibits no discharge  Neck: No JVD present  Cardiovascular: Normal rate  No murmur heard  Pulmonary/Chest: Effort normal  No respiratory distress  Rales   Abdominal: Soft  Musculoskeletal: Normal range of motion  He exhibits no edema  Neurological: He is alert  No cranial nerve deficit  Skin: Skin is warm         Additional Data:     Labs:    Results from last 7 days   Lab Units 20  0539  20  1805   WBC Thousand/uL 11 98*   < > 13 39*   HEMOGLOBIN g/dL 11 2*   < > 11 3*   HEMATOCRIT % 34 0*   < > 36 3*   PLATELETS Thousands/uL 229   < > 208   NEUTROS PCT %  --   --  83*   LYMPHS PCT %  --   --  5*   MONOS PCT %  --   --  9   EOS PCT % --   --  2    < > = values in this interval not displayed  Results from last 7 days   Lab Units 07/30/20  0539   POTASSIUM mmol/L 4 1   CHLORIDE mmol/L 104   CO2 mmol/L 28   BUN mg/dL 87*   CREATININE mg/dL 2 16*   CALCIUM mg/dL 8 9   ALK PHOS U/L 92   ALT U/L 15   AST U/L 19     Results from last 7 days   Lab Units 07/24/20  0533   INR  1 32*       * I Have Reviewed All Lab Data Listed Above  * Additional Pertinent Lab Tests Reviewed: Tam[address] 66 Admission Reviewed    Imaging:    Imaging Reports Reviewed Today Include:   Imaging Personally Reviewed by Myself Includes:      Recent Cultures (last 7 days):     Results from last 7 days   Lab Units 07/26/20  2146 07/26/20  0455 07/26/20  0454   BLOOD CULTURE   --  No Growth After 4 Days  No Growth After 4 Days  LEGIONELLA URINARY ANTIGEN  Negative  --   --        Last 24 Hours Medication List:     Current Facility-Administered Medications:  acetaminophen 650 mg Oral Q6H PRN Kamari Sood, DO   amLODIPine 10 mg Oral BID Gemini Soden, PA-C   apixaban 2 5 mg Oral BID Michael Collie, PA-C   aspirin 81 mg Oral Daily [place] Collreji, PA-C   bisacodyl 10 mg Oral Daily PRN Nemo Eid MD   cloNIDine 0 1 mg Oral Daily Gemini Soden, DARIN   docusate sodium 100 mg Oral BID Nemo Eid MD   furosemide 60 mg Intravenous BID (diuretic) Marie Cho, DO   Labetalol HCl 10 mg Intravenous Q8H PRN Kendra Ratliff, DO   metoclopramide 10 mg Intravenous Q6H PRN Marie Cho, DO   metoprolol succinate 100 mg Oral BID Alejandro Zapien, DO   pantoprazole 40 mg Oral Early Morning Hetul Stewart, DO   polyethylene glycol 17 g Oral Daily Nemo Eid MD   pravastatin 40 mg Oral Daily With [employer], DO   senna 1 tablet Oral HS Nemo Eid MD        Today, Patient Was Seen By: Nemo Eid MD    ** Please Note: Dictation voice to text software may have been used in the creation of this document   **

## 2025-03-13 NOTE — PROGRESS NOTES
Name: Les Quiñonez      : 1935      MRN: [id number]  Encounter Provider: Chadd Nava DPM  Encounter Date: 3/13/2025   Encounter department: [facility] PODIATRY [place]  :  Assessment & Plan  Onychomycosis       Debride mycotic nails and thin the nail plates x 10 with the use of a nail nipper manually and an electric Dremel bur was used to reduce the thickness of the nail beds and smoothed the distal aspect of the nails.   Long term current use of anticoagulant         Paroxysmal atrial fibrillation (HCC)         Neuropathy         Xerosis of skin       Pt was instructed to use lotion once a day on both feet such as cerave, Cetaphil or similar thick style of lotion.   Discussed proper shoe gear, daily inspections of feet, and general foot health with patient. Patient has Q8  findings and is recommended for at risk foot care every 9-10 weeks.    Patients most recent complete clinical foot exam was on: 2024      Return in about 10 weeks (around 2025).     History of Present Illness   HPI  Les Quiñonez is a 89 y.o. male who presents with chief complaint of painful thick nails on both feet and also dry flaky skin present on the both feet.  He was seen in a wheelchair with his daughter present.  History obtained from: patient    Review of Systems  Medical History Reviewed by provider this encounter:     .  Current Outpatient Medications on File Prior to Visit   Medication Sig Dispense Refill    allopurinol (ZYLOPRIM) 100 mg tablet Take 1 tablet (100 mg total) by mouth daily 90 tablet 1    apixaban (Eliquis) 2.5 mg Take 1 tablet by mouth twice daily 180 tablet 1    aspirin (ECOTRIN LOW STRENGTH) 81 mg EC tablet Take 1 tablet (81 mg total) by mouth daily  0    atorvastatin (LIPITOR) 40 mg tablet Take 1 tablet (40 mg total) by mouth daily with dinner In place of pravastatin, to better prevent a heart attack or stroke 100 tablet 2    Blood Pressure Monitoring KIT Use 3 (three) times a  "week 1 kit 0    ergocalciferol (ERGOCALCIFEROL) 1.25 MG (90153 UT) capsule Take 1 capsule (50,000 Units total) by mouth once a week 12 capsule 1    ergocalciferol (VITAMIN D2) 50,000 units Take 1 capsule by mouth once a week 12 capsule 0    gabapentin (NEURONTIN) 100 mg capsule Take 1 capsule (100 mg total) by mouth daily at bedtime 90 capsule 1    magnesium oxide (MAG-OX) 400 mg tablet Take 400 mg by mouth daily Takes this Saturday Sunday Monday      metoprolol tartrate (LOPRESSOR) 100 mg tablet Take 1 tablet (100 mg total) by mouth 2 (two) times a day Hold for a HR<60 bpm or a SBP<110 mmHg 180 tablet 1    pantoprazole (PROTONIX) 40 mg tablet Take 1 tablet by mouth once daily 180 tablet 1    polyethylene glycol (MIRALAX) 17 g packet Take 17 g by mouth daily as needed (constipation)      senna (SENOKOT) 8.6 mg Take 1 tablet (8.6 mg total) by mouth daily at bedtime      silver sulfadiazine (SILVADENE,SSD) 1 % cream Apply topically daily 85 g 1    torsemide (DEMADEX) 20 mg tablet Take 2 tablets (40 mg total) by mouth daily 180 tablet 0     No current facility-administered medications on file prior to visit.      Social History     Tobacco Use    Smoking status: Never    Smokeless tobacco: Never   Vaping Use    Vaping status: Never Used   Substance and Sexual Activity    Alcohol use: Never    Drug use: Never    Sexual activity: Not Currently     Partners: Female     Birth control/protection: Other        Objective   Pulse 78   Temp 97.7 °F (36.5 °C)   Resp 16   Ht 5' 4\" (1.626 m)   Wt 77.6 kg (171 lb)   SpO2 98%   BMI 29.35 kg/m²      Physical Exam  Vascular status is 0/4 DP PT negative digital hair, delayed capillary refill, and normal distal cooling bilaterally.  Capillary refill is approximately 3 to 4 seconds.  There is slight edema present bilaterally of +1 to +2 pitting.    Derm nails are brittle elongated hypertrophic white-yellow discoloration with subungual debris x 10.  There is an increased thickness " in the nails of approximately 2 mm.  There is white flaky tissue present on the dorsal and plantar aspect of both feet going up into the legs.  There is small fissures present within the flaky tissue but no signs of infection or dried blood.  Loss of turgor is present bilaterally.  There is dependent rubor present bilaterally.    Ortho hammertoe deformities are present in digits 2 through 5 bilaterally left ankle to no motion present.  They are plantarflexed at the PIPJ level.    Normal touch is intact and equal bilateral.    Administrative Statements   I have spent a total time of 15 minutes in caring for this patient on the day of the visit/encounter including Risks and benefits of tx options, Instructions for management, Patient and family education, Importance of tx compliance, Risk factor reductions, Counseling / Coordination of care, Documenting in the medical record, and Obtaining or reviewing history  .

## 2025-03-21 DIAGNOSIS — E55.9 VITAMIN D DEFICIENCY: ICD-10-CM

## 2025-03-21 DIAGNOSIS — I50.32 CHRONIC DIASTOLIC CHF (CONGESTIVE HEART FAILURE) (HCC): ICD-10-CM

## 2025-03-21 DIAGNOSIS — K92.2 GASTROINTESTINAL HEMORRHAGE, UNSPECIFIED GASTROINTESTINAL HEMORRHAGE TYPE: ICD-10-CM

## 2025-03-21 RX ORDER — ERGOCALCIFEROL 1.25 MG/1
50000 CAPSULE, LIQUID FILLED ORAL WEEKLY
Qty: 12 CAPSULE | Refills: 0 | Status: SHIPPED | OUTPATIENT
Start: 2025-03-21

## 2025-03-21 RX ORDER — PANTOPRAZOLE SODIUM 40 MG/1
40 TABLET, DELAYED RELEASE ORAL DAILY
Qty: 90 TABLET | Refills: 1 | Status: SHIPPED | OUTPATIENT
Start: 2025-03-21

## 2025-03-21 NOTE — TELEPHONE ENCOUNTER
Reason for call:   [x] Refill   [] Prior Auth  [] Other:     Office:   [] PCP/Provider -   [x] Specialty/Provider - [facility] [place]    Medication: (ERGOCALCIFEROL Capsules    Dose/Frequency: 1.25mg (50,000Units)  One, Once a week     Quantity: 12    Pharmacy: [id number]    Local Pharmacy   Does the patient have enough for 3 days?   [] Yes   [x] No - Send as HP to POD    Mail Away Pharmacy   Does the patient have enough for 10 days?   [] Yes   [] No - Send as HP to POD

## 2025-03-21 NOTE — TELEPHONE ENCOUNTER
Reason for call:   [x] Refill   [] Prior Auth  [] Other:     Office:   [] PCP/Provider -   [x] Specialty/Provider - Tia Katelyn Shafer PA-C [facility]IO ASSMELLY PICKETT     Medication: Torsemide    Dose/Frequency: 20mg   Two (40mg total)  Daily    Quantity: 180    [address] [facility] [id number]    Local Pharmacy   Does the patient have enough for 3 days?   [] Yes   [x] No - Send as HP to POD    Mail Away Pharmacy   Does the patient have enough for 10 days?   [] Yes   [] No - Send as HP to POD

## 2025-03-21 NOTE — TELEPHONE ENCOUNTER
Reason for call:   [x] Refill   [] Prior Auth  [] Other:     Office:   [x] PCP/Provider - DO Aliza Lugo  [] Specialty/Provider -     Medication: Pantoprazole    Dose/Frequency: 40mg  One Daily    Quantity: 90      Pharmacy: [id number] [address] [place]    Local Pharmacy   Does the patient have enough for 3 days?   [] Yes   [x] No - Send as HP to POD    Mail Away Pharmacy   Does the patient have enough for 10 days?   [] Yes   [] No - Send as HP to POD

## 2025-03-24 RX ORDER — TORSEMIDE 20 MG/1
40 TABLET ORAL DAILY
Qty: 180 TABLET | Refills: 1 | Status: SHIPPED | OUTPATIENT
Start: 2025-03-24

## 2025-04-01 ENCOUNTER — HOSPITAL ENCOUNTER (OUTPATIENT)
Dept: ULTRASOUND IMAGING | Facility: HOSPITAL | Age: OVER 89
Discharge: HOME | End: 2025-04-01
Payer: COMMERCIAL

## 2025-04-01 DIAGNOSIS — N18.4 CKD (CHRONIC KIDNEY DISEASE) STAGE 4, GFR 15-29 ML/MIN (HCC): ICD-10-CM

## 2025-04-01 DIAGNOSIS — Q61.02 MULTIPLE RENAL CYSTS: ICD-10-CM

## 2025-04-01 PROCEDURE — 76775 US EXAM ABDO BACK WALL LIM: CPT

## 2025-05-15 DIAGNOSIS — N18.4 CKD (CHRONIC KIDNEY DISEASE) STAGE 4, GFR 15-29 ML/MIN (HCC): Primary | ICD-10-CM

## 2025-06-01 PROBLEM — I25.10 CORONARY ARTERY DISEASE INVOLVING NATIVE CORONARY ARTERY OF NATIVE HEART WITHOUT ANGINA PECTORIS: Status: ACTIVE | Noted: 2020-08-11

## 2025-06-01 NOTE — PROGRESS NOTES
Cardiology Follow Up    Les Quiñonez  1935  [id number]  [facility] [address] [id number] [phone]    1. Chronic diastolic CHF (congestive heart failure) (HCC)        2. Longstanding persistent atrial fibrillation (HCC)        3. Coronary artery disease involving native coronary artery of 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        4. History of severe aortic stenosis        5. History of aortic valve replacement with bioprosthetic valve        6. Nonrheumatic mitral valve stenosis        7. Nonrheumatic mitral valve regurgitation        8. 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 kidney disease, or chronic kidney disease (HCC)        9. Complete heart block (HCC)        10. Presence of permanent cardiac pacemaker        11. Benign essential hypertension        12. Dyslipidemia  Lipid Panel With Direct LDL            Discussion/Summary:  Mr. Quiñonez is a pleasant 89-year-old gentleman who presents to the office today for follow-up.  In the recent past he has been feeling relatively well.  He offers no cardiac complaints today.    He appears euvolemic on exam on his current diuretic regimen to which no changes were advised.  A low-sodium diet was reinforced.    His blood pressure is on the low side today with which he is asymptomatic.  For now no changes were made to his medication regimen.  He will see his primary care provider next week where his blood pressure will be reassessed.  If his blood pressure remains low his metoprolol dose can be reduced.    I have no recent assessment of his lipids.  A prescription was provided.    His most recent device checks reveal he has been persistently in atrial fibrillation since 2022.  No changes were made to his AV david blocking regimen.  He is on anticoagulation with Eliquis dosed for  his age and renal function.    An echocardiogram performed in 2023 revealed a normally functioning bioprosthetic aortic valve.  He has known mitral valve disease.  We have chosen to forego a repeat echocardiogram as this likely will not .    He has a vesicular rash on his right lower abdomen extending to his lower back which appears to be consistent with shingles.  His daughter will call his primary care provider today.    I will see him back in the office in six months.     Interval History:  Mr. Quiñonez is a pleasant 89-year-old gentleman who presents to the office today for follow-up.  He was last seen by me in 2023.    He is very sedentary.  He ambulates with the aid of a walker in his home.  With the activity he performs he is asymptomatic.  He denies any chest pain or shortness of breath with physical activity.  He denies any signs or symptoms of volume overload including paroxysmal nocturnal dyspnea, orthopnea, acute weight gain or increasing abdominal girth.  However he has been sleeping in a recliner since his hip fracture for comfort.  He has not been weighing himself regularly.  He is compliant with a low-salt diet.  He denies lightheadedness, syncope or presyncope.  He denies any sensation of palpitations.  He denies any bleeding consequences or falls on Eliquis.  He denies symptoms of claudication.    Medical Problems       Problem List       Mild intermittent asthma without complication    CKD (chronic kidney disease) stage 4, GFR 15-29 ml/min ([facility])    Lab Results   Component Value Date    EGFR 18 12/13/2024    EGFR 18 09/10/2024    EGFR 21 05/14/2024    CREATININE 2.87 (H) 12/13/2024    CREATININE 2.84 (H) 09/10/2024    CREATININE 2.56 (H) 05/14/2024         Dyslipidemia    GERD (gastroesophageal reflux disease)    Late effects of cerebrovascular disease    Lumbar degenerative disc disease    Lumbar radiculopathy    Mitral regurgitation    Overview Signed 8/14/2018  9:07 AM by Cristine  DO Liz     Description: echo 04- - moderate         Obesity (BMI 30-39.9)    Medicare annual wellness visit, subsequent    H/O aortic valve replacement    History of stroke    Chronic diastolic CHF (congestive heart failure) (HCC)    Wt Readings from Last 3 Encounters:   06/02/25 73.5 kg (162 lb)   03/13/25 77.6 kg (171 lb)   12/13/24 77.6 kg (171 lb)                 Ambulatory dysfunction    Paroxysmal atrial fibrillation (HCC)    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    Pacemaker (Chronic)    Oxygen dependent    Chronic combined systolic and diastolic congestive heart failure (HCC)    Wt Readings from Last 3 Encounters:   06/02/25 73.5 kg (162 lb)   03/13/25 77.6 kg (171 lb)   12/13/24 77.6 kg (171 lb)                 Mitral stenosis    Iron deficiency anemia    Need for immunization against influenza    Pulmonary emphysema (HCC)    Left wrist pain    Right hip pain    Acute kidney injury superimposed on CKD (HCC)    Lab Results   Component Value Date    EGFR 18 12/13/2024    EGFR 18 09/10/2024    EGFR 21 05/14/2024    CREATININE 2.87 (H) 12/13/2024    CREATININE 2.84 (H) 09/10/2024    CREATININE 2.56 (H) 05/14/2024         Sepsis (HCC)    Overview Deleted 8/4/2021 10:17 AM by Cinthia Sotomayor PA-C            Multiple renal cysts    Effusion of left knee    Mitral valve stenosis    Acute cystitis without hematuria    Acute gout    Transaminitis    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 kidney disease, or chronic kidney disease (HCC)    Overview Signed 8/6/2021  5:04 AM by Chante Ochoa     Per CMS ICD 10 Guidelines--per Physician         Lab Results   Component Value Date    EGFR 18 12/13/2024    EGFR 18 09/10/2024    EGFR 21 05/14/2024    CREATININE 2.87 (H) 12/13/2024    CREATININE 2.84 (H) 09/10/2024    CREATININE 2.56 (H) 05/14/2024         Chronic low back pain without sciatica                   Past Medical History:   Diagnosis Date    Acute blood loss anemia 06/10/2014    Aortic stenosis     ECHO -4-14-14. MODERATE TO SEVERE AORTIC STENOSIS; MILD AROTIC INSUFFICIENCY - LAST ASSESSED 10/26/16; RESOLVED 6/8/16    Aortic valve disorder     LAST ASSESSED 10/8/15; 10/8/15    Arthritis     CHF (congestive heart failure) ([facility])     Complete heart block ([facility]) 07/20/2020    Coronary artery disease     Hx of CABG 10/3/2021    Hypertension     Hypertensive urgency 05/19/2019    Pulmonary emphysema ([facility]) 10/15/2020    Renal disorder     TIA (transient ischemic attack)     Transient cerebral ischemia      Social History     Socioeconomic History    Marital status: /Civil Union     Spouse name: Gosia    Number of children: 5    Years of education: Not on file    Highest education level: Not on file   Occupational History    Occupation: retired   Tobacco Use    Smoking status: Never    Smokeless tobacco: Never   Vaping Use    Vaping status: Never Used   Substance and Sexual Activity    Alcohol use: Never    Drug use: Never    Sexual activity: Not Currently     Partners: Female     Birth control/protection: Other   Other Topics Concern    Not on file   Social History Narrative    Caffeine use     Dental care, regularly     Lives independently with spouse     Uses seatbelts      Social Drivers of Health     Financial Resource Strain: Low Risk  (12/6/2023)    Overall Financial Resource Strain (CARDIA)     Difficulty of Paying Living Expenses: Not hard at all   Food Insecurity: No Food Insecurity (12/13/2024)    Nursing - Inadequate Food Risk Classification     Worried About Running Out of Food in the Last Year: Never true     Ran Out of Food in the Last Year: Never true     Ran Out of Food in the Last Year: Not on file   Transportation Needs: No Transportation Needs (12/13/2024)    PRAPARE - Transportation     Lack of Transportation (Medical): No     Lack of Transportation (Non-Medical): No   Physical  Activity: Not on file   Stress: Not on file   Social Connections: Unknown (9/3/2020)    Social Connection and Isolation Panel     Frequency of Communication with Friends and Family: More than three times a week     Frequency of Social Gatherings with Friends and Family: More than three times a week     Attends Yarsani Services: Not on file     Active Member of Clubs or Organizations: Not on file     Attends Club or Organization Meetings: Not on file     Marital Status:    Intimate Partner Violence: Not on file   Housing Stability: Low Risk  (12/13/2024)    Housing Stability Vital Sign     Unable to Pay for Housing in the Last Year: No     Number of Times Moved in the Last Year: 0     Homeless in the Last Year: No      Family History   Problem Relation Name Age of Onset    No Known Problems Mother      No Known Problems Father      Coronary artery disease Neg Hx       Past Surgical History:   Procedure Laterality Date    AORTIC VALVE REPLACEMENT  06/30/2015    avr with 23 mm Living[place] Magna Ease bioprosthetic    CARDIAC PACEMAKER PLACEMENT Left 07/24/2020    CATARACT EXTRACTION Right 06/05/2000    COLONOSCOPY      CORONARY ARTERY BYPASS GRAFT  06/05/2000    x1 with argueta  to lad    EYE SURGERY      POLYPECTOMY  04/2014    enteroscopic - esophageal ulcer, gastric erosion, and duodenal ulcer.     IA HEMIARTHROPLASTY HIP PARTIAL Right 2/10/2023    Procedure: HEMIARTHROPLASTY HIP (BIPOLAR);  Surgeon: Andrei Jackson DO;  Location: [facility];  Service: Orthopedics    TONSILLECTOMY      unknown       Current Outpatient Medications:     allopurinol (ZYLOPRIM) 100 mg tablet, Take 1 tablet (100 mg total) by mouth daily, Disp: 90 tablet, Rfl: 1    apixaban (Eliquis) 2.5 mg, Take 1 tablet by mouth twice daily, Disp: 180 tablet, Rfl: 1    aspirin (ECOTRIN LOW STRENGTH) 81 mg EC tablet, Take 1 tablet (81 mg total) by mouth daily, Disp:  , Rfl: 0    atorvastatin (LIPITOR) 40 mg tablet, Take 1 tablet (40 mg total) by mouth  daily with dinner In place of pravastatin, to better prevent a heart attack or stroke, Disp: 100 tablet, Rfl: 2    Blood Pressure Monitoring KIT, Use 3 (three) times a week, Disp: 1 kit, Rfl: 0    ergocalciferol (ERGOCALCIFEROL) 1.25 MG (48749 UT) capsule, Take 1 capsule (50,000 Units total) by mouth once a week, Disp: 12 capsule, Rfl: 1    ergocalciferol (VITAMIN D2) 50,000 units, Take 1 capsule (50,000 Units total) by mouth once a week, Disp: 12 capsule, Rfl: 0    gabapentin (NEURONTIN) 100 mg capsule, Take 1 capsule (100 mg total) by mouth daily at bedtime, Disp: 90 capsule, Rfl: 1    magnesium oxide (MAG-OX) 400 mg tablet, Take 400 mg by mouth in the morning. Takes this Saturday Sunday Monday., Disp: , Rfl:     metoprolol tartrate (LOPRESSOR) 100 mg tablet, Take 1 tablet (100 mg total) by mouth 2 (two) times a day Hold for a HR<60 bpm or a SBP<110 mmHg, Disp: 180 tablet, Rfl: 1    pantoprazole (PROTONIX) 40 mg tablet, Take 1 tablet (40 mg total) by mouth daily, Disp: 90 tablet, Rfl: 1    polyethylene glycol (MIRALAX) 17 g packet, Take 17 g by mouth daily as needed (constipation), Disp: , Rfl:     silver sulfadiazine (SILVADENE,SSD) 1 % cream, Apply topically daily, Disp: 85 g, Rfl: 1    torsemide (DEMADEX) 20 mg tablet, Take 2 tablets (40 mg total) by mouth daily, Disp: 180 tablet, Rfl: 1    senna (SENOKOT) 8.6 mg, Take 1 tablet (8.6 mg total) by mouth daily at bedtime, Disp: , Rfl:   Allergies   Allergen Reactions    Promethazine Delirium and Other (See Comments)       Labs:     Chemistry        Component Value Date/Time     10/01/2015 0832    K 4.5 12/13/2024 1112    K 4.6 10/01/2015 0832    CL 99 12/13/2024 1112     10/01/2015 0832    CO2 35 (H) 12/13/2024 1112    CO2 27.4 10/01/2015 0832    BUN 51 (H) 12/13/2024 1112    BUN 28 (H) 10/01/2015 0832    CREATININE 2.87 (H) 12/13/2024 1112    CREATININE 1.88 (H) 10/01/2015 0832        Component Value Date/Time    CALCIUM 9.2 12/13/2024 1112    CALCIUM  "9.1 10/01/2015 0832    ALKPHOS 108 (H) 12/13/2024 1112    ALKPHOS 100 10/01/2015 0832    AST 19 12/13/2024 1112    AST 18 10/01/2015 0832    ALT 15 12/13/2024 1112    ALT 17 10/01/2015 0832    BILITOT 0.37 10/01/2015 0832            Lab Results   Component Value Date    CHOL 139 10/01/2015    CHOL 147 06/15/2015    CHOL 151 04/30/2015     Lab Results   Component Value Date    HDL 22 (L) 12/02/2022    HDL 36 (L) 08/19/2021    HDL 32 (L) 05/20/2019     Lab Results   Component Value Date    LDLCALC 36 12/02/2022    LDLCALC 72 08/19/2021    LDLCALC 103 (H) 05/20/2019     Lab Results   Component Value Date    TRIG 202 (H) 12/02/2022    TRIG 70 08/19/2021    TRIG 162 (H) 05/20/2019     No results found for: \"CHOLHDL\"    Imaging: No results found.        Review of Systems   Cardiovascular:  Negative for chest pain, cyanosis, irregular heartbeat, leg swelling, near-syncope, orthopnea and palpitations.   All other systems reviewed and are negative.      Vitals:    06/02/25 0916   BP: 110/52   Pulse: 61   SpO2: 97%       Vitals:    06/02/25 0916   Weight: 73.5 kg (162 lb)       Height: 5' 4\" (162.6 cm)   Body mass index is 27.81 kg/m².    Physical Exam:  General:  Alert and cooperative, appears stated age  HEENT:  PERRLA, EOMI, no scleral icterus, no conjunctival pallor  Neck:  No lymphadenopathy, no thyromegaly, no carotid bruits, no elevated JVP  Heart:  Regular rate and rhythm, normal S1/S2, no S3/S4, no murmur  Lungs:  Clear to auscultation bilaterally   Abdomen:  Soft, non-tender, positive bowel sounds, no rebound or guarding,   no organomegaly   Extremities: No edema  Skin: Vesicular rash right lower abdomen extending to low back in single dermatome  Neurologic:  Cranial nerves II-XII grossly intact without focal deficits  "

## 2025-06-02 ENCOUNTER — OFFICE VISIT (OUTPATIENT)
Dept: CARDIOLOGY CLINIC | Facility: HOSPITAL | Age: OVER 89
End: 2025-06-02
Payer: COMMERCIAL

## 2025-06-02 VITALS
SYSTOLIC BLOOD PRESSURE: 110 MMHG | HEART RATE: 61 BPM | DIASTOLIC BLOOD PRESSURE: 52 MMHG | HEIGHT: 64 IN | WEIGHT: 162 LBS | BODY MASS INDEX: 27.66 KG/M2 | OXYGEN SATURATION: 97 %

## 2025-06-02 DIAGNOSIS — I34.2 NONRHEUMATIC MITRAL VALVE STENOSIS: ICD-10-CM

## 2025-06-02 DIAGNOSIS — I48.11 LONGSTANDING PERSISTENT ATRIAL FIBRILLATION (HCC): ICD-10-CM

## 2025-06-02 DIAGNOSIS — G89.29 CHRONIC BILATERAL LOW BACK PAIN WITHOUT SCIATICA: ICD-10-CM

## 2025-06-02 DIAGNOSIS — I25.10 CORONARY ARTERY DISEASE INVOLVING NATIVE CORONARY ARTERY OF NATIVE HEART WITHOUT ANGINA PECTORIS: ICD-10-CM

## 2025-06-02 DIAGNOSIS — Z95.0 PRESENCE OF PERMANENT CARDIAC PACEMAKER: ICD-10-CM

## 2025-06-02 DIAGNOSIS — E78.5 DYSLIPIDEMIA: ICD-10-CM

## 2025-06-02 DIAGNOSIS — M54.50 CHRONIC BILATERAL LOW BACK PAIN WITHOUT SCIATICA: ICD-10-CM

## 2025-06-02 DIAGNOSIS — I35.0 SEVERE AORTIC STENOSIS: ICD-10-CM

## 2025-06-02 DIAGNOSIS — M10.9 ACUTE GOUT OF MULTIPLE SITES, UNSPECIFIED CAUSE: ICD-10-CM

## 2025-06-02 DIAGNOSIS — E55.9 VITAMIN D DEFICIENCY: ICD-10-CM

## 2025-06-02 DIAGNOSIS — I34.0 NONRHEUMATIC MITRAL VALVE REGURGITATION: ICD-10-CM

## 2025-06-02 DIAGNOSIS — I44.2 COMPLETE HEART BLOCK (HCC): ICD-10-CM

## 2025-06-02 DIAGNOSIS — I50.32 CHRONIC DIASTOLIC CHF (CONGESTIVE HEART FAILURE) (HCC): ICD-10-CM

## 2025-06-02 DIAGNOSIS — I10 BENIGN ESSENTIAL HYPERTENSION: ICD-10-CM

## 2025-06-02 DIAGNOSIS — Z95.3 HISTORY OF AORTIC VALVE REPLACEMENT WITH BIOPROSTHETIC VALVE: ICD-10-CM

## 2025-06-02 DIAGNOSIS — I10 ESSENTIAL HYPERTENSION: ICD-10-CM

## 2025-06-02 DIAGNOSIS — I13.0 HYPERTENSIVE HEART AND CHRONIC KIDNEY DISEASE WITH HEART FAILURE AND STAGE 1 THROUGH STAGE 4 CHRONIC KIDNEY DISEASE, OR CHRONIC KIDNEY DISEASE (HCC): ICD-10-CM

## 2025-06-02 PROCEDURE — 99214 OFFICE O/P EST MOD 30 MIN: CPT | Performed by: INTERNAL MEDICINE

## 2025-06-03 RX ORDER — METOPROLOL TARTRATE 100 MG/1
100 TABLET ORAL 2 TIMES DAILY
Qty: 180 TABLET | Refills: 1 | Status: SHIPPED | OUTPATIENT
Start: 2025-06-03

## 2025-06-03 RX ORDER — ALLOPURINOL 100 MG/1
100 TABLET ORAL DAILY
Qty: 90 TABLET | Refills: 1 | Status: SHIPPED | OUTPATIENT
Start: 2025-06-03

## 2025-06-03 RX ORDER — ERGOCALCIFEROL 1.25 MG/1
50000 CAPSULE ORAL WEEKLY
Qty: 12 CAPSULE | Refills: 0 | Status: SHIPPED | OUTPATIENT
Start: 2025-06-03

## 2025-06-03 RX ORDER — GABAPENTIN 100 MG/1
100 CAPSULE ORAL
Qty: 90 CAPSULE | Refills: 1 | Status: SHIPPED | OUTPATIENT
Start: 2025-06-03

## 2025-06-04 ENCOUNTER — RA CDI HCC (OUTPATIENT)
Dept: OTHER | Facility: HOSPITAL | Age: OVER 89
End: 2025-06-04

## 2025-06-09 ENCOUNTER — TELEPHONE (OUTPATIENT)
Age: OVER 89
End: 2025-06-09

## 2025-06-09 NOTE — TELEPHONE ENCOUNTER
I called and left a VM for Les regarding completing blood work prior to upcoming appt on 06/16/2025

## 2025-06-13 ENCOUNTER — OFFICE VISIT (OUTPATIENT)
Dept: FAMILY MEDICINE CLINIC | Facility: CLINIC | Age: OVER 89
End: 2025-06-13
Payer: COMMERCIAL

## 2025-06-13 ENCOUNTER — APPOINTMENT (OUTPATIENT)
Dept: LAB | Facility: MEDICAL CENTER | Age: OVER 89
End: 2025-06-13
Attending: INTERNAL MEDICINE
Payer: COMMERCIAL

## 2025-06-13 VITALS
RESPIRATION RATE: 18 BRPM | HEART RATE: 60 BPM | HEIGHT: 64 IN | BODY MASS INDEX: 27.25 KG/M2 | SYSTOLIC BLOOD PRESSURE: 132 MMHG | OXYGEN SATURATION: 99 % | TEMPERATURE: 97.2 F | WEIGHT: 159.6 LBS | DIASTOLIC BLOOD PRESSURE: 76 MMHG

## 2025-06-13 DIAGNOSIS — I13.0 HYPERTENSIVE HEART AND CHRONIC KIDNEY DISEASE WITH HEART FAILURE AND STAGE 1 THROUGH STAGE 4 CHRONIC KIDNEY DISEASE, OR CHRONIC KIDNEY DISEASE (HCC): ICD-10-CM

## 2025-06-13 DIAGNOSIS — I10 BENIGN ESSENTIAL HYPERTENSION: Primary | ICD-10-CM

## 2025-06-13 DIAGNOSIS — E78.5 DYSLIPIDEMIA: Primary | ICD-10-CM

## 2025-06-13 DIAGNOSIS — J43.9 PULMONARY EMPHYSEMA, UNSPECIFIED EMPHYSEMA TYPE (HCC): ICD-10-CM

## 2025-06-13 DIAGNOSIS — R26.2 AMBULATORY DYSFUNCTION: ICD-10-CM

## 2025-06-13 DIAGNOSIS — N18.4 CKD (CHRONIC KIDNEY DISEASE) STAGE 4, GFR 15-29 ML/MIN (HCC): ICD-10-CM

## 2025-06-13 PROBLEM — S72.001A FRACTURE OF FEMORAL NECK, RIGHT (HCC): Status: RESOLVED | Noted: 2023-02-08 | Resolved: 2025-06-13

## 2025-06-13 PROBLEM — L89.312 PRESSURE ULCER OF RIGHT BUTTOCK, STAGE 2 (HCC): Status: RESOLVED | Noted: 2022-12-02 | Resolved: 2025-06-13

## 2025-06-13 PROBLEM — G93.41 ACUTE METABOLIC ENCEPHALOPATHY: Status: RESOLVED | Noted: 2023-12-19 | Resolved: 2025-06-13

## 2025-06-13 PROBLEM — U07.1 COVID-19 VIRUS INFECTION: Status: RESOLVED | Noted: 2023-12-19 | Resolved: 2025-06-13

## 2025-06-13 LAB
ANION GAP SERPL CALCULATED.3IONS-SCNC: 12 MMOL/L (ref 4–13)
BASOPHILS # BLD AUTO: 0.04 THOUSANDS/ÂΜL (ref 0–0.1)
BASOPHILS NFR BLD AUTO: 0 % (ref 0–1)
BUN SERPL-MCNC: 61 MG/DL (ref 5–25)
CALCIUM SERPL-MCNC: 9.2 MG/DL (ref 8.4–10.2)
CHLORIDE SERPL-SCNC: 96 MMOL/L (ref 96–108)
CHOLEST SERPL-MCNC: 122 MG/DL (ref ?–200)
CO2 SERPL-SCNC: 29 MMOL/L (ref 21–32)
CREAT SERPL-MCNC: 3.29 MG/DL (ref 0.6–1.3)
EOSINOPHIL # BLD AUTO: 0.56 THOUSAND/ÂΜL (ref 0–0.61)
EOSINOPHIL NFR BLD AUTO: 5 % (ref 0–6)
ERYTHROCYTE [DISTWIDTH] IN BLOOD BY AUTOMATED COUNT: 14.6 % (ref 11.6–15.1)
GFR SERPL CREATININE-BSD FRML MDRD: 15 ML/MIN/1.73SQ M
GLUCOSE SERPL-MCNC: 97 MG/DL (ref 65–140)
HCT VFR BLD AUTO: 37.3 % (ref 36.5–49.3)
HDLC SERPL-MCNC: 26 MG/DL
HGB BLD-MCNC: 12 G/DL (ref 12–17)
IMM GRANULOCYTES # BLD AUTO: 0.1 THOUSAND/UL (ref 0–0.2)
IMM GRANULOCYTES NFR BLD AUTO: 1 % (ref 0–2)
LDLC SERPL CALC-MCNC: 45 MG/DL (ref 0–100)
LDLC SERPL DIRECT ASSAY-MCNC: 58 MG/DL (ref 0–100)
LYMPHOCYTES # BLD AUTO: 1.24 THOUSANDS/ÂΜL (ref 0.6–4.47)
LYMPHOCYTES NFR BLD AUTO: 12 % (ref 14–44)
MCH RBC QN AUTO: 32.4 PG (ref 26.8–34.3)
MCHC RBC AUTO-ENTMCNC: 32.2 G/DL (ref 31.4–37.4)
MCV RBC AUTO: 101 FL (ref 82–98)
MONOCYTES # BLD AUTO: 0.8 THOUSAND/ÂΜL (ref 0.17–1.22)
MONOCYTES NFR BLD AUTO: 8 % (ref 4–12)
NEUTROPHILS # BLD AUTO: 7.9 THOUSANDS/ÂΜL (ref 1.85–7.62)
NEUTS SEG NFR BLD AUTO: 74 % (ref 43–75)
NONHDLC SERPL-MCNC: 96 MG/DL
NRBC BLD AUTO-RTO: 0 /100 WBCS
PLATELET # BLD AUTO: 222 THOUSANDS/UL (ref 149–390)
PMV BLD AUTO: 11.2 FL (ref 8.9–12.7)
POTASSIUM SERPL-SCNC: 4.8 MMOL/L (ref 3.5–5.3)
RBC # BLD AUTO: 3.7 MILLION/UL (ref 3.88–5.62)
SODIUM SERPL-SCNC: 137 MMOL/L (ref 135–147)
TRIGL SERPL-MCNC: 254 MG/DL (ref ?–150)
WBC # BLD AUTO: 10.64 THOUSAND/UL (ref 4.31–10.16)

## 2025-06-13 PROCEDURE — 80061 LIPID PANEL: CPT

## 2025-06-13 PROCEDURE — 85025 COMPLETE CBC W/AUTO DIFF WBC: CPT

## 2025-06-13 PROCEDURE — 83721 ASSAY OF BLOOD LIPOPROTEIN: CPT

## 2025-06-13 PROCEDURE — 99214 OFFICE O/P EST MOD 30 MIN: CPT | Performed by: INTERNAL MEDICINE

## 2025-06-13 PROCEDURE — G2211 COMPLEX E/M VISIT ADD ON: HCPCS | Performed by: INTERNAL MEDICINE

## 2025-06-13 PROCEDURE — 36415 COLL VENOUS BLD VENIPUNCTURE: CPT

## 2025-06-13 NOTE — ASSESSMENT & PLAN NOTE
Encouraged home exercises Pt is ambulatory with walker to the bathroom or in the home but limited     Fall precautions

## 2025-06-13 NOTE — PROGRESS NOTES
Name: Les Qiuñonez      : 1935      MRN: [id number]  Encounter Provider: Cristine Hill DO  Encounter Date: 2025   Encounter department: [place] [facility]  :  Assessment & Plan  Pulmonary emphysema, unspecified emphysema type (HCC)  Breathing stable He is going for repeat labs/hgb recheck today       Benign essential hypertension  BP stable Continue current rx Has nephrology appt next week and will get labs today        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 kidney disease, or chronic kidney disease (HCC)  Wt Readings from Last 3 Encounters:   25 72.4 kg (159 lb 9.6 oz)   25 73.5 kg (162 lb)   25 77.6 kg (171 lb)   Pt will go for labs today for Nephrology followup next week          Ambulatory dysfunction  Encouraged home exercises Pt is ambulatory with walker to the bathroom or in the home but limited     Fall precautions        Rto 6months/awv       History of Present Illness   HPI  Pt doing ok He had recent shingles but did not have pain and no issues following He has irritated skin left hand but does scratch and pick at it No pain or drainage No falls Pt does get to the bathroom with walker but he does not do much during the day Sleeping more No falls No chest pain or sob Appetite remains good Has nephrology appt next week   Review of Systems   Constitutional:  Positive for activity change. Negative for chills and fever.   HENT: Negative.     Eyes:  Negative for visual disturbance.   Respiratory:  Negative for cough and shortness of breath.    Cardiovascular: Negative.    Gastrointestinal: Negative.    Genitourinary: Negative.    Musculoskeletal:  Positive for back pain and gait problem.   Neurological:  Negative for dizziness, light-headedness and headaches.   Psychiatric/Behavioral:  Negative for sleep disturbance. The patient is not nervous/anxious.      Past Medical History[1]  Past Surgical  History[2]  Social History     Socioeconomic History   • Marital status: /Civil Union     Spouse name: Gosia   • Number of children: 5   • Years of education: Not on file   • Highest education level: Not on file   Occupational History   • Occupation: retired   Tobacco Use   • Smoking status: Never   • Smokeless tobacco: Never   Vaping Use   • Vaping status: Never Used   Substance and Sexual Activity   • Alcohol use: Never   • Drug use: Never   • Sexual activity: Not Currently     Partners: Female     Birth control/protection: Other   Other Topics Concern   • Not on file   Social History Narrative    Caffeine use     Dental care, regularly     Lives independently with spouse     Uses seatbelts      Social Drivers of Health     Financial Resource Strain: Low Risk  (12/6/2023)    Overall Financial Resource Strain (CARDIA)    • Difficulty of Paying Living Expenses: Not hard at all   Food Insecurity: No Food Insecurity (12/13/2024)    Nursing - Inadequate Food Risk Classification    • Worried About Running Out of Food in the Last Year: Never true    • Ran Out of Food in the Last Year: Never true    • Ran Out of Food in the Last Year: Not on file   Transportation Needs: No Transportation Needs (12/13/2024)    PRAPARE - Transportation    • Lack of Transportation (Medical): No    • Lack of Transportation (Non-Medical): No   Physical Activity: Not on file   Stress: Not on file   Social Connections: Unknown (9/3/2020)    Social Connection and Isolation Panel    • Frequency of Communication with Friends and Family: More than three times a week    • Frequency of Social Gatherings with Friends and Family: More than three times a week    • Attends Sikh Services: Not on file    • Active Member of Clubs or Organizations: Not on file    • Attends Club or Organization Meetings: Not on file    • Marital Status:    Intimate Partner Violence: Not on file   Housing Stability: Low Risk  (12/13/2024)    Housing Stability  "Vital Sign    • Unable to Pay for Housing in the Last Year: No    • Number of Times Moved in the Last Year: 0    • Homeless in the Last Year: No     Allergies   Allergen Reactions   • Promethazine Delirium and Other (See Comments)       Objective   Pulse 60   Temp (!) 97.2 °F (36.2 °C)   Ht 5' 4\" (1.626 m)   Wt 72.4 kg (159 lb 9.6 oz)   SpO2 99%   BMI 27.40 kg/m²      Physical Exam  Vitals and nursing note reviewed.   Constitutional:       General: He is not in acute distress.     Appearance: Normal appearance. He is not ill-appearing, toxic-appearing or diaphoretic.   HENT:      Head: Normocephalic and atraumatic.      Right Ear: External ear normal.      Left Ear: External ear normal.      Nose: Nose normal.      Mouth/Throat:      Mouth: Mucous membranes are dry.     Eyes:      General: No scleral icterus.     Extraocular Movements: Extraocular movements intact.      Conjunctiva/sclera: Conjunctivae normal.      Pupils: Pupils are equal, round, and reactive to light.       Cardiovascular:      Rate and Rhythm: Normal rate. Rhythm irregular.      Pulses: Normal pulses.   Pulmonary:      Effort: Pulmonary effort is normal. No respiratory distress.      Breath sounds: Normal breath sounds. No wheezing.   Abdominal:      General: There is no distension.      Palpations: Abdomen is soft.      Tenderness: There is no abdominal tenderness.     Musculoskeletal:      Cervical back: Normal range of motion and neck supple.      Right lower leg: No edema.      Left lower leg: Edema present.   Lymphadenopathy:      Cervical: No cervical adenopathy.     Skin:     Coloration: Skin is pale.      Findings: Erythema present.      Comments: Excoriated area left hand dry no s/sinfection localized swelling     Neurological:      General: No focal deficit present.      Mental Status: He is alert and oriented to person, place, and time. Mental status is at baseline.      Cranial Nerves: No cranial nerve deficit.      Motor: " Weakness present.      Gait: Gait abnormal.     Psychiatric:         Mood and Affect: Mood normal.         Behavior: Behavior normal.         Thought Content: Thought content normal.         Judgment: Judgment normal.                [1]  Past Medical History:  Diagnosis Date   • Acute blood loss anemia 06/10/2014   • Aortic stenosis     ECHO -4-14-14. MODERATE TO SEVERE AORTIC STENOSIS; MILD AROTIC INSUFFICIENCY - LAST ASSESSED 10/26/16; RESOLVED 6/8/16   • Aortic valve disorder     LAST ASSESSED 10/8/15; 10/8/15   • Arthritis    • CHF (congestive heart failure) ([facility])    • Complete heart block ([facility]) 07/20/2020   • Coronary artery disease    • Fracture of femoral neck, right ([facility]) 02/08/2023   • Hx of CABG 10/03/2021   • Hypertension    • Hypertensive urgency 05/19/2019   • Pressure ulcer of right buttock, stage 2 ([facility]) 12/02/2022   • Pulmonary emphysema ([facility]) 10/15/2020   • Renal disorder    • TIA (transient ischemic attack)    • Transient cerebral ischemia    [2]  Past Surgical History:  Procedure Laterality Date   • AORTIC VALVE REPLACEMENT  06/30/2015    avr with 23 mm [place] Magna Ease bioprosthetic   • CARDIAC PACEMAKER PLACEMENT Left 07/24/2020   • CATARACT EXTRACTION Right 06/05/2000   • COLONOSCOPY     • CORONARY ARTERY BYPASS GRAFT  06/05/2000    x1 with argueta  to lad   • EYE SURGERY     • POLYPECTOMY  04/2014    enteroscopic - esophageal ulcer, gastric erosion, and duodenal ulcer.    • MD HEMIARTHROPLASTY HIP PARTIAL Right 2/10/2023    Procedure: HEMIARTHROPLASTY HIP (BIPOLAR);  Surgeon: Andrei Jackson DO;  Location: [facility];  Service: Orthopedics   • TONSILLECTOMY      unknown

## 2025-06-13 NOTE — ASSESSMENT & PLAN NOTE
Wt Readings from Last 3 Encounters:   06/13/25 72.4 kg (159 lb 9.6 oz)   06/02/25 73.5 kg (162 lb)   03/13/25 77.6 kg (171 lb)   Pt will go for labs today for Nephrology followup next week

## 2025-06-15 ENCOUNTER — RESULTS FOLLOW-UP (OUTPATIENT)
Age: OVER 89
End: 2025-06-15

## 2025-06-16 ENCOUNTER — TELEPHONE (OUTPATIENT)
Dept: LAB | Facility: HOSPITAL | Age: OVER 89
End: 2025-06-16

## 2025-06-16 ENCOUNTER — RESULTS FOLLOW-UP (OUTPATIENT)
Dept: CARDIOLOGY CLINIC | Facility: CLINIC | Age: OVER 89
End: 2025-06-16

## 2025-06-16 ENCOUNTER — OFFICE VISIT (OUTPATIENT)
Dept: NEPHROLOGY | Facility: CLINIC | Age: OVER 89
End: 2025-06-16
Payer: COMMERCIAL

## 2025-06-16 VITALS
WEIGHT: 159 LBS | RESPIRATION RATE: 16 BRPM | BODY MASS INDEX: 27.14 KG/M2 | TEMPERATURE: 97.9 F | OXYGEN SATURATION: 97 % | HEART RATE: 60 BPM | DIASTOLIC BLOOD PRESSURE: 79 MMHG | SYSTOLIC BLOOD PRESSURE: 121 MMHG | HEIGHT: 64 IN

## 2025-06-16 DIAGNOSIS — N18.4 HYPERTENSIVE HEART DISEASE WITH HEART FAILURE AND STAGE 4 CHRONIC KIDNEY DISEASE, UNSPECIFIED HEART FAILURE TYPE (HCC): Primary | ICD-10-CM

## 2025-06-16 DIAGNOSIS — N18.4 STAGE 4 CHRONIC KIDNEY DISEASE (HCC): ICD-10-CM

## 2025-06-16 DIAGNOSIS — I13.0 HYPERTENSIVE HEART DISEASE WITH HEART FAILURE AND STAGE 4 CHRONIC KIDNEY DISEASE, UNSPECIFIED HEART FAILURE TYPE (HCC): Primary | ICD-10-CM

## 2025-06-16 DIAGNOSIS — I35.0 SEVERE AORTIC STENOSIS: ICD-10-CM

## 2025-06-16 DIAGNOSIS — I50.32 CHRONIC DIASTOLIC CHF (CONGESTIVE HEART FAILURE) (HCC): ICD-10-CM

## 2025-06-16 PROCEDURE — 99214 OFFICE O/P EST MOD 30 MIN: CPT | Performed by: INTERNAL MEDICINE

## 2025-06-16 NOTE — PATIENT INSTRUCTIONS
Thanks for coming in today. You have had stage 4 kidney disease for many years, since 2020. You kidney function has been in the 17-20% range for the past couple years. Most recent function slightly weaker at 15%. There is no emergent need for dialysis at this time.   Kidney disease can be progressive, which means get worse with time, but your has been relatively stable in the past couple years. Based on your current kidney function, you are not a candidate for any kidney protective medications at this time.  Your ultrasound showed kidney cysts and the right kidney cyst has not changed since 2023.  Ensure you hydrate well to 50 ounces per day.   We discussed that itching can be a common manifestation of kidney disease, you can increase the frequency of Gabapentin to twice a day. Keep skin moisturized.    Discussed symptoms of kidney failure including itching, twitching, confusion, poor appetite, metallic taste in the mouth, decreased urination, swelling, shortness of breath.Call the office if any concerns.   Avoid motrin,advil,aleve and ibuprofen.   Blood work 1 month.

## 2025-06-16 NOTE — PROGRESS NOTES
Assessment & Plan:    1. 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 Stage 4 chronic kidney disease (HCC)  -     Basic metabolic panel; Future; Expected date: 07/16/2025  -     Urinalysis with microscopic; Future; Expected date: 09/16/2025  -     PTH, intact; Future; Expected date: 09/16/2025  -     Comprehensive metabolic panel; Future; Expected date: 09/16/2025  -     CBC and differential; Future; Expected date: 09/16/2025  -     Magnesium; Future; Expected date: 09/16/2025  -     Phosphorus; Future; Expected date: 09/16/2025  3. Chronic diastolic CHF (congestive heart failure) (HCC)  4. History of severe aortic stenosis       HTN heart and kidney disease    BP well controlled on current regimen.  Continue low sodium diet.    Continue torsemide 40mg/day and metoprolol tartrate.  Due to advanced CKD and age, risks> benefits for RAASI/SGLT2I.     2. CKD4    Creatinine trends reviewed. Baseline felt to be 2.8-3.0 previously, Cr slightly up at 3.29.  Volume status appears euvolemic.    Would not advise additional medication for CKD progression as noted above.    Avoid NSAID.  Hydrate to 50 ounces per day.    No emergent need for HD but concerned for progression.  Age prohibitive for transplant.  Family will consider dialysis option if indicated in the future.   Discussed kidney failure s/s.  FU chemistry in 1 month.   Gabapentin can be used twice daily to help with itching. Pruritus is a common symptom in CKD. Keep skin moisturized.    Reviewed renal imaging with similar 1.5 cm thin septation in upper pole renal cyst since 2023. No need for updated renal imaging.  Bilateral simple renal cysts.     3. Chronic diastolic CHF  Volume status appears euvolemic.  Weight monitoring is limited.  Denies respiratory concerns.   Diuretic-torsemide 40mg/day.  BB -metoprolol tartrate 100mg Q12.      4. Hx severe aortic stenosis, complete heart  "block,HLD  Continue cardiology fu . He is on eliquis at appropriate dose.  His BP is appropriate at this time.  No plan for echo at this time.  Continue metoprolol tartrate 100mg q12.    The benefits, risks and alternatives to the treatment plan were discussed at this visit. Patient was advised of common adverse effects of any medical therapies prescribed. All questions were answered and discussed with the patient and any accompanying family members or caretakers.      Subjective:      Patient ID: Les Quiñonez is a 89 y.o. male presents for CKD fu in the [place] office. Patient last seen 12/5/24.Patient accompanied by family during the visit.    HPI  In the interim since last visit, seen by cardiology on 6/2 and had no medication changes made at that time. He was noted to have a vesciular rash and advised to see PCP.Seen by PCP on 6/13.   Hydrates unclear amount.   Denies chest pain or shortness of breath. No weight gain concerns but does not weight himself regularly.   Denies NSAID use.   Watched sodium intale.  Eats twice a day.  No urination changes.     He does have a will. Does have POA.     Walks with a walker.    Patient is complaint with medications and tolerates medication without issue.       The following portions of the patient's history were reviewed and updated as appropriate: allergies, current medications, past family history, past medical history, past social history, past surgical history, and problem list.    Review of Systems   Constitutional: Negative.    Respiratory: Negative.     Cardiovascular: Negative.    Gastrointestinal: Negative.    Genitourinary: Negative.    Skin:  Positive for rash.   Neurological: Negative.    All other systems reviewed and are negative.        Objective:      /79 (Patient Position: Sitting, Cuff Size: Standard)   Pulse 60   Temp 97.9 °F (36.6 °C) (Temporal)   Resp 16   Ht 5' 4\" (1.626 m)   Wt 72.1 kg (159 lb)   SpO2 97%   BMI 27.29 kg/m²          " Physical Exam  Vitals reviewed.   Constitutional:       General: He is not in acute distress.     Appearance: He is not ill-appearing.   HENT:      Head: Normocephalic and atraumatic.      Nose: Nose normal.      Mouth/Throat:      Mouth: Mucous membranes are moist.      Pharynx: Oropharynx is clear.     Cardiovascular:      Rate and Rhythm: Normal rate and regular rhythm.      Heart sounds:      No friction rub.   Pulmonary:      Breath sounds: Normal breath sounds. No wheezing or rales.   Abdominal:      General: There is no distension.      Palpations: Abdomen is soft.      Tenderness: There is no abdominal tenderness. There is no guarding.     Musculoskeletal:      Right lower leg: No edema.      Left lower leg: No edema.     Skin:     Findings: Lesion present.      Comments: Healing vesicular rash on abdomen in one dermatomal region     Neurological:      General: No focal deficit present.      Mental Status: He is alert. Mental status is at baseline.             Lab Results   Component Value Date     10/01/2015    SODIUM 137 06/13/2025    K 4.8 06/13/2025    CL 96 06/13/2025    CO2 29 06/13/2025    ANIONGAP 9 10/01/2015    AGAP 12 06/13/2025    BUN 61 (H) 06/13/2025    CREATININE 3.29 (H) 06/13/2025    GLUC 97 06/13/2025    GLUF 97 03/05/2024    CALCIUM 9.2 06/13/2025    AST 19 12/13/2024    ALT 15 12/13/2024    ALKPHOS 108 (H) 12/13/2024    PROT 7.7 10/01/2015    TP 7.2 12/13/2024    BILITOT 0.37 10/01/2015    TBILI 0.62 12/13/2024    EGFR 15 06/13/2025      Lab Results   Component Value Date    CREATININE 3.29 (H) 06/13/2025    CREATININE 2.87 (H) 12/13/2024    CREATININE 2.84 (H) 09/10/2024    CREATININE 2.56 (H) 05/14/2024    CREATININE 2.97 (H) 03/05/2024    CREATININE 2.46 (H) 01/12/2024    CREATININE 2.71 (H) 12/26/2023    CREATININE 2.55 (H) 12/21/2023    CREATININE 3.06 (H) 12/20/2023    CREATININE 3.13 (H) 12/19/2023    CREATININE 2.82 (H) 09/01/2023    CREATININE 2.97 (H) 07/11/2023     "CREATININE 3.11 (H) 04/14/2023    CREATININE 2.85 (H) 03/20/2023    CREATININE 2.53 (H) 03/01/2023      Lab Results   Component Value Date    COLORU Light Yellow 09/12/2024    CLARITYU Clear 09/12/2024    SPECGRAV 1.013 09/12/2024    PHUR 6.0 09/12/2024    LEUKOCYTESUR Negative 09/12/2024    NITRITE Negative 09/12/2024    PROTEIN UA Negative 09/12/2024    GLUCOSEU Negative 09/12/2024    KETONESU Negative 09/12/2024    UROBILINOGEN <2.0 09/12/2024    BILIRUBINUR Negative 09/12/2024    BLOODU Negative 09/12/2024    RBCUA None Seen 09/12/2024    WBCUA None Seen 09/12/2024    EPIS Occasional 09/12/2024    BACTERIA Occasional 09/12/2024      No results found for: \"LABPROT\"  No results found for: \"MICROALBUR\", \"TQOO16FXN\"  Lab Results   Component Value Date    WBC 10.64 (H) 06/13/2025    HGB 12.0 06/13/2025    HCT 37.3 06/13/2025     (H) 06/13/2025     06/13/2025      Lab Results   Component Value Date    HGB 12.0 06/13/2025    HGB 12.1 12/13/2024    HGB 11.8 (L) 09/10/2024    HGB 13.4 05/14/2024    HGB 10.6 (L) 01/12/2024      Lab Results   Component Value Date    IRON 91 12/13/2024    TIBC 274 12/13/2024    FERRITIN 282 12/13/2024      No results found for: \"PTHCALCIUM\", \"QIDS67GYWLFH\", \"PHOSPHORUS\"   Lab Results   Component Value Date    CHOLESTEROL 122 06/13/2025    HDL 26 (L) 06/13/2025    LDLCALC 45 06/13/2025    TRIG 254 (H) 06/13/2025      Lab Results   Component Value Date    URICACID 8.0 01/12/2024      Lab Results   Component Value Date    HGBA1C 5.9 (H) 12/02/2022      No results found for: \"TSHANTIBODY\", \"G9XQVWB\", \"FREET4\"   No results found for: \"JEAN\", \"DSDNAAB\", \"RFIGM\"   Lab Results   Component Value Date    PROT 7.7 10/01/2015        Portions of the record may have been created with voice recognition software. Occasional wrong word or \"sound a like\" substitutions may have occurred due to the inherent limitations of voice recognition software. Read the chart carefully and recognize, using " context, where substitutions have occurred. If you have any questions, please contact the dictating provider.

## 2025-06-16 NOTE — PROGRESS NOTES
Virtual Regular Visit    Verification of patient location:    Patient is located at Home in the following state in which I hold an active license [place]      Assessment/Plan:    1  YOLANDE (acute kidney injury) ([facility] )  ? Peak creatinine mid 5s due to ATN from [place] + hemodyamic perturbations with surgery + volume depletion  Baseline creatinine around 2 2 mg/dL however not achieved since YOLANDE- he may have progressed  2  CKD (chronic kidney disease) stage 4, GFR 15-29 ml/min (HCC)  ? As above  3  Chronic diastolic CHF (congestive heart failure) ([facility] )  ? Needs increased diuresis  Is volume overloaded  Increase torsemide to 40 mg daily and reduce back to 20 mg daily once improved  Repeat labs 4-6 weeks   4  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 kidney disease, or chronic kidney disease ([facility] )  ? Blood pressure is appropriate per family report  5  Hx of femur fracture  6  Possible right complex renal cyst  ? Repeat ultrasound due in August of 2023    HPI: Jose Juan Reyes is a 80 y o  male who presents today via telemedicine for follow-up regarding CKD 4  Pertinent medical problems include CKD 4, PAF on Eliquis, HFpEF, CAD s/p CABG, severe A/S s/p AVR, PPM present, hypertension  He is assisted by his family member  He has some swelling  He takes 20 mg torsemide but still has significant edema  Increase torsemide to 40 mg daily and then reduce to 20 mg once swelling is improved  Repeat lab work in 4-6 weeks  Come back in office for visit  Patient and family agreeable           Problem List Items Addressed This Visit        Genitourinary    CKD (chronic kidney disease) stage 4, GFR 15-29 ml/min ([facility]) - Primary    Relevant Orders    Comprehensive metabolic panel    Phosphorus    Magnesium   Other Visit Diagnoses     Secondary hyperparathyroidism ([facility] )        Relevant Orders    PTH, intact    Vitamin D deficiency        Relevant Orders    Vitamin D 25 hydroxy Iron deficiency        Relevant Orders    CBC and differential               Reason for visit is   Chief Complaint   Patient presents with    Follow-up    Virtual Regular Visit        Encounter provider DIANDRA Vanessa    Provider located at [place] [id number]  [facility] [id number] [phone]      Recent Visits  Date Type Provider Dept   04/28/23 [address] [place] [address], [address] recent visits within past 7 days and meeting all other requirements  Future Appointments  No visits were found meeting these conditions  Showing future appointments within next 150 days and meeting all other requirements       The patient was identified by name and date of birth  Radha Paulino was informed that this is a telemedicine visit and that the visit is being conducted through the [employer]  He agrees to proceed     My office door was closed  No one else was in the room  He acknowledged consent and understanding of privacy and security of the video platform  The patient has agreed to participate and understands they can discontinue the visit at any time  Patient is aware this is a billable service  Subjective      Past Medical History:   Diagnosis Date    Aortic stenosis     ECHO -4-14-14   MODERATE TO SEVERE AORTIC STENOSIS; MILD AROTIC INSUFFICIENCY - LAST ASSESSED 10/26/16; RESOLVED 6/8/16    Aortic valve disorder     LAST ASSESSED 10/8/15; 10/8/15    Arthritis     CHF (congestive heart failure) ([facility])     Complete heart block ([address] ) 7/20/2020    Coronary artery disease     Hypertension     Hypertensive urgency 5/19/2019    Renal disorder     TIA (transient ischemic attack)     Transient cerebral ischemia        Past Surgical History:   Procedure Laterality Date    AORTIC VALVE REPLACEMENT  06/30/2015    avr with 23 mm [place] Magna Ease bioprosthetic    CARDIAC PACEMAKER PLACEMENT Left 07/24/2020    CATARACT EXTRACTION Right 06/05/2000    COLONOSCOPY      CORONARY ARTERY BYPASS GRAFT  06/05/2000    x1 with argueta  to lad    EYE SURGERY      POLYPECTOMY  04/2014    enteroscopic - esophageal ulcer, gastric erosion, and duodenal ulcer   HI HEMIARTHROPLASTY HIP PARTIAL Right 2/10/2023    Procedure: HEMIARTHROPLASTY HIP (BIPOLAR);   Surgeon: Dany Paige DO;  Location: [facility];  Service: Orthopedics    TONSILLECTOMY      unknown       Current Outpatient Medications   Medication Sig Dispense Refill    allopurinol (ZYLOPRIM) 100 mg tablet Take 1 tablet (100 mg total) by mouth daily 90 tablet 3    aspirin (ECOTRIN LOW STRENGTH) 81 mg EC tablet Take 1 tablet (81 mg total) by mouth daily  0    desoximetasone (TOPICORT) 0 05 % cream Apply topically 2 (two) times a day 60 g 5    ferrous sulfate 325 (65 Fe) mg tablet Take 325 mg by mouth 2 (two) times a day      gabapentin (NEURONTIN) 100 mg capsule Take 1 capsule (100 mg total) by mouth daily at bedtime 30 capsule 0    metoprolol tartrate (LOPRESSOR) 100 mg tablet Take 1 tablet (100 mg total) by mouth 2 (two) times a day 180 tablet 0    pravastatin (PRAVACHOL) 40 mg tablet Take 1 tablet (40 mg total) by mouth daily with dinner 90 tablet 3    senna (SENOKOT) 8 6 mg Take 1 tablet (8 6 mg total) by mouth daily at bedtime 90 tablet 3    silver sulfadiazine (Silvadene) 1 % cream Apply topically 2 (two) times a day 50 g 5    torsemide (DEMADEX) 20 mg tablet Take 1 tablet (20 mg total) by mouth daily 30 tablet 5    apixaban (ELIQUIS) 2 5 mg Take 1 tablet (2 5 mg total) by mouth 2 (two) times a day 180 tablet 3    ondansetron (ZOFRAN) 4 mg tablet Take 1 tablet (4 mg total) by mouth every 8 (eight) hours as needed for nausea or vomiting for up to 4 days 12 tablet 0    pantoprazole (PROTONIX) 40 mg tablet Take 1 tablet (40 mg total) by mouth 2 (two) times a day before meals 180 tablet 3     No current facility-administered medications for this visit  Allergies   Allergen Reactions    Promethazine Delirium and Other (See Comments)       Review of Systems    Video Exam    There were no vitals filed for this visit  Physical Exam  Constitutional:       Appearance: Normal appearance  He is normal weight  HENT:      Head: Normocephalic and atraumatic  Nose: Nose normal       Mouth/Throat:      Pharynx: No oropharyngeal exudate  Eyes:      Conjunctiva/sclera: Conjunctivae normal    Pulmonary:      Effort: Pulmonary effort is normal    Musculoskeletal:      Right lower leg: Edema present  Left lower leg: Edema present  Skin:     Capillary Refill: Capillary refill takes 2 to 3 seconds  Neurological:      General: No focal deficit present  Mental Status: He is alert  Mental status is at baseline  He is disoriented  Psychiatric:         Mood and Affect: Mood normal          Behavior: Behavior normal          Thought Content:  Thought content normal           Visit Time  Total Visit Duration: 25 minutes yes

## 2025-06-17 ENCOUNTER — RESULTS FOLLOW-UP (OUTPATIENT)
Dept: NON INVASIVE DIAGNOSTICS | Facility: HOSPITAL | Age: OVER 89
End: 2025-06-17

## 2025-06-17 ENCOUNTER — REMOTE DEVICE CLINIC VISIT (OUTPATIENT)
Dept: CARDIOLOGY CLINIC | Facility: CLINIC | Age: OVER 89
End: 2025-06-17
Payer: COMMERCIAL

## 2025-06-17 DIAGNOSIS — Z95.0 CARDIAC PACEMAKER IN SITU: Primary | ICD-10-CM

## 2025-06-17 PROCEDURE — 93294 REM INTERROG EVL PM/LDLS PM: CPT | Performed by: INTERNAL MEDICINE

## 2025-06-17 PROCEDURE — 93296 REM INTERROG EVL PM/IDS: CPT | Performed by: INTERNAL MEDICINE

## 2025-06-17 NOTE — PROGRESS NOTES
"Results for orders placed or performed in visit on 06/17/25   Cardiac EP device report    Narrative    MDT-DUAL CHAMBER PPM (DDD MODE)/ ACTIVE SYSTEM IS MRI CONDITIONAL  CARELINK TRANSMISSION: PRESENTING EGRAM AF @ 60 BPM. BATTERY STATUS \"7 YRS.\" AP 0%  100%. ALL LEAD PARAMETERS WITHIN NORMAL LIMITS. 100% AF BURDEN- PT ON ELIQUIS. NORMAL DEVICE FUNCTION. NC         "

## 2025-06-19 ENCOUNTER — PROCEDURE VISIT (OUTPATIENT)
Dept: PODIATRY | Facility: CLINIC | Age: OVER 89
End: 2025-06-19
Payer: COMMERCIAL

## 2025-06-19 VITALS
BODY MASS INDEX: 27.14 KG/M2 | WEIGHT: 159 LBS | HEART RATE: 75 BPM | OXYGEN SATURATION: 98 % | RESPIRATION RATE: 16 BRPM | HEIGHT: 64 IN | TEMPERATURE: 98 F

## 2025-06-19 DIAGNOSIS — M79.674 PAIN IN TOES OF BOTH FEET: ICD-10-CM

## 2025-06-19 DIAGNOSIS — B35.1 ONYCHOMYCOSIS: Primary | ICD-10-CM

## 2025-06-19 DIAGNOSIS — M79.675 PAIN IN TOES OF BOTH FEET: ICD-10-CM

## 2025-06-19 DIAGNOSIS — Z79.01 LONG TERM CURRENT USE OF ANTICOAGULANT: ICD-10-CM

## 2025-06-19 DIAGNOSIS — R60.1 GENERALIZED EDEMA: ICD-10-CM

## 2025-06-19 DIAGNOSIS — G62.9 NEUROPATHY: ICD-10-CM

## 2025-06-19 PROCEDURE — 11721 DEBRIDE NAIL 6 OR MORE: CPT | Performed by: PODIATRIST

## 2025-06-19 NOTE — PROGRESS NOTES
"Name: Les Quiñonez      : 1935      MRN: [id number]  Encounter Provider: Chadd Nava DPM  Encounter Date: 2025   Encounter department: [facility] [place]  :  Assessment & Plan  Onychomycosis       Debride mycotic nails and thin the nail plates x 10 with the use of a nail nipper manually and an electric Dremel bur was used to reduce the thickness of the nail beds and smoothed the distal aspect of the nails.   Long term current use of anticoagulant         Generalized edema         Pain in toes of both feet         Last primary care doctor's note from 2025.    Pt was instructed to use lotion on days that he gets a shower on both feet such as cerave, Cetaphil or similar thick style of lotion.     Discussed proper shoe gear, daily inspections of feet, and general foot health with patient. Patient has Q8  findings and is recommended for at risk foot care every 9-10 weeks.    Patients most recent complete clinical foot exam was on: 2025    Return in about 10 weeks (around 2025).      History of Present Illness   HPI  Les Quiñonez is a 89 y.o. male who presents chief complaint of painful thick nails on both feet.  No change in medical history or medications since his last visit.  He was seen in his wheelchair with family present in the room.  Patient presents for at-risk foot care.  Patient has no acute concerns today.  Patient has significant lower extremity risk due to neuropathy, parasthesia, edema, and trophic skin changes to the lower extremity.    History obtained from: patient    Review of Systems  Medical History Reviewed by provider this encounter:     .  Medications Ordered Prior to Encounter[1]   Social History[2]     Objective   Pulse 75   Temp 98 °F (36.7 °C)   Resp 16   Ht 5' 4\" (1.626 m)   Wt 72.1 kg (159 lb)   SpO2 98%   BMI 27.29 kg/m²      Physical Exam  Vascular status is 0/4 DP PT negative digital hair, delayed capillary refill, and normal " distal cooling bilaterally.  Capillary refill is approximately 3 to 4 seconds.  There is edema present bilaterally of  +2 pitting.     Derm nails are brittle elongated hypertrophic white-yellow discoloration with subungual debris x 10.  There is an increased thickness in the nails of approximately 2 mm.  There is white flaky tissue present on the dorsal and plantar aspect of both feet going up into the legs.  There are no fissures present within the flaky tissue and no signs of infection or dried blood.  Loss of turgor is present bilaterally.  There is dependent rubor present bilaterally.            [1]   Current Outpatient Medications on File Prior to Visit   Medication Sig Dispense Refill    allopurinol (ZYLOPRIM) 100 mg tablet Take 1 tablet (100 mg total) by mouth daily 90 tablet 1    apixaban (Eliquis) 2.5 mg Take 1 tablet by mouth twice daily 180 tablet 1    aspirin (ECOTRIN LOW STRENGTH) 81 mg EC tablet Take 1 tablet (81 mg total) by mouth daily  0    atorvastatin (LIPITOR) 40 mg tablet Take 1 tablet (40 mg total) by mouth daily with dinner In place of pravastatin, to better prevent a heart attack or stroke 100 tablet 2    Blood Pressure Monitoring KIT Use 3 (three) times a week 1 kit 0    ergocalciferol (ERGOCALCIFEROL) 1.25 MG (50339 UT) capsule Take 1 capsule (50,000 Units total) by mouth once a week 12 capsule 0    ergocalciferol (VITAMIN D2) 50,000 units Take 1 capsule (50,000 Units total) by mouth once a week 12 capsule 0    gabapentin (NEURONTIN) 100 mg capsule Take 1 capsule (100 mg total) by mouth daily at bedtime 90 capsule 1    magnesium oxide (MAG-OX) 400 mg tablet Take 400 mg by mouth in the morning. Takes this Saturday Sunday Monday.      metoprolol tartrate (LOPRESSOR) 100 mg tablet Take 1 tablet (100 mg total) by mouth 2 (two) times a day Hold for a HR<60 bpm or a SBP<110 mmHg 180 tablet 1    pantoprazole (PROTONIX) 40 mg tablet Take 1 tablet (40 mg total) by mouth daily 90 tablet 1    senna  (SENOKOT) 8.6 mg Take 1 tablet (8.6 mg total) by mouth daily at bedtime      silver sulfadiazine (SILVADENE,SSD) 1 % cream Apply topically daily 85 g 1    torsemide (DEMADEX) 20 mg tablet Take 2 tablets (40 mg total) by mouth daily 180 tablet 1     No current facility-administered medications on file prior to visit.   [2]   Social History  Tobacco Use    Smoking status: Never    Smokeless tobacco: Never   Vaping Use    Vaping status: Never Used   Substance and Sexual Activity    Alcohol use: Never    Drug use: Never    Sexual activity: Not Currently     Partners: Female     Birth control/protection: Other

## 2025-07-11 DIAGNOSIS — I50.32 CHRONIC DIASTOLIC CHF (CONGESTIVE HEART FAILURE) (HCC): ICD-10-CM

## 2025-07-11 DIAGNOSIS — M54.50 CHRONIC BILATERAL LOW BACK PAIN WITHOUT SCIATICA: ICD-10-CM

## 2025-07-11 DIAGNOSIS — G89.29 CHRONIC BILATERAL LOW BACK PAIN WITHOUT SCIATICA: ICD-10-CM

## 2025-07-11 DIAGNOSIS — M10.9 ACUTE GOUT OF MULTIPLE SITES, UNSPECIFIED CAUSE: ICD-10-CM

## 2025-07-11 DIAGNOSIS — E55.9 VITAMIN D DEFICIENCY: ICD-10-CM

## 2025-07-11 DIAGNOSIS — I10 ESSENTIAL HYPERTENSION: ICD-10-CM

## 2025-07-11 RX ORDER — ERGOCALCIFEROL 1.25 MG/1
50000 CAPSULE, LIQUID FILLED ORAL WEEKLY
Qty: 12 CAPSULE | Refills: 0 | Status: SHIPPED | OUTPATIENT
Start: 2025-07-11

## 2025-07-11 RX ORDER — TORSEMIDE 20 MG/1
40 TABLET ORAL DAILY
Qty: 180 TABLET | Refills: 1 | Status: SHIPPED | OUTPATIENT
Start: 2025-07-11

## 2025-07-14 RX ORDER — GABAPENTIN 100 MG/1
100 CAPSULE ORAL
Qty: 90 CAPSULE | Refills: 0 | OUTPATIENT
Start: 2025-07-14

## 2025-07-14 RX ORDER — METOPROLOL TARTRATE 100 MG/1
100 TABLET ORAL 2 TIMES DAILY
Qty: 180 TABLET | Refills: 0 | OUTPATIENT
Start: 2025-07-14

## 2025-07-14 RX ORDER — ALLOPURINOL 100 MG/1
100 TABLET ORAL DAILY
Qty: 90 TABLET | Refills: 0 | OUTPATIENT
Start: 2025-07-14

## 2025-07-15 ENCOUNTER — APPOINTMENT (OUTPATIENT)
Dept: LAB | Facility: HOSPITAL | Age: OVER 89
End: 2025-07-15
Attending: INTERNAL MEDICINE
Payer: COMMERCIAL

## 2025-07-15 DIAGNOSIS — N18.4 STAGE 4 CHRONIC KIDNEY DISEASE (HCC): ICD-10-CM

## 2025-07-15 LAB
ANION GAP SERPL CALCULATED.3IONS-SCNC: 8 MMOL/L (ref 4–13)
BUN SERPL-MCNC: 55 MG/DL (ref 5–25)
CALCIUM SERPL-MCNC: 9.3 MG/DL (ref 8.4–10.2)
CHLORIDE SERPL-SCNC: 102 MMOL/L (ref 96–108)
CO2 SERPL-SCNC: 27 MMOL/L (ref 21–32)
CREAT SERPL-MCNC: 2.88 MG/DL (ref 0.6–1.3)
GFR SERPL CREATININE-BSD FRML MDRD: 18 ML/MIN/1.73SQ M
GLUCOSE P FAST SERPL-MCNC: 109 MG/DL (ref 65–99)
POTASSIUM SERPL-SCNC: 4.5 MMOL/L (ref 3.5–5.3)
SODIUM SERPL-SCNC: 137 MMOL/L (ref 135–147)

## 2025-07-15 PROCEDURE — 80048 BASIC METABOLIC PNL TOTAL CA: CPT

## 2025-07-15 PROCEDURE — 36415 COLL VENOUS BLD VENIPUNCTURE: CPT

## 2025-07-24 DIAGNOSIS — I48.0 PAROXYSMAL ATRIAL FIBRILLATION (HCC): ICD-10-CM

## 2025-07-24 DIAGNOSIS — I25.810 ATHEROSCLEROSIS OF CORONARY ARTERY BYPASS GRAFT OF NATIVE HEART WITHOUT ANGINA PECTORIS: ICD-10-CM

## 2025-07-25 RX ORDER — ATORVASTATIN CALCIUM 40 MG/1
TABLET, FILM COATED ORAL
Qty: 90 TABLET | Refills: 1 | Status: SHIPPED | OUTPATIENT
Start: 2025-07-25

## 2025-07-28 ENCOUNTER — TELEPHONE (OUTPATIENT)
Dept: GASTROENTEROLOGY | Facility: CLINIC | Age: OVER 89
End: 2025-07-28

## 2025-07-28 DIAGNOSIS — I25.810 ATHEROSCLEROSIS OF CORONARY ARTERY BYPASS GRAFT OF NATIVE HEART WITHOUT ANGINA PECTORIS: ICD-10-CM

## 2025-07-30 RX ORDER — ATORVASTATIN CALCIUM 40 MG/1
TABLET, FILM COATED ORAL
Qty: 90 TABLET | Refills: 0 | OUTPATIENT
Start: 2025-07-30

## 2025-08-01 DIAGNOSIS — L89.150 PRESSURE INJURY OF SACRAL REGION, UNSTAGEABLE (HCC): ICD-10-CM

## 2025-08-04 RX ORDER — SILVER SULFADIAZINE 10 MG/G
CREAM TOPICAL DAILY
Qty: 85 G | Refills: 0 | Status: SHIPPED | OUTPATIENT
Start: 2025-08-04

## (undated) DEVICE — THREE-QUARTER SHEET: Brand: CONVERTORS

## (undated) DEVICE — ACE WRAP 4 IN UNSTERILE

## (undated) DEVICE — FRAZIER SUCTION INSTRUMENT 18 FR W/OBTURATOR, NO CONTROL VENT: Brand: FRAZIER

## (undated) DEVICE — ACE WRAP 6 IN UNSTERILE

## (undated) DEVICE — ACE WRAP 6 IN STERILE

## (undated) DEVICE — SCD SEQUENTIAL COMPRESSION COMFORT SLEEVE MEDIUM KNEE LENGTH: Brand: KENDALL SCD

## (undated) DEVICE — ACE WRAP 4 IN STERILE

## (undated) DEVICE — DRESSING MEPILEX AG BORDER POST-OP 4 X 8 IN

## (undated) DEVICE — DRESSING MEPILEX AG BORDER POST-OP 4 X 12 IN

## (undated) DEVICE — CAPIT HIP STEM POR PRIMARY

## (undated) DEVICE — SYRINGE 20ML LL

## (undated) DEVICE — SUT ETHIBOND 1 CT-1 30 IN X425H

## (undated) DEVICE — NEEDLE 20 G X 1 1/2

## (undated) DEVICE — PADDING CAST 4 IN  COTTON STRL

## (undated) DEVICE — PEEL-AWAY HOOD: Brand: FLYTE, SURGICOOL

## (undated) DEVICE — DUAL CUT SAGITTAL BLADE

## (undated) DEVICE — PLUMEPEN PRO 10FT

## (undated) DEVICE — CAPIT HIP BIPOLAR HEAD POR PRIMARY

## (undated) DEVICE — HEAVY DUTY TABLE COVER: Brand: CONVERTORS

## (undated) DEVICE — SUT VICRYL 1 CT-1 36 IN J947H

## (undated) DEVICE — 3M™ IOBAN™ 2 ANTIMICROBIAL INCISE DRAPE 6651EZ: Brand: IOBAN™ 2

## (undated) DEVICE — SUT MONOCRYL 2-0 CT-1 36 IN Y945H

## (undated) DEVICE — THE SIMPULSE SOLO SYSTEM WITH ULTREX RETRACTABLE SPLASH SHIELD TIP: Brand: SIMPULSE SOLO

## (undated) DEVICE — INTENDED FOR TISSUE SEPARATION, AND OTHER PROCEDURES THAT REQUIRE A SHARP SURGICAL BLADE TO PUNCTURE OR CUT.: Brand: BARD-PARKER ® CARBON RIB-BACK BLADES

## (undated) DEVICE — POSITIONER OSI BEACH CHAIR FOAM

## (undated) DEVICE — Device

## (undated) DEVICE — TELFA NON-ADHERENT ABSORBENT DRESSING: Brand: TELFA

## (undated) DEVICE — COBAN 6 IN STERILE

## (undated) DEVICE — PADDING CAST 6IN COTTON STRL

## (undated) DEVICE — NEEDLE 18 G X 1 1/2

## (undated) DEVICE — SUT STRATAFIX SPIRAL PDS PLUS 1 CTX 18 IN SXPP1A400

## (undated) DEVICE — DRESSING MEPILEX AG BORDER 4 X 8 IN

## (undated) DEVICE — COMFORT HOOD -75 EA/BX: Brand: CARDINAL HEALTH

## (undated) DEVICE — SUT MONOCRYL 3-0 PS-2 27 IN Y427H

## (undated) DEVICE — 3M™ STERI-DRAPE™ U-DRAPE 1015: Brand: STERI-DRAPE™